# Patient Record
Sex: FEMALE | Race: WHITE | Employment: OTHER | URBAN - METROPOLITAN AREA
[De-identification: names, ages, dates, MRNs, and addresses within clinical notes are randomized per-mention and may not be internally consistent; named-entity substitution may affect disease eponyms.]

---

## 2018-07-17 ENCOUNTER — OFFICE VISIT (OUTPATIENT)
Dept: FAMILY MEDICINE CLINIC | Age: 58
End: 2018-07-17
Payer: COMMERCIAL

## 2018-07-17 VITALS
DIASTOLIC BLOOD PRESSURE: 76 MMHG | BODY MASS INDEX: 35.65 KG/M2 | WEIGHT: 214 LBS | HEIGHT: 65 IN | RESPIRATION RATE: 18 BRPM | TEMPERATURE: 98.5 F | SYSTOLIC BLOOD PRESSURE: 130 MMHG | OXYGEN SATURATION: 94 % | HEART RATE: 94 BPM

## 2018-07-17 DIAGNOSIS — Z83.3 FAMILY HISTORY OF DIABETES MELLITUS: ICD-10-CM

## 2018-07-17 DIAGNOSIS — M54.42 CHRONIC BILATERAL LOW BACK PAIN WITH LEFT-SIDED SCIATICA: ICD-10-CM

## 2018-07-17 DIAGNOSIS — Z00.00 ROUTINE PHYSICAL EXAMINATION: Primary | ICD-10-CM

## 2018-07-17 DIAGNOSIS — Z12.11 SCREEN FOR COLON CANCER: ICD-10-CM

## 2018-07-17 DIAGNOSIS — M25.552 LEFT HIP PAIN: ICD-10-CM

## 2018-07-17 DIAGNOSIS — Z12.39 SCREENING FOR BREAST CANCER: ICD-10-CM

## 2018-07-17 DIAGNOSIS — Z23 NEED FOR PNEUMOCOCCAL VACCINATION: ICD-10-CM

## 2018-07-17 DIAGNOSIS — Z13.1 SCREENING FOR DIABETES MELLITUS: ICD-10-CM

## 2018-07-17 DIAGNOSIS — Z87.442 HISTORY OF NEPHROLITHIASIS: ICD-10-CM

## 2018-07-17 DIAGNOSIS — Z87.440 HISTORY OF UTI: ICD-10-CM

## 2018-07-17 DIAGNOSIS — Z86.19 HISTORY OF HERPES GENITALIS: ICD-10-CM

## 2018-07-17 DIAGNOSIS — G89.29 CHRONIC BILATERAL LOW BACK PAIN WITH LEFT-SIDED SCIATICA: ICD-10-CM

## 2018-07-17 LAB
BILIRUBIN, POC: NORMAL
BLOOD URINE, POC: NORMAL
CLARITY, POC: CLEAR
COLOR, POC: YELLOW
GLUCOSE URINE, POC: NORMAL
KETONES, POC: NORMAL
LEUKOCYTE EST, POC: NORMAL
NITRITE, POC: NORMAL
PH, POC: 6
PROTEIN, POC: 100
SPECIFIC GRAVITY, POC: 1.03
UROBILINOGEN, POC: 0.2

## 2018-07-17 PROCEDURE — 90732 PPSV23 VACC 2 YRS+ SUBQ/IM: CPT | Performed by: FAMILY MEDICINE

## 2018-07-17 PROCEDURE — 90471 IMMUNIZATION ADMIN: CPT | Performed by: FAMILY MEDICINE

## 2018-07-17 PROCEDURE — 99386 PREV VISIT NEW AGE 40-64: CPT | Performed by: FAMILY MEDICINE

## 2018-07-17 PROCEDURE — 81002 URINALYSIS NONAUTO W/O SCOPE: CPT | Performed by: FAMILY MEDICINE

## 2018-07-17 RX ORDER — VALACYCLOVIR HYDROCHLORIDE 1 G/1
1000 TABLET, FILM COATED ORAL DAILY
Qty: 5 TABLET | Refills: 1 | Status: SHIPPED | OUTPATIENT
Start: 2018-07-17 | End: 2021-04-27 | Stop reason: SDUPTHER

## 2018-07-17 RX ORDER — MELOXICAM 7.5 MG/1
TABLET ORAL
Qty: 60 TABLET | Refills: 2 | Status: ON HOLD | OUTPATIENT
Start: 2018-07-17 | End: 2018-09-20

## 2018-07-17 ASSESSMENT — ENCOUNTER SYMPTOMS
CONSTIPATION: 1
BLOOD IN STOOL: 0
DIARRHEA: 1
HEARTBURN: 1
BACK PAIN: 1

## 2018-07-17 ASSESSMENT — PATIENT HEALTH QUESTIONNAIRE - PHQ9
SUM OF ALL RESPONSES TO PHQ QUESTIONS 1-9: 0
1. LITTLE INTEREST OR PLEASURE IN DOING THINGS: 0
2. FEELING DOWN, DEPRESSED OR HOPELESS: 0
SUM OF ALL RESPONSES TO PHQ9 QUESTIONS 1 & 2: 0

## 2018-07-17 NOTE — PROGRESS NOTES
7/17/2018    Eder March is a 60 y.o. 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 Has not been to a doctor for some time. She has 3 kids, grown up, also has grandkids. Her sons children live with her  Health issues - she has h/o kidney infections and kidney stones. 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 She has a hiatal hernia. A few years ago had a few variable blood pressures both too high and too low. No recent evaluation for diabetes. She had a few normal paps after procedure for stage 4 abnormal cells of cervix. She is having some pain shooting into her legs  \"I think I have something pinched\"  Will get a sudden grabbing pain and will yell with pain, will occur once or twice and then won't recur. Then will occur again every few days  In addition to this, she has constant pain left side, sometimes radiating to right side - radiates to top thigh and left hip. Will sometimes have pain across lower part of them  She is having more trouble - more limited in her activities, feels unsteady on her feet as well. Previously had more strength in her legs  More trouble doing gardening and cutting grass. She has tried advil for pain - \"takes the edge off\". occ numbness in right hand numb  Worse with certain positions    Smokes - Quit for 6 months years and years ago. Not interested in quitting at this time    She also reports history of genital herpes. Got from  uyears ago. Gets outbreaks periodically. At one time was on daily medication but has not had medications for a long time.     Wt Readings from Last 3 Encounters:   07/17/18 214 lb (97.1 kg)   03/07/14 280 lb (127 kg)       Allergies   Allergen Reactions    Latex     Aloe     Iodine        Medications  Current Outpatient Prescriptions   Medication Sig Dispense Refill    Multiple Vitamins-Minerals (CENTRUM SILVER PO) Take  by mouth daily.  Omega-3 Fatty Acids (FISH OIL PO) Take  by mouth every 48 hours.  Naproxen Sodium (ALEVE PO) Take  by mouth daily. No current facility-administered medications for this visit. Past Medical/Surgical Hx;  Reviewed with patient      Diagnosis Date    Degenerative arthritis of hand     Hiatal hernia      Past Surgical History:   Procedure Laterality Date    TONSILLECTOMY         Past Family Hx:  Reviewed with patient  Family History   Problem Relation Age of Onset    Diabetes Mother     Arthritis Mother     Atrial Fibrillation Mother     Diabetes Father    Mora Dakins Atrial Fibrillation Father     Arthritis Father    Mora Dakins Emphysema Father    Mora Dakins Cancer Sister        Social Hx:  Reviewed with patient  Social History   Substance Use Topics    Smoking status: Current Every Day Smoker     Packs/day: 1.50     Years: 43.00     Types: Cigarettes    Smokeless tobacco: Never Used    Alcohol use No         There is no immunization history on file for this patient. Review of Systems  Review of Systems   Constitutional: Negative for chills and fever. Gastrointestinal: Positive for constipation (occ), diarrhea (occ) and heartburn (depending on diet). Negative for blood in stool and melena. Musculoskeletal: Positive for back pain. Neurological: Positive for weakness (legs). PE:  VS:  /76   Pulse 94   Temp 98.5 °F (36.9 °C) (Oral)   Resp 18   Ht 5' 5\" (1.651 m)   Wt 214 lb (97.1 kg)   SpO2 94%   Breastfeeding? No   BMI 35.61 kg/m²   Physical Exam   Constitutional: She is oriented to person, place, and time. She appears well-developed and well-nourished. Central obesity   HENT:   Head: Normocephalic and atraumatic. Eyes: Conjunctivae and EOM are normal. Pupils are equal, round, and reactive to light.    Cardiovascular: Normal rate, regular rhythm and

## 2018-07-17 NOTE — PATIENT INSTRUCTIONS
[email or web address] and sign in to your [employer] account. Enter Z339 in the [employer] box to learn more about \"Back Stretches: Exercises. \"     If you do not have an account, please click on the \"Sign Up Now\" link. Current as of: November 29, 2017  Content Version: 11.6  © 3234-1381 [employer]. Care instructions adapted under license by [facility] ([facility]). If you have questions about a medical condition or this instruction, always ask your healthcare professional. [address] any warranty or liability for your use of this information. Patient Education        Sciatica: Exercises  Your Care Instructions  Here are some examples of typical rehabilitation exercises for your condition. Start each exercise slowly. Ease off the exercise if you start to have pain. Your doctor or physical therapist will tell you when you can start these exercises and which ones will work best for you. When you are not being active, find a comfortable position for rest. Some people are comfortable on the floor or a medium-firm bed with a small pillow under their head and another under their knees. Some people prefer to lie on their side with a pillow between their knees. Don't stay in one position for too long. Take short walks (10 to 20 minutes) every 2 to 3 hours. Avoid slopes, hills, and stairs until you feel better. Walk only distances you can manage without pain, especially leg pain. How to do the exercises  Back stretches    5. Get down on your hands and knees on the floor. 6. Relax your head and allow it to droop. Round your back up toward the ceiling until you feel a nice stretch in your upper, middle, and lower back. Hold this stretch for as long as it feels comfortable, or about 15 to 30 seconds. 7. Return to the starting position with a flat back while you are on your hands and knees. 8. Let your back sway by pressing your stomach toward the floor.  Lift bend your knee. With your opposite hand, reach across your body, and then gently pull your knee toward your opposite shoulder. 7. Hold the stretch for 15 to 30 seconds. 8. Repeat 2 to 4 times. Double knee-to-chest    5. Lie on your back with your knees bent and your feet flat on the floor. You can put a small pillow under your head and neck if it is more comfortable. 6. Bring both knees to your chest.  7. Keep your lower back pressed to the floor. Hold for 15 to 30 seconds. 8. Relax, and lower your knees to the starting position. 9. Repeat 2 to 4 times. Follow-up care is a key part of your treatment and safety. Be sure to make and go to all appointments, and call your doctor if you are having problems. It's also a good idea to know your test results and keep a list of the medicines you take. Where can you learn more? Go to [email or web address] and sign in to your [email or web address] account. Enter L233 in the [employer] box to learn more about \"Trochanteric Bursitis: Exercises. \"     If you do not have an account, please click on the \"Sign Up Now\" link. Current as of: November 29, 2017  Content Version: 11.6  © 7879-1280 [employer]. Care instructions adapted under license by [facility] ([facility]). If you have questions about a medical condition or this instruction, always ask your healthcare professional. [place] [address] any warranty or liability for your use of this information.

## 2018-07-19 DIAGNOSIS — R31.29 OTHER MICROSCOPIC HEMATURIA: Primary | ICD-10-CM

## 2018-07-19 DIAGNOSIS — Z87.442 HISTORY OF NEPHROLITHIASIS: ICD-10-CM

## 2018-07-24 ENCOUNTER — HOSPITAL ENCOUNTER (OUTPATIENT)
Age: 58
Discharge: HOME | End: 2018-07-26
Payer: COMMERCIAL

## 2018-07-24 ENCOUNTER — NURSE ONLY (OUTPATIENT)
Dept: FAMILY MEDICINE CLINIC | Age: 58
End: 2018-07-24
Payer: COMMERCIAL

## 2018-07-24 DIAGNOSIS — Z87.440 HISTORY OF UTI: ICD-10-CM

## 2018-07-24 DIAGNOSIS — Z87.442 HISTORY OF NEPHROLITHIASIS: ICD-10-CM

## 2018-07-24 DIAGNOSIS — Z13.1 SCREENING FOR DIABETES MELLITUS: ICD-10-CM

## 2018-07-24 LAB
ALBUMIN SERPL-MCNC: 4.2 G/DL (ref 3.5–5.2)
ALP BLD-CCNC: 87 U/L (ref 35–104)
ALT SERPL-CCNC: 11 U/L (ref 0–32)
ANION GAP SERPL CALCULATED.3IONS-SCNC: 16 MMOL/L (ref 7–16)
AST SERPL-CCNC: 15 U/L (ref 0–31)
BASOPHILS ABSOLUTE: 0 E9/L (ref 0–0.2)
BASOPHILS RELATIVE PERCENT: 0.8 % (ref 0–2)
BILIRUB SERPL-MCNC: 0.5 MG/DL (ref 0–1.2)
BUN BLDV-MCNC: 14 MG/DL (ref 6–20)
BURR CELLS: NORMAL
CALCIUM SERPL-MCNC: 9.7 MG/DL (ref 8.6–10.2)
CHLORIDE BLD-SCNC: 102 MMOL/L (ref 98–107)
CHOLESTEROL, TOTAL: 186 MG/DL (ref 0–199)
CO2: 25 MMOL/L (ref 22–29)
CREAT SERPL-MCNC: 0.7 MG/DL (ref 0.5–1)
EOSINOPHILS ABSOLUTE: 0.14 E9/L (ref 0.05–0.5)
EOSINOPHILS RELATIVE PERCENT: 1.8 % (ref 0–6)
GFR AFRICAN AMERICAN: >60
GFR NON-AFRICAN AMERICAN: >60 ML/MIN/1.73
GLUCOSE BLD-MCNC: 107 MG/DL (ref 74–109)
HCT VFR BLD CALC: 46.1 % (ref 34–48)
HDLC SERPL-MCNC: 47 MG/DL
HEMOGLOBIN: 15.5 G/DL (ref 11.5–15.5)
LDL CHOLESTEROL CALCULATED: 108 MG/DL (ref 0–99)
LYMPHOCYTES ABSOLUTE: 2.5 E9/L (ref 1.5–4)
LYMPHOCYTES RELATIVE PERCENT: 31.6 % (ref 20–42)
MCH RBC QN AUTO: 31.8 PG (ref 26–35)
MCHC RBC AUTO-ENTMCNC: 33.6 % (ref 32–34.5)
MCV RBC AUTO: 94.7 FL (ref 80–99.9)
MONOCYTES ABSOLUTE: 0.55 E9/L (ref 0.1–0.95)
MONOCYTES RELATIVE PERCENT: 7 % (ref 2–12)
NEUTROPHILS ABSOLUTE: 4.68 E9/L (ref 1.8–7.3)
NEUTROPHILS RELATIVE PERCENT: 59.6 % (ref 43–80)
PDW BLD-RTO: 13.2 FL (ref 11.5–15)
PLATELET # BLD: 252 E9/L (ref 130–450)
PMV BLD AUTO: 9.6 FL (ref 7–12)
POIKILOCYTES: NORMAL
POTASSIUM SERPL-SCNC: 4.6 MMOL/L (ref 3.5–5)
RBC # BLD: 4.87 E12/L (ref 3.5–5.5)
SODIUM BLD-SCNC: 143 MMOL/L (ref 132–146)
TOTAL PROTEIN: 7.1 G/DL (ref 6.4–8.3)
TRIGL SERPL-MCNC: 157 MG/DL (ref 0–149)
VLDLC SERPL CALC-MCNC: 31 MG/DL
WBC # BLD: 7.8 E9/L (ref 4.5–11.5)

## 2018-07-24 PROCEDURE — 80053 COMPREHEN METABOLIC PANEL: CPT

## 2018-07-24 PROCEDURE — 85025 COMPLETE CBC W/AUTO DIFF WBC: CPT

## 2018-07-24 PROCEDURE — 36415 COLL VENOUS BLD VENIPUNCTURE: CPT | Performed by: FAMILY MEDICINE

## 2018-07-24 PROCEDURE — 80061 LIPID PANEL: CPT

## 2018-07-25 ENCOUNTER — HOSPITAL ENCOUNTER (OUTPATIENT)
Dept: ULTRASOUND IMAGING | Age: 58
Discharge: HOME | End: 2018-07-27
Payer: COMMERCIAL

## 2018-07-25 DIAGNOSIS — Z87.442 HISTORY OF NEPHROLITHIASIS: ICD-10-CM

## 2018-07-25 DIAGNOSIS — R31.29 OTHER MICROSCOPIC HEMATURIA: ICD-10-CM

## 2018-07-25 PROCEDURE — 76770 US EXAM ABDO BACK WALL COMP: CPT

## 2018-07-27 DIAGNOSIS — Z87.442 HISTORY OF NEPHROLITHIASIS: ICD-10-CM

## 2018-07-27 DIAGNOSIS — Z72.0 TOBACCO USE: ICD-10-CM

## 2018-07-27 DIAGNOSIS — R31.29 OTHER MICROSCOPIC HEMATURIA: Primary | ICD-10-CM

## 2018-09-10 ENCOUNTER — APPOINTMENT (OUTPATIENT)
Dept: CT IMAGING | Age: 58
DRG: 021 | End: 2018-09-10
Payer: COMMERCIAL

## 2018-09-10 ENCOUNTER — HOSPITAL ENCOUNTER (INPATIENT)
Age: 58
LOS: 10 days | Discharge: NURSING, REHAB OR LONG-TERM CARE | DRG: 021 | End: 2018-09-20
Attending: EMERGENCY MEDICINE | Admitting: INTERNAL MEDICINE
Payer: COMMERCIAL

## 2018-09-10 ENCOUNTER — ANESTHESIA EVENT (OUTPATIENT)
Dept: INTERVENTIONAL RADIOLOGY/VASCULAR | Age: 58
DRG: 021 | End: 2018-09-10
Payer: COMMERCIAL

## 2018-09-10 ENCOUNTER — ANESTHESIA (OUTPATIENT)
Dept: INTERVENTIONAL RADIOLOGY/VASCULAR | Age: 58
DRG: 021 | End: 2018-09-10
Payer: COMMERCIAL

## 2018-09-10 ENCOUNTER — APPOINTMENT (OUTPATIENT)
Dept: INTERVENTIONAL RADIOLOGY/VASCULAR | Age: 58
DRG: 021 | End: 2018-09-10
Payer: COMMERCIAL

## 2018-09-10 VITALS — SYSTOLIC BLOOD PRESSURE: 125 MMHG | TEMPERATURE: 96.8 F | OXYGEN SATURATION: 95 % | DIASTOLIC BLOOD PRESSURE: 63 MMHG

## 2018-09-10 DIAGNOSIS — I10 ACCELERATED HYPERTENSION: ICD-10-CM

## 2018-09-10 DIAGNOSIS — I60.9 SAH (SUBARACHNOID HEMORRHAGE) (HCC): ICD-10-CM

## 2018-09-10 DIAGNOSIS — I60.9 SUBARACHNOID BLEED (HCC): Primary | ICD-10-CM

## 2018-09-10 DIAGNOSIS — R51.9 ACUTE INTRACTABLE HEADACHE, UNSPECIFIED HEADACHE TYPE: ICD-10-CM

## 2018-09-10 PROBLEM — I67.1 CEREBRAL ANEURYSM: Status: ACTIVE | Noted: 2018-09-10

## 2018-09-10 PROBLEM — Z87.440 HISTORY OF UTI: Status: RESOLVED | Noted: 2018-07-17 | Resolved: 2018-09-10

## 2018-09-10 PROBLEM — E66.9 OBESITY (BMI 30-39.9): Chronic | Status: ACTIVE | Noted: 2018-09-10

## 2018-09-10 PROBLEM — I16.1 HYPERTENSIVE EMERGENCY: Status: ACTIVE | Noted: 2018-09-10

## 2018-09-10 PROBLEM — Z87.442 HISTORY OF NEPHROLITHIASIS: Status: RESOLVED | Noted: 2018-07-17 | Resolved: 2018-09-10

## 2018-09-10 LAB
ABO/RH: NORMAL
ACETAMINOPHEN LEVEL: <5 MCG/ML (ref 10–30)
AMPHETAMINE SCREEN, URINE: NOT DETECTED
ANION GAP SERPL CALCULATED.3IONS-SCNC: 15 MMOL/L (ref 7–16)
ANTIBODY SCREEN: NORMAL
APTT: 26 SEC (ref 24.5–35.1)
BARBITURATE SCREEN URINE: NOT DETECTED
BASOPHILS ABSOLUTE: 0.05 E9/L (ref 0–0.2)
BASOPHILS RELATIVE PERCENT: 0.4 % (ref 0–2)
BENZODIAZEPINE SCREEN, URINE: NOT DETECTED
BUN BLDV-MCNC: 19 MG/DL (ref 6–20)
CALCIUM SERPL-MCNC: 8.9 MG/DL (ref 8.6–10.2)
CANNABINOID SCREEN URINE: NOT DETECTED
CHLORIDE BLD-SCNC: 100 MMOL/L (ref 98–107)
CO2: 19 MMOL/L (ref 22–29)
COCAINE METABOLITE SCREEN URINE: NOT DETECTED
CREAT SERPL-MCNC: 0.6 MG/DL (ref 0.5–1)
EKG ATRIAL RATE: 88 BPM
EKG P AXIS: 69 DEGREES
EKG P-R INTERVAL: 162 MS
EKG Q-T INTERVAL: 400 MS
EKG QRS DURATION: 98 MS
EKG QTC CALCULATION (BAZETT): 484 MS
EKG R AXIS: 78 DEGREES
EKG T AXIS: 62 DEGREES
EKG VENTRICULAR RATE: 88 BPM
EOSINOPHILS ABSOLUTE: 0.06 E9/L (ref 0.05–0.5)
EOSINOPHILS RELATIVE PERCENT: 0.4 % (ref 0–6)
ETHANOL: <10 MG/DL (ref 0–0.08)
GFR AFRICAN AMERICAN: >60
GFR NON-AFRICAN AMERICAN: >60 ML/MIN/1.73
GLUCOSE BLD-MCNC: 159 MG/DL (ref 74–109)
HCT VFR BLD CALC: 43 % (ref 34–48)
HEMOGLOBIN: 14.6 G/DL (ref 11.5–15.5)
IMMATURE GRANULOCYTES #: 0.16 E9/L
IMMATURE GRANULOCYTES %: 1.1 % (ref 0–5)
INR BLD: 0.9
LACTIC ACID: 2.3 MMOL/L (ref 0.5–2.2)
LYMPHOCYTES ABSOLUTE: 1.51 E9/L (ref 1.5–4)
LYMPHOCYTES RELATIVE PERCENT: 10.8 % (ref 20–42)
MAGNESIUM: 1.8 MG/DL (ref 1.6–2.6)
MCH RBC QN AUTO: 31.8 PG (ref 26–35)
MCHC RBC AUTO-ENTMCNC: 34 % (ref 32–34.5)
MCV RBC AUTO: 93.7 FL (ref 80–99.9)
METHADONE SCREEN, URINE: NOT DETECTED
MONOCYTES ABSOLUTE: 0.67 E9/L (ref 0.1–0.95)
MONOCYTES RELATIVE PERCENT: 4.8 % (ref 2–12)
NEUTROPHILS ABSOLUTE: 11.57 E9/L (ref 1.8–7.3)
NEUTROPHILS RELATIVE PERCENT: 82.5 % (ref 43–80)
OPIATE SCREEN URINE: NOT DETECTED
PDW BLD-RTO: 12.8 FL (ref 11.5–15)
PHENCYCLIDINE SCREEN URINE: NOT DETECTED
PLATELET # BLD: 238 E9/L (ref 130–450)
PMV BLD AUTO: 9.3 FL (ref 7–12)
POTASSIUM SERPL-SCNC: 3.7 MMOL/L (ref 3.5–5)
PROPOXYPHENE SCREEN: NOT DETECTED
PROTHROMBIN TIME: 10.9 SEC (ref 9.3–12.4)
RBC # BLD: 4.59 E12/L (ref 3.5–5.5)
SALICYLATE, SERUM: <0.3 MG/DL (ref 0–30)
SODIUM BLD-SCNC: 134 MMOL/L (ref 132–146)
TRICYCLIC ANTIDEPRESSANTS SCREEN SERUM: NEGATIVE NG/ML
TROPONIN: <0.01 NG/ML (ref 0–0.03)
WBC # BLD: 14 E9/L (ref 4.5–11.5)

## 2018-09-10 PROCEDURE — 36415 COLL VENOUS BLD VENIPUNCTURE: CPT

## 2018-09-10 PROCEDURE — 03VG3DZ RESTRICTION OF INTRACRANIAL ARTERY WITH INTRALUMINAL DEVICE, PERCUTANEOUS APPROACH: ICD-10-PCS | Performed by: NEUROLOGICAL SURGERY

## 2018-09-10 PROCEDURE — 96375 TX/PRO/DX INJ NEW DRUG ADDON: CPT

## 2018-09-10 PROCEDURE — 70498 CT ANGIOGRAPHY NECK: CPT

## 2018-09-10 PROCEDURE — 93005 ELECTROCARDIOGRAM TRACING: CPT | Performed by: PHYSICIAN ASSISTANT

## 2018-09-10 PROCEDURE — 6360000002 HC RX W HCPCS

## 2018-09-10 PROCEDURE — 70496 CT ANGIOGRAPHY HEAD: CPT

## 2018-09-10 PROCEDURE — 7100000000 HC PACU RECOVERY - FIRST 15 MIN

## 2018-09-10 PROCEDURE — 85025 COMPLETE CBC W/AUTO DIFF WBC: CPT

## 2018-09-10 PROCEDURE — 6360000002 HC RX W HCPCS: Performed by: INTERNAL MEDICINE

## 2018-09-10 PROCEDURE — 6360000002 HC RX W HCPCS: Performed by: EMERGENCY MEDICINE

## 2018-09-10 PROCEDURE — 2500000003 HC RX 250 WO HCPCS: Performed by: NURSE ANESTHETIST, CERTIFIED REGISTERED

## 2018-09-10 PROCEDURE — 84484 ASSAY OF TROPONIN QUANT: CPT

## 2018-09-10 PROCEDURE — 94761 N-INVAS EAR/PLS OXIMETRY MLT: CPT

## 2018-09-10 PROCEDURE — 86850 RBC ANTIBODY SCREEN: CPT

## 2018-09-10 PROCEDURE — 6360000002 HC RX W HCPCS: Performed by: RADIOLOGY

## 2018-09-10 PROCEDURE — 6360000004 HC RX CONTRAST MEDICATION: Performed by: RADIOLOGY

## 2018-09-10 PROCEDURE — 83605 ASSAY OF LACTIC ACID: CPT

## 2018-09-10 PROCEDURE — 3700000001 HC ADD 15 MINUTES (ANESTHESIA)

## 2018-09-10 PROCEDURE — 2580000003 HC RX 258

## 2018-09-10 PROCEDURE — 99285 EMERGENCY DEPT VISIT HI MDM: CPT

## 2018-09-10 PROCEDURE — 85610 PROTHROMBIN TIME: CPT

## 2018-09-10 PROCEDURE — 85730 THROMBOPLASTIN TIME PARTIAL: CPT

## 2018-09-10 PROCEDURE — S0028 INJECTION, FAMOTIDINE, 20 MG: HCPCS | Performed by: PHYSICIAN ASSISTANT

## 2018-09-10 PROCEDURE — 2500000003 HC RX 250 WO HCPCS: Performed by: PHYSICIAN ASSISTANT

## 2018-09-10 PROCEDURE — 6360000002 HC RX W HCPCS: Performed by: PHYSICIAN ASSISTANT

## 2018-09-10 PROCEDURE — 80048 BASIC METABOLIC PNL TOTAL CA: CPT

## 2018-09-10 PROCEDURE — 83735 ASSAY OF MAGNESIUM: CPT

## 2018-09-10 PROCEDURE — 96365 THER/PROPH/DIAG IV INF INIT: CPT

## 2018-09-10 PROCEDURE — C9113 INJ PANTOPRAZOLE SODIUM, VIA: HCPCS | Performed by: INTERNAL MEDICINE

## 2018-09-10 PROCEDURE — 86900 BLOOD TYPING SEROLOGIC ABO: CPT

## 2018-09-10 PROCEDURE — 7100000001 HC PACU RECOVERY - ADDTL 15 MIN

## 2018-09-10 PROCEDURE — 2580000003 HC RX 258: Performed by: NURSE PRACTITIONER

## 2018-09-10 PROCEDURE — C9248 INJ, CLEVIDIPINE BUTYRATE: HCPCS | Performed by: EMERGENCY MEDICINE

## 2018-09-10 PROCEDURE — 2500000003 HC RX 250 WO HCPCS

## 2018-09-10 PROCEDURE — 6360000002 HC RX W HCPCS: Performed by: NURSE ANESTHETIST, CERTIFIED REGISTERED

## 2018-09-10 PROCEDURE — 2500000003 HC RX 250 WO HCPCS: Performed by: NURSE PRACTITIONER

## 2018-09-10 PROCEDURE — 99233 SBSQ HOSP IP/OBS HIGH 50: CPT | Performed by: NURSE PRACTITIONER

## 2018-09-10 PROCEDURE — 2720000010 IR ULTRASOUND GUIDANCE VASCULAR ACCESS

## 2018-09-10 PROCEDURE — 87081 CULTURE SCREEN ONLY: CPT

## 2018-09-10 PROCEDURE — 2580000003 HC RX 258: Performed by: RADIOLOGY

## 2018-09-10 PROCEDURE — 2000000000 HC ICU R&B

## 2018-09-10 PROCEDURE — 3700000000 HC ANESTHESIA ATTENDED CARE

## 2018-09-10 PROCEDURE — 86901 BLOOD TYPING SEROLOGIC RH(D): CPT

## 2018-09-10 PROCEDURE — 80307 DRUG TEST PRSMV CHEM ANLYZR: CPT

## 2018-09-10 PROCEDURE — G0480 DRUG TEST DEF 1-7 CLASSES: HCPCS

## 2018-09-10 RX ORDER — FENTANYL CITRATE 50 UG/ML
25 INJECTION, SOLUTION INTRAMUSCULAR; INTRAVENOUS ONCE
Status: COMPLETED | OUTPATIENT
Start: 2018-09-10 | End: 2018-09-10

## 2018-09-10 RX ORDER — HYDRALAZINE HYDROCHLORIDE 20 MG/ML
10 INJECTION INTRAMUSCULAR; INTRAVENOUS ONCE
Status: COMPLETED | OUTPATIENT
Start: 2018-09-10 | End: 2018-09-10

## 2018-09-10 RX ORDER — HEPARIN SODIUM 10000 [USP'U]/ML
5000 INJECTION, SOLUTION INTRAVENOUS; SUBCUTANEOUS EVERY 5 MIN PRN
Status: COMPLETED | OUTPATIENT
Start: 2018-09-10 | End: 2018-09-10

## 2018-09-10 RX ORDER — MORPHINE SULFATE 2 MG/ML
2 INJECTION, SOLUTION INTRAMUSCULAR; INTRAVENOUS EVERY 5 MIN PRN
Status: DISCONTINUED | OUTPATIENT
Start: 2018-09-10 | End: 2018-09-11

## 2018-09-10 RX ORDER — METHYLPREDNISOLONE SODIUM SUCCINATE 125 MG/2ML
INJECTION, POWDER, LYOPHILIZED, FOR SOLUTION INTRAMUSCULAR; INTRAVENOUS PRN
Status: DISCONTINUED | OUTPATIENT
Start: 2018-09-10 | End: 2018-09-10 | Stop reason: SDUPTHER

## 2018-09-10 RX ORDER — MEPERIDINE HYDROCHLORIDE 50 MG/ML
12.5 INJECTION INTRAMUSCULAR; INTRAVENOUS; SUBCUTANEOUS EVERY 5 MIN PRN
Status: DISCONTINUED | OUTPATIENT
Start: 2018-09-10 | End: 2018-09-11

## 2018-09-10 RX ORDER — ONDANSETRON 2 MG/ML
4 INJECTION INTRAMUSCULAR; INTRAVENOUS ONCE
Status: COMPLETED | OUTPATIENT
Start: 2018-09-10 | End: 2018-09-10

## 2018-09-10 RX ORDER — DEXAMETHASONE SODIUM PHOSPHATE 10 MG/ML
INJECTION, SOLUTION INTRAMUSCULAR; INTRAVENOUS PRN
Status: DISCONTINUED | OUTPATIENT
Start: 2018-09-10 | End: 2018-09-10 | Stop reason: SDUPTHER

## 2018-09-10 RX ORDER — MORPHINE SULFATE 2 MG/ML
1 INJECTION, SOLUTION INTRAMUSCULAR; INTRAVENOUS EVERY 5 MIN PRN
Status: DISCONTINUED | OUTPATIENT
Start: 2018-09-10 | End: 2018-09-11

## 2018-09-10 RX ORDER — HYDROCODONE BITARTRATE AND ACETAMINOPHEN 5; 325 MG/1; MG/1
2 TABLET ORAL PRN
Status: ACTIVE | OUTPATIENT
Start: 2018-09-10 | End: 2018-09-10

## 2018-09-10 RX ORDER — PROPOFOL 10 MG/ML
INJECTION, EMULSION INTRAVENOUS PRN
Status: DISCONTINUED | OUTPATIENT
Start: 2018-09-10 | End: 2018-09-10 | Stop reason: SDUPTHER

## 2018-09-10 RX ORDER — HYDROCODONE BITARTRATE AND ACETAMINOPHEN 5; 325 MG/1; MG/1
1 TABLET ORAL PRN
Status: ACTIVE | OUTPATIENT
Start: 2018-09-10 | End: 2018-09-10

## 2018-09-10 RX ORDER — VECURONIUM BROMIDE 1 MG/ML
INJECTION, POWDER, LYOPHILIZED, FOR SOLUTION INTRAVENOUS PRN
Status: DISCONTINUED | OUTPATIENT
Start: 2018-09-10 | End: 2018-09-10 | Stop reason: SDUPTHER

## 2018-09-10 RX ORDER — DIPHENHYDRAMINE HYDROCHLORIDE 50 MG/ML
25 INJECTION INTRAMUSCULAR; INTRAVENOUS ONCE
Status: COMPLETED | OUTPATIENT
Start: 2018-09-10 | End: 2018-09-10

## 2018-09-10 RX ORDER — SODIUM CHLORIDE 9 MG/ML
INJECTION, SOLUTION INTRAVENOUS CONTINUOUS
Status: DISCONTINUED | OUTPATIENT
Start: 2018-09-10 | End: 2018-09-11

## 2018-09-10 RX ORDER — GLYCOPYRROLATE 1 MG/5 ML
SYRINGE (ML) INTRAVENOUS PRN
Status: DISCONTINUED | OUTPATIENT
Start: 2018-09-10 | End: 2018-09-10 | Stop reason: SDUPTHER

## 2018-09-10 RX ORDER — FENTANYL CITRATE 50 UG/ML
INJECTION, SOLUTION INTRAMUSCULAR; INTRAVENOUS
Status: COMPLETED
Start: 2018-09-10 | End: 2018-09-10

## 2018-09-10 RX ORDER — SODIUM CHLORIDE 0.9 % (FLUSH) 0.9 %
10 SYRINGE (ML) INJECTION PRN
Status: COMPLETED | OUTPATIENT
Start: 2018-09-10 | End: 2018-09-10

## 2018-09-10 RX ORDER — SODIUM CHLORIDE 0.9 % (FLUSH) 0.9 %
10 SYRINGE (ML) INJECTION EVERY 12 HOURS SCHEDULED
Status: DISCONTINUED | OUTPATIENT
Start: 2018-09-10 | End: 2018-09-20 | Stop reason: HOSPADM

## 2018-09-10 RX ORDER — DIPHENHYDRAMINE HYDROCHLORIDE 50 MG/ML
50 INJECTION INTRAMUSCULAR; INTRAVENOUS ONCE
Status: DISCONTINUED | OUTPATIENT
Start: 2018-09-10 | End: 2018-09-14

## 2018-09-10 RX ORDER — NEOSTIGMINE METHYLSULFATE 1 MG/ML
INJECTION, SOLUTION INTRAVENOUS PRN
Status: DISCONTINUED | OUTPATIENT
Start: 2018-09-10 | End: 2018-09-10 | Stop reason: SDUPTHER

## 2018-09-10 RX ORDER — SODIUM CHLORIDE 0.9 % (FLUSH) 0.9 %
10 SYRINGE (ML) INJECTION EVERY 12 HOURS SCHEDULED
Status: DISCONTINUED | OUTPATIENT
Start: 2018-09-10 | End: 2018-09-10 | Stop reason: SDUPTHER

## 2018-09-10 RX ORDER — ACETAMINOPHEN 325 MG/1
650 TABLET ORAL EVERY 4 HOURS PRN
Status: DISCONTINUED | OUTPATIENT
Start: 2018-09-10 | End: 2018-09-15

## 2018-09-10 RX ORDER — FENTANYL CITRATE 50 UG/ML
INJECTION, SOLUTION INTRAMUSCULAR; INTRAVENOUS PRN
Status: DISCONTINUED | OUTPATIENT
Start: 2018-09-10 | End: 2018-09-10 | Stop reason: SDUPTHER

## 2018-09-10 RX ORDER — METHYLPREDNISOLONE SODIUM SUCCINATE 125 MG/2ML
125 INJECTION, POWDER, LYOPHILIZED, FOR SOLUTION INTRAMUSCULAR; INTRAVENOUS ONCE
Status: COMPLETED | OUTPATIENT
Start: 2018-09-10 | End: 2018-09-10

## 2018-09-10 RX ORDER — HYDRALAZINE HYDROCHLORIDE 20 MG/ML
10 INJECTION INTRAMUSCULAR; INTRAVENOUS EVERY 10 MIN PRN
Status: DISCONTINUED | OUTPATIENT
Start: 2018-09-10 | End: 2018-09-11

## 2018-09-10 RX ORDER — ONDANSETRON 2 MG/ML
INJECTION INTRAMUSCULAR; INTRAVENOUS
Status: COMPLETED
Start: 2018-09-10 | End: 2018-09-10

## 2018-09-10 RX ORDER — HEPARIN SODIUM 1000 [USP'U]/ML
INJECTION, SOLUTION INTRAVENOUS; SUBCUTANEOUS PRN
Status: DISCONTINUED | OUTPATIENT
Start: 2018-09-10 | End: 2018-09-10 | Stop reason: SDUPTHER

## 2018-09-10 RX ORDER — SODIUM CHLORIDE 0.9 % (FLUSH) 0.9 %
10 SYRINGE (ML) INJECTION PRN
Status: DISCONTINUED | OUTPATIENT
Start: 2018-09-10 | End: 2018-09-20 | Stop reason: HOSPADM

## 2018-09-10 RX ORDER — PROMETHAZINE HYDROCHLORIDE 25 MG/ML
6.25 INJECTION, SOLUTION INTRAMUSCULAR; INTRAVENOUS EVERY 10 MIN PRN
Status: DISCONTINUED | OUTPATIENT
Start: 2018-09-10 | End: 2018-09-11

## 2018-09-10 RX ORDER — METHYLPREDNISOLONE ACETATE 80 MG/ML
INJECTION, SUSPENSION INTRA-ARTICULAR; INTRALESIONAL; INTRAMUSCULAR; SOFT TISSUE PRN
Status: DISCONTINUED | OUTPATIENT
Start: 2018-09-10 | End: 2018-09-10

## 2018-09-10 RX ORDER — LABETALOL HYDROCHLORIDE 5 MG/ML
10 INJECTION, SOLUTION INTRAVENOUS EVERY 10 MIN PRN
Status: DISCONTINUED | OUTPATIENT
Start: 2018-09-10 | End: 2018-09-11

## 2018-09-10 RX ORDER — DIPHENHYDRAMINE HYDROCHLORIDE 50 MG/ML
INJECTION INTRAMUSCULAR; INTRAVENOUS PRN
Status: DISCONTINUED | OUTPATIENT
Start: 2018-09-10 | End: 2018-09-10 | Stop reason: SDUPTHER

## 2018-09-10 RX ORDER — SODIUM CHLORIDE 9 MG/ML
INJECTION, SOLUTION INTRAVENOUS CONTINUOUS PRN
Status: DISCONTINUED | OUTPATIENT
Start: 2018-09-10 | End: 2018-09-10 | Stop reason: SDUPTHER

## 2018-09-10 RX ORDER — SODIUM CHLORIDE 9 MG/ML
INJECTION, SOLUTION INTRAVENOUS CONTINUOUS PRN
Status: DISCONTINUED | OUTPATIENT
Start: 2018-09-10 | End: 2018-09-10

## 2018-09-10 RX ORDER — HYDRALAZINE HYDROCHLORIDE 20 MG/ML
INJECTION INTRAMUSCULAR; INTRAVENOUS
Status: COMPLETED
Start: 2018-09-10 | End: 2018-09-10

## 2018-09-10 RX ORDER — MORPHINE SULFATE 2 MG/ML
2 INJECTION, SOLUTION INTRAMUSCULAR; INTRAVENOUS EVERY 4 HOURS PRN
Status: DISCONTINUED | OUTPATIENT
Start: 2018-09-10 | End: 2018-09-11

## 2018-09-10 RX ORDER — METHYLPREDNISOLONE SODIUM SUCCINATE 40 MG/ML
40 INJECTION, POWDER, LYOPHILIZED, FOR SOLUTION INTRAMUSCULAR; INTRAVENOUS ONCE
Status: DISCONTINUED | OUTPATIENT
Start: 2018-09-10 | End: 2018-09-14

## 2018-09-10 RX ORDER — SUCCINYLCHOLINE CHLORIDE 20 MG/ML
INJECTION INTRAMUSCULAR; INTRAVENOUS PRN
Status: DISCONTINUED | OUTPATIENT
Start: 2018-09-10 | End: 2018-09-10 | Stop reason: SDUPTHER

## 2018-09-10 RX ORDER — ONDANSETRON 2 MG/ML
4 INJECTION INTRAMUSCULAR; INTRAVENOUS EVERY 6 HOURS PRN
Status: DISCONTINUED | OUTPATIENT
Start: 2018-09-10 | End: 2018-09-20 | Stop reason: HOSPADM

## 2018-09-10 RX ORDER — PANTOPRAZOLE SODIUM 40 MG/10ML
40 INJECTION, POWDER, LYOPHILIZED, FOR SOLUTION INTRAVENOUS DAILY
Status: DISCONTINUED | OUTPATIENT
Start: 2018-09-10 | End: 2018-09-17

## 2018-09-10 RX ADMIN — FENTANYL CITRATE 50 MCG: 50 INJECTION, SOLUTION INTRAMUSCULAR; INTRAVENOUS at 17:43

## 2018-09-10 RX ADMIN — VECURONIUM BROMIDE 2 MG: 1 INJECTION, POWDER, LYOPHILIZED, FOR SOLUTION INTRAVENOUS at 18:52

## 2018-09-10 RX ADMIN — ONDANSETRON 4 MG: 2 SOLUTION INTRAMUSCULAR; INTRAVENOUS at 11:51

## 2018-09-10 RX ADMIN — ONDANSETRON 4 MG: 2 INJECTION INTRAMUSCULAR; INTRAVENOUS at 02:16

## 2018-09-10 RX ADMIN — HYDRALAZINE HYDROCHLORIDE 10 MG: 20 INJECTION INTRAMUSCULAR; INTRAVENOUS at 01:40

## 2018-09-10 RX ADMIN — Medication 5000 UNITS: at 17:20

## 2018-09-10 RX ADMIN — DEXAMETHASONE SODIUM PHOSPHATE 10 MG: 10 INJECTION, SOLUTION INTRAMUSCULAR; INTRAVENOUS at 16:51

## 2018-09-10 RX ADMIN — Medication 10 ML: at 08:03

## 2018-09-10 RX ADMIN — MORPHINE SULFATE 2 MG: 2 INJECTION, SOLUTION INTRAMUSCULAR; INTRAVENOUS at 10:05

## 2018-09-10 RX ADMIN — DIPHENHYDRAMINE HYDROCHLORIDE 50 MG: 50 INJECTION, SOLUTION INTRAMUSCULAR; INTRAVENOUS at 16:34

## 2018-09-10 RX ADMIN — HYDRALAZINE HYDROCHLORIDE 10 MG: 20 INJECTION INTRAMUSCULAR; INTRAVENOUS at 01:28

## 2018-09-10 RX ADMIN — DIPHENHYDRAMINE HYDROCHLORIDE 25 MG: 50 INJECTION, SOLUTION INTRAMUSCULAR; INTRAVENOUS at 01:11

## 2018-09-10 RX ADMIN — CLEVIPIDINE 14 MG/HR: 0.5 EMULSION INTRAVENOUS at 05:56

## 2018-09-10 RX ADMIN — MORPHINE SULFATE 2 MG: 2 INJECTION, SOLUTION INTRAMUSCULAR; INTRAVENOUS at 04:26

## 2018-09-10 RX ADMIN — ONDANSETRON 4 MG: 2 SOLUTION INTRAMUSCULAR; INTRAVENOUS at 02:16

## 2018-09-10 RX ADMIN — HEPARIN SODIUM 2000 UNITS: 1000 INJECTION, SOLUTION INTRAVENOUS; SUBCUTANEOUS at 20:26

## 2018-09-10 RX ADMIN — Medication 20 ML: at 01:21

## 2018-09-10 RX ADMIN — SODIUM CHLORIDE: 9 INJECTION, SOLUTION INTRAVENOUS at 10:36

## 2018-09-10 RX ADMIN — Medication 5000 UNITS: at 17:35

## 2018-09-10 RX ADMIN — SODIUM CHLORIDE: 9 INJECTION, SOLUTION INTRAVENOUS at 16:18

## 2018-09-10 RX ADMIN — IOVERSOL 285 ML: 678 INJECTION INTRA-ARTERIAL; INTRAVENOUS at 22:30

## 2018-09-10 RX ADMIN — IOPAMIDOL 80 ML: 755 INJECTION, SOLUTION INTRAVENOUS at 01:21

## 2018-09-10 RX ADMIN — Medication 140 MG: at 16:32

## 2018-09-10 RX ADMIN — Medication 5000 UNITS: at 17:25

## 2018-09-10 RX ADMIN — Medication 5000 UNITS: at 17:30

## 2018-09-10 RX ADMIN — FENTANYL CITRATE 50 MCG: 50 INJECTION, SOLUTION INTRAMUSCULAR; INTRAVENOUS at 17:04

## 2018-09-10 RX ADMIN — Medication 5000 UNITS: at 17:15

## 2018-09-10 RX ADMIN — FENTANYL CITRATE 25 MCG: 50 INJECTION, SOLUTION INTRAMUSCULAR; INTRAVENOUS at 01:55

## 2018-09-10 RX ADMIN — PANTOPRAZOLE SODIUM 40 MG: 40 INJECTION, POWDER, FOR SOLUTION INTRAVENOUS at 08:03

## 2018-09-10 RX ADMIN — Medication 5000 UNITS: at 17:10

## 2018-09-10 RX ADMIN — VECURONIUM BROMIDE 8 MG: 1 INJECTION, POWDER, LYOPHILIZED, FOR SOLUTION INTRAVENOUS at 16:51

## 2018-09-10 RX ADMIN — HEPARIN SODIUM 2500 UNITS: 1000 INJECTION, SOLUTION INTRAVENOUS; SUBCUTANEOUS at 19:13

## 2018-09-10 RX ADMIN — LABETALOL HYDROCHLORIDE 10 MG: 5 INJECTION INTRAVENOUS at 09:55

## 2018-09-10 RX ADMIN — CLEVIPIDINE 14 MG/HR: 0.5 EMULSION INTRAVENOUS at 09:19

## 2018-09-10 RX ADMIN — VECURONIUM BROMIDE 2 MG: 1 INJECTION, POWDER, LYOPHILIZED, FOR SOLUTION INTRAVENOUS at 16:31

## 2018-09-10 RX ADMIN — LABETALOL HYDROCHLORIDE 10 MG: 5 INJECTION INTRAVENOUS at 12:09

## 2018-09-10 RX ADMIN — FENTANYL CITRATE 50 MCG: 50 INJECTION, SOLUTION INTRAMUSCULAR; INTRAVENOUS at 16:23

## 2018-09-10 RX ADMIN — Medication 0.6 MG: at 21:48

## 2018-09-10 RX ADMIN — CLEVIPIDINE 12 MG/HR: 0.5 EMULSION INTRAVENOUS at 12:56

## 2018-09-10 RX ADMIN — VECURONIUM BROMIDE 1 MG: 1 INJECTION, POWDER, LYOPHILIZED, FOR SOLUTION INTRAVENOUS at 21:15

## 2018-09-10 RX ADMIN — FENTANYL CITRATE 50 MCG: 50 INJECTION, SOLUTION INTRAMUSCULAR; INTRAVENOUS at 16:31

## 2018-09-10 RX ADMIN — METHYLPREDNISOLONE SODIUM SUCCINATE 40 MG: 125 INJECTION, POWDER, FOR SOLUTION INTRAMUSCULAR; INTRAVENOUS at 16:34

## 2018-09-10 RX ADMIN — CLEVIPIDINE 2 MG/HR: 0.5 EMULSION INTRAVENOUS at 01:43

## 2018-09-10 RX ADMIN — LIDOCAINE HYDROCHLORIDE 100 MG: 20 INJECTION, SOLUTION INTRAVENOUS at 16:31

## 2018-09-10 RX ADMIN — PROPOFOL 200 MG: 10 INJECTION, EMULSION INTRAVENOUS at 16:31

## 2018-09-10 RX ADMIN — VECURONIUM BROMIDE 2 MG: 1 INJECTION, POWDER, LYOPHILIZED, FOR SOLUTION INTRAVENOUS at 19:42

## 2018-09-10 RX ADMIN — METHYLPREDNISOLONE SODIUM SUCCINATE 125 MG: 125 INJECTION, POWDER, FOR SOLUTION INTRAMUSCULAR; INTRAVENOUS at 01:11

## 2018-09-10 RX ADMIN — ONDANSETRON 4 MG: 2 SOLUTION INTRAMUSCULAR; INTRAVENOUS at 21:44

## 2018-09-10 RX ADMIN — Medication 3 MG: at 21:48

## 2018-09-10 RX ADMIN — FENTANYL CITRATE 100 MCG: 50 INJECTION, SOLUTION INTRAMUSCULAR; INTRAVENOUS at 22:18

## 2018-09-10 RX ADMIN — FAMOTIDINE 20 MG: 10 INJECTION, SOLUTION INTRAVENOUS at 01:11

## 2018-09-10 ASSESSMENT — PULMONARY FUNCTION TESTS
PIF_VALUE: 23
PIF_VALUE: 22
PIF_VALUE: 22
PIF_VALUE: 25
PIF_VALUE: 1
PIF_VALUE: 22
PIF_VALUE: 22
PIF_VALUE: 23
PIF_VALUE: 25
PIF_VALUE: 22
PIF_VALUE: 32
PIF_VALUE: 22
PIF_VALUE: 22
PIF_VALUE: 1
PIF_VALUE: 22
PIF_VALUE: 1
PIF_VALUE: 22
PIF_VALUE: 25
PIF_VALUE: 22
PIF_VALUE: 22
PIF_VALUE: 21
PIF_VALUE: 21
PIF_VALUE: 22
PIF_VALUE: 23
PIF_VALUE: 22
PIF_VALUE: 24
PIF_VALUE: 23
PIF_VALUE: 23
PIF_VALUE: 22
PIF_VALUE: 23
PIF_VALUE: 24
PIF_VALUE: 22
PIF_VALUE: 3
PIF_VALUE: 23
PIF_VALUE: 22
PIF_VALUE: 23
PIF_VALUE: 22
PIF_VALUE: 22
PIF_VALUE: 23
PIF_VALUE: 21
PIF_VALUE: 7
PIF_VALUE: 10
PIF_VALUE: 23
PIF_VALUE: 22
PIF_VALUE: 23
PIF_VALUE: 22
PIF_VALUE: 23
PIF_VALUE: 22
PIF_VALUE: 22
PIF_VALUE: 24
PIF_VALUE: 23
PIF_VALUE: 22
PIF_VALUE: 23
PIF_VALUE: 23
PIF_VALUE: 22
PIF_VALUE: 23
PIF_VALUE: 22
PIF_VALUE: 23
PIF_VALUE: 22
PIF_VALUE: 23
PIF_VALUE: 23
PIF_VALUE: 25
PIF_VALUE: 22
PIF_VALUE: 37
PIF_VALUE: 22
PIF_VALUE: 22
PIF_VALUE: 24
PIF_VALUE: 21
PIF_VALUE: 22
PIF_VALUE: 23
PIF_VALUE: 1
PIF_VALUE: 23
PIF_VALUE: 25
PIF_VALUE: 22
PIF_VALUE: 23
PIF_VALUE: 2
PIF_VALUE: 22
PIF_VALUE: 23
PIF_VALUE: 22
PIF_VALUE: 23
PIF_VALUE: 22
PIF_VALUE: 23
PIF_VALUE: 22
PIF_VALUE: 0
PIF_VALUE: 23
PIF_VALUE: 22
PIF_VALUE: 17
PIF_VALUE: 22
PIF_VALUE: 23
PIF_VALUE: 22
PIF_VALUE: 24
PIF_VALUE: 22
PIF_VALUE: 20
PIF_VALUE: 22
PIF_VALUE: 23
PIF_VALUE: 22
PIF_VALUE: 4
PIF_VALUE: 23
PIF_VALUE: 26
PIF_VALUE: 22
PIF_VALUE: 23
PIF_VALUE: 22
PIF_VALUE: 23
PIF_VALUE: 22
PIF_VALUE: 4
PIF_VALUE: 23
PIF_VALUE: 22
PIF_VALUE: 22
PIF_VALUE: 24
PIF_VALUE: 22
PIF_VALUE: 25
PIF_VALUE: 22
PIF_VALUE: 23
PIF_VALUE: 22
PIF_VALUE: 23
PIF_VALUE: 22
PIF_VALUE: 23
PIF_VALUE: 14
PIF_VALUE: 26
PIF_VALUE: 22
PIF_VALUE: 24
PIF_VALUE: 22
PIF_VALUE: 22
PIF_VALUE: 21
PIF_VALUE: 22
PIF_VALUE: 22
PIF_VALUE: 24
PIF_VALUE: 22
PIF_VALUE: 23
PIF_VALUE: 23
PIF_VALUE: 22
PIF_VALUE: 22
PIF_VALUE: 26
PIF_VALUE: 22
PIF_VALUE: 27
PIF_VALUE: 22
PIF_VALUE: 21
PIF_VALUE: 22
PIF_VALUE: 23
PIF_VALUE: 22
PIF_VALUE: 21
PIF_VALUE: 22
PIF_VALUE: 2
PIF_VALUE: 22
PIF_VALUE: 19
PIF_VALUE: 22
PIF_VALUE: 22
PIF_VALUE: 23
PIF_VALUE: 23
PIF_VALUE: 10
PIF_VALUE: 19
PIF_VALUE: 24
PIF_VALUE: 1
PIF_VALUE: 22
PIF_VALUE: 14
PIF_VALUE: 23
PIF_VALUE: 22
PIF_VALUE: 22
PIF_VALUE: 23
PIF_VALUE: 8
PIF_VALUE: 23
PIF_VALUE: 22
PIF_VALUE: 22
PIF_VALUE: 24
PIF_VALUE: 22
PIF_VALUE: 21
PIF_VALUE: 23
PIF_VALUE: 22
PIF_VALUE: 23
PIF_VALUE: 22
PIF_VALUE: 23
PIF_VALUE: 22
PIF_VALUE: 23
PIF_VALUE: 22
PIF_VALUE: 25
PIF_VALUE: 23
PIF_VALUE: 1
PIF_VALUE: 22
PIF_VALUE: 22
PIF_VALUE: 23
PIF_VALUE: 23
PIF_VALUE: 2
PIF_VALUE: 23
PIF_VALUE: 23
PIF_VALUE: 22
PIF_VALUE: 25
PIF_VALUE: 23
PIF_VALUE: 22
PIF_VALUE: 29
PIF_VALUE: 21
PIF_VALUE: 19
PIF_VALUE: 22
PIF_VALUE: 23
PIF_VALUE: 22
PIF_VALUE: 24
PIF_VALUE: 23
PIF_VALUE: 25
PIF_VALUE: 23
PIF_VALUE: 23
PIF_VALUE: 0
PIF_VALUE: 22
PIF_VALUE: 22
PIF_VALUE: 23
PIF_VALUE: 1
PIF_VALUE: 23
PIF_VALUE: 32
PIF_VALUE: 22
PIF_VALUE: 23
PIF_VALUE: 22
PIF_VALUE: 1
PIF_VALUE: 22
PIF_VALUE: 22
PIF_VALUE: 21
PIF_VALUE: 23
PIF_VALUE: 23
PIF_VALUE: 22
PIF_VALUE: 13
PIF_VALUE: 23
PIF_VALUE: 22
PIF_VALUE: 23
PIF_VALUE: 22
PIF_VALUE: 23
PIF_VALUE: 22
PIF_VALUE: 22
PIF_VALUE: 28
PIF_VALUE: 23
PIF_VALUE: 22
PIF_VALUE: 23
PIF_VALUE: 22
PIF_VALUE: 23
PIF_VALUE: 22
PIF_VALUE: 23
PIF_VALUE: 22
PIF_VALUE: 22
PIF_VALUE: 23
PIF_VALUE: 26
PIF_VALUE: 23
PIF_VALUE: 22
PIF_VALUE: 25
PIF_VALUE: 22
PIF_VALUE: 24
PIF_VALUE: 23
PIF_VALUE: 22
PIF_VALUE: 2
PIF_VALUE: 25
PIF_VALUE: 23
PIF_VALUE: 23
PIF_VALUE: 12
PIF_VALUE: 22
PIF_VALUE: 23
PIF_VALUE: 22
PIF_VALUE: 23
PIF_VALUE: 22
PIF_VALUE: 25
PIF_VALUE: 23
PIF_VALUE: 22
PIF_VALUE: 23
PIF_VALUE: 28
PIF_VALUE: 22
PIF_VALUE: 23

## 2018-09-10 ASSESSMENT — PAIN DESCRIPTION - LOCATION
LOCATION: HEAD;NECK

## 2018-09-10 ASSESSMENT — PAIN DESCRIPTION - ONSET
ONSET: ON-GOING
ONSET: ON-GOING

## 2018-09-10 ASSESSMENT — LIFESTYLE VARIABLES: SMOKING_STATUS: 1

## 2018-09-10 ASSESSMENT — PAIN DESCRIPTION - PAIN TYPE
TYPE: ACUTE PAIN
TYPE: ACUTE PAIN

## 2018-09-10 ASSESSMENT — PAIN SCALES - GENERAL
PAINLEVEL_OUTOF10: 10
PAINLEVEL_OUTOF10: 7
PAINLEVEL_OUTOF10: 10
PAINLEVEL_OUTOF10: 10
PAINLEVEL_OUTOF10: 0
PAINLEVEL_OUTOF10: 10

## 2018-09-10 ASSESSMENT — PAIN DESCRIPTION - FREQUENCY
FREQUENCY: CONTINUOUS
FREQUENCY: CONTINUOUS

## 2018-09-10 NOTE — ED PROVIDER NOTES
ED Attending  CC: Yes      HPI:  9/10/18, Time: 12:53AM.       Perla Voss is a 60 y.o. female presenting to the ED for evaluation of severe posterior head and neck pain, beginning prior to coming to ER . The complaint has been constant, moderate to severe in severity, and worsened by nothing. 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 She reports she has never had any pain like this in the past. She reports that she has no history of known HTN. She reports that her vision feels somewhat blurry and her eyes are very sensitive to the light. She states she went to the bathroom and felt very sweaty all over. She denies any anterior chest pain or shortness of breath. She is unaware of any known FH of aneurysms, etc.. She does admit to smoking tobacco. She denies any recent falls, trauma or injury. ROS:   Pertinent positives and negatives are stated within the HPI, all other systems reviewed and are negative.    --------------------------------------------- PAST HISTORY ---------------------------------------------  Past Medical History:  has a past medical history of Degenerative arthritis of hand and Hiatal hernia. Past Surgical History:  has a past surgical history that includes Tonsillectomy. Social History:  reports that she has been smoking Cigarettes. She has a 64.50 pack-year smoking history. She has never used smokeless tobacco. She reports that she does not drink alcohol or use drugs. Family History: family history includes Arthritis in her father and mother; Atrial Fibrillation in her father and mother; Cancer in her sister; Diabetes in her father and mother; Emphysema in her father. The patients home medications have been reviewed. Allergies: Latex;  Aloe; and Iodine      ---------------------------------------------------PHYSICAL EXAM--------------------------------------    Constitutional/General: Alert and oriented x3, ill appearing, non toxic in NAD, lying on bed with eyes closed. Slow to respond to questions. Head: NC/AT  Eyes: PERRL, EOMI, eyes very sensitive to light bilaterally. Ears and Nose: No discharge or bleeding  Mouth: Oropharynx clear, tongue midline, handling secretions, no trismus  Neck: Patient resists flexion and extension of cervical spine due to complaints of pain. Marked local tenderness to palpation in the midline of central cervical spine, no stridor, no crepitus, no meningeal signs  Pulmonary: Lungs clear to auscultation bilaterally, no obvious active wheezes, rales, or rhonchi. Not in respiratory distress  Cardiovascular:  Regular rate. No obvious murmur, gallops, or rubs. 2+ distal pulses  Chest: no chest wall tenderness  Abdomen: Soft. Non tender. Non distended. +BS. No rebound, guarding, or rigidity. No pulsatile masses appreciated. Musculoskeletal: Moves all extremities x 4. Warm and well perfused, no clubbing, cyanosis, or pitting edema. Capillary refill <3 seconds  Skin: warm and dry. No rashes. Neurologic: GCS 15, CN 2-12 grossly intact, no focal deficits, symmetric strength 5/5 in the upper and lower extremities bilaterally  Psych: Normal Affect    -------------------------------------------------- RESULTS -------------------------------------------------  I have personally reviewed all laboratory and imaging results for this patient. Results are listed below.      LABS:  Results for orders placed or performed during the hospital encounter of 09/10/18   CBC Auto Differential   Result Value Ref Range    WBC 14.0 (H) 4.5 - 11.5 E9/L    RBC 4.59 3.50 - 5.50 E12/L    Hemoglobin 14.6 11.5 - 15.5 g/dL    Hematocrit 43.0 34.0 - 48.0 %    MCV 93.7 80.0 - 99.9 fL    MCH 31.8 26.0 - 35.0 pg    MCHC 34.0 32.0 - 34.5 %    RDW 12.8 11.5 - 15.0 encounter and vital signs as below have been reviewed by myself. BP (!) 192/101   Pulse 98   Temp 96.4 °F (35.8 °C) (Temporal)   Resp 20   Ht 5' 5\" (1.651 m)   SpO2 95%   BMI 35.61 kg/m²   Oxygen Saturation Interpretation: Normal    The patients available past medical records and past encounters were reviewed. ------------------------------ ED COURSE/MEDICAL DECISION MAKING----------------------  Medications   clevidipine (CLEVIPREX) infusion (6 mg/hr Intravenous Rate/Dose Change 9/10/18 0216)   diphenhydrAMINE (BENADRYL) injection 25 mg (25 mg Intravenous Given 9/10/18 0111)   methylPREDNISolone sodium (SOLU-MEDROL) injection 125 mg (125 mg Intravenous Given 9/10/18 0111)   famotidine (PEPCID) injection 20 mg (20 mg Intravenous Given 9/10/18 0111)   sodium chloride flush 0.9 % injection 10 mL (20 mLs Intravenous Given 9/10/18 0121)   iopamidol (ISOVUE-370) 76 % injection 80 mL (80 mLs Intravenous Given 9/10/18 0121)   hydrALAZINE (APRESOLINE) injection 10 mg (10 mg Intravenous Given 9/10/18 0128)   hydrALAZINE (APRESOLINE) injection 10 mg (10 mg Intravenous Given 9/10/18 0140)   fentaNYL (SUBLIMAZE) injection 25 mcg (25 mcg Intravenous Given 9/10/18 0155)   ondansetron (ZOFRAN) injection 4 mg (4 mg Intravenous Given 9/10/18 0216)       Medical Decision Making:    Patient to ER with complaints of sudden onset of severe posterior neck pain and severe headache. Spoke with Dr. Anthony Terry immediately after coming out of patient room as to my concerns of possible bleed vs aneurysm rupture. Iodine allergy listed on patient chart, due to emergent nature of need for CTA of head and neck, will give Benadryl, Solumedrol and Pepcid as prophylaxis prior. Patient unaware of any IV dye allergy. Call received from CT, evidence of subarachnoid hemorrhage. Dose of IV Hydralazine given. BP improved. Fentanyl given for headache/pain. Patient vomiting 2:10 AM, dose of IV Zofran given.     Consultations: 1:35AM Dr. Shar Hicks spoke with Dr. Davis Courser placed to Dr. Ariel Finch, hospitalist concerning admission. 2:10AM Dr. Shar Hicks spoke with Dr. Ariel Finch, he agrees with admission to Neuro ICU. Admission placed. Critical Care: 30 minutes    This patient's ED course included: a personal history and physicial examination, re-evaluation prior to disposition, multiple bedside re-evaluations, IV medications, cardiac monitoring, continuous pulse oximetry and complex medical decision making and emergency management    This patient has remained hemodynamically stable during their ED course. Counseling: The emergency provider has spoken with the patient and discussed todays results, in addition to providing specific details for the plan of care and counseling regarding the diagnosis and prognosis. Questions are answered at this time and they are agreeable with the plan.     --------------------------------- IMPRESSION AND DISPOSITION ---------------------------------    IMPRESSION  1. Subarachnoid bleed ([address]    2. Accelerated hypertension    3.  Acute intractable headache, unspecified headache type        DISPOSITION  Disposition: Admit to Neuro ICU  Patient condition is fair    Patient seen and discussed with Dr. Sandeep Savage PA-C  09/10/18 5658

## 2018-09-10 NOTE — ANESTHESIA PRE PROCEDURE
0.9 % injection 10 mL  10 mL Intravenous 2 times per day LYLY Armijo CNP   10 mL at 09/10/18 6942       Allergies: Allergies   Allergen Reactions    Latex     Aloe     Iodine        Problem List:    Patient Active Problem List   Diagnosis Code    [facility] I60.9    Hypertensive emergency I16.1    Obesity (BMI 30-39. 9) E66.9    Cerebral aneurysm I67.1       Past Medical History:        Diagnosis Date    Degenerative arthritis of hand     Hiatal hernia        Past Surgical History:        Procedure Laterality Date    TONSILLECTOMY         Social History:    Social History   Substance Use Topics    Smoking status: Current Every Day Smoker     Packs/day: 1.50     Years: 43.00     Types: Cigarettes    Smokeless tobacco: Never Used    Alcohol use No                                Ready to quit: Not Answered  Counseling given: Not Answered      Vital Signs (Current):   Vitals:    09/10/18 1315 09/10/18 1400 09/10/18 1500 09/10/18 1520   BP: (!) 150/91 137/81 136/89 (!) 146/82   Pulse: 85 74 85 86   Resp: 17 16 17 20   Temp:       TempSrc:       SpO2:  92% 91% 95%   Weight:       Height:                                                  BP Readings from Last 3 Encounters:   09/10/18 (!) 146/82   07/17/18 130/76   03/07/14 130/84       NPO Status:                                                                                 BMI:   Wt Readings from Last 3 Encounters:   09/10/18 219 lb 12.8 oz (99.7 kg)   07/17/18 214 lb (97.1 kg)   03/07/14 280 lb (127 kg)     Body mass index is 36.58 kg/m².     CBC:   Lab Results   Component Value Date    WBC 14.0 09/10/2018    RBC 4.59 09/10/2018    HGB 14.6 09/10/2018    HCT 43.0 09/10/2018    MCV 93.7 09/10/2018    RDW 12.8 09/10/2018     09/10/2018       CMP:   Lab Results   Component Value Date     09/10/2018    K 3.7 09/10/2018     09/10/2018    CO2 19 09/10/2018    BUN 19 09/10/2018    CREATININE 0.6 09/10/2018

## 2018-09-10 NOTE — INTERVAL H&P NOTE
NEUROINTERVENTIONAL RADIOLOGY PREOPERATIVE HISTORY AND PHYSICAL    CHIEF COMPLAINT: Aneurysmal subarachnoid hemorrhage    HISTORY OF PRESENT ILLNESS: Laura Suarez is a 60 y.o. female who presented with severe headache. Head CT demonstrated extensive subarachnoid hemorrhage. Due to a contrast allergy, the patient was premedicated with methylprednisolone 125 mg IV and Benadryl 50 mg IV before undergoing CTA of the head, which demonstrated left ICA aneurysms. She was referred to Interventional Radiology for cerebral angiography and endovascular coil embolization. DIAGNOSIS:    Patient Active Problem List   Diagnosis    [facility]) [facility]    Hypertensive emergency    Obesity (BMI 30-39. 9)    Cerebral aneurysm         Current Facility-Administered Medications:     clevidipine (CLEVIPREX) infusion, 2 mg/hr, Intravenous, Continuous, Manda Jackson MD, Last Rate: 28 mL/hr at 09/10/18 1318, 14 mg/hr at 09/10/18 1318    sodium chloride flush 0.9 % injection 10 mL, 10 mL, Intravenous, PRN, Zachery Cason MD    0.9 % sodium chloride infusion, , Intravenous, Continuous, Gayatri Gibson APRN - CNP, Last Rate: 75 mL/hr at 09/10/18 1036    acetaminophen (TYLENOL) tablet 650 mg, 650 mg, Oral, Q4H PRN, Zachery Cason MD    morphine injection 2 mg, 2 mg, Intravenous, Q4H PRN, Zachery Cason MD, 2 mg at 09/10/18 1005    ondansetron [facility]) injection 4 mg, 4 mg, Intravenous, Q6H PRN, Zachery Cason MD, 4 mg at 09/10/18 1151    pantoprazole (PROTONIX) injection 40 mg, 40 mg, Intravenous, Daily, Zachery Cason MD, 40 mg at 09/10/18 0803    hydrALAZINE (APRESOLINE) injection 10 mg, 10 mg, Intravenous, Q10 Min PRN, Gayatri Cowans APRN - CNP    labetalol (NORMODYNE;TRANDATE) injection 10 mg, 10 mg, Intravenous, Q10 Min PRN, Gayatri Gibson APRN - CNP, 10 mg at 09/10/18 1209    sodium chloride flush 0.9 % injection 10 mL, 10 mL, Intravenous, 2 times per day, Gayatri Gibson APRN - CNP, 10 mL at 09/10/18 0803    methylPREDNISolone sodium (SOLU-MEDROL) injection 40 mg, 40 mg, Intravenous, Once, Obi Arcos MD    diphenhydrAMINE (BENADRYL) injection 50 mg, 50 mg, Intravenous, Once, Obi Arcos MD    Allergies   Allergen Reactions    Latex     Aloe     Iodine        Past Medical History:   Diagnosis Date    Degenerative arthritis of hand     Hiatal hernia        Past Surgical History:   Procedure Laterality Date    TONSILLECTOMY         Family History   Problem Relation Age of Onset    Diabetes Mother     Arthritis Mother     Atrial Fibrillation Mother     Diabetes Father    Minerva Amble Atrial Fibrillation Father     Arthritis Father    Minerva Amble Emphysema Father     Cancer Sister        Social History     Social History    Marital status:      Spouse name: N/A    Number of children: N/A    Years of education: N/A     Occupational History    Not on file. Social History Main Topics    Smoking status: Current Every Day Smoker     Packs/day: 1.50     Years: 43.00     Types: Cigarettes    Smokeless tobacco: Never Used    Alcohol use No    Drug use: No    Sexual activity: Not on file     Other Topics Concern    Not on file     Social History Narrative    No narrative on file       ROS: Negative or non-contributory other than as noted above. PHYSICAL EXAM:      Vitals:    09/10/18 1520   BP: (!) 146/82   Pulse: 86   Resp: 20   Temp:    SpO2: 95%     Patient examined in angio holding area. General appearance: Lying supine in ICU bed. Neuro:    Mental status: Somnolent - patient fell asleep repeatedly during interview but was oriented to place, date, situation. Speech: Speech fluent. No obvious dysarthria or aphasia. Cranial nerves: Subtle CN III palsy? II - XII otherwise intact. Pupils equal, round (2 mm), reactive to light. Strength: 4/5 in upper extremities (, biceps, triceps). Right pronator drift.   5/5 lower extremities (quadriceps, hamstrings, gastrocnemius, tibialis Alejandra Mayorga.  The details of the procedure as well as its risks, benefits, and alternatives were discussed. These risks include, but are not limited to, stroke (with temporary or permanent disability), vascular injury (including dissection), cerebral or other internal hemorrhage, aneurysm rupture or re-rupture with increased intracranial hemorrhage, infection, and death. The patient's sone indicated understanding and gave permission to proceed.     Electronically signed by Jake Valles MD on 9/10/2018 at 4:35 PM

## 2018-09-10 NOTE — ED NOTES
Name: Bernardo Berry  : 1960  MRN: [id number]    Date: 9/10/2018    Benefits of immediately proceeding with Radiology exam outweigh the risks and therefore the following is being waived:      [] Pregnancy test    [] Protocol for Iodine allergy    [] MRI questionnaire    [x] BUN/Creatinine  Concern for bleed in brain or aneurysm, waive labs      Brianna Watt 95, PA-C  09/10/18 0105

## 2018-09-10 NOTE — PROGRESS NOTES
1515 Received pt via bed to angio holding. Foot flexion and extension =. Hand grasps = and fair . Tongue deviates left. Hand off report received from [facility] SRNA visits and examines  Right femoral and DP pulses2+  1550 Dr Carrington Brown visits and explains procedure  65 Son at bedside, Dr Carrington Brown explains procedure  1620 To Angio table, care turned over to anesthesia  1625  Dr Anna Collins visits. Pt intubated with ease  1650  Dr Carrington Brown notified of pt's readiness  [id number] Procedure started  1910 2500 units heparin given IV  1922  4 mm x 8 cm Hydro Frame 10 coil deployed in LICA  7341  3 mm x 6 cm Hydrosoft 3D deployed in LICA  6228   2 mm x 4 cm Wave extra soft coil deployed in [address]  1 mm x 2 cm  Hyper soft 3D coil deployed in LICA  0612   2 mm x 2 cm wave extra soft coil deployed in LICA  2029  3 mm x 4 cm Hydrosoft 3D coil deployed LICA-PCOM  2964  1.5 mm x 2 cm wave extra soft coil deployed LICA - PCOM  1864 Procedure ended. 6 Fr angioseal deployed right femoral artery. Digital pressure x 5 minutes  2155 Right femoral and DP pulses 2+.  Hand off report called to PACU  2200 Transferred via bed, with monitor, O2 CRNA and RN in stable condition

## 2018-09-10 NOTE — ED NOTES
Name: Lia Brown  : 1960  MRN: [id number]    Date: 9/10/2018    Benefits of immediately proceeding with Radiology exam outweigh the risks and therefore the following is being waived:      [] Pregnancy test    [x] Protocol for Iodine allergy    [] MRI questionnaire    [] BUN/Creatinine        MD Juanpablo Williamson MD  09/10/18 5678

## 2018-09-10 NOTE — ED NOTES
Pt had one episode of emesis and was medicated with 4 mg Zofran per verbal order of Dr. Hussein Benítez. Pt no longer complaining of nausea.      Deshawn Gray RN  09/10/18 9218

## 2018-09-10 NOTE — H&P
[address] [facility]  Attending History and Physical      CHIEF COMPLAINT:  headache      HISTORY OF PRESENT ILLNESS:      The patient is a 60 y.o. female patient of dr Vidya Hackett who presents with complains of headache. Patient was in her usual state of health the entire day. Around 10 PM she acutely developed a headache. It was the worst headache of her life. She was nauseated. She did not take anything for the symptoms at home. She has no history of hypertension. She presented to the ED. She denied chest pain, shortness of breath, abdominal pain, fevers, chills and diaphoresis. She is resting comfortably in bed. She is very tired from being up all night. Past Medical History:    Past Medical History:   Diagnosis Date    Degenerative arthritis of hand     Hiatal hernia        Past Surgical History:    Past Surgical History:   Procedure Laterality Date    TONSILLECTOMY         Medications Prior to Admission:    Prescriptions Prior to Admission: meloxicam (MOBIC) 7.5 MG tablet, Take 1-2 tablets once daily  Multiple Vitamins-Minerals (CENTRUM SILVER PO), Take  by mouth daily. Omega-3 Fatty Acids (FISH OIL PO), Take  by mouth every 48 hours. Naproxen Sodium (ALEVE PO), Take  by mouth daily. Allergies:    Latex; Aloe; and Iodine    Social History:    reports that she has been smoking Cigarettes. She has a 64.50 pack-year smoking history. She has never used smokeless tobacco. She reports that she does not drink alcohol or use drugs. Family History:   family history includes Arthritis in her father and mother; Atrial Fibrillation in her father and mother; Cancer in her sister; Diabetes in her father and mother; Emphysema in her father.     REVIEW OF SYSTEMS:  As above in the HPI, otherwise negative    PHYSICAL EXAM:    Vitals:  /64   Pulse 85   Temp 98.2 °F (36.8 °C) (Oral)   Resp 21   Ht 5' 5\" (1.651 m)   Wt 219 lb 12.8 oz (99.7 kg)   SpO2 93%   BMI 36.58 kg/m² General:  Awake, alert, oriented X 3. Well developed, well nourished, well groomed. No apparent distress. HEENT:  Normocephalic, atraumatic. Pupils equal, round, reactive to light. No scleral icterus. No conjunctival injection. Normal lips, teeth, and gums. No nasal discharge. Neck:  Supple  Heart:  RRR, no murmurs, gallops, rubs  Lungs:  CTA bilaterally, bilat symmetrical expansion, no wheeze, rales, or rhonchi  Abdomen:   Bowel sounds present, soft, nontender, no masses, no organomegaly, no peritoneal signs  Extremities:  No clubbing, cyanosis, or edema  Skin:  Warm and dry, no open lesions or rash  Neuro:  Cranial nerves 2-12 intact, no focal deficits  Breast: deferred  Rectal: deferred  Genitalia:  deferred    LABS:    CBC with Differential:    Lab Results   Component Value Date    WBC 14.0 09/10/2018    RBC 4.59 09/10/2018    HGB 14.6 09/10/2018    HCT 43.0 09/10/2018     09/10/2018    MCV 93.7 09/10/2018    MCH 31.8 09/10/2018    MCHC 34.0 09/10/2018    RDW 12.8 09/10/2018    LYMPHOPCT 10.8 09/10/2018    MONOPCT 4.8 09/10/2018    BASOPCT 0.4 09/10/2018    MONOSABS 0.67 09/10/2018    LYMPHSABS 1.51 09/10/2018    EOSABS 0.06 09/10/2018    BASOSABS 0.05 09/10/2018     CMP:    Lab Results   Component Value Date     09/10/2018    K 3.7 09/10/2018     09/10/2018    CO2 19 09/10/2018    BUN 19 09/10/2018    CREATININE 0.6 09/10/2018    GFRAA >60 09/10/2018    LABGLOM >60 09/10/2018    GLUCOSE 159 09/10/2018    PROT 7.1 07/24/2018    LABALBU 4.2 07/24/2018    CALCIUM 8.9 09/10/2018    BILITOT 0.5 07/24/2018    ALKPHOS 87 07/24/2018    AST 15 07/24/2018    ALT 11 07/24/2018     BMP:    Lab Results   Component Value Date     09/10/2018    K 3.7 09/10/2018     09/10/2018    CO2 19 09/10/2018    BUN 19 09/10/2018    LABALBU 4.2 07/24/2018    CREATININE 0.6 09/10/2018    CALCIUM 8.9 09/10/2018    GFRAA >60 09/10/2018    LABGLOM >60 09/10/2018    GLUCOSE 159 09/10/2018 Magnesium:    Lab Results   Component Value Date    MG 1.8 09/10/2018     Phosphorus:  No results found for: PHOS  PT/INR:    Lab Results   Component Value Date    PROTIME 10.9 09/10/2018    INR 0.9 09/10/2018     PTT:    Lab Results   Component Value Date    APTT 26.0 09/10/2018   [APTT}  Troponin:    Lab Results   Component Value Date    TROPONINI <0.01 09/10/2018     Last 3 Troponin:    Lab Results   Component Value Date    TROPONINI <0.01 09/10/2018     U/A:    Lab Results   Component Value Date    NITRITE neg 07/17/2018    COLORU yellow 07/17/2018    COLORU YELLOW 03/07/2014    PROTEINU 100 07/17/2018    PROTEINU >=300 03/07/2014    PHUR 6.0 07/17/2018    PHUR 5.5 03/07/2014    WBCUA NONE 03/07/2014    RBCUA 2-5 03/07/2014    BACTERIA RARE 03/07/2014    CLARITYU clear 07/17/2018    CLARITYU CLEAR 03/07/2014    SPECGRAV 1.030 07/17/2018    SPECGRAV >=1.030 03/07/2014    LEUKOCYTESUR neg 07/17/2018    LEUKOCYTESUR NEGATIVE 03/07/2014    UROBILINOGEN 0.2 03/07/2014    BILIRUBINUR neg 07/17/2018    BLOODU moderate 07/17/2018    BLOODU LARGE 03/07/2014    GLUCOSEU neg 07/17/2018    GLUCOSEU NEGATIVE 03/07/2014     HgBA1c:  No results found for: LABA1C  VITAMIN B12: No components found for: B12  FOLATE:  No results found for: FOLATE    ASSESSMENT:      Patient Active Problem List   Diagnosis    [facility] [address]    Hypertensive emergency    Obesity (BMI 30-39. 9)    Cerebral aneurysm         PLAN:    Neurosurgery managing this neurosurgical emergency. Control blood pressure. As above. Continue to encourage weight loss. As above. Continue ICU care.     Joseph Pena MD  11:14 AM  9/10/2018

## 2018-09-10 NOTE — PLAN OF CARE
Problem: Falls - Risk of:  Goal: Will remain free from falls  Will remain free from falls   Outcome: Met This Shift      Problem: HEMODYNAMIC STATUS  Goal: Patient has stable vital signs and fluid balance  Outcome: Met This Shift

## 2018-09-10 NOTE — CONSULTS
Patient evaluated. Spoke with family. 4 vessel angiogram & possible coiling of the aneurysm ordered.

## 2018-09-10 NOTE — PROGRESS NOTES
 Atrial Fibrillation Father     Arthritis Father     Emphysema Father     Cancer Sister        VITAL SIGNS:   BP (!) 146/79   Pulse 100   Temp 98.2 °F (36.8 °C) (Oral)   Resp 19   Ht 5' 5\" (1.651 m)   Wt 219 lb 12.8 oz (99.7 kg)   SpO2 94%   BMI 36.58 kg/m²     PHYSICAL EXAM:    General appearance:  Comfortable. Pain Description: severe HA  GCS:    4 - Opens eyes on own   6 - Follows simple motor commands  5 - Alert and oriented    Pupil size:  Left 3 mm    Right 3 mm  Pupil reaction: Yes  Wiggles fingers: Left Yes Right Yes  Hand grasp:   Left normal    Right normal  Wiggles toes: Left Yes    Right Yes  Plantar flexion: Left normal   Right normal    CONSTITUTIONAL: no acute distress, lying in hospital bed. Alert and cooperative   NEUROLOGIC: PERRL, oriented x 4  CARDIOVASCULAR: S1 S2, regular rate, regular rhythm, no murmur/gallop/rub. Monitor:Sinus  PULMONARY: no rhonchi/rales/wheezes, no use of accessory muscles  RENAL: fischer to gravity, clear yellow urine  ABDOMEN: soft, nontender, nondistended, nontympanic, no masses, no organomegaly, normal bowel sounds   MUSCULOSKELETAL: moves all extremities purposefully, 5/5 strength   SKIN/EXTREMITIES: no rashes/ecchymosis, no edema/clubbing, warm/dry, good capillary refill     Assessment & Plan:       · Neuro:  SAH, aneurysm. Neurosurgery consulted, monitor hemodynamics   · CV: Hypertensive. Monitor hemodynamics. BP goal < 140. PRN hydralazine & labetalol. Cleviprex,     · Pulm: No acute issues  Monitor RR & SpO2. Pulmonary hygiene  · GI: No acute issues. NPO. Monitor bowel function. · Renal: No acute issues. Monitor BUN & Cr. Monitor electrolytes & replace as needed. Monitor urine output. · ID: Leukocytosis, afebrile. Monitor for SIRS  · Endocrine: Hyperglycemia. Monitor BS. · MSK: No acute issues. ROM. turn & reposition. PT/OT when able  · Heme: No acute issues. Monitor CBC.       Pain control/Sedation: morphine, Tylenol  DVT prophylaxis: SCDs.  No Lovenox/heparin until ok with neurosurgery. GI prophylaxis: Protonix  Mouth/Eye care: As needed  Sams: Keep in place for critical care monitoring of fluid balance.     Family update: Updated at bedside   Code status:  Full    Disposition:  NSICU      Electronically signed by LYLY Flores CNP on 9/10/2018 at 9:37 AM

## 2018-09-11 ENCOUNTER — ANESTHESIA EVENT (OUTPATIENT)
Dept: INTERVENTIONAL RADIOLOGY/VASCULAR | Age: 58
DRG: 021 | End: 2018-09-11
Payer: COMMERCIAL

## 2018-09-11 ENCOUNTER — APPOINTMENT (OUTPATIENT)
Dept: CT IMAGING | Age: 58
DRG: 021 | End: 2018-09-11
Payer: COMMERCIAL

## 2018-09-11 ENCOUNTER — APPOINTMENT (OUTPATIENT)
Dept: INTERVENTIONAL RADIOLOGY/VASCULAR | Age: 58
DRG: 021 | End: 2018-09-11
Payer: COMMERCIAL

## 2018-09-11 ENCOUNTER — APPOINTMENT (OUTPATIENT)
Dept: GENERAL RADIOLOGY | Age: 58
DRG: 021 | End: 2018-09-11
Payer: COMMERCIAL

## 2018-09-11 ENCOUNTER — ANESTHESIA (OUTPATIENT)
Dept: INTERVENTIONAL RADIOLOGY/VASCULAR | Age: 58
DRG: 021 | End: 2018-09-11
Payer: COMMERCIAL

## 2018-09-11 ENCOUNTER — APPOINTMENT (OUTPATIENT)
Dept: ULTRASOUND IMAGING | Age: 58
DRG: 021 | End: 2018-09-11
Payer: COMMERCIAL

## 2018-09-11 VITALS — RESPIRATION RATE: 1 BRPM | OXYGEN SATURATION: 92 %

## 2018-09-11 LAB
ANION GAP SERPL CALCULATED.3IONS-SCNC: 18 MMOL/L (ref 7–16)
BUN BLDV-MCNC: 15 MG/DL (ref 6–20)
CALCIUM SERPL-MCNC: 8.2 MG/DL (ref 8.6–10.2)
CHLORIDE BLD-SCNC: 106 MMOL/L (ref 98–107)
CO2: 18 MMOL/L (ref 22–29)
CREAT SERPL-MCNC: 0.7 MG/DL (ref 0.5–1)
GFR AFRICAN AMERICAN: >60
GFR NON-AFRICAN AMERICAN: >60 ML/MIN/1.73
GLUCOSE BLD-MCNC: 141 MG/DL (ref 74–109)
HCT VFR BLD CALC: 39.6 % (ref 34–48)
HEMOGLOBIN: 13.4 G/DL (ref 11.5–15.5)
MAGNESIUM: 1.7 MG/DL (ref 1.6–2.6)
MCH RBC QN AUTO: 32 PG (ref 26–35)
MCHC RBC AUTO-ENTMCNC: 33.8 % (ref 32–34.5)
MCV RBC AUTO: 94.5 FL (ref 80–99.9)
MRSA CULTURE ONLY: NORMAL
PDW BLD-RTO: 13.3 FL (ref 11.5–15)
PHOSPHORUS: 2.7 MG/DL (ref 2.5–4.5)
PLATELET # BLD: 233 E9/L (ref 130–450)
PMV BLD AUTO: 9.6 FL (ref 7–12)
POTASSIUM SERPL-SCNC: 3.6 MMOL/L (ref 3.5–5)
RBC # BLD: 4.19 E12/L (ref 3.5–5.5)
SODIUM BLD-SCNC: 142 MMOL/L (ref 132–146)
SODIUM BLD-SCNC: 143 MMOL/L (ref 132–146)
WBC # BLD: 15.8 E9/L (ref 4.5–11.5)

## 2018-09-11 PROCEDURE — C9248 INJ, CLEVIDIPINE BUTYRATE: HCPCS | Performed by: EMERGENCY MEDICINE

## 2018-09-11 PROCEDURE — 2580000003 HC RX 258: Performed by: PSYCHIATRY & NEUROLOGY

## 2018-09-11 PROCEDURE — 71045 X-RAY EXAM CHEST 1 VIEW: CPT

## 2018-09-11 PROCEDURE — 6360000002 HC RX W HCPCS: Performed by: INTERNAL MEDICINE

## 2018-09-11 PROCEDURE — 84100 ASSAY OF PHOSPHORUS: CPT

## 2018-09-11 PROCEDURE — 6370000000 HC RX 637 (ALT 250 FOR IP): Performed by: NURSE PRACTITIONER

## 2018-09-11 PROCEDURE — C1769 GUIDE WIRE: HCPCS

## 2018-09-11 PROCEDURE — 76377 3D RENDER W/INTRP POSTPROCES: CPT

## 2018-09-11 PROCEDURE — 7100000000 HC PACU RECOVERY - FIRST 15 MIN

## 2018-09-11 PROCEDURE — 75894 X-RAYS TRANSCATH THERAPY: CPT

## 2018-09-11 PROCEDURE — 83735 ASSAY OF MAGNESIUM: CPT

## 2018-09-11 PROCEDURE — 6360000002 HC RX W HCPCS: Performed by: NURSE ANESTHETIST, CERTIFIED REGISTERED

## 2018-09-11 PROCEDURE — 6360000002 HC RX W HCPCS: Performed by: EMERGENCY MEDICINE

## 2018-09-11 PROCEDURE — 70450 CT HEAD/BRAIN W/O DYE: CPT

## 2018-09-11 PROCEDURE — 99253 IP/OBS CNSLTJ NEW/EST LOW 45: CPT | Performed by: PSYCHIATRY & NEUROLOGY

## 2018-09-11 PROCEDURE — 2580000003 HC RX 258: Performed by: NURSE ANESTHETIST, CERTIFIED REGISTERED

## 2018-09-11 PROCEDURE — 02HV33Z INSERTION OF INFUSION DEVICE INTO SUPERIOR VENA CAVA, PERCUTANEOUS APPROACH: ICD-10-PCS | Performed by: SURGERY

## 2018-09-11 PROCEDURE — 6360000002 HC RX W HCPCS: Performed by: RADIOLOGY

## 2018-09-11 PROCEDURE — 93886 INTRACRANIAL COMPLETE STUDY: CPT

## 2018-09-11 PROCEDURE — 94664 DEMO&/EVAL PT USE INHALER: CPT

## 2018-09-11 PROCEDURE — 7100000001 HC PACU RECOVERY - ADDTL 15 MIN

## 2018-09-11 PROCEDURE — 84295 ASSAY OF SERUM SODIUM: CPT

## 2018-09-11 PROCEDURE — 36228 PLACE CATH INTRACRANIAL ART: CPT

## 2018-09-11 PROCEDURE — 99233 SBSQ HOSP IP/OBS HIGH 50: CPT | Performed by: NURSE PRACTITIONER

## 2018-09-11 PROCEDURE — 75710 ARTERY X-RAYS ARM/LEG: CPT

## 2018-09-11 PROCEDURE — 2500000003 HC RX 250 WO HCPCS: Performed by: RADIOLOGY

## 2018-09-11 PROCEDURE — 6360000004 HC RX CONTRAST MEDICATION: Performed by: RADIOLOGY

## 2018-09-11 PROCEDURE — 3700000000 HC ANESTHESIA ATTENDED CARE

## 2018-09-11 PROCEDURE — 37253 INTRVASC US NONCORONARY ADDL: CPT

## 2018-09-11 PROCEDURE — 2500000003 HC RX 250 WO HCPCS: Performed by: NURSE ANESTHETIST, CERTIFIED REGISTERED

## 2018-09-11 PROCEDURE — 85027 COMPLETE CBC AUTOMATED: CPT

## 2018-09-11 PROCEDURE — 36245 INS CATH ABD/L-EXT ART 1ST: CPT

## 2018-09-11 PROCEDURE — 2500000003 HC RX 250 WO HCPCS: Performed by: NURSE PRACTITIONER

## 2018-09-11 PROCEDURE — 6370000000 HC RX 637 (ALT 250 FOR IP): Performed by: ANESTHESIOLOGY

## 2018-09-11 PROCEDURE — 61650 EVASC PRLNG ADMN RX AGNT 1ST: CPT

## 2018-09-11 PROCEDURE — 36226 PLACE CATH VERTEBRAL ART: CPT

## 2018-09-11 PROCEDURE — 61624 TCAT PERM OCCLS/EMBOLJ CNS: CPT

## 2018-09-11 PROCEDURE — 36415 COLL VENOUS BLD VENIPUNCTURE: CPT

## 2018-09-11 PROCEDURE — 3700000001 HC ADD 15 MINUTES (ANESTHESIA)

## 2018-09-11 PROCEDURE — 2580000003 HC RX 258: Performed by: SURGERY

## 2018-09-11 PROCEDURE — 2000000000 HC ICU R&B

## 2018-09-11 PROCEDURE — 75898 FOLLOW-UP ANGIOGRAPHY: CPT

## 2018-09-11 PROCEDURE — C9113 INJ PANTOPRAZOLE SODIUM, VIA: HCPCS | Performed by: INTERNAL MEDICINE

## 2018-09-11 PROCEDURE — 36556 INSERT NON-TUNNEL CV CATH: CPT

## 2018-09-11 PROCEDURE — 2580000003 HC RX 258: Performed by: NURSE PRACTITIONER

## 2018-09-11 PROCEDURE — 36224 PLACE CATH CAROTD ART: CPT

## 2018-09-11 PROCEDURE — 80048 BASIC METABOLIC PNL TOTAL CA: CPT

## 2018-09-11 PROCEDURE — 6360000002 HC RX W HCPCS: Performed by: NURSE PRACTITIONER

## 2018-09-11 PROCEDURE — 37252 INTRVASC US NONCORONARY 1ST: CPT

## 2018-09-11 RX ORDER — POTASSIUM CHLORIDE 400 MEQ/1000ML
INJECTION, SOLUTION INTRAVENOUS PRN
Status: DISCONTINUED | OUTPATIENT
Start: 2018-09-11 | End: 2018-09-11 | Stop reason: SDUPTHER

## 2018-09-11 RX ORDER — LABETALOL HYDROCHLORIDE 5 MG/ML
10 INJECTION, SOLUTION INTRAVENOUS EVERY 10 MIN PRN
Status: DISCONTINUED | OUTPATIENT
Start: 2018-09-11 | End: 2018-09-15

## 2018-09-11 RX ORDER — POTASSIUM CHLORIDE 7.45 MG/ML
10 INJECTION INTRAVENOUS
Status: DISPENSED | OUTPATIENT
Start: 2018-09-11 | End: 2018-09-11

## 2018-09-11 RX ORDER — PROMETHAZINE HYDROCHLORIDE 25 MG/ML
25 INJECTION, SOLUTION INTRAMUSCULAR; INTRAVENOUS PRN
Status: DISCONTINUED | OUTPATIENT
Start: 2018-09-11 | End: 2018-09-13

## 2018-09-11 RX ORDER — NEOSTIGMINE METHYLSULFATE 1 MG/ML
INJECTION, SOLUTION INTRAVENOUS PRN
Status: DISCONTINUED | OUTPATIENT
Start: 2018-09-11 | End: 2018-09-11 | Stop reason: SDUPTHER

## 2018-09-11 RX ORDER — FENTANYL CITRATE 50 UG/ML
INJECTION, SOLUTION INTRAMUSCULAR; INTRAVENOUS PRN
Status: DISCONTINUED | OUTPATIENT
Start: 2018-09-11 | End: 2018-09-11 | Stop reason: SDUPTHER

## 2018-09-11 RX ORDER — 3% SODIUM CHLORIDE 3 G/100ML
25 INJECTION, SOLUTION INTRAVENOUS CONTINUOUS
Status: DISCONTINUED | OUTPATIENT
Start: 2018-09-11 | End: 2018-09-11

## 2018-09-11 RX ORDER — ACETAMINOPHEN 650 MG/1
650 SUPPOSITORY RECTAL EVERY 4 HOURS PRN
Status: DISCONTINUED | OUTPATIENT
Start: 2018-09-11 | End: 2018-09-13

## 2018-09-11 RX ORDER — VERAPAMIL HYDROCHLORIDE 2.5 MG/ML
40 INJECTION, SOLUTION INTRAVENOUS
Status: DISCONTINUED | OUTPATIENT
Start: 2018-09-11 | End: 2018-09-11 | Stop reason: CLARIF

## 2018-09-11 RX ORDER — MEPERIDINE HYDROCHLORIDE 50 MG/ML
12.5 INJECTION INTRAMUSCULAR; INTRAVENOUS; SUBCUTANEOUS EVERY 5 MIN PRN
Status: DISCONTINUED | OUTPATIENT
Start: 2018-09-11 | End: 2018-09-12

## 2018-09-11 RX ORDER — PROPOFOL 10 MG/ML
INJECTION, EMULSION INTRAVENOUS PRN
Status: DISCONTINUED | OUTPATIENT
Start: 2018-09-11 | End: 2018-09-11 | Stop reason: SDUPTHER

## 2018-09-11 RX ORDER — METHYLPREDNISOLONE SODIUM SUCCINATE 40 MG/ML
40 INJECTION, POWDER, LYOPHILIZED, FOR SOLUTION INTRAMUSCULAR; INTRAVENOUS ONCE
Status: COMPLETED | OUTPATIENT
Start: 2018-09-11 | End: 2018-09-11

## 2018-09-11 RX ORDER — LABETALOL HYDROCHLORIDE 5 MG/ML
5 INJECTION, SOLUTION INTRAVENOUS EVERY 10 MIN PRN
Status: DISCONTINUED | OUTPATIENT
Start: 2018-09-11 | End: 2018-09-12

## 2018-09-11 RX ORDER — NIMODIPINE 30 MG/1
60 CAPSULE, LIQUID FILLED ORAL
Status: DISCONTINUED | OUTPATIENT
Start: 2018-09-11 | End: 2018-09-20 | Stop reason: HOSPADM

## 2018-09-11 RX ORDER — SODIUM CHLORIDE 9 MG/ML
INJECTION, SOLUTION INTRAVENOUS CONTINUOUS
Status: DISCONTINUED | OUTPATIENT
Start: 2018-09-11 | End: 2018-09-13

## 2018-09-11 RX ORDER — DIPHENHYDRAMINE HYDROCHLORIDE 50 MG/ML
50 INJECTION INTRAMUSCULAR; INTRAVENOUS ONCE
Status: COMPLETED | OUTPATIENT
Start: 2018-09-11 | End: 2018-09-11

## 2018-09-11 RX ORDER — VECURONIUM BROMIDE 1 MG/ML
INJECTION, POWDER, LYOPHILIZED, FOR SOLUTION INTRAVENOUS PRN
Status: DISCONTINUED | OUTPATIENT
Start: 2018-09-11 | End: 2018-09-11 | Stop reason: SDUPTHER

## 2018-09-11 RX ORDER — VERAPAMIL HYDROCHLORIDE 2.5 MG/ML
40 INJECTION, SOLUTION INTRAVENOUS
Status: COMPLETED | OUTPATIENT
Start: 2018-09-11 | End: 2018-09-11

## 2018-09-11 RX ORDER — LIDOCAINE HYDROCHLORIDE 20 MG/ML
INJECTION, SOLUTION EPIDURAL; INFILTRATION; INTRACAUDAL; PERINEURAL PRN
Status: DISCONTINUED | OUTPATIENT
Start: 2018-09-11 | End: 2018-09-11 | Stop reason: SDUPTHER

## 2018-09-11 RX ORDER — MORPHINE SULFATE 2 MG/ML
1 INJECTION, SOLUTION INTRAMUSCULAR; INTRAVENOUS EVERY 5 MIN PRN
Status: DISCONTINUED | OUTPATIENT
Start: 2018-09-11 | End: 2018-09-12

## 2018-09-11 RX ORDER — HYDRALAZINE HYDROCHLORIDE 20 MG/ML
10 INJECTION INTRAMUSCULAR; INTRAVENOUS EVERY 10 MIN PRN
Status: DISCONTINUED | OUTPATIENT
Start: 2018-09-11 | End: 2018-09-19

## 2018-09-11 RX ORDER — IPRATROPIUM BROMIDE AND ALBUTEROL SULFATE 2.5; .5 MG/3ML; MG/3ML
1 SOLUTION RESPIRATORY (INHALATION) ONCE
Status: COMPLETED | OUTPATIENT
Start: 2018-09-11 | End: 2018-09-11

## 2018-09-11 RX ORDER — MAGNESIUM SULFATE IN WATER 40 MG/ML
4 INJECTION, SOLUTION INTRAVENOUS ONCE
Status: COMPLETED | OUTPATIENT
Start: 2018-09-11 | End: 2018-09-11

## 2018-09-11 RX ORDER — MORPHINE SULFATE 2 MG/ML
2 INJECTION, SOLUTION INTRAMUSCULAR; INTRAVENOUS EVERY 5 MIN PRN
Status: DISCONTINUED | OUTPATIENT
Start: 2018-09-11 | End: 2018-09-12

## 2018-09-11 RX ORDER — HEPARIN SODIUM 10000 [USP'U]/ML
5000 INJECTION, SOLUTION INTRAVENOUS; SUBCUTANEOUS EVERY 5 MIN PRN
Status: COMPLETED | OUTPATIENT
Start: 2018-09-11 | End: 2018-09-11

## 2018-09-11 RX ORDER — GLYCOPYRROLATE 1 MG/5 ML
SYRINGE (ML) INTRAVENOUS PRN
Status: DISCONTINUED | OUTPATIENT
Start: 2018-09-11 | End: 2018-09-11 | Stop reason: SDUPTHER

## 2018-09-11 RX ORDER — 0.9 % SODIUM CHLORIDE 0.9 %
1000 INTRAVENOUS SOLUTION INTRAVENOUS ONCE
Status: DISCONTINUED | OUTPATIENT
Start: 2018-09-11 | End: 2018-09-11

## 2018-09-11 RX ORDER — CALCIUM CHLORIDE 100 MG/ML
INJECTION INTRAVENOUS; INTRAVENTRICULAR PRN
Status: DISCONTINUED | OUTPATIENT
Start: 2018-09-11 | End: 2018-09-11 | Stop reason: SDUPTHER

## 2018-09-11 RX ORDER — PROPOFOL 10 MG/ML
INJECTION, EMULSION INTRAVENOUS CONTINUOUS PRN
Status: DISCONTINUED | OUTPATIENT
Start: 2018-09-11 | End: 2018-09-11 | Stop reason: SDUPTHER

## 2018-09-11 RX ADMIN — Medication 5000 UNITS: at 15:55

## 2018-09-11 RX ADMIN — PHENYLEPHRINE HYDROCHLORIDE 150 MCG: 10 INJECTION INTRAMUSCULAR; INTRAVENOUS; SUBCUTANEOUS at 16:58

## 2018-09-11 RX ADMIN — Medication 0.4 MG: at 17:56

## 2018-09-11 RX ADMIN — LIDOCAINE HYDROCHLORIDE 20 ML: 20 INJECTION, SOLUTION INFILTRATION; PERINEURAL at 18:50

## 2018-09-11 RX ADMIN — CLEVIPIDINE 14 MG/HR: 0.5 EMULSION INTRAVENOUS at 07:32

## 2018-09-11 RX ADMIN — METHYLPREDNISOLONE SODIUM SUCCINATE 40 MG: 40 INJECTION, POWDER, FOR SOLUTION INTRAMUSCULAR; INTRAVENOUS at 14:50

## 2018-09-11 RX ADMIN — IOVERSOL 140 ML: 678 INJECTION INTRA-ARTERIAL; INTRAVENOUS at 18:50

## 2018-09-11 RX ADMIN — PANTOPRAZOLE SODIUM 40 MG: 40 INJECTION, POWDER, FOR SOLUTION INTRAVENOUS at 08:33

## 2018-09-11 RX ADMIN — PHENYLEPHRINE HYDROCHLORIDE 10 MCG: 10 INJECTION INTRAMUSCULAR; INTRAVENOUS; SUBCUTANEOUS at 15:45

## 2018-09-11 RX ADMIN — VECURONIUM BROMIDE FOR INJECTION 1 MG: 1 INJECTION, POWDER, LYOPHILIZED, FOR SOLUTION INTRAVENOUS at 16:40

## 2018-09-11 RX ADMIN — VECURONIUM BROMIDE FOR INJECTION 2 MG: 1 INJECTION, POWDER, LYOPHILIZED, FOR SOLUTION INTRAVENOUS at 15:40

## 2018-09-11 RX ADMIN — ACETAMINOPHEN 650 MG: 650 SUPPOSITORY RECTAL at 09:44

## 2018-09-11 RX ADMIN — VECURONIUM BROMIDE FOR INJECTION 2 MG: 1 INJECTION, POWDER, LYOPHILIZED, FOR SOLUTION INTRAVENOUS at 15:13

## 2018-09-11 RX ADMIN — POTASSIUM CHLORIDE 20 MEQ: 400 INJECTION, SOLUTION INTRAVENOUS at 17:27

## 2018-09-11 RX ADMIN — LABETALOL HYDROCHLORIDE 10 MG: 5 INJECTION INTRAVENOUS at 02:12

## 2018-09-11 RX ADMIN — DIPHENHYDRAMINE HYDROCHLORIDE 50 MG: 50 INJECTION, SOLUTION INTRAMUSCULAR; INTRAVENOUS at 14:50

## 2018-09-11 RX ADMIN — LABETALOL HYDROCHLORIDE 10 MG: 5 INJECTION INTRAVENOUS at 21:38

## 2018-09-11 RX ADMIN — SODIUM CHLORIDE: 9 INJECTION, SOLUTION INTRAVENOUS at 14:40

## 2018-09-11 RX ADMIN — HYDRALAZINE HYDROCHLORIDE 10 MG: 20 INJECTION INTRAMUSCULAR; INTRAVENOUS at 02:32

## 2018-09-11 RX ADMIN — FENTANYL CITRATE 25 MCG: 50 INJECTION, SOLUTION INTRAMUSCULAR; INTRAVENOUS at 15:55

## 2018-09-11 RX ADMIN — MAGNESIUM SULFATE IN WATER 4 G: 40 INJECTION, SOLUTION INTRAVENOUS at 12:34

## 2018-09-11 RX ADMIN — Medication 5000 UNITS: at 15:50

## 2018-09-11 RX ADMIN — Medication 10 ML: at 21:09

## 2018-09-11 RX ADMIN — CLEVIPIDINE 14 MG/HR: 0.5 EMULSION INTRAVENOUS at 06:52

## 2018-09-11 RX ADMIN — Medication 5000 UNITS: at 15:40

## 2018-09-11 RX ADMIN — POTASSIUM CHLORIDE 10 MEQ: 7.46 INJECTION, SOLUTION INTRAVENOUS at 12:32

## 2018-09-11 RX ADMIN — HYDRALAZINE HYDROCHLORIDE 10 MG: 20 INJECTION INTRAMUSCULAR; INTRAVENOUS at 03:06

## 2018-09-11 RX ADMIN — CALCIUM CHLORIDE 0.5 G: 100 INJECTION, SOLUTION INTRAVENOUS; INTRAVENTRICULAR at 17:39

## 2018-09-11 RX ADMIN — SODIUM CHLORIDE: 9 INJECTION, SOLUTION INTRAVENOUS at 16:40

## 2018-09-11 RX ADMIN — FENTANYL CITRATE 50 MCG: 50 INJECTION, SOLUTION INTRAMUSCULAR; INTRAVENOUS at 14:40

## 2018-09-11 RX ADMIN — LABETALOL HYDROCHLORIDE 10 MG: 5 INJECTION INTRAVENOUS at 03:39

## 2018-09-11 RX ADMIN — HYDRALAZINE HYDROCHLORIDE 10 MG: 20 INJECTION INTRAMUSCULAR; INTRAVENOUS at 21:08

## 2018-09-11 RX ADMIN — Medication 2 MG: at 17:56

## 2018-09-11 RX ADMIN — FENTANYL CITRATE 25 MCG: 50 INJECTION, SOLUTION INTRAMUSCULAR; INTRAVENOUS at 15:14

## 2018-09-11 RX ADMIN — VECURONIUM BROMIDE FOR INJECTION 1 MG: 1 INJECTION, POWDER, LYOPHILIZED, FOR SOLUTION INTRAVENOUS at 16:10

## 2018-09-11 RX ADMIN — Medication 5000 UNITS: at 15:35

## 2018-09-11 RX ADMIN — LIDOCAINE HYDROCHLORIDE 100 MG: 20 INJECTION, SOLUTION EPIDURAL; INFILTRATION; INTRACAUDAL; PERINEURAL at 14:40

## 2018-09-11 RX ADMIN — PHENYLEPHRINE HYDROCHLORIDE 100 MCG: 10 INJECTION INTRAMUSCULAR; INTRAVENOUS; SUBCUTANEOUS at 14:40

## 2018-09-11 RX ADMIN — LABETALOL HYDROCHLORIDE 10 MG: 5 INJECTION INTRAVENOUS at 05:22

## 2018-09-11 RX ADMIN — VECURONIUM BROMIDE FOR INJECTION 5 MG: 1 INJECTION, POWDER, LYOPHILIZED, FOR SOLUTION INTRAVENOUS at 14:40

## 2018-09-11 RX ADMIN — Medication 5000 UNITS: at 15:30

## 2018-09-11 RX ADMIN — PROPOFOL 50 MCG/KG/MIN: 10 INJECTION, EMULSION INTRAVENOUS at 14:50

## 2018-09-11 RX ADMIN — Medication 10 ML: at 08:34

## 2018-09-11 RX ADMIN — NIMODIPINE 60 MG: 30 CAPSULE ORAL at 21:34

## 2018-09-11 RX ADMIN — PROPOFOL 200 MG: 10 INJECTION, EMULSION INTRAVENOUS at 14:40

## 2018-09-11 RX ADMIN — SODIUM CHLORIDE 25 ML/HR: 3 INJECTION, SOLUTION INTRAVENOUS at 08:34

## 2018-09-11 RX ADMIN — IPRATROPIUM BROMIDE AND ALBUTEROL SULFATE 1 AMPULE: .5; 3 SOLUTION RESPIRATORY (INHALATION) at 20:51

## 2018-09-11 RX ADMIN — SODIUM CHLORIDE 1000 ML: 9 INJECTION, SOLUTION INTRAVENOUS at 12:32

## 2018-09-11 RX ADMIN — PHENYLEPHRINE HYDROCHLORIDE 100 MCG: 10 INJECTION INTRAMUSCULAR; INTRAVENOUS; SUBCUTANEOUS at 17:10

## 2018-09-11 RX ADMIN — PHENYLEPHRINE HYDROCHLORIDE 200 MCG: 10 INJECTION INTRAMUSCULAR; INTRAVENOUS; SUBCUTANEOUS at 17:20

## 2018-09-11 RX ADMIN — FENTANYL CITRATE 25 MCG: 50 INJECTION, SOLUTION INTRAMUSCULAR; INTRAVENOUS at 15:40

## 2018-09-11 RX ADMIN — PHENYLEPHRINE HYDROCHLORIDE 100 MCG: 10 INJECTION INTRAMUSCULAR; INTRAVENOUS; SUBCUTANEOUS at 16:50

## 2018-09-11 RX ADMIN — Medication 5000 UNITS: at 15:45

## 2018-09-11 RX ADMIN — VERAPAMIL HYDROCHLORIDE 10 MG: 2.5 INJECTION, SOLUTION INTRAVENOUS at 17:03

## 2018-09-11 RX ADMIN — HYDRALAZINE HYDROCHLORIDE 10 MG: 20 INJECTION INTRAMUSCULAR; INTRAVENOUS at 07:08

## 2018-09-11 RX ADMIN — PHENYLEPHRINE HYDROCHLORIDE 200 MCG: 10 INJECTION INTRAMUSCULAR; INTRAVENOUS; SUBCUTANEOUS at 17:05

## 2018-09-11 RX ADMIN — SODIUM CHLORIDE: 9 INJECTION, SOLUTION INTRAVENOUS at 17:55

## 2018-09-11 RX ADMIN — CLEVIPIDINE 14 MG/HR: 0.5 EMULSION INTRAVENOUS at 09:23

## 2018-09-11 ASSESSMENT — PULMONARY FUNCTION TESTS
PIF_VALUE: 21
PIF_VALUE: 14
PIF_VALUE: 21
PIF_VALUE: 4
PIF_VALUE: 1
PIF_VALUE: 23
PIF_VALUE: 20
PIF_VALUE: 1
PIF_VALUE: 21
PIF_VALUE: 25
PIF_VALUE: 21
PIF_VALUE: 24
PIF_VALUE: 21
PIF_VALUE: 26
PIF_VALUE: 21
PIF_VALUE: 24
PIF_VALUE: 24
PIF_VALUE: 20
PIF_VALUE: 20
PIF_VALUE: 22
PIF_VALUE: 20
PIF_VALUE: 20
PIF_VALUE: 41
PIF_VALUE: 24
PIF_VALUE: 23
PIF_VALUE: 25
PIF_VALUE: 4
PIF_VALUE: 2
PIF_VALUE: 21
PIF_VALUE: 24
PIF_VALUE: 1
PIF_VALUE: 23
PIF_VALUE: 30
PIF_VALUE: 22
PIF_VALUE: 21
PIF_VALUE: 21
PIF_VALUE: 20
PIF_VALUE: 22
PIF_VALUE: 21
PIF_VALUE: 20
PIF_VALUE: 19
PIF_VALUE: 23
PIF_VALUE: 20
PIF_VALUE: 25
PIF_VALUE: 3
PIF_VALUE: 21
PIF_VALUE: 24
PIF_VALUE: 21
PIF_VALUE: 24
PIF_VALUE: 21
PIF_VALUE: 23
PIF_VALUE: 15
PIF_VALUE: 23
PIF_VALUE: 24
PIF_VALUE: 24
PIF_VALUE: 20
PIF_VALUE: 20
PIF_VALUE: 19
PIF_VALUE: 22
PIF_VALUE: 1
PIF_VALUE: 25
PIF_VALUE: 27
PIF_VALUE: 23
PIF_VALUE: 19
PIF_VALUE: 21
PIF_VALUE: 25
PIF_VALUE: 21
PIF_VALUE: 16
PIF_VALUE: 25
PIF_VALUE: 24
PIF_VALUE: 22
PIF_VALUE: 28
PIF_VALUE: 14
PIF_VALUE: 1
PIF_VALUE: 19
PIF_VALUE: 1
PIF_VALUE: 22
PIF_VALUE: 21
PIF_VALUE: 21
PIF_VALUE: 24
PIF_VALUE: 34
PIF_VALUE: 22
PIF_VALUE: 21
PIF_VALUE: 23
PIF_VALUE: 1
PIF_VALUE: 2
PIF_VALUE: 21
PIF_VALUE: 21
PIF_VALUE: 25
PIF_VALUE: 21
PIF_VALUE: 22
PIF_VALUE: 12
PIF_VALUE: 23
PIF_VALUE: 22
PIF_VALUE: 20
PIF_VALUE: 1
PIF_VALUE: 24
PIF_VALUE: 1
PIF_VALUE: 23
PIF_VALUE: 21
PIF_VALUE: 23
PIF_VALUE: 1
PIF_VALUE: 21
PIF_VALUE: 24
PIF_VALUE: 1
PIF_VALUE: 1
PIF_VALUE: 25
PIF_VALUE: 18
PIF_VALUE: 22
PIF_VALUE: 19
PIF_VALUE: 3
PIF_VALUE: 1
PIF_VALUE: 24
PIF_VALUE: 21
PIF_VALUE: 21
PIF_VALUE: 1
PIF_VALUE: 21
PIF_VALUE: 23
PIF_VALUE: 23
PIF_VALUE: 24
PIF_VALUE: 21
PIF_VALUE: 1
PIF_VALUE: 17
PIF_VALUE: 20
PIF_VALUE: 22
PIF_VALUE: 20
PIF_VALUE: 20
PIF_VALUE: 23
PIF_VALUE: 16
PIF_VALUE: 21
PIF_VALUE: 21
PIF_VALUE: 23
PIF_VALUE: 1
PIF_VALUE: 20
PIF_VALUE: 20
PIF_VALUE: 23
PIF_VALUE: 21
PIF_VALUE: 21
PIF_VALUE: 17
PIF_VALUE: 21
PIF_VALUE: 2
PIF_VALUE: 2
PIF_VALUE: 28
PIF_VALUE: 24
PIF_VALUE: 23
PIF_VALUE: 24
PIF_VALUE: 22
PIF_VALUE: 21
PIF_VALUE: 24
PIF_VALUE: 3
PIF_VALUE: 20
PIF_VALUE: 20
PIF_VALUE: 24
PIF_VALUE: 1
PIF_VALUE: 40
PIF_VALUE: 23
PIF_VALUE: 21
PIF_VALUE: 22
PIF_VALUE: 23
PIF_VALUE: 21
PIF_VALUE: 21
PIF_VALUE: 24
PIF_VALUE: 25
PIF_VALUE: 23
PIF_VALUE: 23
PIF_VALUE: 21
PIF_VALUE: 1
PIF_VALUE: 23
PIF_VALUE: 21
PIF_VALUE: 23
PIF_VALUE: 21
PIF_VALUE: 21
PIF_VALUE: 28
PIF_VALUE: 24
PIF_VALUE: 23
PIF_VALUE: 20
PIF_VALUE: 24
PIF_VALUE: 5
PIF_VALUE: 21
PIF_VALUE: 19
PIF_VALUE: 24
PIF_VALUE: 17
PIF_VALUE: 24
PIF_VALUE: 21
PIF_VALUE: 28
PIF_VALUE: 23
PIF_VALUE: 18
PIF_VALUE: 11
PIF_VALUE: 23
PIF_VALUE: 1
PIF_VALUE: 1
PIF_VALUE: 24
PIF_VALUE: 12
PIF_VALUE: 19
PIF_VALUE: 31
PIF_VALUE: 1
PIF_VALUE: 21
PIF_VALUE: 24
PIF_VALUE: 24
PIF_VALUE: 21
PIF_VALUE: 24
PIF_VALUE: 1
PIF_VALUE: 21
PIF_VALUE: 24
PIF_VALUE: 20
PIF_VALUE: 21
PIF_VALUE: 31
PIF_VALUE: 23
PIF_VALUE: 24
PIF_VALUE: 24
PIF_VALUE: 21
PIF_VALUE: 20
PIF_VALUE: 2
PIF_VALUE: 21
PIF_VALUE: 25
PIF_VALUE: 24
PIF_VALUE: 23

## 2018-09-11 ASSESSMENT — PAIN DESCRIPTION - LOCATION
LOCATION: HEAD;NECK;BACK
LOCATION: HEAD;NECK;BACK

## 2018-09-11 ASSESSMENT — PAIN SCALES - GENERAL
PAINLEVEL_OUTOF10: 0
PAINLEVEL_OUTOF10: 9
PAINLEVEL_OUTOF10: 0

## 2018-09-11 ASSESSMENT — PAIN DESCRIPTION - PAIN TYPE
TYPE: ACUTE PAIN
TYPE: ACUTE PAIN

## 2018-09-11 ASSESSMENT — LIFESTYLE VARIABLES: SMOKING_STATUS: 1

## 2018-09-11 ASSESSMENT — PAIN DESCRIPTION - FREQUENCY: FREQUENCY: CONTINUOUS

## 2018-09-11 NOTE — PROGRESS NOTES
Spoke with patient's bedside nurse, Teresa Lozoya, regarding aneurysm coiling performed yesterday, 9/10. Nurse states there is a decline in patient's neurological status affecting the right side. Notified Dr. Marichuy Jauregui of information. Per nurse, neurology was consulted to change in status. If there are any concerns that needs to be directed to IR department, please call at ext [phone].

## 2018-09-11 NOTE — PROGRESS NOTES
[facility] Int[facility] Unit  Critical Care Consult 9/11/2018      Date of Admission: 9/10/18    CC: Headache    Overnight Events:  Lethargic this AM, flaccid right side. CT scan demonstrating worsening edema. Started on 3%    Surgical/Interventional Procedures:   9/10: 4 Vessel with coiling of 2 ICA aneurysms       VITAL SIGNS:   /66   Pulse 71   Temp 98.1 °F (36.7 °C) (Oral)   Resp 18   Ht 5' 5\" (1.651 m)   Wt 219 lb 12.8 oz (99.7 kg)   SpO2 92%   BMI 36.58 kg/m²     PHYSICAL EXAM:    General appearance:  Comfortable. GCS:    3 - Opens eyes to loud noise or command   6 - Follows simple motor commands  4 - Seems confused, disoriented      Pupil size:  Left 3 mm    Right 3 mm  Pupil reaction: Yes  Wiggles fingers: Left Yes Right No  Hand grasp:   Left normal    Right absent  Wiggles toes: Left Yes    Right No  Plantar flexion: Left normal   Right absent    CONSTITUTIONAL: no acute distress, lying in hospital bed. CARDIOVASCULAR: S1 S2, regular rate, regular rhythm, no murmur/gallop/rub. Monitor:Sinus  PULMONARY: no rhonchi/rales/wheezes, no use of accessory muscles, 2 L NC  RENAL: fischer to gravity, clear yellow urine  ABDOMEN: soft, nontender, nondistended, nontympanic, no masses, no organomegaly, normal bowel sounds   SKIN/EXTREMITIES: no rashes/ecchymosis, no edema/clubbing, warm/dry, good capillary refill     Assessment & Plan:       · Neuro:  SAH, aneurysm s/p coiling. Neurosurgery following, monitor hemodynamics   · CV: Hypertensive. Monitor hemodynamics. BP goal < 140. PRN hydralazine & labetalol. Cleviprex,     · Pulm: No acute issues  Monitor RR & SpO2. Pulmonary hygiene  · GI: No acute issues. NPO. Monitor bowel function. · Renal: No acute issues. Monitor BUN & Cr. Monitor electrolytes & replace as needed. Monitor urine output. · ID: Leukocytosis, afebrile. Monitor for SIRS  · Endocrine: Hyperglycemia. Monitor BS. · MSK: No acute issues. ROM. turn & reposition.  PT/OT when able  · Heme: No acute issues. Monitor CBC. Pain control/Sedation: morphine, Tylenol  DVT prophylaxis: SCDs. No Lovenox/heparin until ok with neurosurgery. GI prophylaxis: Protonix  Mouth/Eye care: As needed  Sams: Keep in place for critical care monitoring of fluid balance.     Family update: Updated at bedside   Code status:  Full    Disposition:  NSICU      Electronically signed by LYLY Cintron CNP on 9/11/2018 at 8:22 AM

## 2018-09-11 NOTE — PROGRESS NOTES
Had 4 vessel angiogram yesterday & also coiling of the aneurysm. Apparently is obtunded today & possible right sided weakness & expressive dysphasia. Patient started on Nimotop. Reviewed CT scan of brain. Diffuse atrophy likely age related   Findings compatible with small vessel ischemic changes.    Evolving intracranial hemorrhage   Getting central line insertion

## 2018-09-11 NOTE — PROGRESS NOTES
Attempted to call Dr. Ron García at [phone] with update regarding pts neuro assessment. Could not leave message d/t mailbox being full. Unable to reach him through Reaction also.

## 2018-09-11 NOTE — INTERVAL H&P NOTE
Neurointerventional Radiology Preprocedure Note    HPI:  Lia Brown is a 60 y.o. female with aneurysmal SAH, POD 1 s/p coil embolization of two distal left ICA aneurysms. Pt. demonstrated RUE pronator drift prior to coiling and now shows persistent RUE weakness, right facial droop, and decreased spontaneous movement on right. There was no apparent major arterial occlusion during angio. She underwent repeat head CT early this AM and again this afternoon. There has been slight resolution of the [facility]. Ventricles are fairly stable. Vasospasm is a diagnostic consideration. Allergies: Allergies   Allergen Reactions    Latex     Aloe     Iodine        Vital Signs:  Vitals:    09/11/18 1100 09/11/18 1200 09/11/18 1300 09/11/18 1400   BP:  116/73 (!) 156/84    Pulse: 74 96 67 66   Resp: 17 26 18 19   Temp:  99.8 °F (37.7 °C)     TempSrc:  Temporal     SpO2: 93% 92% 95% 91%   Weight:       Height:         Exam:  Pt. follows some commands. Restless. Complains of headache in the back of her head. Right facial droop. Does look to right beyond midline. Left eye - slight exotropia. But EOM's appear grossly conjugate. Moves RLE spontaneously but not as much as LLE. RLE strength 5/5. LLE strength 5/5. Sensation in both LE's grossly intact. Can not move RUE spontaneously. Does not hold RUE against gravity. Lab Results:    Lab Results   Component Value Date    INR 0.9 09/10/2018    PROTIME 10.9 09/10/2018    WBC 15.8 (H) 09/11/2018    HGB 13.4 09/11/2018    HCT 39.6 09/11/2018     09/11/2018    CHOL 186 07/24/2018    TRIG 157 (H) 07/24/2018    HDL 47 07/24/2018    ALT 11 07/24/2018    AST 15 07/24/2018     09/11/2018    K 3.6 09/11/2018     09/11/2018    CREATININE 0.7 09/11/2018    BUN 15 09/11/2018    CO2 18 (L) 09/11/2018       Imaging:   Most recent head CT this PM shows slightly decreased SAH. Coil masses stable. There is IVH, as on priors, and temporal horns are prominent but stable.  No large infarct. Assessment and Plan:  1. Overall decreased spontaneous right-sided movement and weakness in RUE, right facial droop, poor mentation. Vasospasm vs. embolic infarct. Plan is for cerebral angio and intra-arterial treatment of vasospasm. Discussed with Dr. Amina Richard of Neurology and Dr. Irena Rodriguez of Neurosurgery. 2. Pre-medicate for contrast allergy. Informed Consent:  Informed consent was obtained from the patient's son, Zaira Holliday. The details of the procedure, as wells as its risks, benefits, and alternatives, were discussed. These risks include, but are not limited to, stroke (with temporary or permanent disability), vascular injury (including dissection), intracranial hemorrhage, aneurysm rupture, groin hematoma and/or femoral artery pseudoaneurysm, infection, and death. The patient's son indicated that he understood and gave permission to proceed.     Electronically signed by Payton Gordon MD on 9/11/2018 at 2:45 PM

## 2018-09-11 NOTE — ANESTHESIA PRE PROCEDURE
mg  650 mg Oral Q4H PRN Sylvie Donato MD        ondansetron [facility]) injection 4 mg  4 mg Intravenous Q6H PRN Sylvie Donato MD   4 mg at 09/10/18 2144    pantoprazole (PROTONIX) injection 40 mg  40 mg Intravenous Daily Sylvie Donato MD   40 mg at 09/11/18 4952    hydrALAZINE (APRESOLINE) injection 10 mg  10 mg Intravenous Q10 Min PRN [facility], APRN - CNP   10 mg at 09/11/18 0708    labetalol (NORMODYNE;TRANDATE) injection 10 mg  10 mg Intravenous Q10 Min PRN [facility], APRN - CNP   10 mg at 09/11/18 0522    sodium chloride flush 0.9 % injection 10 mL  10 mL Intravenous 2 times per day [facility] APRN - CNP   10 mL at 09/11/18 0834    methylPREDNISolone sodium (SOLU-MEDROL) injection 40 mg  40 mg Intravenous Once Tierra Guevara MD        diphenhydrAMINE (BENADRYL) injection 50 mg  50 mg Intravenous Once Tierra Guevara MD           Allergies: Allergies   Allergen Reactions    Latex     Aloe     Iodine        Problem List:    Patient Active Problem List   Diagnosis Code    [facility] I60.9    Hypertensive emergency I16.1    Obesity (BMI 30-39. 9) E66.9    Cerebral aneurysm I67.1       Past Medical History:        Diagnosis Date    Degenerative arthritis of hand     Hiatal hernia        Past Surgical History:        Procedure Laterality Date    TONSILLECTOMY         Social History:    Social History   Substance Use Topics    Smoking status: Current Every Day Smoker     Packs/day: 1.50     Years: 43.00     Types: Cigarettes    Smokeless tobacco: Never Used    Alcohol use No                                Ready to quit: Not Answered  Counseling given: Not Answered      Vital Signs (Current): There were no vitals filed for this visit.                                            BP Readings from Last 3 Encounters:   09/11/18 116/73   09/10/18 125/63   07/17/18 130/76       NPO Status: Anesthesia Plan      general     ASA 4 - emergent       Induction: rapid sequence. arterial line  MIPS: Postoperative opioids intended and Prophylactic antiemetics administered. Anesthetic plan and risks discussed with patient. Plan discussed with attending.                   Halle Blevins MD   9/11/2018

## 2018-09-11 NOTE — CONSULTS
intracranial  aneurysm. Recommend four-vessel cerebral arteriogram to confirm the same  and in the same time, if possible coiling of the aneurysm. I had spoken with Dr. Washington Gonzalez in that regard. I have explained this to the  family. We will follow along.                 Kathe Yates MD    D: 09/11/2018 12:50:03       T: 09/11/2018 12:52:37     [email or web address]  Job#: [id number]     Doc#: [id number]    CC:

## 2018-09-11 NOTE — PROGRESS NOTES
3139 Hand off report given by  RN. Received pt on angio table. Procedure in progress. 1703 10 mg verapamil intra arterially LMCA given by Dr Devaughn Arreguin  1721  7 mg verapamil intra arterially LICA given by Dr Devaughn Arreguin  [phone] Procedure ended. 6 Fr angioseal deployed left femoral artery. Digital pressure held x 10 minutes  1800 DSD applied amrik groin. Bilateral femoral and DP pulses 3+  1810 To PACU via bed with monitor, O2 and CRNA, SRNA and RN in transport.  Hand off report given by CRNA

## 2018-09-11 NOTE — PROGRESS NOTES
Subjective: The patient is awake and alert. Displaying right sided weakness. Status post cerebral angiography and endovascular coil embolization of two distal left ICA aneurysms (9/10/2018). Denies chest pain, angina, and dyspnea. Denies abdominal pain. Tolerating diet. No nausea or vomiting. Objective:    /66   Pulse 64   Temp 98.1 °F (36.7 °C) (Oral)   Resp 19   Ht 5' 5\" (1.651 m)   Wt 219 lb 12.8 oz (99.7 kg)   SpO2 92%   BMI 36.58 kg/m²     Current medications that patient is taking have been reviewed. Heart:  RRR, no murmurs, gallops, or rubs.   Lungs:  CTA bilaterally, no wheeze, rales or rhonchi  Abd: bowel sounds present, nontender, nondistended, no masses  Extrem:  No clubbing, cyanosis, or edema    CBC with Differential:    Lab Results   Component Value Date    WBC 15.8 09/11/2018    RBC 4.19 09/11/2018    HGB 13.4 09/11/2018    HCT 39.6 09/11/2018     09/11/2018    MCV 94.5 09/11/2018    MCH 32.0 09/11/2018    MCHC 33.8 09/11/2018    RDW 13.3 09/11/2018    LYMPHOPCT 10.8 09/10/2018    MONOPCT 4.8 09/10/2018    BASOPCT 0.4 09/10/2018    MONOSABS 0.67 09/10/2018    LYMPHSABS 1.51 09/10/2018    EOSABS 0.06 09/10/2018    BASOSABS 0.05 09/10/2018     CMP:    Lab Results   Component Value Date     09/11/2018    K 3.6 09/11/2018     09/11/2018    CO2 18 09/11/2018    BUN 15 09/11/2018    CREATININE 0.7 09/11/2018    GFRAA >60 09/11/2018    LABGLOM >60 09/11/2018    GLUCOSE 141 09/11/2018    PROT 7.1 07/24/2018    LABALBU 4.2 07/24/2018    CALCIUM 8.2 09/11/2018    BILITOT 0.5 07/24/2018    ALKPHOS 87 07/24/2018    AST 15 07/24/2018    ALT 11 07/24/2018     BMP:    Lab Results   Component Value Date     09/11/2018    K 3.6 09/11/2018     09/11/2018    CO2 18 09/11/2018    BUN 15 09/11/2018    LABALBU 4.2 07/24/2018    CREATININE 0.7 09/11/2018    CALCIUM 8.2 09/11/2018    GFRAA >60 09/11/2018    LABGLOM >60 09/11/2018    GLUCOSE 141 09/11/2018 Magnesium:    Lab Results   Component Value Date    MG 1.7 09/11/2018     Phosphorus:    Lab Results   Component Value Date    PHOS 2.7 09/11/2018     PT/INR:    Lab Results   Component Value Date    PROTIME 10.9 09/10/2018    INR 0.9 09/10/2018     PTT:    Lab Results   Component Value Date    APTT 26.0 09/10/2018   [APTT}     Assessment:    Patient Active Problem List   Diagnosis    SAH (subarachnoid hemorrhage) ([facility] [address]    Hypertensive emergency    Obesity (BMI 30-39. 9)    Cerebral aneurysm       Plan:  As per neurosurgery. Continue blood pressure control. Continue to encourage weight loss. Status post cerebral angiography and endovascular coil embolization of two distal left ICA aneurysms. Now with right sided weakness. Worry about CVA. Neurology consulted. Pt/Ot evaluations for discharge planning.         Simón Retana MD  11:10 AM  9/11/2018

## 2018-09-11 NOTE — PROGRESS NOTES
Clevaprex at 12 mg/h on arrival to pacu. The patient was agitated on arrival and unable to redirect. Patient placed in 3 pt soft restraints for safety.  [place]

## 2018-09-11 NOTE — PROGRESS NOTES
Dr. Elsi Quinn in the PAcu to evaluate patient. She is still not following commands. She has been sleeping and is aroused with stimulation.  Mary Davidson

## 2018-09-11 NOTE — PROGRESS NOTES
Pt transported to 4414 from PACU with cleviprex gtt running and O2 which she was trying to pull off. Pt able to follow some commands at this time but not all d/t lethargy. R side appears to be weaker than L. These findings were communicated with Dr. Román Spencer when he had called unit to get an update on pt. Follow up head CT ordered. Will continue to monitor.

## 2018-09-11 NOTE — PROCEDURES
applied  Assessment: blood return through all ports,  free fluid flow,  no pneumothorax on x-ray and placement verified by x-ray  Patient tolerance: Patient tolerated the procedure well with no immediate complications          Delaney Romberg, MD  9/11/2018

## 2018-09-11 NOTE — CONSULTS
Dave Swain is a 60 y.o. female    Neurology consulted for stroke in setting of [facility]    Past Medical History:     Past Medical History:   Diagnosis Date    Degenerative arthritis of hand     Hiatal hernia        Past Surgical History:     Past Surgical History:   Procedure Laterality Date    TONSILLECTOMY         Allergies:     Latex; Aloe; and Iodine    Medications:     Prior to Admission medications    Medication Sig Start Date End Date Taking? Authorizing Provider   meloxicam (MOBIC) 7.5 MG tablet Take 1-2 tablets once daily 7/17/18   Shayna Sandra MD   Multiple Vitamins-Minerals (CENTRUM SILVER PO) Take  by mouth daily. Historical Provider, MD   Omega-3 Fatty Acids (FISH OIL PO) Take  by mouth every 48 hours. Historical Provider, MD   Naproxen Sodium (ALEVE PO) Take  by mouth daily. Historical Provider, MD       Social History:     Social History   Substance Use Topics    Smoking status: Current Every Day Smoker     Packs/day: 1.50     Years: 43.00     Types: Cigarettes    Smokeless tobacco: Never Used    Alcohol use No       Review of Systems:       Limited by mental status and headache. Endorses headache    Review of Systems   Unable to perform ROS: Mental status change         Family History:     Family History   Problem Relation Age of Onset    Diabetes Mother     Arthritis Mother     Atrial Fibrillation Mother     Diabetes Father     Atrial Fibrillation Father     Arthritis Father     Emphysema Father     Cancer Sister         History of Present Illness:     History obtained from pt EMR and ICU team.   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 Post procedure she has had weakness of the right side continued severe headache and decreased mental status.      Objective:   /66   Pulse 72   Temp 98.1 °F (36.7 °C) (Oral)   Resp (!) 33   Ht 5' 5\" (1.651 m)   Wt 219 lb 12.8 oz (99.7 kg)   SpO2 93%   BMI 36.58 kg/m² of ruptured LICA aneurysm concering for jacque procedural stroke verse vasospasm related ischemia. There is a lack of cortical signs. No visual field deficits no aphsia gaze preference no neglect. This suggest stroke is not hemispheric and relatively less likely vasospasm. Possible anterior choroidal involvement vs perforators off MCA. Her decreased LOC is also concerning for increased ICP    Patient Active Problem List   Diagnosis    SAH (subarachnoid hemorrhage) ([address]    Hypertensive emergency    Obesity (BMI 30-39. 9)    Cerebral aneurysm       Plan:     Agree with TCDs to monitor for vasospasm. Consider fluid augmentation, EVD placement repeat angiogram.  Loosening of pressure goal. Differ to [address], Neuroradiology proceduralist.   MRI brain when able.        Helena Headings  10:07 AM  9/11/2018

## 2018-09-11 NOTE — PLAN OF CARE
Problem: Falls - Risk of:  Goal: Will remain free from falls  Will remain free from falls   Outcome: Met This Shift    Goal: Absence of physical injury  Absence of physical injury   Outcome: Met This Shift      Problem: HEMODYNAMIC STATUS  Goal: Patient has stable vital signs and fluid balance  Outcome: Met This Shift      Problem: COMMUNICATION IMPAIRMENT  Goal: Ability to express needs and understand communication  Outcome: Met This Shift      Problem: Risk for Impaired Skin Integrity  Goal: Tissue integrity - skin and mucous membranes  Structural intactness and normal physiological function of skin and  mucous membranes.    Outcome: Met This Shift

## 2018-09-11 NOTE — PROGRESS NOTES
Pt transported to and from CT in bed with portable monitor, cleviprex drip, and O2 without complication. Returned back to [phone], pt quiet resting in bed.

## 2018-09-11 NOTE — PROGRESS NOTES
1430 Patient arrived to angio department for cerebral angiogram with possible intervention with Dr. Shaun Otoole. Report Hand off obtained from [facility]. Chart reviewed, allergies, medications and consent has been signed. 1450 Patient medicated for Iodine allergy see MAR  Patient placed on angio table with monitoring devices, Right  femoral and bilateral pedal pulses palpable. Rght groin prepped and draped. Time out called @ 1523 Right artery accessed and angiogram initiated by Dr Shaun Otoole.    Shift Handoff given RP

## 2018-09-12 ENCOUNTER — APPOINTMENT (OUTPATIENT)
Dept: ULTRASOUND IMAGING | Age: 58
DRG: 021 | End: 2018-09-12
Payer: COMMERCIAL

## 2018-09-12 LAB
ALBUMIN SERPL-MCNC: 3.2 G/DL (ref 3.5–5.2)
ALP BLD-CCNC: 57 U/L (ref 35–104)
ALT SERPL-CCNC: 10 U/L (ref 0–32)
ANION GAP SERPL CALCULATED.3IONS-SCNC: 16 MMOL/L (ref 7–16)
AST SERPL-CCNC: 9 U/L (ref 0–31)
BILIRUB SERPL-MCNC: 0.4 MG/DL (ref 0–1.2)
BUN BLDV-MCNC: 12 MG/DL (ref 6–20)
CALCIUM SERPL-MCNC: 8.1 MG/DL (ref 8.6–10.2)
CHLORIDE BLD-SCNC: 107 MMOL/L (ref 98–107)
CO2: 19 MMOL/L (ref 22–29)
CREAT SERPL-MCNC: 0.6 MG/DL (ref 0.5–1)
GFR AFRICAN AMERICAN: >60
GFR NON-AFRICAN AMERICAN: >60 ML/MIN/1.73
GLUCOSE BLD-MCNC: 123 MG/DL (ref 74–109)
HCT VFR BLD CALC: 34.7 % (ref 34–48)
HEMOGLOBIN: 11.3 G/DL (ref 11.5–15.5)
MAGNESIUM: 2.3 MG/DL (ref 1.6–2.6)
MCH RBC QN AUTO: 31.2 PG (ref 26–35)
MCHC RBC AUTO-ENTMCNC: 32.6 % (ref 32–34.5)
MCV RBC AUTO: 95.9 FL (ref 80–99.9)
PDW BLD-RTO: 13.5 FL (ref 11.5–15)
PHOSPHORUS: 2.2 MG/DL (ref 2.5–4.5)
PLATELET # BLD: 191 E9/L (ref 130–450)
PMV BLD AUTO: 10 FL (ref 7–12)
POTASSIUM SERPL-SCNC: 3.3 MMOL/L (ref 3.5–5)
RBC # BLD: 3.62 E12/L (ref 3.5–5.5)
SODIUM BLD-SCNC: 142 MMOL/L (ref 132–146)
TOTAL PROTEIN: 5.7 G/DL (ref 6.4–8.3)
WBC # BLD: 11.7 E9/L (ref 4.5–11.5)

## 2018-09-12 PROCEDURE — 2580000003 HC RX 258: Performed by: PSYCHIATRY & NEUROLOGY

## 2018-09-12 PROCEDURE — 83735 ASSAY OF MAGNESIUM: CPT

## 2018-09-12 PROCEDURE — 99233 SBSQ HOSP IP/OBS HIGH 50: CPT | Performed by: NURSE PRACTITIONER

## 2018-09-12 PROCEDURE — 93886 INTRACRANIAL COMPLETE STUDY: CPT

## 2018-09-12 PROCEDURE — 84100 ASSAY OF PHOSPHORUS: CPT

## 2018-09-12 PROCEDURE — 6360000002 HC RX W HCPCS: Performed by: INTERNAL MEDICINE

## 2018-09-12 PROCEDURE — 6360000002 HC RX W HCPCS: Performed by: NURSE PRACTITIONER

## 2018-09-12 PROCEDURE — 99231 SBSQ HOSP IP/OBS SF/LOW 25: CPT | Performed by: PSYCHIATRY & NEUROLOGY

## 2018-09-12 PROCEDURE — 6360000002 HC RX W HCPCS: Performed by: ANESTHESIOLOGY

## 2018-09-12 PROCEDURE — 6370000000 HC RX 637 (ALT 250 FOR IP): Performed by: INTERNAL MEDICINE

## 2018-09-12 PROCEDURE — 36415 COLL VENOUS BLD VENIPUNCTURE: CPT

## 2018-09-12 PROCEDURE — 85027 COMPLETE CBC AUTOMATED: CPT

## 2018-09-12 PROCEDURE — C9113 INJ PANTOPRAZOLE SODIUM, VIA: HCPCS | Performed by: INTERNAL MEDICINE

## 2018-09-12 PROCEDURE — 6370000000 HC RX 637 (ALT 250 FOR IP): Performed by: NURSE PRACTITIONER

## 2018-09-12 PROCEDURE — 6360000002 HC RX W HCPCS: Performed by: RADIOLOGY

## 2018-09-12 PROCEDURE — 2580000003 HC RX 258: Performed by: NURSE PRACTITIONER

## 2018-09-12 PROCEDURE — 2000000000 HC ICU R&B

## 2018-09-12 PROCEDURE — 80053 COMPREHEN METABOLIC PANEL: CPT

## 2018-09-12 PROCEDURE — 2500000003 HC RX 250 WO HCPCS: Performed by: RADIOLOGY

## 2018-09-12 RX ORDER — POTASSIUM CHLORIDE 29.8 MG/ML
20 INJECTION INTRAVENOUS
Status: COMPLETED | OUTPATIENT
Start: 2018-09-12 | End: 2018-09-12

## 2018-09-12 RX ORDER — BUTALBITAL, ACETAMINOPHEN AND CAFFEINE 50; 325; 40 MG/1; MG/1; MG/1
1 TABLET ORAL ONCE
Status: COMPLETED | OUTPATIENT
Start: 2018-09-12 | End: 2018-09-12

## 2018-09-12 RX ADMIN — NIMODIPINE 60 MG: 30 CAPSULE ORAL at 12:40

## 2018-09-12 RX ADMIN — PANTOPRAZOLE SODIUM 40 MG: 40 INJECTION, POWDER, FOR SOLUTION INTRAVENOUS at 08:28

## 2018-09-12 RX ADMIN — POTASSIUM CHLORIDE 20 MEQ: 400 INJECTION, SOLUTION INTRAVENOUS at 09:04

## 2018-09-12 RX ADMIN — LABETALOL HYDROCHLORIDE 10 MG: 5 INJECTION INTRAVENOUS at 10:12

## 2018-09-12 RX ADMIN — LABETALOL HYDROCHLORIDE 10 MG: 5 INJECTION INTRAVENOUS at 23:02

## 2018-09-12 RX ADMIN — SODIUM CHLORIDE: 9 INJECTION, SOLUTION INTRAVENOUS at 16:10

## 2018-09-12 RX ADMIN — HYDRALAZINE HYDROCHLORIDE 10 MG: 20 INJECTION INTRAMUSCULAR; INTRAVENOUS at 06:09

## 2018-09-12 RX ADMIN — NIMODIPINE 60 MG: 30 CAPSULE ORAL at 19:48

## 2018-09-12 RX ADMIN — HYDRALAZINE HYDROCHLORIDE 10 MG: 20 INJECTION INTRAMUSCULAR; INTRAVENOUS at 18:26

## 2018-09-12 RX ADMIN — MORPHINE SULFATE 2 MG: 2 INJECTION, SOLUTION INTRAMUSCULAR; INTRAVENOUS at 02:28

## 2018-09-12 RX ADMIN — Medication 10 ML: at 21:18

## 2018-09-12 RX ADMIN — LABETALOL HYDROCHLORIDE 10 MG: 5 INJECTION INTRAVENOUS at 02:13

## 2018-09-12 RX ADMIN — NIMODIPINE 60 MG: 30 CAPSULE ORAL at 00:29

## 2018-09-12 RX ADMIN — ACETAMINOPHEN 650 MG: 325 TABLET, FILM COATED ORAL at 08:32

## 2018-09-12 RX ADMIN — LABETALOL HYDROCHLORIDE 10 MG: 5 INJECTION INTRAVENOUS at 17:04

## 2018-09-12 RX ADMIN — BUTALBITAL, ACETAMINOPHEN, AND CAFFEINE 1 TABLET: 50; 325; 40 TABLET ORAL at 11:27

## 2018-09-12 RX ADMIN — NIMODIPINE 60 MG: 30 CAPSULE ORAL at 05:28

## 2018-09-12 RX ADMIN — NIMODIPINE 60 MG: 30 CAPSULE ORAL at 08:28

## 2018-09-12 RX ADMIN — SODIUM CHLORIDE: 9 INJECTION, SOLUTION INTRAVENOUS at 05:59

## 2018-09-12 RX ADMIN — LABETALOL HYDROCHLORIDE 10 MG: 5 INJECTION INTRAVENOUS at 21:19

## 2018-09-12 RX ADMIN — NIMODIPINE 60 MG: 30 CAPSULE ORAL at 16:11

## 2018-09-12 RX ADMIN — ACETAMINOPHEN 650 MG: 325 TABLET, FILM COATED ORAL at 19:48

## 2018-09-12 RX ADMIN — LABETALOL HYDROCHLORIDE 10 MG: 5 INJECTION INTRAVENOUS at 15:05

## 2018-09-12 RX ADMIN — ACETAMINOPHEN 650 MG: 325 TABLET, FILM COATED ORAL at 15:07

## 2018-09-12 RX ADMIN — Medication 10 ML: at 08:28

## 2018-09-12 RX ADMIN — POTASSIUM CHLORIDE 20 MEQ: 400 INJECTION, SOLUTION INTRAVENOUS at 07:53

## 2018-09-12 RX ADMIN — LABETALOL HYDROCHLORIDE 10 MG: 5 INJECTION INTRAVENOUS at 19:03

## 2018-09-12 RX ADMIN — HYDRALAZINE HYDROCHLORIDE 10 MG: 20 INJECTION INTRAMUSCULAR; INTRAVENOUS at 07:55

## 2018-09-12 RX ADMIN — LABETALOL HYDROCHLORIDE 10 MG: 5 INJECTION INTRAVENOUS at 09:02

## 2018-09-12 RX ADMIN — HYDRALAZINE HYDROCHLORIDE 10 MG: 20 INJECTION INTRAMUSCULAR; INTRAVENOUS at 22:03

## 2018-09-12 RX ADMIN — HYDRALAZINE HYDROCHLORIDE 10 MG: 20 INJECTION INTRAMUSCULAR; INTRAVENOUS at 14:02

## 2018-09-12 ASSESSMENT — PAIN DESCRIPTION - DESCRIPTORS
DESCRIPTORS: HEADACHE
DESCRIPTORS: ACHING;DISCOMFORT;NAGGING
DESCRIPTORS: ACHING;DISCOMFORT;HEADACHE

## 2018-09-12 ASSESSMENT — PAIN DESCRIPTION - FREQUENCY
FREQUENCY: CONTINUOUS

## 2018-09-12 ASSESSMENT — PAIN SCALES - GENERAL
PAINLEVEL_OUTOF10: 6
PAINLEVEL_OUTOF10: 10
PAINLEVEL_OUTOF10: 7
PAINLEVEL_OUTOF10: 10
PAINLEVEL_OUTOF10: 0
PAINLEVEL_OUTOF10: 6
PAINLEVEL_OUTOF10: 9
PAINLEVEL_OUTOF10: 10
PAINLEVEL_OUTOF10: 8
PAINLEVEL_OUTOF10: 10

## 2018-09-12 ASSESSMENT — PAIN DESCRIPTION - ONSET: ONSET: AWAKENED FROM SLEEP

## 2018-09-12 ASSESSMENT — PAIN DESCRIPTION - PAIN TYPE
TYPE: ACUTE PAIN
TYPE: ACUTE PAIN;SURGICAL PAIN
TYPE: ACUTE PAIN;CHRONIC PAIN

## 2018-09-12 ASSESSMENT — PAIN DESCRIPTION - LOCATION
LOCATION: HEAD

## 2018-09-12 NOTE — ANESTHESIA POSTPROCEDURE EVALUATION
Department of Anesthesiology  Postprocedure Note    Patient: Mona Baron  MRN: [id number]  YOB: 1960  Date of evaluation: 9/12/2018  Time:  7:30 AM     Procedure Summary     Date:  09/11/18 Room / Location:  [facility] Special Procedures    Anesthesia Start:  3165 Anesthesia Stop:  7118    Procedures:       IR ABY INT CAROTID LT INTRCRANIAL      IR ULTRASOUND GUIDANCE VASCULAR ACCESS      IR PLACE CATH CAROTID/INOM ART LEFT      IR RADIOPHARM THERAPY INTRA ART      IR ANGIOGRAM EXTREMITY RIGHT Diagnosis:       SAH (subarachnoid hemorrhage) (HCC)      (SAH (subarachnoid hemorrhage) (HCC))      (SAH (subarachnoid hemorrhage) (HCC))      (SAH (subarachnoid hemorrhage) (HCC))      (SAH (subarachnoid hemorrhage) ([address]))      (right femoral angio for possible occlusion)    Scheduled Providers:   Responsible Provider:  Anthony Franz MD    Anesthesia Type:  general ASA Status:  4 - Emergent          Anesthesia Type: general    Monica Phase I: Monica Score: 8    Monica Phase II:      Last vitals: Reviewed and per EMR flowsheets.        Anesthesia Post Evaluation    Patient location during evaluation: PACU  Patient participation: complete - patient participated  Level of consciousness: awake  Airway patency: patent  Nausea & Vomiting: no nausea and no vomiting  Complications: no  Cardiovascular status: hemodynamically stable  Respiratory status: acceptable  Hydration status: stable

## 2018-09-12 NOTE — PROGRESS NOTES
Subjective: The patient is awake and alert. Displaying right sided weakness. Status post cerebral angiography, left carotid circulation transcatheter intra-arterial infusion of verapamil, left MCA and left ICA (9/11/2018). Status post cerebral angiography and endovascular coil embolization of two distal left ICA aneurysms (9/10/2018). Denies chest pain, angina, and dyspnea. Denies abdominal pain. Tolerating diet. No nausea or vomiting. Objective:    BP (!) 176/73   Pulse 84   Temp 98.2 °F (36.8 °C) (Oral)   Resp 20   Ht 5' 5\" (1.651 m)   Wt 229 lb (103.9 kg)   SpO2 94%   BMI 38.11 kg/m²     Current medications that patient is taking have been reviewed. Heart:  RRR, no murmurs, gallops, or rubs.   Lungs:  CTA bilaterally, no wheeze, rales or rhonchi  Abd: bowel sounds present, nontender, nondistended, no masses  Extrem:  No clubbing, cyanosis, or edema    CBC with Differential:    Lab Results   Component Value Date    WBC 11.7 09/12/2018    RBC 3.62 09/12/2018    HGB 11.3 09/12/2018    HCT 34.7 09/12/2018     09/12/2018    MCV 95.9 09/12/2018    MCH 31.2 09/12/2018    MCHC 32.6 09/12/2018    RDW 13.5 09/12/2018    LYMPHOPCT 10.8 09/10/2018    MONOPCT 4.8 09/10/2018    BASOPCT 0.4 09/10/2018    MONOSABS 0.67 09/10/2018    LYMPHSABS 1.51 09/10/2018    EOSABS 0.06 09/10/2018    BASOSABS 0.05 09/10/2018     CMP:    Lab Results   Component Value Date     09/12/2018    K 3.3 09/12/2018     09/12/2018    CO2 19 09/12/2018    BUN 12 09/12/2018    CREATININE 0.6 09/12/2018    GFRAA >60 09/12/2018    LABGLOM >60 09/12/2018    GLUCOSE 123 09/12/2018    PROT 5.7 09/12/2018    LABALBU 3.2 09/12/2018    CALCIUM 8.1 09/12/2018    BILITOT 0.4 09/12/2018    ALKPHOS 57 09/12/2018    AST 9 09/12/2018    ALT 10 09/12/2018     BMP:    Lab Results   Component Value Date     09/12/2018    K 3.3 09/12/2018     09/12/2018    CO2 19 09/12/2018    BUN 12 09/12/2018    LABALBU 3.2 09/12/2018 CREATININE 0.6 09/12/2018    CALCIUM 8.1 09/12/2018    GFRAA >60 09/12/2018    LABGLOM >60 09/12/2018    GLUCOSE 123 09/12/2018     Magnesium:    Lab Results   Component Value Date    MG 2.3 09/12/2018     Phosphorus:    Lab Results   Component Value Date    PHOS 2.2 09/12/2018     PT/INR:    Lab Results   Component Value Date    PROTIME 10.9 09/10/2018    INR 0.9 09/10/2018     PTT:    Lab Results   Component Value Date    APTT 26.0 09/10/2018   [APTT}     Assessment:    Patient Active Problem List   Diagnosis    [facility] [facility]ach[facility]) [address]    Hypertensive emergency    Obesity (BMI 30-39. 9)    Cerebral aneurysm       Plan:  As per neurosurgery. Continue blood pressure control. Continue to encourage weight loss. Status post cerebral angiography, left carotid circulation transcatheter intra-arterial infusion of verapamil, left MCA and left ICA. Status post cerebral angiography and endovascular coil embolization of two distal left ICA aneurysms. Now with right sided weakness. Worry about CVA. Neurology input appreciated. Pt/Ot evaluations for discharge planning.         Luanne Riley MD  10:55 AM  9/12/2018

## 2018-09-12 NOTE — PROGRESS NOTES
No significant improvement in neuro status from yesterday. Speech fair,  Right hand  1/5 right leg 2-3/5  CT scan reviewed yesterday - early hydrocephalus.   Will repeat CT scan of brain in AM

## 2018-09-12 NOTE — PROGRESS NOTES
NEUROINTERVENTIONAL RADIOLOGY    S: Pt. was extubated in angio suite, recovered in PACU, and transferred to NSICU. Examined pt. at [facility] bedside. She complains of being unable to move right arm but does complain of headache. O:   Vitals:    09/11/18 1825 09/11/18 1828 09/11/18 1830 09/11/18 1837   BP: (!) 91/56 (!) 108/53  (!) 147/70   Pulse: 86 92 108 63   Resp: 28 14 20 18   Temp: 97 °F (36.1 °C) 97 °F (36.1 °C)     TempSrc: Temporal Temporal     SpO2: 92% 91% 95% 94%   Weight:       Height:         Exam:   Awake, alert. Speech coherent. Left eye down and out deviation (as on prior exams) but eye movements conjugate. Flat right nasolabial fold and right mouth droop. Sensation grossly intact in all extremities, including RUE and RLE. RUE - Attempts movement of proximal arm, 1/5. Right hand and fingers 0/5. RLE - Knee flex 4+/5, plantar flex 4+/5  LUE and LLE grossly full strength. A/P:  POD 1 s/p coil embo of ruptured left ICA aneurysms. Maintain -160. Closely monitor for change in neuro status. Legs straight x 3 hrs from end of angio. Evaluate inguinal puncture sites for bleeding and hematoma.     Electronically signed by Alexandra Singh MD on 9/11/2018 at 8:18 PM

## 2018-09-12 NOTE — PROGRESS NOTES
Neurology progress note  Events over last 24 hours: pt remains weak on the right with severe head pain and is fairly sleepy but arousable    Subjective: reports headache weakness and numbness on the right    Objective:    Exam:   Vitals:    09/12/18 0622 09/12/18 0700 09/12/18 0800 09/12/18 0900   BP:  (!) 161/72 (!) 157/73 (!) 181/60   Pulse:  80 61 77   Resp:  23 21 29   Temp:   98.2 °F (36.8 °C)    TempSrc:   Oral    SpO2:  92% 93% 92%   Weight: 229 lb (103.9 kg)      Height:                Mental status: drowsy follows commands and andswers questions no aphasia  CN: pupils equal and reactive.  Normal EOM full range  Motor: Right side with only flicker of movement  Sensory: decreased sensation on right            Current Facility-Administered Medications   Medication Dose Route Frequency Provider Last Rate Last Dose    butalbital-acetaminophen-caffeine (FIORICET, ESGIC) per tablet 1 tablet  1 tablet Oral Once LYLY Huffman - CNP        acetaminophen (TYLENOL) suppository 650 mg  650 mg Rectal Q4H PRN [place], APRN - CNP   650 mg at 09/11/18 0944    niMODipine (NIMOTOP) capsule 60 mg  60 mg Oral 6 times per day [place], APRN - CNP   60 mg at 09/12/18 0828    0.9 % sodium chloride infusion   Intravenous Continuous Luis Alfredo Olson  mL/hr at 09/12/18 0559      promethazine (PHENERGAN) injection 25 mg  25 mg Intravenous PRN Nora Benson MD        labetalol (NORMODYNE;TRANDATE) injection 5 mg  5 mg Intravenous Q10 Min PRN Nora Benson MD        meperidine (DEMEROL) injection 12.5 mg  12.5 mg Intravenous Q5 Min PRN Nora Benson MD        morphine injection 1 mg  1 mg Intravenous Q5 Min PRN Nora Benson MD        morphine injection 2 mg  2 mg Intravenous Q5 Min PRN Nora Benson MD   2 mg at 09/12/18 0228    labetalol (NORMODYNE;TRANDATE) injection 10 mg  10 mg Intravenous Q10 Min PRN Leslee Rust MD   10 mg at 09/12/18 0902    hydrALAZINE (APRESOLINE) injection 10 mg  10 mg Intravenous Q10 Min PRN Carine Knowles MD   10 mg at 09/12/18 0755    clevidipine (CLEVIPREX) infusion  2 mg/hr Intravenous Continuous Manda Jackson MD   Stopped at 09/11/18 1214    sodium chloride flush 0.9 % injection 10 mL  10 mL Intravenous PRN Zachery Cason MD        acetaminophen (TYLENOL) tablet 650 mg  650 mg Oral Q4H PRN Zachery Cason MD   650 mg at 09/12/18 0832    ondansetron (ZOFRAN) injection 4 mg  4 mg Intravenous Q6H PRN Zachery Cason MD   4 mg at 09/10/18 2144    pantoprazole (PROTONIX) injection 40 mg  40 mg Intravenous Daily Zachery Cason MD   40 mg at 09/12/18 0828    sodium chloride flush 0.9 % injection 10 mL  10 mL Intravenous 2 times per day LYLY Bush - CNP   10 mL at 09/12/18 0183    methylPREDNISolone sodium (SOLU-MEDROL) injection 40 mg  40 mg Intravenous Once Carine Knowles MD        diphenhydrAMINE (BENADRYL) injection 50 mg  50 mg Intravenous Once Carine Knowles MD               Principal Problem:    SAH (subarachnoid hemorrhage) [facility])  Active Problems:    Hypertensive emergency    Obesity (BMI 30-39. 9)    Cerebral aneurysm  Resolved Problems:    * No resolved hospital problems. *        Assessment and Plan:  SAH 2/2 ruptured ICA aneurysm with likely small stroke. She is neurologically stable. No vasospasm on repeat angiography. Monitor for signs of increased ICP and hydrocephalus. Neurology will be available as needed.        I spent 25 minutes with the patient, with 50% or more counseling them on their stroke and Ashely Bell MD

## 2018-09-12 NOTE — PLAN OF CARE
Problem: Falls - Risk of:  Goal: Will remain free from falls  Will remain free from falls   Outcome: Met This Shift      Problem: HEMODYNAMIC STATUS  Goal: Patient has stable vital signs and fluid balance  Outcome: Ongoing

## 2018-09-12 NOTE — PROGRESS NOTES
Spoke to Jigar Arroyo about pt breathing and HOB restrictions.  Verbal order given , ok to elevate HOB  No more than 30 degrees

## 2018-09-12 NOTE — PROGRESS NOTES
[facility] Science Intensive Care Unit  Critical Care Consult 9/12/2018      Date of Admission: 9/10/18    CC: Headache    Overnight Events:  Angio yesterday for transcatheter Verapamil, no vasospasm noted. Improvement with facial droop and right sided weakness this AM.  C/O ha     Surgical/Interventional Procedures:   9/10: 4 Vessel with coiling of 2 ICA aneurysms   9/11: angio for  transcatheter Verapamil      VITAL SIGNS:   BP (!) 181/60   Pulse 77   Temp 98.2 °F (36.8 °C) (Oral)   Resp 29   Ht 5' 5\" (1.651 m)   Wt 229 lb (103.9 kg)   SpO2 92%   BMI 38.11 kg/m²     PHYSICAL EXAM:    General appearance:  Comfortable. GCS:    3 - Opens eyes to loud noise or command   6 - Follows simple motor commands  5 - Alert and oriented       Pupil size:  Left 3 mm    Right 3 mm  Pupil reaction: Yes  Wiggles fingers: Left Yes Right No  Hand grasp:   Left normal    Right weak  Wiggles toes: Left Yes    Right Yes  Plantar flexion: Left normal   Right weak    CONSTITUTIONAL: no acute distress, lying in hospital bed. Alert and cooperative    CARDIOVASCULAR: S1 S2, regular rate, regular rhythm, no murmur/gallop/rub. Monitor:Sinus  PULMONARY: no rhonchi/rales/wheezes, no use of accessory muscles, 2 L NC  RENAL: fischer to gravity, clear yellow urine  ABDOMEN: soft, nontender, nondistended, nontympanic, no masses, no organomegaly, normal bowel sounds   SKIN/EXTREMITIES: no rashes/ecchymosis, no edema/clubbing, warm/dry, good capillary refill     Assessment & Plan:       · Neuro:  SAH, aneurysm s/p coiling and transcatheter Verapamil . Neurosurgery following, monitor hemodynamics Nimotop, transcranial US  · CV: Hypertensive. Monitor hemodynamics. BP goal < 140. PRN hydralazine & labetalol. · Pulm: No acute issues  Monitor RR & SpO2. Pulmonary hygiene  · GI: No acute issues. NPO. Monitor bowel function. · Renal: No acute issues. Monitor BUN & Cr. Monitor electrolytes & replace as needed. Monitor urine output.    · ID: Leukocytosis, afebrile. Monitor for SIRS  · Endocrine: Hyperglycemia. Monitor BS. · MSK: No acute issues. ROM. turn & reposition. PT/OT when able  · Heme: No acute issues. Monitor CBC. Pain control/Sedation: morphine, Tylenol  DVT prophylaxis: SCDs. No Lovenox/heparin until ok with neurosurgery. GI prophylaxis: Protonix  Mouth/Eye care: As needed  Sams: Keep in place for critical care monitoring of fluid balance.     Family update: Updated at bedside   Code status:  Full    Disposition:  NSICU      Electronically signed by LYLY Motta CNP on 9/12/2018 at 9:31 AM

## 2018-09-13 ENCOUNTER — APPOINTMENT (OUTPATIENT)
Dept: CT IMAGING | Age: 58
DRG: 021 | End: 2018-09-13
Payer: COMMERCIAL

## 2018-09-13 ENCOUNTER — APPOINTMENT (OUTPATIENT)
Dept: ULTRASOUND IMAGING | Age: 58
DRG: 021 | End: 2018-09-13
Payer: COMMERCIAL

## 2018-09-13 LAB
ALBUMIN SERPL-MCNC: 3.3 G/DL (ref 3.5–5.2)
ALP BLD-CCNC: 57 U/L (ref 35–104)
ALT SERPL-CCNC: 14 U/L (ref 0–32)
ANION GAP SERPL CALCULATED.3IONS-SCNC: 16 MMOL/L (ref 7–16)
AST SERPL-CCNC: 12 U/L (ref 0–31)
BILIRUB SERPL-MCNC: 0.6 MG/DL (ref 0–1.2)
BUN BLDV-MCNC: 9 MG/DL (ref 6–20)
CALCIUM SERPL-MCNC: 8.3 MG/DL (ref 8.6–10.2)
CHLORIDE BLD-SCNC: 106 MMOL/L (ref 98–107)
CO2: 20 MMOL/L (ref 22–29)
CREAT SERPL-MCNC: 0.5 MG/DL (ref 0.5–1)
GFR AFRICAN AMERICAN: >60
GFR NON-AFRICAN AMERICAN: >60 ML/MIN/1.73
GLUCOSE BLD-MCNC: 123 MG/DL (ref 74–109)
HCT VFR BLD CALC: 33.6 % (ref 34–48)
HEMOGLOBIN: 11.3 G/DL (ref 11.5–15.5)
MAGNESIUM: 1.9 MG/DL (ref 1.6–2.6)
MCH RBC QN AUTO: 32.1 PG (ref 26–35)
MCHC RBC AUTO-ENTMCNC: 33.6 % (ref 32–34.5)
MCV RBC AUTO: 95.5 FL (ref 80–99.9)
PDW BLD-RTO: 13.4 FL (ref 11.5–15)
PHOSPHORUS: 2.5 MG/DL (ref 2.5–4.5)
PLATELET # BLD: 176 E9/L (ref 130–450)
PMV BLD AUTO: 10.1 FL (ref 7–12)
POTASSIUM SERPL-SCNC: 3.3 MMOL/L (ref 3.5–5)
RBC # BLD: 3.52 E12/L (ref 3.5–5.5)
SODIUM BLD-SCNC: 142 MMOL/L (ref 132–146)
TOTAL PROTEIN: 5.9 G/DL (ref 6.4–8.3)
WBC # BLD: 10.6 E9/L (ref 4.5–11.5)

## 2018-09-13 PROCEDURE — 6360000002 HC RX W HCPCS: Performed by: INTERNAL MEDICINE

## 2018-09-13 PROCEDURE — 97530 THERAPEUTIC ACTIVITIES: CPT

## 2018-09-13 PROCEDURE — G8982 BODY POS GOAL STATUS: HCPCS

## 2018-09-13 PROCEDURE — G8981 BODY POS CURRENT STATUS: HCPCS

## 2018-09-13 PROCEDURE — 2580000003 HC RX 258: Performed by: INTERNAL MEDICINE

## 2018-09-13 PROCEDURE — 2580000003 HC RX 258: Performed by: NURSE PRACTITIONER

## 2018-09-13 PROCEDURE — 6370000000 HC RX 637 (ALT 250 FOR IP): Performed by: NURSE PRACTITIONER

## 2018-09-13 PROCEDURE — C9113 INJ PANTOPRAZOLE SODIUM, VIA: HCPCS | Performed by: INTERNAL MEDICINE

## 2018-09-13 PROCEDURE — 70450 CT HEAD/BRAIN W/O DYE: CPT

## 2018-09-13 PROCEDURE — 2000000000 HC ICU R&B

## 2018-09-13 PROCEDURE — 2500000003 HC RX 250 WO HCPCS: Performed by: RADIOLOGY

## 2018-09-13 PROCEDURE — 6360000002 HC RX W HCPCS: Performed by: RADIOLOGY

## 2018-09-13 PROCEDURE — 6370000000 HC RX 637 (ALT 250 FOR IP): Performed by: INTERNAL MEDICINE

## 2018-09-13 PROCEDURE — 97162 PT EVAL MOD COMPLEX 30 MIN: CPT

## 2018-09-13 PROCEDURE — 85027 COMPLETE CBC AUTOMATED: CPT

## 2018-09-13 PROCEDURE — 97166 OT EVAL MOD COMPLEX 45 MIN: CPT

## 2018-09-13 PROCEDURE — 83735 ASSAY OF MAGNESIUM: CPT

## 2018-09-13 PROCEDURE — 93886 INTRACRANIAL COMPLETE STUDY: CPT

## 2018-09-13 PROCEDURE — 6360000002 HC RX W HCPCS: Performed by: SURGERY

## 2018-09-13 PROCEDURE — 2700000000 HC OXYGEN THERAPY PER DAY

## 2018-09-13 PROCEDURE — 80053 COMPREHEN METABOLIC PANEL: CPT

## 2018-09-13 PROCEDURE — 2580000003 HC RX 258: Performed by: PSYCHIATRY & NEUROLOGY

## 2018-09-13 PROCEDURE — 94640 AIRWAY INHALATION TREATMENT: CPT

## 2018-09-13 PROCEDURE — 84100 ASSAY OF PHOSPHORUS: CPT

## 2018-09-13 PROCEDURE — 99254 IP/OBS CNSLTJ NEW/EST MOD 60: CPT | Performed by: PHYSICAL MEDICINE & REHABILITATION

## 2018-09-13 PROCEDURE — 36415 COLL VENOUS BLD VENIPUNCTURE: CPT

## 2018-09-13 RX ORDER — POTASSIUM CHLORIDE 20 MEQ/1
40 TABLET, EXTENDED RELEASE ORAL 2 TIMES DAILY
Status: COMPLETED | OUTPATIENT
Start: 2018-09-13 | End: 2018-09-13

## 2018-09-13 RX ORDER — AMLODIPINE BESYLATE 5 MG/1
5 TABLET ORAL DAILY
Status: DISCONTINUED | OUTPATIENT
Start: 2018-09-13 | End: 2018-09-14

## 2018-09-13 RX ORDER — BUTALBITAL, ACETAMINOPHEN AND CAFFEINE 50; 325; 40 MG/1; MG/1; MG/1
1 TABLET ORAL EVERY 6 HOURS PRN
Status: DISCONTINUED | OUTPATIENT
Start: 2018-09-13 | End: 2018-09-20 | Stop reason: HOSPADM

## 2018-09-13 RX ORDER — ALBUTEROL SULFATE 1.25 MG/3ML
1.25 SOLUTION RESPIRATORY (INHALATION) EVERY 4 HOURS PRN
Status: DISCONTINUED | OUTPATIENT
Start: 2018-09-13 | End: 2018-09-20 | Stop reason: HOSPADM

## 2018-09-13 RX ADMIN — Medication 10 ML: at 08:00

## 2018-09-13 RX ADMIN — NIMODIPINE 60 MG: 30 CAPSULE ORAL at 11:30

## 2018-09-13 RX ADMIN — Medication 10 ML: at 20:10

## 2018-09-13 RX ADMIN — Medication 10 ML: at 22:11

## 2018-09-13 RX ADMIN — BUTALBITAL, ACETAMINOPHEN, AND CAFFEINE 1 TABLET: 50; 325; 40 TABLET ORAL at 09:12

## 2018-09-13 RX ADMIN — NIMODIPINE 60 MG: 30 CAPSULE ORAL at 19:56

## 2018-09-13 RX ADMIN — LABETALOL HYDROCHLORIDE 10 MG: 5 INJECTION INTRAVENOUS at 10:44

## 2018-09-13 RX ADMIN — HYDRALAZINE HYDROCHLORIDE 10 MG: 20 INJECTION INTRAMUSCULAR; INTRAVENOUS at 10:05

## 2018-09-13 RX ADMIN — BUTALBITAL, ACETAMINOPHEN, AND CAFFEINE 1 TABLET: 50; 325; 40 TABLET ORAL at 22:27

## 2018-09-13 RX ADMIN — SODIUM CHLORIDE: 9 INJECTION, SOLUTION INTRAVENOUS at 02:04

## 2018-09-13 RX ADMIN — LABETALOL HYDROCHLORIDE 10 MG: 5 INJECTION INTRAVENOUS at 10:34

## 2018-09-13 RX ADMIN — ACETAMINOPHEN 650 MG: 325 TABLET, FILM COATED ORAL at 19:55

## 2018-09-13 RX ADMIN — LABETALOL HYDROCHLORIDE 10 MG: 5 INJECTION INTRAVENOUS at 19:35

## 2018-09-13 RX ADMIN — NIMODIPINE 60 MG: 30 CAPSULE ORAL at 15:42

## 2018-09-13 RX ADMIN — BUTALBITAL, ACETAMINOPHEN, AND CAFFEINE 1 TABLET: 50; 325; 40 TABLET ORAL at 15:41

## 2018-09-13 RX ADMIN — POTASSIUM CHLORIDE 40 MEQ: 20 TABLET, EXTENDED RELEASE ORAL at 20:10

## 2018-09-13 RX ADMIN — LABETALOL HYDROCHLORIDE 10 MG: 5 INJECTION INTRAVENOUS at 04:04

## 2018-09-13 RX ADMIN — POTASSIUM CHLORIDE 40 MEQ: 20 TABLET, EXTENDED RELEASE ORAL at 09:13

## 2018-09-13 RX ADMIN — NIMODIPINE 60 MG: 30 CAPSULE ORAL at 00:49

## 2018-09-13 RX ADMIN — PANTOPRAZOLE SODIUM 40 MG: 40 INJECTION, POWDER, FOR SOLUTION INTRAVENOUS at 07:28

## 2018-09-13 RX ADMIN — NIMODIPINE 60 MG: 30 CAPSULE ORAL at 04:05

## 2018-09-13 RX ADMIN — ALBUTEROL SULFATE 1.25 MG: 1.25 SOLUTION RESPIRATORY (INHALATION) at 03:25

## 2018-09-13 RX ADMIN — LABETALOL HYDROCHLORIDE 10 MG: 5 INJECTION INTRAVENOUS at 09:09

## 2018-09-13 RX ADMIN — ACETAMINOPHEN 650 MG: 325 TABLET, FILM COATED ORAL at 05:59

## 2018-09-13 RX ADMIN — HYDRALAZINE HYDROCHLORIDE 10 MG: 20 INJECTION INTRAMUSCULAR; INTRAVENOUS at 22:10

## 2018-09-13 RX ADMIN — HYDRALAZINE HYDROCHLORIDE 10 MG: 20 INJECTION INTRAMUSCULAR; INTRAVENOUS at 14:14

## 2018-09-13 RX ADMIN — LABETALOL HYDROCHLORIDE 10 MG: 5 INJECTION INTRAVENOUS at 17:29

## 2018-09-13 RX ADMIN — NIMODIPINE 60 MG: 30 CAPSULE ORAL at 07:30

## 2018-09-13 RX ADMIN — AMLODIPINE BESYLATE 5 MG: 5 TABLET ORAL at 15:42

## 2018-09-13 RX ADMIN — ACETAMINOPHEN 650 MG: 325 TABLET, FILM COATED ORAL at 00:49

## 2018-09-13 ASSESSMENT — PAIN DESCRIPTION - LOCATION
LOCATION: HEAD

## 2018-09-13 ASSESSMENT — PAIN SCALES - GENERAL
PAINLEVEL_OUTOF10: 0
PAINLEVEL_OUTOF10: 4
PAINLEVEL_OUTOF10: 0
PAINLEVEL_OUTOF10: 3
PAINLEVEL_OUTOF10: 0
PAINLEVEL_OUTOF10: 5
PAINLEVEL_OUTOF10: 6
PAINLEVEL_OUTOF10: 0
PAINLEVEL_OUTOF10: 0
PAINLEVEL_OUTOF10: 7
PAINLEVEL_OUTOF10: 5
PAINLEVEL_OUTOF10: 4

## 2018-09-13 ASSESSMENT — PAIN DESCRIPTION - ONSET: ONSET: GRADUAL

## 2018-09-13 ASSESSMENT — PAIN DESCRIPTION - FREQUENCY: FREQUENCY: CONTINUOUS

## 2018-09-13 ASSESSMENT — PAIN DESCRIPTION - PAIN TYPE
TYPE: ACUTE PAIN

## 2018-09-13 ASSESSMENT — PAIN DESCRIPTION - DESCRIPTORS
DESCRIPTORS: HEADACHE;DULL
DESCRIPTORS: DULL;HEADACHE

## 2018-09-13 NOTE — CONSULTS
Inpatient consult to Physical Medicine Rehab  Consult performed by: Adrianna Mayen  Consult ordered by: Sharonda Jacques        PM&R Consult Note  Patient: Yara Bailey  Age/sex: 60 y.o. female  Medical Record #: [id number]      Referring physician: Casa Ulloa MD  Consulting physician: Dr. Chuy Davis MD  Primary care provider: Shanika Modi MD      Chief complaint:   Impairments and acitivities limitations in ADLs and mobility secondary to [facility] due to ruptured intracranial aneurysm    HPI:   Yara Bailey is a 60 y.o. female who presented to [facility] on 9/10/2018 due to severe headache and subsequent nausea/vomiting. Patient was found to have [facility] on head CT. CTA confirmed left ICA aneurysm x2. She underwent coiling on 9/10/18. Post procedure she was noted to have increased right sided weakness and decreased mental status. Further imaging revealed small infarct in the left basal ganglia. She is being monitored in ICU for signs of increased intracranial pressure and hydrocephalus. She continues to report severe headache. Patient was previously independent. She has participated in therapy evaluations on acute care.        Past Medical History:   Diagnosis Date    Degenerative arthritis of hand     Hiatal hernia      Past Surgical History:   Procedure Laterality Date    TONSILLECTOMY         Family History   Problem Relation Age of Onset    Diabetes Mother     Arthritis Mother     Atrial Fibrillation Mother     Diabetes Father     Atrial Fibrillation Father     Arthritis Father     Emphysema Father     Cancer Sister        Allergies   Allergen Reactions    Latex     Aloe     Fish-Derived Products     Iodine        Scheduled Meds:    potassium chloride 40 mEq BID   amLODIPine 5 mg Daily   niMODipine 60 mg 6 times per day   pantoprazole 40 mg Daily   sodium chloride flush 10 mL 2 times per day   methylPREDNISolone 40 mg Once   diphenhydrAMINE 50 mg Once       PRN Meds    albuterol 1.25 mg Q4H PRN   butalbital-acetaminophen-caffeine 1 tablet Q6H PRN   labetalol 10 mg Q10 Min PRN   hydrALAZINE 10 mg Q10 Min PRN   sodium chloride flush 10 mL PRN   acetaminophen 650 mg Q4H PRN   ondansetron 4 mg Q6H PRN       Social History:  Social History     Social History    Marital status:      Spouse name: N/A    Number of children: N/A    Years of education: N/A     Occupational History    Not on file.      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  Social supports: Lives with son (home to assist) and grandchild  Home situation: Lives in 2 level home, with 4 stairs to enter with 1 rail      Functional History:  Premorbid ADL's: independent   Premorbid Mobility: independent   Present: significant decrease in mobility and function    Physical Therapy:  Bed mobility: Max A  Transfers: Max A  Ambulation: NT    Occupational Therapy:  Feeding: Min A  Grooming: Max A  UB dressing: Dependent  LB dressing: Dependent     Review Of Systems:   Contsitutional: No Fever, chills  HEENT: + HA  Respiratory: No Cough, SOB  Cardiovascular: No CP  Gastrointestinal: No nausea, vomiting, diarrhea, abdominal discomfort  Genitourinary: No Dysuria  Musculoskeletal:  per HPI  Neurologic: per HPI  Psychiatric: No Depression, No anxiety      Physical Examination:  Vitals:   Vitals:    09/13/18 1300 09/13/18 1400 09/13/18 1500 09/13/18 1600   BP: (!) 153/44 (!) 174/78 137/76 (!) 126/49   Pulse: 78 61 82 61   Resp: 22 22 26 23   Temp:  97.8 °F (36.6 °C)     TempSrc:       SpO2: 94% 97% 95% 96%   Weight:       Height:           GEN: resting in bed, uncomfortable appearing, reports headache   HEENT: NC/AT, PERRL, EOMI  RESP: CTAB, no R/R/W  CV: RRR  ABD: soft, NT, ND, normal BS   EXT: No erythema/clubbing/cyanosis  Skin: intact in visible areas    Neuro Exam:  Awake, drowsy but easily arousable  Speech clear, only short answers. Oriented to person, place  Repetition/naming intact  Follows 1-2 step commands    Cranial Nerves:  I: olfactory not tested  II visual fields grossly intact   III, IV, VI: extra occular muscles intact  Pupils (II, III): ERRL  V: Facial Sensation/Jaw clench: intact  VII: Facial Motor: right facial droop  VIII.  Hearing: intact for conversation  XI/X: Palate: intact  XI: Shoulder shrug/SCM:intact  XII: Tongue: intact      Coordination:  Did not participate due to headache     Sensory:    LT: patient reports intact without side to side difference in bilateral upper and lower extremities with light touch testing     Motor:      Motor Findings  Strength: Right  Strength: Left    Deltoid  0/5  5-/5    Biceps  0/5  5/5    Triceps  0/5  5/5    Wrist Extensors  0/5  5/5    FDP 0/5 5/5   Finger abductors 0/5 5/5   Iliospoas  2/5  4/5    Quadriceps  2/5  4/5    Tibialis anterior  0/5  5/5    EHL 0/5 5/5   Gastroc soleus  0/5  5/5        DTRs:  Reflex Right Left   Biceps 2+ 2+   Triceps 2+ 2+   Brachioradialis 2+ 2+   Patella 3+ 2+   Achilles  2+ 2+   Babinski upgoing downgoing   Clonus Negative Negative   Marily Negative Negative       Balance/Gait:  Gait: deferred     Laboratory:    Lab Results   Component Value Date    WBC 10.6 09/13/2018    HGB 11.3 (L) 09/13/2018    HCT 33.6 (L) 09/13/2018    MCV 95.5 09/13/2018     09/13/2018     Lab Results   Component Value Date     09/13/2018    K 3.3 09/13/2018     09/13/2018    CO2 20 09/13/2018    BUN 9 09/13/2018    CREATININE 0.5 09/13/2018    GLUCOSE 123 09/13/2018    CALCIUM 8.3 09/13/2018      Lab Results   Component Value Date    ALT 14 09/13/2018    AST 12 09/13/2018    ALKPHOS 57 09/13/2018    BILITOT 0.6 09/13/2018       Lab Results Component Value Date    COLORU yellow 07/17/2018    COLORU YELLOW 03/07/2014    NITRU NEGATIVE 03/07/2014    GLUCOSEU neg 07/17/2018    GLUCOSEU NEGATIVE 03/07/2014    KETUA neg 07/17/2018    KETUA TRACE 03/07/2014    UROBILINOGEN 0.2 03/07/2014    BILIRUBINUR neg 07/17/2018       All available imaging reviewed      IMPRESSION:  Eliazar Guerra is a 60 y.o. female with impairments and acitivities limitations in ADLs and mobility secondary to [facility] due to ruptured intracranial aneurysm      Recommendations:   Patient is appropriate and would benefit from acute rehab once medically stable and all workup is complete. Thank you for the consult.     Andrade Wang MD  Physical Medicine and Rehabilitation

## 2018-09-13 NOTE — PROGRESS NOTES
No change in neuro status. No movements right hand & barely right arm. Right leg 1-2/5  Speech fair. Reviewed CT scan from today. .  Redemonstration of subarachnoid and intraventricular hemorrhage, stable to    slightly decreased from previous study.       2.  Interval improvement/resolution of previously noted mild hydrocephalus.       3.  Interval development of small low attenuation left lateral convexity    extra-axial fluid collection measuring approximately 3 mm which may represent a    subdural hygroma.       4.  No significant mass effect or midline shift.       5.  Incidental/non-acute findings are described above. Small infarct noted in the left basal ganglion.   Spoke with family

## 2018-09-13 NOTE — PLAN OF CARE
Problem: Falls - Risk of:  Goal: Will remain free from falls  Will remain free from falls   Outcome: Met This Shift    Goal: Absence of physical injury  Absence of physical injury   Outcome: Met This Shift      Problem: HEMODYNAMIC STATUS  Goal: Patient has stable vital signs and fluid balance  Outcome: Ongoing      Problem: ACTIVITY INTOLERANCE/IMPAIRED MOBILITY  Goal: Mobility/activity is maintained at optimum level for patient  Outcome: Ongoing      Problem: Risk for Impaired Skin Integrity  Goal: Tissue integrity - skin and mucous membranes  Structural intactness and normal physiological function of skin and  mucous membranes.    Outcome: Ongoing      Problem: Pain:  Goal: Pain level will decrease  Pain level will decrease   Outcome: Ongoing    Goal: Control of acute pain  Control of acute pain   Outcome: Ongoing    Goal: Control of chronic pain  Control of chronic pain   Outcome: Ongoing

## 2018-09-13 NOTE — PROGRESS NOTES
SIRS  · Endocrine: Hyperglycemia. Monitor BS. · MSK: No acute issues. ROM. turn & reposition. PT/OT when able  · Heme: No acute issues. Monitor CBC. Pain control/Sedation: Fiorcet, Tylenol  DVT prophylaxis: SCDs. No Lovenox/heparin until ok with neurosurgery. GI prophylaxis: Protonix, Diet  Mouth/Eye care: As needed  Sams: Keep in place for critical care monitoring of fluid balance.     Family update: Updated at bedside   Code status:  Full    Disposition:  NSICU      Electronically signed by LYLY Zelaya CNP on 9/13/2018 at 10:44 AM

## 2018-09-13 NOTE — PROGRESS NOTES
Acute Rehab Pre-Admission Screen      Referral date: 9/13/18 1400  Onset/Hospital Admit Date: 9/10/2018 12:48 AM  Current Location: [facility]  Admitting Diagnosis: SAH   Surgery /  Date:  Embolization 9/10/18  Name: Neva Ibrahim  YOB: 1960  Age: 60 y.o.   [id number]  Home Phone: [phone] (home)  Kash Longoria #: [id number]    Sex: female  Race:   Marital Status:    Ethnic/Cultural/Restoration Considerations: Shinto    Advanced Directives: [x] Full Code  [] [address] [] Medications only       [] Living Will  [] DPOA      []Organ donor      [] No mechanical breathing or ventilation     [] no tube feeding, nutrition or hydration      [x] Patient does not have advanced directives or living will         COVERAGE INFORMATION   Primary Insurer: [employer] Yeni Olp  Phone:   Merle Baez #: [id number]  Verified coverage: [] Patient  [] Family/caregiver    [x] financial department [] insurance carrier    PHYSICIAN / [facility] Notice INFORMATION    Referring [address], APRN - CNP   Attending Physician: Hollie Flores MD  Primary Care Physician: Payton Vitale MD  Consultants/Opinions (see full consult notes on chart): Kareem Torres MD-neurosurgery-  ( see consult on chart)   Abimael Reeder MD-neurology-( see consult on chart)      SOCIAL INFORMATION    Primary  Contact: Yonas Whatley Relationship: dtr  Primary Phone: [phone]     Secondary  Contact: Georgi Saucedo  Relationship: son  Primary Phone: [phone]     Previous Community Services: none  Caregiver available: [x] Yes [] No Hours per day available:TBD  Patient previously employed:  [x] Yes [] Part Time [] Full Time [] No [] Retired  Occupation/Profession: works in homecare  Prior living arrangements: [x] Home  [] Assisted living  [] SNF [] Other  Lived with:  [] Alone  [] Spouse  [x] Family  [] Other  Lived with: son and grandson  Contact phone:   Home:  2 story home  4 entry steps  Rails: 1  Steps: 12-15 to basement  Rails:  1   Bedroom: [x] 1st floor  [] 2nd floor    Bathroom:  [x] 1st floor  [] 2nd floor    Prior Functional Level: Independent for : IND with ADLs;  IND with IADLs. No device for ambulation  driving    Dominant hand: [x] Right  [] Left    Previous Home Equipment:  [] Cane [] Grab bars [] Orthotic / prosthetic   [] Shower chair [] Tub bench  [] 3-in-1 Commode [] Long handle sponge   [] Oxygen [] Sock aide  [] Wheelchair  [] motorized wc/scooter  [] Wheelchair cushion   [] Crutches [] Long handle shoehorn  [] Reachers [] Toilet seat elevator  [] Walker(wheeled)   [] Walker(standard) [] Mechanical lift    [x] None of the above    MEDICAL UPDATE:  History of present admission: presents with complains of headache. Patient was in her usual state of health the entire day. Around 10 PM she acutely developed a headache. It was the worst headache of her life. She was nauseated. She did not take anything for the symptoms at home. + for anuerysm hemorrhage- s/p coil embolization 9/10/18. Past Medical:  Past Medical History:   Diagnosis Date    Degenerative arthritis of hand     Hiatal hernia         Influenza vaccine given:  [] yes   [] no  [x] n/a (out of season)    Has patient had 2 or more falls in the past year? [] yes   [x] no   Has patient had any fall with injury in the past year? [] yes   [x] no  [] unknown  Has patient had major surgery during past 100 days prior to admission?    [x] yes   [] no Type/ Date: s/p coil embolization 9/10/18      Surgical History:  Past Surgical History:   Procedure Laterality Date    TONSILLECTOMY           Current Co-morbidities:  [] Alzheimer's   [] Dysphasia     [] Parkinsonism  [] Amputation   [] Edema of larynx  [] Peripheral artery disease   [] Anemia      [] Encephalopathy  [] Peripheral vascular disease  [] Anxiety   [] Gangrene   [] Pneumonia  [] Aphasia   [] Gout    [] Polyneuropathy  [] Asthma   [] slightly more prominent on the current study. Status post coil embolization of two distal left ICA aneurysms. Ct Head Wo Contrast  Result Date: 9/11/2018  Diffuse atrophy likely age related Findings compatible with small vessel ischemic changes. Evolving intracranial hemorrhage     Xr Chest Portable  Result Date: 9/11/2018  No airspace opacities or pleural effusion. Satisfactory position right-sided catheter without pneumothorax     Cta Neck W Contrast  Addendum Date: 9/10/2018    1. Extensive subarachnoid hemorrhage as described above. 2.  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 Cta Head W Contrast  Addendum Date: 9/10/2018      1. Extensive subarachnoid hemorrhage   2. 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       Medications:   docusate sodium  100 mg Oral BID    senna  1 tablet Oral Nightly    polyethylene glycol  17 g Oral Daily    metoprolol tartrate  50 mg Oral BID    losartan  100 mg Oral Daily    potassium chloride  40 mEq Oral BID WC    pantoprazole  40 mg Oral QAM AC    ipratropium-albuterol  1 ampule Inhalation 4x daily    chlorthalidone  25 mg Oral Daily    acetaminophen  650 mg Oral Q6H    amLODIPine  10 mg Oral Daily    enoxaparin  40 mg Subcutaneous Daily    niMODipine  60 mg Oral 6 times per day    sodium chloride flush  10 mL Intravenous 2 times per day       hydrALAZINE, ketorolac, guaiFENesin-dextromethorphan, magnesium hydroxide, bisacodyl, albuterol, butalbital-acetaminophen-caffeine, sodium chloride flush, ondansetron      SPECIAL PRECAUTIONS: [] No current precautions  [] Cardiac  [] Renal [] Sternal [] Respiratory      [] Neurological           [] Hip  [] Spinal [x] Seizure  [] Aspiration  [] Isolation precautions:    [] Contact   [] Barriers to discharge: none noted    Discharge Support: [] Patient lives alone and does not have a caregiver available     [x] Patient has a caregiver available     [x] Discharge plan has been verified with patient's caregiver    [] Caregiver is in agreement with the discharge plan     Expected functional status for safe discharge: min assist to supervision level    Patient/support person goals: return home and to work and drive    Expected length of stay: 2-3 weeks    Discussed expected length of stay and agreeable to IRF plan: [x] Yes   [] No    Impairment Group Category: 1.1    Etiological Diagnosis: CVA    Primary Rehabilitation Diagnosis: CVA      Electronically signed by Cait Angelo RN on 9/20/2018 at 12:18 PM    Prescreen completed __________________________________ (signature of prescreener)    Date:   9/20/18 Time:  1305      JUSTIFICATION FOR ADMISSION TO ACUTE REHABILITATION:  Patient has suffered decline in functional abilities for gait, transfers, speech, swallowing, cognition,  ADL's and IADL's as well as endurance. Patient has functional deficits requiring intensive therapy across multiple disciplines in order to return home safely. Patient will need physician oversight for respiratory issues, abnormal vital signs, nutritional and hydration status, safety issues, medications and therapy modalities. PT, OT and speech will work on deficits as noted in evaluations. Case management and social work will provide services for DME and management of a safe discharge home.         RECOMMEND LEVEL OF CARE  Recommend inpatient rehabilitation: [x] Yes   [] No  If no indicate reason:    [] Functional level too high  [] Unmotivated  [] No insurance carrier approval [] Unlikely to return to community  [] No medical necessity  [] Patient or family chose other facility  [] Too medically complex  [] Inadequate discharge plan  [] Rehabilitation bed unavailable [] Functional level too low  [] patient or family refused ARU    If patient not accepted for IRF admission, recommended level of care:  [] [address]  [] [employer]  [] [place]   [] Home Care  [] Other      [] LTAC       Physician Assigned:  [x] Dr. Lux Peraza [] Dr. Annia Lopez  [] Dr. Candice Ibanez     [] Dr. Betty Her [] Dr. Osiris York  [] Dr. Tito Burgos (if not admitted within 48 hours of initial pre-screen)    Medical Update/Changes: Prescreen updated to reflect current data since initiated. Functional Update/Changes: Therapy notes updated on prescreen graph to reflect current status.       Reviewer Signature:_____________________________________    Date:  9/20/18  Time: 1305    PHYSICIAN ADMISSION DETERMINATION AND REVIEW UPDATE:     ____________________________________________________________________  ____________________________________________________________________  ____________________________________________________________________  ____________________________________________________________________  ____________________________________________________________________                                                                                                                                                                                                                                                                                                                                            Physician Signature:_____________________________________    Print Signature:_________________________________________    Date: 9/20/18    Time:  3079

## 2018-09-13 NOTE — PROGRESS NOTES
0.5 09/13/2018    CALCIUM 8.3 09/13/2018    GFRAA >60 09/13/2018    LABGLOM >60 09/13/2018    GLUCOSE 123 09/13/2018     Magnesium:    Lab Results   Component Value Date    MG 1.9 09/13/2018     Phosphorus:    Lab Results   Component Value Date    PHOS 2.5 09/13/2018     PT/INR:    Lab Results   Component Value Date    PROTIME 10.9 09/10/2018    INR 0.9 09/10/2018     PTT:    Lab Results   Component Value Date    APTT 26.0 09/10/2018   [APTT}     Assessment:    Patient Active Problem List   Diagnosis    Subarach[facility]oid bleed ([facility] [address]    Hypertensive emergency    Obesity (BMI 30-39. 9)    Cerebral aneurysm       Plan:  As per neurosurgery. Continue blood pressure control. Continue to encourage weight loss. Status post cerebral angiography, left carotid circulation transcatheter intra-arterial infusion of verapamil, left MCA and left ICA. Status post cerebral angiography and endovascular coil embolization of two distal left ICA aneurysms. Now with right sided weakness. Worry about CVA. Neurology input appreciated. Pt/Ot evaluations for discharge planning.         Daniel Addison MD  11:16 AM  9/13/2018

## 2018-09-13 NOTE — PROGRESS NOTES
Physical Therapy    Facility/Department: [address]  Initial Assessment    NAME: Liza Calles  : 1960  MRN: [id number]    Date of Service: 2018  Evaluating Therapist: Carlie Salamanca PT, SCOTTY    ROOM #: [id number]  DIAGNOSIS: SAH  PRECAUTIONS: Falls, R hemiparesis, 2L O2, SBP <140  PROCEDURES:  cerebral angiography, endovascular coil embolization of 2 distal L ICA aneurysms     Social:  Pt lives with son and grandson in a 2 floor plan, 1st floor setup with 4 step(s) and 1 rail(s) to enter. Prior to admission pt walked with no device and was Independent. Pt works in home care. Initial Evaluation  Date: 18 Treatment  Date:     Short Term/ Long Term   Goals   AM-PAC 6 Clicks      Does pt have pain? 5/10 headache      Bed Mobility  Rolling: NT  Supine to sit: MaxA  Sit to supine: NT  Scooting: MaxA  Rachel   Transfers Sit to stand: MaxA  Stand to sit: MaxA  Stand pivot: MaxA x 1, Rachel x 1 for HHA  Rachel with AAD   Ambulation   NT  >75 feet with Rachel with AAD   Stair negotiation: ascended and descended NT  >4 steps with 1 rail with Rachel   BLE ROM WNL     BLE strength LLE Grossly 4+/5  R hip flex 3-/5  R knee ext 3-/5  R DF 1/5  Increase by 1/3 MMT grade   Balance Sitting: Rachel  Standing: MaxA x 1, Rachel x 1 for HHA  Sitting: SBA  Standing: Rachel with AAD     Vitals:  Heart Rate at rest 75 bpm Heart Rate post session 63 bpm   SpO2 at rest 95% SpO2 post session 94%   Blood Pressure at rest 128/68 mmHg Blood Pressure post session 126/82 mmHg     Functional Status Score-Intensive Care Unit (FSS-ICU)   Rolling 2/7   Supine to sit transfer 2   Unsupported sitting  /   Sit to stand transfers 2/   Ambulation    Total       Pt is alert and oriented x 3  CAM-ICU: negative  RASS: 0  Sensation: WNL  Edema: WNL    ASSESSMENT  Pt displays functional ability as noted in the objective portion of this evaluation. Comments/Treatment:  RN reported pt was stable for session.   Pt supine in bed upon arrival, agreeable to initial evaluation with OT collaboration. Pt's mother present for session. Initially, /80 before session - VIVIAN Hartmann in room to administer medication. Pt significantly improved and below goal of <140 for mobility. Pt exhibited trace muscle contraction at ankle. Pt was able to initiate BLE movement, L>R, to EOB but required increased assistance to achieve a seated position at EOB. Pt reported increased head and back pain with mobility. BP reassessed at 166/90 at EOB. RN again administered medication and BP then 140/75. Pt required a R knee block for transfers as well as HHA on L hand for safety and balance deficits. Pt instructed on proper sequencing for transfer to bedside chair. R knee buckling noted with RLE stance phase. Pt left in bedside chair and instructed on therapeutic exercises. All needs met and call light in reach. Pt would benefit from an intensive rehabilitation program at discharge to address deficits noted above. Patient education  Pt educated on safety    Patient response to education:   Pt verbalized understanding Pt demonstrated skill Pt requires further education in this area   x  x     Pts/ family goals   1. Improve function    Patient and or family understand(s) diagnosis, prognosis, and plan of care. PLAN  PT care will be provided in accordance with the objectives noted above. Whenever appropriate, clear delegation orders will be provided for nursing staff. Exercises and functional mobility practice will be used as well as appropriate assistive devices or modalities to obtain goals. Patient and family education will also be administered as needed. Frequency of treatments will be 2-5x/week as able.     Time in: 1025  Time out: 1600 [address] [place] [address]  [id number]

## 2018-09-13 NOTE — PROGRESS NOTES
Consult received,chart reviewed. Will discuss with Dr. Luis F Tee  and will advise. Will see today. Thank you for consult. Will need precerted if accepted to [address].

## 2018-09-13 NOTE — PROGRESS NOTES
Info:    RUE  PROM WFL; min active shoulder adduction; no active movement distally  1/5 shoulder ext;   2-/5 horizontal adduction  0/5 distal No active   and no  FMC/dexterity noted during ADL tasks     LUE WFL 4/5 G  and G FMC/dexterity noted during ADL tasks     Sensation: impaired sensation median nerve distribution   Tone: flaccid distal R UE   Edema:min edema R UE    Functional Assessment:   Initial Status 9/13 Comments   Feeding  Min A using L UE    Grooming  Max A    Upper Body Dressing Dep     Lower Body Dressing Dep    Bathing Dep    Toileting  Dep    Bed Mobility  Supine to Sit: Max A  Sit to Supine:NT    Functional Transfers Max A sit<>stand  Max A + Min A 2nd person for SPT to chair    Functional Mobility NT      Sit balance: Min A  Stand balance: Max A  Endurance/Activity tolerance: fair with light activity; pt limited by headache    Vitals: BP prior to session: 181/80. Nurse notified- present to monitor BP/administer medication throughout session  Heart Rate at rest 75 bpm Heart Rate post session 63 bpm   SpO2 at rest 95% SpO2 post session 94%   Blood Pressure at rest 128/68 mmHg Blood Pressure post session 126/82 mmHg         Comments: Upon arrival pt supine in bed; mother present. At end of session pt assisted to bedside chair; R UE elevated on pillow for jt protection and edema control. Family present. All devices within reach, all lines and tubes intact. Treatment: Patient assisted to EOB to increase functional endurance in preparation of self care activities and functional transfers. Patient required Max A to EOB/ Min A for R UE awareness; sat for 8-10 min with Min A for balance & F tolerance. Nurse present to assist in monitoring BP. Patient educated on R UE jt protection. Pt assisted with transfers to chair. See documentation above.        Assessment of current deficits   Functional mobility [x]  ROM [x] Strength [x]  Cognition [x]  ADLs [x]   IADLs [x] Safety Awareness [x] Endurance [x]  Fine Motor Coordination [x] Balance [x] Vision/perception [x] Sensation [x]   Gross Motor Coordination [x]     Eval Complexity: moderate  Profile and History- moderate- review of consults  Assessment of Occupational Performance and Identification of Deficits- moderate- 5+ performance areas identified limiting functional independence and safe return home. Clinical Decision Making- moderate    Treatment frequency:PRN 1-3 tx      Plan of Care:   ADL retraining [x]   Equipment needs [x]   Neuromuscular re-education [x] Energy Conservation Techniques [x]  Functional Transfer training [x] Patient and/or Family Education [x]  Functional Mobility training [x]  Environmental Modifications [x]  Cognitive re-training []   Compensatory techniques for ADLs [x]  Splinting Needs []   Positioning/joint protection [x]  Other: [x] perceptual retraining     Rehab Potential: good    Patient / Family Goal: to improve function     Short term goals  Time Frame: 3-5 days  GOAL (1) Increase feeding skills to S; set up using AE as needed while seated up in chair to increase activity tolerance  GOAL (2) Increase grooming skills to Min A after set up seated sink level with F+ balance & F activity tolerance  GOAL (3) Increase UB dressing/bathing to Mod A; good trunk control and G awareness of R side of body and koby dressing techniques   GOAL (4) Increase LB dressing/bathing to Mod A using AE as needed for safe reach/energy conservation & demonstrating F+  balance/ G safety  GOAL (5) Increase functional transfers/bathroom mobility to Min A using AD/DME as needed for balance/energy conservation & F+ safety  GOAL (6) Increase knowledge of R UE SROM ex's and jt protection to maintain jt integrity       Patient and/or family understands diagnosis, prognosis and plan of care: yes    [] Malnutrition indicators have been identified and nursing has been notified to ensure a dietitian consult is ordered.      Time In: 1025  Time Out: 1105  Evaluation + 40 min timed treatment    Hiral Mejia, OTR/L 1161

## 2018-09-14 ENCOUNTER — APPOINTMENT (OUTPATIENT)
Dept: ULTRASOUND IMAGING | Age: 58
DRG: 021 | End: 2018-09-14
Payer: COMMERCIAL

## 2018-09-14 LAB
ALBUMIN SERPL-MCNC: 3.1 G/DL (ref 3.5–5.2)
ALP BLD-CCNC: 55 U/L (ref 35–104)
ALT SERPL-CCNC: 22 U/L (ref 0–32)
ANION GAP SERPL CALCULATED.3IONS-SCNC: 16 MMOL/L (ref 7–16)
AST SERPL-CCNC: 15 U/L (ref 0–31)
BILIRUB SERPL-MCNC: 0.7 MG/DL (ref 0–1.2)
BUN BLDV-MCNC: 13 MG/DL (ref 6–20)
CALCIUM SERPL-MCNC: 8.6 MG/DL (ref 8.6–10.2)
CHLORIDE BLD-SCNC: 103 MMOL/L (ref 98–107)
CO2: 20 MMOL/L (ref 22–29)
CREAT SERPL-MCNC: 0.6 MG/DL (ref 0.5–1)
GFR AFRICAN AMERICAN: >60
GFR NON-AFRICAN AMERICAN: >60 ML/MIN/1.73
GLUCOSE BLD-MCNC: 122 MG/DL (ref 74–109)
HCT VFR BLD CALC: 33 % (ref 34–48)
HEMOGLOBIN: 10.8 G/DL (ref 11.5–15.5)
MAGNESIUM: 2 MG/DL (ref 1.6–2.6)
MCH RBC QN AUTO: 31.4 PG (ref 26–35)
MCHC RBC AUTO-ENTMCNC: 32.7 % (ref 32–34.5)
MCV RBC AUTO: 95.9 FL (ref 80–99.9)
PDW BLD-RTO: 13.4 FL (ref 11.5–15)
PHOSPHORUS: 3.5 MG/DL (ref 2.5–4.5)
PLATELET # BLD: 183 E9/L (ref 130–450)
PMV BLD AUTO: 10 FL (ref 7–12)
POTASSIUM SERPL-SCNC: 3.9 MMOL/L (ref 3.5–5)
RBC # BLD: 3.44 E12/L (ref 3.5–5.5)
SODIUM BLD-SCNC: 139 MMOL/L (ref 132–146)
TOTAL PROTEIN: 5.8 G/DL (ref 6.4–8.3)
WBC # BLD: 10.4 E9/L (ref 4.5–11.5)

## 2018-09-14 PROCEDURE — 6360000002 HC RX W HCPCS: Performed by: INTERNAL MEDICINE

## 2018-09-14 PROCEDURE — 93886 INTRACRANIAL COMPLETE STUDY: CPT

## 2018-09-14 PROCEDURE — 2500000003 HC RX 250 WO HCPCS: Performed by: RADIOLOGY

## 2018-09-14 PROCEDURE — 6360000002 HC RX W HCPCS: Performed by: RADIOLOGY

## 2018-09-14 PROCEDURE — 2580000003 HC RX 258: Performed by: NURSE PRACTITIONER

## 2018-09-14 PROCEDURE — 6360000002 HC RX W HCPCS: Performed by: NURSE PRACTITIONER

## 2018-09-14 PROCEDURE — 85027 COMPLETE CBC AUTOMATED: CPT

## 2018-09-14 PROCEDURE — 80053 COMPREHEN METABOLIC PANEL: CPT

## 2018-09-14 PROCEDURE — 2700000000 HC OXYGEN THERAPY PER DAY

## 2018-09-14 PROCEDURE — 6370000000 HC RX 637 (ALT 250 FOR IP): Performed by: NURSE PRACTITIONER

## 2018-09-14 PROCEDURE — 97530 THERAPEUTIC ACTIVITIES: CPT

## 2018-09-14 PROCEDURE — 97110 THERAPEUTIC EXERCISES: CPT

## 2018-09-14 PROCEDURE — 2580000003 HC RX 258: Performed by: INTERNAL MEDICINE

## 2018-09-14 PROCEDURE — 6370000000 HC RX 637 (ALT 250 FOR IP): Performed by: INTERNAL MEDICINE

## 2018-09-14 PROCEDURE — 2000000000 HC ICU R&B

## 2018-09-14 PROCEDURE — C9113 INJ PANTOPRAZOLE SODIUM, VIA: HCPCS | Performed by: INTERNAL MEDICINE

## 2018-09-14 PROCEDURE — 97535 SELF CARE MNGMENT TRAINING: CPT

## 2018-09-14 PROCEDURE — 83735 ASSAY OF MAGNESIUM: CPT

## 2018-09-14 PROCEDURE — 99233 SBSQ HOSP IP/OBS HIGH 50: CPT | Performed by: NURSE PRACTITIONER

## 2018-09-14 PROCEDURE — 36415 COLL VENOUS BLD VENIPUNCTURE: CPT

## 2018-09-14 PROCEDURE — 84100 ASSAY OF PHOSPHORUS: CPT

## 2018-09-14 RX ORDER — DOCUSATE SODIUM 100 MG/1
100 CAPSULE, LIQUID FILLED ORAL DAILY PRN
Status: DISCONTINUED | OUTPATIENT
Start: 2018-09-14 | End: 2018-09-19

## 2018-09-14 RX ORDER — AMLODIPINE BESYLATE 10 MG/1
10 TABLET ORAL DAILY
Status: DISCONTINUED | OUTPATIENT
Start: 2018-09-14 | End: 2018-09-20 | Stop reason: HOSPADM

## 2018-09-14 RX ORDER — BISACODYL 10 MG
10 SUPPOSITORY, RECTAL RECTAL DAILY PRN
Status: DISCONTINUED | OUTPATIENT
Start: 2018-09-14 | End: 2018-09-20 | Stop reason: HOSPADM

## 2018-09-14 RX ADMIN — NIMODIPINE 60 MG: 30 CAPSULE ORAL at 16:03

## 2018-09-14 RX ADMIN — ACETAMINOPHEN 650 MG: 325 TABLET, FILM COATED ORAL at 14:23

## 2018-09-14 RX ADMIN — METOPROLOL TARTRATE 25 MG: 25 TABLET ORAL at 08:16

## 2018-09-14 RX ADMIN — METOPROLOL TARTRATE 25 MG: 25 TABLET ORAL at 20:17

## 2018-09-14 RX ADMIN — LABETALOL HYDROCHLORIDE 10 MG: 5 INJECTION INTRAVENOUS at 03:12

## 2018-09-14 RX ADMIN — HYDRALAZINE HYDROCHLORIDE 10 MG: 20 INJECTION INTRAMUSCULAR; INTRAVENOUS at 22:33

## 2018-09-14 RX ADMIN — Medication 10 ML: at 20:20

## 2018-09-14 RX ADMIN — LABETALOL HYDROCHLORIDE 10 MG: 5 INJECTION INTRAVENOUS at 14:19

## 2018-09-14 RX ADMIN — AMLODIPINE BESYLATE 10 MG: 10 TABLET ORAL at 08:16

## 2018-09-14 RX ADMIN — NIMODIPINE 60 MG: 30 CAPSULE ORAL at 00:07

## 2018-09-14 RX ADMIN — BUTALBITAL, ACETAMINOPHEN, AND CAFFEINE 1 TABLET: 50; 325; 40 TABLET ORAL at 17:52

## 2018-09-14 RX ADMIN — HYDRALAZINE HYDROCHLORIDE 10 MG: 20 INJECTION INTRAMUSCULAR; INTRAVENOUS at 15:00

## 2018-09-14 RX ADMIN — BUTALBITAL, ACETAMINOPHEN, AND CAFFEINE 1 TABLET: 50; 325; 40 TABLET ORAL at 04:19

## 2018-09-14 RX ADMIN — NIMODIPINE 60 MG: 30 CAPSULE ORAL at 11:47

## 2018-09-14 RX ADMIN — NIMODIPINE 60 MG: 30 CAPSULE ORAL at 04:19

## 2018-09-14 RX ADMIN — BUTALBITAL, ACETAMINOPHEN, AND CAFFEINE 1 TABLET: 50; 325; 40 TABLET ORAL at 10:52

## 2018-09-14 RX ADMIN — LABETALOL HYDROCHLORIDE 10 MG: 5 INJECTION INTRAVENOUS at 09:19

## 2018-09-14 RX ADMIN — PANTOPRAZOLE SODIUM 40 MG: 40 INJECTION, POWDER, FOR SOLUTION INTRAVENOUS at 08:16

## 2018-09-14 RX ADMIN — NIMODIPINE 60 MG: 30 CAPSULE ORAL at 07:40

## 2018-09-14 RX ADMIN — LABETALOL HYDROCHLORIDE 10 MG: 5 INJECTION INTRAVENOUS at 04:20

## 2018-09-14 RX ADMIN — LABETALOL HYDROCHLORIDE 10 MG: 5 INJECTION INTRAVENOUS at 14:47

## 2018-09-14 RX ADMIN — ACETAMINOPHEN 650 MG: 325 TABLET, FILM COATED ORAL at 08:21

## 2018-09-14 RX ADMIN — Medication 10 ML: at 04:21

## 2018-09-14 RX ADMIN — Medication 10 ML: at 08:16

## 2018-09-14 RX ADMIN — LABETALOL HYDROCHLORIDE 10 MG: 5 INJECTION INTRAVENOUS at 21:32

## 2018-09-14 RX ADMIN — HYDRALAZINE HYDROCHLORIDE 10 MG: 20 INJECTION INTRAMUSCULAR; INTRAVENOUS at 16:03

## 2018-09-14 RX ADMIN — HYDRALAZINE HYDROCHLORIDE 10 MG: 20 INJECTION INTRAMUSCULAR; INTRAVENOUS at 09:05

## 2018-09-14 RX ADMIN — LABETALOL HYDROCHLORIDE 10 MG: 5 INJECTION INTRAVENOUS at 13:15

## 2018-09-14 RX ADMIN — NIMODIPINE 60 MG: 30 CAPSULE ORAL at 19:44

## 2018-09-14 RX ADMIN — ACETAMINOPHEN 650 MG: 325 TABLET, FILM COATED ORAL at 20:16

## 2018-09-14 RX ADMIN — ENOXAPARIN SODIUM 40 MG: 40 INJECTION SUBCUTANEOUS at 14:45

## 2018-09-14 ASSESSMENT — PAIN DESCRIPTION - FREQUENCY
FREQUENCY: CONTINUOUS

## 2018-09-14 ASSESSMENT — PAIN DESCRIPTION - PAIN TYPE
TYPE: ACUTE PAIN

## 2018-09-14 ASSESSMENT — PAIN SCALES - GENERAL
PAINLEVEL_OUTOF10: 7
PAINLEVEL_OUTOF10: 5
PAINLEVEL_OUTOF10: 10
PAINLEVEL_OUTOF10: 3
PAINLEVEL_OUTOF10: 6
PAINLEVEL_OUTOF10: 6
PAINLEVEL_OUTOF10: 7
PAINLEVEL_OUTOF10: 10
PAINLEVEL_OUTOF10: 3
PAINLEVEL_OUTOF10: 0

## 2018-09-14 ASSESSMENT — PAIN DESCRIPTION - DESCRIPTORS
DESCRIPTORS: HEADACHE
DESCRIPTORS: HEADACHE;DULL
DESCRIPTORS: HEADACHE

## 2018-09-14 ASSESSMENT — PAIN DESCRIPTION - ONSET
ONSET: GRADUAL
ONSET: GRADUAL
ONSET: ON-GOING

## 2018-09-14 ASSESSMENT — PAIN DESCRIPTION - LOCATION
LOCATION: HEAD

## 2018-09-14 NOTE — PROGRESS NOTES
verapamil. Headache. Neurosurgery following. Monitor neuro status. Nimotop. D4/21. Fioricet  CV: HTN. SBP goal <150mmHg. Increased amlodipine. Started on metoprolol. PRN labetalol and hydralazine. Pulm: No acute issues. Monitor respiratory status. GI: No acute issues. Diet. Protonix. Monitor bowel function. Zofran. Renal:  No acute issues. Monitor uop, renal function and electrolytes. ID: No acute issues. Afebrile. Endocrine: Hyperglycemia. Monitor BS     MSK:.R arm flaccid. R leg weak. PT/OT. Up with assistance. PMR    Heme:Anemia. Monitor CBC. Bowel regime: MOM. Colace. Dulcolax  Pain control/Sedation:  Acetaminophen. Fioricet. DVT prophylaxis: SCDs. No heparin or lovenox dt SAH. GI prophylaxis: Protonix. Diet. Glucose protocol:  Monitor BS. Consults:   Medicine. Neurosurgery. Patient/Family update: Family updated. Questions answered. Code status:  Full code. [facility]         LYLY Moore-CNP  9/14/2018  7:21 AM

## 2018-09-14 NOTE — PROGRESS NOTES
Post Aneurysm Coiling#4  No change in neuro status. No movements right hand &  right arm. Right leg 1-2/5  Speech fair.   Post angiogram & injection of intraarterial verapamil  Spoke with family

## 2018-09-14 NOTE — PROGRESS NOTES
Met with pt and mother in room. Discussed AR program and expectations. Pt is motivated to do program. Family is supportive and will have plenty of support at time of discharge between all family members. Will re eval status on Monday as testing not completed as of this date. Did advise when medically stable will beging precert process/ verbalized understanding. Will follow.

## 2018-09-14 NOTE — PROGRESS NOTES
Nutrition Assessment    Type and Reason for Visit: Initial    Nutrition Recommendations: Continue current diet, Start Ensure BID    Malnutrition Assessment:  · Malnutrition Status: At risk for malnutrition  · Context: Acute illness or injury  · Findings of the 6 clinical characteristics of malnutrition (Minimum of 2 out of 6 clinical characteristics is required to make the diagnosis of moderate or severe Protein Calorie Malnutrition based on AND/ASPEN Guidelines):  1. Energy Intake-Less than or equal to 50%,  (x4 days)    2. Weight Loss-No significant weight loss    3. Fat Loss-No significant subcutaneous fat loss    4. Muscle Loss-No significant muscle mass loss    5. Fluid Accumulation-No significant fluid accumulation    6.   Strength-Not measured    Nutrition Diagnosis:   · Problem: Inadequate oral intake  · Etiology: related to Acute injury/trauma (s/p SAH)     Signs and symptoms:  as evidenced by Intake 0-25%, Diet history of poor intake    Nutrition Assessment:  · Subjective Assessment: pt alert w/ minimal response to this clinician s/p SAH  · Nutrition-Focused Physical Findings: missing teeth, abd WDL, no noted edema, -I/Os  · Wound Type: Surgical Wound  · Current Nutrition Therapies:  · Oral Diet Orders: General   · Oral Diet intake: 1-25%  · Anthropometric Measures:  · Ht: 5' 5\" (165.1 cm)   · Current Body Wt: 225 lb (102.1 kg) (bed scale 9/14)  · Admission Body Wt: 219 lb (99.3 kg) (actual 9/10)  · Usual Body Wt: 214 lb (97.1 kg) (per EMR 07/2018, no method noted)  · % Weight Change: noted wt gain since adm though current fluids - at this time     · Ideal Body Wt: 125 lb (56.7 kg), % Ideal Body 180%  · Adjusted Body Wt: 150 lb (68 kg), body weight adjusted for Obesity  · BMI Classification: BMI 35.0 - 39.9 Obese Class II  · Comparative Standards (Estimated Nutrition Needs):  · Estimated Daily Total Kcal:  (MSJ 1602 x 1.2 SF)  · Estimated Daily Protein (g):     Nutrition Risk Level: Moderate    Nutrition Interventions:   Continued Inpatient Monitoring, Education not appropriate at this time, Coordination of Care    Nutrition Evaluation:   · Evaluation: Goals set   · Goals: Consume >50% meals/ONS    · Monitoring: Meal Intake, Supplement Intake, Diet Tolerance, Skin Integrity, Wound Healing, Fluid Balance, Mental Status/Confusion, Weight, Comparative Standards, Pertinent Labs    See Adult Nutrition Doc Flowsheet for more detail.      Electronically signed by Kerrie Trevizo MS, RD, LD on 9/14/18 at 2:57 PM    Contact Number: [phone]

## 2018-09-14 NOTE — PROGRESS NOTES
I called Dr. Monta Carrel regarding patient able to transfer out. Dr. Monta Carrel does not want patient transferring out of ICU.

## 2018-09-14 NOTE — PROGRESS NOTES
09/14/2018    CREATININE 0.6 09/14/2018    CALCIUM 8.6 09/14/2018    GFRAA >60 09/14/2018    LABGLOM >60 09/14/2018    GLUCOSE 122 09/14/2018     Magnesium:    Lab Results   Component Value Date    MG 2.0 09/14/2018     Phosphorus:    Lab Results   Component Value Date    PHOS 3.5 09/14/2018     PT/INR:    Lab Results   Component Value Date    PROTIME 10.9 09/10/2018    INR 0.9 09/10/2018     PTT:    Lab Results   Component Value Date    APTT 26.0 09/10/2018   [APTT}     Assessment:    Patient Active Problem List   Diagnosis    Subarachnoid bleed ([facility] [address]    Hypertensive emergency    Obesity (BMI 30-39. 9)    Cerebral aneurysm       Plan:  As per neurosurgery. Continue blood pressure control. Continue to encourage weight loss. Status post cerebral angiography, left carotid circulation transcatheter intra-arterial infusion of verapamil, left MCA and left ICA. Status post cerebral angiography and endovascular coil embolization of two distal left ICA aneurysms. Now with right sided weakness. Worry about CVA. Neurology input appreciated. Pt/Ot evaluations for discharge planning.         Shaheed Duggan MD  10:42 AM  9/14/2018

## 2018-09-14 NOTE — PROGRESS NOTES
Physical Therapy  Facility/Department: [facility]LW [facility]  Daily Treatment Note  NAME: Adonica Rubinstein  : 1960  MRN: [id number]    Date of Service: 2018  Evaluating Therapist: Angi Streeter PT, DPT     ROOM #: [id number]  DIAGNOSIS: SAH  PRECAUTIONS: Falls, R hemiparesis, 2L O2, SBP <140  PROCEDURES:  cerebral angiography, endovascular coil embolization of 2 distal L ICA aneurysms      Social:  Pt lives with son and grandson in a 2 floor plan, 1st floor setup with 4 step(s) and 1 rail(s) to enter. Prior to admission pt walked with no device and was Independent. Pt works in home care.                Initial Evaluation  Date: 18 Treatment  Date: 18  Short Term/ Long Term   Goals   AM-PAC 6 Clicks 3/92  47/11     Does pt have pain?  5/10 headache  5/10 headache      Bed Mobility  Rolling: NT  Supine to sit: MaxA  Sit to supine: NT  Scooting: MaxA Rolling: NT  Supine to sit: MaxA  Sit to supine: NT  Scooting: MaxA Rachel   Transfers Sit to stand: MaxA  Stand to sit: MaxA  Stand pivot: MaxA x 1, Rachel x 1 for HHA Sit to stand: MaxA  Stand to sit: MaxA  Stand pivot: MaxA with hemicane Rachel with AAD   Ambulation   NT 2 feet with MaxA with hemicane >75 feet with Rachel with AAD   Stair negotiation: ascended and descended NT   >4 steps with 1 rail with Rachel   BLE ROM WNL       BLE strength LLE Grossly 4+/5  R hip flex 3-/5  R knee ext 3-/5  R DF 1/5   Increase by 1/3 MMT grade   Balance Sitting: Rachel  Standing: MaxA x 1, Rachel x 1 for HHA Sitting: Rachel dynamic  Standing: MaxA with hemicane Sitting: SBA  Standing: Rachel with AAD      Vitals:  Heart Rate at rest 64 bpm Heart Rate post session 67 bpm   SpO2 at rest 94% SpO2 post session 94%   Blood Pressure at rest 138/69 mmHg Blood Pressure post session 128/68 mmHg      Functional Status Score-Intensive Care Unit (FSS-ICU)   Rolling 2/7   Supine to sit transfer 2   Unsupported sitting     Sit to stand transfers    Ambulation    Total        Pt is alert and oriented x 3  CAM-ICU: negative  RASS: -1  Sensation: WNL  Edema: WNL    Pt performed therapeutic exercise of the following: R LAQs until fatigue     Patient education  Pt was educated on safety    Patient response to education:   Pt verbalized understanding Pt demonstrated skill Pt requires further education in this area   x  x     Additional Comments: RN reported pt was stable for session. Pt supine in bed upon arrival, agreeable to treatment session with OT collaboration. Pt reported continued headache and was somewhat lethargic throughout session. Pt demonstrated improved ability to advance legs to EOB but still required increased assistance. Pt reported increased back pain with sitting EOB. Pt instructed on use of LUE to complete transfers. Pt stood with flexed posture and a right lateral lean. Pt required assistance to correct. Pt became confused with right and left and forward and backward when attempting to ambulate. Poor hemicane placement noted and pt required assistance. Pt completed small steps to bedside chair. Pt instructed on importance of completing therapeutic exercises. All needs met and call light in reach. Pt's mother present for session. Time in: 1010  Time out: 21     Pt is making progress toward established Physical Therapy goals. Continue with physical therapy current plan of care.     Lane Mroeno, PT, DPT  [id number]

## 2018-09-14 NOTE — PROGRESS NOTES
Fair  Comprehension: Fair  Problem solving: Fair-  Safety/Judgement: Poor+    UE Assessment:  Hand Dominance: Right [x]  Left []  R UE: 1/5 shoulder, no active ROM distal, flaccid UE  L UE: WFL 4/5    Sensation: finger ID intact this date   Tone: flaccid distal R UE   Edema:min edema R UE    Short term goals  Time Frame: 3-5 days  GOAL (1) Increase feeding skills to S; set up using AE as needed while seated up in chair to increase activity tolerance  GOAL (2) Increase grooming skills to Min A after set up seated sink level with F+ balance & F activity tolerance  GOAL (3) Increase UB dressing/bathing to Mod A; good trunk control and G awareness of R side of body and koby dressing techniques   GOAL (4) Increase LB dressing/bathing to Mod A using AE as needed for safe reach/energy conservation & demonstrating F+  balance/ G safety  GOAL (5) Increase functional transfers/bathroom mobility to Min A using AD/DME as needed for balance/energy conservation & F+ safety  GOAL (6) Increase knowledge of R UE SROM ex's and jt protection to maintain jt integrity     Functional Assessment:   Initial Status 9/13 Current Status: 9/14   Feeding  Min A using L UE Min A after set up-using non dominant L UE   Grooming  Max A Max A after set up to wash face  Pt requires min assist to wash affected UE and apply lotion for skin care and proprioceptive input to increase R hand awareness   Upper Body Dressing Dep  Max A after set up and instruction on koby dressing techniques   Lower Body Dressing Dep Dep   Bathing Dep Max A; min cues for technique to initiate UE sponge bath and hand hygiene. Toileting  Dep Dep   Bed Mobility  Supine to Sit: Max A  Sit to Supine:NT Supine to sit:  Max A   Functional Transfers Max A sit<>stand  Max A + Min A 2nd person for SPT to chair Max A sit<>stand with min cues for hand placement-use of koby walker for support   Functional Mobility NT Max A using koby walker with max cues to sequence steps for pivot transfer to chair     Sit balance: SBA static, Min A dynamic   Stand balance: Max A; koby walker with min cues for posture  Endurance/Activity tolerance: fair with light activity; pt limited by headache    Vitals:  Heart Rate at rest 70 bpm Heart Rate post session 77 bpm   SpO2 at rest 94% SpO2 post session 95%   Blood Pressure at rest 138/69 mmHg Blood Pressure post session 128/68 mmHg         Comments/Treatment: Upon arrival pt supine in bed; mother present. At end of session pt assisted to bedside chair; R UE elevated on pillow for jt protection and edema control. Pt educated on dressing koby techniques and improving awareness of R UE. Pt instructed on hand hygiene, jt protection and SROM ex's to perform bedside. All devices within reach, all lines and tubes intact. Patient / Family Goal: to improve function       Patient and/or family understands diagnosis, prognosis and plan of care: yes    [] Malnutrition indicators have been identified and nursing has been notified to ensure a dietitian consult is ordered.      Time In: 1010  Time Out: 1050   40 min timed treatment    Vidal Olmedo OTR/L 2300

## 2018-09-15 ENCOUNTER — APPOINTMENT (OUTPATIENT)
Dept: ULTRASOUND IMAGING | Age: 58
DRG: 021 | End: 2018-09-15
Payer: COMMERCIAL

## 2018-09-15 LAB
ALBUMIN SERPL-MCNC: 3.5 G/DL (ref 3.5–5.2)
ALP BLD-CCNC: 66 U/L (ref 35–104)
ALT SERPL-CCNC: 31 U/L (ref 0–32)
ANION GAP SERPL CALCULATED.3IONS-SCNC: 17 MMOL/L (ref 7–16)
AST SERPL-CCNC: 15 U/L (ref 0–31)
BILIRUB SERPL-MCNC: 0.6 MG/DL (ref 0–1.2)
BUN BLDV-MCNC: 14 MG/DL (ref 6–20)
CALCIUM SERPL-MCNC: 8.8 MG/DL (ref 8.6–10.2)
CHLORIDE BLD-SCNC: 98 MMOL/L (ref 98–107)
CO2: 23 MMOL/L (ref 22–29)
CREAT SERPL-MCNC: 0.5 MG/DL (ref 0.5–1)
GFR AFRICAN AMERICAN: >60
GFR NON-AFRICAN AMERICAN: >60 ML/MIN/1.73
GLUCOSE BLD-MCNC: 124 MG/DL (ref 74–109)
HCT VFR BLD CALC: 35.3 % (ref 34–48)
HEMOGLOBIN: 11.7 G/DL (ref 11.5–15.5)
MAGNESIUM: 2 MG/DL (ref 1.6–2.6)
MCH RBC QN AUTO: 31.4 PG (ref 26–35)
MCHC RBC AUTO-ENTMCNC: 33.1 % (ref 32–34.5)
MCV RBC AUTO: 94.6 FL (ref 80–99.9)
PDW BLD-RTO: 13.2 FL (ref 11.5–15)
PHOSPHORUS: 3.6 MG/DL (ref 2.5–4.5)
PLATELET # BLD: 223 E9/L (ref 130–450)
PMV BLD AUTO: 9.9 FL (ref 7–12)
POTASSIUM SERPL-SCNC: 3.6 MMOL/L (ref 3.5–5)
RBC # BLD: 3.73 E12/L (ref 3.5–5.5)
SODIUM BLD-SCNC: 138 MMOL/L (ref 132–146)
TOTAL PROTEIN: 6.3 G/DL (ref 6.4–8.3)
WBC # BLD: 10.8 E9/L (ref 4.5–11.5)

## 2018-09-15 PROCEDURE — 2500000003 HC RX 250 WO HCPCS: Performed by: RADIOLOGY

## 2018-09-15 PROCEDURE — 6370000000 HC RX 637 (ALT 250 FOR IP): Performed by: INTERNAL MEDICINE

## 2018-09-15 PROCEDURE — 6370000000 HC RX 637 (ALT 250 FOR IP): Performed by: NURSE PRACTITIONER

## 2018-09-15 PROCEDURE — 2000000000 HC ICU R&B

## 2018-09-15 PROCEDURE — 2580000003 HC RX 258: Performed by: NURSE PRACTITIONER

## 2018-09-15 PROCEDURE — 6360000002 HC RX W HCPCS: Performed by: INTERNAL MEDICINE

## 2018-09-15 PROCEDURE — 99233 SBSQ HOSP IP/OBS HIGH 50: CPT | Performed by: NURSE PRACTITIONER

## 2018-09-15 PROCEDURE — 6360000002 HC RX W HCPCS: Performed by: NURSE PRACTITIONER

## 2018-09-15 PROCEDURE — 6360000002 HC RX W HCPCS: Performed by: RADIOLOGY

## 2018-09-15 PROCEDURE — 84100 ASSAY OF PHOSPHORUS: CPT

## 2018-09-15 PROCEDURE — C9113 INJ PANTOPRAZOLE SODIUM, VIA: HCPCS | Performed by: INTERNAL MEDICINE

## 2018-09-15 PROCEDURE — 83735 ASSAY OF MAGNESIUM: CPT

## 2018-09-15 PROCEDURE — 93886 INTRACRANIAL COMPLETE STUDY: CPT

## 2018-09-15 PROCEDURE — 36415 COLL VENOUS BLD VENIPUNCTURE: CPT

## 2018-09-15 PROCEDURE — 2700000000 HC OXYGEN THERAPY PER DAY

## 2018-09-15 PROCEDURE — 2500000003 HC RX 250 WO HCPCS: Performed by: NURSE PRACTITIONER

## 2018-09-15 PROCEDURE — 80053 COMPREHEN METABOLIC PANEL: CPT

## 2018-09-15 PROCEDURE — 85027 COMPLETE CBC AUTOMATED: CPT

## 2018-09-15 RX ORDER — ACETAMINOPHEN 325 MG/1
650 TABLET ORAL EVERY 6 HOURS
Status: DISCONTINUED | OUTPATIENT
Start: 2018-09-15 | End: 2018-09-20 | Stop reason: HOSPADM

## 2018-09-15 RX ORDER — LOSARTAN POTASSIUM 25 MG/1
25 TABLET ORAL DAILY
Status: DISCONTINUED | OUTPATIENT
Start: 2018-09-15 | End: 2018-09-17

## 2018-09-15 RX ORDER — LABETALOL HYDROCHLORIDE 5 MG/ML
10 INJECTION, SOLUTION INTRAVENOUS EVERY 10 MIN PRN
Status: DISCONTINUED | OUTPATIENT
Start: 2018-09-15 | End: 2018-09-19

## 2018-09-15 RX ADMIN — NIMODIPINE 60 MG: 30 CAPSULE ORAL at 15:36

## 2018-09-15 RX ADMIN — AMLODIPINE BESYLATE 10 MG: 10 TABLET ORAL at 08:37

## 2018-09-15 RX ADMIN — Medication 10 ML: at 08:37

## 2018-09-15 RX ADMIN — HYDRALAZINE HYDROCHLORIDE 10 MG: 20 INJECTION INTRAMUSCULAR; INTRAVENOUS at 16:02

## 2018-09-15 RX ADMIN — ENOXAPARIN SODIUM 40 MG: 40 INJECTION SUBCUTANEOUS at 08:37

## 2018-09-15 RX ADMIN — HYDRALAZINE HYDROCHLORIDE 10 MG: 20 INJECTION INTRAMUSCULAR; INTRAVENOUS at 04:41

## 2018-09-15 RX ADMIN — LABETALOL HYDROCHLORIDE 10 MG: 5 INJECTION INTRAVENOUS at 12:22

## 2018-09-15 RX ADMIN — NIMODIPINE 60 MG: 30 CAPSULE ORAL at 11:14

## 2018-09-15 RX ADMIN — HYDRALAZINE HYDROCHLORIDE 10 MG: 20 INJECTION INTRAMUSCULAR; INTRAVENOUS at 00:40

## 2018-09-15 RX ADMIN — HYDRALAZINE HYDROCHLORIDE 10 MG: 20 INJECTION INTRAMUSCULAR; INTRAVENOUS at 18:51

## 2018-09-15 RX ADMIN — NIMODIPINE 60 MG: 30 CAPSULE ORAL at 20:04

## 2018-09-15 RX ADMIN — ACETAMINOPHEN 650 MG: 325 TABLET, FILM COATED ORAL at 10:25

## 2018-09-15 RX ADMIN — LABETALOL HYDROCHLORIDE 10 MG: 5 INJECTION, SOLUTION INTRAVENOUS at 15:21

## 2018-09-15 RX ADMIN — LABETALOL HYDROCHLORIDE 10 MG: 5 INJECTION INTRAVENOUS at 09:48

## 2018-09-15 RX ADMIN — HYDRALAZINE HYDROCHLORIDE 10 MG: 20 INJECTION INTRAMUSCULAR; INTRAVENOUS at 09:34

## 2018-09-15 RX ADMIN — BUTALBITAL, ACETAMINOPHEN, AND CAFFEINE 1 TABLET: 50; 325; 40 TABLET ORAL at 20:39

## 2018-09-15 RX ADMIN — ACETAMINOPHEN 650 MG: 325 TABLET, FILM COATED ORAL at 22:55

## 2018-09-15 RX ADMIN — ACETAMINOPHEN 650 MG: 325 TABLET, FILM COATED ORAL at 00:35

## 2018-09-15 RX ADMIN — HYDRALAZINE HYDROCHLORIDE 10 MG: 20 INJECTION INTRAMUSCULAR; INTRAVENOUS at 04:09

## 2018-09-15 RX ADMIN — Medication 10 ML: at 20:05

## 2018-09-15 RX ADMIN — HYDRALAZINE HYDROCHLORIDE 10 MG: 20 INJECTION INTRAMUSCULAR; INTRAVENOUS at 20:27

## 2018-09-15 RX ADMIN — LABETALOL HYDROCHLORIDE 10 MG: 5 INJECTION INTRAVENOUS at 11:20

## 2018-09-15 RX ADMIN — NIMODIPINE 60 MG: 30 CAPSULE ORAL at 00:33

## 2018-09-15 RX ADMIN — LABETALOL HYDROCHLORIDE 10 MG: 5 INJECTION, SOLUTION INTRAVENOUS at 20:39

## 2018-09-15 RX ADMIN — HYDRALAZINE HYDROCHLORIDE 10 MG: 20 INJECTION INTRAMUSCULAR; INTRAVENOUS at 09:10

## 2018-09-15 RX ADMIN — METOPROLOL TARTRATE 25 MG: 25 TABLET ORAL at 08:37

## 2018-09-15 RX ADMIN — METOPROLOL TARTRATE 25 MG: 25 TABLET ORAL at 20:04

## 2018-09-15 RX ADMIN — HYDRALAZINE HYDROCHLORIDE 10 MG: 20 INJECTION INTRAMUSCULAR; INTRAVENOUS at 02:05

## 2018-09-15 RX ADMIN — ACETAMINOPHEN 650 MG: 325 TABLET, FILM COATED ORAL at 05:17

## 2018-09-15 RX ADMIN — LOSARTAN POTASSIUM 25 MG: 25 TABLET, FILM COATED ORAL at 10:25

## 2018-09-15 RX ADMIN — PANTOPRAZOLE SODIUM 40 MG: 40 INJECTION, POWDER, FOR SOLUTION INTRAVENOUS at 08:38

## 2018-09-15 RX ADMIN — LABETALOL HYDROCHLORIDE 10 MG: 5 INJECTION, SOLUTION INTRAVENOUS at 23:11

## 2018-09-15 RX ADMIN — HYDRALAZINE HYDROCHLORIDE: 20 INJECTION INTRAMUSCULAR; INTRAVENOUS at 12:04

## 2018-09-15 RX ADMIN — HYDRALAZINE HYDROCHLORIDE 10 MG: 20 INJECTION INTRAMUSCULAR; INTRAVENOUS at 20:05

## 2018-09-15 RX ADMIN — BUTALBITAL, ACETAMINOPHEN, AND CAFFEINE 1 TABLET: 50; 325; 40 TABLET ORAL at 15:36

## 2018-09-15 RX ADMIN — NIMODIPINE 60 MG: 30 CAPSULE ORAL at 05:17

## 2018-09-15 RX ADMIN — HYDRALAZINE HYDROCHLORIDE 10 MG: 20 INJECTION INTRAMUSCULAR; INTRAVENOUS at 11:04

## 2018-09-15 RX ADMIN — LABETALOL HYDROCHLORIDE 10 MG: 5 INJECTION INTRAVENOUS at 05:39

## 2018-09-15 RX ADMIN — HYDRALAZINE HYDROCHLORIDE 10 MG: 20 INJECTION INTRAMUSCULAR; INTRAVENOUS at 11:15

## 2018-09-15 RX ADMIN — NIMODIPINE 60 MG: 30 CAPSULE ORAL at 08:37

## 2018-09-15 ASSESSMENT — PAIN SCALES - GENERAL
PAINLEVEL_OUTOF10: 7
PAINLEVEL_OUTOF10: 7
PAINLEVEL_OUTOF10: 0
PAINLEVEL_OUTOF10: 5
PAINLEVEL_OUTOF10: 0
PAINLEVEL_OUTOF10: 3
PAINLEVEL_OUTOF10: 0
PAINLEVEL_OUTOF10: 7
PAINLEVEL_OUTOF10: 5
PAINLEVEL_OUTOF10: 4
PAINLEVEL_OUTOF10: 3

## 2018-09-15 ASSESSMENT — PAIN DESCRIPTION - PAIN TYPE
TYPE: ACUTE PAIN
TYPE: ACUTE PAIN;CHRONIC PAIN;SURGICAL PAIN

## 2018-09-15 ASSESSMENT — PAIN DESCRIPTION - ORIENTATION
ORIENTATION: OTHER (COMMENT)
ORIENTATION: POSTERIOR
ORIENTATION: ANTERIOR;POSTERIOR

## 2018-09-15 ASSESSMENT — PAIN DESCRIPTION - DESCRIPTORS
DESCRIPTORS: ACHING;NAGGING;DISCOMFORT
DESCRIPTORS: ACHING;HEADACHE;NAGGING
DESCRIPTORS: ACHING

## 2018-09-15 ASSESSMENT — PAIN DESCRIPTION - LOCATION
LOCATION: BACK;HEAD
LOCATION: HEAD
LOCATION: BACK;HEAD;LEG

## 2018-09-15 ASSESSMENT — PAIN DESCRIPTION - FREQUENCY
FREQUENCY: CONTINUOUS
FREQUENCY: CONTINUOUS

## 2018-09-15 ASSESSMENT — PAIN DESCRIPTION - PROGRESSION: CLINICAL_PROGRESSION: GRADUALLY WORSENING

## 2018-09-15 NOTE — PLAN OF CARE
Problem: Falls - Risk of:  Goal: Will remain free from falls  Will remain free from falls   Outcome: Met This Shift    Goal: Absence of physical injury  Absence of physical injury   Outcome: Met This Shift      Problem: HEMODYNAMIC STATUS  Goal: Patient has stable vital signs and fluid balance  Outcome: Met This Shift      Problem: ACTIVITY INTOLERANCE/IMPAIRED MOBILITY  Goal: Mobility/activity is maintained at optimum level for patient  Outcome: Met This Shift      Problem: Risk for Impaired Skin Integrity  Goal: Tissue integrity - skin and mucous membranes  Structural intactness and normal physiological function of skin and  mucous membranes.    Outcome: Met This Shift      Problem: Pain:  Goal: Pain level will decrease  Pain level will decrease   Outcome: Met This Shift    Goal: Control of acute pain  Control of acute pain   Outcome: Met This Shift    Goal: Control of chronic pain  Control of chronic pain   Outcome: Met This Shift      Problem: Neurological  Goal: Maximum potential motor/sensory/cognitive function  Outcome: Met This Shift      Problem: Nutrition  Goal: Optimal nutrition therapy  Outcome: Met This Shift

## 2018-09-15 NOTE — PROGRESS NOTES
Neuro Science Intensive Care Unit  Critical Care  Daily Progress Note 9/15/2018    Date of Admission: 9/10/18  CC:  Headache    SURGICAL/INTERVENTIONAL PROCEDURES:   9/10: 4 Vessel with coiling of 2 ICA aneurysms   9/11: angio for  transcatheter Verapamil    OVERNIGHT EVENTS: T max 98.7F. Required 13 additional doses of antihypertensive agents in the previous 24 hours. Did not  insulin in the previous 24 hours. PHYSICAL EXAM:    BP (!) 155/56   Pulse 59   Temp 98.6 °F (37 °C) (Temporal)   Resp 19   Ht 5' 5\" (1.651 m)   Wt 217 lb (98.4 kg)   SpO2 96%   BMI 36.11 kg/m²        Intake/Output Summary (Last 24 hours) at 09/15/18 0835  Last data filed at 09/15/18 0530   Gross per 24 hour   Intake              760 ml   Output             1250 ml   Net             -490 ml         General appearance:  Appears ill. NEUROLOGIC:        GCS:    4 - Opens eyes on own   6 - Follows simple motor commands  5 - Alert and oriented       Pupil size:  Left 3 mm  Right 3 mm  Pupil reaction: Yes   PERRLA  Wiggles fingers: Left Yes Right No  Hand grasp:   Left normal     Right absent  Wiggles toes: Left Yes    Right Yes  Plantar flexion: Left normal    Right decreased    Facial droop: left    Speech: normal       CONSTITUTIONAL: Appears ill with headache. CARDIOVASCULAR: S1 S2, regular rate, regular rhythm,Monitor: SR  PULMONARY:  Respirations unlabored. No rhonchi/rales/wheezes. ABDOMEN: Soft, nontender, nondistended, nontympanic, normal bowel sounds. MUSCULOSKELETAL: RLE moving to command. No RUE movement. SKIN/EXTREMITIES: No rashes/ecchymosis, no edema/clubbing, warm/dry, good capillary refill. IV ACCESS:   TLC D 5. Peripheral       ASSESSMENT/PLAN:     Principal Problem:    Subarachnoid bleed (HCC)  Active Problems:    Hypertensive emergency    Obesity (BMI 30-39. 9)    Cerebral aneurysm  Resolved Problems:    * No resolved hospital problems. *    Neuro:  SAH.  Aneurysm s/p coiling and transcatheter

## 2018-09-15 NOTE — PROGRESS NOTES
Subjective: The patient is awake and alert. Displaying right sided weakness. Status post cerebral angiography, left carotid circulation transcatheter intra-arterial infusion of verapamil, left MCA and left ICA (9/11/2018). Status post cerebral angiography and endovascular coil embolization of two distal left ICA aneurysms (9/10/2018). Denies chest pain, angina, and dyspnea. Denies abdominal pain. Tolerating diet. No nausea or vomiting. She does complain of head pain, back pain and legs-painful    Objective:    BP (!) 155/56   Pulse 59   Temp 98.6 °F (37 °C) (Temporal)   Resp 19   Ht 5' 5\" (1.651 m)   Wt 217 lb (98.4 kg)   SpO2 96%   BMI 36.11 kg/m²     Current medications that patient is taking have been reviewed. Head normal  Eyes normal-full ROM and pupils are equal  Face-right droop-weakness  Heart:  RRR, no murmurs, gallops, or rubs.   Lungs:  CTA bilaterally, no wheeze, rales or rhonchi  Abd: bowel sounds present, nontender, nondistended, no masses  Extrem:  No clubbing, cyanosis, or edema-pulses palpable bilaterally-no skin lesion  Cognition-awake alert and cognizant capable of self determination    CBC with Differential:    Lab Results   Component Value Date    WBC 10.8 09/15/2018    RBC 3.73 09/15/2018    HGB 11.7 09/15/2018    HCT 35.3 09/15/2018     09/15/2018    MCV 94.6 09/15/2018    MCH 31.4 09/15/2018    MCHC 33.1 09/15/2018    RDW 13.2 09/15/2018    LYMPHOPCT 10.8 09/10/2018    MONOPCT 4.8 09/10/2018    BASOPCT 0.4 09/10/2018    MONOSABS 0.67 09/10/2018    LYMPHSABS 1.51 09/10/2018    EOSABS 0.06 09/10/2018    BASOSABS 0.05 09/10/2018     CMP:    Lab Results   Component Value Date     09/15/2018    K 3.6 09/15/2018    CL 98 09/15/2018    CO2 23 09/15/2018    BUN 14 09/15/2018    CREATININE 0.5 09/15/2018    GFRAA >60 09/15/2018    LABGLOM >60 09/15/2018    GLUCOSE 124 09/15/2018    PROT 6.3 09/15/2018    LABALBU 3.5 09/15/2018    CALCIUM 8.8 09/15/2018    BILITOT 0.6 09/15/2018    ALKPHOS 66 09/15/2018    AST 15 09/15/2018    ALT 31 09/15/2018     BMP:    Lab Results   Component Value Date     09/15/2018    K 3.6 09/15/2018    CL 98 09/15/2018    CO2 23 09/15/2018    BUN 14 09/15/2018    LABALBU 3.5 09/15/2018    CREATININE 0.5 09/15/2018    CALCIUM 8.8 09/15/2018    GFRAA >60 09/15/2018    LABGLOM >60 09/15/2018    GLUCOSE 124 09/15/2018     Magnesium:    Lab Results   Component Value Date    MG 2.0 09/15/2018     Phosphorus:    Lab Results   Component Value Date    PHOS 3.6 09/15/2018     PT/INR:    Lab Results   Component Value Date    PROTIME 10.9 09/10/2018    INR 0.9 09/10/2018     PTT:    Lab Results   Component Value Date    APTT 26.0 09/10/2018   [APTT}     Assessment:    Patient Active Problem List   Diagnosis    Subarachnoid bleed ([address]    Hypertensive emergency    Obesity (BMI 30-39. 9)    Cerebral aneurysm       Plan:  As per neurosurgery. Continue blood pressure control. This is still marginal  Status post cerebral angiography, left carotid circulation transcatheter intra-arterial infusion of verapamil, left MCA and left ICA. Status post cerebral angiography and endovascular coil embolization of two distal left ICA aneurysms. Now with right sided weakness. Worry about CVA. Neurology input appreciated. Pt/Ot evaluations for discharge planning.  Re-evaluation for acute rehab scheduled for Monday         Kali Dodge MD  8:22 AM  9/15/2018

## 2018-09-15 NOTE — PROGRESS NOTES
Post Aneurysm Coiling#5  No change in neuro status. No movements right hand &  right arm. Right leg  2/5  Speech fair. TCD being done.  BP elevated  Spoke with family

## 2018-09-16 ENCOUNTER — APPOINTMENT (OUTPATIENT)
Dept: ULTRASOUND IMAGING | Age: 58
DRG: 021 | End: 2018-09-16
Payer: COMMERCIAL

## 2018-09-16 LAB
ALBUMIN SERPL-MCNC: 3.5 G/DL (ref 3.5–5.2)
ALP BLD-CCNC: 67 U/L (ref 35–104)
ALT SERPL-CCNC: 23 U/L (ref 0–32)
ANION GAP SERPL CALCULATED.3IONS-SCNC: 12 MMOL/L (ref 7–16)
AST SERPL-CCNC: 10 U/L (ref 0–31)
BILIRUB SERPL-MCNC: 0.5 MG/DL (ref 0–1.2)
BUN BLDV-MCNC: 18 MG/DL (ref 6–20)
CALCIUM SERPL-MCNC: 8.5 MG/DL (ref 8.6–10.2)
CHLORIDE BLD-SCNC: 101 MMOL/L (ref 98–107)
CO2: 24 MMOL/L (ref 22–29)
CREAT SERPL-MCNC: 0.5 MG/DL (ref 0.5–1)
GFR AFRICAN AMERICAN: >60
GFR NON-AFRICAN AMERICAN: >60 ML/MIN/1.73
GLUCOSE BLD-MCNC: 116 MG/DL (ref 74–109)
HCT VFR BLD CALC: 33.7 % (ref 34–48)
HEMOGLOBIN: 11.3 G/DL (ref 11.5–15.5)
MAGNESIUM: 2.1 MG/DL (ref 1.6–2.6)
MCH RBC QN AUTO: 31.7 PG (ref 26–35)
MCHC RBC AUTO-ENTMCNC: 33.5 % (ref 32–34.5)
MCV RBC AUTO: 94.4 FL (ref 80–99.9)
PDW BLD-RTO: 13.2 FL (ref 11.5–15)
PHOSPHORUS: 3.8 MG/DL (ref 2.5–4.5)
PLATELET # BLD: 253 E9/L (ref 130–450)
PMV BLD AUTO: 9.8 FL (ref 7–12)
POTASSIUM SERPL-SCNC: 3.6 MMOL/L (ref 3.5–5)
RBC # BLD: 3.57 E12/L (ref 3.5–5.5)
SODIUM BLD-SCNC: 137 MMOL/L (ref 132–146)
TOTAL PROTEIN: 6.2 G/DL (ref 6.4–8.3)
WBC # BLD: 11.1 E9/L (ref 4.5–11.5)

## 2018-09-16 PROCEDURE — 36415 COLL VENOUS BLD VENIPUNCTURE: CPT

## 2018-09-16 PROCEDURE — 6360000002 HC RX W HCPCS: Performed by: NURSE PRACTITIONER

## 2018-09-16 PROCEDURE — 80053 COMPREHEN METABOLIC PANEL: CPT

## 2018-09-16 PROCEDURE — 93886 INTRACRANIAL COMPLETE STUDY: CPT

## 2018-09-16 PROCEDURE — 6370000000 HC RX 637 (ALT 250 FOR IP): Performed by: NURSE PRACTITIONER

## 2018-09-16 PROCEDURE — 2500000003 HC RX 250 WO HCPCS: Performed by: NURSE PRACTITIONER

## 2018-09-16 PROCEDURE — 6370000000 HC RX 637 (ALT 250 FOR IP): Performed by: NEUROLOGICAL SURGERY

## 2018-09-16 PROCEDURE — C9113 INJ PANTOPRAZOLE SODIUM, VIA: HCPCS | Performed by: INTERNAL MEDICINE

## 2018-09-16 PROCEDURE — 6370000000 HC RX 637 (ALT 250 FOR IP)

## 2018-09-16 PROCEDURE — 2700000000 HC OXYGEN THERAPY PER DAY

## 2018-09-16 PROCEDURE — 2000000000 HC ICU R&B

## 2018-09-16 PROCEDURE — 85027 COMPLETE CBC AUTOMATED: CPT

## 2018-09-16 PROCEDURE — 6360000002 HC RX W HCPCS: Performed by: INTERNAL MEDICINE

## 2018-09-16 PROCEDURE — 84100 ASSAY OF PHOSPHORUS: CPT

## 2018-09-16 PROCEDURE — 83735 ASSAY OF MAGNESIUM: CPT

## 2018-09-16 PROCEDURE — 2580000003 HC RX 258: Performed by: NURSE PRACTITIONER

## 2018-09-16 PROCEDURE — 99233 SBSQ HOSP IP/OBS HIGH 50: CPT | Performed by: NURSE PRACTITIONER

## 2018-09-16 RX ORDER — DIMETHICONE, OXYBENZONE, AND PADIMATE O 2; 2.5; 6.6 G/100G; G/100G; G/100G
STICK TOPICAL
Status: COMPLETED
Start: 2018-09-16 | End: 2018-09-16

## 2018-09-16 RX ORDER — HYDROCODONE BITARTRATE AND ACETAMINOPHEN 7.5; 325 MG/1; MG/1
1 TABLET ORAL EVERY 4 HOURS PRN
Status: DISCONTINUED | OUTPATIENT
Start: 2018-09-16 | End: 2018-09-17

## 2018-09-16 RX ORDER — CHLORTHALIDONE 25 MG/1
25 TABLET ORAL DAILY
Status: DISCONTINUED | OUTPATIENT
Start: 2018-09-16 | End: 2018-09-20 | Stop reason: HOSPADM

## 2018-09-16 RX ADMIN — NIMODIPINE 60 MG: 30 CAPSULE ORAL at 00:26

## 2018-09-16 RX ADMIN — HYDRALAZINE HYDROCHLORIDE 10 MG: 20 INJECTION INTRAMUSCULAR; INTRAVENOUS at 21:22

## 2018-09-16 RX ADMIN — HYDROCODONE BITARTRATE AND ACETAMINOPHEN 1 TABLET: 7.5; 325 TABLET ORAL at 16:32

## 2018-09-16 RX ADMIN — HYDRALAZINE HYDROCHLORIDE 10 MG: 20 INJECTION INTRAMUSCULAR; INTRAVENOUS at 11:15

## 2018-09-16 RX ADMIN — HYDROCODONE BITARTRATE AND ACETAMINOPHEN 1 TABLET: 7.5; 325 TABLET ORAL at 12:31

## 2018-09-16 RX ADMIN — HYDRALAZINE HYDROCHLORIDE 10 MG: 20 INJECTION INTRAMUSCULAR; INTRAVENOUS at 16:16

## 2018-09-16 RX ADMIN — Medication 10 ML: at 08:32

## 2018-09-16 RX ADMIN — HYDRALAZINE HYDROCHLORIDE 10 MG: 20 INJECTION INTRAMUSCULAR; INTRAVENOUS at 08:19

## 2018-09-16 RX ADMIN — ACETAMINOPHEN 650 MG: 325 TABLET, FILM COATED ORAL at 12:16

## 2018-09-16 RX ADMIN — HYDROCODONE BITARTRATE AND ACETAMINOPHEN 1 TABLET: 7.5; 325 TABLET ORAL at 20:32

## 2018-09-16 RX ADMIN — BUTALBITAL, ACETAMINOPHEN, AND CAFFEINE 1 TABLET: 50; 325; 40 TABLET ORAL at 00:26

## 2018-09-16 RX ADMIN — NIMODIPINE 60 MG: 30 CAPSULE ORAL at 20:15

## 2018-09-16 RX ADMIN — NIMODIPINE 60 MG: 30 CAPSULE ORAL at 12:07

## 2018-09-16 RX ADMIN — HYDRALAZINE HYDROCHLORIDE 10 MG: 20 INJECTION INTRAMUSCULAR; INTRAVENOUS at 22:17

## 2018-09-16 RX ADMIN — NIMODIPINE 60 MG: 30 CAPSULE ORAL at 04:25

## 2018-09-16 RX ADMIN — NIMODIPINE 60 MG: 30 CAPSULE ORAL at 08:31

## 2018-09-16 RX ADMIN — CHLORTHALIDONE 25 MG: 25 TABLET ORAL at 19:00

## 2018-09-16 RX ADMIN — METOPROLOL TARTRATE 25 MG: 25 TABLET ORAL at 20:15

## 2018-09-16 RX ADMIN — Medication 10 ML: at 20:16

## 2018-09-16 RX ADMIN — METOPROLOL TARTRATE 25 MG: 25 TABLET ORAL at 08:32

## 2018-09-16 RX ADMIN — Medication: at 17:43

## 2018-09-16 RX ADMIN — BUTALBITAL, ACETAMINOPHEN, AND CAFFEINE 1 TABLET: 50; 325; 40 TABLET ORAL at 08:49

## 2018-09-16 RX ADMIN — LABETALOL HYDROCHLORIDE 10 MG: 5 INJECTION, SOLUTION INTRAVENOUS at 06:00

## 2018-09-16 RX ADMIN — ACETAMINOPHEN 650 MG: 325 TABLET, FILM COATED ORAL at 04:25

## 2018-09-16 RX ADMIN — ENOXAPARIN SODIUM 40 MG: 40 INJECTION SUBCUTANEOUS at 08:31

## 2018-09-16 RX ADMIN — AMLODIPINE BESYLATE 10 MG: 10 TABLET ORAL at 08:31

## 2018-09-16 RX ADMIN — PANTOPRAZOLE SODIUM 40 MG: 40 INJECTION, POWDER, FOR SOLUTION INTRAVENOUS at 08:32

## 2018-09-16 RX ADMIN — LOSARTAN POTASSIUM 25 MG: 25 TABLET, FILM COATED ORAL at 08:32

## 2018-09-16 RX ADMIN — NIMODIPINE 60 MG: 30 CAPSULE ORAL at 16:32

## 2018-09-16 ASSESSMENT — PAIN SCALES - GENERAL
PAINLEVEL_OUTOF10: 0
PAINLEVEL_OUTOF10: 7
PAINLEVEL_OUTOF10: 5
PAINLEVEL_OUTOF10: 0
PAINLEVEL_OUTOF10: 6
PAINLEVEL_OUTOF10: 4
PAINLEVEL_OUTOF10: 9
PAINLEVEL_OUTOF10: 5
PAINLEVEL_OUTOF10: 6

## 2018-09-16 ASSESSMENT — PAIN DESCRIPTION - DESCRIPTORS
DESCRIPTORS: CONSTANT;DISCOMFORT;HEADACHE
DESCRIPTORS: ACHING;CONSTANT;DISCOMFORT

## 2018-09-16 ASSESSMENT — PAIN DESCRIPTION - PROGRESSION
CLINICAL_PROGRESSION: GRADUALLY WORSENING
CLINICAL_PROGRESSION: GRADUALLY WORSENING

## 2018-09-16 ASSESSMENT — PAIN DESCRIPTION - LOCATION
LOCATION: BACK
LOCATION: HEAD
LOCATION: HEAD

## 2018-09-16 ASSESSMENT — PAIN DESCRIPTION - PAIN TYPE
TYPE: ACUTE PAIN

## 2018-09-16 ASSESSMENT — PAIN DESCRIPTION - ONSET
ONSET: AWAKENED FROM SLEEP
ONSET: ON-GOING

## 2018-09-16 NOTE — PROGRESS NOTES
Neuro Science Intensive Care Unit  Critical Care  Daily Progress Note 9/16/2018    Date of Admission: 9/10/18  CC:  Headache    SURGICAL/INTERVENTIONAL PROCEDURES:   9/10: 4 Vessel with coiling of 2 ICA aneurysms   9/11: angio for  transcatheter Verapamil    OVERNIGHT EVENTS: T max 99.1F. Required 14 additional doses of antihypertensive agents in the previous 24 hours. Did not require insulin in the previous 24 hours. PHYSICAL EXAM:    BP (!) 173/71 Comment: treated  Pulse 71   Temp 98.9 °F (37.2 °C) (Temporal)   Resp 21   Ht 5' 5\" (1.651 m)   Wt 217 lb (98.4 kg)   SpO2 92%   BMI 36.11 kg/m²        Intake/Output Summary (Last 24 hours) at 09/16/18 0754  Last data filed at 09/16/18 0304   Gross per 24 hour   Intake              490 ml   Output             1450 ml   Net             -960 ml         General appearance:  Appears ill. NEUROLOGIC:        GCS:    4 - Opens eyes on own   6 - Follows simple motor commands  5 - Alert and oriented       Pupil size:  Left 3 mm  Right 3 mm  Pupil reaction: Yes   PERRLA  Wiggles fingers: Left Yes Right No  Hand grasp:   Left normal     Right absent  Wiggles toes: Left Yes    Right Yes  Plantar flexion: Left normal    Right decreased    Facial droop: left    Speech: normal       CONSTITUTIONAL: Appears ill with headache. CARDIOVASCULAR: S1 S2, regular rate, regular rhythm,Monitor: SR  PULMONARY:  Respirations unlabored. No rhonchi/rales/wheezes. ABDOMEN: Soft, nontender, nondistended, nontympanic, normal bowel sounds. MUSCULOSKELETAL: RLE moving to command. No RUE movement. SKIN/EXTREMITIES: No rashes/ecchymosis, no edema/clubbing, warm/dry, good capillary refill. IV ACCESS:   TLC D 5. Peripheral       ASSESSMENT/PLAN:     Principal Problem:    Subarachnoid bleed (HCC)  Active Problems:    Hypertensive emergency    Obesity (BMI 30-39. 9)    Cerebral aneurysm  Resolved Problems:    * No resolved hospital problems. *    Neuro:  SAH.  Aneurysm s/p coiling and transcatheter verapamil. Headache. Neurosurgery following. Monitor neuro status. Nimotop. D5/21. Fioricet  CV: HTN. SBP goal <160mmHg. amlodipine. metoprolol. Losartan. Chlorothalidone. PRN labetalol and hydralazine. Pulm: No acute issues. Monitor respiratory status. GI: No acute issues. Diet. Protonix. Monitor bowel function. Zofran. Renal:  No acute issues. Monitor uop, renal function and electrolytes. ID: No acute issues. Afebrile. Endocrine: Hyperglycemia. Monitor BS     MSK:.R arm flaccid. R leg weak. PT/OT. Up with assistance. PMR    Heme:Anemia. Monitor CBC. Bowel regime: MOM. Colace. Dulcolax  Pain control/Sedation:  Acetaminophen. Fioricet. DVT prophylaxis: SCDs. No heparin or lovenox dt SAH. GI prophylaxis: Protonix. Diet. Glucose protocol:  Monitor BS. Consults:   Medicine. Neurosurgery. Patient/Family update: Family updated. Questions answered. Code status:  Full code. [facility]         LYLY Moore-CNP  9/16/2018  7:57 AM

## 2018-09-16 NOTE — PROGRESS NOTES
Subjective: The patient is awake and alert. Displaying right sided weakness. Status post cerebral angiography, left carotid circulation transcatheter intra-arterial infusion of verapamil, left MCA and left ICA (9/11/2018). Status post cerebral angiography and endovascular coil embolization of two distal left ICA aneurysms (9/10/2018). Denies chest pain, angina, and dyspnea. Denies abdominal pain. Tolerating diet. No nausea or vomiting. She does complain of head pain, back pain and legs-painful    Objective:    BP (!) 175/79   Pulse 71   Temp 98.9 °F (37.2 °C) (Temporal)   Resp 21   Ht 5' 5\" (1.651 m)   Wt 217 lb (98.4 kg)   SpO2 92%   BMI 36.11 kg/m²     Current medications that patient is taking have been reviewed. Head normal  Eyes normal-full ROM and pupils are equal  Face-right droop-weakness  Heart:  RRR, no murmurs, gallops, or rubs.   Lungs:  CTA bilaterally, no wheeze, rales or rhonchi  Abd: bowel sounds present, nontender, nondistended, no masses  Extrem:  No clubbing, cyanosis, or edema-pulses palpable bilaterally-no skin lesion  Cognition-awake alert and cognizant capable of self determination    CBC with Differential:    Lab Results   Component Value Date    WBC 11.1 09/16/2018    RBC 3.57 09/16/2018    HGB 11.3 09/16/2018    HCT 33.7 09/16/2018     09/16/2018    MCV 94.4 09/16/2018    MCH 31.7 09/16/2018    MCHC 33.5 09/16/2018    RDW 13.2 09/16/2018    LYMPHOPCT 10.8 09/10/2018    MONOPCT 4.8 09/10/2018    BASOPCT 0.4 09/10/2018    MONOSABS 0.67 09/10/2018    LYMPHSABS 1.51 09/10/2018    EOSABS 0.06 09/10/2018    BASOSABS 0.05 09/10/2018     CMP:    Lab Results   Component Value Date     09/16/2018    K 3.6 09/16/2018     09/16/2018    CO2 24 09/16/2018    BUN 18 09/16/2018    CREATININE 0.5 09/16/2018    GFRAA >60 09/16/2018    LABGLOM >60 09/16/2018    GLUCOSE 116 09/16/2018    PROT 6.2 09/16/2018    LABALBU 3.5 09/16/2018    CALCIUM 8.5 09/16/2018 BILITOT 0.5 09/16/2018    ALKPHOS 67 09/16/2018    AST 10 09/16/2018    ALT 23 09/16/2018     BMP:    Lab Results   Component Value Date     09/16/2018    K 3.6 09/16/2018     09/16/2018    CO2 24 09/16/2018    BUN 18 09/16/2018    LABALBU 3.5 09/16/2018    CREATININE 0.5 09/16/2018    CALCIUM 8.5 09/16/2018    GFRAA >60 09/16/2018    LABGLOM >60 09/16/2018    GLUCOSE 116 09/16/2018     Magnesium:    Lab Results   Component Value Date    MG 2.1 09/16/2018     Phosphorus:    Lab Results   Component Value Date    PHOS 3.8 09/16/2018     PT/INR:    Lab Results   Component Value Date    PROTIME 10.9 09/10/2018    INR 0.9 09/10/2018     PTT:    Lab Results   Component Value Date    APTT 26.0 09/10/2018   [APTT}     Assessment:  SURGICAL/INTERVENTIONAL PROCEDURES:   9/10: 4 Vessel with coiling of 2 ICA aneurysms   9/11: angio for  transcatheter Verapamil    Patient Active Problem List   Diagnosis    Subarachnoid bleed ([facility] [address]    Hypertensive emergency    Obesity (BMI 30-39. 9)    Cerebral aneurysm       Plan:  As per neurosurgery. Continue blood pressure control. This is still marginal  Status post cerebral angiography, left carotid circulation transcatheter intra-arterial infusion of verapamil, left MCA and left ICA. Status post cerebral angiography and endovascular coil embolization of two distal left ICA aneurysms. Now with right sided weakness.           Pramod Otero MD  9:38 AM  9/16/2018

## 2018-09-17 ENCOUNTER — APPOINTMENT (OUTPATIENT)
Dept: ULTRASOUND IMAGING | Age: 58
DRG: 021 | End: 2018-09-17
Payer: COMMERCIAL

## 2018-09-17 ENCOUNTER — APPOINTMENT (OUTPATIENT)
Dept: CT IMAGING | Age: 58
DRG: 021 | End: 2018-09-17
Payer: COMMERCIAL

## 2018-09-17 LAB
ALBUMIN SERPL-MCNC: 3.5 G/DL (ref 3.5–5.2)
ALP BLD-CCNC: 77 U/L (ref 35–104)
ALT SERPL-CCNC: 23 U/L (ref 0–32)
ANION GAP SERPL CALCULATED.3IONS-SCNC: 19 MMOL/L (ref 7–16)
AST SERPL-CCNC: 12 U/L (ref 0–31)
BILIRUB SERPL-MCNC: 0.6 MG/DL (ref 0–1.2)
BUN BLDV-MCNC: 13 MG/DL (ref 6–20)
CALCIUM SERPL-MCNC: 9.2 MG/DL (ref 8.6–10.2)
CHLORIDE BLD-SCNC: 92 MMOL/L (ref 98–107)
CO2: 24 MMOL/L (ref 22–29)
CREAT SERPL-MCNC: 0.4 MG/DL (ref 0.5–1)
GFR AFRICAN AMERICAN: >60
GFR NON-AFRICAN AMERICAN: >60 ML/MIN/1.73
GLUCOSE BLD-MCNC: 112 MG/DL (ref 74–109)
HCT VFR BLD CALC: 38.5 % (ref 34–48)
HEMOGLOBIN: 13.1 G/DL (ref 11.5–15.5)
MAGNESIUM: 2 MG/DL (ref 1.6–2.6)
MCH RBC QN AUTO: 31.9 PG (ref 26–35)
MCHC RBC AUTO-ENTMCNC: 34 % (ref 32–34.5)
MCV RBC AUTO: 93.7 FL (ref 80–99.9)
PDW BLD-RTO: 13.2 FL (ref 11.5–15)
PHOSPHORUS: 3.2 MG/DL (ref 2.5–4.5)
PLATELET # BLD: 314 E9/L (ref 130–450)
PMV BLD AUTO: 9.9 FL (ref 7–12)
POTASSIUM SERPL-SCNC: 2.8 MMOL/L (ref 3.5–5)
POTASSIUM SERPL-SCNC: 5 MMOL/L (ref 3.5–5)
RBC # BLD: 4.11 E12/L (ref 3.5–5.5)
SODIUM BLD-SCNC: 135 MMOL/L (ref 132–146)
TOTAL PROTEIN: 6.8 G/DL (ref 6.4–8.3)
WBC # BLD: 11.9 E9/L (ref 4.5–11.5)

## 2018-09-17 PROCEDURE — 6370000000 HC RX 637 (ALT 250 FOR IP): Performed by: NEUROLOGICAL SURGERY

## 2018-09-17 PROCEDURE — 36415 COLL VENOUS BLD VENIPUNCTURE: CPT

## 2018-09-17 PROCEDURE — 99233 SBSQ HOSP IP/OBS HIGH 50: CPT | Performed by: SURGERY

## 2018-09-17 PROCEDURE — 70450 CT HEAD/BRAIN W/O DYE: CPT

## 2018-09-17 PROCEDURE — 84132 ASSAY OF SERUM POTASSIUM: CPT

## 2018-09-17 PROCEDURE — 83735 ASSAY OF MAGNESIUM: CPT

## 2018-09-17 PROCEDURE — 2500000003 HC RX 250 WO HCPCS: Performed by: NURSE PRACTITIONER

## 2018-09-17 PROCEDURE — 2700000000 HC OXYGEN THERAPY PER DAY

## 2018-09-17 PROCEDURE — 85027 COMPLETE CBC AUTOMATED: CPT

## 2018-09-17 PROCEDURE — 6360000002 HC RX W HCPCS: Performed by: NURSE PRACTITIONER

## 2018-09-17 PROCEDURE — 97530 THERAPEUTIC ACTIVITIES: CPT

## 2018-09-17 PROCEDURE — APPSS30 APP SPLIT SHARED TIME 16-30 MINUTES: Performed by: NURSE PRACTITIONER

## 2018-09-17 PROCEDURE — 6370000000 HC RX 637 (ALT 250 FOR IP): Performed by: NURSE PRACTITIONER

## 2018-09-17 PROCEDURE — 6370000000 HC RX 637 (ALT 250 FOR IP): Performed by: INTERNAL MEDICINE

## 2018-09-17 PROCEDURE — 84100 ASSAY OF PHOSPHORUS: CPT

## 2018-09-17 PROCEDURE — 97110 THERAPEUTIC EXERCISES: CPT

## 2018-09-17 PROCEDURE — 97535 SELF CARE MNGMENT TRAINING: CPT

## 2018-09-17 PROCEDURE — 2000000000 HC ICU R&B

## 2018-09-17 PROCEDURE — 94640 AIRWAY INHALATION TREATMENT: CPT

## 2018-09-17 PROCEDURE — 93886 INTRACRANIAL COMPLETE STUDY: CPT

## 2018-09-17 PROCEDURE — 2580000003 HC RX 258: Performed by: NURSE PRACTITIONER

## 2018-09-17 PROCEDURE — 6360000002 HC RX W HCPCS: Performed by: INTERNAL MEDICINE

## 2018-09-17 PROCEDURE — 80053 COMPREHEN METABOLIC PANEL: CPT

## 2018-09-17 RX ORDER — OXYCODONE HYDROCHLORIDE AND ACETAMINOPHEN 5; 325 MG/1; MG/1
1 TABLET ORAL EVERY 6 HOURS PRN
Status: DISCONTINUED | OUTPATIENT
Start: 2018-09-17 | End: 2018-09-18

## 2018-09-17 RX ORDER — FENTANYL CITRATE 50 UG/ML
25 INJECTION, SOLUTION INTRAMUSCULAR; INTRAVENOUS ONCE
Status: COMPLETED | OUTPATIENT
Start: 2018-09-17 | End: 2018-09-17

## 2018-09-17 RX ORDER — POTASSIUM CHLORIDE 7.45 MG/ML
10 INJECTION INTRAVENOUS
Status: COMPLETED | OUTPATIENT
Start: 2018-09-17 | End: 2018-09-17

## 2018-09-17 RX ORDER — GUAIFENESIN/DEXTROMETHORPHAN 100-10MG/5
5 SYRUP ORAL EVERY 4 HOURS PRN
Status: DISCONTINUED | OUTPATIENT
Start: 2018-09-17 | End: 2018-09-20 | Stop reason: HOSPADM

## 2018-09-17 RX ORDER — IPRATROPIUM BROMIDE AND ALBUTEROL SULFATE 2.5; .5 MG/3ML; MG/3ML
1 SOLUTION RESPIRATORY (INHALATION) 4 TIMES DAILY
Status: DISCONTINUED | OUTPATIENT
Start: 2018-09-17 | End: 2018-09-20 | Stop reason: HOSPADM

## 2018-09-17 RX ORDER — POTASSIUM CHLORIDE 20 MEQ/1
40 TABLET, EXTENDED RELEASE ORAL ONCE
Status: COMPLETED | OUTPATIENT
Start: 2018-09-17 | End: 2018-09-17

## 2018-09-17 RX ORDER — PANTOPRAZOLE SODIUM 40 MG/1
40 TABLET, DELAYED RELEASE ORAL
Status: DISCONTINUED | OUTPATIENT
Start: 2018-09-17 | End: 2018-09-20 | Stop reason: HOSPADM

## 2018-09-17 RX ORDER — LOSARTAN POTASSIUM 50 MG/1
50 TABLET ORAL DAILY
Status: DISCONTINUED | OUTPATIENT
Start: 2018-09-17 | End: 2018-09-19

## 2018-09-17 RX ADMIN — HYDRALAZINE HYDROCHLORIDE 10 MG: 20 INJECTION INTRAMUSCULAR; INTRAVENOUS at 21:19

## 2018-09-17 RX ADMIN — Medication 10 ML: at 08:41

## 2018-09-17 RX ADMIN — POTASSIUM CHLORIDE 10 MEQ: 10 INJECTION, SOLUTION INTRAVENOUS at 11:17

## 2018-09-17 RX ADMIN — NIMODIPINE 60 MG: 30 CAPSULE ORAL at 12:02

## 2018-09-17 RX ADMIN — CHLORTHALIDONE 25 MG: 25 TABLET ORAL at 08:42

## 2018-09-17 RX ADMIN — NIMODIPINE 60 MG: 30 CAPSULE ORAL at 20:43

## 2018-09-17 RX ADMIN — POTASSIUM CHLORIDE 10 MEQ: 10 INJECTION, SOLUTION INTRAVENOUS at 08:50

## 2018-09-17 RX ADMIN — NIMODIPINE 60 MG: 30 CAPSULE ORAL at 15:56

## 2018-09-17 RX ADMIN — LABETALOL HYDROCHLORIDE 10 MG: 5 INJECTION, SOLUTION INTRAVENOUS at 12:37

## 2018-09-17 RX ADMIN — ACETAMINOPHEN 650 MG: 325 TABLET, FILM COATED ORAL at 09:58

## 2018-09-17 RX ADMIN — ACETAMINOPHEN 650 MG: 325 TABLET, FILM COATED ORAL at 00:07

## 2018-09-17 RX ADMIN — ACETAMINOPHEN 650 MG: 325 TABLET, FILM COATED ORAL at 04:13

## 2018-09-17 RX ADMIN — LOSARTAN POTASSIUM 50 MG: 50 TABLET, FILM COATED ORAL at 08:42

## 2018-09-17 RX ADMIN — POTASSIUM CHLORIDE 10 MEQ: 10 INJECTION, SOLUTION INTRAVENOUS at 12:03

## 2018-09-17 RX ADMIN — ENOXAPARIN SODIUM 40 MG: 40 INJECTION SUBCUTANEOUS at 08:41

## 2018-09-17 RX ADMIN — FENTANYL CITRATE 25 MCG: 50 INJECTION INTRAMUSCULAR; INTRAVENOUS at 08:41

## 2018-09-17 RX ADMIN — LABETALOL HYDROCHLORIDE 10 MG: 5 INJECTION, SOLUTION INTRAVENOUS at 08:23

## 2018-09-17 RX ADMIN — HYDRALAZINE HYDROCHLORIDE 10 MG: 20 INJECTION INTRAMUSCULAR; INTRAVENOUS at 05:23

## 2018-09-17 RX ADMIN — NIMODIPINE 60 MG: 30 CAPSULE ORAL at 04:13

## 2018-09-17 RX ADMIN — NIMODIPINE 60 MG: 30 CAPSULE ORAL at 08:49

## 2018-09-17 RX ADMIN — HYDRALAZINE HYDROCHLORIDE 10 MG: 20 INJECTION INTRAMUSCULAR; INTRAVENOUS at 02:30

## 2018-09-17 RX ADMIN — IPRATROPIUM BROMIDE AND ALBUTEROL SULFATE 1 AMPULE: .5; 3 SOLUTION RESPIRATORY (INHALATION) at 20:54

## 2018-09-17 RX ADMIN — POTASSIUM CHLORIDE 10 MEQ: 10 INJECTION, SOLUTION INTRAVENOUS at 10:01

## 2018-09-17 RX ADMIN — Medication 10 ML: at 20:43

## 2018-09-17 RX ADMIN — HYDROCODONE BITARTRATE AND ACETAMINOPHEN 1 TABLET: 7.5; 325 TABLET ORAL at 04:57

## 2018-09-17 RX ADMIN — METOPROLOL TARTRATE 25 MG: 25 TABLET ORAL at 20:42

## 2018-09-17 RX ADMIN — HYDROCODONE BITARTRATE AND ACETAMINOPHEN 1 TABLET: 7.5; 325 TABLET ORAL at 12:03

## 2018-09-17 RX ADMIN — HYDRALAZINE HYDROCHLORIDE 10 MG: 20 INJECTION INTRAMUSCULAR; INTRAVENOUS at 04:12

## 2018-09-17 RX ADMIN — OXYCODONE AND ACETAMINOPHEN 1 TABLET: 5; 325 TABLET ORAL at 22:02

## 2018-09-17 RX ADMIN — PANTOPRAZOLE SODIUM 40 MG: 40 TABLET, DELAYED RELEASE ORAL at 08:49

## 2018-09-17 RX ADMIN — AMLODIPINE BESYLATE 10 MG: 10 TABLET ORAL at 08:42

## 2018-09-17 RX ADMIN — POTASSIUM CHLORIDE 40 MEQ: 20 TABLET, EXTENDED RELEASE ORAL at 08:41

## 2018-09-17 RX ADMIN — METOPROLOL TARTRATE 25 MG: 25 TABLET ORAL at 08:42

## 2018-09-17 RX ADMIN — OXYCODONE AND ACETAMINOPHEN 1 TABLET: 5; 325 TABLET ORAL at 15:56

## 2018-09-17 RX ADMIN — NIMODIPINE 60 MG: 30 CAPSULE ORAL at 00:07

## 2018-09-17 ASSESSMENT — PAIN SCALES - GENERAL
PAINLEVEL_OUTOF10: 5
PAINLEVEL_OUTOF10: 0
PAINLEVEL_OUTOF10: 7
PAINLEVEL_OUTOF10: 9
PAINLEVEL_OUTOF10: 8
PAINLEVEL_OUTOF10: 6
PAINLEVEL_OUTOF10: 7
PAINLEVEL_OUTOF10: 5
PAINLEVEL_OUTOF10: 8
PAINLEVEL_OUTOF10: 7

## 2018-09-17 ASSESSMENT — PAIN DESCRIPTION - ONSET
ONSET: ON-GOING
ONSET: ON-GOING

## 2018-09-17 ASSESSMENT — PAIN DESCRIPTION - PAIN TYPE
TYPE: ACUTE PAIN

## 2018-09-17 ASSESSMENT — PAIN DESCRIPTION - LOCATION
LOCATION: BACK
LOCATION: BACK;HEAD

## 2018-09-17 ASSESSMENT — PAIN DESCRIPTION - DESCRIPTORS
DESCRIPTORS: ACHING;HEADACHE
DESCRIPTORS: ACHING;CONSTANT;DISCOMFORT
DESCRIPTORS: ACHING;DISCOMFORT
DESCRIPTORS: ACHING;HEADACHE

## 2018-09-17 ASSESSMENT — PAIN DESCRIPTION - PROGRESSION: CLINICAL_PROGRESSION: GRADUALLY WORSENING

## 2018-09-17 ASSESSMENT — PAIN DESCRIPTION - FREQUENCY: FREQUENCY: CONTINUOUS

## 2018-09-17 NOTE — PLAN OF CARE
Problem: Falls - Risk of:  Goal: Will remain free from falls  Will remain free from falls   Outcome: Met This Shift    Goal: Absence of physical injury  Absence of physical injury   Outcome: Met This Shift      Problem: HEMODYNAMIC STATUS  Goal: Patient has stable vital signs and fluid balance  Outcome: Ongoing      Problem: ACTIVITY INTOLERANCE/IMPAIRED MOBILITY  Goal: Mobility/activity is maintained at optimum level for patient  Outcome: Not Met This Shift      Problem: Pain:  Goal: Control of acute pain  Control of acute pain   Outcome: Ongoing

## 2018-09-17 NOTE — PROGRESS NOTES
Subjective: The patient is awake and alert. Continues to have headache and diffuse right sided plegia. No changes in vision. No chest pain or dyspnea. Objective:    BP (!) 165/69   Pulse 79   Temp 98.4 °F (36.9 °C) (Oral)   Resp 16   Ht 5' 5\" (1.651 m)   Wt 211 lb 8 oz (95.9 kg)   SpO2 95%   BMI 35.20 kg/m²     Current medications that patient is taking have been reviewed. Heart:  RRR, no murmurs, gallops, or rubs. Lungs:  Bilateral rhonchi, no wheeze/rales  Abd: bowel sounds present, soft, nontender, nondistended, no masses  Extrem:  No clubbing, cyanosis, or edema  Neuro:  Right sided hemiparesis/plegia with upper extremity strength 0/5 and lower extremity strength 1-2/5, left sided strength 5/5    CBC:   Lab Results   Component Value Date    WBC 11.9 09/17/2018    RBC 4.11 09/17/2018    HGB 13.1 09/17/2018    HCT 38.5 09/17/2018    MCV 93.7 09/17/2018    MCH 31.9 09/17/2018    MCHC 34.0 09/17/2018    RDW 13.2 09/17/2018     09/17/2018    MPV 9.9 09/17/2018     BMP:    Lab Results   Component Value Date     09/17/2018    K 2.8 09/17/2018    CL 92 09/17/2018    CO2 24 09/17/2018    BUN 13 09/17/2018    LABALBU 3.5 09/17/2018    CREATININE 0.4 09/17/2018    CALCIUM 9.2 09/17/2018    GFRAA >60 09/17/2018    LABGLOM >60 09/17/2018    GLUCOSE 112 09/17/2018        Assessment:    Patient Active Problem List   Diagnosis    Subarachnoid hemorrhage secondary to hypertension with ruptured aneurysm-s/p coiling of aneurysm and trans catheter infusion of verapamil    Hypertensive emergency    Intracranial vasospasm    Obesity, morbid    Hypokalemia     Tobacco abuse, in remission       Plan:    1) post aneurysm care as per neurosurgery  2) await repeat head CT this AM  3) control BP  4) control headache/back pain  5) replace electrolytes  6) aerosols 4 times daily  7) ok to move out of ICU once ok with neurosurgery  8) needs PT/OT. Will need MACKENZIE/acute rehab on discharge.       Samantha Johnson Moon Arteaga MD  8:00 AM  9/17/2018

## 2018-09-17 NOTE — PROGRESS NOTES
Post Aneurysm Coiling#7  No change in neuro status. No movements right hand &  right arm. Right leg  2/5 improving  Speech fair. C/O back pain  TCD being done. BP elevated  CT scan of brain  Reviewed:  1. Status post endovascular coil embolization.       2. Areas of subarachnoid hemorrhage seen on prior are less apparent.       3. No new intracranial hemorrhage or evidence of intracranial edema. Continued follow-up recommended as clinically indicated. BP elevated. C/O headache    Order History     Open Order Details

## 2018-09-17 NOTE — PROGRESS NOTES
[facility]  Critical Care  Daily Progress Note 9/17/2018    Date of Admission: 09/10/2018    CC: Follow up for aneurysm    HOSPITAL COURSE/OVERNIGHT EVENTS:    09/10  Admitted with HA. Left ICA aneurysm. Arteriogram with coiling of 2 ICA aneurysms. Cleviprex for BP control.    09/11 Developed right sided weakness. Angiography for  transcatheter Verapamil. Extubated in PACU.    09/12  Improving right sided weakness. Head Ct with hydrocephalus. 09/13  No new issues. PMR consulted. 09/14  Hypertensive requiring multiple doses of antihypertensive agents. 09/15  Continues to require multiple doses of antihypertensive agents for BP control. 09/16  Afebrile. Did require multiple doses of hydralazine in the previous 24 hours. No new issues. Central line removed. Scheduled Hygroton added. 09/17  Afebrile. Required multiple doses of antihypertensive agents for BP control. PHYSICAL EXAM:  BP (!) 165/69   Pulse 79   Temp 98.4 °F (36.9 °C) (Oral)   Resp 16   Ht 5' 5\" (1.651 m)   Wt 211 lb 8 oz (95.9 kg)   SpO2 95%   BMI 35.20 kg/m²     Intake/Output Summary (Last 24 hours) at 09/17/18 0738  Last data filed at 09/17/18 0300   Gross per 24 hour   Intake              350 ml   Output             2125 ml   Net            -1775 ml     General appearance:  Ill looking woman in bed. Pain Description: severe headache and back pain. NEUROLOGIC:   RASS Score:  0  GCS:    4 - Opens eyes on own   6 - Follows simple motor commands  5 - Alert and oriented       Pupil size:  Left 4 mm  Right 4 mm  Pupil reaction: Yes   PERRLA  Wiggles fingers: Left Yes Right No  Hand grasp:   Left normal     Right decreased  Wiggles toes: Left Yes    Right No  Plantar flexion: Left normal    Right none    CONSTITUTIONAL: No acute distress, lying in hospital bed. CARDIOVASCULAR: S1 S2, regular rate, regular rhythm, no murmur/gallop/rub. Monitor: NSR. PULMONARY: Bilaterally clear.   No rhonchi/rales/wheezes, no use of accessory muscles. O2 at 2 L nc. RENAL  Sams to gravity, clear yellow urine. Fluid balance for previous 24 hours:  - 1775 ml. ABDOMEN: Soft, nontender, nondistended, nontympanic, normal bowel sounds. Tolerates diet. No nausea or vomiting. Last BM since prior to admission. MUSCULOSKELETAL:  Complains of back pain. RUE:  Unable to lift off the bed. No hand grasp. RLE: Unable to lift leg off bed. LUE:  Strong hand grasp  LLE:  Able to lift leg off bed. SKIN/EXTREMITIES: No rashes/ecchymosis, no edema/clubbing, warm/dry, good capillary refill. Peripheral IVs.         Recent Labs      09/15/18   0525  09/16/18   0430  09/17/18   0445   WBC  10.8  11.1  11.9*   HGB  11.7  11.3*  13.1   HCT  35.3  33.7*  38.5   MCV  94.6  94.4  93.7   PLT  223  253  314       Recent Labs      09/15/18   0525  09/16/18   0430  09/17/18   0445   NA  138  137  135   K  3.6  3.6  2.8*   CO2  23  24  24   PHOS  3.6  3.8  3.2   BUN  14  18  13   CREATININE  0.5  0.5  0.4*     ASSESSMENT/PLAN:     Principal Problem:    Subarachnoid bleed (HCC)  Active Problems:    Hypertensive emergency    Obesity (BMI 30-39. 9)    Cerebral aneurysm  Resolved Problems:    * No resolved hospital problems. *    Neuro:  Headache. SAH. Left ICA aneurysm. S/P coiling. S/P transcatheter administration of verapamil - Monitor neuro status. Neurosurgery following. Neurology signed off. Stroke education. Stroke protocol. Nimodipine for cerebral artery spasms. CV: Hypertensive - BP goal systolic less than 217 mm Hg. PRN hydralzine & labetalol. Lopressor. Norvasc. Hygroton. Pulm: No acute issues - Monitor RR & SpO2. O2 as needed. Albuterol PRN. Encourage cough, SMI  & deep breathing. GI: No acute issues. Obesity. BMI 36 - Monitor bowel function. Diet General with supplements. Zofran. Bowel regime. Renal:  No acute issues.   Hypokalemia - Monitor BUN & Cr. Monitor electrolytes & replace as needed. Monitor I & O. Sams catheter. Potassium supplementation. ID: No acute issues - Monitor for sepsis. Endocrine: No acute issues - Monitor BS.   MSK: No acute issues. Deconditioned  - ROM. Turn & reposition. PT & OT AM PAC 10/24. Monitor for skin breakdown. Heme: No acute issues  - Monitor CBC. Bowel regime: Colace. MOM. Ducolax suppository. Pain control/Sedation:  Norco.   Tylenol. Fioricet. Given 1 dose of Fentanyl 25 micrograms for severe headache & back pain. DVT prophylaxis: SCDs. Lovenox. GI prophylaxis:   Protonix. Diet. Sams: Keep in place for critical care monitoring of fluid balance. Ancillary consults:   Neurosurgery. Neurology (signed off). PMR. Medicine. Patient/Family update:  Questions answered. Support given. Code status:  Full  Disposition:  NSICU.       Electronically signed by Brien Brunson RN MSN APRN-NP [facility] NP  JANET CCRN 9/17/2018 9:54 AM

## 2018-09-17 NOTE — PROGRESS NOTES
Marie Garcia, yuriy. Updated to current status & daily goals & activities. POC also explained & PMR precert has begun. 1:1 given, voices understanding to all.

## 2018-09-17 NOTE — PROGRESS NOTES
Pt is alert and oriented x 3  CAM-ICU: negative  RASS: -1  Sensation: WNL  Edema: WNL    Pt performed therapeutic exercise of the following: A/AROM at R ankle and knee, PROM R hip    Patient education  Pt was educated on safety    Patient response to education:   Pt verbalized understanding Pt demonstrated skill Pt requires further education in this area   x  x     Additional Comments: RN reported pt was stable for session. Pt supine in bed upon arrival, agreeable to treatment session with OT collaboration. Pt reported increased headache and back pain upon sitting EOB. Pt required encouragement to remain seated at EOB. Pt exhibited improved mobility throughout RLE compared to previous session. Pt stood and reported mild dizziness that required increased time to improve. Pt initially had difficulty following commands for sequencing and weight shifting. Due to safety concerns, pt was returned to EOB. After a brief rest break, pt agreeable to attempt ambulation again. Pt demonstrated impaired processing and differentiating between left and right. Pt required assistance for RLE placement as pt favors a narrow DAPHNIE. Pt completed short ambulation to chair. Therapeutic exercises completed as noted above. Pt left in chair with all needs met and call light in reach. Time in: 0839  Time out: 0917    Pt is making progress toward established Physical Therapy goals. Continue with physical therapy current plan of care.     Shaun Pena, PT, DPT  [id number]

## 2018-09-17 NOTE — PLAN OF CARE
Problem: HEMODYNAMIC STATUS  Goal: Patient has stable vital signs and fluid balance  Outcome: Ongoing      Problem: Risk for Impaired Skin Integrity  Goal: Tissue integrity - skin and mucous membranes  Structural intactness and normal physiological function of skin and  mucous membranes.    Outcome: Met This Shift      Problem: Neurological  Goal: Maximum potential motor/sensory/cognitive function  Outcome: Ongoing

## 2018-09-17 NOTE — CARE COORDINATION
9/17/2018 social work transition of care/discharge planning  Patient has PM&R consult in [place], patient was choices for ARU at [facility] [employer]. Aru is following and will start pre cert when patient vitals are stable. Sw will follow and assist prn.   Electronically signed by SUZANNE Martinez on 9/17/2018 at 11:24 AM

## 2018-09-17 NOTE — PROGRESS NOTES
OCCUPATIONAL THERAPY INITIAL EVALUATION      Date:2018  Patient Name: Dave Swain  MRN: [id number]  : 1960  Room: -A     Evaluating OT: Kimberlee Wilkins, OTR/L [id number]    Recommended Adaptive Equipment: TBA: AD, ADL AE, tub transfer bench and rails, 3in1 commode     AM-PAC Inpatient Daily Activity Raw Score:   G code on evaluation: CL    Modified [place] Scale (MRS)  Score     Description  0             No symptoms  1             No significant disability despite symptoms  2             Slight disability; able to look after own affairs  3             Moderate disability; able to ambulate without assist/ requires assist with ADLs  4             Moderate/Severe disability; requires assist to ambulate/assist with ADLs  5             Severe disability;bedridden/incontinent   6               Score:   4      Diagnosis: SAH; R weakness  Surgery:   cerebral angiography, endovascular coil embolization of 2 distal L ICA aneurysms      Past Medical History:   Diagnosis Date    Degenerative arthritis of hand     Hiatal hernia        Precautions: Falls, R hemiparesis, 2L O2, SBP <140     Home Living: Pt lives with son(home to assist) & grandchild (school) in a 2 floor plan with 4 steps to enter and 1 rail(s); bed/bath on first floor. Flight/rail to basement for laundry. Bathroom setup: full bath on first floor: tub/shower; standard commode seat  Equipment owned: no DME     Prior Level of Function: IND with ADLs;  IND with IADLs. No device for ambulation. Driving: yes   Occupation: pt works in home care    Pain Level: pt c/o headache /10 this session; pt also c/o back pain-does not rate    Cognition: oriented x 3  Pt follows gross gross 1 step commands appropriately. Requires cues during motor processing of transfers. Pt limited also by perceptual deficits: min R/L discrimination confusion, min cues for R body awareness.  Pt demo min recall of koby dressing techniques; decreased recall of

## 2018-09-18 ENCOUNTER — APPOINTMENT (OUTPATIENT)
Dept: CT IMAGING | Age: 58
DRG: 021 | End: 2018-09-18
Payer: COMMERCIAL

## 2018-09-18 LAB
ALBUMIN SERPL-MCNC: 3.7 G/DL (ref 3.5–5.2)
ALP BLD-CCNC: 83 U/L (ref 35–104)
ALT SERPL-CCNC: 27 U/L (ref 0–32)
ANION GAP SERPL CALCULATED.3IONS-SCNC: 17 MMOL/L (ref 7–16)
AST SERPL-CCNC: 22 U/L (ref 0–31)
BILIRUB SERPL-MCNC: 0.5 MG/DL (ref 0–1.2)
BUN BLDV-MCNC: 20 MG/DL (ref 6–20)
CALCIUM SERPL-MCNC: 9.3 MG/DL (ref 8.6–10.2)
CHLORIDE BLD-SCNC: 90 MMOL/L (ref 98–107)
CO2: 24 MMOL/L (ref 22–29)
CREAT SERPL-MCNC: 0.5 MG/DL (ref 0.5–1)
GFR AFRICAN AMERICAN: >60
GFR NON-AFRICAN AMERICAN: >60 ML/MIN/1.73
GLUCOSE BLD-MCNC: 131 MG/DL (ref 74–109)
HCT VFR BLD CALC: 37.3 % (ref 34–48)
HEMOGLOBIN: 13.2 G/DL (ref 11.5–15.5)
MAGNESIUM: 1.9 MG/DL (ref 1.6–2.6)
MCH RBC QN AUTO: 32.5 PG (ref 26–35)
MCHC RBC AUTO-ENTMCNC: 35.4 % (ref 32–34.5)
MCV RBC AUTO: 91.9 FL (ref 80–99.9)
PDW BLD-RTO: 13 FL (ref 11.5–15)
PHOSPHORUS: 3.6 MG/DL (ref 2.5–4.5)
PLATELET # BLD: 323 E9/L (ref 130–450)
PMV BLD AUTO: 9.5 FL (ref 7–12)
POTASSIUM SERPL-SCNC: 4.1 MMOL/L (ref 3.5–5)
RBC # BLD: 4.06 E12/L (ref 3.5–5.5)
SODIUM BLD-SCNC: 131 MMOL/L (ref 132–146)
TOTAL PROTEIN: 6.9 G/DL (ref 6.4–8.3)
WBC # BLD: 12.7 E9/L (ref 4.5–11.5)

## 2018-09-18 PROCEDURE — 6360000002 HC RX W HCPCS: Performed by: NURSE PRACTITIONER

## 2018-09-18 PROCEDURE — 6370000000 HC RX 637 (ALT 250 FOR IP): Performed by: INTERNAL MEDICINE

## 2018-09-18 PROCEDURE — 97110 THERAPEUTIC EXERCISES: CPT

## 2018-09-18 PROCEDURE — 84100 ASSAY OF PHOSPHORUS: CPT

## 2018-09-18 PROCEDURE — 80053 COMPREHEN METABOLIC PANEL: CPT

## 2018-09-18 PROCEDURE — 6370000000 HC RX 637 (ALT 250 FOR IP): Performed by: NURSE PRACTITIONER

## 2018-09-18 PROCEDURE — 6370000000 HC RX 637 (ALT 250 FOR IP): Performed by: SURGERY

## 2018-09-18 PROCEDURE — 97535 SELF CARE MNGMENT TRAINING: CPT

## 2018-09-18 PROCEDURE — 85027 COMPLETE CBC AUTOMATED: CPT

## 2018-09-18 PROCEDURE — 99233 SBSQ HOSP IP/OBS HIGH 50: CPT | Performed by: SURGERY

## 2018-09-18 PROCEDURE — 97530 THERAPEUTIC ACTIVITIES: CPT

## 2018-09-18 PROCEDURE — 2700000000 HC OXYGEN THERAPY PER DAY

## 2018-09-18 PROCEDURE — 94640 AIRWAY INHALATION TREATMENT: CPT

## 2018-09-18 PROCEDURE — 36415 COLL VENOUS BLD VENIPUNCTURE: CPT

## 2018-09-18 PROCEDURE — 2000000000 HC ICU R&B

## 2018-09-18 PROCEDURE — 70450 CT HEAD/BRAIN W/O DYE: CPT

## 2018-09-18 PROCEDURE — 83735 ASSAY OF MAGNESIUM: CPT

## 2018-09-18 PROCEDURE — 2580000003 HC RX 258: Performed by: NURSE PRACTITIONER

## 2018-09-18 PROCEDURE — 2500000003 HC RX 250 WO HCPCS: Performed by: NURSE PRACTITIONER

## 2018-09-18 RX ORDER — SODIUM CHLORIDE 1000 MG
1 TABLET, SOLUBLE MISCELLANEOUS
Status: DISCONTINUED | OUTPATIENT
Start: 2018-09-18 | End: 2018-09-19

## 2018-09-18 RX ORDER — OXYCODONE HYDROCHLORIDE 15 MG/1
7.5 TABLET ORAL EVERY 4 HOURS PRN
Status: DISCONTINUED | OUTPATIENT
Start: 2018-09-18 | End: 2018-09-18

## 2018-09-18 RX ORDER — OXYCODONE HYDROCHLORIDE 5 MG/1
5 TABLET ORAL EVERY 4 HOURS PRN
Status: DISCONTINUED | OUTPATIENT
Start: 2018-09-18 | End: 2018-09-18

## 2018-09-18 RX ORDER — KETOROLAC TROMETHAMINE 30 MG/ML
15 INJECTION, SOLUTION INTRAMUSCULAR; INTRAVENOUS EVERY 6 HOURS PRN
Status: DISCONTINUED | OUTPATIENT
Start: 2018-09-18 | End: 2018-09-20 | Stop reason: HOSPADM

## 2018-09-18 RX ADMIN — NIMODIPINE 60 MG: 30 CAPSULE ORAL at 04:26

## 2018-09-18 RX ADMIN — SODIUM CHLORIDE TAB 1 GM 1 G: 1 TAB at 12:41

## 2018-09-18 RX ADMIN — NIMODIPINE 60 MG: 30 CAPSULE ORAL at 08:26

## 2018-09-18 RX ADMIN — ACETAMINOPHEN 650 MG: 325 TABLET, FILM COATED ORAL at 04:27

## 2018-09-18 RX ADMIN — NIMODIPINE 60 MG: 30 CAPSULE ORAL at 20:08

## 2018-09-18 RX ADMIN — CHLORTHALIDONE 25 MG: 25 TABLET ORAL at 08:30

## 2018-09-18 RX ADMIN — Medication 10 ML: at 08:33

## 2018-09-18 RX ADMIN — AMLODIPINE BESYLATE 10 MG: 10 TABLET ORAL at 08:26

## 2018-09-18 RX ADMIN — NIMODIPINE 60 MG: 30 CAPSULE ORAL at 16:01

## 2018-09-18 RX ADMIN — IPRATROPIUM BROMIDE AND ALBUTEROL SULFATE 1 AMPULE: .5; 3 SOLUTION RESPIRATORY (INHALATION) at 21:23

## 2018-09-18 RX ADMIN — LABETALOL HYDROCHLORIDE 10 MG: 5 INJECTION, SOLUTION INTRAVENOUS at 05:13

## 2018-09-18 RX ADMIN — METOPROLOL TARTRATE 25 MG: 25 TABLET ORAL at 08:26

## 2018-09-18 RX ADMIN — ENOXAPARIN SODIUM 40 MG: 40 INJECTION SUBCUTANEOUS at 08:26

## 2018-09-18 RX ADMIN — IPRATROPIUM BROMIDE AND ALBUTEROL SULFATE 1 AMPULE: .5; 3 SOLUTION RESPIRATORY (INHALATION) at 13:21

## 2018-09-18 RX ADMIN — ACETAMINOPHEN 650 MG: 325 TABLET, FILM COATED ORAL at 16:01

## 2018-09-18 RX ADMIN — NIMODIPINE 60 MG: 30 CAPSULE ORAL at 00:26

## 2018-09-18 RX ADMIN — NIMODIPINE 60 MG: 30 CAPSULE ORAL at 12:39

## 2018-09-18 RX ADMIN — METOPROLOL TARTRATE 25 MG: 25 TABLET ORAL at 20:08

## 2018-09-18 RX ADMIN — LOSARTAN POTASSIUM 50 MG: 50 TABLET, FILM COATED ORAL at 08:35

## 2018-09-18 RX ADMIN — PANTOPRAZOLE SODIUM 40 MG: 40 TABLET, DELAYED RELEASE ORAL at 07:24

## 2018-09-18 RX ADMIN — LABETALOL HYDROCHLORIDE 10 MG: 5 INJECTION, SOLUTION INTRAVENOUS at 03:13

## 2018-09-18 RX ADMIN — HYDRALAZINE HYDROCHLORIDE 10 MG: 20 INJECTION INTRAMUSCULAR; INTRAVENOUS at 01:07

## 2018-09-18 RX ADMIN — SODIUM CHLORIDE TAB 1 GM 1 G: 1 TAB at 17:18

## 2018-09-18 RX ADMIN — OXYCODONE AND ACETAMINOPHEN 1 TABLET: 5; 325 TABLET ORAL at 04:53

## 2018-09-18 RX ADMIN — OXYCODONE HYDROCHLORIDE 7.5 MG: 15 TABLET ORAL at 10:32

## 2018-09-18 RX ADMIN — Medication 10 ML: at 20:08

## 2018-09-18 ASSESSMENT — PAIN DESCRIPTION - ORIENTATION
ORIENTATION: MID

## 2018-09-18 ASSESSMENT — PAIN DESCRIPTION - PROGRESSION
CLINICAL_PROGRESSION: NOT CHANGED

## 2018-09-18 ASSESSMENT — PAIN DESCRIPTION - DESCRIPTORS
DESCRIPTORS: ACHING

## 2018-09-18 ASSESSMENT — PAIN SCALES - GENERAL
PAINLEVEL_OUTOF10: 7
PAINLEVEL_OUTOF10: 10
PAINLEVEL_OUTOF10: 0
PAINLEVEL_OUTOF10: 0
PAINLEVEL_OUTOF10: 7
PAINLEVEL_OUTOF10: 0
PAINLEVEL_OUTOF10: 5

## 2018-09-18 ASSESSMENT — PAIN DESCRIPTION - FREQUENCY
FREQUENCY: INTERMITTENT

## 2018-09-18 ASSESSMENT — PAIN DESCRIPTION - LOCATION
LOCATION: BACK

## 2018-09-18 ASSESSMENT — PAIN DESCRIPTION - PAIN TYPE
TYPE: CHRONIC PAIN

## 2018-09-18 ASSESSMENT — PAIN DESCRIPTION - ONSET
ONSET: PROGRESSIVE

## 2018-09-18 NOTE — PLAN OF CARE
Problem: Falls - Risk of:  Goal: Will remain free from falls  Will remain free from falls   Outcome: Met This Shift    Goal: Absence of physical injury  Absence of physical injury   Outcome: Met This Shift      Problem: HEMODYNAMIC STATUS  Goal: Patient has stable vital signs and fluid balance  Outcome: Ongoing      Problem: ACTIVITY INTOLERANCE/IMPAIRED MOBILITY  Goal: Mobility/activity is maintained at optimum level for patient  Outcome: Not Met This Shift      Problem: Risk for Impaired Skin Integrity  Goal: Tissue integrity - skin and mucous membranes  Structural intactness and normal physiological function of skin and  mucous membranes.    Outcome: Met This Shift      Problem: Pain:  Goal: Control of acute pain  Control of acute pain   Outcome: Met This Shift

## 2018-09-18 NOTE — PROGRESS NOTES
Subjective: The patient is awake and alert. Headache and back ache persists, but no worse than yesterday. No new deficits. Right side remains weak. No angina, dyspnea or nausea. Objective:    BP (!) 167/82 Comment: med given  Pulse 87   Temp 99.1 °F (37.3 °C)   Resp 19   Ht 5' 5\" (1.651 m)   Wt 211 lb 8 oz (95.9 kg)   SpO2 90%   BMI 35.20 kg/m²     Current medications that patient is taking have been reviewed. Heart:  RRR, no murmurs, gallops, or rubs.   Lungs:  Clear bilaterally, no wheeze/rhonchi today  Abd: bowel sounds present, soft, nontender, nondistended, no masses  Extrem:  No clubbing, cyanosis, or edema  Neuro:  Right sided hemiparesis persists with 0/5 movement right upper and 1-2/5 right lower extremities, no new deficits    CBC:   Lab Results   Component Value Date    WBC 12.7 09/18/2018    RBC 4.06 09/18/2018    HGB 13.2 09/18/2018    HCT 37.3 09/18/2018    MCV 91.9 09/18/2018    MCH 32.5 09/18/2018    MCHC 35.4 09/18/2018    RDW 13.0 09/18/2018     09/18/2018    MPV 9.5 09/18/2018     BMP:    Lab Results   Component Value Date     09/18/2018    K 4.1 09/18/2018    CL 90 09/18/2018    CO2 24 09/18/2018    BUN 20 09/18/2018    LABALBU 3.7 09/18/2018    CREATININE 0.5 09/18/2018    CALCIUM 9.3 09/18/2018    GFRAA >60 09/18/2018    LABGLOM >60 09/18/2018    GLUCOSE 131 09/18/2018        Imaging studies reviewed      Assessment:    Patient Active Problem List   Diagnosis    Subarachnoid hemorrhage secondary to hypertension with ruptured aneurysm-s/p coiling of aneurysm and trans catheter infusion of verapamil    Hypertensive emergency-BP improved    Intracranial vasospasm-resolving    Obesity, morbid    Tobacco abuse, in remission       Plan:    1) post aneurysm care as per neurosurgery  2) titrate medications for headache/back ache  3) follow labs  4) transfer out of ICU once ok with neurosurgery  5) acute rehab placement pending      Fela Ferrari MD  8:31

## 2018-09-18 NOTE — PROGRESS NOTES
[facility]  Critical Care  Daily Progress Note 9/18/2018    Date of Admission: 09/10/2018    CC: Follow up for aneurysm    HOSPITAL COURSE/OVERNIGHT EVENTS:    09/10  Admitted with HA. Left ICA aneurysm. Arteriogram with coiling of 2 ICA aneurysms. Cleviprex for BP control.    09/11 Developed right sided weakness. Angiography for  transcatheter Verapamil. Extubated in PACU.    09/12  Improving right sided weakness. Head Ct with hydrocephalus. 09/13  No new issues. PMR consulted. 09/14  Hypertensive requiring multiple doses of antihypertensive agents. 09/15  Continues to require multiple doses of antihypertensive agents for BP control. 09/16  Afebrile. Did require multiple doses of hydralazine in the previous 24 hours. No new issues. Central line removed. Scheduled Hygroton added. 09/17  Afebrile. Required multiple doses of antihypertensive agents for BP control. 09/18  Afebrile. Continues to complain headache & back pain. Required multiple doses of antihypertensive agents. PHYSICAL EXAM:  BP (!) 171/79   Pulse 68   Temp 98.7 °F (37.1 °C) (Temporal)   Resp 20   Ht 5' 5\" (1.651 m)   Wt 211 lb 8 oz (95.9 kg)   SpO2 93%   BMI 35.20 kg/m²     Intake/Output Summary (Last 24 hours) at 09/18/18 1057  Last data filed at 09/18/18 0800   Gross per 24 hour   Intake              420 ml   Output             2250 ml   Net            -1830 ml     General appearance:  Ill looking woman in bed. Pain Description: severe headache and back pain. NEUROLOGIC:   RASS Score:  0  GCS:    4 - Opens eyes on own   6 - Follows simple motor commands  5 - Alert and oriented     Complains of headache.   Pupil size:  Left 4 mm  Right 4 mm  Pupil reaction: Yes   PERRLA  Wiggles fingers: Left Yes Right No  Hand grasp:   Left normal     Right decreased  Wiggles toes: Left Yes    Right No  Plantar flexion: Left normal    Right none    CONSTITUTIONAL: No acute distress, lying in issues. Obesity. BMI 36 - Monitor bowel function. Diet General with supplements. Zofran. Bowel regime. Renal:  No acute issues. Hypokalemia - Monitor BUN & Cr. Monitor electrolytes & replace as needed. Monitor I & O. Sams catheter. Potassium supplementation. ID: No acute issues - Monitor for sepsis. Endocrine: No acute issues - Monitor BS.   MSK: No acute issues. Deconditioned  - ROM. Turn & reposition. PT & OT AM PAC 10/24. Monitor for skin breakdown. Heme: No acute issues  - Monitor CBC. Bowel regime: Colace. MOM. Ducolax suppository. Pain control/Sedation:  Norco.   Tylenol. Fioricet. Given 1 dose of Fentanyl 25 micrograms for severe headache & back pain. DVT prophylaxis: SCDs. Lovenox. GI prophylaxis:   Protonix. Diet. Sams: Keep in place for critical care monitoring of fluid balance. Ancillary consults:   Neurosurgery. Neurology (signed off). PMR. Medicine. Patient/Family update:  Questions answered. Support given. Code status:  Full  Disposition:  NSICU. Transfer.       Electronically signed by Majo Garner RN MSN APRN-NP [facility] NP  CCNS CCRN 9/18/2018 10:57 AM

## 2018-09-18 NOTE — PLAN OF CARE
Problem: Falls - Risk of:  Goal: Will remain free from falls  Will remain free from falls   Outcome: Met This Shift    Goal: Absence of physical injury  Absence of physical injury   Outcome: Met This Shift      Problem: HEMODYNAMIC STATUS  Goal: Patient has stable vital signs and fluid balance  Outcome: Ongoing      Problem: ACTIVITY INTOLERANCE/IMPAIRED MOBILITY  Goal: Mobility/activity is maintained at optimum level for patient  Outcome: Not Met This Shift      Problem: Risk for Impaired Skin Integrity  Goal: Tissue integrity - skin and mucous membranes  Structural intactness and normal physiological function of skin and  mucous membranes.    Outcome: Met This Shift      Problem: Pain:  Goal: Pain level will decrease  Pain level will decrease   Outcome: Ongoing    Goal: Control of acute pain  Control of acute pain   Outcome: Ongoing    Goal: Control of chronic pain  Control of chronic pain   Outcome: Ongoing      Problem: Neurological  Goal: Maximum potential motor/sensory/cognitive function  Outcome: Met This Shift      Problem: Nutrition  Goal: Optimal nutrition therapy  Outcome: Met This Shift

## 2018-09-18 NOTE — PROGRESS NOTES
1200: upon assessment, patient was confused and disoriented while sitting in chair; experienced expressive aphasia but still able to follow commands. Jolynn Aquino NP notified of change in mental status. Patient had just received increased pain medication per order. STAT head CT ordered. Dr. Ragini Lange aware. Patient taken to CT on monitor.

## 2018-09-18 NOTE — CARE COORDINATION
9/18/2018 social work transition of care/discharge planning  Patient plan remains for Acute rehab here. Per rounds,pt BP needs to be stable before going to ARU.  Sw will follow and assist prn.'  Electronically signed by SUZANNE Johns on 9/18/2018 at 9:58 AM

## 2018-09-19 LAB
ALBUMIN SERPL-MCNC: 3.7 G/DL (ref 3.5–5.2)
ALP BLD-CCNC: 82 U/L (ref 35–104)
ALT SERPL-CCNC: 39 U/L (ref 0–32)
ANION GAP SERPL CALCULATED.3IONS-SCNC: 19 MMOL/L (ref 7–16)
AST SERPL-CCNC: 24 U/L (ref 0–31)
BILIRUB SERPL-MCNC: 0.5 MG/DL (ref 0–1.2)
BUN BLDV-MCNC: 24 MG/DL (ref 6–20)
CALCIUM SERPL-MCNC: 9.3 MG/DL (ref 8.6–10.2)
CHLORIDE BLD-SCNC: 90 MMOL/L (ref 98–107)
CO2: 25 MMOL/L (ref 22–29)
CREAT SERPL-MCNC: 0.6 MG/DL (ref 0.5–1)
GFR AFRICAN AMERICAN: >60
GFR NON-AFRICAN AMERICAN: >60 ML/MIN/1.73
GLUCOSE BLD-MCNC: 111 MG/DL (ref 74–109)
HCT VFR BLD CALC: 39.2 % (ref 34–48)
HEMOGLOBIN: 13.5 G/DL (ref 11.5–15.5)
MAGNESIUM: 2 MG/DL (ref 1.6–2.6)
MCH RBC QN AUTO: 31.7 PG (ref 26–35)
MCHC RBC AUTO-ENTMCNC: 34.4 % (ref 32–34.5)
MCV RBC AUTO: 92 FL (ref 80–99.9)
PDW BLD-RTO: 13 FL (ref 11.5–15)
PHOSPHORUS: 4.3 MG/DL (ref 2.5–4.5)
PLATELET # BLD: 329 E9/L (ref 130–450)
PMV BLD AUTO: 9.4 FL (ref 7–12)
POTASSIUM SERPL-SCNC: 3.1 MMOL/L (ref 3.5–5)
RBC # BLD: 4.26 E12/L (ref 3.5–5.5)
SODIUM BLD-SCNC: 134 MMOL/L (ref 132–146)
TOTAL PROTEIN: 7 G/DL (ref 6.4–8.3)
WBC # BLD: 13.1 E9/L (ref 4.5–11.5)

## 2018-09-19 PROCEDURE — 6370000000 HC RX 637 (ALT 250 FOR IP): Performed by: INTERNAL MEDICINE

## 2018-09-19 PROCEDURE — 6370000000 HC RX 637 (ALT 250 FOR IP): Performed by: NURSE PRACTITIONER

## 2018-09-19 PROCEDURE — 97535 SELF CARE MNGMENT TRAINING: CPT

## 2018-09-19 PROCEDURE — 36415 COLL VENOUS BLD VENIPUNCTURE: CPT

## 2018-09-19 PROCEDURE — 6360000002 HC RX W HCPCS: Performed by: NURSE PRACTITIONER

## 2018-09-19 PROCEDURE — 97110 THERAPEUTIC EXERCISES: CPT

## 2018-09-19 PROCEDURE — 1200000000 HC SEMI PRIVATE

## 2018-09-19 PROCEDURE — 97530 THERAPEUTIC ACTIVITIES: CPT

## 2018-09-19 PROCEDURE — APPSS15 APP SPLIT SHARED TIME 0-15 MINUTES: Performed by: NURSE PRACTITIONER

## 2018-09-19 PROCEDURE — 83735 ASSAY OF MAGNESIUM: CPT

## 2018-09-19 PROCEDURE — 2700000000 HC OXYGEN THERAPY PER DAY

## 2018-09-19 PROCEDURE — 94640 AIRWAY INHALATION TREATMENT: CPT

## 2018-09-19 PROCEDURE — 2580000003 HC RX 258: Performed by: NURSE PRACTITIONER

## 2018-09-19 PROCEDURE — 6360000002 HC RX W HCPCS: Performed by: NEUROLOGICAL SURGERY

## 2018-09-19 PROCEDURE — 80053 COMPREHEN METABOLIC PANEL: CPT

## 2018-09-19 PROCEDURE — 2500000003 HC RX 250 WO HCPCS: Performed by: NURSE PRACTITIONER

## 2018-09-19 PROCEDURE — 84100 ASSAY OF PHOSPHORUS: CPT

## 2018-09-19 PROCEDURE — 85027 COMPLETE CBC AUTOMATED: CPT

## 2018-09-19 RX ORDER — METOPROLOL TARTRATE 50 MG/1
50 TABLET, FILM COATED ORAL 2 TIMES DAILY
Status: CANCELLED | OUTPATIENT
Start: 2018-09-19

## 2018-09-19 RX ORDER — DOCUSATE SODIUM 100 MG/1
100 CAPSULE, LIQUID FILLED ORAL 2 TIMES DAILY
Status: CANCELLED | OUTPATIENT
Start: 2018-09-19

## 2018-09-19 RX ORDER — PANTOPRAZOLE SODIUM 40 MG/1
40 TABLET, DELAYED RELEASE ORAL
Status: CANCELLED | OUTPATIENT
Start: 2018-09-20

## 2018-09-19 RX ORDER — SODIUM CHLORIDE 0.9 % (FLUSH) 0.9 %
10 SYRINGE (ML) INJECTION EVERY 12 HOURS SCHEDULED
Status: CANCELLED | OUTPATIENT
Start: 2018-09-19

## 2018-09-19 RX ORDER — SENNA PLUS 8.6 MG/1
1 TABLET ORAL NIGHTLY
Status: CANCELLED | OUTPATIENT
Start: 2018-09-19

## 2018-09-19 RX ORDER — CHLORTHALIDONE 25 MG/1
25 TABLET ORAL DAILY
Status: CANCELLED | OUTPATIENT
Start: 2018-09-19

## 2018-09-19 RX ORDER — SENNA PLUS 8.6 MG/1
1 TABLET ORAL NIGHTLY
Status: DISCONTINUED | OUTPATIENT
Start: 2018-09-19 | End: 2018-09-20 | Stop reason: HOSPADM

## 2018-09-19 RX ORDER — POLYETHYLENE GLYCOL 3350 17 G/17G
17 POWDER, FOR SOLUTION ORAL DAILY
Status: CANCELLED | OUTPATIENT
Start: 2018-09-19

## 2018-09-19 RX ORDER — GUAIFENESIN/DEXTROMETHORPHAN 100-10MG/5
5 SYRUP ORAL EVERY 4 HOURS PRN
Status: CANCELLED | OUTPATIENT
Start: 2018-09-19

## 2018-09-19 RX ORDER — POTASSIUM CHLORIDE 7.45 MG/ML
10 INJECTION INTRAVENOUS
Status: DISCONTINUED | OUTPATIENT
Start: 2018-09-19 | End: 2018-09-19

## 2018-09-19 RX ORDER — HYDRALAZINE HYDROCHLORIDE 20 MG/ML
10 INJECTION INTRAMUSCULAR; INTRAVENOUS EVERY 6 HOURS PRN
Status: DISCONTINUED | OUTPATIENT
Start: 2018-09-19 | End: 2018-09-20 | Stop reason: HOSPADM

## 2018-09-19 RX ORDER — SODIUM CHLORIDE 0.9 % (FLUSH) 0.9 %
10 SYRINGE (ML) INJECTION PRN
Status: CANCELLED | OUTPATIENT
Start: 2018-09-19

## 2018-09-19 RX ORDER — LOSARTAN POTASSIUM 50 MG/1
100 TABLET ORAL DAILY
Status: DISCONTINUED | OUTPATIENT
Start: 2018-09-19 | End: 2018-09-20 | Stop reason: HOSPADM

## 2018-09-19 RX ORDER — POTASSIUM CHLORIDE 20 MEQ/1
40 TABLET, EXTENDED RELEASE ORAL 2 TIMES DAILY WITH MEALS
Status: DISCONTINUED | OUTPATIENT
Start: 2018-09-19 | End: 2018-09-20 | Stop reason: HOSPADM

## 2018-09-19 RX ORDER — DOCUSATE SODIUM 100 MG/1
100 CAPSULE, LIQUID FILLED ORAL 2 TIMES DAILY
Status: DISCONTINUED | OUTPATIENT
Start: 2018-09-19 | End: 2018-09-20 | Stop reason: HOSPADM

## 2018-09-19 RX ORDER — LOSARTAN POTASSIUM 50 MG/1
100 TABLET ORAL DAILY
Status: CANCELLED | OUTPATIENT
Start: 2018-09-19

## 2018-09-19 RX ORDER — AMLODIPINE BESYLATE 10 MG/1
10 TABLET ORAL DAILY
Status: CANCELLED | OUTPATIENT
Start: 2018-09-19

## 2018-09-19 RX ORDER — IPRATROPIUM BROMIDE AND ALBUTEROL SULFATE 2.5; .5 MG/3ML; MG/3ML
1 SOLUTION RESPIRATORY (INHALATION) 4 TIMES DAILY
Status: CANCELLED | OUTPATIENT
Start: 2018-09-19

## 2018-09-19 RX ORDER — POLYETHYLENE GLYCOL 3350 17 G/17G
17 POWDER, FOR SOLUTION ORAL DAILY
Status: DISCONTINUED | OUTPATIENT
Start: 2018-09-19 | End: 2018-09-20 | Stop reason: HOSPADM

## 2018-09-19 RX ORDER — POTASSIUM CHLORIDE 20 MEQ/1
40 TABLET, EXTENDED RELEASE ORAL ONCE
Status: COMPLETED | OUTPATIENT
Start: 2018-09-19 | End: 2018-09-19

## 2018-09-19 RX ORDER — NIMODIPINE 30 MG/1
60 CAPSULE, LIQUID FILLED ORAL
Status: CANCELLED | OUTPATIENT
Start: 2018-09-19

## 2018-09-19 RX ORDER — BUTALBITAL, ACETAMINOPHEN AND CAFFEINE 50; 325; 40 MG/1; MG/1; MG/1
1 TABLET ORAL EVERY 6 HOURS PRN
Status: CANCELLED | OUTPATIENT
Start: 2018-09-19

## 2018-09-19 RX ORDER — BISACODYL 10 MG
10 SUPPOSITORY, RECTAL RECTAL DAILY PRN
Status: CANCELLED | OUTPATIENT
Start: 2018-09-19

## 2018-09-19 RX ORDER — METOPROLOL TARTRATE 50 MG/1
50 TABLET, FILM COATED ORAL 2 TIMES DAILY
Status: DISCONTINUED | OUTPATIENT
Start: 2018-09-19 | End: 2018-09-20 | Stop reason: HOSPADM

## 2018-09-19 RX ADMIN — LOSARTAN POTASSIUM 100 MG: 50 TABLET, FILM COATED ORAL at 09:38

## 2018-09-19 RX ADMIN — ACETAMINOPHEN 650 MG: 325 TABLET, FILM COATED ORAL at 05:06

## 2018-09-19 RX ADMIN — ACETAMINOPHEN 650 MG: 325 TABLET, FILM COATED ORAL at 18:36

## 2018-09-19 RX ADMIN — LABETALOL HYDROCHLORIDE 10 MG: 5 INJECTION, SOLUTION INTRAVENOUS at 04:05

## 2018-09-19 RX ADMIN — KETOROLAC TROMETHAMINE 15 MG: 30 INJECTION, SOLUTION INTRAMUSCULAR at 19:08

## 2018-09-19 RX ADMIN — Medication 10 ML: at 09:39

## 2018-09-19 RX ADMIN — KETOROLAC TROMETHAMINE 15 MG: 30 INJECTION, SOLUTION INTRAMUSCULAR at 02:44

## 2018-09-19 RX ADMIN — SENNOSIDES 8.6 MG: 8.6 TABLET, FILM COATED ORAL at 22:25

## 2018-09-19 RX ADMIN — POTASSIUM CHLORIDE 40 MEQ: 20 TABLET, EXTENDED RELEASE ORAL at 18:37

## 2018-09-19 RX ADMIN — PANTOPRAZOLE SODIUM 40 MG: 40 TABLET, DELAYED RELEASE ORAL at 07:57

## 2018-09-19 RX ADMIN — KETOROLAC TROMETHAMINE 15 MG: 30 INJECTION, SOLUTION INTRAMUSCULAR at 09:00

## 2018-09-19 RX ADMIN — POTASSIUM CHLORIDE 40 MEQ: 20 TABLET, EXTENDED RELEASE ORAL at 14:11

## 2018-09-19 RX ADMIN — DOCUSATE SODIUM 100 MG: 100 CAPSULE, LIQUID FILLED ORAL at 09:43

## 2018-09-19 RX ADMIN — NIMODIPINE 60 MG: 30 CAPSULE ORAL at 08:34

## 2018-09-19 RX ADMIN — NIMODIPINE 30 MG: 30 CAPSULE ORAL at 12:20

## 2018-09-19 RX ADMIN — NIMODIPINE 60 MG: 30 CAPSULE ORAL at 18:37

## 2018-09-19 RX ADMIN — POTASSIUM CHLORIDE 40 MEQ: 20 TABLET, EXTENDED RELEASE ORAL at 07:57

## 2018-09-19 RX ADMIN — METOPROLOL TARTRATE 50 MG: 50 TABLET, FILM COATED ORAL at 22:24

## 2018-09-19 RX ADMIN — CHLORTHALIDONE 25 MG: 25 TABLET ORAL at 14:14

## 2018-09-19 RX ADMIN — LABETALOL HYDROCHLORIDE 10 MG: 5 INJECTION, SOLUTION INTRAVENOUS at 05:15

## 2018-09-19 RX ADMIN — NIMODIPINE 60 MG: 30 CAPSULE ORAL at 04:30

## 2018-09-19 RX ADMIN — IPRATROPIUM BROMIDE AND ALBUTEROL SULFATE 1 AMPULE: .5; 3 SOLUTION RESPIRATORY (INHALATION) at 15:55

## 2018-09-19 RX ADMIN — NIMODIPINE 60 MG: 30 CAPSULE ORAL at 00:29

## 2018-09-19 RX ADMIN — DOCUSATE SODIUM 100 MG: 100 CAPSULE, LIQUID FILLED ORAL at 22:25

## 2018-09-19 RX ADMIN — ENOXAPARIN SODIUM 40 MG: 40 INJECTION SUBCUTANEOUS at 09:43

## 2018-09-19 RX ADMIN — METOPROLOL TARTRATE 50 MG: 50 TABLET, FILM COATED ORAL at 09:38

## 2018-09-19 RX ADMIN — ACETAMINOPHEN 650 MG: 325 TABLET, FILM COATED ORAL at 09:43

## 2018-09-19 RX ADMIN — AMLODIPINE BESYLATE 10 MG: 10 TABLET ORAL at 09:38

## 2018-09-19 ASSESSMENT — PAIN DESCRIPTION - LOCATION
LOCATION: BACK

## 2018-09-19 ASSESSMENT — PAIN DESCRIPTION - PAIN TYPE
TYPE: CHRONIC PAIN

## 2018-09-19 ASSESSMENT — PAIN SCALES - GENERAL
PAINLEVEL_OUTOF10: 6
PAINLEVEL_OUTOF10: 5
PAINLEVEL_OUTOF10: 0
PAINLEVEL_OUTOF10: 5
PAINLEVEL_OUTOF10: 6
PAINLEVEL_OUTOF10: 0

## 2018-09-19 ASSESSMENT — PAIN DESCRIPTION - ONSET: ONSET: ON-GOING

## 2018-09-19 ASSESSMENT — PAIN DESCRIPTION - ORIENTATION
ORIENTATION: MID

## 2018-09-19 ASSESSMENT — PAIN DESCRIPTION - DESCRIPTORS
DESCRIPTORS: ACHING;CONSTANT;DISCOMFORT;SORE
DESCRIPTORS: ACHING;CONSTANT;DISCOMFORT;SORE

## 2018-09-19 NOTE — PROGRESS NOTES
Subjective: The patient is awake and alert. Feels about the same. Per nursing, she had confusion after oxycodone 7.5 mg given yesterday. Back to baseline mental status at this time. No chest pain. No dyspnea. Right sided weakness unchanged. Objective:    BP (!) 158/69   Pulse 88   Temp 98.8 °F (37.1 °C)   Resp 16   Ht 5' 5\" (1.651 m)   Wt 211 lb 8 oz (95.9 kg)   SpO2 93%   BMI 35.20 kg/m²     Current medications that patient is taking have been reviewed. Heart:  RRR, no murmurs, gallops, or rubs.   Lungs:  Clear bilaterally, no wheeze/rhonchi today  Abd: bowel sounds present, soft, nontender, nondistended, no masses  Extrem:  No clubbing, cyanosis, or edema  Neuro:  Right sided hemiparesis unchanged, no new deficits    CBC:   Lab Results   Component Value Date    WBC 13.1 09/19/2018    RBC 4.26 09/19/2018    HGB 13.5 09/19/2018    HCT 39.2 09/19/2018    MCV 92.0 09/19/2018    MCH 31.7 09/19/2018    MCHC 34.4 09/19/2018    RDW 13.0 09/19/2018     09/19/2018    MPV 9.4 09/19/2018     BMP:    Lab Results   Component Value Date     09/19/2018    K 3.1 09/19/2018    CL 90 09/19/2018    CO2 25 09/19/2018    BUN 24 09/19/2018    LABALBU 3.7 09/19/2018    CREATININE 0.6 09/19/2018    CALCIUM 9.3 09/19/2018    GFRAA >60 09/19/2018    LABGLOM >60 09/19/2018    GLUCOSE 111 09/19/2018        Imaging studies reviewed      Assessment:    Patient Active Problem List   Diagnosis    Subarachnoid hemorrhage secondary to hypertension with ruptured aneurysm-s/p coiling of aneurysm and trans catheter infusion of verapamil    Hypertensive emergency-BP improved    Intracranial vasospasm-resolved    Obesity, morbid    Tobacco abuse, in remission       Plan:    1) titrate medications for BP management  2) supplement electrolytes  3) ok to move out of ICU and move to acute rehab if ok with neurosurgery      Portia San MD  7:51 AM  9/19/2018

## 2018-09-19 NOTE — PROGRESS NOTES
Functional Assessment:   Initial Status 9/13 Current Status: 9/19   Feeding  Min A using L UE SBA after set up-using non dominant L UE (perforemed bed level)     Grooming  Max A Min A; set up to wash face and brush teeth while while seated up in chair. Pt requires min assist to follow through with one handed compensatory techniques and min cues to regard R UE/jt protection during tasks   Upper Body Dressing Dep  9/18: Min A UB dressing    Mod A UB bathing    *Pt demo fair recall of koby  dressing techniques; requires cues for R UE awareness and follow through of one handed sponge bathing technique after instruct. Lower Body Dressing Dep 9/19: Pt instructed on LB dressing techniques this am using reacher. Pt required Max A to don pants; Max A to stand and pull pants up over hips. Pt able to use reacher to doff B socks with min cues while seated up in chair. Pt demo G understanding of use of reacher & F- awareness of R LE during tasks   Bathing Dep Mod A; mod cues for technique & to maintain jt protection   Toileting  Dep Dep; assist to roll in bed and place bed pan  Total assist for hygiene   Bed Mobility  Supine to Sit: Max A  Sit to Supine:NT Rolling: Max A    Supine to sit: Max A; min cues for R UE jt protection   Functional Transfers Max A sit<>stand  Max A + Min A 2nd person for SPT to chair Max A sit<>stand with min cues for hand placement-use of koby walker for support   Functional Mobility NT Max A using koby walker with max cues to sequence steps for pivot transfer to chair- cues to increase awareness of narrow DAPHNIE & R LE proprioception; assist to weight shift to advance L LE ; support at R  knee     Sit balance: SBA static, Min A dynamic; decreased sitting tolerance EOB due to complaints of increased back pain  Stand balance:  Max A; koby walker with min cues for posture  Endurance/Activity tolerance: fair with light activity & min encouragement; pt limited by back pain    Vitals:  Heart Rate at rest 94 bpm Heart Rate post session 94 bpm   SpO2 at rest 96% SpO2 post session 91%   Blood Pressure at rest 138/80 mmHg Blood Pressure post session 104/69 mmHg        Comments/Treatment: Upon arrival pt supine in bed; agreeable to therapy session. Patient assisted to EOB to increase functional endurance in preparation of self care activities and functional transfers. Patient required Max A with mod cues for technique to EOB; sat for 5-8 min during LB ADL tasks; stood with Max A/min cues for technique and use of hemiwalker. Pt assisted to bedside chair to perform grooming tasks and ex's. Review of compensatory techniques and R UE jt protection. Pt with F- follow through. Nurse to order sling for use during therapy and functional activities. Pt set up with breakfast tray while seated in chair. Pillow placed under R UE for protection. Call line within reach; medical lines intact.        Time In: 9266  Time Out: 1020   53 min timed treatment    Sina Aguila, OTR/L [id number]

## 2018-09-19 NOTE — PROGRESS NOTES
[facility] Science Intensive Care Unit  Critical Care  Daily Progress Note 9/19/2018    Date of Admission: 09/10/2018    CC: Follow up for aneurysm    HOSPITAL COURSE/OVERNIGHT EVENTS:    09/10  Admitted with HA. Left ICA aneurysm. Arteriogram with coiling of 2 ICA aneurysms. Cleviprex for BP control.    09/11 Developed right sided weakness. Angiography for  transcatheter Verapamil. Extubated in PACU.    09/12  Improving right sided weakness. Head Ct with hydrocephalus. 09/13  No new issues. PMR consulted. 09/14  Hypertensive requiring multiple doses of antihypertensive agents. 09/15  Continues to require multiple doses of antihypertensive agents for BP control. 09/16  Afebrile. Did require multiple doses of hydralazine in the previous 24 hours. No new issues. Central line removed. Scheduled Hygroton added. 09/17  Afebrile. Required multiple doses of antihypertensive agents for BP control. 09/18  Afebrile. Continues to complain headache & back pain. Required multiple doses of antihypertensive agents. Acute confusion yesterday. Brain CT negative.   09/19  Afebrile. Continue to complain headache & back pain. PHYSICAL EXAM:  BP (!) 166/70   Pulse 90   Temp 98.8 °F (37.1 °C)   Resp 13   Ht 5' 5\" (1.651 m)   Wt 211 lb 8 oz (95.9 kg)   SpO2 92%   BMI 35.20 kg/m²     Intake/Output Summary (Last 24 hours) at 09/19/18 1036  Last data filed at 09/19/18 0937   Gross per 24 hour   Intake              680 ml   Output             1450 ml   Net             -770 ml     General appearance: Comfortable this morning. Pain Description: severe headache and back pain. NEUROLOGIC:   RASS Score:  0  GCS:  15  4 - Opens eyes on own   6 - Follows simple motor commands  5 - Alert and oriented     Complains of headache.   Pupil size:  Left 4 mm  Right 4 mm  Pupil reaction: Yes   PERRLA  Wiggles fingers: Left Yes Right No  Hand grasp:   Left normal     Right decreased  Wiggles toes: Left Yes    Right No  Plantar flexion: Left normal    Right none    CONSTITUTIONAL: No acute distress, lying in hospital bed. CARDIOVASCULAR: S1 S2, regular rate, regular rhythm, no murmur/gallop/rub. Monitor: NSR. PULMONARY: Bilaterally clear. No rhonchi/rales/wheezes, no use of accessory muscles. O2 at 2 L nc. RENAL  Voids. Fluid balance for previous 24 hours:  - 770 ml. ABDOMEN: Soft, nontender, nondistended, nontympanic, normal bowel sounds. Tolerates diet. No nausea or vomiting. Last BM since prior to admission. MUSCULOSKELETAL:  Complains of back pain. RUE:  Unable to lift off the bed. No hand grasp. RLE: Unable to lift leg off bed. LUE:  Strong hand grasp  LLE:  Able to lift leg off bed. SKIN/EXTREMITIES: No rashes/ecchymosis, no edema/clubbing, warm/dry, good capillary refill. Peripheral IVs.         Recent Labs      09/17/18   0445  09/18/18   0130  09/19/18   0510   WBC  11.9*  12.7*  13.1*   HGB  13.1  13.2  13.5   HCT  38.5  37.3  39.2   MCV  93.7  91.9  92.0   PLT  314  323  329       Recent Labs      09/17/18   0445  09/17/18   1415  09/18/18   0130  09/19/18   0510   NA  135   --   131*  134   K  2.8*  5.0  4.1  3.1*   CO2  24   --   24  25   PHOS  3.2   --   3.6  4.3   BUN  13   --   20  24*   CREATININE  0.4*   --   0.5  0.6     ASSESSMENT/PLAN:     Principal Problem:    Subarachnoid bleed (HCC)  Active Problems:    Hypertensive emergency    Obesity (BMI 30-39. 9)    Cerebral aneurysm  Resolved Problems:    * No resolved hospital problems. *    Neuro:  Headache. SAH. Left ICA aneurysm. S/P coiling. S/P transcatheter administration of verapamil - Monitor neuro status. Neurosurgery following. Neurology signed off. Stroke education. Stroke protocol. Nimodipine for cerebral artery spasms. CV: Hypertensive - BP goal systolic less than 472 mm Hg. PRN hydralzine & labetalol. Lopressor. Norvasc. Hygroton. Pulm: No acute issues - Monitor RR & SpO2. O2 as needed. Albuterol PRN. Encourage cough, SMI  & deep breathing. GI: No acute issues. Obesity. BMI 36 - Monitor bowel function. Diet General with supplements. Zofran. Bowel regime. GLycolax. Colace increased. Renal:  No acute issues. Hypokalemia - Monitor BUN & Cr. Monitor electrolytes & replace as needed. Monitor I & O. Sams catheter. Potassium supplementation. ID: No acute issues - Monitor for sepsis. Endocrine: No acute issues - Monitor BS.   MSK: No acute issues. Deconditioned  - ROM. Turn & reposition. PT & OT AM PAC 10/24. Monitor for skin breakdown. Heme: No acute issues  - Monitor CBC. Bowel regime: Colace. MOM. Ducolax suppository. Pain control/Sedation:  Tylenol. DVT prophylaxis: SCDs. Lovenox. GI prophylaxis:   Protonix. Diet. Sams: Keep in place for critical care monitoring of fluid balance. Ancillary consults:   Neurosurgery. Neurology (signed off). PMR. Medicine. Patient/Family update:  Questions answered. Support given. Code status:  Full  Disposition:  NSICU. Transfer.       Electronically signed by Mohini Meraz RN MSN APRN-NP [facility] NP  CCNS CCRN 9/19/2018 10:36 AM

## 2018-09-19 NOTE — PROGRESS NOTES
Nutrition Assessment    Type and Reason for Visit: Reassess    Nutrition Recommendations: Continue current diet/ONS    Malnutrition Assessment:  · Malnutrition Status: At risk for malnutrition  · Context: Acute illness or injury  · Findings of the 6 clinical characteristics of malnutrition (Minimum of 2 out of 6 clinical characteristics is required to make the diagnosis of moderate or severe Protein Calorie Malnutrition based on AND/ASPEN Guidelines):  1. Energy Intake-Less than or equal to 50%, greater than 7 days    2. Weight Loss-No significant weight loss    3. Fat Loss-No significant subcutaneous fat loss    4. Muscle Loss-No significant muscle mass loss    5. Fluid Accumulation-No significant fluid accumulation    6.   Strength-Not measured    Nutrition Diagnosis:   · Problem: Inadequate oral intake  · Etiology: related to Acute injury/trauma (s/p SAH)     Signs and symptoms:  as evidenced by Intake 25-50%, Diet history of poor intake    Nutrition Assessment:  · Subjective Assessment: pt alert in bed s/p SAH  · Nutrition-Focused Physical Findings: poor dentition, hypokalemia, soft abd, active BS, trace edema, -I/Os  · Wound Type: Surgical Wound  · Current Nutrition Therapies:  · Oral Diet Orders: General   · Oral Diet intake: 26-50% (per doc flow)  · Oral Nutrition Supplement (ONS) Orders: Standard High Calorie Oral Supplement (BID)  · ONS intake: % (per pt)  · Anthropometric Measures:  · Ht: 5' 5\" (165.1 cm)   · Current Body Wt: 209 lb (94.8 kg) (bed scale 9/19)  · Admission Body Wt: 219 lb (99.3 kg) (actual 9/10)  · Usual Body Wt: 214 lb (97.1 kg) (per EMR 07/2018, no method noted)  · % Weight Change: noted overall 10# wt loss since adm w/ current fluids -10L at this time     · Ideal Body Wt: 125 lb (56.7 kg), % Ideal Body 167%  · Adjusted Body Wt: 146 lb (66.2 kg), body weight adjusted for Obesity  · BMI Classification: BMI 30.0 - 34.9 Obese Class I  · Comparative Standards (Estimated Nutrition Needs):  · Estimated Daily Total Kcal:  (MSJ 1530 x 1.2 SF)  · Estimated Daily Protein (g):     Nutrition Risk Level: Moderate    Nutrition Interventions:   Continued Inpatient Monitoring, Education Initiated, Coordination of Care (Reviewed importance of PO intake)    Nutrition Evaluation:   · Evaluation: Goals set   · Goals: Consume >50% meals/ONS    · Monitoring: Meal Intake, Supplement Intake, Diet Tolerance, Skin Integrity, Wound Healing, Fluid Balance, Ascites/Edema, Weight, Comparative Standards, Pertinent Labs    See Adult Nutrition Doc Flowsheet for more detail.      Electronically signed by Ghulam Beavers, MS, RD, LD on 9/19/18 at 12:17 PM    Contact Number: [phone]

## 2018-09-19 NOTE — PROGRESS NOTES
2/7   Ambulation 1/7   Total  11/35     Pt is alert and oriented x 3  CAM-ICU: negative  RASS: 0  Sensation: WNL     Pt performed therapeutic exercise of the following: A/AROM at R ankle and knee, PROM R hip    Patient education  Pt was educated on safety, therapeutic exercises, transfers    Patient response to education:   Pt verbalized understanding Pt demonstrated skill Pt requires further education in this area   x  x     Additional Comments: RN reported pt was stable for session. Pt supine in bed upon arrival, agreeable to treatment with OT collaboration. Pt was more alert and focused this session but still required redirection. Pt demonstrated improved strength in RLE compared to previous session. Pt reported increased back pain with upon sitting EOB and unable to tolerate >3 minutes at EOB. Pt required assistance with standing balance while donning pants. Pt required frequent cues for weight shifting, sequencing and R TKE during mobility. Pt unable to achieve R TKE without assistance. R knee block required in stance phase. Pt demonstrated improved ability to advance RLE but still required assistance for placement. Increased time required for transfer and pt fatigued. While in chair, pt completed RLE therapeutic exercises to fatigue. All needs met and call light in reach. Time in: 0927  Time out: 56    Pt is making progress toward established Physical Therapy goals. Continue with physical therapy current plan of care.     Sindy Payne, PT, DPT  [id number]

## 2018-09-19 NOTE — PROGRESS NOTES
Post Aneurysm Coiling#9  No change in neuro status. 1-2 movements right hand &  right arm. Right leg  2/5 improving  No deficit left extremities  Speech fair.  C/O back pain  Transfer out of NSICU

## 2018-09-19 NOTE — FLOWSHEET NOTE
Left message with Dr. Pranav Granger for stepdown unit transfer. Per Hector Xiong started today and once overnight SBP control is achieved patient can transfer to Acute Re-hab.

## 2018-09-19 NOTE — DISCHARGE SUMMARY
Physician Discharge Summary     Patient ID:  Taran Alexander  [id number]  60 y.o.  1960    Admit date: 9/10/2018    Discharge date and time:  Sept 20, 2018    Admission Diagnoses:   Patient Active Problem List   Diagnosis    Subarachnoid hemorrhage     Hypertensive emergency    Obesity, morbid    Tobacco abuse, in remission       Discharge Diagnoses:   Patient Active Problem List   Diagnosis    Subarachnoid hemorrhage secondary to hypertension with ruptured aneurysm-s/p coiling of aneurysm and trans catheter infusion of verapamil    Hypertensive emergency-BP improved    Intracranial vasospasm-resolving    Obesity, morbid    Tobacco abuse, in remission       Consults: neurology Effie Gonsalez and neurosurgery Benny Ayala    Procedures: coiling of aneurysm, trans catheter verapamil infusion    Hospital Course: the patient is a 60 y.o. white woman followed by DR Shefali Mike who presents secondary to severe headache. She was found to have markedly elevated BP and subarachnoid hemorrhage. Further imaging demonstrated ruptured cerebral aneurysm. She was admitted to the ICU and evaluated by neurosurgery. She underwent coiling of the aneurysm. Medications were titrated for BP management. She developed cerebral vasospasm, and nimotop was started. Trans catheter verapamil infusion was completed by IR. She had slow but progressive improvement. Due to the bleed, right sided hemiparesis developed. She was recommended acute rehab for further functional improvement. She was ready for discharge to acute rehab on Sept 19.     Discharge Exam:  See progress note from today    Disposition: acute rehab    Patient Instructions:     docusate sodium (COLACE) capsule 100 mg BID   senna (SENOKOT) tablet 8.6 mg Nightly   polyethylene glycol (GLYCOLAX) packet 17 g Daily   metoprolol tartrate (LOPRESSOR) tablet 50 mg BID   losartan (COZAAR) tablet 100 mg Daily   pantoprazole (PROTONIX) tablet 40 mg QAM AC   ipratropium-albuterol (DUONEB) nebulizer solution 1 ampule 4x daily   guaiFENesin-dextromethorphan (ROBITUSSIN DM) 100-10 MG/5ML syrup 5 mL Q4H PRN   chlorthalidone (HYGROTON) tablet 25 mg Daily   acetaminophen (TYLENOL) tablet 650 mg Q6H/PRN   amLODIPine (NORVASC) tablet 10 mg Daily   magnesium hydroxide (MILK OF MAGNESIA) 400 MG/5ML suspension 30 mL Daily PRN   bisacodyl (DULCOLAX) suppository 10 mg Daily PRN   enoxaparin (LOVENOX) injection 40 mg Daily   albuterol (ACCUNEB) nebulizer solution 1.25 mg Q4H PRN   butalbital-acetaminophen-caffeine (FIORICET, ESGIC) per tablet 1 tablet Q6H PRN   niMODipine (NIMOTOP) capsule 60 mg 6 times per day       Activity: activity as tolerated  Diet: regular diet    Follow-up with PM&R doctor in 1 day.     Note that over 30 minutes was spent in preparing discharge papers, discussing discharge with patient, medication review, etc.    Signed:  Zachery Cason MD  9/20/2018  8:29 PM

## 2018-09-20 ENCOUNTER — HOSPITAL ENCOUNTER (INPATIENT)
Age: 58
LOS: 26 days | Discharge: HOME HEALTH | DRG: 949 | End: 2018-10-16
Attending: PHYSICAL MEDICINE & REHABILITATION | Admitting: PHYSICAL MEDICINE & REHABILITATION
Payer: COMMERCIAL

## 2018-09-20 VITALS
SYSTOLIC BLOOD PRESSURE: 143 MMHG | RESPIRATION RATE: 15 BRPM | WEIGHT: 209.4 LBS | HEART RATE: 77 BPM | OXYGEN SATURATION: 97 % | DIASTOLIC BLOOD PRESSURE: 65 MMHG | BODY MASS INDEX: 34.89 KG/M2 | TEMPERATURE: 98.1 F | HEIGHT: 65 IN

## 2018-09-20 DIAGNOSIS — M54.16 LUMBAR RADICULITIS: Primary | ICD-10-CM

## 2018-09-20 PROBLEM — I60.9 SAH (SUBARACHNOID HEMORRHAGE) (HCC): Status: ACTIVE | Noted: 2018-09-20

## 2018-09-20 PROCEDURE — 94640 AIRWAY INHALATION TREATMENT: CPT

## 2018-09-20 PROCEDURE — 6370000000 HC RX 637 (ALT 250 FOR IP): Performed by: INTERNAL MEDICINE

## 2018-09-20 PROCEDURE — 6360000002 HC RX W HCPCS: Performed by: NURSE PRACTITIONER

## 2018-09-20 PROCEDURE — 6370000000 HC RX 637 (ALT 250 FOR IP): Performed by: NURSE PRACTITIONER

## 2018-09-20 PROCEDURE — 1280000000 HC REHAB R&B

## 2018-09-20 PROCEDURE — 2580000003 HC RX 258: Performed by: INTERNAL MEDICINE

## 2018-09-20 PROCEDURE — 6360000002 HC RX W HCPCS: Performed by: NEUROLOGICAL SURGERY

## 2018-09-20 PROCEDURE — 99222 1ST HOSP IP/OBS MODERATE 55: CPT | Performed by: PHYSICAL MEDICINE & REHABILITATION

## 2018-09-20 RX ORDER — CHLORTHALIDONE 25 MG/1
25 TABLET ORAL DAILY
Status: DISCONTINUED | OUTPATIENT
Start: 2018-09-20 | End: 2018-10-16 | Stop reason: HOSPADM

## 2018-09-20 RX ORDER — AMLODIPINE BESYLATE 10 MG/1
10 TABLET ORAL DAILY
Status: DISCONTINUED | OUTPATIENT
Start: 2018-09-20 | End: 2018-09-29

## 2018-09-20 RX ORDER — GUAIFENESIN/DEXTROMETHORPHAN 100-10MG/5
5 SYRUP ORAL EVERY 4 HOURS PRN
Status: DISCONTINUED | OUTPATIENT
Start: 2018-09-20 | End: 2018-09-24

## 2018-09-20 RX ORDER — NIMODIPINE 30 MG/1
60 CAPSULE, LIQUID FILLED ORAL
Status: DISCONTINUED | OUTPATIENT
Start: 2018-09-20 | End: 2018-09-28 | Stop reason: SINTOL

## 2018-09-20 RX ORDER — SODIUM CHLORIDE 0.9 % (FLUSH) 0.9 %
10 SYRINGE (ML) INJECTION PRN
Status: DISCONTINUED | OUTPATIENT
Start: 2018-09-20 | End: 2018-09-22 | Stop reason: ALTCHOICE

## 2018-09-20 RX ORDER — IPRATROPIUM BROMIDE AND ALBUTEROL SULFATE 2.5; .5 MG/3ML; MG/3ML
1 SOLUTION RESPIRATORY (INHALATION) 4 TIMES DAILY
Status: DISCONTINUED | OUTPATIENT
Start: 2018-09-21 | End: 2018-10-02

## 2018-09-20 RX ORDER — POLYETHYLENE GLYCOL 3350 17 G/17G
17 POWDER, FOR SOLUTION ORAL DAILY
Status: DISCONTINUED | OUTPATIENT
Start: 2018-09-20 | End: 2018-09-24

## 2018-09-20 RX ORDER — DOCUSATE SODIUM 100 MG/1
100 CAPSULE, LIQUID FILLED ORAL 2 TIMES DAILY
Status: DISCONTINUED | OUTPATIENT
Start: 2018-09-20 | End: 2018-09-24

## 2018-09-20 RX ORDER — ACETAMINOPHEN 325 MG/1
650 TABLET ORAL EVERY 6 HOURS PRN
Status: DISCONTINUED | OUTPATIENT
Start: 2018-09-20 | End: 2018-10-16 | Stop reason: HOSPADM

## 2018-09-20 RX ORDER — BISACODYL 10 MG
10 SUPPOSITORY, RECTAL RECTAL DAILY PRN
Status: DISCONTINUED | OUTPATIENT
Start: 2018-09-20 | End: 2018-10-16 | Stop reason: HOSPADM

## 2018-09-20 RX ORDER — BUTALBITAL, ACETAMINOPHEN AND CAFFEINE 50; 325; 40 MG/1; MG/1; MG/1
1 TABLET ORAL EVERY 6 HOURS PRN
Status: DISCONTINUED | OUTPATIENT
Start: 2018-09-20 | End: 2018-10-01

## 2018-09-20 RX ORDER — LOSARTAN POTASSIUM 50 MG/1
100 TABLET ORAL DAILY
Status: DISCONTINUED | OUTPATIENT
Start: 2018-09-20 | End: 2018-10-16

## 2018-09-20 RX ORDER — ACETAMINOPHEN 325 MG/1
650 TABLET ORAL EVERY 6 HOURS PRN
Status: CANCELLED | OUTPATIENT
Start: 2018-09-20

## 2018-09-20 RX ORDER — METOPROLOL TARTRATE 50 MG/1
50 TABLET, FILM COATED ORAL 2 TIMES DAILY
Status: DISCONTINUED | OUTPATIENT
Start: 2018-09-20 | End: 2018-10-16 | Stop reason: HOSPADM

## 2018-09-20 RX ORDER — PANTOPRAZOLE SODIUM 40 MG/1
40 TABLET, DELAYED RELEASE ORAL
Status: DISCONTINUED | OUTPATIENT
Start: 2018-09-21 | End: 2018-10-16 | Stop reason: HOSPADM

## 2018-09-20 RX ORDER — SODIUM CHLORIDE 0.9 % (FLUSH) 0.9 %
10 SYRINGE (ML) INJECTION EVERY 12 HOURS SCHEDULED
Status: DISCONTINUED | OUTPATIENT
Start: 2018-09-20 | End: 2018-09-22 | Stop reason: ALTCHOICE

## 2018-09-20 RX ORDER — SENNA PLUS 8.6 MG/1
1 TABLET ORAL NIGHTLY
Status: DISCONTINUED | OUTPATIENT
Start: 2018-09-20 | End: 2018-09-24

## 2018-09-20 RX ADMIN — IPRATROPIUM BROMIDE AND ALBUTEROL SULFATE 1 AMPULE: .5; 3 SOLUTION RESPIRATORY (INHALATION) at 17:20

## 2018-09-20 RX ADMIN — ACETAMINOPHEN 650 MG: 325 TABLET, FILM COATED ORAL at 01:36

## 2018-09-20 RX ADMIN — CHLORTHALIDONE 25 MG: 25 TABLET ORAL at 09:46

## 2018-09-20 RX ADMIN — POTASSIUM CHLORIDE 40 MEQ: 20 TABLET, EXTENDED RELEASE ORAL at 12:51

## 2018-09-20 RX ADMIN — METOPROLOL TARTRATE 50 MG: 50 TABLET, FILM COATED ORAL at 09:46

## 2018-09-20 RX ADMIN — AMLODIPINE BESYLATE 10 MG: 10 TABLET ORAL at 09:46

## 2018-09-20 RX ADMIN — Medication 10 ML: at 01:17

## 2018-09-20 RX ADMIN — DOCUSATE SODIUM 100 MG: 100 CAPSULE, LIQUID FILLED ORAL at 21:53

## 2018-09-20 RX ADMIN — PANTOPRAZOLE SODIUM 40 MG: 40 TABLET, DELAYED RELEASE ORAL at 06:38

## 2018-09-20 RX ADMIN — NIMODIPINE 60 MG: 30 CAPSULE ORAL at 00:17

## 2018-09-20 RX ADMIN — NIMODIPINE 60 MG: 30 CAPSULE ORAL at 04:39

## 2018-09-20 RX ADMIN — NIMODIPINE 60 MG: 30 CAPSULE ORAL at 12:51

## 2018-09-20 RX ADMIN — POTASSIUM CHLORIDE 40 MEQ: 20 TABLET, EXTENDED RELEASE ORAL at 16:30

## 2018-09-20 RX ADMIN — LOSARTAN POTASSIUM 100 MG: 50 TABLET, FILM COATED ORAL at 09:46

## 2018-09-20 RX ADMIN — SENNOSIDES 8.6 MG: 8.6 TABLET, FILM COATED ORAL at 21:53

## 2018-09-20 RX ADMIN — NIMODIPINE 60 MG: 30 CAPSULE ORAL at 16:30

## 2018-09-20 RX ADMIN — NIMODIPINE 30 MG: 30 CAPSULE ORAL at 21:53

## 2018-09-20 RX ADMIN — METOPROLOL TARTRATE 50 MG: 50 TABLET ORAL at 21:53

## 2018-09-20 RX ADMIN — ENOXAPARIN SODIUM 40 MG: 40 INJECTION SUBCUTANEOUS at 09:46

## 2018-09-20 RX ADMIN — NIMODIPINE 60 MG: 30 CAPSULE ORAL at 09:45

## 2018-09-20 RX ADMIN — ACETAMINOPHEN 650 MG: 325 TABLET, FILM COATED ORAL at 16:30

## 2018-09-20 RX ADMIN — ACETAMINOPHEN 650 MG: 325 TABLET, FILM COATED ORAL at 09:48

## 2018-09-20 RX ADMIN — ACETAMINOPHEN 650 MG: 325 TABLET, FILM COATED ORAL at 04:38

## 2018-09-20 ASSESSMENT — PAIN SCALES - GENERAL
PAINLEVEL_OUTOF10: 0
PAINLEVEL_OUTOF10: 4
PAINLEVEL_OUTOF10: 6
PAINLEVEL_OUTOF10: 4
PAINLEVEL_OUTOF10: 7
PAINLEVEL_OUTOF10: 4
PAINLEVEL_OUTOF10: 2
PAINLEVEL_OUTOF10: 6
PAINLEVEL_OUTOF10: 7

## 2018-09-20 ASSESSMENT — PAIN DESCRIPTION - LOCATION
LOCATION: BACK;HEAD
LOCATION: BACK;HEAD

## 2018-09-20 ASSESSMENT — PAIN DESCRIPTION - DESCRIPTORS
DESCRIPTORS: ACHING;CONSTANT;DISCOMFORT;SORE
DESCRIPTORS: ACHING;CONSTANT;DISCOMFORT;HEADACHE

## 2018-09-20 ASSESSMENT — PAIN DESCRIPTION - ORIENTATION
ORIENTATION: MID
ORIENTATION: MID

## 2018-09-20 ASSESSMENT — PAIN DESCRIPTION - FREQUENCY
FREQUENCY: CONTINUOUS
FREQUENCY: CONTINUOUS

## 2018-09-20 ASSESSMENT — PAIN DESCRIPTION - PAIN TYPE
TYPE: CHRONIC PAIN;ACUTE PAIN
TYPE: CHRONIC PAIN;ACUTE PAIN

## 2018-09-20 ASSESSMENT — PAIN DESCRIPTION - ONSET
ONSET: ON-GOING
ONSET: ON-GOING

## 2018-09-20 NOTE — PROGRESS NOTES
Post Aneurysm Coiling#10  No change in neuro status. 1-2 movements right hand &  right arm. Right leg  2/5 improving  No deficit left extremities  Speech fair.   Slow but steady improvement

## 2018-09-20 NOTE — LETTER
DATE: 10/10/2018        Zackery Andreina Almeida available in your area  [address] [facility]  [place], [place] [address]  [phone]  Activities include: ceramics, aerobics, walkers club, and fitness center. [address]  [place], [address]  [phone] opportunities, programs and prescription assistance   [facility]  [address], [facility] [address]  [phone], line dancing, chair yoga,   dance exercise classes, painting, gardening groups and more. [facility]  [address] [place]  [facility], [place]  [phone]  Socialization opportunities, exercise and wellness classes, crafts, and computer classes. [facility]  [address], [address]  [phone]  Senior group meets on Mondays and Wednesdays from [address] [facility]. Activities include: discussions, crafts, guest speakers, cards and films. [address] [place] [address], [place]  [phone]  Activities such as cards, bowling and occasional bus trips. [facility]  [address] [facility], [address]  [phone]  Weekly programs including painting, dancing, exercise, computer classes,   crafts and theater. [address] [place] [address] 30 [facility],[address], [place] [address]  [phone]  Meetings 9AM-2PM on Thursdays to socialize and play cards. [place] [facility]  [address] [facility]  [phone]  Exercise equipment, water exercise classes,  indoor and outdoor pool. [employer] of [facility] [place]  [address] [facility], [place] [address]  [phone]  Activities include: bus trips, fitness programs, arts, crafts, dinners and card games. [employer] [facility] [address] [facility], [address]  [address]  [place], [address]  [phone] Water exercise programs, different exercise equipment, indoor pool. [address] at the . [address] [facility]. [place], [place] [address]  [phone]  Free health screenings, health education, health information on community resources, fitness classes & fitness center. [address] [facility]  [address], [address] [place] [address]  [phone]  8AM-4PM daily with noon meal.  Activities include arts, crafts, knitting and rin [facility]  [address] [place] [address]  [phone]  Low level fitness classes and warm water arthritis classes. [facility] [address] [facility], [address] [place] [address]  [phone]  Group meets at 12PM on Mondays, Wednesdays and Fridays. Activities include bingo, lunch and special programs.           DATE: 10/10/2018        ***

## 2018-09-20 NOTE — PROGRESS NOTES
Subjective: The patient is awake and alert. Feels ok. Still with intermittent headache, but pain medications helping. No acute events overnight. Right sided weakness unchanged. Objective:    BP (!) 158/70   Pulse 75   Temp 98.1 °F (36.7 °C) (Temporal)   Resp 16   Ht 5' 5\" (1.651 m)   Wt 209 lb 6.4 oz (95 kg)   SpO2 100%   BMI 34.85 kg/m²     Current medications that patient is taking have been reviewed. Heart:  RRR, no murmurs, gallops, or rubs.   Lungs:  Clear bilaterally, no rales or wheeze  Abd: bowel sounds present, soft, nontender, nondistended, no masses  Extrem:  No clubbing, cyanosis, or edema  Neuro:  Right sided hemiparesis unchanged, no new deficits    Labs pending this AM    Assessment:    Patient Active Problem List   Diagnosis    Subarachnoid hemorrhage secondary to hypertension with ruptured aneurysm-s/p coiling of aneurysm and trans catheter infusion of verapamil    Hypertensive emergency-BP improved    Intracranial vasospasm-resolved    Obesity, morbid    Tobacco abuse, in remission       Plan:    1) continue current medications  2) follow up on AM labs  3) ok to discharge to acute rehab once bed approved/available    Discussed with family who was in the room at the time of visit      Gerda Cordero MD  9:54 AM  9/20/2018

## 2018-09-20 NOTE — PROGRESS NOTES
Spoke with son, Berlin Oleary via phone and daughter, Vivienne Ventura in person and reviewed ARU program they are both in agreement with ARU at [facility]. Precert obtained will admit to ARU [id number]. Orders written under misc nursing.

## 2018-09-20 NOTE — CARE COORDINATION
9/20/2018 social work transition of care/discharge planning  Sw notified that Selena Simmonds is pending for acute rehab. Moo will follow and assist prn.   Electronically signed by SUZANNE Brown on 9/20/2018 at 11:58 AM

## 2018-09-21 PROBLEM — I67.1 ANEURYSM OF LEFT INTERNAL CAROTID ARTERY: Status: ACTIVE | Noted: 2018-09-21

## 2018-09-21 PROBLEM — I10 HYPERTENSION: Status: ACTIVE | Noted: 2018-09-21

## 2018-09-21 LAB
ANION GAP SERPL CALCULATED.3IONS-SCNC: 20 MMOL/L (ref 7–16)
BASOPHILS ABSOLUTE: 0.05 E9/L (ref 0–0.2)
BASOPHILS RELATIVE PERCENT: 0.3 % (ref 0–2)
BUN BLDV-MCNC: 37 MG/DL (ref 6–20)
CALCIUM SERPL-MCNC: 9.8 MG/DL (ref 8.6–10.2)
CHLORIDE BLD-SCNC: 94 MMOL/L (ref 98–107)
CO2: 21 MMOL/L (ref 22–29)
CREAT SERPL-MCNC: 0.6 MG/DL (ref 0.5–1)
EOSINOPHILS ABSOLUTE: 0.09 E9/L (ref 0.05–0.5)
EOSINOPHILS RELATIVE PERCENT: 0.6 % (ref 0–6)
GFR AFRICAN AMERICAN: >60
GFR NON-AFRICAN AMERICAN: >60 ML/MIN/1.73
GLUCOSE BLD-MCNC: 126 MG/DL (ref 74–109)
HCT VFR BLD CALC: 40.2 % (ref 34–48)
HEMOGLOBIN: 13.8 G/DL (ref 11.5–15.5)
IMMATURE GRANULOCYTES #: 0.22 E9/L
IMMATURE GRANULOCYTES %: 1.5 % (ref 0–5)
LYMPHOCYTES ABSOLUTE: 2.07 E9/L (ref 1.5–4)
LYMPHOCYTES RELATIVE PERCENT: 13.8 % (ref 20–42)
MCH RBC QN AUTO: 31.4 PG (ref 26–35)
MCHC RBC AUTO-ENTMCNC: 34.3 % (ref 32–34.5)
MCV RBC AUTO: 91.4 FL (ref 80–99.9)
MONOCYTES ABSOLUTE: 0.9 E9/L (ref 0.1–0.95)
MONOCYTES RELATIVE PERCENT: 6 % (ref 2–12)
NEUTROPHILS ABSOLUTE: 11.64 E9/L (ref 1.8–7.3)
NEUTROPHILS RELATIVE PERCENT: 77.8 % (ref 43–80)
PDW BLD-RTO: 13 FL (ref 11.5–15)
PLATELET # BLD: 416 E9/L (ref 130–450)
PMV BLD AUTO: 9.1 FL (ref 7–12)
POTASSIUM SERPL-SCNC: 4.4 MMOL/L (ref 3.5–5)
RBC # BLD: 4.4 E12/L (ref 3.5–5.5)
SODIUM BLD-SCNC: 135 MMOL/L (ref 132–146)
WBC # BLD: 15 E9/L (ref 4.5–11.5)

## 2018-09-21 PROCEDURE — 6360000002 HC RX W HCPCS: Performed by: INTERNAL MEDICINE

## 2018-09-21 PROCEDURE — 97535 SELF CARE MNGMENT TRAINING: CPT

## 2018-09-21 PROCEDURE — 6370000000 HC RX 637 (ALT 250 FOR IP): Performed by: PHYSICAL MEDICINE & REHABILITATION

## 2018-09-21 PROCEDURE — 97112 NEUROMUSCULAR REEDUCATION: CPT

## 2018-09-21 PROCEDURE — 85025 COMPLETE CBC W/AUTO DIFF WBC: CPT

## 2018-09-21 PROCEDURE — 94664 DEMO&/EVAL PT USE INHALER: CPT

## 2018-09-21 PROCEDURE — 80048 BASIC METABOLIC PNL TOTAL CA: CPT

## 2018-09-21 PROCEDURE — 97530 THERAPEUTIC ACTIVITIES: CPT

## 2018-09-21 PROCEDURE — 94640 AIRWAY INHALATION TREATMENT: CPT

## 2018-09-21 PROCEDURE — 97163 PT EVAL HIGH COMPLEX 45 MIN: CPT

## 2018-09-21 PROCEDURE — 92610 EVALUATE SWALLOWING FUNCTION: CPT

## 2018-09-21 PROCEDURE — 1280000000 HC REHAB R&B

## 2018-09-21 PROCEDURE — 6370000000 HC RX 637 (ALT 250 FOR IP): Performed by: INTERNAL MEDICINE

## 2018-09-21 PROCEDURE — 36415 COLL VENOUS BLD VENIPUNCTURE: CPT

## 2018-09-21 RX ORDER — LIDOCAINE 50 MG/G
1 PATCH TOPICAL DAILY
Status: DISCONTINUED | OUTPATIENT
Start: 2018-09-22 | End: 2018-10-16 | Stop reason: HOSPADM

## 2018-09-21 RX ORDER — HYDRALAZINE HYDROCHLORIDE 25 MG/1
25 TABLET, FILM COATED ORAL EVERY 8 HOURS SCHEDULED
Status: DISCONTINUED | OUTPATIENT
Start: 2018-09-21 | End: 2018-10-12

## 2018-09-21 RX ORDER — HYDROCODONE BITARTRATE AND ACETAMINOPHEN 5; 325 MG/1; MG/1
1 TABLET ORAL EVERY 6 HOURS PRN
Status: DISCONTINUED | OUTPATIENT
Start: 2018-09-21 | End: 2018-09-21

## 2018-09-21 RX ADMIN — AMLODIPINE BESYLATE 10 MG: 10 TABLET ORAL at 07:44

## 2018-09-21 RX ADMIN — IPRATROPIUM BROMIDE AND ALBUTEROL SULFATE 1 AMPULE: .5; 3 SOLUTION RESPIRATORY (INHALATION) at 14:55

## 2018-09-21 RX ADMIN — NIMODIPINE 60 MG: 30 CAPSULE ORAL at 19:40

## 2018-09-21 RX ADMIN — NIMODIPINE 60 MG: 30 CAPSULE ORAL at 16:14

## 2018-09-21 RX ADMIN — NIMODIPINE 60 MG: 30 CAPSULE ORAL at 23:31

## 2018-09-21 RX ADMIN — HYDRALAZINE HYDROCHLORIDE 25 MG: 25 TABLET ORAL at 13:40

## 2018-09-21 RX ADMIN — SENNOSIDES 8.6 MG: 8.6 TABLET, FILM COATED ORAL at 21:11

## 2018-09-21 RX ADMIN — CHLORTHALIDONE 25 MG: 25 TABLET ORAL at 07:44

## 2018-09-21 RX ADMIN — DOCUSATE SODIUM 100 MG: 100 CAPSULE, LIQUID FILLED ORAL at 21:11

## 2018-09-21 RX ADMIN — NIMODIPINE 60 MG: 30 CAPSULE ORAL at 00:18

## 2018-09-21 RX ADMIN — POLYETHYLENE GLYCOL 3350 17 G: 17 POWDER, FOR SOLUTION ORAL at 07:43

## 2018-09-21 RX ADMIN — METOPROLOL TARTRATE 50 MG: 50 TABLET ORAL at 21:11

## 2018-09-21 RX ADMIN — ACETAMINOPHEN 650 MG: 325 TABLET, FILM COATED ORAL at 21:12

## 2018-09-21 RX ADMIN — PANTOPRAZOLE SODIUM 40 MG: 40 TABLET, DELAYED RELEASE ORAL at 06:45

## 2018-09-21 RX ADMIN — IPRATROPIUM BROMIDE AND ALBUTEROL SULFATE 1 AMPULE: .5; 3 SOLUTION RESPIRATORY (INHALATION) at 19:13

## 2018-09-21 RX ADMIN — HYDRALAZINE HYDROCHLORIDE 25 MG: 25 TABLET ORAL at 21:11

## 2018-09-21 RX ADMIN — HYDROCODONE BITARTRATE AND ACETAMINOPHEN 1 TABLET: 5; 325 TABLET ORAL at 15:09

## 2018-09-21 RX ADMIN — ACETAMINOPHEN 650 MG: 325 TABLET, FILM COATED ORAL at 07:43

## 2018-09-21 RX ADMIN — NIMODIPINE 60 MG: 30 CAPSULE ORAL at 04:02

## 2018-09-21 RX ADMIN — LOSARTAN POTASSIUM 100 MG: 50 TABLET, FILM COATED ORAL at 07:44

## 2018-09-21 RX ADMIN — ENOXAPARIN SODIUM 40 MG: 40 INJECTION SUBCUTANEOUS at 07:43

## 2018-09-21 RX ADMIN — NIMODIPINE 60 MG: 30 CAPSULE ORAL at 12:04

## 2018-09-21 RX ADMIN — DOCUSATE SODIUM 100 MG: 100 CAPSULE, LIQUID FILLED ORAL at 07:44

## 2018-09-21 RX ADMIN — BUTALBITAL, ACETAMINOPHEN, AND CAFFEINE 1 TABLET: 50; 325; 40 TABLET ORAL at 12:04

## 2018-09-21 RX ADMIN — NIMODIPINE 60 MG: 30 CAPSULE ORAL at 07:45

## 2018-09-21 RX ADMIN — METOPROLOL TARTRATE 50 MG: 50 TABLET ORAL at 07:44

## 2018-09-21 ASSESSMENT — PAIN DESCRIPTION - FREQUENCY
FREQUENCY: CONTINUOUS
FREQUENCY: INTERMITTENT
FREQUENCY: CONTINUOUS

## 2018-09-21 ASSESSMENT — PAIN DESCRIPTION - PAIN TYPE
TYPE: CHRONIC PAIN
TYPE: ACUTE PAIN

## 2018-09-21 ASSESSMENT — PAIN SCALES - GENERAL
PAINLEVEL_OUTOF10: 7
PAINLEVEL_OUTOF10: 5
PAINLEVEL_OUTOF10: 10
PAINLEVEL_OUTOF10: 7
PAINLEVEL_OUTOF10: 3
PAINLEVEL_OUTOF10: 0
PAINLEVEL_OUTOF10: 5
PAINLEVEL_OUTOF10: 7
PAINLEVEL_OUTOF10: 0
PAINLEVEL_OUTOF10: 0
PAINLEVEL_OUTOF10: 5

## 2018-09-21 ASSESSMENT — PAIN DESCRIPTION - DESCRIPTORS
DESCRIPTORS: ACHING;DISCOMFORT;DULL;SORE
DESCRIPTORS: ACHING
DESCRIPTORS: CONSTANT;DISCOMFORT;DULL;ACHING
DESCRIPTORS: CONSTANT;DISCOMFORT;SORE

## 2018-09-21 ASSESSMENT — PAIN DESCRIPTION - PROGRESSION
CLINICAL_PROGRESSION: NOT CHANGED
CLINICAL_PROGRESSION: GRADUALLY WORSENING

## 2018-09-21 ASSESSMENT — PAIN DESCRIPTION - ORIENTATION
ORIENTATION: LOWER
ORIENTATION: LOWER
ORIENTATION: ANTERIOR
ORIENTATION: MID;LOWER

## 2018-09-21 ASSESSMENT — PAIN DESCRIPTION - LOCATION
LOCATION: NECK
LOCATION: BACK

## 2018-09-21 ASSESSMENT — PAIN DESCRIPTION - ONSET
ONSET: ON-GOING

## 2018-09-21 NOTE — PROGRESS NOTES
Physical Therapy    Facility/Department: [address] REHAB  Initial Assessment    NAME: Tono Colon  : 1960  MRN: [id number]    Date of Service: 2018    Evaluating Therapist: Jackelin Patel DPT    ROOM: [address] [place] [address]  DIAGNOSIS: SAH    PMH: To ED on 9/10/18 for HA with CT shows SAH. CTA of the head, which demonstrated left ICA aneurysms. Subarachnoid hemorrhage secondary to hypertension with ruptured aneurysm- s/p coiling of aneurysm with endovascular coil embolization of 2 distal L ICA aneurysms  And noted saccular aneurysm of right MCA on 9/10/18 and trans catheter infusion of verapamil. Post op, She developed cerebral vasospasm. PRECAUTIONS: SBP less than 150mmHg,  Right hemiparesis, falls, latex allergy, bed/chair alarm    Social:  Pt lives with son and grand daughter in a 2 floor plan, 1st floor setup with 4 step(s) and 1 rail(s) to enter on left per chart. Patient reporting 6 steps to enter with left HR. Prior to admission pt walked with no device and was Independent.  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 Initial Evaluation  18       Short Term Goals Long Term Goals    Was pt agreeable to Eval/treatment? yes       Does pt have pain? Yes 10/10 low back pain with any movement.  Nursing notified        Bed Mobility  Rolling: maxA  Supine to sit: maxA  Sit to supine: NT  Scooting: maxA   Kristi sba   Transfers Sit to stand: maxA  Stand to sit: maxA  Stand pivot: maxA HHA to left side   Kristi with AAD sba with AAD   Ambulation    3 feet with maxA with HHA (side stepping EOB)   50 feet with AAD with Kristi >150 feet with AAD with sba   Walking 10 feet on uneven surface NT       Wheel Chair Mobility NT   150' with left hemibody cgA >300' with left hemibody sup   Car Transfers NT   Kristi with AAD sba with AAd   Stair negotiation: ascended and descended  NT   4 steps with one rail with modA >6 steps with one rail with Kristi   Curb Step:   ascended and descended NT   4 inch step with AAD and modA 4 inch back pain and leg pain but unable to provide any detail to which leg, where at in the leg, or description of pain. Pt will benefit from intense skilled PT to increase above deficits to increase functional mobility to return home with family. Pending progress with skilled PT, pt may benefit from 24 hours supervision with all mobility. Patient education  Pt educated on log roll technique with bed mobility, sequencing with transfers, role of PT, PT poc, use of call bell    Patient response to education:   Pt verbalized understanding Pt demonstrated skill Pt requires further education in this area   Yes  Yes with cueing Reinforcement      Rehab potential is Good for reaching above PT goals. Pts/ family goals   1. To feel better    Patient and or family understand(s) diagnosis, prognosis, and plan of care. PLAN  PT care will be provided in accordance with the objectives noted above. Whenever appropriate, clear delegation orders will be provided for nursing staff. Exercises and functional mobility practice will be used as well as appropriate assistive devices or modalities to obtain goals. Patient and family education will also be administered as needed. Frequency of treatments will be 2x/day M-F and 1x/day Sat or Sun x  28 days.     Time in: 1000  Time out: 1015    Time In: 1030  Time POO:9257    Valentino Seek, DPT   [id number]

## 2018-09-21 NOTE — PROGRESS NOTES
Occupational Therapy   Occupational Therapy Initial Assessment  Date: 2018   Patient Name: Angel Beard  MRN: [id number]     : 1960  Room: 5509B    Referring Practitioner: Kajal Rizvi MD  Diagnosis: s/p aneurysm coiling - CVA  Additional Pertinent Hx: HTN, +ETOH & OA    Date of Service: 2018    Discharge Recommendations:  24 hour supervision or assist, Home with Home health OT     Restrictions   Fall Risk    Subjective   Chart Reviewed: Yes  Family / Caregiver Present: No  Subjective: Pt presents supine in bed & was agreeable to OT intervention  Pain Comments: Pt did c/o 8/10 back & headache pain during OT interveniton. Pt had been medicated prior to OT session     Social/Functional History  Lives With: Son & grand daughter  Type of Home: House  Home Layout: Two level, Laundry in basement (1st floor s/u & flight of steps 1HR to basement)  Home Access: Stairs to enter with rails  Entrance Stairs - Number of Steps: 4 RADAMES 1HR  Bathroom Shower/Tub: Tub/Shower unit, Curtain  Bathroom Toilet: Standard  Ambulation Assistance: Independent  Transfer Assistance: Independent  Active : Yes  Mode of Transportation: Car  Occupation: Full time employment  Type of occupation: Homecare  IADL Comments: PLOF pt independent in [address] including driving, home management & working     Objective   Vision: Impaired  Vision Exceptions: Wears glasses at all times  Hearing: Within functional limits      Orientation  Overall Orientation Status: Within Functional Limits     Observation/Palpation  Posture: Fair (due to pain)     Balance  Sitting Balance: Minimal assistance  Standing Balance: Maximum assistance  Standing Balance  Sit to stand:  Moderate assistance  Stand to sit: Moderate assistance  Toilet Transfers  Toilet - Technique: Stand pivot  Equipment Used: Standard toilet  Toilet Transfer: Dependent/Total  Toilet Transfers Comments: vc's for hand placement & safety throughout the transfer     ADL  Feeding: Minimal

## 2018-09-21 NOTE — PROGRESS NOTES
Administer meds via feeding tube     []  Other:                      OBJECTIVE ASSESSMENT:    Current respiratory status:        [x] Room Air         [] Nasal cannula        [] O2 mask          [] Non-rebreather mask      [] Tracheostomy      [] BiPAP      [] Vent dependent        Current nutritional status:        [x] Oral      [] NPO    Oral mechanism examination:       [x] Adequate lingual/labial strength    [] Generalized oral weakness      [] Left labiobuccal weakness         [] Left lingual deviation          [] Right labiobuccal weakness      [] Right lingual deviation                      [] Oral apraxia                   [] CNT      Dentition:      [x] Natural         [] Missing teeth/teeth in poor repair      [] Edentulous         [] Dentures    [] Implants         Vocal quality prior to PO intake:       [x] WFL    [] Weak    [] Wet      PROCEDURE    Consistencies Administered During the Evaluation:      Solids:  [x] Puree/pudding      [x] Soft solid    [x] Solid       Liquids: [x]  Thin         []  Nectar         []  Honey         Mealtime assessment:          [] Breakfast       [x] Lunch    [] Dinner      Method of Intake:     [x] Tsp     [x] Cup      [] Straw     [x] Fed self    [] Fed by SLP      Position:    [x] Seated in wheelchair/chair      [] Upright seated in bed   [] Reclined,  >____°       [] Modified Blue Dye tracheostomy protocol utilized      RESULTS OF ASSESSMENT    Oral Stage:  [] Normal     [x] Functional     [] Abnormal       [] Inadequate labial seal resulting anterior labial spillage from:     [] right     [] left     [] midline     [] Decreased mastication due to:       [] poor/missing dentition     [] decreased lingual control     [] vertical munch     [] other:       [] Delayed A-P transit due to:       [] decreased initiation     [] poor tongue movement     [] cognitive function      [] Oral residuals:        [] throughout oral cavity      [] anterior sulcus     [] left lateral

## 2018-09-21 NOTE — H&P
I   Potential: good potential to reach functional goals    ELOS: 3 weeks with anticipated discharge home    Co-morbidities :  -Headache: continue PRN tylenol, Fioricet   -HTN: continue current blood pressure medications, monitor closely. May need further adjustment to scheduled medications. -DVT ppx: lovenox        Summary of Medical Appropriateness for Admission to [address] [place] [address],[address]    1. The functional deficits and medical and nursing needs outlined above require:   a. close medical supervision by a physiatrist;   b. 24 hour availability of nurses skilled in rehabilitation; and   c. treatment by multiple other licensed rehabilitation professionals in a time intensive and medically coordinated program.   2. The medical stability of the patient and management of medical or surgical co-morbidities are:   a. manageable in the rehabilitation hospital; and   b. sufficiently under control so as to permit simultaneous participation in the rehabilitation program.   3. The patient is now capable of fully participating in the inpatient rehabilitation program.   4. The patient has a high probability of benefiting from the program of care.    5. The patient has a home and available family or care providers         Electronically signed by Hanny Gary MD on 9/20/2018 at 9:59 PM

## 2018-09-22 LAB
ANION GAP SERPL CALCULATED.3IONS-SCNC: 21 MMOL/L (ref 7–16)
BILIRUBIN URINE: NEGATIVE
BLOOD, URINE: NEGATIVE
BUN BLDV-MCNC: 42 MG/DL (ref 6–20)
CALCIUM SERPL-MCNC: 9.7 MG/DL (ref 8.6–10.2)
CHLORIDE BLD-SCNC: 93 MMOL/L (ref 98–107)
CLARITY: CLEAR
CO2: 20 MMOL/L (ref 22–29)
COLOR: YELLOW
CREAT SERPL-MCNC: 0.7 MG/DL (ref 0.5–1)
GFR AFRICAN AMERICAN: >60
GFR NON-AFRICAN AMERICAN: >60 ML/MIN/1.73
GLUCOSE BLD-MCNC: 125 MG/DL (ref 74–109)
GLUCOSE URINE: NEGATIVE MG/DL
KETONES, URINE: NEGATIVE MG/DL
LEUKOCYTE ESTERASE, URINE: NEGATIVE
NITRITE, URINE: NEGATIVE
PH UA: 6 (ref 5–9)
POTASSIUM SERPL-SCNC: 4.3 MMOL/L (ref 3.5–5)
PROTEIN UA: NEGATIVE MG/DL
SODIUM BLD-SCNC: 134 MMOL/L (ref 132–146)
SPECIFIC GRAVITY UA: 1.02 (ref 1–1.03)
UROBILINOGEN, URINE: 1 E.U./DL

## 2018-09-22 PROCEDURE — 80048 BASIC METABOLIC PNL TOTAL CA: CPT

## 2018-09-22 PROCEDURE — 51798 US URINE CAPACITY MEASURE: CPT

## 2018-09-22 PROCEDURE — 6370000000 HC RX 637 (ALT 250 FOR IP): Performed by: PHYSICAL MEDICINE & REHABILITATION

## 2018-09-22 PROCEDURE — 99232 SBSQ HOSP IP/OBS MODERATE 35: CPT | Performed by: PHYSICAL MEDICINE & REHABILITATION

## 2018-09-22 PROCEDURE — 87077 CULTURE AEROBIC IDENTIFY: CPT

## 2018-09-22 PROCEDURE — 6370000000 HC RX 637 (ALT 250 FOR IP): Performed by: INTERNAL MEDICINE

## 2018-09-22 PROCEDURE — 94640 AIRWAY INHALATION TREATMENT: CPT

## 2018-09-22 PROCEDURE — 87088 URINE BACTERIA CULTURE: CPT

## 2018-09-22 PROCEDURE — 6360000002 HC RX W HCPCS: Performed by: INTERNAL MEDICINE

## 2018-09-22 PROCEDURE — 97110 THERAPEUTIC EXERCISES: CPT

## 2018-09-22 PROCEDURE — 1280000000 HC REHAB R&B

## 2018-09-22 PROCEDURE — 36415 COLL VENOUS BLD VENIPUNCTURE: CPT

## 2018-09-22 PROCEDURE — 92523 SPEECH SOUND LANG COMPREHEN: CPT

## 2018-09-22 PROCEDURE — 87186 SC STD MICRODIL/AGAR DIL: CPT

## 2018-09-22 PROCEDURE — 81003 URINALYSIS AUTO W/O SCOPE: CPT

## 2018-09-22 RX ORDER — GABAPENTIN 100 MG/1
100 CAPSULE ORAL 3 TIMES DAILY
Status: DISCONTINUED | OUTPATIENT
Start: 2018-09-22 | End: 2018-09-26

## 2018-09-22 RX ADMIN — LOSARTAN POTASSIUM 100 MG: 50 TABLET, FILM COATED ORAL at 09:06

## 2018-09-22 RX ADMIN — AMLODIPINE BESYLATE 10 MG: 10 TABLET ORAL at 09:06

## 2018-09-22 RX ADMIN — BUTALBITAL, ACETAMINOPHEN, AND CAFFEINE 1 TABLET: 50; 325; 40 TABLET ORAL at 02:06

## 2018-09-22 RX ADMIN — PANTOPRAZOLE SODIUM 40 MG: 40 TABLET, DELAYED RELEASE ORAL at 05:44

## 2018-09-22 RX ADMIN — NIMODIPINE 60 MG: 30 CAPSULE ORAL at 09:06

## 2018-09-22 RX ADMIN — ACETAMINOPHEN 650 MG: 325 TABLET, FILM COATED ORAL at 05:44

## 2018-09-22 RX ADMIN — METOPROLOL TARTRATE 50 MG: 50 TABLET ORAL at 09:04

## 2018-09-22 RX ADMIN — IPRATROPIUM BROMIDE AND ALBUTEROL SULFATE 1 AMPULE: .5; 3 SOLUTION RESPIRATORY (INHALATION) at 17:08

## 2018-09-22 RX ADMIN — DOCUSATE SODIUM 100 MG: 100 CAPSULE, LIQUID FILLED ORAL at 09:07

## 2018-09-22 RX ADMIN — SENNOSIDES 8.6 MG: 8.6 TABLET, FILM COATED ORAL at 20:24

## 2018-09-22 RX ADMIN — HYDRALAZINE HYDROCHLORIDE 25 MG: 25 TABLET ORAL at 21:21

## 2018-09-22 RX ADMIN — HYDRALAZINE HYDROCHLORIDE 25 MG: 25 TABLET ORAL at 05:44

## 2018-09-22 RX ADMIN — NIMODIPINE 60 MG: 30 CAPSULE ORAL at 16:10

## 2018-09-22 RX ADMIN — NIMODIPINE 60 MG: 30 CAPSULE ORAL at 03:19

## 2018-09-22 RX ADMIN — CHLORTHALIDONE 25 MG: 25 TABLET ORAL at 09:07

## 2018-09-22 RX ADMIN — IPRATROPIUM BROMIDE AND ALBUTEROL SULFATE 1 AMPULE: .5; 3 SOLUTION RESPIRATORY (INHALATION) at 20:54

## 2018-09-22 RX ADMIN — ACETAMINOPHEN 650 MG: 325 TABLET, FILM COATED ORAL at 14:40

## 2018-09-22 RX ADMIN — METOPROLOL TARTRATE 50 MG: 50 TABLET ORAL at 20:24

## 2018-09-22 RX ADMIN — ACETAMINOPHEN 650 MG: 325 TABLET, FILM COATED ORAL at 21:21

## 2018-09-22 RX ADMIN — HYDRALAZINE HYDROCHLORIDE 25 MG: 25 TABLET ORAL at 14:37

## 2018-09-22 RX ADMIN — IPRATROPIUM BROMIDE AND ALBUTEROL SULFATE 1 AMPULE: .5; 3 SOLUTION RESPIRATORY (INHALATION) at 12:13

## 2018-09-22 RX ADMIN — ENOXAPARIN SODIUM 40 MG: 40 INJECTION SUBCUTANEOUS at 09:03

## 2018-09-22 RX ADMIN — DOCUSATE SODIUM 100 MG: 100 CAPSULE, LIQUID FILLED ORAL at 20:24

## 2018-09-22 RX ADMIN — GABAPENTIN 100 MG: 100 CAPSULE ORAL at 20:24

## 2018-09-22 ASSESSMENT — PAIN DESCRIPTION - DESCRIPTORS
DESCRIPTORS: DISCOMFORT;THROBBING
DESCRIPTORS: THROBBING;DISCOMFORT
DESCRIPTORS: ACHING;HEADACHE

## 2018-09-22 ASSESSMENT — PAIN SCALES - GENERAL
PAINLEVEL_OUTOF10: 3
PAINLEVEL_OUTOF10: 5
PAINLEVEL_OUTOF10: 0
PAINLEVEL_OUTOF10: 8
PAINLEVEL_OUTOF10: 0
PAINLEVEL_OUTOF10: 7
PAINLEVEL_OUTOF10: 5
PAINLEVEL_OUTOF10: 0

## 2018-09-22 ASSESSMENT — PAIN DESCRIPTION - ONSET
ONSET: ON-GOING
ONSET: ON-GOING

## 2018-09-22 ASSESSMENT — PAIN DESCRIPTION - FREQUENCY
FREQUENCY: INTERMITTENT
FREQUENCY: INTERMITTENT

## 2018-09-22 ASSESSMENT — PAIN DESCRIPTION - PROGRESSION
CLINICAL_PROGRESSION: NOT CHANGED
CLINICAL_PROGRESSION: NOT CHANGED

## 2018-09-22 ASSESSMENT — PAIN DESCRIPTION - LOCATION
LOCATION: HEAD

## 2018-09-22 ASSESSMENT — PAIN DESCRIPTION - PAIN TYPE
TYPE: ACUTE PAIN

## 2018-09-22 ASSESSMENT — PAIN DESCRIPTION - ORIENTATION
ORIENTATION: ANTERIOR
ORIENTATION: ANTERIOR

## 2018-09-22 NOTE — PROGRESS NOTES
SPEECH/LANGUAGE PATHOLOGY  SPEECH/LANGUAGE/COGNITIVE EVALUATION      PATIENT NAME:  Martinez Merrill      :  1960      TODAY'S DATE:  2018      SPEECH PATHOLOGY DIAGNOSIS:     Moderate-mild language deficit  Moderate-mild cognitive deficit    THERAPY RECOMMENDATIONS:   []Speech Pathology intervention is not warranted at this time. [x]Speech Pathology intervention is recommended with emphasis on the followin. Patient will improve word finding at a conversational level to greater than 90% accuracy. 2.  Patient will improve auditory comprehension for yes/no questions and multi-step commands to greater than 90% accuracy. 3.  Patient will improve orientation to environment to greater than 90% accuracy with the use of compensatory aids.                MOTOR SPEECH          Oral Peripheral Examination   []Adequate lingual/labial strength   []Generalized oral weakness   [x]Right labiobuccal weakness -mild  []Left labiobuccal weakness   []Right lingual deviation    []Left lingual deviation   []Inadequate velopharyngeal closure  []Oral apraxia      []CNT    Parameters of Speech Production  Respiration:  [x]WFL []SOB []Inadequate for speech production  Articulation:  []WFL [x]Distortions-minimal []Anticipatory struggle []CNT  Resonance:  [x]WFL []Hypernasal  []Hyponasal  []Nasal emission []CNT  Quality:   [x]WFL []Hoarse []Harsh []Strained [] Breathiness []CNT  Pitch:    [x]WFL []High []Low []CNT  Intensity: [x]WFL []Loud []Quiet []CNT  Fluency:  [x]Intact []Dysfluent []CNT  Prosody [x]Intact []Monotone []Irregular fluctuation      RECEPTIVE LANGUAGE       Comprehension of Yes/No Questions:   []WNL []Incomplete [x]Latent  []Inconsistent []Perseveration []Cueing  []Unable []CNT    Process  Simple Verbal Commands:   [x]WNL []Incomplete []Latent  []Inconsistent []Perseveration []Cueing  []Unable []CNT  Process Intermediate Verbal Commands:   []WNL [x]Incomplete [x]Latent  [x]Inconsistent []Perseveration

## 2018-09-23 LAB
ANION GAP SERPL CALCULATED.3IONS-SCNC: 19 MMOL/L (ref 7–16)
BASOPHILS ABSOLUTE: 0.06 E9/L (ref 0–0.2)
BASOPHILS RELATIVE PERCENT: 0.5 % (ref 0–2)
BUN BLDV-MCNC: 49 MG/DL (ref 6–20)
CALCIUM SERPL-MCNC: 9.3 MG/DL (ref 8.6–10.2)
CHLORIDE BLD-SCNC: 92 MMOL/L (ref 98–107)
CO2: 23 MMOL/L (ref 22–29)
CREAT SERPL-MCNC: 0.8 MG/DL (ref 0.5–1)
EOSINOPHILS ABSOLUTE: 0.21 E9/L (ref 0.05–0.5)
EOSINOPHILS RELATIVE PERCENT: 1.7 % (ref 0–6)
GFR AFRICAN AMERICAN: >60
GFR NON-AFRICAN AMERICAN: >60 ML/MIN/1.73
GLUCOSE BLD-MCNC: 119 MG/DL (ref 74–109)
HCT VFR BLD CALC: 38.3 % (ref 34–48)
HEMOGLOBIN: 13.2 G/DL (ref 11.5–15.5)
IMMATURE GRANULOCYTES #: 0.13 E9/L
IMMATURE GRANULOCYTES %: 1 % (ref 0–5)
LYMPHOCYTES ABSOLUTE: 2.48 E9/L (ref 1.5–4)
LYMPHOCYTES RELATIVE PERCENT: 19.6 % (ref 20–42)
MCH RBC QN AUTO: 31.8 PG (ref 26–35)
MCHC RBC AUTO-ENTMCNC: 34.5 % (ref 32–34.5)
MCV RBC AUTO: 92.3 FL (ref 80–99.9)
MONOCYTES ABSOLUTE: 1.04 E9/L (ref 0.1–0.95)
MONOCYTES RELATIVE PERCENT: 8.2 % (ref 2–12)
NEUTROPHILS ABSOLUTE: 8.72 E9/L (ref 1.8–7.3)
NEUTROPHILS RELATIVE PERCENT: 69 % (ref 43–80)
PDW BLD-RTO: 12.9 FL (ref 11.5–15)
PLATELET # BLD: 446 E9/L (ref 130–450)
PMV BLD AUTO: 9.2 FL (ref 7–12)
POTASSIUM SERPL-SCNC: 3.7 MMOL/L (ref 3.5–5)
RBC # BLD: 4.15 E12/L (ref 3.5–5.5)
SODIUM BLD-SCNC: 134 MMOL/L (ref 132–146)
WBC # BLD: 12.6 E9/L (ref 4.5–11.5)

## 2018-09-23 PROCEDURE — 51798 US URINE CAPACITY MEASURE: CPT

## 2018-09-23 PROCEDURE — 6360000002 HC RX W HCPCS: Performed by: INTERNAL MEDICINE

## 2018-09-23 PROCEDURE — 6370000000 HC RX 637 (ALT 250 FOR IP): Performed by: PHYSICAL MEDICINE & REHABILITATION

## 2018-09-23 PROCEDURE — 94640 AIRWAY INHALATION TREATMENT: CPT

## 2018-09-23 PROCEDURE — 51701 INSERT BLADDER CATHETER: CPT

## 2018-09-23 PROCEDURE — 6370000000 HC RX 637 (ALT 250 FOR IP): Performed by: INTERNAL MEDICINE

## 2018-09-23 PROCEDURE — 80048 BASIC METABOLIC PNL TOTAL CA: CPT

## 2018-09-23 PROCEDURE — 97110 THERAPEUTIC EXERCISES: CPT

## 2018-09-23 PROCEDURE — 85025 COMPLETE CBC W/AUTO DIFF WBC: CPT

## 2018-09-23 PROCEDURE — 97530 THERAPEUTIC ACTIVITIES: CPT

## 2018-09-23 PROCEDURE — 1280000000 HC REHAB R&B

## 2018-09-23 PROCEDURE — 36415 COLL VENOUS BLD VENIPUNCTURE: CPT

## 2018-09-23 RX ADMIN — METOPROLOL TARTRATE 50 MG: 50 TABLET ORAL at 20:58

## 2018-09-23 RX ADMIN — PANTOPRAZOLE SODIUM 40 MG: 40 TABLET, DELAYED RELEASE ORAL at 07:04

## 2018-09-23 RX ADMIN — ENOXAPARIN SODIUM 40 MG: 40 INJECTION SUBCUTANEOUS at 08:16

## 2018-09-23 RX ADMIN — HYDRALAZINE HYDROCHLORIDE 25 MG: 25 TABLET ORAL at 07:04

## 2018-09-23 RX ADMIN — CHLORTHALIDONE 25 MG: 25 TABLET ORAL at 08:19

## 2018-09-23 RX ADMIN — AMLODIPINE BESYLATE 10 MG: 10 TABLET ORAL at 08:18

## 2018-09-23 RX ADMIN — BUTALBITAL, ACETAMINOPHEN, AND CAFFEINE 1 TABLET: 50; 325; 40 TABLET ORAL at 14:40

## 2018-09-23 RX ADMIN — BUTALBITAL, ACETAMINOPHEN, AND CAFFEINE 1 TABLET: 50; 325; 40 TABLET ORAL at 08:14

## 2018-09-23 RX ADMIN — NIMODIPINE 30 MG: 30 CAPSULE ORAL at 03:55

## 2018-09-23 RX ADMIN — GABAPENTIN 100 MG: 100 CAPSULE ORAL at 08:13

## 2018-09-23 RX ADMIN — ACETAMINOPHEN 650 MG: 325 TABLET, FILM COATED ORAL at 19:31

## 2018-09-23 RX ADMIN — NIMODIPINE 60 MG: 30 CAPSULE ORAL at 00:36

## 2018-09-23 RX ADMIN — ACETAMINOPHEN 650 MG: 325 TABLET, FILM COATED ORAL at 03:56

## 2018-09-23 RX ADMIN — HYDRALAZINE HYDROCHLORIDE 25 MG: 25 TABLET ORAL at 22:20

## 2018-09-23 RX ADMIN — POLYETHYLENE GLYCOL 3350 17 G: 17 POWDER, FOR SOLUTION ORAL at 08:24

## 2018-09-23 RX ADMIN — LOSARTAN POTASSIUM 100 MG: 50 TABLET, FILM COATED ORAL at 08:19

## 2018-09-23 RX ADMIN — DOCUSATE SODIUM 100 MG: 100 CAPSULE, LIQUID FILLED ORAL at 20:58

## 2018-09-23 RX ADMIN — DOCUSATE SODIUM 100 MG: 100 CAPSULE, LIQUID FILLED ORAL at 08:17

## 2018-09-23 RX ADMIN — IPRATROPIUM BROMIDE AND ALBUTEROL SULFATE 1 AMPULE: .5; 3 SOLUTION RESPIRATORY (INHALATION) at 16:17

## 2018-09-23 RX ADMIN — GABAPENTIN 100 MG: 100 CAPSULE ORAL at 20:58

## 2018-09-23 RX ADMIN — SENNOSIDES 8.6 MG: 8.6 TABLET, FILM COATED ORAL at 20:58

## 2018-09-23 RX ADMIN — GABAPENTIN 100 MG: 100 CAPSULE ORAL at 14:40

## 2018-09-23 RX ADMIN — ACETAMINOPHEN 650 MG: 325 TABLET, FILM COATED ORAL at 11:40

## 2018-09-23 RX ADMIN — METOPROLOL TARTRATE 50 MG: 50 TABLET ORAL at 08:13

## 2018-09-23 ASSESSMENT — PAIN DESCRIPTION - PROGRESSION
CLINICAL_PROGRESSION: NOT CHANGED

## 2018-09-23 ASSESSMENT — PAIN DESCRIPTION - ONSET
ONSET: ON-GOING

## 2018-09-23 ASSESSMENT — PAIN DESCRIPTION - DESCRIPTORS
DESCRIPTORS: ACHING;HEADACHE
DESCRIPTORS: DISCOMFORT;HEADACHE
DESCRIPTORS: ACHING;DISCOMFORT;HEADACHE

## 2018-09-23 ASSESSMENT — PAIN DESCRIPTION - LOCATION
LOCATION: BACK;HEAD

## 2018-09-23 ASSESSMENT — PAIN DESCRIPTION - FREQUENCY
FREQUENCY: INTERMITTENT
FREQUENCY: INTERMITTENT

## 2018-09-23 ASSESSMENT — PAIN SCALES - GENERAL
PAINLEVEL_OUTOF10: 4
PAINLEVEL_OUTOF10: 7
PAINLEVEL_OUTOF10: 6
PAINLEVEL_OUTOF10: 0
PAINLEVEL_OUTOF10: 5
PAINLEVEL_OUTOF10: 7
PAINLEVEL_OUTOF10: 9
PAINLEVEL_OUTOF10: 7

## 2018-09-23 ASSESSMENT — PAIN DESCRIPTION - ORIENTATION
ORIENTATION: ANTERIOR;LOWER
ORIENTATION: ANTERIOR;LOWER

## 2018-09-23 ASSESSMENT — PAIN DESCRIPTION - PAIN TYPE
TYPE: ACUTE PAIN

## 2018-09-23 NOTE — FLOWSHEET NOTE
09/23/18 1300   Urine Assessment   Urine Color Kimber   Urine Appearance Clear   Urine Odor No odor   Bladder Scan Volume (mL) 550 mL   $ Bladder scan $ Yes   Intermittent/Straight Cath (mL) 400 mL   $ Cath urethra straight $ Yes

## 2018-09-24 ENCOUNTER — APPOINTMENT (OUTPATIENT)
Dept: CT IMAGING | Age: 58
DRG: 949 | End: 2018-09-24
Attending: PHYSICAL MEDICINE & REHABILITATION
Payer: COMMERCIAL

## 2018-09-24 LAB
ALBUMIN SERPL-MCNC: 4 G/DL (ref 3.5–5.2)
ALP BLD-CCNC: 90 U/L (ref 35–104)
ALT SERPL-CCNC: 36 U/L (ref 0–32)
ANION GAP SERPL CALCULATED.3IONS-SCNC: 15 MMOL/L (ref 7–16)
AST SERPL-CCNC: 11 U/L (ref 0–31)
BASOPHILS ABSOLUTE: 0.05 E9/L (ref 0–0.2)
BASOPHILS RELATIVE PERCENT: 0.5 % (ref 0–2)
BILIRUB SERPL-MCNC: 0.4 MG/DL (ref 0–1.2)
BILIRUBIN URINE: NEGATIVE
BLOOD, URINE: NEGATIVE
BUN BLDV-MCNC: 52 MG/DL (ref 6–20)
CALCIUM SERPL-MCNC: 9.8 MG/DL (ref 8.6–10.2)
CHLORIDE BLD-SCNC: 93 MMOL/L (ref 98–107)
CLARITY: CLEAR
CO2: 26 MMOL/L (ref 22–29)
COLOR: YELLOW
CREAT SERPL-MCNC: 0.9 MG/DL (ref 0.5–1)
EOSINOPHILS ABSOLUTE: 0.18 E9/L (ref 0.05–0.5)
EOSINOPHILS RELATIVE PERCENT: 1.7 % (ref 0–6)
GFR AFRICAN AMERICAN: >60
GFR NON-AFRICAN AMERICAN: >60 ML/MIN/1.73
GLUCOSE BLD-MCNC: 156 MG/DL (ref 74–109)
GLUCOSE URINE: NEGATIVE MG/DL
HCT VFR BLD CALC: 38.9 % (ref 34–48)
HEMOGLOBIN: 12.8 G/DL (ref 11.5–15.5)
IMMATURE GRANULOCYTES #: 0.08 E9/L
IMMATURE GRANULOCYTES %: 0.7 % (ref 0–5)
KETONES, URINE: NEGATIVE MG/DL
LEUKOCYTE ESTERASE, URINE: NEGATIVE
LYMPHOCYTES ABSOLUTE: 1.85 E9/L (ref 1.5–4)
LYMPHOCYTES RELATIVE PERCENT: 17.1 % (ref 20–42)
MCH RBC QN AUTO: 31.6 PG (ref 26–35)
MCHC RBC AUTO-ENTMCNC: 32.9 % (ref 32–34.5)
MCV RBC AUTO: 96 FL (ref 80–99.9)
MONOCYTES ABSOLUTE: 0.85 E9/L (ref 0.1–0.95)
MONOCYTES RELATIVE PERCENT: 7.9 % (ref 2–12)
NEUTROPHILS ABSOLUTE: 7.79 E9/L (ref 1.8–7.3)
NEUTROPHILS RELATIVE PERCENT: 72.1 % (ref 43–80)
NITRITE, URINE: NEGATIVE
PDW BLD-RTO: 12.8 FL (ref 11.5–15)
PH UA: 6 (ref 5–9)
PLATELET # BLD: 464 E9/L (ref 130–450)
PMV BLD AUTO: 9.3 FL (ref 7–12)
POTASSIUM SERPL-SCNC: 3.4 MMOL/L (ref 3.5–5)
PROTEIN UA: NEGATIVE MG/DL
RBC # BLD: 4.05 E12/L (ref 3.5–5.5)
SODIUM BLD-SCNC: 134 MMOL/L (ref 132–146)
SPECIFIC GRAVITY UA: 1.01 (ref 1–1.03)
TOTAL PROTEIN: 7.4 G/DL (ref 6.4–8.3)
UROBILINOGEN, URINE: 1 E.U./DL
WBC # BLD: 10.8 E9/L (ref 4.5–11.5)

## 2018-09-24 PROCEDURE — 80053 COMPREHEN METABOLIC PANEL: CPT

## 2018-09-24 PROCEDURE — 97535 SELF CARE MNGMENT TRAINING: CPT

## 2018-09-24 PROCEDURE — 1280000000 HC REHAB R&B

## 2018-09-24 PROCEDURE — 97530 THERAPEUTIC ACTIVITIES: CPT

## 2018-09-24 PROCEDURE — 6370000000 HC RX 637 (ALT 250 FOR IP): Performed by: INTERNAL MEDICINE

## 2018-09-24 PROCEDURE — 97110 THERAPEUTIC EXERCISES: CPT

## 2018-09-24 PROCEDURE — 99232 SBSQ HOSP IP/OBS MODERATE 35: CPT | Performed by: PHYSICAL MEDICINE & REHABILITATION

## 2018-09-24 PROCEDURE — 6360000002 HC RX W HCPCS: Performed by: INTERNAL MEDICINE

## 2018-09-24 PROCEDURE — 94640 AIRWAY INHALATION TREATMENT: CPT

## 2018-09-24 PROCEDURE — 51701 INSERT BLADDER CATHETER: CPT

## 2018-09-24 PROCEDURE — 6370000000 HC RX 637 (ALT 250 FOR IP): Performed by: PHYSICAL MEDICINE & REHABILITATION

## 2018-09-24 PROCEDURE — 51798 US URINE CAPACITY MEASURE: CPT

## 2018-09-24 PROCEDURE — 2580000003 HC RX 258: Performed by: PHYSICAL MEDICINE & REHABILITATION

## 2018-09-24 PROCEDURE — 97112 NEUROMUSCULAR REEDUCATION: CPT

## 2018-09-24 PROCEDURE — 36415 COLL VENOUS BLD VENIPUNCTURE: CPT

## 2018-09-24 PROCEDURE — 87088 URINE BACTERIA CULTURE: CPT

## 2018-09-24 PROCEDURE — 81003 URINALYSIS AUTO W/O SCOPE: CPT

## 2018-09-24 PROCEDURE — 70450 CT HEAD/BRAIN W/O DYE: CPT

## 2018-09-24 PROCEDURE — 85025 COMPLETE CBC W/AUTO DIFF WBC: CPT

## 2018-09-24 RX ORDER — AMOXICILLIN 250 MG/1
500 CAPSULE ORAL EVERY 12 HOURS SCHEDULED
Status: COMPLETED | OUTPATIENT
Start: 2018-09-24 | End: 2018-09-28

## 2018-09-24 RX ORDER — SODIUM CHLORIDE 9 MG/ML
INJECTION, SOLUTION INTRAVENOUS CONTINUOUS
Status: DISCONTINUED | OUTPATIENT
Start: 2018-09-24 | End: 2018-10-02

## 2018-09-24 RX ORDER — SENNA PLUS 8.6 MG/1
1 TABLET ORAL NIGHTLY PRN
Status: DISCONTINUED | OUTPATIENT
Start: 2018-09-24 | End: 2018-10-16 | Stop reason: HOSPADM

## 2018-09-24 RX ORDER — DOCUSATE SODIUM 100 MG/1
100 CAPSULE, LIQUID FILLED ORAL 2 TIMES DAILY PRN
Status: DISCONTINUED | OUTPATIENT
Start: 2018-09-24 | End: 2018-10-16 | Stop reason: HOSPADM

## 2018-09-24 RX ORDER — POTASSIUM CHLORIDE 750 MG/1
10 TABLET, EXTENDED RELEASE ORAL
Status: DISCONTINUED | OUTPATIENT
Start: 2018-09-25 | End: 2018-10-16 | Stop reason: HOSPADM

## 2018-09-24 RX ORDER — SODIUM CHLORIDE 9 MG/ML
INJECTION, SOLUTION INTRAVENOUS CONTINUOUS
Status: ACTIVE | OUTPATIENT
Start: 2018-09-24 | End: 2018-09-24

## 2018-09-24 RX ADMIN — GABAPENTIN 100 MG: 100 CAPSULE ORAL at 20:48

## 2018-09-24 RX ADMIN — SODIUM CHLORIDE: 9 INJECTION, SOLUTION INTRAVENOUS at 19:48

## 2018-09-24 RX ADMIN — IPRATROPIUM BROMIDE AND ALBUTEROL SULFATE 1 AMPULE: .5; 3 SOLUTION RESPIRATORY (INHALATION) at 15:47

## 2018-09-24 RX ADMIN — METOPROLOL TARTRATE 50 MG: 50 TABLET ORAL at 09:11

## 2018-09-24 RX ADMIN — GABAPENTIN 100 MG: 100 CAPSULE ORAL at 14:25

## 2018-09-24 RX ADMIN — AMLODIPINE BESYLATE 10 MG: 10 TABLET ORAL at 09:11

## 2018-09-24 RX ADMIN — HYDRALAZINE HYDROCHLORIDE 25 MG: 25 TABLET ORAL at 14:28

## 2018-09-24 RX ADMIN — ACETAMINOPHEN 650 MG: 325 TABLET, FILM COATED ORAL at 17:35

## 2018-09-24 RX ADMIN — HYDRALAZINE HYDROCHLORIDE 25 MG: 25 TABLET ORAL at 22:21

## 2018-09-24 RX ADMIN — CHLORTHALIDONE 25 MG: 25 TABLET ORAL at 09:11

## 2018-09-24 RX ADMIN — SODIUM CHLORIDE: 9 INJECTION, SOLUTION INTRAVENOUS at 22:13

## 2018-09-24 RX ADMIN — LOSARTAN POTASSIUM 100 MG: 50 TABLET, FILM COATED ORAL at 09:11

## 2018-09-24 RX ADMIN — IPRATROPIUM BROMIDE AND ALBUTEROL SULFATE 1 AMPULE: .5; 3 SOLUTION RESPIRATORY (INHALATION) at 09:29

## 2018-09-24 RX ADMIN — PANTOPRAZOLE SODIUM 40 MG: 40 TABLET, DELAYED RELEASE ORAL at 06:51

## 2018-09-24 RX ADMIN — NIMODIPINE 60 MG: 30 CAPSULE ORAL at 04:41

## 2018-09-24 RX ADMIN — IPRATROPIUM BROMIDE AND ALBUTEROL SULFATE 1 AMPULE: .5; 3 SOLUTION RESPIRATORY (INHALATION) at 19:43

## 2018-09-24 RX ADMIN — ENOXAPARIN SODIUM 40 MG: 40 INJECTION SUBCUTANEOUS at 09:21

## 2018-09-24 RX ADMIN — HYDRALAZINE HYDROCHLORIDE 25 MG: 25 TABLET ORAL at 06:51

## 2018-09-24 RX ADMIN — ACETAMINOPHEN 650 MG: 325 TABLET, FILM COATED ORAL at 01:56

## 2018-09-24 RX ADMIN — NIMODIPINE 60 MG: 30 CAPSULE ORAL at 20:47

## 2018-09-24 RX ADMIN — AMOXICILLIN 500 MG: 250 CAPSULE ORAL at 22:20

## 2018-09-24 RX ADMIN — METOPROLOL TARTRATE 50 MG: 50 TABLET ORAL at 20:48

## 2018-09-24 RX ADMIN — ACETAMINOPHEN 650 MG: 325 TABLET, FILM COATED ORAL at 09:19

## 2018-09-24 RX ADMIN — GABAPENTIN 100 MG: 100 CAPSULE ORAL at 09:11

## 2018-09-24 RX ADMIN — NIMODIPINE 30 MG: 30 CAPSULE ORAL at 09:09

## 2018-09-24 ASSESSMENT — PAIN DESCRIPTION - DESCRIPTORS
DESCRIPTORS: CONSTANT
DESCRIPTORS: CONSTANT;DISCOMFORT;SORE

## 2018-09-24 ASSESSMENT — PAIN DESCRIPTION - FREQUENCY
FREQUENCY: INTERMITTENT
FREQUENCY: CONTINUOUS

## 2018-09-24 ASSESSMENT — PAIN DESCRIPTION - PAIN TYPE
TYPE: CHRONIC PAIN
TYPE: ACUTE PAIN
TYPE: CHRONIC PAIN

## 2018-09-24 ASSESSMENT — PAIN SCALES - GENERAL
PAINLEVEL_OUTOF10: 7
PAINLEVEL_OUTOF10: 7
PAINLEVEL_OUTOF10: 0
PAINLEVEL_OUTOF10: 7
PAINLEVEL_OUTOF10: 7
PAINLEVEL_OUTOF10: 0

## 2018-09-24 ASSESSMENT — PAIN DESCRIPTION - ONSET: ONSET: ON-GOING

## 2018-09-24 ASSESSMENT — PAIN DESCRIPTION - ORIENTATION
ORIENTATION: LEFT
ORIENTATION: LOWER;RIGHT;LEFT

## 2018-09-24 ASSESSMENT — PAIN DESCRIPTION - LOCATION
LOCATION: GENERALIZED
LOCATION: LEG

## 2018-09-24 NOTE — FLOWSHEET NOTE
09/24/18 0115 09/24/18 0650 09/24/18 0744   Output (mL)   Urine 0 mL 0 mL --    Urine Assessment   Incontinence No No --    Bladder Scan Volume (mL) 239 mL  (Denies discomfort. Will monitor.) 587 mL --    $ Bladder scan $ Yes $ Yes --    Intermittent/Straight Cath (mL) --  (patient refused until later.  states she just got dressed.) 675 mL

## 2018-09-24 NOTE — PROGRESS NOTES
Pt has requested to use the bedpan and go to the bathroom multiple times this shift with no success. Pt has the urge to urinate but is unable to do so on her own.  Kely Gusman RN

## 2018-09-24 NOTE — PROGRESS NOTES
supplementation, encourage PO      Electronically signed by Francisco Hurley MD on 9/24/2018 at 3:45 PM

## 2018-09-25 LAB
ANION GAP SERPL CALCULATED.3IONS-SCNC: 18 MMOL/L (ref 7–16)
BUN BLDV-MCNC: 38 MG/DL (ref 6–20)
CALCIUM SERPL-MCNC: 9.4 MG/DL (ref 8.6–10.2)
CHLORIDE BLD-SCNC: 95 MMOL/L (ref 98–107)
CO2: 23 MMOL/L (ref 22–29)
CREAT SERPL-MCNC: 0.6 MG/DL (ref 0.5–1)
GFR AFRICAN AMERICAN: >60
GFR NON-AFRICAN AMERICAN: >60 ML/MIN/1.73
GLUCOSE BLD-MCNC: 121 MG/DL (ref 74–109)
ORGANISM: ABNORMAL
POTASSIUM SERPL-SCNC: 3.7 MMOL/L (ref 3.5–5)
SODIUM BLD-SCNC: 136 MMOL/L (ref 132–146)
URINE CULTURE, ROUTINE: ABNORMAL
URINE CULTURE, ROUTINE: ABNORMAL

## 2018-09-25 PROCEDURE — 6360000002 HC RX W HCPCS: Performed by: INTERNAL MEDICINE

## 2018-09-25 PROCEDURE — 51798 US URINE CAPACITY MEASURE: CPT

## 2018-09-25 PROCEDURE — 51701 INSERT BLADDER CATHETER: CPT

## 2018-09-25 PROCEDURE — 97530 THERAPEUTIC ACTIVITIES: CPT

## 2018-09-25 PROCEDURE — 6370000000 HC RX 637 (ALT 250 FOR IP): Performed by: INTERNAL MEDICINE

## 2018-09-25 PROCEDURE — 2580000003 HC RX 258: Performed by: PHYSICAL MEDICINE & REHABILITATION

## 2018-09-25 PROCEDURE — 94640 AIRWAY INHALATION TREATMENT: CPT

## 2018-09-25 PROCEDURE — 6370000000 HC RX 637 (ALT 250 FOR IP): Performed by: PHYSICAL MEDICINE & REHABILITATION

## 2018-09-25 PROCEDURE — 1280000000 HC REHAB R&B

## 2018-09-25 PROCEDURE — 97127 HC SP THER IVNTJ W/FOCUS COG FUNCJ: CPT | Performed by: SPEECH-LANGUAGE PATHOLOGIST

## 2018-09-25 PROCEDURE — 36415 COLL VENOUS BLD VENIPUNCTURE: CPT

## 2018-09-25 PROCEDURE — 80048 BASIC METABOLIC PNL TOTAL CA: CPT

## 2018-09-25 PROCEDURE — 90791 PSYCH DIAGNOSTIC EVALUATION: CPT | Performed by: PSYCHOLOGIST

## 2018-09-25 PROCEDURE — 99232 SBSQ HOSP IP/OBS MODERATE 35: CPT | Performed by: PHYSICAL MEDICINE & REHABILITATION

## 2018-09-25 RX ORDER — CELECOXIB 100 MG/1
200 CAPSULE ORAL 2 TIMES DAILY
Status: COMPLETED | OUTPATIENT
Start: 2018-09-25 | End: 2018-09-28

## 2018-09-25 RX ADMIN — AMLODIPINE BESYLATE 10 MG: 10 TABLET ORAL at 09:32

## 2018-09-25 RX ADMIN — CELECOXIB 200 MG: 100 CAPSULE ORAL at 13:36

## 2018-09-25 RX ADMIN — HYDRALAZINE HYDROCHLORIDE 25 MG: 25 TABLET ORAL at 14:20

## 2018-09-25 RX ADMIN — AMOXICILLIN 500 MG: 250 CAPSULE ORAL at 20:47

## 2018-09-25 RX ADMIN — IPRATROPIUM BROMIDE AND ALBUTEROL SULFATE 1 AMPULE: .5; 3 SOLUTION RESPIRATORY (INHALATION) at 19:49

## 2018-09-25 RX ADMIN — METOPROLOL TARTRATE 50 MG: 50 TABLET ORAL at 20:47

## 2018-09-25 RX ADMIN — LOSARTAN POTASSIUM 100 MG: 50 TABLET, FILM COATED ORAL at 09:35

## 2018-09-25 RX ADMIN — CHLORTHALIDONE 25 MG: 25 TABLET ORAL at 09:34

## 2018-09-25 RX ADMIN — NIMODIPINE 60 MG: 30 CAPSULE ORAL at 20:15

## 2018-09-25 RX ADMIN — GABAPENTIN 100 MG: 100 CAPSULE ORAL at 20:48

## 2018-09-25 RX ADMIN — NIMODIPINE 60 MG: 30 CAPSULE ORAL at 09:15

## 2018-09-25 RX ADMIN — SODIUM CHLORIDE: 9 INJECTION, SOLUTION INTRAVENOUS at 20:58

## 2018-09-25 RX ADMIN — POTASSIUM CHLORIDE 10 MEQ: 750 TABLET, EXTENDED RELEASE ORAL at 09:29

## 2018-09-25 RX ADMIN — GABAPENTIN 100 MG: 100 CAPSULE ORAL at 09:30

## 2018-09-25 RX ADMIN — NIMODIPINE 30 MG: 30 CAPSULE ORAL at 00:38

## 2018-09-25 RX ADMIN — NIMODIPINE 60 MG: 30 CAPSULE ORAL at 04:24

## 2018-09-25 RX ADMIN — HYDRALAZINE HYDROCHLORIDE 25 MG: 25 TABLET ORAL at 06:31

## 2018-09-25 RX ADMIN — IPRATROPIUM BROMIDE AND ALBUTEROL SULFATE 1 AMPULE: .5; 3 SOLUTION RESPIRATORY (INHALATION) at 08:28

## 2018-09-25 RX ADMIN — BUTALBITAL, ACETAMINOPHEN, AND CAFFEINE 1 TABLET: 50; 325; 40 TABLET ORAL at 16:12

## 2018-09-25 RX ADMIN — PANTOPRAZOLE SODIUM 40 MG: 40 TABLET, DELAYED RELEASE ORAL at 06:19

## 2018-09-25 RX ADMIN — IPRATROPIUM BROMIDE AND ALBUTEROL SULFATE 1 AMPULE: .5; 3 SOLUTION RESPIRATORY (INHALATION) at 16:05

## 2018-09-25 RX ADMIN — GABAPENTIN 100 MG: 100 CAPSULE ORAL at 14:20

## 2018-09-25 RX ADMIN — HYDRALAZINE HYDROCHLORIDE 25 MG: 25 TABLET ORAL at 20:47

## 2018-09-25 RX ADMIN — METOPROLOL TARTRATE 50 MG: 50 TABLET ORAL at 09:36

## 2018-09-25 RX ADMIN — NIMODIPINE 30 MG: 30 CAPSULE ORAL at 13:03

## 2018-09-25 RX ADMIN — AMOXICILLIN 500 MG: 250 CAPSULE ORAL at 09:29

## 2018-09-25 RX ADMIN — ENOXAPARIN SODIUM 40 MG: 40 INJECTION SUBCUTANEOUS at 09:39

## 2018-09-25 RX ADMIN — CELECOXIB 200 MG: 100 CAPSULE ORAL at 20:48

## 2018-09-25 ASSESSMENT — PAIN DESCRIPTION - LOCATION
LOCATION: BACK
LOCATION: BACK;LEG

## 2018-09-25 ASSESSMENT — PAIN SCALES - GENERAL
PAINLEVEL_OUTOF10: 8
PAINLEVEL_OUTOF10: 0
PAINLEVEL_OUTOF10: 7
PAINLEVEL_OUTOF10: 5
PAINLEVEL_OUTOF10: 4
PAINLEVEL_OUTOF10: 3

## 2018-09-25 ASSESSMENT — PAIN DESCRIPTION - PAIN TYPE
TYPE: CHRONIC PAIN
TYPE: CHRONIC PAIN

## 2018-09-25 ASSESSMENT — PAIN DESCRIPTION - DESCRIPTORS
DESCRIPTORS: ACHING;CRUSHING;DULL
DESCRIPTORS: ACHING;CONSTANT;DISCOMFORT

## 2018-09-25 ASSESSMENT — PAIN DESCRIPTION - FREQUENCY
FREQUENCY: CONTINUOUS
FREQUENCY: INTERMITTENT

## 2018-09-25 ASSESSMENT — PAIN DESCRIPTION - ORIENTATION: ORIENTATION: LOWER;RIGHT;LEFT

## 2018-09-25 NOTE — PATIENT CARE CONFERENCE
level: max assist-1-2, transfer board  is mod assist-max assist of 2  Short term Chair/bed transfers goal: min assist  Long term Chair/bed transfers goal: standby assist      Ambulation:   Current level: 5 ft max assist with hemicane, right upper in sling, wheelchair follows  Short term ambulation goal: 50 ft min assist  Long term ambulation goal: 150 standby assist    Car transfers:   Current level: to be assessed    Stairs:   Current level : to be assessed    Lower Extremity Strength Issues:    Right LE: grossly 3+/5     Left LE: hip: 3-/5  Knee: 3-/5  Ankle: 2-/5        Other comments: pain hinders      OCCUPATIONAL THERAPY:      Tub/shower:   Current level: to be assessed    Feeding:  Current level: min assist  Short term feeding goal: supervision  Long term feeding goal:independent    Grooming:   Current level: min assist, neglect present with right arm  Short term grooming goal: supervision  Long term grooming goal: Modified independent    Bathing:  Current level: max assist, pain limits  Short term bathing goal: mod assist  Long term bathing goal: min assist    Homemaking:   Current level: NA    Upper body dressing:  Current level: max assist  Short term upper body dressing goal: mod assist  Long term upper body dressing goal: supervision    Lower body dressing:  Current level: dependent  Short term lower body dressing goal: max assist  Long term lower body dressing goal: min assist    Toilet transfer:   Current level: max assist-dependent  Short term toilet transfer goal: max assist-mod assist  Long term toilet transfer goal: min assist        Other comments: pain hinders        SPEECH THERAPY  Swallowing:  Current level:  Modified independent  Short term swallowing goal: Modified independent  Long term swallowing Goal: Modified independent    Comprehension:   Current level: min assist  Short term comprehension goal: supervision  Long term comprehension goal: supervision    Expression:   Current level: min assist  Short term expression goal: supervision  Long term expression goal: supervision    Problem solving:   Current level: min assist  Short term problem solving goal: supervision  Long term problem solving goal: supervision    Memory:  Current level: min assist  Short term memory goal: supervision  Long term memory goal: supervision    Social interaction:  min assist    Safety awareness: fair-      Patient/family's personal goals: \"get back to work\"  Factors supporting goal achievement:  prior level of independence  Factors hindering goal achievement:  back and headache pain      Discharge Plan   Estimated Length of 4 weeks            Destination: home  Services at Discharge: to be assessed  Equipment at Discharge: to be assessed      INTERDISCIPLINARY TEAM/PHYSICIAN RECOMMENDATION AND/OR REVISIONS OF PLAN OF CARE:  Dr. Dilip Najera to see for adjustment reaction, start Celebrex for pain, Amoxil for urinary symptoms      I approve the established interdisciplinary plan of care as documented within the medical record of Kimberley Morataya. Please see team conference signature page for those in attendance.     Electronically signed by Fatou Sims RN  on 9/25/2018 at 10:25 AM

## 2018-09-25 NOTE — FLOWSHEET NOTE
09/25/18 1300   Urine Assessment   Incontinence No  (patient up to the toilet and voided (missed the hat))   Bladder Scan Volume (mL) 168 mL   $ Bladder scan $ Yes   Unmeasured Output   Urine Occurrence 1  (missed the hat)       Patient up to toilet, voided large amount, unmeasured due to missing the hat. PVR for 168.

## 2018-09-25 NOTE — PROGRESS NOTES
Monitor neuro status. Continue acute rehab program. Interval HCT 9/24 stable. -Headache: continue PRN tylenol, Fioricet. Headaches improving, prn medication use decreasing. -HTN: Blood pressure control improving. Monitor. BP medications per IM.   -Low back pain: check xray. Patient does not tolerate narcotics, too drowsy. Not controlled with tylenol. Received IV toradol on acute care and she reports it helped. Will try oral nsaid for a few days, but would avoid long term. -Dysuria: UA negative, culture however growing strep > 100,000. She was symptomatic, thus opt to treat. No fevers. No WBC elevation.   -Intermittent urinary retention. At times she is voiding well on commode, not voiding well on bed pan. Nursing to get patient up to commode for all voids. Check PVRs. IC if needed per order parameters. As intermittent, likely related to positioning and immobility. Will see if she empties better when consistently up to commode and not in bed, if no improvement consider urology. Monitor closely  -Poor oral intake - gentle IVF supplementation, encourage PO. PO fluid intake better this am, if she continues to take better po, consider decreasing or stopping IVF.      Psychology for adjustment, coping     Electronically signed by Heather Servin MD on 9/25/2018 at 12:02 PM

## 2018-09-25 NOTE — PROGRESS NOTES
Physical Therapy    [address]Department[address]  Daily Treatment Note    NAME: Roz Castellanos  : 1960  MRN: [id number]    Date of Service: 2018    Evaluating Therapist: Hillary Grossman DPT    ROOM: 8837/8173-[id number]  DIAGNOSIS: SAH    PMH: To ED on 9/10/18 for HA with CT shows SAH. CTA of the head, which demonstrated left ICA aneurysms. Subarachnoid hemorrhage secondary to hypertension with ruptured aneurysm- s/p coiling of aneurysm with endovascular coil embolization of 2 distal L ICA aneurysms  And noted saccular aneurysm of right MCA on 9/10/18 and trans catheter infusion of verapamil. Post op, She developed cerebral vasospasm. PRECAUTIONS: SBP less than 150mmHg,  Right hemiparesis, falls, latex allergy    Social:  Pt lives with son and grand daughter in a 2 floor plan, 1st floor setup with 4 step(s) and 1 rail(s) to enter on left per chart. Patient reporting 6 steps to enter with left HR. Prior to admission pt walked with no device and was Independent.  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 Initial Evaluation  18  AM P.m. Short Term Goals Long Term Goals    Was pt agreeable to Eval/treatment? yes Yes yes     Does pt have pain? Yes 10/10 low back pain with any movement. Nursing notified  Reported low back pain with all movement No c/o this p.m. Bed Mobility  Rolling: maxA  Supine to sit: maxA  Sit to supine: NT  Scooting: maxA Rolling: ModA  Supine to sit: Max A  Sit to supine: MaxA  Scooting: Max A N/T Kristi sba   Transfers Sit to stand: maxA  Stand to sit: maxA  Stand pivot: maxA HHA to left side Sit to stand: Max A  Stand to sit: Max A  Stand pivot:N/T Sit to stand MaxA   Stand to sit MaxA   Stand pivot MaxA Kristi with AAD sba with AAD   Ambulation    3 feet with maxA with HHA (side stepping EOB) Sidestepped toward HOB ModA +2 10 feet with lehman rail and 22 feet with lehman rail ModA and w/c follow .  See comments 50 feet with AAD with Kristi >150 feet with AAD with sba   Walking 10 feet on

## 2018-09-26 ENCOUNTER — APPOINTMENT (OUTPATIENT)
Dept: GENERAL RADIOLOGY | Age: 58
DRG: 949 | End: 2018-09-26
Attending: PHYSICAL MEDICINE & REHABILITATION
Payer: COMMERCIAL

## 2018-09-26 PROCEDURE — 6370000000 HC RX 637 (ALT 250 FOR IP): Performed by: PHYSICAL MEDICINE & REHABILITATION

## 2018-09-26 PROCEDURE — 2580000003 HC RX 258: Performed by: PHYSICAL MEDICINE & REHABILITATION

## 2018-09-26 PROCEDURE — 72100 X-RAY EXAM L-S SPINE 2/3 VWS: CPT

## 2018-09-26 PROCEDURE — 51798 US URINE CAPACITY MEASURE: CPT

## 2018-09-26 PROCEDURE — 97127 HC SP THER IVNTJ W/FOCUS COG FUNCJ: CPT

## 2018-09-26 PROCEDURE — 2580000003 HC RX 258

## 2018-09-26 PROCEDURE — 99232 SBSQ HOSP IP/OBS MODERATE 35: CPT | Performed by: PHYSICAL MEDICINE & REHABILITATION

## 2018-09-26 PROCEDURE — 97530 THERAPEUTIC ACTIVITIES: CPT

## 2018-09-26 PROCEDURE — 94640 AIRWAY INHALATION TREATMENT: CPT

## 2018-09-26 PROCEDURE — 6370000000 HC RX 637 (ALT 250 FOR IP): Performed by: INTERNAL MEDICINE

## 2018-09-26 PROCEDURE — 1280000000 HC REHAB R&B

## 2018-09-26 PROCEDURE — 97112 NEUROMUSCULAR REEDUCATION: CPT

## 2018-09-26 PROCEDURE — 97535 SELF CARE MNGMENT TRAINING: CPT

## 2018-09-26 PROCEDURE — 6360000002 HC RX W HCPCS: Performed by: INTERNAL MEDICINE

## 2018-09-26 RX ORDER — GABAPENTIN 100 MG/1
200 CAPSULE ORAL NIGHTLY
Status: DISCONTINUED | OUTPATIENT
Start: 2018-09-26 | End: 2018-10-02

## 2018-09-26 RX ORDER — SODIUM CHLORIDE 0.9 % (FLUSH) 0.9 %
SYRINGE (ML) INJECTION
Status: COMPLETED
Start: 2018-09-26 | End: 2018-09-26

## 2018-09-26 RX ADMIN — POTASSIUM CHLORIDE 10 MEQ: 750 TABLET, EXTENDED RELEASE ORAL at 09:22

## 2018-09-26 RX ADMIN — ACETAMINOPHEN 650 MG: 325 TABLET, FILM COATED ORAL at 11:44

## 2018-09-26 RX ADMIN — GABAPENTIN 200 MG: 100 CAPSULE ORAL at 20:46

## 2018-09-26 RX ADMIN — HYDRALAZINE HYDROCHLORIDE 25 MG: 25 TABLET ORAL at 21:47

## 2018-09-26 RX ADMIN — AMOXICILLIN 500 MG: 250 CAPSULE ORAL at 09:22

## 2018-09-26 RX ADMIN — HYDRALAZINE HYDROCHLORIDE 25 MG: 25 TABLET ORAL at 14:16

## 2018-09-26 RX ADMIN — CHLORTHALIDONE 25 MG: 25 TABLET ORAL at 09:22

## 2018-09-26 RX ADMIN — AMOXICILLIN 500 MG: 250 CAPSULE ORAL at 20:41

## 2018-09-26 RX ADMIN — PANTOPRAZOLE SODIUM 40 MG: 40 TABLET, DELAYED RELEASE ORAL at 06:42

## 2018-09-26 RX ADMIN — METOPROLOL TARTRATE 50 MG: 50 TABLET ORAL at 09:22

## 2018-09-26 RX ADMIN — CELECOXIB 200 MG: 100 CAPSULE ORAL at 20:46

## 2018-09-26 RX ADMIN — LOSARTAN POTASSIUM 100 MG: 50 TABLET, FILM COATED ORAL at 09:22

## 2018-09-26 RX ADMIN — CELECOXIB 200 MG: 100 CAPSULE ORAL at 09:22

## 2018-09-26 RX ADMIN — SODIUM CHLORIDE: 9 INJECTION, SOLUTION INTRAVENOUS at 20:05

## 2018-09-26 RX ADMIN — NIMODIPINE 30 MG: 30 CAPSULE ORAL at 09:21

## 2018-09-26 RX ADMIN — Medication 10 ML: at 20:40

## 2018-09-26 RX ADMIN — IPRATROPIUM BROMIDE AND ALBUTEROL SULFATE 1 AMPULE: .5; 3 SOLUTION RESPIRATORY (INHALATION) at 16:43

## 2018-09-26 RX ADMIN — ENOXAPARIN SODIUM 40 MG: 40 INJECTION SUBCUTANEOUS at 09:32

## 2018-09-26 RX ADMIN — AMLODIPINE BESYLATE 10 MG: 10 TABLET ORAL at 09:22

## 2018-09-26 ASSESSMENT — PAIN SCALES - GENERAL
PAINLEVEL_OUTOF10: 5
PAINLEVEL_OUTOF10: 0
PAINLEVEL_OUTOF10: 4
PAINLEVEL_OUTOF10: 5
PAINLEVEL_OUTOF10: 6
PAINLEVEL_OUTOF10: 4
PAINLEVEL_OUTOF10: 8

## 2018-09-26 ASSESSMENT — PAIN DESCRIPTION - FREQUENCY
FREQUENCY: CONTINUOUS
FREQUENCY: CONTINUOUS

## 2018-09-26 ASSESSMENT — PAIN DESCRIPTION - ORIENTATION
ORIENTATION: RIGHT;LEFT;LOWER
ORIENTATION: RIGHT;LEFT;LOWER

## 2018-09-26 ASSESSMENT — PAIN DESCRIPTION - PAIN TYPE
TYPE: CHRONIC PAIN
TYPE: CHRONIC PAIN

## 2018-09-26 ASSESSMENT — PAIN DESCRIPTION - LOCATION
LOCATION: BACK;LEG
LOCATION: BACK;LEG

## 2018-09-26 ASSESSMENT — PAIN DESCRIPTION - DESCRIPTORS
DESCRIPTORS: ACHING;DISCOMFORT
DESCRIPTORS: ACHING;DISCOMFORT

## 2018-09-26 NOTE — PROGRESS NOTES
however growing strep > 100,000. She was symptomatic, thus opt to treat. No fevers. No WBC elevation.   -Intermittent urinary retention. - Improved. Patient now voiding with low PVRs on commode. Nursing to get patient up to commode for all voids, as she retains if voiding laying on bedpan.   -Poor oral intake - PO intake improving. Decrease IVF supplementation to run only overnight and off during the day.  If PO continues to improve hopefully can dc soon     Psychology for adjustment, coping     Electronically signed by Tyler Ortiz MD on 9/26/2018 at 1:00 PM

## 2018-09-26 NOTE — PROGRESS NOTES
with Kristi >150 feet with AAD with sba   Walking 10 feet on uneven surface NT NT N/T     Wheel Chair Mobility NT NT N/T 150' with left hemibody cgA >300' with left hemibody sup   Car Transfers NT NT N/T Kristi with AAD sba with AAd   Stair negotiation: ascended and descended  NT                NT 2 steps L HR/ descend backwards maxA 4 steps with one rail with modA >6 steps with one rail with Kristi   Curb Step:   ascended and descended NT NT N/T 4 inch step with AAD and modA 4 inch step with AAD and Kristi   Picking up object off the floor NT NT N/T     BLE ROM WFL NT      BLE Strength Right LE: grossly 3+/5    Left LE: hip: 3-/5  Knee: 3-/5  Ankle: 2-/5 NT      Balance  Sitting: Kristi-sba (left lateral lean 2* back pain)    Standing: maxA Sitting EOB: CGA  Standing: modA with HW Standing Dynamically [address] [place]     Date Family Teach Completed TBA  Pt's brother, \"Rayo\" was present for observation 9/23      Is additional Family Teaching Needed? Y or N Pending improvement  Yes yes     Hindering Progress Hemiparesis, pain, balance Hemiparesis, pain, poor balance hemiparesis     PT recommended ELOS 4 weeks       Team's Discharge Plan        Therapist at Team Meeting          Therapeutic Exercise:   AM: n/a    PM: n/a    Patient education  Pt educated on importance of getting out of bed and moving around, ambulation pattern    Patient response to education:   Pt verbalized understanding Pt demonstrated skill Pt requires further education in this area   yes Yes verbal cues reinforcement     Additional Comments: Reviewed log roll technique in bed for mobility mobility. Pt able to tolerate and initiate rolling in bed more this date with reduced c/o pain. Pain increased in back with transitional movements but residing with static positioning. Pt c/o low back and leg pain throughout session.  Pt ambulated using L HR requiring verbal cues to weight shift to the left and for wider foot placement with R LE (pt tending to place at

## 2018-09-27 PROCEDURE — 97110 THERAPEUTIC EXERCISES: CPT

## 2018-09-27 PROCEDURE — 6370000000 HC RX 637 (ALT 250 FOR IP): Performed by: INTERNAL MEDICINE

## 2018-09-27 PROCEDURE — 97112 NEUROMUSCULAR REEDUCATION: CPT

## 2018-09-27 PROCEDURE — 97530 THERAPEUTIC ACTIVITIES: CPT

## 2018-09-27 PROCEDURE — 51798 US URINE CAPACITY MEASURE: CPT

## 2018-09-27 PROCEDURE — 97127 HC SP THER IVNTJ W/FOCUS COG FUNCJ: CPT

## 2018-09-27 PROCEDURE — 1280000000 HC REHAB R&B

## 2018-09-27 PROCEDURE — 2580000003 HC RX 258: Performed by: PHYSICAL MEDICINE & REHABILITATION

## 2018-09-27 PROCEDURE — 6360000002 HC RX W HCPCS: Performed by: INTERNAL MEDICINE

## 2018-09-27 PROCEDURE — 6370000000 HC RX 637 (ALT 250 FOR IP): Performed by: PHYSICAL MEDICINE & REHABILITATION

## 2018-09-27 PROCEDURE — 99232 SBSQ HOSP IP/OBS MODERATE 35: CPT | Performed by: PHYSICAL MEDICINE & REHABILITATION

## 2018-09-27 PROCEDURE — 94640 AIRWAY INHALATION TREATMENT: CPT

## 2018-09-27 RX ADMIN — ACETAMINOPHEN 650 MG: 325 TABLET, FILM COATED ORAL at 21:03

## 2018-09-27 RX ADMIN — METOPROLOL TARTRATE 50 MG: 50 TABLET ORAL at 21:04

## 2018-09-27 RX ADMIN — POTASSIUM CHLORIDE 10 MEQ: 750 TABLET, EXTENDED RELEASE ORAL at 08:37

## 2018-09-27 RX ADMIN — HYDRALAZINE HYDROCHLORIDE 25 MG: 25 TABLET ORAL at 22:51

## 2018-09-27 RX ADMIN — HYDRALAZINE HYDROCHLORIDE 25 MG: 25 TABLET ORAL at 14:30

## 2018-09-27 RX ADMIN — SODIUM CHLORIDE: 9 INJECTION, SOLUTION INTRAVENOUS at 00:02

## 2018-09-27 RX ADMIN — METOPROLOL TARTRATE 50 MG: 50 TABLET ORAL at 08:37

## 2018-09-27 RX ADMIN — HYDRALAZINE HYDROCHLORIDE 25 MG: 25 TABLET ORAL at 06:34

## 2018-09-27 RX ADMIN — GABAPENTIN 200 MG: 100 CAPSULE ORAL at 21:04

## 2018-09-27 RX ADMIN — CHLORTHALIDONE 25 MG: 25 TABLET ORAL at 08:36

## 2018-09-27 RX ADMIN — NIMODIPINE 30 MG: 30 CAPSULE ORAL at 08:37

## 2018-09-27 RX ADMIN — NIMODIPINE 30 MG: 30 CAPSULE ORAL at 16:46

## 2018-09-27 RX ADMIN — PANTOPRAZOLE SODIUM 40 MG: 40 TABLET, DELAYED RELEASE ORAL at 06:34

## 2018-09-27 RX ADMIN — IPRATROPIUM BROMIDE AND ALBUTEROL SULFATE 1 AMPULE: .5; 3 SOLUTION RESPIRATORY (INHALATION) at 17:04

## 2018-09-27 RX ADMIN — AMOXICILLIN 500 MG: 250 CAPSULE ORAL at 08:37

## 2018-09-27 RX ADMIN — ENOXAPARIN SODIUM 40 MG: 40 INJECTION SUBCUTANEOUS at 08:36

## 2018-09-27 RX ADMIN — AMOXICILLIN 500 MG: 250 CAPSULE ORAL at 21:04

## 2018-09-27 RX ADMIN — CELECOXIB 200 MG: 100 CAPSULE ORAL at 21:04

## 2018-09-27 RX ADMIN — AMLODIPINE BESYLATE 10 MG: 10 TABLET ORAL at 08:37

## 2018-09-27 RX ADMIN — NIMODIPINE 30 MG: 30 CAPSULE ORAL at 21:01

## 2018-09-27 RX ADMIN — LOSARTAN POTASSIUM 100 MG: 50 TABLET, FILM COATED ORAL at 08:38

## 2018-09-27 RX ADMIN — CELECOXIB 200 MG: 100 CAPSULE ORAL at 08:38

## 2018-09-27 ASSESSMENT — PAIN SCALES - GENERAL
PAINLEVEL_OUTOF10: 5
PAINLEVEL_OUTOF10: 0
PAINLEVEL_OUTOF10: 0

## 2018-09-27 NOTE — PROGRESS NOTES
[facility] Physical Medicine and Rehabilitation  Comprehensive Progress Note    Subjective:      Constanza Johnson is a 60 y.o. female admitted to inpatient rehabilitation for [address] due to ruptured intracranial aneurysm. No acute events overnight. Patient reports pain continues to be improved. Patient now voiding well. Dysuria resolved. No cp, sob, n/v reported. Progressing, walked 48 ft with therapy assistance. The patient's medical records have been reviewed. Scheduled Meds:    gabapentin 200 mg Nightly   celecoxib 200 mg BID   potassium chloride 10 mEq Daily with breakfast   amoxicillin 500 mg 2 times per day   hydrALAZINE 25 mg 3 times per day   lidocaine 1 patch Daily   amLODIPine 10 mg Daily   chlorthalidone 25 mg Daily   ipratropium-albuterol 1 ampule 4x daily   losartan 100 mg Daily   metoprolol tartrate 50 mg BID   niMODipine 60 mg 6 times per day   pantoprazole 40 mg QAM AC   enoxaparin 40 mg Daily     Continuous Infusions:   sodium chloride 80 mL/hr at 09/27/18 0002     PRN Meds:    docusate sodium 100 mg BID PRN   senna 1 tablet Nightly PRN   acetaminophen 650 mg Q6H PRN   bisacodyl 10 mg Daily PRN   butalbital-acetaminophen-caffeine 1 tablet Q6H PRN   magnesium hydroxide 30 mL Daily PRN        Objective:      Vitals:    09/26/18 2147 09/27/18 0615 09/27/18 0836 09/27/18 1204   BP: 106/62 134/70 (!) 125/53    Pulse:  94 94    Resp:  16     Temp:  98.6 °F (37 °C)     TempSrc:  Temporal     SpO2:       Weight:       Height:    5' 7\" (1.702 m)     General appearance: Alert, NAD, seen in dining room  Lungs: CTA b/l, no w/r/r  Heart: RRR  Abdomen: soft, non-tender, normal bowel sounds, no pain or guarding with palpation. No costovertebral angle tenderness or pain with percussion. Musculoskeletal: Range of motion limited by weakness on R, normal on L. No lumbar midline or paraspinal tenderness. Neurologic: AAOx3. Speech clear, answers questions appropriately. Follows commands.  R facial droop,

## 2018-09-27 NOTE — PROGRESS NOTES
Occupational Therapy  OCCUPATIONAL THERAPY DAILY NOTE    Date:2018  Patient Name: Gallito Manzano  MRN: [id number]  : 1960  Room: [address] [place] [address]     Diagnosis: s/p aneurysm coiling - CVA  Precautions: falls    Functional Assessment:   Date Status AE  Comments   Feeding 18 Minimal Assist      Grooming 18 Maximal Assist      Bathing 18 Maximal Assist      UB Dressing 18 Maximal Assist      LB Dressing 18 Max A  reacher    Homemaking         Functional Transfers / Balance:   Date Status DME  Comments   Sit Balance 18 Min A     Stand Balance 18 Mod- Max A Table top    [x] Tub  [] Shower   Transfer 18 Mod A Joe cane, extended tub bench  Assistance to lift BLEs over side of tub    Commode   Transfer 18 Min-Mod A Joe  cane VCs for hand placement   Functional   Mobility 18 Min-mod A Joe cane    Other:  Bed<>WC   18   Max A       Functional Exercises / Activity:  Courtney Mohs box with 1# weight with ace wrap to secure RUE to handle to increase BUE ROM, strength and endurance for independence with functional transfers and ADLs. Towel scrunches with R hand completed- pt demo'd poor skill with 2-/5 movement in digits 2, 4 & 5    Standing at table top pt completed prehension clip activity to increase stand balance, weight bearing through RUE and endurance. Pt completed X 2 stands with CGA-Min A    PROM to RUE 1 X 10 to increase ROM and maintain joint mobility    1# free weight 3 X 10 to LUE to increase strength for functional tasks and transfers      Sensory / Neuromuscular Re-Education:      Cognitive Skills:   Status Comments   Problem   Solving fair     Memory fair     Sequencing fair     Safety fair -       Visual Perception:    Education:  Pt educated on hand placement during functional transfers    [] Family teach completed on:    Pain Level:      Additional Notes:   During PM session pt completed commode transfer from [facility]- pt reports becoming dizzy and

## 2018-09-27 NOTE — PLAN OF CARE
Problem: Pain:  Goal: Pain level will decrease  Pain level will decrease   Outcome: Met This Shift      Problem: Falls - Risk of:  Goal: Will remain free from falls  Will remain free from falls   Outcome: Met This Shift      Problem: Risk for Impaired Skin Integrity  Goal: Tissue integrity - skin and mucous membranes  Structural intactness and normal physiological function of skin and  mucous membranes.    Outcome: Met This Shift

## 2018-09-27 NOTE — PROGRESS NOTES
Speech Language Pathology    Individual cognitive therapy completed to target goals identified on speech/language/cognitive eval. Patient was alert and cooperative. Patient oriented x3 this date with independent use of visual cues. Completed short term memory task with ~75% accuracy given min v/c from SLP. Attempted to engage patient in conversation regarding interests; however, patient reluctant to participate. Responses were vague/incomplete.     Kennedy Cormier CCC-SLP/L  SP 1162      Three Hour Rule Tracking:    Individual therapy:   15 minutes  Concurrent therapy:    0 minutes  Group therapy:     0 minutes  Co-treatment therapy:    0 minutes    Total minutes for 9/26/2018: 15 minutes

## 2018-09-27 NOTE — PROGRESS NOTES
Physical Therapy    Facility/Department: [facility]  Daily Treatment Note    NAME: Rae John  : 1960  MRN: [id number]    Date of Service: 2018    Evaluating Therapist: Aldo Yepez DPT    ROOM: [id number]  DIAGNOSIS: SAH    PMH: To ED on 9/10/18 for HA with CT shows SAH. CTA of the head, which demonstrated left ICA aneurysms. Subarachnoid hemorrhage secondary to hypertension with ruptured aneurysm- s/p coiling of aneurysm with endovascular coil embolization of 2 distal L ICA aneurysms  And noted saccular aneurysm of right MCA on 9/10/18 and trans catheter infusion of verapamil. Post op, She developed cerebral vasospasm. PRECAUTIONS: SBP less than 150mmHg,  Right hemiparesis, falls, latex allergy    Social:  Pt lives with son and grand daughter in a 2 floor plan, 1st floor setup with 4 step(s) and 1 rail(s) to enter on left per chart. Patient reporting 6 steps to enter with left HR. Prior to admission pt walked with no device and was Independent.  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 Initial Evaluation  18  AM PM Short Term Goals Long Term Goals    Was pt agreeable to Eval/treatment? yes Yes yes     Does pt have pain? Yes 10/10 low back pain with any movement. Nursing notified  Low back pain   low back pain, but tolerable     Bed Mobility  Rolling: maxA  Supine to sit: maxA  Sit to supine: NT  Scooting: maxA Rolling: Kristi  Supine to sit: ModA  Sit to supine: NT  Scooting: Max A Rolling: Kristi  Supine to sit: Kristi  Sit to supine: NT  Scooting:  Max A Kristi sba   Transfers Sit to stand: maxA  Stand to sit: maxA  Stand pivot: maxA HHA to left side Sit to stand: Kristi  Stand to sit: Kristi  Stand pivot: modA with LBQC Sit to stand: cg/Kristi  Stand to sit: cga  Stand pivot: min/modA with LBQC Kristi with AAD sba with AAD   Ambulation    3 feet with maxA with HHA (side stepping EOB) 30 feet with L LBQC Kristi 30 feet with L LBQC cg/Kristi 50 feet with AAD with Kristi >150 feet with AAD with sba

## 2018-09-28 LAB
ANION GAP SERPL CALCULATED.3IONS-SCNC: 17 MMOL/L (ref 7–16)
BUN BLDV-MCNC: 26 MG/DL (ref 6–20)
CALCIUM SERPL-MCNC: 8.9 MG/DL (ref 8.6–10.2)
CHLORIDE BLD-SCNC: 100 MMOL/L (ref 98–107)
CO2: 23 MMOL/L (ref 22–29)
CREAT SERPL-MCNC: 0.6 MG/DL (ref 0.5–1)
GFR AFRICAN AMERICAN: >60
GFR NON-AFRICAN AMERICAN: >60 ML/MIN/1.73
GLUCOSE BLD-MCNC: 94 MG/DL (ref 74–109)
ORGANISM: ABNORMAL
ORGANISM: ABNORMAL
POTASSIUM SERPL-SCNC: 3.6 MMOL/L (ref 3.5–5)
SODIUM BLD-SCNC: 140 MMOL/L (ref 132–146)
URINE CULTURE, ROUTINE: ABNORMAL

## 2018-09-28 PROCEDURE — 97535 SELF CARE MNGMENT TRAINING: CPT

## 2018-09-28 PROCEDURE — 6370000000 HC RX 637 (ALT 250 FOR IP): Performed by: PHYSICAL MEDICINE & REHABILITATION

## 2018-09-28 PROCEDURE — 94640 AIRWAY INHALATION TREATMENT: CPT

## 2018-09-28 PROCEDURE — 99231 SBSQ HOSP IP/OBS SF/LOW 25: CPT | Performed by: PHYSICAL MEDICINE & REHABILITATION

## 2018-09-28 PROCEDURE — 80048 BASIC METABOLIC PNL TOTAL CA: CPT

## 2018-09-28 PROCEDURE — 97112 NEUROMUSCULAR REEDUCATION: CPT

## 2018-09-28 PROCEDURE — 97110 THERAPEUTIC EXERCISES: CPT

## 2018-09-28 PROCEDURE — 1280000000 HC REHAB R&B

## 2018-09-28 PROCEDURE — 2580000003 HC RX 258: Performed by: PHYSICAL MEDICINE & REHABILITATION

## 2018-09-28 PROCEDURE — 97530 THERAPEUTIC ACTIVITIES: CPT

## 2018-09-28 PROCEDURE — 36415 COLL VENOUS BLD VENIPUNCTURE: CPT

## 2018-09-28 PROCEDURE — 97127 HC SP THER IVNTJ W/FOCUS COG FUNCJ: CPT

## 2018-09-28 PROCEDURE — 6370000000 HC RX 637 (ALT 250 FOR IP): Performed by: INTERNAL MEDICINE

## 2018-09-28 PROCEDURE — 6360000002 HC RX W HCPCS: Performed by: INTERNAL MEDICINE

## 2018-09-28 RX ADMIN — DOCUSATE SODIUM 100 MG: 100 CAPSULE, LIQUID FILLED ORAL at 08:13

## 2018-09-28 RX ADMIN — METOPROLOL TARTRATE 50 MG: 50 TABLET ORAL at 08:16

## 2018-09-28 RX ADMIN — GABAPENTIN 200 MG: 100 CAPSULE ORAL at 21:08

## 2018-09-28 RX ADMIN — NIMODIPINE 30 MG: 30 CAPSULE ORAL at 08:14

## 2018-09-28 RX ADMIN — BUTALBITAL, ACETAMINOPHEN, AND CAFFEINE 1 TABLET: 50; 325; 40 TABLET ORAL at 08:13

## 2018-09-28 RX ADMIN — LOSARTAN POTASSIUM 100 MG: 50 TABLET, FILM COATED ORAL at 08:16

## 2018-09-28 RX ADMIN — CELECOXIB 200 MG: 100 CAPSULE ORAL at 21:07

## 2018-09-28 RX ADMIN — AMOXICILLIN 500 MG: 250 CAPSULE ORAL at 21:07

## 2018-09-28 RX ADMIN — METOPROLOL TARTRATE 50 MG: 50 TABLET ORAL at 21:09

## 2018-09-28 RX ADMIN — ENOXAPARIN SODIUM 40 MG: 40 INJECTION SUBCUTANEOUS at 08:13

## 2018-09-28 RX ADMIN — POTASSIUM CHLORIDE 10 MEQ: 750 TABLET, EXTENDED RELEASE ORAL at 08:16

## 2018-09-28 RX ADMIN — IPRATROPIUM BROMIDE AND ALBUTEROL SULFATE 1 AMPULE: .5; 3 SOLUTION RESPIRATORY (INHALATION) at 13:12

## 2018-09-28 RX ADMIN — CELECOXIB 200 MG: 100 CAPSULE ORAL at 08:15

## 2018-09-28 RX ADMIN — ACETAMINOPHEN 650 MG: 325 TABLET, FILM COATED ORAL at 11:41

## 2018-09-28 RX ADMIN — PANTOPRAZOLE SODIUM 40 MG: 40 TABLET, DELAYED RELEASE ORAL at 07:00

## 2018-09-28 RX ADMIN — AMOXICILLIN 500 MG: 250 CAPSULE ORAL at 08:15

## 2018-09-28 RX ADMIN — SODIUM CHLORIDE: 9 INJECTION, SOLUTION INTRAVENOUS at 14:30

## 2018-09-28 RX ADMIN — SODIUM CHLORIDE: 9 INJECTION, SOLUTION INTRAVENOUS at 19:24

## 2018-09-28 RX ADMIN — HYDRALAZINE HYDROCHLORIDE 25 MG: 25 TABLET ORAL at 07:00

## 2018-09-28 RX ADMIN — HYDRALAZINE HYDROCHLORIDE 25 MG: 25 TABLET ORAL at 21:10

## 2018-09-28 RX ADMIN — CHLORTHALIDONE 25 MG: 25 TABLET ORAL at 08:15

## 2018-09-28 RX ADMIN — AMLODIPINE BESYLATE 10 MG: 10 TABLET ORAL at 08:16

## 2018-09-28 ASSESSMENT — PAIN DESCRIPTION - ONSET
ONSET: ON-GOING
ONSET: ON-GOING

## 2018-09-28 ASSESSMENT — PAIN DESCRIPTION - DESCRIPTORS
DESCRIPTORS: ACHING;DISCOMFORT;HEADACHE
DESCRIPTORS: ACHING;DISCOMFORT;DULL;SORE

## 2018-09-28 ASSESSMENT — PAIN DESCRIPTION - PROGRESSION
CLINICAL_PROGRESSION: NOT CHANGED
CLINICAL_PROGRESSION: NOT CHANGED

## 2018-09-28 ASSESSMENT — PAIN SCALES - GENERAL
PAINLEVEL_OUTOF10: 5
PAINLEVEL_OUTOF10: 2
PAINLEVEL_OUTOF10: 3
PAINLEVEL_OUTOF10: 6
PAINLEVEL_OUTOF10: 6
PAINLEVEL_OUTOF10: 5

## 2018-09-28 ASSESSMENT — PAIN DESCRIPTION - LOCATION
LOCATION: BACK
LOCATION: HEAD

## 2018-09-28 ASSESSMENT — PAIN DESCRIPTION - FREQUENCY
FREQUENCY: INTERMITTENT
FREQUENCY: CONTINUOUS

## 2018-09-28 ASSESSMENT — PAIN DESCRIPTION - ORIENTATION: ORIENTATION: LOWER

## 2018-09-28 ASSESSMENT — PAIN DESCRIPTION - PAIN TYPE: TYPE: CHRONIC PAIN

## 2018-09-28 NOTE — PROGRESS NOTES
[facility] Physical Medicine and Rehabilitation  Comprehensive Progress Note    Subjective:      Brian Sellers is a 60 y.o. female admitted to inpatient rehabilitation for [facility] due to ruptured intracranial aneurysm. No acute events overnight. No cp, sob, n/v reported. Progressing in therapy. No new issues reported. The patient's medical records have been reviewed. Scheduled Meds:    gabapentin 200 mg Nightly   celecoxib 200 mg BID   potassium chloride 10 mEq Daily with breakfast   amoxicillin 500 mg 2 times per day   hydrALAZINE 25 mg 3 times per day   lidocaine 1 patch Daily   amLODIPine 10 mg Daily   chlorthalidone 25 mg Daily   ipratropium-albuterol 1 ampule 4x daily   losartan 100 mg Daily   metoprolol tartrate 50 mg BID   pantoprazole 40 mg QAM AC   enoxaparin 40 mg Daily     Continuous Infusions:   sodium chloride 80 mL/hr at 09/1960     PRN Meds:    docusate sodium 100 mg BID PRN   senna 1 tablet Nightly PRN   acetaminophen 650 mg Q6H PRN   bisacodyl 10 mg Daily PRN   butalbital-acetaminophen-caffeine 1 tablet Q6H PRN   magnesium hydroxide 30 mL Daily PRN        Objective:      Vitals:    09/28/18 0811 09/28/18 1138 09/28/18 1332 09/28/18 1650   BP: 120/61 (!) 89/49 (!) 84/36 133/72   Pulse: 90 60 72 70   Resp:       Temp:       TempSrc:       SpO2:       Weight:       Height:         General appearance: Alert, NAD, seen in dining room  Lungs: CTA b/l, no w/r/r  Heart: RRR  Abdomen: soft, non-tender, normal bowel sounds, no pain or guarding with palpation. No costovertebral angle tenderness or pain with percussion. Musculoskeletal: Range of motion limited by weakness on R, normal on L. No lumbar midline or paraspinal tenderness. Neurologic: AAOx3. Speech clear, answers questions appropriately. Follows commands. R facial droop, unchanged.   Motor 1/5 RUE, 3/5 RLE, 4-5/5 on left side    Laboratory:    Lab Results   Component Value Date    WBC 10.8 09/24/2018    HGB 12.8 09/24/2018

## 2018-09-28 NOTE — PROGRESS NOTES
Notified Dr Zeinab Fish and Dr Jas Gonzalez regarding patient's low blood pressures. See new orders.

## 2018-09-29 PROCEDURE — 97530 THERAPEUTIC ACTIVITIES: CPT

## 2018-09-29 PROCEDURE — 94640 AIRWAY INHALATION TREATMENT: CPT

## 2018-09-29 PROCEDURE — 92508 TX SP LANG VOICE COMM GROUP: CPT

## 2018-09-29 PROCEDURE — 6360000002 HC RX W HCPCS: Performed by: INTERNAL MEDICINE

## 2018-09-29 PROCEDURE — 90832 PSYTX W PT 30 MINUTES: CPT | Performed by: PSYCHOLOGIST

## 2018-09-29 PROCEDURE — 6370000000 HC RX 637 (ALT 250 FOR IP): Performed by: INTERNAL MEDICINE

## 2018-09-29 PROCEDURE — 97110 THERAPEUTIC EXERCISES: CPT

## 2018-09-29 PROCEDURE — 97535 SELF CARE MNGMENT TRAINING: CPT

## 2018-09-29 PROCEDURE — 51798 US URINE CAPACITY MEASURE: CPT

## 2018-09-29 PROCEDURE — 1280000000 HC REHAB R&B

## 2018-09-29 PROCEDURE — 2580000003 HC RX 258: Performed by: PHYSICAL MEDICINE & REHABILITATION

## 2018-09-29 PROCEDURE — 2580000003 HC RX 258

## 2018-09-29 PROCEDURE — 6370000000 HC RX 637 (ALT 250 FOR IP): Performed by: PHYSICAL MEDICINE & REHABILITATION

## 2018-09-29 RX ORDER — SODIUM CHLORIDE 0.9 % (FLUSH) 0.9 %
SYRINGE (ML) INJECTION
Status: COMPLETED
Start: 2018-09-29 | End: 2018-09-29

## 2018-09-29 RX ORDER — AMLODIPINE BESYLATE 5 MG/1
5 TABLET ORAL DAILY
Status: DISCONTINUED | OUTPATIENT
Start: 2018-09-30 | End: 2018-10-16 | Stop reason: HOSPADM

## 2018-09-29 RX ADMIN — METOPROLOL TARTRATE 50 MG: 50 TABLET ORAL at 20:45

## 2018-09-29 RX ADMIN — LOSARTAN POTASSIUM 100 MG: 50 TABLET, FILM COATED ORAL at 08:46

## 2018-09-29 RX ADMIN — CHLORTHALIDONE 25 MG: 25 TABLET ORAL at 08:46

## 2018-09-29 RX ADMIN — METOPROLOL TARTRATE 50 MG: 50 TABLET ORAL at 08:46

## 2018-09-29 RX ADMIN — GABAPENTIN 200 MG: 100 CAPSULE ORAL at 20:41

## 2018-09-29 RX ADMIN — DOCUSATE SODIUM 100 MG: 100 CAPSULE, LIQUID FILLED ORAL at 20:42

## 2018-09-29 RX ADMIN — IPRATROPIUM BROMIDE AND ALBUTEROL SULFATE 1 AMPULE: .5; 3 SOLUTION RESPIRATORY (INHALATION) at 09:23

## 2018-09-29 RX ADMIN — IPRATROPIUM BROMIDE AND ALBUTEROL SULFATE 1 AMPULE: .5; 3 SOLUTION RESPIRATORY (INHALATION) at 20:00

## 2018-09-29 RX ADMIN — SODIUM CHLORIDE: 9 INJECTION, SOLUTION INTRAVENOUS at 22:07

## 2018-09-29 RX ADMIN — POTASSIUM CHLORIDE 10 MEQ: 750 TABLET, EXTENDED RELEASE ORAL at 08:45

## 2018-09-29 RX ADMIN — ACETAMINOPHEN 650 MG: 325 TABLET, FILM COATED ORAL at 14:16

## 2018-09-29 RX ADMIN — Medication: at 13:44

## 2018-09-29 RX ADMIN — AMLODIPINE BESYLATE 10 MG: 10 TABLET ORAL at 08:45

## 2018-09-29 RX ADMIN — ACETAMINOPHEN 650 MG: 325 TABLET, FILM COATED ORAL at 22:01

## 2018-09-29 RX ADMIN — ENOXAPARIN SODIUM 40 MG: 40 INJECTION SUBCUTANEOUS at 08:51

## 2018-09-29 RX ADMIN — HYDRALAZINE HYDROCHLORIDE 25 MG: 25 TABLET ORAL at 07:17

## 2018-09-29 RX ADMIN — PANTOPRAZOLE SODIUM 40 MG: 40 TABLET, DELAYED RELEASE ORAL at 07:17

## 2018-09-29 RX ADMIN — IPRATROPIUM BROMIDE AND ALBUTEROL SULFATE 1 AMPULE: .5; 3 SOLUTION RESPIRATORY (INHALATION) at 14:15

## 2018-09-29 RX ADMIN — HYDRALAZINE HYDROCHLORIDE 25 MG: 25 TABLET ORAL at 22:01

## 2018-09-29 ASSESSMENT — PAIN SCALES - GENERAL
PAINLEVEL_OUTOF10: 0
PAINLEVEL_OUTOF10: 6
PAINLEVEL_OUTOF10: 7
PAINLEVEL_OUTOF10: 5
PAINLEVEL_OUTOF10: 3
PAINLEVEL_OUTOF10: 0
PAINLEVEL_OUTOF10: 0

## 2018-09-29 ASSESSMENT — PAIN DESCRIPTION - PAIN TYPE
TYPE: ACUTE PAIN
TYPE: CHRONIC PAIN

## 2018-09-29 ASSESSMENT — PAIN DESCRIPTION - FREQUENCY
FREQUENCY: INTERMITTENT
FREQUENCY: CONTINUOUS

## 2018-09-29 ASSESSMENT — PAIN DESCRIPTION - LOCATION
LOCATION: HEAD
LOCATION: BACK

## 2018-09-29 ASSESSMENT — PAIN DESCRIPTION - ORIENTATION: ORIENTATION: LOWER

## 2018-09-29 ASSESSMENT — PAIN DESCRIPTION - DESCRIPTORS
DESCRIPTORS: ACHING;HEADACHE;DISCOMFORT
DESCRIPTORS: ACHING;DISCOMFORT;SORE

## 2018-09-29 NOTE — PROGRESS NOTES
.Occupational Therapy  OCCUPATIONAL THERAPY DAILY NOTE    Date:2018  Patient Name: Laure Bonds  MRN: [id number]  : 1960  Room: [address] [place] [address]     Diagnosis: s/p aneurysm coiling - CVA  Precautions: falls    Functional Assessment:   Date Status AE  Comments   Feeding 18 Minimal Assist      Grooming 18 Min A      Bathing 18 Mod A       UB Dressing 18 Set up     LB Dressing 18 max A  Reacher  Sock aid Pt used reacher to rashida/doff pants and gripper socks needing assist with vc's for proper technique. Pt used sock aid to rashida socks on with vc's. Pt attempted threading sock on device using one handed skills. Homemaking         Functional Transfers / Balance:   Date Status DME  Comments   Sit Balance 18 Min A  Sitting balance up in w/c and during A.E. Training to increase trunk control/bending. Stand Balance 18 Min-Mod A Table top Pt stood at table top x2 reps for approx. 2mins each. [x] Tub  [x] Shower   Transfer 18 Mod A Joe cane, extended tub bench    Commode   Transfer 18 Min-Mod A Joe  cane    Functional   Mobility 18 Min-mod A Joe cane    Other:  Bed<>WC   18   Max A       Functional Exercises / Activity:  Pt engaged in RUE wt bearing while standing at table to promote muscular input for functional use. RUE skate board task to increase ROM for shld/scapula needing assist with vc's for follow thru. Sensory / Neuromuscular Re-Education:      Cognitive Skills:   Status Comments   Problem   Solving fair     Memory fair     Sequencing fair     Safety fair -       Visual Perception:    Education:  Pt educated on hand placement during functional transfers. Pt educated using A.E for LB dressing tasks using one handed skills. [] Family teach completed on:    Pain Level: 6./10 in back     Additional Notes:   18- During PM session pt completed commode transfer from [facility]- pt reports becoming dizzy and \"seeing stars\".  Therapist

## 2018-09-29 NOTE — PLAN OF CARE
Problem: Pain:  Goal: Pain level will decrease  Pain level will decrease   Outcome: Ongoing      Problem: Falls - Risk of:  Goal: Will remain free from falls  Will remain free from falls   Outcome: Met This Shift

## 2018-09-29 NOTE — PLAN OF CARE
Problem: Pain:  Goal: Pain level will decrease  Pain level will decrease   Outcome: Met This Shift    Goal: Control of acute pain  Control of acute pain   Outcome: Met This Shift    Goal: Control of chronic pain  Control of chronic pain   Outcome: Met This Shift      Problem: Falls - Risk of:  Goal: Will remain free from falls  Will remain free from falls   Outcome: Met This Shift    Goal: Absence of physical injury  Absence of physical injury   Outcome: Met This Shift      Problem: Risk for Impaired Skin Integrity  Goal: Tissue integrity - skin and mucous membranes  Structural intactness and normal physiological function of skin and  mucous membranes.    Outcome: Met This Shift      Problem: HEMODYNAMIC STATUS  Goal: Patient has stable vital signs and fluid balance  Outcome: Met This Shift      Problem: COMMUNICATION IMPAIRMENT  Goal: Ability to express needs and understand communication  Outcome: Met This Shift      Problem: Neurological  Goal: Maximum potential motor/sensory/cognitive function  Outcome: Met This Shift

## 2018-09-30 PROCEDURE — 51798 US URINE CAPACITY MEASURE: CPT

## 2018-09-30 PROCEDURE — 6360000002 HC RX W HCPCS: Performed by: INTERNAL MEDICINE

## 2018-09-30 PROCEDURE — 6370000000 HC RX 637 (ALT 250 FOR IP): Performed by: PHYSICAL MEDICINE & REHABILITATION

## 2018-09-30 PROCEDURE — 6370000000 HC RX 637 (ALT 250 FOR IP): Performed by: INTERNAL MEDICINE

## 2018-09-30 PROCEDURE — 94640 AIRWAY INHALATION TREATMENT: CPT

## 2018-09-30 PROCEDURE — 1280000000 HC REHAB R&B

## 2018-09-30 PROCEDURE — 97530 THERAPEUTIC ACTIVITIES: CPT

## 2018-09-30 RX ORDER — SODIUM CHLORIDE 0.9 % (FLUSH) 0.9 %
SYRINGE (ML) INJECTION
Status: COMPLETED
Start: 2018-09-30 | End: 2018-10-01

## 2018-09-30 RX ADMIN — CHLORTHALIDONE 25 MG: 25 TABLET ORAL at 08:15

## 2018-09-30 RX ADMIN — METOPROLOL TARTRATE 50 MG: 50 TABLET ORAL at 20:34

## 2018-09-30 RX ADMIN — IPRATROPIUM BROMIDE AND ALBUTEROL SULFATE 1 AMPULE: .5; 3 SOLUTION RESPIRATORY (INHALATION) at 13:59

## 2018-09-30 RX ADMIN — HYDRALAZINE HYDROCHLORIDE 25 MG: 25 TABLET ORAL at 06:57

## 2018-09-30 RX ADMIN — HYDRALAZINE HYDROCHLORIDE 25 MG: 25 TABLET ORAL at 13:27

## 2018-09-30 RX ADMIN — ACETAMINOPHEN 650 MG: 325 TABLET, FILM COATED ORAL at 08:15

## 2018-09-30 RX ADMIN — GABAPENTIN 200 MG: 100 CAPSULE ORAL at 20:34

## 2018-09-30 RX ADMIN — PANTOPRAZOLE SODIUM 40 MG: 40 TABLET, DELAYED RELEASE ORAL at 06:57

## 2018-09-30 RX ADMIN — METOPROLOL TARTRATE 50 MG: 50 TABLET ORAL at 08:15

## 2018-09-30 RX ADMIN — ACETAMINOPHEN 650 MG: 325 TABLET, FILM COATED ORAL at 19:46

## 2018-09-30 RX ADMIN — BUTALBITAL, ACETAMINOPHEN, AND CAFFEINE 1 TABLET: 50; 325; 40 TABLET ORAL at 13:27

## 2018-09-30 RX ADMIN — IPRATROPIUM BROMIDE AND ALBUTEROL SULFATE 1 AMPULE: .5; 3 SOLUTION RESPIRATORY (INHALATION) at 11:04

## 2018-09-30 RX ADMIN — IPRATROPIUM BROMIDE AND ALBUTEROL SULFATE 1 AMPULE: .5; 3 SOLUTION RESPIRATORY (INHALATION) at 20:20

## 2018-09-30 RX ADMIN — POTASSIUM CHLORIDE 10 MEQ: 750 TABLET, EXTENDED RELEASE ORAL at 08:16

## 2018-09-30 RX ADMIN — ENOXAPARIN SODIUM 40 MG: 40 INJECTION SUBCUTANEOUS at 08:16

## 2018-09-30 RX ADMIN — LOSARTAN POTASSIUM 100 MG: 50 TABLET, FILM COATED ORAL at 08:16

## 2018-09-30 RX ADMIN — HYDRALAZINE HYDROCHLORIDE 25 MG: 25 TABLET ORAL at 22:09

## 2018-09-30 RX ADMIN — AMLODIPINE BESYLATE 5 MG: 5 TABLET ORAL at 08:16

## 2018-09-30 ASSESSMENT — PAIN DESCRIPTION - LOCATION
LOCATION: HEAD
LOCATION: BACK
LOCATION: GROIN

## 2018-09-30 ASSESSMENT — PAIN DESCRIPTION - ONSET
ONSET: ON-GOING
ONSET: ON-GOING

## 2018-09-30 ASSESSMENT — PAIN SCALES - GENERAL
PAINLEVEL_OUTOF10: 5
PAINLEVEL_OUTOF10: 4
PAINLEVEL_OUTOF10: 0
PAINLEVEL_OUTOF10: 6
PAINLEVEL_OUTOF10: 2
PAINLEVEL_OUTOF10: 8

## 2018-09-30 ASSESSMENT — PAIN DESCRIPTION - DESCRIPTORS
DESCRIPTORS: ACHING;DISCOMFORT;DULL
DESCRIPTORS: ACHING;DISCOMFORT
DESCRIPTORS: ACHING;CONSTANT;DISCOMFORT;DULL

## 2018-09-30 ASSESSMENT — PAIN DESCRIPTION - PAIN TYPE
TYPE: CHRONIC PAIN
TYPE: ACUTE PAIN
TYPE: CHRONIC PAIN

## 2018-09-30 ASSESSMENT — PAIN DESCRIPTION - ORIENTATION: ORIENTATION: LEFT

## 2018-09-30 ASSESSMENT — PAIN DESCRIPTION - FREQUENCY
FREQUENCY: CONTINUOUS
FREQUENCY: INTERMITTENT

## 2018-10-01 PROCEDURE — 99231 SBSQ HOSP IP/OBS SF/LOW 25: CPT | Performed by: PHYSICAL MEDICINE & REHABILITATION

## 2018-10-01 PROCEDURE — 6360000002 HC RX W HCPCS: Performed by: INTERNAL MEDICINE

## 2018-10-01 PROCEDURE — 6370000000 HC RX 637 (ALT 250 FOR IP): Performed by: PHYSICAL MEDICINE & REHABILITATION

## 2018-10-01 PROCEDURE — 6370000000 HC RX 637 (ALT 250 FOR IP): Performed by: INTERNAL MEDICINE

## 2018-10-01 PROCEDURE — 2580000003 HC RX 258: Performed by: PHYSICAL MEDICINE & REHABILITATION

## 2018-10-01 PROCEDURE — 94640 AIRWAY INHALATION TREATMENT: CPT

## 2018-10-01 PROCEDURE — 97110 THERAPEUTIC EXERCISES: CPT

## 2018-10-01 PROCEDURE — 1280000000 HC REHAB R&B

## 2018-10-01 PROCEDURE — 97530 THERAPEUTIC ACTIVITIES: CPT

## 2018-10-01 PROCEDURE — 2580000003 HC RX 258

## 2018-10-01 PROCEDURE — 97127 HC SP THER IVNTJ W/FOCUS COG FUNCJ: CPT

## 2018-10-01 PROCEDURE — 51798 US URINE CAPACITY MEASURE: CPT

## 2018-10-01 RX ORDER — CELECOXIB 100 MG/1
200 CAPSULE ORAL DAILY
Status: DISCONTINUED | OUTPATIENT
Start: 2018-10-01 | End: 2018-10-16 | Stop reason: HOSPADM

## 2018-10-01 RX ORDER — SODIUM CHLORIDE 0.9 % (FLUSH) 0.9 %
10 SYRINGE (ML) INJECTION 2 TIMES DAILY
Status: DISCONTINUED | OUTPATIENT
Start: 2018-10-01 | End: 2018-10-10

## 2018-10-01 RX ORDER — BUTALBITAL, ACETAMINOPHEN AND CAFFEINE 50; 325; 40 MG/1; MG/1; MG/1
1 TABLET ORAL 2 TIMES DAILY PRN
Status: DISCONTINUED | OUTPATIENT
Start: 2018-10-01 | End: 2018-10-03

## 2018-10-01 RX ADMIN — CELECOXIB 200 MG: 100 CAPSULE ORAL at 10:04

## 2018-10-01 RX ADMIN — ACETAMINOPHEN 650 MG: 325 TABLET, FILM COATED ORAL at 16:20

## 2018-10-01 RX ADMIN — POTASSIUM CHLORIDE 10 MEQ: 750 TABLET, EXTENDED RELEASE ORAL at 08:25

## 2018-10-01 RX ADMIN — AMLODIPINE BESYLATE 5 MG: 5 TABLET ORAL at 08:25

## 2018-10-01 RX ADMIN — Medication 10 ML: at 22:45

## 2018-10-01 RX ADMIN — METOPROLOL TARTRATE 50 MG: 50 TABLET ORAL at 21:56

## 2018-10-01 RX ADMIN — LOSARTAN POTASSIUM 100 MG: 50 TABLET, FILM COATED ORAL at 08:25

## 2018-10-01 RX ADMIN — IPRATROPIUM BROMIDE AND ALBUTEROL SULFATE 1 AMPULE: .5; 3 SOLUTION RESPIRATORY (INHALATION) at 12:16

## 2018-10-01 RX ADMIN — BUTALBITAL, ACETAMINOPHEN, AND CAFFEINE 1 TABLET: 50; 325; 40 TABLET ORAL at 21:59

## 2018-10-01 RX ADMIN — ACETAMINOPHEN 650 MG: 325 TABLET, FILM COATED ORAL at 08:25

## 2018-10-01 RX ADMIN — IPRATROPIUM BROMIDE AND ALBUTEROL SULFATE 1 AMPULE: .5; 3 SOLUTION RESPIRATORY (INHALATION) at 07:43

## 2018-10-01 RX ADMIN — SODIUM CHLORIDE: 9 INJECTION, SOLUTION INTRAVENOUS at 22:42

## 2018-10-01 RX ADMIN — HYDRALAZINE HYDROCHLORIDE 25 MG: 25 TABLET ORAL at 21:56

## 2018-10-01 RX ADMIN — BUTALBITAL, ACETAMINOPHEN, AND CAFFEINE 1 TABLET: 50; 325; 40 TABLET ORAL at 08:37

## 2018-10-01 RX ADMIN — METOPROLOL TARTRATE 50 MG: 50 TABLET ORAL at 08:25

## 2018-10-01 RX ADMIN — ENOXAPARIN SODIUM 40 MG: 40 INJECTION SUBCUTANEOUS at 08:25

## 2018-10-01 RX ADMIN — CHLORTHALIDONE 25 MG: 25 TABLET ORAL at 08:25

## 2018-10-01 RX ADMIN — GABAPENTIN 200 MG: 100 CAPSULE ORAL at 21:57

## 2018-10-01 RX ADMIN — Medication 10 ML: at 12:00

## 2018-10-01 RX ADMIN — HYDRALAZINE HYDROCHLORIDE 25 MG: 25 TABLET ORAL at 06:34

## 2018-10-01 RX ADMIN — PANTOPRAZOLE SODIUM 40 MG: 40 TABLET, DELAYED RELEASE ORAL at 06:34

## 2018-10-01 RX ADMIN — Medication 10 ML: at 22:01

## 2018-10-01 RX ADMIN — HYDRALAZINE HYDROCHLORIDE 25 MG: 25 TABLET ORAL at 13:39

## 2018-10-01 RX ADMIN — IPRATROPIUM BROMIDE AND ALBUTEROL SULFATE 1 AMPULE: .5; 3 SOLUTION RESPIRATORY (INHALATION) at 19:51

## 2018-10-01 ASSESSMENT — PAIN DESCRIPTION - PROGRESSION: CLINICAL_PROGRESSION: NOT CHANGED

## 2018-10-01 ASSESSMENT — PAIN SCALES - GENERAL
PAINLEVEL_OUTOF10: 10
PAINLEVEL_OUTOF10: 6
PAINLEVEL_OUTOF10: 0
PAINLEVEL_OUTOF10: 10
PAINLEVEL_OUTOF10: 7

## 2018-10-01 ASSESSMENT — PAIN DESCRIPTION - DESCRIPTORS
DESCRIPTORS: ACHING;DISCOMFORT
DESCRIPTORS: HEADACHE

## 2018-10-01 ASSESSMENT — PAIN DESCRIPTION - PAIN TYPE
TYPE: ACUTE PAIN
TYPE: ACUTE PAIN

## 2018-10-01 ASSESSMENT — PAIN DESCRIPTION - ONSET: ONSET: ON-GOING

## 2018-10-01 ASSESSMENT — PAIN DESCRIPTION - FREQUENCY
FREQUENCY: INTERMITTENT
FREQUENCY: INTERMITTENT

## 2018-10-01 ASSESSMENT — PAIN DESCRIPTION - LOCATION
LOCATION: HEAD
LOCATION: BACK

## 2018-10-01 ASSESSMENT — PAIN DESCRIPTION - ORIENTATION: ORIENTATION: MID;LOWER

## 2018-10-01 NOTE — PROGRESS NOTES
__Pain_______    Recommendations:  [x]Group Session  [x]Individual Session  []Client Refused Services  []No Therapy  []Other: ___________    Objectives:  [x]Establish adaptations for leisure involvement   []Increase Self worth/self esteem  []Community reintegration training   [x]Social interaction  [x]Leisure participation  []Other: ___________    Goals for Therapeutic Recreation Services:   Social Interaction:   Admission Functional [place] Measure: ___3_______  Discharge Functional [place] Measure: ___5________   Other:________________________________      Leisure Choice/ Increase [place] Quality of Life: To participate in 1 premorbid leisure activities of choice by discharge to increase leisure choice and independence thus increasing the overall quality of his/her life. Socialization/Social Interaction: To increase social interaction skills by introducing self 1 x per week at the beginning of TR session . Adaptations: To increase knowledge of adaptations to leisure involvement by participating in 1 modified leisure activities per week. Self Expression: To participate in 1 leisure activity(s) of choice, identifying 1 benefits to leisure participation. Leisure Skills: To increase leisure skills by displaying the ability to participate in 1 new leisure activities by discharge. Self esteem/confidence: To complete 1 leisure activities with success 1 x  by discharge. Radha Bernstein

## 2018-10-01 NOTE — PROGRESS NOTES
sup with AAd   Stair negotiation: ascended and descended  NT NT 4 steps with Kristi with L HR (descending backwards) 4 steps with one rail with modA >6 steps with one rail with Kristi   Curb Step:   ascended and descended NT  4 in step modA with left // bar 4 inch step with AAD and modA 4 inch step with AAD and Kristi   BLE ROM WFL NT WNL     BLE Strength Right LE: grossly 3+/5    Left LE: hip: 3-/5  Knee: 3-/5  Ankle: 2-/5 NT Right LE: grossly 4/5  Left LE: hip 3+/5  Knee: 4-/5  Ankle: 3+/5     Balance  Sitting: Kristi-sba (left lateral lean 2* back pain)    Standing: maxA Sitting EOB: CGA  Standing: Max A dynamically Sitting EOB: sup  Standing: cga with LBQC dynamically     Date Family Teach Completed TBA  Pt's brother, \"Rayo\" was present for observation 9/23 9/23 brother observed     Is additional Family Teaching Needed? Y or N Pending improvement  Yes Y     Hindering Progress Hemiparesis, pain, balance Hemiparesis, pain, poor balance Hemiparesis, balance     PT recommended ELOS 4 weeks 3 weeks if pt can tolerate 2-3 weeks     Team's Discharge Plan  4 weeks 2 weeks     Therapist at [facility] GHANSHYAM, PT, DPT [id number]   Yris Ortiz, PT, DPT [id number]         Date:  10/1/18  Supporting factors: Motivated, good carryover  Barriers to discharge:  Low back pain, R hemiparesis  Additional comments: Pt able to tolerate more in PT due to decrease in low back pain. Pt improving in all aspects of mobility. Occasional verbal cues given for hand placement during transfers and step pattern during stair negotiation. Long term goals upgraded per grid above. DME:  TBD (LBQC at this time however anticipate progression to [facility])  After Care:  TBD    Nita Albright, SPT  [facility]  [id number]    10/2/18:  Social work to set up family teach. Dtr will be staying with the patient during the day and son in the evening. Patient needs to stay hydrated, was drinking coffee all day.   Yris Ortiz, PT, DPT

## 2018-10-02 PROCEDURE — 97127 HC SP THER IVNTJ W/FOCUS COG FUNCJ: CPT

## 2018-10-02 PROCEDURE — 97530 THERAPEUTIC ACTIVITIES: CPT

## 2018-10-02 PROCEDURE — 99232 SBSQ HOSP IP/OBS MODERATE 35: CPT | Performed by: PHYSICAL MEDICINE & REHABILITATION

## 2018-10-02 PROCEDURE — 6370000000 HC RX 637 (ALT 250 FOR IP): Performed by: INTERNAL MEDICINE

## 2018-10-02 PROCEDURE — 94640 AIRWAY INHALATION TREATMENT: CPT

## 2018-10-02 PROCEDURE — 6360000002 HC RX W HCPCS: Performed by: INTERNAL MEDICINE

## 2018-10-02 PROCEDURE — 97110 THERAPEUTIC EXERCISES: CPT

## 2018-10-02 PROCEDURE — 1280000000 HC REHAB R&B

## 2018-10-02 PROCEDURE — 6370000000 HC RX 637 (ALT 250 FOR IP): Performed by: PHYSICAL MEDICINE & REHABILITATION

## 2018-10-02 PROCEDURE — 97112 NEUROMUSCULAR REEDUCATION: CPT

## 2018-10-02 PROCEDURE — 51798 US URINE CAPACITY MEASURE: CPT

## 2018-10-02 RX ORDER — IPRATROPIUM BROMIDE AND ALBUTEROL SULFATE 2.5; .5 MG/3ML; MG/3ML
1 SOLUTION RESPIRATORY (INHALATION) EVERY 6 HOURS PRN
Status: DISCONTINUED | OUTPATIENT
Start: 2018-10-02 | End: 2018-10-16 | Stop reason: HOSPADM

## 2018-10-02 RX ORDER — GABAPENTIN 100 MG/1
200 CAPSULE ORAL 2 TIMES DAILY
Status: DISCONTINUED | OUTPATIENT
Start: 2018-10-02 | End: 2018-10-16 | Stop reason: HOSPADM

## 2018-10-02 RX ADMIN — IPRATROPIUM BROMIDE AND ALBUTEROL SULFATE 1 AMPULE: .5; 3 SOLUTION RESPIRATORY (INHALATION) at 08:06

## 2018-10-02 RX ADMIN — HYDRALAZINE HYDROCHLORIDE 25 MG: 25 TABLET ORAL at 13:58

## 2018-10-02 RX ADMIN — GABAPENTIN 200 MG: 100 CAPSULE ORAL at 21:13

## 2018-10-02 RX ADMIN — HYDRALAZINE HYDROCHLORIDE 25 MG: 25 TABLET ORAL at 21:11

## 2018-10-02 RX ADMIN — HYDRALAZINE HYDROCHLORIDE 25 MG: 25 TABLET ORAL at 06:45

## 2018-10-02 RX ADMIN — PANTOPRAZOLE SODIUM 40 MG: 40 TABLET, DELAYED RELEASE ORAL at 06:45

## 2018-10-02 RX ADMIN — CHLORTHALIDONE 25 MG: 25 TABLET ORAL at 08:56

## 2018-10-02 RX ADMIN — AMLODIPINE BESYLATE 5 MG: 5 TABLET ORAL at 08:57

## 2018-10-02 RX ADMIN — METOPROLOL TARTRATE 50 MG: 50 TABLET ORAL at 08:57

## 2018-10-02 RX ADMIN — ACETAMINOPHEN 650 MG: 325 TABLET, FILM COATED ORAL at 08:56

## 2018-10-02 RX ADMIN — CELECOXIB 200 MG: 100 CAPSULE ORAL at 08:57

## 2018-10-02 RX ADMIN — ENOXAPARIN SODIUM 40 MG: 40 INJECTION SUBCUTANEOUS at 08:57

## 2018-10-02 RX ADMIN — DOCUSATE SODIUM 100 MG: 100 CAPSULE, LIQUID FILLED ORAL at 08:56

## 2018-10-02 RX ADMIN — BUTALBITAL, ACETAMINOPHEN, AND CAFFEINE 1 TABLET: 50; 325; 40 TABLET ORAL at 21:11

## 2018-10-02 RX ADMIN — POTASSIUM CHLORIDE 10 MEQ: 750 TABLET, EXTENDED RELEASE ORAL at 08:57

## 2018-10-02 RX ADMIN — LOSARTAN POTASSIUM 100 MG: 50 TABLET, FILM COATED ORAL at 08:57

## 2018-10-02 RX ADMIN — GABAPENTIN 200 MG: 100 CAPSULE ORAL at 12:22

## 2018-10-02 RX ADMIN — METOPROLOL TARTRATE 50 MG: 50 TABLET ORAL at 21:11

## 2018-10-02 RX ADMIN — BUTALBITAL, ACETAMINOPHEN, AND CAFFEINE 1 TABLET: 50; 325; 40 TABLET ORAL at 11:48

## 2018-10-02 ASSESSMENT — PAIN DESCRIPTION - FREQUENCY
FREQUENCY: CONTINUOUS
FREQUENCY: CONTINUOUS

## 2018-10-02 ASSESSMENT — PAIN SCALES - GENERAL
PAINLEVEL_OUTOF10: 0
PAINLEVEL_OUTOF10: 8
PAINLEVEL_OUTOF10: 4
PAINLEVEL_OUTOF10: 0
PAINLEVEL_OUTOF10: 0
PAINLEVEL_OUTOF10: 7
PAINLEVEL_OUTOF10: 0
PAINLEVEL_OUTOF10: 5
PAINLEVEL_OUTOF10: 0

## 2018-10-02 ASSESSMENT — PAIN DESCRIPTION - PAIN TYPE
TYPE: CHRONIC PAIN
TYPE: CHRONIC PAIN

## 2018-10-02 ASSESSMENT — PAIN DESCRIPTION - ONSET
ONSET: ON-GOING
ONSET: ON-GOING

## 2018-10-02 ASSESSMENT — PAIN DESCRIPTION - DESCRIPTORS
DESCRIPTORS: ACHING;CONSTANT;DISCOMFORT
DESCRIPTORS: ACHING;DISCOMFORT;SORE

## 2018-10-02 ASSESSMENT — PAIN DESCRIPTION - PROGRESSION
CLINICAL_PROGRESSION: NOT CHANGED
CLINICAL_PROGRESSION: NOT CHANGED

## 2018-10-02 ASSESSMENT — PAIN DESCRIPTION - LOCATION
LOCATION: BACK
LOCATION: BACK

## 2018-10-02 ASSESSMENT — PAIN DESCRIPTION - ORIENTATION: ORIENTATION: MID;LOWER

## 2018-10-02 NOTE — PATIENT CARE CONFERENCE
solving:   Current level: min assist  Short term problem solving goal: supervision  Long term problem solving goal: supervision    Memory:  Current level: min assist  Short term memory goal: supervision  Long term memory goal: supervision    Safety awareness: fair -      Patient/family's personal goals: \"get back home\"  Factors supporting goal achievement:  making gains  Factors hindering goal achievement:  back pain      Discharge Plan   Estimated Length of Stay: 2 weeks            Destination: home  Services at Discharge: to be assessed  Equipment at Discharge: to be assessed      INTERDISCIPLINARY TEAM/PHYSICIAN RECOMMENDATION AND/OR REVISIONS OF PLAN OF CARE:  schedule family education      I approve the established interdisciplinary plan of care as documented within the medical record of Rodney Hernandez. Please see team conference signature page for those in attendance.     Electronically signed by Vonna Bosworth, RN  on 10/2/2018 at 9:56 AM

## 2018-10-02 NOTE — PROGRESS NOTES
[facility] Physical Medicine and Rehabilitation  Comprehensive Progress Note    Subjective:      Lian Hairston is a 60 y.o. female admitted to inpatient rehabilitation for [facility] due to ruptured intracranial aneurysm. No acute events overnight. No cp, sob, n/v reported. Continues to progress in therapy. PO intake improving. The patient's medical records have been reviewed. Scheduled Meds:    sodium chloride flush 10 mL BID   celecoxib 200 mg Daily   amLODIPine 5 mg Daily   gabapentin 200 mg Nightly   potassium chloride 10 mEq Daily with breakfast   hydrALAZINE 25 mg 3 times per day   lidocaine 1 patch Daily   chlorthalidone 25 mg Daily   ipratropium-albuterol 1 ampule 4x daily   losartan 100 mg Daily   metoprolol tartrate 50 mg BID   pantoprazole 40 mg QAM AC   enoxaparin 40 mg Daily     Continuous Infusions:    PRN Meds:    butalbital-acetaminophen-caffeine 1 tablet BID PRN   docusate sodium 100 mg BID PRN   senna 1 tablet Nightly PRN   acetaminophen 650 mg Q6H PRN   bisacodyl 10 mg Daily PRN   magnesium hydroxide 30 mL Daily PRN        Objective:      Vitals:    10/01/18 2151 10/02/18 0507 10/02/18 0807 10/02/18 0855   BP: 128/62 125/67  132/62   Pulse: 86 80  95   Resp:  16     Temp:  97.7 °F (36.5 °C)     TempSrc:  Temporal     SpO2:   97%    Weight:       Height:         General appearance: Alert, NAD, up in dining room  Lungs: CTA b/l, no w/r/r  Heart: RRR  Abdomen: soft, non-tender, normal bowel sounds, no pain or guarding with palpation. Musculoskeletal: Range of motion limited by weakness on R, normal on L. No lumbar midline or paraspinal tenderness. Neurologic: AAOx3. Speech clear, answers questions appropriately. Follows commands. R facial droop, unchanged.   Motor 1-2/5 RUE, 3/5 RLE, 4-5/5 on left side    Laboratory:    Lab Results   Component Value Date    WBC 10.8 09/24/2018    HGB 12.8 09/24/2018    HCT 38.9 09/24/2018    MCV 96.0 09/24/2018     (H) 09/24/2018     Lab Results

## 2018-10-03 LAB
ANION GAP SERPL CALCULATED.3IONS-SCNC: 13 MMOL/L (ref 7–16)
BUN BLDV-MCNC: 19 MG/DL (ref 6–20)
CALCIUM SERPL-MCNC: 9.5 MG/DL (ref 8.6–10.2)
CHLORIDE BLD-SCNC: 98 MMOL/L (ref 98–107)
CO2: 29 MMOL/L (ref 22–29)
CREAT SERPL-MCNC: 0.6 MG/DL (ref 0.5–1)
GFR AFRICAN AMERICAN: >60
GFR NON-AFRICAN AMERICAN: >60 ML/MIN/1.73
GLUCOSE BLD-MCNC: 97 MG/DL (ref 74–109)
POTASSIUM SERPL-SCNC: 4.2 MMOL/L (ref 3.5–5)
SODIUM BLD-SCNC: 140 MMOL/L (ref 132–146)

## 2018-10-03 PROCEDURE — 97530 THERAPEUTIC ACTIVITIES: CPT

## 2018-10-03 PROCEDURE — 80048 BASIC METABOLIC PNL TOTAL CA: CPT

## 2018-10-03 PROCEDURE — 1280000000 HC REHAB R&B

## 2018-10-03 PROCEDURE — 6370000000 HC RX 637 (ALT 250 FOR IP): Performed by: PHYSICAL MEDICINE & REHABILITATION

## 2018-10-03 PROCEDURE — 6360000002 HC RX W HCPCS: Performed by: INTERNAL MEDICINE

## 2018-10-03 PROCEDURE — 97110 THERAPEUTIC EXERCISES: CPT

## 2018-10-03 PROCEDURE — 51798 US URINE CAPACITY MEASURE: CPT

## 2018-10-03 PROCEDURE — 97535 SELF CARE MNGMENT TRAINING: CPT

## 2018-10-03 PROCEDURE — 97112 NEUROMUSCULAR REEDUCATION: CPT

## 2018-10-03 PROCEDURE — 6370000000 HC RX 637 (ALT 250 FOR IP): Performed by: INTERNAL MEDICINE

## 2018-10-03 PROCEDURE — 36415 COLL VENOUS BLD VENIPUNCTURE: CPT

## 2018-10-03 PROCEDURE — 99231 SBSQ HOSP IP/OBS SF/LOW 25: CPT | Performed by: PHYSICAL MEDICINE & REHABILITATION

## 2018-10-03 PROCEDURE — 97127 HC SP THER IVNTJ W/FOCUS COG FUNCJ: CPT

## 2018-10-03 RX ADMIN — HYDRALAZINE HYDROCHLORIDE 25 MG: 25 TABLET ORAL at 21:54

## 2018-10-03 RX ADMIN — PANTOPRAZOLE SODIUM 40 MG: 40 TABLET, DELAYED RELEASE ORAL at 06:44

## 2018-10-03 RX ADMIN — GABAPENTIN 200 MG: 100 CAPSULE ORAL at 09:01

## 2018-10-03 RX ADMIN — METOPROLOL TARTRATE 50 MG: 50 TABLET ORAL at 20:47

## 2018-10-03 RX ADMIN — GABAPENTIN 200 MG: 100 CAPSULE ORAL at 20:47

## 2018-10-03 RX ADMIN — HYDRALAZINE HYDROCHLORIDE 25 MG: 25 TABLET ORAL at 06:44

## 2018-10-03 RX ADMIN — CELECOXIB 200 MG: 100 CAPSULE ORAL at 09:01

## 2018-10-03 RX ADMIN — METOPROLOL TARTRATE 50 MG: 50 TABLET ORAL at 09:01

## 2018-10-03 RX ADMIN — ACETAMINOPHEN 650 MG: 325 TABLET, FILM COATED ORAL at 20:47

## 2018-10-03 RX ADMIN — HYDRALAZINE HYDROCHLORIDE 25 MG: 25 TABLET ORAL at 14:07

## 2018-10-03 RX ADMIN — ACETAMINOPHEN 650 MG: 325 TABLET, FILM COATED ORAL at 08:59

## 2018-10-03 RX ADMIN — AMLODIPINE BESYLATE 5 MG: 5 TABLET ORAL at 09:00

## 2018-10-03 RX ADMIN — LOSARTAN POTASSIUM 100 MG: 50 TABLET, FILM COATED ORAL at 09:01

## 2018-10-03 RX ADMIN — ENOXAPARIN SODIUM 40 MG: 40 INJECTION SUBCUTANEOUS at 09:00

## 2018-10-03 RX ADMIN — POTASSIUM CHLORIDE 10 MEQ: 750 TABLET, EXTENDED RELEASE ORAL at 09:00

## 2018-10-03 RX ADMIN — BUTALBITAL, ACETAMINOPHEN, AND CAFFEINE 1 TABLET: 50; 325; 40 TABLET ORAL at 11:51

## 2018-10-03 RX ADMIN — CHLORTHALIDONE 25 MG: 25 TABLET ORAL at 09:00

## 2018-10-03 ASSESSMENT — PAIN DESCRIPTION - ORIENTATION: ORIENTATION: RIGHT;UPPER

## 2018-10-03 ASSESSMENT — PAIN SCALES - GENERAL
PAINLEVEL_OUTOF10: 7
PAINLEVEL_OUTOF10: 0
PAINLEVEL_OUTOF10: 0
PAINLEVEL_OUTOF10: 7
PAINLEVEL_OUTOF10: 7

## 2018-10-03 ASSESSMENT — PAIN DESCRIPTION - DESCRIPTORS: DESCRIPTORS: ACHING;CONSTANT

## 2018-10-03 ASSESSMENT — PAIN DESCRIPTION - FREQUENCY: FREQUENCY: CONTINUOUS

## 2018-10-03 ASSESSMENT — PAIN DESCRIPTION - PAIN TYPE: TYPE: CHRONIC PAIN

## 2018-10-03 ASSESSMENT — PAIN DESCRIPTION - LOCATION: LOCATION: LEG

## 2018-10-03 NOTE — PROGRESS NOTES
Physical Therapy    Facility/Department: [facility] [facility]  Daily Treatment Note    NAME: Jose Alberto Reina  : 1960  MRN: [id number]    Date of Service: 10/3/2018    Evaluating Therapist: Vincent Martinez DPT    ROOM: [id number]  DIAGNOSIS: SAH    PMH: To ED on 9/10/18 for HA with CT shows SAH. CTA of the head, which demonstrated left ICA aneurysms. Subarachnoid hemorrhage secondary to hypertension with ruptured aneurysm- s/p coiling of aneurysm with endovascular coil embolization of 2 distal L ICA aneurysms  And noted saccular aneurysm of right MCA on 9/10/18 and trans catheter infusion of verapamil. Post op, She developed cerebral vasospasm. PRECAUTIONS: SBP less than 150mmHg,  Right hemiparesis, falls, latex allergy    Social:  Pt lives with son and grand daughter in a 2 floor plan, 1st floor setup with 4 step(s) and 1 rail(s) to enter on left per chart. Patient reporting 6 steps to enter with left HR. Prior to admission pt walked with no device and was Independent.  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 Initial Evaluation  18  AM PM Short Term Goals Long Term Goals    Was pt agreeable to Eval/treatment? yes Yes yes     Does pt have pain? Yes 10/10 low back pain with any movement.  Nursing notified  Yes, low back and right LE \"heaviness\" mild low back pain      Bed Mobility  Rolling: maxA  Supine to sit: maxA  Sit to supine: NT  Scooting: maxA   Kristi  mod I   Transfers Sit to stand: maxA  Stand to sit: maxA  Stand pivot: maxA HHA to left side Sit to stand: sba  Stand to sit: sba  Stand pivot: cg/sba  With L SBQC Sit to stand: sba  Stand to sit: sba  Stand pivot: cg/sba  With L SBQC Kristi with AAD  sup with AAD   Ambulation    3 feet with maxA with HHA (side stepping EOB) 60 feet with cga with L SBQC 50 feet with cga with L SBQC 50 feet with AAD with Kristi >150 feet with AAD with  sup   Walking 10 feet on uneven surface NT NT N/T     Wheel Chair Mobility NT NT NT     Car Transfers NT sba with L SBQC N/T Kristi with AAD  sup with AAd   Stair negotiation: ascended and descended  NT                NT 6 steps with L handrail and cga 4 steps with one rail with modA >6 steps with one rail with Kristi   Curb Step:   ascended and descended NT  N/T 4 inch step with AAD and modA 4 inch step with AAD and Kristi   Picking up object off the floor NT NT N/T     BLE ROM WFL NT      BLE Strength Right LE: grossly 3+/5    Left LE: hip: 3-/5  Knee: 3-/5  Ankle: 2-/5 NT Right LE: grossly 4/5  Left LE: hip 3-/5  Knee: 4-/5  Ankle: 4-/5     Balance  Sitting: Kristi-sba (left lateral lean 2* back pain)    Standing: maxA Sitting EOB: sup  Standing: cgA/Kristi with SBQC Standing Dynamically cgA with SQBC     Date Family Teach Completed TBA  9/23, 10/2 brother observed      Is additional Family Teaching Needed? Y or N Pending improvement  Scheduled 10/5 with dtr/sons      Hindering Progress Hemiparesis, pain, balance Hemiparesis, pain, poor balance hemiparesis     PT recommended ELOS 4 weeks       Team's Discharge Plan        Therapist at Team Meeting          Therapeutic Exercise:   AM: 5 x STS with L LBQC  20 feet walk with L SBQC between 2 chairs to address turning to L cga x 4 reps    PM: weaving in and out of 3 canes then turning to sit in a chair x 4 reps with L sbqc cga    Patient education  Pt educated on hand placement during transfers, sequencing during turns    Patient response to education:   Pt verbalized understanding Pt demonstrated skill Pt requires further education in this area   yes Yes with verbal cues reinforcement     Additional Comments: Pt complains of low back and right leg heaviness and pain throughout session. Addressed turning to the left to sit in a chair after 20 foot walk x 4 reps. Pt instructed to turn to the left to maintain balance and stability. Pt demonstrated improved turning with repetition of activity. Pt using L SBQC throughout session and had good carryover with step sequencing and safety throughout.  In PM,

## 2018-10-03 NOTE — FLOWSHEET NOTE
10/03/18 0345   Intake   P.O. 120 mL   Output (mL)   Urine 500 mL   Urine Assessment   Incontinence No   Urine Color Yellow/straw   Urine Appearance Clear   Urine Odor No odor   Bladder Scan Volume (mL) 0 mL  (PVR)   $ Bladder scan $ Yes

## 2018-10-04 PROCEDURE — 90832 PSYTX W PT 30 MINUTES: CPT | Performed by: PSYCHOLOGIST

## 2018-10-04 PROCEDURE — 6370000000 HC RX 637 (ALT 250 FOR IP): Performed by: INTERNAL MEDICINE

## 2018-10-04 PROCEDURE — 97530 THERAPEUTIC ACTIVITIES: CPT

## 2018-10-04 PROCEDURE — 97127 HC SP THER IVNTJ W/FOCUS COG FUNCJ: CPT

## 2018-10-04 PROCEDURE — 6370000000 HC RX 637 (ALT 250 FOR IP): Performed by: PHYSICAL MEDICINE & REHABILITATION

## 2018-10-04 PROCEDURE — 1280000000 HC REHAB R&B

## 2018-10-04 PROCEDURE — 6360000002 HC RX W HCPCS: Performed by: INTERNAL MEDICINE

## 2018-10-04 PROCEDURE — 94640 AIRWAY INHALATION TREATMENT: CPT

## 2018-10-04 PROCEDURE — 99231 SBSQ HOSP IP/OBS SF/LOW 25: CPT | Performed by: PHYSICAL MEDICINE & REHABILITATION

## 2018-10-04 PROCEDURE — 97535 SELF CARE MNGMENT TRAINING: CPT

## 2018-10-04 PROCEDURE — 97112 NEUROMUSCULAR REEDUCATION: CPT

## 2018-10-04 PROCEDURE — 97110 THERAPEUTIC EXERCISES: CPT

## 2018-10-04 RX ADMIN — ACETAMINOPHEN 650 MG: 325 TABLET, FILM COATED ORAL at 21:15

## 2018-10-04 RX ADMIN — ENOXAPARIN SODIUM 40 MG: 40 INJECTION SUBCUTANEOUS at 09:13

## 2018-10-04 RX ADMIN — LOSARTAN POTASSIUM 100 MG: 50 TABLET, FILM COATED ORAL at 09:13

## 2018-10-04 RX ADMIN — ACETAMINOPHEN 650 MG: 325 TABLET, FILM COATED ORAL at 14:45

## 2018-10-04 RX ADMIN — GABAPENTIN 200 MG: 100 CAPSULE ORAL at 09:10

## 2018-10-04 RX ADMIN — IPRATROPIUM BROMIDE AND ALBUTEROL SULFATE 1 AMPULE: .5; 3 SOLUTION RESPIRATORY (INHALATION) at 07:27

## 2018-10-04 RX ADMIN — HYDRALAZINE HYDROCHLORIDE 25 MG: 25 TABLET ORAL at 14:45

## 2018-10-04 RX ADMIN — CELECOXIB 200 MG: 100 CAPSULE ORAL at 09:09

## 2018-10-04 RX ADMIN — HYDRALAZINE HYDROCHLORIDE 25 MG: 25 TABLET ORAL at 22:41

## 2018-10-04 RX ADMIN — PANTOPRAZOLE SODIUM 40 MG: 40 TABLET, DELAYED RELEASE ORAL at 06:56

## 2018-10-04 RX ADMIN — DOCUSATE SODIUM 100 MG: 100 CAPSULE, LIQUID FILLED ORAL at 09:09

## 2018-10-04 RX ADMIN — AMLODIPINE BESYLATE 5 MG: 5 TABLET ORAL at 09:12

## 2018-10-04 RX ADMIN — CHLORTHALIDONE 25 MG: 25 TABLET ORAL at 09:13

## 2018-10-04 RX ADMIN — METOPROLOL TARTRATE 50 MG: 50 TABLET ORAL at 21:15

## 2018-10-04 RX ADMIN — POTASSIUM CHLORIDE 10 MEQ: 750 TABLET, EXTENDED RELEASE ORAL at 09:10

## 2018-10-04 RX ADMIN — GABAPENTIN 200 MG: 100 CAPSULE ORAL at 21:15

## 2018-10-04 RX ADMIN — METOPROLOL TARTRATE 50 MG: 50 TABLET ORAL at 09:12

## 2018-10-04 RX ADMIN — HYDRALAZINE HYDROCHLORIDE 25 MG: 25 TABLET ORAL at 06:44

## 2018-10-04 ASSESSMENT — PAIN DESCRIPTION - PAIN TYPE
TYPE: CHRONIC PAIN

## 2018-10-04 ASSESSMENT — PAIN DESCRIPTION - LOCATION
LOCATION: BACK
LOCATION: BACK
LOCATION: BACK;LEG

## 2018-10-04 ASSESSMENT — PAIN DESCRIPTION - FREQUENCY
FREQUENCY: CONTINUOUS
FREQUENCY: CONTINUOUS

## 2018-10-04 ASSESSMENT — PAIN DESCRIPTION - ONSET
ONSET: ON-GOING
ONSET: ON-GOING

## 2018-10-04 ASSESSMENT — PAIN DESCRIPTION - PROGRESSION: CLINICAL_PROGRESSION: NOT CHANGED

## 2018-10-04 ASSESSMENT — PAIN SCALES - GENERAL
PAINLEVEL_OUTOF10: 0
PAINLEVEL_OUTOF10: 3
PAINLEVEL_OUTOF10: 0
PAINLEVEL_OUTOF10: 5
PAINLEVEL_OUTOF10: 7
PAINLEVEL_OUTOF10: 7
PAINLEVEL_OUTOF10: 0
PAINLEVEL_OUTOF10: 3

## 2018-10-04 ASSESSMENT — PAIN DESCRIPTION - DESCRIPTORS
DESCRIPTORS: ACHING;DISCOMFORT

## 2018-10-04 ASSESSMENT — PAIN DESCRIPTION - ORIENTATION: ORIENTATION: RIGHT

## 2018-10-04 NOTE — FLOWSHEET NOTE
10/04/18 1400   Output (mL)   Urine 400 mL   Urine Assessment   Incontinence No   Urine Color Yellow/straw   Urine Appearance Clear

## 2018-10-04 NOTE — PROGRESS NOTES
.Occupational Therapy  OCCUPATIONAL THERAPY DAILY NOTE    Date:10/4/2018  Patient Name: Garret Mcfarland  MRN: [id number]  : 1960  Room: [address] [place] [address]     Diagnosis: s/p aneurysm coiling - CVA  Precautions: falls    Functional Assessment:   Date Status AE  Comments   Feeding 10/4/18 Set up  Assist to open packages. Grooming 10/3/18 Set up     Bathing 10/3/18 Mod A     UB Dressing 10/3/18 Set up     LB Dressing 10/3/18 Mod A  Reacher  Sock aid    Homemaking 10/4/18 Min A LBQC Prepared toast with jelly. Pt retrieved all items and made toast in toaster. Opened jelly package and spread onto toast. SBA-CGA for stand balance. VCs for safety technique for item retrieval and to steady bread while buttering. Functional Transfers / Balance:   Date Status DME  Comments   Sit Balance 10/3/18 S     Stand Balance 10/4/18 SBA-CGA Table top    [x] Tub  [x] Shower   Transfer 10/4/18 Min A LBQC, extended tub bench Assistance to lift RLE over side of tub   Commode   Transfer 10/4/18 CGA LBQC    Functional   Mobility 10/4/18 CGA LBQC    Other:  Bed<>WC    WC<>Chair w/ arms   10/2/18    10/2/18   Min A    Min-mod A       LBQC      Functional Exercises / Activity:  Arm Skate to RUE on figure 8 board to increase AROM and endurance- pt completed with fair/fair+ skill and demo'd fair endurance.      Jesus Manuel box with ace wrap to RUE to increase strength, BUE coordination and endurance for independence with functional tasks     Mod resistive power  X 50 reps to increase L hand strength for independence with functional tasks      Sensory / Neuromuscular Re-Education:  PROM to RUE 1 X 10 on all planes to increase ROM and maintain joint mobility  Cognitive Skills:   Status Comments   Problem   Solving fair     Memory fair     Sequencing fair     Safety fair -       Visual Perception:    Education:  Pt educated on hand placement during functional transfers  ing.  [] Family teach completed on:    Pain Level: no c/o pain     Additional

## 2018-10-04 NOTE — PROGRESS NOTES
Nutrition Assessment    Type and Reason for Visit: Reassess    Nutrition Recommendations: Continue current diet and ONS as ordered. Will continue to monitor patient. Patient reports good po intake since admit     Malnutrition Assessment:  · Malnutrition Status: At risk for malnutrition  · Context: Acute illness or injury  · Findings of the 6 clinical characteristics of malnutrition (Minimum of 2 out of 6 clinical characteristics is required to make the diagnosis of moderate or severe Protein Calorie Malnutrition based on AND/ASPEN Guidelines):  1. Energy Intake-Less than or equal to 75%, greater than or equal to 5 days    2. Weight Loss-No significant weight loss,    3. Fat Loss-No significant subcutaneous fat loss,    4. Muscle Loss-No significant muscle mass loss,    5. Fluid Accumulation-No significant fluid accumulation,    6.   Strength-Not measured    Nutrition Diagnosis:   · Problem: Inadequate oral intake  · Etiology: related to Insufficient energy/nutrient consumption     Signs and symptoms:  as evidenced by Diet history of poor intake, Intake 50-75%    Nutrition Assessment:  · Subjective Assessment: patient reports good po intake, enjoys ensure shakes   · Nutrition-Focused Physical Findings: +i/os, rounded abd, active bs, trace RUE edema noted   · Wound Type: Surgical Wound (Incision to L groin)  · Current Nutrition Therapies:  · Oral Diet Orders: General   · Oral Diet intake: 51-75%  · Oral Nutrition Supplement (ONS) Orders: Standard High Calorie Oral Supplement  · ONS intake: 51-75%  · Anthropometric Measures:  · Ht: 5' 7\" (170.2 cm)   · Current Body Wt: 198 lb (89.8 kg) (10/3 actual bedscale )  · Admission Body Wt: 202 lb (91.6 kg) (9/21/18, first bedscale wt)  · Usual Body Wt: 209 lb (94.8 kg) (9/10/18, bedscale)  · % Weight Change:  ,  EMR shows past weight of 209# bedscale on 9/10/18  · Ideal Body Wt: 1235 lb (560.2 kg), % Ideal Body 152%  · Adjusted Body Wt: 153 lb (69.4 kg), body weight adjusted for Obesity  · BMI Classification: BMI 30.0 - 34.9 Obese Class I  · Comparative Standards (Estimated Nutrition Needs):  · Estimated Daily Total Kcal: 7432-7714  · Estimated Daily Protein (g): 75-85      Nutrition Risk Level: Low    Nutrition Interventions:   Continue current diet, Continue current ONS  Continued Inpatient Monitoring, Coordination of Care, No recommendations at this time    Nutrition Evaluation:   · Evaluation: Goals set   · Goals: consume 75% or more of most meals/ONS     · Monitoring: Meal Intake, Supplement Intake, Diet Tolerance, Skin Integrity, Fluid Balance, Ascites/Edema, Weight, Comparative Standards, Pertinent Labs    See Adult Nutrition Doc Flowsheet for more detail.      Electronically signed by Di Franklin RD, LD on 10/4/18 at 12:59 PM    Contact Number: x [phone]

## 2018-10-04 NOTE — PROGRESS NOTES
Speech Language Pathology  DAILY PROGRESS NOTE      Alert, oriented x3. Fair/fair+ outcome when asked to provide verbal recounting of directives to successfully complete familiar recreationally-based cognitive task. Infrequent min v/c for task maintenance and functional problem solving once engaged in motor component of activity.            SWALLOWING:    Current level:  MODIFIED INDEPENDENT    RECEPTIVE LANGUAGE:  Current FIM level: MODIFIED INDEPENDENT      EXPRESSIVE LANGUAGE:  Current FIM level: MODIFIED INDEPENDENT/SUPERVISION    PROBLEM SOLVING: Current FIM level: MIN ASSISTANCE    MEMORY:    Current FIM level: MIN ASSISTANCE        DISCHARGE PLANNING:    Given current cognitive/linguistic status, SLP recommends      24 hour   supervision  Close   supervision  Intermittent   supervision    No   supervision              X                                               Given current swallow function status, SLP recommends supervision with all PO intake               YES -- Close supervision      YES -- Remote supervision    NO supervision necessary  Not applicable given current NPO status                                               X                                Education was completed in the following areas:         Aspiration precautions    x  Solid/liquid consistency recommendations     Strategies to promote safety with PO intake     Use of thickening agents     Resistive tongue base exercises/Delmy maneuver     Deep Pharyngeal Neuromuscular Stimulation (DPNS)     Shaker exercises     Electrical stimulation     Swallow compensatory strategies     Ace water protocol     Modified Ace water protocol     Receptive aphasia     Auditory processing deficit     Expressive aphasia     Anomia/circumlocutory strategies     Apraxia -- verbal/oral/motor     Augmentative communication   x  Cognition   x  Strategies to promote safety in home environment   x  IADL tasks -- finances, medications, etc.   x

## 2018-10-05 PROCEDURE — 6370000000 HC RX 637 (ALT 250 FOR IP): Performed by: INTERNAL MEDICINE

## 2018-10-05 PROCEDURE — 97530 THERAPEUTIC ACTIVITIES: CPT

## 2018-10-05 PROCEDURE — 6370000000 HC RX 637 (ALT 250 FOR IP): Performed by: PHYSICAL MEDICINE & REHABILITATION

## 2018-10-05 PROCEDURE — 1280000000 HC REHAB R&B

## 2018-10-05 PROCEDURE — 97110 THERAPEUTIC EXERCISES: CPT

## 2018-10-05 PROCEDURE — 6360000002 HC RX W HCPCS: Performed by: INTERNAL MEDICINE

## 2018-10-05 RX ADMIN — HYDRALAZINE HYDROCHLORIDE 25 MG: 25 TABLET ORAL at 06:52

## 2018-10-05 RX ADMIN — AMLODIPINE BESYLATE 5 MG: 5 TABLET ORAL at 09:08

## 2018-10-05 RX ADMIN — HYDRALAZINE HYDROCHLORIDE 25 MG: 25 TABLET ORAL at 23:02

## 2018-10-05 RX ADMIN — METOPROLOL TARTRATE 50 MG: 50 TABLET ORAL at 23:02

## 2018-10-05 RX ADMIN — CHLORTHALIDONE 25 MG: 25 TABLET ORAL at 09:08

## 2018-10-05 RX ADMIN — GABAPENTIN 200 MG: 100 CAPSULE ORAL at 09:08

## 2018-10-05 RX ADMIN — METOPROLOL TARTRATE 50 MG: 50 TABLET ORAL at 09:08

## 2018-10-05 RX ADMIN — ACETAMINOPHEN 650 MG: 325 TABLET, FILM COATED ORAL at 09:08

## 2018-10-05 RX ADMIN — ACETAMINOPHEN 650 MG: 325 TABLET, FILM COATED ORAL at 16:04

## 2018-10-05 RX ADMIN — LOSARTAN POTASSIUM 100 MG: 50 TABLET, FILM COATED ORAL at 09:08

## 2018-10-05 RX ADMIN — ENOXAPARIN SODIUM 40 MG: 40 INJECTION SUBCUTANEOUS at 09:08

## 2018-10-05 RX ADMIN — POTASSIUM CHLORIDE 10 MEQ: 750 TABLET, EXTENDED RELEASE ORAL at 09:08

## 2018-10-05 RX ADMIN — CELECOXIB 200 MG: 100 CAPSULE ORAL at 09:08

## 2018-10-05 RX ADMIN — DOCUSATE SODIUM 100 MG: 100 CAPSULE, LIQUID FILLED ORAL at 09:08

## 2018-10-05 RX ADMIN — PANTOPRAZOLE SODIUM 40 MG: 40 TABLET, DELAYED RELEASE ORAL at 06:52

## 2018-10-05 RX ADMIN — HYDRALAZINE HYDROCHLORIDE 25 MG: 25 TABLET ORAL at 14:34

## 2018-10-05 RX ADMIN — GABAPENTIN 200 MG: 100 CAPSULE ORAL at 20:56

## 2018-10-05 ASSESSMENT — PAIN SCALES - GENERAL
PAINLEVEL_OUTOF10: 0
PAINLEVEL_OUTOF10: 6
PAINLEVEL_OUTOF10: 0
PAINLEVEL_OUTOF10: 0
PAINLEVEL_OUTOF10: 5
PAINLEVEL_OUTOF10: 7

## 2018-10-05 ASSESSMENT — PAIN DESCRIPTION - ORIENTATION: ORIENTATION: RIGHT;LEFT

## 2018-10-05 ASSESSMENT — PAIN DESCRIPTION - PAIN TYPE: TYPE: ACUTE PAIN

## 2018-10-05 ASSESSMENT — PAIN DESCRIPTION - DESCRIPTORS: DESCRIPTORS: NAGGING;DISCOMFORT;ACHING

## 2018-10-05 ASSESSMENT — PAIN DESCRIPTION - LOCATION: LOCATION: BACK;LEG

## 2018-10-06 PROCEDURE — 6370000000 HC RX 637 (ALT 250 FOR IP): Performed by: INTERNAL MEDICINE

## 2018-10-06 PROCEDURE — 1280000000 HC REHAB R&B

## 2018-10-06 PROCEDURE — 97530 THERAPEUTIC ACTIVITIES: CPT

## 2018-10-06 PROCEDURE — 6370000000 HC RX 637 (ALT 250 FOR IP): Performed by: PHYSICAL MEDICINE & REHABILITATION

## 2018-10-06 PROCEDURE — 97110 THERAPEUTIC EXERCISES: CPT

## 2018-10-06 PROCEDURE — 6360000002 HC RX W HCPCS: Performed by: INTERNAL MEDICINE

## 2018-10-06 PROCEDURE — 97127 HC SP THER IVNTJ W/FOCUS COG FUNCJ: CPT

## 2018-10-06 RX ADMIN — LOSARTAN POTASSIUM 100 MG: 50 TABLET, FILM COATED ORAL at 08:11

## 2018-10-06 RX ADMIN — METOPROLOL TARTRATE 50 MG: 50 TABLET ORAL at 08:11

## 2018-10-06 RX ADMIN — POTASSIUM CHLORIDE 10 MEQ: 750 TABLET, EXTENDED RELEASE ORAL at 08:10

## 2018-10-06 RX ADMIN — PANTOPRAZOLE SODIUM 40 MG: 40 TABLET, DELAYED RELEASE ORAL at 06:44

## 2018-10-06 RX ADMIN — HYDRALAZINE HYDROCHLORIDE 25 MG: 25 TABLET ORAL at 06:44

## 2018-10-06 RX ADMIN — ACETAMINOPHEN 650 MG: 325 TABLET, FILM COATED ORAL at 08:08

## 2018-10-06 RX ADMIN — HYDRALAZINE HYDROCHLORIDE 25 MG: 25 TABLET ORAL at 14:55

## 2018-10-06 RX ADMIN — CELECOXIB 200 MG: 100 CAPSULE ORAL at 08:10

## 2018-10-06 RX ADMIN — METOPROLOL TARTRATE 50 MG: 50 TABLET ORAL at 21:03

## 2018-10-06 RX ADMIN — AMLODIPINE BESYLATE 5 MG: 5 TABLET ORAL at 08:11

## 2018-10-06 RX ADMIN — CHLORTHALIDONE 25 MG: 25 TABLET ORAL at 08:11

## 2018-10-06 RX ADMIN — HYDRALAZINE HYDROCHLORIDE 25 MG: 25 TABLET ORAL at 21:03

## 2018-10-06 RX ADMIN — GABAPENTIN 200 MG: 100 CAPSULE ORAL at 21:01

## 2018-10-06 RX ADMIN — DOCUSATE SODIUM 100 MG: 100 CAPSULE, LIQUID FILLED ORAL at 08:10

## 2018-10-06 RX ADMIN — GABAPENTIN 200 MG: 100 CAPSULE ORAL at 08:10

## 2018-10-06 RX ADMIN — ENOXAPARIN SODIUM 40 MG: 40 INJECTION SUBCUTANEOUS at 08:11

## 2018-10-06 RX ADMIN — ACETAMINOPHEN 650 MG: 325 TABLET, FILM COATED ORAL at 21:08

## 2018-10-06 ASSESSMENT — PAIN DESCRIPTION - FREQUENCY
FREQUENCY: CONTINUOUS
FREQUENCY: CONTINUOUS

## 2018-10-06 ASSESSMENT — PAIN SCALES - GENERAL
PAINLEVEL_OUTOF10: 6
PAINLEVEL_OUTOF10: 6
PAINLEVEL_OUTOF10: 0
PAINLEVEL_OUTOF10: 0
PAINLEVEL_OUTOF10: 4
PAINLEVEL_OUTOF10: 8

## 2018-10-06 ASSESSMENT — PAIN DESCRIPTION - LOCATION
LOCATION: HEAD
LOCATION: LEG
LOCATION: BACK;LEG
LOCATION: HEAD

## 2018-10-06 ASSESSMENT — PAIN DESCRIPTION - DESCRIPTORS
DESCRIPTORS: ACHING
DESCRIPTORS: ACHING;SORE

## 2018-10-06 ASSESSMENT — PAIN DESCRIPTION - ORIENTATION
ORIENTATION: RIGHT;LEFT
ORIENTATION: RIGHT

## 2018-10-06 ASSESSMENT — PAIN DESCRIPTION - PAIN TYPE
TYPE: CHRONIC PAIN
TYPE: CHRONIC PAIN

## 2018-10-06 NOTE — PLAN OF CARE
Problem: Pain:  Goal: Pain level will decrease  Pain level will decrease   Outcome: Met This Shift      Problem: Falls - Risk of:  Goal: Will remain free from falls  Will remain free from falls   Outcome: Met This Shift    Goal: Absence of physical injury  Absence of physical injury   Outcome: Met This Shift      Problem: Risk for Impaired Skin Integrity  Goal: Tissue integrity - skin and mucous membranes  Structural intactness and normal physiological function of skin and  mucous membranes.    Outcome: Met This Shift

## 2018-10-07 PROCEDURE — 1280000000 HC REHAB R&B

## 2018-10-07 PROCEDURE — 6370000000 HC RX 637 (ALT 250 FOR IP): Performed by: PHYSICAL MEDICINE & REHABILITATION

## 2018-10-07 PROCEDURE — 6370000000 HC RX 637 (ALT 250 FOR IP): Performed by: INTERNAL MEDICINE

## 2018-10-07 PROCEDURE — 97535 SELF CARE MNGMENT TRAINING: CPT

## 2018-10-07 PROCEDURE — 97530 THERAPEUTIC ACTIVITIES: CPT

## 2018-10-07 PROCEDURE — 97110 THERAPEUTIC EXERCISES: CPT

## 2018-10-07 PROCEDURE — 6360000002 HC RX W HCPCS: Performed by: INTERNAL MEDICINE

## 2018-10-07 RX ADMIN — METOPROLOL TARTRATE 50 MG: 50 TABLET ORAL at 08:21

## 2018-10-07 RX ADMIN — HYDRALAZINE HYDROCHLORIDE 25 MG: 25 TABLET ORAL at 07:14

## 2018-10-07 RX ADMIN — GABAPENTIN 200 MG: 100 CAPSULE ORAL at 20:45

## 2018-10-07 RX ADMIN — ACETAMINOPHEN 650 MG: 325 TABLET, FILM COATED ORAL at 20:45

## 2018-10-07 RX ADMIN — ENOXAPARIN SODIUM 40 MG: 40 INJECTION SUBCUTANEOUS at 08:18

## 2018-10-07 RX ADMIN — LOSARTAN POTASSIUM 100 MG: 50 TABLET, FILM COATED ORAL at 08:21

## 2018-10-07 RX ADMIN — ACETAMINOPHEN 650 MG: 325 TABLET, FILM COATED ORAL at 08:22

## 2018-10-07 RX ADMIN — HYDRALAZINE HYDROCHLORIDE 25 MG: 25 TABLET ORAL at 14:14

## 2018-10-07 RX ADMIN — POTASSIUM CHLORIDE 10 MEQ: 750 TABLET, EXTENDED RELEASE ORAL at 08:21

## 2018-10-07 RX ADMIN — PANTOPRAZOLE SODIUM 40 MG: 40 TABLET, DELAYED RELEASE ORAL at 07:14

## 2018-10-07 RX ADMIN — AMLODIPINE BESYLATE 5 MG: 5 TABLET ORAL at 08:21

## 2018-10-07 RX ADMIN — HYDRALAZINE HYDROCHLORIDE 25 MG: 25 TABLET ORAL at 21:51

## 2018-10-07 RX ADMIN — GABAPENTIN 200 MG: 100 CAPSULE ORAL at 08:21

## 2018-10-07 RX ADMIN — ACETAMINOPHEN 650 MG: 325 TABLET, FILM COATED ORAL at 14:16

## 2018-10-07 RX ADMIN — CELECOXIB 200 MG: 100 CAPSULE ORAL at 08:21

## 2018-10-07 RX ADMIN — DOCUSATE SODIUM 100 MG: 100 CAPSULE, LIQUID FILLED ORAL at 08:24

## 2018-10-07 RX ADMIN — CHLORTHALIDONE 25 MG: 25 TABLET ORAL at 08:22

## 2018-10-07 ASSESSMENT — PAIN SCALES - GENERAL
PAINLEVEL_OUTOF10: 2
PAINLEVEL_OUTOF10: 2
PAINLEVEL_OUTOF10: 0
PAINLEVEL_OUTOF10: 4
PAINLEVEL_OUTOF10: 6
PAINLEVEL_OUTOF10: 3
PAINLEVEL_OUTOF10: 0

## 2018-10-07 ASSESSMENT — PAIN DESCRIPTION - ORIENTATION
ORIENTATION: RIGHT;LEFT
ORIENTATION: RIGHT
ORIENTATION: RIGHT

## 2018-10-07 ASSESSMENT — PAIN DESCRIPTION - FREQUENCY
FREQUENCY: INTERMITTENT

## 2018-10-07 ASSESSMENT — PAIN DESCRIPTION - LOCATION
LOCATION: BACK;KNEE
LOCATION: HEAD
LOCATION: HEAD
LOCATION: KNEE

## 2018-10-07 ASSESSMENT — PAIN DESCRIPTION - PAIN TYPE
TYPE: CHRONIC PAIN
TYPE: ACUTE PAIN

## 2018-10-07 ASSESSMENT — PAIN DESCRIPTION - DESCRIPTORS
DESCRIPTORS: HEADACHE
DESCRIPTORS: HEADACHE
DESCRIPTORS: ACHING;SORE
DESCRIPTORS: ACHING;SORE

## 2018-10-07 NOTE — PROGRESS NOTES
Progress Note - covering Dr. Xi Mooney    Subjective/   60y.o. year old female on the rehab unit for [facility] secondary to ruptured ICA aneurysm. She is complaining of leg pain which is better this afternoon. She reports a headache as well. States Tylenol didn't help. No SOB or chest pain or dizziness. Objective/   VITALS:  BP (!) 115/47   Pulse 65   Temp 97.9 °F (36.6 °C)   Resp 16   Ht 5' 7\" (1.702 m)   Wt 198 lb (89.8 kg)   SpO2 97%   BMI 31.01 kg/m²   24HR INTAKE/OUTPUT:    Intake/Output Summary (Last 24 hours) at 10/06/18 2156  Last data filed at 10/06/18 1840   Gross per 24 hour   Intake             1320 ml   Output             1000 ml   Net              320 ml     Constitutional:  Alert, awake, no apparent distress   Cardiovascular:  S1, S2 without m/r/g   Respiratory:  CTA B without w/r/r   Abdomen: +BS  Ext: no pitting LE edema, no calf tenderness or cords. Negative Homans' sign  Neuro: awake and alert, good sitting balance    Functional Level    Initial Evaluation  9/21/18  PM Short Term Goals Long Term Goals    Was pt agreeable to Eval/treatment? yes   yes       Does pt have pain? Yes 10/10 low back pain with any movement.  Nursing notified    mild low back pain        Bed Mobility  Rolling: maxA  Supine to sit: maxA  Sit to supine: NT  Scooting: maxA   Supervision for all components Kristi  mod I   Transfers Sit to stand: maxA  Stand to sit: maxA  Stand pivot: maxA HHA to left side   Sit<>stand: Supervision  Stand pivot: SBA Kristi with AAD  sup with AAD   Ambulation    3 feet with maxA with HHA (side stepping EOB)   75 feet with L LBQC with CGA 50 feet with AAD with Kristi >150 feet with AAD with  sup   Walking 10 feet on uneven surface NT   NT       Wheel Chair Mobility NT   NT     Car Transfers NT   NT Kristi with AAD  sup with AAd   Stair negotiation: ascended and descended  NT    4 steps with L rail (descended backward) with CGA 4 steps with one rail with modA >6 steps with one rail with with anxiety and depression      Plan/  1. Continue rehab program  2. Continue symptomatic relief  3. Close neuro monitoring and re-image if significant changes  4. Continue dvt prophylaxis  5.  Control blood pressure - doing well          Robinson Ferrara MD

## 2018-10-07 NOTE — PROGRESS NOTES
.Occupational Therapy  OCCUPATIONAL THERAPY DAILY NOTE    Date:10/7/2018  Patient Name: Dawson Iyer  MRN: [id number]  : 1960  Room: [address] [place] [address]     Diagnosis: s/p aneurysm coiling - CVA  Precautions: falls    Functional Assessment:   Date Status AE  Comments   Feeding 10/4/18 Set up     Grooming 10/3/18 Set up     Bathing 10/3/18 Mod A     UB Dressing 10/7/18 Set up  Pt able to rashida/doff t- shirt using koby technique. LB Dressing 10/7/18 CGA/min Reacher  Sock aid Pt able to rashida/doff hospital pants without reacher seated in w/c. Pt used sock aid using Right hand to assist threading sock on device x2 reps. CGA with sock aid to rashida gripper socks. Homemaking 10/4/18 Min A LBQC      Functional Transfers / Balance:   Date Status DME  Comments   Sit Balance 10/7/18 S  Sitting balance up in w/c and during LB dressing tasks   Stand Balance 10/7/18 SBA-CGA Table top  Quad cane Pt completed sit to stand transfers in room using quad cane for balance. [x] Tub  [x] Shower   Transfer 10/5/18 CGA LBQC, extended tub bench    Commode   Transfer 10/5/18 CGA LBQC    Functional   Mobility 10/5/18 CGA LBQC    Other:  Bed<>WC    WC<>Chair w/ arms   10/5/18    10/5/18   CGA    Min-mod A       LBQC      Functional Exercises / Activity:   RUE ROM ex's completed using  skate board on table  to increase joint ROM for functional movement during therapeutic tasks. Sensory / Neuromuscular Re-Education:      Cognitive Skills:   Status Comments   Problem   Solving fair     Memory fair     Sequencing fair     Safety fair -       Visual Perception:    Education:  Pt educated using A.E. For LB dressing and picking up objects off floor. [x] Family teach completed on: 10/5/18  Pt's daughter and sons present for family teach this AM and completed hands on during transfers throughout functional living environment. They demo'd good skill and understanding of instructions.  They were educated on levels of assist for ADLs and

## 2018-10-08 PROCEDURE — 97530 THERAPEUTIC ACTIVITIES: CPT

## 2018-10-08 PROCEDURE — 1280000000 HC REHAB R&B

## 2018-10-08 PROCEDURE — 6360000002 HC RX W HCPCS: Performed by: INTERNAL MEDICINE

## 2018-10-08 PROCEDURE — 97535 SELF CARE MNGMENT TRAINING: CPT

## 2018-10-08 PROCEDURE — 6370000000 HC RX 637 (ALT 250 FOR IP): Performed by: PHYSICAL MEDICINE & REHABILITATION

## 2018-10-08 PROCEDURE — 97112 NEUROMUSCULAR REEDUCATION: CPT

## 2018-10-08 PROCEDURE — 99231 SBSQ HOSP IP/OBS SF/LOW 25: CPT | Performed by: PHYSICAL MEDICINE & REHABILITATION

## 2018-10-08 PROCEDURE — 6370000000 HC RX 637 (ALT 250 FOR IP): Performed by: INTERNAL MEDICINE

## 2018-10-08 PROCEDURE — 97127 HC SP THER IVNTJ W/FOCUS COG FUNCJ: CPT

## 2018-10-08 PROCEDURE — 97110 THERAPEUTIC EXERCISES: CPT

## 2018-10-08 RX ORDER — BUTALBITAL, ACETAMINOPHEN AND CAFFEINE 50; 325; 40 MG/1; MG/1; MG/1
1 TABLET ORAL 2 TIMES DAILY PRN
Status: DISCONTINUED | OUTPATIENT
Start: 2018-10-08 | End: 2018-10-16 | Stop reason: HOSPADM

## 2018-10-08 RX ADMIN — CELECOXIB 200 MG: 100 CAPSULE ORAL at 08:46

## 2018-10-08 RX ADMIN — METOPROLOL TARTRATE 50 MG: 50 TABLET ORAL at 20:44

## 2018-10-08 RX ADMIN — ACETAMINOPHEN 650 MG: 325 TABLET, FILM COATED ORAL at 14:32

## 2018-10-08 RX ADMIN — PANTOPRAZOLE SODIUM 40 MG: 40 TABLET, DELAYED RELEASE ORAL at 06:42

## 2018-10-08 RX ADMIN — BUTALBITAL, ACETAMINOPHEN, AND CAFFEINE 1 TABLET: 50; 325; 40 TABLET ORAL at 20:46

## 2018-10-08 RX ADMIN — ENOXAPARIN SODIUM 40 MG: 40 INJECTION SUBCUTANEOUS at 08:46

## 2018-10-08 RX ADMIN — POTASSIUM CHLORIDE 10 MEQ: 750 TABLET, EXTENDED RELEASE ORAL at 08:47

## 2018-10-08 RX ADMIN — DOCUSATE SODIUM 100 MG: 100 CAPSULE, LIQUID FILLED ORAL at 08:46

## 2018-10-08 RX ADMIN — HYDRALAZINE HYDROCHLORIDE 25 MG: 25 TABLET ORAL at 14:28

## 2018-10-08 RX ADMIN — HYDRALAZINE HYDROCHLORIDE 25 MG: 25 TABLET ORAL at 06:42

## 2018-10-08 RX ADMIN — ACETAMINOPHEN 650 MG: 325 TABLET, FILM COATED ORAL at 06:42

## 2018-10-08 RX ADMIN — GABAPENTIN 200 MG: 100 CAPSULE ORAL at 08:47

## 2018-10-08 RX ADMIN — GABAPENTIN 200 MG: 100 CAPSULE ORAL at 20:44

## 2018-10-08 ASSESSMENT — PAIN SCALES - GENERAL
PAINLEVEL_OUTOF10: 3
PAINLEVEL_OUTOF10: 7
PAINLEVEL_OUTOF10: 5
PAINLEVEL_OUTOF10: 3
PAINLEVEL_OUTOF10: 5
PAINLEVEL_OUTOF10: 5
PAINLEVEL_OUTOF10: 7
PAINLEVEL_OUTOF10: 1
PAINLEVEL_OUTOF10: 0

## 2018-10-08 ASSESSMENT — PAIN DESCRIPTION - FREQUENCY
FREQUENCY: CONTINUOUS
FREQUENCY: CONTINUOUS

## 2018-10-08 ASSESSMENT — PAIN DESCRIPTION - DESCRIPTORS
DESCRIPTORS: ACHING;DISCOMFORT;HEADACHE
DESCRIPTORS: HEADACHE;ACHING;DISCOMFORT
DESCRIPTORS: DULL;HEADACHE

## 2018-10-08 ASSESSMENT — PAIN DESCRIPTION - ORIENTATION
ORIENTATION: ANTERIOR;POSTERIOR
ORIENTATION: POSTERIOR;ANTERIOR

## 2018-10-08 ASSESSMENT — PAIN DESCRIPTION - ONSET: ONSET: ON-GOING

## 2018-10-08 ASSESSMENT — PAIN DESCRIPTION - PAIN TYPE
TYPE: CHRONIC PAIN

## 2018-10-08 ASSESSMENT — PAIN DESCRIPTION - PROGRESSION: CLINICAL_PROGRESSION: NOT CHANGED

## 2018-10-08 ASSESSMENT — PAIN DESCRIPTION - LOCATION
LOCATION: HEAD

## 2018-10-08 NOTE — PLAN OF CARE
Problem: Pain:  Goal: Pain level will decrease  Pain level will decrease   Outcome: Met This Shift    Goal: Control of acute pain  Control of acute pain   Outcome: Met This Shift    Goal: Control of chronic pain  Control of chronic pain   Outcome: Met This Shift      Problem: Falls - Risk of:  Goal: Will remain free from falls  Will remain free from falls   Outcome: Met This Shift    Goal: Absence of physical injury  Absence of physical injury   Outcome: Met This Shift      Problem: Risk for Impaired Skin Integrity  Goal: Tissue integrity - skin and mucous membranes  Structural intactness and normal physiological function of skin and  mucous membranes.    Outcome: Met This Shift      Problem: HEMODYNAMIC STATUS  Goal: Patient has stable vital signs and fluid balance  Outcome: Met This Shift      Problem: Neurological  Goal: Maximum potential motor/sensory/cognitive function  Outcome: Met This Shift

## 2018-10-08 NOTE — PROGRESS NOTES
excessively lean forward occasionally requiring verbal cues and tactile cues to stand up straight when stepping up. In PM, pt c/o headache and feeling \"off\". Pt BP after session 119/80. Pt demonstrated good carryover with hand placements and sequencing during turning throughout session. AM  Time in: 1000  Time out: 1045    PM  Time in: 1515  Time out: 1600    Pt is making good progress toward established Physical Therapy goals. Continue with physical therapy current plan of care.     Dave Tom, SPT  [place], [place]  [id number]

## 2018-10-08 NOTE — PROGRESS NOTES
Occupational Therapy     10/08/18 4367   Attendance   Activity Trivia   Participation Active participation   [place] Demonstrates ability to complete social goals   Leisure Education Demonstrates knowledge of benefits of leisure involvement  (Identified broke the monotony as a benefit.)   Leisure Attitude/Participation Participates in 1:1 structured activity   Time Spent With Patient   Minutes 20

## 2018-10-08 NOTE — PATIENT CARE CONFERENCE
Short term Chair/bed transfers goal: Supervision   Long term Chair/bed transfers goal: Supervision     Ambulation:   Current level: 75-90' small based quad cane at Stand by Assist   Short term ambulation goal: met / exceeded  Long term ambulation goal: > 150'  With Appropriate assistive device at Supervision     Car transfers:   Current level: Stand by Assist with small based quad cane  Short term car transfers goal: met  Long term car transfers goal:Supervision with Appropriate assistive device     Stairs:   Current level : 4-7 steps with  left rail at [employer] assistance   Short term stairs goal: met   Long term stairs goal: 12  With 1 rail at Stand by Assist     OCCUPATIONAL THERAPY:    Tub/shower:   Current level: Contact Guard assistance to Minimum assistance   Short term tub/shower goal: Contact Guard assistance   Long term tub/shower goal: Supervision     Feeding:  Current level: Independent   Short term feeding goal: Independent   Long term feeding goal: Independent     Grooming:   Current level: Supervision   Short term grooming goal: Modified Independent   Long term grooming goal: Modified Independent     Bathing:  Current level: Moderate Assist   Short term bathing goal: Minimum assistance   Long term bathing goal: Minimum assistance     Homemaking:   Current level: Minimum assistance   Short term homemaking goal: Supervision   Long term homemaking goal: Supervision     Upper body dressing:  Current level: Supervision   Short term upper body dressing goal: Supervision   Long term upper body dressing goal: Supervision     Lower body dressing:  Current level: Minimum assistance   Short term lower body dressing goal: Contact Guard assistance   Long term lower body dressing goal: Supervision     Toilet transfer:   Current level: Stand by Assist   Short term toilet transfer goal: Supervision   Long term toilet transfer goal: Supervision     SPEECH THERAPY  Swallowing:  Current level:   Independent

## 2018-10-09 PROCEDURE — 97530 THERAPEUTIC ACTIVITIES: CPT

## 2018-10-09 PROCEDURE — 90791 PSYCH DIAGNOSTIC EVALUATION: CPT | Performed by: PSYCHOLOGIST

## 2018-10-09 PROCEDURE — 1280000000 HC REHAB R&B

## 2018-10-09 PROCEDURE — 97110 THERAPEUTIC EXERCISES: CPT

## 2018-10-09 PROCEDURE — 97127 HC SP THER IVNTJ W/FOCUS COG FUNCJ: CPT

## 2018-10-09 PROCEDURE — 6370000000 HC RX 637 (ALT 250 FOR IP): Performed by: PHYSICAL MEDICINE & REHABILITATION

## 2018-10-09 PROCEDURE — 99231 SBSQ HOSP IP/OBS SF/LOW 25: CPT | Performed by: PHYSICAL MEDICINE & REHABILITATION

## 2018-10-09 PROCEDURE — 6360000002 HC RX W HCPCS: Performed by: INTERNAL MEDICINE

## 2018-10-09 PROCEDURE — 6370000000 HC RX 637 (ALT 250 FOR IP): Performed by: INTERNAL MEDICINE

## 2018-10-09 RX ADMIN — HYDRALAZINE HYDROCHLORIDE 25 MG: 25 TABLET ORAL at 07:01

## 2018-10-09 RX ADMIN — METOPROLOL TARTRATE 50 MG: 50 TABLET ORAL at 09:06

## 2018-10-09 RX ADMIN — CELECOXIB 200 MG: 100 CAPSULE ORAL at 09:06

## 2018-10-09 RX ADMIN — ACETAMINOPHEN 650 MG: 325 TABLET, FILM COATED ORAL at 16:26

## 2018-10-09 RX ADMIN — DOCUSATE SODIUM 100 MG: 100 CAPSULE, LIQUID FILLED ORAL at 09:06

## 2018-10-09 RX ADMIN — LOSARTAN POTASSIUM 100 MG: 50 TABLET, FILM COATED ORAL at 09:06

## 2018-10-09 RX ADMIN — AMLODIPINE BESYLATE 5 MG: 5 TABLET ORAL at 09:07

## 2018-10-09 RX ADMIN — CHLORTHALIDONE 25 MG: 25 TABLET ORAL at 09:07

## 2018-10-09 RX ADMIN — METOPROLOL TARTRATE 50 MG: 50 TABLET ORAL at 21:30

## 2018-10-09 RX ADMIN — ENOXAPARIN SODIUM 40 MG: 40 INJECTION SUBCUTANEOUS at 09:08

## 2018-10-09 RX ADMIN — PANTOPRAZOLE SODIUM 40 MG: 40 TABLET, DELAYED RELEASE ORAL at 07:01

## 2018-10-09 RX ADMIN — ACETAMINOPHEN 650 MG: 325 TABLET, FILM COATED ORAL at 09:07

## 2018-10-09 RX ADMIN — GABAPENTIN 200 MG: 100 CAPSULE ORAL at 21:30

## 2018-10-09 RX ADMIN — GABAPENTIN 200 MG: 100 CAPSULE ORAL at 09:06

## 2018-10-09 RX ADMIN — POTASSIUM CHLORIDE 10 MEQ: 750 TABLET, EXTENDED RELEASE ORAL at 09:06

## 2018-10-09 ASSESSMENT — PAIN DESCRIPTION - ONSET: ONSET: ON-GOING

## 2018-10-09 ASSESSMENT — PAIN DESCRIPTION - PAIN TYPE
TYPE: CHRONIC PAIN
TYPE: CHRONIC PAIN

## 2018-10-09 ASSESSMENT — PAIN DESCRIPTION - LOCATION
LOCATION: BACK
LOCATION: BACK

## 2018-10-09 ASSESSMENT — PAIN SCALES - GENERAL
PAINLEVEL_OUTOF10: 6
PAINLEVEL_OUTOF10: 8
PAINLEVEL_OUTOF10: 4

## 2018-10-09 ASSESSMENT — PAIN DESCRIPTION - FREQUENCY
FREQUENCY: CONTINUOUS
FREQUENCY: CONTINUOUS

## 2018-10-09 ASSESSMENT — PAIN DESCRIPTION - ORIENTATION
ORIENTATION: LOWER
ORIENTATION: LOWER

## 2018-10-09 ASSESSMENT — PAIN DESCRIPTION - DESCRIPTORS
DESCRIPTORS: CONSTANT;DISCOMFORT;ACHING
DESCRIPTORS: OTHER (COMMENT)

## 2018-10-09 ASSESSMENT — PAIN DESCRIPTION - PROGRESSION: CLINICAL_PROGRESSION: NOT CHANGED

## 2018-10-09 NOTE — PROGRESS NOTES
Speech Language Pathology  DAILY PROGRESS NOTE        Fair+ outcome outcome when engaged in recreationally based cognitive activity. Good sustained attention to task. Fair functional problem solving when engaged in motor component of task while standing and throwing bags. Required infrequent min v/c for recall of scoring procedure and also for functional math to tally scores. Limited interaction with peers but appropriate response to humorous/sarcastic banter. Affect appropriate to venue.             SWALLOWING:    Current level:  MODIFIED INDEPENDENT    RECEPTIVE LANGUAGE:  Current FIM level: MODIFIED INDEPENDENT      EXPRESSIVE LANGUAGE:  Current FIM level: MODIFIED INDEPENDENT    PROBLEM SOLVING: Current FIM level: SUPERVISION/MIN ASSISTANCE    MEMORY:    Current FIM level: SUPERVISION/MIN ASSISTANCE        DISCHARGE PLANNING:    Given current cognitive/linguistic status, SLP recommends      24 hour   supervision  Close   supervision  Intermittent   supervision    No   supervision              X                                               Given current swallow function status, SLP recommends supervision with all PO intake               YES -- Close supervision      YES -- Remote supervision    NO supervision necessary  Not applicable given current NPO status                                               X                                Education was completed in the following areas:         Aspiration precautions    x  Solid/liquid consistency recommendations     Strategies to promote safety with PO intake     Use of thickening agents     Resistive tongue base exercises/Delmy maneuver     Deep Pharyngeal Neuromuscular Stimulation (DPNS)     Shaker exercises     Electrical stimulation     Swallow compensatory strategies     Fredda Clamp water protocol     Google water protocol     Receptive aphasia     Auditory processing deficit     Expressive aphasia     Anomia/circumlocutory strategies     Apraxia --

## 2018-10-09 NOTE — PROGRESS NOTES
Physical Therapy    Facility/Department: [address] REHAB  Daily Treatment Note    NAME: Dawson Iyer  : 1960  MRN: [id number]    Date of Service: 10/9/2018    Evaluating Therapist: Aldo Yepez DPT    ROOM: [facility]  DIAGNOSIS: SAH    PMH: To ED on 9/10/18 for HA with CT shows SAH. CTA of the head, which demonstrated left ICA aneurysms. Subarachnoid hemorrhage secondary to hypertension with ruptured aneurysm- s/p coiling of aneurysm with endovascular coil embolization of 2 distal L ICA aneurysms  And noted saccular aneurysm of right MCA on 9/10/18 and trans catheter infusion of verapamil. Post op, She developed cerebral vasospasm. PRECAUTIONS: SBP less than 150mmHg,  Right hemiparesis, falls, latex allergy    Social:  Pt lives with son and grand daughter in a 2 floor plan, 1st floor setup with 4 step(s) and 1 rail(s) to enter on left per chart. Patient reporting 6 steps to enter with left HR. Prior to admission pt walked with no device and was Independent.  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 Initial Evaluation  18  AM PM Short Term Goals Long Term Goals    Was pt agreeable to Eval/treatment? yes Yes yes     Does pt have pain? Yes 10/10 low back pain with any movement.  Nursing notified  Mild Low back and back of left leg pain 8/10 Low back and back of left leg pain     Bed Mobility  Rolling: maxA  Supine to sit: maxA  Sit to supine: NT  Scooting: maxA Mod I Mod I Kristi  mod I   Transfers Sit to stand: maxA  Stand to sit: maxA  Stand pivot: maxA HHA to left side Sit to stand: sup  Stand to sit: sup  Stand pivot: sba  With L SBQC Sit to stand: sup  Stand to sit: sup  Stand pivot: sba  With L SBQC Kristi with AAD  sup with AAD   Ambulation    3 feet with maxA with HHA (side stepping EOB) 75 feet with sba L sbqc     75 feet with  sba L sbqc    150 feet with cga and L handrail reciprocal pattern 50 feet with AAD with Kristi >150 feet with AAD with  sup   Walking 10 feet on uneven surface NT NT N/T all mobility. Pt given initial verbal cues during turning to not reach too far with cane placement and then was able to demonstrate appropriate cane placement for the remainder of the session. In PM, pt c/o 8/10 low back pain after ambulation. Pt willing and motivated to participate in session. Trialed ambulation with reciprocal pattern with L handrail with increased endurance. Pt given verbal cues to keep wider DAPHNIE and take smaller step with L LE. Pt given occasional verbal cues for hand placement during transfers. On last stand pivot transfer of session, pt had LOB back into chair due to not reaching back and fatigue. Pt educated on importance of taking time when tired to have good quality movements and increase safety. AM  Time in: 1000  Time out: 1045    PM  Time in: 1515  Time out: 1600    Pt is making good progress toward established Physical Therapy goals. Continue with physical therapy current plan of care.     Cait Andrews, SILVINA [place], [place]  [id number]

## 2018-10-10 PROCEDURE — 97530 THERAPEUTIC ACTIVITIES: CPT

## 2018-10-10 PROCEDURE — 97535 SELF CARE MNGMENT TRAINING: CPT

## 2018-10-10 PROCEDURE — 99231 SBSQ HOSP IP/OBS SF/LOW 25: CPT | Performed by: PHYSICAL MEDICINE & REHABILITATION

## 2018-10-10 PROCEDURE — 6370000000 HC RX 637 (ALT 250 FOR IP): Performed by: PHYSICAL MEDICINE & REHABILITATION

## 2018-10-10 PROCEDURE — 97542 WHEELCHAIR MNGMENT TRAINING: CPT

## 2018-10-10 PROCEDURE — 1280000000 HC REHAB R&B

## 2018-10-10 PROCEDURE — 97127 HC SP THER IVNTJ W/FOCUS COG FUNCJ: CPT

## 2018-10-10 PROCEDURE — 6370000000 HC RX 637 (ALT 250 FOR IP): Performed by: INTERNAL MEDICINE

## 2018-10-10 PROCEDURE — 6360000002 HC RX W HCPCS: Performed by: INTERNAL MEDICINE

## 2018-10-10 RX ADMIN — GABAPENTIN 200 MG: 100 CAPSULE ORAL at 09:38

## 2018-10-10 RX ADMIN — AMLODIPINE BESYLATE 5 MG: 5 TABLET ORAL at 09:39

## 2018-10-10 RX ADMIN — ACETAMINOPHEN 650 MG: 325 TABLET, FILM COATED ORAL at 13:58

## 2018-10-10 RX ADMIN — ENOXAPARIN SODIUM 40 MG: 40 INJECTION SUBCUTANEOUS at 09:38

## 2018-10-10 RX ADMIN — CHLORTHALIDONE 25 MG: 25 TABLET ORAL at 09:38

## 2018-10-10 RX ADMIN — LOSARTAN POTASSIUM 100 MG: 50 TABLET, FILM COATED ORAL at 09:38

## 2018-10-10 RX ADMIN — HYDRALAZINE HYDROCHLORIDE 25 MG: 25 TABLET ORAL at 13:58

## 2018-10-10 RX ADMIN — HYDRALAZINE HYDROCHLORIDE 25 MG: 25 TABLET ORAL at 06:38

## 2018-10-10 RX ADMIN — PANTOPRAZOLE SODIUM 40 MG: 40 TABLET, DELAYED RELEASE ORAL at 06:38

## 2018-10-10 RX ADMIN — METOPROLOL TARTRATE 50 MG: 50 TABLET ORAL at 09:38

## 2018-10-10 RX ADMIN — METOPROLOL TARTRATE 50 MG: 50 TABLET ORAL at 21:40

## 2018-10-10 RX ADMIN — CELECOXIB 200 MG: 100 CAPSULE ORAL at 09:38

## 2018-10-10 RX ADMIN — POTASSIUM CHLORIDE 10 MEQ: 750 TABLET, EXTENDED RELEASE ORAL at 09:39

## 2018-10-10 RX ADMIN — GABAPENTIN 200 MG: 100 CAPSULE ORAL at 21:40

## 2018-10-10 ASSESSMENT — PAIN DESCRIPTION - LOCATION
LOCATION: BACK
LOCATION: BACK

## 2018-10-10 ASSESSMENT — PAIN DESCRIPTION - PAIN TYPE
TYPE: CHRONIC PAIN
TYPE: CHRONIC PAIN

## 2018-10-10 ASSESSMENT — PAIN SCALES - GENERAL
PAINLEVEL_OUTOF10: 6
PAINLEVEL_OUTOF10: 5
PAINLEVEL_OUTOF10: 6
PAINLEVEL_OUTOF10: 0
PAINLEVEL_OUTOF10: 0

## 2018-10-10 ASSESSMENT — PAIN DESCRIPTION - ORIENTATION
ORIENTATION: LOWER
ORIENTATION: LOWER

## 2018-10-10 ASSESSMENT — PAIN DESCRIPTION - ONSET
ONSET: ON-GOING
ONSET: ON-GOING

## 2018-10-10 ASSESSMENT — PAIN DESCRIPTION - DESCRIPTORS
DESCRIPTORS: ACHING;DISCOMFORT;CONSTANT
DESCRIPTORS: ACHING;DISCOMFORT

## 2018-10-10 ASSESSMENT — PAIN DESCRIPTION - FREQUENCY
FREQUENCY: CONTINUOUS
FREQUENCY: CONTINUOUS

## 2018-10-10 ASSESSMENT — PAIN DESCRIPTION - PROGRESSION
CLINICAL_PROGRESSION: NOT CHANGED
CLINICAL_PROGRESSION: NOT CHANGED

## 2018-10-10 NOTE — PROGRESS NOTES
.Occupational Therapy  OCCUPATIONAL THERAPY DAILY NOTE    Date:10/10/2018  Patient Name: Pete Marquez  MRN: [id number]  : 1960  Room: [address] [place] [address]     Diagnosis: s/p aneurysm coiling - CVA  Precautions: falls    Functional Assessment:   Date Status AE  Comments   Feeding 10/8/18 Independent     Grooming 10/10/18 Set up  Brushed teeth, brushed hair, washed face. Bathing 10/10/18 Mod A  UB and LB bathing completed seated and standing. SBA for safety to wash buttocks while steadying using grab bar. UB Dressing 10/10/18 Set up  [employer]. LB Dressing 10/10/18 SBA Reacher  Sock aid Don pants. Homemaking 10/4/18 Min A LBQC       Functional Transfers / Balance:    Date Status DME  Comments   Sit Balance 10/7/18 S     Stand Balance 10/10/18 S/SBA Table top  Quad cane    [x] Tub  [x] Shower   Transfer 10/10/18 SBA LBQC, extended tub bench Shower bench with grab bar   Commode   Transfer 10/8/18 SBA LBQC    Functional   Mobility 10/9/18 SBA/CGA LBQC    Other:  Bed<>WC    WC<>Chair w/ arms   10/8/18    10/9/18   SBA    SBA       LBQC      Functional Exercises / Activity:  Leisure task standing at table top to increase stand balance, endurance and to promote weightbearing throughout RUE. Pt completed task with S for stand balance and demo'd fair+ stand tolerance. Sensory / Neuromuscular Re-Education:      Cognitive Skills:   Status Comments   Problem   Solving fair     Memory fair     Sequencing fair     Safety fair -       Visual Perception:    Education:  Pt educated on safety during functional activity. [x] Family teach completed on: 10/5/18  Pt's daughter and sons present for family teach this AM and completed hands on during transfers throughout functional living environment. They demo'd good skill and understanding of instructions. They were educated on levels of assist for ADLs and home safety instructions.      Pain Level: no c/o pain     Additional Notes:       Patient has made good

## 2018-10-10 NOTE — PROGRESS NOTES
Occupational Therapy     10/10/18 1225   Attendance   Activity Games  (Left,Center,Right)   Participation Active participation   [place] Demonstrates ability to complete social goals   Social Skills Cooperates with others in group activity   Leisure Education Demonstrates knowledge of benefits of leisure involvement  (Identified socializing as a benefit.)   Time Spent With Patient   Minutes 30

## 2018-10-10 NOTE — PROGRESS NOTES
proper sequencing with sbqc with curb negotiation. Continues to be limited with longer distances/prolonged standing 2* back pain. One episode of cueing requiring sba for safety with turning to w/c with fatigue after ambulation. AM  Time in: 1000  Time out: 1045    PM  Time in: 1515  Time out: 1600    Pt is making fair+ progress toward established Physical Therapy goals. Continue with physical therapy current plan of care.     Andrez Resendiz DPT  [id number]

## 2018-10-10 NOTE — PROGRESS NOTES
exercises/Delmy maneuver      Deep Pharyngeal Neuromuscular Stimulation (DPNS)      Shaker exercises      Electrical stimulation      Swallow compensatory strategies      Ace water protocol      Modified Ace water protocol      Receptive aphasia      Auditory processing deficit      Expressive aphasia      Anomia/circumlocutory strategies      Apraxia -- verbal/oral/motor      Augmentative communication   x  Cognition   x  Strategies to promote safety in home environment   x  IADL tasks -- finances, medications, etc.   x  Safety/insight given current physical/cognitive/visuospatial deficits      Neglect      Homonymous hemianopsia      Dysarthria      Oral motor exercises      Articulation drill training      Speech intelligibility strategies (slow rate of delivery, over-articulation)      Dysphonia      Vocal hygiene program      Other:      Will continue SP intervention as per previously established POC.     Rigo Solano M.Ed. CCC-SLP  SP [id number]        Three Hour Rule Tracking:     Individual therapy:                     30 minutes  Concurrent therapy:                  0 minutes  Group therapy:                        0 minutes  Co-treatment therapy:               0  minutes     Total minutes:                       30 minutes

## 2018-10-11 PROCEDURE — 97530 THERAPEUTIC ACTIVITIES: CPT

## 2018-10-11 PROCEDURE — 6360000002 HC RX W HCPCS: Performed by: INTERNAL MEDICINE

## 2018-10-11 PROCEDURE — 6370000000 HC RX 637 (ALT 250 FOR IP): Performed by: INTERNAL MEDICINE

## 2018-10-11 PROCEDURE — 99231 SBSQ HOSP IP/OBS SF/LOW 25: CPT | Performed by: PHYSICAL MEDICINE & REHABILITATION

## 2018-10-11 PROCEDURE — 97110 THERAPEUTIC EXERCISES: CPT

## 2018-10-11 PROCEDURE — 6370000000 HC RX 637 (ALT 250 FOR IP): Performed by: PHYSICAL MEDICINE & REHABILITATION

## 2018-10-11 PROCEDURE — 97127 HC SP THER IVNTJ W/FOCUS COG FUNCJ: CPT

## 2018-10-11 PROCEDURE — 1280000000 HC REHAB R&B

## 2018-10-11 RX ADMIN — CELECOXIB 200 MG: 100 CAPSULE ORAL at 08:36

## 2018-10-11 RX ADMIN — BUTALBITAL, ACETAMINOPHEN, AND CAFFEINE 1 TABLET: 50; 325; 40 TABLET ORAL at 08:43

## 2018-10-11 RX ADMIN — ACETAMINOPHEN 650 MG: 325 TABLET, FILM COATED ORAL at 15:15

## 2018-10-11 RX ADMIN — AMLODIPINE BESYLATE 5 MG: 5 TABLET ORAL at 08:36

## 2018-10-11 RX ADMIN — METOPROLOL TARTRATE 50 MG: 50 TABLET ORAL at 08:36

## 2018-10-11 RX ADMIN — LOSARTAN POTASSIUM 100 MG: 50 TABLET, FILM COATED ORAL at 08:36

## 2018-10-11 RX ADMIN — CHLORTHALIDONE 25 MG: 25 TABLET ORAL at 08:36

## 2018-10-11 RX ADMIN — GABAPENTIN 200 MG: 100 CAPSULE ORAL at 21:30

## 2018-10-11 RX ADMIN — ENOXAPARIN SODIUM 40 MG: 40 INJECTION SUBCUTANEOUS at 10:01

## 2018-10-11 RX ADMIN — POTASSIUM CHLORIDE 10 MEQ: 750 TABLET, EXTENDED RELEASE ORAL at 08:35

## 2018-10-11 RX ADMIN — PANTOPRAZOLE SODIUM 40 MG: 40 TABLET, DELAYED RELEASE ORAL at 06:36

## 2018-10-11 RX ADMIN — GABAPENTIN 200 MG: 100 CAPSULE ORAL at 08:36

## 2018-10-11 ASSESSMENT — PAIN SCALES - GENERAL
PAINLEVEL_OUTOF10: 6
PAINLEVEL_OUTOF10: 0
PAINLEVEL_OUTOF10: 6
PAINLEVEL_OUTOF10: 6
PAINLEVEL_OUTOF10: 0

## 2018-10-11 NOTE — PROGRESS NOTES
Physical Therapy    Facility/Department: [address] REHAB  Daily Treatment Note    NAME: Garret Mcfarland  : 1960  MRN: [id number]    Date of Service: 10/11/2018    Evaluating Therapist: Cece Ramirez DPT    ROOM: [address] [place] [address]  DIAGNOSIS: SAH    PMH: To ED on 9/10/18 for HA with CT shows SAH. CTA of the head, which demonstrated left ICA aneurysms. Subarachnoid hemorrhage secondary to hypertension with ruptured aneurysm- s/p coiling of aneurysm with endovascular coil embolization of 2 distal L ICA aneurysms  And noted saccular aneurysm of right MCA on 9/10/18 and trans catheter infusion of verapamil. Post op, She developed cerebral vasospasm. PRECAUTIONS: SBP less than 150mmHg,  Right hemiparesis, falls, latex allergy    Social:  Pt lives with son and grand daughter in a 2 floor plan, 1st floor setup with 4 step(s) and 1 rail(s) to enter on left per chart. Patient reporting 6 steps to enter with left HR. Prior to admission pt walked with no device and was Independent.  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 Initial Evaluation  18  AM PM Short Term Goals Long Term Goals    Was pt agreeable to Eval/treatment? yes Yes yes     Does pt have pain? Yes 10/10 low back pain with any movement.  Nursing notified  Mild Low back and back of left leg pain 7/10 Low back      Bed Mobility  Rolling: maxA  Supine to sit: maxA  Sit to supine: NT  Scooting: maxA NT NT Kristi  mod I   Transfers Sit to stand: maxA  Stand to sit: maxA  Stand pivot: maxA HHA to left side Sit to stand: mod I  Stand to sit: sup  Stand pivot: sup  With L SBQC Sit to stand: mod i  Stand to sit: mod i  Stand pivot: sup  With L SBQC Kristi with AAD  sup with AAD   Ambulation    3 feet with maxA with HHA (side stepping EOB) NT     110' with sup with L sbqc 50 feet with AAD with Kristi >150 feet with AAD with  sup   Walking 10 feet on uneven surface NT 50' sba with sbqc N/T     Wheel Chair Mobility NT NT NT     Car Transfers NT sup with L SBQC NT Kristi with AAD

## 2018-10-11 NOTE — PROGRESS NOTES
hands on during transfers throughout functional living environment. They demo'd good skill and understanding of instructions. They were educated on levels of assist for ADLs and home safety instructions. Pain Level: no c/o pain     Additional Notes:       Patient has made good minus progress during treatment sessions toward set goals. Therapy emphasis to obtain goals:Strengthening, ROM, Gait Training, Patient/Caregiver Education & Training, Home Management Training, Equipment Evaluation, Education, & procurement, Balance Training, Functional Mobility Training, Endurance Training, Safety Education & Training, Positioning, Neuromuscular Re-education      [x] Continue with current OT Plan of care. [] Prepare for Discharge     DISCHARGE RECOMMENDATIONS  Recommended DME:    Post Discharge Care:   []Home Independently  []Home with 24hr Care / Supervision []Home with Partial Supervision []Home with Home Health OT []Home with Out Pt OT []Other: ___   Comments:         Time in Time out Tx Time Breakdown  Variance:   First Session  8:20 8:55 [x] Individual Tx- 45 Mins  [] Concurrent Tx -   [] Co-Tx -   [] Group Tx -   [] Time Missed -     Second Session 10:45 11:30 [] Individual Tx-    [x] Concurrent Tx - 45 Mins  [] Co-Tx -   [] Group Tx -   [] Time Missed -     Third Session    [] Individual Tx-   [] Concurrent Tx -  [] Co-Tx -   [] Group Tx -   [] Time Missed -         Total Tx Time  90 Mins     Wm Osman  ZAMBRANO/L [id number]  I have read the above note and agree with the documentation.   Mari Blanton OTR/L [id number]

## 2018-10-12 PROCEDURE — 97110 THERAPEUTIC EXERCISES: CPT

## 2018-10-12 PROCEDURE — 97530 THERAPEUTIC ACTIVITIES: CPT

## 2018-10-12 PROCEDURE — 6370000000 HC RX 637 (ALT 250 FOR IP): Performed by: PHYSICAL MEDICINE & REHABILITATION

## 2018-10-12 PROCEDURE — 1280000000 HC REHAB R&B

## 2018-10-12 PROCEDURE — 99232 SBSQ HOSP IP/OBS MODERATE 35: CPT | Performed by: PHYSICAL MEDICINE & REHABILITATION

## 2018-10-12 PROCEDURE — 6360000002 HC RX W HCPCS: Performed by: INTERNAL MEDICINE

## 2018-10-12 PROCEDURE — 97127 HC SP THER IVNTJ W/FOCUS COG FUNCJ: CPT

## 2018-10-12 PROCEDURE — 6370000000 HC RX 637 (ALT 250 FOR IP): Performed by: INTERNAL MEDICINE

## 2018-10-12 PROCEDURE — 97112 NEUROMUSCULAR REEDUCATION: CPT

## 2018-10-12 RX ADMIN — POTASSIUM CHLORIDE 10 MEQ: 750 TABLET, EXTENDED RELEASE ORAL at 08:22

## 2018-10-12 RX ADMIN — CELECOXIB 200 MG: 100 CAPSULE ORAL at 08:22

## 2018-10-12 RX ADMIN — PANTOPRAZOLE SODIUM 40 MG: 40 TABLET, DELAYED RELEASE ORAL at 06:31

## 2018-10-12 RX ADMIN — LOSARTAN POTASSIUM 100 MG: 50 TABLET, FILM COATED ORAL at 08:22

## 2018-10-12 RX ADMIN — GABAPENTIN 200 MG: 100 CAPSULE ORAL at 21:34

## 2018-10-12 RX ADMIN — CHLORTHALIDONE 25 MG: 25 TABLET ORAL at 08:28

## 2018-10-12 RX ADMIN — GABAPENTIN 200 MG: 100 CAPSULE ORAL at 08:21

## 2018-10-12 RX ADMIN — METOPROLOL TARTRATE 50 MG: 50 TABLET ORAL at 21:34

## 2018-10-12 RX ADMIN — ACETAMINOPHEN 650 MG: 325 TABLET, FILM COATED ORAL at 08:27

## 2018-10-12 RX ADMIN — ENOXAPARIN SODIUM 40 MG: 40 INJECTION SUBCUTANEOUS at 08:22

## 2018-10-12 ASSESSMENT — PAIN SCALES - GENERAL
PAINLEVEL_OUTOF10: 6
PAINLEVEL_OUTOF10: 6
PAINLEVEL_OUTOF10: 5

## 2018-10-12 ASSESSMENT — PAIN DESCRIPTION - PROGRESSION: CLINICAL_PROGRESSION: NOT CHANGED

## 2018-10-12 ASSESSMENT — PAIN DESCRIPTION - ORIENTATION: ORIENTATION: LOWER

## 2018-10-12 ASSESSMENT — PAIN DESCRIPTION - FREQUENCY: FREQUENCY: CONTINUOUS

## 2018-10-12 ASSESSMENT — PAIN DESCRIPTION - ONSET: ONSET: ON-GOING

## 2018-10-12 ASSESSMENT — PAIN DESCRIPTION - LOCATION: LOCATION: BACK

## 2018-10-12 ASSESSMENT — PAIN DESCRIPTION - DESCRIPTORS: DESCRIPTORS: ACHING;DISCOMFORT;SORE

## 2018-10-12 ASSESSMENT — PAIN DESCRIPTION - PAIN TYPE: TYPE: CHRONIC PAIN

## 2018-10-12 NOTE — PROGRESS NOTES
Stair negotiation: ascended and descended  NT  NT 8 steps left HR cg/sba (descending backwards) 4 steps with one rail with modA  12 steps with one rail with  sba   Curb Step:   ascended and descended NT 4 in curb x2 with sbqc cgA/sba 4 in curb x2 with sbqc cgA 4 inch step with AAD and modA 4 inch step with AAD and Kristi   Picking up object off the floor NT NT N/T     BLE ROM WFL NT      BLE Strength Right LE: grossly 3+/5    Left LE: hip: 3-/5  Knee: 3-/5  Ankle: 2-/5 NT Right LE: grossly 4/5  Left LE: hip 4-/5  Knee: 4-/5  Ankle: 4-/5     Balance  Sitting: Kristi-sba (left lateral lean 2* back pain)    Standing: maxA Sitting EOB: Independent   Standing: sba with SBQC Standing Dynamically sbA with SQBC     Date Family Teach Completed TBA  9/23, 10/2 brother observed, 10/5 with dtr/sons, 10/9,10/11 with dtr      Is additional Family Teaching Needed? Y or N Pending improvement        Hindering Progress Hemiparesis, pain, balance  pain      PT recommended ELOS 4 weeks       Team's Discharge Plan        Therapist at Team Meeting          Therapeutic Exercise:   AM: supine gs, qs, ap, saq, bridging (3x5), single knee to chest (2x10) x30 to increase strength for transfers  5x sts transfers at sbqc with no cues for hand placement     PM: 5 x sts transfers with no cues for hand placement     Patient education  Pt educated on sequencing with curb step with sbqc, safety with transfers (spt)    Patient response to education:   Pt verbalized understanding Pt demonstrated skill Pt requires further education in this area   yes yes Reinforcement      Additional Comments: Reviewed sequencing with sbqc with proper step pattern, with patient demonstrating consistently throughout session. Educated to slow down with turning as pt as times does not turn right LE completely back to chair and is at risk to lose balance. Pt carrying safety over and performing proper stand to sit transfer after SPT with sbqc throughout rest of session.

## 2018-10-12 NOTE — PROGRESS NOTES
Speech Language Pathology  DAILY PROGRESS NOTE           Good outcome with completion of community reintegration task (ordering off a menu). Good ability to follow multi-step directives presented by SLP. Patient able to complete functional math (e.g. calculating total, adding tip) with cueing x1 only from SLP.                                                                                 SWALLOWING:                     Current level:               MODIFIED INDEPENDENT     RECEPTIVE LANGUAGE:    Current FIM level:       MODIFIED INDEPENDENT                            EXPRESSIVE LANGUAGE:  Current FIM level:       MODIFIED INDEPENDENT     PROBLEM SOLVING:           Current FIM level:       SUPERVISION     MEMORY:                               Current FIM level:       SUPERVISION           DISCHARGE PLANNING:     Given current cognitive/linguistic status, SLP recommends       24 hour   supervision  Close   supervision  Intermittent   supervision    No   supervision                              X                                                                             Given current swallow function status, SLP recommends supervision with all PO intake                                                                                                          YES -- Close supervision      YES -- Remote supervision    NO supervision necessary  Not applicable given current NPO status                                               X                                  Education was completed in the following areas:                                           Aspiration precautions              x  Solid/liquid consistency recommendations      Strategies to promote safety with PO intake      Use of thickening agents      Resistive tongue base exercises/Delmy maneuver      Deep Pharyngeal Neuromuscular Stimulation (DPNS)      Shaker exercises      Electrical stimulation      Swallow compensatory strategies      Db sims

## 2018-10-13 PROCEDURE — 6370000000 HC RX 637 (ALT 250 FOR IP): Performed by: INTERNAL MEDICINE

## 2018-10-13 PROCEDURE — 6370000000 HC RX 637 (ALT 250 FOR IP): Performed by: PHYSICAL MEDICINE & REHABILITATION

## 2018-10-13 PROCEDURE — 92507 TX SP LANG VOICE COMM INDIV: CPT | Performed by: SPEECH-LANGUAGE PATHOLOGIST

## 2018-10-13 PROCEDURE — 97530 THERAPEUTIC ACTIVITIES: CPT

## 2018-10-13 PROCEDURE — 97535 SELF CARE MNGMENT TRAINING: CPT

## 2018-10-13 PROCEDURE — 6360000002 HC RX W HCPCS: Performed by: INTERNAL MEDICINE

## 2018-10-13 PROCEDURE — 1280000000 HC REHAB R&B

## 2018-10-13 RX ADMIN — GABAPENTIN 200 MG: 100 CAPSULE ORAL at 21:44

## 2018-10-13 RX ADMIN — AMLODIPINE BESYLATE 5 MG: 5 TABLET ORAL at 08:32

## 2018-10-13 RX ADMIN — ACETAMINOPHEN 650 MG: 325 TABLET, FILM COATED ORAL at 08:32

## 2018-10-13 RX ADMIN — ENOXAPARIN SODIUM 40 MG: 40 INJECTION SUBCUTANEOUS at 08:32

## 2018-10-13 RX ADMIN — METOPROLOL TARTRATE 50 MG: 50 TABLET ORAL at 21:44

## 2018-10-13 RX ADMIN — CHLORTHALIDONE 25 MG: 25 TABLET ORAL at 08:32

## 2018-10-13 RX ADMIN — PANTOPRAZOLE SODIUM 40 MG: 40 TABLET, DELAYED RELEASE ORAL at 07:03

## 2018-10-13 RX ADMIN — LOSARTAN POTASSIUM 100 MG: 50 TABLET, FILM COATED ORAL at 08:32

## 2018-10-13 RX ADMIN — POTASSIUM CHLORIDE 10 MEQ: 750 TABLET, EXTENDED RELEASE ORAL at 08:32

## 2018-10-13 RX ADMIN — GABAPENTIN 200 MG: 100 CAPSULE ORAL at 08:32

## 2018-10-13 RX ADMIN — DOCUSATE SODIUM 100 MG: 100 CAPSULE, LIQUID FILLED ORAL at 08:32

## 2018-10-13 RX ADMIN — CELECOXIB 200 MG: 100 CAPSULE ORAL at 08:32

## 2018-10-13 RX ADMIN — METOPROLOL TARTRATE 50 MG: 50 TABLET ORAL at 08:32

## 2018-10-13 ASSESSMENT — PAIN DESCRIPTION - FREQUENCY: FREQUENCY: CONTINUOUS

## 2018-10-13 ASSESSMENT — PAIN SCALES - GENERAL
PAINLEVEL_OUTOF10: 0
PAINLEVEL_OUTOF10: 6
PAINLEVEL_OUTOF10: 0
PAINLEVEL_OUTOF10: 3

## 2018-10-13 ASSESSMENT — PAIN DESCRIPTION - LOCATION: LOCATION: BACK

## 2018-10-13 ASSESSMENT — PAIN DESCRIPTION - PROGRESSION: CLINICAL_PROGRESSION: NOT CHANGED

## 2018-10-13 ASSESSMENT — PAIN DESCRIPTION - DESCRIPTORS: DESCRIPTORS: ACHING;DULL;DISCOMFORT;SORE

## 2018-10-13 ASSESSMENT — PAIN DESCRIPTION - PAIN TYPE: TYPE: CHRONIC PAIN

## 2018-10-13 ASSESSMENT — PAIN DESCRIPTION - ONSET: ONSET: ON-GOING

## 2018-10-13 NOTE — PROGRESS NOTES
Cognition:   WFL. Speech:  WNL. ADLs and Self Care:  MIN to MOD A. Bed Mobility:  MOD I. Transfers:   MOD I to SUP. Gait:     120' with SBQC with MOD I to SUP. Stairs:    8 steps with left rail with SBA to CGA, descending backward. 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  4.) S/P Endovascular coil embolization of 2 distal left ICA aneurysms,  9/10/2018.   5.) HTN. PLAN:    1.) Recheck labs.   2.) Continue Rehab Program.

## 2018-10-14 LAB
ALBUMIN SERPL-MCNC: 3.5 G/DL (ref 3.5–5.2)
ALP BLD-CCNC: 72 U/L (ref 35–104)
ALT SERPL-CCNC: 17 U/L (ref 0–32)
ANION GAP SERPL CALCULATED.3IONS-SCNC: 13 MMOL/L (ref 7–16)
AST SERPL-CCNC: 10 U/L (ref 0–31)
BASOPHILS ABSOLUTE: 0.03 E9/L (ref 0–0.2)
BASOPHILS RELATIVE PERCENT: 0.5 % (ref 0–2)
BILIRUB SERPL-MCNC: 0.2 MG/DL (ref 0–1.2)
BUN BLDV-MCNC: 25 MG/DL (ref 6–20)
CALCIUM SERPL-MCNC: 9.4 MG/DL (ref 8.6–10.2)
CHLORIDE BLD-SCNC: 100 MMOL/L (ref 98–107)
CO2: 28 MMOL/L (ref 22–29)
CREAT SERPL-MCNC: 0.8 MG/DL (ref 0.5–1)
EOSINOPHILS ABSOLUTE: 0.21 E9/L (ref 0.05–0.5)
EOSINOPHILS RELATIVE PERCENT: 3.4 % (ref 0–6)
GFR AFRICAN AMERICAN: >60
GFR NON-AFRICAN AMERICAN: >60 ML/MIN/1.73
GLUCOSE BLD-MCNC: 85 MG/DL (ref 74–109)
HCT VFR BLD CALC: 36.3 % (ref 34–48)
HEMOGLOBIN: 11.8 G/DL (ref 11.5–15.5)
IMMATURE GRANULOCYTES #: 0.04 E9/L
IMMATURE GRANULOCYTES %: 0.7 % (ref 0–5)
LYMPHOCYTES ABSOLUTE: 2.54 E9/L (ref 1.5–4)
LYMPHOCYTES RELATIVE PERCENT: 41.6 % (ref 20–42)
MCH RBC QN AUTO: 31.4 PG (ref 26–35)
MCHC RBC AUTO-ENTMCNC: 32.5 % (ref 32–34.5)
MCV RBC AUTO: 96.5 FL (ref 80–99.9)
MONOCYTES ABSOLUTE: 0.49 E9/L (ref 0.1–0.95)
MONOCYTES RELATIVE PERCENT: 8 % (ref 2–12)
NEUTROPHILS ABSOLUTE: 2.8 E9/L (ref 1.8–7.3)
NEUTROPHILS RELATIVE PERCENT: 45.8 % (ref 43–80)
PDW BLD-RTO: 12.6 FL (ref 11.5–15)
PLATELET # BLD: 237 E9/L (ref 130–450)
PMV BLD AUTO: 9.3 FL (ref 7–12)
POTASSIUM SERPL-SCNC: 4.4 MMOL/L (ref 3.5–5)
RBC # BLD: 3.76 E12/L (ref 3.5–5.5)
SODIUM BLD-SCNC: 141 MMOL/L (ref 132–146)
TOTAL PROTEIN: 6.2 G/DL (ref 6.4–8.3)
WBC # BLD: 6.1 E9/L (ref 4.5–11.5)

## 2018-10-14 PROCEDURE — 85025 COMPLETE CBC W/AUTO DIFF WBC: CPT

## 2018-10-14 PROCEDURE — 80053 COMPREHEN METABOLIC PANEL: CPT

## 2018-10-14 PROCEDURE — 6360000002 HC RX W HCPCS: Performed by: INTERNAL MEDICINE

## 2018-10-14 PROCEDURE — 36415 COLL VENOUS BLD VENIPUNCTURE: CPT

## 2018-10-14 PROCEDURE — 6370000000 HC RX 637 (ALT 250 FOR IP): Performed by: PHYSICAL MEDICINE & REHABILITATION

## 2018-10-14 PROCEDURE — 6370000000 HC RX 637 (ALT 250 FOR IP): Performed by: INTERNAL MEDICINE

## 2018-10-14 PROCEDURE — 1280000000 HC REHAB R&B

## 2018-10-14 PROCEDURE — 97530 THERAPEUTIC ACTIVITIES: CPT

## 2018-10-14 RX ADMIN — ENOXAPARIN SODIUM 40 MG: 40 INJECTION SUBCUTANEOUS at 09:31

## 2018-10-14 RX ADMIN — DOCUSATE SODIUM 100 MG: 100 CAPSULE, LIQUID FILLED ORAL at 10:02

## 2018-10-14 RX ADMIN — POTASSIUM CHLORIDE 10 MEQ: 750 TABLET, EXTENDED RELEASE ORAL at 09:31

## 2018-10-14 RX ADMIN — CELECOXIB 200 MG: 100 CAPSULE ORAL at 09:27

## 2018-10-14 RX ADMIN — CHLORTHALIDONE 25 MG: 25 TABLET ORAL at 09:27

## 2018-10-14 RX ADMIN — GABAPENTIN 200 MG: 100 CAPSULE ORAL at 21:23

## 2018-10-14 RX ADMIN — METOPROLOL TARTRATE 50 MG: 50 TABLET ORAL at 09:30

## 2018-10-14 RX ADMIN — GABAPENTIN 200 MG: 100 CAPSULE ORAL at 09:27

## 2018-10-14 RX ADMIN — AMLODIPINE BESYLATE 5 MG: 5 TABLET ORAL at 09:28

## 2018-10-14 RX ADMIN — ACETAMINOPHEN 650 MG: 325 TABLET, FILM COATED ORAL at 10:02

## 2018-10-14 RX ADMIN — SENNOSIDES 8.6 MG: 8.6 TABLET, FILM COATED ORAL at 21:24

## 2018-10-14 RX ADMIN — PANTOPRAZOLE SODIUM 40 MG: 40 TABLET, DELAYED RELEASE ORAL at 06:42

## 2018-10-14 RX ADMIN — METOPROLOL TARTRATE 50 MG: 50 TABLET ORAL at 21:24

## 2018-10-14 RX ADMIN — ACETAMINOPHEN 650 MG: 325 TABLET, FILM COATED ORAL at 21:24

## 2018-10-14 RX ADMIN — BUTALBITAL, ACETAMINOPHEN, AND CAFFEINE 1 TABLET: 50; 325; 40 TABLET ORAL at 15:18

## 2018-10-14 RX ADMIN — LOSARTAN POTASSIUM 100 MG: 50 TABLET, FILM COATED ORAL at 09:30

## 2018-10-14 ASSESSMENT — PAIN DESCRIPTION - FREQUENCY
FREQUENCY: INTERMITTENT
FREQUENCY: INTERMITTENT

## 2018-10-14 ASSESSMENT — PAIN DESCRIPTION - DESCRIPTORS
DESCRIPTORS: HEADACHE;ACHING;POUNDING
DESCRIPTORS: ACHING;DISCOMFORT;HEADACHE
DESCRIPTORS: HEADACHE

## 2018-10-14 ASSESSMENT — PAIN SCALES - GENERAL
PAINLEVEL_OUTOF10: 6
PAINLEVEL_OUTOF10: 7
PAINLEVEL_OUTOF10: 7
PAINLEVEL_OUTOF10: 0
PAINLEVEL_OUTOF10: 5
PAINLEVEL_OUTOF10: 0
PAINLEVEL_OUTOF10: 2
PAINLEVEL_OUTOF10: 0
PAINLEVEL_OUTOF10: 6

## 2018-10-14 ASSESSMENT — PAIN DESCRIPTION - ONSET
ONSET: ON-GOING
ONSET: ON-GOING

## 2018-10-14 ASSESSMENT — PAIN DESCRIPTION - PAIN TYPE
TYPE: CHRONIC PAIN
TYPE: CHRONIC PAIN

## 2018-10-14 ASSESSMENT — PAIN DESCRIPTION - LOCATION
LOCATION: HEAD

## 2018-10-14 NOTE — PROGRESS NOTES
steps with one rail with  sba   Curb Step:   ascended and descended NT NT  4 inch step with AAD and modA 4 inch step with AAD and Kristi   Picking up object off the floor NT NT      BLE ROM WFL NT      BLE Strength Right LE: grossly 3+/5    Left LE: hip: 3-/5  Knee: 3-/5  Ankle: 2-/5 NT      Balance  Sitting: Kristi-sba (left lateral lean 2* back pain)    Standing: maxA Sitting EOB: Independent   Standing: sba with SBQC      Date Family Teach Completed TBA  9/23, 10/2 brother observed, 10/5 with dtr/sons, 10/9,10/11 with dtr      Is additional Family Teaching Needed? Y or N Pending improvement        Hindering Progress Hemiparesis, pain, balance  pain      PT recommended ELOS 4 weeks       Team's Discharge Plan        Therapist at Team Meeting          Therapeutic Exercise:   Supine TE:    HS stretch BLE 30\" each   SKTC 10x10\"   Lumbar trunk rotations 10x10\"   Bridges 10 reps    Patient education  Pt educated on upright posture with forward gaze during amb, hand placement with car transfer and safety/cane placement with pivots. Patient response to education:   Pt verbalized understanding Pt demonstrated skill Pt requires further education in this area   yes yes Reinforcement      Additional Comments: Pt c/o increased back pain following amb. Pt performed above supine TE and reported pain had decreased to 4/10. Pt required intermittent VCs for hand placement with transfers and for appropriate placement of SBQC during pivots. AM  Time in: 1010  Time out: 1040    Pt is making fair+ progress toward established Physical Therapy goals. Continue with physical therapy current plan of care.     Kristina Steele, PT, DPT  License [id number]

## 2018-10-15 PROCEDURE — 97112 NEUROMUSCULAR REEDUCATION: CPT

## 2018-10-15 PROCEDURE — 97535 SELF CARE MNGMENT TRAINING: CPT

## 2018-10-15 PROCEDURE — 6370000000 HC RX 637 (ALT 250 FOR IP): Performed by: PHYSICAL MEDICINE & REHABILITATION

## 2018-10-15 PROCEDURE — 97530 THERAPEUTIC ACTIVITIES: CPT

## 2018-10-15 PROCEDURE — 6370000000 HC RX 637 (ALT 250 FOR IP): Performed by: INTERNAL MEDICINE

## 2018-10-15 PROCEDURE — 99232 SBSQ HOSP IP/OBS MODERATE 35: CPT | Performed by: PHYSICAL MEDICINE & REHABILITATION

## 2018-10-15 PROCEDURE — 97127 HC SP THER IVNTJ W/FOCUS COG FUNCJ: CPT

## 2018-10-15 PROCEDURE — 6360000002 HC RX W HCPCS: Performed by: INTERNAL MEDICINE

## 2018-10-15 PROCEDURE — 1280000000 HC REHAB R&B

## 2018-10-15 RX ADMIN — GABAPENTIN 200 MG: 100 CAPSULE ORAL at 20:17

## 2018-10-15 RX ADMIN — ENOXAPARIN SODIUM 40 MG: 40 INJECTION SUBCUTANEOUS at 09:13

## 2018-10-15 RX ADMIN — ACETAMINOPHEN 650 MG: 325 TABLET, FILM COATED ORAL at 09:19

## 2018-10-15 RX ADMIN — POTASSIUM CHLORIDE 10 MEQ: 750 TABLET, EXTENDED RELEASE ORAL at 09:15

## 2018-10-15 RX ADMIN — PANTOPRAZOLE SODIUM 40 MG: 40 TABLET, DELAYED RELEASE ORAL at 06:37

## 2018-10-15 RX ADMIN — AMLODIPINE BESYLATE 5 MG: 5 TABLET ORAL at 09:15

## 2018-10-15 RX ADMIN — LOSARTAN POTASSIUM 100 MG: 50 TABLET, FILM COATED ORAL at 09:15

## 2018-10-15 RX ADMIN — METOPROLOL TARTRATE 50 MG: 50 TABLET ORAL at 09:16

## 2018-10-15 RX ADMIN — GABAPENTIN 200 MG: 100 CAPSULE ORAL at 09:15

## 2018-10-15 RX ADMIN — CELECOXIB 200 MG: 100 CAPSULE ORAL at 09:14

## 2018-10-15 RX ADMIN — CHLORTHALIDONE 25 MG: 25 TABLET ORAL at 09:16

## 2018-10-15 RX ADMIN — DOCUSATE SODIUM 100 MG: 100 CAPSULE, LIQUID FILLED ORAL at 09:19

## 2018-10-15 ASSESSMENT — PAIN SCALES - GENERAL
PAINLEVEL_OUTOF10: 0
PAINLEVEL_OUTOF10: 6
PAINLEVEL_OUTOF10: 6
PAINLEVEL_OUTOF10: 0

## 2018-10-15 NOTE — PROGRESS NOTES
[facility]  Comprehensive Progress Note    Subjective:      Susan Mclaughlin is a 60 y.o. female admitted to inpatient rehabilitation for [facility] due to ruptured intracranial aneurysm. No acute events overnight. No cp, sob. Tolerating therapy. No complaints. No new issues reported. The patient's medical records have been reviewed. Scheduled Meds:    [START ON 10/17/2018] losartan 50 mg Daily   gabapentin 200 mg BID   celecoxib 200 mg Daily   amLODIPine 5 mg Daily   potassium chloride 10 mEq Daily with breakfast   lidocaine 1 patch Daily   chlorthalidone 25 mg Daily   metoprolol tartrate 50 mg BID   pantoprazole 40 mg QAM AC   enoxaparin 40 mg Daily     Continuous Infusions:    PRN Meds:    butalbital-acetaminophen-caffeine 1 tablet BID PRN   ipratropium-albuterol 1 ampule Q6H PRN   docusate sodium 100 mg BID PRN   senna 1 tablet Nightly PRN   acetaminophen 650 mg Q6H PRN   bisacodyl 10 mg Daily PRN   magnesium hydroxide 30 mL Daily PRN        Vitals:    10/14/18 1630 10/14/18 2123 10/15/18 0449 10/15/18 0900   BP:  131/75 (!) 92/44 119/61   Pulse: 62 60 57 66   Resp:   16 16   Temp:   97.6 °F (36.4 °C)    TempSrc:   Temporal    SpO2: 98%      Weight:   208 lb 3.2 oz (94.4 kg)    Height:         General appearance: Alert, NAD, up in chair   Lungs: CTA b/l, no w/r/r  Heart: RRR  Abdomen: soft, non-tender, normal bowel sounds, no pain or guarding with palpation. Musculoskeletal: Range of motion limited by weakness on R, normal on L. Normal PROM. No lumbar midline or paraspinal tenderness. No calf tenderness. No lower extremity edema. Neurologic: AAOx3. Speech clear, content appropriate. Follows commands. R facial droop. Motor 2-3/5 RUE, 4-/5 RLE, 5/5 on left side.      Exam stable    Laboratory:    Lab Results   Component Value Date    WBC 6.1 10/14/2018    HGB 11.8 10/14/2018    HCT 36.3 10/14/2018    MCV 96.5 10/14/2018     10/14/2018     Lab Results

## 2018-10-15 NOTE — PROGRESS NOTES
with AAd   Stair negotiation: ascended and descended  NT  NT 10 steps left HR cg/sba (descending backwards) 4 steps with one rail with modA  12 steps with one rail with  sba   Curb Step:   ascended and descended NT 4 in curb with sbqc cgA/sba 4 in curb x2 with sbqc cgA 4 inch step with AAD and modA 4 inch step with AAD and Kristi   Picking up object off the floor NT NT N/T     BLE ROM WFL NT      BLE Strength Right LE: grossly 3+/5    Left LE: hip: 3-/5  Knee: 3-/5  Ankle: 2-/5 Right LE:  4+/5  Left LE: hip 4-/5  Knee: 4-/5  Ankle: 4-/5 Right LE: grossly 4/5  Left LE: hip 4-/5  Knee: 4-/5  Ankle: 4-/5     Balance  Sitting: Kristi-sba (left lateral lean 2* back pain)    Standing: maxA Sitting EOB: Independent   Standing: sup with SBQC Standing Dynamically sup with SQBC     Date Family Teach Completed TBA  9/23, 10/2 brother observed, 10/5 with dtr/sons, 10/9,10/11 with dtr      Is additional Family Teaching Needed? Y or N Pending improvement        Hindering Progress Hemiparesis, pain, balance  pain      PT recommended ELOS 4 weeks       Team's Discharge Plan        Therapist at Team Meeting          Therapeutic Exercise:   Am: 5x sts [place] [facility] [place] cues for hand placement   Supine ap, gs, saq, (SKTC and pelvic/lumbar side to side rotation)  x20 (the rest of the exercises completed 30 reps)    PM: 5x sts [place] at [facility] [place] no cues for hand placement   Standing hip flexion, mini squats, heel raises at left // bar x15    Patient education  Pt educated on sequencing with sbqc with ambulation, sequencing with sbqc with curb negotiation     Patient response to education:   Pt verbalized understanding Pt demonstrated skill Pt requires further education in this area   yes yes Reinforcement      Additional Comments: Reviewed all aspects of mobility as anticipated d/c home tomorrow with family.  Pt reporting feeling nervous to leave the hospital but also excited to finally be back home after prolonged

## 2018-10-15 NOTE — PROGRESS NOTES
.Occupational Therapy  OCCUPATIONAL THERAPY DAILY NOTE    Date:10/15/2018  Patient Name: Vu Sheehan  MRN: [id number]  : 1960  Room: [address] [place] [address]     Diagnosis: s/p aneurysm coiling - CVA  Precautions: falls    Functional Assessment:   Date Status AE  Comments   Feeding 10/8/18 Independent     Grooming 10/15/18 Mod I  Seated at sink pt completed grooming tasks of brushing teeth and hair. Pt demo'd good carryover of koby techniques during grooming tasks. Bathing 10/15/18 Min A Shower bench, HH Shower Full shower completed seated/standing with assist required to wash L UE and for balance/safety while standing to wash jacque area. Min cues for safety provided. UB Dressing 10/15/18 Set up  To don pull over shirt seated in chair. LB Dressing 10/15/18 SBA Reacher  Sock aid Pt demo'd ability to rashida/doff socks and don pants seated in chair with assist required for balance/safety once standing to manage clothing around hips. Homemaking 10/13/18 Min A LBQC        Functional Transfers / Balance:    Date Status DME  Comments   Sit Balance 10/15/18 Independent  Seated on shower bench during bathing task. Stand Balance 10/15/18 SBA Table top  Quad cane Demo'd during transfers/ADL tasks. [x] Tub      [x] Shower   Transfer 10/15/18      10/15/18 SBA      SBA LBQC, extended tub bench  Shower bench, grab bar       SPT completed from w/c<>shower with min cues for safety. Commode   Transfer 10/15/18 SBA LBQC    Functional   Mobility 10/15/18 SBA LBQC    Other:  Bed<>WC    WC<>Chair w/ arms   10/8/18    10/9/18   SBA    SBA       LBQC      Functional Exercises / Activity:  Jesus Manuel box with 5# weight with ace wrap to secure RUE to handle to increase BUE coordination, ROM and endurance for independence with functional tasks- demo'd good endurance.     AAROM, AROM 1 X 10 on all planes of RUE to increase strength and ROM     2# free weight 3 X 10 on all planes of LUE to increase strength for independence with functional

## 2018-10-16 VITALS
RESPIRATION RATE: 16 BRPM | HEIGHT: 67 IN | WEIGHT: 208.2 LBS | BODY MASS INDEX: 32.68 KG/M2 | HEART RATE: 72 BPM | OXYGEN SATURATION: 98 % | DIASTOLIC BLOOD PRESSURE: 65 MMHG | SYSTOLIC BLOOD PRESSURE: 123 MMHG | TEMPERATURE: 97.7 F

## 2018-10-16 PROCEDURE — 99239 HOSP IP/OBS DSCHRG MGMT >30: CPT | Performed by: PHYSICAL MEDICINE & REHABILITATION

## 2018-10-16 PROCEDURE — 97110 THERAPEUTIC EXERCISES: CPT

## 2018-10-16 PROCEDURE — 6370000000 HC RX 637 (ALT 250 FOR IP): Performed by: INTERNAL MEDICINE

## 2018-10-16 PROCEDURE — 97112 NEUROMUSCULAR REEDUCATION: CPT

## 2018-10-16 PROCEDURE — 97530 THERAPEUTIC ACTIVITIES: CPT

## 2018-10-16 PROCEDURE — 6370000000 HC RX 637 (ALT 250 FOR IP): Performed by: PHYSICAL MEDICINE & REHABILITATION

## 2018-10-16 PROCEDURE — 6360000002 HC RX W HCPCS: Performed by: INTERNAL MEDICINE

## 2018-10-16 RX ORDER — CELECOXIB 200 MG/1
200 CAPSULE ORAL DAILY
Qty: 30 CAPSULE | Refills: 0 | Status: SHIPPED | OUTPATIENT
Start: 2018-10-17 | End: 2018-11-06 | Stop reason: SDUPTHER

## 2018-10-16 RX ORDER — CHLORTHALIDONE 25 MG/1
25 TABLET ORAL DAILY
Qty: 30 TABLET | Refills: 0 | Status: SHIPPED | OUTPATIENT
Start: 2018-10-17 | End: 2018-11-06 | Stop reason: SDUPTHER

## 2018-10-16 RX ORDER — LOSARTAN POTASSIUM 100 MG/1
100 TABLET ORAL DAILY
Qty: 30 TABLET | Refills: 0 | Status: SHIPPED | OUTPATIENT
Start: 2018-10-17 | End: 2018-11-06 | Stop reason: SDUPTHER

## 2018-10-16 RX ORDER — AMLODIPINE BESYLATE 5 MG/1
5 TABLET ORAL DAILY
Qty: 30 TABLET | Refills: 0 | Status: SHIPPED | OUTPATIENT
Start: 2018-10-17 | End: 2018-11-06 | Stop reason: SDUPTHER

## 2018-10-16 RX ORDER — LOSARTAN POTASSIUM 50 MG/1
100 TABLET ORAL DAILY
Status: DISCONTINUED | OUTPATIENT
Start: 2018-10-17 | End: 2018-10-16 | Stop reason: HOSPADM

## 2018-10-16 RX ORDER — POTASSIUM CHLORIDE 750 MG/1
10 TABLET, EXTENDED RELEASE ORAL
Qty: 30 TABLET | Refills: 0 | Status: SHIPPED | OUTPATIENT
Start: 2018-10-17 | End: 2018-11-06 | Stop reason: SDUPTHER

## 2018-10-16 RX ORDER — LIDOCAINE 50 MG/G
1 PATCH TOPICAL DAILY
Qty: 30 PATCH | Refills: 0 | Status: SHIPPED | OUTPATIENT
Start: 2018-10-17 | End: 2018-11-06 | Stop reason: SDUPTHER

## 2018-10-16 RX ORDER — LOSARTAN POTASSIUM 50 MG/1
50 TABLET ORAL DAILY
Status: DISCONTINUED | OUTPATIENT
Start: 2018-10-17 | End: 2018-10-16

## 2018-10-16 RX ORDER — METOPROLOL TARTRATE 50 MG/1
50 TABLET, FILM COATED ORAL 2 TIMES DAILY
Qty: 60 TABLET | Refills: 0 | Status: SHIPPED | OUTPATIENT
Start: 2018-10-16 | End: 2018-11-06 | Stop reason: SDUPTHER

## 2018-10-16 RX ORDER — PANTOPRAZOLE SODIUM 40 MG/1
40 TABLET, DELAYED RELEASE ORAL
Qty: 30 TABLET | Refills: 0 | Status: SHIPPED | OUTPATIENT
Start: 2018-10-17 | End: 2018-11-06 | Stop reason: SDUPTHER

## 2018-10-16 RX ORDER — GABAPENTIN 100 MG/1
200 CAPSULE ORAL 2 TIMES DAILY
Qty: 120 CAPSULE | Refills: 0 | Status: SHIPPED | OUTPATIENT
Start: 2018-10-16 | End: 2018-11-06 | Stop reason: SDUPTHER

## 2018-10-16 RX ADMIN — METOPROLOL TARTRATE 50 MG: 50 TABLET ORAL at 08:12

## 2018-10-16 RX ADMIN — ACETAMINOPHEN 650 MG: 325 TABLET, FILM COATED ORAL at 12:11

## 2018-10-16 RX ADMIN — BUTALBITAL, ACETAMINOPHEN, AND CAFFEINE 1 TABLET: 50; 325; 40 TABLET ORAL at 13:23

## 2018-10-16 RX ADMIN — GABAPENTIN 200 MG: 100 CAPSULE ORAL at 08:12

## 2018-10-16 RX ADMIN — LOSARTAN POTASSIUM 100 MG: 50 TABLET, FILM COATED ORAL at 08:12

## 2018-10-16 RX ADMIN — PANTOPRAZOLE SODIUM 40 MG: 40 TABLET, DELAYED RELEASE ORAL at 06:53

## 2018-10-16 RX ADMIN — CHLORTHALIDONE 25 MG: 25 TABLET ORAL at 08:12

## 2018-10-16 RX ADMIN — AMLODIPINE BESYLATE 5 MG: 5 TABLET ORAL at 08:13

## 2018-10-16 RX ADMIN — POTASSIUM CHLORIDE 10 MEQ: 750 TABLET, EXTENDED RELEASE ORAL at 08:13

## 2018-10-16 RX ADMIN — CELECOXIB 200 MG: 100 CAPSULE ORAL at 08:12

## 2018-10-16 RX ADMIN — ENOXAPARIN SODIUM 40 MG: 40 INJECTION SUBCUTANEOUS at 08:11

## 2018-10-16 ASSESSMENT — PAIN SCALES - GENERAL
PAINLEVEL_OUTOF10: 6
PAINLEVEL_OUTOF10: 7

## 2018-10-16 NOTE — PROGRESS NOTES
10/14/2018    CO2 28 10/14/2018    BUN 25 10/14/2018    CREATININE 0.8 10/14/2018    GLUCOSE 85 10/14/2018    CALCIUM 9.4 10/14/2018      Lab Results   Component Value Date    ALT 17 10/14/2018    AST 10 10/14/2018    ALKPHOS 72 10/14/2018    BILITOT 0.2 10/14/2018       Lab Results   Component Value Date    COLORU Yellow 09/24/2018    NITRU Negative 09/24/2018    GLUCOSEU Negative 09/24/2018    KETUA Negative 09/24/2018    UROBILINOGEN 1.0 09/24/2018    BILIRUBINUR Negative 09/24/2018    BILIRUBINUR neg 07/17/2018       Functional Status:   Feeding: Independent   Grooming: Mod I  UB dressing: Setup  LB dressing: SBA  Bathing: Min A  Bed mobility: Mod I  Transfers: Mod I  Ambulation: 120 ft with supervision  With L SBQC     Assessment/Plan:     60 y.o. female admitted to inpatient rehabilitation for [facility] due to ruptured intracranial aneurysm. -SAH, L ICA aneurysm x2 s/p coiling: Kirill dc'd. Continue BP control, NSGY to follow case. Monitor neuro status--stable. Continue acute rehab program. Interval HCT 9/24 stable. -Headache: continue PRN tylenol. Fioricet for rare use if tylenol not effective. Headaches improving. -HTN: Continue BP medications. BP improved.   -Low back pain: Lumbar xray with chronic degenerative changes, no acute finding. Patient does not tolerate narcotics, too drowsy. Gabapentin helping with neuropathic pains  -Intermittent urinary retention. - Resolved. -Poor oral intake - improved, continue to encourage   -Psychology following for adjustment, coping     Team conference today.  Discharge today with home health    Electronically signed by Edwardo Lau MD on 10/16/2018 at 9:40 AM

## 2018-10-16 NOTE — PROGRESS NOTES
Strategies to promote safety with PO intake      Use of thickening agents      Resistive tongue base exercises/Delmy maneuver      Deep Pharyngeal Neuromuscular Stimulation (DPNS)      Shaker exercises      Electrical stimulation      Swallow compensatory strategies      Ace water protocol      Modified Ace water protocol      Receptive aphasia      Auditory processing deficit      Expressive aphasia      Anomia/circumlocutory strategies      Apraxia -- verbal/oral/motor      Augmentative communication   x  Cognition   x  Strategies to promote safety in home environment   x  IADL tasks -- finances, medications, etc.   x  Safety/insight given current physical/cognitive/visuospatial deficits      Neglect      Homonymous hemianopsia      Dysarthria      Oral motor exercises      Articulation drill training      Speech intelligibility strategies (slow rate of delivery, over-articulation)      Dysphonia      Vocal hygiene program      Other:      Will continue SP intervention as per previously established POC.       Noel Yanes M.A., CCC-SLP/L  SP [id number]        Three Hour Rule Tracking:     Individual therapy:                   30 minutes  Concurrent therapy:                  0 minutes  Group therapy:                          0 minutes  Co-treatment therapy:               0  minutes     Total minutes:                       30 minutes

## 2018-10-16 NOTE — PATIENT CARE CONFERENCE
[address] [place], [place] [address] [facility]  INPATIENT ACUTE REHABILITATION  TEAM CONFERENCE NOTE/PATIENT PLAN OF CARE    Date: 10/16/2018  Admission date: 2018  Patient Name: Vu Sheehan        MRN: [id number]    : 1960  (63 y.o.)  Gender: female   Rehab diagnosis/surgery with date:  CVA-ruptured aneurysm-coil embolization 9/10/18  Impairment Group Code:  1.2      MEDICAL/FUNCTIONAL HISTORY/STATUS:  BP running low, meds adjusted, stable    Consultations/Labs/X-rays: none recent      MEDICATION UPDATE:  Cozaar 100 mg.      NURSING FIMS:    Bowel:   Current level: supervision  Short term bowel goal:  Modified independent  Long term bowel goal: Modified independent    Bladder:   Current level: Modified independent  Short term bladder goal: Modified independent  Long term bladder goal: Modified independent    Toilet Hygiene:   Current level : min assist  Short term Toilet hygiene goal: supervision  Long term toilet hygiene goal:  supervision    Skin integrity: red jacque area  Pain: low back and headaches, lidoderm patch, tylenol    NUTRITION    Diet  general  Liquid consistency   thin    SOCIAL INFORMATION:  Lives with: son and granddaughter  Prior community services:  none  Home Architecture:  2 story, 6 entry 1 rail, first floor setup, tub shower combo  Prior Level of function:  independent, was working  DME:  shower chair    FAMILY / PATIENT EDUCATION:  safety and self care are ongoing, family education completed    PHYSICAL THERAPY    Bed mobility:   Current level: Modified independent  Short term bed mobility goal: Modified independent  Long term bed mobility goal: Modified independent    Chair/bed transfers:  Current level: Modified independent-supervision with quad cane  Short term Chair/bed transfers goal: met  Long term Chair/bed transfers goal: met      Ambulation:   Current level: 75ft  to 120 ft. quad cane at  supervision  Short term ambulation goal: 150 ft. at supervision  Long term ambulation goal: met      Car independent  Long term expression goal: Modified independent    Problem solving:   Current level: supervision  Short term problem solving goal: supervision  Long term problem solving goal: supervision    Memory:  Current level: supervision-min assist  Short term memory goal: supervision  Long term memory goal: supervision    Social interaction:  Modified independent    Safety awareness: fair to fair +      Patient/family's personal goals: \"go home today\"  Factors supporting goal achievement:  made gains, supportive family  Factors hindering goal achievement:  none, ready for discharge      Discharge Plan   Estimated Length of Stay: 10/16            Destination: home  Services at Discharge: Home health  for all  therapies and nursingthen move on to outpatient  Equipment at Discharge: quad cane , tub bench, transport chair      INTERDISCIPLINARY TEAM/PHYSICIAN RECOMMENDATION AND/OR REVISIONS OF PLAN OF CARE:  discharge today with instructions      I approve the established interdisciplinary plan of care as documented within the medical record of Garret Mcfarland. Please see team conference signature page for those in attendance.     Electronically signed by Venkat Dean RN  on 10/16/2018 at 10:09 AM

## 2018-10-16 NOTE — PROGRESS NOTES
Strength Right LE: grossly 3+/5    Left LE: hip: 3-/5  Knee: 3-/5  Ankle: 2-/5 Right LE:  4+/5  Left LE: hip 4-/5  Knee: 4-/5  Ankle: 4-/5     Balance  Sitting: Kristi-sba (left lateral lean 2* back pain)    Standing: maxA Sitting EOB: Independent   Standing: sup with SBQC     Date Family Teach Completed TBA  9/23, 10/2 brother observed, 10/5 with dtr/sons, 10/9,10/11 with dtr     Is additional Family Teaching Needed? Y or N Pending improvement       Hindering Progress Hemiparesis, pain, balance      PT recommended ELOS 4 weeks      Team's Discharge Plan       Therapist at Team Meeting         Pt made significant progress during rehab length of stay. Pt met all goals except distance with ambulation and number of steps completed, limited by back pain. FT occurred on multiple instances as per above grid with family educated on guarding techniques, safety with ambulation/transfers/curb and step negotiation, as well as technique to complete car transfer into and out of personal vehicle. Family reporting feeling comfortable with current level of care and providing cgA with patient at time of d/c. Pt to d/c home with sbqc as safest device. Rec transport w/c for community level mobility at this time. rec home with HHPT for continued strengthening and transition home safely. Family to provide 24 hr supervision upon d/c.      Moshe Cortez DPT  [id number]

## 2018-10-16 NOTE — PROGRESS NOTES
[]Home Independently  []Home with 24hr Care / Supervision []Home with Partial Supervision []Home with Home Health OT []Home with Out Pt OT []Other: ___   Comments:         Time in Time out Tx Time Breakdown  Variance:   First Session  10:45 11:25 [] Individual Tx-   [x] Concurrent Tx - 40 Mins  [] Co-Tx - \  [] Group Tx -   [] Time Missed -     Second Session   [] Individual Tx-    [] Concurrent Tx -  [] Co-Tx -   [] Group Tx -   [] Time Missed -     Third Session    [] Individual Tx-   [] Concurrent Tx -  [] Co-Tx -   [] Group Tx -   [] Time Missed -         Total Tx Time  45 Mins   Óscar Stewart  ZAMBRANO/L [id number]    I have read & agree with the above status.     Russ Will OTR/L [id number]

## 2018-10-16 NOTE — PROGRESS NOTES
Speech Language Pathology      DISCHARGE SUMMARY          CURRENT LEVEL OF FUNCTION:     SWALLOWING:   Functional for regular consistency solids and thin consistency liquids     LANGUAGE:    Functional for expression of abstract thought      COGNITION:   Mild cognitive deficit     SPEECH:   Intact      Speech Pathologist (SLP) completed education with the patient and/or family regarding type of cognitive impairment. Discussed compensatory strategies to assist in improving attention, STM/LTM, problem solving, abstract reasoning and safety/insight. Encouraged patient and/or family to engage SLP in structured Q&A session relative to identified deficit areas. Patient and/or family indicated understanding of all information provided via satisfactory verbal response. OUTCOME:    Good progress toward goals. Will benefit from ongoing SP intervention to target the identified deficit areas.       Renee Olmstead M.A., CCC-SLP/L  SP [id number]  10/16/2018

## 2018-10-16 NOTE — PROGRESS NOTES
Notified Dr. Olinda Hughes that pts blood pressure was low and Lopressor was held. Orders received and entered.  Misti Jane RN

## 2018-10-17 ENCOUNTER — TELEPHONE (OUTPATIENT)
Dept: FAMILY MEDICINE CLINIC | Age: 58
End: 2018-10-17

## 2018-10-18 ENCOUNTER — TELEPHONE (OUTPATIENT)
Dept: FAMILY MEDICINE CLINIC | Age: 58
End: 2018-10-18

## 2018-10-18 ENCOUNTER — TELEPHONE (OUTPATIENT)
Dept: PHYSICAL MEDICINE AND REHAB | Age: 58
End: 2018-10-18

## 2018-10-25 ENCOUNTER — OFFICE VISIT (OUTPATIENT)
Dept: FAMILY MEDICINE CLINIC | Age: 58
End: 2018-10-25
Payer: COMMERCIAL

## 2018-10-25 VITALS
DIASTOLIC BLOOD PRESSURE: 60 MMHG | WEIGHT: 201.12 LBS | HEIGHT: 65 IN | RESPIRATION RATE: 18 BRPM | OXYGEN SATURATION: 98 % | BODY MASS INDEX: 33.51 KG/M2 | HEART RATE: 50 BPM | SYSTOLIC BLOOD PRESSURE: 108 MMHG | TEMPERATURE: 97.8 F

## 2018-10-25 DIAGNOSIS — Z92.89 HISTORY OF RECENT HOSPITALIZATION: ICD-10-CM

## 2018-10-25 DIAGNOSIS — I10 HYPERTENSION, UNSPECIFIED TYPE: ICD-10-CM

## 2018-10-25 DIAGNOSIS — I60.9 SAH (SUBARACHNOID HEMORRHAGE) (HCC): Primary | ICD-10-CM

## 2018-10-25 PROCEDURE — 99214 OFFICE O/P EST MOD 30 MIN: CPT | Performed by: NURSE PRACTITIONER

## 2018-10-25 ASSESSMENT — ENCOUNTER SYMPTOMS
WHEEZING: 0
CONSTIPATION: 0
SHORTNESS OF BREATH: 0
BACK PAIN: 1
COUGH: 1
DIARRHEA: 0
NAUSEA: 0
VOMITING: 0

## 2018-10-26 ENCOUNTER — TELEPHONE (OUTPATIENT)
Dept: FAMILY MEDICINE CLINIC | Age: 58
End: 2018-10-26

## 2018-10-26 NOTE — TELEPHONE ENCOUNTER
[phone] [facility] called and wanted you to know that they will be doing occupational therapy 2 x a week for 4 weeks

## 2018-11-06 DIAGNOSIS — M54.16 LUMBAR RADICULITIS: ICD-10-CM

## 2018-11-08 RX ORDER — LIDOCAINE 50 MG/G
1 PATCH TOPICAL DAILY
Qty: 30 PATCH | Refills: 0 | Status: SHIPPED | OUTPATIENT
Start: 2018-11-08 | End: 2018-11-18

## 2018-11-08 RX ORDER — CHLORTHALIDONE 25 MG/1
25 TABLET ORAL DAILY
Qty: 30 TABLET | Refills: 0 | Status: SHIPPED | OUTPATIENT
Start: 2018-11-08 | End: 2018-11-16 | Stop reason: SDUPTHER

## 2018-11-08 RX ORDER — LOSARTAN POTASSIUM 100 MG/1
100 TABLET ORAL DAILY
Qty: 30 TABLET | Refills: 0 | Status: SHIPPED | OUTPATIENT
Start: 2018-11-08 | End: 2018-11-16 | Stop reason: SDUPTHER

## 2018-11-08 RX ORDER — AMLODIPINE BESYLATE 5 MG/1
5 TABLET ORAL DAILY
Qty: 30 TABLET | Refills: 0 | Status: SHIPPED | OUTPATIENT
Start: 2018-11-08 | End: 2018-11-16 | Stop reason: SDUPTHER

## 2018-11-08 RX ORDER — GABAPENTIN 100 MG/1
200 CAPSULE ORAL 2 TIMES DAILY
Qty: 120 CAPSULE | Refills: 0 | Status: SHIPPED | OUTPATIENT
Start: 2018-11-08 | End: 2018-12-07 | Stop reason: SDUPTHER

## 2018-11-08 RX ORDER — POTASSIUM CHLORIDE 750 MG/1
10 TABLET, EXTENDED RELEASE ORAL
Qty: 30 TABLET | Refills: 0 | Status: SHIPPED | OUTPATIENT
Start: 2018-11-08 | End: 2018-11-16 | Stop reason: SDUPTHER

## 2018-11-08 RX ORDER — METOPROLOL TARTRATE 50 MG/1
50 TABLET, FILM COATED ORAL 2 TIMES DAILY
Qty: 60 TABLET | Refills: 0 | Status: SHIPPED | OUTPATIENT
Start: 2018-11-08 | End: 2018-12-07 | Stop reason: SDUPTHER

## 2018-11-08 RX ORDER — PANTOPRAZOLE SODIUM 40 MG/1
40 TABLET, DELAYED RELEASE ORAL
Qty: 30 TABLET | Refills: 0 | Status: SHIPPED | OUTPATIENT
Start: 2018-11-08 | End: 2018-11-16 | Stop reason: SDUPTHER

## 2018-11-08 RX ORDER — CELECOXIB 200 MG/1
200 CAPSULE ORAL DAILY
Qty: 30 CAPSULE | Refills: 0 | Status: SHIPPED | OUTPATIENT
Start: 2018-11-08 | End: 2018-12-07

## 2018-11-09 ENCOUNTER — HOSPITAL ENCOUNTER (EMERGENCY)
Age: 58
Discharge: ANOTHER HOSPITAL | End: 2018-11-09
Attending: EMERGENCY MEDICINE
Payer: COMMERCIAL

## 2018-11-09 ENCOUNTER — APPOINTMENT (OUTPATIENT)
Dept: GENERAL RADIOLOGY | Age: 58
End: 2018-11-09
Payer: COMMERCIAL

## 2018-11-09 ENCOUNTER — HOSPITAL ENCOUNTER (INPATIENT)
Age: 58
LOS: 3 days | Discharge: HOME | DRG: 103 | End: 2018-11-12
Attending: INTERNAL MEDICINE | Admitting: INTERNAL MEDICINE
Payer: COMMERCIAL

## 2018-11-09 ENCOUNTER — APPOINTMENT (OUTPATIENT)
Dept: CT IMAGING | Age: 58
End: 2018-11-09
Payer: COMMERCIAL

## 2018-11-09 ENCOUNTER — OFFICE VISIT (OUTPATIENT)
Dept: FAMILY MEDICINE CLINIC | Age: 58
End: 2018-11-09
Payer: COMMERCIAL

## 2018-11-09 ENCOUNTER — HOSPITAL ENCOUNTER (OUTPATIENT)
Age: 58
Discharge: HOME | End: 2018-11-09
Payer: COMMERCIAL

## 2018-11-09 VITALS
OXYGEN SATURATION: 97 % | DIASTOLIC BLOOD PRESSURE: 68 MMHG | RESPIRATION RATE: 16 BRPM | WEIGHT: 213 LBS | HEIGHT: 65 IN | HEART RATE: 56 BPM | SYSTOLIC BLOOD PRESSURE: 125 MMHG | TEMPERATURE: 97 F | BODY MASS INDEX: 35.49 KG/M2

## 2018-11-09 VITALS
BODY MASS INDEX: 35.49 KG/M2 | DIASTOLIC BLOOD PRESSURE: 70 MMHG | TEMPERATURE: 98.3 F | HEIGHT: 65 IN | HEART RATE: 55 BPM | SYSTOLIC BLOOD PRESSURE: 114 MMHG | WEIGHT: 213 LBS | RESPIRATION RATE: 18 BRPM | OXYGEN SATURATION: 97 %

## 2018-11-09 DIAGNOSIS — H57.11 ACUTE RIGHT EYE PAIN: Primary | ICD-10-CM

## 2018-11-09 DIAGNOSIS — G44.59 OTHER COMPLICATED HEADACHE SYNDROME: ICD-10-CM

## 2018-11-09 DIAGNOSIS — R51.9 NONINTRACTABLE HEADACHE, UNSPECIFIED CHRONICITY PATTERN, UNSPECIFIED HEADACHE TYPE: Primary | ICD-10-CM

## 2018-11-09 DIAGNOSIS — Z86.79 HX OF SUBARACHNOID HEMORRHAGE: ICD-10-CM

## 2018-11-09 DIAGNOSIS — R47.81 SLURRED SPEECH: ICD-10-CM

## 2018-11-09 PROBLEM — R29.90 STROKE-LIKE SYMPTOMS: Status: ACTIVE | Noted: 2018-11-09

## 2018-11-09 LAB
ALBUMIN SERPL-MCNC: 4.2 G/DL (ref 3.5–5.2)
ALP BLD-CCNC: 86 U/L (ref 35–104)
ALT SERPL-CCNC: 14 U/L (ref 0–32)
ANION GAP SERPL CALCULATED.3IONS-SCNC: 11 MMOL/L (ref 7–16)
AST SERPL-CCNC: 12 U/L (ref 0–31)
BASOPHILS ABSOLUTE: 0.04 E9/L (ref 0–0.2)
BASOPHILS RELATIVE PERCENT: 0.6 % (ref 0–2)
BILIRUB SERPL-MCNC: 0.2 MG/DL (ref 0–1.2)
BUN BLDV-MCNC: 24 MG/DL (ref 6–20)
CALCIUM SERPL-MCNC: 9.4 MG/DL (ref 8.6–10.2)
CHLORIDE BLD-SCNC: 104 MMOL/L (ref 98–107)
CHP ED QC CHECK: YES
CO2: 25 MMOL/L (ref 22–29)
CREAT SERPL-MCNC: 1 MG/DL (ref 0.5–1)
EKG ATRIAL RATE: 53 BPM
EKG P AXIS: 70 DEGREES
EKG P-R INTERVAL: 164 MS
EKG Q-T INTERVAL: 412 MS
EKG QRS DURATION: 96 MS
EKG QTC CALCULATION (BAZETT): 386 MS
EKG R AXIS: 74 DEGREES
EKG T AXIS: 71 DEGREES
EKG VENTRICULAR RATE: 53 BPM
EOSINOPHILS ABSOLUTE: 0.24 E9/L (ref 0.05–0.5)
EOSINOPHILS RELATIVE PERCENT: 3.5 % (ref 0–6)
GFR AFRICAN AMERICAN: >60
GFR NON-AFRICAN AMERICAN: 57 ML/MIN/1.73
GLUCOSE BLD-MCNC: 109 MG/DL
GLUCOSE BLD-MCNC: 113 MG/DL (ref 74–99)
HCT VFR BLD CALC: 39.1 % (ref 34–48)
HEMOGLOBIN: 13.1 G/DL (ref 11.5–15.5)
IMMATURE GRANULOCYTES #: 0.03 E9/L
IMMATURE GRANULOCYTES %: 0.4 % (ref 0–5)
LYMPHOCYTES ABSOLUTE: 1.83 E9/L (ref 1.5–4)
LYMPHOCYTES RELATIVE PERCENT: 26.8 % (ref 20–42)
MCH RBC QN AUTO: 31.7 PG (ref 26–35)
MCHC RBC AUTO-ENTMCNC: 33.5 % (ref 32–34.5)
MCV RBC AUTO: 94.7 FL (ref 80–99.9)
METER GLUCOSE: 109 MG/DL (ref 74–99)
MONOCYTES ABSOLUTE: 0.48 E9/L (ref 0.1–0.95)
MONOCYTES RELATIVE PERCENT: 7 % (ref 2–12)
NEUTROPHILS ABSOLUTE: 4.2 E9/L (ref 1.8–7.3)
NEUTROPHILS RELATIVE PERCENT: 61.7 % (ref 43–80)
PDW BLD-RTO: 12.5 FL (ref 11.5–15)
PLATELET # BLD: 261 E9/L (ref 130–450)
PMV BLD AUTO: 9.3 FL (ref 7–12)
POTASSIUM SERPL-SCNC: 4.4 MMOL/L (ref 3.5–5)
RBC # BLD: 4.13 E12/L (ref 3.5–5.5)
SODIUM BLD-SCNC: 140 MMOL/L (ref 132–146)
TOTAL PROTEIN: 7.1 G/DL (ref 6.4–8.3)
TROPONIN: <0.01 NG/ML (ref 0–0.03)
WBC # BLD: 6.8 E9/L (ref 4.5–11.5)

## 2018-11-09 PROCEDURE — 96374 THER/PROPH/DIAG INJ IV PUSH: CPT

## 2018-11-09 PROCEDURE — 96375 TX/PRO/DX INJ NEW DRUG ADDON: CPT

## 2018-11-09 PROCEDURE — 6360000002 HC RX W HCPCS: Performed by: EMERGENCY MEDICINE

## 2018-11-09 PROCEDURE — A0426 ALS 1: HCPCS

## 2018-11-09 PROCEDURE — 80053 COMPREHEN METABOLIC PANEL: CPT

## 2018-11-09 PROCEDURE — 2000000000 HC ICU R&B

## 2018-11-09 PROCEDURE — 85025 COMPLETE CBC W/AUTO DIFF WBC: CPT

## 2018-11-09 PROCEDURE — 6370000000 HC RX 637 (ALT 250 FOR IP): Performed by: INTERNAL MEDICINE

## 2018-11-09 PROCEDURE — 2580000003 HC RX 258: Performed by: INTERNAL MEDICINE

## 2018-11-09 PROCEDURE — 70450 CT HEAD/BRAIN W/O DYE: CPT

## 2018-11-09 PROCEDURE — 87081 CULTURE SCREEN ONLY: CPT

## 2018-11-09 PROCEDURE — 36415 COLL VENOUS BLD VENIPUNCTURE: CPT

## 2018-11-09 PROCEDURE — 99213 OFFICE O/P EST LOW 20 MIN: CPT | Performed by: PHYSICIAN ASSISTANT

## 2018-11-09 PROCEDURE — 82962 GLUCOSE BLOOD TEST: CPT

## 2018-11-09 PROCEDURE — 71045 X-RAY EXAM CHEST 1 VIEW: CPT

## 2018-11-09 PROCEDURE — A0425 GROUND MILEAGE: HCPCS

## 2018-11-09 PROCEDURE — 99285 EMERGENCY DEPT VISIT HI MDM: CPT

## 2018-11-09 PROCEDURE — 84484 ASSAY OF TROPONIN QUANT: CPT

## 2018-11-09 RX ORDER — SODIUM CHLORIDE 0.9 % (FLUSH) 0.9 %
10 SYRINGE (ML) INJECTION PRN
Status: DISCONTINUED | OUTPATIENT
Start: 2018-11-09 | End: 2018-11-12 | Stop reason: HOSPADM

## 2018-11-09 RX ORDER — LOSARTAN POTASSIUM 50 MG/1
100 TABLET ORAL DAILY
Status: DISCONTINUED | OUTPATIENT
Start: 2018-11-09 | End: 2018-11-12 | Stop reason: HOSPADM

## 2018-11-09 RX ORDER — METOPROLOL TARTRATE 50 MG/1
50 TABLET, FILM COATED ORAL 2 TIMES DAILY
Status: DISCONTINUED | OUTPATIENT
Start: 2018-11-09 | End: 2018-11-12 | Stop reason: HOSPADM

## 2018-11-09 RX ORDER — AMLODIPINE BESYLATE 5 MG/1
5 TABLET ORAL DAILY
Status: DISCONTINUED | OUTPATIENT
Start: 2018-11-09 | End: 2018-11-12 | Stop reason: HOSPADM

## 2018-11-09 RX ORDER — HYDROCODONE BITARTRATE AND ACETAMINOPHEN 5; 325 MG/1; MG/1
1 TABLET ORAL EVERY 6 HOURS PRN
Status: DISCONTINUED | OUTPATIENT
Start: 2018-11-09 | End: 2018-11-12 | Stop reason: HOSPADM

## 2018-11-09 RX ORDER — POTASSIUM CHLORIDE 750 MG/1
10 TABLET, EXTENDED RELEASE ORAL
Status: DISCONTINUED | OUTPATIENT
Start: 2018-11-10 | End: 2018-11-12 | Stop reason: HOSPADM

## 2018-11-09 RX ORDER — DIPHENHYDRAMINE HYDROCHLORIDE 50 MG/ML
12.5 INJECTION INTRAMUSCULAR; INTRAVENOUS ONCE
Status: COMPLETED | OUTPATIENT
Start: 2018-11-09 | End: 2018-11-09

## 2018-11-09 RX ORDER — ONDANSETRON 2 MG/ML
4 INJECTION INTRAMUSCULAR; INTRAVENOUS EVERY 6 HOURS PRN
Status: DISCONTINUED | OUTPATIENT
Start: 2018-11-09 | End: 2018-11-12 | Stop reason: HOSPADM

## 2018-11-09 RX ORDER — CHLORTHALIDONE 25 MG/1
25 TABLET ORAL DAILY
Status: DISCONTINUED | OUTPATIENT
Start: 2018-11-09 | End: 2018-11-12 | Stop reason: HOSPADM

## 2018-11-09 RX ORDER — GABAPENTIN 100 MG/1
200 CAPSULE ORAL 2 TIMES DAILY
Status: DISCONTINUED | OUTPATIENT
Start: 2018-11-09 | End: 2018-11-12 | Stop reason: HOSPADM

## 2018-11-09 RX ORDER — PANTOPRAZOLE SODIUM 40 MG/1
40 TABLET, DELAYED RELEASE ORAL
Status: DISCONTINUED | OUTPATIENT
Start: 2018-11-10 | End: 2018-11-12 | Stop reason: HOSPADM

## 2018-11-09 RX ORDER — SODIUM CHLORIDE 0.9 % (FLUSH) 0.9 %
10 SYRINGE (ML) INJECTION EVERY 12 HOURS SCHEDULED
Status: DISCONTINUED | OUTPATIENT
Start: 2018-11-09 | End: 2018-11-12 | Stop reason: HOSPADM

## 2018-11-09 RX ADMIN — METOPROLOL TARTRATE 50 MG: 50 TABLET ORAL at 20:35

## 2018-11-09 RX ADMIN — DIPHENHYDRAMINE HYDROCHLORIDE 12.5 MG: 50 INJECTION, SOLUTION INTRAMUSCULAR; INTRAVENOUS at 16:06

## 2018-11-09 RX ADMIN — PROCHLORPERAZINE EDISYLATE 10 MG: 5 INJECTION INTRAMUSCULAR; INTRAVENOUS at 16:06

## 2018-11-09 RX ADMIN — GABAPENTIN 200 MG: 100 CAPSULE ORAL at 20:34

## 2018-11-09 RX ADMIN — HYDROCODONE BITARTRATE AND ACETAMINOPHEN 1 TABLET: 5; 325 TABLET ORAL at 20:43

## 2018-11-09 RX ADMIN — Medication 10 ML: at 20:36

## 2018-11-09 ASSESSMENT — PAIN DESCRIPTION - PAIN TYPE
TYPE: ACUTE PAIN

## 2018-11-09 ASSESSMENT — ENCOUNTER SYMPTOMS
VOMITING: 0
ABDOMINAL PAIN: 0
PHOTOPHOBIA: 0
SHORTNESS OF BREATH: 0
NAUSEA: 0
EYE REDNESS: 0
EYE PAIN: 1
BLURRED VISION: 0
COLOR CHANGE: 0
COUGH: 0

## 2018-11-09 ASSESSMENT — PAIN DESCRIPTION - FREQUENCY: FREQUENCY: INTERMITTENT

## 2018-11-09 ASSESSMENT — PAIN DESCRIPTION - LOCATION
LOCATION: HEAD
LOCATION: HEAD;EYE
LOCATION: HEAD

## 2018-11-09 ASSESSMENT — PAIN DESCRIPTION - PROGRESSION: CLINICAL_PROGRESSION: GRADUALLY IMPROVING

## 2018-11-09 ASSESSMENT — PAIN SCALES - GENERAL
PAINLEVEL_OUTOF10: 7
PAINLEVEL_OUTOF10: 9
PAINLEVEL_OUTOF10: 6

## 2018-11-09 ASSESSMENT — PAIN DESCRIPTION - ONSET
ONSET: ON-GOING
ONSET: ON-GOING

## 2018-11-09 ASSESSMENT — PAIN DESCRIPTION - DESCRIPTORS
DESCRIPTORS: ACHING;DISCOMFORT;HEADACHE
DESCRIPTORS: ACHING

## 2018-11-09 ASSESSMENT — PAIN DESCRIPTION - ORIENTATION
ORIENTATION: ANTERIOR
ORIENTATION: RIGHT

## 2018-11-09 NOTE — PROGRESS NOTES
related to the medical problem. Past Surgical History:  has a past surgical history that includes Tonsillectomy and Brain aneurysm surgery. Social History:  reports that she quit smoking about 2 months ago. Her smoking use included Cigarettes. She has a 64.50 pack-year smoking history. She has never used smokeless tobacco. She reports that she does not drink alcohol or use drugs. Family History: family history includes Arthritis in her father and mother; Atrial Fibrillation in her father and mother; Cancer in her sister; Diabetes in her father and mother; Emphysema in her father. Allergies: Latex; Iodine; Aloe; and Fish-derived products    Physical Exam         VS:  /70   Pulse 55   Temp 98.3 °F (36.8 °C) (Oral)   Resp 18   Ht 5' 5\" (1.651 m)   Wt 213 lb (96.6 kg)   SpO2 97%   Breastfeeding? No   BMI 35.45 kg/m²    Oxygen Saturation Interpretation: Normal.    Constitutional:  Level of Consciousness: Alert, gives appropriate history, ambulated self to the room with the use of a cane. Eyes:  PERRL, EOMI. Moderate photophobia noted. Neck:  Normal ROM. Supple. Lungs:  Clear to auscultation and breath sounds equal.  Heart:  Regular rate and rhythm, normal heart sounds, without pathological murmurs, ectopy, gallops, or rubs. Skin:  Normal turgor. Warm, dry, without visible rash, unless noted elsewhere. Neurological:  Oriented. Motor functions intact. CN II-XII intact. Decreased strength to the right upper extremity. No nystagmus noted. Lab / Imaging Results   (All laboratory and radiology results have been personally reviewed by myself)  Labs:  No results found for this visit on 11/09/18. Imaging: All Radiology results interpreted by Radiologist unless otherwise noted. Assessment / Plan     Impression(s):  1. Acute right eye pain    2. Hx of subarachnoid hemorrhage    3. Other complicated headache syndrome        Disposition:  Disposition: Discharge to home.     Given history of

## 2018-11-09 NOTE — ED PROVIDER NOTES
Conjunctivae are normal.   Neck: Neck supple. Cardiovascular: Normal rate, regular rhythm, normal heart sounds and intact distal pulses. Pulses:       Radial pulses are 2+ on the right side, and 2+ on the left side. Dorsalis pedis pulses are 2+ on the right side, and 2+ on the left side. Pulmonary/Chest: Effort normal and breath sounds normal. No respiratory distress. Abdominal: Soft. Bowel sounds are normal. There is no tenderness. Neurological: She is alert and oriented to person, place, and time. Cranial nerves 2 through 12 grossly intact. Patient able to perform elbow flexion and extension of left upper extremity against resistance. Patient able to perform hip flexion, knee extension, plantar flexion, dorsiflexion and extension for hallicus longus muscle against resistance. Patient has unequal  strength as her left is greater than right. Patient has residual weakness from prior stroke in December. Patient has mild facial droop on the right. Patient has slurred speech. Skin: Skin is warm and dry. She is not diaphoretic. Psychiatric: She has a normal mood and affect. Her behavior is normal.   Nursing note and vitals reviewed. Last known well time: 700    1A: Level of Consciousness 0 - alert; keenly responsive   1B: Ask Month and Age 0 - answers both questions correctly   1C:  Tell Patient To Open and Close Eyes, then Hand  Squeeze 0 - performs both tasks correctly   2: Test Horizontal Extraocular Movements 0 - normal   3: Test Visual Fields 0 - no visual loss   4: Test Facial Palsy 1 - minor paralysis (flattened nasolabial fold, asymmetric on smiling)   5A: Test Left Arm Motor Drift 0 - no drift, limb holds 90 (or 45) degrees for full 10 seconds   5B: Test Right Arm Motor Drift 2 - some effort against gravity, limb cannot get to or maintain (if cued) 90 (or 45) degrees, drifts down to bed, but has some effort against gravity    6A: Test Left Leg Motor Drift 0 - no drift; leg stable.                  Juancho Sims DO  Resident  11/09/18 2004

## 2018-11-10 LAB
ANION GAP SERPL CALCULATED.3IONS-SCNC: 17 MMOL/L (ref 7–16)
BASOPHILS ABSOLUTE: 0.04 E9/L (ref 0–0.2)
BASOPHILS RELATIVE PERCENT: 0.8 % (ref 0–2)
BUN BLDV-MCNC: 28 MG/DL (ref 6–20)
CALCIUM SERPL-MCNC: 9 MG/DL (ref 8.6–10.2)
CHLORIDE BLD-SCNC: 103 MMOL/L (ref 98–107)
CO2: 23 MMOL/L (ref 22–29)
CREAT SERPL-MCNC: 0.9 MG/DL (ref 0.5–1)
EOSINOPHILS ABSOLUTE: 0.28 E9/L (ref 0.05–0.5)
EOSINOPHILS RELATIVE PERCENT: 5.4 % (ref 0–6)
GFR AFRICAN AMERICAN: >60
GFR NON-AFRICAN AMERICAN: >60 ML/MIN/1.73
GLUCOSE BLD-MCNC: 94 MG/DL (ref 74–99)
HCT VFR BLD CALC: 37.8 % (ref 34–48)
HEMOGLOBIN: 12.3 G/DL (ref 11.5–15.5)
IMMATURE GRANULOCYTES #: 0.04 E9/L
IMMATURE GRANULOCYTES %: 0.8 % (ref 0–5)
LYMPHOCYTES ABSOLUTE: 1.92 E9/L (ref 1.5–4)
LYMPHOCYTES RELATIVE PERCENT: 37 % (ref 20–42)
MCH RBC QN AUTO: 31.1 PG (ref 26–35)
MCHC RBC AUTO-ENTMCNC: 32.5 % (ref 32–34.5)
MCV RBC AUTO: 95.5 FL (ref 80–99.9)
MONOCYTES ABSOLUTE: 0.34 E9/L (ref 0.1–0.95)
MONOCYTES RELATIVE PERCENT: 6.6 % (ref 2–12)
NEUTROPHILS ABSOLUTE: 2.57 E9/L (ref 1.8–7.3)
NEUTROPHILS RELATIVE PERCENT: 49.4 % (ref 43–80)
PDW BLD-RTO: 12.6 FL (ref 11.5–15)
PLATELET # BLD: 226 E9/L (ref 130–450)
PMV BLD AUTO: 9.4 FL (ref 7–12)
POTASSIUM REFLEX MAGNESIUM: 4.4 MMOL/L (ref 3.5–5)
RBC # BLD: 3.96 E12/L (ref 3.5–5.5)
SODIUM BLD-SCNC: 143 MMOL/L (ref 132–146)
WBC # BLD: 5.2 E9/L (ref 4.5–11.5)

## 2018-11-10 PROCEDURE — 6370000000 HC RX 637 (ALT 250 FOR IP): Performed by: INTERNAL MEDICINE

## 2018-11-10 PROCEDURE — 2000000000 HC ICU R&B

## 2018-11-10 PROCEDURE — APPSS15 APP SPLIT SHARED TIME 0-15 MINUTES: Performed by: NURSE PRACTITIONER

## 2018-11-10 PROCEDURE — 2580000003 HC RX 258: Performed by: INTERNAL MEDICINE

## 2018-11-10 PROCEDURE — 6370000000 HC RX 637 (ALT 250 FOR IP): Performed by: NURSE PRACTITIONER

## 2018-11-10 PROCEDURE — 99232 SBSQ HOSP IP/OBS MODERATE 35: CPT | Performed by: SURGERY

## 2018-11-10 PROCEDURE — 85025 COMPLETE CBC W/AUTO DIFF WBC: CPT

## 2018-11-10 PROCEDURE — 80048 BASIC METABOLIC PNL TOTAL CA: CPT

## 2018-11-10 PROCEDURE — 36415 COLL VENOUS BLD VENIPUNCTURE: CPT

## 2018-11-10 RX ORDER — DIPHENHYDRAMINE HCL 25 MG
50 TABLET ORAL ONCE
Status: COMPLETED | OUTPATIENT
Start: 2018-11-11 | End: 2018-11-11

## 2018-11-10 RX ORDER — LORAZEPAM 2 MG/ML
0.5 INJECTION INTRAMUSCULAR ONCE
Status: COMPLETED | OUTPATIENT
Start: 2018-11-10 | End: 2018-11-11

## 2018-11-10 RX ADMIN — AMLODIPINE BESYLATE 5 MG: 5 TABLET ORAL at 08:36

## 2018-11-10 RX ADMIN — HYDROCODONE BITARTRATE AND ACETAMINOPHEN 1 TABLET: 5; 325 TABLET ORAL at 21:15

## 2018-11-10 RX ADMIN — LOSARTAN POTASSIUM 100 MG: 50 TABLET, FILM COATED ORAL at 08:36

## 2018-11-10 RX ADMIN — GABAPENTIN 200 MG: 100 CAPSULE ORAL at 08:36

## 2018-11-10 RX ADMIN — Medication 10 ML: at 21:00

## 2018-11-10 RX ADMIN — PREDNISONE 50 MG: 10 TABLET ORAL at 16:11

## 2018-11-10 RX ADMIN — POTASSIUM CHLORIDE 10 MEQ: 10 TABLET, EXTENDED RELEASE ORAL at 08:35

## 2018-11-10 RX ADMIN — METOPROLOL TARTRATE 50 MG: 50 TABLET ORAL at 08:35

## 2018-11-10 RX ADMIN — HYDROCODONE BITARTRATE AND ACETAMINOPHEN 1 TABLET: 5; 325 TABLET ORAL at 15:15

## 2018-11-10 RX ADMIN — PREDNISONE 50 MG: 10 TABLET ORAL at 20:56

## 2018-11-10 RX ADMIN — METOPROLOL TARTRATE 50 MG: 50 TABLET ORAL at 20:56

## 2018-11-10 RX ADMIN — HYDROCODONE BITARTRATE AND ACETAMINOPHEN 1 TABLET: 5; 325 TABLET ORAL at 08:35

## 2018-11-10 RX ADMIN — CHLORTHALIDONE 25 MG: 25 TABLET ORAL at 08:36

## 2018-11-10 RX ADMIN — GABAPENTIN 200 MG: 100 CAPSULE ORAL at 20:56

## 2018-11-10 RX ADMIN — PANTOPRAZOLE SODIUM 40 MG: 40 TABLET, DELAYED RELEASE ORAL at 06:39

## 2018-11-10 ASSESSMENT — PAIN DESCRIPTION - ORIENTATION
ORIENTATION: RIGHT
ORIENTATION: ANTERIOR;RIGHT
ORIENTATION: RIGHT

## 2018-11-10 ASSESSMENT — PAIN DESCRIPTION - DESCRIPTORS
DESCRIPTORS: DISCOMFORT
DESCRIPTORS: ACHING
DESCRIPTORS: DISCOMFORT

## 2018-11-10 ASSESSMENT — PAIN SCALES - GENERAL
PAINLEVEL_OUTOF10: 0
PAINLEVEL_OUTOF10: 5
PAINLEVEL_OUTOF10: 0
PAINLEVEL_OUTOF10: 5
PAINLEVEL_OUTOF10: 6
PAINLEVEL_OUTOF10: 2
PAINLEVEL_OUTOF10: 8
PAINLEVEL_OUTOF10: 7
PAINLEVEL_OUTOF10: 5
PAINLEVEL_OUTOF10: 6
PAINLEVEL_OUTOF10: 0
PAINLEVEL_OUTOF10: 6

## 2018-11-10 ASSESSMENT — ENCOUNTER SYMPTOMS
EYE REDNESS: 1
RESPIRATORY NEGATIVE: 1
BLURRED VISION: 1
VOMITING: 0
EYE PAIN: 1
GASTROINTESTINAL NEGATIVE: 1
NAUSEA: 0
PHOTOPHOBIA: 1

## 2018-11-10 ASSESSMENT — PAIN DESCRIPTION - PAIN TYPE
TYPE: ACUTE PAIN

## 2018-11-10 ASSESSMENT — PAIN DESCRIPTION - LOCATION
LOCATION: EYE;HEAD
LOCATION: HEAD
LOCATION: EYE
LOCATION: HEAD

## 2018-11-10 ASSESSMENT — VISUAL ACUITY
OD: 50
OS: 40

## 2018-11-10 ASSESSMENT — PAIN DESCRIPTION - FREQUENCY: FREQUENCY: INTERMITTENT

## 2018-11-10 NOTE — PLAN OF CARE
Problem: Falls - Risk of:  Goal: Will remain free from falls  Will remain free from falls   Outcome: Met This Shift      Problem: Mobility - Impaired:  Goal: Able to ambulate independently  Able to ambulate independently   Outcome: Ongoing

## 2018-11-10 NOTE — CONSULTS
1.50     Years: 43.00     Types: Cigarettes     Quit date: 9/9/2018    Smokeless tobacco: Never Used    Alcohol use No     Family History   Problem Relation Age of Onset    Diabetes Mother     Arthritis Mother     Atrial Fibrillation Mother     Diabetes Father     Atrial Fibrillation Father     Arthritis Father     Emphysema Father     Cancer Sister      Past Surgical History:   Procedure Laterality Date    BRAIN ANEURYSM SURGERY      coiling     TONSILLECTOMY       Past Medical History:   Diagnosis Date    Cerebral artery occlusion with cerebral infarction ([address]     Degenerative arthritis of hand     Hiatal hernia     Hypertension      Review of Systems   Eyes: Positive for blurred vision, photophobia, pain and redness. Gastrointestinal: Negative for nausea and vomiting. All other systems reviewed and are negative. Objective:   BP (!) 108/58   Pulse 56   Temp 97.9 °F (36.6 °C) (Oral)   Resp 17   Wt 212 lb (96.2 kg)   SpO2 99%   BMI 35.28 kg/m²     Physical Exam   Constitutional: She appears well-developed and well-nourished. HENT:   Head: Normocephalic and atraumatic. Eyes: Pupils are equal, round, and reactive to light. Slight scleral injection   Neck: Neck supple. Cardiovascular: Normal rate. Pulmonary/Chest: Effort normal.   Abdominal: Soft. Neurological:   Reflex Scores:       Tricep reflexes are 2+ on the right side and Tr on the left side. Bicep reflexes are 2+ on the right side and Tr on the left side. Brachioradialis reflexes are 2+ on the right side and Tr on the left side. Patellar reflexes are 0 on the right side and 0 on the left side. Achilles reflexes are 0 on the right side and 0 on the left side. Psychiatric: Her speech is normal.               Neurological Exam  Mental Status  Awake, alert and oriented to person, place and time. Recent and remote memory are intact.  Clock drawing is normal. Speech is normal. Language is 11/10/2018    MONOPCT 6.6 11/10/2018    BASOPCT 0.8 11/10/2018    MONOSABS 0.34 11/10/2018    LYMPHSABS 1.92 11/10/2018    EOSABS 0.28 11/10/2018    BASOSABS 0.04 11/10/2018     CMP:    Lab Results   Component Value Date     11/10/2018    K 4.4 11/10/2018     11/10/2018    CO2 23 11/10/2018    BUN 28 11/10/2018    CREATININE 0.9 11/10/2018    GFRAA >60 11/10/2018    LABGLOM >60 11/10/2018    GLUCOSE 94 11/10/2018    PROT 7.1 11/09/2018    LABALBU 4.2 11/09/2018    CALCIUM 9.0 11/10/2018    BILITOT 0.2 11/09/2018    ALKPHOS 86 11/09/2018    AST 12 11/09/2018    ALT 14 11/09/2018       Head CT reviewed as above. I independently reviewed the labs and imaging studies at today's appointment. Assessment:     Severe headache and eye pain in setting of recent carotid aneurysm and SAH. Neurologically pt is stable. headache is improving somewhat. Less likely vasospasm in my opinion. Patient Active Problem List   Diagnosis    Subarachnoid bleed ([address]    Hypertensive emergency    Obesity (BMI 30-39. 9)    Cerebral aneurysm    SAH (subarachnoid hemorrhage) (HCC)    Hypertension    Adjustment reaction with anxiety and depression    Stroke-like symptoms       Plan:     Monitor clinically for any signs of vasospasm or neurologic deterioration. - consider Angiogram is any occur. Recommend optho evaluation as her main new complaint is new eye pain.      Elizabeth Barbour  10:34 AM  11/10/2018

## 2018-11-10 NOTE — PLAN OF CARE
Problem: Pain:  Goal: Pain level will decrease  Pain level will decrease   Outcome: Met This Shift    Goal: Control of acute pain  Control of acute pain   Outcome: Ongoing      Problem: Falls - Risk of:  Goal: Absence of physical injury  Absence of physical injury   Outcome: Met This Shift

## 2018-11-11 ENCOUNTER — APPOINTMENT (OUTPATIENT)
Dept: CT IMAGING | Age: 58
DRG: 103 | End: 2018-11-11
Attending: INTERNAL MEDICINE
Payer: COMMERCIAL

## 2018-11-11 LAB — MRSA CULTURE ONLY: NORMAL

## 2018-11-11 PROCEDURE — 2580000003 HC RX 258: Performed by: INTERNAL MEDICINE

## 2018-11-11 PROCEDURE — 6370000000 HC RX 637 (ALT 250 FOR IP): Performed by: INTERNAL MEDICINE

## 2018-11-11 PROCEDURE — 97162 PT EVAL MOD COMPLEX 30 MIN: CPT

## 2018-11-11 PROCEDURE — G8988 SELF CARE GOAL STATUS: HCPCS

## 2018-11-11 PROCEDURE — 6360000002 HC RX W HCPCS: Performed by: SURGERY

## 2018-11-11 PROCEDURE — 70496 CT ANGIOGRAPHY HEAD: CPT

## 2018-11-11 PROCEDURE — 6360000004 HC RX CONTRAST MEDICATION: Performed by: RADIOLOGY

## 2018-11-11 PROCEDURE — G8987 SELF CARE CURRENT STATUS: HCPCS

## 2018-11-11 PROCEDURE — G8978 MOBILITY CURRENT STATUS: HCPCS

## 2018-11-11 PROCEDURE — 97530 THERAPEUTIC ACTIVITIES: CPT

## 2018-11-11 PROCEDURE — G8979 MOBILITY GOAL STATUS: HCPCS

## 2018-11-11 PROCEDURE — 2060000000 HC ICU INTERMEDIATE R&B

## 2018-11-11 PROCEDURE — 2700000000 HC OXYGEN THERAPY PER DAY

## 2018-11-11 PROCEDURE — 6370000000 HC RX 637 (ALT 250 FOR IP): Performed by: NURSE PRACTITIONER

## 2018-11-11 RX ADMIN — PANTOPRAZOLE SODIUM 40 MG: 40 TABLET, DELAYED RELEASE ORAL at 07:00

## 2018-11-11 RX ADMIN — GABAPENTIN 200 MG: 100 CAPSULE ORAL at 08:19

## 2018-11-11 RX ADMIN — DIPHENHYDRAMINE HCL 50 MG: 25 TABLET ORAL at 03:59

## 2018-11-11 RX ADMIN — IOPAMIDOL 70 ML: 755 INJECTION, SOLUTION INTRAVENOUS at 05:19

## 2018-11-11 RX ADMIN — Medication 10 ML: at 08:21

## 2018-11-11 RX ADMIN — HYDROCODONE BITARTRATE AND ACETAMINOPHEN 1 TABLET: 5; 325 TABLET ORAL at 22:07

## 2018-11-11 RX ADMIN — AMLODIPINE BESYLATE 5 MG: 5 TABLET ORAL at 08:20

## 2018-11-11 RX ADMIN — LORAZEPAM 0.5 MG: 2 INJECTION INTRAMUSCULAR; INTRAVENOUS at 04:59

## 2018-11-11 RX ADMIN — METOPROLOL TARTRATE 50 MG: 50 TABLET ORAL at 20:17

## 2018-11-11 RX ADMIN — PREDNISONE 50 MG: 10 TABLET ORAL at 03:58

## 2018-11-11 RX ADMIN — Medication 10 ML: at 20:17

## 2018-11-11 RX ADMIN — CHLORTHALIDONE 25 MG: 25 TABLET ORAL at 08:20

## 2018-11-11 RX ADMIN — HYDROCODONE BITARTRATE AND ACETAMINOPHEN 1 TABLET: 5; 325 TABLET ORAL at 08:25

## 2018-11-11 RX ADMIN — LOSARTAN POTASSIUM 100 MG: 50 TABLET, FILM COATED ORAL at 08:19

## 2018-11-11 RX ADMIN — METOPROLOL TARTRATE 50 MG: 50 TABLET ORAL at 08:19

## 2018-11-11 RX ADMIN — HYDROCODONE BITARTRATE AND ACETAMINOPHEN 1 TABLET: 5; 325 TABLET ORAL at 16:01

## 2018-11-11 RX ADMIN — GABAPENTIN 200 MG: 100 CAPSULE ORAL at 20:17

## 2018-11-11 RX ADMIN — POTASSIUM CHLORIDE 10 MEQ: 10 TABLET, EXTENDED RELEASE ORAL at 08:20

## 2018-11-11 ASSESSMENT — PAIN DESCRIPTION - ONSET
ONSET: ON-GOING
ONSET: ON-GOING

## 2018-11-11 ASSESSMENT — PAIN DESCRIPTION - FREQUENCY
FREQUENCY: INTERMITTENT
FREQUENCY: INTERMITTENT

## 2018-11-11 ASSESSMENT — PAIN SCALES - GENERAL
PAINLEVEL_OUTOF10: 8
PAINLEVEL_OUTOF10: 6
PAINLEVEL_OUTOF10: 0
PAINLEVEL_OUTOF10: 7
PAINLEVEL_OUTOF10: 0

## 2018-11-11 ASSESSMENT — PAIN DESCRIPTION - DESCRIPTORS
DESCRIPTORS: ACHING;CONSTANT
DESCRIPTORS: ACHING;CONSTANT;DISCOMFORT

## 2018-11-11 ASSESSMENT — PAIN DESCRIPTION - PROGRESSION
CLINICAL_PROGRESSION: GRADUALLY IMPROVING
CLINICAL_PROGRESSION: GRADUALLY IMPROVING

## 2018-11-11 ASSESSMENT — PAIN DESCRIPTION - PAIN TYPE
TYPE: ACUTE PAIN
TYPE: ACUTE PAIN

## 2018-11-11 ASSESSMENT — PAIN DESCRIPTION - ORIENTATION: ORIENTATION: MID

## 2018-11-11 ASSESSMENT — PAIN DESCRIPTION - LOCATION
LOCATION: HEAD
LOCATION: HEAD

## 2018-11-11 NOTE — PLAN OF CARE
Problem: Pain:  Goal: Pain level will decrease  Pain level will decrease   Outcome: Met This Shift    Goal: Control of acute pain  Control of acute pain   Outcome: Met This Shift      Problem: Falls - Risk of:  Goal: Will remain free from falls  Will remain free from falls   Outcome: Met This Shift    Goal: Absence of physical injury  Absence of physical injury   Outcome: Met This Shift      Problem: Mobility - Impaired:  Goal: Able to ambulate with minimal assistance  Able to ambulate with minimal assistance   Outcome: Met This Shift

## 2018-11-11 NOTE — PROGRESS NOTES
Dr. Claus Joyner called the unit, orders given for norco 5-325mg q6hr prn for pain. Orders placed telephone with readback.
Pt. Brought back to unit from CT
Pt. Taken down to CTA at this time
Spoke with Dr. Lani Bassett regarding neurosurgery consult, said he will see her in the morning
with all amb. Pt was left sitting up in bedside chair with call button within reach and all needs met. Patient education  Pt educated on PT role, safety, upright posture and forward gaze. Patient response to education:   Pt verbalized understanding Pt demonstrated skill Pt requires further education in this area   yes  yes     Rehab potential is Good for reaching above PT goals. Pts/ family goals   1. To return home. Patient and or family understand(s) diagnosis, prognosis, and plan of care. PLAN  PT care will be provided in accordance with the objectives noted above. Whenever appropriate, clear delegation orders will be provided for nursing staff. Exercises and functional mobility practice will be used as well as appropriate assistive devices or modalities to obtain goals. Patient and family education will also be administered as needed. Frequency of treatments will be 2-5x/week x 7-10 days.     Time in: 1335  Time out: [address] [facility], [facility]  License [id number]

## 2018-11-12 VITALS
OXYGEN SATURATION: 96 % | TEMPERATURE: 97.5 F | SYSTOLIC BLOOD PRESSURE: 128 MMHG | BODY MASS INDEX: 34.06 KG/M2 | RESPIRATION RATE: 18 BRPM | WEIGHT: 204.7 LBS | DIASTOLIC BLOOD PRESSURE: 60 MMHG | HEART RATE: 74 BPM

## 2018-11-12 PROBLEM — R29.90 STROKE-LIKE SYMPTOMS: Status: RESOLVED | Noted: 2018-11-09 | Resolved: 2018-11-12

## 2018-11-12 PROBLEM — I67.1 CEREBRAL ANEURYSM: Status: RESOLVED | Noted: 2018-09-10 | Resolved: 2018-11-12

## 2018-11-12 PROBLEM — I60.9 SAH (SUBARACHNOID HEMORRHAGE) (HCC): Status: RESOLVED | Noted: 2018-09-20 | Resolved: 2018-11-12

## 2018-11-12 PROCEDURE — 6370000000 HC RX 637 (ALT 250 FOR IP): Performed by: INTERNAL MEDICINE

## 2018-11-12 PROCEDURE — 2580000003 HC RX 258: Performed by: INTERNAL MEDICINE

## 2018-11-12 RX ORDER — CARBAMAZEPINE 100 MG/1
100 TABLET, CHEWABLE ORAL 3 TIMES DAILY
Qty: 90 TABLET | Refills: 0 | Status: SHIPPED | OUTPATIENT
Start: 2018-11-12 | End: 2018-12-04

## 2018-11-12 RX ORDER — CARBAMAZEPINE 100 MG/1
100 TABLET, CHEWABLE ORAL 3 TIMES DAILY
Status: DISCONTINUED | OUTPATIENT
Start: 2018-11-12 | End: 2018-11-12 | Stop reason: HOSPADM

## 2018-11-12 RX ADMIN — Medication 10 ML: at 08:17

## 2018-11-12 RX ADMIN — AMLODIPINE BESYLATE 5 MG: 5 TABLET ORAL at 08:14

## 2018-11-12 RX ADMIN — HYDROCODONE BITARTRATE AND ACETAMINOPHEN 1 TABLET: 5; 325 TABLET ORAL at 14:35

## 2018-11-12 RX ADMIN — PANTOPRAZOLE SODIUM 40 MG: 40 TABLET, DELAYED RELEASE ORAL at 06:51

## 2018-11-12 RX ADMIN — LOSARTAN POTASSIUM 100 MG: 50 TABLET, FILM COATED ORAL at 08:14

## 2018-11-12 RX ADMIN — HYDROCODONE BITARTRATE AND ACETAMINOPHEN 1 TABLET: 5; 325 TABLET ORAL at 08:17

## 2018-11-12 RX ADMIN — GABAPENTIN 200 MG: 100 CAPSULE ORAL at 08:14

## 2018-11-12 RX ADMIN — CARBAMAZEPINE 100 MG: 100 TABLET, CHEWABLE ORAL at 11:24

## 2018-11-12 RX ADMIN — CHLORTHALIDONE 25 MG: 25 TABLET ORAL at 08:14

## 2018-11-12 RX ADMIN — METOPROLOL TARTRATE 50 MG: 50 TABLET ORAL at 08:14

## 2018-11-12 RX ADMIN — POTASSIUM CHLORIDE 10 MEQ: 10 TABLET, EXTENDED RELEASE ORAL at 08:14

## 2018-11-12 ASSESSMENT — PAIN DESCRIPTION - DESCRIPTORS: DESCRIPTORS: ACHING;DISCOMFORT;PRESSURE

## 2018-11-12 ASSESSMENT — PAIN DESCRIPTION - LOCATION
LOCATION: EYE
LOCATION: EYE;HEAD

## 2018-11-12 ASSESSMENT — PAIN SCALES - GENERAL
PAINLEVEL_OUTOF10: 7
PAINLEVEL_OUTOF10: 8
PAINLEVEL_OUTOF10: 5
PAINLEVEL_OUTOF10: 0
PAINLEVEL_OUTOF10: 0

## 2018-11-12 ASSESSMENT — PAIN DESCRIPTION - PAIN TYPE
TYPE: ACUTE PAIN
TYPE: ACUTE PAIN

## 2018-11-12 ASSESSMENT — PAIN DESCRIPTION - PROGRESSION: CLINICAL_PROGRESSION: GRADUALLY IMPROVING

## 2018-11-12 NOTE — CARE COORDINATION
Spoke with patient in her room. She states she lives at home with her son and 12 yr old grand daughter. She states her PCP is Dr Dayday Ott. She states she does feel a lot better than when she came in. She states she plans to return home on discharge with resumption of [id number] [facility] homecare as she is already active with them. She was inquiring how to get Medical record for disability. SW gave her Medical record number to call and request.  Have called and spoke to Ger at [facility]. Informed her that patient will likely go home today and needs services resumed. She is requesting a resume care order for PT, OT and SLP, charge RN notified.

## 2018-11-13 ENCOUNTER — TELEPHONE (OUTPATIENT)
Dept: FAMILY MEDICINE CLINIC | Age: 58
End: 2018-11-13

## 2018-11-13 NOTE — TELEPHONE ENCOUNTER
Tony [facility] Transitions Initial Follow Up Call    Outreach made within 2 business days of discharge: Yes    Patient: Renée Meyer Patient : 1960   MRN: [id number]  Reason for Admission: There are no discharge diagnoses documented for the most recent discharge. Discharge Date: 18       Spoke with: Mrs. Winifred Tran    Discharge department/facility: Mrs. Mitzi Morfin Patient Contact:  Was patient able to fill all prescriptions: Yes  Was patient instructed to bring all medications to the follow-up visit: Yes  Is patient taking all medications as directed in the discharge summary?  Yes  Does patient understand their discharge instructions: Yes  Does patient have questions or concerns that need addressed prior to 7-14 day follow up office visit: yes -       Scheduled appointment with PCP within 7-14 days    Follow Up appointment was made already  Future Appointments  Date Time Provider Shabnam Moore   2018 10:30 AM [facility] - [place] [id number]   2018 10:00 AM Holly Tobin MD [facility]   2018 1:45 PM Kelsey Ocampo MD [facility] None   2018 10:00 AM Mauro Rivero MD [facility]

## 2018-11-16 ENCOUNTER — OFFICE VISIT (OUTPATIENT)
Dept: FAMILY MEDICINE CLINIC | Age: 58
End: 2018-11-16
Payer: COMMERCIAL

## 2018-11-16 VITALS
RESPIRATION RATE: 16 BRPM | HEART RATE: 59 BPM | SYSTOLIC BLOOD PRESSURE: 130 MMHG | OXYGEN SATURATION: 97 % | HEIGHT: 65 IN | WEIGHT: 216.8 LBS | BODY MASS INDEX: 36.12 KG/M2 | TEMPERATURE: 98.2 F | DIASTOLIC BLOOD PRESSURE: 66 MMHG

## 2018-11-16 DIAGNOSIS — I10 HYPERTENSION, UNSPECIFIED TYPE: ICD-10-CM

## 2018-11-16 DIAGNOSIS — I60.9 SUBARACHNOID BLEED (HCC): Primary | ICD-10-CM

## 2018-11-16 PROCEDURE — 99495 TRANSJ CARE MGMT MOD F2F 14D: CPT | Performed by: NURSE PRACTITIONER

## 2018-11-16 PROCEDURE — 1111F DSCHRG MED/CURRENT MED MERGE: CPT | Performed by: NURSE PRACTITIONER

## 2018-11-16 ASSESSMENT — ENCOUNTER SYMPTOMS
CONSTIPATION: 0
SHORTNESS OF BREATH: 0
COUGH: 0
WHEEZING: 0
VOMITING: 0
DIARRHEA: 0
NAUSEA: 0

## 2018-11-18 RX ORDER — CHLORTHALIDONE 25 MG/1
25 TABLET ORAL DAILY
Qty: 30 TABLET | Refills: 2 | Status: SHIPPED | OUTPATIENT
Start: 2018-11-18 | End: 2019-02-14 | Stop reason: SDUPTHER

## 2018-11-18 RX ORDER — AMLODIPINE BESYLATE 5 MG/1
5 TABLET ORAL DAILY
Qty: 30 TABLET | Refills: 2 | Status: SHIPPED | OUTPATIENT
Start: 2018-11-18 | End: 2019-02-14 | Stop reason: SDUPTHER

## 2018-11-18 RX ORDER — PANTOPRAZOLE SODIUM 40 MG/1
40 TABLET, DELAYED RELEASE ORAL
Qty: 30 TABLET | Refills: 2 | Status: SHIPPED | OUTPATIENT
Start: 2018-11-18 | End: 2019-02-14 | Stop reason: SDUPTHER

## 2018-11-18 RX ORDER — POTASSIUM CHLORIDE 750 MG/1
10 TABLET, EXTENDED RELEASE ORAL
Qty: 30 TABLET | Refills: 2 | Status: SHIPPED | OUTPATIENT
Start: 2018-11-18 | End: 2019-03-14 | Stop reason: SDUPTHER

## 2018-11-18 RX ORDER — LOSARTAN POTASSIUM 100 MG/1
100 TABLET ORAL DAILY
Qty: 30 TABLET | Refills: 2 | Status: SHIPPED | OUTPATIENT
Start: 2018-11-18 | End: 2019-02-14 | Stop reason: SDUPTHER

## 2018-11-26 ENCOUNTER — OFFICE VISIT (OUTPATIENT)
Dept: PHYSICAL MEDICINE AND REHAB | Age: 58
End: 2018-11-26
Payer: COMMERCIAL

## 2018-11-26 VITALS
BODY MASS INDEX: 35.99 KG/M2 | SYSTOLIC BLOOD PRESSURE: 130 MMHG | TEMPERATURE: 97.6 F | HEART RATE: 63 BPM | OXYGEN SATURATION: 95 % | WEIGHT: 216 LBS | DIASTOLIC BLOOD PRESSURE: 82 MMHG | HEIGHT: 65 IN

## 2018-11-26 DIAGNOSIS — Z78.9 IMPAIRED MOBILITY AND ADLS: ICD-10-CM

## 2018-11-26 DIAGNOSIS — Z86.79 HISTORY OF SUBARACHNOID HEMORRHAGE: Primary | ICD-10-CM

## 2018-11-26 DIAGNOSIS — Z74.09 IMPAIRED MOBILITY AND ADLS: ICD-10-CM

## 2018-11-26 DIAGNOSIS — Z86.79 HISTORY OF NONTRAUMATIC RUPTURE OF CEREBRAL ANEURYSM: ICD-10-CM

## 2018-11-26 PROCEDURE — 99215 OFFICE O/P EST HI 40 MIN: CPT | Performed by: PHYSICAL MEDICINE & REHABILITATION

## 2018-11-26 NOTE — PROGRESS NOTES
Loly Devries M.D. [facility] MEDICINE AND [facility]ITA[facility]  [address] [facility] [address]  Dept: [phone]  Dept Fax: [phone]    PCP: Vee Blanco MD  Date of visit: 11/26/18    Chief Complaint   Patient presents with    Neurologic Problem     trouble walking, weakness in right arm- knuckles hurt when she makes a fist - right leg is weaker than left side         Dawson Iyer is a 60 y.o. woman who presents for follow up after acute rehab. Patient was admitted to [facility] 9/10/18 with SAH secondary to ruptured left ICA aneurysm. She underwent aneurysm coiling x2. Blood pressure was controlled and interval head CT was stable. She then completed acute inpatient rehab from 9/20/18-10/16/18. She made excellent functional progress in rehab and was discharged home with home health services. At time of discharge she was at [address] I level for all functional mobility and  SBA-Mod I level for ADLs. After return home she had been doing well. She was admitted back to the hospital from 11/9/18-11/12/18 after presenting with worsened headache and right eye pain. She was evaluated by neurology and NSGY. Imaging did not reveal SAH or acute changes. Continued conservative therapy was recommended. She was started on Tegretol by NSGY for headache and reports it does help. She is now back at home. She states she is now doing well at home. Home health therapy will be resuming today, PT, OT, and Speech therapy. She is ambulating Mod I with a quad cane. No falls since she has been home. She is dressing herself and grooming. She still needs some assistance with bathing. She feels right upper extremity strength is improving. She is starting to write again with her right hand. Patient asks about driving. She states it is hard to find rides, but she herself does not feel she is safe to drive. No other new issues reported. capsule by mouth daily 30 capsule 0    gabapentin (NEURONTIN) 100 MG capsule Take 2 capsules by mouth 2 times daily for 30 days. . 120 capsule 0    metoprolol tartrate (LOPRESSOR) 50 MG tablet Take 1 tablet by mouth 2 times daily 60 tablet 0     No current facility-administered medications for this visit. Review of Systems  General: No chills, fatigue, fever, malaise, night sweats, weight gain,  weight loss. Psychological: No anxiety, depression, suicidal ideation   Ophthalmic: No blurry vision, decreased vision, double vision, loss of vision  Ear Nose Throat: No hearing loss, tinnitus, phonophobia, sensitivity to smells, vertigo, or vocal changes. Allergy/Immunology: No watery eyes, itchy eyes, frequent infections. Hematological and Lymphatic: No bleeding problems, blood clots, bruising  Endocrine:  No polydypsia, polyuria, temperature intolerance. Respiratory: No cough, shortness of breath, wheezing. Cardiovascular: No syncope, chest pain, dyspnea on exertion,palpitations. Gastrointestinal: No abdominal pain, hematemesis, melena, nausea, vomiting, stool incontinence  Genito-Urinary: No dysuria, hematuria, incontinence   Musculoskeletal: Per HPI  Neurological: Per HPI  Dermatological:  No rash      Physical Exam:   Height 5' 5\" (1.651 m), weight 216 lb (98 kg), not currently breastfeeding. Vitals:    11/26/18 1020   BP: 130/82   Pulse: 63   Temp: 97.6 °F (36.4 °C)   SpO2: 95%      General: The patient is in no apparent distress. HEENT: No rhinorrhea, sneezing, yawning, or lacrimation. No scleral icterus or conjunctival injection. SKIN: No piloerection. No track marks. No rash. Normal turgor. No erythema or ecchymosis. Psychological: Mood and affect are appropriate. Hygiene is appropriate. Cardiovascular:  Heart is regular rate. Peripheral pulses are 2+. There is no edema. Respiratory: Respirations are regular and unlabored. There is no cyanosis.    Lymphatic: There is no cervical

## 2018-12-07 ENCOUNTER — OFFICE VISIT (OUTPATIENT)
Dept: FAMILY MEDICINE CLINIC | Age: 58
End: 2018-12-07
Payer: COMMERCIAL

## 2018-12-07 ENCOUNTER — HOSPITAL ENCOUNTER (OUTPATIENT)
Age: 58
Discharge: HOME | End: 2018-12-09
Payer: COMMERCIAL

## 2018-12-07 VITALS
DIASTOLIC BLOOD PRESSURE: 60 MMHG | WEIGHT: 221 LBS | HEIGHT: 65 IN | OXYGEN SATURATION: 93 % | TEMPERATURE: 97.9 F | RESPIRATION RATE: 16 BRPM | HEART RATE: 62 BPM | BODY MASS INDEX: 36.82 KG/M2 | SYSTOLIC BLOOD PRESSURE: 110 MMHG

## 2018-12-07 DIAGNOSIS — Z12.31 ENCOUNTER FOR SCREENING MAMMOGRAM FOR BREAST CANCER: ICD-10-CM

## 2018-12-07 DIAGNOSIS — R35.0 URINARY FREQUENCY: Primary | ICD-10-CM

## 2018-12-07 DIAGNOSIS — R35.0 URINARY FREQUENCY: ICD-10-CM

## 2018-12-07 DIAGNOSIS — I10 HYPERTENSION, UNSPECIFIED TYPE: ICD-10-CM

## 2018-12-07 DIAGNOSIS — M54.16 LUMBAR RADICULITIS: ICD-10-CM

## 2018-12-07 DIAGNOSIS — I60.9 SAH (SUBARACHNOID HEMORRHAGE) (HCC): ICD-10-CM

## 2018-12-07 DIAGNOSIS — R51.9 RECURRENT HEADACHE: ICD-10-CM

## 2018-12-07 LAB
BILIRUBIN URINE: NEGATIVE
BILIRUBIN, POC: NEGATIVE
BLOOD URINE, POC: NORMAL
BLOOD, URINE: NEGATIVE
CLARITY, POC: CLEAR
CLARITY: CLEAR
COLOR, POC: NORMAL
COLOR: YELLOW
GLUCOSE URINE, POC: NEGATIVE
GLUCOSE URINE: NEGATIVE MG/DL
KETONES, POC: NORMAL
KETONES, URINE: NEGATIVE MG/DL
LEUKOCYTE EST, POC: NEGATIVE
LEUKOCYTE ESTERASE, URINE: NEGATIVE
NITRITE, POC: NEGATIVE
NITRITE, URINE: NEGATIVE
PH UA: 6.5 (ref 5–9)
PH, POC: 6
PROTEIN UA: NEGATIVE MG/DL
PROTEIN, POC: NORMAL
SPECIFIC GRAVITY UA: 1.01 (ref 1–1.03)
SPECIFIC GRAVITY, POC: 1.01
UROBILINOGEN, POC: NORMAL
UROBILINOGEN, URINE: 0.2 E.U./DL

## 2018-12-07 PROCEDURE — 81003 URINALYSIS AUTO W/O SCOPE: CPT

## 2018-12-07 PROCEDURE — 99213 OFFICE O/P EST LOW 20 MIN: CPT | Performed by: FAMILY MEDICINE

## 2018-12-07 PROCEDURE — 81002 URINALYSIS NONAUTO W/O SCOPE: CPT | Performed by: FAMILY MEDICINE

## 2018-12-07 PROCEDURE — 87088 URINE BACTERIA CULTURE: CPT

## 2018-12-07 RX ORDER — METOPROLOL TARTRATE 50 MG/1
50 TABLET, FILM COATED ORAL 2 TIMES DAILY
Qty: 60 TABLET | Refills: 2 | Status: SHIPPED | OUTPATIENT
Start: 2018-12-07 | End: 2019-02-14 | Stop reason: SDUPTHER

## 2018-12-07 RX ORDER — GABAPENTIN 300 MG/1
300 CAPSULE ORAL 2 TIMES DAILY
Qty: 60 CAPSULE | Refills: 2 | Status: SHIPPED | OUTPATIENT
Start: 2018-12-07 | End: 2019-02-14 | Stop reason: SDUPTHER

## 2018-12-09 LAB — URINE CULTURE, ROUTINE: NORMAL

## 2018-12-14 RX ORDER — CELECOXIB 200 MG/1
CAPSULE ORAL
Qty: 30 CAPSULE | Refills: 0 | OUTPATIENT
Start: 2018-12-14

## 2018-12-20 RX ORDER — CELECOXIB 200 MG/1
200 CAPSULE ORAL DAILY
Qty: 30 CAPSULE | Refills: 0 | OUTPATIENT
Start: 2018-12-20

## 2019-01-18 ENCOUNTER — PROCEDURE VISIT (OUTPATIENT)
Dept: PHYSICAL MEDICINE AND REHAB | Age: 59
End: 2019-01-18

## 2019-01-18 VITALS
BODY MASS INDEX: 37.99 KG/M2 | SYSTOLIC BLOOD PRESSURE: 134 MMHG | DIASTOLIC BLOOD PRESSURE: 75 MMHG | HEART RATE: 58 BPM | WEIGHT: 228 LBS | HEIGHT: 65 IN

## 2019-01-18 DIAGNOSIS — Z02.71 DISABILITY EXAMINATION: Primary | ICD-10-CM

## 2019-01-18 PROCEDURE — MISCBDD BUREAU OF DISABILITY DETERMINATION: Performed by: PHYSICAL MEDICINE & REHABILITATION

## 2019-01-18 RX ORDER — CELECOXIB 200 MG/1
200 CAPSULE ORAL 2 TIMES DAILY
COMMUNITY
End: 2019-02-14 | Stop reason: SDUPTHER

## 2019-02-14 ENCOUNTER — OFFICE VISIT (OUTPATIENT)
Dept: FAMILY MEDICINE CLINIC | Age: 59
End: 2019-02-14
Payer: COMMERCIAL

## 2019-02-14 ENCOUNTER — HOSPITAL ENCOUNTER (OUTPATIENT)
Age: 59
Discharge: HOME | End: 2019-02-16
Payer: COMMERCIAL

## 2019-02-14 VITALS
BODY MASS INDEX: 38.86 KG/M2 | DIASTOLIC BLOOD PRESSURE: 62 MMHG | WEIGHT: 233.25 LBS | TEMPERATURE: 98.4 F | HEIGHT: 65 IN | RESPIRATION RATE: 18 BRPM | SYSTOLIC BLOOD PRESSURE: 118 MMHG | OXYGEN SATURATION: 95 % | HEART RATE: 68 BPM

## 2019-02-14 DIAGNOSIS — K44.9 HIATAL HERNIA: ICD-10-CM

## 2019-02-14 DIAGNOSIS — R06.09 DYSPNEA ON EXERTION: ICD-10-CM

## 2019-02-14 DIAGNOSIS — I10 HYPERTENSION, UNSPECIFIED TYPE: ICD-10-CM

## 2019-02-14 DIAGNOSIS — M54.16 LUMBAR RADICULITIS: ICD-10-CM

## 2019-02-14 DIAGNOSIS — R06.09 DYSPNEA ON EXERTION: Primary | ICD-10-CM

## 2019-02-14 DIAGNOSIS — R00.2 PALPITATIONS: ICD-10-CM

## 2019-02-14 LAB
ALBUMIN SERPL-MCNC: 4.4 G/DL (ref 3.5–5.2)
ALP BLD-CCNC: 107 U/L (ref 35–104)
ALT SERPL-CCNC: 13 U/L (ref 0–32)
ANION GAP SERPL CALCULATED.3IONS-SCNC: 11 MMOL/L (ref 7–16)
AST SERPL-CCNC: 11 U/L (ref 0–31)
BASOPHILS ABSOLUTE: 0.04 E9/L (ref 0–0.2)
BASOPHILS RELATIVE PERCENT: 0.6 % (ref 0–2)
BILIRUB SERPL-MCNC: <0.2 MG/DL (ref 0–1.2)
BUN BLDV-MCNC: 22 MG/DL (ref 6–20)
CALCIUM SERPL-MCNC: 9.2 MG/DL (ref 8.6–10.2)
CHLORIDE BLD-SCNC: 102 MMOL/L (ref 98–107)
CO2: 26 MMOL/L (ref 22–29)
CREAT SERPL-MCNC: 1 MG/DL (ref 0.5–1)
EOSINOPHILS ABSOLUTE: 0.2 E9/L (ref 0.05–0.5)
EOSINOPHILS RELATIVE PERCENT: 3 % (ref 0–6)
GFR AFRICAN AMERICAN: >60
GFR NON-AFRICAN AMERICAN: 57 ML/MIN/1.73
GLUCOSE BLD-MCNC: 86 MG/DL (ref 74–99)
HCT VFR BLD CALC: 42.5 % (ref 34–48)
HEMOGLOBIN: 13.5 G/DL (ref 11.5–15.5)
IMMATURE GRANULOCYTES #: 0.02 E9/L
IMMATURE GRANULOCYTES %: 0.3 % (ref 0–5)
LYMPHOCYTES ABSOLUTE: 2.24 E9/L (ref 1.5–4)
LYMPHOCYTES RELATIVE PERCENT: 33.2 % (ref 20–42)
MCH RBC QN AUTO: 29.9 PG (ref 26–35)
MCHC RBC AUTO-ENTMCNC: 31.8 % (ref 32–34.5)
MCV RBC AUTO: 94 FL (ref 80–99.9)
MONOCYTES ABSOLUTE: 0.55 E9/L (ref 0.1–0.95)
MONOCYTES RELATIVE PERCENT: 8.1 % (ref 2–12)
NEUTROPHILS ABSOLUTE: 3.7 E9/L (ref 1.8–7.3)
NEUTROPHILS RELATIVE PERCENT: 54.8 % (ref 43–80)
PDW BLD-RTO: 12.7 FL (ref 11.5–15)
PLATELET # BLD: 250 E9/L (ref 130–450)
PMV BLD AUTO: 9.5 FL (ref 7–12)
POTASSIUM SERPL-SCNC: 4.3 MMOL/L (ref 3.5–5)
RBC # BLD: 4.52 E12/L (ref 3.5–5.5)
SODIUM BLD-SCNC: 139 MMOL/L (ref 132–146)
TOTAL PROTEIN: 7.3 G/DL (ref 6.4–8.3)
TSH SERPL DL<=0.05 MIU/L-ACNC: 1.29 UIU/ML (ref 0.27–4.2)
WBC # BLD: 6.8 E9/L (ref 4.5–11.5)

## 2019-02-14 PROCEDURE — 99214 OFFICE O/P EST MOD 30 MIN: CPT | Performed by: FAMILY MEDICINE

## 2019-02-14 PROCEDURE — 3017F COLORECTAL CA SCREEN DOC REV: CPT | Performed by: FAMILY MEDICINE

## 2019-02-14 PROCEDURE — G8417 CALC BMI ABV UP PARAM F/U: HCPCS | Performed by: FAMILY MEDICINE

## 2019-02-14 PROCEDURE — 93000 ELECTROCARDIOGRAM COMPLETE: CPT | Performed by: FAMILY MEDICINE

## 2019-02-14 PROCEDURE — G8484 FLU IMMUNIZE NO ADMIN: HCPCS | Performed by: FAMILY MEDICINE

## 2019-02-14 PROCEDURE — 80053 COMPREHEN METABOLIC PANEL: CPT

## 2019-02-14 PROCEDURE — 85025 COMPLETE CBC W/AUTO DIFF WBC: CPT

## 2019-02-14 PROCEDURE — 1036F TOBACCO NON-USER: CPT | Performed by: FAMILY MEDICINE

## 2019-02-14 PROCEDURE — G8427 DOCREV CUR MEDS BY ELIG CLIN: HCPCS | Performed by: FAMILY MEDICINE

## 2019-02-14 PROCEDURE — 84443 ASSAY THYROID STIM HORMONE: CPT

## 2019-02-14 RX ORDER — GABAPENTIN 300 MG/1
300 CAPSULE ORAL 3 TIMES DAILY
Qty: 90 CAPSULE | Refills: 2 | Status: SHIPPED | OUTPATIENT
Start: 2019-02-14 | End: 2019-06-19 | Stop reason: SDUPTHER

## 2019-02-14 RX ORDER — CHLORTHALIDONE 25 MG/1
25 TABLET ORAL DAILY
Qty: 30 TABLET | Refills: 5 | Status: SHIPPED | OUTPATIENT
Start: 2019-02-14 | End: 2019-06-18 | Stop reason: SDUPTHER

## 2019-02-14 RX ORDER — METOPROLOL TARTRATE 50 MG/1
50 TABLET, FILM COATED ORAL 2 TIMES DAILY
Qty: 60 TABLET | Refills: 5 | Status: SHIPPED | OUTPATIENT
Start: 2019-02-14 | End: 2019-06-18 | Stop reason: SDUPTHER

## 2019-02-14 RX ORDER — PANTOPRAZOLE SODIUM 20 MG/1
20 TABLET, DELAYED RELEASE ORAL
Qty: 30 TABLET | Refills: 2 | Status: SHIPPED | OUTPATIENT
Start: 2019-02-14 | End: 2019-06-19 | Stop reason: SDUPTHER

## 2019-02-14 RX ORDER — LOSARTAN POTASSIUM 100 MG/1
100 TABLET ORAL DAILY
Qty: 30 TABLET | Refills: 5 | Status: SHIPPED | OUTPATIENT
Start: 2019-02-14 | End: 2019-06-18 | Stop reason: SDUPTHER

## 2019-02-14 RX ORDER — AMLODIPINE BESYLATE 5 MG/1
5 TABLET ORAL DAILY
Qty: 30 TABLET | Refills: 5 | Status: SHIPPED | OUTPATIENT
Start: 2019-02-14 | End: 2019-06-18 | Stop reason: SDUPTHER

## 2019-02-14 RX ORDER — CELECOXIB 200 MG/1
200 CAPSULE ORAL DAILY
Qty: 30 CAPSULE | Refills: 2 | Status: SHIPPED | OUTPATIENT
Start: 2019-02-14 | End: 2019-08-16 | Stop reason: SDUPTHER

## 2019-02-14 RX ORDER — CARBAMAZEPINE 100 MG/1
100 TABLET, EXTENDED RELEASE ORAL 3 TIMES DAILY
Qty: 90 TABLET | Refills: 3 | Status: SHIPPED | OUTPATIENT
Start: 2019-02-14 | End: 2019-06-18 | Stop reason: SDUPTHER

## 2019-02-14 ASSESSMENT — PATIENT HEALTH QUESTIONNAIRE - PHQ9
2. FEELING DOWN, DEPRESSED OR HOPELESS: 1
SUM OF ALL RESPONSES TO PHQ9 QUESTIONS 1 & 2: 2
SUM OF ALL RESPONSES TO PHQ QUESTIONS 1-9: 2
1. LITTLE INTEREST OR PLEASURE IN DOING THINGS: 1
SUM OF ALL RESPONSES TO PHQ QUESTIONS 1-9: 2

## 2019-02-14 ASSESSMENT — ENCOUNTER SYMPTOMS
SHORTNESS OF BREATH: 1
WHEEZING: 0
COUGH: 0
BACK PAIN: 1

## 2019-02-21 ENCOUNTER — HOSPITAL ENCOUNTER (OUTPATIENT)
Dept: NON INVASIVE DIAGNOSTICS | Age: 59
Discharge: HOME | End: 2019-02-21
Payer: COMMERCIAL

## 2019-02-21 ENCOUNTER — HOSPITAL ENCOUNTER (OUTPATIENT)
Age: 59
Discharge: HOME | End: 2019-02-23
Payer: COMMERCIAL

## 2019-02-21 ENCOUNTER — HOSPITAL ENCOUNTER (OUTPATIENT)
Dept: GENERAL RADIOLOGY | Age: 59
Discharge: HOME | End: 2019-02-23
Payer: COMMERCIAL

## 2019-02-21 DIAGNOSIS — I10 HYPERTENSION, UNSPECIFIED TYPE: ICD-10-CM

## 2019-02-21 DIAGNOSIS — R06.09 DYSPNEA ON EXERTION: ICD-10-CM

## 2019-02-21 DIAGNOSIS — R00.2 PALPITATIONS: ICD-10-CM

## 2019-02-21 LAB
LV EF: 65 %
LVEF MODALITY: NORMAL

## 2019-02-21 PROCEDURE — 93306 TTE W/DOPPLER COMPLETE: CPT

## 2019-02-21 PROCEDURE — 71046 X-RAY EXAM CHEST 2 VIEWS: CPT

## 2019-02-22 ENCOUNTER — OFFICE VISIT (OUTPATIENT)
Dept: NEUROLOGY | Age: 59
End: 2019-02-22
Payer: COMMERCIAL

## 2019-02-22 VITALS
BODY MASS INDEX: 38.82 KG/M2 | HEIGHT: 65 IN | WEIGHT: 233 LBS | SYSTOLIC BLOOD PRESSURE: 150 MMHG | HEART RATE: 79 BPM | RESPIRATION RATE: 12 BRPM | DIASTOLIC BLOOD PRESSURE: 80 MMHG | OXYGEN SATURATION: 96 %

## 2019-02-22 DIAGNOSIS — G44.329 CHRONIC INTERMITTENT POST-TRAUMATIC HEADACHE: Primary | ICD-10-CM

## 2019-02-22 PROCEDURE — G8427 DOCREV CUR MEDS BY ELIG CLIN: HCPCS | Performed by: PSYCHIATRY & NEUROLOGY

## 2019-02-22 PROCEDURE — 3017F COLORECTAL CA SCREEN DOC REV: CPT | Performed by: PSYCHIATRY & NEUROLOGY

## 2019-02-22 PROCEDURE — G8417 CALC BMI ABV UP PARAM F/U: HCPCS | Performed by: PSYCHIATRY & NEUROLOGY

## 2019-02-22 PROCEDURE — 99204 OFFICE O/P NEW MOD 45 MIN: CPT | Performed by: PSYCHIATRY & NEUROLOGY

## 2019-02-22 PROCEDURE — G8484 FLU IMMUNIZE NO ADMIN: HCPCS | Performed by: PSYCHIATRY & NEUROLOGY

## 2019-02-22 PROCEDURE — 1036F TOBACCO NON-USER: CPT | Performed by: PSYCHIATRY & NEUROLOGY

## 2019-02-22 RX ORDER — BENZONATATE 100 MG/1
100 CAPSULE ORAL 3 TIMES DAILY PRN
Qty: 30 CAPSULE | Refills: 0 | Status: SHIPPED | OUTPATIENT
Start: 2019-02-22 | End: 2019-03-01 | Stop reason: CLARIF

## 2019-02-22 ASSESSMENT — ENCOUNTER SYMPTOMS
RESPIRATORY NEGATIVE: 1
BACK PAIN: 1
GASTROINTESTINAL NEGATIVE: 1
ALLERGIC/IMMUNOLOGIC NEGATIVE: 1

## 2019-03-01 ENCOUNTER — OFFICE VISIT (OUTPATIENT)
Dept: PAIN MANAGEMENT | Age: 59
End: 2019-03-01
Payer: COMMERCIAL

## 2019-03-01 VITALS
HEIGHT: 65 IN | BODY MASS INDEX: 38.82 KG/M2 | DIASTOLIC BLOOD PRESSURE: 78 MMHG | SYSTOLIC BLOOD PRESSURE: 122 MMHG | RESPIRATION RATE: 16 BRPM | TEMPERATURE: 97.9 F | OXYGEN SATURATION: 96 % | HEART RATE: 64 BPM | WEIGHT: 233 LBS

## 2019-03-01 DIAGNOSIS — G89.4 CHRONIC PAIN SYNDROME: ICD-10-CM

## 2019-03-01 DIAGNOSIS — M47.816 LUMBAR SPONDYLOSIS: ICD-10-CM

## 2019-03-01 DIAGNOSIS — M16.12 PRIMARY OSTEOARTHRITIS OF LEFT HIP: ICD-10-CM

## 2019-03-01 DIAGNOSIS — M47.816 LUMBAR FACET ARTHROPATHY: Primary | ICD-10-CM

## 2019-03-01 DIAGNOSIS — M51.9 LUMBAR DISC DISORDER: ICD-10-CM

## 2019-03-01 PROCEDURE — 1036F TOBACCO NON-USER: CPT | Performed by: PAIN MEDICINE

## 2019-03-01 PROCEDURE — 99204 OFFICE O/P NEW MOD 45 MIN: CPT | Performed by: PAIN MEDICINE

## 2019-03-01 PROCEDURE — G8484 FLU IMMUNIZE NO ADMIN: HCPCS | Performed by: PAIN MEDICINE

## 2019-03-01 PROCEDURE — 3017F COLORECTAL CA SCREEN DOC REV: CPT | Performed by: PAIN MEDICINE

## 2019-03-01 PROCEDURE — G8417 CALC BMI ABV UP PARAM F/U: HCPCS | Performed by: PAIN MEDICINE

## 2019-03-01 PROCEDURE — G8427 DOCREV CUR MEDS BY ELIG CLIN: HCPCS | Performed by: PAIN MEDICINE

## 2019-03-14 ENCOUNTER — OFFICE VISIT (OUTPATIENT)
Dept: FAMILY MEDICINE CLINIC | Age: 59
End: 2019-03-14
Payer: COMMERCIAL

## 2019-03-14 VITALS
HEIGHT: 65 IN | TEMPERATURE: 98 F | SYSTOLIC BLOOD PRESSURE: 124 MMHG | RESPIRATION RATE: 16 BRPM | HEART RATE: 83 BPM | BODY MASS INDEX: 39.32 KG/M2 | DIASTOLIC BLOOD PRESSURE: 74 MMHG | WEIGHT: 236 LBS | OXYGEN SATURATION: 96 %

## 2019-03-14 DIAGNOSIS — R05.9 COUGH: Primary | ICD-10-CM

## 2019-03-14 DIAGNOSIS — J40 BRONCHITIS: ICD-10-CM

## 2019-03-14 DIAGNOSIS — I10 HYPERTENSION, UNSPECIFIED TYPE: ICD-10-CM

## 2019-03-14 LAB
INFLUENZA A ANTIBODY: NORMAL
INFLUENZA B ANTIBODY: NORMAL

## 2019-03-14 PROCEDURE — G8427 DOCREV CUR MEDS BY ELIG CLIN: HCPCS | Performed by: FAMILY MEDICINE

## 2019-03-14 PROCEDURE — 1036F TOBACCO NON-USER: CPT | Performed by: FAMILY MEDICINE

## 2019-03-14 PROCEDURE — G8484 FLU IMMUNIZE NO ADMIN: HCPCS | Performed by: FAMILY MEDICINE

## 2019-03-14 PROCEDURE — 87804 INFLUENZA ASSAY W/OPTIC: CPT | Performed by: FAMILY MEDICINE

## 2019-03-14 PROCEDURE — 3017F COLORECTAL CA SCREEN DOC REV: CPT | Performed by: FAMILY MEDICINE

## 2019-03-14 PROCEDURE — 99213 OFFICE O/P EST LOW 20 MIN: CPT | Performed by: FAMILY MEDICINE

## 2019-03-14 PROCEDURE — G8417 CALC BMI ABV UP PARAM F/U: HCPCS | Performed by: FAMILY MEDICINE

## 2019-03-14 RX ORDER — BENZONATATE 100 MG/1
100 CAPSULE ORAL 3 TIMES DAILY PRN
Qty: 30 CAPSULE | Refills: 0 | Status: SHIPPED | OUTPATIENT
Start: 2019-03-14 | End: 2019-03-21

## 2019-03-14 RX ORDER — POTASSIUM CHLORIDE 750 MG/1
10 TABLET, EXTENDED RELEASE ORAL
Qty: 90 TABLET | Refills: 0 | Status: SHIPPED | OUTPATIENT
Start: 2019-03-14 | End: 2019-06-18 | Stop reason: SDUPTHER

## 2019-03-14 RX ORDER — AZITHROMYCIN 250 MG/1
250 TABLET, FILM COATED ORAL SEE ADMIN INSTRUCTIONS
Qty: 6 TABLET | Refills: 0 | Status: SHIPPED | OUTPATIENT
Start: 2019-03-14 | End: 2019-03-19

## 2019-03-18 ASSESSMENT — ENCOUNTER SYMPTOMS
COUGH: 1
SHORTNESS OF BREATH: 1

## 2019-03-25 ENCOUNTER — OFFICE VISIT (OUTPATIENT)
Dept: PHYSICAL MEDICINE AND REHAB | Age: 59
End: 2019-03-25
Payer: COMMERCIAL

## 2019-03-25 VITALS
BODY MASS INDEX: 38.82 KG/M2 | TEMPERATURE: 97.7 F | SYSTOLIC BLOOD PRESSURE: 128 MMHG | HEART RATE: 58 BPM | DIASTOLIC BLOOD PRESSURE: 60 MMHG | OXYGEN SATURATION: 97 % | HEIGHT: 65 IN | WEIGHT: 233 LBS

## 2019-03-25 DIAGNOSIS — Z74.09 IMPAIRED MOBILITY AND ADLS: ICD-10-CM

## 2019-03-25 DIAGNOSIS — M16.12 PRIMARY OSTEOARTHRITIS OF LEFT HIP: ICD-10-CM

## 2019-03-25 DIAGNOSIS — Z86.79 HISTORY OF SUBARACHNOID HEMORRHAGE: Primary | ICD-10-CM

## 2019-03-25 DIAGNOSIS — Z78.9 IMPAIRED MOBILITY AND ADLS: ICD-10-CM

## 2019-03-25 DIAGNOSIS — Z86.79 HISTORY OF NONTRAUMATIC RUPTURE OF CEREBRAL ANEURYSM: ICD-10-CM

## 2019-03-25 PROCEDURE — 3017F COLORECTAL CA SCREEN DOC REV: CPT | Performed by: PHYSICAL MEDICINE & REHABILITATION

## 2019-03-25 PROCEDURE — 99214 OFFICE O/P EST MOD 30 MIN: CPT | Performed by: PHYSICAL MEDICINE & REHABILITATION

## 2019-03-25 PROCEDURE — G8484 FLU IMMUNIZE NO ADMIN: HCPCS | Performed by: PHYSICAL MEDICINE & REHABILITATION

## 2019-03-25 PROCEDURE — 1036F TOBACCO NON-USER: CPT | Performed by: PHYSICAL MEDICINE & REHABILITATION

## 2019-03-25 PROCEDURE — G8417 CALC BMI ABV UP PARAM F/U: HCPCS | Performed by: PHYSICAL MEDICINE & REHABILITATION

## 2019-03-25 PROCEDURE — G8427 DOCREV CUR MEDS BY ELIG CLIN: HCPCS | Performed by: PHYSICAL MEDICINE & REHABILITATION

## 2019-03-25 NOTE — PROGRESS NOTES
Mikel Jolley M.D. [address] [facility] PHYSICAL MEDICINE AND REHABILITAION  [address] [facility] [address]  Dept: [phone]  Dept Fax: [phone]    PCP: George Souza MD  Date of visit: 3/25/19    Chief Complaint   Patient presents with    Neurologic Problem     trouble with gate - uses 4 pronged cane- still having trouble with balance somedays are worse than other- sometimes she feels dizzy but most of the time she said she just feels off balance- no falls     HPI:   Yany Gunter is a 60 y.o. woman who presents for follow up of rehab needs, post stroke. Patient was admitted to [address] [place] [address] Drive 9/10/18 with SAH secondary to ruptured left ICA aneurysm. She underwent aneurysm coiling x2. Blood pressure was controlled and interval head CT was stable. She then completed acute inpatient rehab from 9/20/18-10/16/18. She made excellent functional progress in rehab and was discharged home with home health services. At time of discharge she was at [address] I level for all functional mobility and  SBA-Mod I level for ADLs. After return home she had been doing well. She was admitted back to the hospital from 11/9/18-11/12/18 after presenting with worsened headache and right eye pain. She was evaluated by neurology and NSGY. Imaging did not reveal SAH or acute changes. Continued conservative therapy was recommended. She was started on Tegretol by NSGY for headache and reported it does help. Interval history:  Since last visit patient reports no changes. She states she is doing well overall at home. She completed home health PT, OT, SLP. She is ambulating Mod I with a quad cane. She states that she has some increased difficulty and unsteadiness on days when her back and left hip pain is worse. She is seeing pain management for the low back pain and is suppose to be starting PT. No falls.  She reports pain in the low back and with most significant pain at the posterior left hip radiating into the left groin. Left hip pain is worse with weight bearing. Her xray did show advanced arthritis in the left hip. In the office she is noted to have an antalgic gait on the left. She is dressing herself and grooming. She still needs some assistance with washing her hair and bathing. She feels right upper extremity strength is improving. She is starting to write again with her right hand. She states it is hard to find rides, but she does not feel she is safe to drive. She states she is still getting intermittent headaches, less frequent and severe than previously. She saw Neurology who recommended she increase Tegretol and continue remainder of current medications; patient did not try increasing this medication. Her most recent head CT was stable. She states she will be having an EEG recommended by Dr. Queenie Shell. Left hip xray  Impression   Advanced osteoarthrosis of bilateral hips     Lumbar xray  Impression   Levoconvex curvature with moderate lumbar spondylosis at L5-S1. Head CT  HEAD:   The ventricles, sulci, and cisterns are normal in size and   configuration.  The brain parenchyma is normal in density.  There is   no evidence of intracranial hemorrhage or mass.  No extra-axial fluid   is seen.  The paranasal sinuses are clear.  The globes and orbits are   normal.  No abnormalities of the calvarium are identified.           Impression       1. No acute findings.          Past Medical History:   Diagnosis Date    Cerebral artery occlusion with cerebral infarction ([facility] [address]     Degenerative arthritis of hand     Hiatal hernia     Hypertension        Past Surgical History:   Procedure Laterality Date    BRAIN ANEURYSM SURGERY      coiling     TONSILLECTOMY         Social History     Socioeconomic History    Marital status:      Spouse name: Not on file    Number of children: Not on file    Years of education: Not on file    Highest education level: Not on file   Occupational History    Not on file   Social Needs    Financial resource strain: Not on file    Food insecurity:     Worry: Not on file     Inability: Not on file    Transportation needs:     Medical: Not on file     Non-medical: Not on file   Tobacco Use    Smoking status: Former Smoker     Packs/day: 1.50     Years: 43.00     Pack years: 64.50     Types: Cigarettes     Last attempt to quit: 2018     Years since quittin.5    Smokeless tobacco: Never Used   Substance and Sexual Activity    Alcohol use: No    Drug use: No    Sexual activity: Not on file   Lifestyle    Physical activity:     Days per week: Not on file     Minutes per session: Not on file    Stress: Not on file   Relationships    Social connections:     Talks on phone: Not on file     Gets together: Not on file     Attends Worship service: Not on file     Active member of club or organization: Not on file     Attends meetings of clubs or organizations: Not on file     Relationship status: Not on file    Intimate partner violence:     Fear of current or ex partner: Not on file     Emotionally abused: Not on file     Physically abused: Not on file     Forced sexual activity: Not on file   Other Topics Concern    Not on file   Social History Narrative    Not on file          Family History   Problem Relation Age of Onset    Diabetes Mother     Arthritis Mother     Atrial Fibrillation Mother     Diabetes Father     Atrial Fibrillation Father     Arthritis Father     Emphysema Father     Cancer Sister        Allergies   Allergen Reactions    Latex Hives     Hives and rash    Iodine Rash     Hives . pt.  States anaphalyxis    Aloe Hives     Hives     Fish-Derived Products        Current Outpatient Medications   Medication Sig Dispense Refill    potassium chloride (KLOR-CON M) 10 MEQ extended release tablet Take 1 tablet by mouth daily (with breakfast) 90 tablet 0    pantoprazole (PROTONIX) 20 MG tablet Take 1 tablet by mouth every morning (before breakfast) 30 tablet 2    metoprolol tartrate (LOPRESSOR) 50 MG tablet Take 1 tablet by mouth 2 times daily 60 tablet 5    losartan (COZAAR) 100 MG tablet Take 1 tablet by mouth daily 30 tablet 5    chlorthalidone (HYGROTON) 25 MG tablet Take 1 tablet by mouth daily 30 tablet 5    gabapentin (NEURONTIN) 300 MG capsule Take 1 capsule by mouth 3 times daily for 90 days. . 90 capsule 2    carBAMazepine (TEGRETOL-XR) 100 MG extended release tablet Take 1 tablet by mouth 3 times daily 90 tablet 3    amLODIPine (NORVASC) 5 MG tablet Take 1 tablet by mouth daily 30 tablet 5    celecoxib (CELEBREX) 200 MG capsule Take 1 capsule by mouth daily 30 capsule 2     No current facility-administered medications for this visit. Review of Systems  General: No chills, fatigue, fever, malaise, night sweats, weight gain,  weight loss. Psychological: No anxiety, depression, suicidal ideation   Ophthalmic: No blurry vision, decreased vision, double vision, loss of vision  Ear Nose Throat: No hearing loss, tinnitus, phonophobia, sensitivity to smells, vertigo, or vocal changes. Allergy/Immunology: No watery eyes, itchy eyes, frequent infections. Hematological and Lymphatic: No bleeding problems, blood clots, bruising  Endocrine:  No polydypsia, polyuria, temperature intolerance. Respiratory: No cough, shortness of breath, wheezing. Cardiovascular: No syncope, chest pain, dyspnea on exertion,palpitations. Gastrointestinal: No abdominal pain, hematemesis, melena, nausea, vomiting, stool incontinence  Genito-Urinary: No dysuria, hematuria, incontinence   Musculoskeletal: Per HPI  Neurological: Per HPI  Dermatological:  No rash      Physical Exam:   Blood pressure 128/60, pulse 58, temperature 97.7 °F (36.5 °C), temperature source Oral, height 5' 5\" (1.651 m), weight 233 lb (105.7 kg), SpO2 97 %, not currently breastfeeding.    Vitals:    03/25/19 0916   BP: 128/60 Pulse: 58   Temp: 97.7 °F (36.5 °C)   SpO2: 97%      General: The patient is in no apparent distress. HEENT: No rhinorrhea, sneezing, yawning, or lacrimation. No scleral icterus or conjunctival injection. SKIN: No piloerection. No track marks. No rash. Normal turgor. No erythema or ecchymosis. Psychological: Mood and affect are appropriate. Hygiene is appropriate. Cardiovascular:  Heart is regular rate. Peripheral pulses are 2+. There is no edema. Respiratory: Respirations are regular and unlabored. There is no cyanosis. Lymphatic: There is no cervical lymphadenopathy. Gastrointestinal: Soft abdomen, non-tender. Genitourinary: No costovertebral angle tenderness. MSK: Normal AROM of upper extremities. Significantly decreased AROM and PROM of left hip. Pain with left hip flexion, internal rotation, and external rotation (posterior hip and groin). Negative SLR bilaterally. Neurologic: Awake, alert and oriented in three planes. Speech is fluent. No facial asymmetry. Tongue is midline. Hearing is intact for conversation. Pupils are equal and round. Extraocular muscles are intact. Shoulder shrug symmetric. Strength:   R  L      Deltoid   4+  5  Biceps   4 + 5  Triceps  4 + 5  Wrist Ext  4 + 5  Finger Abd  4+  5  Hip Flex  4+  5  Knee Ext  4+  5  Ankle dorsi  4+  5  EHL   4+ 5  Ankle Plantar  4+  5    Sensory:  Intact for light touch in all upper and lower extremity dermatomes. Reflexes:   R  L  Biceps   1 1  Triceps  1 1  BR   1 1   Patellar  1 1   Ankle Jerk  1 1    No Babinski  No Ford  No clonus or spasticity. Gait: ambulates with left quad cane, slow pace, antalgic on the left. Impression:   History of subarachnoid hemorrhage secondary to ruptured Left ICA aneurysm  Impaired mobility and ADLs  Chronic left hip and low back pain. Severe OA of the left hip. Plan:   · Agree with PT for low back and left hip pain.  Patient states PT was ordered, but she has not heard from them to schedule--she will follow up. She is following with pain management for these issues. · Continue quad cane at all times. · Continue home exercise program  The patient was educated about the diagnosis, prognosis, indications, risks and benefits of treatment. An opportunity to ask questions was given to the patient and questions were answered. The patient agreed to proceed with the recommended treatment as described above. Follow up 4 months, or sooner if needed         Annette Jorgensen M.D.   Physical Medicine and Rehabilitation

## 2019-03-27 ENCOUNTER — HOSPITAL ENCOUNTER (OUTPATIENT)
Dept: NEUROLOGY | Age: 59
Discharge: HOME | End: 2019-03-27
Payer: COMMERCIAL

## 2019-03-27 DIAGNOSIS — I60.9 SUBARACHNOID BLEED (HCC): ICD-10-CM

## 2019-03-27 PROCEDURE — 95953 HC EEG CONT EA 24HR 16CH UNATTENDED: CPT

## 2019-03-29 ENCOUNTER — OFFICE VISIT (OUTPATIENT)
Dept: PAIN MANAGEMENT | Age: 59
End: 2019-03-29
Payer: COMMERCIAL

## 2019-03-29 ENCOUNTER — HOSPITAL ENCOUNTER (OUTPATIENT)
Dept: NEUROLOGY | Age: 59
Discharge: HOME | End: 2019-03-29
Payer: COMMERCIAL

## 2019-03-29 VITALS
BODY MASS INDEX: 37.15 KG/M2 | HEART RATE: 69 BPM | RESPIRATION RATE: 16 BRPM | SYSTOLIC BLOOD PRESSURE: 102 MMHG | OXYGEN SATURATION: 94 % | TEMPERATURE: 99 F | DIASTOLIC BLOOD PRESSURE: 64 MMHG | WEIGHT: 223 LBS | HEIGHT: 65 IN

## 2019-03-29 DIAGNOSIS — M47.816 LUMBAR SPONDYLOSIS: ICD-10-CM

## 2019-03-29 DIAGNOSIS — M16.12 PRIMARY OSTEOARTHRITIS OF LEFT HIP: ICD-10-CM

## 2019-03-29 DIAGNOSIS — G89.4 CHRONIC PAIN SYNDROME: ICD-10-CM

## 2019-03-29 DIAGNOSIS — M51.9 LUMBAR DISC DISORDER: Primary | ICD-10-CM

## 2019-03-29 DIAGNOSIS — M47.816 LUMBAR FACET ARTHROPATHY: ICD-10-CM

## 2019-03-29 PROCEDURE — G8417 CALC BMI ABV UP PARAM F/U: HCPCS | Performed by: PAIN MEDICINE

## 2019-03-29 PROCEDURE — 99213 OFFICE O/P EST LOW 20 MIN: CPT | Performed by: PAIN MEDICINE

## 2019-03-29 PROCEDURE — G8427 DOCREV CUR MEDS BY ELIG CLIN: HCPCS | Performed by: PAIN MEDICINE

## 2019-03-29 PROCEDURE — 1036F TOBACCO NON-USER: CPT | Performed by: PAIN MEDICINE

## 2019-03-29 PROCEDURE — 99214 OFFICE O/P EST MOD 30 MIN: CPT | Performed by: PAIN MEDICINE

## 2019-03-29 PROCEDURE — 3017F COLORECTAL CA SCREEN DOC REV: CPT | Performed by: PAIN MEDICINE

## 2019-03-29 PROCEDURE — G8484 FLU IMMUNIZE NO ADMIN: HCPCS | Performed by: PAIN MEDICINE

## 2019-03-29 PROCEDURE — 95950 PR AMBULATORY EEG MONITORING: CPT | Performed by: PSYCHIATRY & NEUROLOGY

## 2019-03-29 NOTE — PROGRESS NOTES
[address]  [address] [facility], [address] [place] [address] [place]  [phone]    Follow up Note      Meirdanika Dobson     Date of Visit:  3/29/2019    CC:  Patient presents for follow up   Chief Complaint   Patient presents with    Lower Back Pain       HPI:    Pain is unchanged. Change in quality of symptoms:no. Medication side effects:none. Recent diagnostic testing:lumbar spine and hip xray. Recent interventional procedures:none. .    She has not been on anticoagulation medications to include none and has not been on herbal supplements. She is not diabetic.     Imaging: Lumbar spine Xray 03/2019  Scoliosis and osteoarthritis. Bilateral hip Xray 03/2019   Advanced osteoarthrosis of bilateral hips. Previous treatments: Physical Therapy and medications. .         Potential Aberrant Drug-Related Behavior:  No    Urine Drug Screening:  First office visit saliva screen showed no narcotics     OARRS report:  03/2019 consistent     Past Medical History:   Diagnosis Date    Cerebral artery occlusion with cerebral infarction ([facility] [address]     Degenerative arthritis of hand     Hiatal hernia     Hypertension      Past Surgical History:   Procedure Laterality Date    BRAIN ANEURYSM SURGERY      coiling     TONSILLECTOMY       Prior to Admission medications    Medication Sig Start Date End Date Taking?  Authorizing Provider   potassium chloride (KLOR-CON M) 10 MEQ extended release tablet Take 1 tablet by mouth daily (with breakfast) 3/14/19  Yes Zuleima Katz MD   pantoprazole (PROTONIX) 20 MG tablet Take 1 tablet by mouth every morning (before breakfast) 2/14/19  Yes Zuleima Katz MD   metoprolol tartrate (LOPRESSOR) 50 MG tablet Take 1 tablet by mouth 2 times daily 2/14/19  Yes Zuleima Katz MD   losartan (COZAAR) 100 MG tablet Take 1 tablet by mouth daily 2/14/19  Yes Zuleima Katz MD   chlorthalidone (HYGROTON) 25 MG tablet Take 1 tablet by mouth daily 2/14/19  Yes Yaneli Mckeon Cornelius Bush MD   gabapentin (NEURONTIN) 300 MG capsule Take 1 capsule by mouth 3 times daily for 90 days. . 2/14/19 5/15/19 Yes Jordan Agee MD   carBAMazepine (TEGRETOL-XR) 100 MG extended release tablet Take 1 tablet by mouth 3 times daily 19  Yes Jordan Agee MD   amLODIPine (NORVASC) 5 MG tablet Take 1 tablet by mouth daily 19  Yes Jordan Agee MD   celecoxib (CELEBREX) 200 MG capsule Take 1 capsule by mouth daily 19  Yes Jordan Agee MD     Allergies   Allergen Reactions    Latex Hives     Hives and rash    Iodine Rash     Hives . pt.  States anaphalyxis    Aloe Hives     Hives     Fish-Derived Products      Social History     Socioeconomic History    Marital status:      Spouse name: Not on file    Number of children: Not on file    Years of education: Not on file    Highest education level: Not on file   Occupational History    Not on file   Social Needs    Financial resource strain: Not on file    Food insecurity:     Worry: Not on file     Inability: Not on file    Transportation needs:     Medical: Not on file     Non-medical: Not on file   Tobacco Use    Smoking status: Former Smoker     Packs/day: 1.50     Years: 43.00     Pack years: 64.50     Types: Cigarettes     Last attempt to quit: 2018     Years since quittin.5    Smokeless tobacco: Never Used   Substance and Sexual Activity    Alcohol use: No    Drug use: No    Sexual activity: Not on file   Lifestyle    Physical activity:     Days per week: Not on file     Minutes per session: Not on file    Stress: Not on file   Relationships    Social connections:     Talks on phone: Not on file     Gets together: Not on file     Attends Yazdanism service: Not on file     Active member of club or organization: Not on file     Attends meetings of clubs or organizations: Not on file     Relationship status: Not on file    Intimate partner violence:     Fear of current or ex partner: Not on file positive Left  Intact:Yes  Edema:Normal     Neurological:     Sensory:normal to light touch bilateral lower extremities  Motor:                  Right Quadriceps4-/5          Left Quadriceps4-/5           Right Gastrocnemius4-/5    Left Gastrocnemius4-/5  Right Ant Tibialis4-/5  Left Ant Tibialis4-/5  Reflexes:    Right Quadriceps reflex2+  Left Quadriceps reflex2+  Right Achilles reflex1+  Left Achilles reflex1+  Gait:antalgic with a cane     Dermatology:     Skin:no unusual rashes and no skin lesions     Impression:  Chronic low back pain with radiation to the Left groin and h/o stroke and residual Right sided weakness   Lumbar spine Xray scoliosis and osteoarthritis   Plan:  Axial low back pain and Left hip dysfunction  Results of lumbar and Left hip Xray were discussed with the patient   Will refer patient to  for evaluation   Will consider interventional procedures if physical therapy doesn't help  Reviewed first office visit saliva screen  OARRS report reviewed  Patient encouraged to stay active and to lose weight, awaiting to start physical therapy  Treatment plan discussed with the patient including medications and procedure side effects       ccreferring physic    CHARO Crook M.D.

## 2019-03-29 NOTE — PROGRESS NOTES
Naeem Adkins presents to the [address] [address] on 3/29/2019. Jessy Hill is complaining of pain in her lower back and radiates down left leg. . The pain is constant. The pain is described as aching, throbbing, shooting, stabbing and sharp. Pain is rated on her best day at a 8, on her worst day at a 10, and on average at a 9 on the VAS scale. She took her last dose of Advil this AM .        Any procedures since your last visit: No,    She has nothas not been on anticoagulation medications to include none and is managed by NA.      Pacemaker or defibrilator: No Physician managing device is NA.       /64   Pulse 69   Temp 99 °F (37.2 °C) (Oral)   Resp 16   Ht 5' 5\" (1.651 m)   Wt 223 lb (101.2 kg)   SpO2 94%   BMI 37.11 kg/m²

## 2019-04-26 ENCOUNTER — OFFICE VISIT (OUTPATIENT)
Dept: PAIN MANAGEMENT | Age: 59
End: 2019-04-26
Payer: COMMERCIAL

## 2019-04-26 VITALS
OXYGEN SATURATION: 98 % | RESPIRATION RATE: 16 BRPM | WEIGHT: 233 LBS | TEMPERATURE: 97.7 F | BODY MASS INDEX: 38.82 KG/M2 | HEIGHT: 65 IN | SYSTOLIC BLOOD PRESSURE: 128 MMHG | DIASTOLIC BLOOD PRESSURE: 62 MMHG | HEART RATE: 62 BPM

## 2019-04-26 DIAGNOSIS — M47.816 LUMBAR SPONDYLOSIS: ICD-10-CM

## 2019-04-26 DIAGNOSIS — M51.9 LUMBAR DISC DISORDER: ICD-10-CM

## 2019-04-26 DIAGNOSIS — M47.816 LUMBAR FACET ARTHROPATHY: ICD-10-CM

## 2019-04-26 DIAGNOSIS — M16.12 PRIMARY OSTEOARTHRITIS OF LEFT HIP: Primary | ICD-10-CM

## 2019-04-26 DIAGNOSIS — G89.4 CHRONIC PAIN SYNDROME: ICD-10-CM

## 2019-04-26 PROCEDURE — 99213 OFFICE O/P EST LOW 20 MIN: CPT | Performed by: PAIN MEDICINE

## 2019-04-26 PROCEDURE — G8417 CALC BMI ABV UP PARAM F/U: HCPCS | Performed by: PAIN MEDICINE

## 2019-04-26 PROCEDURE — 3017F COLORECTAL CA SCREEN DOC REV: CPT | Performed by: PAIN MEDICINE

## 2019-04-26 PROCEDURE — G8427 DOCREV CUR MEDS BY ELIG CLIN: HCPCS | Performed by: PAIN MEDICINE

## 2019-04-26 PROCEDURE — 1036F TOBACCO NON-USER: CPT | Performed by: PAIN MEDICINE

## 2019-04-26 NOTE — PROGRESS NOTES
Deja Brooks presents to the [facility] on 4/26/2019. Shyann Diggs is complaining of pain lt. Hip /back. The pain is constant. The pain is described as aching, throbbing, shooting and stabbing. Pain is rated on her best day at a 8, on her worst day at a 8, and on average at a 8 on the VAS scale. She took her last dose of  Tylenol. Any procedures since your last visit. She has been on anticoagulation medications to include NSAIDS and is managed by herself.      Pacemaker or defibrilator: No Physician managing device is none       /62   Pulse 62   Temp 97.7 °F (36.5 °C) (Oral)   Resp 16   Ht 5' 5\" (1.651 m)   Wt 233 lb (105.7 kg)   SpO2 98%   BMI 38.77 kg/m²

## 2019-04-26 NOTE — PROGRESS NOTES
[facility]  [address] [facility], [address] [place] [address] [place]  [phone]    Follow up Note      Tim Michael     Date of Visit:  4/26/2019    CC:  Patient presents for follow up   Chief Complaint   Patient presents with    Follow-up     will see surgeon 5'1/19     HPI:    Pain is unchanged. Change in quality of symptoms:no. Medication side effects:none. Recent diagnostic testing:none  Recent interventional procedures:none. .    She has not been on anticoagulation medications to include none and has not been on herbal supplements. She is not diabetic.     Imaging: Lumbar spine Xray 03/2019  Scoliosis and osteoarthritis. Bilateral hip Xray 03/2019   Advanced osteoarthrosis of bilateral hips. Previous treatments: Physical Therapy and medications. .         Potential Aberrant Drug-Related Behavior:  No    Urine Drug Screening:  First office visit saliva screen showed no narcotics     OARRS report:  03/2019 consistent   04/2019 consistent     Past Medical History:   Diagnosis Date    Cerebral artery occlusion with cerebral infarction ([address]     Degenerative arthritis of hand     Hiatal hernia     Hypertension      Past Surgical History:   Procedure Laterality Date    BRAIN ANEURYSM SURGERY      coiling     TONSILLECTOMY       Prior to Admission medications    Medication Sig Start Date End Date Taking?  Authorizing Provider   potassium chloride (KLOR-CON M) 10 MEQ extended release tablet Take 1 tablet by mouth daily (with breakfast) 3/14/19  Yes Joi Chang MD   pantoprazole (PROTONIX) 20 MG tablet Take 1 tablet by mouth every morning (before breakfast) 2/14/19  Yes Joi Chang MD   metoprolol tartrate (LOPRESSOR) 50 MG tablet Take 1 tablet by mouth 2 times daily 2/14/19  Yes Joi Chang MD   losartan (COZAAR) 100 MG tablet Take 1 tablet by mouth daily 2/14/19  Yes Joi Chang MD   chlorthalidone (HYGROTON) 25 MG tablet Take 1 tablet by mouth daily 2/14/19 Yes Jose Alvarado MD   gabapentin (NEURONTIN) 300 MG capsule Take 1 capsule by mouth 3 times daily for 90 days. . 2/14/19 5/15/19 Yes Jose Alvarado MD   carBAMazepine (TEGRETOL-XR) 100 MG extended release tablet Take 1 tablet by mouth 3 times daily 19  Yes Jose Alvarado MD   amLODIPine (NORVASC) 5 MG tablet Take 1 tablet by mouth daily 19  Yes Jose Alvarado MD   celecoxib (CELEBREX) 200 MG capsule Take 1 capsule by mouth daily 19  Yes Jose Alvarado MD     Allergies   Allergen Reactions    Latex Hives     Hives and rash    Iodine Rash     Hives . pt.  States anaphalyxis    Aloe Hives     Hives     Fish-Derived Products      Social History     Socioeconomic History    Marital status:      Spouse name: Not on file    Number of children: Not on file    Years of education: Not on file    Highest education level: Not on file   Occupational History    Not on file   Social Needs    Financial resource strain: Not on file    Food insecurity:     Worry: Not on file     Inability: Not on file    Transportation needs:     Medical: Not on file     Non-medical: Not on file   Tobacco Use    Smoking status: Former Smoker     Packs/day: 1.50     Years: 43.00     Pack years: 64.50     Types: Cigarettes     Last attempt to quit: 2018     Years since quittin.6    Smokeless tobacco: Never Used   Substance and Sexual Activity    Alcohol use: No    Drug use: No    Sexual activity: Not on file   Lifestyle    Physical activity:     Days per week: Not on file     Minutes per session: Not on file    Stress: Not on file   Relationships    Social connections:     Talks on phone: Not on file     Gets together: Not on file     Attends Christianity service: Not on file     Active member of club or organization: Not on file     Attends meetings of clubs or organizations: Not on file     Relationship status: Not on file    Intimate partner violence:     Fear of current or ex partner: Not on of hip positive Left  Intact:Yes  Edema:Normal     Neurological:     Sensory:normal to light touch bilateral lower extremities  Motor:                  Right Quadriceps4-/5          Left Quadriceps4-/5           Right Gastrocnemius4-/5    Left Gastrocnemius4-/5  Right Ant Tibialis4-/5  Left Ant Tibialis4-/5  Reflexes:    Right Quadriceps reflex2+  Left Quadriceps reflex2+  Right Achilles reflex1+  Left Achilles reflex1+  Gait:antalgic with a cane     Dermatology:     Skin:no unusual rashes and no skin lesions     Impression:  Chronic low back pain with radiation to the Left groin and h/o stroke and residual Right sided weakness   Lumbar spine Xray scoliosis and osteoarthritis   Plan:  Axial low back pain and Left hip dysfunction  Patient is scheduled to see  for evaluation on 05/02  Will consider interventional procedures if physical therapy doesn't help  OARRS report reviewed  Patient encouraged to stay active and to lose weight, awaiting to start physical therapy  Treatment plan discussed with the patient including medications and procedure side effects       ccreferring physic    CHARO Cassidy M.D.

## 2019-05-02 ENCOUNTER — OFFICE VISIT (OUTPATIENT)
Dept: ORTHOPEDIC SURGERY | Age: 59
End: 2019-05-02
Payer: COMMERCIAL

## 2019-05-02 VITALS — BODY MASS INDEX: 38.82 KG/M2 | WEIGHT: 233 LBS | HEIGHT: 65 IN

## 2019-05-02 DIAGNOSIS — M16.12 PRIMARY OSTEOARTHRITIS OF LEFT HIP: Primary | ICD-10-CM

## 2019-05-02 PROCEDURE — 1036F TOBACCO NON-USER: CPT | Performed by: ORTHOPAEDIC SURGERY

## 2019-05-02 PROCEDURE — 99203 OFFICE O/P NEW LOW 30 MIN: CPT | Performed by: ORTHOPAEDIC SURGERY

## 2019-05-02 PROCEDURE — G8427 DOCREV CUR MEDS BY ELIG CLIN: HCPCS | Performed by: ORTHOPAEDIC SURGERY

## 2019-05-02 PROCEDURE — G8417 CALC BMI ABV UP PARAM F/U: HCPCS | Performed by: ORTHOPAEDIC SURGERY

## 2019-05-02 PROCEDURE — 3017F COLORECTAL CA SCREEN DOC REV: CPT | Performed by: ORTHOPAEDIC SURGERY

## 2019-05-02 NOTE — PROGRESS NOTES
Chief Complaint   Patient presents with    Hip Pain     Left hip pain for several years. Ivis Perkins  Is a 60 y.o.  female who presents today complaining of left hip pain. She states that the pain began several   year(s) ago. She did not have a history of injury. Patients states pain is located anterior, posterior and has tenderness over the  None portion of the hip. Hip pain is described as aching, sharp and throbbing and  is worse with weight bearing along with groin discomfort. She states the pain occurs in the  continuous. Patient states hip pain is relieved by rest. Patient does have a history of back pain. The patient does use ambulatory aid. Past Medical History:   Diagnosis Date    Cerebral artery occlusion with cerebral infarction ([facility] [address]     Degenerative arthritis of hand     Hiatal hernia     Hypertension      Past Surgical History:   Procedure Laterality Date    BRAIN ANEURYSM SURGERY      coiling     TONSILLECTOMY         Current Outpatient Medications:     potassium chloride (KLOR-CON M) 10 MEQ extended release tablet, Take 1 tablet by mouth daily (with breakfast), Disp: 90 tablet, Rfl: 0    pantoprazole (PROTONIX) 20 MG tablet, Take 1 tablet by mouth every morning (before breakfast), Disp: 30 tablet, Rfl: 2    metoprolol tartrate (LOPRESSOR) 50 MG tablet, Take 1 tablet by mouth 2 times daily, Disp: 60 tablet, Rfl: 5    losartan (COZAAR) 100 MG tablet, Take 1 tablet by mouth daily, Disp: 30 tablet, Rfl: 5    chlorthalidone (HYGROTON) 25 MG tablet, Take 1 tablet by mouth daily, Disp: 30 tablet, Rfl: 5    gabapentin (NEURONTIN) 300 MG capsule, Take 1 capsule by mouth 3 times daily for 90 days. ., Disp: 90 capsule, Rfl: 2    carBAMazepine (TEGRETOL-XR) 100 MG extended release tablet, Take 1 tablet by mouth 3 times daily, Disp: 90 tablet, Rfl: 3    amLODIPine (NORVASC) 5 MG tablet, Take 1 tablet by mouth daily, Disp: 30 tablet, Rfl: 5    celecoxib (CELEBREX) 200 MG capsule, Take 1 capsule by mouth daily, Disp: 30 capsule, Rfl: 2  Allergies   Allergen Reactions    Latex Hives     Hives and rash    Iodine Rash     Hives . pt.  States anaphalyxis    Aloe Hives     Hives     Fish-Derived Products      Social History     Socioeconomic History    Marital status:      Spouse name: Not on file    Number of children: Not on file    Years of education: Not on file    Highest education level: Not on file   Occupational History    Not on file   Social Needs    Financial resource strain: Not on file    Food insecurity:     Worry: Not on file     Inability: Not on file    Transportation needs:     Medical: Not on file     Non-medical: Not on file   Tobacco Use    Smoking status: Former Smoker     Packs/day: 1.50     Years: 43.00     Pack years: 64.50     Types: Cigarettes     Last attempt to quit: 2018     Years since quittin.6    Smokeless tobacco: Never Used   Substance and Sexual Activity    Alcohol use: No    Drug use: No    Sexual activity: Not on file   Lifestyle    Physical activity:     Days per week: Not on file     Minutes per session: Not on file    Stress: Not on file   Relationships    Social connections:     Talks on phone: Not on file     Gets together: Not on file     Attends [employer] service: Not on file     Active member of club or organization: Not on file     Attends meetings of clubs or organizations: Not on file     Relationship status: Not on file    Intimate partner violence:     Fear of current or ex partner: Not on file     Emotionally abused: Not on file     Physically abused: Not on file     Forced sexual activity: Not on file   Other Topics Concern    Not on file   Social History Narrative    Not on file     Family History   Problem Relation Age of Onset    Diabetes Mother     Arthritis Mother     Atrial Fibrillation Mother     Diabetes Father     Atrial Fibrillation Father     Arthritis Father     Emphysema Father  Cancer Sister          REVIEW OF SYSTEMS:     General/Constitution:  (-)weight loss, (-)fever, (-)chills, (-)weakness. Skin: (-) rash,(-) psoriasis,(-) eczema, (-)skin cancer. Musculoskeletal: (-) fractures,  (-) dislocations,(-) collagen vascular disease, (-) fibromyalgia, (-) multiple sclerosis, (-) muscular dystrophy, (-) RSD,(-) joint pain (-)swelling, (-) joint pain,swelling. Neurologic: (-) epilepsy, (-)seizures,(-) brain tumor,(-) TIA, (-)stroke, (-)headaches, (-)Parkinson disease,(-) memory loss, (-) LOC. Cardiovascular: (-) Chest pain, (-) swelling in legs/feet, (-) SOB, (-) cramping in legs/feet with walking. Respiratory: (-) SOB, (-) Coughing, (-) night sweats. GI: (-) nausea, (-) vomiting, (-) diarrhea, (-) blood in stool, (-) gastric ulcer. Psychiatric: (-) Depression, (-) Anxiety, (-) bipolar disease, (-) Alzheimer's Disease  Allergic/Immunologic: (-) allergies latex, (-) allergies metal, (-) skin sensitivity. Hematlogic: (-) anemia, (-) blood transfusion, (-) DVT/PE, (-) Clotting disorders      Subjective:    Constitution:    Ht 5' 5\" (1.651 m)   Wt 233 lb (105.7 kg)   BMI 38.77 kg/m²     Psycihatric:  The patient is alert and oriented x 3, appears to be stated age and in no distress. Respiratory:  Respiratory effort is not labored. Patient is not gasping. Palpation of the chest reveals no tactile fremitus. Skin:  Upon inspection: the skin appears warm, dry and intact. There is not a previous scar over the affected area. There is not any cellulitis, lymphedema or cutaneous lesions noted in the lower extremities. Upon palpation there is no induration noted. Neurologic:  Motor exam of the lower extremities show ; quadriceps, hamstrings, foot dorsi and plantar flexors intact R.  5/5 and L. 5/5. Deep tendon reflexes are 2/4 at the knees and 2/4 at the ankles with strong extensor hallicus longus motor strength bilaterally.  Sensory to both feet is intact to all sensory roots. Cardiovascular: The vascular exam is normal and is well perfused to distal extremities. Distal pulses DP/PT: R. 2+; L. 2+. There is cap refill noted less than two seconds in all digits. There is not edema of the bilateral lower extremities. There is not varicosities noted in the distal extremities. Lymph:  Upon palpation,  there is no lymphadenopathy noted in bilateral lower extremities. Musculoskeletal:  Gait: antalgic;  Examination of the digits and nails reveal no cyanosis or clubbing    Lumbar exam:  On visual inspection, there is not deformity of the spine. full range of motion, no tenderness, palpable spasm or pain on motion. Special tests: Straight Leg Raise negative, Eduard test negative. Hip exam:  Upon visual inspection there is a deformity noted. Patient complains of tenderness at the  groin. Exam of the left hip shows none leg length discrepancy. Range of motion of the involved/uninvolved hip is 10 degrees internal rotation and 10 degrees external rotation/20 degrees internal rotation and 30 degrees external rotation, the hip joint is stable to testing. Strength of the lower extremity is normal.The patient does not have ipsilateral knee pain, and is described as  none. Hip exam- Gait: antalgic; Strength: Hip Flexors 5/5; Hip Abductors 5/5; Hip Adduction 5/5. Knee exam :  Upon visual inspection there is not deformity noted. She does not know have  pain on motion, there is not tenderness over the  medial, lateral region. Range of motion of R. Knee is 0 to 120, and L. Knee is 0 to 120. there are not any masses, there is not ligamentous instability, there is not  deformity noted. Xrays: There is no evidence of acute fracture or dislocation. The obturator   rings are intact. There is advanced osteoarthrosis of bilateral hips. There is no evidence of avascular necrosis. Bilateral sacroiliac   joints and the pubic symphysis are maintained.  There are few

## 2019-05-28 ENCOUNTER — HOSPITAL ENCOUNTER (OUTPATIENT)
Dept: GENERAL RADIOLOGY | Age: 59
Discharge: HOME | End: 2019-05-30
Payer: COMMERCIAL

## 2019-05-28 ENCOUNTER — HOSPITAL ENCOUNTER (OUTPATIENT)
Age: 59
Discharge: HOME | End: 2019-05-30
Payer: COMMERCIAL

## 2019-05-28 ENCOUNTER — OFFICE VISIT (OUTPATIENT)
Dept: FAMILY MEDICINE CLINIC | Age: 59
End: 2019-05-28
Payer: COMMERCIAL

## 2019-05-28 VITALS
HEART RATE: 65 BPM | DIASTOLIC BLOOD PRESSURE: 60 MMHG | BODY MASS INDEX: 38.32 KG/M2 | RESPIRATION RATE: 16 BRPM | TEMPERATURE: 98.1 F | WEIGHT: 230 LBS | SYSTOLIC BLOOD PRESSURE: 114 MMHG | HEIGHT: 65 IN | OXYGEN SATURATION: 94 %

## 2019-05-28 DIAGNOSIS — J20.9 ACUTE BRONCHITIS, UNSPECIFIED ORGANISM: ICD-10-CM

## 2019-05-28 DIAGNOSIS — J20.9 ACUTE BRONCHITIS, UNSPECIFIED ORGANISM: Primary | ICD-10-CM

## 2019-05-28 PROCEDURE — 99213 OFFICE O/P EST LOW 20 MIN: CPT | Performed by: PHYSICIAN ASSISTANT

## 2019-05-28 PROCEDURE — G8417 CALC BMI ABV UP PARAM F/U: HCPCS | Performed by: PHYSICIAN ASSISTANT

## 2019-05-28 PROCEDURE — 3017F COLORECTAL CA SCREEN DOC REV: CPT | Performed by: PHYSICIAN ASSISTANT

## 2019-05-28 PROCEDURE — 71046 X-RAY EXAM CHEST 2 VIEWS: CPT

## 2019-05-28 PROCEDURE — 1036F TOBACCO NON-USER: CPT | Performed by: PHYSICIAN ASSISTANT

## 2019-05-28 PROCEDURE — G8427 DOCREV CUR MEDS BY ELIG CLIN: HCPCS | Performed by: PHYSICIAN ASSISTANT

## 2019-05-28 RX ORDER — BENZONATATE 100 MG/1
100 CAPSULE ORAL 3 TIMES DAILY PRN
Qty: 30 CAPSULE | Refills: 0 | Status: SHIPPED | OUTPATIENT
Start: 2019-05-28 | End: 2019-06-07

## 2019-05-28 RX ORDER — DOXYCYCLINE HYCLATE 100 MG
100 TABLET ORAL 2 TIMES DAILY WITH MEALS
Qty: 20 TABLET | Refills: 0 | Status: SHIPPED | OUTPATIENT
Start: 2019-05-28 | End: 2019-06-07

## 2019-06-04 ENCOUNTER — OFFICE VISIT (OUTPATIENT)
Dept: FAMILY MEDICINE CLINIC | Age: 59
End: 2019-06-04
Payer: COMMERCIAL

## 2019-06-04 VITALS
DIASTOLIC BLOOD PRESSURE: 64 MMHG | SYSTOLIC BLOOD PRESSURE: 120 MMHG | BODY MASS INDEX: 38.49 KG/M2 | OXYGEN SATURATION: 96 % | WEIGHT: 231 LBS | TEMPERATURE: 98.1 F | RESPIRATION RATE: 14 BRPM | HEIGHT: 65 IN | HEART RATE: 57 BPM

## 2019-06-04 DIAGNOSIS — R26.2 AMBULATORY DYSFUNCTION: ICD-10-CM

## 2019-06-04 DIAGNOSIS — Z87.891 FORMER TOBACCO USE: ICD-10-CM

## 2019-06-04 DIAGNOSIS — M15.9 GENERALIZED OSTEOARTHRITIS: ICD-10-CM

## 2019-06-04 DIAGNOSIS — R13.10 DYSPHAGIA, UNSPECIFIED TYPE: Primary | ICD-10-CM

## 2019-06-04 PROCEDURE — G8427 DOCREV CUR MEDS BY ELIG CLIN: HCPCS | Performed by: FAMILY MEDICINE

## 2019-06-04 PROCEDURE — 3017F COLORECTAL CA SCREEN DOC REV: CPT | Performed by: FAMILY MEDICINE

## 2019-06-04 PROCEDURE — 99213 OFFICE O/P EST LOW 20 MIN: CPT | Performed by: FAMILY MEDICINE

## 2019-06-04 PROCEDURE — 1036F TOBACCO NON-USER: CPT | Performed by: FAMILY MEDICINE

## 2019-06-04 PROCEDURE — G8417 CALC BMI ABV UP PARAM F/U: HCPCS | Performed by: FAMILY MEDICINE

## 2019-06-04 RX ORDER — POLYETHYLENE GLYCOL 3350 17 G/17G
17 POWDER, FOR SOLUTION ORAL DAILY PRN
Qty: 510 G | Refills: 5 | Status: SHIPPED | OUTPATIENT
Start: 2019-06-04 | End: 2019-06-17

## 2019-06-04 ASSESSMENT — ENCOUNTER SYMPTOMS
BLOOD IN STOOL: 0
CONSTIPATION: 1
COUGH: 1
APNEA: 0
DIARRHEA: 1
VOICE CHANGE: 0
TROUBLE SWALLOWING: 1

## 2019-06-04 NOTE — PROGRESS NOTES
(LOPRESSOR) 50 MG tablet Take 1 tablet by mouth 2 times daily 60 tablet 5    losartan (COZAAR) 100 MG tablet Take 1 tablet by mouth daily 30 tablet 5    chlorthalidone (HYGROTON) 25 MG tablet Take 1 tablet by mouth daily 30 tablet 5    carBAMazepine (TEGRETOL-XR) 100 MG extended release tablet Take 1 tablet by mouth 3 times daily 90 tablet 3    amLODIPine (NORVASC) 5 MG tablet Take 1 tablet by mouth daily 30 tablet 5    celecoxib (CELEBREX) 200 MG capsule Take 1 capsule by mouth daily 30 capsule 2    gabapentin (NEURONTIN) 300 MG capsule Take 1 capsule by mouth 3 times daily for 90 days. . 90 capsule 2     No current facility-administered medications for this visit. Past Medical/Surgical Hx;  Reviewed with patient         Diagnosis Date    C[facility]bral artery occlusion with cerebral infarction ([facility] [address]     Degenerative arthritis of hand     Hiatal hernia     Hypertension      Past Surgical History:   Procedure Laterality Date    BRAIN ANEURYSM SURGERY      coiling     TONSILLECTOMY         Past Family Hx:  Reviewed with patient      Problem Relation Age of Onset    Diabetes Mother     Arthritis Mother     Atrial Fibrillation Mother     Diabetes Father    [facility] Atrial Fibrillation Father     Arthritis Father    [facility] Emphysema Father     Cancer Sister        Social Hx:  Reviewed with patient  Social History     Tobacco Use    Smoking status: Former Smoker     Packs/day: 1.50     Years: 43.00     Pack years: 64.50     Types: Cigarettes     Last attempt to quit: 2018     Years since quittin.7    Smokeless tobacco: Never Used   Substance Use Topics    Alcohol use: No       Immunization History   Administered Date(s) Administered    Pneumococcal Polysaccharide ([id number]) 2018       Review of Systems  Review of Systems   Constitutional: Positive for appetite change and fatigue. HENT: Positive for trouble swallowing. Negative for voice change. Respiratory: Positive for cough (improving). Negative for apnea. Cardiovascular: Positive for chest pain (chest wall achiness). Gastrointestinal: Positive for constipation and diarrhea. Negative for blood in stool. PE:  VS:  /64   Pulse 57   Temp 98.1 °F (36.7 °C)   Resp 14   Ht 5' 5\" (1.651 m)   Wt 231 lb (104.8 kg)   SpO2 96%   BMI 38.44 kg/m²   Physical Exam   Constitutional: She is oriented to person, place, and time. She appears well-developed and well-nourished. HENT:   Head: Normocephalic and atraumatic. Cardiovascular: Normal rate and regular rhythm. Exam reveals no gallop and no friction rub. No murmur heard. Pulmonary/Chest: Effort normal and breath sounds normal. No stridor. No respiratory distress. She has no wheezes. She has no rales. Musculoskeletal: She exhibits no edema. Neurological: She is alert and oriented to person, place, and time. Skin: Skin is warm and dry. Recent CXR reviewed    Assessment/Plan:  Erlinda Larose was seen today for generalized body aches and nausea. Diagnoses and all orders for this visit:    Dysphagia, unspecified type  Worrisome in setting of known hiatal hernia and tobacco history  Recommend egd  -     [facility]    Former tobacco use  -     Scot Kinsey MD, General Surgery, [place]    Ambulatory dysfunction  Having trouble walking long distances due to knee and hip pain  Also recent stroke  -     DME Order for Wheel Chair as OP    Generalized osteoarthritis  -     DME Order for Wheel Chair as OP    Constipation  -     polyethylene glycol (GLYCOLAX) powder; Take 17 g by mouth daily as needed (constipation)        Return in about 2 months (around 8/4/2019). Advised patient to call with any new medication issues. Allquestions answered.   Call or go to ED immediately if symptoms worsen or persist.

## 2019-06-17 ENCOUNTER — OFFICE VISIT (OUTPATIENT)
Dept: SURGERY | Age: 59
End: 2019-06-17
Payer: COMMERCIAL

## 2019-06-17 VITALS
BODY MASS INDEX: 35.49 KG/M2 | DIASTOLIC BLOOD PRESSURE: 64 MMHG | TEMPERATURE: 97.7 F | SYSTOLIC BLOOD PRESSURE: 112 MMHG | WEIGHT: 213 LBS | HEIGHT: 65 IN | HEART RATE: 82 BPM | RESPIRATION RATE: 16 BRPM

## 2019-06-17 DIAGNOSIS — R68.81 EARLY SATIETY: ICD-10-CM

## 2019-06-17 DIAGNOSIS — Z91.89 AT RISK FOR COLON CANCER: ICD-10-CM

## 2019-06-17 DIAGNOSIS — R12 HEARTBURN: ICD-10-CM

## 2019-06-17 DIAGNOSIS — R13.10 DYSPHAGIA, UNSPECIFIED TYPE: Primary | ICD-10-CM

## 2019-06-17 PROCEDURE — G8427 DOCREV CUR MEDS BY ELIG CLIN: HCPCS | Performed by: SURGERY

## 2019-06-17 PROCEDURE — G8417 CALC BMI ABV UP PARAM F/U: HCPCS | Performed by: SURGERY

## 2019-06-17 PROCEDURE — 99244 OFF/OP CNSLTJ NEW/EST MOD 40: CPT | Performed by: SURGERY

## 2019-06-17 ASSESSMENT — ENCOUNTER SYMPTOMS
CHOKING: 0
EYE REDNESS: 0
BLOOD IN STOOL: 0
COUGH: 0
SHORTNESS OF BREATH: 1
COLOR CHANGE: 1
NAUSEA: 1
DIARRHEA: 1
CONSTIPATION: 1
CHEST TIGHTNESS: 0
EYE DISCHARGE: 0
WHEEZING: 1
ABDOMINAL PAIN: 1
EYE ITCHING: 1
VOMITING: 0
ANAL BLEEDING: 0
BACK PAIN: 1
EYE PAIN: 1
ABDOMINAL DISTENTION: 1

## 2019-06-17 NOTE — PROGRESS NOTES
Subjective:      Patient ID: Lilo Dobson is a 60 y.o. female. HPI  60 yr old female referred by Dr. Ellen Marcelino for dysphagia. This occurs with both solids and liquids. Does not happen every time she eats or drinks, but she does report early satiety. States she was told she had a hiatal hernia in her 25s. States she has occasional upper abdominal pain and bloating. Pain sometimes radiates into her chest and back. Denies regurgitation of undigested food. Occasional nausea and ongoing heartburn. Denies blood in stool. Reports alternating diarrhea and constipation--has been this way her whole life. Denies unintentional weight loss, or family hx of colon cancer. Denies prior colonoscopy. Past Medical History:   Diagnosis Date    Cerebral artery occlusion with [facility] infarction ([facility] [address]     Degenerative arthritis of hand     Hiatal hernia     Hypertension        Past Surgical History:   Procedure Laterality Date    BRAIN ANEURYSM SURGERY      coiling     TONSILLECTOMY         Current Outpatient Medications   Medication Sig Dispense Refill    potassium chloride (KLOR-CON M) 10 MEQ extended release tablet Take 1 tablet by mouth daily (with breakfast) 90 tablet 0    pantoprazole (PROTONIX) 20 MG tablet Take 1 tablet by mouth every morning (before breakfast) 30 tablet 2    metoprolol tartrate (LOPRESSOR) 50 MG tablet Take 1 tablet by mouth 2 times daily 60 tablet 5    losartan (COZAAR) 100 MG tablet Take 1 tablet by mouth daily 30 tablet 5    chlorthalidone (HYGROTON) 25 MG tablet Take 1 tablet by mouth daily 30 tablet 5    gabapentin (NEURONTIN) 300 MG capsule Take 1 capsule by mouth 3 times daily for 90 days. . 90 capsule 2    carBAMazepine (TEGRETOL-XR) 100 MG extended release tablet Take 1 tablet by mouth 3 times daily 90 tablet 3    amLODIPine (NORVASC) 5 MG tablet Take 1 tablet by mouth daily 30 tablet 5    celecoxib (CELEBREX) 200 MG capsule Take 1 capsule by mouth daily 30 capsule 2 No current facility-administered medications for this visit. Allergies   Allergen Reactions    Latex Hives     Hives and rash    Iodine Rash     Hives . pt.  States anaphalyxis    Aloe Hives     Hives     Fish-Derived Products        Family History   Problem Relation Age of Onset    Diabetes Mother     Arthritis Mother     Atrial Fibrillation Mother     Diabetes Father     Atrial Fibrillation Father     Arthritis Father     Emphysema Father     Cancer Sister        Social History     Socioeconomic History    Marital status:      Spouse name: Not on file    Number of children: Not on file    Years of education: Not on file    Highest education level: Not on file   Occupational History    Not on file   Social Needs    Financial resource strain: Not on file    Food insecurity:     Worry: Not on file     Inability: Not on file    Transportation needs:     Medical: Not on file     Non-medical: Not on file   Tobacco Use    Smoking status: Former Smoker     Packs/day: 1.50     Years: 43.00     Pack years: 64.50     Types: Cigarettes     Last attempt to quit: 2018     Years since quittin.7    Smokeless tobacco: Never Used   Substance and Sexual Activity    Alcohol use: No    Drug use: No    Sexual activity: Not on file   Lifestyle    Physical activity:     Days per week: Not on file     Minutes per session: Not on file    Stress: Not on file   Relationships    Social connections:     Talks on phone: Not on file     Gets together: Not on file     Attends Orthodox service: Not on file     Active member of club or organization: Not on file     Attends meetings of clubs or organizations: Not on file     Relationship status: Not on file    Intimate partner violence:     Fear of current or ex partner: Not on file     Emotionally abused: Not on file     Physically abused: Not on file     Forced sexual activity: Not on file   Other Topics Concern    Not on file   Social History Narrative    Not on file       Review of Systems   Constitutional: Positive for appetite change and chills. Negative for activity change, fever and unexpected weight change. HENT: Negative. Eyes: Positive for pain, itching and visual disturbance. Negative for discharge and redness. Respiratory: Positive for shortness of breath and wheezing. Negative for cough, choking and chest tightness. Cardiovascular: Positive for leg swelling. Negative for chest pain and palpitations. Gastrointestinal: Positive for abdominal distention, abdominal pain, constipation, diarrhea and nausea. Negative for anal bleeding, blood in stool and vomiting. Endocrine: Positive for cold intolerance and heat intolerance. Negative for polydipsia and polyuria. Genitourinary: Positive for frequency. Negative for dysuria, hematuria, urgency, vaginal bleeding, vaginal discharge and vaginal pain. Musculoskeletal: Positive for arthralgias, back pain, gait problem, joint swelling, myalgias, neck pain and neck stiffness. Skin: Positive for color change. Negative for pallor, rash and wound. Allergic/Immunologic: Positive for food allergies. Negative for environmental allergies. Neurological: Positive for weakness and headaches. Negative for dizziness, seizures, syncope, light-headedness and numbness. Hematological: Negative for adenopathy. Bruises/bleeds easily. Psychiatric/Behavioral: Positive for agitation. Negative for confusion, decreased concentration, hallucinations, self-injury and suicidal ideas. The patient is nervous/anxious. The patient is not hyperactive. Objective:   Physical Exam   Constitutional: She is oriented to person, place, and time. She appears well-developed and well-nourished. No distress. HENT:   Head: Normocephalic and atraumatic. Right Ear: External ear normal.   Left Ear: External ear normal.   Nose: Nose normal.   Eyes: Pupils are equal, round, and reactive to light.  Conjunctivae and EOM are normal. Right eye exhibits no discharge. Left eye exhibits no discharge. No scleral icterus. Neck: Normal range of motion. Neck supple. Cardiovascular: Normal rate, regular rhythm and normal heart sounds. No murmur heard. Pulmonary/Chest: Effort normal and breath sounds normal. No stridor. No respiratory distress. She has no wheezes. Abdominal: Soft. Bowel sounds are normal. She exhibits no distension and no mass. There is no tenderness. There is no rebound and no guarding. No hernia. Musculoskeletal:   In wheelchair   Neurological: She is alert and oriented to person, place, and time. Skin: Skin is warm and dry. She is not diaphoretic. Psychiatric: She has a normal mood and affect. Her behavior is normal. Judgment and thought content normal.       Assessment:      Dysphagia  Heartburn  At risk for colon cancer secondary to age      Plan:      Recommend UGI  Recommend EGD/colonoscopy. The patient was explained the risks/benefits/alternatives/expected outcomes of the procedure. The patient was explained the risks of the procedure, including, but not limited to, the risk of reaction to the anesthesia medicine and the risk of perforation requiring further surgery. The patient was informed that they may require biopsy or polypectomy. These procedures may increase the risk of complication. All questions were answered. The patient verbalized understanding and agreed to proceed.           Galen Dugan MD

## 2019-06-17 NOTE — PATIENT INSTRUCTIONS
Call [phone] for any questions/concerns. Patient Information and Instructions for  Upper GI Endoscopy or Esophagogastroduodenoscopy [EGD])         Definition Upper GI Endoscopy or Esophagogastroduodenoscopy [EGD])  This is a test that uses a fiberoptic scope to examine the esophagus (throat), stomach, and upper part of the small intestines. Upper GI endoscopy may be recommended if you have:   Abdominal pain   Severe heartburn   Persistent nausea and vomiting   Difficulty swallowing   Blood in stool or vomit   Abnormal x-ray or other examinations of the gastrointestinal tract     Conditions that can be diagnosed with upper GI endoscopy include:   Ulcers   Tumors   Polyps   Abnormal narrowing   Inflammation     Possible Complications   Complications are rare, but no procedure is completely free of risk. If you are planning to have upper GI endoscopy, your doctor will review a list of possible complications, which may include:   Bleeding   Damage to the esophagus, stomach, or intestine   Infection   Respiratory depression (reduced breathing rate and/or depth)   Reaction to sedatives or anesthesia causing your blood pressure to drop    Some factors that may increase the risk of complications include:   Age: 61 or older   Pregnancy   Obesity   Smoking , alcoholism , or drug use   Malnutrition   Recent illness   Diabetes   Heart or lung problems   Bleeding disorders   Use of certain medicines     Be sure to discuss these risks with your doctor before the test.     What to Expect   Prior to test   Leading up to the test:   Arrange for a ride home after the test. Also, arrange for help at home. The night before, eat a light meal.  Do not eat or drink anything after midnight the night before the test.   Talk to your doctor about your medicines.  You may be asked to stop taking some medicines up to one week before the procedure, like:   Anti-inflammatory drugs (e.g., aspirin )   Blood thinners, like clopidogrel (Plavix) or warfarin (Coumadin)     Description of the Test   You will be asked to lie on your left side. You will have monitors tracking your breathing, heart rate, and blood oxygen levels. You will be given supplemental oxygen to breathe through your nose. A mouthpiece will be positioned to help keep your mouth open. Your throat may be sprayed with a numbing medicine. You will be given a sedative through an IV to help you relax during the test.  During the test, a small suction tube will be used to clear saliva and fluids from your mouth. The endoscope will be lubricated and placed in your mouth. You will be asked to try to swallow it. Then, it will be carefully and slowly advanced down your throat. It will be passed through your esophagus and into your stomach and intestine. While the endoscope is being advanced, your doctor will view the images on the screen. Air will be passed through the endoscope into your digestive tract. This will be done to smooth the normal folds in the tissues, allowing your doctor to view the tissue more easily. Tiny tools may be passed through the endoscope in order to take biopsies or do other tests. After Test   After the test, you will be observed for an hour. Then, you will be allowed to go home. When you return home after the test, do the following to help ensure a smooth recovery:   Rest when you get home. Ask your doctor if you can resume your normal diet. In most cases, you will be able to. Sedatives can slow your reaction time. Do not drive or use machinery for the rest of the day. Avoid alcohol for the rest of the day. Be sure to follow your doctor's instructions . How Long Will It Take? Usually about 10-15 minutes     Will It Hurt? Most people do not feel anything during the test and will not remember the test.  After the test, your throat may be sore and you may feel bloated.      Results   This test gives your doctor information about the health of your digestive system. The results can help to explain your symptoms. You and your doctor will talk about the results and your treatment plan. Call Your Doctor   After the test, call your doctor if any of the following occurs:   Signs of infection, including fever and chills   Severe abdominal pain   Hard, swollen abdomen   Difficulty swallowing or breathing   Any change or increase in your original symptoms   Bloody or black tarry colored stools   Nausea and/or vomiting   Cough, shortness of breath, or chest pain   Bleeding     In case of emergency, call 911. BOWEL PREP INSTRUCTIONS      It is very important that you follow all of the instructions listed on this sheet carefully to ensure that your colon is cleaned out or your risk of side effects could be increased. What you will Need:  1. Nulytely, Golytely or Colyte Colon prep. You have been given a prescription. 2 Days or More Before Colonoscopy:   Obtain your colon prep from the pharmacy   Do not eat corn, tomatoes, peas, or watermelon 3 to 5 days before procedure. On the Day Before Colonoscopy:  · You may have clear liquids ONLY - No solid food. Do not drink milk   Do not eat or drink anything that is red or purple in color   Do not drink alcohol or beer. The following is OK to eat or drink:  ·  Water, Limeade or lemonade  ·  Strained fruit juices (no pulp) - including apple, orange, white grape or white  cranberry  ·  Coffee or Tea - do not use any milk or creamer  ·  Chicken broth  ·  Jello without added fruit or toppings (No red or purple)    Directions to take Colon Prep:  Step 1. The colon prep can be used with or without the flavor packs. If using flavor, tear open packet and pour into bottle before mixing. Step 2. Add lukewarm water to top line on bottle. Put cap on bottle and shake to dissolve the powder. It should clear like water. Do not put anything else in the bottle. After mixing, keep in the refrigerator.  You should drink it within 48 hours. Step 3. The first bowel movement occurs about 1 hour after you start drinking the bowel prep. Continue to drink the bowel prep until the bowel movement is clear like water and free of solid bowel movement  Step 4. Drink 1 (8 oz) glass every 10 minutes. Try not to sip small amounts, but instead drink each glass within a few minutes. Note:    Feelings of bloating and/or nausea are common. This is temporary and will get better once bowel movements begin.  If you have nausea or vomiting, call the doctor   Nothing to eat or drink after midnight the night before your colonoscopy. However, if you are taking any blood pressure medications or heart medications, you should take them with a sip of water.  If you are on Insulin or Other medications for diabetes then check with your doctor as to how to take your medication for the day before and the day of the colonoscopy. [facility] - Dr. Donna Walker, Dr. Bruce Ceron,  Dr. Bossman Roque, Dr. Shelby Campuzano, Dr. Lizzie Hernandez, Dr. Clemencia Bob, Dr. Tashi Yen  Phone: [phone]  Fax:  [phone]       Patient Information and Instructions for Colonoscopy         Definition of Colonoscopy   A colonoscopy is the visual exam of the rectum and colon (large intestine). The exam is done with a tool called a colonoscope. The colonoscope is a flexible tube with a tiny camera on the end. This instrument allows the doctor to view the inside of your rectum and colon. Sigmoidoscopy is a shorter scope that views only the last one third of the colon. Reasons for Colonoscopy   It is used to examine, diagnose, and treat problems in your large intestine. The procedure is most often done for the following reasons:    To determine the cause of abdominal pain, rectal bleeding, or a change in bowel habits   To detect and treat colon cancer or colon polyps   To obtain tissue samples for testing   To stop intestinal bleeding   Monitor response to treatment if you have inflammatory bowel disease     Possible Complications   Complications are rare, but no procedure is completely free of risk. If you are planning to have a colonoscopy, your doctor will review a list of possible complications, which may include:   Bleeding   Reaction to the sedation causing drop in your blood pressure or problems breathing  Perforation or puncture of the bowel     Factors that may increase the risk of complications include:   Pre-existing heart or kidney condition   Treatment with certain medicines, including aspirin and other drugs with anticoagulant or blood-thinning properties   Prior abdominal surgery or radiation treatments   Active colitis , diverticulitis , or other acute bowel disease   Previous treatment with radiation therapy     Be sure to discuss these risks with your doctor before the procedure. What to Expect   Prior to Procedure   Your doctor will likely do the following:   Physical exam   Health history   Review of medicines   Test your stool for hidden blood (called \"occult blood\")     Your colon must be completely clean before the procedure. Any stool left in the intestine will block the view. This preparation may start several days before the procedure. Follow your doctor's instructions. Leading up to your procedure:   Talk to your doctor about your medicines. You may be asked to stop taking some medicines up to one week before the procedure, like:   Anti-inflammatory drugs (e.g., aspirin )   Blood thinners like clopidogrel (Plavix) or warfarin (Coumadin)   Iron supplements or vitamins containing iron   The day or days before your procedure, go on a clear liquid diet (clear broth, clear juice, clear jello) with no red coloring  Do not eat or drink anything after midnight. Wear comfortable clothing.    If you have diabetes, ask your doctor if you need to adjust your diabetes medicine on the day prior to your Signs of infection, including fever or chills   Inability to pass gas or stool   Coughing, shortness of breath, chest pain, severe nausea or vomiting     In case of an emergency, CALL 911 .

## 2019-06-18 DIAGNOSIS — I60.9 SUBARACHNOID BLEED (HCC): Primary | ICD-10-CM

## 2019-06-18 DIAGNOSIS — I10 HYPERTENSION, UNSPECIFIED TYPE: ICD-10-CM

## 2019-06-18 DIAGNOSIS — K44.9 HIATAL HERNIA: ICD-10-CM

## 2019-06-18 DIAGNOSIS — M54.16 LUMBAR RADICULITIS: ICD-10-CM

## 2019-06-18 NOTE — TELEPHONE ENCOUNTER
Next appointment 8/16/19. Patient called for refill on gabapentin and Potassium.   meds pending drs approval.

## 2019-06-19 RX ORDER — CARBAMAZEPINE 100 MG/1
100 TABLET, EXTENDED RELEASE ORAL 3 TIMES DAILY
Qty: 90 TABLET | Refills: 2 | Status: SHIPPED | OUTPATIENT
Start: 2019-06-19 | End: 2019-08-16 | Stop reason: SDUPTHER

## 2019-06-19 RX ORDER — LOSARTAN POTASSIUM 100 MG/1
100 TABLET ORAL DAILY
Qty: 30 TABLET | Refills: 2 | Status: SHIPPED | OUTPATIENT
Start: 2019-06-19 | End: 2019-08-16 | Stop reason: SDUPTHER

## 2019-06-19 RX ORDER — CHLORTHALIDONE 25 MG/1
25 TABLET ORAL DAILY
Qty: 30 TABLET | Refills: 2 | Status: SHIPPED | OUTPATIENT
Start: 2019-06-19 | End: 2019-08-16 | Stop reason: SDUPTHER

## 2019-06-19 RX ORDER — POTASSIUM CHLORIDE 750 MG/1
10 TABLET, EXTENDED RELEASE ORAL
Qty: 30 TABLET | Refills: 2 | Status: SHIPPED | OUTPATIENT
Start: 2019-06-19 | End: 2019-08-16 | Stop reason: SDUPTHER

## 2019-06-19 RX ORDER — METOPROLOL TARTRATE 50 MG/1
50 TABLET, FILM COATED ORAL 2 TIMES DAILY
Qty: 60 TABLET | Refills: 2 | Status: SHIPPED | OUTPATIENT
Start: 2019-06-19 | End: 2019-08-16 | Stop reason: SDUPTHER

## 2019-06-19 RX ORDER — AMLODIPINE BESYLATE 5 MG/1
5 TABLET ORAL DAILY
Qty: 30 TABLET | Refills: 2 | Status: SHIPPED | OUTPATIENT
Start: 2019-06-19 | End: 2019-08-16 | Stop reason: SDUPTHER

## 2019-06-19 RX ORDER — PANTOPRAZOLE SODIUM 20 MG/1
20 TABLET, DELAYED RELEASE ORAL
Qty: 30 TABLET | Refills: 2 | Status: SHIPPED | OUTPATIENT
Start: 2019-06-19 | End: 2019-08-16 | Stop reason: SDUPTHER

## 2019-06-19 RX ORDER — GABAPENTIN 300 MG/1
300 CAPSULE ORAL 3 TIMES DAILY
Qty: 90 CAPSULE | Refills: 2 | Status: SHIPPED | OUTPATIENT
Start: 2019-06-19 | End: 2019-08-16 | Stop reason: SDUPTHER

## 2019-06-20 ENCOUNTER — HOSPITAL ENCOUNTER (OUTPATIENT)
Dept: GENERAL RADIOLOGY | Age: 59
Discharge: HOME | End: 2019-06-22
Payer: COMMERCIAL

## 2019-06-20 DIAGNOSIS — R12 HEARTBURN: ICD-10-CM

## 2019-06-20 DIAGNOSIS — R13.10 DYSPHAGIA, UNSPECIFIED TYPE: ICD-10-CM

## 2019-06-20 DIAGNOSIS — R68.81 EARLY SATIETY: ICD-10-CM

## 2019-06-20 PROCEDURE — 74220 X-RAY XM ESOPHAGUS 1CNTRST: CPT

## 2019-06-20 PROCEDURE — 2500000003 HC RX 250 WO HCPCS: Performed by: NURSE PRACTITIONER

## 2019-06-20 PROCEDURE — 6370000000 HC RX 637 (ALT 250 FOR IP): Performed by: NURSE PRACTITIONER

## 2019-06-20 RX ADMIN — ANTACID/ANTIFLATULENT 1 EACH: 380; 550; 10; 10 GRANULE, EFFERVESCENT ORAL at 09:47

## 2019-06-20 RX ADMIN — BARIUM SULFATE 176 G: 960 POWDER, FOR SUSPENSION ORAL at 09:47

## 2019-06-21 DIAGNOSIS — M16.12 PRIMARY OSTEOARTHRITIS OF LEFT HIP: Primary | ICD-10-CM

## 2019-06-25 ENCOUNTER — HOSPITAL ENCOUNTER (OUTPATIENT)
Dept: GENERAL RADIOLOGY | Age: 59
Discharge: HOME | End: 2019-06-27
Payer: COMMERCIAL

## 2019-06-25 ENCOUNTER — OFFICE VISIT (OUTPATIENT)
Dept: ORTHOPEDIC SURGERY | Age: 59
End: 2019-06-25
Payer: COMMERCIAL

## 2019-06-25 VITALS
HEIGHT: 63 IN | SYSTOLIC BLOOD PRESSURE: 127 MMHG | WEIGHT: 213 LBS | HEART RATE: 54 BPM | DIASTOLIC BLOOD PRESSURE: 74 MMHG | BODY MASS INDEX: 37.74 KG/M2

## 2019-06-25 DIAGNOSIS — M16.12 PRIMARY OSTEOARTHRITIS OF LEFT HIP: Primary | ICD-10-CM

## 2019-06-25 DIAGNOSIS — M16.12 PRIMARY OSTEOARTHRITIS OF LEFT HIP: ICD-10-CM

## 2019-06-25 PROCEDURE — 3017F COLORECTAL CA SCREEN DOC REV: CPT | Performed by: ORTHOPAEDIC SURGERY

## 2019-06-25 PROCEDURE — G8417 CALC BMI ABV UP PARAM F/U: HCPCS | Performed by: ORTHOPAEDIC SURGERY

## 2019-06-25 PROCEDURE — G8427 DOCREV CUR MEDS BY ELIG CLIN: HCPCS | Performed by: ORTHOPAEDIC SURGERY

## 2019-06-25 PROCEDURE — 1036F TOBACCO NON-USER: CPT | Performed by: ORTHOPAEDIC SURGERY

## 2019-06-25 PROCEDURE — 99213 OFFICE O/P EST LOW 20 MIN: CPT | Performed by: ORTHOPAEDIC SURGERY

## 2019-06-25 PROCEDURE — 99212 OFFICE O/P EST SF 10 MIN: CPT | Performed by: ORTHOPAEDIC SURGERY

## 2019-06-25 PROCEDURE — 73502 X-RAY EXAM HIP UNI 2-3 VIEWS: CPT

## 2019-06-26 ENCOUNTER — TELEPHONE (OUTPATIENT)
Dept: SURGERY | Age: 59
End: 2019-06-26

## 2019-06-26 NOTE — TELEPHONE ENCOUNTER
MA made first attempt to schedule patient for EGD and Colonoscopy with Dr. Danae Medeiros. MA left voicemail with office contact information for scheduling.     Electronically signed by Donald Noriega on 6/26/19 at 3:38 PM

## 2019-07-03 RX ORDER — BUPIVACAINE HYDROCHLORIDE 2.5 MG/ML
1 INJECTION, SOLUTION EPIDURAL; INFILTRATION; INTRACAUDAL ONCE
Status: CANCELLED | OUTPATIENT
Start: 2019-07-03 | End: 2019-07-03

## 2019-07-03 RX ORDER — LIDOCAINE HYDROCHLORIDE 10 MG/ML
1 INJECTION, SOLUTION EPIDURAL; INFILTRATION; INTRACAUDAL; PERINEURAL ONCE
Status: CANCELLED | OUTPATIENT
Start: 2019-07-03 | End: 2019-07-03

## 2019-07-03 RX ORDER — TRIAMCINOLONE ACETONIDE 40 MG/ML
40 INJECTION, SUSPENSION INTRA-ARTICULAR; INTRAMUSCULAR ONCE
Status: CANCELLED | OUTPATIENT
Start: 2019-07-03 | End: 2019-07-03

## 2019-07-05 ENCOUNTER — HOSPITAL ENCOUNTER (OUTPATIENT)
Dept: INTERVENTIONAL RADIOLOGY/VASCULAR | Age: 59
Discharge: HOME | End: 2019-07-07
Payer: COMMERCIAL

## 2019-07-05 VITALS
SYSTOLIC BLOOD PRESSURE: 142 MMHG | DIASTOLIC BLOOD PRESSURE: 78 MMHG | HEART RATE: 53 BPM | TEMPERATURE: 97.7 F | BODY MASS INDEX: 35.49 KG/M2 | HEIGHT: 65 IN | WEIGHT: 213 LBS | RESPIRATION RATE: 16 BRPM | OXYGEN SATURATION: 100 %

## 2019-07-05 DIAGNOSIS — M16.12 PRIMARY OSTEOARTHRITIS OF LEFT HIP: ICD-10-CM

## 2019-07-05 PROCEDURE — 6360000004 HC RX CONTRAST MEDICATION: Performed by: PHYSICIAN ASSISTANT

## 2019-07-05 PROCEDURE — 2500000003 HC RX 250 WO HCPCS: Performed by: PHYSICIAN ASSISTANT

## 2019-07-05 PROCEDURE — 20610 DRAIN/INJ JOINT/BURSA W/O US: CPT

## 2019-07-05 PROCEDURE — 2500000003 HC RX 250 WO HCPCS: Performed by: ORTHOPAEDIC SURGERY

## 2019-07-05 PROCEDURE — 6360000002 HC RX W HCPCS: Performed by: PHYSICIAN ASSISTANT

## 2019-07-05 PROCEDURE — 77002 NEEDLE LOCALIZATION BY XRAY: CPT

## 2019-07-05 PROCEDURE — 6360000002 HC RX W HCPCS: Performed by: ORTHOPAEDIC SURGERY

## 2019-07-05 RX ORDER — DIPHENHYDRAMINE HYDROCHLORIDE 50 MG/ML
50 INJECTION INTRAMUSCULAR; INTRAVENOUS ONCE
Status: COMPLETED | OUTPATIENT
Start: 2019-07-05 | End: 2019-07-05

## 2019-07-05 RX ORDER — LIDOCAINE HYDROCHLORIDE 10 MG/ML
1 INJECTION, SOLUTION EPIDURAL; INFILTRATION; INTRACAUDAL; PERINEURAL ONCE
Status: COMPLETED | OUTPATIENT
Start: 2019-07-05 | End: 2019-07-05

## 2019-07-05 RX ORDER — BUPIVACAINE HYDROCHLORIDE 2.5 MG/ML
1 INJECTION, SOLUTION EPIDURAL; INFILTRATION; INTRACAUDAL ONCE
Status: COMPLETED | OUTPATIENT
Start: 2019-07-05 | End: 2019-07-05

## 2019-07-05 RX ORDER — TRIAMCINOLONE ACETONIDE 40 MG/ML
40 INJECTION, SUSPENSION INTRA-ARTICULAR; INTRAMUSCULAR ONCE
Status: COMPLETED | OUTPATIENT
Start: 2019-07-05 | End: 2019-07-05

## 2019-07-05 RX ORDER — LIDOCAINE HYDROCHLORIDE 20 MG/ML
12 INJECTION, SOLUTION INFILTRATION; PERINEURAL ONCE
Status: COMPLETED | OUTPATIENT
Start: 2019-07-05 | End: 2019-07-05

## 2019-07-05 RX ADMIN — DIPHENHYDRAMINE HYDROCHLORIDE 50 MG: 50 INJECTION, SOLUTION INTRAMUSCULAR; INTRAVENOUS at 09:14

## 2019-07-05 RX ADMIN — BUPIVACAINE HYDROCHLORIDE 2.5 MG: 2.5 INJECTION, SOLUTION EPIDURAL; INFILTRATION; INTRACAUDAL at 10:03

## 2019-07-05 RX ADMIN — IOVERSOL 1 ML: 678 INJECTION INTRA-ARTERIAL; INTRAVENOUS at 10:02

## 2019-07-05 RX ADMIN — LIDOCAINE HYDROCHLORIDE 1 ML: 10 INJECTION, SOLUTION EPIDURAL; INFILTRATION; INTRACAUDAL; PERINEURAL at 10:03

## 2019-07-05 RX ADMIN — TRIAMCINOLONE ACETONIDE 40 MG: 40 INJECTION, SUSPENSION INTRA-ARTICULAR; INTRAMUSCULAR at 10:03

## 2019-07-05 RX ADMIN — HYDROCORTISONE SODIUM SUCCINATE 100 MG: 100 INJECTION, POWDER, FOR SOLUTION INTRAMUSCULAR; INTRAVENOUS at 09:14

## 2019-07-05 RX ADMIN — LIDOCAINE HYDROCHLORIDE 12 ML: 20 INJECTION, SOLUTION INFILTRATION; PERINEURAL at 10:01

## 2019-07-05 NOTE — H&P
on file     Forced sexual activity: Not on file   Other Topics Concern    Not on file   Social History Narrative    Not on file       ROS: Non-contributory other than as noted above    PHYSICAL EXAM:    Constitutional:  Awake and alert. cooperative. Heart:  Normal regular rhythm    Lungs:  demonstrate no contraindications to proceed    Abdomen:  normal    DATA:  CBC:   Lab Results   Component Value Date    WBC 6.8 02/14/2019    RBC 4.52 02/14/2019    HGB 13.5 02/14/2019    HCT 42.5 02/14/2019    MCV 94.0 02/14/2019    MCH 29.9 02/14/2019    MCHC 31.8 02/14/2019    RDW 12.7 02/14/2019     02/14/2019    MPV 9.5 02/14/2019     CBC with Differential:    Lab Results   Component Value Date    WBC 6.8 02/14/2019    RBC 4.52 02/14/2019    HGB 13.5 02/14/2019    HCT 42.5 02/14/2019     02/14/2019    MCV 94.0 02/14/2019    MCH 29.9 02/14/2019    MCHC 31.8 02/14/2019    RDW 12.7 02/14/2019    LYMPHOPCT 33.2 02/14/2019    MONOPCT 8.1 02/14/2019    BASOPCT 0.6 02/14/2019    MONOSABS 0.55 02/14/2019    LYMPHSABS 2.24 02/14/2019    EOSABS 0.20 02/14/2019    BASOSABS 0.04 02/14/2019     Platelets:    Lab Results   Component Value Date     02/14/2019     BUN/Creatinine:    Lab Results   Component Value Date    BUN 22 02/14/2019    CREATININE 1.0 02/14/2019       ASSESSMENT AND PLAN:  1.   left Hip Joint pain. 2.  Procedure options, risks and benefits reviewed with patient. Patient expresses understanding.   3.   left Hip Joint injection,    Electronically signed by SPENSER López   DD: 7/5/19  9:49 AM

## 2019-07-15 NOTE — PROGRESS NOTES
New Patient      Morteza Pendleton is a 60 y.o. female, herdate of birth is 1960 with the following history as recorded in [facility]:    HPI: Patient is a very pleasant 68-year-old female who comes in today with bilateral hip pain. Her left hip hurts worse than her right. She also has a significant history of a ruptured brain aneurysm approximately 5 to 6 months ago which was repaired. She has been seen by multiple surgeons and deemed not a good operative candidate for total hip arthroplasty due to her aneurysm and medical comorbidities. She came to me for a second opinion. She is having significant pain in her hip which is limiting her ADLs. She denies any history of falls or traumas. Patient Active Problem List    Diagnosis Date Noted    Chronic pain syndrome 03/01/2019    Lumbar facet arthropathy 03/01/2019    Lumbar spondylosis 03/01/2019    Lumbar disc disorder 03/01/2019    Primary osteoarthritis of left hip 03/01/2019    Hypertension 09/21/2018    Subarachnoid bleed ([address] 09/10/2018    Hypertensive emergency 09/10/2018    Obesity (BMI 30-39.9) 09/10/2018     Current Outpatient Medications   Medication Sig Dispense Refill    gabapentin (NEURONTIN) 300 MG capsule Take 1 capsule by mouth 3 times daily for 90 days.  90 capsule 2    potassium chloride (KLOR-CON M) 10 MEQ extended release tablet Take 1 tablet by mouth daily (with breakfast) 30 tablet 2    pantoprazole (PROTONIX) 20 MG tablet Take 1 tablet by mouth every morning (before breakfast) 30 tablet 2    metoprolol tartrate (LOPRESSOR) 50 MG tablet Take 1 tablet by mouth 2 times daily 60 tablet 2    losartan (COZAAR) 100 MG tablet Take 1 tablet by mouth daily 30 tablet 2    chlorthalidone (HYGROTON) 25 MG tablet Take 1 tablet by mouth daily 30 tablet 2    carBAMazepine (TEGRETOL-XR) 100 MG extended release tablet Take 1 tablet by mouth 3 times daily 90 tablet 2    amLODIPine (NORVASC) 5 MG tablet Take 1 tablet by mouth daily no abdominal pain, change in bowel habits, or black or bloody stools  Genito-Urinary ROS: no dysuria, trouble voiding, or hematuria  Musculoskeletal ROS: Bilateral groin pain left worse than the right  Neurological ROS: no TIA or stroke symptoms      Physical Examination:      Vitals:    06/25/19 1130   BP: 127/74   Pulse: 54       Physical Examination: General appearance - alert, well appearing, and in no distress, oriented to person, place, and time and overweight  Mental status - alert, oriented to person, place, and time, normal mood, behavior, speech, dress, motor activity, and thought processes  Back exam - full range of motion, no tenderness, palpable spasm or pain on motion, negative straight leg raise bilaterally, sacroiliac joints and sciatic notches nontender, normal reflexes and strength bilateral lower extremities, sensory exam intact bilateral lower extremities  Neurological - alert, oriented, normal speech, no focal findings or movement disorder noted, DTR's normal and symmetric, Babinski sign negative, motor and sensory grossly normal bilaterally, normal muscle tone, no tremors, strength 5/5  Musculoskeletal -  Left hip   Skin intact   Compartments soft and compressible   Calves soft and non tender    5/5 EHL, TA, GS   2/4 DP and PT pulses   Sensation intact distally   Capillary refill less than 3 seconds    very limited internal rotation left hip   Groin pain elicited on internal rotation with crepitus noted   No hip flexion contracture noted    Extremities - peripheral pulses normal, no pedal edema, no clubbing or cyanosis, no pedal edema noted, no edema, redness or tenderness in the calves or thighs, [place]'s sign negative bilaterally, no ulcers, gangrene or atrophic changes  Skin - normal coloration and turgor, no rashes, no suspicious skin lesions noted      XR: X-rays reviewed which show severe osteoarthritis in bilateral hips worse on the left than the right.     DISCUSSION: Talk with her in

## 2019-07-16 ENCOUNTER — TELEPHONE (OUTPATIENT)
Dept: SURGERY | Age: 59
End: 2019-07-16

## 2019-08-07 ENCOUNTER — OFFICE VISIT (OUTPATIENT)
Dept: ORTHOPEDIC SURGERY | Age: 59
End: 2019-08-07
Payer: COMMERCIAL

## 2019-08-07 VITALS
HEART RATE: 57 BPM | WEIGHT: 210 LBS | BODY MASS INDEX: 37.21 KG/M2 | SYSTOLIC BLOOD PRESSURE: 126 MMHG | DIASTOLIC BLOOD PRESSURE: 75 MMHG | TEMPERATURE: 97.6 F | HEIGHT: 63 IN

## 2019-08-07 DIAGNOSIS — M16.12 PRIMARY OSTEOARTHRITIS OF LEFT HIP: Primary | ICD-10-CM

## 2019-08-07 DIAGNOSIS — M16.11 ARTHRITIS OF RIGHT HIP: ICD-10-CM

## 2019-08-07 PROCEDURE — 1036F TOBACCO NON-USER: CPT | Performed by: PHYSICIAN ASSISTANT

## 2019-08-07 PROCEDURE — G8417 CALC BMI ABV UP PARAM F/U: HCPCS | Performed by: PHYSICIAN ASSISTANT

## 2019-08-07 PROCEDURE — G8427 DOCREV CUR MEDS BY ELIG CLIN: HCPCS | Performed by: PHYSICIAN ASSISTANT

## 2019-08-07 PROCEDURE — 99212 OFFICE O/P EST SF 10 MIN: CPT

## 2019-08-07 PROCEDURE — 99214 OFFICE O/P EST MOD 30 MIN: CPT | Performed by: PHYSICIAN ASSISTANT

## 2019-08-07 PROCEDURE — 3017F COLORECTAL CA SCREEN DOC REV: CPT | Performed by: PHYSICIAN ASSISTANT

## 2019-08-07 RX ORDER — NAPROXEN 500 MG/1
500 TABLET ORAL 2 TIMES DAILY WITH MEALS
COMMUNITY
End: 2019-08-27 | Stop reason: ALTCHOICE

## 2019-08-07 NOTE — PROGRESS NOTES
Negative for pallor, rash and wound. Neurological: Negative for dizziness, tremors, seizures, weakness, light-headedness, no TIA or stroke symptoms. No numbness and headaches. Psychiatric/Behavioral: Negative. OBJECTIVE:      Physical Examination:   General appearance: alert, well appearing, and in no distress,  normal appearing weight. No visible signs of trauma   Mental status: alert, oriented to person, place, and time, normal mood, behavior, speech, dress, motor activity, and thought processes  Abdomen: soft, nondistended  Resp:   resp easy and unlabored, no audible wheezes note, normal symmetrical expansion of both hemithoraces  Cardiac: distal pulses palpable, skin and extremities well perfused  Neurological: alert, oriented X3, normal speech, no focal findings or movement disorder noted, motor and sensory grossly normal bilaterally, normal muscle tone, no tremors, strength 5/5, normal gait and station  HEENT: normochephalic atraumatic, external ears and eyes normal, sclera normal, neck supple, no nasal discharge. Extremities:   peripheral pulses normal, no edema, redness or tenderness in the calves   Skin: normal coloration, no rashes or open wounds, no suspicious skin lesions noted  Psych: Affect euthymic   Musculoskeletal:   Extremity:  left Lower Extremity Exam:    Skin intact not broken down, no signs of infection. No erythema/induration/fluctuence present. no swelling present. no ecchymosis present. Tender to palpation over left trochanteric bursa, and groin. Significantly limited left hip range of motion, 0 degrees of internal rotation, leg rest and external rotation, pain with IR/ER, pain with logrolling and hip flexion  Demonstrates active knee flexion/extension, ankle plantar/dorsiflexion/great toe extension. Sensation intact to light touch in sural/deep peroneal/superficial peroneal/saphenous/posterior tibial nerve distributions to foot/ankle.    Palpable dorsalis pedis and

## 2019-08-14 ENCOUNTER — TELEPHONE (OUTPATIENT)
Dept: SURGERY | Age: 59
End: 2019-08-14

## 2019-08-15 ENCOUNTER — OFFICE VISIT (OUTPATIENT)
Dept: PAIN MANAGEMENT | Age: 59
End: 2019-08-15
Payer: COMMERCIAL

## 2019-08-15 ENCOUNTER — HOSPITAL ENCOUNTER (OUTPATIENT)
Age: 59
Discharge: HOME | End: 2019-08-17
Payer: COMMERCIAL

## 2019-08-15 VITALS
DIASTOLIC BLOOD PRESSURE: 62 MMHG | OXYGEN SATURATION: 96 % | WEIGHT: 200 LBS | SYSTOLIC BLOOD PRESSURE: 100 MMHG | HEART RATE: 61 BPM | TEMPERATURE: 97.8 F | RESPIRATION RATE: 16 BRPM | BODY MASS INDEX: 32.14 KG/M2 | HEIGHT: 66 IN

## 2019-08-15 DIAGNOSIS — M51.9 LUMBAR DISC DISORDER: ICD-10-CM

## 2019-08-15 DIAGNOSIS — M47.816 LUMBAR SPONDYLOSIS: ICD-10-CM

## 2019-08-15 DIAGNOSIS — M47.816 LUMBAR FACET ARTHROPATHY: ICD-10-CM

## 2019-08-15 DIAGNOSIS — M16.12 PRIMARY OSTEOARTHRITIS OF LEFT HIP: Primary | ICD-10-CM

## 2019-08-15 DIAGNOSIS — G89.4 CHRONIC PAIN SYNDROME: ICD-10-CM

## 2019-08-15 LAB — SPECIFIC GRAVITY UA: <=1.005 (ref 1–1.03)

## 2019-08-15 PROCEDURE — G8417 CALC BMI ABV UP PARAM F/U: HCPCS | Performed by: PAIN MEDICINE

## 2019-08-15 PROCEDURE — 3017F COLORECTAL CA SCREEN DOC REV: CPT | Performed by: PAIN MEDICINE

## 2019-08-15 PROCEDURE — 99213 OFFICE O/P EST LOW 20 MIN: CPT | Performed by: PAIN MEDICINE

## 2019-08-15 PROCEDURE — 80307 DRUG TEST PRSMV CHEM ANLYZR: CPT

## 2019-08-15 PROCEDURE — G0480 DRUG TEST DEF 1-7 CLASSES: HCPCS

## 2019-08-15 PROCEDURE — G8427 DOCREV CUR MEDS BY ELIG CLIN: HCPCS | Performed by: PAIN MEDICINE

## 2019-08-15 PROCEDURE — 1036F TOBACCO NON-USER: CPT | Performed by: PAIN MEDICINE

## 2019-08-16 ENCOUNTER — OFFICE VISIT (OUTPATIENT)
Dept: FAMILY MEDICINE CLINIC | Age: 59
End: 2019-08-16
Payer: COMMERCIAL

## 2019-08-16 VITALS
BODY MASS INDEX: 38.09 KG/M2 | WEIGHT: 237 LBS | DIASTOLIC BLOOD PRESSURE: 70 MMHG | RESPIRATION RATE: 18 BRPM | HEART RATE: 54 BPM | OXYGEN SATURATION: 98 % | HEIGHT: 66 IN | SYSTOLIC BLOOD PRESSURE: 118 MMHG

## 2019-08-16 DIAGNOSIS — I10 HYPERTENSION, UNSPECIFIED TYPE: Primary | ICD-10-CM

## 2019-08-16 DIAGNOSIS — Z23 NEED FOR TDAP VACCINATION: ICD-10-CM

## 2019-08-16 DIAGNOSIS — K44.9 HIATAL HERNIA: ICD-10-CM

## 2019-08-16 DIAGNOSIS — I60.9 SUBARACHNOID BLEED (HCC): ICD-10-CM

## 2019-08-16 DIAGNOSIS — M54.16 LUMBAR RADICULITIS: ICD-10-CM

## 2019-08-16 DIAGNOSIS — Z23 NEED FOR SHINGLES VACCINE: ICD-10-CM

## 2019-08-16 DIAGNOSIS — Z12.39 SCREENING FOR MALIGNANT NEOPLASM OF BREAST: ICD-10-CM

## 2019-08-16 PROCEDURE — 90715 TDAP VACCINE 7 YRS/> IM: CPT | Performed by: FAMILY MEDICINE

## 2019-08-16 PROCEDURE — G8427 DOCREV CUR MEDS BY ELIG CLIN: HCPCS | Performed by: FAMILY MEDICINE

## 2019-08-16 PROCEDURE — 99213 OFFICE O/P EST LOW 20 MIN: CPT | Performed by: FAMILY MEDICINE

## 2019-08-16 PROCEDURE — G8417 CALC BMI ABV UP PARAM F/U: HCPCS | Performed by: FAMILY MEDICINE

## 2019-08-16 PROCEDURE — 3017F COLORECTAL CA SCREEN DOC REV: CPT | Performed by: FAMILY MEDICINE

## 2019-08-16 PROCEDURE — 90471 IMMUNIZATION ADMIN: CPT | Performed by: FAMILY MEDICINE

## 2019-08-16 PROCEDURE — 1036F TOBACCO NON-USER: CPT | Performed by: FAMILY MEDICINE

## 2019-08-16 RX ORDER — LOSARTAN POTASSIUM 100 MG/1
100 TABLET ORAL DAILY
Qty: 30 TABLET | Refills: 2 | Status: SHIPPED | OUTPATIENT
Start: 2019-08-16 | End: 2019-09-18

## 2019-08-16 RX ORDER — PANTOPRAZOLE SODIUM 20 MG/1
20 TABLET, DELAYED RELEASE ORAL
Qty: 30 TABLET | Refills: 2 | Status: ON HOLD | OUTPATIENT
Start: 2019-08-16 | End: 2019-08-30 | Stop reason: HOSPADM

## 2019-08-16 RX ORDER — POTASSIUM CHLORIDE 750 MG/1
10 TABLET, EXTENDED RELEASE ORAL
Qty: 30 TABLET | Refills: 2 | Status: SHIPPED | OUTPATIENT
Start: 2019-08-16 | End: 2019-10-16 | Stop reason: SDUPTHER

## 2019-08-16 RX ORDER — CELECOXIB 200 MG/1
200 CAPSULE ORAL DAILY
Qty: 30 CAPSULE | Refills: 2 | Status: SHIPPED | OUTPATIENT
Start: 2019-08-16 | End: 2019-10-17 | Stop reason: SINTOL

## 2019-08-16 RX ORDER — METOPROLOL TARTRATE 50 MG/1
50 TABLET, FILM COATED ORAL 2 TIMES DAILY
Qty: 60 TABLET | Refills: 2 | Status: SHIPPED | OUTPATIENT
Start: 2019-08-16 | End: 2019-10-16 | Stop reason: SDUPTHER

## 2019-08-16 RX ORDER — AMLODIPINE BESYLATE 5 MG/1
5 TABLET ORAL DAILY
Qty: 30 TABLET | Refills: 2 | Status: SHIPPED | OUTPATIENT
Start: 2019-08-16 | End: 2019-10-16 | Stop reason: SDUPTHER

## 2019-08-16 RX ORDER — CARBAMAZEPINE 100 MG/1
100 TABLET, EXTENDED RELEASE ORAL 3 TIMES DAILY
Qty: 90 TABLET | Refills: 2 | Status: SHIPPED
Start: 2019-08-16 | End: 2020-02-10 | Stop reason: SDUPTHER

## 2019-08-16 RX ORDER — CHLORTHALIDONE 25 MG/1
25 TABLET ORAL DAILY
Qty: 30 TABLET | Refills: 2 | Status: SHIPPED | OUTPATIENT
Start: 2019-08-16 | End: 2019-10-16 | Stop reason: SDUPTHER

## 2019-08-16 RX ORDER — GABAPENTIN 300 MG/1
300 CAPSULE ORAL 3 TIMES DAILY
Qty: 90 CAPSULE | Refills: 2 | Status: SHIPPED | OUTPATIENT
Start: 2019-08-16 | End: 2019-11-20 | Stop reason: SDUPTHER

## 2019-08-16 ASSESSMENT — PATIENT HEALTH QUESTIONNAIRE - PHQ9
2. FEELING DOWN, DEPRESSED OR HOPELESS: 1
SUM OF ALL RESPONSES TO PHQ QUESTIONS 1-9: 2
SUM OF ALL RESPONSES TO PHQ9 QUESTIONS 1 & 2: 2
1. LITTLE INTEREST OR PLEASURE IN DOING THINGS: 1
SUM OF ALL RESPONSES TO PHQ QUESTIONS 1-9: 2

## 2019-08-16 ASSESSMENT — ENCOUNTER SYMPTOMS
SHORTNESS OF BREATH: 1
BACK PAIN: 1

## 2019-08-19 LAB
6AM URINE: <10 NG/ML
7-AMINOCLONAZEPAM, URINE: <5 NG/ML
ALPHA-HYDROXYALPRAZOLAM, URINE: <5 NG/ML
ALPHA-HYDROXYMIDAZOLAM, URINE: <20 NG/ML
ALPRAZOLAM, URINE: <5 NG/ML
CHLORDIAZEPOXIDE, URINE: <20 NG/ML
CLONAZEPAM, URINE: <5 NG/ML
CODEINE, URINE: <20 NG/ML
DIAZEPAM, URINE: <20 NG/ML
HYDROCODONE, URINE: <20 NG/ML
HYDROMORPHONE, URINE: <20 NG/ML
LORAZEPAM, URINE: <20 NG/ML
Lab: NORMAL
MIDAZOLAM, URINE: <20 NG/ML
MORPHINE URINE: <20 NG/ML
NORDIAZEPAM, URINE: <20 NG/ML
NORHYDROCODONE, URINE: <20 NG/ML
NOROXYCODONE, URINE: <20 NG/ML
NOROXYMORPHONE, URINE: <20 NG/ML
OXAZEPAM, URINE: <20 NG/ML
OXYCODONE, URINE CONFIRMATION: <20 NG/ML
OXYMORPHONE, URINE: <20 NG/ML
REPORT: NORMAL
TEMAZEPAM, URINE: <20 NG/ML
THIS TEST SENT TO: NORMAL

## 2019-08-30 ENCOUNTER — HOSPITAL ENCOUNTER (OUTPATIENT)
Age: 59
Setting detail: OUTPATIENT SURGERY
Discharge: HOME | End: 2019-08-30
Attending: SURGERY | Admitting: SURGERY
Payer: COMMERCIAL

## 2019-08-30 ENCOUNTER — ANESTHESIA EVENT (OUTPATIENT)
Dept: ENDOSCOPY | Age: 59
End: 2019-08-30
Payer: COMMERCIAL

## 2019-08-30 ENCOUNTER — ANESTHESIA (OUTPATIENT)
Dept: ENDOSCOPY | Age: 59
End: 2019-08-30
Payer: COMMERCIAL

## 2019-08-30 VITALS
OXYGEN SATURATION: 98 % | SYSTOLIC BLOOD PRESSURE: 144 MMHG | WEIGHT: 230 LBS | HEIGHT: 65 IN | BODY MASS INDEX: 38.32 KG/M2 | RESPIRATION RATE: 17 BRPM | HEART RATE: 49 BPM | TEMPERATURE: 97 F | DIASTOLIC BLOOD PRESSURE: 83 MMHG

## 2019-08-30 VITALS — DIASTOLIC BLOOD PRESSURE: 67 MMHG | OXYGEN SATURATION: 96 % | SYSTOLIC BLOOD PRESSURE: 143 MMHG

## 2019-08-30 PROCEDURE — 3609010600 HC COLONOSCOPY POLYPECTOMY SNARE/COLD BIOPSY: Performed by: SURGERY

## 2019-08-30 PROCEDURE — 43239 EGD BIOPSY SINGLE/MULTIPLE: CPT | Performed by: SURGERY

## 2019-08-30 PROCEDURE — 2580000003 HC RX 258: Performed by: SURGERY

## 2019-08-30 PROCEDURE — 88305 TISSUE EXAM BY PATHOLOGIST: CPT

## 2019-08-30 PROCEDURE — 6360000002 HC RX W HCPCS: Performed by: ANESTHESIOLOGIST ASSISTANT

## 2019-08-30 PROCEDURE — 2709999900 HC NON-CHARGEABLE SUPPLY: Performed by: SURGERY

## 2019-08-30 PROCEDURE — 45380 COLONOSCOPY AND BIOPSY: CPT | Performed by: SURGERY

## 2019-08-30 PROCEDURE — 3700000001 HC ADD 15 MINUTES (ANESTHESIA): Performed by: SURGERY

## 2019-08-30 PROCEDURE — 3609012400 HC EGD TRANSORAL BIOPSY SINGLE/MULTIPLE: Performed by: SURGERY

## 2019-08-30 PROCEDURE — 3700000000 HC ANESTHESIA ATTENDED CARE: Performed by: SURGERY

## 2019-08-30 PROCEDURE — 7100000010 HC PHASE II RECOVERY - FIRST 15 MIN: Performed by: SURGERY

## 2019-08-30 PROCEDURE — 7100000011 HC PHASE II RECOVERY - ADDTL 15 MIN: Performed by: SURGERY

## 2019-08-30 RX ORDER — SUCRALFATE 1 G/1
1 TABLET ORAL 4 TIMES DAILY
Qty: 120 TABLET | Refills: 3 | Status: SHIPPED | OUTPATIENT
Start: 2019-08-30 | End: 2020-02-21 | Stop reason: SDUPTHER

## 2019-08-30 RX ORDER — PROPOFOL 10 MG/ML
INJECTION, EMULSION INTRAVENOUS PRN
Status: DISCONTINUED | OUTPATIENT
Start: 2019-08-30 | End: 2019-08-30 | Stop reason: SDUPTHER

## 2019-08-30 RX ORDER — SODIUM CHLORIDE 9 MG/ML
INJECTION, SOLUTION INTRAVENOUS CONTINUOUS
Status: DISCONTINUED | OUTPATIENT
Start: 2019-08-30 | End: 2019-08-30 | Stop reason: HOSPADM

## 2019-08-30 RX ORDER — ONDANSETRON 2 MG/ML
INJECTION INTRAMUSCULAR; INTRAVENOUS PRN
Status: DISCONTINUED | OUTPATIENT
Start: 2019-08-30 | End: 2019-08-30 | Stop reason: SDUPTHER

## 2019-08-30 RX ORDER — 0.9 % SODIUM CHLORIDE 0.9 %
10 VIAL (ML) INJECTION PRN
Status: DISCONTINUED | OUTPATIENT
Start: 2019-08-30 | End: 2019-08-30 | Stop reason: HOSPADM

## 2019-08-30 RX ORDER — PANTOPRAZOLE SODIUM 40 MG/1
40 TABLET, DELAYED RELEASE ORAL DAILY
Qty: 30 TABLET | Refills: 3 | Status: SHIPPED | OUTPATIENT
Start: 2019-08-30 | End: 2020-02-21 | Stop reason: SDUPTHER

## 2019-08-30 RX ORDER — SODIUM CHLORIDE 0.9 % (FLUSH) 0.9 %
10 SYRINGE (ML) INJECTION EVERY 12 HOURS SCHEDULED
Status: DISCONTINUED | OUTPATIENT
Start: 2019-08-30 | End: 2019-08-30 | Stop reason: HOSPADM

## 2019-08-30 RX ADMIN — SODIUM CHLORIDE: 9 INJECTION, SOLUTION INTRAVENOUS at 08:24

## 2019-08-30 RX ADMIN — ONDANSETRON HYDROCHLORIDE 4 MG: 2 INJECTION, SOLUTION INTRAMUSCULAR; INTRAVENOUS at 09:18

## 2019-08-30 RX ADMIN — PROPOFOL 500 MG: 10 INJECTION, EMULSION INTRAVENOUS at 08:40

## 2019-08-30 ASSESSMENT — PAIN - FUNCTIONAL ASSESSMENT: PAIN_FUNCTIONAL_ASSESSMENT: 0-10

## 2019-08-30 ASSESSMENT — PAIN SCALES - GENERAL
PAINLEVEL_OUTOF10: 0

## 2019-08-30 NOTE — ANESTHESIA PRE PROCEDURE
3 Encounters:   08/16/19 118/70   08/15/19 100/62   08/07/19 126/75       NPO Status:                                                                                 BMI:   Wt Readings from Last 3 Encounters:   08/27/19 230 lb (104.3 kg)   08/16/19 237 lb (107.5 kg)   08/15/19 200 lb (90.7 kg)     There is no height or weight on file to calculate BMI.    CBC:   Lab Results   Component Value Date    WBC 6.8 02/14/2019    RBC 4.52 02/14/2019    HGB 13.5 02/14/2019    HCT 42.5 02/14/2019    MCV 94.0 02/14/2019    RDW 12.7 02/14/2019     02/14/2019       CMP:   Lab Results   Component Value Date     02/14/2019    K 4.3 02/14/2019    K 4.4 11/10/2018     02/14/2019    CO2 26 02/14/2019    BUN 22 02/14/2019    CREATININE 1.0 02/14/2019    GFRAA >60 02/14/2019    LABGLOM 57 02/14/2019    GLUCOSE 86 02/14/2019    PROT 7.3 02/14/2019    CALCIUM 9.2 02/14/2019    BILITOT <0.2 02/14/2019    ALKPHOS 107 02/14/2019    AST 11 02/14/2019    ALT 13 02/14/2019       POC Tests: No results for input(s): POCGLU, POCNA, POCK, POCCL, POCBUN, POCHEMO, POCHCT in the last 72 hours. Coags:   Lab Results   Component Value Date    PROTIME 10.9 09/10/2018    INR 0.9 09/10/2018    APTT 26.0 09/10/2018       HCG (If Applicable): No results found for: PREGTESTUR, PREGSERUM, HCG, HCGQUANT     ABGs: No results found for: PHART, PO2ART, WVR6HNZ, CKA2YJT, BEART, S4PMYJAJ     Type & Screen (If Applicable):  No results found for: LABABO, [address]    Anesthesia Evaluation  Patient summary reviewed  Airway: Mallampati: III  TM distance: >3 FB   Neck ROM: full  Mouth opening: > = 3 FB Dental:          Pulmonary: breath sounds clear to auscultation                            ROS comment: Former Smoker. Cardiovascular:    (+) hypertension:,       ECG reviewed  Rhythm: regular  Rate: normal  Echocardiogram reviewed         Beta Blocker:  Dose within 24 Hrs      ROS comment: EKG: Normal Sinus Rhythm 63.     ECHO:  Left ventricular internal dimensions were normal in diastole and systole.   Mild left ventricular concentric hypertrophy noted.   No regional wall motion abnormalities seen.   Normal left ventricular ejection fraction.   Mild aortic regurgitation is noted. Neuro/Psych:   (+) CVA:, neuromuscular disease:, headaches:,              ROS comment: Brain Aneurysm. GI/Hepatic/Renal:   (+) hiatal hernia,           Endo/Other:    (+) : arthritis (DJD OA Hips.):., .                 Abdominal:           Vascular: negative vascular ROS. Anesthesia Plan      MAC     ASA 3             Anesthetic plan and risks discussed with patient. Plan discussed with CRNA.     Attending anesthesiologist reviewed and agrees with Shelby Christensen MD   8/30/2019

## 2019-08-30 NOTE — ANESTHESIA PRE PROCEDURE
Department of Anesthesiology  Preprocedure Note       Name:  Alan Salazar   Age:  60 y.o.  :  1960                                          MRN:  [id number]         Date:  2019      Surgeon: Linda Huynh):  Aniyah Gonsalez MD    Procedure: EGD (N/A )  COLORECTAL CANCER SCREENING, NOT HIGH RISK (N/A )    Medications prior to admission:   Prior to Admission medications    Medication Sig Start Date End Date Taking? Authorizing Provider   zoster recombinant adjuvanted vaccine [facility]) 50 MCG/0.5ML SUSR injection Inject 0.5 mLs into the muscle See Admin Instructions 1 dose now and repeat in 2-6 months 19   Elke Lane MD   amLODIPine (NORVASC) 5 MG tablet Take 1 tablet by mouth daily 19   Elke Lane MD   carBAMazepine (TEGRETOL-XR) 100 MG extended release tablet Take 1 tablet by mouth 3 times daily 19   Elke Lane MD   pantoprazole (PROTONIX) 20 MG tablet Take 1 tablet by mouth every morning (before breakfast) 19   Elke Lane MD   potassium chloride (KLOR-CON M) 10 MEQ extended release tablet Take 1 tablet by mouth daily (with breakfast) 19   Elke Lane MD   chlorthalidone (HYGROTON) 25 MG tablet Take 1 tablet by mouth daily 19   Elke Lane MD   gabapentin (NEURONTIN) 300 MG capsule Take 1 capsule by mouth 3 times daily for 90 days.  19  Elke Lane MD   losartan (COZAAR) 100 MG tablet Take 1 tablet by mouth daily 19   Elke Lane MD   metoprolol tartrate (LOPRESSOR) 50 MG tablet Take 1 tablet by mouth 2 times daily 19   Elke Lane MD   celecoxib (CELEBREX) 200 MG capsule Take 1 capsule by mouth daily 19   Elke Lane MD       Current medications:    Current Facility-Administered Medications   Medication Dose Route Frequency Provider Last Rate Last Dose    0.9 % sodium chloride infusion   Intravenous Continuous Aniyah Gonsalez MD        sodium chloride flush 0.9 % injection 10 mL  10 mL Intravenous 2 (+) CVA:, neuromuscular disease:, headaches:,              ROS comment: Brain Aneurysm. GI/Hepatic/Renal:   (+) hiatal hernia,           Endo/Other:    (+) : arthritis (DJD OA Hips.):., .                 Abdominal:           Vascular: negative vascular ROS. Anesthesia Plan      MAC     ASA 3             Anesthetic plan and risks discussed with patient. Plan discussed with CRNA.     Attending anesthesiologist reviewed and agrees with Juan Irving MD   8/30/2019

## 2019-08-30 NOTE — H&P
Patient ID: Valda Nyhan is a 60 y.o. female. HPI  60 yr old female referred by Dr. Yoshi Shields for dysphagia. This occurs with both solids and liquids. Does not happen every time she eats or drinks, but she does report early satiety. States she was told she had a hiatal hernia in her 25s. States she has occasional upper abdominal pain and bloating. Pain sometimes radiates into her chest and back. Denies regurgitation of undigested food. Occasional nausea and ongoing heartburn. Denies blood in stool. Reports alternating diarrhea and constipation--has been this way her whole life. Denies unintentional weight loss, or family hx of colon cancer. Denies prior colonoscopy. Past Medical History        Past Medical History:   Diagnosis Date    Cerebral artery occlusion with cerebral infarction ([facility] [address]      Degenerative arthritis of hand      Hiatal hernia      Hypertension              Past Surgical History         Past Surgical History:   Procedure Laterality Date    BRAIN ANEURYSM SURGERY         coiling     TONSILLECTOMY                Current Facility-Administered Medications          Current Outpatient Medications   Medication Sig Dispense Refill    potassium chloride (KLOR-CON M) 10 MEQ extended release tablet Take 1 tablet by mouth daily (with breakfast) 90 tablet 0    pantoprazole (PROTONIX) 20 MG tablet Take 1 tablet by mouth every morning (before breakfast) 30 tablet 2    metoprolol tartrate (LOPRESSOR) 50 MG tablet Take 1 tablet by mouth 2 times daily 60 tablet 5    losartan (COZAAR) 100 MG tablet Take 1 tablet by mouth daily 30 tablet 5    chlorthalidone (HYGROTON) 25 MG tablet Take 1 tablet by mouth daily 30 tablet 5    gabapentin (NEURONTIN) 300 MG capsule Take 1 capsule by mouth 3 times daily for 90 days. . 90 capsule 2    carBAMazepine (TEGRETOL-XR) 100 MG extended release tablet Take 1 tablet by mouth 3 times daily 90 tablet 3    amLODIPine (NORVASC) 5 MG tablet Take 1 tablet by

## 2019-08-30 NOTE — ANESTHESIA POSTPROCEDURE EVALUATION
Department of Anesthesiology  Postprocedure Note    Patient: Winter Lundberg  MRN: [id number]  YOB: 1960  Date of evaluation: 8/30/2019  Time:  10:57 AM     Procedure Summary     Date:  08/30/19 [place]  [facility] [place] [facility] [place] [facility]    Anesthesia Start:  4838 Anesthesia Stop:  0920    Procedures:       EGD BIOPSY (N/A )      COLONOSCOPY POLYPECTOMY SNARE/COLD BIOPSY (N/A ) Diagnosis:  ([address], EARLY SATIETY,HEARTBURN, SCREENING)    Surgeon:  Elder Adames MD Responsible Provider:  Joshua Collazo MD    Anesthesia Type:  MAC ASA Status:  3          Anesthesia Type: MAC    Monica Phase I: Monica Score: 10    Monica Phase II: Monica Score: 10    Last vitals: Reviewed and per EMR flowsheets.        Anesthesia Post Evaluation    Patient location during evaluation: PACU  Patient participation: complete - patient participated  Level of consciousness: awake  Pain score: 0  Airway patency: patent  Nausea & Vomiting: no nausea  Complications: no  Cardiovascular status: blood pressure returned to baseline  Respiratory status: acceptable  Hydration status: euvolemic

## 2019-09-06 PROBLEM — K63.5 COLON POLYPS: Status: ACTIVE | Noted: 2019-09-06

## 2019-09-09 ENCOUNTER — TELEPHONE (OUTPATIENT)
Dept: FAMILY MEDICINE CLINIC | Age: 59
End: 2019-09-09

## 2019-09-09 NOTE — TELEPHONE ENCOUNTER
Patient left message for someone to call her back. I called back, left message for patient to return call with a detailed message as to what she is needing.

## 2019-09-12 ENCOUNTER — OFFICE VISIT (OUTPATIENT)
Dept: PAIN MANAGEMENT | Age: 59
End: 2019-09-12
Payer: COMMERCIAL

## 2019-09-12 VITALS
SYSTOLIC BLOOD PRESSURE: 112 MMHG | DIASTOLIC BLOOD PRESSURE: 70 MMHG | HEART RATE: 57 BPM | OXYGEN SATURATION: 99 % | WEIGHT: 230 LBS | HEIGHT: 65 IN | BODY MASS INDEX: 38.32 KG/M2 | RESPIRATION RATE: 16 BRPM | TEMPERATURE: 98.3 F

## 2019-09-12 DIAGNOSIS — G89.4 CHRONIC PAIN SYNDROME: ICD-10-CM

## 2019-09-12 DIAGNOSIS — M47.816 LUMBAR SPONDYLOSIS: ICD-10-CM

## 2019-09-12 DIAGNOSIS — M16.11 ARTHRITIS OF RIGHT HIP: ICD-10-CM

## 2019-09-12 DIAGNOSIS — M51.9 LUMBAR DISC DISORDER: ICD-10-CM

## 2019-09-12 DIAGNOSIS — M47.816 LUMBAR FACET ARTHROPATHY: ICD-10-CM

## 2019-09-12 DIAGNOSIS — M16.12 PRIMARY OSTEOARTHRITIS OF LEFT HIP: Primary | ICD-10-CM

## 2019-09-12 PROCEDURE — 99213 OFFICE O/P EST LOW 20 MIN: CPT | Performed by: NURSE PRACTITIONER

## 2019-09-12 PROCEDURE — G8417 CALC BMI ABV UP PARAM F/U: HCPCS | Performed by: NURSE PRACTITIONER

## 2019-09-12 PROCEDURE — 1036F TOBACCO NON-USER: CPT | Performed by: NURSE PRACTITIONER

## 2019-09-12 PROCEDURE — G8427 DOCREV CUR MEDS BY ELIG CLIN: HCPCS | Performed by: NURSE PRACTITIONER

## 2019-09-12 PROCEDURE — 3017F COLORECTAL CA SCREEN DOC REV: CPT | Performed by: NURSE PRACTITIONER

## 2019-09-12 RX ORDER — ACETAMINOPHEN AND CODEINE PHOSPHATE 300; 30 MG/1; MG/1
1 TABLET ORAL DAILY PRN
Qty: 30 TABLET | Refills: 0 | Status: SHIPPED | OUTPATIENT
Start: 2019-09-12 | End: 2019-10-12

## 2019-09-12 NOTE — PROGRESS NOTES
Mary Ellen Cerna presents to the [facility] on 9/12/2019. [employer] is complaining of pain in her bilateral hips. . The pain is constant. The pain is described as aching, throbbing, shooting and stabbing. Pain is rated on her best day at a 8, on her worst day at a 10, and on average at a 9 on the VAS scale. She took her last dose of gabapentin this AM .        Any procedures since your last visit: No    She has not been on anticoagulation medications to include none and is managed by NA. Pacemaker or defibrilator: No Physician managing device is NA.       /70   Pulse 57   Temp 98.3 °F (36.8 °C) (Oral)   Resp 16   Ht 5' 5\" (1.651 m)   Wt 230 lb (104.3 kg)   SpO2 99%   BMI 38.27 kg/m²      No LMP recorded.  Patient is postmenopausal.
to remain active as tolerated   Treatment plan discussed with the patient including medications and side effects     ccreferring physic    LYLY Caal - CNP

## 2019-09-13 ENCOUNTER — TELEPHONE (OUTPATIENT)
Dept: PAIN MANAGEMENT | Age: 59
End: 2019-09-13

## 2019-09-18 DIAGNOSIS — I10 HYPERTENSION, UNSPECIFIED TYPE: ICD-10-CM

## 2019-09-18 RX ORDER — LOSARTAN POTASSIUM 50 MG/1
100 TABLET ORAL DAILY
Qty: 60 TABLET | Refills: 2 | Status: SHIPPED | OUTPATIENT
Start: 2019-09-18 | End: 2019-10-18

## 2019-09-27 ENCOUNTER — TELEPHONE (OUTPATIENT)
Dept: FAMILY MEDICINE CLINIC | Age: 59
End: 2019-09-27

## 2019-09-27 NOTE — LETTER
[facility]  [address] [address]  Dept: [phone]    Juany Zimmerman MD   9/27/2019      Osorio Flowers  [address] [place] [address]          Dear Karlie Morris records show that you are in need of a screening mammogram.     The American Cancer Society's guidelines state that early detection of breast cancer improves the chances that breast cancer can be diagnosed at an early stage and treated successfully. Women age 36 and older should have a mammogram every year and should continue to do so for as long as they are in good health. Please call our office to discuss scheduling your mammogram at your earliest convenience. We look forward to hearing from you. If you already had a mammogram this year Congratulations for taking the step toward good health! Please call our office so that we can add this information to your chart. We also welcome you to reach out to us online using [employer]. Ask us how!     Sincerely,     Juany Zimmerman MD

## 2019-10-02 ENCOUNTER — HOSPITAL ENCOUNTER (OUTPATIENT)
Dept: MAMMOGRAPHY | Age: 59
Discharge: HOME | End: 2019-10-04
Payer: COMMERCIAL

## 2019-10-02 DIAGNOSIS — Z12.39 SCREENING FOR MALIGNANT NEOPLASM OF BREAST: ICD-10-CM

## 2019-10-02 PROCEDURE — 77067 SCR MAMMO BI INCL CAD: CPT

## 2019-10-16 ENCOUNTER — OFFICE VISIT (OUTPATIENT)
Dept: FAMILY MEDICINE CLINIC | Age: 59
End: 2019-10-16
Payer: COMMERCIAL

## 2019-10-16 VITALS
HEART RATE: 72 BPM | HEIGHT: 65 IN | RESPIRATION RATE: 18 BRPM | BODY MASS INDEX: 39.82 KG/M2 | SYSTOLIC BLOOD PRESSURE: 128 MMHG | WEIGHT: 239 LBS | OXYGEN SATURATION: 94 % | DIASTOLIC BLOOD PRESSURE: 64 MMHG

## 2019-10-16 DIAGNOSIS — I10 HYPERTENSION, UNSPECIFIED TYPE: Primary | ICD-10-CM

## 2019-10-16 DIAGNOSIS — Z12.2 ENCOUNTER FOR SCREENING FOR MALIGNANT NEOPLASM OF LUNG: ICD-10-CM

## 2019-10-16 DIAGNOSIS — M54.16 LUMBAR RADICULITIS: ICD-10-CM

## 2019-10-16 DIAGNOSIS — Z86.79 HX OF SUBARACHNOID HEMORRHAGE: ICD-10-CM

## 2019-10-16 DIAGNOSIS — Z74.09 IMPAIRED MOBILITY: ICD-10-CM

## 2019-10-16 PROCEDURE — G8417 CALC BMI ABV UP PARAM F/U: HCPCS | Performed by: FAMILY MEDICINE

## 2019-10-16 PROCEDURE — G8484 FLU IMMUNIZE NO ADMIN: HCPCS | Performed by: FAMILY MEDICINE

## 2019-10-16 PROCEDURE — 1036F TOBACCO NON-USER: CPT | Performed by: FAMILY MEDICINE

## 2019-10-16 PROCEDURE — G8427 DOCREV CUR MEDS BY ELIG CLIN: HCPCS | Performed by: FAMILY MEDICINE

## 2019-10-16 PROCEDURE — 3017F COLORECTAL CA SCREEN DOC REV: CPT | Performed by: FAMILY MEDICINE

## 2019-10-16 PROCEDURE — 99213 OFFICE O/P EST LOW 20 MIN: CPT | Performed by: FAMILY MEDICINE

## 2019-10-16 RX ORDER — ACETAMINOPHEN AND CODEINE PHOSPHATE 300; 30 MG/1; MG/1
1 TABLET ORAL DAILY PRN
COMMUNITY
End: 2019-10-17

## 2019-10-16 RX ORDER — AMLODIPINE BESYLATE 5 MG/1
5 TABLET ORAL DAILY
Qty: 30 TABLET | Refills: 3 | Status: SHIPPED
Start: 2019-10-16 | End: 2020-02-11 | Stop reason: SDUPTHER

## 2019-10-16 RX ORDER — METOPROLOL TARTRATE 50 MG/1
50 TABLET, FILM COATED ORAL 2 TIMES DAILY
Qty: 60 TABLET | Refills: 3 | Status: SHIPPED
Start: 2019-10-16 | End: 2020-02-11 | Stop reason: SDUPTHER

## 2019-10-16 RX ORDER — CHLORTHALIDONE 25 MG/1
25 TABLET ORAL DAILY
Qty: 30 TABLET | Refills: 3 | Status: SHIPPED
Start: 2019-10-16 | End: 2020-02-11 | Stop reason: SDUPTHER

## 2019-10-16 RX ORDER — CELECOXIB 200 MG/1
200 CAPSULE ORAL DAILY
Qty: 30 CAPSULE | Refills: 3 | Status: CANCELLED | OUTPATIENT
Start: 2019-10-16

## 2019-10-16 RX ORDER — POTASSIUM CHLORIDE 750 MG/1
10 TABLET, EXTENDED RELEASE ORAL
Qty: 30 TABLET | Refills: 3 | Status: SHIPPED
Start: 2019-10-16 | End: 2020-02-11 | Stop reason: SDUPTHER

## 2019-10-17 ENCOUNTER — OFFICE VISIT (OUTPATIENT)
Dept: PAIN MANAGEMENT | Age: 59
End: 2019-10-17
Payer: COMMERCIAL

## 2019-10-17 VITALS
HEIGHT: 65 IN | TEMPERATURE: 98 F | SYSTOLIC BLOOD PRESSURE: 122 MMHG | OXYGEN SATURATION: 99 % | RESPIRATION RATE: 16 BRPM | HEART RATE: 59 BPM | BODY MASS INDEX: 39.82 KG/M2 | WEIGHT: 239 LBS | DIASTOLIC BLOOD PRESSURE: 70 MMHG

## 2019-10-17 DIAGNOSIS — I10 HYPERTENSION, UNSPECIFIED TYPE: Primary | ICD-10-CM

## 2019-10-17 DIAGNOSIS — M47.816 LUMBAR FACET ARTHROPATHY: ICD-10-CM

## 2019-10-17 DIAGNOSIS — M47.816 LUMBAR SPONDYLOSIS: Primary | ICD-10-CM

## 2019-10-17 DIAGNOSIS — M16.12 PRIMARY OSTEOARTHRITIS OF LEFT HIP: ICD-10-CM

## 2019-10-17 DIAGNOSIS — G89.4 CHRONIC PAIN SYNDROME: ICD-10-CM

## 2019-10-17 DIAGNOSIS — M51.9 LUMBAR DISC DISORDER: ICD-10-CM

## 2019-10-17 PROCEDURE — 3017F COLORECTAL CA SCREEN DOC REV: CPT | Performed by: NURSE PRACTITIONER

## 2019-10-17 PROCEDURE — 1036F TOBACCO NON-USER: CPT | Performed by: NURSE PRACTITIONER

## 2019-10-17 PROCEDURE — G8417 CALC BMI ABV UP PARAM F/U: HCPCS | Performed by: NURSE PRACTITIONER

## 2019-10-17 PROCEDURE — G8427 DOCREV CUR MEDS BY ELIG CLIN: HCPCS | Performed by: NURSE PRACTITIONER

## 2019-10-17 PROCEDURE — 99213 OFFICE O/P EST LOW 20 MIN: CPT | Performed by: NURSE PRACTITIONER

## 2019-10-17 PROCEDURE — G8484 FLU IMMUNIZE NO ADMIN: HCPCS | Performed by: NURSE PRACTITIONER

## 2019-10-17 RX ORDER — TRAMADOL HYDROCHLORIDE 50 MG/1
50 TABLET ORAL DAILY PRN
Qty: 30 TABLET | Refills: 0 | Status: SHIPPED | OUTPATIENT
Start: 2019-10-17 | End: 2019-11-16

## 2019-10-18 DIAGNOSIS — I10 HYPERTENSION, UNSPECIFIED TYPE: ICD-10-CM

## 2019-10-18 RX ORDER — LOSARTAN POTASSIUM 100 MG/1
100 TABLET ORAL DAILY
Qty: 30 TABLET | Refills: 3 | Status: SHIPPED
Start: 2019-10-18 | End: 2020-02-11 | Stop reason: SDUPTHER

## 2019-10-18 RX ORDER — LOSARTAN POTASSIUM 100 MG/1
100 TABLET ORAL DAILY
Qty: 30 TABLET | Refills: 3 | Status: SHIPPED | OUTPATIENT
Start: 2019-10-18 | End: 2019-10-18 | Stop reason: SDUPTHER

## 2019-10-29 ENCOUNTER — HOSPITAL ENCOUNTER (OUTPATIENT)
Dept: CT IMAGING | Age: 59
Discharge: HOME | End: 2019-10-31
Payer: COMMERCIAL

## 2019-10-29 ENCOUNTER — HOSPITAL ENCOUNTER (OUTPATIENT)
Age: 59
Discharge: HOME | End: 2019-10-29
Payer: COMMERCIAL

## 2019-10-29 DIAGNOSIS — Z12.2 ENCOUNTER FOR SCREENING FOR MALIGNANT NEOPLASM OF LUNG: ICD-10-CM

## 2019-10-29 DIAGNOSIS — I10 HYPERTENSION, UNSPECIFIED TYPE: ICD-10-CM

## 2019-10-29 LAB
ALBUMIN SERPL-MCNC: 4.4 G/DL (ref 3.5–5.2)
ALP BLD-CCNC: 113 U/L (ref 35–104)
ALT SERPL-CCNC: 15 U/L (ref 0–32)
ANION GAP SERPL CALCULATED.3IONS-SCNC: 14 MMOL/L (ref 7–16)
AST SERPL-CCNC: 11 U/L (ref 0–31)
BASOPHILS ABSOLUTE: 0.03 E9/L (ref 0–0.2)
BASOPHILS RELATIVE PERCENT: 0.5 % (ref 0–2)
BILIRUB SERPL-MCNC: 0.3 MG/DL (ref 0–1.2)
BUN BLDV-MCNC: 18 MG/DL (ref 8–23)
CALCIUM SERPL-MCNC: 9.7 MG/DL (ref 8.6–10.2)
CHLORIDE BLD-SCNC: 98 MMOL/L (ref 98–107)
CHOLESTEROL, TOTAL: 184 MG/DL (ref 0–199)
CO2: 27 MMOL/L (ref 22–29)
CREAT SERPL-MCNC: 1 MG/DL (ref 0.5–1)
EOSINOPHILS ABSOLUTE: 0.07 E9/L (ref 0.05–0.5)
EOSINOPHILS RELATIVE PERCENT: 1.3 % (ref 0–6)
GFR AFRICAN AMERICAN: >60
GFR NON-AFRICAN AMERICAN: 56 ML/MIN/1.73
GLUCOSE BLD-MCNC: 115 MG/DL (ref 74–99)
HCT VFR BLD CALC: 42.7 % (ref 34–48)
HDLC SERPL-MCNC: 61 MG/DL
HEMOGLOBIN: 13.7 G/DL (ref 11.5–15.5)
IMMATURE GRANULOCYTES #: 0.02 E9/L
IMMATURE GRANULOCYTES %: 0.4 % (ref 0–5)
LDL CHOLESTEROL CALCULATED: 88 MG/DL (ref 0–99)
LYMPHOCYTES ABSOLUTE: 1.38 E9/L (ref 1.5–4)
LYMPHOCYTES RELATIVE PERCENT: 24.8 % (ref 20–42)
MCH RBC QN AUTO: 29.7 PG (ref 26–35)
MCHC RBC AUTO-ENTMCNC: 32.1 % (ref 32–34.5)
MCV RBC AUTO: 92.4 FL (ref 80–99.9)
MONOCYTES ABSOLUTE: 0.37 E9/L (ref 0.1–0.95)
MONOCYTES RELATIVE PERCENT: 6.6 % (ref 2–12)
NEUTROPHILS ABSOLUTE: 3.7 E9/L (ref 1.8–7.3)
NEUTROPHILS RELATIVE PERCENT: 66.4 % (ref 43–80)
PDW BLD-RTO: 12.3 FL (ref 11.5–15)
PLATELET # BLD: 238 E9/L (ref 130–450)
PMV BLD AUTO: 8.8 FL (ref 7–12)
POTASSIUM SERPL-SCNC: 3.8 MMOL/L (ref 3.5–5)
RBC # BLD: 4.62 E12/L (ref 3.5–5.5)
SODIUM BLD-SCNC: 139 MMOL/L (ref 132–146)
TOTAL PROTEIN: 7.7 G/DL (ref 6.4–8.3)
TRIGL SERPL-MCNC: 174 MG/DL (ref 0–149)
VLDLC SERPL CALC-MCNC: 35 MG/DL
WBC # BLD: 5.6 E9/L (ref 4.5–11.5)

## 2019-10-29 PROCEDURE — 80053 COMPREHEN METABOLIC PANEL: CPT

## 2019-10-29 PROCEDURE — G0297 LDCT FOR LUNG CA SCREEN: HCPCS

## 2019-10-29 PROCEDURE — 85025 COMPLETE CBC W/AUTO DIFF WBC: CPT

## 2019-10-29 PROCEDURE — 80061 LIPID PANEL: CPT

## 2019-10-29 PROCEDURE — 36415 COLL VENOUS BLD VENIPUNCTURE: CPT

## 2019-10-30 ASSESSMENT — ENCOUNTER SYMPTOMS
WHEEZING: 0
BACK PAIN: 1
COUGH: 0

## 2019-11-06 ENCOUNTER — OFFICE VISIT (OUTPATIENT)
Dept: ORTHOPEDIC SURGERY | Age: 59
End: 2019-11-06
Payer: COMMERCIAL

## 2019-11-06 VITALS
HEIGHT: 65 IN | SYSTOLIC BLOOD PRESSURE: 140 MMHG | BODY MASS INDEX: 39.82 KG/M2 | WEIGHT: 239 LBS | HEART RATE: 92 BPM | DIASTOLIC BLOOD PRESSURE: 82 MMHG

## 2019-11-06 DIAGNOSIS — J84.9 LUNG DISEASE, INTERSTITIAL (HCC): Primary | ICD-10-CM

## 2019-11-06 DIAGNOSIS — M16.12 PRIMARY OSTEOARTHRITIS OF LEFT HIP: Primary | ICD-10-CM

## 2019-11-06 DIAGNOSIS — M16.11 ARTHRITIS OF RIGHT HIP: ICD-10-CM

## 2019-11-06 PROCEDURE — G8427 DOCREV CUR MEDS BY ELIG CLIN: HCPCS | Performed by: NURSE PRACTITIONER

## 2019-11-06 PROCEDURE — G8417 CALC BMI ABV UP PARAM F/U: HCPCS | Performed by: NURSE PRACTITIONER

## 2019-11-06 PROCEDURE — 1036F TOBACCO NON-USER: CPT | Performed by: NURSE PRACTITIONER

## 2019-11-06 PROCEDURE — 99213 OFFICE O/P EST LOW 20 MIN: CPT | Performed by: NURSE PRACTITIONER

## 2019-11-06 PROCEDURE — 99212 OFFICE O/P EST SF 10 MIN: CPT | Performed by: NURSE PRACTITIONER

## 2019-11-06 PROCEDURE — G8484 FLU IMMUNIZE NO ADMIN: HCPCS | Performed by: NURSE PRACTITIONER

## 2019-11-06 PROCEDURE — 3017F COLORECTAL CA SCREEN DOC REV: CPT | Performed by: NURSE PRACTITIONER

## 2019-11-14 ENCOUNTER — HOSPITAL ENCOUNTER (OUTPATIENT)
Age: 59
Discharge: HOME | End: 2019-11-16
Payer: COMMERCIAL

## 2019-11-14 ENCOUNTER — TELEPHONE (OUTPATIENT)
Dept: PAIN MANAGEMENT | Age: 59
End: 2019-11-14

## 2019-11-14 ENCOUNTER — OFFICE VISIT (OUTPATIENT)
Dept: PAIN MANAGEMENT | Age: 59
End: 2019-11-14
Payer: COMMERCIAL

## 2019-11-14 VITALS
HEART RATE: 60 BPM | BODY MASS INDEX: 35.49 KG/M2 | TEMPERATURE: 98.3 F | SYSTOLIC BLOOD PRESSURE: 118 MMHG | RESPIRATION RATE: 16 BRPM | HEIGHT: 65 IN | OXYGEN SATURATION: 97 % | WEIGHT: 213 LBS | DIASTOLIC BLOOD PRESSURE: 74 MMHG

## 2019-11-14 DIAGNOSIS — M16.12 PRIMARY OSTEOARTHRITIS OF LEFT HIP: ICD-10-CM

## 2019-11-14 DIAGNOSIS — M51.9 LUMBAR DISC DISORDER: ICD-10-CM

## 2019-11-14 DIAGNOSIS — M16.11 ARTHRITIS OF RIGHT HIP: ICD-10-CM

## 2019-11-14 DIAGNOSIS — M54.2 CERVICALGIA: ICD-10-CM

## 2019-11-14 DIAGNOSIS — M47.816 LUMBAR FACET ARTHROPATHY: ICD-10-CM

## 2019-11-14 DIAGNOSIS — G89.4 CHRONIC PAIN SYNDROME: ICD-10-CM

## 2019-11-14 DIAGNOSIS — M47.816 LUMBAR SPONDYLOSIS: Primary | ICD-10-CM

## 2019-11-14 LAB — SPECIFIC GRAVITY UA: 1.01 (ref 1–1.03)

## 2019-11-14 PROCEDURE — G8427 DOCREV CUR MEDS BY ELIG CLIN: HCPCS | Performed by: NURSE PRACTITIONER

## 2019-11-14 PROCEDURE — 3017F COLORECTAL CA SCREEN DOC REV: CPT | Performed by: NURSE PRACTITIONER

## 2019-11-14 PROCEDURE — 1036F TOBACCO NON-USER: CPT | Performed by: NURSE PRACTITIONER

## 2019-11-14 PROCEDURE — 99213 OFFICE O/P EST LOW 20 MIN: CPT | Performed by: NURSE PRACTITIONER

## 2019-11-14 PROCEDURE — G0480 DRUG TEST DEF 1-7 CLASSES: HCPCS

## 2019-11-14 PROCEDURE — G8484 FLU IMMUNIZE NO ADMIN: HCPCS | Performed by: NURSE PRACTITIONER

## 2019-11-14 PROCEDURE — 80307 DRUG TEST PRSMV CHEM ANLYZR: CPT

## 2019-11-14 PROCEDURE — G8417 CALC BMI ABV UP PARAM F/U: HCPCS | Performed by: NURSE PRACTITIONER

## 2019-11-14 RX ORDER — BUPRENORPHINE 5 UG/H
1 PATCH TRANSDERMAL WEEKLY
Qty: 4 PATCH | Refills: 0 | Status: SHIPPED | OUTPATIENT
Start: 2019-11-14 | End: 2019-12-12

## 2019-11-17 LAB
7-AMINOCLONAZEPAM, URINE: <5 NG/ML
ALPHA-HYDROXYALPRAZOLAM, URINE: <5 NG/ML
ALPHA-HYDROXYMIDAZOLAM, URINE: <20 NG/ML
ALPRAZOLAM, URINE: <5 NG/ML
CHLORDIAZEPOXIDE, URINE: <20 NG/ML
CLONAZEPAM, URINE: <5 NG/ML
DIAZEPAM, URINE: <20 NG/ML
LORAZEPAM, URINE: <20 NG/ML
MIDAZOLAM, URINE: <20 NG/ML
NORDIAZEPAM, URINE: <20 NG/ML
OXAZEPAM, URINE: <20 NG/ML
TEMAZEPAM, URINE: <20 NG/ML

## 2019-11-18 DIAGNOSIS — M54.16 LUMBAR RADICULITIS: ICD-10-CM

## 2019-11-18 LAB
6AM URINE: <10 NG/ML
CODEINE, URINE: <20 NG/ML
HYDROCODONE, URINE: <20 NG/ML
HYDROMORPHONE, URINE: <20 NG/ML
Lab: NORMAL
MORPHINE URINE: <20 NG/ML
NORHYDROCODONE, URINE: <20 NG/ML
NOROXYCODONE, URINE: <20 NG/ML
NOROXYMORPHONE, URINE: <20 NG/ML
OXYCODONE, URINE CONFIRMATION: <20 NG/ML
OXYMORPHONE, URINE: <20 NG/ML
REPORT: NORMAL
THIS TEST SENT TO: NORMAL

## 2019-11-20 RX ORDER — GABAPENTIN 300 MG/1
300 CAPSULE ORAL 3 TIMES DAILY
Qty: 90 CAPSULE | Refills: 0 | Status: SHIPPED | OUTPATIENT
Start: 2019-11-20 | End: 2019-12-12 | Stop reason: SDUPTHER

## 2019-12-12 ENCOUNTER — OFFICE VISIT (OUTPATIENT)
Dept: PAIN MANAGEMENT | Age: 59
End: 2019-12-12
Payer: COMMERCIAL

## 2019-12-12 VITALS
DIASTOLIC BLOOD PRESSURE: 58 MMHG | RESPIRATION RATE: 16 BRPM | SYSTOLIC BLOOD PRESSURE: 100 MMHG | HEART RATE: 66 BPM | WEIGHT: 235 LBS | HEIGHT: 65 IN | TEMPERATURE: 98 F | OXYGEN SATURATION: 98 % | BODY MASS INDEX: 39.15 KG/M2

## 2019-12-12 DIAGNOSIS — G89.4 CHRONIC PAIN SYNDROME: ICD-10-CM

## 2019-12-12 DIAGNOSIS — M50.30 DEGENERATION OF CERVICAL DISC WITHOUT MYELOPATHY: ICD-10-CM

## 2019-12-12 DIAGNOSIS — M16.12 PRIMARY OSTEOARTHRITIS OF LEFT HIP: ICD-10-CM

## 2019-12-12 DIAGNOSIS — M54.16 LUMBAR RADICULITIS: ICD-10-CM

## 2019-12-12 DIAGNOSIS — M47.816 LUMBAR SPONDYLOSIS: Primary | ICD-10-CM

## 2019-12-12 DIAGNOSIS — M47.816 LUMBAR FACET ARTHROPATHY: ICD-10-CM

## 2019-12-12 DIAGNOSIS — M47.812 FACET ARTHROPATHY, CERVICAL: ICD-10-CM

## 2019-12-12 DIAGNOSIS — M50.30 DEGENERATIVE DISC DISEASE, CERVICAL: ICD-10-CM

## 2019-12-12 DIAGNOSIS — M51.9 LUMBAR DISC DISORDER: ICD-10-CM

## 2019-12-12 DIAGNOSIS — M16.11 ARTHRITIS OF RIGHT HIP: ICD-10-CM

## 2019-12-12 DIAGNOSIS — M47.812 ARTHROPATHY OF CERVICAL FACET JOINT: ICD-10-CM

## 2019-12-12 PROCEDURE — 1036F TOBACCO NON-USER: CPT | Performed by: NURSE PRACTITIONER

## 2019-12-12 PROCEDURE — 99213 OFFICE O/P EST LOW 20 MIN: CPT | Performed by: NURSE PRACTITIONER

## 2019-12-12 PROCEDURE — G8417 CALC BMI ABV UP PARAM F/U: HCPCS | Performed by: NURSE PRACTITIONER

## 2019-12-12 PROCEDURE — 3017F COLORECTAL CA SCREEN DOC REV: CPT | Performed by: NURSE PRACTITIONER

## 2019-12-12 PROCEDURE — G8484 FLU IMMUNIZE NO ADMIN: HCPCS | Performed by: NURSE PRACTITIONER

## 2019-12-12 PROCEDURE — G8427 DOCREV CUR MEDS BY ELIG CLIN: HCPCS | Performed by: NURSE PRACTITIONER

## 2019-12-12 RX ORDER — GABAPENTIN 300 MG/1
300 CAPSULE ORAL 3 TIMES DAILY
Qty: 90 CAPSULE | Refills: 0 | Status: SHIPPED | OUTPATIENT
Start: 2019-12-12 | End: 2020-01-16 | Stop reason: SDUPTHER

## 2019-12-12 RX ORDER — ACETAMINOPHEN AND CODEINE PHOSPHATE 60; 300 MG/1; MG/1
1 TABLET ORAL 2 TIMES DAILY PRN
Qty: 60 TABLET | Refills: 0 | Status: SHIPPED | OUTPATIENT
Start: 2019-12-12 | End: 2020-01-16 | Stop reason: SDUPTHER

## 2019-12-27 ENCOUNTER — OFFICE VISIT (OUTPATIENT)
Dept: FAMILY MEDICINE CLINIC | Age: 59
End: 2019-12-27
Payer: COMMERCIAL

## 2019-12-27 VITALS
HEIGHT: 65 IN | TEMPERATURE: 98.1 F | RESPIRATION RATE: 16 BRPM | WEIGHT: 230 LBS | BODY MASS INDEX: 38.32 KG/M2 | HEART RATE: 72 BPM | OXYGEN SATURATION: 96 % | DIASTOLIC BLOOD PRESSURE: 62 MMHG | SYSTOLIC BLOOD PRESSURE: 110 MMHG

## 2019-12-27 DIAGNOSIS — J20.9 BRONCHITIS WITH BRONCHOSPASM: Primary | ICD-10-CM

## 2019-12-27 PROCEDURE — 1036F TOBACCO NON-USER: CPT | Performed by: NURSE PRACTITIONER

## 2019-12-27 PROCEDURE — G8427 DOCREV CUR MEDS BY ELIG CLIN: HCPCS | Performed by: NURSE PRACTITIONER

## 2019-12-27 PROCEDURE — 3017F COLORECTAL CA SCREEN DOC REV: CPT | Performed by: NURSE PRACTITIONER

## 2019-12-27 PROCEDURE — G8484 FLU IMMUNIZE NO ADMIN: HCPCS | Performed by: NURSE PRACTITIONER

## 2019-12-27 PROCEDURE — 99213 OFFICE O/P EST LOW 20 MIN: CPT | Performed by: NURSE PRACTITIONER

## 2019-12-27 PROCEDURE — G8417 CALC BMI ABV UP PARAM F/U: HCPCS | Performed by: NURSE PRACTITIONER

## 2019-12-27 RX ORDER — BENZONATATE 100 MG/1
100 CAPSULE ORAL 3 TIMES DAILY PRN
Qty: 21 CAPSULE | Refills: 0 | Status: SHIPPED | OUTPATIENT
Start: 2019-12-27 | End: 2019-12-31 | Stop reason: SDUPTHER

## 2019-12-27 RX ORDER — DEXTROMETHORPHAN HYDROBROMIDE AND PROMETHAZINE HYDROCHLORIDE 15; 6.25 MG/5ML; MG/5ML
5 SYRUP ORAL 4 TIMES DAILY PRN
Qty: 120 ML | Refills: 0 | Status: SHIPPED | OUTPATIENT
Start: 2019-12-27 | End: 2020-01-06

## 2019-12-27 RX ORDER — DOXYCYCLINE HYCLATE 100 MG
100 TABLET ORAL 2 TIMES DAILY
Qty: 10 TABLET | Refills: 0 | Status: SHIPPED | OUTPATIENT
Start: 2019-12-27 | End: 2019-12-31 | Stop reason: SDUPTHER

## 2019-12-28 ENCOUNTER — TELEPHONE (OUTPATIENT)
Dept: FAMILY MEDICINE CLINIC | Age: 59
End: 2019-12-28

## 2019-12-31 ENCOUNTER — TELEPHONE (OUTPATIENT)
Dept: FAMILY MEDICINE CLINIC | Age: 59
End: 2019-12-31

## 2019-12-31 DIAGNOSIS — J20.9 BRONCHITIS WITH BRONCHOSPASM: ICD-10-CM

## 2019-12-31 RX ORDER — BENZONATATE 100 MG/1
100 CAPSULE ORAL 3 TIMES DAILY PRN
Qty: 21 CAPSULE | Refills: 0 | Status: SHIPPED | OUTPATIENT
Start: 2019-12-31 | End: 2020-01-07

## 2019-12-31 RX ORDER — DOXYCYCLINE HYCLATE 100 MG
100 TABLET ORAL 2 TIMES DAILY
Qty: 10 TABLET | Refills: 0 | Status: SHIPPED | OUTPATIENT
Start: 2019-12-31 | End: 2020-01-05

## 2020-01-08 ENCOUNTER — OFFICE VISIT (OUTPATIENT)
Dept: ORTHOPEDIC SURGERY | Age: 60
End: 2020-01-08
Payer: COMMERCIAL

## 2020-01-08 ENCOUNTER — HOSPITAL ENCOUNTER (OUTPATIENT)
Dept: GENERAL RADIOLOGY | Age: 60
Discharge: HOME | End: 2020-01-10
Payer: COMMERCIAL

## 2020-01-08 VITALS
HEIGHT: 65 IN | HEART RATE: 66 BPM | RESPIRATION RATE: 18 BRPM | DIASTOLIC BLOOD PRESSURE: 76 MMHG | WEIGHT: 230 LBS | BODY MASS INDEX: 38.32 KG/M2 | SYSTOLIC BLOOD PRESSURE: 130 MMHG

## 2020-01-08 PROBLEM — M16.0 PRIMARY OSTEOARTHRITIS OF BOTH HIPS: Status: ACTIVE | Noted: 2019-03-01

## 2020-01-08 PROCEDURE — 99213 OFFICE O/P EST LOW 20 MIN: CPT | Performed by: ORTHOPAEDIC SURGERY

## 2020-01-08 PROCEDURE — G8417 CALC BMI ABV UP PARAM F/U: HCPCS | Performed by: ORTHOPAEDIC SURGERY

## 2020-01-08 PROCEDURE — 73502 X-RAY EXAM HIP UNI 2-3 VIEWS: CPT

## 2020-01-08 PROCEDURE — G8484 FLU IMMUNIZE NO ADMIN: HCPCS | Performed by: ORTHOPAEDIC SURGERY

## 2020-01-08 PROCEDURE — 1036F TOBACCO NON-USER: CPT | Performed by: ORTHOPAEDIC SURGERY

## 2020-01-08 PROCEDURE — 3017F COLORECTAL CA SCREEN DOC REV: CPT | Performed by: ORTHOPAEDIC SURGERY

## 2020-01-08 PROCEDURE — 99215 OFFICE O/P EST HI 40 MIN: CPT | Performed by: ORTHOPAEDIC SURGERY

## 2020-01-08 PROCEDURE — G8427 DOCREV CUR MEDS BY ELIG CLIN: HCPCS | Performed by: ORTHOPAEDIC SURGERY

## 2020-01-08 NOTE — PATIENT INSTRUCTIONS
Call office once medical clearance appointment with your family doctor is scheduled and schedule appointment with our office following PCP appointment. You will need to be medically cleared before surgery by your family doctor & from neurosurgery. Please call your doctor to set up an appointment for medical clearance as soon as possible and have the office fax your medical clearance to :   (FAX) [phone]   ATTN:  IESHA    1. Your surgery is scheduled for Left total hip arthroplasty  with Dr. Mala Wells MD at the main [address] [place] [address] in [facility] . You will need to report to Preop area  that morning at 90 mins prior to scheduled time   OR time and date will be scheduled once we have clearance    2. You are having Inpatient surgery so you will not be returning home the same day  3. Preadmission Testing (PAT) department at [facility] will contact you with all the details prior to surgery. 4. Nothing to eat or drink after midnight the night before surgery. You may take a pain pill and any other medicine PAT instructs you to take with small sip of water if needed. 5. You will be going to Joint Education Class prior to surgery  6. Continue with ice and elevation to reduce swelling  7. Weightbearing as tolerated right lower and left lower, use assistive devices  8. Take pain medicine as instructed  9. Call office with any question or concerns: [phone]. Hold Aspirin the day of surgery.  Hold all NSAIDs 7 days prior to surgery    - We will ask for pain management's assistance managing your post-op pain regimen  - Continue with your aquatic therapy

## 2020-01-08 NOTE — PROGRESS NOTES
[celecoxib]; and Fish-derived products  Past Medical History:   Diagnosis Date    Brain aneurysm 2018    H/O TIMES 2 THAT BURST PER PATIENT    Cerebral artery occlusion with cerebral infarction (HCC)     RT SIDE AFFECTED-LEARNED TO WALK AND WRITE AGAIN    Degenerative arthritis of hand     Headache     Hiatal hernia     Hip problem     NEEDS HIP REPLACEMENT PER PATIENT    Hypertension      Past Surgical History:   Procedure Laterality Date    BRAIN ANEURYSM SURGERY      coiling     COLONOSCOPY  2019    polyps--frank    COLONOSCOPY N/A 2019    COLONOSCOPY POLYPECTOMY SNARE/COLD BIOPSY performed by Glenna Jara MD at [address]- DONE AT [address] UPPER GASTROINTESTINAL ENDOSCOPY  2019    gastritis with superficial ulcerations; duodenitis--frank    UPPER GASTROINTESTINAL ENDOSCOPY N/A 2019    EGD BIOPSY performed by Glenna Jara MD at [facility] ENDOSCOPY     Family History   Problem Relation Age of Onset    Diabetes Mother    Equilla Kranthi Arthritis Mother     Atrial Fibrillation Mother     Diabetes Father    Equilla Kranthi Atrial Fibrillation Father     Arthritis Father    Equilla Kranthi Emphysema Father     Cancer Sister      Social History     Tobacco Use    Smoking status: Former Smoker     Packs/day: 1.50     Years: 43.00     Pack years: 64.50     Types: Cigarettes     Last attempt to quit: 2018     Years since quittin.3    Smokeless tobacco: Never Used   Substance Use Topics    Alcohol use: No                             Chief Complaint   Patient presents with    Hip Pain      Cont.to c/o of left hip pain only sl. worse than Rt. hip. Pain has intensified since 2018. SUBJECTIVE: Floridalma Omalley is here today for their bilateral Hip. The patient has had pain for sometime, but last 6 months or so it has become more severe.  She  was diagnosed with OA in the past. There had been no injury to the bilateral Hip that they can remember. Viji Mac has tried multiple conservative treatment. Has had therapy in the past, that worked at first, but the pain persisted. She did have steroid injections which did not help much. Patient also had used a bilateral Hip bracing without relief of symptoms. They have been working on weight loss. The pain is described as typical arthritic pain, achy in the joint, becoming more severe with stairs and any twisting motion of the bilateral Hip. Rest makes the bilateral Hip better along with NSAIDs and over the counter analgesics, but states they are now less effective. She does use an occasional walker and scooter, patient can ambulate less than 1 block prior to pain limiting their function. Viji Mac is having marked difficulty with ADLs, and states this pain is limiting her activity and is decreasing their quality of life. She would like to discuss MERVAT. Review of Systems   Constitutional: Negative for fever, chills, diaphoresis, appetite change and fatigue. HENT: Negative for dental issues, hearing loss and tinnitus. Negative for congestion, sinus pressure, sneezing, sore throat. Negative for headache. Eyes: Negative for visual disturbance, blurred and double vision. Negative for pain, discharge, redness and itching  Respiratory: Negative for cough, shortness of breath and wheezing. Cardiovascular: Negative for chest pain, palpitations and leg swelling. No dyspnea on exertion   Gastrointestinal:   Negative for nausea, vomiting, abdominal pain, diarrhea, constipation  or black or bloody. Hematologic\Lymphatic:  negative for bleeding, petechiae,   Genitourinary: Negative for hematuria and difficulty urinating. Musculoskeletal: Negative for neck pain and stiffness. Mild for back pain, negative joint swelling and gait problem. Skin: Negative for pallor, rash and wound.    Neurological: Negative for dizziness, tremors, seizures, weakness, light-headedness, no TIA or stroke symptoms. No numbness and headaches. Psychiatric/Behavioral: Negative. Physical Examination:   General appearance: alert, well appearing, and in no distress,  normal appearing weight  Mental status: alert, oriented to person, place, and time, normal mood, behavior, speech, dress, motor activity, and thought processes  Abdomen: soft, nondistended, nontender, bowel sound + X 4 quads  Resp:   resp easy and unlabored, equal, regular rate, no wheezes, rhonchi, crackles noted  Cardiac: distal pulses palpable, skin well perfused. HR regular rhythm and rate, no murmers, rubs, or clicks  Neurological: alert, oriented X3, normal speech, no focal findings or movement disorder noted, motor and sensory grossly normal bilaterally, normal muscle tone, no tremors, strength 5/5, normal gait and station  HEENT: normochephalic atraumatic, external ears and eyes normal, sclera normal, neck supple  Extremities:   peripheral pulses normal, no edema, redness or tenderness in the calves   Skin: normal coloration, no rashes or open wounds, no suspicious skin lesions noted  Psych: Affect euthymic   Musculoskeletal:    Extremity:    Bilateral hips   Skin intact   Compartments soft and compressible   Calves soft and non tender    5/5 EHL, TA, GS   2/4 DP and PT pulses   Sensation intact distally   Capillary refill less than 3 seconds    Pain and hip range of motion and crepitus   No internal rotation bilaterally   Early hip contractures present with flexion      /76   Pulse 66   Resp 18   Ht 5' 5\" (1.651 m)   Wt 230 lb (104.3 kg)   BMI 38.27 kg/m²      XR: 1/8/20     ASSESSMENT:     Diagnosis Orders   1. Primary osteoarthritis of left hip  XR HIP 2-3 VW W PELVIS LEFT   2. Primary osteoarthritis of both hips         Discussion:  The patient would like to proceed with total bilateral Hip arthroplasty when cleared by their PCP.   Javy Bowenjose miguel understands the risks include but are not limited to infection, injuries to the blood vessel and nerves, bleeding, continued pain and non relief of pain, aseptic loosening dislocation, limb length discrepancy, arthrofibrosis of the joint, further operation, deep venous thrombosis, pulmonary embolism and fracture, heart attack and death. Mary operative plan discussed, need for anticoagulation for DVT/PE prophylaxis, need for physical therapy, office follow up visits, and possible rehab stay. It was also explained patient will need to take a prophylactic dose of ATB prior to any bowel, dental or mouth procedures, including dental cleaning, for length of the implant. Did review surgery prosthesis with model with patient as well. Also explained patient will need to use hip precaution for 6-8 weeks after surgery. Pain control was also discussed and the expectation is to have patient off narcotic pain meds around 6 weeks post operatively for a total joint arthroplasty     PLAN:  Plan for ORn further discussion and clearance from neurosurgery and family practice  Pre-surgery medical clearance  PAT  Joint education class  NPO after midnight the night before surgery    Electronically signed by Shweta James PA-C on 1/8/2020 at 10:31 AM  Note: This report was completed using [employer] voiced recognition software. Every effort has been made to ensure accuracy; however, inadvertent computerized transcription errors may be present. I have seen and evaluated the patient and agree with the above assessment on today's visit. I have performed the key components of the history and physical examination and concur completely with the findings and plans as documented. Agree with ROS, examination, FMH, PMH, PSH, SocHx, and allergies as above. Agree with above history and physical.  Patient was seen along with the physician assistant today. She does have complex medical comorbidities and her body mass is close to being at 40. We discussed these risks today.   She understands and she

## 2020-01-09 ENCOUNTER — TELEPHONE (OUTPATIENT)
Dept: FAMILY MEDICINE CLINIC | Age: 60
End: 2020-01-09

## 2020-01-10 NOTE — TELEPHONE ENCOUNTER
Okay to substitute olmesartan or valsartan   Please first verify if the pharmacy has it    olmesartan 40 mg    Valsartan 320 mg     Either one is okay    Whichever is covered by insurance

## 2020-01-13 ENCOUNTER — TELEPHONE (OUTPATIENT)
Dept: FAMILY MEDICINE CLINIC | Age: 60
End: 2020-01-13

## 2020-01-16 ENCOUNTER — OFFICE VISIT (OUTPATIENT)
Dept: PAIN MANAGEMENT | Age: 60
End: 2020-01-16
Payer: COMMERCIAL

## 2020-01-16 VITALS
OXYGEN SATURATION: 92 % | DIASTOLIC BLOOD PRESSURE: 62 MMHG | TEMPERATURE: 98 F | HEART RATE: 56 BPM | SYSTOLIC BLOOD PRESSURE: 117 MMHG | WEIGHT: 230 LBS | HEIGHT: 65 IN | BODY MASS INDEX: 38.32 KG/M2 | RESPIRATION RATE: 16 BRPM

## 2020-01-16 PROCEDURE — 3017F COLORECTAL CA SCREEN DOC REV: CPT | Performed by: NURSE PRACTITIONER

## 2020-01-16 PROCEDURE — G8484 FLU IMMUNIZE NO ADMIN: HCPCS | Performed by: NURSE PRACTITIONER

## 2020-01-16 PROCEDURE — G8417 CALC BMI ABV UP PARAM F/U: HCPCS | Performed by: NURSE PRACTITIONER

## 2020-01-16 PROCEDURE — G8427 DOCREV CUR MEDS BY ELIG CLIN: HCPCS | Performed by: NURSE PRACTITIONER

## 2020-01-16 PROCEDURE — 99213 OFFICE O/P EST LOW 20 MIN: CPT | Performed by: NURSE PRACTITIONER

## 2020-01-16 PROCEDURE — 1036F TOBACCO NON-USER: CPT | Performed by: NURSE PRACTITIONER

## 2020-01-16 RX ORDER — GABAPENTIN 300 MG/1
300 CAPSULE ORAL 3 TIMES DAILY
Qty: 90 CAPSULE | Refills: 0 | Status: SHIPPED
Start: 2020-01-16 | End: 2020-02-10 | Stop reason: SDUPTHER

## 2020-01-16 RX ORDER — ACETAMINOPHEN AND CODEINE PHOSPHATE 60; 300 MG/1; MG/1
1 TABLET ORAL 2 TIMES DAILY PRN
Qty: 60 TABLET | Refills: 0 | Status: SHIPPED
Start: 2020-01-16 | End: 2020-02-17 | Stop reason: SDUPTHER

## 2020-01-16 NOTE — PROGRESS NOTES
 COLONOSCOPY N/A 8/30/2019    COLONOSCOPY POLYPECTOMY SNARE/COLD BIOPSY performed by Cait Millard MD at [address] ENDOSCOPY  08/30/2019    gastritis with superficial ulcerations; duodenitis--frank    UPPER GASTROINTESTINAL ENDOSCOPY N/A 8/30/2019    EGD BIOPSY performed by Cait Millard MD at [address]       Prior to Admission medications    Medication Sig Start Date End Date Taking? Authorizing Provider   gabapentin (NEURONTIN) 300 MG capsule Take 1 capsule by mouth 3 times daily for 30 days. 1/16/20 2/15/20 Yes LYLY Urena - CNP   acetaminophen-codeine (TYLENOL #4) 300-60 MG per tablet Take 1 tablet by mouth 2 times daily as needed for Pain for up to 30 days. 1/16/20 2/15/20 Yes LYLY Urena CNP   losartan (COZAAR) 100 MG tablet Take 1 tablet by mouth daily 10/18/19  Yes Betzy Alvarenga MD   amLODIPine (NORVASC) 5 MG tablet Take 1 tablet by mouth daily 10/16/19  Yes Betzy Alvarenga MD   chlorthalidone (HYGROTON) 25 MG tablet Take 1 tablet by mouth daily 10/16/19  Yes Betyz Alvarenga MD   metoprolol tartrate (LOPRESSOR) 50 MG tablet Take 1 tablet by mouth 2 times daily 10/16/19  Yes Betzy Alvarenga MD   potassium chloride (KLOR-CON M) 10 MEQ extended release tablet Take 1 tablet by mouth daily (with breakfast) 10/16/19  Yes Betzy Alvarenga MD   sucralfate (CARAFATE) 1 GM tablet Take 1 tablet by mouth 4 times daily 8/30/19  Yes Cait Millard MD   pantoprazole (PROTONIX) 40 MG tablet Take 1 tablet by mouth daily 8/30/19  Yes Cait Millard MD   carBAMazepine (TEGRETOL-XR) 100 MG extended release tablet Take 1 tablet by mouth 3 times daily 8/16/19  Yes Betzy Alvarenga MD       Allergies   Allergen Reactions    Latex Hives     Hives and rash    Iodine Rash     Hives . pt.  States anaphalyxis    Aloe Hives     Hives     Celebrex [Celecoxib] Itching    Fish-Derived Products Other (See Comments)       Social History     Socioeconomic History    Marital status:      Spouse name: Not on file    Number of children: Not on file    Years of education: Not on file    Highest education level: Not on file   Occupational History    Not on file   Social Needs    Financial resource strain: Not on file    Food insecurity:     Worry: Not on file     Inability: Not on file    Transportation needs:     Medical: Not on file     Non-medical: Not on file   Tobacco Use    Smoking status: Former Smoker     Packs/day: 1.50     Years: 43.00     Pack years: 64.50     Types: Cigarettes     Last attempt to quit: 2018     Years since quittin.3    Smokeless tobacco: Never Used   Substance and Sexual Activity    Alcohol use: No    Drug use: No    Sexual activity: Not Currently   Lifestyle    Physical activity:     Days per week: Not on file     Minutes per session: Not on file    Stress: Not on file   Relationships    Social connections:     Talks on phone: Not on file     Gets together: Not on file     Attends Restoration service: Not on file     Active member of club or organization: Not on file     Attends meetings of clubs or organizations: Not on file     Relationship status: Not on file    Intimate partner violence:     Fear of current or ex partner: Not on file     Emotionally abused: Not on file     Physically abused: Not on file     Forced sexual activity: Not on file   Other Topics Concern    Not on file   Social History Narrative    Not on file       Family History   Problem Relation Age of Onset    Diabetes Mother     Arthritis Mother     Atrial Fibrillation Mother     Diabetes Father     Atrial Fibrillation Father     Arthritis Father     Emphysema Father     Cancer Sister        REVIEW OF SYSTEMS:     Jialene Noonan denies fever/chills, chest pain, shortness of breath, new bowel or bladder complaints. All other review of systems was negative. Denies any new neurologic complaints and/or red flag symptoms. PHYSICAL EXAMINATION:      /62   Pulse 56   Temp 98 °F (36.7 °C) (Oral)   Resp 16   Ht 5' 5\" (1.651 m)   Wt 230 lb (104.3 kg)   SpO2 92%   BMI 38.27 kg/m²     General:      General appearance: Pleasant and well-hydrated. In moderate discomfort and alert and oriented x3  Build:Overweight  Function:Rises from a seated position with some difficulty     HEENT:     Head:normocephalic and atraumatic  Pupils:regular, round and equal.  Sclera: icterus absent     Abdomen:     Shape: Obese and non-distended  Tenderness:none  Guarding:none    Cervical spine:     Spine inspection: Normal  CVA tenderness: None  Palpation: Tenderness paravertebral muscles, facet loading, left, right, positive and tenderness. Right worse than left  Range of motion: Abnormal moderately Lateral bending, flexion, extension rotation bilateral and is painful. Spurling's: negative   Ford's: negative     Lumbar spine:     Spine inspection: Scoliosis   CVA tenderness: None  Palpation: Tenderness paravertebral muscles, facet loading, left, right, positive and tenderness. Range of motion: Abnormal moderately Lateral bending, flexion, extension rotation bilateral and is painful.      Musculoskeletal:     Trigger points in Paravertebral: absent bilaterally   SI joint tenderness: Positive Left  Elkin Yessy test: Positive Left   Piriformis tenderness: Negative  Trochanteric bursa tenderness: Negative  SLR: Negative, sitting     Extremities:     Tremors: None bilaterally upper and lower  Range of motion: Generally normal shoulders, pain with internal rotation of hips positive Left, painful  Intact: Yes  Edema: Normal     Neurological:     Sensory: Normal to light touch bilateral upper and lower extremities  Motor:  Right Quadriceps4/5  Left Quadriceps4/5  Right Gastrocnemius4/5  Left Gastrocnemius4/5  Right Ant Tibialis4/5  Left Ant Tibialis4/5  Gait: Antalgic in a wheelchair

## 2020-01-16 NOTE — PROGRESS NOTES
Yobani Calles presents to the [address] on 1/16/2020. Juliette Sharp is complaining of pain in lower back. . The pain is constant. The pain is described as aching, throbbing, shooting, stabbing and sharp. Pain is rated on her best day at a 9, on her worst day at a 10, and on average at a 9 on the VAS scale. She took her last dose of Tylenol with codeine this Darliss Friday does not have issues with constipation. Any procedures since your last visit: No,      She is not on NSAIDS and  is not on anticoagulation medications to include none and is managed by NA. Pacemaker or defibrilator: No Physician managing device is NA.       /62   Pulse 56   Temp 98 °F (36.7 °C) (Oral)   Resp 16   Ht 5' 5\" (1.651 m)   Wt 230 lb (104.3 kg)   SpO2 92%   BMI 38.27 kg/m²      No LMP recorded.  Patient is postmenopausal.

## 2020-01-17 ENCOUNTER — OFFICE VISIT (OUTPATIENT)
Dept: FAMILY MEDICINE CLINIC | Age: 60
End: 2020-01-17
Payer: COMMERCIAL

## 2020-01-17 VITALS
SYSTOLIC BLOOD PRESSURE: 109 MMHG | TEMPERATURE: 97.8 F | BODY MASS INDEX: 39.82 KG/M2 | HEIGHT: 65 IN | OXYGEN SATURATION: 95 % | DIASTOLIC BLOOD PRESSURE: 62 MMHG | HEART RATE: 73 BPM | WEIGHT: 239 LBS | RESPIRATION RATE: 18 BRPM

## 2020-01-17 PROCEDURE — 3017F COLORECTAL CA SCREEN DOC REV: CPT | Performed by: STUDENT IN AN ORGANIZED HEALTH CARE EDUCATION/TRAINING PROGRAM

## 2020-01-17 PROCEDURE — 1036F TOBACCO NON-USER: CPT | Performed by: STUDENT IN AN ORGANIZED HEALTH CARE EDUCATION/TRAINING PROGRAM

## 2020-01-17 PROCEDURE — G8427 DOCREV CUR MEDS BY ELIG CLIN: HCPCS | Performed by: STUDENT IN AN ORGANIZED HEALTH CARE EDUCATION/TRAINING PROGRAM

## 2020-01-17 PROCEDURE — 99213 OFFICE O/P EST LOW 20 MIN: CPT | Performed by: STUDENT IN AN ORGANIZED HEALTH CARE EDUCATION/TRAINING PROGRAM

## 2020-01-17 PROCEDURE — G8484 FLU IMMUNIZE NO ADMIN: HCPCS | Performed by: STUDENT IN AN ORGANIZED HEALTH CARE EDUCATION/TRAINING PROGRAM

## 2020-01-17 PROCEDURE — G8417 CALC BMI ABV UP PARAM F/U: HCPCS | Performed by: STUDENT IN AN ORGANIZED HEALTH CARE EDUCATION/TRAINING PROGRAM

## 2020-01-17 RX ORDER — PREDNISONE 20 MG/1
40 TABLET ORAL DAILY
Qty: 10 TABLET | Refills: 0 | Status: SHIPPED | OUTPATIENT
Start: 2020-01-17 | End: 2020-01-22

## 2020-01-17 RX ORDER — FLUTICASONE PROPIONATE 50 MCG
2 SPRAY, SUSPENSION (ML) NASAL DAILY
Qty: 1 BOTTLE | Refills: 0 | Status: SHIPPED | OUTPATIENT
Start: 2020-01-17 | End: 2020-02-21 | Stop reason: SDUPTHER

## 2020-01-17 RX ORDER — GUAIFENESIN 600 MG/1
600 TABLET, EXTENDED RELEASE ORAL 2 TIMES DAILY
Qty: 30 TABLET | Refills: 0 | Status: SHIPPED | OUTPATIENT
Start: 2020-01-17 | End: 2020-02-01

## 2020-01-17 RX ORDER — AMOXICILLIN AND CLAVULANATE POTASSIUM 875; 125 MG/1; MG/1
1 TABLET, FILM COATED ORAL 2 TIMES DAILY
Qty: 20 TABLET | Refills: 0 | Status: SHIPPED | OUTPATIENT
Start: 2020-01-17 | End: 2020-01-27

## 2020-01-17 RX ORDER — ALBUTEROL SULFATE 90 UG/1
2 AEROSOL, METERED RESPIRATORY (INHALATION) EVERY 6 HOURS PRN
Qty: 3 INHALER | Refills: 1 | Status: SHIPPED | OUTPATIENT
Start: 2020-01-17 | End: 2020-02-21 | Stop reason: SDUPTHER

## 2020-01-17 ASSESSMENT — PATIENT HEALTH QUESTIONNAIRE - PHQ9
SUM OF ALL RESPONSES TO PHQ QUESTIONS 1-9: 1
SUM OF ALL RESPONSES TO PHQ QUESTIONS 1-9: 1
1. LITTLE INTEREST OR PLEASURE IN DOING THINGS: 0
SUM OF ALL RESPONSES TO PHQ9 QUESTIONS 1 & 2: 1
2. FEELING DOWN, DEPRESSED OR HOPELESS: 1

## 2020-01-17 NOTE — PROGRESS NOTES
[address] [facility]  Family Medicine Residency Program  Phone: [phone]  Fax: [phone]    Patient:  Eliseo Perze 60 y.o. female                                 Date of Service: 1/17/20                            Chiefcomplaint:   Chief Complaint   Patient presents with    Cough     for 1 month    Congestion         History of Present Illness: The patient is a 60 y.o. female  with past medical history of hypertension, low back pain/chronic pain syndrome, obesity, GERD, past smoker presented to us for acute problem. She further stated that she has had cough with congestion for last 3 weeks. She said that  she visited nurse practitioner in last week of December and took antibiotics doxycycline for 5 days  and cough medicine as per her advice. She further explained that it has not been better, but it is getting worse. Now, she has had subjective fever, sinus pain and pressure and  frequent cough. The cough is most of the time dry, sometimes produces yellowish- green sputum without blood and frequent cough making chest wall discomfort. She denied chest pain, palpitation, shortness of breath, chills and Rigor, dizziness, joint pain, abdominal pain, bowel- bladder problem. But she admits wheezing and sore throat occasionally. He has been a schedule with pulmonologist in the month of March and has been following up with Dr. Nadeen Sanders MD  for her regular medical problem. Review of Systems:   Review of Systems   Constitutional: Positive for chills and fever. HENT: Positive for congestion, sinus pressure, sinus pain, sneezing, sore throat and voice change. Negative for trouble swallowing. Eyes: Negative for redness. Respiratory: Positive for cough and wheezing. Negative for chest tightness and shortness of breath. Cardiovascular: Negative for chest pain and palpitations. Gastrointestinal: Negative for abdominal pain, constipation, nausea and vomiting. enlargement  Neck: Supple, symmetrical, trachea midline. No JVD. Chest wall/Lung: Clear to auscultation bilaterally,  respirations unlabored. No   Occasional wheezes right more than left, no crepitation. Heart[de-identified]  Regular rate and rhythm, S1and S2 normal, no murmur, rub or gallop. Abdomen: Soft, non-tender, bowel sounds normoactive, no masses, no organomegaly  Extremities:  Extremities normal, atraumatic, no cyanosis. no edema. Skin: Skin color, texture, turgor normal, no rashes or lesions  Musculokeletal: ROM grossly normal in all joints of extremities, no obvious joint swelling. Lymph nodes: no lymph node enlargement appreciated  Neurologic:   Alert&Oriented. Normal gait and coordination  No focal neurological deficits appreaciated         Psychiatric: has a normal mood and affect. Behavior is normal.       Assessment and Plan:         Beni Mora was seen today for cough and congestion. Diagnoses and all orders for this visit:    Acute maxillary sinusitis, recurrence not specified  -Presented with 3 weeks history of cough and congestion  -Admits subjective fever, nasal discharge occasionally  -Moderate tenderness over right frontal and maxillary sinus  -Given Augmentin for 10 days  -Given Flonase nasal spray  -advised steam inhalation    Acute bronchitis, unspecified organism  -Presented with cough and congestion for about 3 weeks,   -past smoker, admits occasional wheeze too  -Visited nurse practitioner in the past, no improvement with cough medicine and doxy 5 days   -Has had cough with occasional light yellowish sputum, repeated cough causing chest wall pain  -Given Mucinex and tab prednisone for 5 days  -Started Augmentin for rhinosinusitis  -Advised albuterol inhaler as needed    Other orders  -     amoxicillin-clavulanate (AUGMENTIN) 875-125 MG per tablet; Take 1 tablet by mouth 2 times daily for 10 days  -     predniSONE (DELTASONE) 20 MG tablet;  Take 2 tablets by mouth daily for 5 days  - fluticasone (FLONASE) 50 MCG/ACT nasal spray; 2 sprays by Each Nostril route daily  -     albuterol sulfate  (90 Base) MCG/ACT inhaler; Inhale 2 puffs into the lungs every 6 hours as needed for Wheezing  -     guaiFENesin (MUCINEX) 600 MG extended release tablet; Take 1 tablet by mouth 2 times daily for 15 days    I encourage further reading and education about your health conditions. Information on many healthconditions is provided by the American Academy of Family Physicians: https://familydoctor. org/  Please bring any questions to me at your next visit. Return to Office: Return for Routine Follow up. Medication List:    Current Outpatient Medications   Medication Sig Dispense Refill    amoxicillin-clavulanate (AUGMENTIN) 875-125 MG per tablet Take 1 tablet by mouth 2 times daily for 10 days 20 tablet 0    predniSONE (DELTASONE) 20 MG tablet Take 2 tablets by mouth daily for 5 days 10 tablet 0    fluticasone (FLONASE) 50 MCG/ACT nasal spray 2 sprays by Each Nostril route daily 1 Bottle 0    albuterol sulfate  (90 Base) MCG/ACT inhaler Inhale 2 puffs into the lungs every 6 hours as needed for Wheezing 3 Inhaler 1    guaiFENesin (MUCINEX) 600 MG extended release tablet Take 1 tablet by mouth 2 times daily for 15 days 30 tablet 0    gabapentin (NEURONTIN) 300 MG capsule Take 1 capsule by mouth 3 times daily for 30 days. 90 capsule 0    acetaminophen-codeine (TYLENOL #4) 300-60 MG per tablet Take 1 tablet by mouth 2 times daily as needed for Pain for up to 30 days.  60 tablet 0    losartan (COZAAR) 100 MG tablet Take 1 tablet by mouth daily 30 tablet 3    amLODIPine (NORVASC) 5 MG tablet Take 1 tablet by mouth daily 30 tablet 3    chlorthalidone (HYGROTON) 25 MG tablet Take 1 tablet by mouth daily 30 tablet 3    metoprolol tartrate (LOPRESSOR) 50 MG tablet Take 1 tablet by mouth 2 times daily 60 tablet 3    potassium chloride (KLOR-CON M) 10 MEQ extended release tablet Take 1 tablet by mouth daily (with breakfast) 30 tablet 3    sucralfate (CARAFATE) 1 GM tablet Take 1 tablet by mouth 4 times daily 120 tablet 3    pantoprazole (PROTONIX) 40 MG tablet Take 1 tablet by mouth daily 30 tablet 3    carBAMazepine (TEGRETOL-XR) 100 MG extended release tablet Take 1 tablet by mouth 3 times daily 90 tablet 2     No current facility-administered medications for this visit. Octavia Hoskins MD       This document may have been prepared at least partiallythrough the use of voice recognition software. Although effort is taken to assure the accuracy of this document, it is possible that grammatical, syntax,  or spelling errors may occur.

## 2020-01-18 ASSESSMENT — ENCOUNTER SYMPTOMS
VOMITING: 0
CHEST TIGHTNESS: 0
VOICE CHANGE: 1
SHORTNESS OF BREATH: 0
SINUS PRESSURE: 1
COUGH: 1
TROUBLE SWALLOWING: 0
EYE REDNESS: 0
ABDOMINAL PAIN: 0
SINUS PAIN: 1
SORE THROAT: 1
NAUSEA: 0
ALLERGIC/IMMUNOLOGIC NEGATIVE: 1
CONSTIPATION: 0
WHEEZING: 1

## 2020-02-10 NOTE — TELEPHONE ENCOUNTER
Patient called for refills on all of her medications. She is out of the carBAMazepine and losartan. She will run out of medication before her appointment on 2/21/2020.   meds pending drs approval.

## 2020-02-11 RX ORDER — LOSARTAN POTASSIUM 100 MG/1
100 TABLET ORAL DAILY
Qty: 30 TABLET | Refills: 3 | Status: SHIPPED
Start: 2020-02-11 | End: 2020-05-15 | Stop reason: SDUPTHER

## 2020-02-11 RX ORDER — CARBAMAZEPINE 100 MG/1
100 TABLET, EXTENDED RELEASE ORAL 3 TIMES DAILY
Qty: 90 TABLET | Refills: 3 | Status: SHIPPED
Start: 2020-02-11 | End: 2020-06-05

## 2020-02-11 RX ORDER — AMLODIPINE BESYLATE 5 MG/1
5 TABLET ORAL DAILY
Qty: 30 TABLET | Refills: 0 | Status: SHIPPED | OUTPATIENT
Start: 2020-02-11 | End: 2020-02-21 | Stop reason: SDUPTHER

## 2020-02-11 RX ORDER — METOPROLOL TARTRATE 50 MG/1
50 TABLET, FILM COATED ORAL 2 TIMES DAILY
Qty: 60 TABLET | Refills: 0 | Status: SHIPPED | OUTPATIENT
Start: 2020-02-11 | End: 2020-02-21 | Stop reason: SDUPTHER

## 2020-02-11 RX ORDER — GABAPENTIN 300 MG/1
300 CAPSULE ORAL 3 TIMES DAILY
Qty: 90 CAPSULE | Refills: 0 | Status: SHIPPED | OUTPATIENT
Start: 2020-02-11 | End: 2020-02-21 | Stop reason: SDUPTHER

## 2020-02-11 RX ORDER — CHLORTHALIDONE 25 MG/1
25 TABLET ORAL DAILY
Qty: 30 TABLET | Refills: 3 | Status: SHIPPED
Start: 2020-02-11 | End: 2020-05-15 | Stop reason: SDUPTHER

## 2020-02-11 RX ORDER — POTASSIUM CHLORIDE 750 MG/1
10 TABLET, EXTENDED RELEASE ORAL
Qty: 30 TABLET | Refills: 0 | Status: SHIPPED | OUTPATIENT
Start: 2020-02-11 | End: 2020-02-21 | Stop reason: SDUPTHER

## 2020-02-17 ENCOUNTER — TELEPHONE (OUTPATIENT)
Dept: FAMILY MEDICINE CLINIC | Age: 60
End: 2020-02-17

## 2020-02-17 ENCOUNTER — OFFICE VISIT (OUTPATIENT)
Dept: PAIN MANAGEMENT | Age: 60
End: 2020-02-17
Payer: COMMERCIAL

## 2020-02-17 VITALS
OXYGEN SATURATION: 92 % | HEART RATE: 66 BPM | SYSTOLIC BLOOD PRESSURE: 90 MMHG | BODY MASS INDEX: 38.82 KG/M2 | RESPIRATION RATE: 16 BRPM | DIASTOLIC BLOOD PRESSURE: 66 MMHG | HEIGHT: 65 IN | WEIGHT: 233 LBS | TEMPERATURE: 97.8 F

## 2020-02-17 PROCEDURE — G8427 DOCREV CUR MEDS BY ELIG CLIN: HCPCS | Performed by: PHYSICIAN ASSISTANT

## 2020-02-17 PROCEDURE — 99213 OFFICE O/P EST LOW 20 MIN: CPT | Performed by: PHYSICIAN ASSISTANT

## 2020-02-17 PROCEDURE — 1036F TOBACCO NON-USER: CPT | Performed by: PHYSICIAN ASSISTANT

## 2020-02-17 PROCEDURE — G8484 FLU IMMUNIZE NO ADMIN: HCPCS | Performed by: PHYSICIAN ASSISTANT

## 2020-02-17 PROCEDURE — 3017F COLORECTAL CA SCREEN DOC REV: CPT | Performed by: PHYSICIAN ASSISTANT

## 2020-02-17 PROCEDURE — G8417 CALC BMI ABV UP PARAM F/U: HCPCS | Performed by: PHYSICIAN ASSISTANT

## 2020-02-17 RX ORDER — ACETAMINOPHEN AND CODEINE PHOSPHATE 60; 300 MG/1; MG/1
1 TABLET ORAL 2 TIMES DAILY PRN
Qty: 60 TABLET | Refills: 0 | Status: SHIPPED
Start: 2020-02-17 | End: 2020-03-19 | Stop reason: SDUPTHER

## 2020-02-17 NOTE — PROGRESS NOTES
Carolina Boss presents to the [facility] on 2/17/2020. Essie Mary is complaining of pain hips lower back. The pain is constant. The pain is described as aching, sharp, tender and burning. Pain is rated on her best day at a 9, on her worst day at a 9, and on average at a 9 on the VAS scale. She took her last dose of Neurontin and Tylenol with codeine pain medication two days ago Neurontin today. Martínez Berry does have issues with constipation. Any procedures since your last visit: No, with  % relief. She is not on NSAIDS and  is not on anticoagulation medications to include none and is managed by   . Pacemaker or defibrilator: No Physician managing device is . BP 90/66   Pulse 66   Temp 97.8 °F (36.6 °C) (Oral)   Resp 16   Ht 5' 5\" (1.651 m)   Wt 233 lb (105.7 kg)   SpO2 92%   BMI 38.77 kg/m²      No LMP recorded.  Patient is postmenopausal.

## 2020-02-18 ENCOUNTER — TELEPHONE (OUTPATIENT)
Dept: SURGERY | Age: 60
End: 2020-02-18

## 2020-02-21 ENCOUNTER — TELEPHONE (OUTPATIENT)
Dept: FAMILY MEDICINE CLINIC | Age: 60
End: 2020-02-21

## 2020-02-21 ENCOUNTER — OFFICE VISIT (OUTPATIENT)
Dept: FAMILY MEDICINE CLINIC | Age: 60
End: 2020-02-21
Payer: COMMERCIAL

## 2020-02-21 VITALS
HEART RATE: 68 BPM | BODY MASS INDEX: 40.82 KG/M2 | RESPIRATION RATE: 16 BRPM | SYSTOLIC BLOOD PRESSURE: 124 MMHG | WEIGHT: 245 LBS | DIASTOLIC BLOOD PRESSURE: 68 MMHG | OXYGEN SATURATION: 98 % | HEIGHT: 65 IN

## 2020-02-21 PROBLEM — R73.09 ABNORMAL BLOOD SUGAR: Status: ACTIVE | Noted: 2020-02-21

## 2020-02-21 LAB — HBA1C MFR BLD: 7.3 %

## 2020-02-21 PROCEDURE — G8484 FLU IMMUNIZE NO ADMIN: HCPCS | Performed by: FAMILY MEDICINE

## 2020-02-21 PROCEDURE — G8427 DOCREV CUR MEDS BY ELIG CLIN: HCPCS | Performed by: FAMILY MEDICINE

## 2020-02-21 PROCEDURE — 83036 HEMOGLOBIN GLYCOSYLATED A1C: CPT | Performed by: FAMILY MEDICINE

## 2020-02-21 PROCEDURE — 93000 ELECTROCARDIOGRAM COMPLETE: CPT | Performed by: FAMILY MEDICINE

## 2020-02-21 PROCEDURE — G8417 CALC BMI ABV UP PARAM F/U: HCPCS | Performed by: FAMILY MEDICINE

## 2020-02-21 PROCEDURE — 3017F COLORECTAL CA SCREEN DOC REV: CPT | Performed by: FAMILY MEDICINE

## 2020-02-21 PROCEDURE — 1036F TOBACCO NON-USER: CPT | Performed by: FAMILY MEDICINE

## 2020-02-21 PROCEDURE — 99213 OFFICE O/P EST LOW 20 MIN: CPT | Performed by: FAMILY MEDICINE

## 2020-02-21 RX ORDER — DOXEPIN HYDROCHLORIDE 50 MG/G
CREAM TOPICAL
COMMUNITY
Start: 2020-02-19 | End: 2020-04-07

## 2020-02-21 RX ORDER — PANTOPRAZOLE SODIUM 40 MG/1
40 TABLET, DELAYED RELEASE ORAL DAILY
Qty: 30 TABLET | Refills: 3 | Status: SHIPPED
Start: 2020-02-21 | End: 2020-05-15 | Stop reason: SDUPTHER

## 2020-02-21 RX ORDER — ALBUTEROL SULFATE 90 UG/1
2 AEROSOL, METERED RESPIRATORY (INHALATION) EVERY 6 HOURS PRN
Qty: 3 INHALER | Refills: 1 | Status: SHIPPED
Start: 2020-02-21 | End: 2020-06-05 | Stop reason: SDUPTHER

## 2020-02-21 RX ORDER — METOPROLOL TARTRATE 50 MG/1
50 TABLET, FILM COATED ORAL 2 TIMES DAILY
Qty: 60 TABLET | Refills: 0 | Status: SHIPPED
Start: 2020-02-21 | End: 2020-02-21

## 2020-02-21 RX ORDER — POTASSIUM CHLORIDE 750 MG/1
10 TABLET, EXTENDED RELEASE ORAL
Qty: 30 TABLET | Refills: 0 | Status: SHIPPED
Start: 2020-02-21 | End: 2020-04-10 | Stop reason: SDUPTHER

## 2020-02-21 RX ORDER — SUCRALFATE 1 G/1
1 TABLET ORAL 4 TIMES DAILY
Qty: 120 TABLET | Refills: 3 | Status: SHIPPED
Start: 2020-02-21 | End: 2020-06-05 | Stop reason: SDUPTHER

## 2020-02-21 RX ORDER — FLUTICASONE PROPIONATE 50 MCG
2 SPRAY, SUSPENSION (ML) NASAL DAILY
Qty: 1 BOTTLE | Refills: 0 | Status: SHIPPED
Start: 2020-02-21 | End: 2020-12-11 | Stop reason: SDUPTHER

## 2020-02-21 RX ORDER — LIDOCAINE 50 MG/G
OINTMENT TOPICAL
COMMUNITY
Start: 2020-02-19 | End: 2020-04-07

## 2020-02-21 RX ORDER — GABAPENTIN 300 MG/1
300 CAPSULE ORAL 3 TIMES DAILY
Qty: 90 CAPSULE | Refills: 0 | Status: SHIPPED
Start: 2020-02-21 | End: 2020-04-10 | Stop reason: SDUPTHER

## 2020-02-21 RX ORDER — AMLODIPINE BESYLATE 5 MG/1
5 TABLET ORAL DAILY
Qty: 30 TABLET | Refills: 0 | Status: SHIPPED
Start: 2020-02-21 | End: 2020-04-10 | Stop reason: SDUPTHER

## 2020-02-21 RX ORDER — METOPROLOL TARTRATE 37.5 MG/1
37.5 TABLET, FILM COATED ORAL 2 TIMES DAILY
Qty: 60 TABLET | Refills: 2 | Status: SHIPPED
Start: 2020-02-21 | End: 2020-05-15 | Stop reason: SDUPTHER

## 2020-02-21 ASSESSMENT — ENCOUNTER SYMPTOMS
DIARRHEA: 0
SHORTNESS OF BREATH: 0
BACK PAIN: 1
CONSTIPATION: 0
WHEEZING: 0

## 2020-02-21 NOTE — PROGRESS NOTES
2/21/2020    Ekta Gillis is a 60 y.o. female here for   Chief Complaint   Patient presents with    Pre-op Exam    Hypertension    Weight Gain     Concerned about weight gain  Regarding hypertension. Patient is  monitoring home blood pressures. Checks once a day 562 to 472'Z systolic bp's. Never over 140's. Never less 100 since out of the hospital. It has been about 15 months since the time of her stroke. No chest pain. She is having some SOB. She is following with pulmonology. She has outpt appointment with pulm next month  Minor headaches which she attributes to neck arthritis  Cardiovascular risk factors: advanced age (older than 54 for men, 72 for women), dyslipidemia, hypertension, obesity (BMI >= 30 kg/m2), sedentary lifestyle, smoking/ tobacco exposure and prev history of stroke. Patient does not smoke. reports that she quit smoking about 17 months ago. Her smoking use included cigarettes. She has a 64.50 pack-year smoking history. She has never used smokeless tobacco.  Currently on metoprolol 50 mg twice daily, amlodipine 5 mg daily, losartan 100 mg daily, chlorthalidone 25 mg daily. Taking as prescribed. No adverse effects. Today,  BP: 124/68 and she is asymptomatic. BP Readings from Last 3 Encounters:   02/21/20 124/68   02/17/20 90/66   01/17/20 109/62     Patient denies chest pain, diaphoresis, dyspnea, dyspnea on exertion, peripheral edema, palpitations, headache, vision changes. Saw Dr Mikala Rogers for hip arthritis  Tried water arthritis  Was told that she may be candidate for hip replacement  She has upcoming appointment. Would like to proceed if it means that she could walk. She is veyr frustrated by her functional status. Has always been someone who worked and since her stroke has been nable to do much. She has devised ways to be able to clean her house by sitting and using a swiffer to reachcertain areas, etc.   She would like to get preoperative clerance ofr hip surgery.    She (CARAFATE) 1 GM tablet Take 1 tablet by mouth 4 times daily 120 tablet 3    pantoprazole (PROTONIX) 40 MG tablet Take 1 tablet by mouth daily 30 tablet 3    lidocaine (XYLOCAINE) 5 % ointment       doxepin (ZONALON) 5 % cream       diclofenac sodium 1 % GEL        No current facility-administered medications for this visit.          Past Medical/Surgical Hx;  Reviewed with patient         Diagnosis Date    Brain aneurysm 2018    H/O TIMES 2 THAT BURST PER PATIENT    Cerebral artery occlusion with cerebral infarction (HCC)     RT SIDE AFFECTED-LEARNED TO WALK AND WRITE AGAIN    Degenerative arthritis of hand     Headache     Hiatal hernia     Hip problem     NEEDS HIP REPLACEMENT PER PATIENT    Hypertension      Past Surgical History:   Procedure Laterality Date    BRAIN ANEURYSM SURGERY      coiling     COLONOSCOPY  2019    polyps--frank    COLONOSCOPY N/A 2019    COLONOSCOPY POLYPECTOMY SNARE/COLD BIOPSY performed by Veronica Merrill MD at [facility]- DONE AT [address] UPPER GASTROINTESTINAL ENDOSCOPY  2019    gastritis with superficial ulcerations; duodenitis--frank    UPPER GASTROINTESTINAL ENDOSCOPY N/A 2019    EGD BIOPSY performed by Veronica Merrill MD at [facility] ENDOSCOPY       Past Family Hx:  Reviewed with patient      Problem Relation Age of Onset    Diabetes Mother    Christen Bio Arthritis Mother     Atrial Fibrillation Mother     Diabetes Father    Christen Bio Atrial Fibrillation Father     Arthritis Father    Christen Bio Emphysema Father    Christen Bio Cancer Sister        Social Hx:  Reviewed with patient  Social History     Tobacco Use    Smoking status: Former Smoker     Packs/day: 1.50     Years: 43.00     Pack years: 64.50     Types: Cigarettes     Last attempt to quit: 2018     Years since quittin.4    Smokeless tobacco: Never Used   Substance Use Topics    Alcohol use: No       Immunization History Administered Date(s) Administered    Pneumococcal Polysaccharide ([id number]) 07/17/2018    Tdap (Boostrix, Adacel) 08/16/2019       Review of Systems  Review of Systems   Constitutional: Negative for chills, diaphoresis and fever. Eyes: Negative for visual disturbance. Respiratory: Negative for shortness of breath and wheezing. Cardiovascular: Negative for chest pain. Gastrointestinal: Negative for constipation and diarrhea. Musculoskeletal: Positive for arthralgias, back pain, gait problem and neck pain. Neurological: Positive for weakness. Negative for dizziness. Psychiatric/Behavioral: Positive for dysphoric mood. PE:  VS:  /68   Pulse 68   Resp 16   Ht 5' 5\" (1.651 m)   Wt 245 lb (111.1 kg)   SpO2 98%   Breastfeeding No   BMI 40.77 kg/m²   Physical Exam  Constitutional:       Appearance: She is well-developed. HENT:      Head: Normocephalic and atraumatic. Cardiovascular:      Rate and Rhythm: Normal rate and regular rhythm. Heart sounds: No murmur. No friction rub. No gallop. Pulmonary:      Effort: Pulmonary effort is normal.      Breath sounds: Normal breath sounds. No wheezing or rales. Skin:     General: Skin is warm and dry. Neurological:      Mental Status: She is alert and oriented to person, place, and time. Assessment/Plan:  Fitz Schreiber was seen today for pre-op exam, hypertension and weight gain. Diagnoses and all orders for this visit:    Preoperative testing  She has CV risk factors and is unable to acheieve 4 METS . Prior to hip replacment if indicated she should undergo cardiac stres testing especially as she is having intermittent ches tpain though this does not seem to be exertional.    -     NM Cardiac Stress Test Nuclear Imaging; Future  -     Cardiac Stress Test - w/Pharm; Future    Hypertension, unspecified type  Bradycardia on ekg  Reduce dose of metoprolol  -     Discontinue: metoprolol tartrate (LOPRESSOR) 50 MG tablet;  Take 1 tablet by mouth 2 times daily  -     potassium chloride (KLOR-CON M) 10 MEQ extended release tablet; Take 1 tablet by mouth daily (with breakfast)  -     amLODIPine (NORVASC) 5 MG tablet; Take 1 tablet by mouth daily  -     CBC Auto Differential; Future  -     Comprehensive Metabolic Panel; Future  -     EKG 12 Lead  -     NM Cardiac Stress Test Nuclear Imaging; Future  -     Cardiac Stress Test - w/Pharm; Future  -     metoprolol tartrate 37.5 MG TABS; Take 37.5 mg by mouth 2 times daily    Abnormal blood sugar  -     POCT glycosylated hemoglobin (Hb A1C)    Lumbar radiculitis  -     gabapentin (NEURONTIN) 300 MG capsule; Take 1 capsule by mouth 3 times daily for 30 days. Lung disease, interstitial (HCC)  -     fluticasone (FLONASE) 50 MCG/ACT nasal spray; 2 sprays by Each Nostril route daily  -     albuterol sulfate  (90 Base) MCG/ACT inhaler; Inhale 2 puffs into the lungs every 6 hours as needed for Wheezing    Dysphagia, unspecified type  improving  Cont carafate and ppi    -     sucralfate (CARAFATE) 1 GM tablet; Take 1 tablet by mouth 4 times daily  -     pantoprazole (PROTONIX) 40 MG tablet; Take 1 tablet by mouth daily    Screening for HIV without presence of risk factors  -     HIV-1 AND HIV-2 ANTIBODIES; Future    Encounter for hepatitis C screening test for low risk patient  -     HEPATITIS C ANTIBODY; Future    Hip arthritis  Anticipating possible surgery  Needs further evaluation    Chest pain, unspecified type  Check stress test  -     NM Cardiac Stress Test Nuclear Imaging; Future  -     Cardiac Stress Test - w/Pharm; Future    Will schedule future visit to discuss abnormal sugar test/ elevated hemoglobin aic.

## 2020-02-26 ENCOUNTER — OFFICE VISIT (OUTPATIENT)
Dept: ORTHOPEDIC SURGERY | Age: 60
End: 2020-02-26
Payer: COMMERCIAL

## 2020-02-26 ENCOUNTER — OFFICE VISIT (OUTPATIENT)
Dept: SURGERY | Age: 60
End: 2020-02-26
Payer: COMMERCIAL

## 2020-02-26 VITALS
DIASTOLIC BLOOD PRESSURE: 60 MMHG | HEART RATE: 70 BPM | SYSTOLIC BLOOD PRESSURE: 128 MMHG | BODY MASS INDEX: 40.65 KG/M2 | RESPIRATION RATE: 18 BRPM | WEIGHT: 244 LBS | OXYGEN SATURATION: 96 % | TEMPERATURE: 98.1 F | HEIGHT: 65 IN

## 2020-02-26 VITALS — SYSTOLIC BLOOD PRESSURE: 142 MMHG | HEART RATE: 62 BPM | DIASTOLIC BLOOD PRESSURE: 78 MMHG

## 2020-02-26 PROCEDURE — G8427 DOCREV CUR MEDS BY ELIG CLIN: HCPCS | Performed by: ORTHOPAEDIC SURGERY

## 2020-02-26 PROCEDURE — G8484 FLU IMMUNIZE NO ADMIN: HCPCS | Performed by: SURGERY

## 2020-02-26 PROCEDURE — 1036F TOBACCO NON-USER: CPT | Performed by: SURGERY

## 2020-02-26 PROCEDURE — G8484 FLU IMMUNIZE NO ADMIN: HCPCS | Performed by: ORTHOPAEDIC SURGERY

## 2020-02-26 PROCEDURE — 3017F COLORECTAL CA SCREEN DOC REV: CPT | Performed by: SURGERY

## 2020-02-26 PROCEDURE — G8417 CALC BMI ABV UP PARAM F/U: HCPCS | Performed by: SURGERY

## 2020-02-26 PROCEDURE — 99213 OFFICE O/P EST LOW 20 MIN: CPT | Performed by: SURGERY

## 2020-02-26 PROCEDURE — G8427 DOCREV CUR MEDS BY ELIG CLIN: HCPCS | Performed by: SURGERY

## 2020-02-26 PROCEDURE — 1036F TOBACCO NON-USER: CPT | Performed by: ORTHOPAEDIC SURGERY

## 2020-02-26 PROCEDURE — 99212 OFFICE O/P EST SF 10 MIN: CPT | Performed by: ORTHOPAEDIC SURGERY

## 2020-02-26 PROCEDURE — 3017F COLORECTAL CA SCREEN DOC REV: CPT | Performed by: ORTHOPAEDIC SURGERY

## 2020-02-26 PROCEDURE — G8417 CALC BMI ABV UP PARAM F/U: HCPCS | Performed by: ORTHOPAEDIC SURGERY

## 2020-02-26 NOTE — PROGRESS NOTES
Pt here to discuss ongoing use of Prilosec/Carafate. Pt underwent EGD/colonoscopy 8/2019 and was instructed to take Prilosec and Carafate for her symptoms. Pt denies ongoing heartburn. Does report occasional retrosternal pain. Pt reports feeling of fullness/bloating has improved. States she does occasionally have regurgitation/choking of water at times. Pt states she is still taking Prilosec once/day and routinely takes Carafate 2x/day--occasionally she remembers to take 3x/day. Rarely takes it 4x/day. Reviewed medication use and instructions to taper medicines over 3 weeks for each gradual reduction. I explained to reduce Carafate use first to once/day for 3 weeks and if no symptoms, then stop for 3 weeks before reducing Prilosec in similar fashion. Reduce to 20 mg daily for 3 weeks, then 20 mg every other day for 3 weeks, then stop. I instructed pt to increase back to last step if any symptoms develop. She verbalized understanding of taper and agreed to call with any questions.     Keegan Silva MD, FACS  2/26/2020  11:31 AM

## 2020-02-26 NOTE — PATIENT INSTRUCTIONS
Call [phone] for any questions/concerns. Reduce medication use over 3 weeks each step as discussed--call for any questions.

## 2020-03-11 ENCOUNTER — TELEPHONE (OUTPATIENT)
Dept: NON INVASIVE DIAGNOSTICS | Age: 60
End: 2020-03-11

## 2020-03-12 ENCOUNTER — HOSPITAL ENCOUNTER (OUTPATIENT)
Dept: NON INVASIVE DIAGNOSTICS | Age: 60
Discharge: HOME | End: 2020-03-12
Payer: COMMERCIAL

## 2020-03-12 ENCOUNTER — HOSPITAL ENCOUNTER (OUTPATIENT)
Dept: NUCLEAR MEDICINE | Age: 60
Discharge: HOME | End: 2020-03-14
Payer: COMMERCIAL

## 2020-03-12 LAB
LV EF: 73 %
LVEF MODALITY: NORMAL

## 2020-03-12 PROCEDURE — A9500 TC99M SESTAMIBI: HCPCS | Performed by: RADIOLOGY

## 2020-03-12 PROCEDURE — 3430000000 HC RX DIAGNOSTIC RADIOPHARMACEUTICAL: Performed by: RADIOLOGY

## 2020-03-12 PROCEDURE — 93018 CV STRESS TEST I&R ONLY: CPT | Performed by: INTERNAL MEDICINE

## 2020-03-12 PROCEDURE — 78452 HT MUSCLE IMAGE SPECT MULT: CPT

## 2020-03-12 PROCEDURE — 6360000002 HC RX W HCPCS: Performed by: FAMILY MEDICINE

## 2020-03-12 PROCEDURE — 93017 CV STRESS TEST TRACING ONLY: CPT

## 2020-03-12 PROCEDURE — 93016 CV STRESS TEST SUPVJ ONLY: CPT | Performed by: INTERNAL MEDICINE

## 2020-03-12 RX ADMIN — Medication 35 MILLICURIE: at 10:23

## 2020-03-12 RX ADMIN — Medication 10 MILLICURIE: at 08:30

## 2020-03-12 RX ADMIN — REGADENOSON 0.4 MG: 0.08 INJECTION, SOLUTION INTRAVENOUS at 10:06

## 2020-03-16 ENCOUNTER — TELEPHONE (OUTPATIENT)
Dept: FAMILY MEDICINE CLINIC | Age: 60
End: 2020-03-16

## 2020-03-16 NOTE — TELEPHONE ENCOUNTER
- [facility] called to verify what dose of Metoprolol patient is to take.   - last office visit, Jonh Merchant stated patient to DC Metoprolol 50 mg tab, start Metoprolol 37.5.  - I called [facility] spoke with Seema Solomon and advised to DC any refills on the metoprolol 50 mg tab, patient is to take the 37.5 mg.

## 2020-03-17 ENCOUNTER — TELEPHONE (OUTPATIENT)
Dept: FAMILY MEDICINE CLINIC | Age: 60
End: 2020-03-17
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
## 2020-03-19 ENCOUNTER — OFFICE VISIT (OUTPATIENT)
Dept: PAIN MANAGEMENT | Age: 60
End: 2020-03-19
Payer: COMMERCIAL

## 2020-03-19 PROCEDURE — G8484 FLU IMMUNIZE NO ADMIN: HCPCS | Performed by: PHYSICIAN ASSISTANT

## 2020-03-19 PROCEDURE — 99213 OFFICE O/P EST LOW 20 MIN: CPT | Performed by: PHYSICIAN ASSISTANT

## 2020-03-19 PROCEDURE — 3017F COLORECTAL CA SCREEN DOC REV: CPT | Performed by: PHYSICIAN ASSISTANT

## 2020-03-19 PROCEDURE — G8427 DOCREV CUR MEDS BY ELIG CLIN: HCPCS | Performed by: PHYSICIAN ASSISTANT

## 2020-03-19 PROCEDURE — G8417 CALC BMI ABV UP PARAM F/U: HCPCS | Performed by: PHYSICIAN ASSISTANT

## 2020-03-19 PROCEDURE — 1036F TOBACCO NON-USER: CPT | Performed by: PHYSICIAN ASSISTANT

## 2020-03-19 RX ORDER — ACETAMINOPHEN AND CODEINE PHOSPHATE 60; 300 MG/1; MG/1
1 TABLET ORAL 2 TIMES DAILY PRN
Qty: 60 TABLET | Refills: 0 | Status: SHIPPED
Start: 2020-03-19 | End: 2020-03-19 | Stop reason: SDUPTHER

## 2020-03-19 RX ORDER — ACETAMINOPHEN AND CODEINE PHOSPHATE 60; 300 MG/1; MG/1
1 TABLET ORAL 2 TIMES DAILY PRN
Qty: 60 TABLET | Refills: 0 | Status: SHIPPED | OUTPATIENT
Start: 2020-04-17 | End: 2020-05-17

## 2020-03-23 ENCOUNTER — TELEPHONE (OUTPATIENT)
Dept: FAMILY MEDICINE CLINIC | Age: 60
End: 2020-03-23

## 2020-03-23 RX ORDER — GUAIFENESIN 600 MG/1
600 TABLET, EXTENDED RELEASE ORAL 2 TIMES DAILY
Qty: 30 TABLET | Refills: 0 | Status: CANCELLED | OUTPATIENT
Start: 2020-03-23 | End: 2020-04-07

## 2020-04-07 RX ORDER — POTASSIUM CHLORIDE 750 MG/1
10 TABLET, EXTENDED RELEASE ORAL
Qty: 30 TABLET | Refills: 2 | Status: CANCELLED | OUTPATIENT
Start: 2020-04-07

## 2020-04-07 RX ORDER — AMLODIPINE BESYLATE 5 MG/1
5 TABLET ORAL DAILY
Qty: 30 TABLET | Refills: 2 | Status: CANCELLED | OUTPATIENT
Start: 2020-04-07

## 2020-04-07 RX ORDER — DOXEPIN HYDROCHLORIDE 50 MG/G
CREAM TOPICAL
Qty: 180 G | Refills: 0 | Status: SHIPPED
Start: 2020-04-07 | End: 2020-06-16

## 2020-04-07 RX ORDER — LIDOCAINE 50 MG/G
OINTMENT TOPICAL
Qty: 1 TUBE | Refills: 0 | Status: SHIPPED
Start: 2020-04-07 | End: 2020-05-11

## 2020-04-07 RX ORDER — PANTOPRAZOLE SODIUM 20 MG/1
TABLET, DELAYED RELEASE ORAL
Qty: 30 TABLET | Refills: 0 | OUTPATIENT
Start: 2020-04-07

## 2020-04-08 NOTE — TELEPHONE ENCOUNTER
If she has the same cough since January antibiotics may not be appropriate. I would like to speak with her.

## 2020-04-10 ENCOUNTER — VIRTUAL VISIT (OUTPATIENT)
Dept: FAMILY MEDICINE CLINIC | Age: 60
End: 2020-04-10
Payer: COMMERCIAL

## 2020-04-10 PROCEDURE — G8427 DOCREV CUR MEDS BY ELIG CLIN: HCPCS | Performed by: FAMILY MEDICINE

## 2020-04-10 PROCEDURE — 3017F COLORECTAL CA SCREEN DOC REV: CPT | Performed by: FAMILY MEDICINE

## 2020-04-10 PROCEDURE — 99213 OFFICE O/P EST LOW 20 MIN: CPT | Performed by: FAMILY MEDICINE

## 2020-04-10 RX ORDER — POTASSIUM CHLORIDE 750 MG/1
10 TABLET, EXTENDED RELEASE ORAL
Qty: 30 TABLET | Refills: 0 | Status: SHIPPED
Start: 2020-04-10 | End: 2020-05-22

## 2020-04-10 RX ORDER — GABAPENTIN 300 MG/1
300 CAPSULE ORAL 3 TIMES DAILY
Qty: 90 CAPSULE | Refills: 0 | Status: SHIPPED
Start: 2020-04-10 | End: 2020-05-15 | Stop reason: SDUPTHER

## 2020-04-10 RX ORDER — AZITHROMYCIN 250 MG/1
250 TABLET, FILM COATED ORAL SEE ADMIN INSTRUCTIONS
Qty: 6 TABLET | Refills: 0 | Status: SHIPPED | OUTPATIENT
Start: 2020-04-10 | End: 2020-04-15

## 2020-04-10 RX ORDER — AMLODIPINE BESYLATE 5 MG/1
5 TABLET ORAL DAILY
Qty: 30 TABLET | Refills: 0 | Status: SHIPPED
Start: 2020-04-10 | End: 2020-05-15 | Stop reason: SDUPTHER

## 2020-04-10 RX ORDER — GUAIFENESIN 600 MG/1
600 TABLET, EXTENDED RELEASE ORAL 2 TIMES DAILY
Qty: 30 TABLET | Refills: 0 | Status: SHIPPED | OUTPATIENT
Start: 2020-04-10 | End: 2020-04-25

## 2020-04-10 NOTE — PROGRESS NOTES
[facility] Precepting [facility]    Subjective: This Telehealth visit was performed as two-way, audio-video technology platform. Verbal consent was taken from patient as noted in resident's chart. Pt c/o cough   Productive  Hx of COPD  No CP, fever, chills, sore throat  Using inhalers more frequently    Objective:  General appearance: As noted in resident's chart. Home health data if available    Assessment/Plan:  Cough  Likely mild COPD exacerbation  -  Start on Azithromycin  -continue other meds  F/u prn     Attending Physician Statement  I have reviewed the chart, including any radiology or labs. I have discussed the case, including pertinent history with the resident. I agree with the assessment, plan and orders as documented by the resident. Please refer to the resident note for additional information.     Electronically signed by Anastasia Hercules MD on 4/10/20 at 11:11 AM EDT
address concerns as mentioned above. A caregiver was present when appropriate. Due to this being a TeleHealth encounter (During EKGNJ-73 public health emergency), evaluation of the following organ systems was limited: Vitals/Constitutional/EENT/Resp/CV/GI//MS/Neuro/Skin/Heme-Lymph-Imm. Pursuant to the emergency declaration under the [address] [place] [address], [address] [place] [address] authority and the [employer] and [employer] Act, this Virtual Visit was conducted with patient's (and/or legal guardian's) consent, to reduce the patient's risk of exposure to COVID-19 and provide necessary medical care. The patient (and/or legal guardian) has also been advised to contact this office for worsening conditions or problems, and seek emergency medical treatment and/or call 911 if deemed necessary. Services were provided through a video synchronous discussion virtually to substitute for in-person clinic visit. --Erasmo Dougherty MD on 4/12/2020 at 4:23 PM    An electronic signature was used to authenticate this note.

## 2020-04-12 ASSESSMENT — ENCOUNTER SYMPTOMS
GASTROINTESTINAL NEGATIVE: 1
COUGH: 1
NAUSEA: 0
EYES NEGATIVE: 1
CHEST TIGHTNESS: 0
EYE REDNESS: 0
VOMITING: 0
SORE THROAT: 0
SHORTNESS OF BREATH: 1
ALLERGIC/IMMUNOLOGIC NEGATIVE: 1
WHEEZING: 1
CONSTIPATION: 0
ABDOMINAL PAIN: 0

## 2020-04-15 NOTE — PROGRESS NOTES
(KLOR-CON M) 10 MEQ extended release tablet  Take 1 tablet by mouth daily (with breakfast)                   Medications marked \"taking\" at this time  Outpatient Prescriptions Marked as Taking for the 11/16/18 encounter (Office Visit) with LYLY Sena - CNP   Medication Sig Dispense Refill    carBAMazepine (TEGRETOL) 100 MG chewable tablet Take 1 tablet by mouth 3 times daily 90 tablet 0    potassium chloride (KLOR-CON M) 10 MEQ extended release tablet Take 1 tablet by mouth daily (with breakfast) 30 tablet 0    pantoprazole (PROTONIX) 40 MG tablet Take 1 tablet by mouth every morning (before breakfast) 30 tablet 0    celecoxib (CELEBREX) 200 MG capsule Take 1 capsule by mouth daily 30 capsule 0    gabapentin (NEURONTIN) 100 MG capsule Take 2 capsules by mouth 2 times daily for 30 days. . 120 capsule 0    chlorthalidone (HYGROTON) 25 MG tablet Take 1 tablet by mouth daily 30 tablet 0    losartan (COZAAR) 100 MG tablet Take 1 tablet by mouth daily 30 tablet 0    metoprolol tartrate (LOPRESSOR) 50 MG tablet Take 1 tablet by mouth 2 times daily 60 tablet 0    amLODIPine (NORVASC) 5 MG tablet Take 1 tablet by mouth daily 30 tablet 0        Medications patient taking as of now reconciled against medications ordered at time of hospital discharge: Yes    Chief Complaint   Patient presents with    [place] [facility] [place] Subarachnoid bleed. She is here for follow up hospital stay 11/9/18 to 11/12/18. She was admitted for right eye/occipital pain s/p subarachnoid hemorrhage. She reports that they were unable to determine the cause of the pain. They did give her pain medicine and started her on tegretol 100 mg tid. She reports that the medicine helps with the pain, however it makes her very cranky. She is requesting to try something different today. She reports that she did not have an opthamology consult in the hospital - and she does not follow with one on an outpatient basis.
162

## 2020-05-11 RX ORDER — LIDOCAINE 50 MG/G
OINTMENT TOPICAL
Qty: 212.64 G | Refills: 0 | Status: SHIPPED
Start: 2020-05-11 | End: 2021-02-15

## 2020-05-15 ENCOUNTER — HOSPITAL ENCOUNTER (OUTPATIENT)
Age: 60
Discharge: HOME | End: 2020-05-17
Payer: COMMERCIAL

## 2020-05-15 ENCOUNTER — OFFICE VISIT (OUTPATIENT)
Dept: FAMILY MEDICINE CLINIC | Age: 60
End: 2020-05-15
Payer: COMMERCIAL

## 2020-05-15 VITALS
BODY MASS INDEX: 40.98 KG/M2 | HEART RATE: 67 BPM | RESPIRATION RATE: 18 BRPM | WEIGHT: 246 LBS | TEMPERATURE: 98.1 F | OXYGEN SATURATION: 98 % | SYSTOLIC BLOOD PRESSURE: 139 MMHG | HEIGHT: 65 IN | DIASTOLIC BLOOD PRESSURE: 76 MMHG

## 2020-05-15 LAB
ALBUMIN SERPL-MCNC: 4.5 G/DL (ref 3.5–5.2)
ALP BLD-CCNC: 91 U/L (ref 35–104)
ALT SERPL-CCNC: 19 U/L (ref 0–32)
ANION GAP SERPL CALCULATED.3IONS-SCNC: 16 MMOL/L (ref 7–16)
AST SERPL-CCNC: 17 U/L (ref 0–31)
BASOPHILS ABSOLUTE: 0.02 E9/L (ref 0–0.2)
BASOPHILS RELATIVE PERCENT: 0.4 % (ref 0–2)
BILIRUB SERPL-MCNC: 0.3 MG/DL (ref 0–1.2)
BUN BLDV-MCNC: 23 MG/DL (ref 8–23)
CALCIUM SERPL-MCNC: 9.9 MG/DL (ref 8.6–10.2)
CHLORIDE BLD-SCNC: 99 MMOL/L (ref 98–107)
CO2: 25 MMOL/L (ref 22–29)
CREAT SERPL-MCNC: 1 MG/DL (ref 0.5–1)
EOSINOPHILS ABSOLUTE: 0.08 E9/L (ref 0.05–0.5)
EOSINOPHILS RELATIVE PERCENT: 1.4 % (ref 0–6)
GFR AFRICAN AMERICAN: >60
GFR NON-AFRICAN AMERICAN: 56 ML/MIN/1.73
GLUCOSE BLD-MCNC: 92 MG/DL (ref 74–99)
HCT VFR BLD CALC: 44.2 % (ref 34–48)
HEMOGLOBIN: 14.2 G/DL (ref 11.5–15.5)
IMMATURE GRANULOCYTES #: 0.02 E9/L
IMMATURE GRANULOCYTES %: 0.4 % (ref 0–5)
LYMPHOCYTES ABSOLUTE: 1.9 E9/L (ref 1.5–4)
LYMPHOCYTES RELATIVE PERCENT: 33.6 % (ref 20–42)
MCH RBC QN AUTO: 30 PG (ref 26–35)
MCHC RBC AUTO-ENTMCNC: 32.1 % (ref 32–34.5)
MCV RBC AUTO: 93.2 FL (ref 80–99.9)
MONOCYTES ABSOLUTE: 0.39 E9/L (ref 0.1–0.95)
MONOCYTES RELATIVE PERCENT: 6.9 % (ref 2–12)
NEUTROPHILS ABSOLUTE: 3.25 E9/L (ref 1.8–7.3)
NEUTROPHILS RELATIVE PERCENT: 57.3 % (ref 43–80)
PDW BLD-RTO: 12.7 FL (ref 11.5–15)
PLATELET # BLD: 238 E9/L (ref 130–450)
PMV BLD AUTO: 9.4 FL (ref 7–12)
POTASSIUM SERPL-SCNC: 3.8 MMOL/L (ref 3.5–5)
RBC # BLD: 4.74 E12/L (ref 3.5–5.5)
SODIUM BLD-SCNC: 140 MMOL/L (ref 132–146)
TOTAL PROTEIN: 7.3 G/DL (ref 6.4–8.3)
WBC # BLD: 5.7 E9/L (ref 4.5–11.5)

## 2020-05-15 PROCEDURE — 3017F COLORECTAL CA SCREEN DOC REV: CPT | Performed by: FAMILY MEDICINE

## 2020-05-15 PROCEDURE — G8417 CALC BMI ABV UP PARAM F/U: HCPCS | Performed by: FAMILY MEDICINE

## 2020-05-15 PROCEDURE — 99214 OFFICE O/P EST MOD 30 MIN: CPT | Performed by: FAMILY MEDICINE

## 2020-05-15 PROCEDURE — 86703 HIV-1/HIV-2 1 RESULT ANTBDY: CPT

## 2020-05-15 PROCEDURE — 86803 HEPATITIS C AB TEST: CPT

## 2020-05-15 PROCEDURE — 80053 COMPREHEN METABOLIC PANEL: CPT

## 2020-05-15 PROCEDURE — 85025 COMPLETE CBC W/AUTO DIFF WBC: CPT

## 2020-05-15 PROCEDURE — 1036F TOBACCO NON-USER: CPT | Performed by: FAMILY MEDICINE

## 2020-05-15 PROCEDURE — G8427 DOCREV CUR MEDS BY ELIG CLIN: HCPCS | Performed by: FAMILY MEDICINE

## 2020-05-15 RX ORDER — CHLORTHALIDONE 25 MG/1
25 TABLET ORAL DAILY
Qty: 30 TABLET | Refills: 3 | Status: SHIPPED
Start: 2020-05-15 | End: 2020-09-17 | Stop reason: SDUPTHER

## 2020-05-15 RX ORDER — METOPROLOL TARTRATE 37.5 MG/1
37.5 TABLET, FILM COATED ORAL 2 TIMES DAILY
Qty: 60 TABLET | Refills: 3 | Status: SHIPPED
Start: 2020-05-15 | End: 2020-09-17 | Stop reason: SDUPTHER

## 2020-05-15 RX ORDER — GABAPENTIN 300 MG/1
300 CAPSULE ORAL 3 TIMES DAILY
Qty: 90 CAPSULE | Refills: 2 | Status: SHIPPED
Start: 2020-05-15 | End: 2020-08-14 | Stop reason: SDUPTHER

## 2020-05-15 RX ORDER — PANTOPRAZOLE SODIUM 40 MG/1
40 TABLET, DELAYED RELEASE ORAL DAILY
Qty: 30 TABLET | Refills: 3 | Status: SHIPPED
Start: 2020-05-15 | End: 2020-09-08

## 2020-05-15 RX ORDER — LOSARTAN POTASSIUM 100 MG/1
100 TABLET ORAL DAILY
Qty: 30 TABLET | Refills: 3 | Status: SHIPPED
Start: 2020-05-15 | End: 2020-09-17 | Stop reason: SDUPTHER

## 2020-05-15 RX ORDER — AMLODIPINE BESYLATE 5 MG/1
5 TABLET ORAL DAILY
Qty: 30 TABLET | Refills: 3 | Status: SHIPPED
Start: 2020-05-15 | End: 2020-09-08

## 2020-05-15 NOTE — PROGRESS NOTES
5/15/2020    Chapo Rasmussen is a 63 y.o. female here for   Chief Complaint   Patient presents with    Hypertension    Gastroesophageal Reflux     restarted Carafate     Regarding hypertension. Patient is not monitoring home blood pressures. Cardiovascular risk factors: advanced age (older than 54 for men, 72 for women), dyslipidemia, hypertension, obesity (BMI >= 30 kg/m2), sedentary lifestyle, smoking/ tobacco exposure and prev SAH. Patient does not smoke. reports that she quit smoking about 20 months ago. Her smoking use included cigarettes. She has a 64.50 pack-year smoking history. She has never used smokeless tobacco. her son does smoke   Currently on amlodipine 5 mg daily, metoprolol 27.5 mg twice dialy, chlorthalidone 25 mg dialy, losartan 100 mg daily (also potassium 10 meq daily ) . Taking as prescribed. No adverse effects. Today,  BP: 139/76 and she is asymptomatic. BP Readings from Last 3 Encounters:   05/15/20 139/76   20 128/60   20 (!) 142/78     Patient denies chest pain, diaphoresis, peripheral edema, palpitations, headache, vision changes. Still having some chronic cough   Had temporary improvement with azithromycin  Has some discomfort across chest .   Has this discomfort on and off  Then will have a wet cough  Occasionally productive cough  Will have some wheezing associated with it    Needs a new pain clinic  Son had some conflict with one of the workers. At end of visit, pt inquired \"do I have cancer\"  She has been ahvign left hip pain. She has known osteoarthritis. She has a cousin who was having back and hip pain, was found to have metastatic cancer and  within a few months of diagnosis. Reviewed health maintenance. She has not had recent cervical cancer screening  She has had to have \"scraping\" for abnormal cells but was told that it was fine on f/u  Requests a referral to gyne.    Has had appointments for pap scheduled at my office before that were not done for various reasons. No abnormal bleeding  No night sweats  No unexpeted weight loss. Wt Readings from Last 3 Encounters:   05/15/20 246 lb (111.6 kg)   02/26/20 244 lb (110.7 kg)   02/21/20 245 lb (111.1 kg)       Allergies   Allergen Reactions    Latex Hives     Hives and rash    Iodine Rash     Hives . pt. States anaphalyxis    Aloe Hives     Hives     Celebrex [Celecoxib] Itching    Fish-Derived Products Other (See Comments)       Medications  Current Outpatient Medications   Medication Sig Dispense Refill    diclofenac sodium (VOLTAREN) 1 % GEL APPLY ONE (1) GRAM EXTERNALLY TWICE DAILY TO NECK AND ONE (1) GRAM EXTERNALLY TWICE DAILY TO BACK  100 g 0    lidocaine (XYLOCAINE) 5 % ointment APPLY ONE GRAM EXTERNALLY FOUR TIMES A DAY TO NECK AND ONE GRAM EXTERNALLY FOUR TIMES A DAY TO BACK  212.64 g 0    potassium chloride (KLOR-CON M) 10 MEQ extended release tablet Take 1 tablet by mouth daily (with breakfast) 30 tablet 0    amLODIPine (NORVASC) 5 MG tablet Take 1 tablet by mouth daily 30 tablet 0    gabapentin (NEURONTIN) 300 MG capsule Take 1 capsule by mouth 3 times daily for 30 days. 90 capsule 0    doxepin (ZONALON) 5 % cream APPLY ONE (1) GRAM EXTERNALLY THREE TIMES A DAY TO NECK AND ONE (1) GRAM THREE TIMES A DAY TO BACK 180 g 0    acetaminophen-codeine (TYLENOL #4) 300-60 MG per tablet Take 1 tablet by mouth 2 times daily as needed for Pain for up to 30 days. 60 tablet 0    Misc.  Devices (ROLLATOR) MISC 1 each by Does not apply route daily as needed (use as needed for stability) 1 each 0    fluticasone (FLONASE) 50 MCG/ACT nasal spray 2 sprays by Each Nostril route daily 1 Bottle 0    albuterol sulfate  (90 Base) MCG/ACT inhaler Inhale 2 puffs into the lungs every 6 hours as needed for Wheezing 3 Inhaler 1    sucralfate (CARAFATE) 1 GM tablet Take 1 tablet by mouth 4 times daily 120 tablet 3    pantoprazole (PROTONIX) 40 MG tablet Take 1 tablet by mouth daily 30 tablet 3 tobacco: Never Used   Substance Use Topics    Alcohol use: No       Immunization History   Administered Date(s) Administered    Pneumococcal Polysaccharide ([id number]) 07/17/2018    Tdap (Boostrix, Adacel) 08/16/2019       Review of Systems  Review of Systems    PE:  VS:  /76   Pulse 67   Temp 98.1 °F (36.7 °C) (Temporal)   Resp 18   Ht 5' 5\" (1.651 m)   Wt 246 lb (111.6 kg)   SpO2 98%   Breastfeeding No   BMI 40.94 kg/m²   Physical Exam    Assessment/Plan:  Haroldo Gibson was seen today for hypertension, gastroesophageal reflux and chronic pain. Diagnoses and all orders for this visit:    History of abnormal cervical Pap smear  Discussed importance of age appropriate cancer screening  Would start with this  If hip pain persists we can consider further imaging  Refer to gyne fo rpap  -     ([facility]) - Marco Valdez DO, OB/GYN, [place] ([place])    Hypertension, unspecified type  At goal  -     amLODIPine (NORVASC) 5 MG tablet; Take 1 tablet by mouth daily  -     chlorthalidone (HYGROTON) 25 MG tablet; Take 1 tablet by mouth daily  -     losartan (COZAAR) 100 MG tablet; Take 1 tablet by mouth daily  -     Metoprolol Tartrate 37.5 MG TABS; Take 37.5 mg by mouth 2 times daily    Dysphagia, unspecified type  Cont PPI  May need repeat egd though this was done in 2019 and it was normal  -     pantoprazole (PROTONIX) 40 MG tablet; Take 1 tablet by mouth daily    Lumbar radiculitis  Cont gabapetin  Refer to pain management  -     gabapentin (NEURONTIN) 300 MG capsule; Take 1 capsule by mouth 3 times daily for 30 days. -     [facility] Pain Medicine Reading    Spent 25 minutes in direct patient care with patient of which greater than 50% was spent counseling and/or coordinating care regarding the above issues/ diagnoses      F/u in 3 months    Advised patient to call with any new medication issues. Allquestions answered.   Call or go to ED immediately if symptoms worsen or persist.

## 2020-05-18 LAB
HEPATITIS C ANTIBODY INTERPRETATION: NORMAL
HIV-1 AND HIV-2 ANTIBODIES: NORMAL

## 2020-05-18 RX ORDER — LIDOCAINE AND PRILOCAINE 25; 25 MG/G; MG/G
CREAM TOPICAL
Qty: 30 G | Refills: 0 | Status: SHIPPED
Start: 2020-05-18 | End: 2020-07-27

## 2020-05-20 ENCOUNTER — VIRTUAL VISIT (OUTPATIENT)
Dept: PAIN MANAGEMENT | Age: 60
End: 2020-05-20
Payer: COMMERCIAL

## 2020-05-20 PROCEDURE — 99213 OFFICE O/P EST LOW 20 MIN: CPT | Performed by: PHYSICIAN ASSISTANT

## 2020-05-20 RX ORDER — ACETAMINOPHEN AND CODEINE PHOSPHATE 60; 300 MG/1; MG/1
1 TABLET ORAL DAILY PRN
Qty: 30 TABLET | Refills: 1 | Status: SHIPPED
Start: 2020-05-20 | End: 2020-06-15 | Stop reason: SDUPTHER

## 2020-05-22 RX ORDER — POTASSIUM CHLORIDE 750 MG/1
TABLET, EXTENDED RELEASE ORAL
Qty: 30 TABLET | Refills: 3 | Status: SHIPPED
Start: 2020-05-22 | End: 2020-09-17 | Stop reason: SDUPTHER

## 2020-05-22 NOTE — TELEPHONE ENCOUNTER
Last Appointment   5/15/2020  Next Appointment  8/7/2020    Patient forgot to request this refill at last appt.   Med pending drs approval.

## 2020-06-05 RX ORDER — CARBAMAZEPINE 100 MG/1
TABLET, EXTENDED RELEASE ORAL
Qty: 90 TABLET | Refills: 3 | Status: SHIPPED
Start: 2020-06-05 | End: 2020-09-17 | Stop reason: SDUPTHER

## 2020-06-05 RX ORDER — SUCRALFATE 1 G/1
1 TABLET ORAL 4 TIMES DAILY
Qty: 120 TABLET | Refills: 0 | Status: SHIPPED
Start: 2020-06-05 | End: 2020-06-12

## 2020-06-05 RX ORDER — ALBUTEROL SULFATE 90 UG/1
2 AEROSOL, METERED RESPIRATORY (INHALATION) EVERY 6 HOURS PRN
Qty: 3 INHALER | Refills: 1 | Status: SHIPPED
Start: 2020-06-05 | End: 2020-11-03

## 2020-06-05 NOTE — TELEPHONE ENCOUNTER
Last Appointment   5/15/2020  Next Appointment  8/7/2020    Refill was not given at last appt. Patient left message requesting refill for carbamazepine, albuterol inhaler and sucralfate.   Meds pending drs approval.

## 2020-06-12 RX ORDER — SUCRALFATE 1 G/1
TABLET ORAL
Qty: 120 TABLET | Refills: 0 | Status: SHIPPED
Start: 2020-06-12 | End: 2021-03-19 | Stop reason: SDUPTHER

## 2020-06-15 ENCOUNTER — OFFICE VISIT (OUTPATIENT)
Dept: PAIN MANAGEMENT | Age: 60
End: 2020-06-15
Payer: COMMERCIAL

## 2020-06-15 VITALS
HEART RATE: 76 BPM | SYSTOLIC BLOOD PRESSURE: 122 MMHG | DIASTOLIC BLOOD PRESSURE: 68 MMHG | BODY MASS INDEX: 38.32 KG/M2 | HEIGHT: 65 IN | RESPIRATION RATE: 16 BRPM | WEIGHT: 230 LBS | TEMPERATURE: 97.8 F

## 2020-06-15 PROCEDURE — 1036F TOBACCO NON-USER: CPT | Performed by: PHYSICIAN ASSISTANT

## 2020-06-15 PROCEDURE — 99213 OFFICE O/P EST LOW 20 MIN: CPT | Performed by: PHYSICIAN ASSISTANT

## 2020-06-15 PROCEDURE — G8427 DOCREV CUR MEDS BY ELIG CLIN: HCPCS | Performed by: PHYSICIAN ASSISTANT

## 2020-06-15 PROCEDURE — 3017F COLORECTAL CA SCREEN DOC REV: CPT | Performed by: PHYSICIAN ASSISTANT

## 2020-06-15 PROCEDURE — G8417 CALC BMI ABV UP PARAM F/U: HCPCS | Performed by: PHYSICIAN ASSISTANT

## 2020-06-15 RX ORDER — ACETAMINOPHEN AND CODEINE PHOSPHATE 60; 300 MG/1; MG/1
1 TABLET ORAL DAILY PRN
Qty: 30 TABLET | Refills: 0 | Status: SHIPPED
Start: 2020-07-19 | End: 2020-07-15 | Stop reason: SDUPTHER

## 2020-06-15 NOTE — PROGRESS NOTES
[facility]  [address] [facility], [address] [place] [address] Chad  [phone]    Follow up Note      Bob Maravilla     Date of Visit:  2/17/2020    CC:  Patient presents for follow up   Chief Complaint   Patient presents with    Follow-up     hips, lower back       HPI:    Pain is worse. B/L hips and back are the most painful today. Neck pain is intermittent. Having a good day. Appropriate analgesia with current medications regimen: yes. Change in quality of symptoms:no. Medication side effects:none. Recent diagnostic testing:none. Recent interventional procedures:none. She has not been on anticoagulation medications to include none. The patient  has not been on herbal supplements. The patient is not diabetic.     Imaging studies:    Cervical XR 11/2019 Severe degenerative changes      Lumbar spine Xray 03/2019  Scoliosis and osteoarthritis.     Bilateral hip Xray 03/2019   Advanced osteoarthrosis of bilateral hips.                                         Potential Aberrant Drug-Related Behavior: None     Urine Drug Screening:  First office visit saliva screen showed no narcotics   11/2019 Consistent, negative for all     OARRS report:  03/2019 consistent to 02/2020 Consistent         Past Medical History:   Diagnosis Date    Brain aneurysm 09/2018    H/O TIMES 2 THAT BURST PER PATIENT    Cerebral artery occlusion with cerebral infarction ([facility] [address]     RT SIDE AFFECTED-LEARNED TO WALK AND WRITE AGAIN    Degenerative arthritis of hand     Headache     Hiatal hernia     Hip problem     NEEDS HIP REPLACEMENT PER PATIENT    Hypertension        Past Surgical History:   Procedure Laterality Date    BRAIN ANEURYSM SURGERY      coiling     COLONOSCOPY  08/30/2019    polyps--frank    COLONOSCOPY N/A 8/30/2019    COLONOSCOPY POLYPECTOMY SNARE/COLD BIOPSY performed by Sergei Cade MD at [address] [place] [address]  Aloe Hives     Hives     Celebrex [Celecoxib] Itching    Fish-Derived Products Other (See Comments)       Social History     Socioeconomic History    Marital status:      Spouse name: Not on file    Number of children: Not on file    Years of education: Not on file    Highest education level: Not on file   Occupational History    Not on file   Social Needs    Financial resource strain: Not on file    Food insecurity:     Worry: Not on file     Inability: Not on file    Transportation needs:     Medical: Not on file     Non-medical: Not on file   Tobacco Use    Smoking status: Former Smoker     Packs/day: 1.50     Years: 43.00     Pack years: 64.50     Types: Cigarettes     Last attempt to quit: 2018     Years since quittin.4    Smokeless tobacco: Never Used   Substance and Sexual Activity    Alcohol use: No    Drug use: No    Sexual activity: Not Currently   Lifestyle    Physical activity:     Days per week: Not on file     Minutes per session: Not on file    Stress: Not on file   Relationships    Social connections:     Talks on phone: Not on file     Gets together: Not on file     Attends Sikhism service: Not on file     Active member of club or organization: Not on file     Attends meetings of clubs or organizations: Not on file     Relationship status: Not on file    Intimate partner violence:     Fear of current or ex partner: Not on file     Emotionally abused: Not on file     Physically abused: Not on file     Forced sexual activity: Not on file   Other Topics Concern    Not on file   Social History Narrative    Not on file       Family History   Problem Relation Age of Onset    Diabetes Mother     Arthritis Mother     Atrial Fibrillation Mother     Diabetes Father     Atrial Fibrillation Father     Arthritis Father     Emphysema Father     Cancer Sister        REVIEW OF SYSTEMS:     Sproul Loss denies fever/chills, chest pain, shortness of breath, new bowel or Airway patent. Nasal mucosa clear. Mouth with normal mucosa. Throat has no vesicles, no oropharyngeal exudates and uvula is midline.

## 2020-06-15 NOTE — PROGRESS NOTES
8/30/2019    COLONOSCOPY POLYPECTOMY SNARE/COLD BIOPSY performed by Merrill Francois MD at [address] ENDOSCOPY  08/30/2019    gastritis with superficial ulcerations; duodenitis--frank    UPPER GASTROINTESTINAL ENDOSCOPY N/A 8/30/2019    EGD BIOPSY performed by Merrill Francois MD at [address]       Prior to Admission medications    Medication Sig Start Date End Date Taking? Authorizing Provider   sucralfate (CARAFATE) 1 GM tablet TAKE ONE TABLET BY MOUTH FOUR TIMES A DAY 6/12/20  Yes Andre Charles MD   carBAMazepine (TEGRETOL XR) 100 MG extended release tablet TAKE ONE TABLET BY MOUTH THREE TIMES A DAY 6/5/20  Yes Andre Charles MD   albuterol sulfate  (90 Base) MCG/ACT inhaler Inhale 2 puffs into the lungs every 6 hours as needed for Wheezing 6/5/20  Yes Andre Charles MD   potassium chloride (KLOR-CON M) 10 MEQ extended release tablet TAKE ONE TABLET BY MOUTH DAILY WITH BREAKFAST 5/22/20  Yes Andre Charles MD   acetaminophen-codeine (TYLENOL #4) 300-60 MG per tablet Take 1 tablet by mouth daily as needed for Pain for up to 30 days.  5/20/20 6/19/20 Yes SPENSER Yost   lidocaine-prilocaine (EMLA) 2.5-2.5 % cream APPLY ONE GRAM EXTERNALLY THREE TIMES A DAY IF NEEDED-MUST LAST 30 DAYS  5/18/20  Yes SPENSER Yost   amLODIPine (NORVASC) 5 MG tablet Take 1 tablet by mouth daily 5/15/20  Yes Andre Charles MD   chlorthalidone (HYGROTON) 25 MG tablet Take 1 tablet by mouth daily 5/15/20  Yes Andre Charles MD   losartan (COZAAR) 100 MG tablet Take 1 tablet by mouth daily 5/15/20  Yes Andre Charles MD   Metoprolol Tartrate 37.5 MG TABS Take 37.5 mg by mouth 2 times daily 5/15/20  Yes Andre Charles MD   pantoprazole (PROTONIX) 40 MG tablet Take 1 tablet by mouth daily 5/15/20  Yes Andre Charles MD   diclofenac sodium (VOLTAREN) 1 % GEL APPLY ONE (1) GRAM EXTERNALLY TWICE DAILY TO NECK AND ONE (1) GRAM EXTERNALLY TWICE DAILY TO BACK  20  Yes SPENSER Ashley   lidocaine (XYLOCAINE) 5 % ointment APPLY ONE GRAM EXTERNALLY FOUR TIMES A DAY TO NECK AND ONE GRAM EXTERNALLY FOUR TIMES A DAY TO BACK  20  Yes SPENSER Ashley   doxepin (ZONALON) 5 % cream APPLY ONE (1) GRAM EXTERNALLY THREE TIMES A DAY TO NECK AND ONE (1) GRAM THREE TIMES A DAY TO BACK 20  Yes SPENSER Ashley   Misc. Devices (ROLLATOR) MISC 1 each by Does not apply route daily as needed (use as needed for stability) 3/17/20  Yes Theresa Coleman MD   fluticasone (FLONASE) 50 MCG/ACT nasal spray 2 sprays by Each Nostril route daily 20  Yes Theresa Coleman MD   gabapentin (NEURONTIN) 300 MG capsule Take 1 capsule by mouth 3 times daily for 30 days. 5/15/20 6/14/20  Theresa Coleman MD       Allergies   Allergen Reactions    Latex Hives     Hives and rash    Iodine Rash     Hives . pt.  States anaphalyxis    Aloe Hives     Hives     Celebrex [Celecoxib] Itching    Fish-Derived Products Other (See Comments)       Social History     Socioeconomic History    Marital status:      Spouse name: Not on file    Number of children: Not on file    Years of education: Not on file    Highest education level: Not on file   Occupational History    Not on file   Social Needs    Financial resource strain: Not on file    Food insecurity     Worry: Not on file     Inability: Not on file   [employer] needs     Medical: Not on file     Non-medical: Not on file   Tobacco Use    Smoking status: Former Smoker     Packs/day: 1.50     Years: 43.00     Pack years: 64.50     Types: Cigarettes     Last attempt to quit: 2018     Years since quittin.7    Smokeless tobacco: Never Used   Substance and Sexual Activity    Alcohol use: No    Drug use: No    Sexual activity: Not Currently   Lifestyle    Physical activity     Days per week: Not on file     Minutes nodules and good skin turgor    Impression:    Patient seen for follow up for her chronic bilateral hip pain and low back pain due to severe degenerative changes and axial c/o neck pain   Would benefit from Cervical MBB, patient uninterested   Patient has seen Dr. Geo Varghese and Dr. 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 Continue Gabapentin 300 mg TID per PCP  Continue Tylenol #4 QD prn  Compounding cream. Helping a lot. OARRS report reviewed 06/2020  Buccal ordered today  Patient encouraged to remain active as tolerated   Treatment plan discussed with the patient including medications and side effects     Controlled Substance Monitoring:    Acute and Chronic Pain Monitoring:   RX Monitoring 6/15/2020   Periodic Controlled Substance Monitoring Possible medication side effects, risk of tolerance/dependence & alternative treatments discussed. ;No signs of potential drug abuse or diversion identified. ;Assessed functional status.                        Plan:    We discussed with the patient that combining opioids, benzodiazepines, alcohol, illicit drugs or sleep aids increases the risk of respiratory depression including death. We discussed that these medications may cause drowsiness, sedation or dizziness and have counseled the patient not to drive or operate machinery. We have discussed that these medications will be prescribed only by one provider. We have discussed with the patient about age related risk factors and have thoroughly discussed the importance of taking these medications as prescribed. The patient verbalizes understanding.     ccreferring physic

## 2020-06-16 RX ORDER — DOXEPIN HYDROCHLORIDE 50 MG/G
CREAM TOPICAL
Qty: 180 G | Refills: 1 | Status: SHIPPED
Start: 2020-06-16 | End: 2020-09-29

## 2020-06-16 RX ORDER — DOXEPIN HYDROCHLORIDE 50 MG/G
CREAM TOPICAL
Qty: 180 G | Refills: 0 | Status: CANCELLED | OUTPATIENT
Start: 2020-06-16

## 2020-06-16 RX ORDER — LIDOCAINE 50 MG/G
OINTMENT TOPICAL
Qty: 212.64 G | Refills: 0 | Status: CANCELLED | OUTPATIENT
Start: 2020-06-16

## 2020-07-07 ENCOUNTER — VIRTUAL VISIT (OUTPATIENT)
Dept: PULMONOLOGY | Age: 60
End: 2020-07-07
Payer: COMMERCIAL

## 2020-07-07 PROCEDURE — 99443 PR PHYS/QHP TELEPHONE EVALUATION 21-30 MIN: CPT | Performed by: INTERNAL MEDICINE

## 2020-07-07 NOTE — PROGRESS NOTES
Department of Internal Medicine  Division of Pulmonary, Critical Care & Sleep Medicine  Pulmonary [address]                                             Pulmonary Clinic Consult     I had the pleasure of seeing  Feliciano Nuno in the [address] regarding their COPD       Chief Complaint   Patient presents with    New Patient     Referral for COPD; COVID reschedule   sob    HISTORY OF PRESENT ILLNESS:    Feliciano Nuno is a 63y.o. year old  Who start smoking at age 14 And increase gradually 1.4 TO 2 pack and she quit 2018     The Patient comes in with SOB that has been going on  2-3 years Associated with cough and it is occasional ,She  states that it get worse with exercise or walking long distance and he can walk . 1 block and she was told she has emphysema . And go 1-light of stairs before get short winded    She  has cough ,productive for clear-yellow   Sputum and it is more in the       She use albuterol as needed and she states 3-4 times     She states that she can breath well     Sleep history :  Snoring no   Feel tired during the day no   Wake up fresh no   excessive daytime sleepiness no             ALLERGIES:    Allergies   Allergen Reactions    Latex Hives     Hives and rash    Iodine Rash     Hives . pt.  States anaphalyxis    Aloe Hives     Hives     Celebrex [Celecoxib] Itching    Fish-Derived Products Other (See Comments)       PAST MEDICAL HISTORY:       Diagnosis Date    Brain aneurysm 09/2018    H/O TIMES 2 THAT BURST PER PATIENT    Cerebral artery occlusion with cerebral infarction (HCC)     RT SIDE AFFECTED-LEARNED TO WALK AND WRITE AGAIN    Degenerative arthritis of hand     Headache     Hiatal hernia     Hip problem     NEEDS HIP REPLACEMENT PER PATIENT    Hx of abnormal cervical Pap smear     Hypertension     Stroke (cerebrum) (HCC)        MEDICATIONS:   Current Outpatient Medications   Medication Sig Dispense Refill    doxepin (ZONALON) 5 % cream APPLY ONE (1) GRAM EXTERNALLY THREE TIMES A DAY TO NECK AND ONE (1) GRAM THREE TIMES A DAY TO BACK  180 g 1    [START ON 7/19/2020] acetaminophen-codeine (TYLENOL #4) 300-60 MG per tablet Take 1 tablet by mouth daily as needed for Pain for up to 30 days. 30 tablet 0    sucralfate (CARAFATE) 1 GM tablet TAKE ONE TABLET BY MOUTH FOUR TIMES A  tablet 0    carBAMazepine (TEGRETOL XR) 100 MG extended release tablet TAKE ONE TABLET BY MOUTH THREE TIMES A DAY 90 tablet 3    albuterol sulfate  (90 Base) MCG/ACT inhaler Inhale 2 puffs into the lungs every 6 hours as needed for Wheezing 3 Inhaler 1    potassium chloride (KLOR-CON M) 10 MEQ extended release tablet TAKE ONE TABLET BY MOUTH DAILY WITH BREAKFAST 30 tablet 3    lidocaine-prilocaine (EMLA) 2.5-2.5 % cream APPLY ONE GRAM EXTERNALLY THREE TIMES A DAY IF NEEDED-MUST LAST 30 DAYS  30 g 0    amLODIPine (NORVASC) 5 MG tablet Take 1 tablet by mouth daily 30 tablet 3    chlorthalidone (HYGROTON) 25 MG tablet Take 1 tablet by mouth daily 30 tablet 3    losartan (COZAAR) 100 MG tablet Take 1 tablet by mouth daily 30 tablet 3    Metoprolol Tartrate 37.5 MG TABS Take 37.5 mg by mouth 2 times daily 60 tablet 3    gabapentin (NEURONTIN) 300 MG capsule Take 1 capsule by mouth 3 times daily for 30 days. 90 capsule 2    pantoprazole (PROTONIX) 40 MG tablet Take 1 tablet by mouth daily 30 tablet 3    diclofenac sodium (VOLTAREN) 1 % GEL APPLY ONE (1) GRAM EXTERNALLY TWICE DAILY TO NECK AND ONE (1) GRAM EXTERNALLY TWICE DAILY TO BACK  100 g 0    lidocaine (XYLOCAINE) 5 % ointment APPLY ONE GRAM EXTERNALLY FOUR TIMES A DAY TO NECK AND ONE GRAM EXTERNALLY FOUR TIMES A DAY TO BACK  212.64 g 0    Misc.  Devices (ROLLATOR) MISC 1 each by Does not apply route daily as needed (use as needed for stability) 1 each 0    fluticasone (FLONASE) 50 MCG/ACT nasal spray 2 sprays by Each Nostril route daily 1 Bottle 0     No current facility-administered medications for this visit. SOCIAL AND OCCUPATIONAL HEALTH:  Social History     Tobacco Use   Smoking Status Former Smoker    Packs/day: 1.50    Years: 43.00    Pack years: 64.50    Types: Cigarettes    Last attempt to quit: 2018    Years since quittin.8   Smokeless Tobacco Never Used     TB:No   Pets Cats   Industrial exposure:  Birds :no     SURGICAL HISTORY:   Past Surgical History:   Procedure Laterality Date    BRAIN ANEURYSM SURGERY      coiling     COLONOSCOPY  2019    polyps--frank    COLONOSCOPY N/A 2019    COLONOSCOPY POLYPECTOMY SNARE/COLD BIOPSY performed by Nathanael Royal MD at [address]- DONE  [facility] [facility] [address] ENDOSCOPY  2019    gastritis with superficial ulcerations; duodenitis--frank    UPPER GASTROINTESTINAL ENDOSCOPY N/A 2019    EGD BIOPSY performed by Nathanael Royal MD at [address]:   Lung cancer:  DVT or PE     REVIEW OF SYSTEMS:  Constitutional: General health is good . There has been no weight changes. No fevers, fatigue or weakness. Head: Patient denies any history of trauma, convulsive disorder or syncope. Skin:  Patient denies history of changes in pigmentation, eruptions or pruritus. No easy bruising or bleeding. EENT: no nasal congestion   Cardiovascular ,No chest pain ,No edema ,  Respiration:SOB:+  ,BELCHER :+  Gastrointestinal:No GI bleed ,no melena  ,no hematemesis    Musculoskeletal: no joint pain ,no swelling  Neurological:no , syncope. Denies twitching, convulsions, loss of consciousness or memory. Endocrine:  . No history of goiter, exophthalmos or dryness of skin. The patient has no history of diabetes. Hematopoietic:  No history of bleeding disorders or easy bruising.   Rheumatic:  No connective tissue disease history or polyarthritis/inflammatory joint disease. PHYSICAL EXAMINATION:  There were no vitals filed for this visit. This is phone visit     . Neurological/Psychiatric: The patient's general behavior, level of consciousness, thought content and emotional status is normal.  Cranial nerves II-XII are intact. DATA:   Will get PFT     IMPRESSION:    1-Possible COPD  2-emphysema   3-lung nodule /scar   4-Hip pain       PLAN:      patient with long history of smoking ,will get PFT    -Patient had CAT scan done in October 2019 showing some cystic changes with scarring/lung nodule will get her to get follow-up CAT scan in a 1 year. Between the 2 CAT scans  -Continue albuterol as needed for now depends on the PFT and her symptoms we might add LAMA   in terms of her hip surgery she is high risk giving her year of smoking COPD/emphysema she needed the surgery and she understand the risk and she is willing to proceed    We will see her after PFT and CAT scan and decide about further work-up    Flu and Pneumovax as per primary    Thank you for [employer] allowing me to participate in [facility]. I will keep following with you ,should you have any concerns ,please contact me at [phone]     Sincerely,        Denny Chiu MD  Pulmonary & Critical Care Medicine     NOTE: This report was transcribed using voice recognition software. Every effort was made to ensure accuracy; however, inadvertent computerized transcription errors may be present.

## 2020-07-15 RX ORDER — ACETAMINOPHEN AND CODEINE PHOSPHATE 60; 300 MG/1; MG/1
1 TABLET ORAL DAILY PRN
Qty: 30 TABLET | Refills: 0 | Status: SHIPPED
Start: 2020-07-16 | End: 2020-08-10 | Stop reason: SDUPTHER

## 2020-07-15 NOTE — TELEPHONE ENCOUNTER
Pharmacy just called, they need a new script because the one they have is too old and didn't have a start date. Med pended.

## 2020-07-15 NOTE — TELEPHONE ENCOUNTER
Dee Alvarez called in, she is out of her Tylenol #4. I did review med and explained that there is one at the pharmacy for her. She was taking it BID, but instructed that it is only once a day. [facility] and they do have a script on hold for her.

## 2020-07-27 RX ORDER — LIDOCAINE AND PRILOCAINE 25; 25 MG/G; MG/G
CREAM TOPICAL
Qty: 30 G | Refills: 1 | Status: SHIPPED
Start: 2020-07-27 | End: 2020-11-03

## 2020-08-10 ENCOUNTER — OFFICE VISIT (OUTPATIENT)
Dept: PAIN MANAGEMENT | Age: 60
End: 2020-08-10
Payer: COMMERCIAL

## 2020-08-10 VITALS
WEIGHT: 230 LBS | TEMPERATURE: 97.7 F | DIASTOLIC BLOOD PRESSURE: 68 MMHG | RESPIRATION RATE: 16 BRPM | BODY MASS INDEX: 38.32 KG/M2 | SYSTOLIC BLOOD PRESSURE: 112 MMHG | HEIGHT: 65 IN | HEART RATE: 68 BPM

## 2020-08-10 PROCEDURE — G8417 CALC BMI ABV UP PARAM F/U: HCPCS | Performed by: PHYSICIAN ASSISTANT

## 2020-08-10 PROCEDURE — 99213 OFFICE O/P EST LOW 20 MIN: CPT | Performed by: PHYSICIAN ASSISTANT

## 2020-08-10 PROCEDURE — 1036F TOBACCO NON-USER: CPT | Performed by: PHYSICIAN ASSISTANT

## 2020-08-10 PROCEDURE — G8427 DOCREV CUR MEDS BY ELIG CLIN: HCPCS | Performed by: PHYSICIAN ASSISTANT

## 2020-08-10 PROCEDURE — 3017F COLORECTAL CA SCREEN DOC REV: CPT | Performed by: PHYSICIAN ASSISTANT

## 2020-08-10 RX ORDER — ACETAMINOPHEN AND CODEINE PHOSPHATE 60; 300 MG/1; MG/1
1 TABLET ORAL DAILY PRN
Qty: 30 TABLET | Refills: 0 | Status: SHIPPED
Start: 2020-08-14 | End: 2020-09-03 | Stop reason: SDUPTHER

## 2020-08-10 NOTE — PROGRESS NOTES
[address]  [address] [facility], [address] [place] [address] Chad  [phone]    Follow up Note      John Mauricio     Date of Visit:  8/10/2020    CC:  Patient presents for follow up   Chief Complaint   Patient presents with    Follow-up    Back Pain     low back pain       HPI:    Pain is unchanged. Reports that she has good and bad days. Appropriate analgesia with current medications regimen: yes. Change in quality of symptoms:no. Medication side effects:none. Recent diagnostic testing:none. Recent interventional procedures:none. She has not been on anticoagulation medications to include none. The patient  has not been on herbal supplements. The patient is not diabetic.     Imaging studies:    Cervical XR 11/2019 Severe degenerative changes      Lumbar spine Xray 03/2019  Scoliosis and osteoarthritis.     Bilateral hip Xray 03/2019   Advanced osteoarthrosis of bilateral hips.                                         Potential Aberrant Drug-Related Behavior: None     Urine Drug Screening:  First office visit saliva screen showed no narcotics   11/2019 Consistent, negative for all  06/2020 Consistent     OARRS report:  03/2019 consistent to 08/2020 Consistent         Past Medical History:   Diagnosis Date    Brain aneurysm 09/2018    H/O TIMES 2 THAT BURST PER PATIENT    Cerebral artery occlusion with cerebral infarction ([facility] [address]     RT SIDE AFFECTED-LEARNED TO WALK AND WRITE AGAIN    Degenerative arthritis of hand     Headache     Hiatal hernia     Hip problem     NEEDS HIP REPLACEMENT PER PATIENT    Hx of abnormal cervical Pap smear     Hypertension     Stroke [facility]cerebrum) [facility])        Past Surgical History:   Procedure Laterality Date    BRAIN ANEURYSM SURGERY      coiling     COLONOSCOPY  08/30/2019    polyps--frank    COLONOSCOPY N/A 8/30/2019    COLONOSCOPY POLYPECTOMY SNARE/COLD BIOPSY performed by Ankit Marc MD at [place] [address] HISTORY      FOR CANCER CELLS- DONE AT [address]      UPPER GASTROINTESTINAL ENDOSCOPY  08/30/2019    gastritis with superficial ulcerations; duodenitis--frank    UPPER GASTROINTESTINAL ENDOSCOPY N/A 8/30/2019    EGD BIOPSY performed by Christofer Fonseca MD at [address] [place] [address]       Prior to Admission medications    Medication Sig Start Date End Date Taking? Authorizing Provider   acetaminophen-codeine (TYLENOL #4) 300-60 MG per tablet Take 1 tablet by mouth daily as needed for Pain for up to 30 days.  8/14/20 9/13/20 Yes Enzo Mckeons, PA   lidocaine-prilocaine (EMLA) 2.5-2.5 % cream APPLY ONE GRAM EXTERNALLY THREE TIMES A DAY IF NEEDED-MUST LAST 30 DAYS  7/27/20  Yes Enzo Ge, PA   diclofenac sodium (VOLTAREN) 1 % GEL APPLY ONE (1) GRAM EXTERNALLY TWICE DAILY TO NECK AND ONE (1) GRAM EXTERNALLY TWICE DAILY TO BACK  7/27/20  Yes Enzo Mckeons, PA   doxepin (ZONALON) 5 % cream APPLY ONE (1) GRAM EXTERNALLY THREE TIMES A DAY TO NECK AND ONE (1) GRAM THREE TIMES A DAY TO BACK  6/16/20  Yes Enzo Ge, PA   sucralfate (CARAFATE) 1 GM tablet TAKE ONE TABLET BY MOUTH FOUR TIMES A DAY 6/12/20  Yes Sarabjit Wills MD   carBAMazepine (TEGRETOL XR) 100 MG extended release tablet TAKE ONE TABLET BY MOUTH THREE TIMES A DAY 6/5/20  Yes Sarabjit Wills MD   albuterol sulfate  (90 Base) MCG/ACT inhaler Inhale 2 puffs into the lungs every 6 hours as needed for Wheezing 6/5/20  Yes Sarabjit Wills MD   potassium chloride (KLOR-CON M) 10 MEQ extended release tablet TAKE ONE TABLET BY MOUTH DAILY WITH BREAKFAST 5/22/20  Yes Sarabjit Wills MD   amLODIPine (NORVASC) 5 MG tablet Take 1 tablet by mouth daily 5/15/20  Yes Sarabjit Wills MD   chlorthalidone (HYGROTON) 25 MG tablet Take 1 tablet by mouth daily 5/15/20  Yes Sarabjit Wills MD   losartan (COZAAR) 100 MG tablet Take 1 tablet by mouth daily 5/15/20  Yes Sarabjit Wills MD   Metoprolol Tartrate 37.5 MG TABS Take 37.5 mg by mouth 2 times daily 5/15/20  Yes Bienvenido James MD   pantoprazole (PROTONIX) 40 MG tablet Take 1 tablet by mouth daily 5/15/20  Yes Bienvenido James MD   lidocaine (XYLOCAINE) 5 % ointment APPLY ONE GRAM EXTERNALLY FOUR TIMES A DAY TO NECK AND ONE GRAM EXTERNALLY FOUR TIMES A DAY TO BACK  20  Yes Anastacia Spindle, [address] Devices (ROLLATOR) MISC 1 each by Does not apply route daily as needed (use as needed for stability) 3/17/20  Yes Bienvenido James MD   fluticasone (FLONASE) 50 MCG/ACT nasal spray 2 sprays by Each Nostril route daily 20  Yes Bienvenido James MD   gabapentin (NEURONTIN) 300 MG capsule Take 1 capsule by mouth 3 times daily for 30 days. 5/15/20 6/14/20  Bienvenido James MD       Allergies   Allergen Reactions    Latex Hives     Hives and rash    Iodine Rash     Hives . pt.  States anaphalyxis    Aloe Hives     Hives     Celebrex [Celecoxib] Itching    Fish-Derived Products Other (See Comments)       Social History     Socioeconomic History    Marital status:      Spouse name: Not on file    Number of children: Not on file    Years of education: Not on file    Highest education level: Not on file   Occupational History    Not on file   Social Needs    Financial resource strain: Not on file    Food insecurity     Worry: Not on file     Inability: Not on file   [employer] needs     Medical: Not on file     Non-medical: Not on file   Tobacco Use    Smoking status: Former Smoker     Packs/day: 1.50     Years: 43.00     Pack years: 64.50     Types: Cigarettes     Last attempt to quit: 2018     Years since quittin.9    Smokeless tobacco: Never Used   Substance and Sexual Activity    Alcohol use: No    Drug use: No    Sexual activity: Not Currently   Lifestyle    Physical activity     Days per week: Not on file     Minutes per session: Not on file    Stress: Not on file   Relationships    Social connections     Talks on phone: Not on file Gets together: Not on file     Attends Buddhist service: Not on file     Active member of club or organization: Not on file     Attends meetings of clubs or organizations: Not on file     Relationship status: Not on file    Intimate partner violence     Fear of current or ex partner: Not on file     Emotionally abused: Not on file     Physically abused: Not on file     Forced sexual activity: Not on file   Other Topics Concern    Not on file   Social History Narrative    Not on file       Family History   Problem Relation Age of Onset    Diabetes Mother     Arthritis Mother     Atrial Fibrillation Mother     Diabetes Father     Atrial Fibrillation Father     Arthritis Father     Emphysema Father     Cancer Sister        REVIEW OF SYSTEMS:     Lopez Root denies fever/chills, chest pain, shortness of breath, new bowel or bladder complaints. All other review of systems was negative. PHYSICAL EXAMINATION:      /68   Pulse 68   Temp 97.7 °F (36.5 °C) (Oral)   Resp 16   Ht 5' 5\" (1.651 m)   Wt 230 lb (104.3 kg)   BMI 38.27 kg/m²     General:      General appearance:   pleasant and well-hydrated. , in no discomfort and A & O x3  Build:Overweight  Function:Rises from a seated position with difficulty    HEENT:    Head:normocephalic and atraumatic  Pupils:regular, round and equal.  Sclera: icterus absent,    Lungs:    Breathing:Normal expansion. Clear to auscultation. No rales, rhonchi, or wheezing. Abdomen:    Shape:non-distended and normal  Tenderness:none  Guarding:none    Extremities:    Tremors:None bilaterally upper and lower  Range of motion:Generally normal shoulders  Intact:Yes  Varicose veins:not assessed   Cyanosis:none  Edema:Normal    Neurological:    Gait: Not observed.     Dermatology:    Skin:no unusual rashes, no skin lesions, no palpable subcutaneous nodules and good skin turgor    Impression:    Patient seen for follow up for her chronic bilateral hip pain and low back pain due to severe degenerative changes and axial c/o neck pain   Would benefit from Cervical MBB, patient uninterested   Patient has seen Dr. Elen Braga and Dr. Bruce Carpio who did not recommend surgery because of her co-morbid conditions (must be a year past her cerebral rupture and will need clearance per neurosurgery), but patient states she is in the process of getting medical clearance. Continue Gabapentin 300 mg TID per PCP  Continue Tylenol #4 QD prn  Compounding cream. Helping a lot. OARRS report reviewed 08/2020  Buccal reviewed and is consistent  Patient encouraged to remain active as tolerated   Treatment plan discussed with the patient including medications and side effects     Controlled Substance Monitoring:    Acute and Chronic Pain Monitoring:   RX Monitoring 8/10/2020   Periodic Controlled Substance Monitoring Possible medication side effects, risk of tolerance/dependence & alternative treatments discussed. ;No signs of potential drug abuse or diversion identified. ;Assessed functional status. ;Obtaining appropriate analgesic effect of treatment.                        Plan:    We discussed with the patient that combining opioids, benzodiazepines, alcohol, illicit drugs or sleep aids increases the risk of respiratory depression including death. We discussed that these medications may cause drowsiness, sedation or dizziness and have counseled the patient not to drive or operate machinery. We have discussed that these medications will be prescribed only by one provider. We have discussed with the patient about age related risk factors and have thoroughly discussed the importance of taking these medications as prescribed. The patient verbalizes understanding.     ccreferring physic

## 2020-08-10 NOTE — PROGRESS NOTES
Do you currently have any of the following:    Fever: No  Headache:  No  Cough: No  Shortness of breath: No  Exposed to anyone with these symptoms: No                                                                                                                Trenda Mantle presents to the [facility] on 8/10/2020. Iris Suazo is complaining of pain in her low back, both hips. The pain is constant. The pain is described as shooting, stabbing, sharp, burning and miserable. Pain is rated on her best day at a 9, on her worst day at a 10, and on average at a 8 on the VAS scale. She took her last dose of Tylenol with codeine this morning. Iris uSazo does not have issues with constipation. Any procedures since your last visit: No    She is not on NSAIDS and  is not on anticoagulation medications to include none. Pacemaker or defibrilator: No.       /68   Pulse 68   Temp 97.7 °F (36.5 °C) (Oral)   Resp 16   Ht 5' 5\" (1.651 m)   Wt 230 lb (104.3 kg)   BMI 38.27 kg/m²      No LMP recorded.  Patient is postmenopausal.

## 2020-08-13 ENCOUNTER — HOSPITAL ENCOUNTER (OUTPATIENT)
Dept: NEUROLOGY | Age: 60
Discharge: HOME | End: 2020-08-13
Payer: COMMERCIAL

## 2020-08-14 ENCOUNTER — HOSPITAL ENCOUNTER (OUTPATIENT)
Dept: NEUROLOGY | Age: 60
Discharge: HOME | End: 2020-08-14
Payer: COMMERCIAL

## 2020-08-14 ENCOUNTER — OFFICE VISIT (OUTPATIENT)
Dept: FAMILY MEDICINE CLINIC | Age: 60
End: 2020-08-14
Payer: COMMERCIAL

## 2020-08-14 ENCOUNTER — HOSPITAL ENCOUNTER (OUTPATIENT)
Age: 60
Discharge: HOME | End: 2020-08-16
Payer: COMMERCIAL

## 2020-08-14 VITALS
WEIGHT: 243 LBS | RESPIRATION RATE: 18 BRPM | OXYGEN SATURATION: 98 % | SYSTOLIC BLOOD PRESSURE: 102 MMHG | HEIGHT: 65 IN | DIASTOLIC BLOOD PRESSURE: 50 MMHG | BODY MASS INDEX: 40.48 KG/M2 | HEART RATE: 77 BPM | TEMPERATURE: 97.9 F

## 2020-08-14 LAB
APPEARANCE FLUID: ABNORMAL
BILIRUBIN, POC: ABNORMAL
BLOOD URINE, POC: ABNORMAL
CLARITY, POC: ABNORMAL
COLOR, POC: ABNORMAL
GLUCOSE URINE, POC: ABNORMAL
KETONES, POC: ABNORMAL
LEUKOCYTE EST, POC: ABNORMAL
NITRITE, POC: POSITIVE
PH, POC: 6.5
PROTEIN, POC: ABNORMAL
SPECIFIC GRAVITY, POC: 1.02
UROBILINOGEN, POC: 0.2

## 2020-08-14 PROCEDURE — 95721 EEG PHY/QHP>36<60 HR W/O VID: CPT | Performed by: PSYCHIATRY & NEUROLOGY

## 2020-08-14 PROCEDURE — 99213 OFFICE O/P EST LOW 20 MIN: CPT | Performed by: STUDENT IN AN ORGANIZED HEALTH CARE EDUCATION/TRAINING PROGRAM

## 2020-08-14 PROCEDURE — G8417 CALC BMI ABV UP PARAM F/U: HCPCS | Performed by: STUDENT IN AN ORGANIZED HEALTH CARE EDUCATION/TRAINING PROGRAM

## 2020-08-14 PROCEDURE — 3017F COLORECTAL CA SCREEN DOC REV: CPT | Performed by: STUDENT IN AN ORGANIZED HEALTH CARE EDUCATION/TRAINING PROGRAM

## 2020-08-14 PROCEDURE — 87077 CULTURE AEROBIC IDENTIFY: CPT

## 2020-08-14 PROCEDURE — 87088 URINE BACTERIA CULTURE: CPT

## 2020-08-14 PROCEDURE — 1036F TOBACCO NON-USER: CPT | Performed by: STUDENT IN AN ORGANIZED HEALTH CARE EDUCATION/TRAINING PROGRAM

## 2020-08-14 PROCEDURE — 81002 URINALYSIS NONAUTO W/O SCOPE: CPT | Performed by: STUDENT IN AN ORGANIZED HEALTH CARE EDUCATION/TRAINING PROGRAM

## 2020-08-14 PROCEDURE — 87186 SC STD MICRODIL/AGAR DIL: CPT

## 2020-08-14 PROCEDURE — G8427 DOCREV CUR MEDS BY ELIG CLIN: HCPCS | Performed by: STUDENT IN AN ORGANIZED HEALTH CARE EDUCATION/TRAINING PROGRAM

## 2020-08-14 RX ORDER — NITROFURANTOIN 25; 75 MG/1; MG/1
100 CAPSULE ORAL 2 TIMES DAILY
Qty: 14 CAPSULE | Refills: 0 | Status: SHIPPED
Start: 2020-08-14 | End: 2020-09-17 | Stop reason: ALTCHOICE

## 2020-08-14 RX ORDER — GABAPENTIN 300 MG/1
300 CAPSULE ORAL 3 TIMES DAILY
Qty: 90 CAPSULE | Refills: 0 | Status: SHIPPED
Start: 2020-08-14 | End: 2020-09-17 | Stop reason: SDUPTHER

## 2020-08-14 NOTE — TELEPHONE ENCOUNTER
Patient called for refill on gabapentin. Advised that since she missed the last appointment she needs to reschedule. appt with Sierra Rodriguez scheduled 9/17/20.     Complaints of vaginal itching, dark urine - appt scheduled 8/14/20 with Carmie Cap for refill on gabapentin and evaluation of symptoms

## 2020-08-14 NOTE — PROGRESS NOTES
S: 63 y.o. female with   Chief Complaint   Patient presents with    Chronic Pain     Lumbar radiculitis - needs refill Gabapentin    Vaginal Itching     started 1 wk ago; no discharge    Urinary Frequency     strong odor, dark urine       Acute visit for chronic pain treated with gabapentin. Pain stable. Planning surgery in future. Urinary frequency and dysuria and dark urine but no blood seen. Has hx kidney stones. BP Readings from Last 3 Encounters:   08/14/20 (!) 102/50   08/10/20 112/68   06/15/20 122/68       O: VS:  height is 5' 5\" (1.651 m) and weight is 243 lb (110.2 kg). Her temporal temperature is 97.9 °F (36.6 °C). Her blood pressure is 102/50 (abnormal) and her pulse is 77. Her respiration is 18 and oxygen saturation is 98%. AAO/NAD, appropriate affect for mood  CV:  RRR, no murmur  Resp: CTAB  Suprapubic ttp    Impression/Plan:   1) Chronic Pains with radiculopathy - refill gabapentin  2) UTI - Culture and treat        Health Maintenance Due   Topic Date Due    Diabetic foot exam  04/05/1969    Diabetic retinal exam  04/05/1969    Diabetic microalbuminuria test  04/05/1977    Shingles Vaccine (1 of 2) 04/05/2009         Attending Physician Statement  I have discussed the case, including pertinent history and exam findings with the resident. I agree with the documented assessment and plan.       Steph Frank MD

## 2020-08-14 NOTE — PROGRESS NOTES
file   Sticky needs     Medical: Not on file     Non-medical: Not on file   Tobacco Use    Smoking status: Former Smoker     Packs/day: 1.50     Years: 43.00     Pack years: 64.50     Types: Cigarettes     Last attempt to quit: 2018     Years since quittin.9    Smokeless tobacco: Never Used   Substance and Sexual Activity    Alcohol use: No    Drug use: No    Sexual activity: Not Currently   Lifestyle    Physical activity     Days per week: Not on file     Minutes per session: Not on file    Stress: Not on file   Relationships    Social connections     Talks on phone: Not on file     Gets together: Not on file     Attends Confucianism service: Not on file     Active member of club or organization: Not on file     Attends meetings of clubs or organizations: Not on file     Relationship status: Not on file    Intimate partner violence     Fear of current or ex partner: Not on file     Emotionally abused: Not on file     Physically abused: Not on file     Forced sexual activity: Not on file   Other Topics Concern    Not on file   Social History Narrative    Not on file        Family History:       Problem Relation Age of Onset    Diabetes Mother    Yoli Burkett Arthritis Mother     Atrial Fibrillation Mother     Diabetes Father     Atrial Fibrillation Father     Arthritis Father     Emphysema Father     Cancer Sister        BP Readings from Last 3 Encounters:   20 (!) 102/50   08/10/20 112/68   06/15/20 122/68       Physical Exam:    Vitals: BP (!) 102/50   Pulse 77   Temp 97.9 °F (36.6 °C) (Temporal)   Resp 18   Ht 5' 5\" (1.651 m)   Wt 243 lb (110.2 kg)   SpO2 98%   Breastfeeding No   BMI 40.44 kg/m²   General Appearance: patient came in wheelchair due to chronic  LBP and hip pain Well developed, awake, alert, oriented, no acute distress  HEENT: Normocephalic,atraumatic. PERRL, EOM's intact, EAC without erythemaor swelling, no pallor or icterus.    Neck: Supple, symmetrical, trachea concern-regarding this problem        Other orders  -     nitrofurantoin, macrocrystal-monohydrate, (MACROBID) 100 MG capsule; Take 1 capsule by mouth 2 times daily          I encourage further reading and education about your health conditions. Information on many healthconditions is provided by the American Academy of Family Physicians: https://familydoctor. org/  Please bring any questions to me at your next visit. Return to Office: Return for Routine Follow upwith PCP. Medication List:    Current Outpatient Medications   Medication Sig Dispense Refill    gabapentin (NEURONTIN) 300 MG capsule Take 1 capsule by mouth 3 times daily for 30 days. 90 capsule 0    nitrofurantoin, macrocrystal-monohydrate, (MACROBID) 100 MG capsule Take 1 capsule by mouth 2 times daily 14 capsule 0    acetaminophen-codeine (TYLENOL #4) 300-60 MG per tablet Take 1 tablet by mouth daily as needed for Pain for up to 30 days.  30 tablet 0    lidocaine-prilocaine (EMLA) 2.5-2.5 % cream APPLY ONE GRAM EXTERNALLY THREE TIMES A DAY IF NEEDED-MUST LAST 30 DAYS  30 g 1    diclofenac sodium (VOLTAREN) 1 % GEL APPLY ONE (1) GRAM EXTERNALLY TWICE DAILY TO NECK AND ONE (1) GRAM EXTERNALLY TWICE DAILY TO BACK  100 g 1    doxepin (ZONALON) 5 % cream APPLY ONE (1) GRAM EXTERNALLY THREE TIMES A DAY TO NECK AND ONE (1) GRAM THREE TIMES A DAY TO BACK  180 g 1    sucralfate (CARAFATE) 1 GM tablet TAKE ONE TABLET BY MOUTH FOUR TIMES A  tablet 0    carBAMazepine (TEGRETOL XR) 100 MG extended release tablet TAKE ONE TABLET BY MOUTH THREE TIMES A DAY 90 tablet 3    albuterol sulfate  (90 Base) MCG/ACT inhaler Inhale 2 puffs into the lungs every 6 hours as needed for Wheezing 3 Inhaler 1    potassium chloride (KLOR-CON M) 10 MEQ extended release tablet TAKE ONE TABLET BY MOUTH DAILY WITH BREAKFAST 30 tablet 3    amLODIPine (NORVASC) 5 MG tablet Take 1 tablet by mouth daily 30 tablet 3    chlorthalidone (HYGROTON) 25 MG tablet Take

## 2020-08-16 LAB
ORGANISM: ABNORMAL
URINE CULTURE, ROUTINE: ABNORMAL

## 2020-08-16 ASSESSMENT — ENCOUNTER SYMPTOMS
ABDOMINAL DISTENTION: 0
GASTROINTESTINAL NEGATIVE: 1
VOMITING: 0
NAUSEA: 0
EYES NEGATIVE: 1
EYE REDNESS: 0
DIARRHEA: 0
CONSTIPATION: 0
BACK PAIN: 1
ALLERGIC/IMMUNOLOGIC NEGATIVE: 1

## 2020-08-19 ENCOUNTER — TELEPHONE (OUTPATIENT)
Dept: FAMILY MEDICINE CLINIC | Age: 60
End: 2020-08-19

## 2020-08-20 NOTE — TELEPHONE ENCOUNTER
Okay to continue Macrobid. If itching persistant and noticed vaginal  Discharge,foul smelling  as welll ,we will treat .

## 2020-08-21 NOTE — TELEPHONE ENCOUNTER
Patient left message, she continues to have vaginal itching with odor, which started after the Antibiotics.    Would like a prescription sent

## 2020-08-23 RX ORDER — FLUCONAZOLE 150 MG/1
150 TABLET ORAL ONCE
Qty: 1 TABLET | Refills: 0 | Status: SHIPPED | OUTPATIENT
Start: 2020-08-23 | End: 2020-08-23

## 2020-09-03 ENCOUNTER — VIRTUAL VISIT (OUTPATIENT)
Dept: PAIN MANAGEMENT | Age: 60
End: 2020-09-03
Payer: COMMERCIAL

## 2020-09-03 PROCEDURE — 99442 PR PHYS/QHP TELEPHONE EVALUATION 11-20 MIN: CPT | Performed by: PHYSICIAN ASSISTANT

## 2020-09-03 RX ORDER — ACETAMINOPHEN AND CODEINE PHOSPHATE 60; 300 MG/1; MG/1
1 TABLET ORAL DAILY PRN
Qty: 30 TABLET | Refills: 0 | Status: SHIPPED
Start: 2020-09-12 | End: 2020-10-08 | Stop reason: SDUPTHER

## 2020-09-03 NOTE — PROGRESS NOTES
Shabnam Acevedo  [address] [facility], [address] [place] [address] Chad  [phone]    Telephone follow up visit      Date of Visit:  9/3/2020    CC: Follow Up    Consent:  Telephone follow up due to Rufus 19 pandemic   The patient and/or health care decision maker is aware that he/she may receive a bill for this telephone service, depending on his insurance coverage, and has provided verbal consent to proceed: Yes    I have considered the risks of abuse, dependence, addiction and diversion. My patient is aware that they will need a follow-up visit (in-person or virtually) at the appropriate time indicated for continued medications. Further, my patient is aware that when this acute crisis has lifted, they will be expected to return for an in-person visit and all elements of standard local and hospital guidelines in order to continue this medication. Patient location: Home   Provider Location: Office    HPI:  Pain is unchanged. Reports that her brother just  and is leaving to go out of town. Appropriate analgesia with current medications regimen: Yes. Change in quality of symptoms:no. Medication side effects:none. Recent diagnostic testing:none. Recent interventional procedures:none. She has not been on anticoagulation medications to include none.  The patient  has not been on herbal supplements.  The patient is not diabetic.     Imaging studies:     Cervical XR 2019 Severe degenerative changes      Lumbar spine Xray 2019  Scoliosis and osteoarthritis.     Bilateral hip Xray 2019   Advanced osteoarthrosis of bilateral hips.     Potential Aberrant Drug-Related Behavior: None     Urine Drug Screening:  First office visit saliva screen showed no narcotics   2019 Consistent, negative for all  2020 Consistent     OARRS report:  2019 consistent to 2020 Consistent         Past Medical History: Reviewed    Past Surgical History: Reviewed     Home Medications: Reviewed    Allergies: Reviewed     Social History: Reviewed     REVIEW OF SYSTEMS:     Radha Patel denies fever/chills, chest pain, shortness of breath, new bowel or bladder complaints. All other review of systems was negative. PHYSICAL EXAMINATION:     General:       A & O x3    Lungs:    Breathing: No breathing abnormalities noted over the phone. Impression:      Patient seen for follow up for her chronic bilateral hip pain and low back pain due to severe degenerative changes and axial c/o neck pain   Would benefit from Cervical MBB, patient uninterested   Patient has seen Dr. Jones and Dr. 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 Continue Gabapentin 300 mg TID per PCP  Continue Tylenol #4 QD prn  Compounding cream. Helping a lot. OARRS report reviewed 09/2020  Buccal reviewed and is consistent  Patient encouraged to remain active as tolerated   Treatment plan discussed with the patient including medications and side effects     Controlled Substance Monitoring:    Acute and Chronic Pain Monitoring:   RX Monitoring 9/3/2020   Periodic Controlled Substance Monitoring Possible medication side effects, risk of tolerance/dependence & alternative treatments discussed. ;No signs of potential drug abuse or diversion identified. ;Assessed functional status. ;Obtaining appropriate analgesic effect of treatment. We discussed with the patient that combining opioids, benzodiazepines, alcohol, illicit drugs or sleep aids increases the risk of respiratory depression including death. We discussed that these medications may cause drowsiness, sedation or dizziness and have counseled the patient not to drive or operate machinery. We have discussed that these medications will be prescribed only by one provider.  We have discussed with the patient about age related risk factors and have thoroughly discussed the importance of taking these medications as prescribed. The patient verbalizes understanding. Patient advised regarding steps to help prevent the spread of COVID-19   SOURCE - https://chadd-ben.info/. html     1-Stay home except to get medical care  2-Clean your hands often for atleast 20 secnds, avoid touching: Avoid touching your eyes, nose, and mouth with unwashed hands. 3-Seek medical attention: Seek prompt medical attention if your illness is worsening (e.g., difficulty breathing). Call you doctor first.  3-Wear a facemask if you are sick   4-Cover your coughs and sneezes        I affirm this is a Patient Initiated Episode with an Established Patient who has not had a related appointment within my department in the past 7 days or scheduled within the next 24 hours.     Total Time: 11-20 minutes    Note: not billable if this call serves to triage the patient into an appointment for the relevant concern

## 2020-09-03 NOTE — PROGRESS NOTES
Allen Eaton was read the following message We want to confirm that, for purposes of billing, this is a virtual visit with your provider for which we will submit a claim for reimbursement with your insurance company. You will be responsible for any copays, coinsurance amounts or other amounts not covered by your insurance company. If you do not accept this, unfortunately we will not be able to schedule a virtual visit with the provider. Do you accept? [facility] responded Yes .

## 2020-09-04 NOTE — PROCEDURES
Ambulatory EEG report    2year-old woman on gabapentin, Macrobid, sucralfate, carbamazepine, amlodipine, chlorthalidone, losartan, metoprolol, Protonix, albuterol inhalers and potassium supplements, displayed the following underlying rhythms---slightly disorganized, slightly asynchronous, 9 Hz, 15 to 20 µV alpha rhythms in both posterior regions. 20 Hz, 10 µV beta rhythms were noted in both precentral regions. There was no clear attenuation of the posterior alpha rhythms with eye opening. The patient did fall asleep, progressing uneventfully through all stages of somnolence. Despite minimal electrode, muscle and eye movement artifacts, there were no focal abnormalities or epileptiform discharges.     Impression-as as normal 48-hour ambulatory EEG

## 2020-09-08 RX ORDER — AMLODIPINE BESYLATE 5 MG/1
TABLET ORAL
Qty: 30 TABLET | Refills: 0 | Status: SHIPPED
Start: 2020-09-08 | End: 2020-09-17 | Stop reason: SDUPTHER

## 2020-09-08 RX ORDER — PANTOPRAZOLE SODIUM 40 MG/1
TABLET, DELAYED RELEASE ORAL
Qty: 30 TABLET | Refills: 0 | Status: SHIPPED
Start: 2020-09-08 | End: 2020-09-17 | Stop reason: SDUPTHER

## 2020-09-17 ENCOUNTER — OFFICE VISIT (OUTPATIENT)
Dept: FAMILY MEDICINE CLINIC | Age: 60
End: 2020-09-17
Payer: COMMERCIAL

## 2020-09-17 VITALS
OXYGEN SATURATION: 98 % | HEIGHT: 65 IN | SYSTOLIC BLOOD PRESSURE: 118 MMHG | RESPIRATION RATE: 16 BRPM | TEMPERATURE: 97.4 F | WEIGHT: 244 LBS | HEART RATE: 55 BPM | DIASTOLIC BLOOD PRESSURE: 73 MMHG | BODY MASS INDEX: 40.65 KG/M2

## 2020-09-17 LAB
CHP ED QC CHECK: NORMAL
GLUCOSE BLD-MCNC: 108 MG/DL
HBA1C MFR BLD: 5.5 %

## 2020-09-17 PROCEDURE — G8427 DOCREV CUR MEDS BY ELIG CLIN: HCPCS | Performed by: FAMILY MEDICINE

## 2020-09-17 PROCEDURE — 83036 HEMOGLOBIN GLYCOSYLATED A1C: CPT | Performed by: FAMILY MEDICINE

## 2020-09-17 PROCEDURE — 3017F COLORECTAL CA SCREEN DOC REV: CPT | Performed by: FAMILY MEDICINE

## 2020-09-17 PROCEDURE — 1036F TOBACCO NON-USER: CPT | Performed by: FAMILY MEDICINE

## 2020-09-17 PROCEDURE — G8417 CALC BMI ABV UP PARAM F/U: HCPCS | Performed by: FAMILY MEDICINE

## 2020-09-17 PROCEDURE — 99213 OFFICE O/P EST LOW 20 MIN: CPT | Performed by: FAMILY MEDICINE

## 2020-09-17 PROCEDURE — 82962 GLUCOSE BLOOD TEST: CPT | Performed by: FAMILY MEDICINE

## 2020-09-17 RX ORDER — METOPROLOL TARTRATE 37.5 MG/1
37.5 TABLET, FILM COATED ORAL 2 TIMES DAILY
Qty: 60 TABLET | Refills: 3 | Status: SHIPPED
Start: 2020-09-17 | End: 2020-12-11 | Stop reason: SDUPTHER

## 2020-09-17 RX ORDER — POTASSIUM CHLORIDE 750 MG/1
TABLET, EXTENDED RELEASE ORAL
Qty: 30 TABLET | Refills: 3 | Status: SHIPPED
Start: 2020-09-17 | End: 2020-12-11 | Stop reason: SDUPTHER

## 2020-09-17 RX ORDER — CARBAMAZEPINE 100 MG/1
TABLET, EXTENDED RELEASE ORAL
Qty: 90 TABLET | Refills: 3 | Status: SHIPPED
Start: 2020-09-17 | End: 2020-12-11 | Stop reason: SDUPTHER

## 2020-09-17 RX ORDER — CHLORTHALIDONE 25 MG/1
25 TABLET ORAL DAILY
Qty: 30 TABLET | Refills: 3 | Status: SHIPPED
Start: 2020-09-17 | End: 2020-12-11 | Stop reason: SDUPTHER

## 2020-09-17 RX ORDER — LOSARTAN POTASSIUM 100 MG/1
100 TABLET ORAL DAILY
Qty: 30 TABLET | Refills: 3 | Status: SHIPPED
Start: 2020-09-17 | End: 2020-12-11 | Stop reason: SDUPTHER

## 2020-09-17 RX ORDER — GABAPENTIN 300 MG/1
300 CAPSULE ORAL 3 TIMES DAILY
Qty: 90 CAPSULE | Refills: 0 | Status: SHIPPED
Start: 2020-09-17 | End: 2020-10-20

## 2020-09-17 RX ORDER — PANTOPRAZOLE SODIUM 40 MG/1
TABLET, DELAYED RELEASE ORAL
Qty: 30 TABLET | Refills: 3 | Status: SHIPPED
Start: 2020-09-17 | End: 2020-12-11 | Stop reason: SDUPTHER

## 2020-09-17 RX ORDER — AMLODIPINE BESYLATE 5 MG/1
TABLET ORAL
Qty: 30 TABLET | Refills: 3 | Status: SHIPPED
Start: 2020-09-17 | End: 2020-12-11 | Stop reason: SDUPTHER

## 2020-09-17 NOTE — PROGRESS NOTES
9/17/2020    Juan Ramon Hairston is a 63 y.o. female here for   Chief Complaint   Patient presents with    Hypertension    Blood Sugar Problem     2/2020 HGA1C 7.3     Regarding hypertension. Patient is not monitoring home blood pressures. Cardiovascular risk factors: dyslipidemia, hypertension, sedentary lifestyle and prev SAH. Patient does not smoke. reports that she quit smoking about 2 years ago. Her smoking use included cigarettes. She has a 64.50 pack-year smoking history. She has never used smokeless tobacco.  Currently on amldopine 5 mg daily, chlorthalidone 25 mg daily, losartan 100 mg daily, metoprolol 37.5 mg twice daily. Taking as prescribed. No adverse effects. Today,  BP: 118/73 and she is asymptomatic. BP Readings from Last 3 Encounters:   09/17/20 118/73   08/14/20 (!) 102/50   08/10/20 112/68     Patient denies chest pain, diaphoresis, dyspnea, dyspnea on exertion, peripheral edema, palpitations, headache, vision changes. Note previously elevated point of care aic. Never had formal diagnosis of diabetes because she did not return for f/u test. Note normal aic today. She has not taken home blood sugar readings. Discussed diabetic diet. Patient complains of no polyuria or polydipsia, no chest pain, dyspnea or TIA's, no unusual visual symptoms, no hypoglycemia. Lab Results   Component Value Date    LABA1C 5.5 09/17/2020    LABA1C 7.3 02/21/2020     Having teeth pulled   Planning to do it awake  Had a bad experience years ago   Due to her history  She saw dr Manuel Georges  She was sent fo ran eeg by dr Manuel Georges  When she went for resutls he was in an emregency surgery and had to reschedule. Wt Readings from Last 3 Encounters:   09/17/20 244 lb (110.7 kg)   08/14/20 243 lb (110.2 kg)   08/10/20 230 lb (104.3 kg)       She  reports that she quit smoking about 2 years ago. Her smoking use included cigarettes. She has a 64.50 pack-year smoking history.  She has never used smokeless

## 2020-09-29 RX ORDER — DOXEPIN HYDROCHLORIDE 50 MG/G
CREAM TOPICAL
Qty: 180 G | Refills: 2 | Status: SHIPPED
Start: 2020-09-29 | End: 2021-02-15

## 2020-10-08 ENCOUNTER — OFFICE VISIT (OUTPATIENT)
Dept: PAIN MANAGEMENT | Age: 60
End: 2020-10-08
Payer: COMMERCIAL

## 2020-10-08 VITALS
HEART RATE: 64 BPM | DIASTOLIC BLOOD PRESSURE: 72 MMHG | BODY MASS INDEX: 38.32 KG/M2 | OXYGEN SATURATION: 94 % | RESPIRATION RATE: 18 BRPM | HEIGHT: 65 IN | TEMPERATURE: 96.9 F | SYSTOLIC BLOOD PRESSURE: 124 MMHG | WEIGHT: 230 LBS

## 2020-10-08 PROCEDURE — G8427 DOCREV CUR MEDS BY ELIG CLIN: HCPCS | Performed by: PHYSICIAN ASSISTANT

## 2020-10-08 PROCEDURE — G8417 CALC BMI ABV UP PARAM F/U: HCPCS | Performed by: PHYSICIAN ASSISTANT

## 2020-10-08 PROCEDURE — 3017F COLORECTAL CA SCREEN DOC REV: CPT | Performed by: PHYSICIAN ASSISTANT

## 2020-10-08 PROCEDURE — 99213 OFFICE O/P EST LOW 20 MIN: CPT | Performed by: PHYSICIAN ASSISTANT

## 2020-10-08 PROCEDURE — 1036F TOBACCO NON-USER: CPT | Performed by: PHYSICIAN ASSISTANT

## 2020-10-08 PROCEDURE — G8484 FLU IMMUNIZE NO ADMIN: HCPCS | Performed by: PHYSICIAN ASSISTANT

## 2020-10-08 RX ORDER — ACETAMINOPHEN AND CODEINE PHOSPHATE 60; 300 MG/1; MG/1
1 TABLET ORAL DAILY PRN
Qty: 30 TABLET | Refills: 1 | Status: SHIPPED
Start: 2020-10-11 | End: 2020-12-03 | Stop reason: SDUPTHER

## 2020-10-08 NOTE — PROGRESS NOTES
Do you currently have any of the following:    Fever: No  Headache:  No  Cough: No  Shortness of breath: No  Exposed to anyone with these symptoms: Bernarda Cordon presents to the [facility] on 10/8/2020. Malik Garcia is complaining of pain in the back hips and legs  . The pain is constant. The pain is described as aching, throbbing, shooting and stabbing. Pain is rated on her best day at a 8, on her worst day at a 9, and on average at a 8 on the VAS scale. She took her last dose of Tylenol with codeine . Malik Garcia does not have issues with constipation. Any procedures since your last visit: No,     She is not on NSAIDS and  is not on anticoagulation medication  Pacemaker or defibrilator: No Physician managing device is none. /72   Pulse 64   Temp 96.9 °F (36.1 °C)   Resp 18   Ht 5' 5\" (1.651 m)   Wt 230 lb (104.3 kg)   SpO2 94%   BMI 38.27 kg/m²      No LMP recorded.  Patient is postmenopausal.

## 2020-10-08 NOTE — PROGRESS NOTES
[address]  [address] [facility], [address] [place] [address] [place]  [phone]    Follow up Note      Sriram Green     Date of Visit:  10/8/2020    CC:  Patient presents for follow up   Chief Complaint   Patient presents with    Pain     back hips and legs and back        HPI:    Pain is unchanged. No new issues. Appropriate analgesia with current medications regimen: yes. Change in quality of symptoms:no. Medication side effects:none. Recent diagnostic testing:none. Recent interventional procedures:none. She has not been on anticoagulation medications to include none. The patient  has not been on herbal supplements. The patient is not diabetic.     Imaging studies:    Cervical XR 11/2019 Severe degenerative changes      Lumbar spine Xray 03/2019  Scoliosis and osteoarthritis.     Bilateral hip Xray 03/2019   Advanced osteoarthrosis of bilateral hips. Potential Aberrant Drug-Related Behavior: None     Urine Drug Screening:  First office visit saliva screen showed no narcotics   11/2019 Consistent, negative for all  06/2020 Consistent     OARRS report:  03/2019 consistent to 10/2020 Consistent  (norco on 9/24 from dentist for teeth extraction).           Past Medical History:   Diagnosis Date    Brain aneurysm 09/2018    H/O TIMES 2 THAT BURST PER PATIENT    Cerebral artery occlusion with cerebral infarction (HCC)     RT SIDE AFFECTED-LEARNED TO WALK AND WRITE AGAIN    Degenerative arthritis of hand     Headache     Hiatal hernia     Hip problem     NEEDS HIP REPLACEMENT PER PATIENT    Hx of abnormal cervical Pap smear     Hypertension     Stroke [facility]re[facility])        Past Surgical History:   Procedure Laterality Date    BRAIN ANEURYSM SURGERY      coiling     COLONOSCOPY  08/30/2019    polyps--frank    COLONOSCOPY N/A 8/30/2019    COLONOSCOPY POLYPECTOMY SNARE/COLD BIOPSY performed by Michaela Michelle [facility] at [facility]COPY    OTHER SURGICAL HISTORY      FOR CANCER CELLS- DONE AT [facility]    TONSILLECTOMY      UPPER GASTROINTESTINAL ENDOSCOPY  08/30/2019    gastritis with superficial ulcerations; duodenitis--frank    UPPER GASTROINTESTINAL ENDOSCOPY N/A 8/30/2019    EGD BIOPSY performed by Israel Tomlinson MD at [address] [place] [address]       Prior to Admission medications    Medication Sig Start Date End Date Taking? Authorizing Provider   doxepin (ZONALON) 5 % cream APPLY ONE (1) GRAM EXTERNALLY THREE TIMES A DAY TO NECK AND ONE (1) GRAM EXTERNALLY THREE TIMES TIMES A DAY TO BACK 9/29/20  Yes SPENSER Burgos   pantoprazole (PROTONIX) 40 MG tablet TAKE ONE TABLET BY MOUTH EVERY DAY 9/17/20  Yes Anderson Birmingham MD   amLODIPine (NORVASC) 5 MG tablet TAKE ONE TABLET BY MOUTH EVERY DAY 9/17/20  Yes Anderson Birmingham MD   gabapentin (NEURONTIN) 300 MG capsule Take 1 capsule by mouth 3 times daily for 30 days. 9/17/20 10/17/20 Yes Anderson Birmingham MD   carBAMazepine (TEGRETOL XR) 100 MG extended release tablet TAKE ONE TABLET BY MOUTH THREE TIMES A DAY  Patient taking differently: 200 mg TAKE ONE TABLET BY MOUTH twice  TIMES A DAY 9/17/20  Yes Anderson Birmingham MD   potassium chloride (KLOR-CON M) 10 MEQ extended release tablet TAKE ONE TABLET BY MOUTH DAILY WITH BREAKFAST 9/17/20  Yes Anderson Birmingham MD   chlorthalidone (HYGROTON) 25 MG tablet Take 1 tablet by mouth daily 9/17/20  Yes Anderson Birmingham MD   losartan (COZAAR) 100 MG tablet Take 1 tablet by mouth daily 9/17/20  Yes Anderson Birmingham MD   Metoprolol Tartrate 37.5 MG TABS Take 37.5 mg by mouth 2 times daily 9/17/20  Yes Anderson Birmingham MD   acetaminophen-codeine (TYLENOL #4) 300-60 MG per tablet Take 1 tablet by mouth daily as needed for Pain for up to 30 days.  9/12/20 10/12/20 Yes SPENSER Burgos   lidocaine-prilocaine (EMLA) 2.5-2.5 % cream APPLY ONE GRAM EXTERNALLY THREE TIMES A DAY IF NEEDED-MUST LAST 30 DAYS  7/27/20  Yes SPENSER Burgos diclofenac sodium (VOLTAREN) 1 % GEL APPLY ONE (1) GRAM EXTERNALLY TWICE DAILY TO NECK AND ONE (1) GRAM EXTERNALLY TWICE DAILY TO BACK  20  Yes LurlenSPENSER Miller   sucralfate (CARAFATE) 1 GM tablet TAKE ONE TABLET BY MOUTH FOUR TIMES A DAY 20  Yes Enriqueta Durant MD   albuterol sulfate  (90 Base) MCG/ACT inhaler Inhale 2 puffs into the lungs every 6 hours as needed for Wheezing 20  Yes Enriqueta Durant MD   lidocaine (XYLOCAINE) 5 % ointment APPLY ONE GRAM EXTERNALLY FOUR TIMES A DAY TO NECK AND ONE GRAM EXTERNALLY FOUR TIMES A DAY TO BACK  20  Yes Lurlene Fraction PA   Misc. Devices (ROLLATOR) MISC 1 each by Does not apply route daily as needed (use as needed for stability) 3/17/20  Yes Enriqueta Durant MD   fluticasone (FLONASE) 50 MCG/ACT nasal spray 2 sprays by Each Nostril route daily 20  Yes Enriqueta Durant MD       Allergies   Allergen Reactions    Latex Hives     Hives and rash    Iodine Rash     Hives . pt.  States anaphalyxis    Aloe Hives     Hives     Celebrex [Celecoxib] Itching    Fish-Derived Products Other (See Comments)       Social History     Socioeconomic History    Marital status:      Spouse name: Not on file    Number of children: Not on file    Years of education: Not on file    Highest education level: Not on file   Occupational History    Not on file   Social Needs    Financial resource strain: Not on file    Food insecurity     Worry: Not on file     Inability: Not on file   [employer] needs     Medical: Not on file     Non-medical: Not on file   Tobacco Use    Smoking status: Former Smoker     Packs/day: 1.50     Years: 43.00     Pack years: 64.50     Types: Cigarettes     Last attempt to quit: 2018     Years since quittin.0    Smokeless tobacco: Never Used   Substance and Sexual Activity    Alcohol use: No    Drug use: No    Sexual activity: Not Currently   Lifestyle    Physical activity     Days per week: Not on file     Minutes per session: Not on file    Stress: Not on file   Relationships    Social connections     Talks on phone: Not on file     Gets together: Not on file     Attends [employer] service: Not on file     Active member of club or organization: Not on file     Attends meetings of clubs or organizations: Not on file     Relationship status: Not on file    Intimate partner violence     Fear of current or ex partner: Not on file     Emotionally abused: Not on file     Physically abused: Not on file     Forced sexual activity: Not on file   Other Topics Concern    Not on file   Social History Narrative    Not on file       Family History   Problem Relation Age of Onset    Diabetes Mother     Arthritis Mother     Atrial Fibrillation Mother     Diabetes Father     Atrial Fibrillation Father     Arthritis Father     Emphysema Father     Cancer Sister        REVIEW OF SYSTEMS:     Candy Maxwell denies fever/chills, chest pain, shortness of breath, new bowel or bladder complaints. All other review of systems was negative. PHYSICAL EXAMINATION:      /72   Pulse 64   Temp 96.9 °F (36.1 °C)   Resp 18   Ht 5' 5\" (1.651 m)   Wt 230 lb (104.3 kg)   SpO2 94%   BMI 38.27 kg/m²     General:      General appearance:   pleasant and well-hydrated. , in no discomfort and A & O x3  Build:Overweight  Function:Rises from a seated position with difficulty    HEENT:    Head:normocephalic and atraumatic  Pupils:regular, round and equal.  Sclera: icterus absent,    Lungs:    Breathing:Normal expansion. Clear to auscultation. No rales, rhonchi, or wheezing. Abdomen:    Shape:non-distended and normal  Tenderness:none  Guarding:none    Extremities:    Tremors:None bilaterally upper and lower  Range of motion:Generally normal shoulders  Intact:Yes  Varicose veins:not assessed   Cyanosis:none  Edema:Normal    Neurological:    Gait: Not observed.     Dermatology:    Skin:no unusual rashes, no skin lesions, no

## 2020-10-12 ENCOUNTER — OFFICE VISIT (OUTPATIENT)
Dept: PULMONOLOGY | Age: 60
End: 2020-10-12
Payer: COMMERCIAL

## 2020-10-12 VITALS
WEIGHT: 235 LBS | TEMPERATURE: 98.1 F | OXYGEN SATURATION: 96 % | HEIGHT: 65 IN | DIASTOLIC BLOOD PRESSURE: 86 MMHG | BODY MASS INDEX: 39.15 KG/M2 | HEART RATE: 57 BPM | RESPIRATION RATE: 16 BRPM | SYSTOLIC BLOOD PRESSURE: 154 MMHG

## 2020-10-12 LAB
EXPIRATORY TIME-POST: 7.44 SEC
EXPIRATORY TIME: 7.79 SEC
FEF 25-75% %CHNG: 2
FEF 25-75% %PRED-POST: 72 %
FEF 25-75% %PRED-PRE: 71 L/SEC
FEF 25-75% PRED: 2.11 L/SEC
FEF 25-75%-POST: 1.54 L/SEC
FEF 25-75%-PRE: 1.5 L/SEC
FEV1 %PRED-POST: 85 %
FEV1 %PRED-PRE: 85 %
FEV1 PRED: 2.29 L
FEV1-POST: 1.96 L
FEV1-PRE: 1.95 L
FEV1/FVC %PRED-POST: 94 %
FEV1/FVC %PRED-PRE: 95 %
FEV1/FVC PRED: 79 %
FEV1/FVC-POST: 75 %
FEV1/FVC-PRE: 75 %
FVC %PRED-POST: 90 L
FVC %PRED-PRE: 89 %
FVC PRED: 2.9 L
FVC-POST: 2.62 L
FVC-PRE: 2.59 L
PEF %PRED-POST: 75 %
PEF %PRED-PRE: 80 L/SEC
PEF PRED: 5.91 L/SEC
PEF%CHNG: -6
PEF-POST: 4.46 L/SEC
PEF-PRE: 4.77 L/SEC

## 2020-10-12 PROCEDURE — G8484 FLU IMMUNIZE NO ADMIN: HCPCS | Performed by: INTERNAL MEDICINE

## 2020-10-12 PROCEDURE — 94060 EVALUATION OF WHEEZING: CPT | Performed by: INTERNAL MEDICINE

## 2020-10-12 PROCEDURE — 99213 OFFICE O/P EST LOW 20 MIN: CPT | Performed by: INTERNAL MEDICINE

## 2020-10-12 PROCEDURE — 1036F TOBACCO NON-USER: CPT | Performed by: INTERNAL MEDICINE

## 2020-10-12 PROCEDURE — G8428 CUR MEDS NOT DOCUMENT: HCPCS | Performed by: INTERNAL MEDICINE

## 2020-10-12 PROCEDURE — 3017F COLORECTAL CA SCREEN DOC REV: CPT | Performed by: INTERNAL MEDICINE

## 2020-10-12 PROCEDURE — G8417 CALC BMI ABV UP PARAM F/U: HCPCS | Performed by: INTERNAL MEDICINE

## 2020-10-12 PROCEDURE — 99214 OFFICE O/P EST MOD 30 MIN: CPT | Performed by: INTERNAL MEDICINE

## 2020-10-12 ASSESSMENT — PULMONARY FUNCTION TESTS
FEV1_PERCENT_PREDICTED_PRE: 85
FEV1_PRE: 1.95
FEV1/FVC_PERCENT_PREDICTED_PRE: 95
FVC_PERCENT_PREDICTED_PRE: 89
FEV1_PREDICTED: 2.29
FVC_PERCENT_PREDICTED_POST: 90
FEV1_POST: 1.96
FVC_PRE: 2.59
FEV1/FVC_POST: 75
FEV1_PERCENT_PREDICTED_POST: 85
FVC_PREDICTED: 2.90
FEV1/FVC_PREDICTED: 79
FVC_POST: 2.62
FEV1/FVC_PERCENT_PREDICTED_POST: 94
FEV1/FVC_PRE: 75

## 2020-10-12 NOTE — PROGRESS NOTES
Department of Internal Medicine  Division of Pulmonary, Critical Care & Sleep Medicine  Pulmonary [facility]                                             Pulmonary Clinic Consult     I had the pleasure of seeing  Michaela Sweeney in the [facility] [place] regarding their COPD       No chief complaint on file. sob    HISTORY OF PRESENT ILLNESS:    Michaela Sweeney is a 63y.o. year old  Who start smoking at age 14 And increase gradually 1.4 TO 2 pack and she quit 2018     The Patient comes in with SOB that has been going on  2-3 years Associated with cough and it is occasional ,She  states that it get worse with exercise or walking long distance and he can walk . 1 block and she was told she has emphysema . And go 1-light of stairs before get short winded    She  has cough ,productive for clear-yellow   Sputum and it is more in the       She use albuterol as needed and she states 3-4 times     She states that she can breath well     Sleep history :  Snoring no   Feel tired during the day no   Wake up fresh no   excessive daytime sleepiness no     Today visit  She ahs been doing great  Mild SOB   History of post viral pneumonia,got abx and steroids           ALLERGIES:    Allergies   Allergen Reactions    Latex Hives     Hives and rash    Iodine Rash     Hives . pt.  States anaphalyxis    Aloe Hives     Hives     Celebrex [Celecoxib] Itching    Fish-Derived Products Other (See Comments)       PAST MEDICAL HISTORY:       Diagnosis Date    Brain aneurysm 09/2018    H/O TIMES 2 THAT BURST PER PATIENT    Cerebral artery occlusion with cerebral infarction (HCC)     RT SIDE AFFECTED-LEARNED TO WALK AND WRITE AGAIN    Degenerative arthritis of hand     Headache     Hiatal hernia     Hip problem     NEEDS HIP REPLACEMENT PER PATIENT    Hx of abnormal cervical Pap smear     Hypertension     Stroke (cerebrum) (HCC)        MEDICATIONS:   Current Outpatient Medications 0    fluticasone (FLONASE) 50 MCG/ACT nasal spray 2 sprays by Each Nostril route daily 1 Bottle 0     No current facility-administered medications for this visit. SOCIAL AND OCCUPATIONAL HEALTH:  Social History     Tobacco Use   Smoking Status Former Smoker    Packs/day: 1.50    Years: 43.00    Pack years: 64.50    Types: Cigarettes    Last attempt to quit: 2018    Years since quittin.0   Smokeless Tobacco Never Used     TB:No   Pets Cats   Industrial exposure:  Birds :no     SURGICAL HISTORY:   Past Surgical History:   Procedure Laterality Date    BRAIN ANEURYSM SURGERY      coiling     COLONOSCOPY  2019    polyps--frank    COLONOSCOPY N/A 2019    COLONOSCOPY POLYPECTOMY SNARE/COLD BIOPSY performed by Elena Almanza MD at [facility]- DONE  [facility] [address] ENDOSCOPY  2019    gastritis with superficial ulcerations; duodenitis--frank    UPPER GASTROINTESTINAL ENDOSCOPY N/A 2019    EGD BIOPSY performed by Elena Almanza MD at [address] [facility] [address]:   Lung cancer:  DVT or PE     REVIEW OF SYSTEMS:  Constitutional: General health is good . There has been no weight changes. No fevers, fatigue or weakness. Head: Patient denies any history of trauma, convulsive disorder or syncope. Skin:  Patient denies history of changes in pigmentation, eruptions or pruritus. No easy bruising or bleeding. EENT: no nasal congestion   Cardiovascular ,No chest pain ,No edema ,  Respiration:SOB:+  ,BELCHER :+  Gastrointestinal:No GI bleed ,no melena  ,no hematemesis    Musculoskeletal: no joint pain ,no swelling  Neurological:no , syncope. Denies twitching, convulsions, loss of consciousness or memory. Endocrine:  . No history of goiter, exophthalmos or dryness of skin. The patient has no history of diabetes.     Hematopoietic:  No history of bleeding disorders or easy bruising. Rheumatic:  No connective tissue disease history or polyarthritis/inflammatory joint disease. PHYSICAL EXAMINATION:  Vitals:    10/12/20 1126   BP: (!) 154/86   Pulse: 57   Resp: 16   Temp: 98.1 °F (36.7 °C)   SpO2: 96%     This is phone visit     . Neurological/Psychiatric: The patient's general behavior, level of consciousness, thought content and emotional status is normal.  Cranial nerves II-XII are intact. DATA:   Will get PFT     IMPRESSION:    1-Possible COPD  2-emphysema   3-lung nodule /scar   4-Hip pain       PLAN:      PFT ,she has spirometry she has no obstruction   patient with long history of smoking ,  She need to  lose weight and since her spirometry shows no obstruction and symptoms control ,will hold on further inhalers ,if she get worse will Symbicort   She get SOB only with exercise   She some times room to room `  -Patient had CAT scan done in October 2019 showing some cystic changes with scarring/lung nodule will get her to get follow-up CAT scan in a 1 year. Between the 2 CAT scans  -Continue albuterol as needed for now ,if any worse add LAMA       Flu and Pneumovax as per primary    Thank you for Kari Gilesjoe allowing me to participate in [facility]. I will keep following with you ,should you have any concerns ,please contact me at [phone]     Sincerely,        Deniz Angela MD  Pulmonary & Critical Care Medicine     NOTE: This report was transcribed using voice recognition software. Every effort was made to ensure accuracy; however, inadvertent computerized transcription errors may be present.

## 2020-11-03 PROBLEM — M47.816 LUMBAR SPONDYLOSIS: Status: RESOLVED | Noted: 2019-03-01 | Resolved: 2020-11-03

## 2020-11-03 RX ORDER — LIDOCAINE AND PRILOCAINE 25; 25 MG/G; MG/G
CREAM TOPICAL
Qty: 30 G | Refills: 2 | Status: SHIPPED
Start: 2020-11-03 | End: 2021-04-27

## 2020-11-04 RX ORDER — ALBUTEROL SULFATE 90 UG/1
AEROSOL, METERED RESPIRATORY (INHALATION)
Qty: 1 INHALER | Refills: 3 | Status: SHIPPED
Start: 2020-11-04 | End: 2021-03-19 | Stop reason: SDUPTHER

## 2020-11-10 ENCOUNTER — OFFICE VISIT (OUTPATIENT)
Dept: ORTHOPEDIC SURGERY | Age: 60
End: 2020-11-10
Payer: COMMERCIAL

## 2020-11-10 ENCOUNTER — HOSPITAL ENCOUNTER (OUTPATIENT)
Dept: GENERAL RADIOLOGY | Age: 60
Discharge: HOME | End: 2020-11-12
Payer: COMMERCIAL

## 2020-11-10 VITALS
SYSTOLIC BLOOD PRESSURE: 122 MMHG | BODY MASS INDEX: 39.15 KG/M2 | WEIGHT: 235 LBS | DIASTOLIC BLOOD PRESSURE: 72 MMHG | TEMPERATURE: 97.2 F | HEART RATE: 58 BPM | HEIGHT: 65 IN

## 2020-11-10 PROCEDURE — 99214 OFFICE O/P EST MOD 30 MIN: CPT | Performed by: PHYSICIAN ASSISTANT

## 2020-11-10 PROCEDURE — G8484 FLU IMMUNIZE NO ADMIN: HCPCS | Performed by: PHYSICIAN ASSISTANT

## 2020-11-10 PROCEDURE — G8417 CALC BMI ABV UP PARAM F/U: HCPCS | Performed by: PHYSICIAN ASSISTANT

## 2020-11-10 PROCEDURE — 1036F TOBACCO NON-USER: CPT | Performed by: PHYSICIAN ASSISTANT

## 2020-11-10 PROCEDURE — 73502 X-RAY EXAM HIP UNI 2-3 VIEWS: CPT

## 2020-11-10 PROCEDURE — G8427 DOCREV CUR MEDS BY ELIG CLIN: HCPCS | Performed by: PHYSICIAN ASSISTANT

## 2020-11-10 PROCEDURE — 3017F COLORECTAL CA SCREEN DOC REV: CPT | Performed by: PHYSICIAN ASSISTANT

## 2020-11-10 PROCEDURE — 99212 OFFICE O/P EST SF 10 MIN: CPT | Performed by: PHYSICIAN ASSISTANT

## 2020-11-10 NOTE — PROGRESS NOTES
Orthopaedic H&P Note    Jennifer Zhao is a 63 y.o. female, her YOB: 1960 with the following history as recorded in [facility]:      Patient Active Problem List    Diagnosis Date Noted    Abnormal blood sugar 02/21/2020    Degenerative disc disease, cervical 12/12/2019    Arthropathy of cervical facet joint 12/12/2019    Colon polyps 09/06/2019    Dysphagia     Heartburn     At risk for colon cancer     Arthritis of right hip 08/07/2019    Chronic pain syndrome 03/01/2019    Lumbar facet arthropathy 03/01/2019    Lumbar disc disorder 03/01/2019    Primary osteoarthritis of both hips 03/01/2019    Hypertension 09/21/2018    Subarachnoid bleed ([address] 09/10/2018    Hypertensive emergency 09/10/2018    Obesity (BMI 30-39.9) 09/10/2018     Current Outpatient Medications   Medication Sig Dispense Refill    albuterol sulfate  (90 Base) MCG/ACT inhaler INHALE TWO PUFFS BY MOUTH EVERY 6 HOURS AS NEEDED FOR WHEEZING. 1 Inhaler 3    diclofenac sodium (VOLTAREN) 1 % GEL APPLY ONE GRAM EXTERNALLY TWICE A DAY TO NECK AND ONE GRAM EXTERNALLY TWICE A DAY TO BACK  100 g 2    lidocaine-prilocaine (EMLA) 2.5-2.5 % cream APPLY ONE GRAM EXTERNALLY THREE TIMES A DAY IF NEEDED-MUST LAST 30 DAYS  30 g 2    gabapentin (NEURONTIN) 300 MG capsule TAKE ONE CAPSULE BY MOUTH THREE TIMES A DAY 90 capsule 1    acetaminophen-codeine (TYLENOL #4) 300-60 MG per tablet Take 1 tablet by mouth daily as needed for Pain for up to 30 days.  30 tablet 1    doxepin (ZONALON) 5 % cream APPLY ONE (1) GRAM EXTERNALLY THREE TIMES A DAY TO NECK AND ONE (1) GRAM EXTERNALLY THREE TIMES TIMES A DAY TO BACK 180 g 2    pantoprazole (PROTONIX) 40 MG tablet TAKE ONE TABLET BY MOUTH EVERY DAY 30 tablet 3    amLODIPine (NORVASC) 5 MG tablet TAKE ONE TABLET BY MOUTH EVERY DAY 30 tablet 3    carBAMazepine (TEGRETOL XR) 100 MG extended release tablet TAKE ONE TABLET BY MOUTH THREE TIMES A DAY (Patient taking differently: 200 mg TAKE ONE TABLET BY MOUTH twice  TIMES A DAY) 90 tablet 3    potassium chloride (KLOR-CON M) 10 MEQ extended release tablet TAKE ONE TABLET BY MOUTH DAILY WITH BREAKFAST 30 tablet 3    chlorthalidone (HYGROTON) 25 MG tablet Take 1 tablet by mouth daily 30 tablet 3    losartan (COZAAR) 100 MG tablet Take 1 tablet by mouth daily 30 tablet 3    Metoprolol Tartrate 37.5 MG TABS Take 37.5 mg by mouth 2 times daily 60 tablet 3    sucralfate (CARAFATE) 1 GM tablet TAKE ONE TABLET BY MOUTH FOUR TIMES A  tablet 0    lidocaine (XYLOCAINE) 5 % ointment APPLY ONE GRAM EXTERNALLY FOUR TIMES A DAY TO NECK AND ONE GRAM EXTERNALLY FOUR TIMES A DAY TO BACK  212.64 g 0    Misc. Devices (ROLLATOR) MISC 1 each by Does not apply route daily as needed (use as needed for stability) 1 each 0    fluticasone (FLONASE) 50 MCG/ACT nasal spray 2 sprays by Each Nostril route daily 1 Bottle 0     No current facility-administered medications for this visit. Allergies: Latex;  Iodine; Aloe; Celebrex [celecoxib]; and Fish-derived products  Past Medical History:   Diagnosis Date    Brain aneurysm 09/2018    H/O TIMES 2 THAT BURST PER PATIENT    Cerebral artery occlusion with cerebral infarction ([facility] [address]     RT SIDE AFFECTED-LEARNED TO WALK AND WRITE AGAIN    Degenerative arthritis of hand     Headache     Hiatal hernia     Hip problem     NEEDS HIP REPLACEMENT PER PATIENT    Hx of abnormal cervical Pap smear     Hypertension     Stroke [facility]cerebrum[facility])      Past Surgical History:   Procedure Laterality Date    BRAIN ANEURYSM SURGERY      coiling     COLONOSCOPY  08/30/2019    polyps--frank    COLONOSCOPY N/A 8/30/2019    COLONOSCOPY POLYPECTOMY SNARE/COLD BIOPSY performed by Nadine Cameron MD at [facility]  [address] ENDOSCOPY  08/30/2019    gastritis with superficial ulcerations; duodenitis--frank    UPPER GASTROINTESTINAL ENDOSCOPY N/A 2019    EGD BIOPSY performed by Karuna Briggs MD at [address] History   Problem Relation Age of Onset    Diabetes Mother     Arthritis Mother     Atrial Fibrillation Mother     Diabetes Father     Atrial Fibrillation Father     Arthritis Father     Emphysema Father     Cancer Sister      Social History     Tobacco Use    Smoking status: Former Smoker     Packs/day: 1.50     Years: 43.00     Pack years: 64.50     Types: Cigarettes     Last attempt to quit: 2018     Years since quittin.1    Smokeless tobacco: Never Used   Substance Use Topics    Alcohol use: No                             Chief Complaint   Patient presents with    Hip Pain      Reports severe BL hip pain the left being the worse. Walks only short distance at home with a cane. Remains under the care of the pain clinic.  Follow-up      Would like to discuss surgical options. SUBJECTIVE: Chinyere Smith is here today for their bilateral Hips. Left is worse than right. The patient has had pain for sometime, but last 6 months or so it has become more severe. She  was diagnosed with OA in the past. There had been no injury to the bilateral Hip that they can remember. Chinyere Smith has tried multiple conservative treatment. Has had therapy in the past, that worked at first, but the pain persisted. She did have steroid injections which did not help much. They have been working on weight loss. The pain is described as typical arthritic pain, achy in the joint, becoming more severe with stairs and any twisting motion of the bilateral Hip. Rest makes the bilateral Hip very mildly improved, patient cannot use NSAIDs due to history of brain aneurism. Patient is mainly wheelchair dependent due to severity of pain, will use a walker for short distances. Patient can ambulate less than 1 block prior to pain limiting their function.  Chinyere Smith is having difficulty with ADLs, and noted, motor and sensory grossly normal bilaterally, normal muscle tone, no tremors, strength 5/5  HEENT: normochephalic atraumatic, external ears and eyes normal, sclera normal, neck supple  Extremities:   peripheral pulses normal, no edema, redness or tenderness in the calves   Skin: normal coloration, no rashes or open wounds, no suspicious skin lesions noted  Psych: Affect euthymic   Musculoskeletal:    Extremity:  Bilateral Lower Extremity  Skin clean dry and intact, without signs of infection  Compartments supple throughout thigh and leg  Calf supple and nontender  Demonstrates active knee flexion/extension, ankle plantar/dorsiflexion/great toe extension. States sensation intact to touch in sural/deep peroneal/superficial peroneal/saphenous/posterior tibial nerve distributions to foot/ankle. Palpable dorsalis pedis and posterior tibialis pulses, cap refill brisk in toes, foot warm/perfused. Patient does have pain with any motion of bilateral hips L>R  No internal/external rotation able to be assessed secondary to pain              /72   Pulse 58   Temp 97.2 °F (36.2 °C) (Temporal)   Ht 5' 5\" (1.651 m)   Wt 235 lb (106.6 kg)   BMI 39.11 kg/m²      XR: 11/10/20 AP pelvis and 2 view of the left hip demonstrates bilateral severe osteoarthritis of the hips, there is impaction of femoral heads, severe sclerosis noted as well. No acute fracture. ASSESSMENT:     Diagnosis Orders   1. Pain  XR HIP 2-3 VW W PELVIS LEFT       Discussion:  The patient would like to proceed with total left Hip arthroplasty when cleared by their PCP. Misty Paige understands the risks include but are not limited to infection, injuries to the blood vessel and nerves, bleeding, continued pain and non relief of pain, aseptic loosening dislocation, limb length discrepancy, arthrofibrosis of the joint, further operation, deep venous thrombosis, pulmonary embolism and fracture, heart attack and death.    Mary operative plan discussed, need for anticoagulation for DVT/PE prophylaxis, need for physical therapy, office follow up visits, and possible rehab stay. It was also explained patient will need to take a prophylactic dose of ATB prior to any bowel, dental or mouth procedures, including dental cleaning, for length of the implant. Did review surgery prosthesis with model with patient as well. Also explained patient will need to use hip precaution for 6-8 weeks after surgery. Pain control was also discussed and the expectation is to have patient off narcotic pain meds around 3-4 weeks post operatively for a total joint arthroplasty     PLAN:  Plan for OR on 12/14/2020 pending medical clearance with Dr. Corinne Morse MD  Pre-surgery medical clearance  PAT  Joint education class  Preop COVID 19  NPO after midnight the night before surgery  WBAT on bilaterally lower extremity  Hold NSAIDS 7 days prior to surgery  Will notify pain management that planning for surgery and for post op pain control     Electronically signed by Carrington Jurado PA-C on 11/10/2020 at 10:45 AM  Note: This report was completed using [employer] voiced recognition software. Every effort has been made to ensure accuracy; however, inadvertent computerized transcription errors may be present.

## 2020-11-10 NOTE — PATIENT INSTRUCTIONS
You will need to be medically cleared before surgery by your family doctor. Please call your doctor to set up an appointment for medical clearance as soon as possible and have the office fax your medical clearance to :   (FAX) [phone]   ATTN:  IESHA    1. Your surgery is scheduled for Left total hip arthroplasty  at 12/14/2020 at 9:00 AM with Dr. Akash Ventura MD at the main [address] [place] [address] in [facility] . You will need to report to Preop area  that morning at 7:00 AM    2. You are having Inpatient surgery so you will not be returning home the same day  3. Preadmission Testing (PAT) department at [facility] will contact you with all the details prior to surgery. 4. Nothing to eat or drink after midnight the night before surgery. You may take a pain pill and any other medicine PAT instructs you to take with small sip of water if needed. 5. You will need to attend Joint Education Class prior to surgery- Tuesday AM from 10-11 am  6. Continue with ice and elevation to reduce swelling  7. Weightbearing as tolerated right lower and left lower, use assistive devices as needed  8. Take pain medicine as instructed  9. Call office with any question or concerns: [phone]. Hold Lovenox/Aspirin the day of surgery. Hold all NSAIDs 7 days prior to surgery  11.  COVID 19 testing    ALL TOTAL JOINT PATIENTS WILL BE WEANED OFF ANY NARCOTIC MEDICATION GIVEN POST OPERATIVELY BY AT THE LATEST 4 WEEKS FROM DATE OF SURGERY    OUTPATIENT ORTHOPEDIC SURGERY  PRE-OP COVID TESTING     We need to have COVID-19 Testing done 4 days (not including Sunday) prior to your scheduled surgery     After your testing is completed you will need to Self Quarantine until date of surgery     If your surgery is scheduled for:  Monday- Testing needs to be done Wednesday Tuesday- Testing needs to be done Thursday Wednesday- Testing needs to be done Friday Thursday- Testing needs to be done Friday Friday- Testing needs to

## 2020-11-24 ENCOUNTER — TELEPHONE (OUTPATIENT)
Dept: ORTHOPEDIC SURGERY | Age: 60
End: 2020-11-24

## 2020-11-24 NOTE — TELEPHONE ENCOUNTER
Spoke to patient to let her know effective 11- [facility] ([facility]) is cancelling all elective surgeries requiring overnight stay. Therefore her surgery on 12- will be cancelled. She will be placed on a waiting list for when we are able to reschedule surgeries. Patient verbalized understanding to everything.

## 2020-12-03 ENCOUNTER — OFFICE VISIT (OUTPATIENT)
Dept: PAIN MANAGEMENT | Age: 60
End: 2020-12-03
Payer: COMMERCIAL

## 2020-12-03 VITALS
HEIGHT: 65 IN | DIASTOLIC BLOOD PRESSURE: 60 MMHG | TEMPERATURE: 97.1 F | BODY MASS INDEX: 38.32 KG/M2 | HEART RATE: 60 BPM | RESPIRATION RATE: 20 BRPM | SYSTOLIC BLOOD PRESSURE: 94 MMHG | WEIGHT: 230 LBS

## 2020-12-03 PROCEDURE — G8417 CALC BMI ABV UP PARAM F/U: HCPCS | Performed by: PHYSICIAN ASSISTANT

## 2020-12-03 PROCEDURE — 1036F TOBACCO NON-USER: CPT | Performed by: PHYSICIAN ASSISTANT

## 2020-12-03 PROCEDURE — 3017F COLORECTAL CA SCREEN DOC REV: CPT | Performed by: PHYSICIAN ASSISTANT

## 2020-12-03 PROCEDURE — 99213 OFFICE O/P EST LOW 20 MIN: CPT | Performed by: PHYSICIAN ASSISTANT

## 2020-12-03 PROCEDURE — G8427 DOCREV CUR MEDS BY ELIG CLIN: HCPCS | Performed by: PHYSICIAN ASSISTANT

## 2020-12-03 PROCEDURE — 99214 OFFICE O/P EST MOD 30 MIN: CPT | Performed by: PHYSICIAN ASSISTANT

## 2020-12-03 PROCEDURE — 99214 OFFICE O/P EST MOD 30 MIN: CPT

## 2020-12-03 PROCEDURE — G8484 FLU IMMUNIZE NO ADMIN: HCPCS | Performed by: PHYSICIAN ASSISTANT

## 2020-12-03 RX ORDER — ACETAMINOPHEN AND CODEINE PHOSPHATE 60; 300 MG/1; MG/1
1 TABLET ORAL DAILY PRN
Qty: 30 TABLET | Refills: 1 | Status: SHIPPED
Start: 2020-12-03 | End: 2021-02-04 | Stop reason: SDUPTHER

## 2020-12-03 NOTE — PROGRESS NOTES
Do you currently have any of the following:    Fever: No  Headache:  No  Cough: No  Shortness of breath: No  Exposed to anyone with these symptoms: No                                                                                                                Preethi Lester presents to the [address] on 12/3/2020. Mirella Fontanez is complaining of pain neck , lower back. The pain is constant. The pain is described as aching and burning. Pain is rated on her best day at a 8, on her worst day at a 10, and on average at a 9 on the VAS scale. She took her last dose of Tylenol with codeine this am. Mirella Fontanez does have issues with constipation. Any procedures since your last visit: No    She is not on NSAIDS and  is not on anticoagulation medications to include none   Pacemaker or defibrilator: No  Medication Contract and Consent for Opioid Use Documents Filed     Patient Documents       Type of Document Status Date Received Received By Description     Medication Contract Received 3/1/2019  1:43 PM FRANCIS MCINTOSH PAIN MANAGEMENT PT AGREEMENT 3/1/2019     Medication Contract Received 10/8/2020  1:21 PM DAYNA [address] [place] [address] pain pt agreement                   BP 94/60   Pulse 60   Temp 97.1 °F (36.2 °C)   Resp 20   Ht 5' 5\" (1.651 m)   Wt 230 lb (104.3 kg)   BMI 38.27 kg/m²      No LMP recorded.  Patient is postmenopausal.

## 2020-12-03 NOTE — PROGRESS NOTES
[facility], [address] [place] [address] Chad  [phone]    Follow up Note      Ale Prophet     Date of Visit:  12/3/2020    CC:  Patient presents for follow up   Chief Complaint   Patient presents with    Lower Back Pain    Neck Pain     left hand    Follow-up       HPI:    Pain is unchanged. No new issues. Hip surgery is cancelled due to pandemic. Appropriate analgesia with current medications regimen: yes. Change in quality of symptoms:no. Medication side effects:none. Recent diagnostic testing:none. Recent interventional procedures:none. She has not been on anticoagulation medications to include none. The patient  has not been on herbal supplements. The patient is not diabetic.     Imaging studies:    Cervical XR 11/2019 Severe degenerative changes      Lumbar spine Xray 03/2019  Scoliosis and osteoarthritis.     Bilateral hip Xray 03/2019   Advanced osteoarthrosis of bilateral hips. Potential Aberrant Drug-Related Behavior: None     Urine Drug Screening:  First office visit saliva screen showed no narcotics   11/2019 Consistent, negative for all  06/2020 Consistent     OARRS report:  03/2019 consistent to 12/2020 Consistent  (norco on 9/24 from dentist for teeth extraction).           Past Medical History:   Diagnosis Date    Brain aneurysm 09/2018    H/O TIMES 2 THAT BURST PER PATIENT    Cerebral artery occlusion with cerebral infarction ([facility])     RT SIDE AFFECTED-LEARNED TO WALK AND WRITE AGAIN    Degenerative arthritis of hand     Headache     Hiatal hernia     Hip problem     NEEDS HIP REPLACEMENT PER PATIENT    Hx of abnormal cervical Pap smear     Hypertension     Stroke (cerebrum) [facility])        Past Surgical History:   Procedure Laterality Date    BRAIN ANEURYSM SURGERY      coiling     COLONOSCOPY  08/30/2019    polyps--frank    COLONOSCOPY N/A 8/30/2019    COLONOSCOPY POLYPECTOMY SNARE/COLD BIOPSY performed by Manda Jasso MD at [address]  [facility] [address] ENDOSCOPY  08/30/2019    gastritis with superficial ulcerations; duodenitis--frank    UPPER GASTROINTESTINAL ENDOSCOPY N/A 8/30/2019    EGD BIOPSY performed by Manda Jasso MD at [facility]       Prior to Admission medications    Medication Sig Start Date End Date Taking? Authorizing Provider   acetaminophen-codeine (TYLENOL #4) 300-60 MG per tablet Take 1 tablet by mouth daily as needed for Pain for up to 30 days.  12/3/20 1/2/21 Yes SPENSER Briseno   albuterol sulfate  (90 Base) MCG/ACT inhaler INHALE TWO PUFFS BY MOUTH EVERY 6 HOURS AS NEEDED FOR WHEEZING.  11/4/20  Yes Lani Mancia MD   diclofenac sodium (VOLTAREN) 1 % GEL APPLY ONE GRAM EXTERNALLY TWICE A DAY TO NECK AND ONE GRAM EXTERNALLY TWICE A DAY TO BACK  11/3/20  Yes SPENSER Briseno   lidocaine-prilocaine (EMLA) 2.5-2.5 % cream APPLY ONE GRAM EXTERNALLY THREE TIMES A DAY IF NEEDED-MUST LAST 30 DAYS  11/3/20  Yes SPENSER Briseno   gabapentin (NEURONTIN) 300 MG capsule TAKE ONE CAPSULE BY MOUTH THREE TIMES A DAY 10/20/20 12/3/20 Yes Samy Cole MD   doxepin (ZONALON) 5 % cream APPLY ONE (1) GRAM EXTERNALLY THREE TIMES A DAY TO NECK AND ONE (1) GRAM EXTERNALLY THREE TIMES TIMES A DAY TO BACK 9/29/20  Yes SPENSER Briseno   pantoprazole (PROTONIX) 40 MG tablet TAKE ONE TABLET BY MOUTH EVERY DAY 9/17/20  Yes Lani Mancia MD   amLODIPine (NORVASC) 5 MG tablet TAKE ONE TABLET BY MOUTH EVERY DAY 9/17/20  Yes Lani Mancia MD   carBAMazepine (TEGRETOL XR) 100 MG extended release tablet TAKE ONE TABLET BY MOUTH THREE TIMES A DAY  Patient taking differently: 200 mg TAKE ONE TABLET BY MOUTH twice  TIMES A DAY 9/17/20  Yes Lani Mancia MD   potassium chloride (KLOR-CON M) 10 MEQ extended release tablet TAKE ONE TABLET BY MOUTH DAILY WITH BREAKFAST 20  Yes Enriqueta Durant MD   chlorthalidone (HYGROTON) 25 MG tablet Take 1 tablet by mouth daily 20  Yes Enriqueta Durant MD   losartan (COZAAR) 100 MG tablet Take 1 tablet by mouth daily 20  Yes Enriqueta Durant MD   Metoprolol Tartrate 37.5 MG TABS Take 37.5 mg by mouth 2 times daily 20  Yes Enriqueta Durant MD   sucralfate (CARAFATE) 1 GM tablet TAKE ONE TABLET BY MOUTH FOUR TIMES A DAY 20  Yes Enriqueta Durant MD   lidocaine (XYLOCAINE) 5 % ointment APPLY ONE GRAM EXTERNALLY FOUR TIMES A DAY TO NECK AND ONE GRAM EXTERNALLY FOUR TIMES A DAY TO BACK  20  Yes Lurlene Fraction, [address] Devices (ROLLATOR) MISC 1 each by Does not apply route daily as needed (use as needed for stability) 3/17/20  Yes Enriqueta Durant MD   fluticasone (FLONASE) 50 MCG/ACT nasal spray 2 sprays by Each Nostril route daily 20  Yes Enriqueta Durant MD       Allergies   Allergen Reactions    Latex Hives     Hives and rash    Iodine Rash     Hives . pt.  States anaphalyxis    Aloe Hives     Hives     Celebrex [Celecoxib] Itching    Fish-Derived Products Other (See Comments)       Social History     Socioeconomic History    Marital status:      Spouse name: Not on file    Number of children: Not on file    Years of education: Not on file    Highest education level: Not on file   Occupational History    Not on file   Social Needs    Financial resource strain: Not on file    Food insecurity     Worry: Not on file     Inability: Not on file   [employer] needs     Medical: Not on file     Non-medical: Not on file   Tobacco Use    Smoking status: Former Smoker     Packs/day: 1.50     Years: 43.00     Pack years: 64.50     Types: Cigarettes     Last attempt to quit: 2018     Years since quittin.2    Smokeless tobacco: Never Used   Substance and Sexual Activity    Alcohol use: No    Drug use: No    Sexual activity: Not Currently   Lifestyle    Physical activity     Days per week: Not on file     Minutes per session: Not on file    Stress: Not on file   Relationships    Social connections     Talks on phone: Not on file     Gets together: Not on file     Attends Faith service: Not on file     Active member of club or organization: Not on file     Attends meetings of clubs or organizations: Not on file     Relationship status: Not on file    Intimate partner violence     Fear of current or ex partner: Not on file     Emotionally abused: Not on file     Physically abused: Not on file     Forced sexual activity: Not on file   Other Topics Concern    Not on file   Social History Narrative    Not on file       Family History   Problem Relation Age of Onset    Diabetes Mother     Arthritis Mother     Atrial Fibrillation Mother     Diabetes Father     Atrial Fibrillation Father     Arthritis Father     Emphysema Father     Cancer Sister        REVIEW OF SYSTEMS:     Pari Serrano denies fever/chills, chest pain, shortness of breath, new bowel or bladder complaints. All other review of systems was negative. PHYSICAL EXAMINATION:      BP 94/60   Pulse 60   Temp 97.1 °F (36.2 °C)   Resp 20   Ht 5' 5\" (1.651 m)   Wt 230 lb (104.3 kg)   BMI 38.27 kg/m²     General:      General appearance:   pleasant and well-hydrated. , in no discomfort and A & O x3  Build:Overweight  Function:Rises from a seated position with difficulty    HEENT:    Head:normocephalic and atraumatic  Pupils:regular, round and equal.  Sclera: icterus absent,    Lungs:    Breathing:Normal expansion. Clear to auscultation. No rales, rhonchi, or wheezing. Abdomen:    Shape:non-distended and normal  Tenderness:none  Guarding:none    Extremities:    Tremors:None bilaterally upper and lower  Range of motion:Generally normal shoulders  Intact:Yes  Varicose veins:not assessed   Cyanosis:none  Edema:Normal    Neurological:    Gait: Not observed.     Dermatology:    Skin:no unusual rashes, no skin lesions, no palpable subcutaneous nodules and good skin turgor    Impression:    Patient seen for follow up for her chronic bilateral hip pain and low back pain due to severe degenerative changes and axial c/o neck pain   Would benefit from Cervical MBB, patient uninterested   Patient has seen Dr. Radha Levi and Dr. Bradford Camarena who did not recommend surgery because of her co-morbid conditions (must be a year past her cerebral rupture and will need clearance per neurosurgery), but patient states she is in the process of getting medical clearance. Continue Gabapentin 300 mg TID per PCP  Continue Tylenol #4 QD prn - ordered with 1 refill. Compounding cream. Helping a lot. OARRS report reviewed 12/2020  UDS ordered today  Patient encouraged to remain active as tolerated   Treatment plan discussed with the patient including medications and side effects     Controlled Substance Monitoring:    Acute and Chronic Pain Monitoring:   RX Monitoring 10/8/2020   Periodic Controlled Substance Monitoring Possible medication side effects, risk of tolerance/dependence & alternative treatments discussed. ;No signs of potential drug abuse or diversion identified. ;Assessed functional status. ;Obtaining appropriate analgesic effect of treatment. We discussed with the patient that combining opioids, benzodiazepines, alcohol, illicit drugs or sleep aids increases the risk of respiratory depression including death. We discussed that these medications may cause drowsiness, sedation or dizziness and have counseled the patient not to drive or operate machinery. We have discussed that these medications will be prescribed only by one provider. We have discussed with the patient about age related risk factors and have thoroughly discussed the importance of taking these medications as prescribed. The patient verbalizes understanding.     ccreferring physic

## 2020-12-04 ENCOUNTER — HOSPITAL ENCOUNTER (OUTPATIENT)
Age: 60
Discharge: HOME | End: 2020-12-04
Payer: COMMERCIAL

## 2020-12-04 LAB
ALBUMIN SERPL-MCNC: 4.5 G/DL (ref 3.5–5.2)
ALP BLD-CCNC: 100 U/L (ref 35–104)
ALT SERPL-CCNC: 24 U/L (ref 0–32)
ANION GAP SERPL CALCULATED.3IONS-SCNC: 11 MMOL/L (ref 7–16)
AST SERPL-CCNC: 15 U/L (ref 0–31)
BASOPHILS ABSOLUTE: 0.02 E9/L (ref 0–0.2)
BASOPHILS RELATIVE PERCENT: 0.4 % (ref 0–2)
BILIRUB SERPL-MCNC: 0.3 MG/DL (ref 0–1.2)
BUN BLDV-MCNC: 19 MG/DL (ref 8–23)
CALCIUM SERPL-MCNC: 9.8 MG/DL (ref 8.6–10.2)
CHLORIDE BLD-SCNC: 101 MMOL/L (ref 98–107)
CHOLESTEROL, TOTAL: 175 MG/DL (ref 0–199)
CO2: 28 MMOL/L (ref 22–29)
CREAT SERPL-MCNC: 1 MG/DL (ref 0.5–1)
EOSINOPHILS ABSOLUTE COUNT: 80 /UL (ref 50–250)
EOSINOPHILS ABSOLUTE: 0.08 E9/L (ref 0.05–0.5)
EOSINOPHILS RELATIVE PERCENT: 1.4 % (ref 0–6)
GFR AFRICAN AMERICAN: >60
GFR NON-AFRICAN AMERICAN: 56 ML/MIN/1.73
GLUCOSE BLD-MCNC: 105 MG/DL (ref 74–99)
HBA1C MFR BLD: 5.8 % (ref 4–5.6)
HCT VFR BLD CALC: 42.8 % (ref 34–48)
HDLC SERPL-MCNC: 65 MG/DL
HEMOGLOBIN: 14.2 G/DL (ref 11.5–15.5)
IMMATURE GRANULOCYTES #: 0.02 E9/L
IMMATURE GRANULOCYTES %: 0.4 % (ref 0–5)
LDL CHOLESTEROL CALCULATED: 74 MG/DL (ref 0–99)
LYMPHOCYTES ABSOLUTE: 1.59 E9/L (ref 1.5–4)
LYMPHOCYTES RELATIVE PERCENT: 28.3 % (ref 20–42)
MCH RBC QN AUTO: 30.1 PG (ref 26–35)
MCHC RBC AUTO-ENTMCNC: 33.2 % (ref 32–34.5)
MCV RBC AUTO: 90.7 FL (ref 80–99.9)
MONOCYTES ABSOLUTE: 0.32 E9/L (ref 0.1–0.95)
MONOCYTES RELATIVE PERCENT: 5.7 % (ref 2–12)
NEUTROPHILS ABSOLUTE: 3.58 E9/L (ref 1.8–7.3)
NEUTROPHILS RELATIVE PERCENT: 63.8 % (ref 43–80)
PDW BLD-RTO: 12.5 FL (ref 11.5–15)
PLATELET # BLD: 255 E9/L (ref 130–450)
PMV BLD AUTO: 8.9 FL (ref 7–12)
POTASSIUM SERPL-SCNC: 3.5 MMOL/L (ref 3.5–5)
RBC # BLD: 4.72 E12/L (ref 3.5–5.5)
SODIUM BLD-SCNC: 140 MMOL/L (ref 132–146)
TOTAL PROTEIN: 7.5 G/DL (ref 6.4–8.3)
TRIGL SERPL-MCNC: 180 MG/DL (ref 0–149)
VLDLC SERPL CALC-MCNC: 36 MG/DL
WBC # BLD: 5.6 E9/L (ref 4.5–11.5)

## 2020-12-04 PROCEDURE — 85048 AUTOMATED LEUKOCYTE COUNT: CPT

## 2020-12-04 PROCEDURE — 80061 LIPID PANEL: CPT

## 2020-12-04 PROCEDURE — 85025 COMPLETE CBC W/AUTO DIFF WBC: CPT

## 2020-12-04 PROCEDURE — 86003 ALLG SPEC IGE CRUDE XTRC EA: CPT

## 2020-12-04 PROCEDURE — 80053 COMPREHEN METABOLIC PANEL: CPT

## 2020-12-04 PROCEDURE — 83036 HEMOGLOBIN GLYCOSYLATED A1C: CPT

## 2020-12-04 PROCEDURE — 36415 COLL VENOUS BLD VENIPUNCTURE: CPT

## 2020-12-04 PROCEDURE — 82785 ASSAY OF IGE: CPT

## 2020-12-09 LAB
Lab: NORMAL
REPORT: NORMAL
THIS TEST SENT TO: NORMAL

## 2020-12-11 ENCOUNTER — HOSPITAL ENCOUNTER (OUTPATIENT)
Dept: CT IMAGING | Age: 60
Discharge: HOME | End: 2020-12-13
Payer: COMMERCIAL

## 2020-12-11 ENCOUNTER — OFFICE VISIT (OUTPATIENT)
Dept: FAMILY MEDICINE CLINIC | Age: 60
End: 2020-12-11
Payer: COMMERCIAL

## 2020-12-11 VITALS
SYSTOLIC BLOOD PRESSURE: 119 MMHG | TEMPERATURE: 97.4 F | WEIGHT: 237.6 LBS | DIASTOLIC BLOOD PRESSURE: 61 MMHG | HEART RATE: 53 BPM | BODY MASS INDEX: 39.58 KG/M2 | HEIGHT: 65 IN

## 2020-12-11 PROCEDURE — 2022F DILAT RTA XM EVC RTNOPTHY: CPT | Performed by: STUDENT IN AN ORGANIZED HEALTH CARE EDUCATION/TRAINING PROGRAM

## 2020-12-11 PROCEDURE — 3017F COLORECTAL CA SCREEN DOC REV: CPT | Performed by: STUDENT IN AN ORGANIZED HEALTH CARE EDUCATION/TRAINING PROGRAM

## 2020-12-11 PROCEDURE — 70496 CT ANGIOGRAPHY HEAD: CPT

## 2020-12-11 PROCEDURE — 6360000004 HC RX CONTRAST MEDICATION: Performed by: RADIOLOGY

## 2020-12-11 PROCEDURE — G8427 DOCREV CUR MEDS BY ELIG CLIN: HCPCS | Performed by: STUDENT IN AN ORGANIZED HEALTH CARE EDUCATION/TRAINING PROGRAM

## 2020-12-11 PROCEDURE — 1036F TOBACCO NON-USER: CPT | Performed by: STUDENT IN AN ORGANIZED HEALTH CARE EDUCATION/TRAINING PROGRAM

## 2020-12-11 PROCEDURE — 99213 OFFICE O/P EST LOW 20 MIN: CPT | Performed by: STUDENT IN AN ORGANIZED HEALTH CARE EDUCATION/TRAINING PROGRAM

## 2020-12-11 PROCEDURE — G8417 CALC BMI ABV UP PARAM F/U: HCPCS | Performed by: STUDENT IN AN ORGANIZED HEALTH CARE EDUCATION/TRAINING PROGRAM

## 2020-12-11 PROCEDURE — G8484 FLU IMMUNIZE NO ADMIN: HCPCS | Performed by: STUDENT IN AN ORGANIZED HEALTH CARE EDUCATION/TRAINING PROGRAM

## 2020-12-11 PROCEDURE — 3044F HG A1C LEVEL LT 7.0%: CPT | Performed by: STUDENT IN AN ORGANIZED HEALTH CARE EDUCATION/TRAINING PROGRAM

## 2020-12-11 RX ORDER — CHLORTHALIDONE 25 MG/1
25 TABLET ORAL DAILY
Qty: 30 TABLET | Refills: 3 | Status: SHIPPED
Start: 2020-12-11 | End: 2021-03-19 | Stop reason: SDUPTHER

## 2020-12-11 RX ORDER — AMLODIPINE BESYLATE 5 MG/1
TABLET ORAL
Qty: 30 TABLET | Refills: 3 | Status: SHIPPED
Start: 2020-12-11 | End: 2021-03-19 | Stop reason: SDUPTHER

## 2020-12-11 RX ORDER — LOSARTAN POTASSIUM 100 MG/1
100 TABLET ORAL DAILY
Qty: 30 TABLET | Refills: 3 | Status: SHIPPED
Start: 2020-12-11 | End: 2021-03-19 | Stop reason: SDUPTHER

## 2020-12-11 RX ORDER — METOPROLOL TARTRATE 37.5 MG/1
37.5 TABLET, FILM COATED ORAL 2 TIMES DAILY
Qty: 60 TABLET | Refills: 3 | Status: SHIPPED
Start: 2020-12-11 | End: 2021-03-19 | Stop reason: SDUPTHER

## 2020-12-11 RX ORDER — FLUTICASONE PROPIONATE 50 MCG
2 SPRAY, SUSPENSION (ML) NASAL DAILY
Qty: 1 BOTTLE | Refills: 0 | Status: SHIPPED
Start: 2020-12-11 | End: 2021-03-19 | Stop reason: SDUPTHER

## 2020-12-11 RX ORDER — SODIUM CHLORIDE 0.9 % (FLUSH) 0.9 %
10 SYRINGE (ML) INJECTION PRN
Status: DISCONTINUED | OUTPATIENT
Start: 2020-12-11 | End: 2020-12-14 | Stop reason: HOSPADM

## 2020-12-11 RX ORDER — CARBAMAZEPINE 100 MG/1
200 TABLET, EXTENDED RELEASE ORAL 2 TIMES DAILY
Qty: 60 TABLET | Refills: 1 | Status: SHIPPED
Start: 2020-12-11 | End: 2020-12-11 | Stop reason: CLARIF

## 2020-12-11 RX ORDER — GABAPENTIN 300 MG/1
CAPSULE ORAL
Qty: 90 CAPSULE | Refills: 1 | Status: SHIPPED
Start: 2020-12-11 | End: 2021-02-15 | Stop reason: SDUPTHER

## 2020-12-11 RX ORDER — CARBAMAZEPINE 100 MG/1
200 TABLET, EXTENDED RELEASE ORAL 2 TIMES DAILY
Qty: 120 TABLET | Refills: 1 | OUTPATIENT
Start: 2020-12-11 | End: 2021-02-15 | Stop reason: SDUPTHER

## 2020-12-11 RX ORDER — PANTOPRAZOLE SODIUM 40 MG/1
TABLET, DELAYED RELEASE ORAL
Qty: 30 TABLET | Refills: 3 | Status: SHIPPED
Start: 2020-12-11 | End: 2021-03-19 | Stop reason: SDUPTHER

## 2020-12-11 RX ORDER — POTASSIUM CHLORIDE 750 MG/1
TABLET, EXTENDED RELEASE ORAL
Qty: 30 TABLET | Refills: 3 | Status: SHIPPED
Start: 2020-12-11 | End: 2021-03-19 | Stop reason: SDUPTHER

## 2020-12-11 RX ORDER — CARBAMAZEPINE 100 MG/1
200 TABLET, EXTENDED RELEASE ORAL 2 TIMES DAILY
COMMUNITY
End: 2020-12-11 | Stop reason: SDUPTHER

## 2020-12-11 RX ADMIN — IOPAMIDOL 60 ML: 755 INJECTION, SOLUTION INTRAVENOUS at 14:39

## 2020-12-11 NOTE — PROGRESS NOTES
[facility]  Department of Family Medicine  Family Medicine Residency Program      Patient:  Michaela Sweeney 63 y.o. female     Date of Service: 12/11/20      Chief complaint:   Chief Complaint   Patient presents with    3 Month Follow-Up     HTN         History ofPresent Illness   The patient is a 63 y.o. female  presented to the clinic with complaints as above. HTN  -f/u  -is checking every so often at home, 120's/80's  -no issues taking your medications, amlodipine and losartan and chlorthalidone   -denies CP, increasing SOB, lightheadedness, or dizziness     Elevated glucose  -a1c on 12-4-2020 was 5.8, in prediabetic range  -does not take anything for this  -denies any excessive thirst or urination     Past Medical History:      Diagnosis Date    Brain aneurysm 09/2018    H/O TIMES 2 THAT BURST PER PATIENT    Cerebral artery occlusion with cerebral infarction ([address]     RT SIDE AFFECTED-LEARNED TO WALK AND WRITE AGAIN    Degenerative arthritis of hand     Headache     Hiatal hernia     Hip problem     NEEDS HIP REPLACEMENT PER PATIENT    Hx of abnormal cervical Pap smear     Hypertension     Stroke (cerebrum) ([address]        PastSurgical History:        Procedure Laterality Date    BRAIN ANEURYSM SURGERY      coiling     COLONOSCOPY  08/30/2019    polyps--frank    COLONOSCOPY N/A 8/30/2019    COLONOSCOPY POLYPECTOMY SNARE/COLD BIOPSY performed by Ralph Vazquez MD at [address] [place] [address]- DONE AT [address] [place] [address] ENDOSCOPY  08/30/2019    gastritis with superficial ulcerations; duodenitis--frank    UPPER GASTROINTESTINAL ENDOSCOPY N/A 8/30/2019    EGD BIOPSY performed by Ralph V[facility]quez MD at [facility] ENDOSCOPY       Allergies:    Latex;  Iodine; Aloe; Celebrex [celecoxib]; and Fish-derived products    Social History:   Social History     Socioeconomic History    Marital status:  Spouse name: Not on file    Number of children: Not on file    Years of education: Not on file    Highest education level: Not on file   Occupational History    Not on file   Social Needs    Financial resource strain: Not on file    Food insecurity     Worry: Not on file     Inability: Not on file    Transportation needs     Medical: Not on file     Non-medical: Not on file   Tobacco Use    Smoking status: Former Smoker     Packs/day: 1.50     Years: 43.00     Pack years: 64.50     Types: Cigarettes     Last attempt to quit: 2018     Years since quittin.2    Smokeless tobacco: Never Used   Substance and Sexual Activity    Alcohol use: No    Drug use: No    Sexual activity: Not Currently   Lifestyle    Physical activity     Days per week: Not on file     Minutes per session: Not on file    Stress: Not on file   Relationships    Social connections     Talks on phone: Not on file     Gets together: Not on file     Attends Episcopal service: Not on file     Active member of club or organization: Not on file     Attends meetings of clubs or organizations: Not on file     Relationship status: Not on file    Intimate partner violence     Fear of current or ex partner: Not on file     Emotionally abused: Not on file     Physically abused: Not on file     Forced sexual activity: Not on file   Other Topics Concern    Not on file   Social History Narrative    Not on file        Family History:       Problem Relation Age of Onset    Diabetes Mother    Christine Abbasi Arthritis Mother     Atrial Fibrillation Mother     Diabetes Father     Atrial Fibrillation Father     Arthritis Father     Emphysema Father     Cancer Sister        Review of Systems:   Review of Systems - as above     Physical Exam   Vitals: /61   Pulse 53   Temp 97.4 °F (36.3 °C) (Temporal)   Ht 5' 5\" (1.651 m)   Wt 237 lb 9.6 oz (107.8 kg)   BMI 39.54 kg/m²   Physical Exam  Constitutional: Appearance: She is well-developed. HENT:      Head: Normocephalic. Cardiovascular:      Rate and Rhythm: Normal rate and regular rhythm. Heart sounds: Normal heart sounds. No murmur. Pulmonary:      Effort: Pulmonary effort is normal. No respiratory distress. Breath sounds: Normal breath sounds. No wheezing. Abdominal:      General: Bowel sounds are normal.      Palpations: Abdomen is soft. Musculoskeletal: Normal range of motion. General: No tenderness. Skin:     General: Skin is warm and dry. Neurological:      Mental Status: She is alert and oriented to person, place, and time. Psychiatric:         Behavior: Behavior normal.             Assessment and Plan       1. Hypertension, unspecified type  F/u of chronic issue  -well controlled given office BP and at home BP's  -Continue current regimen  - potassium chloride (KLOR-CON M) 10 MEQ extended release tablet; TAKE ONE TABLET BY MOUTH DAILY WITH BREAKFAST  Dispense: 30 tablet; Refill: 3  - Metoprolol Tartrate 37.5 MG TABS; Take 37.5 mg by mouth 2 times daily  Dispense: 60 tablet; Refill: 3  - losartan (COZAAR) 100 MG tablet; Take 1 tablet by mouth daily  Dispense: 30 tablet; Refill: 3  - chlorthalidone (HYGROTON) 25 MG tablet; Take 1 tablet by mouth daily  Dispense: 30 tablet; Refill: 3  - amLODIPine (NORVASC) 5 MG tablet; TAKE ONE TABLET BY MOUTH EVERY DAY  Dispense: 30 tablet; Refill: 3    HCM:  2. Type 2 diabetes mellitus without complication, without long-term current use of insulin (HCC)  F/u of chronic issue  -Diet controlled  -Continue lifestyle modification    3. Dysphagia, unspecified type  - pantoprazole (PROTONIX) 40 MG tablet; TAKE ONE TABLET BY MOUTH EVERY DAY  Dispense: 30 tablet; Refill: 3    4. Lumbar radiculitis  - gabapentin (NEURONTIN) 300 MG capsule; TAKE ONE CAPSULE BY MOUTH THREE TIMES A DAY  Dispense: 90 capsule; Refill: 1    5.  Lung disease, interstitial ([address] - fluticasone (FLONASE) 50 MCG/ACT nasal spray; 2 sprays by Each Nostril route daily  Dispense: 1 Bottle; Refill: 0        Counseled regarding above diagnosis, including possible risks and complications,  especially if left uncontrolled. Counseled regarding the possible side effects, risks, benefits and alternatives to treatment;patient and/or guardian verbalizes understanding, agrees, feels comfortable with and wishes to proceed with above treatment plan. Call or go to [address] if symptoms worsen or persist. Advised patient to call with any new medication issues, and, as applicable, read all Rx info from pharmacy to assure aware of all possible risks and side effects of medicationbefore taking. Patient and/or guardian given opportunity to ask questions/raise concerns. The patient verbalized comfort and understanding ofinstructions. I encourage further reading and education about your health conditions. Information on many health conditions is provided by [facility] [employer]: [email or web address] [email or web address]  Please bring any questions to me at your nextvisit. Return to Office: Return in about 3 months (around 3/11/2021) for f/u HTN .     Medication List:    Current Outpatient Medications   Medication Sig Dispense Refill    pantoprazole (PROTONIX) 40 MG tablet TAKE ONE TABLET BY MOUTH EVERY DAY 30 tablet 3    potassium chloride (KLOR-CON M) 10 MEQ extended release tablet TAKE ONE TABLET BY MOUTH DAILY WITH BREAKFAST 30 tablet 3    Metoprolol Tartrate 37.5 MG TABS Take 37.5 mg by mouth 2 times daily 60 tablet 3    losartan (COZAAR) 100 MG tablet Take 1 tablet by mouth daily 30 tablet 3    gabapentin (NEURONTIN) 300 MG capsule TAKE ONE CAPSULE BY MOUTH THREE TIMES A DAY 90 capsule 1    fluticasone (FLONASE) 50 MCG/ACT nasal spray 2 sprays by Each Nostril route daily 1 Bottle 0    chlorthalidone (HYGROTON) 25 MG tablet Take 1 tablet by mouth daily 30 tablet 3  amLODIPine (NORVASC) 5 MG tablet TAKE ONE TABLET BY MOUTH EVERY DAY 30 tablet 3    carBAMazepine (TEGRETOL XR) 100 MG extended release tablet Take 2 tablets by mouth 2 times daily 120 tablet 1    acetaminophen-codeine (TYLENOL #4) 300-60 MG per tablet Take 1 tablet by mouth daily as needed for Pain for up to 30 days. 30 tablet 1    albuterol sulfate  (90 Base) MCG/ACT inhaler INHALE TWO PUFFS BY MOUTH EVERY 6 HOURS AS NEEDED FOR WHEEZING. 1 Inhaler 3    diclofenac sodium (VOLTAREN) 1 % GEL APPLY ONE GRAM EXTERNALLY TWICE A DAY TO NECK AND ONE GRAM EXTERNALLY TWICE A DAY TO BACK  100 g 2    lidocaine-prilocaine (EMLA) 2.5-2.5 % cream APPLY ONE GRAM EXTERNALLY THREE TIMES A DAY IF NEEDED-MUST LAST 30 DAYS  30 g 2    doxepin (ZONALON) 5 % cream APPLY ONE (1) GRAM EXTERNALLY THREE TIMES A DAY TO NECK AND ONE (1) GRAM EXTERNALLY THREE TIMES TIMES A DAY TO BACK 180 g 2    sucralfate (CARAFATE) 1 GM tablet TAKE ONE TABLET BY MOUTH FOUR TIMES A  tablet 0    lidocaine (XYLOCAINE) 5 % ointment APPLY ONE GRAM EXTERNALLY FOUR TIMES A DAY TO NECK AND ONE GRAM EXTERNALLY FOUR TIMES A DAY TO BACK  212.64 g 0    Misc. Devices (ROLLATOR) MISC 1 each by Does not apply route daily as needed (use as needed for stability) 1 each 0     No current facility-administered medications for this visit. Facility-Administered Medications Ordered in Other Visits   Medication Dose Route Frequency Provider Last Rate Last Dose    sodium chloride flush 0.9 % injection 10 mL  10 mL Intravenous PRN Ever Staff, MD Carrington Mansfield, DO       This document may have been prepared at least partially through the use of voice recognition software. Although effort is taken to assure the accuracy ofthis document, it is possible that grammatical, syntax,  or spelling errors may occur.

## 2021-01-06 ENCOUNTER — TELEPHONE (OUTPATIENT)
Dept: ORTHOPEDIC SURGERY | Age: 61
End: 2021-01-06

## 2021-01-06 NOTE — TELEPHONE ENCOUNTER
Spoke to patient today to r/s her surgery from 12- that was cancelled due to the COVID restrictions. New surgery date was decided for 2- @ 7:30 am with Dr. Della Block for Left Total Hip Arthroplasty. Hospital arrival time of 5:30 am. Patient instructed to call [facility] dept #[phone] to go over surgery instructions, schedule her pre testing and joint class. Informed her of the self quarantine instructions and she was told to have her COVID testing done on 2- between 6:30 am-2 pm and she wants to have it done at the [facility] location. She needs to have a updated medical clearance from her PCP within 30 days of her surgery date and our clearance form was faxed to their office today. Patient is curious to know if her son can stay with her at the hospital following surgery so she was informed to call the [facility] HEAD with Contrast was done on 12- and she needs to make appointment with Dr. Homero Hooks to go over the results. Medical clearance will be needed from her Neurologist so I faxed our medical clearance form to their office today. Appointment with her dentist, Dr. Sumaya Medeiros in [place], is scheduled for 1-8-2021 for a check up since she recently had all of her teeth pulled.

## 2021-01-26 ENCOUNTER — OFFICE VISIT (OUTPATIENT)
Dept: FAMILY MEDICINE CLINIC | Age: 61
End: 2021-01-26
Payer: COMMERCIAL

## 2021-01-26 VITALS
WEIGHT: 232 LBS | HEIGHT: 65 IN | RESPIRATION RATE: 16 BRPM | BODY MASS INDEX: 38.65 KG/M2 | TEMPERATURE: 96.9 F | OXYGEN SATURATION: 99 % | SYSTOLIC BLOOD PRESSURE: 137 MMHG | HEART RATE: 58 BPM | DIASTOLIC BLOOD PRESSURE: 61 MMHG

## 2021-01-26 DIAGNOSIS — Z01.818 PREOP TESTING: Primary | ICD-10-CM

## 2021-01-26 PROCEDURE — G8417 CALC BMI ABV UP PARAM F/U: HCPCS | Performed by: STUDENT IN AN ORGANIZED HEALTH CARE EDUCATION/TRAINING PROGRAM

## 2021-01-26 PROCEDURE — 3017F COLORECTAL CA SCREEN DOC REV: CPT | Performed by: STUDENT IN AN ORGANIZED HEALTH CARE EDUCATION/TRAINING PROGRAM

## 2021-01-26 PROCEDURE — 93000 ELECTROCARDIOGRAM COMPLETE: CPT | Performed by: STUDENT IN AN ORGANIZED HEALTH CARE EDUCATION/TRAINING PROGRAM

## 2021-01-26 PROCEDURE — 99213 OFFICE O/P EST LOW 20 MIN: CPT | Performed by: STUDENT IN AN ORGANIZED HEALTH CARE EDUCATION/TRAINING PROGRAM

## 2021-01-26 PROCEDURE — G8427 DOCREV CUR MEDS BY ELIG CLIN: HCPCS | Performed by: STUDENT IN AN ORGANIZED HEALTH CARE EDUCATION/TRAINING PROGRAM

## 2021-01-26 PROCEDURE — G8484 FLU IMMUNIZE NO ADMIN: HCPCS | Performed by: STUDENT IN AN ORGANIZED HEALTH CARE EDUCATION/TRAINING PROGRAM

## 2021-01-26 PROCEDURE — 1036F TOBACCO NON-USER: CPT | Performed by: STUDENT IN AN ORGANIZED HEALTH CARE EDUCATION/TRAINING PROGRAM

## 2021-01-26 RX ORDER — DEXAMETHASONE 4 MG/1
TABLET ORAL
COMMUNITY
Start: 2020-11-24 | End: 2021-02-04

## 2021-01-26 RX ORDER — DIPHENHYDRAMINE HCL 50 MG/1
CAPSULE ORAL
COMMUNITY
Start: 2020-11-24 | End: 2021-02-04

## 2021-01-26 RX ORDER — FLUCONAZOLE 150 MG/1
150 TABLET ORAL DAILY
Qty: 3 TABLET | Refills: 0 | Status: SHIPPED | OUTPATIENT
Start: 2021-01-26 | End: 2021-01-29

## 2021-01-26 ASSESSMENT — PATIENT HEALTH QUESTIONNAIRE - PHQ9
SUM OF ALL RESPONSES TO PHQ QUESTIONS 1-9: 0
SUM OF ALL RESPONSES TO PHQ QUESTIONS 1-9: 0

## 2021-01-26 ASSESSMENT — ENCOUNTER SYMPTOMS: GASTROINTESTINAL NEGATIVE: 1

## 2021-01-26 NOTE — PROGRESS NOTES
[facility]  Precepting Note    Subjective:  Pre-op - hip replacement  COPD/asthma syndrome. BP controlled. Hx of SAH wth an aneurysmal clipping 2 years ago. No sequelae   Saw Neursosurg recently in prep for this surgery (hip)        ROS otherwise negative     Past medical, surgical, family and social history were reviewed, non-contributory, and unchanged unless otherwise stated. Objective:    /61   Pulse 58   Temp 96.9 °F (36.1 °C) (Temporal)   Resp 16   Ht 5' 5\" (1.651 m)   Wt 232 lb (105.2 kg)   SpO2 99%   Breastfeeding No   BMI 38.61 kg/m²     Exam is as noted by resident with the following changes, additions or corrections:    General:  NAD; alert & oriented x 3   Heart:  RRR, no murmurs, gallops, or rubs. Lungs:  CTA bilaterally, no wheeze, rales or rhonchi  Abd: bowel sounds present, nontender, nondistended, no masses  Extrem:  No clubbing, cyanosis, or edema    Assessment/Plan:  Pre-op Hip  EKG ok without signs of ischemia. No thinners. BP controlled. Breathing controlled. OK for surg per NS regarding SAH Hx. Attending Physician Statement  I have reviewed the chart, including any radiology or labs. I have discussed the case, including pertinent history and exam findings with the resident. I agree with the assessment, plan and orders as documented by the resident. Please refer to the resident note for additional information.       Electronically signed by Brittany Mitchell MD on 1/26/2021 at 10:37 AM

## 2021-01-26 NOTE — PROGRESS NOTES
[facility] ([facility]  Family Medicine Residency Program  Phone: [phone]  Fax: [phone]    Patient:  Nanci Harman 63 y.o. female                                 Date of Service: 1/26/21                            Chiefcomplaint:   Chief Complaint   Patient presents with    Pre-op Exam    Other     labs done on 12/4/2020         History of Present Illness: The patient is a 63 y.o. female  presented to the clinic : Preop exam  for hip replacement surgery: Left hip surgery scheduled on third week of February 2021   Her latest A1C 5.8  _ Has had Normal kidney function; creatinine 1.0, BUN 19, GFR more than 60, sodium 140,   -Hemoglobin 14.2   -She not on any blood thinners  -History of repaired cerebral aneurysm; 2 years ago,  -Today's /61, pulse 58      -The Summary of echocardiogram done on 02/2019   Left ventricular internal dimensions were normal in diastole and systole. Mild left ventricular concentric hypertrophy noted. No regional wall motion abnormalities seen. Normal left ventricular ejection fraction. Mild aortic regurgitation is noted. 2/2020 Latest EKG; normal except sinus bradycardia heart rate 57, we did EKG today and compared    -Slightly obese BMI more than 38    -Recent neurosurgery visit 0108; Dr Dash Arreola \"Reviewed cta head shows that brain looks okay. Discussed results with patient. I recommend patient follow up in 4-5 months as needed. Continue with medication and see how you feel. \"copied     --Stated  that she did not have any chest pain, shortness of breath, nausea vomiting, fever chills, only admits bilateral hip pain left more than right. Review of Systems:   Review of Systems   Constitutional: Negative. Negative for chills and fever. HENT: Negative. Negative for congestion, sinus pain and sore throat. Eyes: Negative. Negative for redness. Respiratory: Negative. Negative for chest tightness. Cardiovascular: Negative. Negative for chest pain, palpitations and leg swelling. Gastrointestinal: Negative. Negative for abdominal pain, constipation, diarrhea and nausea. Endocrine: Negative. Genitourinary: Negative. Negative for hematuria. Musculoskeletal: Positive for arthralgias and gait problem. Skin: Negative for rash and wound. Allergic/Immunologic: Negative. Neurological: Negative for dizziness and light-headedness. Hematological: Negative. Negative for adenopathy. Psychiatric/Behavioral: Negative. The patient is not nervous/anxious.         Past Medical History:      Diagnosis Date    Brain aneurysm 09/2018    H/O TIMES 2 THAT BURST PER PATIENT    Cerebral artery occlusion with cerebral infarction (HCC)     RT SIDE AFFECTED-LEARNED TO WALK AND WRITE AGAIN    Degenerative arthritis of hand     Headache     Hiatal hernia     Hip problem     NEEDS HIP REPLACEMENT PER PATIENT    Hx of abnormal cervical Pap smear     Hypertension     [facility])        Past Surgical History:        Procedure Laterality Date    BRAIN ANEURYSM SURGERY      coiling     COLONOSCOPY  08/30/2019    polyps--frank    COLONOSCOPY N/A 8/30/2019    COLONOSCOPY POLYPECTOMY SNARE/COLD BIOPSY performed by Caleb Monge MD at [address] [place] [address]- DONE AT [address] [place] [address] ENDOSCOPY  08/30/2019    gastritis with superficial ulcerations; duodenitis--frank    UPPER GASTROINTESTINAL ENDOSCOPY N/A 8/30/2019    EGD BIOPSY performed by Caleb Monge MD at [facility] ENDOSCOPY       Allergies:    Latex, Iodine, Aloe, Celebrex [celecoxib], and Fish-derived products    Social History:   Social History     Socioeconomic History    Marital status:      Spouse name: Not on file    Number of children: Not on file    Years of education: Not on file    Highest education level: Not on file Occupational History    Not on file   Social Needs    Financial resource strain: Not on file    Food insecurity     Worry: Not on file     Inability: Not on file    Transportation needs     Medical: Not on file     Non-medical: Not on file   Tobacco Use    Smoking status: Former Smoker     Packs/day: 1.50     Years: 43.00     Pack years: 64.50     Types: Cigarettes     Quit date: 2018     Years since quittin.3    Smokeless tobacco: Never Used   Substance and Sexual Activity    Alcohol use: No    Drug use: No    Sexual activity: Not Currently   Lifestyle    Physical activity     Days per week: Not on file     Minutes per session: Not on file    Stress: Not on file   Relationships    Social connections     Talks on phone: Not on file     Gets together: Not on file     Attends Mandaeism service: Not on file     Active member of club or organization: Not on file     Attends meetings of clubs or organizations: Not on file     Relationship status: Not on file    Intimate partner violence     Fear of current or ex partner: Not on file     Emotionally abused: Not on file     Physically abused: Not on file     Forced sexual activity: Not on file   Other Topics Concern    Not on file   Social History Narrative    Not on file        Family History:       Problem Relation Age of Onset    Diabetes Mother     Arthritis Mother     Atrial Fibrillation Mother     Diabetes Father     Atrial Fibrillation Father     Arthritis Father     Emphysema Father     Cancer Sister        BP Readings from Last 3 Encounters:   20 119/61   20 94/60   11/10/20 122/72       Physical Exam:    Vitals: Ht 5' 5\" (1.651 m)   Breastfeeding No   BMI 39.54 kg/m²   General Appearance: Obese, ambulating with wheelchair due to bilateral hip osteoarthritis, awake, alert, oriented, no acute distress  HEENT: Normocephalic,atraumatic. PERRL, EOM's intact, EAC without erythemaor swelling, no pallor or icterus. According to the 2014 ACC/AHA pre-operative risk assessment guidelines Shubham Roque is a low risk for major cardiac complications during a intermediate risk procedure and may continue as planned. Specific medication recommendations are listed below. Medications recommended to continue should be taken with a sip of water even when NPO. Further recommendations from consultants: None    Medication Recommendations:  -Take your regular medication during surgery  -Not on blood thinner  -Blood pressure well controlled 137/61    There are no diagnoses linked to this encounter. I encourage further reading and education about your health conditions. Information on many healthconditions is provided by the American Academy of Family Physicians: https://familydoctor. org/  Please bring any questions to me at your next visit. Return to Office: No follow-ups on file. Medication List:    Current Outpatient Medications   Medication Sig Dispense Refill    dexamethasone (DECADRON) 4 MG tablet TAKE ONE TABLET BY MOUTH EVERY SIX HOURS THE DAY BEFORE PROCEDURE.  TAKE 2 & 1/2 TABLETS THE MORNING OF      BANOPHEN 50 MG capsule TAKE 1 CAPSULE BY MOUTH EVERY 6 HOURS DAY PRIOR TO PROCEDURE      pantoprazole (PROTONIX) 40 MG tablet TAKE ONE TABLET BY MOUTH EVERY DAY 30 tablet 3    potassium chloride (KLOR-CON M) 10 MEQ extended release tablet TAKE ONE TABLET BY MOUTH DAILY WITH BREAKFAST 30 tablet 3    Metoprolol Tartrate 37.5 MG TABS Take 37.5 mg by mouth 2 times daily 60 tablet 3    losartan (COZAAR) 100 MG tablet Take 1 tablet by mouth daily 30 tablet 3    gabapentin (NEURONTIN) 300 MG capsule TAKE ONE CAPSULE BY MOUTH THREE TIMES A DAY 90 capsule 1    fluticasone (FLONASE) 50 MCG/ACT nasal spray 2 sprays by Each Nostril route daily 1 Bottle 0    chlorthalidone (HYGROTON) 25 MG tablet Take 1 tablet by mouth daily 30 tablet 3    amLODIPine (NORVASC) 5 MG tablet TAKE ONE TABLET BY MOUTH EVERY DAY 30 tablet 3  carBAMazepine (TEGRETOL XR) 100 MG extended release tablet Take 2 tablets by mouth 2 times daily 120 tablet 1    albuterol sulfate  (90 Base) MCG/ACT inhaler INHALE TWO PUFFS BY MOUTH EVERY 6 HOURS AS NEEDED FOR WHEEZING. 1 Inhaler 3    diclofenac sodium (VOLTAREN) 1 % GEL APPLY ONE GRAM EXTERNALLY TWICE A DAY TO NECK AND ONE GRAM EXTERNALLY TWICE A DAY TO BACK  100 g 2    lidocaine-prilocaine (EMLA) 2.5-2.5 % cream APPLY ONE GRAM EXTERNALLY THREE TIMES A DAY IF NEEDED-MUST LAST 30 DAYS  30 g 2    doxepin (ZONALON) 5 % cream APPLY ONE (1) GRAM EXTERNALLY THREE TIMES A DAY TO NECK AND ONE (1) GRAM EXTERNALLY THREE TIMES TIMES A DAY TO BACK 180 g 2    sucralfate (CARAFATE) 1 GM tablet TAKE ONE TABLET BY MOUTH FOUR TIMES A  tablet 0    lidocaine (XYLOCAINE) 5 % ointment APPLY ONE GRAM EXTERNALLY FOUR TIMES A DAY TO NECK AND ONE GRAM EXTERNALLY FOUR TIMES A DAY TO BACK  212.64 g 0    Misc. Devices (ROLLATOR) MISC 1 each by Does not apply route daily as needed (use as needed for stability) 1 each 0     No current facility-administered medications for this visit. Griffin Trevino MD       This document may have been prepared at least partiallythrough the use of voice recognition software. Although effort is taken to assure the accuracy of this document, it is possible that grammatical, syntax,  or spelling errors may occur.

## 2021-01-28 ASSESSMENT — ENCOUNTER SYMPTOMS
ALLERGIC/IMMUNOLOGIC NEGATIVE: 1
CHEST TIGHTNESS: 0
ABDOMINAL PAIN: 0
NAUSEA: 0
SORE THROAT: 0
CONSTIPATION: 0
DIARRHEA: 0
SINUS PAIN: 0
EYE REDNESS: 0
RESPIRATORY NEGATIVE: 1
EYES NEGATIVE: 1

## 2021-02-04 ENCOUNTER — OFFICE VISIT (OUTPATIENT)
Dept: PAIN MANAGEMENT | Age: 61
End: 2021-02-04
Payer: COMMERCIAL

## 2021-02-04 VITALS
SYSTOLIC BLOOD PRESSURE: 100 MMHG | BODY MASS INDEX: 38.65 KG/M2 | HEIGHT: 65 IN | DIASTOLIC BLOOD PRESSURE: 60 MMHG | WEIGHT: 232 LBS | RESPIRATION RATE: 16 BRPM | HEART RATE: 61 BPM | TEMPERATURE: 97.1 F | OXYGEN SATURATION: 97 %

## 2021-02-04 DIAGNOSIS — M51.9 LUMBAR DISC DISORDER: ICD-10-CM

## 2021-02-04 DIAGNOSIS — M50.30 DEGENERATION OF CERVICAL DISC WITHOUT MYELOPATHY: ICD-10-CM

## 2021-02-04 DIAGNOSIS — M47.816 LUMBAR FACET ARTHROPATHY: ICD-10-CM

## 2021-02-04 DIAGNOSIS — G89.4 CHRONIC PAIN SYNDROME: Primary | ICD-10-CM

## 2021-02-04 DIAGNOSIS — M47.816 LUMBAR SPONDYLOSIS: ICD-10-CM

## 2021-02-04 DIAGNOSIS — M47.812 ARTHROPATHY OF CERVICAL FACET JOINT: ICD-10-CM

## 2021-02-04 DIAGNOSIS — M16.11 ARTHRITIS OF RIGHT HIP: ICD-10-CM

## 2021-02-04 DIAGNOSIS — M16.12 PRIMARY OSTEOARTHRITIS OF LEFT HIP: ICD-10-CM

## 2021-02-04 DIAGNOSIS — M50.30 DEGENERATIVE DISC DISEASE, CERVICAL: ICD-10-CM

## 2021-02-04 DIAGNOSIS — M47.812 FACET ARTHROPATHY, CERVICAL: ICD-10-CM

## 2021-02-04 DIAGNOSIS — M54.16 LUMBAR RADICULITIS: ICD-10-CM

## 2021-02-04 DIAGNOSIS — M54.2 CERVICALGIA: ICD-10-CM

## 2021-02-04 PROCEDURE — 1036F TOBACCO NON-USER: CPT | Performed by: PHYSICIAN ASSISTANT

## 2021-02-04 PROCEDURE — G8484 FLU IMMUNIZE NO ADMIN: HCPCS | Performed by: PHYSICIAN ASSISTANT

## 2021-02-04 PROCEDURE — 3017F COLORECTAL CA SCREEN DOC REV: CPT | Performed by: PHYSICIAN ASSISTANT

## 2021-02-04 PROCEDURE — G8427 DOCREV CUR MEDS BY ELIG CLIN: HCPCS | Performed by: PHYSICIAN ASSISTANT

## 2021-02-04 PROCEDURE — 99213 OFFICE O/P EST LOW 20 MIN: CPT | Performed by: PHYSICIAN ASSISTANT

## 2021-02-04 PROCEDURE — 99214 OFFICE O/P EST MOD 30 MIN: CPT | Performed by: PHYSICIAN ASSISTANT

## 2021-02-04 PROCEDURE — G8417 CALC BMI ABV UP PARAM F/U: HCPCS | Performed by: PHYSICIAN ASSISTANT

## 2021-02-04 RX ORDER — ACETAMINOPHEN AND CODEINE PHOSPHATE 300; 60 MG/1; MG/1
TABLET ORAL
COMMUNITY
Start: 2021-01-10 | End: 2021-02-04

## 2021-02-04 RX ORDER — ACETAMINOPHEN AND CODEINE PHOSPHATE 60; 300 MG/1; MG/1
1 TABLET ORAL DAILY PRN
Qty: 13 TABLET | Refills: 0 | Status: SHIPPED | OUTPATIENT
Start: 2021-02-09 | End: 2021-02-22

## 2021-02-04 NOTE — PROGRESS NOTES
Do you currently have any of the following:    Fever: No  Headache:  No  Cough: No  Shortness of breath: No  Exposed to anyone with these symptoms: No                                                                                                                Carlos Davis [facility] to the [facility] on [facility]4/[facility]21. Kecia Chin is complaining of pain in bilateral hips. . The pain is constant. The pain is described as aching, throbbing, shooting, stabbing and sharp. Pain is rated on her best day at a 6, on her worst day at a 10, and on average at a 8 on the VAS scale. She took her last dose of Tylenol with codeine this Forrest Laws does not have issues with constipation. Any procedures since your last visit: No    She is not on NSAIDS and  is not on anticoagulation medications to include none and is managed by NA. Pacemaker or defibrillator: No Physician managing device is NA. Medication Contract and Consent for Opioid Use Documents Filed     Patient Documents       Type of Document Status Date Received Received By Description     Medication Contract Received 3/1/2019  1:43 PM FRANCIS MCINTOSH PAIN MANAGEMENT PT AGREEMENT 3/1/2019     Medication Contract Received 10/8/2020  1:21 PM FRANCIS MCINTOSH pain pt agreement                   /60   Pulse 61   Temp 97.1 °F (36.2 °C) (Infrared)   Resp 16   Ht 5' 5\" (1.651 m)   Wt 232 lb (105.2 kg)   SpO2 97%   BMI 38.61 kg/m²      No LMP recorded.  Patient is postmenopausal.

## 2021-02-04 NOTE — PROGRESS NOTES
[address]  [address] [facility], [address] [place] [address] Chad  [phone]    Follow up Note      Louisa Coronel     Date of Visit:  2/4/2021    CC:  Patient presents for follow up   Chief Complaint   Patient presents with    Lower Back Pain     2 month follow up       HPI:    Pain is unchanged. Having left MERVAT on 2/22. Appropriate analgesia with current medications regimen: yes. Change in quality of symptoms:no. Medication side effects:none. Recent diagnostic testing:none. Recent interventional procedures:none. She has not been on anticoagulation medications to include none. The patient  has not been on herbal supplements. The patient is not diabetic.     Imaging studies:    Cervical XR 11/2019 Severe degenerative changes      Lumbar spine Xray 03/2019  Scoliosis and osteoarthritis.     Bilateral hip Xray 03/2019   Advanced osteoarthrosis of bilateral hips. Potential Aberrant Drug-Related Behavior: None     Urine Drug Screening:  First office visit saliva screen showed no narcotics   11/2019 Consistent, negative for all  06/2020 Consistent  12/2020 Consistent     OARRS report:  03/2019 consistent to 12/2020 Consistent  (norco on 9/24 from dentist for teeth extraction).     Opioid Agreement:  10/08/2020    Past Medical History:   Diagnosis Date    Brain aneurysm 09/2018    H/O TIMES 2 THAT BURST PER PATIENT    Cerebral artery occlusion with cerebral infarction (HCC)     RT SIDE AFFECTED-LEARNED TO WALK AND WRITE AGAIN    Degenerative arthritis of hand     Headache     Hiatal hernia     Hip problem     NEEDS HIP REPLACEMENT PER PATIENT    Hx of abnormal cervical Pap smear     Hypertension     St[facility]re[facility])        Past Surgical History:   Procedure Laterality Date    BRAIN ANEURYSM SURGERY      coiling     COLONOSCOPY  08/30/2019    polyps--frank    COLONOSCOPY N/A 8/30/2019    COLONOSCOPY POLYPECTOMY SNARE/COLD BIOPSY performed by Ivon Martinez MD at [address] [facility] [address]- DONE  [address] ENDOSCOPY  08/30/2019    gastritis with superficial ulcerations; duodenitis--frank    UPPER GASTROINTESTINAL ENDOSCOPY N/A 8/30/2019    EGD BIOPSY performed by Ivon Martinez MD at [facility]       Prior to Admission medications    Medication Sig Start Date End Date Taking? Authorizing Provider   acetaminophen-codeine (TYLENOL #4) 300-60 MG per tablet Take 1 tablet by mouth daily as needed for Pain for up to 13 days.  2/9/21 2/22/21 Yes SPENSER Goodwin   pantoprazole (PROTONIX) 40 MG tablet TAKE ONE TABLET BY MOUTH EVERY DAY 12/11/20  Yes Faina Fernandez DO   potassium chloride (KLOR-CON M) 10 MEQ extended release tablet TAKE ONE TABLET BY MOUTH DAILY WITH BREAKFAST 12/11/20  Yes Faina Fernandez DO   Metoprolol Tartrate 37.5 MG TABS Take 37.5 mg by mouth 2 times daily 12/11/20  Yes Faina Fernandez DO   losartan (COZAAR) 100 MG tablet Take 1 tablet by mouth daily 12/11/20  Yes Faina Fernandez DO   fluticasone (FLONASE) 50 MCG/ACT nasal spray 2 sprays by Each Nostril route daily 12/11/20  Yes Faina Fernandez DO   chlorthalidone (HYGROTON) 25 MG tablet Take 1 tablet by mouth daily 12/11/20  Yes Ezequiel Fernandez DO   amLODIPine (NORVASC) 5 MG tablet TAKE ONE TABLET BY MOUTH EVERY DAY 12/11/20  Yes Faina Fernandez DO   carBAMazepine (TEGRETOL XR) 100 MG extended release tablet Take 2 tablets by mouth 2 times daily 12/11/20  Yes Ezequiel Fernandez DO   albuterol sulfate  (90 Base) MCG/ACT inhaler INHALE TWO PUFFS BY MOUTH EVERY 6 HOURS AS NEEDED FOR WHEEZING.  11/4/20  Yes Cassy Randall MD   diclofenac sodium (VOLTAREN) 1 % GEL APPLY ONE GRAM EXTERNALLY TWICE A DAY TO NECK AND ONE GRAM EXTERNALLY TWICE A DAY TO BACK  11/3/20  Yes SPENSER Goodwin   lidocaine-prilocaine (EMLA) 2.5-2.5 % cream APPLY ONE GRAM EXTERNALLY THREE TIMES A DAY IF NEEDED-MUST LAST 30 DAYS  11/3/20  Yes SPENSER Michel   doxepin (ZONALON) 5 % cream APPLY ONE (1) GRAM EXTERNALLY THREE TIMES A DAY TO NECK AND ONE (1) GRAM EXTERNALLY THREE TIMES TIMES A DAY TO BACK 20  Yes SPENSER Michel   sucralfate (CARAFATE) 1 GM tablet TAKE ONE TABLET BY MOUTH FOUR TIMES A DAY 20  Yes Anuja Wade MD   lidocaine (XYLOCAINE) 5 % ointment APPLY ONE GRAM EXTERNALLY FOUR TIMES A DAY TO NECK AND ONE GRAM EXTERNALLY FOUR TIMES A DAY TO BACK  20  Yes SPENSER Michel   Misc. Devices (ROLLATOR) MISC 1 each by Does not apply route daily as needed (use as needed for stability) 3/17/20  Yes Anuja Wade MD   gabapentin (NEURONTIN) 300 MG capsule TAKE ONE CAPSULE BY MOUTH THREE TIMES A DAY 20  Briana Fernandez, DO       Allergies   Allergen Reactions    Latex Hives     Hives and rash    Iodine Rash     Hives . pt.  States anaphalyxis    Aloe Hives     Hives     Celebrex [Celecoxib] Itching    Fish-Derived Products Other (See Comments)       Social History     Socioeconomic History    Marital status:      Spouse name: Not on file    Number of children: Not on file    Years of education: Not on file    Highest education level: Not on file   Occupational History    Not on file   Social Needs    Financial resource strain: Not on file    Food insecurity     Worry: Not on file     Inability: Not on file   [employer] needs     Medical: Not on file     Non-medical: Not on file   Tobacco Use    Smoking status: Former Smoker     Packs/day: 1.50     Years: 43.00     Pack years: 64.50     Types: Cigarettes     Quit date: 2018     Years since quittin.4    Smokeless tobacco: Never Used   Substance and Sexual Activity    Alcohol use: No    Drug use: No    Sexual activity: Not Currently   Lifestyle    Physical activity     Days per week: Not on file     Minutes per session: Not on file    Stress: Not on file   Relationships    Social connections     Talks on phone: Not on file     Gets together: Not on file     Attends Mu-ism service: Not on file     Active member of club or organization: Not on file     Attends meetings of clubs or organizations: Not on file     Relationship status: Not on file    Intimate partner violence     Fear of current or ex partner: Not on file     Emotionally abused: Not on file     Physically abused: Not on file     Forced sexual activity: Not on file   Other Topics Concern    Not on file   Social History Narrative    Not on file       Family History   Problem Relation Age of Onset    Diabetes Mother     Arthritis Mother     Atrial Fibrillation Mother     Diabetes Father     Atrial Fibrillation Father     Arthritis Father     Emphysema Father     Cancer Sister        REVIEW OF SYSTEMS:     Rach Schuster denies fever/chills, chest pain, shortness of breath, new bowel or bladder complaints. All other review of systems was negative. PHYSICAL EXAMINATION:      /60   Pulse 61   Temp 97.1 °F (36.2 °C) (Infrared)   Resp 16   Ht 5' 5\" (1.651 m)   Wt 232 lb (105.2 kg)   SpO2 97%   BMI 38.61 kg/m²     General:      General appearance:   pleasant and well-hydrated. , in no discomfort and A & O x3  Build:Overweight  Function:Rises from a seated position with difficulty    HEENT:    Head:normocephalic and atraumatic  Pupils:regular, round and equal.  Sclera: icterus absent,    Lungs:    Breathing:Normal expansion. Clear to auscultation. No rales, rhonchi, or wheezing. Abdomen:    Shape:non-distended and normal  Tenderness:none  Guarding:none    Extremities:    Tremors:None bilaterally upper and lower  Range of motion:Generally normal shoulders  Intact:Yes  Varicose veins:not assessed   Cyanosis:none  Edema:Normal    Neurological:    Gait: Not observed.     Dermatology:    Skin:no unusual rashes, no skin lesions, no palpable subcutaneous nodules and good skin turgor    Impression:    Patient seen for follow up for her chronic bilateral hip pain and low back pain due to severe degenerative changes and axial c/o neck pain   Would benefit from Cervical MBB, patient uninterested   Patient is having left MERVAT on 2/22. Cleared regarding cerebral rupture. Continue Gabapentin 300 mg TID per PCP  Continue Tylenol #4 QD prn -given enough until her surgery on 2/22/21 - left MERVAT. Surgeon may prescribe freely during the post operative period. Compounding cream. Helping a lot. OARRS report reviewed 02/2021  UDS reviewed and is consistent  Patient encouraged to remain active as tolerated   Treatment plan discussed with the patient including medications and side effects     Controlled Substance Monitoring:    Acute and Chronic Pain Monitoring:   RX Monitoring 2/4/2021   Periodic Controlled Substance Monitoring Possible medication side effects, risk of tolerance/dependence & alternative treatments discussed. ;No signs of potential drug abuse or diversion identified. ;Assessed functional status. ;Obtaining appropriate analgesic effect of treatment. We discussed with the patient that combining opioids, benzodiazepines, alcohol, illicit drugs or sleep aids increases the risk of respiratory depression including death. We discussed that these medications may cause drowsiness, sedation or dizziness and have counseled the patient not to drive or operate machinery. We have discussed that these medications will be prescribed only by one provider. We have discussed with the patient about age related risk factors and have thoroughly discussed the importance of taking these medications as prescribed. The patient verbalizes understanding.     ccreferring physic

## 2021-02-05 NOTE — PROGRESS NOTES
PATIENT AGREES TO HAVE COVID TESTING DONE 2/16/21 AT [facility], AGREES TO SELF ISOLATE UNTIL SURGERY DAY.

## 2021-02-11 ENCOUNTER — TELEPHONE (OUTPATIENT)
Dept: ORTHOPEDIC SURGERY | Age: 61
End: 2021-02-11

## 2021-02-11 NOTE — TELEPHONE ENCOUNTER
Patient is scheduled to have Left MERVAT on 2- by Dr. Pavan Gaines. Clinicals and prior Auth form were faxed in on 1- to get case started. I spoke with Inocente Godinez in Clinical Utilization @ [address] [place] [address] on 2-9-2021 to check on surgery status. Per Inocente Godinez, there is nothing showing in their system so I was asked to resubmit everything. Clinicals were resubmitted again on 2-9-2021 with fax confirmation page received.     [address] [facility] [address] [facility]  #[phone]  ID #[id number]  DOS 2-  Outpatient  DX: Left Hip OA M16.0  CPT: Left MERVAT [id number]    Faxed to #[phone]

## 2021-02-12 ENCOUNTER — PREP FOR PROCEDURE (OUTPATIENT)
Dept: ORTHOPEDIC SURGERY | Age: 61
End: 2021-02-12

## 2021-02-12 DIAGNOSIS — M16.0 PRIMARY OSTEOARTHRITIS OF BOTH HIPS: Primary | ICD-10-CM

## 2021-02-12 RX ORDER — SODIUM CHLORIDE 0.9 % (FLUSH) 0.9 %
10 SYRINGE (ML) INJECTION EVERY 12 HOURS SCHEDULED
Status: CANCELLED | OUTPATIENT
Start: 2021-02-12

## 2021-02-12 RX ORDER — SODIUM CHLORIDE 0.9 % (FLUSH) 0.9 %
10 SYRINGE (ML) INJECTION PRN
Status: CANCELLED | OUTPATIENT
Start: 2021-02-12

## 2021-02-15 DIAGNOSIS — M54.16 LUMBAR RADICULITIS: ICD-10-CM

## 2021-02-15 RX ORDER — M-VIT,TX,IRON,MINS/CALC/FOLIC 27MG-0.4MG
1 TABLET ORAL DAILY
COMMUNITY

## 2021-02-15 NOTE — TELEPHONE ENCOUNTER
Last Appointment   1/26/2021  Next Appointment  3/5/2021    Patient needs refill on gabapentin and Tegretol before upcoming appointment.   Med pending drs approval.

## 2021-02-15 NOTE — PROGRESS NOTES
[address] PRE-ADMISSION TESTING GENERAL INSTRUCTIONS- [facility]-phone number:[phone]    GENERAL INSTRUCTIONS  [x] Antibacterial Soap shower Night before surgery  [x] Ready Prep CHG wipe instruction sheet and wipes given. [x] Nothing by mouth after midnight, including gum, candy, mints, or water. [x] You may brush your teeth, gargle, but do NOT swallow water. [x]No smoking, chewing tobacco, illegal drugs, or alcohol within 24 hours of your surgery. [x] Jewelry, valuables or body piercing's should not be brought to the hospital. All body and/or tongue piercing's must be removed prior to arriving to hospital.  ALL hair pins must be removed. [x] Do not wear makeup, lotions, powders, deodorant. Nail polish as directed by the nurse. [x] Arrange transportation with a responsible adult  to and from the hospital. If you do not have a responsible adult  to transport you, you will need to make arrangements with a medical transportation company (i.e. [employer]. A Uber/taxi/bus is not appropriate unless you are accompanied by a responsible adult ). Arrange for someone to be with you for the remainder of the day and for 24 hours after your procedure due to having had anesthesia. Who will be your  for transportation? Cruzito Pepe, son will bring for surgery    [x] Bring insurance card and photo ID. [x] Transfusion Bracelet: Please bring with you to hospital, day of surgery    [x] Use inhalers the morning of surgery and bring with you to hospital.    PARKING INSTRUCTIONS:   [x] Arrival Time:5:30 am Park on [facility], enter the main entrance. One person may accompany you. Wear a mask. [x] [facility], upon entering the hospital, take elevator B to the 3rd floor. Check in with the . A parking token will be provided if needed.      EDUCATION INSTRUCTIONS: [x] The day of surgery you will be greeted and checked in by the [employer]. Please bring your photo ID and insurance card. A nurse will greet you in accordance to the time you are needed in the pre-op area to prepare you for surgery. Please do not be discouraged if you are not greeted in the order you arrive as there are many variables that are involved in patient preparation. Your patience is greatly appreciated as you wait for your nurse. Please bring in items such as: books, magazines, newspapers, electronics, or any other items  to occupy your time in the waiting area. [x]  Delays may occur with surgery and staff will make a sincere effort to keep you informed of delays. If any delays occur with your procedure, we apologize ahead of time for your inconvenience as we recognize the value of your time.

## 2021-02-15 NOTE — CARE COORDINATION
I spoke with  Leena Ford today for an orthopaedic education class. We discussed information regarding pre, intra, post-op, discharge, and LOS expectations. I answered all questions, and encouraged the patient to call with any future questions or concerns.

## 2021-02-16 ENCOUNTER — HOSPITAL ENCOUNTER (OUTPATIENT)
Age: 61
Discharge: HOME | End: 2021-02-18
Payer: COMMERCIAL

## 2021-02-16 ENCOUNTER — HOSPITAL ENCOUNTER (OUTPATIENT)
Age: 61
Discharge: HOME | End: 2021-02-16
Payer: COMMERCIAL

## 2021-02-16 ENCOUNTER — HOSPITAL ENCOUNTER (OUTPATIENT)
Dept: PREADMISSION TESTING | Age: 61
Discharge: HOME | End: 2021-02-16
Payer: COMMERCIAL

## 2021-02-16 VITALS
TEMPERATURE: 97.3 F | WEIGHT: 232 LBS | HEART RATE: 55 BPM | OXYGEN SATURATION: 98 % | DIASTOLIC BLOOD PRESSURE: 65 MMHG | SYSTOLIC BLOOD PRESSURE: 125 MMHG | HEIGHT: 65 IN | RESPIRATION RATE: 16 BRPM | BODY MASS INDEX: 38.65 KG/M2

## 2021-02-16 DIAGNOSIS — U07.1 COVID-19: ICD-10-CM

## 2021-02-16 DIAGNOSIS — M16.0 PRIMARY OSTEOARTHRITIS OF BOTH HIPS: ICD-10-CM

## 2021-02-16 LAB
ABO/RH: NORMAL
ALBUMIN SERPL-MCNC: 4.3 G/DL (ref 3.5–5.2)
ALP BLD-CCNC: 94 U/L (ref 35–104)
ALT SERPL-CCNC: 19 U/L (ref 0–32)
ANION GAP SERPL CALCULATED.3IONS-SCNC: 10 MMOL/L (ref 7–16)
ANTIBODY SCREEN: NORMAL
AST SERPL-CCNC: 14 U/L (ref 0–31)
BASOPHILS ABSOLUTE: 0.04 E9/L (ref 0–0.2)
BASOPHILS RELATIVE PERCENT: 0.5 % (ref 0–2)
BILIRUB SERPL-MCNC: 0.3 MG/DL (ref 0–1.2)
BUN BLDV-MCNC: 21 MG/DL (ref 8–23)
CALCIUM SERPL-MCNC: 9.5 MG/DL (ref 8.6–10.2)
CHLORIDE BLD-SCNC: 101 MMOL/L (ref 98–107)
CO2: 28 MMOL/L (ref 22–29)
CREAT SERPL-MCNC: 0.9 MG/DL (ref 0.5–1)
EOSINOPHILS ABSOLUTE: 0.07 E9/L (ref 0.05–0.5)
EOSINOPHILS RELATIVE PERCENT: 1 % (ref 0–6)
GFR AFRICAN AMERICAN: >60
GFR NON-AFRICAN AMERICAN: >60 ML/MIN/1.73
GLUCOSE BLD-MCNC: 116 MG/DL (ref 74–99)
HCT VFR BLD CALC: 42.2 % (ref 34–48)
HEMOGLOBIN: 14 G/DL (ref 11.5–15.5)
IMMATURE GRANULOCYTES #: 0.05 E9/L
IMMATURE GRANULOCYTES %: 0.7 % (ref 0–5)
INR BLD: 1
LYMPHOCYTES ABSOLUTE: 1.98 E9/L (ref 1.5–4)
LYMPHOCYTES RELATIVE PERCENT: 27.1 % (ref 20–42)
MCH RBC QN AUTO: 29.8 PG (ref 26–35)
MCHC RBC AUTO-ENTMCNC: 33.2 % (ref 32–34.5)
MCV RBC AUTO: 89.8 FL (ref 80–99.9)
MONOCYTES ABSOLUTE: 0.4 E9/L (ref 0.1–0.95)
MONOCYTES RELATIVE PERCENT: 5.5 % (ref 2–12)
NEUTROPHILS ABSOLUTE: 4.77 E9/L (ref 1.8–7.3)
NEUTROPHILS RELATIVE PERCENT: 65.2 % (ref 43–80)
PDW BLD-RTO: 12.5 FL (ref 11.5–15)
PLATELET # BLD: 257 E9/L (ref 130–450)
PMV BLD AUTO: 8.8 FL (ref 7–12)
POTASSIUM SERPL-SCNC: 4.1 MMOL/L (ref 3.5–5)
PROTHROMBIN TIME: 11 SEC (ref 9.3–12.4)
RBC # BLD: 4.7 E12/L (ref 3.5–5.5)
SODIUM BLD-SCNC: 139 MMOL/L (ref 132–146)
TOTAL PROTEIN: 7.3 G/DL (ref 6.4–8.3)
WBC # BLD: 7.3 E9/L (ref 4.5–11.5)

## 2021-02-16 PROCEDURE — 86850 RBC ANTIBODY SCREEN: CPT

## 2021-02-16 PROCEDURE — 85610 PROTHROMBIN TIME: CPT

## 2021-02-16 PROCEDURE — 80053 COMPREHEN METABOLIC PANEL: CPT

## 2021-02-16 PROCEDURE — 85025 COMPLETE CBC W/AUTO DIFF WBC: CPT

## 2021-02-16 PROCEDURE — 36415 COLL VENOUS BLD VENIPUNCTURE: CPT

## 2021-02-16 PROCEDURE — 87081 CULTURE SCREEN ONLY: CPT

## 2021-02-16 PROCEDURE — U0003 INFECTIOUS AGENT DETECTION BY NUCLEIC ACID (DNA OR RNA); SEVERE ACUTE RESPIRATORY SYNDROME CORONAVIRUS 2 (SARS-COV-2) (CORONAVIRUS DISEASE [COVID-19]), AMPLIFIED PROBE TECHNIQUE, MAKING USE OF HIGH THROUGHPUT TECHNOLOGIES AS DESCRIBED BY CMS-2020-01-R: HCPCS

## 2021-02-16 PROCEDURE — 86901 BLOOD TYPING SEROLOGIC RH(D): CPT

## 2021-02-16 PROCEDURE — 86900 BLOOD TYPING SEROLOGIC ABO: CPT

## 2021-02-16 RX ORDER — CARBAMAZEPINE 100 MG/1
200 TABLET, EXTENDED RELEASE ORAL 2 TIMES DAILY
Qty: 120 TABLET | Refills: 0 | Status: SHIPPED
Start: 2021-02-16 | End: 2021-03-19 | Stop reason: SDUPTHER

## 2021-02-16 RX ORDER — GABAPENTIN 300 MG/1
CAPSULE ORAL
Qty: 90 CAPSULE | Refills: 0 | Status: SHIPPED
Start: 2021-02-16 | End: 2021-03-19 | Stop reason: SDUPTHER

## 2021-02-16 NOTE — TELEPHONE ENCOUNTER
Surgery has been approved as outpatient. [facility] [id number]. Auth #: [id number]   Valid from 2- through 5-.

## 2021-02-17 LAB
MRSA CULTURE ONLY: NORMAL
SARS-COV-2: NOT DETECTED
SOURCE: NORMAL

## 2021-02-19 ENCOUNTER — TELEPHONE (OUTPATIENT)
Dept: ORTHOPEDIC SURGERY | Age: 61
End: 2021-02-19

## 2021-02-19 NOTE — TELEPHONE ENCOUNTER
Call placed to Dr. Armaan Castro to inquire if patient medically cleared from Neurosurgery standpoint, per Dr. Armaan Castro \"patient is medically cleared for left hip surgery. \"

## 2021-02-19 NOTE — PROGRESS NOTES
Spoke to Charbel Shaw at the ortho office. They are going to reach out to Dr Beto Norris to get his official clearance for surgery Monday since his clearance form is incomplete.

## 2021-02-20 ENCOUNTER — ANESTHESIA EVENT (OUTPATIENT)
Dept: OPERATING ROOM | Age: 61
DRG: 324 | End: 2021-02-20
Payer: COMMERCIAL

## 2021-02-22 ENCOUNTER — ANESTHESIA (OUTPATIENT)
Dept: OPERATING ROOM | Age: 61
DRG: 324 | End: 2021-02-22
Payer: COMMERCIAL

## 2021-02-22 ENCOUNTER — HOSPITAL ENCOUNTER (INPATIENT)
Age: 61
LOS: 2 days | Discharge: NURSING, REHAB OR LONG-TERM CARE | DRG: 324 | End: 2021-02-24
Attending: ORTHOPAEDIC SURGERY | Admitting: ORTHOPAEDIC SURGERY
Payer: COMMERCIAL

## 2021-02-22 ENCOUNTER — APPOINTMENT (OUTPATIENT)
Dept: GENERAL RADIOLOGY | Age: 61
DRG: 324 | End: 2021-02-22
Attending: ORTHOPAEDIC SURGERY
Payer: COMMERCIAL

## 2021-02-22 VITALS — DIASTOLIC BLOOD PRESSURE: 90 MMHG | OXYGEN SATURATION: 99 % | SYSTOLIC BLOOD PRESSURE: 136 MMHG | TEMPERATURE: 94.5 F

## 2021-02-22 DIAGNOSIS — Z96.641 H/O TOTAL HIP ARTHROPLASTY, RIGHT: ICD-10-CM

## 2021-02-22 DIAGNOSIS — U07.1 COVID-19: Primary | ICD-10-CM

## 2021-02-22 PROBLEM — M16.0 ARTHRITIS OF BOTH HIPS: Status: ACTIVE | Noted: 2021-02-22

## 2021-02-22 PROCEDURE — 6360000002 HC RX W HCPCS

## 2021-02-22 PROCEDURE — 2500000003 HC RX 250 WO HCPCS: Performed by: ORTHOPAEDIC SURGERY

## 2021-02-22 PROCEDURE — 0SRB04Z REPLACEMENT OF LEFT HIP JOINT WITH CERAMIC ON POLYETHYLENE SYNTHETIC SUBSTITUTE, OPEN APPROACH: ICD-10-PCS | Performed by: ORTHOPAEDIC SURGERY

## 2021-02-22 PROCEDURE — C1776 JOINT DEVICE (IMPLANTABLE): HCPCS | Performed by: ORTHOPAEDIC SURGERY

## 2021-02-22 PROCEDURE — 2580000003 HC RX 258: Performed by: PHYSICIAN ASSISTANT

## 2021-02-22 PROCEDURE — 7100000001 HC PACU RECOVERY - ADDTL 15 MIN: Performed by: ORTHOPAEDIC SURGERY

## 2021-02-22 PROCEDURE — 2580000003 HC RX 258

## 2021-02-22 PROCEDURE — 3600000013 HC SURGERY LEVEL 3 ADDTL 15MIN: Performed by: ORTHOPAEDIC SURGERY

## 2021-02-22 PROCEDURE — 88304 TISSUE EXAM BY PATHOLOGIST: CPT

## 2021-02-22 PROCEDURE — 3700000001 HC ADD 15 MINUTES (ANESTHESIA): Performed by: ORTHOPAEDIC SURGERY

## 2021-02-22 PROCEDURE — 2700000000 HC OXYGEN THERAPY PER DAY

## 2021-02-22 PROCEDURE — 2709999900 HC NON-CHARGEABLE SUPPLY: Performed by: ORTHOPAEDIC SURGERY

## 2021-02-22 PROCEDURE — 6360000002 HC RX W HCPCS: Performed by: ANESTHESIOLOGY

## 2021-02-22 PROCEDURE — 2580000003 HC RX 258: Performed by: ORTHOPAEDIC SURGERY

## 2021-02-22 PROCEDURE — 6360000002 HC RX W HCPCS: Performed by: ORTHOPAEDIC SURGERY

## 2021-02-22 PROCEDURE — 27130 TOTAL HIP ARTHROPLASTY: CPT | Performed by: ORTHOPAEDIC SURGERY

## 2021-02-22 PROCEDURE — 97530 THERAPEUTIC ACTIVITIES: CPT

## 2021-02-22 PROCEDURE — 97166 OT EVAL MOD COMPLEX 45 MIN: CPT

## 2021-02-22 PROCEDURE — 73502 X-RAY EXAM HIP UNI 2-3 VIEWS: CPT

## 2021-02-22 PROCEDURE — 2500000003 HC RX 250 WO HCPCS: Performed by: PHYSICIAN ASSISTANT

## 2021-02-22 PROCEDURE — 1200000000 HC SEMI PRIVATE

## 2021-02-22 PROCEDURE — 6360000002 HC RX W HCPCS: Performed by: PHYSICIAN ASSISTANT

## 2021-02-22 PROCEDURE — 3700000000 HC ANESTHESIA ATTENDED CARE: Performed by: ORTHOPAEDIC SURGERY

## 2021-02-22 PROCEDURE — 2500000003 HC RX 250 WO HCPCS

## 2021-02-22 PROCEDURE — 7100000000 HC PACU RECOVERY - FIRST 15 MIN: Performed by: ORTHOPAEDIC SURGERY

## 2021-02-22 PROCEDURE — 97161 PT EVAL LOW COMPLEX 20 MIN: CPT

## 2021-02-22 PROCEDURE — 3600000003 HC SURGERY LEVEL 3 BASE: Performed by: ORTHOPAEDIC SURGERY

## 2021-02-22 PROCEDURE — 2720000010 HC SURG SUPPLY STERILE: Performed by: ORTHOPAEDIC SURGERY

## 2021-02-22 PROCEDURE — 6370000000 HC RX 637 (ALT 250 FOR IP): Performed by: PHYSICIAN ASSISTANT

## 2021-02-22 PROCEDURE — 88311 DECALCIFY TISSUE: CPT

## 2021-02-22 DEVICE — HEAD FEM DIA36MM +7.5MM OFFSET HIP BIOLOX DELT CERAMIC TAPR: Type: IMPLANTABLE DEVICE | Site: HIP | Status: FUNCTIONAL

## 2021-02-22 DEVICE — STEM FEM SZ 5 L108MM NK L35MM 44MM OFFSET 127DEG HIP TI: Type: IMPLANTABLE DEVICE | Site: HIP | Status: FUNCTIONAL

## 2021-02-22 DEVICE — SHELL ACET DIA56MM HYDROXY APATITE 5 H CLUS H TRIDENT II: Type: IMPLANTABLE DEVICE | Site: HIP | Status: FUNCTIONAL

## 2021-02-22 DEVICE — LINER ACET SZ F ID36MM THK7.9MM 0DEG HIP X3 LOK RNG FOR: Type: IMPLANTABLE DEVICE | Site: HIP | Status: FUNCTIONAL

## 2021-02-22 RX ORDER — VANCOMYCIN HYDROCHLORIDE 1 G/20ML
INJECTION, POWDER, LYOPHILIZED, FOR SOLUTION INTRAVENOUS PRN
Status: DISCONTINUED | OUTPATIENT
Start: 2021-02-22 | End: 2021-02-22 | Stop reason: ALTCHOICE

## 2021-02-22 RX ORDER — MIDAZOLAM HYDROCHLORIDE 1 MG/ML
INJECTION INTRAMUSCULAR; INTRAVENOUS PRN
Status: DISCONTINUED | OUTPATIENT
Start: 2021-02-22 | End: 2021-02-22 | Stop reason: SDUPTHER

## 2021-02-22 RX ORDER — MORPHINE SULFATE 10 MG/ML
INJECTION, SOLUTION INTRAMUSCULAR; INTRAVENOUS PRN
Status: DISCONTINUED | OUTPATIENT
Start: 2021-02-22 | End: 2021-02-22 | Stop reason: SDUPTHER

## 2021-02-22 RX ORDER — LIDOCAINE HYDROCHLORIDE 20 MG/ML
INJECTION, SOLUTION INTRAVENOUS PRN
Status: DISCONTINUED | OUTPATIENT
Start: 2021-02-22 | End: 2021-02-22 | Stop reason: SDUPTHER

## 2021-02-22 RX ORDER — DIPHENHYDRAMINE HYDROCHLORIDE 50 MG/ML
12.5 INJECTION INTRAMUSCULAR; INTRAVENOUS
Status: DISCONTINUED | OUTPATIENT
Start: 2021-02-22 | End: 2021-02-22 | Stop reason: HOSPADM

## 2021-02-22 RX ORDER — CHLORTHALIDONE 25 MG/1
25 TABLET ORAL DAILY
Status: DISCONTINUED | OUTPATIENT
Start: 2021-02-22 | End: 2021-02-24 | Stop reason: HOSPADM

## 2021-02-22 RX ORDER — AMLODIPINE BESYLATE 5 MG/1
5 TABLET ORAL DAILY
Status: DISCONTINUED | OUTPATIENT
Start: 2021-02-23 | End: 2021-02-24 | Stop reason: HOSPADM

## 2021-02-22 RX ORDER — OXYCODONE HYDROCHLORIDE 10 MG/1
10 TABLET ORAL EVERY 4 HOURS PRN
Status: DISCONTINUED | OUTPATIENT
Start: 2021-02-22 | End: 2021-02-24 | Stop reason: HOSPADM

## 2021-02-22 RX ORDER — SODIUM CHLORIDE 0.9 % (FLUSH) 0.9 %
10 SYRINGE (ML) INJECTION PRN
Status: DISCONTINUED | OUTPATIENT
Start: 2021-02-22 | End: 2021-02-22 | Stop reason: HOSPADM

## 2021-02-22 RX ORDER — ALBUTEROL SULFATE 2.5 MG/3ML
2.5 SOLUTION RESPIRATORY (INHALATION) EVERY 4 HOURS PRN
Status: DISCONTINUED | OUTPATIENT
Start: 2021-02-22 | End: 2021-02-24 | Stop reason: HOSPADM

## 2021-02-22 RX ORDER — MORPHINE SULFATE 4 MG/ML
4 INJECTION, SOLUTION INTRAMUSCULAR; INTRAVENOUS
Status: DISCONTINUED | OUTPATIENT
Start: 2021-02-22 | End: 2021-02-24 | Stop reason: HOSPADM

## 2021-02-22 RX ORDER — PANTOPRAZOLE SODIUM 40 MG/1
40 TABLET, DELAYED RELEASE ORAL DAILY
Status: DISCONTINUED | OUTPATIENT
Start: 2021-02-23 | End: 2021-02-24 | Stop reason: HOSPADM

## 2021-02-22 RX ORDER — SODIUM CHLORIDE 9 MG/ML
INJECTION, SOLUTION INTRAVENOUS CONTINUOUS PRN
Status: DISCONTINUED | OUTPATIENT
Start: 2021-02-22 | End: 2021-02-22 | Stop reason: SDUPTHER

## 2021-02-22 RX ORDER — LOSARTAN POTASSIUM 50 MG/1
100 TABLET ORAL DAILY
Status: DISCONTINUED | OUTPATIENT
Start: 2021-02-22 | End: 2021-02-24 | Stop reason: HOSPADM

## 2021-02-22 RX ORDER — CALCIUM GLUCONATE 94 MG/ML
INJECTION, SOLUTION INTRAVENOUS PRN
Status: DISCONTINUED | OUTPATIENT
Start: 2021-02-22 | End: 2021-02-22 | Stop reason: ALTCHOICE

## 2021-02-22 RX ORDER — FLUTICASONE PROPIONATE 50 MCG
2 SPRAY, SUSPENSION (ML) NASAL DAILY
Status: DISCONTINUED | OUTPATIENT
Start: 2021-02-22 | End: 2021-02-24 | Stop reason: HOSPADM

## 2021-02-22 RX ORDER — MEPERIDINE HYDROCHLORIDE 25 MG/ML
12.5 INJECTION INTRAMUSCULAR; INTRAVENOUS; SUBCUTANEOUS EVERY 5 MIN PRN
Status: DISCONTINUED | OUTPATIENT
Start: 2021-02-22 | End: 2021-02-22 | Stop reason: HOSPADM

## 2021-02-22 RX ORDER — PROPOFOL 10 MG/ML
INJECTION, EMULSION INTRAVENOUS PRN
Status: DISCONTINUED | OUTPATIENT
Start: 2021-02-22 | End: 2021-02-22 | Stop reason: SDUPTHER

## 2021-02-22 RX ORDER — METHOCARBAMOL 750 MG/1
750 TABLET, FILM COATED ORAL 4 TIMES DAILY
Qty: 40 TABLET | Refills: 0 | Status: SHIPPED | OUTPATIENT
Start: 2021-02-22 | End: 2021-03-26 | Stop reason: SDUPTHER

## 2021-02-22 RX ORDER — FENTANYL CITRATE 50 UG/ML
INJECTION, SOLUTION INTRAMUSCULAR; INTRAVENOUS PRN
Status: DISCONTINUED | OUTPATIENT
Start: 2021-02-22 | End: 2021-02-22 | Stop reason: SDUPTHER

## 2021-02-22 RX ORDER — ACETAMINOPHEN 500 MG
1000 TABLET ORAL EVERY 6 HOURS
Status: DISCONTINUED | OUTPATIENT
Start: 2021-02-22 | End: 2021-02-24 | Stop reason: HOSPADM

## 2021-02-22 RX ORDER — GLYCOPYRROLATE 1 MG/5 ML
SYRINGE (ML) INTRAVENOUS PRN
Status: DISCONTINUED | OUTPATIENT
Start: 2021-02-22 | End: 2021-02-22 | Stop reason: SDUPTHER

## 2021-02-22 RX ORDER — CEFAZOLIN SODIUM 1 G/3ML
INJECTION, POWDER, FOR SOLUTION INTRAMUSCULAR; INTRAVENOUS PRN
Status: DISCONTINUED | OUTPATIENT
Start: 2021-02-22 | End: 2021-02-22 | Stop reason: SDUPTHER

## 2021-02-22 RX ORDER — PROMETHAZINE HYDROCHLORIDE 25 MG/1
12.5 TABLET ORAL EVERY 6 HOURS PRN
Status: DISCONTINUED | OUTPATIENT
Start: 2021-02-22 | End: 2021-02-24 | Stop reason: HOSPADM

## 2021-02-22 RX ORDER — SODIUM CHLORIDE 0.9 % (FLUSH) 0.9 %
10 SYRINGE (ML) INJECTION EVERY 12 HOURS SCHEDULED
Status: DISCONTINUED | OUTPATIENT
Start: 2021-02-22 | End: 2021-02-24 | Stop reason: HOSPADM

## 2021-02-22 RX ORDER — CARBAMAZEPINE 200 MG/1
200 CAPSULE, EXTENDED RELEASE ORAL 2 TIMES DAILY
Status: DISCONTINUED | OUTPATIENT
Start: 2021-02-22 | End: 2021-02-24 | Stop reason: HOSPADM

## 2021-02-22 RX ORDER — ROCURONIUM BROMIDE 10 MG/ML
INJECTION, SOLUTION INTRAVENOUS PRN
Status: DISCONTINUED | OUTPATIENT
Start: 2021-02-22 | End: 2021-02-22 | Stop reason: SDUPTHER

## 2021-02-22 RX ORDER — SUCRALFATE 1 G/1
1 TABLET ORAL EVERY 6 HOURS SCHEDULED
Status: DISCONTINUED | OUTPATIENT
Start: 2021-02-22 | End: 2021-02-24 | Stop reason: HOSPADM

## 2021-02-22 RX ORDER — ASPIRIN 81 MG/1
81 TABLET ORAL 2 TIMES DAILY
Qty: 56 TABLET | Refills: 0 | Status: SHIPPED | OUTPATIENT
Start: 2021-02-22 | End: 2021-03-22 | Stop reason: SDUPTHER

## 2021-02-22 RX ORDER — ONDANSETRON 2 MG/ML
INJECTION INTRAMUSCULAR; INTRAVENOUS PRN
Status: DISCONTINUED | OUTPATIENT
Start: 2021-02-22 | End: 2021-02-22 | Stop reason: SDUPTHER

## 2021-02-22 RX ORDER — OXYCODONE HYDROCHLORIDE 5 MG/1
5 TABLET ORAL EVERY 4 HOURS PRN
Status: DISCONTINUED | OUTPATIENT
Start: 2021-02-22 | End: 2021-02-24 | Stop reason: HOSPADM

## 2021-02-22 RX ORDER — SODIUM CHLORIDE 0.9 % (FLUSH) 0.9 %
10 SYRINGE (ML) INJECTION EVERY 12 HOURS SCHEDULED
Status: DISCONTINUED | OUTPATIENT
Start: 2021-02-22 | End: 2021-02-22 | Stop reason: HOSPADM

## 2021-02-22 RX ORDER — ONDANSETRON 2 MG/ML
4 INJECTION INTRAMUSCULAR; INTRAVENOUS EVERY 6 HOURS PRN
Status: DISCONTINUED | OUTPATIENT
Start: 2021-02-22 | End: 2021-02-24 | Stop reason: HOSPADM

## 2021-02-22 RX ORDER — NEOSTIGMINE METHYLSULFATE 1 MG/ML
INJECTION, SOLUTION INTRAVENOUS PRN
Status: DISCONTINUED | OUTPATIENT
Start: 2021-02-22 | End: 2021-02-22 | Stop reason: SDUPTHER

## 2021-02-22 RX ORDER — POTASSIUM CHLORIDE 750 MG/1
10 TABLET, EXTENDED RELEASE ORAL DAILY
Status: DISCONTINUED | OUTPATIENT
Start: 2021-02-22 | End: 2021-02-24 | Stop reason: HOSPADM

## 2021-02-22 RX ORDER — MORPHINE SULFATE 2 MG/ML
2 INJECTION, SOLUTION INTRAMUSCULAR; INTRAVENOUS
Status: DISCONTINUED | OUTPATIENT
Start: 2021-02-22 | End: 2021-02-24 | Stop reason: HOSPADM

## 2021-02-22 RX ORDER — SODIUM CHLORIDE 0.9 % (FLUSH) 0.9 %
10 SYRINGE (ML) INJECTION PRN
Status: DISCONTINUED | OUTPATIENT
Start: 2021-02-22 | End: 2021-02-24 | Stop reason: HOSPADM

## 2021-02-22 RX ORDER — POLYETHYLENE GLYCOL 3350 17 G/17G
17 POWDER, FOR SOLUTION ORAL DAILY
Status: DISCONTINUED | OUTPATIENT
Start: 2021-02-22 | End: 2021-02-24 | Stop reason: HOSPADM

## 2021-02-22 RX ORDER — HYDROCODONE BITARTRATE AND ACETAMINOPHEN 5; 325 MG/1; MG/1
1 TABLET ORAL EVERY 6 HOURS PRN
Qty: 28 TABLET | Refills: 0 | Status: SHIPPED | OUTPATIENT
Start: 2021-02-22 | End: 2021-03-01

## 2021-02-22 RX ORDER — GABAPENTIN 400 MG/1
400 CAPSULE ORAL 3 TIMES DAILY
Status: DISCONTINUED | OUTPATIENT
Start: 2021-02-22 | End: 2021-02-24 | Stop reason: HOSPADM

## 2021-02-22 RX ADMIN — FENTANYL CITRATE 50 MCG: 50 INJECTION, SOLUTION INTRAMUSCULAR; INTRAVENOUS at 08:15

## 2021-02-22 RX ADMIN — FENTANYL CITRATE 50 MCG: 50 INJECTION, SOLUTION INTRAMUSCULAR; INTRAVENOUS at 08:01

## 2021-02-22 RX ADMIN — ROCURONIUM BROMIDE 20 MG: 10 INJECTION, SOLUTION INTRAVENOUS at 07:51

## 2021-02-22 RX ADMIN — PROPOFOL 70 MG: 10 INJECTION, EMULSION INTRAVENOUS at 07:50

## 2021-02-22 RX ADMIN — ROCURONIUM BROMIDE 30 MG: 10 INJECTION, SOLUTION INTRAVENOUS at 07:31

## 2021-02-22 RX ADMIN — ACETAMINOPHEN 1000 MG: 500 TABLET ORAL at 13:12

## 2021-02-22 RX ADMIN — MORPHINE SULFATE 4 MG: 10 INJECTION, SOLUTION INTRAMUSCULAR; INTRAVENOUS at 09:44

## 2021-02-22 RX ADMIN — OXYCODONE HYDROCHLORIDE 10 MG: 10 TABLET ORAL at 13:25

## 2021-02-22 RX ADMIN — MORPHINE SULFATE 4 MG: 4 INJECTION, SOLUTION INTRAMUSCULAR; INTRAVENOUS at 12:11

## 2021-02-22 RX ADMIN — SODIUM CHLORIDE: 9 INJECTION, SOLUTION INTRAVENOUS at 09:19

## 2021-02-22 RX ADMIN — SUCRALFATE 1 G: 1 TABLET ORAL at 17:29

## 2021-02-22 RX ADMIN — Medication 0.2 MG: at 09:18

## 2021-02-22 RX ADMIN — CEFAZOLIN 1000 MG: 1 INJECTION, POWDER, FOR SOLUTION INTRAMUSCULAR; INTRAVENOUS at 08:00

## 2021-02-22 RX ADMIN — CEFAZOLIN 2000 MG: 10 INJECTION, POWDER, FOR SOLUTION INTRAVENOUS at 16:16

## 2021-02-22 RX ADMIN — PROPOFOL 130 MG: 10 INJECTION, EMULSION INTRAVENOUS at 07:30

## 2021-02-22 RX ADMIN — FENTANYL CITRATE 50 MCG: 50 INJECTION, SOLUTION INTRAMUSCULAR; INTRAVENOUS at 08:07

## 2021-02-22 RX ADMIN — Medication 2 MG: at 09:18

## 2021-02-22 RX ADMIN — ENOXAPARIN SODIUM 40 MG: 40 INJECTION SUBCUTANEOUS at 20:21

## 2021-02-22 RX ADMIN — SODIUM CHLORIDE: 9 INJECTION, SOLUTION INTRAVENOUS at 07:22

## 2021-02-22 RX ADMIN — ACETAMINOPHEN 1000 MG: 500 TABLET ORAL at 19:16

## 2021-02-22 RX ADMIN — CARBAMAZEPINE 200 MG: 200 CAPSULE, EXTENDED RELEASE ORAL at 20:19

## 2021-02-22 RX ADMIN — OXYCODONE HYDROCHLORIDE 10 MG: 10 TABLET ORAL at 17:31

## 2021-02-22 RX ADMIN — SODIUM CHLORIDE, PRESERVATIVE FREE 10 ML: 5 INJECTION INTRAVENOUS at 16:16

## 2021-02-22 RX ADMIN — FENTANYL CITRATE 50 MCG: 50 INJECTION, SOLUTION INTRAMUSCULAR; INTRAVENOUS at 09:23

## 2021-02-22 RX ADMIN — FENTANYL CITRATE 100 MCG: 50 INJECTION, SOLUTION INTRAMUSCULAR; INTRAVENOUS at 07:30

## 2021-02-22 RX ADMIN — MORPHINE SULFATE 4 MG: 4 INJECTION, SOLUTION INTRAMUSCULAR; INTRAVENOUS at 15:20

## 2021-02-22 RX ADMIN — Medication 0.2 MG: at 09:24

## 2021-02-22 RX ADMIN — OXYCODONE HYDROCHLORIDE 10 MG: 10 TABLET ORAL at 21:38

## 2021-02-22 RX ADMIN — MIDAZOLAM 2 MG: 1 INJECTION INTRAMUSCULAR; INTRAVENOUS at 07:22

## 2021-02-22 RX ADMIN — ROCURONIUM BROMIDE 20 MG: 10 INJECTION, SOLUTION INTRAVENOUS at 08:12

## 2021-02-22 RX ADMIN — ONDANSETRON HYDROCHLORIDE 4 MG: 2 INJECTION, SOLUTION INTRAMUSCULAR; INTRAVENOUS at 09:03

## 2021-02-22 RX ADMIN — TRANEXAMIC ACID 1000 MG: 1 INJECTION, SOLUTION INTRAVENOUS at 10:40

## 2021-02-22 RX ADMIN — HYDROMORPHONE HYDROCHLORIDE 0.5 MG: 1 INJECTION, SOLUTION INTRAMUSCULAR; INTRAVENOUS; SUBCUTANEOUS at 10:45

## 2021-02-22 RX ADMIN — SUCRALFATE 1 G: 1 TABLET ORAL at 13:12

## 2021-02-22 RX ADMIN — GABAPENTIN 400 MG: 400 CAPSULE ORAL at 20:20

## 2021-02-22 RX ADMIN — Medication 2 G: at 07:28

## 2021-02-22 RX ADMIN — HYDROMORPHONE HYDROCHLORIDE 0.5 MG: 1 INJECTION, SOLUTION INTRAMUSCULAR; INTRAVENOUS; SUBCUTANEOUS at 10:05

## 2021-02-22 RX ADMIN — LIDOCAINE HYDROCHLORIDE 80 MG: 20 INJECTION, SOLUTION INTRAVENOUS at 07:30

## 2021-02-22 RX ADMIN — TRANEXAMIC ACID 1000 MG: 1 INJECTION, SOLUTION INTRAVENOUS at 07:34

## 2021-02-22 RX ADMIN — MORPHINE SULFATE 4 MG: 10 INJECTION, SOLUTION INTRAMUSCULAR; INTRAVENOUS at 09:39

## 2021-02-22 RX ADMIN — POTASSIUM CHLORIDE 10 MEQ: 750 TABLET, EXTENDED RELEASE ORAL at 13:12

## 2021-02-22 RX ADMIN — GABAPENTIN 400 MG: 400 CAPSULE ORAL at 13:12

## 2021-02-22 RX ADMIN — SODIUM CHLORIDE, PRESERVATIVE FREE 10 ML: 5 INJECTION INTRAVENOUS at 20:22

## 2021-02-22 RX ADMIN — FENTANYL CITRATE 50 MCG: 50 INJECTION, SOLUTION INTRAMUSCULAR; INTRAVENOUS at 09:18

## 2021-02-22 ASSESSMENT — PULMONARY FUNCTION TESTS
PIF_VALUE: 25
PIF_VALUE: 26
PIF_VALUE: 25
PIF_VALUE: 15
PIF_VALUE: 27
PIF_VALUE: 0
PIF_VALUE: 26
PIF_VALUE: 28
PIF_VALUE: 3
PIF_VALUE: 31
PIF_VALUE: 20
PIF_VALUE: 26
PIF_VALUE: 32
PIF_VALUE: 14
PIF_VALUE: 26
PIF_VALUE: 0
PIF_VALUE: 26
PIF_VALUE: 27
PIF_VALUE: 25
PIF_VALUE: 30
PIF_VALUE: 2
PIF_VALUE: 25
PIF_VALUE: 30
PIF_VALUE: 25
PIF_VALUE: 0
PIF_VALUE: 16
PIF_VALUE: 26
PIF_VALUE: 26
PIF_VALUE: 30
PIF_VALUE: 25
PIF_VALUE: 27
PIF_VALUE: 1
PIF_VALUE: 25
PIF_VALUE: 15
PIF_VALUE: 26
PIF_VALUE: 27
PIF_VALUE: 1
PIF_VALUE: 28
PIF_VALUE: 27
PIF_VALUE: 24
PIF_VALUE: 26
PIF_VALUE: 24
PIF_VALUE: 25
PIF_VALUE: 25
PIF_VALUE: 26
PIF_VALUE: 24
PIF_VALUE: 26
PIF_VALUE: 0
PIF_VALUE: 15
PIF_VALUE: 28
PIF_VALUE: 26
PIF_VALUE: 20
PIF_VALUE: 26
PIF_VALUE: 26
PIF_VALUE: 27
PIF_VALUE: 23
PIF_VALUE: 26
PIF_VALUE: 0
PIF_VALUE: 26
PIF_VALUE: 25
PIF_VALUE: 20
PIF_VALUE: 25
PIF_VALUE: 25
PIF_VALUE: 27
PIF_VALUE: 26
PIF_VALUE: 25
PIF_VALUE: 1
PIF_VALUE: 24
PIF_VALUE: 26
PIF_VALUE: 33
PIF_VALUE: 17
PIF_VALUE: 26
PIF_VALUE: 25

## 2021-02-22 ASSESSMENT — PAIN DESCRIPTION - DESCRIPTORS
DESCRIPTORS: ACHING;CONSTANT;DISCOMFORT
DESCRIPTORS: BURNING;DISCOMFORT
DESCRIPTORS: ACHING;DISCOMFORT;PRESSURE
DESCRIPTORS: ACHING;CONSTANT;DISCOMFORT
DESCRIPTORS: PATIENT UNABLE TO DESCRIBE
DESCRIPTORS: ACHING;CONSTANT;DISCOMFORT

## 2021-02-22 ASSESSMENT — PAIN DESCRIPTION - LOCATION
LOCATION: HIP

## 2021-02-22 ASSESSMENT — PAIN - FUNCTIONAL ASSESSMENT
PAIN_FUNCTIONAL_ASSESSMENT: ACTIVITIES ARE NOT PREVENTED
PAIN_FUNCTIONAL_ASSESSMENT: PREVENTS OR INTERFERES SOME ACTIVE ACTIVITIES AND ADLS
PAIN_FUNCTIONAL_ASSESSMENT: ACTIVITIES ARE NOT PREVENTED
PAIN_FUNCTIONAL_ASSESSMENT: PREVENTS OR INTERFERES SOME ACTIVE ACTIVITIES AND ADLS
PAIN_FUNCTIONAL_ASSESSMENT: ACTIVITIES ARE NOT PREVENTED

## 2021-02-22 ASSESSMENT — PAIN DESCRIPTION - PAIN TYPE
TYPE: SURGICAL PAIN

## 2021-02-22 ASSESSMENT — PAIN DESCRIPTION - FREQUENCY
FREQUENCY: CONTINUOUS
FREQUENCY: INTERMITTENT
FREQUENCY: INTERMITTENT

## 2021-02-22 ASSESSMENT — PAIN SCALES - GENERAL
PAINLEVEL_OUTOF10: 7
PAINLEVEL_OUTOF10: 4
PAINLEVEL_OUTOF10: 7
PAINLEVEL_OUTOF10: 7
PAINLEVEL_OUTOF10: 0

## 2021-02-22 ASSESSMENT — PAIN DESCRIPTION - ORIENTATION
ORIENTATION: LEFT

## 2021-02-22 ASSESSMENT — PAIN DESCRIPTION - PROGRESSION
CLINICAL_PROGRESSION: NOT CHANGED

## 2021-02-22 ASSESSMENT — PAIN DESCRIPTION - ONSET
ONSET: ON-GOING

## 2021-02-22 NOTE — CONSULTS
Hospital Medicine  Consult History & Physical        Reason for consult:  Post-operative medical management    Date of Service: Pt seen/examined in consultation on 2/22/2021    History Of Present Illness:    Ms. Cuate Hoyos, a 63y.o. year old female  who  has a past medical history of Brain aneurysm, Cerebral artery occlusion with cerebral infarction [facility]), Degenerative arthritis of hand, Headache, Hiatal hernia, Hip problem, Hx of abnormal cervical Pap smear, Hypertension, and Stroke (cerebrum) ([facility] [address] She was admitted on 2/22/21 post-op total left hip arthroplasty. Past Medical History:        Diagnosis Date    Brain aneurysm 09/2018    H/O TIMES 2 THAT BURST PER PATIENT    Cerebral artery occlusion with cerebral infarction (HCC)     RT SIDE AFFECTED-LEARNED TO WALK AND WRITE AGAIN    Degenerative arthritis of hand     Headache     Hiatal hernia     Hip problem     NEEDS HIP REPLACEMENT PER PATIENT    Hx of abnormal cervical Pap smear     Hypertension     Stroke (cerebrum) [facility])        Past Surgical History:        Procedure Laterality Date    BRAIN ANEURYSM SURGERY      coiling     COLONOSCOPY  08/30/2019    polyps--frank    COLONOSCOPY N/A 8/30/2019    COLONOSCOPY POLYPECTOMY SNARE/COLD BIOPSY performed by Radha Hernandez MD at [address] [facility]- DONE AT [address] Left 2/22/2021    LEFT HIP TOTAL ARTHROPLASTY performed by Francisco Morejon MD at [place] [address] ENDOSCOPY  08/30/2019    gastritis with superficial ulcerations; duodenitis--frank    UPPER GASTROINTESTINAL ENDOSCOPY N/A 8/30/2019    EGD BIOPSY performed by Radha Hernandez MD at [address]       Medications Prior to Admission:    Prior to Admission medications    Medication Sig Start Date End Date Taking?  Authorizing Provider HYDROcodone-acetaminophen (NORCO) 5-325 MG per tablet Take 1 tablet by mouth every 6 hours as needed for Pain for up to 7 days. Intended supply: 7 days. Take lowest dose possible to manage pain 2/22/21 3/1/21 Yes Brenda Kwong PA-C   aspirin EC 81 MG EC tablet Take 1 tablet by mouth 2 times daily for 28 days 2/22/21 3/22/21 Yes Brenda Kwong PA-C   methocarbamol (ROBAXIN-750) 750 MG tablet Take 1 tablet by mouth 4 times daily 2/22/21  Yes Brenda Kwong PA-C   gabapentin (NEURONTIN) 300 MG capsule TAKE ONE CAPSULE BY MOUTH THREE TIMES A DAY 2/16/21 4/22/21 Yes Silvia Jasmine MD   carBAMazepine (TEGRETOL XR) 100 MG extended release tablet Take 2 tablets by mouth 2 times daily 2/16/21  Yes Silvia Jasmine MD   Multiple Vitamins-Minerals (THERAPEUTIC MULTIVITAMIN-MINERALS) tablet Take 1 tablet by mouth daily   Yes Whitney Bustos MD   acetaminophen-codeine (TYLENOL #4) 300-60 MG per tablet Take 1 tablet by mouth daily as needed for Pain for up to 13 days.  2/9/21 2/22/21 Yes SPENSER Gardner   pantoprazole (PROTONIX) 40 MG tablet TAKE ONE TABLET BY MOUTH EVERY DAY 12/11/20  Yes Abisai Fernandez DO   potassium chloride (KLOR-CON M) 10 MEQ extended release tablet TAKE ONE TABLET BY MOUTH DAILY WITH BREAKFAST 12/11/20  Yes Abisai Fernandez DO   Metoprolol Tartrate 37.5 MG TABS Take 37.5 mg by mouth 2 times daily 12/11/20  Yes Abisai Fernandez DO   losartan (COZAAR) 100 MG tablet Take 1 tablet by mouth daily 12/11/20  Yes Abisai Frenandez DO   fluticasone (FLONASE) 50 MCG/ACT nasal spray 2 sprays by Each Nostril route daily 12/11/20  Yes Abisai Fernandez DO   chlorthalidone (HYGROTON) 25 MG tablet Take 1 tablet by mouth daily 12/11/20  Yes Ezequiel Fernandez DO   amLODIPine (NORVASC) 5 MG tablet TAKE ONE TABLET BY MOUTH EVERY DAY 12/11/20  Yes Kyle Alatorre, DO albuterol sulfate  (90 Base) MCG/ACT inhaler INHALE TWO PUFFS BY MOUTH EVERY 6 HOURS AS NEEDED FOR WHEEZING.  11/4/20  Yes Lesia Avilez MD   diclofenac sodium (VOLTAREN) 1 % GEL APPLY ONE GRAM EXTERNALLY TWICE A DAY TO NECK AND ONE GRAM EXTERNALLY TWICE A DAY TO BACK  11/3/20  Yes SPENSER Cool   lidocaine-prilocaine (EMLA) 2.5-2.5 % cream APPLY ONE GRAM EXTERNALLY THREE TIMES A DAY IF NEEDED-MUST LAST 30 DAYS  11/3/20  Yes SPENSER Cool   sucralfate (CARAFATE) 1 GM tablet TAKE ONE TABLET BY MOUTH FOUR TIMES A DAY  Patient taking differently: 1 tablet once a week 6/12/20  Yes Lesia Avilez MD   Misc. Devices (ROLLATOR) MISC 1 each by Does not apply route daily as needed (use as needed for stability) 3/17/20   Lesia Avilez MD       Allergies:  Latex, Iodine, Aloe, Celebrex [celecoxib], and Fish-derived products    Social History:      TOBACCO:   reports that she quit smoking about 2 years ago. Her smoking use included cigarettes. She has a 64.50 pack-year smoking history. She has never used smokeless tobacco.  ETOH:   reports no history of alcohol use. Family History:     Positive as follows:        Problem Relation Age of Onset    Diabetes Mother     Arthritis Mother     Atrial Fibrillation Mother     Diabetes Father     Atrial Fibrillation Father     Arthritis Father     Emphysema Father     Cancer Sister        REVIEW OF SYSTEMS:   Pertinent positives as noted in the HPI. All other systems reviewed and negative. PHYSICAL EXAM:  /66   Pulse 83   Temp 97 °F (36.1 °C) (Temporal)   Resp 18   Ht 5' 5\" (1.651 m)   Wt 232 lb (105.2 kg)   SpO2 98%   BMI 38.61 kg/m²   General appearance: No apparent distress, appears stated age and cooperative. HEENT: Normal cephalic, atraumatic without obvious deformity. Pupils equal, round, and reactive to light. Extra ocular muscles intact. Conjunctivae/corneas clear. Neck: Supple, with full range of motion. No jugular venous distention. Trachea midline. Respiratory:  Clear to auscultation bilaterally. No apparent distress. Cardiovascular:  Regular rate and rhythm. S1, S2 without murmurs, rubs, or gallops. PV: Brisk capillary refill. +2 pedal and radial pulses bilaterally. No clubbing, cyanosis, edema of bilateral lower extremities. Abdomen: Soft, non-tender, non-distended. +BS  Musculoskeletal: No obvious deformities or erythematous or edematous joints. L hip with dressing intact. Skin: Normal skin color. No rashes or lesions. Neurologic:  Neurovascularly intact without any focal sensory/motor deficits. Cranial nerves: II-XII intact, grossly non-focal.  Psychiatric: Alert and oriented, thought content appropriate, normal insight    Labs:     CBC:   No results for input(s): WBC, RBC, HGB, HCT, MCV, RDW, PLT in the last 72 hours. BMP: No results for input(s): NA, K, CL, CO2, BUN, CREATININE, MG, PHOS in the last 72 hours. Invalid input(s): CA  LFT:  No results for input(s): PROT, ALB, ALKPHOS, ALT, AST, BILITOT, AMYLASE, LIPASE in the last 72 hours. CE:  No results for input(s): [place] in the last 72 hours. PT/INR: No results for input(s): INR, APTT in the last 72 hours. BNP: No results for input(s): BNP in the last 72 hours.   Hgb A1C:   Lab Results   Component Value Date    LABA1C 5.8 (H) 12/04/2020     No results found for: EAG  ESR: No results found for: SEDRATE  CRP: No results found for: CRP  D Dimer: No results found for: DDIMER  Folate and B12: No results found for: UORITQCW21, No results found for: FOLATE  Lactic Acid:   Lab Results   Component Value Date    LACTA 2.3 (H) 09/10/2018     Thyroid Studies:   Lab Results   Component Value Date    TSH 1.290 02/14/2019         ASSESSMENT/PLAN:    Osteoarthritis of bilateral hips  -S/p left hip total arthroplasty 2/22/2021  -Plan for right hip total arthroplasty in the near future per patient -Monitor H&H postop  -As needed analgesia  -Cefazolin surgical prophylaxis  -PT/OT    HTN  -Controlled with amlodipine, chlorthalidone (and potassium), losartan & metoprolol    Emphysema  -Follows with Dr. Semaj Solorio be as an outpatient  -PRN albuterol    GERD  -Hiatal hernia  Continue Carafate, Protonix    History of subarachnoid hemorrhage  -No residual deficits  -Carbamazepine seizure prophylaxis      Thanks for allowing us to participate in the care of Kristan Miller. We will continue to follow along. Please do not hesitate to contact us if needed.    +++++++++++++++++++++++++++++++++++++++++++++++++  [address] [place], [place]  +++++++++++++++++++++++++++++++++++++++++++++++++  NOTE: This report was transcribed using voice recognition software. Every effort was made to ensure accuracy; however, inadvertent computerized transcription errors may be present.

## 2021-02-22 NOTE — PROGRESS NOTES
OCCUPATIONAL THERAPY INITIAL EVALUATION      Date:2021  Patient Name: Sofia Tate  MRN: [id number]  : 1960  Room: [address] [place] [address]    Evaluating OT: Carmelita Stevenson. Jj Allen, OTR/L #[id number]    Referring physician:   Brice Kang PA-C              AM-PAC Daily Activity Raw Score:   Recommended Adaptive Equipment: ww, BSC, AE for LE dressing and bathing ,      Diagnosis: arthritis of B hips   Surgery: s/p L MERVAT    Pertinent Medical History:   Past Medical History:   Diagnosis Date    Brain aneurysm 2018    H/O TIMES 2 THAT BURST PER PATIENT    Cerebral artery occlusion with cerebral infarction ([facility] [address]     RT SIDE AFFECTED-LEARNED TO WALK AND WRITE AGAIN    Degenerative arthritis of hand     Headache     Hiatal hernia     Hip problem     NEEDS HIP REPLACEMENT PER PATIENT    Hx of abnormal cervical Pap smear     Hypertension     Stroke [facility]cerebrum) ([facility])         Precautions:  Falls, WBAT to LLE , anterolateral hip precautions     Home Living: Pt lives with son  in a 2 story home with 4-5 step(s) to enter and B wide rail(s); bed/bath on main floor. Bathroom setup:  Walk in shower with seat and HR   Equipment owned: ww, shower seat , HR in bathroom and Hallway  Prior Level of Function:   Independent with ADLs ,  shared with IADLs; using ww for ambulation.                               Medication Management setup    Leisure:enjoys reading and walking when able    Pain Level: severe L hip pain with bed mobility and mod with functional sit to stand and transfer  Cognition: A&O: 3/3; Follows 1-2 step directions   Memory:  fair   Sequencing:  fair   Problem solving:  fair   Judgement/safety:  fair     Functional Assessment:   Initial Eval Status  Date: 21 Treatment Status  Date: Short Term Goals=LTG  Treatment frequency: PRN 2-4 x/week   Feeding setup   Independent   Grooming Min A   Mod I standing with ww    UB Dressing modA   Setup sitting EOB   LB Dressing dependent  Min A with AE prn Bathing Mod A simulated  Min A with shower seat and LHS    Toileting Dependent - catheter  SBA to [facility] with ww   Bed Mobility  Supine to sit: max  A x2    Sit to supine: n/t  Supine to sit: min A    Sit to supine: min A    Functional Transfers Sit to stand: max A with ww  Stand to sit:  Mod A x2  Stand pivot: mod A x2 with ww and in creased time with light headedness noted. BP 99/54   SBA with ww   Functional Mobility n/t  SBA with ww   Balance Sitting:     Static:  Min A     Dynamic:mod A   Standing: max A      Activity Tolerance Poor limited by pain and light headedness O2 4.5 L 98% HR 83 post activity  Fair+   Visual/  Perceptual Glasses: yes WFL         Safety fair                 Good follow through with hip precautions in ADL completion     Hand dominance: R   UE ROM: RUE:  WFL  LUE:  WFL  Strength: RUE:  4/5 LUE:  4/5   Strength: R  WFL   L WFL   Fine Motor Coordination: R WFL  L WFL    Hearing: WFL  Sensation:  No c/o numbness or tingling  Tone:  WFL  Edema: none noted                            Comments: Nursing approval to work with patient given. Upon arrival, patient supine and agreeable to evaluation. OT evaluation performed with  education on benefits of mobility and safety with ADL completion. Patient cooperative with encouragement. At end of session, patient sitting up in chair and  with call light and phone within reach, all lines and tubes intact. Nursing notified. OT treatment: OT for functional assessment of  ADL, Functional Transfer/Mobility Training, Equipment Needs, Energy Conservation Techniques, Pt/Family Education, Ther Ex- deep breathing for edema and pain control., OT role and POC reviewed. Patient  demo fair understanding of hip precautions and safety with bed mobility, walker safety and ADL completion. Skilled occupational therapy services provided include instruction/training on safety and adapted techniques for completion of therapeutic activities, ADLs/IADLs, and therapeutic exercise deep breathing for endurance and pain and edema control. Therapist educated pt on anterolateral hip  precautions. Skilled monitoring of O2 sats, HR, and pt response throughout treatment. OT treatment provided this date includes:  ·  ADL-  Instruction/training on safety and adapted techniques for completion of ADLs: Pt. Demonstrates fair understanding of precautions & techniques  ·  Functional Mobility-  Instruction/training on safety and improved independence with bed mobility, /functional transfers using ww   ·  Sitting EOB 5+ minutes to improve dynamic sitting balance and activity tolerance during ADLs. ·  Activity tolerance- Instruction/training on energy conservation/work simplification for completion of ADLs:. Pt. Demo poor tolerance due to pain and light headedness this day   ·  Cognitive retraining -  Cues for safety/technique during bed mobility, sitting balance/seated ADLs, no cues for sequencing, problem solving WFL  ·  Skilled positioning/alignment-  Proper Positioning/Alignment due to anterolateral hip precautions  ·  Skilled monitoring of O2 sats-  refer to activity tolerance reporting  · Therapeutic Exercise- Instruction on deep breathing and benefits of upright sitting  And  exercise to improve functional Ind. with ADLs             Patient would  benefit from continued skilled OT to increase functional independence and quality of life.     Eval Complexity: mod    Assessment of current deficits   Functional mobility [x]  ADLs [x] Strength []  Cognition []  Functional transfers  [x] IADLs [x] Safety Awareness [x]  Endurance [x]  Fine Motor Coordination [] Balance [x] Vision/perception [] Sensation []   Gross Motor Coordination [] ROM [] Delirium []                  Motor Control [] Plan of Care: OT 1-3x/week for 5-7 days PRN   Instruction/training on adapted ADL techniques and AE recommendations to increase functional independence within precautions  Training on energy conservation strategies/techniques to improve independence/tolerance for self-care routine  Functional transfer/mobility training/DME recommendations for increased independence, safety, and fall prevention  Patient/Family education to increase follow through with safety techniques and functional independence  Recommendation of environmental modifications for increased safety with functional transfers/mobility and ADLs  Therapeutic exercise to improve motor endurance, ROM, and functional strength for ADLs/functional transfers  Positioning to improve functional independence    Rehab Potential: Good for established goals    Patient / Family Goal: decrease pain and increase ability to walk     Evaluation time includes thorough review of current medical information, gathering information on past medical & social history & PLOF, completion of standardized testing, informal observation of tasks, consultation with other medical professions/disciplines, assessment of data & development of POC/goals. Patient and/or family were instructed diagnosis, prognosis/goals and plan of care. yes Demonstrated good- understanding. Min Units   OT Eval Low [id number]     OT Eval Medium 96226 X    OT Eval High [id number]     OT Re-Eval [id number]     Therapeutic Ex [id number]     Therapeutic Activities [id number] 18 1   ADL/Self Care 47515     Orthotic Management 43526     Neuro Re-Ed [id number]     Non-Billable Time              Time In: 15:15           Time Out: 15:40               [] Malnutrition indicators have been identified and nursing has been notified to ensure a dietitian consult is ordered. mod OT Evaluation + 18  treatment minutes    Margarita Paez.  [address], [address]

## 2021-02-22 NOTE — PROGRESS NOTES
Admitted to [facility]. Instructions reviewed. Covid test done:2/16/21    Results:not detected    Self quarantine guidelines followed since tested? no    Any unusual S/S or concerns expressed/observed? Yes    Povidine-iodine 5% nasal antiseptic pre-op prep completed 15 seconds per nare :NOT USED  IODINE ALLERGY    2% CHG pre-op skin prep completed in appropriate order using all 6 cloths:   ? With 1st cloth, wipe the neck, chest, and abdomen. Scrub inside nd  around the navel/belly-button area. ? With 2nd cloth, wipe both arms, starting each with the shoulder  and ending at the fingertips. Be sure to thoroughly wipe the arm  pit area. ? With 3rd cloth, wipe the right and left hip followed by the groin. Be sure to wipe folds in the abdominal and groin areas. ? With 4th cloth, wipe both legs, starting at the thigh and ending   at the toes. Be sure to thoroughly wipe behind the knees. ? With 5th cloth, wipe the back starting at the base of the neck and   Ending at the waist line. ? With 6th cloth, wipe the buttocks.

## 2021-02-22 NOTE — H&P
Orthopaedic H&P Note     Beto Anderson is a 63 y.o. female, her YOB: 1960 with the following history as recorded in [facility]:             Patient Active Problem List     Diagnosis Date Noted    Abnormal blood sugar 02/21/2020    Degenerative disc disease, cervical 12/12/2019    Arthropathy of cervical facet joint 12/12/2019    Colon polyps 09/06/2019    Dysphagia      Heartburn      At risk for colon cancer      Arthritis of right hip 08/07/2019    Chronic pain syndrome 03/01/2019    Lumbar facet arthropathy 03/01/2019    Lumbar disc disorder 03/01/2019    Primary osteoarthritis of both hips 03/01/2019    Hypertension 09/21/2018    Subarachnoid bleed ([address] 09/10/2018    Hypertensive emergency 09/10/2018    Obesity (BMI 30-39.9) 09/10/2018      Current Facility-Administered Medications          Current Outpatient Medications   Medication Sig Dispense Refill    albuterol sulfate  (90 Base) MCG/ACT inhaler INHALE TWO PUFFS BY MOUTH EVERY 6 HOURS AS NEEDED FOR WHEEZING. 1 Inhaler 3    diclofenac sodium (VOLTAREN) 1 % GEL APPLY ONE GRAM EXTERNALLY TWICE A DAY TO NECK AND ONE GRAM EXTERNALLY TWICE A DAY TO BACK  100 g 2    lidocaine-prilocaine (EMLA) 2.5-2.5 % cream APPLY ONE GRAM EXTERNALLY THREE TIMES A DAY IF NEEDED-MUST LAST 30 DAYS  30 g 2    gabapentin (NEURONTIN) 300 MG capsule TAKE ONE CAPSULE BY MOUTH THREE TIMES A DAY 90 capsule 1    acetaminophen-codeine (TYLENOL #4) 300-60 MG per tablet Take 1 tablet by mouth daily as needed for Pain for up to 30 days.  30 tablet 1    doxepin (ZONALON) 5 % cream APPLY ONE (1) GRAM EXTERNALLY THREE TIMES A DAY TO NECK AND ONE (1) GRAM EXTERNALLY THREE TIMES TIMES A DAY TO BACK 180 g 2    pantoprazole (PROTONIX) 40 MG tablet TAKE ONE TABLET BY MOUTH EVERY DAY 30 tablet 3    amLODIPine (NORVASC) 5 MG tablet TAKE ONE TABLET BY MOUTH EVERY DAY 30 tablet 3  carBAMazepine (TEGRETOL XR) 100 MG extended release tablet TAKE ONE TABLET BY MOUTH THREE TIMES A DAY (Patient taking differently: 200 mg TAKE ONE TABLET BY MOUTH twice  TIMES A DAY) 90 tablet 3    potassium chloride (KLOR-CON M) 10 MEQ extended release tablet TAKE ONE TABLET BY MOUTH DAILY WITH BREAKFAST 30 tablet 3    chlorthalidone (HYGROTON) 25 MG tablet Take 1 tablet by mouth daily 30 tablet 3    losartan (COZAAR) 100 MG tablet Take 1 tablet by mouth daily 30 tablet 3    Metoprolol Tartrate 37.5 MG TABS Take 37.5 mg by mouth 2 times daily 60 tablet 3    sucralfate (CARAFATE) 1 GM tablet TAKE ONE TABLET BY MOUTH FOUR TIMES A  tablet 0    lidocaine (XYLOCAINE) 5 % ointment APPLY ONE GRAM EXTERNALLY FOUR TIMES A DAY TO NECK AND ONE GRAM EXTERNALLY FOUR TIMES A DAY TO BACK  212.64 g 0    Misc. Devices (ROLLATOR) MISC 1 each by Does not apply route daily as needed (use as needed for stability) 1 each 0    fluticasone (FLONASE) 50 MCG/ACT nasal spray 2 sprays by Each Nostril route daily 1 Bottle 0      No current facility-administered medications for this visit.          Allergies: Latex;  Iodine; Aloe; Celebrex [celecoxib]; and Fish-derived products  Past Medical History        Past Medical History:   Diagnosis Date    Brain aneurysm 09/2018     H/O TIMES 2 THAT BURST PER PATIENT    Cerebral artery occlusion with cerebral infarction ([facility] [address]       RT SIDE AFFECTED-LEARNED TO WALK AND WRITE AGAIN    Degenerative arthritis of hand      Headache      Hiatal hernia      Hip problem       NEEDS HIP REPLACEMENT PER PATIENT    Hx of abnormal cervical Pap smear      Hypertension      St[facility]cerebrum) [facility])           Past Surgical History         Past Surgical History:   Procedure Laterality Date    BRAIN ANEURYSM SURGERY         coiling     COLONOSCOPY   08/30/2019     polyps--frank    COLONOSCOPY N/A 8/30/2019   COLONOSCOPY POLYPECTOMY SNARE/COLD BIOPSY performed by Royce Kendrick MD at [facility].       FOR CANCER CELLS- DONE AT [address]        UPPER GASTROINTESTINAL ENDOSCOPY   2019     gastritis with superficial ulcerations; duodenitis--frank    UPPER GASTROINTESTINAL ENDOSCOPY N/A 2019     EGD BIOPSY performed by Royce Kendrick MD at [address] [place] [address]         Family History         Family History   Problem Relation Age of Onset    Diabetes Mother     Scheryl Gemma Arthritis Mother      Atrial Fibrillation Mother      Diabetes Father      Atrial Fibrillation Father      Arthritis Father      Emphysema Father      Cancer Sister           Social History            Tobacco Use    Smoking status: Former Smoker       Packs/day: 1.50       Years: 43.00       Pack years: 64.50       Types: Cigarettes       Last attempt to quit: 2018       Years since quittin.1    Smokeless tobacco: Never Used   Substance Use Topics    Alcohol use: No                                    Chief Complaint   Patient presents with    Hip Pain        Reports severe BL hip pain the left being the worse. Walks only short distance at home with a cane. Remains under the care of the pain clinic.     Follow-up        Would like to discuss surgical options.        Hematologic\Lymphatic:  negative for bleeding, petechiae,   Genitourinary: Negative for hematuria and difficulty urinating. Musculoskeletal: Negative for neck pain and stiffness. Mild for back pain, negative joint swelling and gait problem. Skin: Negative for pallor, rash and wound. Neurological: Negative for dizziness, tremors, seizures, weakness, light-headedness, no TIA or stroke symptoms. No numbness and headaches. Psychiatric/Behavioral: Negative.      Physical Examination:   General appearance: alert, well appearing, and in no distress,  normal appearing weight  Mental status: alert, oriented to person, place, and time, normal mood, behavior, speech, dress, motor activity, and thought processes  Abdomen: soft, nondistended, nontender, bowel sound + X 4 quads  Resp:   resp easy and unlabored, equal, regular rate, no wheezes, rhonchi, crackles noted  Cardiac: distal pulses palpable, skin well perfused. HR regular rhythm and rate, no murmers, rubs, or clicks  Neurological: alert, oriented X3, normal speech, no focal findings or movement disorder noted, motor and sensory grossly normal bilaterally, normal muscle tone, no tremors, strength 5/5  HEENT: normochephalic atraumatic, external ears and eyes normal, sclera normal, neck supple  Extremities:   peripheral pulses normal, no edema, redness or tenderness in the calves   Skin: normal coloration, no rashes or open wounds, no suspicious skin lesions noted  Psych: Affect euthymic   Musculoskeletal:    Extremity:  Bilateral Lower Extremity  Skin clean dry and intact, without signs of infection  Compartments supple throughout thigh and leg  Calf supple and nontender  Demonstrates active knee flexion/extension, ankle plantar/dorsiflexion/great toe extension. States sensation intact to touch in sural/deep peroneal/superficial peroneal/saphenous/posterior tibial nerve distributions to foot/ankle. Palpable dorsalis pedis and posterior tibialis pulses, cap refill brisk in toes, foot warm/perfused. Patient does have pain with any motion of bilateral hips L>R  No internal/external rotation able to be assessed secondary to pain                 /72   Pulse 58   Temp 97.2 °F (36.2 °C) (Temporal)   Ht 5' 5\" (1.651 m)   Wt 235 lb (106.6 kg)   BMI 39.11 kg/m²      XR: 11/10/20 AP pelvis and 2 view of the left hip demonstrates bilateral severe osteoarthritis of the hips, there is impaction of femoral heads, severe sclerosis noted as well. No acute fracture.      ASSESSMENT:       Diagnosis Orders   1. Pain  XR HIP 2-3 VW W PELVIS LEFT         Discussion:  The patient would like to proceed with total left Hip arthroplasty when cleared by their PCP.  Manual understands the risks include but are not limited to infection, injuries to the blood vessel and nerves, bleeding, continued pain and non relief of pain, aseptic loosening dislocation, limb length discrepancy, arthrofibrosis of the joint, further operation, deep venous thrombosis, pulmonary embolism and fracture, heart attack and death. Mary operative plan discussed, need for anticoagulation for DVT/PE prophylaxis, need for physical therapy, office follow up visits, and possible rehab stay. It was also explained patient will need to take a prophylactic dose of ATB prior to any bowel, dental or mouth procedures, including dental cleaning, for length of the implant. Did review surgery prosthesis with model with patient as well. Also explained patient will need to use hip precaution for 6-8 weeks after surgery.   Pain control was also discussed and the expectation is to have patient off narcotic pain meds around 3-4 weeks post operatively for a total joint arthroplasty     PLAN:  Plan for OR on 12/14/2020 pending medical clearance with Dr. Maurilio Timmons MD  Pre-surgery medical clearance  PAT  Joint education class  Preop COVID 19 NPO after midnight the night before surgery  WBAT on bilaterally lower extremity  Hold NSAIDS 7 days prior to surgery  Will notify pain management that planning for surgery and for post op pain control      Electronically signed by mEili Welsh PA-C on 11/10/2020 at 10:45 AM  Note: This report was completed using computerize voiced recognition Shilo Britt effort has been made to ensure accuracy; however, inadvertent computerized transcription errors may be present. I have seen and evaluated the patient and agree with the above assessment on today's visit. I have performed the key components of the history and physical examination and concur completely with the findings and plans as documented. Agree with ROS, examination, FMH, PMH, PSH, SocHx, and allergies as above. Patient seen and examined. Went over surgery in detail. I explained the surgery in detail. I explained the risks and complications of surgery with the patient including but not limited to death from anesthesia, possible neurovascular damage, possible infection,  Possible wound healing issues, possible perioperative fracture, leg length discrepancy, implant failure, possible need for further surgery, etc.  Patient understood this, asked appropriate questions and decided to go forward with the procedure. Physical Examination:   General appearance: alert, well appearing, and in no distress,  normal appearing weight.  No visible signs of trauma   Mental status: alert, oriented to person, place, and time, normal mood, behavior, speech, dress, motor activity, and thought processes  Abdomen: soft, nondistended  Resp:   resp easy and unlabored, no audible wheezes note, normal symmetrical expansion of both hemithoraces  Cardiac: distal pulses palpable, skin and extremities well perfused Neurological: alert, oriented X3, normal speech, no focal findings or movement disorder noted, motor and sensory grossly normal bilaterally, normal muscle tone, no tremors  HEENT: normochephalic atraumatic, external ears and eyes normal, sclera normal, neck supple, no nasal discharge. Extremities:   peripheral pulses normal, no edema, redness or tenderness in the calves   Skin: normal coloration, no rashes or open wounds, no suspicious skin lesions noted  Psych: Affect euthymic   Musculoskeletal:   Extremity:  No change, skin intact        ELECTRONICALLY signed by:    Anthony Jones MD  2/22/21   This is been dictated utilizing voice recognition software. All efforts have been made to make the note accurate although inadvertent errors may be present.

## 2021-02-22 NOTE — PROGRESS NOTES
Physical Therapy    Physical Therapy Initial Assessment     Name: Natalia Clarke  : 1960  MRN: [id number]    Referring Provider:  Tashi Valenzuela PA-C    Date of Service: 2021    Evaluating PT:  Otoniel Sosa PT, DPT PT [id number]    Room #:  8985/2156-Z  Diagnosis:  Severe left hip degenerative joint disease  PMHx/PSHx:  Brain Aneurysm, Headache, HTN, CVA    Procedure/Surgery:  LEFT HIP TOTAL ARTHROPLASTY 21  Precautions:  WBAT LLE, Anterolateral Hip Precautions, Falls, O2  Equipment Needs:  TBD    SUBJECTIVE:    Pt lives with son and daughter in law in a 2 story home with 4 stairs to enter and single rail. Bed is on first floor and bath is on first floor. Pt has flight of stairs to 2nd floor. Pt ambulated with [employer] independently PTA. Equipment Owned: [employer]    OBJECTIVE:   Initial Evaluation  Date: 21 Treatment Short Term/ Long Term   Goals   AM-PAC 6 Clicks      Was pt agreeable to Eval/treatment? Yes     Does pt have pain? Mild pain L hip     Bed Mobility  Rolling: NT  Supine to sit: MaxA x 2  Sit to supine: NT  Scooting: MaxA  Rolling: Independent    Supine to sit:  Independent    Sit to supine: Independent    Scooting: Independent     Transfers Sit to stand: MaxA   Stand to sit: MaxA  Stand pivot: MaxA [employer]  Sit to stand: Modified Independent  Stand to sit: Modified Independent  Stand pivot: Modified Independent     Ambulation    5 feet with MaxA  WW  >150 feet with Modified Independent  AAD   Stair negotiation: ascended and descended  NT  >4 steps with single rail Modified Independent     ROM BUE:  See OT Note  BLE:  WFL     Strength BUE:  See OT Note  RLE: 4/5  LLE: 2/5   Improve Strength 1/3 MMT Grade   Balance Sitting EOB:  Rachel  Dynamic Standing:  MaxA  Sitting EOB:  Independent    Dynamic Standing:  Modified Independent       Pt is A & O x 4  Sensation:  Pt denies numbness and tingling to extremities  Edema:  WNL    Patient education  Pt educated on role of PT Patient response to education:   Pt verbalized understanding Pt demonstrated skill Pt requires further education in this area   x x x     ASSESSMENT:    Comments:  Pt received in supine agreeable to PT evaluation. Pt educated on hip precautions. Pt requiring assistance of trunk and BLES for bed mobility. Pt requiring assist and cues for functional tranfers and ambulation. Pt with step to, antalgic gait. BP 99/ 53 in bedside chair. Treatment:  Patient practiced and was instructed in the following treatment:    ? Bed mobility- verbal cues for positioning and sequencing to facilitate independence  ? Functional transfers-Verbal cues for proper positioning and sequencing to perform transfers safely with maximum independence. ? Gait training -Verbal cues for proper positioning and sequencing using assistive device to maximize functional mobility independence. Pt's/ family goals   1. Get better    Patient and or family understand(s) diagnosis, prognosis, and plan of care. yes    PLAN:      PLAN OF CARE:    Current Treatment Recommendations     [x] Strengthening     [x] ROM   [x] Balance Training   [x] Endurance Training   [x] Transfer Training   [x] Gait Training   [x] Stair Training   [x] Positioning   [x] Safety and Education Training   [x] Patient/Caregiver Education   [x] HEP  [] Other       PT care will be provided in accordance with the objectives noted above. Exercises and functional mobility practice will be used as well as appropriate assistive devices or modalities to obtain goals. Patient and family education will also be administered as needed.     Frequency of treatments: BID    Time in  1510  Time out  1540    Total Treatment Time  10 minutes Evaluation Time includes thorough review of current medical information, gathering information on past medical history/social history and prior level of function, completion of standardized testing/informal observation of tasks, assessment of data and education on plan of care and goals.     CPT codes:  [x] Low Complexity PT evaluation 84961  [] Moderate Complexity PT evaluation 29336  [] High Complexity PT evaluation 25030  [] PT Re-evaluation 39362  [] Gait training 81579 0 minutes  [] Manual therapy 80436 0 minutes  [x] Therapeutic activities 33450 10 minutes  [] Therapeutic exercises 53481 0 minutes  [] Neuromuscular reeducation 28257 0 minutes       Colton Contreras PT, DPT   [id number]

## 2021-02-22 NOTE — ANESTHESIA POSTPROCEDURE EVALUATION
Department of Anesthesiology  Postprocedure Note    Patient: Mary Taveras  MRN: [id number]  YOB: 1960  Date of evaluation: 2/22/2021  Time:  1:09 PM     Procedure Summary     Date: 02/22/21 Room / Location: [facility] OR 08 / [facility]    Anesthesia Start: 0264 Anesthesia Stop: 9104    Procedure: LEFT HIP TOTAL ARTHROPLASTY (Left Hip) Diagnosis: (OSTEOARTHRITIS)    Surgeons: Bushra Walker MD Responsible Provider: Linh Sheikh MD    Anesthesia Type: general ASA Status: 3          Anesthesia Type: general    Monica Phase I: Monica Score: 9    Monica Phase II:      Last vitals: Reviewed and per EMR flowsheets.        Anesthesia Post Evaluation    Patient location during evaluation: PACU  Patient participation: complete - patient participated  Level of consciousness: awake and alert  Airway patency: patent  Nausea & Vomiting: no nausea and no vomiting  Complications: no  Cardiovascular status: hemodynamically stable  Respiratory status: acceptable  Hydration status: euvolemic

## 2021-02-22 NOTE — BRIEF OP NOTE
Brief Postoperative Note      Patient: Clemencia Houston  YOB: 1960  MRN: [id number]    Date of Procedure: 2/22/2021    Pre-Op Diagnosis: Left hip OSTEOARTHRITIS    Post-Op Diagnosis: Same       Procedure(s):  LEFT HIP TOTAL ARTHROPLASTY    Surgeon(s):  Carley Elliott MD    Assistant:  Surgical Assistant: Arthuro Lines  Physician Assistant: Hector Nino PA-C  Resident: Aurora Bryant DO    Anesthesia: General    Estimated Blood Loss (mL): 010     Complications: None    Specimens:   ID Type Source Tests Collected by Time Destination   A : LEFT FEMORAL HEAD Bone Hip SURGICAL PATHOLOGY Carley Elliott MD 2/22/2021 0830        Implants:  Implant Name Type Inv. Item Serial No.  Lot No. LRB No. Used Action   SHELL ACET [id number] HYDROXY APATITE 5 H CLUS H TRIDENT II  SHELL ACET [id number] HYDROXY APATITE 5 H CLUS H TRIDENT II  [employer] [id number] Left 1 Implanted   LINER ACET SZ F ID36MM THK7.9MM 0DEG HIP X3 APURVA RNG FOR  LINER ACET SZ F ID36MM THK7.9MM 0DEG HIP X3 APURVA RNG FOR  [employer] "[employer] [employer] [id number] Left 1 Implanted   STEM FEM SZ 5 L108MM NK L35MM 44MM OFFSET 127DEG HIP TI  STEM FEM SZ 5 L108MM NK L35MM 44MM OFFSET 127DEG HIP TI  [employer] [employer] [id number] Left 1 Implanted   HEAD FEM [id number] +7.5MM OFFSET HIP BIOLOX DELT CERAMIC TAPR  HEAD FEM [id number] +7.5MM OFFSET HIP BIOLOX DELT CERAMIC TAPR  [employer] [employer] [id number] Left 1 Implanted         Drains:   Urethral Catheter Non-latex; Double-lumen 16 fr (Active)       Findings: see dictated operative report    Electronically signed by Hector Nino PA-C on 2/22/2021 at 9:37 AM

## 2021-02-22 NOTE — OP NOTE
Operative Note      Patient: Shane Hernandez  YOB: 1960  MRN: [id number]    Date of Procedure: 2/22/2021    Pre-Op Diagnosis: Severe left hip degenerative joint disease    Post-Op Diagnosis: Same       Procedure(s):  LEFT HIP TOTAL ARTHROPLASTY    Surgeon(s):  Nicki Hui MD    Assistant:   Surgical Assistant: Denise Holloway  Physician Assistant: Phillip Washington PA-C  Resident: Keerthi Palacio DO    Anesthesia: General    Estimated Blood Loss (mL): less than 411     Complications: None    Specimens:   ID Type Source Tests Collected by Time Destination   A : LEFT FEMORAL HEAD Bone Hip SURGICAL PATHOLOGY Nicki Hui MD 2/22/2021 0830        Implants:  Implant Name Type Inv. Item Serial No.  Lot No. LRB No. Used Action   SHELL ACET [id number] HYDROXY APATITE 5 H CLUS H TRIDENT II  SHELL ACET [id number] HYDROXY APATITE 5 H CLUS H TRIDENT II  [employer] [id number] Left 1 Implanted   LINER ACET SZ F ID36MM THK7.9MM 0DEG HIP X3 APURVA RNG FOR  LINER ACET SZ F ID36MM THK7.9MM 0DEG HIP X3 APURVA RNG FOR  [employer] [id number] Left 1 Implanted   STEM FEM SZ 5 L108MM NK L35MM 44MM OFFSET 127DEG HIP TI  STEM FEM SZ 5 L108MM NK L35MM 44MM OFFSET 127DEG HIP TI  [employer] [id number] Left 1 Implanted   HEAD FEM [id number] +7.5MM OFFSET HIP BIOLOX DELT CERAMIC TAPR  HEAD FEM [id number] +7.5MM OFFSET HIP BIOLOX DELT CERAMIC TAPR  [employer] [id number] Left 1 Implanted         Drains:   Urethral Catheter Non-latex; Double-lumen 16 fr (Active)       Findings: Severe DJD with lateral and superior migration of the femoral head and severe osteophytes. Detailed Description of Procedure:   Illness: [place] Accolade 2 size number 127 degree stem with a 36 mm Biolox +7.5 mm head. A 56 mm Trident cup with a 36 mm liner. DESCRIPTION OF PROCEDURE:  The patient was brought into the operating  room in supine position on the hospital bed.   The patient was then transferred to the operating table by the operating room personnel in  a safe fashion with Anesthesia in control of the patient's C-spine  area. Once on the operating table, all pressure points were  identified and well padded. The patient was placed in a right side  down, left side up lateral position, tim securely with the peg board, all pressure points were identified and well padded. Axillary roll was placed. Patient's left  lower extremity was sterilely prepped and draped in the standard  surgical fashion. A time-out was performed indicating the appropriate  identification of the patient, the procedure to be performed, side to  be performed, and this was all agreed by all individuals in the room. After the time-out was performed, incision was marked over the right  hip. It was re-prepped with a ChloraPrep. Once dry, Karol Castro was  slipped into position. 10 blade was used to make a skin incision. Careful dissection was sharply done on the iliotibial band which was  incised in line of its fibers. A Charnley retractor was then placed  freeing clear the sciatic nerve. An incision was then made in line of  the gluteus medius down distally in a hockey stick fashion with free  edge. Subperiosteal dissection was carried out anteriorly as the leg  was externally rotated. Once the brim was identified, hip was  dislocated. A femoral neck cut was made one fingerbreadth above the  lesser trochanter. Retractors were then put in position for the  acetabulum, and the labrum was removed along with any osteophytes. The condyloid fossa was cleaned out. Sequential reaming was then  carried out through 56 mm which gave us a good cancellous spherical  socket. The 56 mm cup was then impacted in position and was tight. The cup was washed out for the final implant. Liner was locked into position and checked for security which it was.   Attention was then turned to the proximal femur where a femoral elevator was placed underneath it. The box  cutter was placed laterally. The canal finder was then placed on the  femoral canal.  Sequential broaching was carried out to size 5 stem. At this point in time, the canal was irrigated, dried and the final  implant was then backed into position. This was then trialed with a  with different neck lengths until stable to extremes of internal and external  rotation, checked out very nicely, had some tension on the quads, but  not undue tension. Therefore, this was re-dislocated and the final  head was placed, irrigated and dried, luna taper  backed into  position, checked for security which it was, relocated and trialed  through a nice range of motion. The wound was then copiously  irrigated with sterile normal saline. It was shot with Betadine for 3  minutes, diluted. It was re-irrigated once again. Vancomycin powder  was placed in the joint. The capsule was closed with Ethibond suture. Gluteus medius was closed with Ethibond suture. Irrigation was  carried out once again. Iliotibial band was closed with Ethibond  suture along with Stratafix. Irrigation was carried out once again. Subcutaneous tissues were closed with 3-0 Monocryl and skin with  staples. The patient had bacitracin, Adaptic, sterile dressing put in  position. Compartments were soft and compressible. The patient was  risen from anesthesia without complications. She was transferred back  to the hospital bed and taken to PACU in stable condition. Discharge plans Include:  1. Weight bear as tolerated, left lower extremity. 2.  DVT prophylaxis in the form of Lovenox while in the hospital.   Aspirin as an outpatient. 3.  Follow up in the office in 2 weeks for staple removal and repeat  x-rays of the left hip and AP pelvis.       Electronically signed by Edison Naqvi MD on 2/22/2021 at 8:53 AM

## 2021-02-22 NOTE — ANESTHESIA PRE PROCEDURE
Department of Anesthesiology  Preprocedure Note       Name:  Calla Felty   Age:  63 y.o.  :  1960                                          MRN:  [id number]         Date:  2021      Surgeon: Lashay Chávez):  Bess Bolaños MD    Procedure: Procedure(s):  LEFT HIP TOTAL ARTHROPLASTY--KEENAN    Medications prior to admission:   Prior to Admission medications    Medication Sig Start Date End Date Taking? Authorizing Provider   gabapentin (NEURONTIN) 300 MG capsule TAKE ONE CAPSULE BY MOUTH THREE TIMES A DAY 21 Yes Lesia Avilez MD   carBAMazepine (TEGRETOL XR) 100 MG extended release tablet Take 2 tablets by mouth 2 times daily 21  Yes Lesia Avilez MD   Multiple Vitamins-Minerals (THERAPEUTIC MULTIVITAMIN-MINERALS) tablet Take 1 tablet by mouth daily   Yes Historical Provider, MD   acetaminophen-codeine (TYLENOL #4) 300-60 MG per tablet Take 1 tablet by mouth daily as needed for Pain for up to 13 days.  21 Yes SPENSER Cool   pantoprazole (PROTONIX) 40 MG tablet TAKE ONE TABLET BY MOUTH EVERY DAY 20  Yes Kendal Fernandez,    potassium chloride (KLOR-CON M) 10 MEQ extended release tablet TAKE ONE TABLET BY MOUTH DAILY WITH BREAKFAST 20  Yes Kendal Fernandez DO   Metoprolol Tartrate 37.5 MG TABS Take 37.5 mg by mouth 2 times daily 20  Yes Kendal Fernandez DO   losartan (COZAAR) 100 MG tablet Take 1 tablet by mouth daily 20  Yes Kendal Fernandez DO   fluticasone (FLONASE) 50 MCG/ACT nasal spray 2 sprays by Each Nostril route daily 20  Yes Kendal Fernandez DO   chlorthalidone (HYGROTON) 25 MG tablet Take 1 tablet by mouth daily 20  Yes Ezequiel Fernandez,    amLODIPine (NORVASC) 5 MG tablet TAKE ONE TABLET BY MOUTH EVERY DAY 20  Yes Wilhemena Esters, DO albuterol sulfate  (90 Base) MCG/ACT inhaler INHALE TWO PUFFS BY MOUTH EVERY 6 HOURS AS NEEDED FOR WHEEZING.  11/4/20  Yes Marnie Vega MD   diclofenac sodium (VOLTAREN) 1 % GEL APPLY ONE GRAM EXTERNALLY TWICE A DAY TO NECK AND ONE GRAM EXTERNALLY TWICE A DAY TO BACK  11/3/20  Yes SPENSER Hanna   lidocaine-prilocaine (EMLA) 2.5-2.5 % cream APPLY ONE GRAM EXTERNALLY THREE TIMES A DAY IF NEEDED-MUST LAST 30 DAYS  11/3/20  Yes SPENSER Hanna   sucralfate (CARAFATE) 1 GM tablet TAKE ONE TABLET BY MOUTH FOUR TIMES A DAY  Patient taking differently: 1 tablet once a week 6/12/20  Yes Marnie Vega MD   Misc. Devices (ROLLATOR) MISC 1 each by Does not apply route daily as needed (use as needed for stability) 3/17/20   Marnie Vega MD       Current medications:    Current Facility-Administered Medications   Medication Dose Route Frequency Provider Last Rate Last Admin    ceFAZolin (ANCEF) 2000 mg in sterile water 20 mL IV syringe  2,000 mg Intravenous On Call to 23 SPENSER Gonzáles-MISA        sodium chloride flush 0.9 % injection 10 mL  10 mL Intravenous 2 times per day [place] Marisierra, PA-C        sodium chloride flush 0.9 % injection 10 mL  10 mL Intravenous PRN [place] Taylor PA-C        tranexamic acid (CYKLOKAPRON) 1,000 mg in dextrose 5 % 100 mL IVPB  1,000 mg Intravenous Once [place] Marinas, PA-C        tranexamic acid (CYKLOKAPRON) 1,000 mg in dextrose 5 % 100 mL IVPB  1,000 mg Intravenous Once [place] Claudia, PA-C           Allergies: Allergies   Allergen Reactions    Latex Hives     Hives and rash    Iodine Rash     Hives . pt. States anaphalyxis    Aloe Hives     Hives     Celebrex [Celecoxib] Itching    Fish-Derived Products Other (See Comments)       Problem List:    Patient Active Problem List   Diagnosis Code    Subarachnoid bleed ([facility] [address] I60.9    Hypertensive emergency I16.1    Obesity (BMI 30-39. 9) E66.9  Hypertension I10    Chronic pain syndrome G89.4    Lumbar facet arthropathy M47.816    Lumbar disc disorder M51.9    Primary osteoarthritis of both hips M16.0    Arthritis of right hip M16.11    Dysphagia R13.10    Heartburn R12    At risk for colon cancer Z91.89    Colon polyps K63.5    Degenerative disc disease, cervical M50.30    Arthropathy of cervical facet joint M47.812    Abnormal blood sugar R73.09    Arthritis of both hips M16.0       Past Medical History:        Diagnosis Date    Brain aneurysm 2018    H/O TIMES 2 THAT BURST PER PATIENT    Cerebral artery occlusion with cerebral infarction (HCC)     RT SIDE AFFECTED-LEARNED TO WALK AND WRITE AGAIN    Degenerative arthritis of hand     Headache     Hiatal hernia     Hip problem     NEEDS HIP REPLACEMENT PER PATIENT    Hx of abnormal cervical Pap smear     Hypertension     Stroke (cerebrum) [facility])        Past Surgical History:        Procedure Laterality Date    BRAIN ANEURYSM SURGERY      coiling     COLONOSCOPY  2019    polyps--frank    COLONOSCOPY N/A 2019    COLONOSCOPY POLYPECTOMY SNARE/COLD BIOPSY performed by Livier John MD at [address] [place] [address]- DONE AT [address] [place] [address] ENDOSCOPY  2019    gastritis with superficial ulcerations; duodenitis--frank    UPPER GASTROINTESTINAL ENDOSCOPY N/A 2019    EGD BIOPSY performed [facility] Livier John MD at [facility] ENDOSCOPY       Social History:    Social History     Tobacco Use    Smoking status: Former Smoker     Packs/day: 1.50     Years: 43.00     Pack years: 64.50     Types: Cigarettes     Quit date: 2018     Years since quittin.4    Smokeless tobacco: Never Used   Substance Use Topics    Alcohol use:  No                                Counseling given: Not Answered      Vital Signs (Current):   Vitals:    02/15/21 1256 21 0554   BP:  (!) 125/59 Pulse:  64   Resp:  20   Temp:  36.4 °C (97.6 °F)   TempSrc:  Temporal   SpO2:  94%   Weight: 232 lb (105.2 kg) 232 lb (105.2 kg)   Height: 5' 5\" (1.651 m) 5' 5\" (1.651 m)                                              BP Readings from Last 3 Encounters:   02/22/21 (!) 125/59   02/16/21 125/65   02/04/21 100/60       NPO Status: Time of last liquid consumption: 0400                        Time of last solid consumption: 1900                        Date of last liquid consumption: 02/22/21                        Date of last solid food consumption: 02/21/21    BMI:   Wt Readings from Last 3 Encounters:   02/22/21 232 lb (105.2 kg)   02/16/21 232 lb (105.2 kg)   02/04/21 232 lb (105.2 kg)     Body mass index is 38.61 kg/m². CBC:   Lab Results   Component Value Date    WBC 7.3 02/16/2021    RBC 4.70 02/16/2021    HGB 14.0 02/16/2021    HCT 42.2 02/16/2021    MCV 89.8 02/16/2021    RDW 12.5 02/16/2021     02/16/2021       CMP:   Lab Results   Component Value Date     02/16/2021    K 4.1 02/16/2021    K 4.4 11/10/2018     02/16/2021    CO2 28 02/16/2021    BUN 21 02/16/2021    CREATININE 0.9 02/16/2021    GFRAA >60 02/16/2021    LABGLOM >60 02/16/2021    GLUCOSE 116 02/16/2021    PROT 7.3 02/16/2021    CALCIUM 9.5 02/16/2021    BILITOT 0.3 02/16/2021    ALKPHOS 94 02/16/2021    AST 14 02/16/2021    ALT 19 02/16/2021       POC Tests: No results for input(s): POCGLU, POCNA, POCK, POCCL, POCBUN, POCHEMO, POCHCT in the last 72 hours.     Coags:   Lab Results   Component Value Date    PROTIME 11.0 02/16/2021    INR 1.0 02/16/2021    APTT 26.0 09/10/2018       HCG (If Applicable): No results found for: PREGTESTUR, PREGSERUM, HCG, HCGQUANT     ABGs: No results found for: PHART, PO2ART, RRX5JFU, HNY8HYL, BEART, D3HSWEWN     Type & Screen (If Applicable):  No results found for: LABABO, LABRH    Drug/Infectious Status (If Applicable):  No results found for: HIV, HEPCAB    COVID-19 Screening (If Applicable): ROS comment: obese Abdominal:           Vascular: negative vascular ROS. Anesthesia Plan      general     ASA 3       Induction: intravenous. MIPS: Postoperative opioids intended and Prophylactic antiemetics administered. Anesthetic plan and risks discussed with patient. Use of blood products discussed with patient whom. Plan discussed with CRNA and attending. Lovett Schlatter, RN   2/22/2021    DOS STAFF ADDENDUM:    Patient seen and examined, physical exam updated as needed, chart reviewed. NPO status confirmed. Anesthetic options and risks discussed with patient/legal guardian. Patient/legal guardian verbalized understanding and agrees to proceed.      Chyna Gusman MD  Staff Anesthesiologist  February 22, 2021  7:49 AM

## 2021-02-23 LAB
ANION GAP SERPL CALCULATED.3IONS-SCNC: 7 MMOL/L (ref 7–16)
BUN BLDV-MCNC: 22 MG/DL (ref 8–23)
CALCIUM SERPL-MCNC: 9 MG/DL (ref 8.6–10.2)
CHLORIDE BLD-SCNC: 101 MMOL/L (ref 98–107)
CO2: 29 MMOL/L (ref 22–29)
CREAT SERPL-MCNC: 1 MG/DL (ref 0.5–1)
GFR AFRICAN AMERICAN: >60
GFR NON-AFRICAN AMERICAN: 56 ML/MIN/1.73
GLUCOSE BLD-MCNC: 142 MG/DL (ref 74–99)
HCT VFR BLD CALC: 37.7 % (ref 34–48)
HEMOGLOBIN: 12.2 G/DL (ref 11.5–15.5)
MCH RBC QN AUTO: 30 PG (ref 26–35)
MCHC RBC AUTO-ENTMCNC: 32.4 % (ref 32–34.5)
MCV RBC AUTO: 92.9 FL (ref 80–99.9)
PDW BLD-RTO: 12.7 FL (ref 11.5–15)
PLATELET # BLD: 197 E9/L (ref 130–450)
PMV BLD AUTO: 9.4 FL (ref 7–12)
POTASSIUM REFLEX MAGNESIUM: 3.7 MMOL/L (ref 3.5–5)
RBC # BLD: 4.06 E12/L (ref 3.5–5.5)
SODIUM BLD-SCNC: 137 MMOL/L (ref 132–146)
WBC # BLD: 9.6 E9/L (ref 4.5–11.5)

## 2021-02-23 PROCEDURE — 36415 COLL VENOUS BLD VENIPUNCTURE: CPT

## 2021-02-23 PROCEDURE — 2500000003 HC RX 250 WO HCPCS: Performed by: PHYSICIAN ASSISTANT

## 2021-02-23 PROCEDURE — 6370000000 HC RX 637 (ALT 250 FOR IP): Performed by: NURSE PRACTITIONER

## 2021-02-23 PROCEDURE — 6370000000 HC RX 637 (ALT 250 FOR IP): Performed by: PHYSICIAN ASSISTANT

## 2021-02-23 PROCEDURE — 80048 BASIC METABOLIC PNL TOTAL CA: CPT

## 2021-02-23 PROCEDURE — 1200000000 HC SEMI PRIVATE

## 2021-02-23 PROCEDURE — 85027 COMPLETE CBC AUTOMATED: CPT

## 2021-02-23 PROCEDURE — 2580000003 HC RX 258: Performed by: PHYSICIAN ASSISTANT

## 2021-02-23 PROCEDURE — 2700000000 HC OXYGEN THERAPY PER DAY

## 2021-02-23 PROCEDURE — 6370000000 HC RX 637 (ALT 250 FOR IP): Performed by: STUDENT IN AN ORGANIZED HEALTH CARE EDUCATION/TRAINING PROGRAM

## 2021-02-23 PROCEDURE — 97530 THERAPEUTIC ACTIVITIES: CPT

## 2021-02-23 PROCEDURE — 6360000002 HC RX W HCPCS: Performed by: PHYSICIAN ASSISTANT

## 2021-02-23 RX ORDER — LIDOCAINE 4 G/G
1 PATCH TOPICAL DAILY
Status: DISCONTINUED | OUTPATIENT
Start: 2021-02-23 | End: 2021-02-24 | Stop reason: HOSPADM

## 2021-02-23 RX ADMIN — METOPROLOL TARTRATE 37.5 MG: 25 TABLET, FILM COATED ORAL at 09:24

## 2021-02-23 RX ADMIN — METOPROLOL TARTRATE 37.5 MG: 25 TABLET, FILM COATED ORAL at 21:03

## 2021-02-23 RX ADMIN — FLUTICASONE PROPIONATE 2 SPRAY: 50 SPRAY, METERED NASAL at 09:25

## 2021-02-23 RX ADMIN — ACETAMINOPHEN 1000 MG: 500 TABLET ORAL at 06:17

## 2021-02-23 RX ADMIN — POLYETHYLENE GLYCOL 3350 17 G: 17 POWDER, FOR SOLUTION ORAL at 09:24

## 2021-02-23 RX ADMIN — SUCRALFATE 1 G: 1 TABLET ORAL at 17:06

## 2021-02-23 RX ADMIN — GABAPENTIN 400 MG: 400 CAPSULE ORAL at 15:15

## 2021-02-23 RX ADMIN — AMLODIPINE BESYLATE 5 MG: 5 TABLET ORAL at 09:24

## 2021-02-23 RX ADMIN — ACETAMINOPHEN 1000 MG: 500 TABLET ORAL at 15:15

## 2021-02-23 RX ADMIN — SUCRALFATE 1 G: 1 TABLET ORAL at 01:20

## 2021-02-23 RX ADMIN — CHLORTHALIDONE 25 MG: 25 TABLET ORAL at 09:24

## 2021-02-23 RX ADMIN — CARBAMAZEPINE 200 MG: 200 CAPSULE, EXTENDED RELEASE ORAL at 21:03

## 2021-02-23 RX ADMIN — ACETAMINOPHEN 1000 MG: 500 TABLET ORAL at 21:03

## 2021-02-23 RX ADMIN — ACETAMINOPHEN 1000 MG: 500 TABLET ORAL at 01:20

## 2021-02-23 RX ADMIN — OXYCODONE HYDROCHLORIDE 10 MG: 10 TABLET ORAL at 09:31

## 2021-02-23 RX ADMIN — SUCRALFATE 1 G: 1 TABLET ORAL at 05:04

## 2021-02-23 RX ADMIN — GABAPENTIN 400 MG: 400 CAPSULE ORAL at 09:24

## 2021-02-23 RX ADMIN — CARBAMAZEPINE 200 MG: 200 CAPSULE, EXTENDED RELEASE ORAL at 09:24

## 2021-02-23 RX ADMIN — OXYCODONE HYDROCHLORIDE 10 MG: 10 TABLET ORAL at 21:08

## 2021-02-23 RX ADMIN — PANTOPRAZOLE SODIUM 40 MG: 40 TABLET, DELAYED RELEASE ORAL at 09:24

## 2021-02-23 RX ADMIN — OXYCODONE HYDROCHLORIDE 10 MG: 10 TABLET ORAL at 05:04

## 2021-02-23 RX ADMIN — GABAPENTIN 400 MG: 400 CAPSULE ORAL at 21:03

## 2021-02-23 RX ADMIN — CEFAZOLIN 2000 MG: 10 INJECTION, POWDER, FOR SOLUTION INTRAVENOUS at 01:20

## 2021-02-23 RX ADMIN — SODIUM CHLORIDE, PRESERVATIVE FREE 10 ML: 5 INJECTION INTRAVENOUS at 09:25

## 2021-02-23 RX ADMIN — ENOXAPARIN SODIUM 40 MG: 40 INJECTION SUBCUTANEOUS at 09:25

## 2021-02-23 RX ADMIN — OXYCODONE HYDROCHLORIDE 5 MG: 5 TABLET ORAL at 17:06

## 2021-02-23 RX ADMIN — POTASSIUM CHLORIDE 10 MEQ: 750 TABLET, EXTENDED RELEASE ORAL at 09:24

## 2021-02-23 ASSESSMENT — PAIN DESCRIPTION - DESCRIPTORS
DESCRIPTORS: ACHING
DESCRIPTORS: ACHING;DISCOMFORT
DESCRIPTORS: ACHING

## 2021-02-23 ASSESSMENT — PAIN SCALES - WONG BAKER: WONGBAKER_NUMERICALRESPONSE: 0

## 2021-02-23 ASSESSMENT — PAIN DESCRIPTION - LOCATION
LOCATION: HIP

## 2021-02-23 ASSESSMENT — PAIN DESCRIPTION - FREQUENCY
FREQUENCY: CONTINUOUS
FREQUENCY: CONTINUOUS

## 2021-02-23 ASSESSMENT — PAIN DESCRIPTION - PAIN TYPE
TYPE: SURGICAL PAIN
TYPE: SURGICAL PAIN

## 2021-02-23 ASSESSMENT — PAIN SCALES - GENERAL
PAINLEVEL_OUTOF10: 6
PAINLEVEL_OUTOF10: 7

## 2021-02-23 ASSESSMENT — PAIN DESCRIPTION - ORIENTATION: ORIENTATION: LEFT

## 2021-02-23 ASSESSMENT — PAIN DESCRIPTION - ONSET: ONSET: ON-GOING

## 2021-02-23 NOTE — PROGRESS NOTES
Physical Therapy    Physical Therapy Rx Note     Name: Ning Huynh  : 1960  MRN: [id number]    Referring Provider:  Evelin Bunch PA-C    Date of Service: 2021    Evaluating PT:  Danuta Carl PT, DPT PT [id number]    Room #:  6849/9250-N  Diagnosis:  Severe left hip degenerative joint disease  PMHx/PSHx:  Brain Aneurysm, Headache, HTN, CVA    Procedure/Surgery:  LEFT HIP TOTAL ARTHROPLASTY 21  Precautions:  WBAT LLE, Anterolateral Hip Precautions, Falls, O2  Equipment Needs:  TBD    SUBJECTIVE:    Pt lives with son and daughter in law in a 2 story home with 4 stairs to enter and single rail. Bed is on first floor and bath is on first floor. Pt has flight of stairs to 2nd floor. Pt ambulated with [employer] independently PTA. Equipment Owned: [employer]    OBJECTIVE:   Initial Evaluation  Date: 21 Treatment  21  Short Term/ Long Term   Goals   AM-PAC 6 Clicks 61/99 38/08    Was pt agreeable to Eval/treatment? Yes yes    Does pt have pain? Mild pain L hip 8/10 pain     Bed Mobility  Rolling: NT  Supine to sit: MaxA x 2  Sit to supine: NT  Scooting: MaxA Rolling to her right min assist  Supine to sit mod assist with max cues   Sit to supine  nt  Scooting  Min   Rolling: Independent    Supine to sit:  Independent    Sit to supine: Independent    Scooting: Independent     Transfers Sit to stand: MaxA   Stand to sit: MaxA  Stand pivot: MaxA [employer] Sit to stand mod/max  Stand to sit max  Stand pivot nt   Sit to stand: Modified Independent  Stand to sit: Modified Independent  Stand pivot: Modified Independent     Ambulation    5 feet with MaxA  WW 6 feet ww mod assist  >150 feet with Modified Independent  AAD   Stair negotiation: ascended and descended  NT NT  >4 steps with single rail Modified Independent     ROM BUE:  See OT Note  BLE:  WFL     Strength BUE:  See OT Note  RLE: 4/5  LLE: 2/5   Improve Strength 1/3 MMT Grade   Balance Sitting EOB:  Rachel  Dynamic Standing:  MaxA Sitting eob sba Dynamic standing mod assist Sitting EOB:  Independent    Dynamic Standing:  Modified Independent       Pt is A & O x 4    Patient education  Pt educated on hip precautions and function     Patient response to education:   Pt verbalized understanding Pt demonstrated skill Pt requires further education in this area   x x x     ASSESSMENT:    Comments:  Pt received in supine agreeable to PT treatment. Pt educated on hip precautions. Pt requiring assistance of trunk and BLES for bed mobility. Pt requiring assist and cues for functional tranfers and ambulation. Pt doing non reciprocal gait pattern. Pt required cues for walker placement, sequence, posture  Pt at times became quiet and not speaking and sat pt down. Pt reports she felt funny in her head but could not describe how she felt. Pt denied being dizzy but seeing purple things in front of her eyes. Pt performed seated ex laq, ankle pumps, supine hip flex, small abduction, quad sets and glut sets. Educated on ex to do in chair and sitting in bed. Pt also used her asthma inhaler after walking was fatigued and sob. When pulse ox check was in 94 range on 4 liters of 02. Pt BP after sitting 103/81 sat rested then 107/70 and prior to leaving room 128/70. Nursing was notified of the above. Pt educated on call light and not to get up on her own. Pt had no co head ache or seeing purple halo and resting in chair with call light. Treatment:  Patient practiced and was instructed in the following treatment:    ? Bed mobility- verbal cues for positioning and sequencing to facilitate independence  ? Functional transfers-Verbal cues for proper positioning and sequencing to perform transfers safely with maximum independence. ? Gait training -Verbal cues for proper positioning and sequencing using assistive device to maximize functional mobility independence. ? Therapeutic activity     Time 1004  To 1059       Pt's/ family goals   1.  Get better Patient and or family understand(s) diagnosis, prognosis, and plan of care. yes    PLAN:      PLAN OF CARE:    Current Treatment Recommendations     [x] Strengthening     [x] ROM   [x] Balance Training   [x] Endurance Training   [x] Transfer Training   [x] Gait Training   [x] Stair Training   [x] Positioning   [x] Safety and Education Training   [x] Patient/Caregiver Education   [x] HEP  [] Other       PT care will be provided in accordance with the objectives noted above. Exercises and functional mobility practice will be used as well as appropriate assistive devices or modalities to obtain goals. Patient and family education will also be administered as needed.     Frequency of treatments: BID    Time in   1004  Time out  1059    Total Treatment Time  55 minutes       CPT codes:  [] Low Complexity PT evaluation 88931  [] Moderate Complexity PT evaluation 81692  [] High Complexity PT evaluation 33515  [] PT Re-evaluation 70800  [] Gait training 78238 0 minutes  [] Manual therapy 96178 0 minutes  [x] Therapeutic activities 03730 55 minutes  [] Therapeutic exercises 37118 0 minutes  [] Neuromuscular reeducation 69019 0 minutes       Balbina Urbina, PTA  [id number]

## 2021-02-23 NOTE — PROGRESS NOTES
Physical Therapy    Physical Therapy Rx Note     Name: Ismael Vaughn  : 1960  MRN: [id number]    Referring Provider:  Vidya Arguello PA-C    Date of Service: 2021    Evaluating PT:  Isis Soliz PT, DPT PT [id number]    Room #:  9874/6715-M  Diagnosis:  Severe left hip degenerative joint disease  PMHx/PSHx:  Brain Aneurysm, Headache, HTN, CVA    Procedure/Surgery:  LEFT HIP TOTAL ARTHROPLASTY 21  Precautions:  WBAT LLE, Anterolateral Hip Precautions, Falls, O2  Equipment Needs:  TBD    SUBJECTIVE:    Pt lives with son and daughter in law in a 2 story home with 4 stairs to enter and single rail. Bed is on first floor and bath is on first floor. Pt has flight of stairs to 2nd floor. Pt ambulated with [employer] independently PTA. Equipment Owned: [employer]    OBJECTIVE:   Initial Evaluation  Date: 21 Treatment  21  Short Term/ Long Term   Goals   AM-PAC 6 Clicks  91/82    Was pt agreeable to Eval/treatment? Yes yes    Does pt have pain? Mild pain L hip /10 pain     Bed Mobility  Rolling: NT  Supine to sit: MaxA x 2  Sit to supine: NT  Scooting: MaxA Rolling to her right min assist  Supine to sit mod assist with max cues   Sit to supine  nt  Scooting  sba    Rolling: Independent    Supine to sit:  Independent    Sit to supine: Independent    Scooting: Independent     Transfers Sit to stand: MaxA   Stand to sit: MaxA  Stand pivot: MaxA [employer] Sit to stand mod  Stand to sit mod  Stand pivot mod with ww   Cues to  her feet    Sit to stand: Modified Independent  Stand to sit: Modified Independent  Stand pivot: Modified Independent     Ambulation    5 feet with MaxA  WW nt >150 feet with Modified Independent  AAD   Stair negotiation: ascended and descended  NT NT  >4 steps with single rail Modified Independent     ROM BUE:  See OT Note  BLE:  WFL     Strength BUE:  See OT Note  RLE: 4/5  LLE: 2/5   Improve Strength 1/3 MMT Grade   Balance Sitting EOB:  Rachel Dynamic Standing:  MaxA Sitting eob sba  Dynamic standing mod assist Sitting EOB:  Independent    Dynamic Standing:  Modified Independent       Pt is A & O x 4    Patient education  Pt educated on hip precautions and function     Patient response to education:   Pt verbalized understanding Pt demonstrated skill Pt requires further education in this area   x x x     ASSESSMENT:    Comments:  Pt received in supine agreeable to PT treatment. Pt reports she just woke up had some pain meds. Pt educated on hip precautions. Pt requiring assistance of trunk and BLES for bed mobility. Pt requiring assist and cues for functional tranfers and ambulation. Pt requiring increase time to complete stand pivot transfer and sit <> stand transfer and labored with all activity. Pt performed seated laq, ankle pumps. Pt fatigued with activity. Pulse ox in 95 range on room air and nursing ok with keeping pt on room air. Told pt to call staff if she becomes short of breath. Pt had no co dizziness with activity. Told pt that rehab may be benefical to her and she did talk with SW after this therapist talked to SW. Treatment:  Patient practiced and was instructed in the following treatment:    ? Bed mobility- verbal cues for positioning and sequencing to facilitate independence  ? Functional transfers-Verbal cues for proper positioning and sequencing to perform transfers safely with maximum independence. ? Gait training -Verbal cues for proper positioning and sequencing using assistive device to maximize functional mobility independence. ? Therapeutic activity       Pt's/ family goals   1. Get better    Patient and or family understand(s) diagnosis, prognosis, and plan of care.   yes    PLAN:      PLAN OF CARE:    Current Treatment Recommendations     [x] Strengthening     [x] ROM   [x] Balance Training   [x] Endurance Training   [x] Transfer Training   [x] Gait Training   [x] Stair Training [x] Positioning   [x] Safety and Education Training   [x] Patient/Caregiver Education   [x] HEP  [] Other       PT care will be provided in accordance with the objectives noted above. Exercises and functional mobility practice will be used as well as appropriate assistive devices or modalities to obtain goals. Patient and family education will also be administered as needed.     Frequency of treatments: BID    Time in   318   Time out  342  Total Treatment Time  24 minutes       CPT codes:  [] Low Complexity PT evaluation 50210  [] Moderate Complexity PT evaluation 59765  [] High Complexity PT evaluation 27791  [] PT Re-evaluation 32935  [] Gait training 61789 0 minutes  [] Manual therapy 30028 0 minutes  [x] Therapeutic activities 37885 24 minutes  [] Therapeutic exercises 62474 0 minutes  [] Neuromuscular reeducation 65228 0 minutes       Miguel Johnson, [facility]  [id number]

## 2021-02-23 NOTE — CARE COORDINATION
2/23/2021 social work transition of care planning  Pt is from home with family. Pt reported that she has w/w,cane, and s/c. Pt pcp is Dr. Jacques Barakat and pharmacy is [facility] on [facility]. Pt has no hx of snf and hx of [facility]?). Explained sw role in transition of care planning. Pt plan is home, no preference for [facility]. Pt's family will transport to discharge. Electronically signed by SUZANNE José on 2/23/2021 at 11:09 AM     Addendum:Sw notified that pt has agreed to acute rehab, but not nerissa. Pt has no preference for acute. Referral made to [facility]. Sw will follow, will need wet covid,can take pending if accepted.   Electronically signed by SUZANNE José on 2/23/2021 at 3:49 PM

## 2021-02-23 NOTE — PROGRESS NOTES
Department of Orthopedic Surgery  Resident Progress Note    Patient seen and examined at bedside. Pain controlled. No chest pain or shortness of breath. No nausea or vomiting. Patient tolerated diet. No numbness or tingling in extremities. No acute events overnight. Patient was ambulatory 5 feet with physical therapy yesterday.     VITALS:  /64   Pulse 77   Temp 98 °F (36.7 °C) (Temporal)   Resp 17   Ht 5' 5\" (1.651 m)   Wt 232 lb (105.2 kg)   SpO2 95%   BMI 38.61 kg/m²     General: alert and oriented to person, place and time, well-developed and well-nourished, in no acute distress and alert and oriented to person, place and time    MUSCULOSKELETAL:   left lower extremity:  · Aquacel dressing C/D/I  · Compartments soft and compressible  · +PF/DF/EHL  · +2/4 DP & PT pulses, Brisk Cap refill, Toes warm and perfused  · Distal sensation grossly intact to Peroneals, Sural, Saphenous, and tibial nrs    CBC:   Lab Results   Component Value Date    WBC 9.6 02/23/2021    HGB 12.2 02/23/2021    HCT 37.7 02/23/2021     02/23/2021     PT/INR:    Lab Results   Component Value Date    PROTIME 11.0 02/16/2021    INR 1.0 02/16/2021         ASSESSMENT  · S/P left total hip arthroplasty 2/22/2021    PLAN      · Continue physical therapy and protocol: WBAT - LLE  · 24 hour abx coverage  · Deep venous thrombosis prophylaxis -Lovenox 40 mg, early mobilization  · PT/OT  · Pain Control: IV and PO  · Monitor H&H 12.2  · D/C Plan: PT/OT/SW

## 2021-02-23 NOTE — PROGRESS NOTES
Hospitalist Consult Progress Note      SYNOPSIS:     Ms. Shaye Yadav, a 63y.o. year old female  who  has a past medical history of Brain aneurysm, Cerebral artery occlusion with cerebral infarction [facility]), Degenerative arthritis of hand, Headache, Hiatal hernia, Hip problem, Hx of abnormal cervical Pap smear, Hypertension, and Stroke (cerebrum) ([facility] [address] She was admitted on 21 post-op total left hip arthroplasty. Her labs have remained stable postoperatively and she has been working with PT.      SUBJECTIVE:    Patient seen and examined  Records reviewed. States that she feels tired today. Stable overnight. No other overnight issues reported. Temp (24hrs), Av.4 °F (36.3 °C), Min:97 °F (36.1 °C), Max:98 °F (36.7 °C)    DIET: DIET GENERAL;  CODE: Full Code    Intake/Output Summary (Last 24 hours) at 2021 1000  Last data filed at 2021 0555  Gross per 24 hour   Intake 460 ml   Output 1760 ml   Net -1300 ml       Review of Systems  Negative unless otherwise mentioned in HPI. OBJECTIVE:    /64   Pulse 77   Temp 98 °F (36.7 °C) (Temporal)   Resp 17   Ht 5' 5\" (1.651 m)   Wt 232 lb (105.2 kg)   SpO2 95%   BMI 38.61 kg/m²     General appearance: No apparent distress, appears stated age and cooperative. HEENT: Normal cephalic, atraumatic without obvious deformity. Pupils equal, round, and reactive to light. Extra ocular muscles intact. Conjunctivae/corneas clear. Neck: Supple, with full range of motion. No jugular venous distention. Trachea midline. Respiratory:  Clear to auscultation bilaterally. No apparent distress. Cardiovascular:  Regular rate and rhythm. S1, S2 without murmurs, rubs, or gallops. PV: Brisk capillary refill. +2 pedal and radial pulses bilaterally. No clubbing, cyanosis, edema of bilateral lower extremities. Abdomen: Soft, non-tender, non-distended.  +BS Musculoskeletal: No obvious deformities or erythematous or edematous joints. L hip with dressing intact. Skin: Normal skin color. No rashes or lesions. Neurologic:  Neurovascularly intact without any focal sensory/motor deficits. Cranial nerves: II-XII intact, grossly non-focal.  Psychiatric: Alert and oriented, thought content appropriate, normal insight       Medications:  REVIEWED DAILY    Infusion Medications   Scheduled Medications    amLODIPine  5 mg Oral Daily    carBAMazepine  200 mg Oral BID    chlorthalidone  25 mg Oral Daily    fluticasone  2 spray Each Nostril Daily    gabapentin  400 mg Oral TID    [Held by provider] losartan  100 mg Oral Daily    metoprolol tartrate  37.5 mg Oral BID    pantoprazole  40 mg Oral Daily    potassium chloride  10 mEq Oral Daily    sucralfate  1 g Oral 4 times per day    sodium chloride flush  10 mL Intravenous 2 times per day    enoxaparin  40 mg Subcutaneous Daily    acetaminophen  1,000 mg Oral Q6H    polyethylene glycol  17 g Oral Daily     PRN Meds: albuterol, sodium chloride flush, promethazine **OR** ondansetron, oxyCODONE **OR** oxyCODONE, morphine **OR** morphine, bisacodyl    Labs:     Recent Labs     02/23/21  0517   WBC 9.6   HGB 12.2   HCT 37.7          Recent Labs     02/23/21  0517      K 3.7      CO2 29   BUN 22   CREATININE 1.0   CALCIUM 9.0       No results for input(s): PROT, ALB, ALKPHOS, ALT, AST, BILITOT, AMYLASE, LIPASE in the last 72 hours. No results for input(s): INR in the last 72 hours. No results for input(s): [place] Fling in the last 72 hours.     Chronic labs:    Lab Results   Component Value Date    CHOL 175 12/04/2020    TRIG 180 (H) 12/04/2020    HDL 65 12/04/2020    LDLCALC 74 12/04/2020    TSH 1.290 02/14/2019    INR 1.0 02/16/2021    LABA1C 5.8 (H) 12/04/2020       Radiology: REVIEWED DAILY      ASSESSMENT and PLAN:    Osteoarthritis of bilateral hips -S/p left hip total arthroplasty 2/22/2021  -Plan for right hip total arthroplasty in the near future per patient  -Monitor H&H postop  -As needed analgesia  -Cefazolin surgical prophylaxis  -PT/OT     HTN  -Controlled with amlodipine, chlorthalidone (and potassium), losartan & metoprolol  -BP soft today, parameters added to metoprolol. Losartan held.     Emphysema  -Follows with Dr. Wilman Casey be as an outpatient  -PRN albuterol     GERD  -Hiatal hernia  Continue Carafate, Protonix     History of subarachnoid hemorrhage  -No residual deficits         DISPOSITION: Home once stable    +++++++++++++++++++++++++++++++++++++++++++++++++  [place], [place]  +++++++++++++++++++++++++++++++++++++++++++++++++  NOTE: This report was transcribed using voice recognition software. Every effort was made to ensure accuracy; however, inadvertent computerized transcription errors may be present.

## 2021-02-23 NOTE — CONSULTS
Nutrition Monitoring and Evaluation:   Food/Nutrient Intake Outcomes:  Supplement Intake, Food and Nutrient Intake  Physical Signs/Symptoms Outcomes:  GI Status, Fluid Status or Edema, Nutrition Focused Physical Findings, Skin, Weight, Biochemical Data     Electronically signed by Marlon Alfonso MS, RD, LD on 2/23/21 at 1:10 PM EST

## 2021-02-24 VITALS
RESPIRATION RATE: 16 BRPM | TEMPERATURE: 98 F | SYSTOLIC BLOOD PRESSURE: 113 MMHG | WEIGHT: 232 LBS | BODY MASS INDEX: 38.65 KG/M2 | DIASTOLIC BLOOD PRESSURE: 56 MMHG | OXYGEN SATURATION: 94 % | HEART RATE: 104 BPM | HEIGHT: 65 IN

## 2021-02-24 LAB
ANION GAP SERPL CALCULATED.3IONS-SCNC: 9 MMOL/L (ref 7–16)
BUN BLDV-MCNC: 16 MG/DL (ref 8–23)
CALCIUM SERPL-MCNC: 9 MG/DL (ref 8.6–10.2)
CHLORIDE BLD-SCNC: 102 MMOL/L (ref 98–107)
CO2: 28 MMOL/L (ref 22–29)
CREAT SERPL-MCNC: 0.8 MG/DL (ref 0.5–1)
GFR AFRICAN AMERICAN: >60
GFR NON-AFRICAN AMERICAN: >60 ML/MIN/1.73
GLUCOSE BLD-MCNC: 128 MG/DL (ref 74–99)
HCT VFR BLD CALC: 34.6 % (ref 34–48)
HEMOGLOBIN: 11.6 G/DL (ref 11.5–15.5)
MCH RBC QN AUTO: 30.4 PG (ref 26–35)
MCHC RBC AUTO-ENTMCNC: 33.5 % (ref 32–34.5)
MCV RBC AUTO: 90.8 FL (ref 80–99.9)
PDW BLD-RTO: 12.6 FL (ref 11.5–15)
PLATELET # BLD: 195 E9/L (ref 130–450)
PMV BLD AUTO: 9.6 FL (ref 7–12)
POTASSIUM SERPL-SCNC: 3.6 MMOL/L (ref 3.5–5)
RBC # BLD: 3.81 E12/L (ref 3.5–5.5)
SODIUM BLD-SCNC: 139 MMOL/L (ref 132–146)
WBC # BLD: 8.3 E9/L (ref 4.5–11.5)

## 2021-02-24 PROCEDURE — 97535 SELF CARE MNGMENT TRAINING: CPT

## 2021-02-24 PROCEDURE — 97530 THERAPEUTIC ACTIVITIES: CPT

## 2021-02-24 PROCEDURE — 2700000000 HC OXYGEN THERAPY PER DAY

## 2021-02-24 PROCEDURE — 6370000000 HC RX 637 (ALT 250 FOR IP): Performed by: PHYSICIAN ASSISTANT

## 2021-02-24 PROCEDURE — 6370000000 HC RX 637 (ALT 250 FOR IP): Performed by: STUDENT IN AN ORGANIZED HEALTH CARE EDUCATION/TRAINING PROGRAM

## 2021-02-24 PROCEDURE — 36415 COLL VENOUS BLD VENIPUNCTURE: CPT

## 2021-02-24 PROCEDURE — 80048 BASIC METABOLIC PNL TOTAL CA: CPT

## 2021-02-24 PROCEDURE — 85027 COMPLETE CBC AUTOMATED: CPT

## 2021-02-24 PROCEDURE — 6360000002 HC RX W HCPCS: Performed by: PHYSICIAN ASSISTANT

## 2021-02-24 PROCEDURE — 6370000000 HC RX 637 (ALT 250 FOR IP): Performed by: NURSE PRACTITIONER

## 2021-02-24 PROCEDURE — U0003 INFECTIOUS AGENT DETECTION BY NUCLEIC ACID (DNA OR RNA); SEVERE ACUTE RESPIRATORY SYNDROME CORONAVIRUS 2 (SARS-COV-2) (CORONAVIRUS DISEASE [COVID-19]), AMPLIFIED PROBE TECHNIQUE, MAKING USE OF HIGH THROUGHPUT TECHNOLOGIES AS DESCRIBED BY CMS-2020-01-R: HCPCS

## 2021-02-24 RX ADMIN — GABAPENTIN 400 MG: 400 CAPSULE ORAL at 10:37

## 2021-02-24 RX ADMIN — CARBAMAZEPINE 200 MG: 200 CAPSULE, EXTENDED RELEASE ORAL at 10:36

## 2021-02-24 RX ADMIN — PANTOPRAZOLE SODIUM 40 MG: 40 TABLET, DELAYED RELEASE ORAL at 10:36

## 2021-02-24 RX ADMIN — SUCRALFATE 1 G: 1 TABLET ORAL at 00:20

## 2021-02-24 RX ADMIN — OXYCODONE HYDROCHLORIDE 10 MG: 10 TABLET ORAL at 06:16

## 2021-02-24 RX ADMIN — SUCRALFATE 1 G: 1 TABLET ORAL at 06:16

## 2021-02-24 RX ADMIN — ENOXAPARIN SODIUM 40 MG: 40 INJECTION SUBCUTANEOUS at 10:36

## 2021-02-24 RX ADMIN — METOPROLOL TARTRATE 37.5 MG: 25 TABLET, FILM COATED ORAL at 10:36

## 2021-02-24 RX ADMIN — ACETAMINOPHEN 1000 MG: 500 TABLET ORAL at 10:46

## 2021-02-24 RX ADMIN — SUCRALFATE 1 G: 1 TABLET ORAL at 10:36

## 2021-02-24 ASSESSMENT — PAIN DESCRIPTION - ORIENTATION: ORIENTATION: LEFT

## 2021-02-24 ASSESSMENT — PAIN DESCRIPTION - DESCRIPTORS: DESCRIPTORS: ACHING

## 2021-02-24 ASSESSMENT — PAIN DESCRIPTION - LOCATION: LOCATION: LEG

## 2021-02-24 ASSESSMENT — PAIN SCALES - GENERAL
PAINLEVEL_OUTOF10: 3
PAINLEVEL_OUTOF10: 7

## 2021-02-24 ASSESSMENT — PAIN - FUNCTIONAL ASSESSMENT: PAIN_FUNCTIONAL_ASSESSMENT: ACTIVITIES ARE NOT PREVENTED

## 2021-02-24 ASSESSMENT — PAIN DESCRIPTION - PAIN TYPE: TYPE: SURGICAL PAIN

## 2021-02-24 ASSESSMENT — PAIN DESCRIPTION - FREQUENCY: FREQUENCY: INTERMITTENT

## 2021-02-24 ASSESSMENT — PAIN DESCRIPTION - ONSET
ONSET: GRADUAL
ONSET: ON-GOING

## 2021-02-24 NOTE — DISCHARGE INSTR - COC
Continuity of Care Form    Patient Name: Noe Howell   :  1960  MRN:  [id number]    Admit date:  2021  Discharge date:  ***    Code Status Order: Full Code   Advance Directives:   Advance Care Flowsheet Documentation     Date/Time Healthcare Directive Type of Healthcare Directive Copy in [address] Agent's Name Healthcare Agent's Phone Number    21 1248  No, patient does not have an advance directive for healthcare treatment -- -- -- -- --    02/15/21 1409  No, patient does not have an advance directive for healthcare treatment -- -- -- -- --          Admitting Physician:  Bri Banda MD  PCP: No primary care provider on file.     Discharging Nurse: [facility] Unit/Room#: 8175/5279-N  Discharging Unit Phone Number: ***    Emergency Contact:   Extended Emergency Contact Information  Primary Emergency Contact: Mark Cardoso [address] Phone: [phone]  Relation: Child  Secondary Emergency Contact: Alysia De Santiago [address] Phone: [phone]  Relation: Child    Past Surgical History:  Past Surgical History:   Procedure Laterality Date    BRAIN ANEURYSM SURGERY      coiling     COLONOSCOPY  2019    polyps--frank    COLONOSCOPY N/A 2019    COLONOSCOPY POLYPECTOMY SNARE/COLD BIOPSY performed by Ned Alvarado MD at [address] [place] [address]- DONE AT [address] [place] [address] Left 2021    LEFT HIP TOTAL ARTHROPLASTY performed by Bri Banda MD at [facility]  2019    gastritis with superficial ulcerations; duodenitis--frank    UPPER GASTROINTESTINAL ENDOSCOPY N/A 2019    EGD BIOPSY performed by Ned Alvarado MD at [facility] History:   Immunization History   Administered Date(s) Administered  Pneumococcal Polysaccharide ([id number]) 07/17/2018    Tdap (Boostrix, Adacel) 08/16/2019       Active Problems:  Patient Active Problem List   Diagnosis Code    Subarachnoid bleed ([facility] [address] I60.9    Hypertensive emergency I16.1    Obesity (BMI 30-39. 9) E66.9    Hypertension I10    Chronic pain syndrome G89.4    Lumbar facet arthropathy M47.816    Lumbar disc disorder M51.9    Primary osteoarthritis of both hips M16.0    Arthritis of right hip M16.11    Dysphagia R13.10    Heartburn R12    At risk for colon cancer Z91.89    Colon polyps K63.5    Degenerative disc disease, cervical M50.30    Arthropathy of cervical facet joint M47.812    Abnormal blood sugar R73.09    Arthritis of both hips M16.0    COVID-19 U07.1    H/O total hip arthroplasty, right Z96.641       Isolation/Infection:   Isolation          No Isolation        Patient Infection Status     Infection Onset Added Last Indicated Last Indicated By Review Planned Expiration Resolved Resolved By    None active    Resolved    COVID-19 Rule Out 02/16/21 02/16/21 02/16/21 COVID-19 Ambulatory (Ordered)   02/17/21 Rule-Out Test Resulted          Nurse Assessment:  Last Vital Signs: BP (!) 119/57   Pulse 88   Temp 98.9 °F (37.2 °C) (Temporal)   Resp 16   Ht 5' 5\" (1.651 m)   Wt 232 lb (105.2 kg)   SpO2 94%   BMI 38.61 kg/m²     Last documented pain score (0-10 scale): Pain Level: 3  Last Weight:   Wt Readings from Last 1 Encounters:   02/22/21 232 lb (105.2 kg)     Mental Status:  {IP PT MENTAL STATUS:80236}    IV Access:  { HOSSEIN IV ACCESS:900220141}    Nursing Mobility/ADLs:  Walking   {[facility] LGRZ:599133877}  Transfer  {[facility] CAYH:306557471}  Bathing  {[facility] XGKO:862527517}  Dressing  {[facility] UVPU:708587634}  Toileting  {[facility] BZJR:121379174}  Feeding  {[facility] GIFB:090065893}  Med Admin  {[facility] BHDX:603259758}  Med Delivery   {[facility] HOSSEIN MED Delivery:885709136}    Wound Care Documentation and Therapy:        Elimination: Continence:   · Bowel: {YES / CS:53750}  · Bladder: {YES / GK:64126}  Urinary Catheter: {Urinary Catheter:610962823}   Colostomy/Ileostomy/Ileal Conduit: {YES / QD:28079}       Date of Last BM: ***    Intake/Output Summary (Last 24 hours) at 2021 0910  Last data filed at 2021 0615  Gross per 24 hour   Intake    Output 2550 ml   Net -2550 ml     I/O last 3 completed shifts:  In: -   Out: 2550 [Urine:2550]    Safety Concerns:     [employer] Safety Concerns:717476330}    Impairments/Disabilities:      [employer] Impairments/Disabilities:397138974}    Nutrition Therapy:  Current Nutrition Therapy:   [employer] Diet List:362855166}    Routes of Feeding: {CHP DME Other Feedings:256244069}  Liquids: {Slp liquid thickness:99769}  Daily Fluid Restriction: {CHP DME Yes amt example:572866001}  Last Modified Barium Swallow with Video (Video Swallowing Test): {Done Not Done GZUN:515279175}    Treatments at the Time of Hospital Discharge:   Respiratory Treatments: ***  Oxygen Therapy:  {Therapy; copd oxygen:34645}  Ventilator:    { CC Vent [address]:861895318}    Rehab Therapies: {THERAPEUTIC INTERVENTION:0391069209}  Weight Bearing Status/Restrictions: [address] [employer]  Weight Bearin}  Other Medical Equipment (for information only, NOT a DME order):  {EQUIPMENT:719905323}  Other Treatments: ***    Patient's personal belongings (please select all that are sent with patient):  {CHP DME Belongings:438216401}    RN SIGNATURE:  {Esignature:986633942}    CASE MANAGEMENT/SOCIAL WORK SECTION    Inpatient Status Date: ***    Readmission Risk Assessment Score:  Readmission Risk              Risk of Unplanned Readmission:        9           Discharging to Facility/ Agency   · Name: [facility]  · Address:  · Phone:  · Fax:    Dialysis Facility (if applicable)   · Name:  · Address:  · Dialysis Schedule:  · Phone:  · Fax:    / signature: Electronically signed by SUZANNE Rawls on 2021 at 9:10 AM PHYSICIAN SECTION    Prognosis: {Prognosis:8234227109}    Condition at Discharge: [address] Pri[address] Arguello Patient Condition:491091754}    Rehab Potential (if transferring to Rehab): {Prognosis:6058844238}    Recommended Labs or Other Treatments After Discharge: ***    Physician Certification: I certify the above information and transfer of August Franklin  is necessary for the continuing treatment of the diagnosis listed and that she requires Acute Rehab for less 30 days.      Update Admission H&P: {CHP DME Changes in QIDP:427715256}    PHYSICIAN SIGNATURE:  {Esignature:396089778}

## 2021-02-24 NOTE — PROGRESS NOTES
UB Dressing modA  SBA  Setup sitting EOB   LB Dressing dependent Min A  Pt educated on LB AE, donned socks using reacher and sock aid  Min A with AE prn    Bathing Mod A simulated  mod A   Simulated seated EOB Min A with shower seat and LHS    Toileting Dependent - catheter Max A simulated  SBA to BSC with ww   Bed Mobility  Supine to sit: max  A x2    Sit to supine: n/t  min A- supine>sit  Educated pt on technique to increase independence. Supine to sit: min A    Sit to supine: min A    Functional Transfers Sit to stand: max A with ww  Stand to sit:  Mod A x2  Stand pivot: mod A x2 with ww and in creased time with light headedness noted. BP 99/54  Min A- sit<->stand  Cuing for hand placement and body mechanics SBA with ww   Functional Mobility n/t Mod A   Short home distance using w/w  SBA with ww   Balance Sitting:     Static:  Min A     Dynamic:mod A   Standing: max A   SBA static  Min A dynamic   Min A standing     Activity Tolerance Poor limited by pain and light headedness O2 4.5 L 98% HR 83 post activity  fair Fair+   Visual/  Perceptual Glasses: yes WFL           Safety fair  fair                Good follow through with hip precautions in ADL completion        Comments: Upon arrival pt supine in bed . Pt educated on adaptive techniques to increase independence and safety during ADL's, bed mobility, and functional transfers. At end of session sitting in bedside chair  all lines and tubes intact, call light within reach. · Pt has made fair progress towards set goals.    · Continue with current plan of care    Treatment Time In: 3:00          Treatment Time Out: 3:15             Treatment Charges: Mins Units   Ther Ex  98114     Manual Therapy 00349     Thera Activities [id number] 15 1   ADL/Home Mgt [id number]     Neuro Re-ed 08526     Group Therapy      Orthotic manage/training  01370     Non-Billable Time     Total Timed Treatment 15 [address] [place] Fozia Loaiza

## 2021-02-24 NOTE — CARE COORDINATION
2/24/2021 social work transition of care planning  Sw notified that [facility] will accept pt. Wet covid bdmax given to [facility] can accept pending results. Auth initiated today,will need OT update. Envelope will be in soft chart.   Electronically signed by SUZANNE Mckoy on 2/24/2021 at 9:12 AM

## 2021-02-24 NOTE — PROGRESS NOTES
Grooming Min A  Setup sitting  Mod I standing with ww    UB Dressing modA  SBA for gown change  Setup sitting EOB   LB Dressing dependent Dependent unable to bend or bring LE's up over knees  Min A with AE prn    Bathing Mod A simulated  mod A - assist for B LE and posterior Min A with shower seat and LHS    Toileting Dependent - catheter Max A simulated  SBA to BSC with ww   Bed Mobility  Supine to sit: max  A x2    Sit to supine: n/t  mod A x2   Supine to sit: min A    Sit to supine: min A    Functional Transfers Sit to stand: max A with ww  Stand to sit:  Mod A x2  Stand pivot: mod A x2 with ww and in creased time with light headedness noted. BP 99/54  Max  A with ww poor hand placement and poor response to cueing SBA with ww   Functional Mobility n/t Mod A with ww in room became light headed BP sitting 101/56 O2 sat 89% returned to 2L O2  SBA with ww   Balance Sitting:     Static:  Min A     Dynamic:mod A   Standing: max A   SBA static  Min A dynamic   Max A standing     Activity Tolerance Poor limited by pain and light headedness O2 4.5 L 98% HR 83 post activity  fair- limited by light headedness resting 98% 2L  Fair+   Visual/  Perceptual Glasses: yes WFL           Safety fair  fair- with increased cues for safety- able to recall 2/3 anterolateral hip precautions - gave handout                Good follow through with hip precautions in ADL completion        Comments: Upon arrival pt supine in bed . At end of session sitting up in chair eating breakfast and  all lines and tubes intact, call light within reach. Performed bed mobility,dressing,  grooming and bathing task. And walker safety with anterolateral hip precautions reviewed. · Pt has made fair- progress towards set goals.    · Continue with current plan of care    Treatment Time In:8:30          Treatment Time Out: 8:55             Treatment Charges: Mins Units   Ther Ex  [id number]     Manual Therapy Aimee Moreno [phone] [id number] [phone]

## 2021-02-24 NOTE — PROGRESS NOTES
Physical Therapy  Facility/Department: [facility]  Daily Treatment Note  NAME: Keerthi Haley  : 1960  MRN: [id number]    Date of Service: 2021    Referring Provider:  Selina Ogden PA-C       Evaluating PT:  Bobby Price PT, DPT PT [id number]     Room #:  9757/3896-E  Diagnosis:  Severe left hip degenerative joint disease  PMHx/PSHx:  Brain Aneurysm, Headache, HTN, CVA     Procedure/Surgery:  LEFT HIP TOTAL ARTHROPLASTY 21  Precautions:  WBAT LLE, Anterolateral Hip Precautions, Falls, O2  Equipment Needs:  TBD     SUBJECTIVE:     Pt lives with son and daughter in law in a 2 story home with 4 stairs to enter and single rail. Bed is on first floor and bath is on first floor. Pt has flight of stairs to 2nd floor. Pt ambulated with [employer] independently PTA. Equipment Owned: [employer]     OBJECTIVE:    Initial Evaluation  Date: 21 Treatment  21 AM  Short Term/ Long Term   Goals   AM-PAC 6 Clicks 99/71 05/10     Was pt agreeable to Eval/treatment? Yes yes     Does pt have pain? Mild pain L hip Moderate pain L hip     Bed Mobility  Rolling: NT  Supine to sit: MaxA x 2  Sit to supine: NT  Scooting: MaxA       Supine to sit: MaxA  Sit to supine: NT  Scooting: MaxA Rolling: Independent     Supine to sit:  Independent     Sit to supine: Independent     Scooting: Independent      Transfers Sit to stand: MaxA   Stand to sit: MaxA  Stand pivot: MaxA [employer] Sit to stand: MaxA  Stand to sit MaxA  Stand pivot ModA   Sit to stand: Modified Independent  Stand to sit: Modified Independent  Stand pivot: Modified Independent      Ambulation    5 feet with MaxA  WW 4' ModA [employer]    Required emergent chair pickup >150 feet with Modified Independent  AAD   Stair negotiation: ascended and descended  NT NT  >4 steps with single rail Modified Independent      ROM BUE:  See OT Note  BLE:  WFL       Strength BUE:  See OT Note  RLE: 4/5  LLE: 2/5    Improve Strength 1/3 MMT Grade   Balance Sitting EOB:  Rachel Patient is making good progress towards established goals. Will continue with current POC.       Time in  0827  Time out  0852    Total Treatment Time  25 minutes     CPT codes:  [] Gait training 53593 0 minutes  [] Manual therapy 85014 0 minutes  [x] Therapeutic activities 24635 25 minutes  [] Therapeutic exercises 01432 0 minutes  [] Neuromuscular reeducation 93418 0 minutes    Juan Genao PT, DPT  [id number]

## 2021-02-24 NOTE — PROGRESS NOTES
Physical Therapy  Facility/Department: [facility]  Daily Treatment Note  NAME: Shaye Yadav  : 1960  MRN: [id number]    Date of Service: 2021      Referring Provider: William Cunningham PA-C      Evaluating PT: Yonas Braxton PT, DPT PT [id number]     Room #:  4545/7352-Q  Diagnosis:  Severe left hip degenerative joint disease  PMHx/PSHx:  Brain Aneurysm, Headache, HTN, CVA     Procedure/Surgery:  LEFT HIP TOTAL ARTHROPLASTY 21  Precautions:  WBAT LLE, Anterolateral Hip Precautions, Falls  Equipment Needs:  TBD     SUBJECTIVE:     Pt lives with son and daughter in law in a 2 story home with 4 stairs to enter and single rail.  Bed is on first floor and bath is on first floor.  Pt has flight of stairs to 2nd floor.  Pt ambulated with [employer] independently PTA. Equipment Owned: [employer]     OBJECTIVE:    Initial Evaluation  Date: 21 Treatment  21 PM  Short Term/ Long Term   Goals   AM-PAC 6 Clicks 89/72 89/60     Was pt agreeable to Eval/treatment? Yes yes     Does pt have pain?  Mild pain L hip Moderate pain L hip     Bed Mobility  Rolling: NT  Supine to sit: MaxA x 2  Sit to supine: NT  Scooting: MaxA       Supine to sit: Rachel  Sit to supine: NT  Scooting: Rachel Rolling: Independent     Supine to sit: Independent     Sit to supine: Independent     Scooting: Independent      Transfers Sit to stand: MaxA   Stand to sit: MaxA  Stand pivot: MaxA [employer] Sit to stand: Rachel  Stand to sit Rachel  Stand pivot ModA [employer]    Sit to stand: Modified Independent  Stand to sit: Modified Independent  Stand pivot: Modified Independent      Ambulation    5 feet with MaxA  WW 20' ModA [employer]      >150 feet with Modified Independent  AAD   Stair negotiation: ascended and descended  NT NT  >4 steps with single rail Modified Independent      ROM BUE:  See OT Note  BLE:  WFL       Strength BUE:  See OT Note  RLE: 4/5  LLE: 2/5    Improve Strength 1/3 MMT Grade   Balance Sitting EOB:  Rachel Dynamic Standing:  MaxA Sitting eob SBA  Dynamic standing ModA [employer] Sitting EOB:  Independent     Dynamic Standing:  Modified Independent         Pt is A & O x 3. Improved cognition. Denies any visual hallucinations.     Patient education  Pt educated on hip precautions           Vitals:                          Spo2 93% 2.5 L O2                PRE  /56 immediately post ambulation.                         Spo2 93% 2.5 L O2                POST        Patient response to education:   Pt verbalized understanding Pt demonstrated skill Pt requires further education in this area   x x x      ASSESSMENT:     Comments:  Pt received in supine agreeable to PT. Pt requiring assist of trunk and BLEs for supine to sit transfer. Pt requiring assistance and cues for functional transfers and ambulation. Pt ambulating increased distance this session. Patient would benefit from continued skilled PT to maximize functional mobility independence. spo2 96% RA      Patient education  Pt educated on role of PT    Patient response to education:   Pt verbalized understanding Pt demonstrated skill Pt requires further education in this area   x x x     ASSESSMENT:    Comments:  Pt received in supine agreeable to PT. Pt continues to demonstrate strength, balance, and endurance deficits. Pt ambulating with decreased speed, step to, antalgic gait. Patient would benefit from continued skilled PT to maximize functional mobility independence. Treatment:  Patient practiced and was instructed in the following treatment:    ? Bed mobility- verbal cues for positioning and sequencing to facilitate independence  ? Functional transfers-Verbal cues for proper positioning and sequencing to perform transfers safely with maximum independence. ? Gait training- Verbal cues for proper positioning and sequencing using assistive device to maximize functional mobility independence.       PLAN:      PLAN OF CARE:

## 2021-02-24 NOTE — PROGRESS NOTES
Hospitalist Consult Progress Note      SYNOPSIS:     Ms. Murrell Fothergill, a 63y.o. year old female  who  has a past medical history of Brain aneurysm, Cerebral artery occlusion with cerebral infarction [facility]), Degenerative arthritis of hand, Headache, Hiatal hernia, Hip problem, Hx of abnormal cervical Pap smear, Hypertension, and Stroke (cerebrum) ([address] She was admitted on 21 post-op total left hip arthroplasty. Her labs have remained stable postoperatively and she has been working with PT. Her blood pressures were soft on  and antihypertensives were held accordingly. SUBJECTIVE:    Patient seen and examined  Records reviewed. Blood pressures remain soft. Patient reports that she is dizzy upon standing and seeing \"purple spots. \"      Temp (24hrs), Av.6 °F (37 °C), Min:98.3 °F (36.8 °C), Max:98.9 °F (37.2 °C)    DIET: DIET GENERAL;  Dietary Nutrition Supplements: Wound Healing Oral Supplement  CODE: Full Code    Intake/Output Summary (Last 24 hours) at 2021 1058  Last data filed at 2021 8222  Gross per 24 hour   Intake    Output 2550 ml   Net -2550 ml       Review of Systems  Negative unless otherwise mentioned in HPI. OBJECTIVE:    BP (!) 119/57   Pulse 88   Temp 98.9 °F (37.2 °C) (Temporal)   Resp 16   Ht 5' 5\" (1.651 m)   Wt 232 lb (105.2 kg)   SpO2 94%   BMI 38.61 kg/m²     General appearance: No apparent distress, appears stated age and cooperative. HEENT: Normal cephalic, atraumatic without obvious deformity. Pupils equal, round, and reactive to light. Extra ocular muscles intact. Conjunctivae/corneas clear. Neck: Supple, with full range of motion. No jugular venous distention. Trachea midline. Respiratory:  Clear to auscultation bilaterally. No apparent distress. Cardiovascular:  Regular rate and rhythm. S1, S2 without murmurs, rubs, or gallops. PV: Brisk capillary refill. +2 pedal and radial pulses bilaterally. No clubbing, cyanosis, edema of bilateral lower extremities. Abdomen: Soft, non-tender, non-distended. +BS  Musculoskeletal: No obvious deformities or erythematous or edematous joints. L hip with dressing intact. Skin: Normal skin color. No rashes or lesions. Neurologic:  Neurovascularly intact without any focal sensory/motor deficits. Cranial nerves: II-XII intact, grossly non-focal.  Psychiatric: Alert and oriented, thought content appropriate, normal insight       Medications:  REVIEWED DAILY    Infusion Medications   Scheduled Medications    lidocaine  1 patch Transdermal Daily    [Held by provider] amLODIPine  5 mg Oral Daily    carBAMazepine  200 mg Oral BID    [Held by provider] chlorthalidone  25 mg Oral Daily    fluticasone  2 spray Each Nostril Daily    gabapentin  400 mg Oral TID    [Held by provider] losartan  100 mg Oral Daily    metoprolol tartrate  37.5 mg Oral BID    pantoprazole  40 mg Oral Daily    [Held by provider] potassium chloride  10 mEq Oral Daily    sucralfate  1 g Oral 4 times per day    sodium chloride flush  10 mL Intravenous 2 times per day    enoxaparin  40 mg Subcutaneous Daily    acetaminophen  1,000 mg Oral Q6H    polyethylene glycol  17 g Oral Daily     PRN Meds: albuterol, sodium chloride flush, promethazine **OR** ondansetron, oxyCODONE **OR** oxyCODONE, morphine **OR** morphine, bisacodyl    Labs:     Recent Labs     02/23/21  0517 02/24/21  0658   WBC 9.6 8.3   HGB 12.2 11.6   HCT 37.7 34.6    195       Recent Labs     02/23/21  0517      K 3.7      CO2 29   BUN 22   CREATININE 1.0   CALCIUM 9.0       No results for input(s): PROT, ALB, ALKPHOS, ALT, AST, BILITOT, AMYLASE, LIPASE in the last 72 hours. No results for input(s): INR in the last 72 hours. No results for input(s): Doreen Mildred in the last 72 hours.     Chronic labs:    Lab Results Component Value Date    CHOL 175 12/04/2020    TRIG 180 (H) 12/04/2020    HDL 65 12/04/2020    LDLCALC 74 12/04/2020    TSH 1.290 02/14/2019    INR 1.0 02/16/2021    LABA1C 5.8 (H) 12/04/2020       Radiology: REVIEWED DAILY      ASSESSMENT and PLAN:    Osteoarthritis of bilateral hips  -S/p left hip total arthroplasty 2/22/2021  -Plan for right hip total arthroplasty in the near future per patient  -Monitor H&H postop  -As needed analgesia  -Cefazolin surgical prophylaxis  -PT/OT     HTN  -BP remains soft today, parameters added to metoprolol yesterday.    -Losartan, chlorthalidone and amlodipine held today. --She is describing orthostatic hypotension. Orthostatics obtained this afternoon were negative. Encouraged PO fluid intake.     Emphysema  -Follows with Dr. Alvarez waite as an outpatient  -PRN albuterol     GERD  -Hiatal hernia  Continue Carafate, Protonix     History of subarachnoid hemorrhage  -No residual deficits      DISPOSITION: [facility] once stable    +++++++++++++++++++++++++++++++++++++++++++++++++  [place], [place]  +++++++++++++++++++++++++++++++++++++++++++++++++  NOTE: This report was transcribed using voice recognition software. Every effort was made to ensure accuracy; however, inadvertent computerized transcription errors may be present.

## 2021-02-25 LAB — SARS-COV-2, PCR: NOT DETECTED

## 2021-03-04 NOTE — DISCHARGE SUMMARY
Physician Discharge Summary     Patient ID:  Beto Anderson  [id number]  1 y.o.  1960    Admit date: 2/22/2021    Discharge date and time: 2/24/2021  6:01 PM     Admitting Physician: Odessa Almanza MD     Discharge Physician: Odessa Almanza MD    Admission Diagnoses: Arthritis of both hips [M16.0]    Discharge Diagnoses: s/p left total hip arthroplasty    Admission Condition: stable    Discharged Condition: stable    Hospital Course: The patient was admitted from the pre-operative holding area and underwent an uneventful course of left Hip arthroplasty on 2/22/2021 by Odessa Almanza MD. The patient was subsequently taken to the PACU and to the floor in stable condition. The patient continued antibiotics 24 hours postoperatively, as they received a dose of antibiotics preoperatively for infection prophylaxis. The patient was to begin physical therapy, WBAT, the day of surgery. The patient was also started on DVT prophylaxis. Blood counts were followed daily. The operative dressing was a PANCHO dressing and was intended to stay on for 7 days postoperatively. The fischer was discontinued. Pain medications were transitioned to oral medication.  was consulted for D/C planning as the patient made appropriate gains with PT in regaining independent function. On POD 2, the patient was discharged to acute rehab in stable condition. Treatments: s/p left total hip arthroplasty    Disposition: The patient was provided instructions to follow up with Odessa Almanza MD in 2 weeks and to call the office for an appointment. staples (s) were to be removed in 2 weeks. Pt was to continue DVT prophylaxis as directed. Operative dressing was to remain intact for 1 week. At that time it will be removed and replaced with a clean, dry dressing.     Signed:  Tha Willingham DO  3/4/2021  12:02 PM

## 2021-03-08 ENCOUNTER — OFFICE VISIT (OUTPATIENT)
Dept: ORTHOPEDIC SURGERY | Age: 61
End: 2021-03-08
Payer: MEDICARE

## 2021-03-08 ENCOUNTER — HOSPITAL ENCOUNTER (OUTPATIENT)
Dept: GENERAL RADIOLOGY | Age: 61
Discharge: HOME | End: 2021-03-10
Payer: MEDICARE

## 2021-03-08 VITALS — HEIGHT: 65 IN | WEIGHT: 233 LBS | BODY MASS INDEX: 38.82 KG/M2

## 2021-03-08 DIAGNOSIS — M16.0 PRIMARY OSTEOARTHRITIS OF BOTH HIPS: ICD-10-CM

## 2021-03-08 DIAGNOSIS — Z96.642 HISTORY OF TOTAL LEFT HIP ARTHROPLASTY: Primary | ICD-10-CM

## 2021-03-08 PROCEDURE — 73502 X-RAY EXAM HIP UNI 2-3 VIEWS: CPT

## 2021-03-08 PROCEDURE — 99212 OFFICE O/P EST SF 10 MIN: CPT

## 2021-03-08 PROCEDURE — 99024 POSTOP FOLLOW-UP VISIT: CPT | Performed by: PHYSICIAN ASSISTANT

## 2021-03-08 NOTE — PATIENT INSTRUCTIONS
Scar Care Instructions      Sunscreen Recommendations for Scars  · We recommend that all patients use a sunscreen when outside but especially on new scars (that are exposed to sunlight) for a year after their procedure. · The SPF should be at least 28. Follow the application directions on the bottle. Why is it so Important to Use Sunscreen on Scars? · A scar is new and more fragile than the surrounding skin. · If you do not use sunscreen, the scar line will react differently to the sun than the surrounding skin. · If you don't use sunscreen, the scar tissue will become darker than surrounding skin. This is a hyper-pigmented scar and will remain darker than the other skin. · After one year, the scar and surrounding skin should react equally to sun. Superficial Scar Massage  · Scar massage desensitizes and reduces scar adhesions so skin glides freely. · Rub in a circular motion on and around the scar with firm, even pressure for 5 minutes four times per day   · You can start scar massage once incision is completely healed and strong enough to handle the motion (usually 10 - 14 days post operatively). · You use lotion to do the scar massage to allow ease with motion over the scar and prevent friction at the area. Watch for these significant complications. Call physician if they or any other problems occur:  · Fever over 101°, redness, swelling or warmth at the operative site  · Unrelieved nausea                          · Foul smelling or cloudy drainage at the operative site       · Unrelieved pain                   · Blood soaked dressing.  (Some oozing may be normal)                · Numb, pale, blue, cold or tingling extremity    Future Appointments   Date Time Provider Shabnam Teresa   4/5/2021  9:00 AM SCHEDULE, [facility]   5/19/2021  9:45 AM Avis Osborne MD [facility]   5/21/2021  1:00 PM Jesusita Moreno MD [facility] None   6/8/2021 10:00 AM Michaela Fan Shree Martinez, DO [facility]

## 2021-03-08 NOTE — PROGRESS NOTES
Chief Complaint   Patient presents with    Post-Op Check     left MERVAT       OP:SURGEON: Dr. Juan David Page MD  DATE OF PROCEDURE: 2/22/21  PROCEDURE: L MERVAT    Subjective:  Ismael Vaughn is approximately 2 weeks follow-up from the above surgery. Patient is WBAT on that extremity. She does use walker for ambulation. Home physical therapy is just starting to come to the house. She has maintain hip precautions. Still taking aspirin 81 mg twice daily for DVT prophylaxis. No new complaints. Staples still intact patient denies any drainage, fever, chills, erythema about the incision site. Pain is mild to moderate mostly to the left lateral hip about the incision line and she is taking Robaxin which is relieving her pain. Review of Systems -    General ROS: negative for - chills, fatigue, fever or night sweats  Respiratory ROS: no cough, shortness of breath, or wheezing  Cardiovascular ROS: no chest pain or dyspnea on exertion  Gastrointestinal ROS: no abdominal pain, nausea, vomiting, diarrhea, constipation,or black or bloody stools  Genitourinary: no hematuria, dysuria, or incontinence   Musculoskeletal ROS: negative for -back or neck pain or stiffness, also see HPI  Neurological ROS: no TIA or stroke symptoms       Objective:    General: Alert and oriented X 3, normocephalic atraumatic, external ears and eye normal, sclera clear, no acute distress, respirations easy and unlabored with no audible wheezes, skin warm and dry, speech and dress appropriate for noted age, affect euthymic. Extremity:  Left Lower Extremity  Skin clean dry and intact, without signs of infection  Incisions well approximated without signs of redness, warmth or drainage- staples intact  Mild edema noted about the incision lines with mild TTP  Compartments supple throughout thigh and leg  Calf supple and nontender  Demonstrates active knee flexion/extension, ankle plantar/dorsiflexion/great toe extension.    States sensation intact

## 2021-03-08 NOTE — PROGRESS NOTES
Patient here for a post op check Left MERVAT, DOS 02/22/2021. Patient states her pain is mild. She states that when she stands up she has the most pain. Staples removed from left hip, incision area looks good, mild irritation at the hip. Patient tolerate staple removal well. Steri-strips applied to incision site.         Electronically signed by Aaron Bell MA on 3/8/2021 at 9:13 AM

## 2021-03-16 ENCOUNTER — TELEPHONE (OUTPATIENT)
Dept: FAMILY MEDICINE CLINIC | Age: 61
End: 2021-03-16
Payer: MEDICARE

## 2021-03-16 DIAGNOSIS — M47.812 ARTHROPATHY OF CERVICAL FACET JOINT: ICD-10-CM

## 2021-03-16 DIAGNOSIS — J84.9 LUNG DISEASE, INTERSTITIAL (HCC): Primary | ICD-10-CM

## 2021-03-16 PROCEDURE — G0179 MD RECERTIFICATION HHA PT: HCPCS | Performed by: FAMILY MEDICINE

## 2021-03-19 ENCOUNTER — TELEPHONE (OUTPATIENT)
Dept: ADMINISTRATIVE | Age: 61
End: 2021-03-19

## 2021-03-19 DIAGNOSIS — J84.9 LUNG DISEASE, INTERSTITIAL (HCC): ICD-10-CM

## 2021-03-19 DIAGNOSIS — R13.10 DYSPHAGIA, UNSPECIFIED TYPE: ICD-10-CM

## 2021-03-19 DIAGNOSIS — I10 HYPERTENSION, UNSPECIFIED TYPE: ICD-10-CM

## 2021-03-19 DIAGNOSIS — M54.16 LUMBAR RADICULITIS: ICD-10-CM

## 2021-03-19 RX ORDER — AMLODIPINE BESYLATE 5 MG/1
TABLET ORAL
Qty: 30 TABLET | Refills: 0 | Status: SHIPPED | OUTPATIENT
Start: 2021-03-19 | End: 2021-03-22 | Stop reason: SDUPTHER

## 2021-03-19 RX ORDER — PANTOPRAZOLE SODIUM 40 MG/1
TABLET, DELAYED RELEASE ORAL
Qty: 30 TABLET | Refills: 0 | Status: SHIPPED | OUTPATIENT
Start: 2021-03-19 | End: 2021-03-22 | Stop reason: SDUPTHER

## 2021-03-19 RX ORDER — ALBUTEROL SULFATE 90 UG/1
AEROSOL, METERED RESPIRATORY (INHALATION)
Qty: 1 INHALER | Refills: 0 | Status: SHIPPED | OUTPATIENT
Start: 2021-03-19 | End: 2021-03-22 | Stop reason: SDUPTHER

## 2021-03-19 RX ORDER — CHLORTHALIDONE 25 MG/1
25 TABLET ORAL DAILY
Qty: 30 TABLET | Refills: 0 | Status: SHIPPED | OUTPATIENT
Start: 2021-03-19 | End: 2021-03-22 | Stop reason: SDUPTHER

## 2021-03-19 RX ORDER — POTASSIUM CHLORIDE 750 MG/1
TABLET, EXTENDED RELEASE ORAL
Qty: 30 TABLET | Refills: 0 | Status: SHIPPED | OUTPATIENT
Start: 2021-03-19 | End: 2021-03-22 | Stop reason: SDUPTHER

## 2021-03-19 RX ORDER — CARBAMAZEPINE 100 MG/1
200 TABLET, EXTENDED RELEASE ORAL 2 TIMES DAILY
Qty: 120 TABLET | Refills: 0 | Status: SHIPPED | OUTPATIENT
Start: 2021-03-19 | End: 2021-03-22 | Stop reason: SDUPTHER

## 2021-03-19 RX ORDER — LOSARTAN POTASSIUM 100 MG/1
100 TABLET ORAL DAILY
Qty: 30 TABLET | Refills: 0 | Status: SHIPPED | OUTPATIENT
Start: 2021-03-19 | End: 2021-03-22 | Stop reason: SDUPTHER

## 2021-03-19 RX ORDER — METOPROLOL TARTRATE 37.5 MG/1
37.5 TABLET, FILM COATED ORAL 2 TIMES DAILY
Qty: 60 TABLET | Refills: 0 | Status: SHIPPED | OUTPATIENT
Start: 2021-03-19 | End: 2021-03-22 | Stop reason: SDUPTHER

## 2021-03-19 RX ORDER — SUCRALFATE 1 G/1
TABLET ORAL
Qty: 120 TABLET | Refills: 0 | Status: SHIPPED | OUTPATIENT
Start: 2021-03-19 | End: 2021-03-22 | Stop reason: SDUPTHER

## 2021-03-19 RX ORDER — GABAPENTIN 300 MG/1
CAPSULE ORAL
Qty: 90 CAPSULE | Refills: 0 | Status: SHIPPED | OUTPATIENT
Start: 2021-03-19 | End: 2021-03-22 | Stop reason: SDUPTHER

## 2021-03-19 RX ORDER — FLUTICASONE PROPIONATE 50 MCG
2 SPRAY, SUSPENSION (ML) NASAL DAILY
Qty: 1 BOTTLE | Refills: 0 | Status: SHIPPED | OUTPATIENT
Start: 2021-03-19 | End: 2021-03-22 | Stop reason: SDUPTHER

## 2021-03-19 NOTE — TELEPHONE ENCOUNTER
Patient needs  Her meds adjusted and needs a hospital f/u after  Hip surgery . She was instructed to call office after 10 days due to covid concerns.

## 2021-03-22 ENCOUNTER — OFFICE VISIT (OUTPATIENT)
Dept: FAMILY MEDICINE CLINIC | Age: 61
End: 2021-03-22
Payer: MEDICARE

## 2021-03-22 VITALS
HEART RATE: 62 BPM | SYSTOLIC BLOOD PRESSURE: 121 MMHG | TEMPERATURE: 97.2 F | DIASTOLIC BLOOD PRESSURE: 68 MMHG | RESPIRATION RATE: 16 BRPM

## 2021-03-22 DIAGNOSIS — N30.00 ACUTE CYSTITIS WITHOUT HEMATURIA: ICD-10-CM

## 2021-03-22 DIAGNOSIS — N30.00 ACUTE CYSTITIS WITHOUT HEMATURIA: Primary | ICD-10-CM

## 2021-03-22 DIAGNOSIS — Z96.641 H/O TOTAL HIP ARTHROPLASTY, RIGHT: ICD-10-CM

## 2021-03-22 DIAGNOSIS — Z76.0 MEDICATION REFILL: ICD-10-CM

## 2021-03-22 DIAGNOSIS — R30.0 DYSURIA: ICD-10-CM

## 2021-03-22 LAB
APPEARANCE FLUID: ABNORMAL
BILIRUBIN, POC: ABNORMAL
BLOOD URINE, POC: ABNORMAL
CLARITY, POC: ABNORMAL
COLOR, POC: YELLOW
GLUCOSE URINE, POC: ABNORMAL
KETONES, POC: ABNORMAL
LEUKOCYTE EST, POC: ABNORMAL
NITRITE, POC: POSITIVE
PH, POC: 6
PROTEIN, POC: ABNORMAL
SPECIFIC GRAVITY, POC: 1.02
UROBILINOGEN, POC: 0.2

## 2021-03-22 PROCEDURE — G8427 DOCREV CUR MEDS BY ELIG CLIN: HCPCS | Performed by: STUDENT IN AN ORGANIZED HEALTH CARE EDUCATION/TRAINING PROGRAM

## 2021-03-22 PROCEDURE — G8484 FLU IMMUNIZE NO ADMIN: HCPCS | Performed by: STUDENT IN AN ORGANIZED HEALTH CARE EDUCATION/TRAINING PROGRAM

## 2021-03-22 PROCEDURE — 1111F DSCHRG MED/CURRENT MED MERGE: CPT | Performed by: STUDENT IN AN ORGANIZED HEALTH CARE EDUCATION/TRAINING PROGRAM

## 2021-03-22 PROCEDURE — 81002 URINALYSIS NONAUTO W/O SCOPE: CPT | Performed by: STUDENT IN AN ORGANIZED HEALTH CARE EDUCATION/TRAINING PROGRAM

## 2021-03-22 PROCEDURE — G8417 CALC BMI ABV UP PARAM F/U: HCPCS | Performed by: STUDENT IN AN ORGANIZED HEALTH CARE EDUCATION/TRAINING PROGRAM

## 2021-03-22 PROCEDURE — 99213 OFFICE O/P EST LOW 20 MIN: CPT | Performed by: STUDENT IN AN ORGANIZED HEALTH CARE EDUCATION/TRAINING PROGRAM

## 2021-03-22 PROCEDURE — 1036F TOBACCO NON-USER: CPT | Performed by: STUDENT IN AN ORGANIZED HEALTH CARE EDUCATION/TRAINING PROGRAM

## 2021-03-22 PROCEDURE — 3017F COLORECTAL CA SCREEN DOC REV: CPT | Performed by: STUDENT IN AN ORGANIZED HEALTH CARE EDUCATION/TRAINING PROGRAM

## 2021-03-22 RX ORDER — SUCRALFATE 1 G/1
TABLET ORAL
Qty: 120 TABLET | Refills: 3 | Status: SHIPPED
Start: 2021-03-22 | End: 2021-04-27 | Stop reason: DRUGHIGH

## 2021-03-22 RX ORDER — LOSARTAN POTASSIUM 100 MG/1
100 TABLET ORAL DAILY
Qty: 30 TABLET | Refills: 0 | Status: SHIPPED
Start: 2021-03-22 | End: 2021-04-27 | Stop reason: SDUPTHER

## 2021-03-22 RX ORDER — ASPIRIN 81 MG/1
81 TABLET ORAL 2 TIMES DAILY
Qty: 56 TABLET | Refills: 3 | Status: SHIPPED
Start: 2021-03-22 | End: 2021-08-06

## 2021-03-22 RX ORDER — AMLODIPINE BESYLATE 5 MG/1
TABLET ORAL
Qty: 30 TABLET | Refills: 3 | Status: SHIPPED
Start: 2021-03-22 | End: 2021-08-06 | Stop reason: SDUPTHER

## 2021-03-22 RX ORDER — POTASSIUM CHLORIDE 750 MG/1
TABLET, EXTENDED RELEASE ORAL
Qty: 30 TABLET | Refills: 3 | Status: SHIPPED
Start: 2021-03-22 | End: 2021-08-06 | Stop reason: SDUPTHER

## 2021-03-22 RX ORDER — HYDROCODONE BITARTRATE AND ACETAMINOPHEN 5; 325 MG/1; MG/1
TABLET ORAL
COMMUNITY
Start: 2021-03-05 | End: 2021-04-27 | Stop reason: ALTCHOICE

## 2021-03-22 RX ORDER — METHOCARBAMOL 750 MG/1
750 TABLET, FILM COATED ORAL 4 TIMES DAILY
Qty: 40 TABLET | Refills: 0 | Status: CANCELLED | OUTPATIENT
Start: 2021-03-22

## 2021-03-22 RX ORDER — CHLORTHALIDONE 25 MG/1
25 TABLET ORAL DAILY
Qty: 30 TABLET | Refills: 3 | Status: SHIPPED
Start: 2021-03-22 | End: 2021-08-06 | Stop reason: SDUPTHER

## 2021-03-22 RX ORDER — LIDOCAINE AND PRILOCAINE 25; 25 MG/G; MG/G
CREAM TOPICAL
Qty: 30 G | Refills: 2 | Status: CANCELLED | OUTPATIENT
Start: 2021-03-22

## 2021-03-22 RX ORDER — CARBAMAZEPINE 100 MG/1
100 TABLET, EXTENDED RELEASE ORAL 2 TIMES DAILY
Qty: 120 TABLET | Refills: 3 | Status: SHIPPED
Start: 2021-03-22 | End: 2021-03-25 | Stop reason: SDUPTHER

## 2021-03-22 RX ORDER — METOPROLOL TARTRATE 37.5 MG/1
37.5 TABLET, FILM COATED ORAL 2 TIMES DAILY
Qty: 60 TABLET | Refills: 3 | Status: SHIPPED
Start: 2021-03-22 | End: 2021-05-18

## 2021-03-22 RX ORDER — NITROFURANTOIN 25; 75 MG/1; MG/1
100 CAPSULE ORAL 2 TIMES DAILY
Qty: 20 CAPSULE | Refills: 0 | Status: SHIPPED | OUTPATIENT
Start: 2021-03-22 | End: 2021-04-01

## 2021-03-22 RX ORDER — PANTOPRAZOLE SODIUM 40 MG/1
TABLET, DELAYED RELEASE ORAL
Qty: 30 TABLET | Refills: 3 | Status: SHIPPED
Start: 2021-03-22 | End: 2021-08-06 | Stop reason: SDUPTHER

## 2021-03-22 RX ORDER — ALBUTEROL SULFATE 90 UG/1
AEROSOL, METERED RESPIRATORY (INHALATION)
Qty: 1 INHALER | Refills: 3 | Status: SHIPPED
Start: 2021-03-22 | End: 2022-05-18 | Stop reason: SDUPTHER

## 2021-03-22 RX ORDER — GABAPENTIN 300 MG/1
CAPSULE ORAL
Qty: 90 CAPSULE | Refills: 0 | Status: SHIPPED
Start: 2021-03-22 | End: 2021-04-27 | Stop reason: SDUPTHER

## 2021-03-22 RX ORDER — FLUTICASONE PROPIONATE 50 MCG
2 SPRAY, SUSPENSION (ML) NASAL DAILY
Qty: 1 BOTTLE | Refills: 3 | Status: SHIPPED
Start: 2021-03-22 | End: 2021-08-06 | Stop reason: SDUPTHER

## 2021-03-22 NOTE — PROGRESS NOTES
Subjective:    Shaun Smith is here today for a follow up for hip surgery. She had it about a month ago and she is doing well. She has dysuria and it appears that she does have cystitis. ROS: Otherwise negative    Patient Active Problem List   Diagnosis    Subarachnoid bleed ([address]    Hypertensive emergency    Obesity (BMI 30-39. 9)    Hypertension    Chronic pain syndrome    Lumbar facet arthropathy    Lumbar disc disorder    Primary osteoarthritis of both hips    Arthritis of right hip    Dysphagia    Heartburn    At risk for colon cancer    Colon polyps    Degenerative disc disease, cervical    Arthropathy of cervical facet joint    Abnormal blood sugar    Arthritis of both hips    COVID-19    H/O total hip arthroplasty, right    History of total left hip arthroplasty       Past medical, surgical, family and social history were reviewed, non-contributory, and unchanged unless otherwise stated. Objective:    /68   Pulse 62   Temp 97.2 °F (36.2 °C) (Temporal)   Resp 16   Breastfeeding No     Exam is as noted by resident with the following changes, additions or corrections:      Assessment/Plan:        Shaun Smith was seen today for follow-up from hospital, dysuria and hypertension. Diagnoses and all orders for this visit:    Acute cystitis without hematuria  -     Culture, Urine; Future  -     nitrofurantoin, macrocrystal-monohydrate, (MACROBID) 100 MG capsule; Take 1 capsule by mouth 2 times daily for 10 days  -     [facility] DISCHARGE MEDS RECONCILED W/ CURRENT OUTPATIENT MED LIST    Dysuria  -     POCT Urinalysis no Micro  -     NH DISCHARGE MEDS RECONCILED W/ CURRENT OUTPATIENT MED LIST    H/O total hip arthroplasty, right  -     NH DISCHARGE MEDS RECONCILED W/ CURRENT OUTPATIENT MED LIST    Medication refill  -     albuterol sulfate  (90 Base) MCG/ACT inhaler; INHALE TWO PUFFS BY MOUTH EVERY 6 HOURS AS NEEDED FOR WHEEZING.   -     amLODIPine (NORVASC) 5 MG tablet; TAKE ONE TABLET BY MOUTH EVERY DAY  -     carBAMazepine (TEGRETOL XR) 100 MG extended release tablet; Take 1 tablet by mouth 2 times daily  -     chlorthalidone (HYGROTON) 25 MG tablet; Take 1 tablet by mouth daily  -     fluticasone (FLONASE) 50 MCG/ACT nasal spray; 2 sprays by Each Nostril route daily  -     gabapentin (NEURONTIN) 300 MG capsule; TAKE ONE CAPSULE BY MOUTH THREE TIMES A DAY  -     losartan (COZAAR) 100 MG tablet; Take 1 tablet by mouth daily  -     Metoprolol Tartrate 37.5 MG TABS; Take 37.5 mg by mouth 2 times daily  -     pantoprazole (PROTONIX) 40 MG tablet; TAKE ONE TABLET BY MOUTH EVERY DAY  -     potassium chloride (KLOR-CON M) 10 MEQ extended release tablet; TAKE ONE TABLET BY MOUTH DAILY WITH BREAKFAST  -     sucralfate (CARAFATE) 1 GM tablet; TAKE ONE TABLET BY MOUTH FOUR TIMES A DAY  -     aspirin EC 81 MG EC tablet; Take 1 tablet by mouth 2 times daily for 28 days  -     [place] DISCHARGE MEDS RECONCILED W/ CURRENT OUTPATIENT MED LIST           Attending Physician Statement    I have reviewed the chart, including any radiology or labs. I have discussed the case, including pertinent history and exam findings with the resident. I agree with the assessment, plan and orders as documented by the resident. Please refer to the resident note for additional information.       Electronically signed by Mohamud Davis DO on 3/22/2021 at 7:24 PM

## 2021-03-22 NOTE — PROGRESS NOTES
Patient:  Negrito Gonsalez 63 y.o. female     Date of Service: 3/22/21      Chiefcomplaint:   Chief Complaint   Patient presents with    Follow-Up from [facility] Dysuria     dark urine, decreased urination: not drinking much water    Hypertension     History of Present Illness     She is here with her son. Hip replacement on left 1 month ago. She saw them recently for incision check. She says they told her she is doing well and has fu on the 5th. Has a walker and is using that. She is doing pt at the home. She reports improved range of motion. Previous pain is improved. She continues aspirin daily. Ortho notes say bid. Physical Exam   Vitals: /68   Pulse 62   Temp 97.2 °F (36.2 °C) (Temporal)   Resp 16   Breastfeeding No   General Appearance: Alert, oriented, no acute distress. In wheelchair. HEENT: No scleral icterus. No visible discharge from eyes  Neck: Not rigid. No visible masses  Chest wall/Lung: Clear to auscultation bilaterally,  respirations unlabored. No ronchi/wheezing/rales  Heart: RRR, no murmur  Abdomen: Soft, nontender  Extremities:  No edema  Skin: No rashes. No jaundice  Neuro: Alert and oriented        Psych: Appropriate mood and appropriate affect    Assessment and Plan     1. Acute cystitis without hematuria  Start treatment. Send for culture. Previous labs reviewed. Recommend to increase hydration.  - Culture, Urine; Future  - nitrofurantoin, macrocrystal-monohydrate, (MACROBID) 100 MG capsule; Take 1 capsule by mouth 2 times daily for 10 days  Dispense: 20 capsule; Refill: 0    2. Dysuria  - POCT Urinalysis no Micro    3. H/O total hip arthroplasty, right  Doing well. No concerns. Continues PT. Aspirin supposed to be bid. She is now aware of this. 4. Medication refill  - albuterol sulfate  (90 Base) MCG/ACT inhaler; INHALE TWO PUFFS BY MOUTH EVERY 6 HOURS AS NEEDED FOR WHEEZING. Dispense: 1 Inhaler;  Refill: 3  - amLODIPine (NORVASC) 5 MG tablet; TAKE ONE TABLET BY MOUTH EVERY DAY  Dispense: 30 tablet; Refill: 3  - carBAMazepine (TEGRETOL XR) 100 MG extended release tablet; Take 1 tablet by mouth 2 times daily  Dispense: 120 tablet; Refill: 3  - chlorthalidone (HYGROTON) 25 MG tablet; Take 1 tablet by mouth daily  Dispense: 30 tablet; Refill: 3  - fluticasone (FLONASE) 50 MCG/ACT nasal spray; 2 sprays by Each Nostril route daily  Dispense: 1 Bottle; Refill: 3  - gabapentin (NEURONTIN) 300 MG capsule; TAKE ONE CAPSULE BY MOUTH THREE TIMES A DAY  Dispense: 90 capsule; Refill: 0  - losartan (COZAAR) 100 MG tablet; Take 1 tablet by mouth daily  Dispense: 30 tablet; Refill: 0  - Metoprolol Tartrate 37.5 MG TABS; Take 37.5 mg by mouth 2 times daily  Dispense: 60 tablet; Refill: 3  - pantoprazole (PROTONIX) 40 MG tablet; TAKE ONE TABLET BY MOUTH EVERY DAY  Dispense: 30 tablet; Refill: 3  - potassium chloride (KLOR-CON M) 10 MEQ extended release tablet; TAKE ONE TABLET BY MOUTH DAILY WITH BREAKFAST  Dispense: 30 tablet; Refill: 3  - sucralfate (CARAFATE) 1 GM tablet; TAKE ONE TABLET BY MOUTH FOUR TIMES A DAY  Dispense: 120 tablet; Refill: 3  - aspirin EC 81 MG EC tablet; Take 1 tablet by mouth 2 times daily for 28 days  Dispense: 56 tablet; Refill: 3          Return to Office: No follow-ups on file. Merlinda Shorts, DO       This document may have been prepared at least partially through the use of voice recognition software. Although effort is taken to assure the accuracy of this document, it is possible that grammatical, syntax,  or spelling errors may occur.

## 2021-03-25 ENCOUNTER — TELEPHONE (OUTPATIENT)
Dept: FAMILY MEDICINE CLINIC | Age: 61
End: 2021-03-25

## 2021-03-25 DIAGNOSIS — Z76.0 MEDICATION REFILL: ICD-10-CM

## 2021-03-25 LAB
ORGANISM: ABNORMAL
URINE CULTURE, ROUTINE: ABNORMAL

## 2021-03-25 RX ORDER — CARBAMAZEPINE 100 MG/1
100 TABLET, EXTENDED RELEASE ORAL 3 TIMES DAILY
Qty: 270 TABLET | Refills: 1 | OUTPATIENT
Start: 2021-03-25 | End: 2021-08-06

## 2021-03-25 NOTE — TELEPHONE ENCOUNTER
Patient called requesting urine culture results from 3/22/21. She is taking Macrobid BID as prescribed, but still having increased urgency and odor.

## 2021-03-25 NOTE — TELEPHONE ENCOUNTER
Patient informed culture showed infection that should be sensitive to the medication she is already taking.  Advised her to make sure to complete the antibiotic and follow if no improvement

## 2021-03-25 NOTE — TELEPHONE ENCOUNTER
Culture results show macrobid will treat her UTI. Would finish out the medication and fu if no improvemen at that time.

## 2021-03-26 DIAGNOSIS — Z96.641 H/O TOTAL HIP ARTHROPLASTY, RIGHT: ICD-10-CM

## 2021-03-26 DIAGNOSIS — Z96.642 HISTORY OF TOTAL LEFT HIP ARTHROPLASTY: Primary | ICD-10-CM

## 2021-03-26 RX ORDER — METHOCARBAMOL 750 MG/1
750 TABLET, FILM COATED ORAL 4 TIMES DAILY
Qty: 40 TABLET | Refills: 0 | Status: SHIPPED | OUTPATIENT
Start: 2021-03-26 | End: 2021-04-05

## 2021-03-26 RX ORDER — HYDROCODONE BITARTRATE AND ACETAMINOPHEN 5; 325 MG/1; MG/1
1 TABLET ORAL DAILY PRN
Qty: 7 TABLET | Refills: 0 | Status: CANCELLED | OUTPATIENT
Start: 2021-03-26 | End: 2021-04-02

## 2021-03-26 NOTE — TELEPHONE ENCOUNTER
Robaxin refilled. Patient is s/p L MERVAT about 5 weeks out, so no new opioid scripts can be given at this point. Patient filled a Norco script on 3/5 by a different provider and seeing PM taking Acetaminophen-Codeine #4.

## 2021-03-26 NOTE — TELEPHONE ENCOUNTER
Call placed to pt, notified Robaxin script sent but not Norco. She verbalized understanding, would reach out to pain management for appt.

## 2021-03-26 NOTE — TELEPHONE ENCOUNTER
Pt left VM requesting refill of Robaxin and Norco.    OP:SURGEON: Dr. Miky Morataya MD  DATE OF PROCEDURE: 2/22/21  PROCEDURE: L MERVAT    Order pended and routed for decision and signature.

## 2021-04-05 ENCOUNTER — OFFICE VISIT (OUTPATIENT)
Dept: ORTHOPEDIC SURGERY | Age: 61
End: 2021-04-05
Payer: MEDICARE

## 2021-04-05 ENCOUNTER — HOSPITAL ENCOUNTER (OUTPATIENT)
Dept: GENERAL RADIOLOGY | Age: 61
Discharge: HOME | End: 2021-04-07
Payer: MEDICARE

## 2021-04-05 VITALS — TEMPERATURE: 97.8 F

## 2021-04-05 DIAGNOSIS — Z96.642 HISTORY OF TOTAL LEFT HIP ARTHROPLASTY: Primary | ICD-10-CM

## 2021-04-05 DIAGNOSIS — M16.11 PRIMARY OSTEOARTHRITIS OF RIGHT HIP: ICD-10-CM

## 2021-04-05 DIAGNOSIS — Z96.642 HISTORY OF TOTAL LEFT HIP ARTHROPLASTY: ICD-10-CM

## 2021-04-05 PROCEDURE — 99024 POSTOP FOLLOW-UP VISIT: CPT | Performed by: PHYSICIAN ASSISTANT

## 2021-04-05 PROCEDURE — 73502 X-RAY EXAM HIP UNI 2-3 VIEWS: CPT

## 2021-04-05 PROCEDURE — 99212 OFFICE O/P EST SF 10 MIN: CPT | Performed by: PHYSICIAN ASSISTANT

## 2021-04-05 RX ORDER — METHOCARBAMOL 750 MG/1
750 TABLET, FILM COATED ORAL 3 TIMES DAILY
Qty: 90 TABLET | Refills: 0 | Status: SHIPPED | OUTPATIENT
Start: 2021-04-05 | End: 2021-05-05

## 2021-04-05 NOTE — PATIENT INSTRUCTIONS
Continue weightbearing as tolerated on the left lower extremity. Patient is going to finish up home therapy and transition to outpatient PT working on land-based and aquatic therapy. Continue gait training as well as lower extremity strengthening exercises. We will call in a prescription for Robaxin    Follow-up in 6 weeks. Of note, patient has severe OA of the right hip and she is interested in a right hip replacement when she gets a little further out from her left hip surgery. She was advised that a right hip replacement may be considered when she around 4 months out from her left hip surgery. Call with any questions or concerns.

## 2021-04-05 NOTE — PROGRESS NOTES
[address]  [address] [facility], [address] [place] [address] Chad  [phone]    Follow up Note      Jimmy Peterson     Date of Visit:  4/7/2021    CC:  Patient presents for follow up   Chief Complaint   Patient presents with    Follow-up     lumbar, neck       HPI:    Pain is better to left hip s/p left MERVAT on 2/22/2021. Continues to have right hip pain due to severe OA. Currently in PT. Appropriate analgesia with current medications regimen: yes. Change in quality of symptoms:no. Medication side effects:none. Recent diagnostic testing:none. Recent interventional procedures:none. She has not been on anticoagulation medications to include none. The patient  has not been on herbal supplements. The patient is not diabetic.     Imaging studies:    Cervical XR 11/2019 Severe degenerative changes      Lumbar spine Xray 03/2019  Scoliosis and osteoarthritis.     Bilateral hip Xray 03/2019   Advanced osteoarthrosis of bilateral hips. Potential Aberrant Drug-Related Behavior: None     Urine Drug Screening:  First office visit saliva screen showed no narcotics   11/2019 Consistent, negative for all  06/2020 Consistent  12/2020 Consistent     OARRS report:  03/2019 consistent to 04/2021 Consistent  (norco on 9/24 from dentist for teeth extraction).     Opioid Agreement:  10/08/2020    Past Medical History:   Diagnosis Date    Brain aneurysm 09/2018    H/O TIMES 2 THAT BURST PER PATIENT    Cerebral artery occlusion with cerebral infarction (HCC)     RT SIDE AFFECTED-LEARNED TO WALK AND WRITE AGAIN    Degenerative arthritis of hand     Headache     Hiatal hernia     Hip problem     NEEDS HIP REPLACEMENT PER PATIENT    Hx of abnormal cervical Pap smear     Hypertension     Stroke (cerebrum) [facility])        Past Surgical History:   Procedure Laterality Date    BRAIN ANEURYSM SURGERY      coiling     COLONOSCOPY  08/30/2019 polyps--frank    COLONOSCOPY N/A 8/30/2019    COLONOSCOPY POLYPECTOMY SNARE/COLD BIOPSY performed by Dawna Johnson MD at [address]- DONE AT [address] Left 2/22/2021    LEFT HIP TOTAL ARTHROPLASTY performed by Nora Kellogg MD at [facility] ENDOSCOPY  08/30/2019    gastritis with superficial ulcerations; duodenitis--frank    UPPER GASTROINTESTINAL ENDOSCOPY N/A 8/30/2019    EGD BIOPSY performed by Dawna Johnson MD at [address]       Prior to Admission medications    Medication Sig Start Date End Date Taking?  Authorizing Provider   fluconazole (DIFLUCAN) 150 MG tablet Take 1 tablet by mouth once for 1 dose 4/7/21 4/7/21 Yes Junito Velasco MD   benzonatate (TESSALON) 100 MG capsule Take 1 capsule by mouth 3 times daily as needed for Cough 4/7/21 4/14/21 Yes Junito Velasco MD   methocarbamol (ROBAXIN-750) 750 MG tablet Take 1 tablet by mouth 3 times daily 4/5/21 5/5/21 Yes SPENSER Colindres   carBAMazepine (TEGRETOL XR) 100 MG extended release tablet Take 1 tablet by mouth 3 times daily 3/25/21 4/24/21 Yes Carlos Casanova,    albuterol sulfate  (90 Base) MCG/ACT inhaler INHALE TWO PUFFS BY MOUTH EVERY 6 HOURS AS NEEDED FOR WHEEZING. 3/22/21  Yes Akash Crump DO   amLODIPine (NORVASC) 5 MG tablet TAKE ONE TABLET BY MOUTH EVERY DAY 3/22/21  Yes Akash Crump DO   chlorthalidone (HYGROTON) 25 MG tablet Take 1 tablet by mouth daily 3/22/21  Yes Akash Crump DO   fluticasone [facility]) 50 MCG/ACT nasal spray 2 sprays by Each Nostril route daily 3/22/21  Yes Akash Crump DO   gabapentin (NEURONTIN) 300 MG capsule TAKE ONE CAPSULE BY MOUTH THREE TIMES A DAY 3/22/21 5/26/21 Yes Akash Crump DO   losartan (COZAAR) 100 MG tablet Take 1 tablet by mouth daily 3/22/21  Yes Akash Crump DO   Metoprolol Tartrate 37.5 MG TABS Take 37.5 mg by mouth Lungs:    Breathing:Normal expansion. No wheezing. Abdomen:    Shape:non-distended and normal    Extremities:    Tremors:None bilaterally upper and lower  Range of motion:Generally normal shoulders  Intact:Yes  Varicose veins:not assessed   Cyanosis:none  Edema:Normal    Neurological:    Gait: Not observed. Dermatology:    Skin:no unusual rashes, no skin lesions, no palpable subcutaneous nodules and good skin turgor    Impression:    Patient seen for follow up for her chronic bilateral hip pain and low back pain due to severe degenerative changes and axial c/o neck pain   Would benefit from Cervical MBB, patient uninterested   Patient is s/p:  Left MERVAT on 2/22/2021  Currently in PT. Continue Gabapentin 300 mg TID per PCP  Continue Tylenol #4 QD prn  Compounding cream. Helping a lot. OARRS report reviewed 04/2021  Patient encouraged to remain active as tolerated   Treatment plan discussed with the patient including medications and side effects     Controlled Substance Monitoring:    Acute and Chronic Pain Monitoring:   RX Monitoring 4/7/2021   Periodic Controlled Substance Monitoring Possible medication side effects, risk of tolerance/dependence & alternative treatments discussed. ;No signs of potential drug abuse or diversion identified. ;Assessed functional status. ;Obtaining appropriate analgesic effect of treatment. We discussed with the patient that combining opioids, benzodiazepines, alcohol, illicit drugs or sleep aids increases the risk of respiratory depression including death. We discussed that these medications may cause drowsiness, sedation or dizziness and have counseled the patient not to drive or operate machinery. We have discussed that these medications will be prescribed only by one provider. We have discussed with the patient about age related risk factors and have thoroughly discussed the importance of taking these medications as prescribed.  The patient verbalizes

## 2021-04-05 NOTE — PROGRESS NOTES
Chief Complaint   Patient presents with    Follow-up     6wk post-op L MERVAT from 2/22. Pain 4/10, denies numbness or tingling to L leg but has some in right thigh since surgery. Denies fever or chills. Has home PT coming in, 3 visits left.  Other     XR in EPIC       SUBJECTIVE: Que Caicedopool presents today for a follow-up visit. She is now 6 weeks postop from a left hip replacement. Overall she is doing quite well after surgery and making progress in home therapy. She has been ambulating with a walker and working on lower extremity strengthening exercises. She does take Robaxin which helps her. Of note, she does have severe OA in the right hip and is interested in proceeding with a right hip replacement when she gets further out from her left hip surgery likely around 4 months postop. She does complain of pain in the right hip and right anterior thigh area. She takes gabapentin as well for the nerve pain which helps. Review of Systems -   General ROS: negative for - chills, fatigue, fever or night sweats  Respiratory ROS: no cough, shortness of breath, or wheezing  Cardiovascular ROS: no chest pain or dyspnea on exertion  Gastrointestinal ROS: no abdominal pain, change in bowel habits, or black or bloody stools  Genitourinary: no hematuria, dysuria, or incontinence   Musculoskeletal ROS:see above  Neurological ROS: no TIA or stroke symptoms       OBJECTIVE:   Alert and oriented X 3, no acute distress, respirations easy and unlabored with no audible wheezes, skin warm and dry, speech and dress appropriate for noted age, affect euthymic. Extremity:  Left Lower Extremity  Skin clean dry and intact, without signs of infection  Incisions well approximated without signs of redness, warmth or drainage  no edema noted  Compartments supple throughout thigh and leg  Calf supple and nontender  Demonstrates active knee flexion/extension, ankle plantar/dorsiflexion/great toe extension.    States sensation intact to touch in sural/deep peroneal/superficial peroneal/saphenous/posterior tibial nerve distributions to foot/ankle. Palpable dorsalis pedis and posterior tibialis pulses, cap refill brisk in toes, foot warm/perfused. No pain with passive internal and external rotation of the left hip. Moderate right hip and groin pain with passive internal and external rotation of the right hip. XR: 4/5/21   Left hip and pelvis x-rays taken in the office today show a left hip replacement with the hardware in stable position and alignment. No evidence of hardware failure or loosening seen. She does have severe osteoarthritic changes in the right hip. Temp 97.8 °F (36.6 °C)     ASSESSMENT:     Diagnosis Orders   1. History of total left hip arthroplasty  Amb External Referral To Physical Therapy   2. Primary osteoarthritis of right hip         PLAN:  X-rays reviewed and discussed with the patient and her daughter today    Continue weightbearing as tolerated on the left lower extremity. Patient is going to finish up home therapy and transition to outpatient PT working on land-based and aquatic therapy. Continue gait training as well as lower extremity strengthening exercises. We will call in a prescription for Robaxin    Okay to stop hip precautions and progress with activities as tolerated    Follow-up in 6 weeks. Of note, patient has severe OA of the right hip and she is interested in a right hip replacement when she gets a little further out from her left hip surgery. She was advised that a right hip replacement may be considered when she is around 4 months out from her left hip surgery. Call with any questions or concerns. Electronically signed by SPENSER Gallego on 4/5/2021 at 9:48 AM  Note: This report was completed using [employer] voiced recognition software. Every effort has been made to ensure accuracy; however, inadvertent computerized transcription errors may be present.

## 2021-04-06 ENCOUNTER — TELEPHONE (OUTPATIENT)
Dept: FAMILY MEDICINE CLINIC | Age: 61
End: 2021-04-06

## 2021-04-06 DIAGNOSIS — J20.9 BRONCHITIS WITH BRONCHOSPASM: ICD-10-CM

## 2021-04-06 RX ORDER — BENZONATATE 100 MG/1
100 CAPSULE ORAL 3 TIMES DAILY PRN
Qty: 21 CAPSULE | Refills: 0 | Status: CANCELLED | OUTPATIENT
Start: 2021-04-06 | End: 2021-04-13

## 2021-04-06 NOTE — TELEPHONE ENCOUNTER
Last Appointment   3/22/2021  Next Appointment  4/27/2021    - 3/22/21 DX:UTI, was placed on Antibiotic, now has a yeast infection. Symptoms: vaginal itching/odor   Denies: vaginal discharge, abdominal/back pain, fever, chills ,sweats    - Cough due Emphysema, would like to know if she can get a refill on Benzonatate. Said that she had a few left and it helped with her cough.

## 2021-04-07 ENCOUNTER — OFFICE VISIT (OUTPATIENT)
Dept: PAIN MANAGEMENT | Age: 61
End: 2021-04-07
Payer: MEDICARE

## 2021-04-07 VITALS
SYSTOLIC BLOOD PRESSURE: 140 MMHG | WEIGHT: 230 LBS | OXYGEN SATURATION: 92 % | BODY MASS INDEX: 38.32 KG/M2 | HEIGHT: 65 IN | DIASTOLIC BLOOD PRESSURE: 81 MMHG | HEART RATE: 70 BPM | TEMPERATURE: 97.8 F | RESPIRATION RATE: 18 BRPM

## 2021-04-07 DIAGNOSIS — M16.12 PRIMARY OSTEOARTHRITIS OF LEFT HIP: ICD-10-CM

## 2021-04-07 DIAGNOSIS — G89.4 CHRONIC PAIN SYNDROME: Primary | ICD-10-CM

## 2021-04-07 DIAGNOSIS — M47.816 LUMBAR FACET ARTHROPATHY: ICD-10-CM

## 2021-04-07 DIAGNOSIS — M54.2 CERVICALGIA: ICD-10-CM

## 2021-04-07 DIAGNOSIS — M51.9 LUMBAR DISC DISORDER: ICD-10-CM

## 2021-04-07 DIAGNOSIS — M47.816 LUMBAR SPONDYLOSIS: ICD-10-CM

## 2021-04-07 DIAGNOSIS — M16.11 ARTHRITIS OF RIGHT HIP: ICD-10-CM

## 2021-04-07 DIAGNOSIS — M47.812 FACET ARTHROPATHY, CERVICAL: ICD-10-CM

## 2021-04-07 DIAGNOSIS — M47.812 ARTHROPATHY OF CERVICAL FACET JOINT: ICD-10-CM

## 2021-04-07 DIAGNOSIS — M50.30 DEGENERATIVE DISC DISEASE, CERVICAL: ICD-10-CM

## 2021-04-07 DIAGNOSIS — M54.16 LUMBAR RADICULITIS: ICD-10-CM

## 2021-04-07 DIAGNOSIS — M50.30 DEGENERATION OF CERVICAL DISC WITHOUT MYELOPATHY: ICD-10-CM

## 2021-04-07 PROCEDURE — 1036F TOBACCO NON-USER: CPT | Performed by: PHYSICIAN ASSISTANT

## 2021-04-07 PROCEDURE — 3017F COLORECTAL CA SCREEN DOC REV: CPT | Performed by: PHYSICIAN ASSISTANT

## 2021-04-07 PROCEDURE — 99214 OFFICE O/P EST MOD 30 MIN: CPT | Performed by: PHYSICIAN ASSISTANT

## 2021-04-07 PROCEDURE — 99214 OFFICE O/P EST MOD 30 MIN: CPT

## 2021-04-07 PROCEDURE — G8427 DOCREV CUR MEDS BY ELIG CLIN: HCPCS | Performed by: PHYSICIAN ASSISTANT

## 2021-04-07 PROCEDURE — G8417 CALC BMI ABV UP PARAM F/U: HCPCS | Performed by: PHYSICIAN ASSISTANT

## 2021-04-07 RX ORDER — BENZONATATE 100 MG/1
100 CAPSULE ORAL 3 TIMES DAILY PRN
Qty: 21 CAPSULE | Refills: 0 | Status: SHIPPED | OUTPATIENT
Start: 2021-04-07 | End: 2021-04-14

## 2021-04-07 RX ORDER — ACETAMINOPHEN AND CODEINE PHOSPHATE 60; 300 MG/1; MG/1
1 TABLET ORAL DAILY PRN
Qty: 30 TABLET | Refills: 1 | Status: SHIPPED
Start: 2021-04-07 | End: 2021-06-07 | Stop reason: SDUPTHER

## 2021-04-07 RX ORDER — FLUCONAZOLE 150 MG/1
150 TABLET ORAL ONCE
Qty: 1 TABLET | Refills: 0 | Status: SHIPPED | OUTPATIENT
Start: 2021-04-07 | End: 2021-04-07

## 2021-04-07 NOTE — PROGRESS NOTES
Do you currently have any of the following:    Fever: No  Headache:  No  Cough: Yes  Shortness of breath: No  Exposed to anyone with these symptoms: No                                                                                                                Tabby Dunlap [facility] to the [facility] on [facility]/7[facility]21. Margarito Rivero is complaining of pain lumbar, neck. The pain is constant. The pain is described as aching, throbbing, stabbing and nagging. Pain is rated on her best day at a 5, on her worst day at a 10, and on average at a 7 on the VAS scale. She took her last dose of none na. Margarito Rivero does not have issues with constipation. Any procedures since your last visit: No,  She is not on NSAIDS and  is not on anticoagulation medications to include none and is managed by na. Pacemaker or defibrillator: No Physician managing device is na. Medication Contract and Consent for Opioid Use Documents Filed     Patient Documents       Type of Document Status Date Received Received By Description     Medication Contract Received 3/1/2019  1:43 PM FRANCIS MCINTOSH PAIN MANAGEMENT PT AGREEMENT 3/1/2019     Medication Contract Received 10/8/2020  1:21 PM DAYNA [address] [place] [address] pain pt agreement                   BP (!) 140/81   Pulse 70   Temp 97.8 °F (36.6 °C)   Resp 16   Ht 5' 5\" (1.651 m)   Wt 230 lb (104.3 kg)   SpO2 92%   BMI 38.27 kg/m²      No LMP recorded.  Patient is postmenopausal.

## 2021-04-07 NOTE — TELEPHONE ENCOUNTER
Diflucan sent - take once for yeast infection. If no improvement will need office appt for evaluation. I will refill benzonoate for cough. Thank you!

## 2021-04-27 ENCOUNTER — OFFICE VISIT (OUTPATIENT)
Dept: FAMILY MEDICINE CLINIC | Age: 61
End: 2021-04-27
Payer: MEDICARE

## 2021-04-27 VITALS
HEART RATE: 74 BPM | OXYGEN SATURATION: 98 % | RESPIRATION RATE: 16 BRPM | HEIGHT: 65 IN | DIASTOLIC BLOOD PRESSURE: 79 MMHG | BODY MASS INDEX: 40.82 KG/M2 | WEIGHT: 245 LBS | SYSTOLIC BLOOD PRESSURE: 116 MMHG | TEMPERATURE: 97.2 F

## 2021-04-27 DIAGNOSIS — J43.9 PULMONARY EMPHYSEMA, UNSPECIFIED EMPHYSEMA TYPE (HCC): ICD-10-CM

## 2021-04-27 DIAGNOSIS — G89.4 CHRONIC PAIN SYNDROME: ICD-10-CM

## 2021-04-27 DIAGNOSIS — Z76.0 MEDICATION REFILL: ICD-10-CM

## 2021-04-27 DIAGNOSIS — R30.0 DYSURIA: Primary | ICD-10-CM

## 2021-04-27 DIAGNOSIS — I10 HYPERTENSION, UNSPECIFIED TYPE: ICD-10-CM

## 2021-04-27 DIAGNOSIS — Z86.19 HISTORY OF HERPES GENITALIS: ICD-10-CM

## 2021-04-27 LAB
APPEARANCE FLUID: NORMAL
BILIRUBIN, POC: NORMAL
BLOOD URINE, POC: NORMAL
CLARITY, POC: CLEAR
COLOR, POC: YELLOW
GLUCOSE URINE, POC: NORMAL
KETONES, POC: NORMAL
LEUKOCYTE EST, POC: NORMAL
NITRITE, POC: NORMAL
PH, POC: 5.5
PROTEIN, POC: NORMAL
SPECIFIC GRAVITY, POC: 1.01
UROBILINOGEN, POC: 0.2

## 2021-04-27 PROCEDURE — G8427 DOCREV CUR MEDS BY ELIG CLIN: HCPCS | Performed by: FAMILY MEDICINE

## 2021-04-27 PROCEDURE — 81002 URINALYSIS NONAUTO W/O SCOPE: CPT | Performed by: FAMILY MEDICINE

## 2021-04-27 PROCEDURE — 3017F COLORECTAL CA SCREEN DOC REV: CPT | Performed by: FAMILY MEDICINE

## 2021-04-27 PROCEDURE — G8417 CALC BMI ABV UP PARAM F/U: HCPCS | Performed by: FAMILY MEDICINE

## 2021-04-27 PROCEDURE — 3023F SPIROM DOC REV: CPT | Performed by: FAMILY MEDICINE

## 2021-04-27 PROCEDURE — G8926 SPIRO NO PERF OR DOC: HCPCS | Performed by: FAMILY MEDICINE

## 2021-04-27 PROCEDURE — 1036F TOBACCO NON-USER: CPT | Performed by: FAMILY MEDICINE

## 2021-04-27 PROCEDURE — 99214 OFFICE O/P EST MOD 30 MIN: CPT | Performed by: FAMILY MEDICINE

## 2021-04-27 RX ORDER — LIDOCAINE AND PRILOCAINE 25; 25 MG/G; MG/G
CREAM TOPICAL
Qty: 30 G | Refills: 2 | Status: CANCELLED | OUTPATIENT
Start: 2021-04-27

## 2021-04-27 RX ORDER — LOSARTAN POTASSIUM 100 MG/1
100 TABLET ORAL DAILY
Qty: 30 TABLET | Refills: 3 | Status: SHIPPED
Start: 2021-04-27 | End: 2021-08-06 | Stop reason: SDUPTHER

## 2021-04-27 RX ORDER — VALACYCLOVIR HYDROCHLORIDE 1 G/1
1000 TABLET, FILM COATED ORAL DAILY
Qty: 5 TABLET | Refills: 1 | Status: SHIPPED | OUTPATIENT
Start: 2021-04-27

## 2021-04-27 RX ORDER — SUCRALFATE 1 G/1
TABLET ORAL
Qty: 60 TABLET | Refills: 0 | Status: SHIPPED
Start: 2021-04-27 | End: 2021-08-06

## 2021-04-27 RX ORDER — GABAPENTIN 300 MG/1
CAPSULE ORAL
Qty: 90 CAPSULE | Refills: 0 | Status: SHIPPED
Start: 2021-04-27 | End: 2021-06-30 | Stop reason: SDUPTHER

## 2021-04-27 NOTE — PROGRESS NOTES
4/27/2021    Marcus Hayes is a 57 y.o. female here for   Chief Complaint   Patient presents with    Chronic Pain     Diclofenac 1% gel no longer available: please advise     Walks on her toes  Still having right hip pain   Hoping for right hip replacement  She did her rehab at [facility]  She was had an uneven \"cockeyed\" gait before before but it was not as noticeable as after she had her hip replacement  She did home therapy with [facility]  Now will do PT with [facility]  Saving some therapy visits for [facility]    At last visit was treated for UTI - urine culture confirmed E coli that was sensitive to macrobid. She has had a cough for a few days  She has been taking tessalon perles    Denies sinus congestion  She has had some mucous production - spitting it out - sometimes clear/ sometimes dark   Having some headaches but they don't stay  Will last for only a few seconds  No sore throat  Shortness of breath - sometimes yes sometimes no. This mrmini was starting to clean but had to sit down and clean and got aggitated. Due to using broom and walker. She is using inhalers when she needs  Also using flonase nasal spray. Getting false teeth. Regarding hypertension. Patient is  monitoring home blood pressures. They have been at goal - since she came home from hospital - it has only been high 6 times and only when sometimes going on . Cardiovascular risk factors: dyslipidemia, hypertension, obesity (BMI >= 30 kg/m2), sedentary lifestyle and previous stroke/ SAH. Patient does not smoke. reports that she quit smoking about 2 years ago. Her smoking use included cigarettes. She has a 64.50 pack-year smoking history. She has never used smokeless tobacco.     Currently on amlodipine 5 mg, chlrothalidone 25 mg , losartan 100 mg daily, metoprolol 37.5 mg twice daily. . Taking as prescribed. No adverse effects. Today,  BP: 116/79 and she is asymptomatic.   BP Readings from Last 3 Encounters:   04/27/21 116/79 04/07/21 (!) 140/81   03/22/21 121/68     Patient denies chest pain, diaphoresis, dyspnea, dyspnea on exertion, peripheral edema, palpitations, headache, vision changes. Weaning off carafate  sucralafate    Had been having some uti   Wanted to make sure everything was okay  Not recent UTI  She has h/o herpes      reports that she quit smoking about 2 years ago. Her smoking use included cigarettes. She has a 64.50 pack-year smoking history. She has never used smokeless tobacco.    Wt Readings from Last 3 Encounters:   04/27/21 245 lb (111.1 kg)   04/07/21 230 lb (104.3 kg)   03/08/21 233 lb (105.7 kg)       She  reports that she quit smoking about 2 years ago. Her smoking use included cigarettes. She has a 64.50 pack-year smoking history. She has never used smokeless tobacco.    Medications and allergies reviewed and updated in chart. Current Outpatient Medications   Medication Sig Dispense Refill    acetaminophen-codeine (TYLENOL #4) 300-60 MG per tablet Take 1 tablet by mouth daily as needed for Pain for up to 30 days. 30 tablet 1    methocarbamol (ROBAXIN-750) 750 MG tablet Take 1 tablet by mouth 3 times daily 90 tablet 0    carBAMazepine (TEGRETOL XR) 100 MG extended release tablet Take 1 tablet by mouth 3 times daily 270 tablet 1    albuterol sulfate  (90 Base) MCG/ACT inhaler INHALE TWO PUFFS BY MOUTH EVERY 6 HOURS AS NEEDED FOR WHEEZING.  1 Inhaler 3    amLODIPine (NORVASC) 5 MG tablet TAKE ONE TABLET BY MOUTH EVERY DAY 30 tablet 3    chlorthalidone (HYGROTON) 25 MG tablet Take 1 tablet by mouth daily 30 tablet 3    fluticasone (FLONASE) 50 MCG/ACT nasal spray 2 sprays by Each Nostril route daily 1 Bottle 3    gabapentin (NEURONTIN) 300 MG capsule TAKE ONE CAPSULE BY MOUTH THREE TIMES A DAY 90 capsule 0    losartan (COZAAR) 100 MG tablet Take 1 tablet by mouth daily 30 tablet 0    Metoprolol Tartrate 37.5 MG TABS Take 37.5 mg by mouth 2 times daily 60 tablet 3    pantoprazole (PROTONIX) 40 MG tablet TAKE ONE TABLET BY MOUTH EVERY DAY 30 tablet 3    potassium chloride (KLOR-CON M) 10 MEQ extended release tablet TAKE ONE TABLET BY MOUTH DAILY WITH BREAKFAST 30 tablet 3    sucralfate (CARAFATE) 1 GM tablet TAKE ONE TABLET BY MOUTH FOUR TIMES A  tablet 3    Multiple Vitamins-Minerals (THERAPEUTIC MULTIVITAMIN-MINERALS) tablet Take 1 tablet by mouth daily      lidocaine-prilocaine (EMLA) 2.5-2.5 % cream APPLY ONE GRAM EXTERNALLY THREE TIMES A DAY IF NEEDED-MUST LAST 30 DAYS  30 g 2    aspirin EC 81 MG EC tablet Take 1 tablet by mouth 2 times daily for 28 days (Patient not taking: Reported on 4/27/2021) 56 tablet 3    diclofenac sodium (VOLTAREN) 1 % GEL APPLY ONE GRAM EXTERNALLY TWICE A DAY TO NECK AND ONE GRAM EXTERNALLY TWICE A DAY TO BACK  100 g 2    Misc. Devices (ROLLATOR) MISC 1 each by Does not apply route daily as needed (use as needed for stability) 1 each 0     No current facility-administered medications for this visit. Patient'spast medical, surgical, social and/or family history reviewed, updated in chart, and are non-contributory (unless otherwise stated). Review of Systems  Review of Systems   Musculoskeletal: Positive for arthralgias and back pain. PE:  VS:  /79   Pulse 74   Temp 97.2 °F (36.2 °C) (Temporal)   Resp 16   Ht 5' 5\" (1.651 m)   Wt 245 lb (111.1 kg)   SpO2 98%   Breastfeeding No   BMI 40.77 kg/m²   Physical Exam  Constitutional:       Appearance: She is well-developed. She is obese. HENT:      Head: Normocephalic and atraumatic. Cardiovascular:      Rate and Rhythm: Normal rate and regular rhythm. Heart sounds: No murmur. No friction rub. No gallop. Pulmonary:      Effort: Pulmonary effort is normal. No respiratory distress. Breath sounds: No rales. Skin:     General: Skin is warm and dry. Neurological:      Mental Status: She is alert and oriented to person, place, and time.      antalgic gait   Limited rom right hip    Assessment/Plan:  Lilli Camara was seen today for chronic pain. Diagnoses and all orders for this visit:    Hypertension, unspecified type  bp at goal  Cont current medicatoins  -     losartan (COZAAR) 100 MG tablet; Take 1 tablet by mouth daily    Pulmonary emphysema, unspecified emphysema type ([address]  Stable      History of herpes genitalis  Recent UTI may have been related to recent herpes outbreak  Refill valtrex in case of outbreak to have available to take at first sign of outbreak  -     valACYclovir (VALTREX) 1 g tablet; Take 1 tablet by mouth daily For 5 days as needed for Herpes outbreak    Dysuria  Mostly improved, primarily concerned that uti has cleared  ua normal   Pt reassured  She has pap scheduled in a few months with ob/ gyne  -     POCT Urinalysis no Micro    Medication refill  -     sucralfate (CARAFATE) 1 GM tablet; TAKE ONE TABLET BY MOUTH twice a day, wean as tolerated    Chronic pain syndrome  -     diclofenac sodium (VOLTAREN) 1 % GEL; Apply 4 g topically 4 times daily as needed for Pain    -     gabapentin (NEURONTIN) 300 MG capsule; TAKE ONE CAPSULE BY MOUTH THREE TIMES A DAY    Discussed vaccine hesitancy  She is consider COVID vaccine   had arm swelling which makes her worried. Return in about 3 months (around 7/27/2021) for medicare wellness. Advised patient to call with any new medication issues. All questions answered.   Call or go to emergency department ifsymptoms worsen or persist.

## 2021-04-28 ASSESSMENT — ENCOUNTER SYMPTOMS: BACK PAIN: 1

## 2021-05-08 DIAGNOSIS — Z86.19 HISTORY OF HERPES GENITALIS: ICD-10-CM

## 2021-05-10 RX ORDER — VALACYCLOVIR HYDROCHLORIDE 1 G/1
TABLET, FILM COATED ORAL
Qty: 5 TABLET | Refills: 0 | OUTPATIENT
Start: 2021-05-10

## 2021-05-14 DIAGNOSIS — Z96.642 HISTORY OF TOTAL LEFT HIP ARTHROPLASTY: Primary | ICD-10-CM

## 2021-05-14 PROBLEM — I16.1 HYPERTENSIVE EMERGENCY: Chronic | Status: ACTIVE | Noted: 2018-09-10

## 2021-05-17 DIAGNOSIS — I16.1 HYPERTENSIVE EMERGENCY: Primary | ICD-10-CM

## 2021-05-17 NOTE — TELEPHONE ENCOUNTER
DENIED:   Metoprolol Tartrate 37.5 mg 1 po bid  (strength is not covered)  Please advise    PA Case ID: [id number]   Other covered drug(s) is/are:    Acebutolol HCl   Atenolol   Bisoprolol Fumarate   Bystolic   Carvedilol   Metoprolol Succinate ER   Metoprolol Tartrate   Nadolol   Pindolol   Propranolol HCl ER

## 2021-05-18 NOTE — TELEPHONE ENCOUNTER
I would let patient know  The most straightforward option would be to change to 25 mg tablets and have her take 1 1/2 tablets. If she is willing, I will send to pharmacy.

## 2021-05-19 ENCOUNTER — HOSPITAL ENCOUNTER (OUTPATIENT)
Dept: GENERAL RADIOLOGY | Age: 61
Discharge: HOME | End: 2021-05-21
Payer: MEDICARE

## 2021-05-19 ENCOUNTER — OFFICE VISIT (OUTPATIENT)
Dept: ORTHOPEDIC SURGERY | Age: 61
End: 2021-05-19
Payer: MEDICARE

## 2021-05-19 VITALS — HEIGHT: 65 IN | BODY MASS INDEX: 38.32 KG/M2 | TEMPERATURE: 97.6 F | WEIGHT: 230 LBS

## 2021-05-19 DIAGNOSIS — M16.11 PRIMARY OSTEOARTHRITIS OF RIGHT HIP: ICD-10-CM

## 2021-05-19 DIAGNOSIS — Z96.642 HISTORY OF TOTAL LEFT HIP ARTHROPLASTY: ICD-10-CM

## 2021-05-19 DIAGNOSIS — Z96.642 HISTORY OF TOTAL LEFT HIP ARTHROPLASTY: Primary | ICD-10-CM

## 2021-05-19 PROCEDURE — 99212 OFFICE O/P EST SF 10 MIN: CPT

## 2021-05-19 PROCEDURE — 73502 X-RAY EXAM HIP UNI 2-3 VIEWS: CPT

## 2021-05-19 PROCEDURE — 99024 POSTOP FOLLOW-UP VISIT: CPT | Performed by: PHYSICIAN ASSISTANT

## 2021-05-19 NOTE — PROGRESS NOTES
Patient here for 12 week postop check Left MERVAT. DOS 02/22/2021. Patient states that she is still going to outpatient therapy and the hip is feeling very good. Patient states that she has about 4-5 more therapy sessions left.             Electronically signed by Marni Kayser, MA on 5/19/2021 at 9:47 AM

## 2021-05-19 NOTE — PROGRESS NOTES
Chief Complaint   Patient presents with    Post-Op Check     Left MERVAT. OP:SURGEON: Dr. Kacy Mccoy MD  DATE OF PROCEDURE: 2/22/21  PROCEDURE: L MERVAT    Subjective:  Marylen Hancock is approximately 3 months the above date of surgery. She is weightbearing as tolerated bilateral lower extremities not using any assistive devices. Still falling precautions. Denies any anterior, lateral, posterior left hip pain but does sometimes have intermittent left groin pain. Denies paresthesias. States that she has paused physical therapy due to the limited number of sessions per insurance. She is active at home and doing home exercises. States that she would like a right total hip arthroplasty in the near future but would like a few more months of recovery for a left total hip. No injuries or any other orthopedic complaints today. States that she did receive an ultrasound for left calf swelling which came back negative for DVT and denies any calf swelling, warmth, pain today. Denies CP, SOB, fever, chills. Review of Systems -    General ROS: negative for - chills, fatigue, fever or night sweats  Respiratory ROS: no cough, shortness of breath, or wheezing  Cardiovascular ROS: no chest pain or dyspnea on exertion  Gastrointestinal ROS: no abdominal pain, nausea, vomiting, diarrhea, constipation,or black or bloody stools  Genitourinary: no hematuria, dysuria, or incontinence   Musculoskeletal ROS: negative for -back or neck pain or stiffness, also see HPI  Neurological ROS: no TIA or stroke symptoms       Objective:    General: Alert and oriented X 3, normocephalic atraumatic, external ears and eye normal, sclera clear, no acute distress, respirations easy and unlabored with no audible wheezes, skin warm and dry, speech and dress appropriate for noted age, affect euthymic.     Extremity:  Left Lower Extremity  Skin clean dry and intact, without signs of infection  Incision healed  nontender about the hip  No edema noted  Compartments supple throughout thigh and leg  Calf supple and nontender  Demonstrates active knee flexion/extension, ankle plantar/dorsiflexion/great toe extension. 4/5 L hip flexion, abduction, adduction    nontender to PROM at the L hip  States sensation intact to touch in sural/deep peroneal/superficial peroneal/saphenous/posterior tibial nerve distributions to foot/ankle. Palpable dorsalis pedis and posterior tibialis pulses, cap refill brisk in toes, foot warm/perfused. Temp 97.6 °F (36.4 °C) (Temporal)   Ht 5' 5\" (1.651 m)   Wt 230 lb (104.3 kg)   BMI 38.27 kg/m²     XR:   3 views of L hip and pelvis demonstrating L MERVAT  intact without interval displacement, loosening, or failure. No acute fractures or dislocations or any other osseus abnormality identified. Assessment:   Diagnosis Orders   1. History of total left hip arthroplasty  XR HIP 2-3 VW W PELVIS LEFT   2. Primary osteoarthritis of right hip  XR HIP 2-3 VW W PELVIS RIGHT       Plan:   Reviewed x-rays with patient today in office    Continue remaining active and continue HEP   Can discontinue hip precautions    otc analgesics if needed   Will discuss R MERVAT at next ov    Follow up in 3 mo with XR of the bilateral hips and pelvis     Electronically signed by Tessa Hughes PA-C on 5/19/2021 at 10:34 AM  Note: This report was completed using [employer] voiced recognition software. Every effort has been made to ensure accuracy; however, inadvertent computerized transcription errors may be present.

## 2021-06-07 ENCOUNTER — OFFICE VISIT (OUTPATIENT)
Dept: PAIN MANAGEMENT | Age: 61
End: 2021-06-07
Payer: MEDICARE

## 2021-06-07 VITALS
BODY MASS INDEX: 38.32 KG/M2 | TEMPERATURE: 95.9 F | OXYGEN SATURATION: 94 % | RESPIRATION RATE: 16 BRPM | SYSTOLIC BLOOD PRESSURE: 122 MMHG | WEIGHT: 230 LBS | DIASTOLIC BLOOD PRESSURE: 78 MMHG | HEART RATE: 62 BPM | HEIGHT: 65 IN

## 2021-06-07 DIAGNOSIS — M54.16 LUMBAR RADICULITIS: ICD-10-CM

## 2021-06-07 DIAGNOSIS — G89.4 CHRONIC PAIN SYNDROME: Primary | ICD-10-CM

## 2021-06-07 DIAGNOSIS — M54.2 CERVICALGIA: ICD-10-CM

## 2021-06-07 DIAGNOSIS — M47.812 ARTHROPATHY OF CERVICAL FACET JOINT: ICD-10-CM

## 2021-06-07 DIAGNOSIS — M47.812 FACET ARTHROPATHY, CERVICAL: ICD-10-CM

## 2021-06-07 DIAGNOSIS — M47.816 LUMBAR FACET ARTHROPATHY: ICD-10-CM

## 2021-06-07 DIAGNOSIS — M50.30 DEGENERATIVE DISC DISEASE, CERVICAL: ICD-10-CM

## 2021-06-07 DIAGNOSIS — M50.30 DEGENERATION OF CERVICAL DISC WITHOUT MYELOPATHY: ICD-10-CM

## 2021-06-07 DIAGNOSIS — M51.9 LUMBAR DISC DISORDER: ICD-10-CM

## 2021-06-07 DIAGNOSIS — M16.12 PRIMARY OSTEOARTHRITIS OF LEFT HIP: ICD-10-CM

## 2021-06-07 DIAGNOSIS — M16.11 ARTHRITIS OF RIGHT HIP: ICD-10-CM

## 2021-06-07 DIAGNOSIS — M47.816 LUMBAR SPONDYLOSIS: ICD-10-CM

## 2021-06-07 PROCEDURE — G8427 DOCREV CUR MEDS BY ELIG CLIN: HCPCS | Performed by: PHYSICIAN ASSISTANT

## 2021-06-07 PROCEDURE — 99214 OFFICE O/P EST MOD 30 MIN: CPT | Performed by: PHYSICIAN ASSISTANT

## 2021-06-07 PROCEDURE — G8417 CALC BMI ABV UP PARAM F/U: HCPCS | Performed by: PHYSICIAN ASSISTANT

## 2021-06-07 PROCEDURE — 1036F TOBACCO NON-USER: CPT | Performed by: PHYSICIAN ASSISTANT

## 2021-06-07 PROCEDURE — 3017F COLORECTAL CA SCREEN DOC REV: CPT | Performed by: PHYSICIAN ASSISTANT

## 2021-06-07 PROCEDURE — 99214 OFFICE O/P EST MOD 30 MIN: CPT

## 2021-06-07 RX ORDER — ACETAMINOPHEN AND CODEINE PHOSPHATE 60; 300 MG/1; MG/1
1 TABLET ORAL DAILY PRN
Qty: 30 TABLET | Refills: 1 | Status: SHIPPED
Start: 2021-06-07 | End: 2021-08-19 | Stop reason: SDUPTHER

## 2021-06-07 NOTE — PROGRESS NOTES
[address]  1973 [facility], [address] [place] [address] [place]  [phone]    Follow up Note      Jenn Morfinjanice     Date of Visit:  6/7/2021    CC:  Patient presents for follow up   Chief Complaint   Patient presents with    Follow-up     neck and lower back down legs       HPI:    Pain is unchanged. Having some soreness to left hip after surgery as expected. Neck has been sore. No other issues. Appropriate analgesia with current medications regimen: yes. Change in quality of symptoms:no. Medication side effects:none. Recent diagnostic testing:none. Recent interventional procedures:none. She has not been on anticoagulation medications to include none. The patient  has not been on herbal supplements. The patient is not diabetic.     Imaging studies:    Cervical XR 11/2019 Severe degenerative changes      Lumbar spine Xray 03/2019  Scoliosis and osteoarthritis.     Bilateral hip Xray 03/2019   Advanced osteoarthrosis of bilateral hips. Potential Aberrant Drug-Related Behavior: None     Urine Drug Screening:  First office visit saliva screen showed no narcotics   11/2019 Consistent, negative for all  06/2020 Consistent  12/2020 Consistent     OARRS report:  03/2019 consistent to 06/2021 Consistent  (norco on 9/24 from dentist for teeth extraction).     Opioid Agreement:  10/08/2020    Past Medical History:   Diagnosis Date    Brain aneurysm 09/2018    H/O TIMES 2 THAT BURST PER PATIENT    Cerebral artery occlusion with cerebral infarction (HCC)     RT SIDE AFFECTED-LEARNED TO WALK AND WRITE AGAIN    Degenerative arthritis of hand     Headache     Hiatal hernia     Hip problem     NEEDS HIP REPLACEMENT PER PATIENT    Hx of abnormal cervical Pap smear     Hypertension     Stroke (cerebrum) [facility])        Past Surgical History:   Procedure Laterality Date    BRAIN ANEURYSM SURGERY      coiling     COLONOSCOPY  08/30/2019 polyps--frank    COLONOSCOPY N/A 8/30/2019    COLONOSCOPY POLYPECTOMY SNARE/COLD BIOPSY performed by Joselito Rosales MD at [address]- DONE AT [address] Left 2/22/2021    LEFT HIP TOTAL ARTHROPLASTY performed by Ilene Siu MD at [address] ENDOSCOPY  08/30/2019    gastritis with superficial ulcerations; duodenitis--frank    UPPER GASTROINTESTINAL ENDOSCOPY N/A 8/30/2019    EGD BIOPSY performed by Joselito Rosales MD at [facility]       Prior to Admission medications    Medication Sig Start Date End Date Taking? Authorizing Provider   acetaminophen-codeine (TYLENOL #4) 300-60 MG per tablet Take 1 tablet by mouth daily as needed for Pain for up to 30 days.  6/7/21 7/7/21 Yes SPENSER Mullins   metoprolol tartrate (LOPRESSOR) 25 MG tablet Take 1.5 tablets by mouth 2 times daily 5/18/21 6/17/21 Yes Yessica Mcgrath MD   gabapentin (NEURONTIN) 300 MG capsule TAKE ONE CAPSULE BY MOUTH THREE TIMES A DAY 4/27/21 7/1/21 Yes Yessica Mcgrath MD   losartan (COZAAR) 100 MG tablet Take 1 tablet by mouth daily 4/27/21  Yes Yessica Mcgrath MD   diclofenac sodium (VOLTAREN) 1 % GEL Apply 4 g topically 4 times daily as needed for Pain 4/27/21  Yes Yessica Mcgrath MD   sucralfate (CARAFATE) 1 GM tablet TAKE ONE TABLET BY MOUTH twice a day, wean as tolerated 4/27/21  Yes Yessica Mcgrath MD   valACYclovir (VALTREX) 1 g tablet Take 1 tablet by mouth daily For 5 days as needed for Herpes outbreak 4/27/21  Yes Yessica Mcgrath MD   albuterol sulfate  (90 Base) MCG/ACT inhaler INHALE TWO PUFFS BY MOUTH EVERY 6 HOURS AS NEEDED FOR WHEEZING. 3/22/21  Yes Jorge Alberto Lame, DO   amLODIPine (NORVASC) 5 MG tablet TAKE ONE TABLET BY MOUTH EVERY DAY 3/22/21  Yes Jorge Alberto Lame, DO   chlorthalidone (HYGROTON) 25 MG tablet Take 1 tablet by mouth daily 3/22/21  Yes Jorge Alberto Lame, DO Other Topics Concern    Not on file   Social History Narrative    Not on file     Social Determinants of Health     Financial Resource Strain:     Difficulty of Paying Living Expenses:    Food Insecurity:     Worried About Running Out of Food in the Last Year:     [address] in the Last Year:    Transportation Needs:     Lack of Transportation (Medical):  Lack of Transportation (Non-Medical):    Physical Activity:     Days of Exercise per Week:     Minutes of Exercise per Session:    Stress:     Feeling of Stress :    Social Connections:     Frequency of Communication with Friends and Family:     Frequency of Social Gatherings with Friends and Family:     Attends [employer] Services:     Active Member of Clubs or Organizations:     Attends Club or Organization Meetings:     Marital Status:    Intimate Partner Violence:     Fear of Current or Ex-Partner:     Emotionally Abused:     Physically Abused:     Sexually Abused:        Family History   Problem Relation Age of Onset    Diabetes Mother     Arthritis Mother     Atrial Fibrillation Mother     Diabetes Father     Atrial Fibrillation Father     Arthritis Father     Emphysema Father     Cancer Sister        REVIEW OF SYSTEMS:     Eliseo Counts denies fever/chills, chest pain, shortness of breath, new bowel or bladder complaints. All other review of systems was negative. PHYSICAL EXAMINATION:      /78   Pulse 62   Temp 95.9 °F (35.5 °C) (Infrared)   Resp 16   Ht 5' 5\" (1.651 m)   Wt 230 lb (104.3 kg)   SpO2 94%   BMI 38.27 kg/m²     General:      General appearance:   pleasant and well-hydrated. , in no discomfort and A & O x3  Build:Overweight  Function:Rises from a seated position with difficulty    HEENT:    Head:normocephalic and atraumatic  Pupils:regular, round and equal.  Sclera: icterus absent,    Lungs:    Breathing:Normal expansion. No wheezing.     Abdomen:    Shape:non-distended and normal    Extremities:    Tremors:None bilaterally upper and lower  Range of motion:Generally normal shoulders  Intact:Yes  Varicose veins:not assessed   Cyanosis:none  Edema:Normal    Neurological:    Gait: Not observed. Dermatology:    Skin:no unusual rashes, no skin lesions, no palpable subcutaneous nodules and good skin turgor    Impression:    Patient seen for follow up for her chronic bilateral hip pain and low back pain due to severe degenerative changes and axial c/o neck pain   Would benefit from Cervical MBB, patient uninterested   Patient is s/p:  Left MERVAT on 2/22/2021    Continue Gabapentin 300 mg TID per PCP  Continue Tylenol #4 QD prn  Compounding cream. Helping a lot. OARRS report reviewed 06/2021  Buccal ordered today  Patient encouraged to remain active as tolerated   Treatment plan discussed with the patient including medications and side effects     Controlled Substance Monitoring:    Acute and Chronic Pain Monitoring:   RX Monitoring 6/7/2021   Periodic Controlled Substance Monitoring Possible medication side effects, risk of tolerance/dependence & alternative treatments discussed. ;No signs of potential drug abuse or diversion identified. ;Assessed functional status. ;Obtaining appropriate analgesic effect of treatment. ;Random urine drug screen sent today. We discussed with the patient that combining opioids, benzodiazepines, alcohol, illicit drugs or sleep aids increases the risk of respiratory depression including death. We discussed that these medications may cause drowsiness, sedation or dizziness and have counseled the patient not to drive or operate machinery. We have discussed that these medications will be prescribed only by one provider. We have discussed with the patient about age related risk factors and have thoroughly discussed the importance of taking these medications as prescribed. The patient verbalizes understanding.     ccreferring physic

## 2021-06-07 NOTE — PROGRESS NOTES
Do you currently have any of the following:    Fever: No  Headache:  No  Cough: No  Shortness of breath: No  Exposed to anyone with these symptoms: No                                                                                                                Hyacinth Robles presents to the [address] [address] on 6/7/2021. Priscilla Resendiz is complaining of pain lower back. The pain is persistent. The pain is described as aching, throbbing, shooting and stabbing. Pain is rated on her best day at a 7, on her worst day at a 10, and on average at a 8 on the VAS scale. She took her last dose of gabapentin and tyenol 4 . Priscilla Resendiz does not have issues with constipation. Any procedures since your last visit: No, with  % relief. She is not on NSAIDS and  is  on anticoagulation medications to include ASA and is managed by Mckenna Silva MD  .     Pacemaker or defibrillator: No Physician managing device is . Medication Contract and Consent for Opioid Use Documents Filed     Patient Documents       Type of Document Status Date Received Received By Description     Medication Contract Received 3/1/2019  1:43 PM [facility] PAIN MANAGEMENT PT AGREEMENT 3/1/2019     Medication Contract Received 10/8/2020  1:21 PM [facility] [address] [place] [address] pain pt agreement                   /78   Pulse 62   Temp 95.9 °F (35.5 °C) (Infrared)   Resp 16   Ht 5' 5\" (1.651 m)   Wt 230 lb (104.3 kg)   SpO2 94%   BMI 38.27 kg/m²      No LMP recorded.  Patient is postmenopausal.

## 2021-06-30 DIAGNOSIS — G89.4 CHRONIC PAIN SYNDROME: ICD-10-CM

## 2021-06-30 RX ORDER — GABAPENTIN 300 MG/1
CAPSULE ORAL
Qty: 90 CAPSULE | Refills: 0 | Status: SHIPPED
Start: 2021-06-30 | End: 2021-08-06 | Stop reason: SDUPTHER

## 2021-08-05 ASSESSMENT — PATIENT HEALTH QUESTIONNAIRE - PHQ9
SUM OF ALL RESPONSES TO PHQ QUESTIONS 1-9: 0
1. LITTLE INTEREST OR PLEASURE IN DOING THINGS: 0
SUM OF ALL RESPONSES TO PHQ QUESTIONS 1-9: 0
2. FEELING DOWN, DEPRESSED OR HOPELESS: 0
SUM OF ALL RESPONSES TO PHQ QUESTIONS 1-9: 0
SUM OF ALL RESPONSES TO PHQ9 QUESTIONS 1 & 2: 0

## 2021-08-05 ASSESSMENT — LIFESTYLE VARIABLES
AUDIT-C TOTAL SCORE: INCOMPLETE
AUDIT TOTAL SCORE: INCOMPLETE
HOW OFTEN DO YOU HAVE A DRINK CONTAINING ALCOHOL: 0
HOW OFTEN DO YOU HAVE A DRINK CONTAINING ALCOHOL: NEVER

## 2021-08-06 ENCOUNTER — OFFICE VISIT (OUTPATIENT)
Dept: FAMILY MEDICINE CLINIC | Age: 61
End: 2021-08-06
Payer: MEDICARE

## 2021-08-06 VITALS
DIASTOLIC BLOOD PRESSURE: 71 MMHG | BODY MASS INDEX: 39.99 KG/M2 | HEART RATE: 62 BPM | WEIGHT: 240 LBS | HEIGHT: 65 IN | SYSTOLIC BLOOD PRESSURE: 126 MMHG

## 2021-08-06 DIAGNOSIS — Z00.00 ROUTINE GENERAL MEDICAL EXAMINATION AT A HEALTH CARE FACILITY: Primary | ICD-10-CM

## 2021-08-06 DIAGNOSIS — I16.1 HYPERTENSIVE EMERGENCY: ICD-10-CM

## 2021-08-06 DIAGNOSIS — G89.4 CHRONIC PAIN SYNDROME: ICD-10-CM

## 2021-08-06 DIAGNOSIS — I10 HYPERTENSION, UNSPECIFIED TYPE: ICD-10-CM

## 2021-08-06 DIAGNOSIS — Z76.0 MEDICATION REFILL: ICD-10-CM

## 2021-08-06 PROCEDURE — 3017F COLORECTAL CA SCREEN DOC REV: CPT | Performed by: FAMILY MEDICINE

## 2021-08-06 PROCEDURE — G0402 INITIAL PREVENTIVE EXAM: HCPCS | Performed by: FAMILY MEDICINE

## 2021-08-06 RX ORDER — FLUTICASONE PROPIONATE 50 MCG
2 SPRAY, SUSPENSION (ML) NASAL DAILY
Qty: 1 BOTTLE | Refills: 3 | Status: SHIPPED
Start: 2021-08-06 | End: 2021-12-03 | Stop reason: SDUPTHER

## 2021-08-06 RX ORDER — CHLORTHALIDONE 25 MG/1
25 TABLET ORAL DAILY
Qty: 30 TABLET | Refills: 3 | Status: SHIPPED
Start: 2021-08-06 | End: 2021-12-03 | Stop reason: SDUPTHER

## 2021-08-06 RX ORDER — AMLODIPINE BESYLATE 5 MG/1
TABLET ORAL
Qty: 30 TABLET | Refills: 3 | Status: SHIPPED
Start: 2021-08-06 | End: 2021-12-03 | Stop reason: SDUPTHER

## 2021-08-06 RX ORDER — LOSARTAN POTASSIUM 100 MG/1
100 TABLET ORAL DAILY
Qty: 30 TABLET | Refills: 3 | Status: SHIPPED
Start: 2021-08-06 | End: 2021-12-03 | Stop reason: SDUPTHER

## 2021-08-06 RX ORDER — PANTOPRAZOLE SODIUM 40 MG/1
TABLET, DELAYED RELEASE ORAL
Qty: 30 TABLET | Refills: 3 | Status: SHIPPED
Start: 2021-08-06 | End: 2021-12-03 | Stop reason: SDUPTHER

## 2021-08-06 RX ORDER — GABAPENTIN 300 MG/1
CAPSULE ORAL
Qty: 90 CAPSULE | Refills: 3 | Status: SHIPPED
Start: 2021-08-06 | End: 2021-12-03 | Stop reason: SDUPTHER

## 2021-08-06 RX ORDER — POTASSIUM CHLORIDE 750 MG/1
TABLET, EXTENDED RELEASE ORAL
Qty: 30 TABLET | Refills: 3 | Status: SHIPPED
Start: 2021-08-06 | End: 2021-12-03 | Stop reason: SDUPTHER

## 2021-08-06 NOTE — ACP (ADVANCE CARE PLANNING)
Advance Care Planning     Advance Care Planning (ACP) Physician/NP/PA Conversation    Date of Conversation: 8/6/2021  Conducted with: Patient with Decision Making Capacity  Son, Keyla Verdin also present    Healthcare Decision Maker:        Click here to complete [place] including selection of the Healthcare Decision Maker Relationship (ie \"Primary\")  Today we documented Decision Maker(s). The patient will provide ACP documents. Care Preferences:    Hospitalization: \"If your health worsens and it becomes clear that your chance of recovery is unlikely, what would be your preference regarding hospitalization? \"  The patient would prefer hospitalization. Ventilation: \"If you were unable to breath on your own and your chance of recovery was unlikely, what would be your preference about the use of a ventilator (breathing machine) if it was available to you? \"  The patient would desire the use of a ventilator. and Would discuss with family if after a period of time her chance of recovery was limited. Resuscitation: \"In the event your heart stopped as a result of an underlying serious health condition, would you want attempts made to restart your heart, or would you prefer a natural death? \"  Yes, attempt to resuscitate. and would discuss with family if after a period of time, there was little chance of recovery.     benefit/burden of treatment options    Conversation Outcomes / Follow-Up Plan:  ACP in process - completing/providing documents  Reviewed DNR/DNI and patient elects Full Code (Attempt Resuscitation)    Length of Voluntary ACP Conversation in minutes:  <16 minutes (Non-Billable)    Yeyo Burgos MD

## 2021-08-06 NOTE — PATIENT INSTRUCTIONS
Personalized Preventive Plan for Nadia Limon - 8/6/2021  Medicare offers a range of preventive health benefits. Some of the tests and screenings are paid in full while other may be subject to a deductible, co-insurance, and/or copay. Some of these benefits include a comprehensive review of your medical history including lifestyle, illnesses that may run in your family, and various assessments and screenings as appropriate. After reviewing your medical record and screening and assessments performed today your provider may have ordered immunizations, labs, imaging, and/or referrals for you. A list of these orders (if applicable) as well as your Preventive Care list are included within your After Visit Summary for your review. Other Preventive Recommendations:    · A preventive eye exam performed by an eye specialist is recommended every 1-2 years to screen for glaucoma; cataracts, macular degeneration, and other eye disorders. · A preventive dental visit is recommended every 6 months. · Try to get at least 150 minutes of exercise per week or 10,000 steps per day on a pedometer . · Order or download the FREE \"Exercise & Physical Activity: Your Everyday Guide\" from [employer]. Call 7-504.204.2050 or search [employer] online. · You need 7241-9480 mg of calcium and 8817-0887 IU of vitamin D per day. It is possible to meet your calcium requirement with diet alone, but a vitamin D supplement is usually necessary to meet this goal.  · When exposed to the sun, use a sunscreen that protects against both UVA and UVB radiation with an SPF of 30 or greater. Reapply every 2 to 3 hours or after sweating, drying off with a towel, or swimming. · Always wear a seat belt when traveling in a car. Always wear a helmet when riding a bicycle or motorcycle.

## 2021-08-06 NOTE — PROGRESS NOTES
Medicare Annual Wellness Visit  Name: Cory Palomares Date: 2021   MRN: [id number] Sex: Female   Age: 57 y.o. Ethnicity: Non- / Non    : 1960 Race: White (non-)      Balbir Maurer is here for Medicare AWV    Screenings for behavioral, psychosocial and functional/safety risks, and cognitive dysfunction are all negative except as indicated below. These results, as well as other patient data from the [address] [facility] [address] form, are documented in Flowsheets linked to this Encounter. Allergies   Allergen Reactions    Latex Hives     Hives and rash    Iodine Rash     Hives . pt. States anaphalyxis    Aloe Hives     Hives     Celebrex [Celecoxib] Itching    Fish-Derived Products Other (See Comments)       Prior to Visit Medications    Medication Sig Taking? Authorizing Provider   gabapentin (NEURONTIN) 300 MG capsule TAKE ONE CAPSULE BY MOUTH THREE TIMES A DAY  Antoine Fink MD   metoprolol tartrate (LOPRESSOR) 25 MG tablet Take 1.5 tablets by mouth 2 times daily  Antoine Fink MD   losartan (COZAAR) 100 MG tablet Take 1 tablet by mouth daily  Antoine Fink MD   diclofenac sodium (VOLTAREN) 1 % GEL Apply 4 g topically 4 times daily as needed for Pain  Antoine Fink MD   sucralfate (CARAFATE) 1 GM tablet TAKE ONE TABLET BY MOUTH twice a day, wean as tolerated  Antoine Fink MD   valACYclovir (VALTREX) 1 g tablet Take 1 tablet by mouth daily For 5 days as needed for Herpes outbreak  Antoine Fink MD   carBAMazepine (TEGRETOL XR) 100 MG extended release tablet Take 1 tablet by mouth 3 times daily  Carlos Casanova DO   albuterol sulfate  (90 Base) MCG/ACT inhaler INHALE TWO PUFFS BY MOUTH EVERY 6 HOURS AS NEEDED FOR WHEEZING.   Lynn Martha, DO   amLODIPine (NORVASC) 5 MG tablet TAKE ONE TABLET BY MOUTH EVERY DAY  Lynn Martha, DO   chlorthalidone (HYGROTON) 25 MG tablet Take 1 tablet by mouth daily  Lynn Martha DO   fluticasone (FLONASE) 50 MCG/ACT nasal spray 2 sprays by Each Nostril route daily  Lonne Sweet, DO   pantoprazole (PROTONIX) 40 MG tablet TAKE ONE TABLET BY MOUTH EVERY DAY  Lonbeena Sweet, DO   potassium chloride (KLOR-CON M) 10 MEQ extended release tablet TAKE ONE TABLET BY MOUTH DAILY WITH BREAKFAST  Lonbeena Kaminskin, DO   aspirin EC 81 MG EC tablet Take 1 tablet by mouth 2 times daily for 28 days  Patient not taking: Reported on 4/27/2021  Lonbeena Sweet, DO   Multiple Vitamins-Minerals (THERAPEUTIC MULTIVITAMIN-MINERALS) tablet Take 1 tablet by mouth daily  Historical Provider, MD   diclofenac sodium (VOLTAREN) 1 % GEL APPLY ONE GRAM EXTERNALLY TWICE A DAY TO NECK AND ONE GRAM EXTERNALLY TWICE A DAY TO BACK   Reatha Hou, [address]  Devices (ROLLATOR) MISC 1 each by Does not apply route daily as needed (use as needed for stability)  Cole Grover MD       Past Medical History:   Diagnosis Date    Brain aneurysm 09/2018    H/O TIMES 2 THAT BURST PER PATIENT    Cerebral artery occlusion with cerebral infarction ([facility] [address]     RT SIDE AFFECTED-LEARNED TO WALK AND WRITE AGAIN    Degenerative arthritis of hand     Headache     Hiatal hernia     Hip problem     NEEDS HIP REPLACEMENT PER PATIENT    Hx of abnormal cervical Pap smear     Hypertension     [facility]roke [facility]rebrum) [facility])        Past Surgical History:   Procedure Laterality Date    BRAIN ANEURYSM SURGERY      coiling     COLONOSCOPY  08/30/2019    polyps--frank    COLONOSCOPY N/A 8/30/2019    COLONOSCOPY POLYPECTOMY SNARE/COLD BIOPSY performed by Kelsey Blanc MD at [address]- DONE AT [address] Left 2/22/2021    LEFT HIP TOTAL ARTHROPLASTY performed by nAna Zamudio MD at [address] [facility] ENDOSCOPY  08/30/2019    gastritis with superficial ulcerations; duodenitis--frank    UPPER GASTROINTESTINAL ENDOSCOPY N/A 8/30/2019    EGD Habits/Nutrition:  Health Habits/Nutrition  Do you exercise for at least 20 minutes 2-3 times per week?: (!) No  Have you lost any weight without trying in the past 3 months?: No  Do you eat only one meal per day?: No  Have you seen the dentist within the past year?: (!) No     Health Habits/Nutrition Interventions:  · Inadequate physical activity:  patient is not ready to increase his/her physical activity level at this time, due to hip pain     Safety:  Safety  Do you have working smoke detectors?: Yes  Have all throw rugs been removed or fastened?: Yes  Do you have non-slip mats or surfaces in all bathtubs/showers?: (!) No  Do all of your stairways have a railing or banister?: Yes  Are your doorways, halls and stairs free of clutter?: Yes  Do you always fasten your seatbelt when you are in a car?: Yes  Safety Interventions:  · Home safety tips provided    ADL:  ADLs  In the past 7 days, did you need help from others to perform any of the following everyday activities? Eating, dressing, grooming, bathing, toileting, or walking/balance?: None  In the past 7 days, did you need help from others to take care of any of the following?  Laundry, housekeeping, banking/finances, shopping, telephone use, food preparation, transportation, or taking medications?: (!) Transportation  ADL Interventions:  · Patient declines any further evaluation/treatment for this issue    Personalized Preventive Plan   Current Health Maintenance Status  Immunization History   Administered Date(s) Administered    Pneumococcal Polysaccharide ([id number]) 07/17/2018    Tdap (Boostrix, Adacel) 08/16/2019        Health Maintenance   Topic Date Due    COVID-19 Vaccine (1) Never done    Shingles Vaccine (1 of 2) Never done    Low dose CT lung screening  10/29/2020    Annual Wellness Visit (AWV)  Never done    Flu vaccine (1) 09/01/2021    Breast cancer screen  10/02/2021    A1C test (Diabetic or Prediabetic)  12/04/2021    Potassium monitoring  02/24/2022    Creatinine monitoring  02/24/2022    Colon cancer screen colonoscopy  08/30/2022    Cervical cancer screen  06/04/2023    Pneumococcal 0-64 years Vaccine (2 of 2 - PPSV23) 04/05/2024    Lipid screen  12/04/2025    DTaP/Tdap/Td vaccine (2 - Td or Tdap) 08/16/2029    Hepatitis C screen  Completed    HIV screen  Completed    Hepatitis A vaccine  Aged Out    Hepatitis B vaccine  Aged Out    Hib vaccine  Aged Out    Meningococcal (ACWY) vaccine  Aged Out     Recommendations for [employer] Due: see orders and patient instructions/AVS.  .   Recommended screening schedule for the next 5-10 years is provided to the patient in written form: see Patient Instructions/AVS.    Diagnoses and all orders for this visit:    Routine general medical examination at a health care facility

## 2021-08-06 NOTE — PROGRESS NOTES
8/6/2021    Ponce Degroot is a 57 y.o. female here for   Chief Complaint   Patient presents with   [facility] AWV   also needs refills and management of hypertension  She is having some joint ramírez. Regarding hypertension. Patient is  monitoring home blood pressures. Cardiovascular risk factors: dyslipidemia, hypertension and sedentary lifestyle. Patient does not smoke currently. Currently on amlodipine 5 mg daily, losartan 100 mg daily, metoprolol 2 - take 1 1/2 tablets twice daily, chlorthalidone 25 mg. Taking as prescribed. No adverse effects. Today,  BP: 126/71 and she is asymptomatic. BP Readings from Last 3 Encounters:   08/06/21 126/71   06/07/21 122/78   04/27/21 116/79     Patient denies chest pain, diaphoresis, dyspnea, dyspnea on exertion, peripheral edema, palpitations, headache, vision changes. Wt Readings from Last 3 Encounters:   08/06/21 240 lb (108.9 kg)   06/07/21 230 lb (104.3 kg)   05/19/21 230 lb (104.3 kg)       She  reports that she quit smoking about 2 years ago. Her smoking use included cigarettes. She has a 64.50 pack-year smoking history. She has never used smokeless tobacco.    Medications and allergies reviewed and updated in chart. Current Outpatient Medications   Medication Sig Dispense Refill    gabapentin (NEURONTIN) 300 MG capsule TAKE ONE CAPSULE BY MOUTH THREE TIMES A DAY 90 capsule 0    losartan (COZAAR) 100 MG tablet Take 1 tablet by mouth daily 30 tablet 3    diclofenac sodium (VOLTAREN) 1 % GEL Apply 4 g topically 4 times daily as needed for Pain 150 g 5    valACYclovir (VALTREX) 1 g tablet Take 1 tablet by mouth daily For 5 days as needed for Herpes outbreak 5 tablet 1    albuterol sulfate  (90 Base) MCG/ACT inhaler INHALE TWO PUFFS BY MOUTH EVERY 6 HOURS AS NEEDED FOR WHEEZING.  1 Inhaler 3    amLODIPine (NORVASC) 5 MG tablet TAKE ONE TABLET BY MOUTH EVERY DAY 30 tablet 3    chlorthalidone (HYGROTON) 25 MG tablet Take 1 tablet by mouth daily 30 tablet 3    fluticasone (FLONASE) 50 MCG/ACT nasal spray 2 sprays by Each Nostril route daily 1 Bottle 3    pantoprazole (PROTONIX) 40 MG tablet TAKE ONE TABLET BY MOUTH EVERY DAY 30 tablet 3    potassium chloride (KLOR-CON M) 10 MEQ extended release tablet TAKE ONE TABLET BY MOUTH DAILY WITH BREAKFAST 30 tablet 3    Multiple Vitamins-Minerals (THERAPEUTIC MULTIVITAMIN-MINERALS) tablet Take 1 tablet by mouth daily      diclofenac sodium (VOLTAREN) 1 % GEL APPLY ONE GRAM EXTERNALLY TWICE A DAY TO NECK AND ONE GRAM EXTERNALLY TWICE A DAY TO BACK  100 g 2    Misc. Devices (ROLLATOR) MISC 1 each by Does not apply route daily as needed (use as needed for stability) 1 each 0    metoprolol tartrate (LOPRESSOR) 25 MG tablet Take 1.5 tablets by mouth 2 times daily 90 tablet 3    sucralfate (CARAFATE) 1 GM tablet TAKE ONE TABLET BY MOUTH twice a day, wean as tolerated (Patient not taking: Reported on 8/6/2021) 60 tablet 0    carBAMazepine (TEGRETOL XR) 100 MG extended release tablet Take 1 tablet by mouth 3 times daily 270 tablet 1    aspirin EC 81 MG EC tablet Take 1 tablet by mouth 2 times daily for 28 days (Patient not taking: Reported on 4/27/2021) 56 tablet 3     No current facility-administered medications for this visit. Patient'spast medical, surgical, social and/or family history reviewed, updated in chart, and are non-contributory (unless otherwise stated). Review of Systems  Review of Systems   Constitutional: Negative for chills and fever. Respiratory: Negative for cough and wheezing. Cardiovascular: Negative for chest pain and leg swelling. Neurological: Negative for dizziness and headaches. PE:  VS:  /71   Pulse 62   Ht 5' 5\" (1.651 m)   Wt 240 lb (108.9 kg)   BMI 39.94 kg/m²   Physical Exam  Constitutional:       Appearance: She is well-developed. HENT:      Head: Normocephalic and atraumatic.    Cardiovascular:      Rate and Rhythm: Normal rate and regular rhythm. Heart sounds: No murmur heard. No friction rub. No gallop. Pulmonary:      Effort: Pulmonary effort is normal.      Breath sounds: Normal breath sounds. No wheezing or rales. Skin:     General: Skin is warm and dry. Neurological:      Mental Status: She is alert and oriented to person, place, and time. Assessment/Plan:  George Orellana was seen today for medicare awv. Diagnoses and all orders for this visit:    Hypertension  -     metoprolol tartrate (LOPRESSOR) 25 MG tablet; Take 1.5 tablets by mouth 2 times daily  -     losartan (COZAAR) 100 MG tablet; Take 1 tablet by mouth daily  -     amLODIPine (NORVASC) 5 MG tablet; TAKE ONE TABLET BY MOUTH EVERY DAY  -     chlorthalidone (HYGROTON) 25 MG tablet; Take 1 tablet by mouth daily    -     pantoprazole (PROTONIX) 40 MG tablet; TAKE ONE TABLET BY MOUTH EVERY DAY  -     fluticasone (FLONASE) 50 MCG/ACT nasal spray; 2 sprays by Each Nostril route daily  -     potassium chloride (KLOR-CON M) 10 MEQ extended release tablet; TAKE ONE TABLET BY MOUTH DAILY WITH BREAKFAST    Chronic pain syndrome  -     gabapentin (NEURONTIN) 300 MG capsule; TAKE ONE CAPSULE BY MOUTH THREE TIMES A DAY    f/u     Advised patient to call with any new medication issues. All questions answered.   Call or go to emergency department ifsymptoms worsen or persist.

## 2021-08-13 ASSESSMENT — ENCOUNTER SYMPTOMS
WHEEZING: 0
COUGH: 0

## 2021-08-17 NOTE — TELEPHONE ENCOUNTER
MA contacted [facility] for prior authorization. No prior authorization needed for outpatient EGD and Colonoscopy with Dr. Nubia Barrera at [facility] in [place] due to doctor and facility being in network. MA Spoke with Elizabeth Bonner, authorization Specialist. Reference number [id number]. MA scanned authorization form in media tab.     Electronically signed by Frank Spear on 8/14/19 at 4:14 PM 5

## 2021-08-19 ENCOUNTER — OFFICE VISIT (OUTPATIENT)
Dept: PAIN MANAGEMENT | Age: 61
End: 2021-08-19
Payer: MEDICARE

## 2021-08-19 VITALS
TEMPERATURE: 97.3 F | SYSTOLIC BLOOD PRESSURE: 120 MMHG | HEIGHT: 65 IN | DIASTOLIC BLOOD PRESSURE: 72 MMHG | BODY MASS INDEX: 39.99 KG/M2 | RESPIRATION RATE: 16 BRPM | OXYGEN SATURATION: 95 % | HEART RATE: 73 BPM | WEIGHT: 240 LBS

## 2021-08-19 DIAGNOSIS — M16.11 ARTHRITIS OF RIGHT HIP: ICD-10-CM

## 2021-08-19 DIAGNOSIS — M47.816 LUMBAR FACET ARTHROPATHY: ICD-10-CM

## 2021-08-19 DIAGNOSIS — M47.812 FACET ARTHROPATHY, CERVICAL: ICD-10-CM

## 2021-08-19 DIAGNOSIS — M50.30 DEGENERATIVE DISC DISEASE, CERVICAL: ICD-10-CM

## 2021-08-19 DIAGNOSIS — M51.9 LUMBAR DISC DISORDER: ICD-10-CM

## 2021-08-19 DIAGNOSIS — M50.30 DEGENERATION OF CERVICAL DISC WITHOUT MYELOPATHY: ICD-10-CM

## 2021-08-19 DIAGNOSIS — M16.12 PRIMARY OSTEOARTHRITIS OF LEFT HIP: ICD-10-CM

## 2021-08-19 DIAGNOSIS — M54.16 LUMBAR RADICULITIS: ICD-10-CM

## 2021-08-19 DIAGNOSIS — M47.812 ARTHROPATHY OF CERVICAL FACET JOINT: ICD-10-CM

## 2021-08-19 DIAGNOSIS — M54.2 CERVICALGIA: ICD-10-CM

## 2021-08-19 DIAGNOSIS — M47.816 LUMBAR SPONDYLOSIS: ICD-10-CM

## 2021-08-19 DIAGNOSIS — G89.4 CHRONIC PAIN SYNDROME: Primary | ICD-10-CM

## 2021-08-19 PROCEDURE — G8427 DOCREV CUR MEDS BY ELIG CLIN: HCPCS | Performed by: PHYSICIAN ASSISTANT

## 2021-08-19 PROCEDURE — 3017F COLORECTAL CA SCREEN DOC REV: CPT | Performed by: PHYSICIAN ASSISTANT

## 2021-08-19 PROCEDURE — G8417 CALC BMI ABV UP PARAM F/U: HCPCS | Performed by: PHYSICIAN ASSISTANT

## 2021-08-19 PROCEDURE — 99213 OFFICE O/P EST LOW 20 MIN: CPT | Performed by: PHYSICIAN ASSISTANT

## 2021-08-19 PROCEDURE — 1036F TOBACCO NON-USER: CPT | Performed by: PHYSICIAN ASSISTANT

## 2021-08-19 RX ORDER — METHOCARBAMOL 750 MG/1
TABLET, FILM COATED ORAL
COMMUNITY
Start: 2021-05-15 | End: 2021-11-02

## 2021-08-19 RX ORDER — ACETAMINOPHEN AND CODEINE PHOSPHATE 60; 300 MG/1; MG/1
1 TABLET ORAL DAILY PRN
Qty: 30 TABLET | Refills: 1 | Status: SHIPPED
Start: 2021-08-19 | End: 2021-10-07 | Stop reason: SDUPTHER

## 2021-08-19 NOTE — PROGRESS NOTES
[facility]  [address] [facility], [address] [place] [address]  [phone]    Follow up Note      Bhargav James     Date of Visit:  8/19/2021    CC:  Patient presents for follow up   Chief Complaint   Patient presents with    Lower Back Pain       HPI:    Pain is unchanged. Left hip is doing well. Right hip is painful. Possible surgery coming up. Appropriate analgesia with current medications regimen: yes. Change in quality of symptoms:no. Medication side effects:none. Recent diagnostic testing:none. Recent interventional procedures:none. She has not been on anticoagulation medications to include none. The patient  has not been on herbal supplements. The patient is not diabetic.     Imaging studies:    Cervical XR 11/2019 Severe degenerative changes      Lumbar spine Xray 03/2019  Scoliosis and osteoarthritis.     Bilateral hip Xray 03/2019   Advanced osteoarthrosis of bilateral hips. Potential Aberrant Drug-Related Behavior: None     Urine Drug Screening:  First office visit saliva screen showed no narcotics   11/2019 Consistent, negative for all  06/2020 Consistent  12/2020 Consistent  06/2021 Consistent     OARRS report:  03/2019 consistent to 06/2021 Consistent  (norco on 9/24 from dentist for teeth extraction).   08/2021 Consistent    Opioid Agreement:  10/08/2020    Past Medical History:   Diagnosis Date    Brain aneurysm 09/2018    H/O TIMES 2 THAT BURST PER PATIENT    Cerebral artery occlusion with cerebral infarction (HCC)     RT SIDE AFFECTED-LEARNED TO WALK AND WRITE AGAIN    Degenerative arthritis of hand     Headache     Hiatal hernia     Hip problem     NEEDS HIP REPLACEMENT PER PATIENT    Hx of abnormal cervical Pap smear     Hypertension     [facility]roke [facility])        Past Surgical History:   Procedure Laterality Date    BRAIN ANEURYSM SURGERY      coiling     COLONOSCOPY  08/30/2019 polyps--frank    COLONOSCOPY N/A 8/30/2019    COLONOSCOPY POLYPECTOMY SNARE/COLD BIOPSY performed by Rosita Flores MD at [address] [facility]- DONE AT [address] Left 2/22/2021    LEFT HIP TOTAL ARTHROPLASTY performed by Lakesha Santiago MD at [facility]  08/30/2019    gastritis with superficial ulcerations; duodenitis--frank    UPPER GASTROINTESTINAL ENDOSCOPY N/A 8/30/2019    EGD BIOPSY performed by Rosita Flores MD at [address] [facility]       Prior to Admission medications    Medication Sig Start Date End Date Taking?  Authorizing Provider   methocarbamol (ROBAXIN) 750 MG tablet TAKE ONE TABLET BY MOUTH THREE TIMES A DAY 5/15/21  Yes Historical Provider, MD   metoprolol tartrate (LOPRESSOR) 25 MG tablet Take 1.5 tablets by mouth 2 times daily 8/6/21 9/5/21 Yes Tiffanie Avina MD   losartan (COZAAR) 100 MG tablet Take 1 tablet by mouth daily 8/6/21  Yes Tiffanie Avina MD   amLODIPine (NORVASC) 5 MG tablet TAKE ONE TABLET BY MOUTH EVERY DAY 8/6/21  Yes Tiffanie Avina MD   chlorthalidone (HYGROTON) 25 MG tablet Take 1 tablet by mouth daily 8/6/21  Yes Tiffanie Avina MD   gabapentin (NEURONTIN) 300 MG capsule TAKE ONE CAPSULE BY MOUTH THREE TIMES A DAY 8/6/21 10/10/21 Yes Tiffanie Avina MD   pantoprazole (PROTONIX) 40 MG tablet TAKE ONE TABLET BY MOUTH EVERY DAY 8/6/21  Yes Tiffanie Avina MD   fluticasone (FLONASE) 50 MCG/ACT nasal spray 2 sprays by Each Nostril route daily 8/6/21  Yes Tiffanie Avina MD   potassium chloride (KLOR-CON M) 10 MEQ extended release tablet TAKE ONE TABLET BY MOUTH DAILY WITH BREAKFAST 8/6/21  Yes Tiffanie Avina MD   valACYclovir (VALTREX) 1 g tablet Take 1 tablet by mouth daily For 5 days as needed for Herpes outbreak 4/27/21  Yes Tiffanie Avina MD   albuterol sulfate  (90 Base) MCG/ACT inhaler INHALE TWO PUFFS BY MOUTH EVERY 6 HOURS AS NEEDED FOR WHEEZING. 3/22/21  Yes Kofi Boyce, DO   Multiple Vitamins-Minerals (THERAPEUTIC MULTIVITAMIN-MINERALS) tablet Take 1 tablet by mouth daily   Yes Historical Provider, MD   diclofenac sodium (VOLTAREN) 1 % GEL APPLY ONE GRAM EXTERNALLY TWICE A DAY TO NECK AND ONE GRAM EXTERNALLY TWICE A DAY TO BACK  11/3/20  Yes Vero Scott, [address] Devices (ROLLATOR) MISC 1 each by Does not apply route daily as needed (use as needed for stability) 3/17/20  Yes Carol Patton MD       Allergies   Allergen Reactions    Latex Hives     Hives and rash    Iodine Rash     Hives . pt. States anaphalyxis    Aloe Hives     Hives     Celebrex [Celecoxib] Itching    Fish-Derived Products Other (See Comments)       Social History     Socioeconomic History    Marital status:      Spouse name: Not on file    Number of children: Not on file    Years of education: Not on file    Highest education level: Not on file   Occupational History    Not on file   Tobacco Use    Smoking status: Former Smoker     Packs/day: 1.50     Years: 43.00     Pack years: 64.50     Types: Cigarettes     Quit date: 2018     Years since quittin.9    Smokeless tobacco: Never Used   Vaping Use    Vaping Use: Never used   Substance and Sexual Activity    Alcohol use: No    Drug use: No    Sexual activity: Not Currently   Other Topics Concern    Not on file   Social History Narrative    Not on file     Social Determinants of Health     Financial Resource Strain:     Difficulty of Paying Living Expenses:    Food Insecurity:     Worried About Running Out of Food in the Last Year:     [address] in the Last Year:    Transportation Needs:     Lack of Transportation (Medical):      Lack of Transportation (Non-Medical):    Physical Activity:     Days of Exercise per Week:     Minutes of Exercise per Session:    Stress:     Feeling of Stress :    Social Connections:     Frequency of Communication with Friends and Family:     Frequency of Social Gatherings with Friends and Family:     Attends [employer] Services:     Active Member of Clubs or Organizations:     Attends Club or Organization Meetings:     Marital Status:    Intimate Partner Violence:     Fear of Current or Ex-Partner:     Emotionally Abused:     Physically Abused:     Sexually Abused:        Family History   Problem Relation Age of Onset    Diabetes Mother     Arthritis Mother     Atrial Fibrillation Mother     Diabetes Father     Atrial Fibrillation Father     Arthritis Father     Emphysema Father     Cancer Sister        REVIEW OF SYSTEMS:     Jared Santos denies fever/chills, chest pain, shortness of breath, new bowel or bladder complaints. All other review of systems was negative. PHYSICAL EXAMINATION:      /72   Pulse 73   Temp 97.3 °F (36.3 °C) (Infrared)   Resp 16   Ht 5' 5\" (1.651 m)   Wt 240 lb (108.9 kg)   SpO2 95%   BMI 39.94 kg/m²     General:      General appearance:   pleasant and well-hydrated. , in no discomfort and A & O x3  Build:Overweight  Function:Rises from a seated position with difficulty    HEENT:    Head:normocephalic and atraumatic  Pupils:regular, round and equal.  Sclera: icterus absent,    Lungs:    Breathing:Normal expansion. No wheezing. Abdomen:    Shape:non-distended and normal    Extremities:    Tremors:None bilaterally upper and lower  Range of motion:Generally normal shoulders  Intact:Yes  Varicose veins:not assessed   Cyanosis:none  Edema:Normal    Neurological:    Gait: Not observed.     Dermatology:    Skin:no unusual rashes, no skin lesions, no palpable subcutaneous nodules and good skin turgor    Impression:    Patient seen for follow up for her chronic bilateral hip pain and low back pain due to severe degenerative changes and axial c/o neck pain   Would benefit from Cervical MBB, patient uninterested   Patient is s/p:  Left MERVAT on 2/22/2021    Continue Gabapentin 300 mg TID per PCP  Continue Tylenol #4 QD prn  Compounding cream. Helping a lot. OARRS report reviewed 08/2021  Buccal ordered today  Patient encouraged to remain active as tolerated   Treatment plan discussed with the patient including medications and side effects     Controlled Substance Monitoring:    Acute and Chronic Pain Monitoring:   RX Monitoring 8/19/2021   Periodic Controlled Substance Monitoring Possible medication side effects, risk of tolerance/dependence & alternative treatments discussed. ;No signs of potential drug abuse or diversion identified. ;Assessed functional status. ;Obtaining appropriate analgesic effect of treatment. We discussed with the patient that combining opioids, benzodiazepines, alcohol, illicit drugs or sleep aids increases the risk of respiratory depression including death. We discussed that these medications may cause drowsiness, sedation or dizziness and have counseled the patient not to drive or operate machinery. We have discussed that these medications will be prescribed only by one provider. We have discussed with the patient about age related risk factors and have thoroughly discussed the importance of taking these medications as prescribed. The patient verbalizes understanding.     ccreferring physic

## 2021-08-19 NOTE — PROGRESS NOTES
Do you currently have any of the following:    Fever: No  Headache:  No  Cough: No  Shortness of breath: No  Exposed to anyone with these symptoms: No                                                                                                                Yony Mclaughlin presents to the [facility] on 8/19/2021. Candy Maxwell is complaining of pain in lower back and legs. . The pain is constant. The pain is described as aching, throbbing, shooting, stabbing and sharp. Pain is rated on her best day at a 5, on her worst day at a 9, and on average at a 8 on the VAS scale. She took her last dose of Neurontin, Tylenol with codeine and robaxin and diclofenac gel . Candy Maxwell does not have issues with constipation. Any procedures since your last visit: No,      She is not on NSAIDS and  is not on anticoagulation medications to include none and is managed by NA. Pacemaker or defibrillator: No Physician managing device is NA. Medication Contract and Consent for Opioid Use Documents Filed     Patient Documents       Type of Document Status Date Received Received By Description     Medication Contract Received 3/1/2019  1:43 PM FRANCIS MCINTOSH PAIN MANAGEMENT PT AGREEMENT 3/1/2019     Medication Contract Received 10/8/2020  1:21 PM FRANCIS MCINTOSH pain pt agreement                   /72   Pulse 73   Temp 97.3 °F (36.3 °C) (Infrared)   Resp 16   Ht 5' 5\" (1.651 m)   Wt 240 lb (108.9 kg)   SpO2 95%   BMI 39.94 kg/m²      No LMP recorded.  Patient is postmenopausal.

## 2021-09-01 ENCOUNTER — OFFICE VISIT (OUTPATIENT)
Dept: ORTHOPEDIC SURGERY | Age: 61
End: 2021-09-01
Payer: MEDICARE

## 2021-09-01 ENCOUNTER — HOSPITAL ENCOUNTER (OUTPATIENT)
Dept: GENERAL RADIOLOGY | Age: 61
Discharge: HOME | End: 2021-09-03
Payer: MEDICARE

## 2021-09-01 VITALS — TEMPERATURE: 97.5 F

## 2021-09-01 DIAGNOSIS — Z96.642 HISTORY OF TOTAL LEFT HIP ARTHROPLASTY: ICD-10-CM

## 2021-09-01 DIAGNOSIS — M16.11 ARTHRITIS OF RIGHT HIP: ICD-10-CM

## 2021-09-01 DIAGNOSIS — M16.11 PRIMARY OSTEOARTHRITIS OF RIGHT HIP: ICD-10-CM

## 2021-09-01 DIAGNOSIS — Z11.59 SCREENING FOR VIRAL DISEASE: Primary | ICD-10-CM

## 2021-09-01 PROCEDURE — 1036F TOBACCO NON-USER: CPT | Performed by: ORTHOPAEDIC SURGERY

## 2021-09-01 PROCEDURE — 73502 X-RAY EXAM HIP UNI 2-3 VIEWS: CPT

## 2021-09-01 PROCEDURE — 99213 OFFICE O/P EST LOW 20 MIN: CPT | Performed by: ORTHOPAEDIC SURGERY

## 2021-09-01 PROCEDURE — 99215 OFFICE O/P EST HI 40 MIN: CPT | Performed by: ORTHOPAEDIC SURGERY

## 2021-09-01 PROCEDURE — G8417 CALC BMI ABV UP PARAM F/U: HCPCS | Performed by: ORTHOPAEDIC SURGERY

## 2021-09-01 PROCEDURE — G8427 DOCREV CUR MEDS BY ELIG CLIN: HCPCS | Performed by: ORTHOPAEDIC SURGERY

## 2021-09-01 PROCEDURE — 3017F COLORECTAL CA SCREEN DOC REV: CPT | Performed by: ORTHOPAEDIC SURGERY

## 2021-09-01 NOTE — PATIENT INSTRUCTIONS
You will need to be medically cleared before surgery by your family doctor. Please call your doctor to set up an appointment for medical clearance as soon as possible and have the office fax your medical clearance to :   (FAX) [phone]   ATTN:  IESHA    1. Your surgery is scheduled for RIGHT TOTAL HIP REPLACEMENT when medically cleared  with Dr. Joshua Ospina MD at the [facility] in [facility] . You will need to report to Preop area  that morning at 90 mins prior to surgery. 2. You are having Outpatient surgery but will plan to stay overnight  3. Preadmission Testing (PAT) department at [facility] will contact you with all the details prior to surgery. 4. Nothing to eat or drink after midnight the night before surgery. You may take a pain pill and any other medicine PAT instructs you to take with small sip of water if needed. 5. You will need to attend Joint Education Class prior to surgery- Tuesday AM from 10-11 am  6. Continue with ice and elevation to reduce swelling  7. Weightbearing as tolerated right lower and left lower, use assistive devices as needed  8. Take pain medicine as instructed  9. Call office with any question or concerns: [phone]. Hold Aspirin the day of surgery. Hold all NSAIDs 7 days prior to surgery    ALL TOTAL JOINT PATIENTS WILL BE WEANED OFF ANY NARCOTIC MEDICATION GIVEN POST OPERATIVELY BY AT THE LATEST 3 WEEKS FROM DATE OF SURGERY    Due to increased risk of infections, it is important to plan for getting treatment for significant dental diseases (including tooth extractions, root canal and periodontal work) before your total joint surgery. Routine teeth cleaning should be delayed until you are 3 months post op      OUTPATIENT ORTHOPEDIC SURGERY  PRE-OP COVID TESTING    If you are fully vaccinated (at least 2 weeks from second dose of vaccine):      No pre-operative COVID19 test is needed if you show your COVID19 vaccine card or a photo of the vaccine card at either your PAT (pre-admission testing) appointment or the day of your surgery. If you fail to show evidence of your COVID19 vaccination, a rapid test will be administered the day of your surgery. Patient who are not fully vaccinated:     Carlie Rahat testing is still required - please see below     We need to have COVID-19 Testing done 4 days (not including Sunday) prior to your scheduled surgery   After your testing is completed you will need to Self Quarantine until date of surgery     If your surgery is scheduled for:  Monday- Testing needs to be done Wednesday Tuesday- Testing needs to be done Thursday Wednesday- Testing needs to be done Friday Thursday- Testing needs to be done Friday Friday- Testing needs to be done Monday    Locations and hours are listed below: These sites will not test anyone without a physician order. The order can be in [facility] or can be a paper order.     [facility] PRIMARY CARE ([facility])   [address] TESTING: Monday - Friday 7AM -10 AM  WALK IN TESTING: Monday - Friday 8AM-4PM, Saturday 8AM-11AM    [place]- Monday-Friday 6a-230p  [address] [facility] Testing Monday -Friday 6a-10a only  Site will be [address] [place] [address], [facility] [place] Pae will follow white signs that say SURGERY TESTING   Please bring a valid photo ID with you   Please bring order for COVID 19 test with you   Pre-Admission Testing will also contact you to review COVID 19 testing and protocol

## 2021-09-01 NOTE — PROGRESS NOTES
Chief Complaint   Patient presents with    Hip Injury     Left Hip pn 2/10. Pt expressed having minimal pain most often to almost having no pain. Pt exporessed after sitting for long durations of time Left Hip discomfort. Pt has expressed having numbeness and tingling on Right Hip since surgery on 2/22/2021. Pt has difficulty sleeping through the night due to pain in Left hip from time to time. Pt expressed that only happens 4-5 times a month. SUBJECTIVE: Patient is a very pleasant 68-year-old female comes in today with chief complaint of bilateral hip osteoarthritis. She about 6 months out from her left hip. She was wheelchair-bound before for surgery now she is getting around to some degree. She is having significant right groin pain now that she is got more active. She does have severe DJD in her right hip. She is having difficulty due to the right hip states the left hip has no pain. She denies any recent falls or traumas. Review of Systems   Constitutional: Negative for fever, chills, diaphoresis, appetite change and fatigue. HENT: Negative for dental issues, hearing loss and tinnitus. Negative for congestion, sinus pressure, sneezing, sore throat. Negative for headache. Eyes: Negative for visual disturbance, blurred and double vision. Negative for pain, discharge, redness and itching  Respiratory: Negative for cough, shortness of breath and wheezing. Cardiovascular: Negative for chest pain, palpitations and leg swelling. No dyspnea on exertion   Gastrointestinal:   Negative for nausea, vomiting, abdominal pain, diarrhea, constipation  or black or bloody. Hematologic\Lymphatic:  negative for bleeding, petechiae,   Genitourinary: Negative for hematuria and difficulty urinating. Musculoskeletal: Negative for neck pain and stiffness. Negative for back pain, see HPI  Skin: Negative for pallor, rash and wound.    Neurological: Negative for dizziness, tremors, seizures, weakness, light-headedness, no TIA or stroke symptoms. No numbness and headaches. Psychiatric/Behavioral: Negative. Past Medical History:   Diagnosis Date    Brain aneurysm 2018    H/O TIMES 2 THAT BURST PER PATIENT    Cerebral artery occlusion with cerebral infarction (HCC)     RT SIDE AFFECTED-LEARNED TO WALK AND WRITE AGAIN    Degenerative arthritis of hand     Headache     Hiatal hernia     Hip problem     NEEDS HIP REPLACEMENT PER PATIENT    Hx of abnormal cervical Pap smear     Hypertension     [facility])      Past Surgical History:   Procedure Laterality Date    BRAIN ANEURYSM SURGERY      coiling     COLONOSCOPY  2019    polyps--frank    COLONOSCOPY N/A 2019    COLONOSCOPY POLYPECTOMY SNARE/COLD BIOPSY performed by Nadine Cameron MD at [address] [facility]- DONE AT [address] TOTAL HIP ARTHROPLASTY Left 2021    LEFT HIP TOTAL ARTHROPLASTY performed by Jesús Call MD at [place] [address] ENDOSCOPY  2019    gastritis with superficial ulcerations; duodenitis--frank    UPPER GASTROINTESTINAL ENDOSCOPY N/A 2019    EGD BIOPSY [facility]ine [facility] ENDOSCOPY     Family History   Problem Relation Age of Onset    Diabetes Mother    Unk Fujisawa Arthritis Mother     Atrial Fibrillation Mother     Diabetes Father    Unk Fujisawa Atrial Fibrillation Father     Arthritis Father    Unk Fujisawa Emphysema Father     Cancer Sister      Social History     Tobacco Use    Smoking status: Former Smoker     Packs/day: 1.50     Years: 43.00     Pack years: 64.50     Types: Cigarettes     Quit date: 2018     Years since quittin.9    Smokeless tobacco: Never Used   Vaping Use    Vaping Use: Never used   Substance Use Topics    Alcohol use: No    Drug use: No     Allergies   Allergen Reactions    Latex Hives     Hives and rash    Iodine Rash     Hives . pt.  States anaphalyxis    Aloe Hives     Hives     Celebrex [Celecoxib] Itching    Fish-Derived Products Other (See Comments)       OBJECTIVE:      Physical Examination:   General appearance: alert, well appearing, and in no distress, over weight. No visible signs of trauma   Mental status: alert, oriented to person, place, and time, normal mood, behavior, speech, dress, motor activity, and thought processes  Abdomen: soft, nondistended  Resp:   resp easy and unlabored, no audible wheezes note, normal symmetrical expansion of both hemithoraces  Cardiac: distal pulses palpable, skin and extremities well perfused  Neurological: alert, oriented X3, normal speech, no focal findings or movement disorder noted, motor and sensory grossly normal bilaterally, normal muscle tone, no tremors, strength 5/5, normal gait and station  HEENT: normochephalic atraumatic, external ears and eyes normal, sclera normal, neck supple, no nasal discharge.    Extremities:   peripheral pulses normal, no edema, redness or tenderness in the calves   Skin: normal coloration, no rashes or open wounds, no suspicious skin lesions noted  Psych: Affect euthymic   Musculoskeletal:   Extremity:  Left hip   Skin intact, surgical incision well-healed with no signs of infection   With hip range of motion with no clunking or pain   Compartments soft and compressible   Calves soft and non tender    5/5 EHL, TA, GS   2/4 DP and PT pulses   Sensation intact distally   Capillary refill less than 3 seconds     Right hip   Skin intact   Compartments soft and compressible   Pain with any hip range of motion internal rotation limited to about 5 degrees, no hip flexion contracture   Calves soft and non tender    5/5 EHL, TA, GS   2/4 DP and PT pulses   Sensation intact distally   Capillary refill less than 3 seconds         Temp 97.5 °F (36.4 °C) (Oral)      XR: 9/1/21 x-ray shows left hip well fixed with no subsidence and components in good position, she is about 5 mm to a centimeter longer on the left side than the right. The right is degenerative and has worn superior and lateral.  She has severe DJD in the right hip. ASSESSMENT:     Diagnosis Orders   1. Screening for viral disease  COVID-19 Ambulatory   2. Arthritis of right hip     3. Primary osteoarthritis of right hip          Discussion: The patient detail about the fact that we could do physical therapy to see if we get her right hip feeling better. She would like to go on to a joint replacement. She states she has tried therapy in the past.  This is with limiting her from getting around. I went over the risk including death and anesthesia, infection requiring explantation, need for further operations, continued pain, the fact that she is deconditioned and could remain deconditioned limiting her ambulation, deep venous thrombosis, and any other unforeseeable complications. She understood and would like to go for the right total hip replacement.     PLAN:    Right total arthroplasty after clearance    Call with any questions or concerns    ELECTRONICALLY signed by:    Tristian Guzman MD  9/1/21

## 2021-09-13 ENCOUNTER — TELEPHONE (OUTPATIENT)
Dept: FAMILY MEDICINE CLINIC | Age: 61
End: 2021-09-13

## 2021-09-14 NOTE — TELEPHONE ENCOUNTER
Please call patient   Since she is having back pain there could be a concern for an upper UTI  - would strongly suggest coming in TODAY for a poct ua/ urine culture , evaluation. Antibiotics could be prescribed at that time. If no availability okay to add to my schedule. Please let me know what she says.

## 2021-09-16 ENCOUNTER — OFFICE VISIT (OUTPATIENT)
Dept: FAMILY MEDICINE CLINIC | Age: 61
End: 2021-09-16
Payer: MEDICARE

## 2021-09-16 VITALS
SYSTOLIC BLOOD PRESSURE: 138 MMHG | OXYGEN SATURATION: 98 % | TEMPERATURE: 93.5 F | HEART RATE: 75 BPM | RESPIRATION RATE: 18 BRPM | BODY MASS INDEX: 39.49 KG/M2 | HEIGHT: 65 IN | DIASTOLIC BLOOD PRESSURE: 77 MMHG | WEIGHT: 237 LBS

## 2021-09-16 DIAGNOSIS — R30.0 DYSURIA: ICD-10-CM

## 2021-09-16 DIAGNOSIS — N30.01 ACUTE CYSTITIS WITH HEMATURIA: Primary | ICD-10-CM

## 2021-09-16 LAB
BILIRUBIN, POC: NORMAL
BLOOD URINE, POC: NORMAL
CLARITY, POC: CLEAR
COLOR, POC: YELLOW
GLUCOSE URINE, POC: NORMAL
KETONES, POC: NORMAL
LEUKOCYTE EST, POC: NORMAL
NITRITE, POC: NORMAL
PH, POC: 5.5
PROTEIN, POC: 100
SPECIFIC GRAVITY, POC: 1.02
UROBILINOGEN, POC: 0.2

## 2021-09-16 PROCEDURE — 3017F COLORECTAL CA SCREEN DOC REV: CPT | Performed by: STUDENT IN AN ORGANIZED HEALTH CARE EDUCATION/TRAINING PROGRAM

## 2021-09-16 PROCEDURE — 81002 URINALYSIS NONAUTO W/O SCOPE: CPT | Performed by: STUDENT IN AN ORGANIZED HEALTH CARE EDUCATION/TRAINING PROGRAM

## 2021-09-16 PROCEDURE — 99213 OFFICE O/P EST LOW 20 MIN: CPT | Performed by: STUDENT IN AN ORGANIZED HEALTH CARE EDUCATION/TRAINING PROGRAM

## 2021-09-16 PROCEDURE — G8417 CALC BMI ABV UP PARAM F/U: HCPCS | Performed by: STUDENT IN AN ORGANIZED HEALTH CARE EDUCATION/TRAINING PROGRAM

## 2021-09-16 PROCEDURE — G8427 DOCREV CUR MEDS BY ELIG CLIN: HCPCS | Performed by: STUDENT IN AN ORGANIZED HEALTH CARE EDUCATION/TRAINING PROGRAM

## 2021-09-16 PROCEDURE — 1036F TOBACCO NON-USER: CPT | Performed by: STUDENT IN AN ORGANIZED HEALTH CARE EDUCATION/TRAINING PROGRAM

## 2021-09-16 RX ORDER — CARBAMAZEPINE 100 MG/1
100 TABLET, EXTENDED RELEASE ORAL 2 TIMES DAILY
COMMUNITY
End: 2021-11-23 | Stop reason: SDUPTHER

## 2021-09-16 RX ORDER — FLUCONAZOLE 150 MG/1
150 TABLET ORAL ONCE
Qty: 1 TABLET | Refills: 1 | Status: SHIPPED | OUTPATIENT
Start: 2021-09-16 | End: 2021-09-16

## 2021-09-16 RX ORDER — NITROFURANTOIN 25; 75 MG/1; MG/1
100 CAPSULE ORAL 2 TIMES DAILY
Qty: 10 CAPSULE | Refills: 0 | Status: SHIPPED | OUTPATIENT
Start: 2021-09-16 | End: 2021-09-21

## 2021-09-16 SDOH — ECONOMIC STABILITY: FOOD INSECURITY: WITHIN THE PAST 12 MONTHS, THE FOOD YOU BOUGHT JUST DIDN'T LAST AND YOU DIDN'T HAVE MONEY TO GET MORE.: NEVER TRUE

## 2021-09-16 SDOH — ECONOMIC STABILITY: FOOD INSECURITY: WITHIN THE PAST 12 MONTHS, YOU WORRIED THAT YOUR FOOD WOULD RUN OUT BEFORE YOU GOT MONEY TO BUY MORE.: NEVER TRUE

## 2021-09-16 ASSESSMENT — ENCOUNTER SYMPTOMS
EYE PAIN: 0
NAUSEA: 0
ABDOMINAL PAIN: 0
DIARRHEA: 0
COUGH: 0
ABDOMINAL DISTENTION: 0
SHORTNESS OF BREATH: 0
TROUBLE SWALLOWING: 0
CONSTIPATION: 0
SORE THROAT: 0
PHOTOPHOBIA: 0
VOMITING: 0

## 2021-09-16 ASSESSMENT — SOCIAL DETERMINANTS OF HEALTH (SDOH): HOW HARD IS IT FOR YOU TO PAY FOR THE VERY BASICS LIKE FOOD, HOUSING, MEDICAL CARE, AND HEATING?: NOT HARD AT ALL

## 2021-09-16 NOTE — PROGRESS NOTES
9/16/2021    Wilber Tarango is a 57 y.o. femalehere for: dysuria and back pain    HPI:    Urgency for urination  Dysuria and itching as well  No hematuria  Hx of kidney infection, years ago  No fever, chills, nausea or vomiting  No abdominal pain  Diarrhea on chronic, but no change  U/A in clinic is unremarkable  Urine culture from 3/22/21 showed E. Coli that was pan-sensative    Following with neurosurgery for brain aneurysm. Will have f/u CTA in December, pt aware  Also in discussion about right hip replacement as well in future with Dr. Jerrod West  F/u with pain clinic as well, currently on acetaminophen-codeine  Gapabentin 300 mg TID per PCP    BP Readings from Last 3 Encounters:   09/16/21 138/77   08/19/21 120/72   08/06/21 126/71     Current Outpatient Medications   Medication Sig Dispense Refill    carBAMazepine (TEGRETOL XR) 100 MG extended release tablet Take 100 mg by mouth 3 times daily      nitrofurantoin, macrocrystal-monohydrate, (MACROBID) 100 MG capsule Take 1 capsule by mouth 2 times daily for 5 days 10 capsule 0    fluconazole (DIFLUCAN) 150 MG tablet Take 1 tablet by mouth once for 1 dose 1 tablet 1    methocarbamol (ROBAXIN) 750 MG tablet TAKE ONE TABLET BY MOUTH THREE TIMES A DAY      acetaminophen-codeine (TYLENOL #4) 300-60 MG per tablet Take 1 tablet by mouth daily as needed for Pain for up to 30 days.  30 tablet 1    losartan (COZAAR) 100 MG tablet Take 1 tablet by mouth daily 30 tablet 3    amLODIPine (NORVASC) 5 MG tablet TAKE ONE TABLET BY MOUTH EVERY DAY 30 tablet 3    chlorthalidone (HYGROTON) 25 MG tablet Take 1 tablet by mouth daily 30 tablet 3    gabapentin (NEURONTIN) 300 MG capsule TAKE ONE CAPSULE BY MOUTH THREE TIMES A DAY 90 capsule 3    pantoprazole (PROTONIX) 40 MG tablet TAKE ONE TABLET BY MOUTH EVERY DAY 30 tablet 3    fluticasone (FLONASE) 50 MCG/ACT nasal spray 2 sprays by Each Nostril route daily 1 Bottle 3    potassium chloride (KLOR-CON M) 10 MEQ extended release tablet TAKE ONE TABLET BY MOUTH DAILY WITH BREAKFAST 30 tablet 3    valACYclovir (VALTREX) 1 g tablet Take 1 tablet by mouth daily For 5 days as needed for Herpes outbreak 5 tablet 1    albuterol sulfate  (90 Base) MCG/ACT inhaler INHALE TWO PUFFS BY MOUTH EVERY 6 HOURS AS NEEDED FOR WHEEZING. 1 Inhaler 3    Multiple Vitamins-Minerals (THERAPEUTIC MULTIVITAMIN-MINERALS) tablet Take 1 tablet by mouth daily      diclofenac sodium (VOLTAREN) 1 % GEL APPLY ONE GRAM EXTERNALLY TWICE A DAY TO NECK AND ONE GRAM EXTERNALLY TWICE A DAY TO BACK  100 g 2    Misc. Devices (ROLLATOR) MISC 1 each by Does not apply route daily as needed (use as needed for stability) 1 each 0    metoprolol tartrate (LOPRESSOR) 25 MG tablet Take 1.5 tablets by mouth 2 times daily 90 tablet 3     No current facility-administered medications for this visit. Allergies   Allergen Reactions    Latex Hives     Hives and rash    Iodine Rash     Hives . pt.  States anaphalyxis    Aloe Hives     Hives     Celebrex [Celecoxib] Itching    Fish-Derived Products Other (See Comments)       Past Medical & Surgical History:      Diagnosis Date    Brain aneurysm 09/2018    H/O TIMES 2 THAT BURST PER PATIENT    Cerebral artery occlusion with cerebral infarction ([facility] [address]     RT SIDE AFFECTED-LEARNED TO WALK AND WRITE AGAIN    Degenerative arthritis of hand     Headache     Hiatal hernia     Hip problem     NEEDS HIP REPLACEMENT PER PATIENT    Hx of abnormal cervical Pap smear     Hypertension     [facility]roke [facility]um[facility])      Past Surgical History:   Procedure Laterality Date    BRAIN ANEURYSM SURGERY      coiling     COLONOSCOPY  08/30/2019    polyps--frank    COLONOSCOPY N/A 8/30/2019    COLONOSCOPY POLYPECTOMY SNARE/COLD [facility]OPSY [facility] [address] HIP ARTHROPLASTY Left 2/22/2021    LEFT HIP TOTAL ARTHROPLASTY performed by Lori Davis MD at [facility] ENDOSCOPY  08/30/2019    gastritis with superficial ulcerations; duodenitis--frank    UPPER GASTROINTESTINAL ENDOSCOPY N/A 8/30/2019    EGD BIOPSY performed by Ulysses Gallego MD at [facility] History:      Problem Relation Age of Onset    Diabetes Mother    Ashely Curl Arthritis Mother     Atrial Fibrillation Mother     Diabetes Father    Ashely Curl Atrial Fibrillation Father     Arthritis Father    Ashely Curl Emphysema Father     Cancer Sister        Social History:  Social History     Tobacco Use    Smoking status: Former Smoker     Packs/day: 1.50     Years: 43.00     Pack years: 64.50     Types: Cigarettes     Quit date: 9/9/2018     Years since quitting: 3.0    Smokeless tobacco: Never Used   Substance Use Topics    Alcohol use: No       Immunization History   Administered Date(s) Administered    Pneumococcal Polysaccharide ([id number]) 07/17/2018    Tdap (Boostrix, Adacel) 08/16/2019       Review of Systems   Constitutional: Negative for activity change, appetite change, chills, fatigue and fever. HENT: Negative for congestion, sore throat and trouble swallowing. Eyes: Negative for photophobia, pain and visual disturbance. Respiratory: Negative for cough and shortness of breath. Cardiovascular: Negative for chest pain, palpitations and leg swelling. Gastrointestinal: Negative for abdominal distention, abdominal pain, constipation, diarrhea, nausea and vomiting. Endocrine: Negative for heat intolerance and polydipsia. Genitourinary: Positive for dysuria, frequency and urgency. Negative for difficulty urinating, hematuria, pelvic pain and vaginal pain. Musculoskeletal: Negative for joint swelling, neck pain and neck stiffness. Skin: Negative for rash and wound. Neurological: Negative for dizziness, syncope, weakness, light-headedness, numbness and headaches.    Psychiatric/Behavioral: Negative for agitation, behavioral problems and confusion. VS:  /77   Pulse 75   Temp 93.5 °F (34.2 °C)   Resp 18   Ht 5' 5\" (1.651 m)   Wt 237 lb (107.5 kg)   SpO2 98%   BMI 39.44 kg/m²     Physical Exam  Constitutional:       General: She is not in acute distress. Appearance: Normal appearance. She is not ill-appearing. HENT:      Head: Normocephalic and atraumatic. Right Ear: External ear normal.      Left Ear: External ear normal.      Nose: Nose normal. No congestion. Mouth/Throat:      Mouth: Mucous membranes are moist.      Pharynx: Oropharynx is clear. Eyes:      General:         Right eye: No discharge. Left eye: No discharge. Extraocular Movements: Extraocular movements intact. Conjunctiva/sclera: Conjunctivae normal.   Cardiovascular:      Rate and Rhythm: Normal rate and regular rhythm. Pulses: Normal pulses. Heart sounds: Murmur (systolic) heard. Pulmonary:      Effort: Pulmonary effort is normal. No respiratory distress. Breath sounds: Normal breath sounds. No wheezing. Abdominal:      General: Bowel sounds are normal. There is no distension. Palpations: Abdomen is soft. There is no mass. Tenderness: There is no abdominal tenderness. There is no right CVA tenderness, left CVA tenderness, guarding or rebound. Musculoskeletal:         General: No swelling or tenderness. Normal range of motion. Cervical back: Normal range of motion and neck supple. Right lower leg: No edema. Left lower leg: No edema. Skin:     General: Skin is warm. Capillary Refill: Capillary refill takes less than 2 seconds. Coloration: Skin is not jaundiced. Neurological:      General: No focal deficit present. Mental Status: She is alert and oriented to person, place, and time. Mental status is at baseline. Psychiatric:         Mood and Affect: Mood normal.         Behavior: Behavior normal.         Thought Content:  Thought content normal.         Judgment: Judgment normal.         Assessment/Plan:  1. Acute cystitis with hematuria  - U/A wnl, no nitrite or LE. Will send for urine culture. No CVA tenderness b/l. Last urine culture was pan-sensitive from 3/21  - will send macrobid for 5 days. Encouraged to stay hydrated. - if symptoms worsen, call office  - nitrofurantoin, macrocrystal-monohydrate, (MACROBID) 100 MG capsule; Take 1 capsule by mouth 2 times daily for 5 days  Dispense: 10 capsule; Refill: 0  - Culture, Urine; Future    2. Dysuria  - POCT Urinalysis no Micro  - fluconazole (DIFLUCAN) 150 MG tablet; Take 1 tablet by mouth once for 1 dose  Dispense: 1 tablet; Refill: 1  - Culture, Urine; Future      Follow up:  prn    Patient agrees with the above stated plan. Counseled regarding above diagnosis, including possible risks and complications,  especially if left uncontrolled. Counseled regarding the possible side effects, risks, benefits and alternatives to treatment;patient and/or guardian verbalizes understanding, agrees, feels comfortable with and wishes to proceed with above treatment plan. Call or go to ED immediately if symptoms worsen or persist. Advised patient to call with any new medication issues, and, as applicable, read all Rx info from pharmacy to assure aware of all possible risks and side effects of medicationbefore taking. Patient and/or guardian given opportunity to ask questions/raise concerns. The patient verbalized comfort and understanding ofinstructions. I encourage further reading and education about your health conditions. Information on many health conditions is provided by [facility] [employer]: [email or web address]  Please bring any questions to me at your nextvisit.       Yeni Rodriguez MD  Family Medicine PGY-3

## 2021-09-16 NOTE — PATIENT INSTRUCTIONS
Patient Education        Painful Urination (Dysuria): Care Instructions  Your Care Instructions  Burning pain with urination (dysuria) is a common symptom of a urinary tract infection or other urinary problems. The bladder may become inflamed. This can cause pain when the bladder fills and empties. You may also feel pain if the tube that carries urine from the bladder to the outside of the body (urethra) gets irritated or infected. Sexually transmitted infections (STIs) also may cause pain when you urinate. Sometimes the pain can be caused by things other than an infection. The urethra can be irritated by soaps, perfumes, or foreign objects in the urethra. Kidney stones can cause pain when they pass through the urethra. The cause may be hard to find. You may need tests. Treatment for painful urination depends on the cause. Follow-up care is a key part of your treatment and safety. Be sure to make and go to all appointments, and call your doctor if you are having problems. It's also a good idea to know your test results and keep a list of the medicines you take. How can you care for yourself at home? · Drink extra water for the next day or two. This will help make the urine less concentrated. (If you have kidney, heart, or liver disease and have to limit fluids, talk with your doctor before you increase the amount of fluids you drink.)  · Avoid drinks that are carbonated or have caffeine. They can irritate the bladder. · Urinate often. Try to empty your bladder each time. For women:  · Urinate right after you have sex. · After going to the bathroom, wipe from front to back. · Avoid douches, bubble baths, and feminine hygiene sprays. And avoid other feminine hygiene products that have deodorants. When should you call for help? Call your doctor now or seek immediate medical care if:    · You have new symptoms, such as fever, nausea, or vomiting.     · You have new or worse symptoms of a urinary problem. For example:  ? You have blood or pus in your urine. ? You have chills or body aches. ? It hurts worse to urinate. ? You have groin or belly pain. ? You have pain in your back just below your rib cage (the flank area). Watch closely for changes in your health, and be sure to contact your doctor if you have any problems. Where can you learn more? Go to https://[email or web address].[email or web address] org and sign in to your [employer] account. Enter M175 in the [employer] box to learn more about \"Painful Urination (Dysuria): Care Instructions. \"     If you do not have an account, please click on the \"Sign Up Now\" link. Current as of: February 10, 2021               Content Version: 12.9  © 2006-2021 [employer]. Care instructions adapted under license by [facility] ([facility]). If you have questions about a medical condition or this instruction, always ask your healthcare professional. [place] [address] any warranty or liability for your use of this information. Patient Education        Urinary Tract Infection (UTI) in Women: Care Instructions  Overview     A urinary tract infection, or UTI, is a general term for an infection anywhere between the kidneys and the urethra (where urine comes out). Most UTIs are bladder infections. They often cause pain or burning when you urinate. UTIs are caused by bacteria and can be cured with antibiotics. Be sure to complete your treatment so that the infection does not get worse. Follow-up care is a key part of your treatment and safety. Be sure to make and go to all appointments, and call your doctor if you are having problems. It's also a good idea to know your test results and keep a list of the medicines you take. How can you care for yourself at home? · Take your antibiotics as directed. Do not stop taking them just because you feel better. You need to take the full course of antibiotics.   · Drink extra water and other fluids for the next day or two. This will help make the urine less concentrated and help wash out the bacteria that are causing the infection. (If you have kidney, heart, or liver disease and have to limit fluids, talk with your doctor before you increase the amount of fluids you drink.)  · Avoid drinks that are carbonated or have caffeine. They can irritate the bladder. · Urinate often. Try to empty your bladder each time. · To relieve pain, take a hot bath or lay a heating pad set on low over your lower belly or genital area. Never go to sleep with a heating pad in place. To prevent UTIs  · Drink plenty of water each day. This helps you urinate often, which clears bacteria from your system. (If you have kidney, heart, or liver disease and have to limit fluids, talk with your doctor before you increase the amount of fluids you drink.)  · Urinate when you need to. · If you are sexually active, urinate right after you have sex. · Change sanitary pads often. · Avoid douches, bubble baths, feminine hygiene sprays, and other feminine hygiene products that have deodorants. · After going to the bathroom, wipe from front to back. When should you call for help? Call your doctor now or seek immediate medical care if:    · Symptoms such as fever, chills, nausea, or vomiting get worse or appear for the first time.     · You have new pain in your back just below your rib cage. This is called flank pain.     · There is new blood or pus in your urine.     · You have any problems with your antibiotic medicine. Watch closely for changes in your health, and be sure to contact your doctor if:    · You are not getting better after taking an antibiotic for 2 days.     · Your symptoms go away but then come back. Where can you learn more? Go to https://tatianaeb.[facility]. org and sign in to your [employer] account.  Enter T094 in the [facility] box to learn more about \"Urinary Tract Infection (UTI) in Women: Care

## 2021-09-16 NOTE — PROGRESS NOTES
Subjective:    Cricket Lawson is here with dysuria and low back pain. She had a UTI in December showing E. Coli pansensitive. She also has some vaginal itching. ROS: Otherwise negative    Patient Active Problem List   Diagnosis    [facility]oid bleed ([facility]    Hypertensive emergency    Obesity (BMI 30-39. 9)    Hypertension    Chronic pain syndrome    Lumbar facet arthropathy    Lumbar disc disorder    Primary osteoarthritis of both hips    Arthritis of right hip    Dysphagia    Heartburn    At risk for colon cancer    Colon polyps    Degenerative disc disease, cervical    Arthropathy of cervical facet joint    Abnormal blood sugar    Arthritis of both hips    COVID-19    H/O total hip arthroplasty, right    History of total left hip arthroplasty    Primary osteoarthritis of right hip    Pulmonary emphysema ([facility]    History of herpes genitalis       Past medical, surgical, family and social history were reviewed, non-contributory, and unchanged unless otherwise stated. Objective:    /77   Pulse 75   Temp 93.5 °F (34.2 °C)   Resp 18   Ht 5' 5\" (1.651 m)   Wt 237 lb (107.5 kg)   SpO2 98%   BMI 39.44 kg/m²     Exam is as noted by resident with the following changes, additions or corrections:      Assessment/Plan:        Cricket Lawson was seen today for urinary tract infection. Diagnoses and all orders for this visit:    Acute cystitis with hematuria  -     nitrofurantoin, macrocrystal-monohydrate, (MACROBID) 100 MG capsule; Take 1 capsule by mouth 2 times daily for 5 days  -     Culture, Urine; Future    Dysuria  -     POCT Urinalysis no Micro  -     fluconazole (DIFLUCAN) 150 MG tablet; Take 1 tablet by mouth once for 1 dose  -     Culture, Urine; Future           Attending Physician Statement    I have reviewed the chart, including any radiology or labs. I have discussed the case, including pertinent history and exam findings with the resident.   I agree with the assessment, plan and orders as documented by the resident. Please refer to the resident note for additional information.       Electronically signed by Luiza De Los Santos DO on 9/20/2021 at 6:45 PM

## 2021-09-19 LAB — URINE CULTURE, ROUTINE: NORMAL

## 2021-09-21 ENCOUNTER — OFFICE VISIT (OUTPATIENT)
Dept: FAMILY MEDICINE CLINIC | Age: 61
End: 2021-09-21
Payer: MEDICARE

## 2021-09-21 VITALS
BODY MASS INDEX: 39.49 KG/M2 | OXYGEN SATURATION: 100 % | TEMPERATURE: 97 F | DIASTOLIC BLOOD PRESSURE: 73 MMHG | HEART RATE: 67 BPM | SYSTOLIC BLOOD PRESSURE: 147 MMHG | WEIGHT: 237 LBS | HEIGHT: 65 IN | RESPIRATION RATE: 16 BRPM

## 2021-09-21 DIAGNOSIS — Z01.818 PRE-OP TESTING: Primary | ICD-10-CM

## 2021-09-21 PROCEDURE — G8427 DOCREV CUR MEDS BY ELIG CLIN: HCPCS | Performed by: FAMILY MEDICINE

## 2021-09-21 PROCEDURE — 1036F TOBACCO NON-USER: CPT | Performed by: FAMILY MEDICINE

## 2021-09-21 PROCEDURE — G8417 CALC BMI ABV UP PARAM F/U: HCPCS | Performed by: FAMILY MEDICINE

## 2021-09-21 PROCEDURE — 93000 ELECTROCARDIOGRAM COMPLETE: CPT | Performed by: FAMILY MEDICINE

## 2021-09-21 PROCEDURE — 99213 OFFICE O/P EST LOW 20 MIN: CPT | Performed by: FAMILY MEDICINE

## 2021-09-21 PROCEDURE — 3017F COLORECTAL CA SCREEN DOC REV: CPT | Performed by: FAMILY MEDICINE

## 2021-09-21 RX ORDER — ACETAMINOPHEN AND CODEINE PHOSPHATE 300; 60 MG/1; MG/1
1 TABLET ORAL EVERY 4 HOURS PRN
COMMUNITY
End: 2021-10-07

## 2021-09-21 NOTE — PROGRESS NOTES
[facility] [address]  Precepting Note    Subjective:  Pre-op , hip replacement    PMH HTN, COPD. Left hip replaced earlier this year. ASA for 6 weeks after. No blood thinners now. Historical SAH, aneurysm, s/p coiling. Has been on tegretol since Sept. No Headaches recently. Former smoker. Cleared for surgery by NS already. ROS otherwise negative     Past medical, surgical, family and social history were reviewed, non-contributory, and unchanged unless otherwise stated. Objective:    BP (!) 147/73   Pulse 67   Temp 97 °F (36.1 °C) (Temporal)   Resp 16   Ht 5' 5\" (1.651 m)   Wt 237 lb (107.5 kg)   SpO2 100%   Breastfeeding No   BMI 39.44 kg/m²     Exam is as noted by resident with the following changes, additions or corrections:    General:  NAD; alert & oriented x 3   Heart:  RRR, no murmurs, gallops, or rubs. Lungs:  CTA bilaterally, no wheeze, rales or rhonchi  Abd: bowel sounds present, nontender, nondistended, no masses  Extrem:  No clubbing, cyanosis, or edema    Assessment/Plan:  Pre-op  Baseline sate of health, nothing new. OK from NS perspective (Hx SAH)  BP borderline but acceptable. No Hx of clotting, bleeding, anesthesia problems. No new developments since last hip replacement earlier this year. Attending Physician Statement  I have reviewed the chart, including any radiology or labs. I have discussed the case, including pertinent history and exam findings with the resident. I agree with the assessment, plan and orders as documented by the resident. Please refer to the resident note for additional information.       Electronically signed by Gwenith Oppenheim, MD on 9/21/2021 at 10:42 AM

## 2021-09-21 NOTE — PROGRESS NOTES
[facility]  Family Medicine Residency  Phone: [phone]  Fax: [phone]    Patient:  Mala Cordon 57 y.o. female                                 Date of Service: 21                            Chiefcomplaint:   Chief Complaint   Patient presents with    Pre-op Exam     : Right hip: surgery not scheduled until cleared by PCP         History of Present Illness: The patient is a 57 y.o. female  presented to the clinic with complaints as above. Preoperative Consultation  Mala Cordon  YOB: 1960    Date of Service:  2021    Vitals:    21 1000   BP: (!) 147/73   Pulse: 67   Resp: 16   Temp: 97 °F (36.1 °C)   TempSrc: Temporal   SpO2: 100%   Weight: 237 lb (107.5 kg)   Height: 5' 5\" (1.651 m)      Wt Readings from Last 2 Encounters:   21 237 lb (107.5 kg)   21 237 lb (107.5 kg)     BP Readings from Last 3 Encounters:   21 (!) 147/73   21 138/77   21 120/72        Chief Complaint   Patient presents with   Radha Kumar Pre-op Exam     : Right hip: surgery not scheduled until cleared by PCP     Allergies   Allergen Reactions    Latex Hives     Hives and rash    Iodine Rash     Hives . pt. States anaphalyxis    Aloe Hives     Hives     Celebrex [Celecoxib] Itching    Fish-Derived Products Other (See Comments)     Outpatient Medications Marked as Taking for the 21 encounter (Office Visit) with Willie Benavides MD   Medication Sig Dispense Refill    acetaminophen-codeine (TYLENOL/CODEINE #4) 300-60 MG per tablet Take 1 tablet by mouth every 4 hours as needed for Pain.  Indications: pain management      carBAMazepine (TEGRETOL XR) 100 MG extended release tablet Take 100 mg by mouth 3 times daily      [] nitrofurantoin, macrocrystal-monohydrate, (MACROBID) 100 MG capsule Take 1 capsule by mouth 2 times daily for 5 days 10 capsule 0    metoprolol tartrate (LOPRESSOR) 25 MG tablet Take 1.5 tablets by mouth 2 times daily 90 tablet 3    losartan (COZAAR) 100 MG tablet Take 1 tablet by mouth daily 30 tablet 3    amLODIPine (NORVASC) 5 MG tablet TAKE ONE TABLET BY MOUTH EVERY DAY 30 tablet 3    chlorthalidone (HYGROTON) 25 MG tablet Take 1 tablet by mouth daily 30 tablet 3    gabapentin (NEURONTIN) 300 MG capsule TAKE ONE CAPSULE BY MOUTH THREE TIMES A DAY 90 capsule 3    pantoprazole (PROTONIX) 40 MG tablet TAKE ONE TABLET BY MOUTH EVERY DAY 30 tablet 3    fluticasone (FLONASE) 50 MCG/ACT nasal spray 2 sprays by Each Nostril route daily 1 Bottle 3    potassium chloride (KLOR-CON M) 10 MEQ extended release tablet TAKE ONE TABLET BY MOUTH DAILY WITH BREAKFAST 30 tablet 3    albuterol sulfate  (90 Base) MCG/ACT inhaler INHALE TWO PUFFS BY MOUTH EVERY 6 HOURS AS NEEDED FOR WHEEZING. 1 Inhaler 3    Multiple Vitamins-Minerals (THERAPEUTIC MULTIVITAMIN-MINERALS) tablet Take 1 tablet by mouth daily      diclofenac sodium (VOLTAREN) 1 % GEL APPLY ONE GRAM EXTERNALLY TWICE A DAY TO NECK AND ONE GRAM EXTERNALLY TWICE A DAY TO BACK  100 g 2    Misc. Devices (ROLLATOR) MISC 1 each by Does not apply route daily as needed (use as needed for stability) 1 each 0       This patient presents to the office today for a preoperative consultation at the request of surgeon, Dr. Santhosh Valdez, who plans on performing right sided arthroplasty , unsure of dates at this time , surgery planned at  [facility]. XR hip 09/01/2021 showed end stage OA at right hip and anatomically aligned left hip arthroplasty. She states she has no new cough symptoms however some cough is chronic due to emphysema. No new fever, chills, chest pain . She follows with Dr. Brianne Clarke for hx of subarachnoid bleed and brain aneurysm, she was recently started  on tegretol in 09/2021,no headaches but intermittent pain in head   Pt has been cleared from surgery by Neurosurgery. She is not on any blood thinners at this time.  She was on aspirin for 1 month after her left hip arthoplasty in Feb 2021     Known anesthesia problems: None   Bleeding risk: No recent or remote history of abnormal bleeding  Personal or FH of DVT/PE: NO  Patient objection to receiving blood products: No    Patient Active Problem List   Diagnosis    [facility] bleed ([facility] [address]    Hypertensive emergency    Obesity (BMI 30-39. 9)    Hypertension    Chronic pain syndrome    Lumbar facet arthropathy    Lumbar disc disorder    Primary osteoarthritis of both hips    Arthritis of right hip    Dysphagia    Heartburn    At risk for colon cancer    Colon polyps    Degenerative disc disease, cervical    Arthropathy of cervical facet joint    Abnormal blood sugar    Arthritis of both hips    COVID-19    H/O total hip arthroplasty, right    History of total left hip arthroplasty    Primary osteoarthritis of right hip    Pulmonary emphysema ([facility] [address]    History of herpes genitalis    Pre-op testing       Past Medical History:   Diagnosis Date    Brain aneurysm 09/2018    H/O TIMES 2 THAT BURST PER PATIENT    Cerebral artery occlusion with cerebral infarction (HCC)     RT SIDE AFFECTED-LEARNED TO WALK AND WRITE AGAIN    Degenerative arthritis of hand     Headache     Hiatal hernia     Hip problem     NEEDS HIP REPLACEMENT PER PATIENT    Hx of abnormal cervical Pap smear     Hypertension     Stroke [facility]rebrum[facility])      Past Surgical History:   Procedure Laterality Date    BRAIN ANEURYSM SURGERY      coiling     COLONOSCOPY  08/30/2019    polyps--frank    COLONOSCOPY N/A 8/30/2019    COLONOSCOPY POLYPECTOMY SNARE/COLD BIOPSY performed by Ralph Fierro MD at [address] [place] [address]- DONE AT [address] [place] [address] Left 2/22/2021    LEFT HIP TOTAL ARTHROPLASTY performed by Mini Campbell MD at [facility]  08/30/2019    gastritis with superficial ulcerations; duodenitis--frank    UPPER GASTROINTESTINAL ENDOSCOPY N/A 8/30/2019    EGD BIOPSY performed by Karel Cat MD at [place] [id number] History   Problem Relation Age of Onset    Diabetes Mother     Arthritis Mother     Atrial Fibrillation Mother     Diabetes Father     Atrial Fibrillation Father     Arthritis Father    Everjonathan Adamschal Emphysema Father     Cancer Sister      Social History     Socioeconomic History    Marital status:      Spouse name: Not on file    Number of children: Not on file    Years of education: Not on file    Highest education level: Not on file   Occupational History    Not on file   Tobacco Use    Smoking status: Former Smoker     Packs/day: 1.50     Years: 43.00     Pack years: 64.50     Types: Cigarettes     Quit date: 9/9/2018     Years since quitting: 3.0    Smokeless tobacco: Never Used   Vaping Use    Vaping Use: Never used   Substance and Sexual Activity    Alcohol use: No    Drug use: No    Sexual activity: Not Currently   Other Topics Concern    Not on file   Social History Narrative    Not on file     Social Determinants of Health     Financial Resource Strain: Low Risk     Difficulty of Paying Living Expenses: Not hard at all   Food Insecurity: No Food Insecurity    Worried About [address] in the Last Year: Never true    [facility] in the Last Year: Never true   Transportation Needs:     Lack of Transportation (Medical):      Lack of Transportation (Non-Medical):    Physical Activity:     Days of Exercise per Week:     Minutes of Exercise per Session:    Stress:     Feeling of Stress :    Social Connections:     Frequency of Communication with Friends and Family:     Frequency of Social Gatherings with Friends and Family:     Attends Taoist Services:     Active Member of Clubs or Organizations:     Attends Club or Organization Meetings:     Marital Status:    Intimate Partner Violence:     Fear of Current or Ex-Partner:     by Jessica Ferguson MD on 9/22/2021 at 9:12 AM

## 2021-09-22 ENCOUNTER — TELEPHONE (OUTPATIENT)
Dept: ORTHOPEDIC SURGERY | Age: 61
End: 2021-09-22

## 2021-09-22 PROBLEM — Z01.818 PRE-OP TESTING: Status: ACTIVE | Noted: 2021-09-22

## 2021-09-22 NOTE — TELEPHONE ENCOUNTER
Spoke with patient today. She wants to have Right MERVAT surgery done by Dr. Madeline Marcus (Left MERVAT was already done). Medical clearance was obtained by her PCP yesterday. Neurology clearance was obtained on 9-9-2021 by Dr. Ian Guillen. Patient wants to schedule appointment to speak with Dr. Madeline Marcus prior to scheduling her surgery. She was informed that she doesn't need to go to joint class again, but she will need to have pre testing done the week prior to surgery along with COVID testing. Her insurance changed to Medicare A&B on 3-1-2021. Routed call to [address] [place] [address] so patient could make appointment to speak with Dr. Madeline Marcus.

## 2021-10-04 NOTE — PROGRESS NOTES
[address]  [address] [facility], [address] [place] [address] Chad  [phone]    Follow up Note      Cornelious Grady     Date of Visit:  10/7/2021    CC:  Patient presents for follow up   Chief Complaint   Patient presents with    Lower Back Pain       HPI:    Pain is unchanged. Left hip is doing well. Right hip is painful. Possible surgery next month. Appropriate analgesia with current medications regimen: yes. Change in quality of symptoms:no. Medication side effects:none. Recent diagnostic testing:none. Recent interventional procedures:none. She has not been on anticoagulation medications to include none. The patient  has not been on herbal supplements. The patient is not diabetic.     Imaging studies:    Cervical XR 11/2019 Severe degenerative changes      Lumbar spine Xray 03/2019  Scoliosis and osteoarthritis.     Bilateral hip Xray 03/2019   Advanced osteoarthrosis of bilateral hips. Potential Aberrant Drug-Related Behavior: None     Urine Drug Screening:  First office visit saliva screen showed no narcotics   11/2019 Consistent, negative for all  06/2020 Consistent  12/2020 Consistent  06/2021 Consistent     OARRS report:  03/2019 consistent to 06/2021 Consistent  (norco on 9/24 from dentist for teeth extraction). 08/2021 Consistent to 10/2021 Consistent    Opioid Agreement:  10/08/2020 - needs new one today.     Past Medical History:   Diagnosis Date    Brain aneurysm 09/2018    H/O TIMES 2 THAT BURST PER PATIENT    Cerebral artery occlusion with cerebral infarction (HCC)     RT SIDE AFFECTED-LEARNED TO WALK AND WRITE AGAIN    Degenerative arthritis of hand     Headache     Hiatal hernia     Hip problem     NEEDS HIP REPLACEMENT PER PATIENT    Hx of abnormal cervical Pap smear     Hypertension     Stroke (cerebrum) ([address]        Past Surgical History:   Procedure Laterality Date    BRAIN ANEURYSM SURGERY valACYclovir (VALTREX) 1 g tablet Take 1 tablet by mouth daily For 5 days as needed for Herpes outbreak 4/27/21  Yes Luis Garcia MD   albuterol sulfate  (90 Base) MCG/ACT inhaler INHALE TWO PUFFS BY MOUTH EVERY 6 HOURS AS NEEDED FOR WHEEZING. 3/22/21  Yes Bunny Dung, DO   Multiple Vitamins-Minerals (THERAPEUTIC MULTIVITAMIN-MINERALS) tablet Take 1 tablet by mouth daily   Yes Historical Provider, MD   diclofenac sodium (VOLTAREN) 1 % GEL APPLY ONE GRAM EXTERNALLY TWICE A DAY TO NECK AND ONE GRAM EXTERNALLY TWICE A DAY TO BACK  11/3/20  Yes Valdez Govern, [address] Devices (ROLLATOR) MISC 1 each by Does not apply route daily as needed (use as needed for stability) 3/17/20  Yes Luis Garcia MD   metoprolol tartrate (LOPRESSOR) 25 MG tablet Take 1.5 tablets by mouth 2 times daily 8/6/21 10/6/21  Luis Garcia MD       Allergies   Allergen Reactions    Latex Hives     Hives and rash    Iodine Rash     Hives . pt.  States anaphalyxis    Aloe Hives     Hives     Celebrex [Celecoxib] Itching    Fish-Derived Products Other (See Comments)       Social History     Socioeconomic History    Marital status:      Spouse name: Not on file    Number of children: Not on file    Years of education: Not on file    Highest education level: Not on file   Occupational History    Not on file   Tobacco Use    Smoking status: Former Smoker     Packs/day: 1.50     Years: 43.00     Pack years: 64.50     Types: Cigarettes     Quit date: 9/9/2018     Years since quitting: 3.0    Smokeless tobacco: Never Used   Vaping Use    Vaping Use: Never used   Substance and Sexual Activity    Alcohol use: No    Drug use: No    Sexual activity: Not Currently   Other Topics Concern    Not on file   Social History Narrative    Not on file     Social Determinants of Health     Financial Resource Strain: Low Risk     Difficulty of Paying Living Expenses: Not hard at all   Food Insecurity: No Food Insecurity    Worried About Running Out of Food in the Last Year: Never true    Manjula of Food in the Last Year: Never true   Transportation Needs:     Lack of Transportation (Medical):  Lack of Transportation (Non-Medical):    Physical Activity:     Days of Exercise per Week:     Minutes of Exercise per Session:    Stress:     Feeling of Stress :    Social Connections:     Frequency of Communication with Friends and Family:     Frequency of Social Gatherings with Friends and Family:     Attends Orthodoxy Services:     Active Member of Clubs or Organizations:     Attends Club or Organization Meetings:     Marital Status:    Intimate Partner Violence:     Fear of Current or Ex-Partner:     Emotionally Abused:     Physically Abused:     Sexually Abused:        Family History   Problem Relation Age of Onset    Diabetes Mother     Arthritis Mother     Atrial Fibrillation Mother     Diabetes Father     Atrial Fibrillation Father     Arthritis Father     Emphysema Father     Cancer Sister        REVIEW OF SYSTEMS:     Renae Every denies fever/chills, chest pain, shortness of breath, new bowel or bladder complaints. All other review of systems was negative. PHYSICAL EXAMINATION:      /80   Pulse 53   Temp 97.1 °F (36.2 °C) (Infrared)   Resp 16   Ht 5' 5\" (1.651 m)   Wt 237 lb (107.5 kg)   SpO2 98%   BMI 39.44 kg/m²     General:      General appearance:   pleasant and well-hydrated. , in no discomfort and A & O x3  Build:Overweight  Function:Rises from a seated position with difficulty    HEENT:    Head:normocephalic and atraumatic  Pupils:regular, round and equal.  Sclera: icterus absent,    Lungs:    Breathing:Normal expansion. No wheezing.     Abdomen:    Shape:non-distended and normal    Extremities:    Tremors:None bilaterally upper and lower  Range of motion:Generally normal shoulders  Intact:Yes  Varicose veins:not assessed Cyanosis:none  Edema:Normal    Neurological:    Gait: Not observed. Dermatology:    Skin:no unusual rashes, no skin lesions, no palpable subcutaneous nodules and good skin turgor    Impression:    Patient seen for follow up for her chronic bilateral hip pain and low back pain due to severe degenerative changes and axial c/o neck pain   Would benefit from Cervical MBB, patient uninterested   Patient is s/p:  Left MERVAT on 2/22/2021. May have right MERVAT next month. Continue Gabapentin 300 mg TID per PCP  Continue Tylenol #4 QD prn. She will call for refill if not having MERVAT surgery. Compounding cream. Helping a lot. OARRS report reviewed 10/2021  Buccal reviewed and is consistent  Patient encouraged to remain active as tolerated   Treatment plan discussed with the patient including medications and side effects     Controlled Substance Monitoring:    Acute and Chronic Pain Monitoring:   RX Monitoring 10/7/2021   Periodic Controlled Substance Monitoring Possible medication side effects, risk of tolerance/dependence & alternative treatments discussed. ;No signs of potential drug abuse or diversion identified. ;Assessed functional status. ;Obtaining appropriate analgesic effect of treatment. We discussed with the patient that combining opioids, benzodiazepines, alcohol, illicit drugs or sleep aids increases the risk of respiratory depression including death. We discussed that these medications may cause drowsiness, sedation or dizziness and have counseled the patient not to drive or operate machinery. We have discussed that these medications will be prescribed only by one provider. We have discussed with the patient about age related risk factors and have thoroughly discussed the importance of taking these medications as prescribed. The patient verbalizes understanding.     ccreferring physic

## 2021-10-06 ENCOUNTER — OFFICE VISIT (OUTPATIENT)
Dept: ORTHOPEDIC SURGERY | Age: 61
End: 2021-10-06
Payer: MEDICARE

## 2021-10-06 VITALS — TEMPERATURE: 97.4 F

## 2021-10-06 DIAGNOSIS — Z96.642 HISTORY OF TOTAL LEFT HIP ARTHROPLASTY: ICD-10-CM

## 2021-10-06 DIAGNOSIS — M16.11 PRIMARY OSTEOARTHRITIS OF RIGHT HIP: Primary | ICD-10-CM

## 2021-10-06 PROCEDURE — G8484 FLU IMMUNIZE NO ADMIN: HCPCS | Performed by: PHYSICIAN ASSISTANT

## 2021-10-06 PROCEDURE — 99214 OFFICE O/P EST MOD 30 MIN: CPT | Performed by: PHYSICIAN ASSISTANT

## 2021-10-06 PROCEDURE — 1036F TOBACCO NON-USER: CPT | Performed by: PHYSICIAN ASSISTANT

## 2021-10-06 PROCEDURE — 99213 OFFICE O/P EST LOW 20 MIN: CPT | Performed by: ORTHOPAEDIC SURGERY

## 2021-10-06 PROCEDURE — 3017F COLORECTAL CA SCREEN DOC REV: CPT | Performed by: PHYSICIAN ASSISTANT

## 2021-10-06 PROCEDURE — G8417 CALC BMI ABV UP PARAM F/U: HCPCS | Performed by: PHYSICIAN ASSISTANT

## 2021-10-06 PROCEDURE — G8427 DOCREV CUR MEDS BY ELIG CLIN: HCPCS | Performed by: PHYSICIAN ASSISTANT

## 2021-10-06 NOTE — PROGRESS NOTES
Nina Giron is a 57 y.o. female who presents for follow up of hips Right    Date last seen in office: 09/01/2021    Symptoms: worse  New complaints: patient has pain in right hip and would like to discuss surgery    Previous treatment from last visit acetaminophen-  joint injection- right hip    Weightbearing: right lower Full weight bearing    Assistive device Rolling walker and wheel chair when she is not in her house  Participating in therapy? no    Refills Needed: None  Order/Referral Needed: no

## 2021-10-06 NOTE — PATIENT INSTRUCTIONS
1. Your surgery is scheduled for 11-15-21 at 7:30am  with Dr. Yamila Christensen MD at the main [address] [facility] on [facility] in [facility] . You will need to report to Preop area  that morning at 5:30am    2. You are having observation surgery so you will not be returning home the same day. Plan to stay overnight at least 1 night. 3. Preadmission Testing (PAT) department at [facility] will contact you with all the details prior to surgery. 4. Nothing to eat or drink after midnight the night before surgery. You may take a pain pill and any other medicine PAT instructs you to take with small sip of water if needed. 6. Continue with ice and elevation to reduce swelling  7. Weightbearing as tolerated right lower, use assistive devices as needed  8. Take pain medicine as instructed  9. Call office with any question or concerns: [phone]. Hold Lovenox/Aspirin the day of surgery. Hold all NSAIDs 7 days prior to surgery    ALL TOTAL JOINT PATIENTS WILL BE WEANED OFF ANY NARCOTIC MEDICATION GIVEN POST OPERATIVELY BY AT THE LATEST 3 WEEKS FROM DATE OF SURGERY    Due to increased risk of infections, it is important to plan for getting treatment for significant dental diseases (including tooth extractions, root canal and periodontal work) before your total joint surgery.   Routine teeth cleaning should be delayed until you are 3 months post op      [unfilled]

## 2021-10-06 NOTE — PROGRESS NOTES
Orthopaedic H&P Note    Devagn Floyd is a 57 y.o. female, her YOB: 1960 with the following history as recorded in [facility]:      Patient Active Problem List    Diagnosis Date Noted    Pulmonary emphysema ([facility] [address] 04/27/2021    History of herpes genitalis 04/27/2021    Primary osteoarthritis of right hip 04/05/2021    History of total left hip arthroplasty 03/08/2021    Arthritis of both hips 02/22/2021    COVID-19     H/O total hip arthroplasty, right     Abnormal blood sugar 02/21/2020    Degenerative disc disease, cervical 12/12/2019    Arthropathy of cervical facet joint 12/12/2019    Colon polyps 09/06/2019    Dysphagia     Heartburn     At risk for colon cancer     Arthritis of right hip 08/07/2019    Chronic pain syndrome 03/01/2019    Lumbar facet arthropathy 03/01/2019    Lumbar disc disorder 03/01/2019    Primary osteoarthritis of both hips 03/01/2019    Hypertension 09/21/2018    Subarachnoid bleed ([facility] [address] 09/10/2018    Hypertensive emergency 09/10/2018    Obesity (BMI 30-39.9) 09/10/2018     Current Outpatient Medications   Medication Sig Dispense Refill    carBAMazepine (TEGRETOL XR) 100 MG extended release tablet Take 100 mg by mouth 2 times daily       metoprolol tartrate (LOPRESSOR) 25 MG tablet Take 1.5 tablets by mouth 2 times daily 90 tablet 3    losartan (COZAAR) 100 MG tablet Take 1 tablet by mouth daily 30 tablet 3    amLODIPine (NORVASC) 5 MG tablet TAKE ONE TABLET BY MOUTH EVERY DAY 30 tablet 3    chlorthalidone (HYGROTON) 25 MG tablet Take 1 tablet by mouth daily 30 tablet 3    gabapentin (NEURONTIN) 300 MG capsule TAKE ONE CAPSULE BY MOUTH THREE TIMES A DAY 90 capsule 3    pantoprazole (PROTONIX) 40 MG tablet TAKE ONE TABLET BY MOUTH EVERY DAY 30 tablet 3    fluticasone (FLONASE) 50 MCG/ACT nasal spray 2 sprays by Each Nostril route daily 1 Bottle 3    potassium chloride (KLOR-CON M) 10 MEQ extended release tablet TAKE ONE TABLET BY MOUTH DAILY WITH BREAKFAST 30 tablet 3    valACYclovir (VALTREX) 1 g tablet Take 1 tablet by mouth daily For 5 days as needed for Herpes outbreak 5 tablet 1    albuterol sulfate  (90 Base) MCG/ACT inhaler INHALE TWO PUFFS BY MOUTH EVERY 6 HOURS AS NEEDED FOR WHEEZING. 1 Inhaler 3    Multiple Vitamins-Minerals (THERAPEUTIC MULTIVITAMIN-MINERALS) tablet Take 1 tablet by mouth daily      diclofenac sodium (VOLTAREN) 1 % GEL APPLY ONE GRAM EXTERNALLY TWICE A DAY TO NECK AND ONE GRAM EXTERNALLY TWICE A DAY TO BACK  100 g 2    Misc. Devices (ROLLATOR) MISC 1 each by Does not apply route daily as needed (use as needed for stability) 1 each 0    acetaminophen-codeine (TYLENOL #4) 300-60 MG per tablet Take 1 tablet by mouth daily as needed for Pain for up to 30 days. 30 tablet 0     No current facility-administered medications for this visit.      Allergies: Latex, Iodine, Aloe, Celebrex [celecoxib], and Fish-derived products  Past Medical History:   Diagnosis Date    Brain aneurysm 09/2018    H/O TIMES 2 THAT BURST PER PATIENT    Cerebral artery occlusion with cerebral infarction ([address]     RT SIDE AFFECTED-LEARNED TO WALK AND WRITE AGAIN    Degenerative arthritis of hand     Edentulous     Emphysema lung ([address]     lung dr Daquan Streeter Headache     Hiatal hernia     Hip problem     NEEDS HIP REPLACEMENT PER PATIENT    Hx of abnormal cervical Pap smear     Hypertension     Stroke [facility]cerebrum) [facility])      Past Surgical History:   Procedure Laterality Date    BRAIN ANEURYSM SURGERY      coiling     COLONOSCOPY  08/30/2019    polyps--rfank    COLONOSCOPY N/A 8/30/2019    COLONOSCOPY POLYPECTOMY SNARE/COLD BIOPSY performed by Jess Juarez MD at [address] [place] [address]- DONE AT [address] Left 2/22/2021    LEFT HIP TOTAL ARTHROPLASTY performed by Obinna Vinson MD at [facility]  08/30/2019 gastritis with superficial ulcerations; duodenitis--frank    UPPER GASTROINTESTINAL ENDOSCOPY N/A 8/30/2019    EGD BIOPSY performed by Jess Juarez MD at [address] History   Problem Relation Age of Onset    Diabetes Mother     Arthritis Mother     Atrial Fibrillation Mother     Diabetes Father     Atrial Fibrillation Father     Arthritis Father    Daquan Bark Emphysema Father     Cancer Sister      Social History     Tobacco Use    Smoking status: Former Smoker     Packs/day: 1.50     Years: 43.00     Pack years: 64.50     Types: Cigarettes     Quit date: 9/9/2018     Years since quitting: 3.1    Smokeless tobacco: Never Used   Substance Use Topics    Alcohol use: No                             Chief Complaint   Patient presents with    Hip Pain     right hip pain; patient states pain has been in her hip for at least three years; 8/10 pain    Other     patient wants to discuss uziel on right hip       SUBJECTIVE: Molly Addison is here today for their right Hip. The patient has had pain for sometime, but last 6 months or so it has become more severe. She  was diagnosed with OA in the past. There had been no injury to the right Hip that they can remember. Molly Addison has tried multiple conservative treatment. Has had therapy in the past, that worked at first, but the pain persisted. She did have steroid injections which did not help much. Patient also had used a right Hip bracing without relief of symptoms. They have been working on weight loss. The pain is described as typical arthritic pain, achy in the joint, becoming more severe with stairs and any twisting motion of the right Hip. Rest makes the right Hip better along with NSAIDs and over the counter analgesics, but states they are now less effective. She does use an occasional straight cane, patient can ambulate 1 to 2 blocks prior to pain limiting their function.  Molly Addison is having difficulty with ADLs, and states this pain is limiting here activity decreasing their quality of life. She would like to discuss MERVAT. Review of Systems   Constitutional: Negative for fever, chills, diaphoresis, appetite change and fatigue. HENT: Negative for dental issues, hearing loss and tinnitus. Negative for congestion, sinus pressure, sneezing, sore throat. Negative for headache. Eyes: Negative for visual disturbance, blurred and double vision. Negative for pain, discharge, redness and itching  Respiratory: Negative for cough, shortness of breath and wheezing. Cardiovascular: Negative for chest pain, palpitations and leg swelling. No dyspnea on exertion   Gastrointestinal:   Negative for nausea, vomiting, abdominal pain, diarrhea, constipation  or black or bloody. Hematologic\Lymphatic:  negative for bleeding, petechiae,   Genitourinary: Negative for hematuria and difficulty urinating. Musculoskeletal: Negative for neck pain and stiffness. Mild for back pain, negative joint swelling and gait problem. Skin: Negative for pallor, rash and wound. Neurological: Negative for dizziness, tremors, seizures, weakness, light-headedness, no TIA or stroke symptoms. No numbness and headaches. Psychiatric/Behavioral: Negative.      Physical Examination:   General appearance: alert, well appearing, and in no distress,  normal appearing weight  Mental status: alert, oriented to person, place, and time, normal mood, behavior, speech, dress, motor activity, and thought processes  Abdomen: soft, nondistended   Resp:   resp easy and unlabored, no audible wheezes note  Cardiac: distal pulses palpable, skin well perfused  Neurological: alert, oriented X3, normal speech, no focal findings or movement disorder noted, motor and sensory grossly normal bilaterally, normal muscle tone, no tremors, strength 5/5, normal gait and station  HEENT: normochephalic atraumatic, external ears and eyes normal, sclera normal, neck supple  Extremities:   peripheral pulses normal, no edema, redness or tenderness in the calves   Skin: normal coloration, no rashes or open wounds, no suspicious skin lesions noted  Psych: Affect euthymic   Musculoskeletal:    Extremity:  Right Lower Extremity  Skin clean dry and intact, without signs of infection  no edema noted  Compartments supple throughout thigh and leg  Calf supple and nontender  Demonstrates active knee flexion/extension, ankle plantar/dorsiflexion/great toe extension. States sensation intact to touch in sural/deep peroneal/superficial peroneal/saphenous/posterior tibial nerve distributions to foot/ankle. Palpable dorsalis pedis and posterior tibialis pulses, cap refill brisk in toes, foot warm/perfused. She ambulates with an antalgic type gait. Moderate pain with passive internal rotation to approximately 10 degrees and external rotation to approximately 30 degrees of the right hip. She does have hip and groin pain with seated straight leg raise to approximately 65 degrees without distal pain. Temp 97.4 °F (36.3 °C)      XR: 10/6/21   Not taken today. ASSESSMENT:   Diagnosis Orders   1. Primary osteoarthritis of right hip     2. History of total left hip arthroplasty       Discussion:  The patient would like to proceed with total right Hip arthroplasty when cleared by their PCP. Dar Service understands the risks include but are not limited to infection, injuries to the blood vessel and nerves, bleeding, continued pain and non relief of pain, aseptic loosening dislocation, limb length discrepancy, arthrofibrosis of the joint, further operation, deep venous thrombosis, pulmonary embolism and fracture, heart attack and death. Mary operative plan discussed, need for anticoagulation for DVT/PE prophylaxis, need for physical therapy, office follow up visits, and possible rehab stay.  It was also explained patient will need to take a prophylactic dose of ATB prior to any bowel, dental or mouth procedures, including dental cleaning, for length of the implant. Did review surgery prosthesis with model with patient as well. Pain control was also discussed and the expectation is to have patient off narcotic pain meds around 3 weeks post operatively for a total joint arthroplasty     Elective Orthopedic Surgery Checklist:  [] Hemoglobin A1C must be ? 8  [] Nicotine cessation education- If nonunion surgery, needs negative Nicotine blood work  [x] Established with a PCP  [x] BMI to be ? 40 kg/m2 if pursing total joint. [] Referral to [facility]/Weight Management  [x] No pending dental treatments prior to total joint  [] Joint Education Class Needed   [] Any other areas of concern that need addressed prior to surgery:         PLAN:  Plan for OR on 11-15-21 with Dr. Ilene Martinez MD for right total hip replacement  Pre-surgery medical clearance- requested  PAT  Joint education class is not indicated  NPO after midnight the night before surgery  WBAT on on right lower extremity  Hold NSAIDS 7 days prior to surgery  Hold aspirin the morning of surgery  Make sure to have all dental care completed by 1 week before surgery  COVID 19 testing  Planning for post op DVT prophylaxis with Aspirin as well as nonpharmacologic use of Home SCDs  Patient to also to be supplied with cryotherapy with compression post operatively. Electronically signed by SPENSER Alexis on 11/4/2021 at 9:43 AM  Note: This report was completed using computerize voiced recognition software. Every effort has been made to ensure accuracy; however, inadvertent computerized transcription errors may be present.

## 2021-10-07 ENCOUNTER — OFFICE VISIT (OUTPATIENT)
Dept: PAIN MANAGEMENT | Age: 61
End: 2021-10-07
Payer: MEDICARE

## 2021-10-07 VITALS
HEART RATE: 53 BPM | TEMPERATURE: 97.1 F | OXYGEN SATURATION: 98 % | DIASTOLIC BLOOD PRESSURE: 80 MMHG | BODY MASS INDEX: 39.49 KG/M2 | RESPIRATION RATE: 16 BRPM | HEIGHT: 65 IN | WEIGHT: 237 LBS | SYSTOLIC BLOOD PRESSURE: 118 MMHG

## 2021-10-07 DIAGNOSIS — G89.4 CHRONIC PAIN SYNDROME: ICD-10-CM

## 2021-10-07 DIAGNOSIS — M47.812 ARTHROPATHY OF CERVICAL FACET JOINT: ICD-10-CM

## 2021-10-07 DIAGNOSIS — M54.16 LUMBAR RADICULITIS: Primary | ICD-10-CM

## 2021-10-07 DIAGNOSIS — M50.30 DEGENERATION OF CERVICAL DISC WITHOUT MYELOPATHY: ICD-10-CM

## 2021-10-07 DIAGNOSIS — M47.812 FACET ARTHROPATHY, CERVICAL: ICD-10-CM

## 2021-10-07 DIAGNOSIS — M16.11 ARTHRITIS OF RIGHT HIP: ICD-10-CM

## 2021-10-07 DIAGNOSIS — M47.816 LUMBAR FACET ARTHROPATHY: ICD-10-CM

## 2021-10-07 DIAGNOSIS — M16.12 PRIMARY OSTEOARTHRITIS OF LEFT HIP: ICD-10-CM

## 2021-10-07 DIAGNOSIS — M50.30 DEGENERATIVE DISC DISEASE, CERVICAL: ICD-10-CM

## 2021-10-07 DIAGNOSIS — M47.816 LUMBAR SPONDYLOSIS: ICD-10-CM

## 2021-10-07 DIAGNOSIS — M51.9 LUMBAR DISC DISORDER: ICD-10-CM

## 2021-10-07 PROCEDURE — 1036F TOBACCO NON-USER: CPT | Performed by: PHYSICIAN ASSISTANT

## 2021-10-07 PROCEDURE — G8427 DOCREV CUR MEDS BY ELIG CLIN: HCPCS | Performed by: PHYSICIAN ASSISTANT

## 2021-10-07 PROCEDURE — 3017F COLORECTAL CA SCREEN DOC REV: CPT | Performed by: PHYSICIAN ASSISTANT

## 2021-10-07 PROCEDURE — G8417 CALC BMI ABV UP PARAM F/U: HCPCS | Performed by: PHYSICIAN ASSISTANT

## 2021-10-07 PROCEDURE — 99213 OFFICE O/P EST LOW 20 MIN: CPT | Performed by: PHYSICIAN ASSISTANT

## 2021-10-07 PROCEDURE — G8484 FLU IMMUNIZE NO ADMIN: HCPCS | Performed by: PHYSICIAN ASSISTANT

## 2021-10-07 RX ORDER — ACETAMINOPHEN AND CODEINE PHOSPHATE 60; 300 MG/1; MG/1
1 TABLET ORAL DAILY PRN
Qty: 30 TABLET | Refills: 0 | Status: SHIPPED
Start: 2021-10-17 | End: 2021-12-02 | Stop reason: SDUPTHER

## 2021-10-07 NOTE — PROGRESS NOTES
Do you currently have any of the following:    Fever: No  Headache:  No  Cough: No  Shortness of breath: No  Exposed to anyone with these symptoms: No                                                                                                                Radha Guillen presents to the [facility] on 10[facility]2021. Eric Ramos is complaining of pain in the lower back and neck, hips. . The pain is constant. The pain is described as aching, throbbing, shooting, stabbing and sharp. Pain is rated on her best day at a 7, on her worst day at a 10, and on average at a 9 on the VAS scale. She took her last dose of Tylenol with codeine and Robaxin . Eric Ramos does not have issues with constipation. Any procedures since your last visit: No,    She is not on NSAIDS and  is not on anticoagulation medications to include none and is managed by NA. Pacemaker or defibrillator: No Physician managing device is NA. Medication Contract and Consent for Opioid Use Documents Filed     Patient Documents       Type of Document Status Date Received Received By Description     Medication Contract Received 3/1/2019  1:43 PM FRANCIS MCINTOSH PAIN MANAGEMENT PT AGREEMENT 3/1/2019     Medication Contract Received 10/8/2020  1:21 PM FRANCIS MCINTOSH pain pt agreement                   /80   Pulse 53   Temp 97.1 °F (36.2 °C) (Infrared)   Resp 16   Ht 5' 5\" (1.651 m)   Wt 237 lb (107.5 kg)   SpO2 98%   BMI 39.44 kg/m²      No LMP recorded.  Patient is postmenopausal.

## 2021-10-22 PROBLEM — Z01.818 PRE-OP TESTING: Status: RESOLVED | Noted: 2021-09-22 | Resolved: 2021-10-22

## 2021-11-02 NOTE — PROGRESS NOTES
[facility] PRE-ADMISSION TESTING GENERAL INSTRUCTIONS- [facility]-phone number:[phone]    GENERAL INSTRUCTIONS  [x] Antibacterial Soap shower Night before Surgery  [x] Prem wipe instruction sheet and wipes given. [x] Nothing by mouth after midnight, including gum, candy, mints, or water. [x]No smoking, chewing tobacco, illegal drugs, or alcohol within 24 hours of your surgery. [x] Jewelry, valuables or body piercing's should not be brought to the hospital. All body and/or tongue piercing's must be removed prior to arriving to hospital.  ALL hair pins must be removed. [x] Do not wear makeup, lotions, powders, deodorant. Nail polish as directed by the nurse. [x] Arrange transportation with a responsible adult  to and from the hospital.[][x] Transfusion Bracelet: Please bring with you to hospital, day of surgery  [x] Use inhalers the morning of surgery and bring with you to hospital.  [x] Bring copy of living will or healthcare power of  papers to be placed in your electronic record     PARKING INSTRUCTIONS:   [x] Arrival Time:_____0530 for surgery 11-15 Monday with dr petit________  · [x] Parking lot '\"I\"  is located on [facility] (the corner of [facility] and [facility]). To enter, press the button and the gate will lift. A free token will be provided to exit the lot. EDUCATION INSTRUCTIONS:       [x] Pre-admission Testing educational folder given  [x] Incentive Spirometry,coughing & deep breathing exercises reviewed. [][x]Fluoroscopy-Xray used in surgery reviewed with patient. Educational pamphlet placed in chart. [x]Pain: Post-op pain is normal and to be expected. You will be asked to rate your pain from 0-10(a zero is not acceptable-education is needed).  Your post-op pain goal is:  [x] Ask your nurse for your pain medication.  [] [][] Other:___________________________    MEDICATION INSTRUCTIONS:   [x]Bring a complete list of your medications, please write the last time you took the medicine, give this list to the nurse. SEE MED SHEET   [x] Take the following medications the morning of surgery with 1-2 ounces of water: TYLENOL #4 IF NEEDED  TEGRETOL LOPRESSOR NORVASC NEURONTIN PROTONIX  [x] Stop herbal supplements and vitamins 5 days before your surgery   [x] Use your inhalers the morning of surgery. Bring your emergency inhaler with you day of surgery. [x] Follow physician instructions regarding any blood thinners you may be taking. NONE    WHAT TO EXPECT:  [x] The day of surgery you will be greeted and checked in by the [employer].  In addition, you will be registered in the [place] by a Patient Access Representative. Please bring your photo ID and insurance card. A nurse will greet you in accordance to the time you are needed in the pre-op area to prepare you for surgery. Please do not be discouraged if you are not greeted in the order you arrive as there are many variables that are involved in patient preparation. Your patience is greatly appreciated as you wait for your nurse. Please bring in items such as: books, magazines, newspapers, electronics, or any other items  to occupy your time in the waiting area. [x]  Delays may occur with surgery and staff will make a sincere effort to keep you informed of delays. If any delays occur with your procedure, we apologize ahead of time for your inconvenience as we recognize the value of your time.

## 2021-11-03 ENCOUNTER — PREP FOR PROCEDURE (OUTPATIENT)
Dept: ORTHOPEDIC SURGERY | Age: 61
End: 2021-11-03

## 2021-11-03 DIAGNOSIS — M16.11 PRIMARY OSTEOARTHRITIS OF RIGHT HIP: Primary | ICD-10-CM

## 2021-11-03 RX ORDER — SODIUM CHLORIDE 0.9 % (FLUSH) 0.9 %
5-40 SYRINGE (ML) INJECTION EVERY 12 HOURS SCHEDULED
Status: CANCELLED | OUTPATIENT
Start: 2021-11-03

## 2021-11-03 RX ORDER — SODIUM CHLORIDE 0.9 % (FLUSH) 0.9 %
5-40 SYRINGE (ML) INJECTION PRN
Status: CANCELLED | OUTPATIENT
Start: 2021-11-03

## 2021-11-03 RX ORDER — SODIUM CHLORIDE 9 MG/ML
25 INJECTION, SOLUTION INTRAVENOUS PRN
Status: CANCELLED | OUTPATIENT
Start: 2021-11-03

## 2021-11-04 NOTE — H&P
Orthopaedic H&P Note     Salvador Wills is a 57 y.o. female, her YOB: 1960 with the following history as recorded in [facility]:             Patient Active Problem List     Diagnosis Date Noted    Pulmonary emphysema ([facility] [address] 04/27/2021    History of herpes genitalis 04/27/2021    Primary osteoarthritis of right hip 04/05/2021    History of total left hip arthroplasty 03/08/2021    Arthritis of both hips 02/22/2021    COVID-19      H/O total hip arthroplasty, right      Abnormal blood sugar 02/21/2020    Degenerative disc disease, cervical 12/12/2019    Arthropathy of cervical facet joint 12/12/2019    Colon polyps 09/06/2019    Dysphagia      Heartburn      At risk for colon cancer      Arthritis of right hip 08/07/2019    Chronic pain syndrome 03/01/2019    Lumbar facet arthropathy 03/01/2019    Lumbar disc disorder 03/01/2019    Primary osteoarthritis of both hips 03/01/2019    Hypertension 09/21/2018    Subarachnoid bleed ([facility] [address] 09/10/2018    Hypertensive emergency 09/10/2018    Obesity (BMI 30-39.9) 09/10/2018      Current Facility-Administered Medications          Current Outpatient Medications   Medication Sig Dispense Refill    carBAMazepine (TEGRETOL XR) 100 MG extended release tablet Take 100 mg by mouth 2 times daily         metoprolol tartrate (LOPRESSOR) 25 MG tablet Take 1.5 tablets by mouth 2 times daily 90 tablet 3    losartan (COZAAR) 100 MG tablet Take 1 tablet by mouth daily 30 tablet 3    amLODIPine (NORVASC) 5 MG tablet TAKE ONE TABLET BY MOUTH EVERY DAY 30 tablet 3    chlorthalidone (HYGROTON) 25 MG tablet Take 1 tablet by mouth daily 30 tablet 3    gabapentin (NEURONTIN) 300 MG capsule TAKE ONE CAPSULE BY MOUTH THREE TIMES A DAY 90 capsule 3    pantoprazole (PROTONIX) 40 MG tablet TAKE ONE TABLET BY MOUTH EVERY DAY 30 tablet 3    fluticasone (FLONASE) 50 MCG/ACT nasal spray 2 sprays by Each Nostril route daily 1 Bottle 3    potassium chloride (KLOR-CON [place]) 10 MEQ extended release tablet TAKE ONE TABLET BY MOUTH DAILY WITH BREAKFAST 30 tablet 3    valACYclovir (VALTREX) 1 g tablet Take 1 tablet by mouth daily For 5 days as needed for Herpes outbreak 5 tablet 1    albuterol sulfate  (90 Base) MCG/ACT inhaler INHALE TWO PUFFS BY MOUTH EVERY 6 HOURS AS NEEDED FOR WHEEZING. 1 Inhaler 3    Multiple Vitamins-Minerals (THERAPEUTIC MULTIVITAMIN-MINERALS) tablet Take 1 tablet by mouth daily        diclofenac sodium (VOLTAREN) 1 % GEL APPLY ONE GRAM EXTERNALLY TWICE A DAY TO NECK AND ONE GRAM EXTERNALLY TWICE A DAY TO BACK  100 g 2    Misc. Devices (ROLLATOR) MISC 1 each by Does not apply route daily as needed (use as needed for stability) 1 each 0    acetaminophen-codeine (TYLENOL #4) 300-60 MG per tablet Take 1 tablet by mouth daily as needed for Pain for up to 30 days.  30 tablet 0      No current facility-administered medications for this visit.         Allergies: Latex, Iodine, Aloe, Celebrex [celecoxib], and Fish-derived products  Past Medical History        Past Medical History:   Diagnosis Date    Brain aneurysm 09/2018     H/O TIMES 2 THAT BURST PER PATIENT    Cerebral artery occlusion with cerebral infarction ([address]       RT SIDE AFFECTED-LEARNED TO WALK AND WRITE AGAIN    Degenerative arthritis of hand      Edentulous      Emphysema lung ([facility] [address]       [facility]      Hiatal hernia      Hip problem       NEEDS HIP REPLACEMENT PER PATIENT    Hx of abnormal cervical Pap smear      Hypertension      Stroke (cerebrum) [facility])           Past Surgical History         Past Surgical History:   Procedure Laterality Date    BRAIN ANEURYSM SURGERY         coiling     COLONOSCOPY   08/30/2019     polyps--frank    COLONOSCOPY N/A 8/30/2019     COLONOSCOPY POLYPECTOMY SNARE/COLD BIOPSY performed by Alejandrina Gaffney MD at [place] [id number]         FOR CANCER CELLS- DONE AT [address] [id number]        counter analgesics, but states they are now less effective. She does use an occasional straight cane, patient can ambulate 1 to 2 blocks prior to pain limiting their function. Jorge Luis Green is having difficulty with ADLs, and states this pain is limiting here activity decreasing their quality of life. She would like to discuss MERVAT.      Review of Systems   Constitutional: Negative for fever, chills, diaphoresis, appetite change and fatigue. HENT: Negative for dental issues, hearing loss and tinnitus. Negative for congestion, sinus pressure, sneezing, sore throat. Negative for headache. Eyes: Negative for visual disturbance, blurred and double vision. Negative for pain, discharge, redness and itching  Respiratory: Negative for cough, shortness of breath and wheezing. Cardiovascular: Negative for chest pain, palpitations and leg swelling. No dyspnea on exertion   Gastrointestinal:   Negative for nausea, vomiting, abdominal pain, diarrhea, constipation  or black or bloody. Hematologic\Lymphatic:  negative for bleeding, petechiae,   Genitourinary: Negative for hematuria and difficulty urinating. Musculoskeletal: Negative for neck pain and stiffness. Mild for back pain, negative joint swelling and gait problem. Skin: Negative for pallor, rash and wound. Neurological: Negative for dizziness, tremors, seizures, weakness, light-headedness, no TIA or stroke symptoms. No numbness and headaches.    Psychiatric/Behavioral: Negative.      Physical Examination:   General appearance: alert, well appearing, and in no distress,  normal appearing weight  Mental status: alert, oriented to person, place, and time, normal mood, behavior, speech, dress, motor activity, and thought processes  Abdomen: soft, nondistended   Resp:   resp easy and unlabored, no audible wheezes note  Cardiac: distal pulses palpable, skin well perfused  Neurological: alert, oriented X3, normal speech, no focal findings or movement disorder noted, motor and sensory grossly normal bilaterally, normal muscle tone, no tremors, strength 5/5, normal gait and station  HEENT: normochephalic atraumatic, external ears and eyes normal, sclera normal, neck supple  Extremities:   peripheral pulses normal, no edema, redness or tenderness in the calves   Skin: normal coloration, no rashes or open wounds, no suspicious skin lesions noted  Psych: Affect euthymic   Musculoskeletal:    Extremity:  Right Lower Extremity  Skin clean dry and intact, without signs of infection  no edema noted  Compartments supple throughout thigh and leg  Calf supple and nontender  Demonstrates active knee flexion/extension, ankle plantar/dorsiflexion/great toe extension. States sensation intact to touch in sural/deep peroneal/superficial peroneal/saphenous/posterior tibial nerve distributions to foot/ankle. Palpable dorsalis pedis and posterior tibialis pulses, cap refill brisk in toes, foot warm/perfused. She ambulates with an antalgic type gait. Moderate pain with passive internal rotation to approximately 10 degrees and external rotation to approximately 30 degrees of the right hip. She does have hip and groin pain with seated straight leg raise to approximately 65 degrees without distal pain.     Temp 97.4 °F (36.3 °C)      XR: 10/6/21   Not taken today.     ASSESSMENT:    Diagnosis Orders   1. Primary osteoarthritis of right hip      2. History of total left hip arthroplasty         Discussion:  The patient would like to proceed with total right Hip arthroplasty when cleared by their PCP. Alo Collins understands the risks include but are not limited to infection, injuries to the blood vessel and nerves, bleeding, continued pain and non relief of pain, aseptic loosening dislocation, limb length discrepancy, arthrofibrosis of the joint, further operation, deep venous thrombosis, pulmonary embolism and fracture, heart attack and death.    Mary operative plan discussed, need for anticoagulation for DVT/PE prophylaxis, need for physical therapy, office follow up visits, and possible rehab stay. It was also explained patient will need to take a prophylactic dose of ATB prior to any bowel, dental or mouth procedures, including dental cleaning, for length of the implant. Did review surgery prosthesis with model with patient as well. Pain control was also discussed and the expectation is to have patient off narcotic pain meds around 3 weeks post operatively for a total joint arthroplasty     Elective Orthopedic Surgery Checklist:  []? Hemoglobin A1C must be ? 8  []? Nicotine cessation education- If nonunion surgery, needs negative Nicotine blood work  [x]? Established with a PCP  [x]? BMI to be ? 40 kg/m2 if pursing total joint. []? Referral to [facility]/Weight Management  [x]? No pending dental treatments prior to total joint  []? Joint Education Class Needed   []? Any other areas of concern that need addressed prior to surgery:            PLAN:  Plan for OR on 11-15-21 with Dr. Naomi Willard MD for right total hip replacement  Pre-surgery medical clearance- requested  PAT  Joint education class is not indicated  NPO after midnight the night before surgery  WBAT on on right lower extremity  Hold NSAIDS 7 days prior to surgery  Hold aspirin the morning of surgery  Make sure to have all dental care completed by 1 week before surgery  COVID 19 testing  Planning for post op DVT prophylaxis with Aspirin as well as nonpharmacologic use of Home SCDs  Patient to also to be supplied with cryotherapy with compression post operatively.      Electronically signed by SPENSER Banks on 11/4/2021 at 9:43 AM  Note: This report was completed using [email or web address] voiced recognition software.  Every effort has been made to ensure accuracy; however, inadvertent computerized transcription errors may be present.              Office Visit on 10/6/2021     Office Visit on 10/6/2021        Detailed Report      Note shared with patient  Progress Notes Info    Author Note Status Last Update User   SPENSER Khalil Signed SPENSER Khalil   Last Update Date/Time: 11/4/2021 11:00 AM   Chart Review Routing History    No routing history on file.

## 2021-11-05 ENCOUNTER — HOSPITAL ENCOUNTER (OUTPATIENT)
Age: 61
Discharge: HOME | End: 2021-11-05
Payer: MEDICARE

## 2021-11-05 ENCOUNTER — HOSPITAL ENCOUNTER (OUTPATIENT)
Dept: PREADMISSION TESTING | Age: 61
Discharge: HOME | End: 2021-11-05
Payer: MEDICARE

## 2021-11-05 VITALS
OXYGEN SATURATION: 99 % | DIASTOLIC BLOOD PRESSURE: 59 MMHG | TEMPERATURE: 97.8 F | SYSTOLIC BLOOD PRESSURE: 130 MMHG | RESPIRATION RATE: 16 BRPM | HEART RATE: 76 BPM

## 2021-11-05 DIAGNOSIS — M16.11 PRIMARY OSTEOARTHRITIS OF RIGHT HIP: ICD-10-CM

## 2021-11-05 DIAGNOSIS — Z01.812 PRE-OPERATIVE LABORATORY EXAMINATION: Primary | ICD-10-CM

## 2021-11-05 LAB
ABO/RH: NORMAL
ANION GAP SERPL CALCULATED.3IONS-SCNC: 14 MMOL/L (ref 7–16)
ANTIBODY SCREEN: NORMAL
BASOPHILS ABSOLUTE: 0.02 E9/L (ref 0–0.2)
BASOPHILS RELATIVE PERCENT: 0.4 % (ref 0–2)
BUN BLDV-MCNC: 19 MG/DL (ref 6–23)
CALCIUM SERPL-MCNC: 9.5 MG/DL (ref 8.6–10.2)
CHLORIDE BLD-SCNC: 102 MMOL/L (ref 98–107)
CO2: 24 MMOL/L (ref 22–29)
CREAT SERPL-MCNC: 0.9 MG/DL (ref 0.5–1)
EOSINOPHILS ABSOLUTE: 0.08 E9/L (ref 0.05–0.5)
EOSINOPHILS RELATIVE PERCENT: 1.5 % (ref 0–6)
GFR AFRICAN AMERICAN: >60
GFR NON-AFRICAN AMERICAN: >60 ML/MIN/1.73
GLUCOSE BLD-MCNC: 117 MG/DL (ref 74–99)
HCT VFR BLD CALC: 42.5 % (ref 34–48)
HEMOGLOBIN: 14 G/DL (ref 11.5–15.5)
IMMATURE GRANULOCYTES #: 0.03 E9/L
IMMATURE GRANULOCYTES %: 0.5 % (ref 0–5)
INR BLD: 0.9
LYMPHOCYTES ABSOLUTE: 1.44 E9/L (ref 1.5–4)
LYMPHOCYTES RELATIVE PERCENT: 26.2 % (ref 20–42)
MCH RBC QN AUTO: 29.2 PG (ref 26–35)
MCHC RBC AUTO-ENTMCNC: 32.9 % (ref 32–34.5)
MCV RBC AUTO: 88.7 FL (ref 80–99.9)
MONOCYTES ABSOLUTE: 0.34 E9/L (ref 0.1–0.95)
MONOCYTES RELATIVE PERCENT: 6.2 % (ref 2–12)
NEUTROPHILS ABSOLUTE: 3.59 E9/L (ref 1.8–7.3)
NEUTROPHILS RELATIVE PERCENT: 65.2 % (ref 43–80)
PDW BLD-RTO: 12.9 FL (ref 11.5–15)
PLATELET # BLD: 255 E9/L (ref 130–450)
PMV BLD AUTO: 9 FL (ref 7–12)
POTASSIUM SERPL-SCNC: 3.8 MMOL/L (ref 3.5–5)
PROTHROMBIN TIME: 10.3 SEC (ref 9.3–12.4)
RBC # BLD: 4.79 E12/L (ref 3.5–5.5)
SODIUM BLD-SCNC: 140 MMOL/L (ref 132–146)
WBC # BLD: 5.5 E9/L (ref 4.5–11.5)

## 2021-11-05 PROCEDURE — 85610 PROTHROMBIN TIME: CPT

## 2021-11-05 PROCEDURE — 87081 CULTURE SCREEN ONLY: CPT

## 2021-11-05 PROCEDURE — 85025 COMPLETE CBC W/AUTO DIFF WBC: CPT

## 2021-11-05 PROCEDURE — 86901 BLOOD TYPING SEROLOGIC RH(D): CPT

## 2021-11-05 PROCEDURE — 36415 COLL VENOUS BLD VENIPUNCTURE: CPT

## 2021-11-05 PROCEDURE — 86850 RBC ANTIBODY SCREEN: CPT

## 2021-11-05 PROCEDURE — 80048 BASIC METABOLIC PNL TOTAL CA: CPT

## 2021-11-05 PROCEDURE — 86900 BLOOD TYPING SEROLOGIC ABO: CPT

## 2021-11-05 ASSESSMENT — PAIN DESCRIPTION - PAIN TYPE: TYPE: CHRONIC PAIN

## 2021-11-05 ASSESSMENT — PAIN DESCRIPTION - DESCRIPTORS: DESCRIPTORS: ACHING;BURNING

## 2021-11-05 ASSESSMENT — PAIN DESCRIPTION - ORIENTATION: ORIENTATION: RIGHT;LOWER

## 2021-11-05 ASSESSMENT — PAIN DESCRIPTION - LOCATION: LOCATION: HIP;BACK

## 2021-11-05 ASSESSMENT — PAIN SCALES - GENERAL: PAINLEVEL_OUTOF10: 8

## 2021-11-05 ASSESSMENT — PAIN DESCRIPTION - FREQUENCY: FREQUENCY: CONTINUOUS

## 2021-11-06 LAB — MRSA CULTURE ONLY: NORMAL

## 2021-11-10 ENCOUNTER — HOSPITAL ENCOUNTER (OUTPATIENT)
Age: 61
Discharge: HOME | End: 2021-11-12
Payer: MEDICARE

## 2021-11-10 DIAGNOSIS — U07.1 COVID-19: ICD-10-CM

## 2021-11-10 PROCEDURE — U0003 INFECTIOUS AGENT DETECTION BY NUCLEIC ACID (DNA OR RNA); SEVERE ACUTE RESPIRATORY SYNDROME CORONAVIRUS 2 (SARS-COV-2) (CORONAVIRUS DISEASE [COVID-19]), AMPLIFIED PROBE TECHNIQUE, MAKING USE OF HIGH THROUGHPUT TECHNOLOGIES AS DESCRIBED BY CMS-2020-01-R: HCPCS

## 2021-11-10 PROCEDURE — U0005 INFEC AGEN DETEC AMPLI PROBE: HCPCS

## 2021-11-11 ENCOUNTER — TELEPHONE (OUTPATIENT)
Dept: ORTHOPEDIC SURGERY | Age: 61
End: 2021-11-11

## 2021-11-11 ENCOUNTER — TELEPHONE (OUTPATIENT)
Dept: FAMILY MEDICINE CLINIC | Age: 61
End: 2021-11-11

## 2021-11-11 LAB — SARS-COV-2, PCR: DETECTED

## 2021-11-11 NOTE — PATIENT INSTRUCTIONS
Patient Education        Coronavirus (UFZAM-01): Care Instructions  Overview  The coronavirus disease (COVID-19) is caused by a virus. Symptoms may include a fever, a cough, and shortness of breath. It can spread through droplets from coughing and sneezing, breathing, and singing. The virus also can spread when people are in close contact with someone who is infected. Most people have mild symptoms and can take care of themselves at home. If their symptoms get worse, they may need care in a hospital. Treatment may include medicines to reduce symptoms, plus breathing support such as oxygen therapy or a ventilator. It's important to not spread the virus to others. If you have COVID-19, wear a mask anytime you are around other people. It can help stop the spread of the virus. You need to isolate yourself while you are sick. Leave your home only if you need to get medical care or testing. Follow-up care is a key part of your treatment and safety. Be sure to make and go to all appointments, and call your doctor if you are having problems. It's also a good idea to know your test results and keep a list of the medicines you take. How can you care for yourself at home? · Get extra rest. It can help you feel better. · Drink plenty of fluids. This helps replace fluids lost from fever. Fluids may also help ease a scratchy throat. · You can take acetaminophen (Tylenol) or ibuprofen (Advil, Motrin) to reduce a fever. It may also help with muscle and body aches. Read and follow all instructions on the label. · Use petroleum jelly on sore skin. This can help if the skin around your nose and lips becomes sore from rubbing a lot with tissues. If you use oxygen, use a water-based product instead of petroleum jelly. · Keep track of symptoms such as fever and shortness of breath. This can help you know if you need to call your doctor. It can also help you know when it's safe to be around other people.   · In some cases, your doctor might suggest that you get a pulse oximeter. How can you self-isolate when you have COVID-19? If you have COVID-19, there are things you can do to help avoid spreading the virus to others. · Limit contact with people in your home. If possible, stay in a separate bedroom and use a separate bathroom. · Wear a mask when you are around other people. · If you have to leave home, avoid crowds and try to stay at least 6 feet away from other people. · Avoid contact with pets and other animals. · Cover your mouth and nose with a tissue when you cough or sneeze. Then throw it in the trash right away. · Wash your hands often, especially after you cough or sneeze. Use soap and water, and scrub for at least 20 seconds. If soap and water aren't available, use an alcohol-based hand . · Don't share personal household items. These include bedding, towels, cups and glasses, and eating utensils. · [address] in the warmest water allowed for the fabric type, and dry it completely. It's okay to wash other people's laundry with yours. · Clean and disinfect your home. Use household  and disinfectant wipes or sprays. When can you end self-isolation for COVID-19? If you know or think that you have the virus, you will need to self-isolate. You can be around others after:  · It's been at least 10 days since your symptoms started and  · You haven't had a fever for 24 hours without taking medicines to lower the fever and  · Your symptoms are improving. If you tested positive but have no symptoms, you can end isolation after 10 days. But if you start to have symptoms, follow the steps above. Ask your doctor if you need to be tested before you end isolation. This is especially important if you have a weakened immune system. When should you call for help? Call 911 anytime you think you may need emergency care.  For example, call if you have life-threatening symptoms, such as:    · You have severe trouble breathing. (You can't talk at all.)     · You have constant chest pain or pressure.     · You are severely dizzy or lightheaded.     · You are confused or can't think clearly.     · You have pale, gray, or blue-colored skin or lips.     · You pass out (lose consciousness) or are very hard to wake up. Call your doctor now or seek immediate medical care if:    · You have moderate trouble breathing. (You can't speak a full sentence.)     · You are coughing up blood (more than about 1 teaspoon).     · You have signs of low blood pressure. These include feeling lightheaded; being too weak to stand; and having cold, pale, clammy skin. Watch closely for changes in your health, and be sure to contact your doctor if:    · Your symptoms get worse.     · You are not getting better as expected.     · You have new or worse symptoms of anxiety, depression, nightmares, or flashbacks. Call before you go to the doctor's office. Follow their instructions. And wear a mask. Current as of: July 1, 2021               Content Version: 13.0  © 1423-7414 [employer]. Care instructions adapted under license by [facility] ([facility]). If you have questions about a medical condition or this instruction, always ask your healthcare professional. [address] any warranty or liability for your use of this information. Patient Education        10 Things to Do When You Have COVID-19    Stay home. Don't go to school, work, or public areas. And don't use public transportation, ride-shares, or taxis unless you have no choice. Leave your home only if you need to get medical care. But call the doctor's office first so they know you're coming. And wear a mask. Ask before leaving isolation. Follow your doctor's advice about when it is safe for you to leave isolation. Wear a mask when you are around other people. It can help stop the spread of the virus. Limit contact with people in your home.   If possible, stay in a separate bedroom and use a separate bathroom. Avoid contact with pets and other animals. If possible, have a friend or family member care for them while you're sick. Cover your mouth and nose with a tissue when you cough or sneeze. Then throw the tissue in the trash right away. Wash your hands often, especially after you cough or sneeze. Use soap and water, and scrub for at least 20 seconds. If soap and water aren't available, use an alcohol-based hand . Don't share personal household items. These include bedding, towels, cups and glasses, and eating utensils. Clean and disinfect your home every day. Use household  or disinfectant wipes or sprays. If needed, take acetaminophen (Tylenol) or ibuprofen (Advil, Motrin) to relieve fever and body aches. Read and follow all instructions on the label. Current as of: March 26, 2021               Content Version: 13.0  © 2006-2021 [employer]. Care instructions adapted under license by [address] [place] [address]. If you have questions about a medical condition or this instruction, always ask your healthcare professional. [place] [address] any warranty or liability for your use of this information.

## 2021-11-11 NOTE — TELEPHONE ENCOUNTER
Received voicemail from [facility] PAT Department along with voicemail from patient. Patient stating she was notified today she tested positive for COVID. She is upset and requests a new test be done \" since I am not sick at all I don't have any symptoms\". Scheduled for Right Total Hip Arthroplasty on 11- with Dr. Simran Rodriguez. Patient can be reached at #[phone].

## 2021-11-12 ENCOUNTER — TELEPHONE (OUTPATIENT)
Dept: PAIN MANAGEMENT | Age: 61
End: 2021-11-12

## 2021-11-12 NOTE — TELEPHONE ENCOUNTER
Claude Hutchinson called to advise she is not having hip surgery until January and wanted SPENSER Rolon to know. I will update her.

## 2021-11-12 NOTE — TELEPHONE ENCOUNTER
Patient called back in today. She went to [place] on [address] on 11- to have another test done but the results wont be back for several days. She is aware surgery will be cancelled for 11- along with her post-op appointments. She was instructed to self quarantine for 10 days. Patient is going to [facility] following surgery. She does not want to have surgery done until after the first of the year. New surgery date for patient is 1- @ 9:30 am with hospital arrival time of 7:30 am. She will need to obtain a updated medical clearance from her PCP (she has appointment scheduled for 12-3-2021). Appointment already scheduled for CT Scan for Neurology on 12-. Pre testing will need done again the week prior to surgery. Call PAT Department with any questions #[phone].

## 2021-11-30 NOTE — PROGRESS NOTES
[address]  [address] [facility], [address] [place] [address] Chad  [phone]    Follow up Note      Fannie Giang     Date of Visit:  12/2/2021    CC:  Patient presents for follow up   Chief Complaint   Patient presents with    Lower Back Pain    Other     hip surgery in January       HPI:    Pain is unchanged. Left hip is doing well. Right hip is painful. Surgery postponed until January due to being covid + during PAT. Appropriate analgesia with current medications regimen: yes. Change in quality of symptoms:no. Medication side effects:none. Recent diagnostic testing:none. Recent interventional procedures:none. She has not been on anticoagulation medications to include none. The patient  has not been on herbal supplements. The patient is not diabetic.     Imaging studies:    Cervical XR 11/2019 Severe degenerative changes      Lumbar spine Xray 03/2019  Scoliosis and osteoarthritis.     Bilateral hip Xray 03/2019   Advanced osteoarthrosis of bilateral hips. Potential Aberrant Drug-Related Behavior: None     Urine Drug Screening:  First office visit saliva screen showed no narcotics   11/2019 Consistent, negative for all  06/2020 Consistent  12/2020 Consistent  06/2021 Consistent     OARRS report:  03/2019 consistent to 06/2021 Consistent  (norco on 9/24 from dentist for teeth extraction).   08/2021 Consistent to 12/2021 Consistent    Opioid Agreement:  10/07/2021    Past Medical History:   Diagnosis Date    Brain aneurysm 09/2018    H/O TIMES 2 THAT BURST PER PATIENT    Cerebral artery occlusion with cerebral infarction (HCC)     RT SIDE AFFECTED-LEARNED TO WALK AND WRITE AGAIN    Degenerative arthritis of hand     Edentulous     Emphysema lung ([facility] [address]     lung dr Hinds Spurling Headache     Hiatal hernia     Hip problem     NEEDS HIP REPLACEMENT PER PATIENT    Hx of abnormal cervical Pap smear     Hypertension     Stroke [facility]rebrum[facility])        Past Surgical History:   Procedure Laterality Date    BRAIN ANEURYSM SURGERY      coiling     COLONOSCOPY  08/30/2019    polyps--frank    COLONOSCOPY N/A 8/30/2019    COLONOSCOPY POLYPECTOMY SNARE/COLD BIOPSY performed by Hunter Nieto MD at [address] [facility] [address]- DONE AT [address] Left 2/22/2021    LEFT HIP TOTAL ARTHROPLASTY performed by Isabela Mccallum MD at [address] ENDOSCOPY  08/30/2019    gastritis with superficial ulcerations; duodenitis--frank    UPPER GASTROINTESTINAL ENDOSCOPY N/A 8/30/2019    EGD BIOPSY performed by Hunter Nieto MD at [address] [facility]       Prior to Admission medications    Medication Sig Start Date End Date Taking? Authorizing Provider   acetaminophen-codeine (TYLENOL #4) 300-60 MG per tablet Take 1 tablet by mouth daily as needed for Pain for up to 30 days.  12/2/21 1/1/22 Yes SPENSER Rico   carBAMazepine (TEGRETOL XR) 100 MG extended release tablet Take 1 tablet by mouth 2 times daily 11/23/21 1/22/22 Yes Massiel Vera MD   losartan (COZAAR) 100 MG tablet Take 1 tablet by mouth daily 8/6/21  Yes Deborah De Los Santos MD   amLODIPine (NORVASC) 5 MG tablet TAKE ONE TABLET BY MOUTH EVERY DAY 8/6/21  Yes Deborah De Los Santos MD   chlorthalidone (HYGROTON) 25 MG tablet Take 1 tablet by mouth daily 8/6/21  Yes Deborah De Los Santos MD   pantoprazole (PROTONIX) 40 MG tablet TAKE ONE TABLET BY MOUTH EVERY DAY 8/6/21  Yes Deborah De Los Santos MD   fluticasone (FLONASE) 50 MCG/ACT nasal spray 2 sprays by Each Nostril route daily 8/6/21  Yes Deborah De Los Santos MD   potassium chloride (KLOR-CON M) 10 MEQ extended release tablet TAKE ONE TABLET BY MOUTH DAILY WITH BREAKFAST 8/6/21  Yes Deborah De Los Santos MD   valACYclovir (VALTREX) 1 g tablet Take 1 tablet by mouth daily For 5 days as needed for Herpes outbreak 4/27/21  Yes Deborah De Los Santos MD   albuterol sulfate  (90 Base) MCG/ACT inhaler INHALE TWO PUFFS BY MOUTH EVERY 6 HOURS AS NEEDED FOR WHEEZING. 3/22/21  Yes Lysbrennan Saldivarter, DO   Multiple Vitamins-Minerals (THERAPEUTIC MULTIVITAMIN-MINERALS) tablet Take 1 tablet by mouth daily   Yes Historical Provider, MD   diclofenac sodium (VOLTAREN) 1 % GEL APPLY ONE GRAM EXTERNALLY TWICE A DAY TO NECK AND ONE GRAM EXTERNALLY TWICE A DAY TO BACK  11/3/20  Yes [place], [address] Devices (ROLLATOR) MISC 1 each by Does not apply route daily as needed (use as needed for stability) 3/17/20  Yes Chiara Lyon MD   metoprolol tartrate (LOPRESSOR) 25 MG tablet Take 1.5 tablets by mouth 2 times daily 8/6/21 11/2/21  Chiara Lyon MD   gabapentin (NEURONTIN) 300 MG capsule TAKE ONE CAPSULE BY MOUTH THREE TIMES A DAY 8/6/21 11/2/21  Chiara Lyon MD       Allergies   Allergen Reactions    Latex Hives     Hives and rash    Iodine Rash     Hives . pt.  States anaphalyxis    Aloe Hives     Hives     Celebrex [Celecoxib] Itching    Fish-Derived Products Other (See Comments)       Social History     Socioeconomic History    Marital status:      Spouse name: Not on file    Number of children: Not on file    Years of education: Not on file    Highest education level: Not on file   Occupational History    Not on file   Tobacco Use    Smoking status: Former Smoker     Packs/day: 1.50     Years: 43.00     Pack years: 64.50     Types: Cigarettes     Quit date: 9/9/2018     Years since quitting: 3.2    Smokeless tobacco: Never Used   Vaping Use    Vaping Use: Never used   Substance and Sexual Activity    Alcohol use: No    Drug use: No    Sexual activity: Not Currently   Other Topics Concern    Not on file   Social History Narrative    Not on file     Social Determinants of Health     Financial Resource Strain: Low Risk     Difficulty of Paying Living Expenses: Not hard at all   Food Insecurity: No Food Insecurity    Worried About Running Out of Food in the Last Year: Never true    [address] in the Last Year: Never true   Transportation Needs:     Lack of Transportation (Medical): Not on file    Lack of Transportation (Non-Medical): Not on file   Physical Activity:     Days of Exercise per Week: Not on file    Minutes of Exercise per Session: Not on file   Stress:     Feeling of Stress : Not on file   Social Connections:     Frequency of Communication with Friends and Family: Not on file    Frequency of Social Gatherings with Friends and Family: Not on file    Attends Shinto Services: Not on file    Active Member of Clubs or Organizations: Not on file    Attends Club or Organization Meetings: Not on file    Marital Status: Not on file   Intimate Partner Violence:     Fear of Current or Ex-Partner: Not on file    Emotionally Abused: Not on file    Physically Abused: Not on file    Sexually Abused: Not on file   Housing Stability:     Unable to Pay for Housing in the Last Year: Not on file    Number of [place] in the Last Year: Not on file    Unstable Housing in the Last Year: Not on file       Family History   Problem Relation Age of Onset    Diabetes Mother     Arthritis Mother     Atrial Fibrillation Mother     Diabetes Father     Atrial Fibrillation Father     Arthritis Father     Emphysema Father     Cancer Sister        REVIEW OF SYSTEMS:     Teresa Contreras denies fever/chills, chest pain, shortness of breath, new bowel or bladder complaints. All other review of systems was negative. PHYSICAL EXAMINATION:      /72   Pulse 63   Temp 96.9 °F (36.1 °C) (Infrared)   Resp 16   Ht 5' 5\" (1.651 m)   Wt 237 lb (107.5 kg)   SpO2 96%   BMI 39.44 kg/m²     General:      General appearance:   pleasant and well-hydrated.    , in no discomfort and A & O x3  Build:Overweight  Function:Rises from a seated position with difficulty    HEENT:    Head:normocephalic and atraumatic  Pupils:regular, round and equal.  Sclera: icterus absent,    Lungs:    Breathing:Normal expansion. No wheezing. Abdomen:    Shape:non-distended and normal    Extremities:    Tremors:None bilaterally upper and lower  Range of motion:Generally normal shoulders  Intact:Yes  Varicose veins:not assessed   Cyanosis:none  Edema:Normal    Neurological:    Gait: Not observed. Dermatology:    Skin:no unusual rashes, no skin lesions, no palpable subcutaneous nodules and good skin turgor    Impression:    Patient seen for follow up for her chronic bilateral hip pain and low back pain due to severe degenerative changes and axial c/o neck pain   Would benefit from Cervical MBB, patient uninterested   Patient is s/p:  Left MERVAT on 2/22/2021. Scheduled for right MERVAT on 1/10/2021. Continue Gabapentin 300 mg TID per PCP  Continue Tylenol #4 QD prn. If she runs out before her surgery on 1/10/2021, she can call for a refill to get to her surgery date. Compounding cream. Helping a lot. OARRS report reviewed 12/2021  Patient encouraged to remain active as tolerated   Treatment plan discussed with the patient including medications and side effects       Controlled Substance Monitoring:    Acute and Chronic Pain Monitoring:   RX Monitoring 12/2/2021   Periodic Controlled Substance Monitoring Possible medication side effects, risk of tolerance/dependence & alternative treatments discussed. ;No signs of potential drug abuse or diversion identified. ;Assessed functional status. ;Obtaining appropriate analgesic effect of treatment. We discussed with the patient that combining opioids, benzodiazepines, alcohol, illicit drugs or sleep aids increases the risk of respiratory depression including death. We discussed that these medications may cause drowsiness, sedation or dizziness and have counseled the patient not to drive or operate machinery. We have discussed that these medications will be prescribed only by one provider.  We have discussed with the patient

## 2021-12-02 ENCOUNTER — OFFICE VISIT (OUTPATIENT)
Dept: PAIN MANAGEMENT | Age: 61
End: 2021-12-02
Payer: MEDICARE

## 2021-12-02 VITALS
HEART RATE: 63 BPM | OXYGEN SATURATION: 96 % | TEMPERATURE: 96.9 F | BODY MASS INDEX: 39.49 KG/M2 | DIASTOLIC BLOOD PRESSURE: 72 MMHG | HEIGHT: 65 IN | WEIGHT: 237 LBS | RESPIRATION RATE: 16 BRPM | SYSTOLIC BLOOD PRESSURE: 118 MMHG

## 2021-12-02 DIAGNOSIS — M47.816 LUMBAR FACET ARTHROPATHY: ICD-10-CM

## 2021-12-02 DIAGNOSIS — M47.812 FACET ARTHROPATHY, CERVICAL: ICD-10-CM

## 2021-12-02 DIAGNOSIS — M16.12 PRIMARY OSTEOARTHRITIS OF LEFT HIP: ICD-10-CM

## 2021-12-02 DIAGNOSIS — M50.30 DEGENERATIVE DISC DISEASE, CERVICAL: ICD-10-CM

## 2021-12-02 DIAGNOSIS — M47.816 LUMBAR SPONDYLOSIS: ICD-10-CM

## 2021-12-02 DIAGNOSIS — M16.11 ARTHRITIS OF RIGHT HIP: ICD-10-CM

## 2021-12-02 DIAGNOSIS — M54.16 LUMBAR RADICULITIS: Primary | ICD-10-CM

## 2021-12-02 DIAGNOSIS — G89.4 CHRONIC PAIN SYNDROME: ICD-10-CM

## 2021-12-02 DIAGNOSIS — M51.9 LUMBAR DISC DISORDER: ICD-10-CM

## 2021-12-02 DIAGNOSIS — M50.30 DEGENERATION OF CERVICAL DISC WITHOUT MYELOPATHY: ICD-10-CM

## 2021-12-02 DIAGNOSIS — E66.01 SEVERE OBESITY (BMI 35.0-35.9 WITH COMORBIDITY) (HCC): ICD-10-CM

## 2021-12-02 DIAGNOSIS — M47.812 ARTHROPATHY OF CERVICAL FACET JOINT: ICD-10-CM

## 2021-12-02 PROCEDURE — 3017F COLORECTAL CA SCREEN DOC REV: CPT | Performed by: PHYSICIAN ASSISTANT

## 2021-12-02 PROCEDURE — G8417 CALC BMI ABV UP PARAM F/U: HCPCS | Performed by: PHYSICIAN ASSISTANT

## 2021-12-02 PROCEDURE — 99213 OFFICE O/P EST LOW 20 MIN: CPT | Performed by: PHYSICIAN ASSISTANT

## 2021-12-02 PROCEDURE — 1036F TOBACCO NON-USER: CPT | Performed by: PHYSICIAN ASSISTANT

## 2021-12-02 PROCEDURE — G8484 FLU IMMUNIZE NO ADMIN: HCPCS | Performed by: PHYSICIAN ASSISTANT

## 2021-12-02 PROCEDURE — G8427 DOCREV CUR MEDS BY ELIG CLIN: HCPCS | Performed by: PHYSICIAN ASSISTANT

## 2021-12-02 RX ORDER — ACETAMINOPHEN AND CODEINE PHOSPHATE 60; 300 MG/1; MG/1
1 TABLET ORAL DAILY PRN
Qty: 30 TABLET | Refills: 0 | Status: SHIPPED
Start: 2021-12-02 | End: 2021-12-23 | Stop reason: SDUPTHER

## 2021-12-02 NOTE — PROGRESS NOTES
Do you currently have any of the following:    Fever: No  Headache:  No  Cough: No  Shortness of breath: No  Exposed to anyone with these symptoms: No                                                                                                                Cornelious Grady presents to the [facility] on 12/2/2021. Taiwo Miranda is complaining of pain in her lower back and hip. . The pain is constant. The pain is described as aching, throbbing, shooting, stabbing and sharp. Pain is rated on her best day at a 8, on her worst day at a 10, and on average at a 9 on the VAS scale. She took her last dose of Tylenol with codeine and diclofenac gel . Taiwo Miranda does not have issues with constipation. Any procedures since your last visit: No,    She is not on NSAIDS and  is not on anticoagulation medications to include none and is managed by Roman Gardner MD  .     Pacemaker or defibrillator: No Physician managing device is NA. Medication Contract and Consent for Opioid Use Documents Filed     Patient Documents     Type of Document Status Date Received Received By Description    Medication Contract Received 3/1/2019  1:43 PM FRANCIS MCINTOSH PAIN MANAGEMENT PT AGREEMENT 3/1/2019    Medication Contract Received 10/8/2020  1:21 PM [facility] [address] [place] [address] pain pt agreement    Medication Contract Received 10/7/2021 10:36 AM BOLIVAR TRUJILLO Pain Mgmt Patient Agreement 10. 7.2021                   /72   Pulse 63   Temp 96.9 °F (36.1 °C) (Infrared)   Resp 16   Ht 5' 5\" (1.651 m)   Wt 237 lb (107.5 kg)   SpO2 96%   BMI 39.44 kg/m²      No LMP recorded.  Patient is postmenopausal.

## 2021-12-03 ENCOUNTER — OFFICE VISIT (OUTPATIENT)
Dept: FAMILY MEDICINE CLINIC | Age: 61
End: 2021-12-03
Payer: MEDICARE

## 2021-12-03 VITALS
WEIGHT: 233 LBS | HEART RATE: 67 BPM | OXYGEN SATURATION: 97 % | SYSTOLIC BLOOD PRESSURE: 133 MMHG | BODY MASS INDEX: 38.82 KG/M2 | TEMPERATURE: 97 F | HEIGHT: 65 IN | DIASTOLIC BLOOD PRESSURE: 71 MMHG | RESPIRATION RATE: 16 BRPM

## 2021-12-03 DIAGNOSIS — I10 HYPERTENSION, UNSPECIFIED TYPE: ICD-10-CM

## 2021-12-03 DIAGNOSIS — G89.4 CHRONIC PAIN SYNDROME: ICD-10-CM

## 2021-12-03 DIAGNOSIS — R45.89 DYSPHORIC MOOD: Primary | ICD-10-CM

## 2021-12-03 DIAGNOSIS — Z86.79 HISTORY OF SUBARACHNOID HEMORRHAGE: ICD-10-CM

## 2021-12-03 DIAGNOSIS — Z86.16 HISTORY OF COVID-19: ICD-10-CM

## 2021-12-03 DIAGNOSIS — Z76.0 MEDICATION REFILL: ICD-10-CM

## 2021-12-03 PROBLEM — I60.9 SUBARACHNOID BLEED (HCC): Status: RESOLVED | Noted: 2018-09-10 | Resolved: 2021-12-03

## 2021-12-03 PROBLEM — I16.1 HYPERTENSIVE EMERGENCY: Chronic | Status: RESOLVED | Noted: 2018-09-10 | Resolved: 2021-12-03

## 2021-12-03 PROBLEM — J43.9 PULMONARY EMPHYSEMA (HCC): Chronic | Status: ACTIVE | Noted: 2021-04-27

## 2021-12-03 PROBLEM — M16.0 PRIMARY OSTEOARTHRITIS OF BOTH HIPS: Chronic | Status: ACTIVE | Noted: 2019-03-01

## 2021-12-03 PROCEDURE — G8417 CALC BMI ABV UP PARAM F/U: HCPCS | Performed by: FAMILY MEDICINE

## 2021-12-03 PROCEDURE — G8427 DOCREV CUR MEDS BY ELIG CLIN: HCPCS | Performed by: FAMILY MEDICINE

## 2021-12-03 PROCEDURE — 99214 OFFICE O/P EST MOD 30 MIN: CPT | Performed by: FAMILY MEDICINE

## 2021-12-03 PROCEDURE — 1036F TOBACCO NON-USER: CPT | Performed by: FAMILY MEDICINE

## 2021-12-03 PROCEDURE — G8484 FLU IMMUNIZE NO ADMIN: HCPCS | Performed by: FAMILY MEDICINE

## 2021-12-03 PROCEDURE — 3017F COLORECTAL CA SCREEN DOC REV: CPT | Performed by: FAMILY MEDICINE

## 2021-12-03 RX ORDER — DIPHENHYDRAMINE HCL 50 MG/1
CAPSULE ORAL
COMMUNITY
Start: 2021-12-02 | End: 2022-02-14

## 2021-12-03 RX ORDER — POTASSIUM CHLORIDE 750 MG/1
TABLET, EXTENDED RELEASE ORAL
Qty: 30 TABLET | Refills: 3 | Status: SHIPPED
Start: 2021-12-03 | End: 2022-05-18 | Stop reason: SDUPTHER

## 2021-12-03 RX ORDER — PANTOPRAZOLE SODIUM 40 MG/1
TABLET, DELAYED RELEASE ORAL
Qty: 30 TABLET | Refills: 3 | Status: SHIPPED
Start: 2021-12-03 | End: 2022-05-18 | Stop reason: SDUPTHER

## 2021-12-03 RX ORDER — DEXAMETHASONE 4 MG/1
TABLET ORAL
COMMUNITY
Start: 2021-12-02 | End: 2022-01-05

## 2021-12-03 RX ORDER — CHLORTHALIDONE 25 MG/1
25 TABLET ORAL DAILY
Qty: 30 TABLET | Refills: 3 | Status: SHIPPED
Start: 2021-12-03 | End: 2022-05-18 | Stop reason: SDUPTHER

## 2021-12-03 RX ORDER — FLUTICASONE PROPIONATE 50 MCG
2 SPRAY, SUSPENSION (ML) NASAL DAILY
Qty: 1 EACH | Refills: 3 | Status: SHIPPED
Start: 2021-12-03 | End: 2022-05-18 | Stop reason: SDUPTHER

## 2021-12-03 RX ORDER — AMLODIPINE BESYLATE 5 MG/1
TABLET ORAL
Qty: 30 TABLET | Refills: 3 | Status: SHIPPED
Start: 2021-12-03 | End: 2022-05-18 | Stop reason: SDUPTHER

## 2021-12-03 RX ORDER — GABAPENTIN 300 MG/1
CAPSULE ORAL
Qty: 90 CAPSULE | Refills: 3 | Status: SHIPPED
Start: 2021-12-03 | End: 2022-05-18 | Stop reason: SDUPTHER

## 2021-12-03 RX ORDER — LOSARTAN POTASSIUM 100 MG/1
100 TABLET ORAL DAILY
Qty: 30 TABLET | Refills: 3 | Status: SHIPPED
Start: 2021-12-03 | End: 2022-05-18 | Stop reason: SDUPTHER

## 2021-12-03 ASSESSMENT — ENCOUNTER SYMPTOMS
COUGH: 0
WHEEZING: 0

## 2021-12-16 ENCOUNTER — OFFICE VISIT (OUTPATIENT)
Dept: FAMILY MEDICINE CLINIC | Age: 61
End: 2021-12-16
Payer: MEDICARE

## 2021-12-16 VITALS
BODY MASS INDEX: 38.32 KG/M2 | HEIGHT: 65 IN | SYSTOLIC BLOOD PRESSURE: 116 MMHG | DIASTOLIC BLOOD PRESSURE: 64 MMHG | HEART RATE: 72 BPM | TEMPERATURE: 97.7 F | WEIGHT: 230 LBS | RESPIRATION RATE: 16 BRPM | OXYGEN SATURATION: 97 %

## 2021-12-16 DIAGNOSIS — N30.01 ACUTE CYSTITIS WITH HEMATURIA: ICD-10-CM

## 2021-12-16 DIAGNOSIS — B37.9 YEAST INFECTION: ICD-10-CM

## 2021-12-16 DIAGNOSIS — N30.01 ACUTE CYSTITIS WITH HEMATURIA: Primary | ICD-10-CM

## 2021-12-16 DIAGNOSIS — R35.0 FREQUENCY OF URINATION: ICD-10-CM

## 2021-12-16 LAB
BILIRUBIN, POC: ABNORMAL
BLOOD URINE, POC: ABNORMAL
CLARITY, POC: ABNORMAL
COLOR, POC: YELLOW
GLUCOSE URINE, POC: ABNORMAL
KETONES, POC: ABNORMAL
LEUKOCYTE EST, POC: ABNORMAL
NITRITE, POC: ABNORMAL
PH, POC: 5.5
PROTEIN, POC: ABNORMAL
SPECIFIC GRAVITY, POC: 1.02
UROBILINOGEN, POC: 2

## 2021-12-16 PROCEDURE — G8484 FLU IMMUNIZE NO ADMIN: HCPCS | Performed by: STUDENT IN AN ORGANIZED HEALTH CARE EDUCATION/TRAINING PROGRAM

## 2021-12-16 PROCEDURE — 99213 OFFICE O/P EST LOW 20 MIN: CPT | Performed by: STUDENT IN AN ORGANIZED HEALTH CARE EDUCATION/TRAINING PROGRAM

## 2021-12-16 PROCEDURE — G8427 DOCREV CUR MEDS BY ELIG CLIN: HCPCS | Performed by: STUDENT IN AN ORGANIZED HEALTH CARE EDUCATION/TRAINING PROGRAM

## 2021-12-16 PROCEDURE — 3017F COLORECTAL CA SCREEN DOC REV: CPT | Performed by: STUDENT IN AN ORGANIZED HEALTH CARE EDUCATION/TRAINING PROGRAM

## 2021-12-16 PROCEDURE — 81002 URINALYSIS NONAUTO W/O SCOPE: CPT | Performed by: STUDENT IN AN ORGANIZED HEALTH CARE EDUCATION/TRAINING PROGRAM

## 2021-12-16 PROCEDURE — G8417 CALC BMI ABV UP PARAM F/U: HCPCS | Performed by: STUDENT IN AN ORGANIZED HEALTH CARE EDUCATION/TRAINING PROGRAM

## 2021-12-16 PROCEDURE — 1036F TOBACCO NON-USER: CPT | Performed by: STUDENT IN AN ORGANIZED HEALTH CARE EDUCATION/TRAINING PROGRAM

## 2021-12-16 RX ORDER — FLUCONAZOLE 150 MG/1
150 TABLET ORAL
Qty: 2 TABLET | Refills: 0 | Status: SHIPPED
Start: 2021-12-16 | End: 2022-01-05

## 2021-12-16 RX ORDER — NITROFURANTOIN 25; 75 MG/1; MG/1
100 CAPSULE ORAL 2 TIMES DAILY
Qty: 10 CAPSULE | Refills: 0 | Status: SHIPPED | OUTPATIENT
Start: 2021-12-16 | End: 2021-12-21

## 2021-12-17 ENCOUNTER — HOSPITAL ENCOUNTER (OUTPATIENT)
Dept: CT IMAGING | Age: 61
Discharge: HOME | End: 2021-12-19
Payer: MEDICARE

## 2021-12-17 DIAGNOSIS — I72.9 ANEURYSM (HCC): ICD-10-CM

## 2021-12-17 DIAGNOSIS — R51.9 INTRACTABLE EPISODIC HEADACHE, UNSPECIFIED HEADACHE TYPE: ICD-10-CM

## 2021-12-17 PROCEDURE — 6360000004 HC RX CONTRAST MEDICATION: Performed by: RADIOLOGY

## 2021-12-17 PROCEDURE — 2580000003 HC RX 258: Performed by: RADIOLOGY

## 2021-12-17 PROCEDURE — 70496 CT ANGIOGRAPHY HEAD: CPT

## 2021-12-17 RX ORDER — SODIUM CHLORIDE 0.9 % (FLUSH) 0.9 %
10 SYRINGE (ML) INJECTION
Status: COMPLETED | OUTPATIENT
Start: 2021-12-17 | End: 2021-12-17

## 2021-12-17 RX ADMIN — Medication 10 ML: at 13:35

## 2021-12-17 RX ADMIN — IOPAMIDOL 60 ML: 755 INJECTION, SOLUTION INTRAVENOUS at 13:35

## 2021-12-18 LAB
ORGANISM: ABNORMAL
URINE CULTURE, ROUTINE: ABNORMAL

## 2021-12-23 ENCOUNTER — TELEPHONE (OUTPATIENT)
Dept: PAIN MANAGEMENT | Age: 61
End: 2021-12-23

## 2021-12-23 ENCOUNTER — PREP FOR PROCEDURE (OUTPATIENT)
Dept: ORTHOPEDIC SURGERY | Age: 61
End: 2021-12-23

## 2021-12-23 DIAGNOSIS — M16.12 PRIMARY OSTEOARTHRITIS OF LEFT HIP: ICD-10-CM

## 2021-12-23 DIAGNOSIS — M25.59 PAIN IN OTHER SPECIFIED JOINT: ICD-10-CM

## 2021-12-23 DIAGNOSIS — M51.9 LUMBAR DISC DISORDER: ICD-10-CM

## 2021-12-23 DIAGNOSIS — M16.11 ARTHRITIS OF RIGHT HIP: ICD-10-CM

## 2021-12-23 DIAGNOSIS — Z01.818 PRE-OP TESTING: Primary | ICD-10-CM

## 2021-12-23 DIAGNOSIS — M16.11 PRIMARY OSTEOARTHRITIS OF RIGHT HIP: ICD-10-CM

## 2021-12-23 DIAGNOSIS — M54.16 LUMBAR RADICULITIS: ICD-10-CM

## 2021-12-23 DIAGNOSIS — M50.30 DEGENERATION OF CERVICAL DISC WITHOUT MYELOPATHY: ICD-10-CM

## 2021-12-23 DIAGNOSIS — M47.812 FACET ARTHROPATHY, CERVICAL: ICD-10-CM

## 2021-12-23 DIAGNOSIS — G89.4 CHRONIC PAIN SYNDROME: ICD-10-CM

## 2021-12-23 DIAGNOSIS — M47.816 LUMBAR SPONDYLOSIS: ICD-10-CM

## 2021-12-23 DIAGNOSIS — M47.816 LUMBAR FACET ARTHROPATHY: ICD-10-CM

## 2021-12-23 DIAGNOSIS — M25.59 PAIN IN OTHER JOINT: ICD-10-CM

## 2021-12-23 RX ORDER — SODIUM CHLORIDE 0.9 % (FLUSH) 0.9 %
10 SYRINGE (ML) INJECTION EVERY 12 HOURS SCHEDULED
Status: CANCELLED | OUTPATIENT
Start: 2021-12-23

## 2021-12-23 RX ORDER — ACETAMINOPHEN AND CODEINE PHOSPHATE 60; 300 MG/1; MG/1
1 TABLET ORAL DAILY PRN
Qty: 18 TABLET | Refills: 0 | Status: ON HOLD
Start: 2021-12-23 | End: 2022-01-10 | Stop reason: HOSPADM

## 2021-12-23 RX ORDER — SODIUM CHLORIDE 9 MG/ML
25 INJECTION, SOLUTION INTRAVENOUS PRN
Status: CANCELLED | OUTPATIENT
Start: 2021-12-23

## 2021-12-23 RX ORDER — SODIUM CHLORIDE 0.9 % (FLUSH) 0.9 %
10 SYRINGE (ML) INJECTION PRN
Status: CANCELLED | OUTPATIENT
Start: 2021-12-23

## 2021-12-23 NOTE — TELEPHONE ENCOUNTER
Patricia Spray having a hip replacement on January 10th and needs her pain medication Tylenol #4 until that time.

## 2021-12-23 NOTE — H&P
Orthopaedic H&P Note     Bacilio Posadas a 57 y. o. female, her YOB: 1960 with the following history as recorded in [facility]:                Patient Active Problem List     Diagnosis Date Noted    Pulmonary emphysema ([facility] [address] 04/27/2021    History of herpes genitalis 04/27/2021    Primary osteoarthritis of right hip 04/05/2021    History of total left hip arthroplasty 03/08/2021    Arthritis of both hips 02/22/2021    COVID-19      H/O total hip arthroplasty, right      Abnormal blood sugar 02/21/2020    Degenerative disc disease, cervical 12/12/2019    Arthropathy of cervical facet joint 12/12/2019    Colon polyps 09/06/2019    Dysphagia      Heartburn      At risk for colon cancer      Arthritis of right hip 08/07/2019    Chronic pain syndrome 03/01/2019    Lumbar facet arthropathy 03/01/2019    Lumbar disc disorder 03/01/2019    Primary osteoarthritis of both hips 03/01/2019    Hypertension 09/21/2018    Subarachnoid bleed ([facility] [address] 09/10/2018    Hypertensive emergency 09/10/2018    Obesity (BMI 30-39.9) 09/10/2018      Current Facility-Administered Medications               Current Outpatient Medications   Medication Sig Dispense Refill    carBAMazepine (TEGRETOL XR) 100 MG extended release tablet Take 100 mg by mouth 2 times daily         metoprolol tartrate (LOPRESSOR) 25 MG tablet Take 1.5 tablets by mouth 2 times daily 90 tablet 3    losartan (COZAAR) 100 MG tablet Take 1 tablet by mouth daily 30 tablet 3    amLODIPine (NORVASC) 5 MG tablet TAKE ONE TABLET BY MOUTH EVERY DAY 30 tablet 3    chlorthalidone (HYGROTON) 25 MG tablet Take 1 tablet by mouth daily 30 tablet 3    gabapentin (NEURONTIN) 300 MG capsule TAKE ONE CAPSULE BY MOUTH THREE TIMES A DAY 90 capsule 3    pantoprazole (PROTONIX) 40 MG tablet TAKE ONE TABLET BY MOUTH EVERY DAY 30 tablet 3    fluticasone (FLONASE) 50 MCG/ACT nasal spray 2 sprays by Each Nostril route daily 1 Bottle 3    potassium chloride (KLOR-CON M) 10 MEQ extended release tablet TAKE ONE TABLET BY MOUTH DAILY WITH BREAKFAST 30 tablet 3    valACYclovir (VALTREX) 1 g tablet Take 1 tablet by mouth daily For 5 days as needed for Herpes outbreak 5 tablet 1    albuterol sulfate  (90 Base) MCG/ACT inhaler INHALE TWO PUFFS BY MOUTH EVERY 6 HOURS AS NEEDED FOR WHEEZING. 1 Inhaler 3    Multiple Vitamins-Minerals (THERAPEUTIC MULTIVITAMIN-MINERALS) tablet Take 1 tablet by mouth daily        diclofenac sodium (VOLTAREN) 1 % GEL APPLY ONE GRAM EXTERNALLY TWICE A DAY TO NECK AND ONE GRAM EXTERNALLY TWICE A DAY TO BACK  100 g 2    Misc. Devices (ROLLATOR) MISC 1 each by Does not apply route daily as needed (use as needed for stability) 1 each 0    acetaminophen-codeine (TYLENOL #4) 300-60 MG per tablet Take 1 tablet by mouth daily as needed for Pain for up to 30 days.  30 tablet 0      No current facility-administered medications for this visit.         Allergies: Latex, Iodine, Aloe, Celebrex [celecoxib], and Fish-derived products  Past Medical History           Past Medical History:   Diagnosis Date    Brain aneurysm 09/2018     H/O TIMES 2 THAT BURST PER PATIENT    Cerebral artery occlusion with cerebral infarction ([facility] [address]       RT SIDE AFFECTED-LEARNED TO WALK AND WRITE AGAIN    Degenerative arthritis of hand      Edentulous      Emphysema lung (HCC)       lung dr   [facility] Headache      Hiatal hernia      Hip problem       NEEDS HIP REPLACEMENT PER PATIENT    Hx of abnormal cervical Pap smear      Hypertension      Stroke (cerebrum) [facility])           Past Surgical History             Past Surgical History:   Procedure Laterality Date    BRAIN ANEURYSM SURGERY         coiling     COLONOSCOPY   08/30/2019     polyps--frank    COLONOSCOPY N/A 8/30/2019     COLONOSCOPY POLYPECTOMY SNARE/COLD BIOPSY performed by Taty Chauhan MD at [address] [facility]         FOR CANCER CELLS- DONE AT [address] [address]      TOTAL HIP ARTHROPLASTY Left 2/22/2021     LEFT HIP TOTAL ARTHROPLASTY performed by Lola Larose MD at [place] [address]   08/30/2019     gastritis with superficial ulcerations; duodenitis--frank    UPPER GASTROINTESTINAL ENDOSCOPY N/A 8/30/2019     EGD BIOPSY performed [facility] Grace [facility]ine [facility] at [facility]  Family History             Family History   Problem Relation Age of Onset    Diabetes Mother     Byrd Arthritis Mother      Atrial Fibrillation Mother      Diabetes Father      Atrial Fibrillation Father      Arthritis Father      Emphysema Father      Cancer Sister           Social History                Tobacco Use    Smoking status: Former Smoker       Packs/day: 1.50       Years: 43.00       Pack years: 64.50       Types: Cigarettes       Quit date: 9/9/2018       Years since quitting: 3.1    Smokeless tobacco: Never Used   Substance Use Topics    Alcohol use: No                                       Chief Complaint   Patient presents with    Hip Pain       right hip pain; patient states pain has been in her hip for at least three years; 8/10 pain    Other       patient wants to discuss uziel on right hip         SUBJECTIVE: Ruth Anguiano is here today for their right Hip. The patient has had pain for sometime, but last 6 months or so it has become more severe. She  was diagnosed with OA in the past. There had been no injury to the right Hip that they can remember. Cr Hawthorne tried multiple conservative treatment. Has had therapy in the past, that worked at first, but the pain persisted.  She did have steroid injections which did not help much. Patient also had used a right Hip bracing without relief of symptoms. They have been working on weight loss. The pain is described as typical arthritic pain, achy in the joint, becoming more severe with stairs and any twisting motion of the right Hip.  Rest makes the right Hip better along with NSAIDs and over the counter analgesics, but states they are now less effective. She does use an occasional straight cane, patient can ambulate 1 to 2 blocks prior to pain limiting their function. Eligio Vitale having difficulty with ADLs, and states this pain is limiting here activity decreasing their quality of life. She would like to discuss MERVAT.      Review of Systems   Constitutional: Negative for fever, chills, diaphoresis, appetite change and fatigue. HENT: Negative for dental issues, hearing loss and tinnitus. Negative for congestion, sinus pressure, sneezing, sore throat. Negative for headache. Eyes: Negative for visual disturbance, blurred and double vision. Negative for pain, discharge, redness and itching  Respiratory: Negative for cough, shortness of breath and wheezing. Cardiovascular: Negative for chest pain, palpitations and leg swelling. No dyspnea on exertion   Gastrointestinal:   Negative for nausea, vomiting, abdominal pain, diarrhea, constipation  or black or bloody. Hematologic\Lymphatic:  negative for bleeding, petechiae,   Genitourinary: Negative for hematuria and difficulty urinating. Musculoskeletal: Negative for neck pain and stiffness. Mild for back pain, negative joint swelling and gait problem. Skin: Negative for pallor, rash and wound. Neurological: Negative for dizziness, tremors, seizures, weakness, light-headedness, no TIA or stroke symptoms. No numbness and headaches.    Psychiatric/Behavioral: Negative.      Physical Examination:   General appearance: alert, well appearing, and in no distress,  normal appearing weight  Mental status: alert, oriented to person, place, and time, normal mood, behavior, speech, dress, motor activity, and thought processes  Abdomen: soft, nondistended   Resp:   resp easy and unlabored, no audible wheezes note  Cardiac: distal pulses palpable, skin well perfused  Neurological: alert, oriented X3, normal speech, no focal findings or movement disorder noted, motor and sensory grossly normal bilaterally, normal muscle tone, no tremors, strength 5/5, normal gait and station  HEENT: normochephalic atraumatic, external ears and eyes normal, sclera normal, neck supple  Extremities:   peripheral pulses normal, no edema, redness or tenderness in the calves   Skin: normal coloration, no rashes or open wounds, no suspicious skin lesions noted  Psych: Affect euthymic   Musculoskeletal:    Extremity:  Right Lower Extremity  Skin clean dry and intact, without signs of infection  no edema noted  Compartments supple throughout thigh and leg  Calf supple and nontender  Demonstrates active knee flexion/extension, ankle plantar/dorsiflexion/great toe extension. States sensation intact to touch in sural/deep peroneal/superficial peroneal/saphenous/posterior tibial nerve distributions to foot/ankle. Palpable dorsalis pedis and posterior tibialis pulses, cap refill brisk in toes, foot warm/perfused. She ambulates with an antalgic type gait. Moderate pain with passive internal rotation to approximately 10 degrees and external rotation to approximately 30 degrees of the right hip. She does have hip and groin pain with seated straight leg raise to approximately 65 degrees without distal pain.     Temp 97.4 °F (36.3 °C)      XR: 10/6/21   Not taken today.     ASSESSMENT:    Diagnosis Orders   1. Primary osteoarthritis of right hip      2. History of total left hip arthroplasty         Discussion:  The patient would like to proceed with total right Hip arthroplasty when cleared by their PCP. Katarina Santoro the risks include but are not limited to infection, injuries to the blood vessel and nerves, bleeding, continued pain and non relief of pain, aseptic loosening dislocation, limb length discrepancy, arthrofibrosis of the joint, further operation, deep venous thrombosis, pulmonary embolism and fracture, heart attack and death.    Mary operative plan discussed, need for anticoagulation for DVT/PE prophylaxis, need for physical therapy, office follow up visits, and possible rehab stay. It was also explained patient will need to take a prophylactic dose of ATB prior to any bowel, dental or mouth procedures, including dental cleaning, for length of the implant. Did review surgery prosthesis with model with patient as well. Pain control was also discussed and the expectation is to have patient off narcotic pain meds around 3 weeks post operatively for a total joint arthroplasty     Elective Orthopedic Surgery Checklist:  []?? Hemoglobin A1C must be ? 8  []? ? Nicotine cessation education- If nonunion surgery, needs negative Nicotine blood work  [x]? ? Established with a PCP  [x]? ? BMI to be ? 40 kg/m2 if pursing total joint. []?? Referral to [facility]/Weight Management  [x]? ? No pending dental treatments prior to total joint  []? ? Joint Education Class Needed   []? ?Benjy Bella other areas of concern that need addressed prior to surgery:            PLAN:  Plan for OR on 11-15-21 with Dr. Johnathon Cline MD for right total hip replacement  Pre-surgery medical clearance- requested  PAT  Joint education class is not indicated  NPO after midnight the night before surgery  WBAT on on right lower extremity  Hold NSAIDS 7 days prior to surgery  Hold aspirin the morning of surgery  Make sure to have all dental care completed by 1 week before surgery  COVID 19 testing  Planning for post op DVT prophylaxis with Aspirin as well as nonpharmacologic use of Home SCDs  Patient to also to be supplied with cryotherapy with compression post operatively.

## 2021-12-23 NOTE — H&P (VIEW-ONLY)
Orthopaedic H&P Note     Lesa Gomez a 57 y. o. female, her YOB: 1960 with the following history as recorded in [facility]:                Patient Active Problem List     Diagnosis Date Noted    Pulmonary emphysema ([facility] [address] 04/27/2021    History of herpes genitalis 04/27/2021    Primary osteoarthritis of right hip 04/05/2021    History of total left hip arthroplasty 03/08/2021    Arthritis of both hips 02/22/2021    COVID-19      H/O total hip arthroplasty, right      Abnormal blood sugar 02/21/2020    Degenerative disc disease, cervical 12/12/2019    Arthropathy of cervical facet joint 12/12/2019    Colon polyps 09/06/2019    Dysphagia      Heartburn      At risk for colon cancer      Arthritis of right hip 08/07/2019    Chronic pain syndrome 03/01/2019    Lumbar facet arthropathy 03/01/2019    Lumbar disc disorder 03/01/2019    Primary osteoarthritis of both hips 03/01/2019    Hypertension 09/21/2018    Subarachnoid bleed ([facility] [address] 09/10/2018    Hypertensive emergency 09/10/2018    Obesity (BMI 30-39.9) 09/10/2018      Current Facility-Administered Medications               Current Outpatient Medications   Medication Sig Dispense Refill    carBAMazepine (TEGRETOL XR) 100 MG extended release tablet Take 100 mg by mouth 2 times daily         metoprolol tartrate (LOPRESSOR) 25 MG tablet Take 1.5 tablets by mouth 2 times daily 90 tablet 3    losartan (COZAAR) 100 MG tablet Take 1 tablet by mouth daily 30 tablet 3    amLODIPine (NORVASC) 5 MG tablet TAKE ONE TABLET BY MOUTH EVERY DAY 30 tablet 3    chlorthalidone (HYGROTON) 25 MG tablet Take 1 tablet by mouth daily 30 tablet 3    gabapentin (NEURONTIN) 300 MG capsule TAKE ONE CAPSULE BY MOUTH THREE TIMES A DAY 90 capsule 3    pantoprazole (PROTONIX) 40 MG tablet TAKE ONE TABLET BY MOUTH EVERY DAY 30 tablet 3    fluticasone (FLONASE) 50 MCG/ACT nasal spray 2 sprays by Each Nostril route daily 1 Bottle 3    potassium chloride (KLOR-CON M) 10 MEQ extended release tablet TAKE ONE TABLET BY MOUTH DAILY WITH BREAKFAST 30 tablet 3    valACYclovir (VALTREX) 1 g tablet Take 1 tablet by mouth daily For 5 days as needed for Herpes outbreak 5 tablet 1    albuterol sulfate  (90 Base) MCG/ACT inhaler INHALE TWO PUFFS BY MOUTH EVERY 6 HOURS AS NEEDED FOR WHEEZING. 1 Inhaler 3    Multiple Vitamins-Minerals (THERAPEUTIC MULTIVITAMIN-MINERALS) tablet Take 1 tablet by mouth daily        diclofenac sodium (VOLTAREN) 1 % GEL APPLY ONE GRAM EXTERNALLY TWICE A DAY TO NECK AND ONE GRAM EXTERNALLY TWICE A DAY TO BACK  100 g 2    Misc. Devices (ROLLATOR) MISC 1 each by Does not apply route daily as needed (use as needed for stability) 1 each 0    acetaminophen-codeine (TYLENOL #4) 300-60 MG per tablet Take 1 tablet by mouth daily as needed for Pain for up to 30 days.  30 tablet 0      No current facility-administered medications for this visit.         Allergies: Latex, Iodine, Aloe, Celebrex [celecoxib], and Fish-derived products  Past Medical History           Past Medical History:   Diagnosis Date    Brain aneurysm 09/2018     H/O TIMES 2 THAT BURST PER PATIENT    Cerebral artery occlusion with cerebral infarction ([facility] [address]       RT SIDE AFFECTED-LEARNED TO WALK AND WRITE AGAIN    Degenerative arthritis of hand      Edentulous      Emphysema lung (HCC)       lung dr Byrd Headache      Hiatal hernia      Hip problem       NEEDS HIP REPLACEMENT PER PATIENT    Hx of abnormal cervical Pap smear      Hypertension      Stroke (cerebrum) [facility])           Past Surgical History             Past Surgical History:   Procedure Laterality Date    BRAIN ANEURYSM SURGERY         coiling     COLONOSCOPY   08/30/2019     polyps--frank    COLONOSCOPY N/A 8/30/2019     COLONOSCOPY POLYPECTOMY SNARE/COLD BIOPSY performed by Chelsey Germain MD at [place] [address]         FOR CANCER CELLS- DONE AT [address] [place] [address]      TOTAL HIP ARTHROPLASTY Left 2/22/2021     LEFT HIP TOTAL ARTHROPLASTY performed by Dominique Gallo MD at [address]   08/30/2019     gastritis with superficial ulcerations; duodenitis--frank    UPPER GASTROINTESTINAL ENDOSCOPY N/A 8/30/2019     EGD BIOPSY performed [facility] Yeni Ramos [facility] at [facility]  Family History             Family History   Problem Relation Age of Onset    Diabetes Mother     Byrd Arthritis Mother      Atrial Fibrillation Mother      Diabetes Father      Atrial Fibrillation Father      Arthritis Father      Emphysema Father      Cancer Sister           Social History                Tobacco Use    Smoking status: Former Smoker       Packs/day: 1.50       Years: 43.00       Pack years: 64.50       Types: Cigarettes       Quit date: 9/9/2018       Years since quitting: 3.1    Smokeless tobacco: Never Used   Substance Use Topics    Alcohol use: No                                       Chief Complaint   Patient presents with    Hip Pain       right hip pain; patient states pain has been in her hip for at least three years; 8/10 pain    Other       patient wants to discuss uziel on right hip         SUBJECTIVE: Ruth Anguiano is here today for their right Hip. The patient has had pain for sometime, but last 6 months or so it has become more severe. She  was diagnosed with OA in the past. There had been no injury to the right Hip that they can remember. Reema Purchase tried multiple conservative treatment. Has had therapy in the past, that worked at first, but the pain persisted.  She did have steroid injections which did not help much. Patient also had used a right Hip bracing without relief of symptoms. They have been working on weight loss. The pain is described as typical arthritic pain, achy in the joint, becoming more severe with stairs and any twisting motion of the right Hip.  Rest makes the right Hip better along with NSAIDs and over the counter analgesics, but states they are now less effective. She does use an occasional straight cane, patient can ambulate 1 to 2 blocks prior to pain limiting their function. Juarez Speaker having difficulty with ADLs, and states this pain is limiting here activity decreasing their quality of life. She would like to discuss MERVAT.      Review of Systems   Constitutional: Negative for fever, chills, diaphoresis, appetite change and fatigue. HENT: Negative for dental issues, hearing loss and tinnitus. Negative for congestion, sinus pressure, sneezing, sore throat. Negative for headache. Eyes: Negative for visual disturbance, blurred and double vision. Negative for pain, discharge, redness and itching  Respiratory: Negative for cough, shortness of breath and wheezing. Cardiovascular: Negative for chest pain, palpitations and leg swelling. No dyspnea on exertion   Gastrointestinal:   Negative for nausea, vomiting, abdominal pain, diarrhea, constipation  or black or bloody. Hematologic\Lymphatic:  negative for bleeding, petechiae,   Genitourinary: Negative for hematuria and difficulty urinating. Musculoskeletal: Negative for neck pain and stiffness. Mild for back pain, negative joint swelling and gait problem. Skin: Negative for pallor, rash and wound. Neurological: Negative for dizziness, tremors, seizures, weakness, light-headedness, no TIA or stroke symptoms. No numbness and headaches.    Psychiatric/Behavioral: Negative.      Physical Examination:   General appearance: alert, well appearing, and in no distress,  normal appearing weight  Mental status: alert, oriented to person, place, and time, normal mood, behavior, speech, dress, motor activity, and thought processes  Abdomen: soft, nondistended   Resp:   resp easy and unlabored, no audible wheezes note  Cardiac: distal pulses palpable, skin well perfused  Neurological: alert, oriented X3, normal speech, no focal findings or movement disorder noted, motor and sensory grossly normal bilaterally, normal muscle tone, no tremors, strength 5/5, normal gait and station  HEENT: normochephalic atraumatic, external ears and eyes normal, sclera normal, neck supple  Extremities:   peripheral pulses normal, no edema, redness or tenderness in the calves   Skin: normal coloration, no rashes or open wounds, no suspicious skin lesions noted  Psych: Affect euthymic   Musculoskeletal:    Extremity:  Right Lower Extremity  Skin clean dry and intact, without signs of infection  no edema noted  Compartments supple throughout thigh and leg  Calf supple and nontender  Demonstrates active knee flexion/extension, ankle plantar/dorsiflexion/great toe extension. States sensation intact to touch in sural/deep peroneal/superficial peroneal/saphenous/posterior tibial nerve distributions to foot/ankle. Palpable dorsalis pedis and posterior tibialis pulses, cap refill brisk in toes, foot warm/perfused. She ambulates with an antalgic type gait. Moderate pain with passive internal rotation to approximately 10 degrees and external rotation to approximately 30 degrees of the right hip. She does have hip and groin pain with seated straight leg raise to approximately 65 degrees without distal pain.     Temp 97.4 °F (36.3 °C)      XR: 10/6/21   Not taken today.     ASSESSMENT:    Diagnosis Orders   1. Primary osteoarthritis of right hip      2. History of total left hip arthroplasty         Discussion:  The patient would like to proceed with total right Hip arthroplasty when cleared by their PCP. Arlys Samples the risks include but are not limited to infection, injuries to the blood vessel and nerves, bleeding, continued pain and non relief of pain, aseptic loosening dislocation, limb length discrepancy, arthrofibrosis of the joint, further operation, deep venous thrombosis, pulmonary embolism and fracture, heart attack and death.    Mary operative plan discussed, need for anticoagulation for DVT/PE prophylaxis, need for physical therapy, office follow up visits, and possible rehab stay. It was also explained patient will need to take a prophylactic dose of ATB prior to any bowel, dental or mouth procedures, including dental cleaning, for length of the implant. Did review surgery prosthesis with model with patient as well. Pain control was also discussed and the expectation is to have patient off narcotic pain meds around 3 weeks post operatively for a total joint arthroplasty     Elective Orthopedic Surgery Checklist:  []?? Hemoglobin A1C must be ? 8  []? ? Nicotine cessation education- If nonunion surgery, needs negative Nicotine blood work  [x]? ? Established with a PCP  [x]? ? BMI to be ? 40 kg/m2 if pursing total joint. []?? Referral to [facility]/Weight Management  [x]? ? No pending dental treatments prior to total joint  []? ? Joint Education Class Needed   []? ?Jimy Alvarez other areas of concern that need addressed prior to surgery:            PLAN:  Plan for OR on 11-15-21 with Dr. Gifty Burns MD for right total hip replacement  Pre-surgery medical clearance- requested  PAT  Joint education class is not indicated  NPO after midnight the night before surgery  WBAT on on right lower extremity  Hold NSAIDS 7 days prior to surgery  Hold aspirin the morning of surgery  Make sure to have all dental care completed by 1 week before surgery  COVID 19 testing  Planning for post op DVT prophylaxis with Aspirin as well as nonpharmacologic use of Home SCDs  Patient to also to be supplied with cryotherapy with compression post operatively.

## 2021-12-27 NOTE — PROGRESS NOTES
[address] PRE-ADMISSION TESTING GENERAL INSTRUCTIONS- [facility]-phone number:[phone]    GENERAL INSTRUCTIONS  [x] Antibacterial Soap shower Night before  AM of Surgery  [x] Prem wipe instruction sheet and wipes given. [x] Nothing by mouth after midnight, including gum, candy, mints, or water. [x] You may brush your teeth, gargle, but do NOT swallow water. .   [x]No smoking, chewing tobacco, illegal drugs, or alcohol within 24 hours of your surgery. [x] Jewelry, valuables or body piercing's should not be brought to the hospital. All body and/or tongue piercing's must be removed prior to arriving to hospital.  ALL hair pins must be removed. [x] Do not wear makeup, lotions, powders, deodorant. Nail polish as directed by the nurse. [x] Bring insurance card and photo ID. [x] Transfusion Bracelet: Please bring with you to hospital, day of surgery  [x] Use inhalers the morning of surgery and bring with you to hospital.     PARKING INSTRUCTIONS:   [x] Arrival Time: Indra 10 th, arrive at 7 am, one person to accompany, wear mask  · [x] Parking lot '\"I\"  is located on [facility] [place] (the corner of [facility] and [facility] [place]). To enter, press the button and the gate will lift. A free token will be provided to exit the lot. One car per patient is allowed to park in this lot. All other cars are to park on [address] [place] [address] either in the parking garage or the handicap lot. [] To reach the [facility] from [address] [place] [address], upon entering the hospital, take elevator B to the 3rd floor. EDUCATION INSTRUCTIONS:      [x] Knee or hip replacement booklet & exercise pamphlets given. [x] Pre-admission Testing educational folder given  [x] Incentive Spirometry,coughing & deep breathing exercises reviewed. [x]Medication information sheet(s)   [x]Fluoroscopy-Xray used in surgery reviewed with patient. Educational pamphlet placed in chart. [x]Pain: Post-op pain is normal and to be expected. You will be asked to rate your pain from 0-10(a zero is not acceptable-education is needed). Your post-op pain goal is:  [x] Ask your nurse for your pain medication. [x] Joint camp offered. [x] Joint replacement booklets given. MEDICATION INSTRUCTIONS:   [x]Bring a complete list of your medications, please write the last time you took the medicine, give this list to the nurse. [x] Take the following medications the morning of surgery with 1-2 ounces of water: tegretol, gabapentin, lopressor, amlodipine, protonix  [x] Stop herbal supplements and vitamins 5 days before your surgery. [x] Use your inhalers the morning of surgery. Bring your emergency inhaler with you day of surgery. [x] Follow physician instructions regarding any blood thinners you may be taking. WHAT TO EXPECT:  [x] The day of surgery you will be greeted and checked in by the [employer].  In addition, you will be registered in the [facility] by a Patient Access Representative. Please bring your photo ID and insurance card. A nurse will greet you in accordance to the time you are needed in the pre-op area to prepare you for surgery. Please do not be discouraged if you are not greeted in the order you arrive as there are many variables that are involved in patient preparation. Your patience is greatly appreciated as you wait for your nurse. Please bring in items such as: books, magazines, newspapers, electronics, or any other items  to occupy your time in the waiting area. [x]  Delays may occur with surgery and staff will make a sincere effort to keep you informed of delays. If any delays occur with your procedure, we apologize ahead of time for your inconvenience as we recognize the value of your time.

## 2022-01-04 ENCOUNTER — HOSPITAL ENCOUNTER (OUTPATIENT)
Dept: PREADMISSION TESTING | Age: 62
Discharge: HOME | End: 2022-01-04
Payer: MEDICARE

## 2022-01-04 ENCOUNTER — HOSPITAL ENCOUNTER (OUTPATIENT)
Age: 62
Discharge: HOME | End: 2022-01-06
Payer: MEDICARE

## 2022-01-04 ENCOUNTER — HOSPITAL ENCOUNTER (OUTPATIENT)
Dept: GENERAL RADIOLOGY | Age: 62
Discharge: HOME | End: 2022-01-06
Payer: MEDICARE

## 2022-01-04 VITALS
DIASTOLIC BLOOD PRESSURE: 77 MMHG | RESPIRATION RATE: 20 BRPM | TEMPERATURE: 97.6 F | BODY MASS INDEX: 38.94 KG/M2 | SYSTOLIC BLOOD PRESSURE: 166 MMHG | WEIGHT: 234 LBS | OXYGEN SATURATION: 97 %

## 2022-01-04 DIAGNOSIS — M25.59 PAIN IN OTHER JOINT: ICD-10-CM

## 2022-01-04 DIAGNOSIS — M25.59 PAIN IN OTHER SPECIFIED JOINT: ICD-10-CM

## 2022-01-04 DIAGNOSIS — M16.11 PRIMARY OSTEOARTHRITIS OF RIGHT HIP: ICD-10-CM

## 2022-01-04 DIAGNOSIS — Z01.812 PRE-OPERATIVE LABORATORY EXAMINATION: Primary | ICD-10-CM

## 2022-01-04 DIAGNOSIS — Z01.818 PRE-OP TESTING: ICD-10-CM

## 2022-01-04 DIAGNOSIS — Z11.59 SCREENING FOR VIRAL DISEASE: ICD-10-CM

## 2022-01-04 LAB
ABO/RH: NORMAL
ALBUMIN SERPL-MCNC: 4.1 G/DL (ref 3.5–5.2)
ALP BLD-CCNC: 90 U/L (ref 35–104)
ALT SERPL-CCNC: 19 U/L (ref 0–32)
ANION GAP SERPL CALCULATED.3IONS-SCNC: 15 MMOL/L (ref 7–16)
ANTIBODY SCREEN: NORMAL
APTT: <20 SEC (ref 24.5–35.1)
AST SERPL-CCNC: 15 U/L (ref 0–31)
BASOPHILS ABSOLUTE: 0.02 E9/L (ref 0–0.2)
BASOPHILS RELATIVE PERCENT: 0.5 % (ref 0–2)
BILIRUB SERPL-MCNC: 0.3 MG/DL (ref 0–1.2)
BILIRUBIN URINE: NEGATIVE
BLOOD, URINE: NEGATIVE
BUN BLDV-MCNC: 20 MG/DL (ref 6–23)
CALCIUM SERPL-MCNC: 9.5 MG/DL (ref 8.6–10.2)
CHLORIDE BLD-SCNC: 102 MMOL/L (ref 98–107)
CLARITY: CLEAR
CO2: 23 MMOL/L (ref 22–29)
COLOR: YELLOW
CREAT SERPL-MCNC: 0.9 MG/DL (ref 0.5–1)
EOSINOPHILS ABSOLUTE: 0.1 E9/L (ref 0.05–0.5)
EOSINOPHILS RELATIVE PERCENT: 2.4 % (ref 0–6)
GFR AFRICAN AMERICAN: >60
GFR NON-AFRICAN AMERICAN: >60 ML/MIN/1.73
GLUCOSE BLD-MCNC: 105 MG/DL (ref 74–99)
GLUCOSE URINE: NEGATIVE MG/DL
HCT VFR BLD CALC: 39.4 % (ref 34–48)
HEMOGLOBIN: 12.7 G/DL (ref 11.5–15.5)
IMMATURE GRANULOCYTES #: 0.02 E9/L
IMMATURE GRANULOCYTES %: 0.5 % (ref 0–5)
INR BLD: 0.9
KETONES, URINE: ABNORMAL MG/DL
LEUKOCYTE ESTERASE, URINE: NEGATIVE
LYMPHOCYTES ABSOLUTE: 1.27 E9/L (ref 1.5–4)
LYMPHOCYTES RELATIVE PERCENT: 30 % (ref 20–42)
MCH RBC QN AUTO: 30.2 PG (ref 26–35)
MCHC RBC AUTO-ENTMCNC: 32.2 % (ref 32–34.5)
MCV RBC AUTO: 93.8 FL (ref 80–99.9)
MONOCYTES ABSOLUTE: 0.26 E9/L (ref 0.1–0.95)
MONOCYTES RELATIVE PERCENT: 6.1 % (ref 2–12)
NEUTROPHILS ABSOLUTE: 2.57 E9/L (ref 1.8–7.3)
NEUTROPHILS RELATIVE PERCENT: 60.5 % (ref 43–80)
NITRITE, URINE: NEGATIVE
PDW BLD-RTO: 13.7 FL (ref 11.5–15)
PH UA: 5.5 (ref 5–9)
PLATELET # BLD: 207 E9/L (ref 130–450)
PMV BLD AUTO: 9.2 FL (ref 7–12)
POTASSIUM REFLEX MAGNESIUM: 4.1 MMOL/L (ref 3.5–5)
PROTEIN UA: NEGATIVE MG/DL
PROTHROMBIN TIME: 9.9 SEC (ref 9.3–12.4)
RBC # BLD: 4.2 E12/L (ref 3.5–5.5)
SODIUM BLD-SCNC: 140 MMOL/L (ref 132–146)
SPECIFIC GRAVITY UA: 1.02 (ref 1–1.03)
TOTAL PROTEIN: 7.1 G/DL (ref 6.4–8.3)
UROBILINOGEN, URINE: 0.2 E.U./DL
WBC # BLD: 4.2 E9/L (ref 4.5–11.5)

## 2022-01-04 PROCEDURE — 85610 PROTHROMBIN TIME: CPT

## 2022-01-04 PROCEDURE — 85025 COMPLETE CBC W/AUTO DIFF WBC: CPT

## 2022-01-04 PROCEDURE — 87081 CULTURE SCREEN ONLY: CPT

## 2022-01-04 PROCEDURE — 85730 THROMBOPLASTIN TIME PARTIAL: CPT

## 2022-01-04 PROCEDURE — U0005 INFEC AGEN DETEC AMPLI PROBE: HCPCS

## 2022-01-04 PROCEDURE — 87088 URINE BACTERIA CULTURE: CPT

## 2022-01-04 PROCEDURE — U0003 INFECTIOUS AGENT DETECTION BY NUCLEIC ACID (DNA OR RNA); SEVERE ACUTE RESPIRATORY SYNDROME CORONAVIRUS 2 (SARS-COV-2) (CORONAVIRUS DISEASE [COVID-19]), AMPLIFIED PROBE TECHNIQUE, MAKING USE OF HIGH THROUGHPUT TECHNOLOGIES AS DESCRIBED BY CMS-2020-01-R: HCPCS

## 2022-01-04 PROCEDURE — 80053 COMPREHEN METABOLIC PANEL: CPT

## 2022-01-04 PROCEDURE — 36415 COLL VENOUS BLD VENIPUNCTURE: CPT

## 2022-01-04 PROCEDURE — 86900 BLOOD TYPING SEROLOGIC ABO: CPT

## 2022-01-04 PROCEDURE — 81003 URINALYSIS AUTO W/O SCOPE: CPT

## 2022-01-04 PROCEDURE — 71046 X-RAY EXAM CHEST 2 VIEWS: CPT

## 2022-01-04 PROCEDURE — 86901 BLOOD TYPING SEROLOGIC RH(D): CPT

## 2022-01-04 PROCEDURE — 86850 RBC ANTIBODY SCREEN: CPT

## 2022-01-05 ENCOUNTER — OFFICE VISIT (OUTPATIENT)
Dept: FAMILY MEDICINE CLINIC | Age: 62
End: 2022-01-05
Payer: MEDICARE

## 2022-01-05 VITALS
WEIGHT: 238 LBS | HEART RATE: 61 BPM | HEIGHT: 65 IN | TEMPERATURE: 97.5 F | BODY MASS INDEX: 39.65 KG/M2 | SYSTOLIC BLOOD PRESSURE: 123 MMHG | DIASTOLIC BLOOD PRESSURE: 78 MMHG | OXYGEN SATURATION: 96 %

## 2022-01-05 DIAGNOSIS — Z01.818 PREOPERATIVE CLEARANCE: Primary | ICD-10-CM

## 2022-01-05 LAB — MRSA CULTURE ONLY: NORMAL

## 2022-01-05 PROCEDURE — 1036F TOBACCO NON-USER: CPT | Performed by: STUDENT IN AN ORGANIZED HEALTH CARE EDUCATION/TRAINING PROGRAM

## 2022-01-05 PROCEDURE — 3017F COLORECTAL CA SCREEN DOC REV: CPT | Performed by: STUDENT IN AN ORGANIZED HEALTH CARE EDUCATION/TRAINING PROGRAM

## 2022-01-05 PROCEDURE — G8484 FLU IMMUNIZE NO ADMIN: HCPCS | Performed by: STUDENT IN AN ORGANIZED HEALTH CARE EDUCATION/TRAINING PROGRAM

## 2022-01-05 PROCEDURE — G8417 CALC BMI ABV UP PARAM F/U: HCPCS | Performed by: STUDENT IN AN ORGANIZED HEALTH CARE EDUCATION/TRAINING PROGRAM

## 2022-01-05 PROCEDURE — G8427 DOCREV CUR MEDS BY ELIG CLIN: HCPCS | Performed by: STUDENT IN AN ORGANIZED HEALTH CARE EDUCATION/TRAINING PROGRAM

## 2022-01-05 PROCEDURE — 99213 OFFICE O/P EST LOW 20 MIN: CPT | Performed by: STUDENT IN AN ORGANIZED HEALTH CARE EDUCATION/TRAINING PROGRAM

## 2022-01-05 NOTE — PATIENT INSTRUCTIONS
Please call the office back with medication list    Do not take Amlodipine, Chlorthalidone and Losartan on the day of surgery.

## 2022-01-05 NOTE — PROGRESS NOTES
For pre op clearance  Scheduled for knee replacement surgery  History of aneurysm that ruptured  HTN  COPD  Examination  Blood pressure 123/78, pulse 61, temperature 97.5 °F (36.4 °C), temperature source Temporal, height 5' 5\" (1.651 m), weight 238 lb (108 kg), SpO2 96 %, not currently breastfeeding. Unremarkable exam  Lungs clear  Heart regular  No edema  A/P  Review med list  May proceed with surgery as scheduled. Attending Physician Statement  I have discussed the case, including pertinent history and exam findings with the resident. I agree with the documented assessment and plan.

## 2022-01-05 NOTE — PROGRESS NOTES
[facility]  Department of Family Medicine  Family Medicine Residency Program      Patient: Neisha Padron 57 y.o. female     Date of Service: 1/5/22      Chief complaint:   Chief Complaint   Patient presents with    Pre-op Exam     HISTORY OF PRESENTING ILLNESS     57 y.o. female presented to the clinic for a pre op evaluation. She does not have any complaints. She is scheduled for total right hip arthroplasty on 1/10. She does not have a history of blood clots. She does not have a history of bleeding. She does not have any past issues with anesthesia. She does not have any significant drug allergies known to her. She does not have any past issues with pain medications. She states that she was given a medication list and was instructed to avoid flonase, potassium pill, losartan and chlorthalidone on the day of the surgery and to hold her vitamins for 5 days prior to surgery. She denies chest pain, SOB, weakness, abdominal pain.      Past Medical History:      Diagnosis Date    Brain aneurysm 09/2018    H/O TIMES 2 THAT BURST PER PATIENT    Cerebral artery occlusion with cerebral infarction (HCC)     RT SIDE AFFECTED-LEARNED TO WALK AND WRITE AGAIN    Degenerative arthritis of hand     Edentulous     Emphysema lung ([address]     lung dr Montgomery Outhouse Headache     Hiatal hernia     Hip problem     NEEDS HIP REPLACEMENT PER PATIENT    Hx of abnormal cervical Pap smear     Hypertension     Stroke (cerebrum) ([address]     Subarachnoid bleed ([address] 9/10/2018     Past Surgical History:        Procedure Laterality Date    BRAIN ANEURYSM SURGERY      coiling     COLONOSCOPY  08/30/2019    polyps--frank    COLONOSCOPY N/A 8/30/2019    COLONOSCOPY POLYPECTOMY SNARE/COLD BIOPSY performed by Yeni Ramos MD at [facility] AT [address] HIP ARTHROPLASTY Left 2/22/2021    LEFT HIP TOTAL ARTHROPLASTY performed by Dipti Ortiz MD at [address] [place] [address] ENDOSCOPY  08/30/2019    gastritis with superficial ulcerations; duodenitis--frank    UPPER GASTROINTESTINAL ENDOSCOPY N/A 8/30/2019    EGD BIOPSY performed by Jason Cooper MD at [facility] ENDOSCOPY     Allergies:    Latex, Iodine, Aloe, Celebrex [celecoxib], and Fish-derived products  Social History:   Social History     Socioeconomic History    Marital status:      Spouse name: Not on file    Number of children: Not on file    Years of education: Not on file    Highest education level: Not on file   Occupational History    Not on file   Tobacco Use    Smoking status: Former Smoker     Packs/day: 1.50     Years: 43.00     Pack years: 64.50     Types: Cigarettes     Quit date: 9/9/2018     Years since quitting: 3.3    Smokeless tobacco: Never Used   Vaping Use    Vaping Use: Never used   Substance and Sexual Activity    Alcohol use: No    Drug use: No    Sexual activity: Not Currently   Other Topics Concern    Not on file   Social History Narrative    Not on file     Social Determinants of Health     Financial Resource Strain: Low Risk     Difficulty of Paying Living Expenses: Not hard at all   Food Insecurity: No Food Insecurity    Worried About [address] in the Last Year: Never true    [address] [facility] [address] in the Last Year: Never true   Transportation Needs:     Lack of Transportation (Medical): Not on file    Lack of Transportation (Non-Medical):  Not on file   Physical Activity:     Days of Exercise per Week: Not on file    Minutes of Exercise per Session: Not on file   Stress:     Feeling of Stress : Not on file   Social Connections:     Frequency of Communication with Friends and Family: Not on file    Frequency of Social Gatherings with Friends and Family: Not on file    Attends Shinto Services: Not on file    Active Member of Clubs or Organizations: Not on file    Attends Club or Organization Meetings: Not on file    Marital Status: Not on file   Intimate Partner Violence:     Fear of Current or Ex-Partner: Not on file    Emotionally Abused: Not on file    Physically Abused: Not on file    Sexually Abused: Not on file   Housing Stability:     Unable to Pay for Housing in the Last Year: Not on file    Number of Places Lived in the Last Year: Not on file    Unstable Housing in the Last Year: Not on file      Family History:       Problem Relation Age of Onset    Diabetes Mother    Brunetta Nipper Arthritis Mother     Atrial Fibrillation Mother     Diabetes Father    Brunetta Nipper Atrial Fibrillation Father     Arthritis Father     Emphysema Father     Cancer Sister      Review of Systems:   Review of Systems   Constitutional: Negative for activity change, chills and fever. HENT: Negative for rhinorrhea, sneezing and sore throat. Eyes: Negative for redness. Respiratory: Negative for cough and chest tightness. Cardiovascular: Negative for chest pain, palpitations and leg swelling. Gastrointestinal: Negative for abdominal pain, diarrhea, nausea and vomiting. Genitourinary: Negative for decreased urine volume, dysuria, flank pain, frequency, hematuria and urgency. Musculoskeletal: Negative for arthralgias and myalgias. Right hip pain   Neurological: Negative for dizziness, syncope, weakness and headaches. Psychiatric/Behavioral: Negative for agitation. The patient is not nervous/anxious. PHYSICAL EXAM   Vitals: /78   Pulse 61   Temp 97.5 °F (36.4 °C) (Temporal)   Ht 5' 5\" (1.651 m)   Wt 238 lb (108 kg)   SpO2 96%   BMI 39.61 kg/m²   Physical Exam  Constitutional:       General: She is not in acute distress. Comments: In wheelchair   Eyes:      Pupils: Pupils are equal, round, and reactive to light. Cardiovascular:      Rate and Rhythm: Normal rate and regular rhythm. Pulses: Normal pulses.    Pulmonary:      Effort: Pulmonary effort is normal.   Abdominal:      General: Bowel sounds are normal.      Palpations: Abdomen is soft. Musculoskeletal:      Right lower leg: No edema. Left lower leg: No edema. Neurological:      Mental Status: She is oriented to person, place, and time. Psychiatric:         Mood and Affect: Mood normal.       ASSESSMENT AND PLAN     1. Preoperative clearance  Revised Cardiac Index 6 % medium cardiac risk and high surgery risk  -Instructed the patient not to take Amlodipine, Chlorthalidone and Losartan on the day of surgery.   -She is cleared for surgery    Counseled regarding above diagnosis, including possible risks and complications, especially if left uncontrolled. Counseled regarding the possible side effects, risks, benefits and alternatives to treatment; patient and/or guardian verbalizes understanding, agrees, feels comfortable with and wishes to proceed with above treatment plan. Call or go to ED immediately if symptoms worsen or persist. Advised patient to call with any new medication issues, and, as applicable, read all Rx info from pharmacy to assure aware of all possible risks and side effects of medication before taking. Patient and/or guardian given opportunity to ask questions/raise concerns. The patient verbalized comfort and understanding of instructions. I encourage further reading and education about your health conditions. Information on many health conditions is provided by the American Academy of Family Physicians: https://familydoctor. org/  Please bring any questions to me at your next visit. Medication List:    Current Outpatient Medications   Medication Sig Dispense Refill    carBAMazepine (TEGRETOL XR) 100 MG extended release tablet Take 1 tablet by mouth 3 times daily 90 tablet 5    acetaminophen-codeine (TYLENOL #4) 300-60 MG per tablet Take 1 tablet by mouth daily as needed for Pain for up to 18 days.  18 tablet 0    BANOPHEN 50 MG capsule TAKE 1 CAPSULE BY MOUTH EVERY 4 HOURS FOR 24 HOURS PRIOR TO PROCEDURE      amLODIPine (NORVASC) 5 MG tablet TAKE ONE TABLET BY MOUTH EVERY DAY 30 tablet 3    chlorthalidone (HYGROTON) 25 MG tablet Take 1 tablet by mouth daily 30 tablet 3    fluticasone (FLONASE) 50 MCG/ACT nasal spray 2 sprays by Each Nostril route daily 1 each 3    gabapentin (NEURONTIN) 300 MG capsule TAKE ONE CAPSULE BY MOUTH THREE TIMES A DAY 90 capsule 3    losartan (COZAAR) 100 MG tablet Take 1 tablet by mouth daily 30 tablet 3    pantoprazole (PROTONIX) 40 MG tablet TAKE ONE TABLET BY MOUTH EVERY DAY 30 tablet 3    potassium chloride (KLOR-CON M) 10 MEQ extended release tablet TAKE ONE TABLET BY MOUTH DAILY WITH BREAKFAST 30 tablet 3    valACYclovir (VALTREX) 1 g tablet Take 1 tablet by mouth daily For 5 days as needed for Herpes outbreak 5 tablet 1    albuterol sulfate  (90 Base) MCG/ACT inhaler INHALE TWO PUFFS BY MOUTH EVERY 6 HOURS AS NEEDED FOR WHEEZING. 1 Inhaler 3    Multiple Vitamins-Minerals (THERAPEUTIC MULTIVITAMIN-MINERALS) tablet Take 1 tablet by mouth daily      diclofenac sodium (VOLTAREN) 1 % GEL APPLY ONE GRAM EXTERNALLY TWICE A DAY TO NECK AND ONE GRAM EXTERNALLY TWICE A DAY TO BACK  100 g 2    Misc. Devices (ROLLATOR) MISC 1 each by Does not apply route daily as needed (use as needed for stability) 1 each 0    metoprolol tartrate (LOPRESSOR) 25 MG tablet Take 1.5 tablets by mouth 2 times daily 90 tablet 3     No current facility-administered medications for this visit. Return to Office: No follow-ups on file. This document may have been prepared at least partially through the use of voice recognition software. Although effort is taken to assure the accuracy of this document, it is possible that grammatical, syntax,  or spelling errors may occur.     Jagdeep Brasher MD

## 2022-01-05 NOTE — PROGRESS NOTES
Spoke with Jessica Suarez at Dr. Doug Garza office patient scheduled for right MERVAT on 1/10. Patient states she went to Dr. Doug Garza as she needed  Neurology clearance for surgery. Jessica Suarez states she will follow up with Dr. Jacques Rivera regarding and will fax the clearance, provided her with fax number. She states patient was last seen  at the office Dec 2021.

## 2022-01-06 ENCOUNTER — TELEPHONE (OUTPATIENT)
Dept: FAMILY MEDICINE CLINIC | Age: 62
End: 2022-01-06

## 2022-01-06 LAB
SARS-COV-2: NOT DETECTED
SOURCE: NORMAL
URINE CULTURE, ROUTINE: NORMAL

## 2022-01-06 ASSESSMENT — ENCOUNTER SYMPTOMS
RHINORRHEA: 0
COUGH: 0
CHEST TIGHTNESS: 0
DIARRHEA: 0
ABDOMINAL PAIN: 0
SORE THROAT: 0
EYE REDNESS: 0
VOMITING: 0
NAUSEA: 0

## 2022-01-06 NOTE — TELEPHONE ENCOUNTER
Anibal Agudelo and she said she was advised not to take:  - Chlorthalidone 25mg   - Flonase (but they said she can bring it with her)  - Losartan 100mg  - Potassium Chloride 10 MEQ extended release tablet  - Was told to hold her vitamins for 5 days- today, 1/6/22 is her second full day without taking vitamins.  - Voltaren gel

## 2022-01-09 ENCOUNTER — ANESTHESIA EVENT (OUTPATIENT)
Dept: OPERATING ROOM | Age: 62
DRG: 470 | End: 2022-01-09
Payer: MEDICARE

## 2022-01-10 ENCOUNTER — APPOINTMENT (OUTPATIENT)
Dept: GENERAL RADIOLOGY | Age: 62
DRG: 470 | End: 2022-01-10
Attending: ORTHOPAEDIC SURGERY
Payer: MEDICARE

## 2022-01-10 ENCOUNTER — ANESTHESIA (OUTPATIENT)
Dept: OPERATING ROOM | Age: 62
DRG: 470 | End: 2022-01-10
Payer: MEDICARE

## 2022-01-10 ENCOUNTER — HOSPITAL ENCOUNTER (INPATIENT)
Age: 62
LOS: 2 days | Discharge: NURSING, REHAB OR LONG-TERM CARE | DRG: 470 | End: 2022-01-12
Attending: ORTHOPAEDIC SURGERY | Admitting: ORTHOPAEDIC SURGERY
Payer: MEDICARE

## 2022-01-10 VITALS — DIASTOLIC BLOOD PRESSURE: 98 MMHG | SYSTOLIC BLOOD PRESSURE: 154 MMHG | TEMPERATURE: 94.3 F | OXYGEN SATURATION: 85 %

## 2022-01-10 DIAGNOSIS — Z86.16 HISTORY OF COVID-19: ICD-10-CM

## 2022-01-10 DIAGNOSIS — Z96.641 S/P TOTAL RIGHT HIP ARTHROPLASTY: ICD-10-CM

## 2022-01-10 DIAGNOSIS — U07.1 COVID-19: Primary | ICD-10-CM

## 2022-01-10 PROBLEM — R41.89 COGNITIVE IMPAIRMENT: Status: ACTIVE | Noted: 2022-01-10

## 2022-01-10 PROCEDURE — 6360000002 HC RX W HCPCS

## 2022-01-10 PROCEDURE — 1200000000 HC SEMI PRIVATE

## 2022-01-10 PROCEDURE — 3700000000 HC ANESTHESIA ATTENDED CARE: Performed by: ORTHOPAEDIC SURGERY

## 2022-01-10 PROCEDURE — 2580000003 HC RX 258: Performed by: PHYSICIAN ASSISTANT

## 2022-01-10 PROCEDURE — 73502 X-RAY EXAM HIP UNI 2-3 VIEWS: CPT

## 2022-01-10 PROCEDURE — C1776 JOINT DEVICE (IMPLANTABLE): HCPCS | Performed by: ORTHOPAEDIC SURGERY

## 2022-01-10 PROCEDURE — 6370000000 HC RX 637 (ALT 250 FOR IP): Performed by: FAMILY MEDICINE

## 2022-01-10 PROCEDURE — 27130 TOTAL HIP ARTHROPLASTY: CPT | Performed by: ORTHOPAEDIC SURGERY

## 2022-01-10 PROCEDURE — 2709999900 HC NON-CHARGEABLE SUPPLY: Performed by: ORTHOPAEDIC SURGERY

## 2022-01-10 PROCEDURE — 72170 X-RAY EXAM OF PELVIS: CPT

## 2022-01-10 PROCEDURE — 88311 DECALCIFY TISSUE: CPT

## 2022-01-10 PROCEDURE — 2500000003 HC RX 250 WO HCPCS

## 2022-01-10 PROCEDURE — 97530 THERAPEUTIC ACTIVITIES: CPT

## 2022-01-10 PROCEDURE — 6360000002 HC RX W HCPCS: Performed by: STUDENT IN AN ORGANIZED HEALTH CARE EDUCATION/TRAINING PROGRAM

## 2022-01-10 PROCEDURE — 2500000003 HC RX 250 WO HCPCS: Performed by: PHYSICIAN ASSISTANT

## 2022-01-10 PROCEDURE — 6360000002 HC RX W HCPCS: Performed by: ANESTHESIOLOGY

## 2022-01-10 PROCEDURE — 6360000002 HC RX W HCPCS: Performed by: ORTHOPAEDIC SURGERY

## 2022-01-10 PROCEDURE — 6360000002 HC RX W HCPCS: Performed by: PHYSICIAN ASSISTANT

## 2022-01-10 PROCEDURE — 2500000003 HC RX 250 WO HCPCS: Performed by: ORTHOPAEDIC SURGERY

## 2022-01-10 PROCEDURE — 2580000003 HC RX 258: Performed by: STUDENT IN AN ORGANIZED HEALTH CARE EDUCATION/TRAINING PROGRAM

## 2022-01-10 PROCEDURE — 3600000003 HC SURGERY LEVEL 3 BASE: Performed by: ORTHOPAEDIC SURGERY

## 2022-01-10 PROCEDURE — 6370000000 HC RX 637 (ALT 250 FOR IP): Performed by: STUDENT IN AN ORGANIZED HEALTH CARE EDUCATION/TRAINING PROGRAM

## 2022-01-10 PROCEDURE — 7100000000 HC PACU RECOVERY - FIRST 15 MIN: Performed by: ORTHOPAEDIC SURGERY

## 2022-01-10 PROCEDURE — 0SR90JZ REPLACEMENT OF RIGHT HIP JOINT WITH SYNTHETIC SUBSTITUTE, OPEN APPROACH: ICD-10-PCS | Performed by: ORTHOPAEDIC SURGERY

## 2022-01-10 PROCEDURE — 97165 OT EVAL LOW COMPLEX 30 MIN: CPT

## 2022-01-10 PROCEDURE — 3700000001 HC ADD 15 MINUTES (ANESTHESIA): Performed by: ORTHOPAEDIC SURGERY

## 2022-01-10 PROCEDURE — 7100000001 HC PACU RECOVERY - ADDTL 15 MIN: Performed by: ORTHOPAEDIC SURGERY

## 2022-01-10 PROCEDURE — 3600000013 HC SURGERY LEVEL 3 ADDTL 15MIN: Performed by: ORTHOPAEDIC SURGERY

## 2022-01-10 PROCEDURE — 88304 TISSUE EXAM BY PATHOLOGIST: CPT

## 2022-01-10 PROCEDURE — 2580000003 HC RX 258: Performed by: ORTHOPAEDIC SURGERY

## 2022-01-10 DEVICE — HEAD FEM DIA36MM +7.5MM OFFSET HIP BIOLOX DELT CERAMIC TAPR: Type: IMPLANTABLE DEVICE | Site: HIP | Status: FUNCTIONAL

## 2022-01-10 DEVICE — SHELL ACET DIA56MM HYDROXY APATITE 5 H CLUS H TRIDENT II: Type: IMPLANTABLE DEVICE | Site: HIP | Status: FUNCTIONAL

## 2022-01-10 DEVICE — STEM FEM SZ 6 L111MM NK L35MM 45MM OFFSET 127DEG HIP TI: Type: IMPLANTABLE DEVICE | Site: HIP | Status: FUNCTIONAL

## 2022-01-10 DEVICE — COMPONENT TOT HIP CAPPED LNR POLYETH H2STRYKER] STRYKER CORP]: Type: IMPLANTABLE DEVICE | Site: HIP | Status: FUNCTIONAL

## 2022-01-10 DEVICE — Z DUP USE 2377969 SCREW ACET HEX LOW PROFILE 6.5X40MM TRIDENT II: Type: IMPLANTABLE DEVICE | Site: HIP | Status: FUNCTIONAL

## 2022-01-10 DEVICE — INSERT ACET F 0 DEG 36 MM HIP X3 TRIDENT: Type: IMPLANTABLE DEVICE | Site: HIP | Status: FUNCTIONAL

## 2022-01-10 RX ORDER — LIDOCAINE HYDROCHLORIDE 20 MG/ML
INJECTION, SOLUTION INTRAVENOUS PRN
Status: DISCONTINUED | OUTPATIENT
Start: 2022-01-10 | End: 2022-01-10 | Stop reason: SDUPTHER

## 2022-01-10 RX ORDER — CARBAMAZEPINE 100 MG/1
100 TABLET, EXTENDED RELEASE ORAL 3 TIMES DAILY
Status: DISCONTINUED | OUTPATIENT
Start: 2022-01-10 | End: 2022-01-12 | Stop reason: HOSPADM

## 2022-01-10 RX ORDER — CHLORTHALIDONE 25 MG/1
25 TABLET ORAL DAILY
Status: DISCONTINUED | OUTPATIENT
Start: 2022-01-10 | End: 2022-01-12 | Stop reason: HOSPADM

## 2022-01-10 RX ORDER — SODIUM CHLORIDE 9 MG/ML
25 INJECTION, SOLUTION INTRAVENOUS PRN
Status: DISCONTINUED | OUTPATIENT
Start: 2022-01-10 | End: 2022-01-10 | Stop reason: SDUPTHER

## 2022-01-10 RX ORDER — SODIUM CHLORIDE 9 MG/ML
25 INJECTION, SOLUTION INTRAVENOUS PRN
Status: DISCONTINUED | OUTPATIENT
Start: 2022-01-10 | End: 2022-01-12 | Stop reason: HOSPADM

## 2022-01-10 RX ORDER — ROCURONIUM BROMIDE 10 MG/ML
INJECTION, SOLUTION INTRAVENOUS PRN
Status: DISCONTINUED | OUTPATIENT
Start: 2022-01-10 | End: 2022-01-10 | Stop reason: SDUPTHER

## 2022-01-10 RX ORDER — OXYCODONE HYDROCHLORIDE AND ACETAMINOPHEN 5; 325 MG/1; MG/1
1 TABLET ORAL EVERY 6 HOURS PRN
Qty: 28 TABLET | Refills: 0 | Status: SHIPPED | OUTPATIENT
Start: 2022-01-10 | End: 2022-02-02 | Stop reason: SDUPTHER

## 2022-01-10 RX ORDER — ONDANSETRON 2 MG/ML
4 INJECTION INTRAMUSCULAR; INTRAVENOUS EVERY 6 HOURS PRN
Status: DISCONTINUED | OUTPATIENT
Start: 2022-01-10 | End: 2022-01-12 | Stop reason: HOSPADM

## 2022-01-10 RX ORDER — MIDAZOLAM HYDROCHLORIDE 1 MG/ML
INJECTION INTRAMUSCULAR; INTRAVENOUS PRN
Status: DISCONTINUED | OUTPATIENT
Start: 2022-01-10 | End: 2022-01-10 | Stop reason: SDUPTHER

## 2022-01-10 RX ORDER — SODIUM CHLORIDE 0.9 % (FLUSH) 0.9 %
5-40 SYRINGE (ML) INJECTION PRN
Status: DISCONTINUED | OUTPATIENT
Start: 2022-01-10 | End: 2022-01-10 | Stop reason: SDUPTHER

## 2022-01-10 RX ORDER — EPHEDRINE SULFATE/0.9% NACL/PF 50 MG/5 ML
SYRINGE (ML) INTRAVENOUS PRN
Status: DISCONTINUED | OUTPATIENT
Start: 2022-01-10 | End: 2022-01-10 | Stop reason: SDUPTHER

## 2022-01-10 RX ORDER — ONDANSETRON 2 MG/ML
INJECTION INTRAMUSCULAR; INTRAVENOUS PRN
Status: DISCONTINUED | OUTPATIENT
Start: 2022-01-10 | End: 2022-01-10 | Stop reason: SDUPTHER

## 2022-01-10 RX ORDER — MEPERIDINE HYDROCHLORIDE 25 MG/ML
12.5 INJECTION INTRAMUSCULAR; INTRAVENOUS; SUBCUTANEOUS EVERY 5 MIN PRN
Status: DISCONTINUED | OUTPATIENT
Start: 2022-01-10 | End: 2022-01-10 | Stop reason: HOSPADM

## 2022-01-10 RX ORDER — GABAPENTIN 300 MG/1
300 CAPSULE ORAL 3 TIMES DAILY
Status: DISCONTINUED | OUTPATIENT
Start: 2022-01-10 | End: 2022-01-12 | Stop reason: HOSPADM

## 2022-01-10 RX ORDER — OXYCODONE HYDROCHLORIDE 10 MG/1
10 TABLET ORAL EVERY 4 HOURS PRN
Status: DISCONTINUED | OUTPATIENT
Start: 2022-01-10 | End: 2022-01-12 | Stop reason: HOSPADM

## 2022-01-10 RX ORDER — PANTOPRAZOLE SODIUM 40 MG/1
40 TABLET, DELAYED RELEASE ORAL DAILY
Status: DISCONTINUED | OUTPATIENT
Start: 2022-01-11 | End: 2022-01-12 | Stop reason: HOSPADM

## 2022-01-10 RX ORDER — OXYCODONE HYDROCHLORIDE 5 MG/1
5 TABLET ORAL EVERY 4 HOURS PRN
Status: DISCONTINUED | OUTPATIENT
Start: 2022-01-10 | End: 2022-01-12 | Stop reason: HOSPADM

## 2022-01-10 RX ORDER — PROMETHAZINE HYDROCHLORIDE 25 MG/1
12.5 TABLET ORAL EVERY 6 HOURS PRN
Status: DISCONTINUED | OUTPATIENT
Start: 2022-01-10 | End: 2022-01-12 | Stop reason: HOSPADM

## 2022-01-10 RX ORDER — SENNA AND DOCUSATE SODIUM 50; 8.6 MG/1; MG/1
1 TABLET, FILM COATED ORAL 2 TIMES DAILY
Status: DISCONTINUED | OUTPATIENT
Start: 2022-01-10 | End: 2022-01-12 | Stop reason: HOSPADM

## 2022-01-10 RX ORDER — SODIUM CHLORIDE 0.9 % (FLUSH) 0.9 %
10 SYRINGE (ML) INJECTION PRN
Status: DISCONTINUED | OUTPATIENT
Start: 2022-01-10 | End: 2022-01-10 | Stop reason: HOSPADM

## 2022-01-10 RX ORDER — PROPOFOL 10 MG/ML
INJECTION, EMULSION INTRAVENOUS PRN
Status: DISCONTINUED | OUTPATIENT
Start: 2022-01-10 | End: 2022-01-10 | Stop reason: SDUPTHER

## 2022-01-10 RX ORDER — ALBUTEROL SULFATE 90 UG/1
1 AEROSOL, METERED RESPIRATORY (INHALATION) EVERY 4 HOURS PRN
Status: DISCONTINUED | OUTPATIENT
Start: 2022-01-10 | End: 2022-01-10 | Stop reason: CLARIF

## 2022-01-10 RX ORDER — AMLODIPINE BESYLATE 5 MG/1
5 TABLET ORAL DAILY
Status: DISCONTINUED | OUTPATIENT
Start: 2022-01-11 | End: 2022-01-12 | Stop reason: HOSPADM

## 2022-01-10 RX ORDER — DIPHENHYDRAMINE HYDROCHLORIDE 50 MG/ML
25 INJECTION INTRAMUSCULAR; INTRAVENOUS EVERY 6 HOURS PRN
Status: DISCONTINUED | OUTPATIENT
Start: 2022-01-10 | End: 2022-01-12 | Stop reason: HOSPADM

## 2022-01-10 RX ORDER — NEOSTIGMINE METHYLSULFATE 1 MG/ML
INJECTION, SOLUTION INTRAVENOUS PRN
Status: DISCONTINUED | OUTPATIENT
Start: 2022-01-10 | End: 2022-01-10 | Stop reason: SDUPTHER

## 2022-01-10 RX ORDER — ASPIRIN 325 MG
325 TABLET, DELAYED RELEASE (ENTERIC COATED) ORAL 2 TIMES DAILY
Qty: 56 TABLET | Refills: 1 | Status: SHIPPED | OUTPATIENT
Start: 2022-01-10 | End: 2022-10-06

## 2022-01-10 RX ORDER — CALCIUM GLUCONATE 94 MG/ML
INJECTION, SOLUTION INTRAVENOUS PRN
Status: DISCONTINUED | OUTPATIENT
Start: 2022-01-10 | End: 2022-01-10 | Stop reason: ALTCHOICE

## 2022-01-10 RX ORDER — ALBUTEROL SULFATE 2.5 MG/3ML
2.5 SOLUTION RESPIRATORY (INHALATION) EVERY 4 HOURS PRN
Status: DISCONTINUED | OUTPATIENT
Start: 2022-01-10 | End: 2022-01-12 | Stop reason: HOSPADM

## 2022-01-10 RX ORDER — SODIUM CHLORIDE 0.9 % (FLUSH) 0.9 %
10 SYRINGE (ML) INJECTION EVERY 12 HOURS SCHEDULED
Status: DISCONTINUED | OUTPATIENT
Start: 2022-01-10 | End: 2022-01-10 | Stop reason: HOSPADM

## 2022-01-10 RX ORDER — SODIUM CHLORIDE 0.9 % (FLUSH) 0.9 %
5-40 SYRINGE (ML) INJECTION EVERY 12 HOURS SCHEDULED
Status: DISCONTINUED | OUTPATIENT
Start: 2022-01-10 | End: 2022-01-10 | Stop reason: SDUPTHER

## 2022-01-10 RX ORDER — FLUTICASONE PROPIONATE 50 MCG
2 SPRAY, SUSPENSION (ML) NASAL DAILY
Status: DISCONTINUED | OUTPATIENT
Start: 2022-01-10 | End: 2022-01-12 | Stop reason: HOSPADM

## 2022-01-10 RX ORDER — HYDRALAZINE HYDROCHLORIDE 20 MG/ML
5 INJECTION INTRAMUSCULAR; INTRAVENOUS EVERY 10 MIN PRN
Status: DISCONTINUED | OUTPATIENT
Start: 2022-01-10 | End: 2022-01-10 | Stop reason: HOSPADM

## 2022-01-10 RX ORDER — LABETALOL HYDROCHLORIDE 5 MG/ML
5 INJECTION, SOLUTION INTRAVENOUS EVERY 10 MIN PRN
Status: DISCONTINUED | OUTPATIENT
Start: 2022-01-10 | End: 2022-01-10 | Stop reason: HOSPADM

## 2022-01-10 RX ORDER — DEXAMETHASONE SODIUM PHOSPHATE 10 MG/ML
INJECTION INTRAMUSCULAR; INTRAVENOUS PRN
Status: DISCONTINUED | OUTPATIENT
Start: 2022-01-10 | End: 2022-01-10 | Stop reason: SDUPTHER

## 2022-01-10 RX ORDER — POTASSIUM CHLORIDE 750 MG/1
10 TABLET, EXTENDED RELEASE ORAL DAILY
Status: DISCONTINUED | OUTPATIENT
Start: 2022-01-10 | End: 2022-01-12 | Stop reason: HOSPADM

## 2022-01-10 RX ORDER — OXYCODONE HYDROCHLORIDE AND ACETAMINOPHEN 5; 325 MG/1; MG/1
1 TABLET ORAL
Status: DISCONTINUED | OUTPATIENT
Start: 2022-01-10 | End: 2022-01-10 | Stop reason: HOSPADM

## 2022-01-10 RX ORDER — SODIUM CHLORIDE 9 MG/ML
25 INJECTION, SOLUTION INTRAVENOUS PRN
Status: DISCONTINUED | OUTPATIENT
Start: 2022-01-10 | End: 2022-01-10 | Stop reason: HOSPADM

## 2022-01-10 RX ORDER — FENTANYL CITRATE 50 UG/ML
INJECTION, SOLUTION INTRAMUSCULAR; INTRAVENOUS PRN
Status: DISCONTINUED | OUTPATIENT
Start: 2022-01-10 | End: 2022-01-10 | Stop reason: SDUPTHER

## 2022-01-10 RX ORDER — ACYCLOVIR 200 MG/1
400 CAPSULE ORAL 3 TIMES DAILY
Status: DISCONTINUED | OUTPATIENT
Start: 2022-01-10 | End: 2022-01-10

## 2022-01-10 RX ORDER — LOSARTAN POTASSIUM 50 MG/1
100 TABLET ORAL DAILY
Status: DISCONTINUED | OUTPATIENT
Start: 2022-01-10 | End: 2022-01-12 | Stop reason: HOSPADM

## 2022-01-10 RX ORDER — TOBRAMYCIN 1.2 G/30ML
INJECTION, POWDER, LYOPHILIZED, FOR SOLUTION INTRAVENOUS PRN
Status: DISCONTINUED | OUTPATIENT
Start: 2022-01-10 | End: 2022-01-10 | Stop reason: ALTCHOICE

## 2022-01-10 RX ORDER — SODIUM CHLORIDE 0.9 % (FLUSH) 0.9 %
10 SYRINGE (ML) INJECTION EVERY 12 HOURS SCHEDULED
Status: DISCONTINUED | OUTPATIENT
Start: 2022-01-10 | End: 2022-01-12 | Stop reason: HOSPADM

## 2022-01-10 RX ORDER — GLYCOPYRROLATE 1 MG/5 ML
SYRINGE (ML) INTRAVENOUS PRN
Status: DISCONTINUED | OUTPATIENT
Start: 2022-01-10 | End: 2022-01-10 | Stop reason: SDUPTHER

## 2022-01-10 RX ORDER — SODIUM CHLORIDE 0.9 % (FLUSH) 0.9 %
10 SYRINGE (ML) INJECTION PRN
Status: DISCONTINUED | OUTPATIENT
Start: 2022-01-10 | End: 2022-01-12 | Stop reason: HOSPADM

## 2022-01-10 RX ORDER — PROMETHAZINE HYDROCHLORIDE 25 MG/ML
6.25 INJECTION, SOLUTION INTRAMUSCULAR; INTRAVENOUS
Status: DISCONTINUED | OUTPATIENT
Start: 2022-01-10 | End: 2022-01-10 | Stop reason: HOSPADM

## 2022-01-10 RX ORDER — ACETAMINOPHEN 325 MG/1
650 TABLET ORAL EVERY 6 HOURS
Status: DISCONTINUED | OUTPATIENT
Start: 2022-01-10 | End: 2022-01-12 | Stop reason: HOSPADM

## 2022-01-10 RX ORDER — SODIUM CHLORIDE 9 MG/ML
INJECTION, SOLUTION INTRAVENOUS CONTINUOUS
Status: ACTIVE | OUTPATIENT
Start: 2022-01-10 | End: 2022-01-11

## 2022-01-10 RX ADMIN — CARBAMAZEPINE 100 MG: 100 TABLET, EXTENDED RELEASE ORAL at 20:01

## 2022-01-10 RX ADMIN — METOPROLOL TARTRATE 37.5 MG: 25 TABLET, FILM COATED ORAL at 20:02

## 2022-01-10 RX ADMIN — ONDANSETRON 4 MG: 2 INJECTION INTRAMUSCULAR; INTRAVENOUS at 10:08

## 2022-01-10 RX ADMIN — ACETAMINOPHEN 650 MG: 325 TABLET ORAL at 17:31

## 2022-01-10 RX ADMIN — SODIUM CHLORIDE: 9 INJECTION, SOLUTION INTRAVENOUS at 12:26

## 2022-01-10 RX ADMIN — MIDAZOLAM 2 MG: 1 INJECTION INTRAMUSCULAR; INTRAVENOUS at 08:51

## 2022-01-10 RX ADMIN — HYDROMORPHONE HYDROCHLORIDE 0.5 MG: 1 INJECTION, SOLUTION INTRAMUSCULAR; INTRAVENOUS; SUBCUTANEOUS at 10:52

## 2022-01-10 RX ADMIN — TRANEXAMIC ACID 1000 MG: 1 INJECTION, SOLUTION INTRAVENOUS at 09:07

## 2022-01-10 RX ADMIN — Medication 2000 MG: at 17:30

## 2022-01-10 RX ADMIN — ACETAMINOPHEN 650 MG: 325 TABLET ORAL at 12:19

## 2022-01-10 RX ADMIN — TRANEXAMIC ACID 1000 MG: 1 INJECTION, SOLUTION INTRAVENOUS at 11:30

## 2022-01-10 RX ADMIN — SODIUM CHLORIDE 1000 ML: 9 INJECTION, SOLUTION INTRAVENOUS at 07:41

## 2022-01-10 RX ADMIN — SODIUM CHLORIDE: 9 INJECTION, SOLUTION INTRAVENOUS at 09:21

## 2022-01-10 RX ADMIN — ASPIRIN 325 MG: 325 TABLET, COATED ORAL at 12:20

## 2022-01-10 RX ADMIN — DEXAMETHASONE SODIUM PHOSPHATE 10 MG: 10 INJECTION INTRAMUSCULAR; INTRAVENOUS at 09:18

## 2022-01-10 RX ADMIN — OXYCODONE HYDROCHLORIDE 10 MG: 10 TABLET ORAL at 21:30

## 2022-01-10 RX ADMIN — Medication 0.3 MG: at 10:21

## 2022-01-10 RX ADMIN — GABAPENTIN 300 MG: 300 CAPSULE ORAL at 20:01

## 2022-01-10 RX ADMIN — FENTANYL CITRATE 50 MCG: 50 INJECTION, SOLUTION INTRAMUSCULAR; INTRAVENOUS at 09:44

## 2022-01-10 RX ADMIN — PROPOFOL 150 MG: 10 INJECTION, EMULSION INTRAVENOUS at 09:00

## 2022-01-10 RX ADMIN — ROCURONIUM BROMIDE 50 MG: 10 SOLUTION INTRAVENOUS at 09:00

## 2022-01-10 RX ADMIN — OXYCODONE HYDROCHLORIDE 10 MG: 10 TABLET ORAL at 17:31

## 2022-01-10 RX ADMIN — Medication 1.5 MG: at 10:21

## 2022-01-10 RX ADMIN — Medication 5 MG: at 09:54

## 2022-01-10 RX ADMIN — HYDROMORPHONE HYDROCHLORIDE 0.5 MG: 1 INJECTION, SOLUTION INTRAMUSCULAR; INTRAVENOUS; SUBCUTANEOUS at 11:00

## 2022-01-10 RX ADMIN — GABAPENTIN 300 MG: 300 CAPSULE ORAL at 15:00

## 2022-01-10 RX ADMIN — SENNOSIDES AND DOCUSATE SODIUM 1 TABLET: 50; 8.6 TABLET ORAL at 20:01

## 2022-01-10 RX ADMIN — Medication 0.2 MG: at 09:50

## 2022-01-10 RX ADMIN — ASPIRIN 325 MG: 325 TABLET, COATED ORAL at 20:01

## 2022-01-10 RX ADMIN — FENTANYL CITRATE 50 MCG: 50 INJECTION, SOLUTION INTRAMUSCULAR; INTRAVENOUS at 10:26

## 2022-01-10 RX ADMIN — FENTANYL CITRATE 50 MCG: 50 INJECTION, SOLUTION INTRAMUSCULAR; INTRAVENOUS at 09:17

## 2022-01-10 RX ADMIN — OXYCODONE HYDROCHLORIDE 10 MG: 10 TABLET ORAL at 12:20

## 2022-01-10 RX ADMIN — LIDOCAINE HYDROCHLORIDE 100 MG: 20 INJECTION, SOLUTION INTRAVENOUS at 09:00

## 2022-01-10 RX ADMIN — Medication 3000 MG: at 09:06

## 2022-01-10 RX ADMIN — FENTANYL CITRATE 100 MCG: 50 INJECTION, SOLUTION INTRAMUSCULAR; INTRAVENOUS at 09:00

## 2022-01-10 ASSESSMENT — PULMONARY FUNCTION TESTS
PIF_VALUE: 20
PIF_VALUE: 19
PIF_VALUE: 21
PIF_VALUE: 19
PIF_VALUE: 18
PIF_VALUE: 19
PIF_VALUE: 21
PIF_VALUE: 20
PIF_VALUE: 20
PIF_VALUE: 1
PIF_VALUE: 20
PIF_VALUE: 20
PIF_VALUE: 19
PIF_VALUE: 19
PIF_VALUE: 23
PIF_VALUE: 26
PIF_VALUE: 18
PIF_VALUE: 2
PIF_VALUE: 20
PIF_VALUE: 21
PIF_VALUE: 19
PIF_VALUE: 22
PIF_VALUE: 20
PIF_VALUE: 19
PIF_VALUE: 1
PIF_VALUE: 21
PIF_VALUE: 4
PIF_VALUE: 0
PIF_VALUE: 22
PIF_VALUE: 19
PIF_VALUE: 19
PIF_VALUE: 18
PIF_VALUE: 21
PIF_VALUE: 18
PIF_VALUE: 18
PIF_VALUE: 19
PIF_VALUE: 20
PIF_VALUE: 19
PIF_VALUE: 27
PIF_VALUE: 19
PIF_VALUE: 20
PIF_VALUE: 19
PIF_VALUE: 22
PIF_VALUE: 21
PIF_VALUE: 20
PIF_VALUE: 19
PIF_VALUE: 21
PIF_VALUE: 20
PIF_VALUE: 18
PIF_VALUE: 19
PIF_VALUE: 19
PIF_VALUE: 23
PIF_VALUE: 19
PIF_VALUE: 20
PIF_VALUE: 28
PIF_VALUE: 21
PIF_VALUE: 23
PIF_VALUE: 0
PIF_VALUE: 18
PIF_VALUE: 21
PIF_VALUE: 19
PIF_VALUE: 20
PIF_VALUE: 24
PIF_VALUE: 19
PIF_VALUE: 19
PIF_VALUE: 20
PIF_VALUE: 21
PIF_VALUE: 19
PIF_VALUE: 18
PIF_VALUE: 18
PIF_VALUE: 19
PIF_VALUE: 18
PIF_VALUE: 19
PIF_VALUE: 19
PIF_VALUE: 18
PIF_VALUE: 19
PIF_VALUE: 4
PIF_VALUE: 19
PIF_VALUE: 21
PIF_VALUE: 18
PIF_VALUE: 19
PIF_VALUE: 18
PIF_VALUE: 23
PIF_VALUE: 1
PIF_VALUE: 19
PIF_VALUE: 20
PIF_VALUE: 22
PIF_VALUE: 27

## 2022-01-10 ASSESSMENT — PAIN DESCRIPTION - PAIN TYPE
TYPE: SURGICAL PAIN

## 2022-01-10 ASSESSMENT — PAIN SCALES - GENERAL
PAINLEVEL_OUTOF10: 7
PAINLEVEL_OUTOF10: 9
PAINLEVEL_OUTOF10: 0
PAINLEVEL_OUTOF10: 3
PAINLEVEL_OUTOF10: 8
PAINLEVEL_OUTOF10: 0

## 2022-01-10 ASSESSMENT — PAIN DESCRIPTION - PROGRESSION: CLINICAL_PROGRESSION: NOT CHANGED

## 2022-01-10 ASSESSMENT — PAIN DESCRIPTION - DESCRIPTORS
DESCRIPTORS: ACHING;BURNING;DISCOMFORT
DESCRIPTORS: ACHING
DESCRIPTORS: ACHING;BURNING;CONSTANT

## 2022-01-10 ASSESSMENT — PAIN DESCRIPTION - ORIENTATION
ORIENTATION: RIGHT

## 2022-01-10 ASSESSMENT — PAIN DESCRIPTION - FREQUENCY
FREQUENCY: CONTINUOUS
FREQUENCY: CONTINUOUS

## 2022-01-10 ASSESSMENT — ENCOUNTER SYMPTOMS: SHORTNESS OF BREATH: 1

## 2022-01-10 ASSESSMENT — PAIN DESCRIPTION - LOCATION
LOCATION: HIP

## 2022-01-10 ASSESSMENT — PAIN - FUNCTIONAL ASSESSMENT
PAIN_FUNCTIONAL_ASSESSMENT: PREVENTS OR INTERFERES WITH MANY ACTIVE NOT PASSIVE ACTIVITIES
PAIN_FUNCTIONAL_ASSESSMENT: 0-10

## 2022-01-10 ASSESSMENT — PAIN DESCRIPTION - ONSET: ONSET: ON-GOING

## 2022-01-10 ASSESSMENT — LIFESTYLE VARIABLES: SMOKING_STATUS: 0

## 2022-01-10 NOTE — LETTER
[place] Department Medicaid  CERTIFICATION OF NECESSITY  FOR NON-EMERGENCY TRANSPORTATION   BY GROUND AMBULANCE      Individual Information   1. Name: General Devries 2. [place] Medicaid Billing Number:    3. Address: [address] [facility]      Transportation Provider Information   4. Provider Name:    5. [place] Medicaid Provider Number:  National Provider Identifier (NPI):      Certification  7. Criteria:  During transport, this individual requires:  [x] Medical treatment or continuous     supervision by an EMT. [] The administration or regulation of oxygen by another person. [] Supervised protective restraint. 8. Period Beginning Date:    5. Length  [x] Not more than 7 day(s)  [] One Year     Additional Information Relevant to Certification   10. Comments or Explanations, If Necessary or Appropriate   R HIP FX, HIGH FALL RISK     Certifying Practitioner Information   11. Name of Practitioner: DR. Foreign RORDÍGUEZ MD   12. [place] Medicaid Provider Number, If Applicable:  [address] Provider Identifier (NPI):      Signature Information   14. Date of Signature: 1/12/22 15. Name of Person Signing: SUZANNE Silvestre   16.  Signature and Professional Designation: Electronically signed by SUZANNE Carter on 1/12/2022 at 9:46 AM       [facility] [id number]  Rev. 7/2015

## 2022-01-10 NOTE — OP NOTE
Operative Note      Patient: Heriberto Esquivel  YOB: 1960  MRN: [id number]    Date of Procedure: 1/10/2022    Pre-Op Diagnosis: RIGHT HIP OSTEOARTHRITIS, severe    Post-Op Diagnosis: Same           Procedure:  Right Total Hip Arthroplasty        Surgeon(s):  Giancarlo Rajan MD    Assistant:   Resident: James Willis DO    Anesthesia: General    Estimated Blood Loss (mL): less than 498     Complications: None    Specimens:   ID Type Source Tests Collected by Time Destination   A : RIGHT FEMORAL HEAD Bone Hip SURGICAL PATHOLOGY Giancarlo Rajan MD 1/10/2022 [id number]        Implants:  * No implants in log *      Drains: * No LDAs found *    Findings: Used Accolade II Size#6 127 deg., 56mm cup with Biolox 36mm +7.5mm head    Detailed Description of Procedure:      BRIEF HISTORY:  Maurilio Duggan is a pleasant 42-year-old female I  have been following for some time for significant right hip  Osteoarthritis, I previously did her left hip. Patient has had multiple forms of conservative treatment  including physical therapy, NSAIDs, and injections with some relief,  although they stopped working. Patient did start using a cane, was having  difficulty doing their ADLs, therefore they inquired about total hip  arthroplasty. I discussed the surgery in detail with them. I talked  about the risks including death from anesthesia, possible infection,  possible wound healing issues, neurovascular damage, need for further  operations, pulmonary embolism, deep venous thrombosis, need for  transfusion, preoperative fracture, limb length discrepancy, etc.  They  understood and signed consent for the procedure. DESCRIPTION OF PROCEDURE:  The patient was brought into the operating  room in supine position on the hospital bed. The patient was then  transferred to the operating table by the operating room personnel in  a safe fashion with Anesthesia in control of the patient's C-spine  area.   Once on the operating table, all pressure points were  identified and well padded. The patient was placed in a left side  down, right side up lateral position, tim securely with the peg board, all pressure points were identified and well padded. Axillary roll was placed. Patient's right  lower extremity was sterilely prepped and draped in the standard  surgical fashion. A time-out was performed indicating the appropriate  identification of the patient, the procedure to be performed, side to  be performed, and this was all agreed by all individuals in the room. After the time-out was performed, incision was marked over the right  hip. It was re-prepped with a ChloraPrep. Once dry, Mitzy Delatorre was  slipped into position. 10 blade was used to make a skin incision. Careful dissection was sharply done on the iliotibial band which was  incised in line of its fibers. A Charnley retractor was then placed  freeing clear the sciatic nerve. An incision was then made in line of  the gluteus medius down distally in a hockey stick fashion with free  edge. Subperiosteal dissection was carried out anteriorly as the leg  was externally rotated. Once the brim was identified, hip was  dislocated. A femoral neck cut was made one fingerbreadth above the  lesser trochanter. Retractors were then put in position for the  acetabulum, and the labrum was removed along with any osteophytes. The condyloid fossa was cleaned out. Sequential reaming was then  carried out through 56 mm which gave us a good cancellous spherical  socket. The 56 mm cup was then impacted in position and was tight. The cup was washed out for the final implant. Liner was locked into position and checked for security which it was. Attention was then turned to the proximal  femur where a femoral elevator was placed underneath it. The box  cutter was placed laterally.   The canal finder was then placed on the  femoral canal.  Sequential broaching was carried out to size #6 stem. At this point in time, the canal was irrigated, dried and the final  implant was then backed into position. This was then trialed with a  with different neck lengths until stable to extremes of internal and external  rotation, checked out very nicely, had some tension on the quads, but  not undue tension. Therefore, this was re-dislocated and the final  head was placed, irrigated and dried, luna taper  backed into  position, checked for security which it was, relocated and trialed  through a nice range of motion. The wound was then copiously  irrigated with sterile normal saline. It was shot with Betadine for 3  minutes, diluted. It was re-irrigated once again. Vancomycin powder  was placed in the joint. The capsule was closed with Ethibond suture. Gluteus medius was closed with Ethibond suture. Irrigation was  carried out once again. Iliotibial band was closed with Ethibond  suture along with Stratafix. Irrigation was carried out once again. Subcutaneous tissues were closed with 3-0 Monocryl and skin with  staples. The patient had bacitracin, Adaptic, sterile dressing put in  position. Compartments were soft and compressible. The patient was  risen from anesthesia without complications. She was transferred back  to the hospital bed and taken to PACU in stable condition. Discharge plans Include:  1. Weight bear as tolerated, right lower extremity. 2.  DVT prophylaxis in the form of Lovenox while in the hospital.   Aspirin as an outpatient. 3.  Follow up in the office in 2 weeks for staple removal and repeat  x-rays of the right hip and AP pelvis.       Electronically signed by Franklyn Lam MD on 1/10/2022 at 10:09 AM

## 2022-01-10 NOTE — ANESTHESIA PRE PROCEDURE
Department of Anesthesiology  Preprocedure Note       Name:  Juliana Martinez   Age:  57 y.o.  :  1960                                          MRN:  [id number]         Date:  1/10/2022      Surgeon: Amaya San):  Suzanne Willingham MD    Procedure: Procedure(s):  RIGHT TOTAL HIP ARTHROPLASTY - STYKER    Medications prior to admission:   Prior to Admission medications    Medication Sig Start Date End Date Taking? Authorizing Provider   carBAMazepine (TEGRETOL XR) 100 MG extended release tablet Take 1 tablet by mouth 3 times daily 21 Yes Juan Farnsworth MD   acetaminophen-codeine (TYLENOL #4) 300-60 MG per tablet Take 1 tablet by mouth daily as needed for Pain for up to 18 days.  12/23/21 1/10/22 Yes SPENSER Graves   amLODIPine (NORVASC) 5 MG tablet TAKE ONE TABLET BY MOUTH EVERY DAY 12/3/21  Yes Agapito Traylor MD   chlorthalidone (HYGROTON) 25 MG tablet Take 1 tablet by mouth daily 12/3/21  Yes Agapito Traylor MD   fluticasone (FLONASE) 50 MCG/ACT nasal spray 2 sprays by Each Nostril route daily 12/3/21  Yes Agapiot Traylor MD   gabapentin (NEURONTIN) 300 MG capsule TAKE ONE CAPSULE BY MOUTH THREE TIMES A DAY 12/3/21 3/1/22 Yes Agapito Traylor MD   losartan (COZAAR) 100 MG tablet Take 1 tablet by mouth daily 12/3/21  Yes Agapito Traylor MD   metoprolol tartrate (LOPRESSOR) 25 MG tablet Take 1.5 tablets by mouth 2 times daily 12/3/21 1/2/22 Yes Agapito Traylor MD   pantoprazole (PROTONIX) 40 MG tablet TAKE ONE TABLET BY MOUTH EVERY DAY 12/3/21  Yes Agapito Traylor MD   potassium chloride (KLOR-CON M) 10 MEQ extended release tablet TAKE ONE TABLET BY MOUTH DAILY WITH BREAKFAST 12/3/21  Yes Agapito Traylor MD   valACYclovir (VALTREX) 1 g tablet Take 1 tablet by mouth daily For 5 days as needed for Herpes outbreak 21  Yes Agapito Traylor MD   albuterol sulfate  (90 Base) MCG/ACT inhaler INHALE TWO PUFFS BY MOUTH EVERY 6 HOURS AS NEEDED FOR WHEEZING. 3/22/21  Yes Dee Dee Sosa, DO   Multiple Vitamins-Minerals (THERAPEUTIC MULTIVITAMIN-MINERALS) tablet Take 1 tablet by mouth daily   Yes Historical Provider, MD   diclofenac sodium (VOLTAREN) 1 % GEL APPLY ONE GRAM EXTERNALLY TWICE A DAY TO NECK AND ONE GRAM EXTERNALLY TWICE A DAY TO BACK  11/3/20  Yes SPENSER Sands   BANOPHEN 50 MG capsule TAKE 1 CAPSULE BY MOUTH EVERY 4 HOURS FOR 24 HOURS PRIOR TO PROCEDURE 12/2/21   Historical Provider, MD   Misc. Devices (ROLLATOR) MISC 1 each by Does not apply route daily as needed (use as needed for stability) 3/17/20   Deanna Hester MD       Current medications:    Current Facility-Administered Medications   Medication Dose Route Frequency Provider Last Rate Last Admin    0.9 % sodium chloride infusion  25 mL IntraVENous PRN SPENSER Calero 100 mL/hr at 01/10/22 0741 1,000 mL at 01/10/22 0741    ceFAZolin (ANCEF) 2000 mg in sterile water 20 mL IV syringe  2,000 mg IntraVENous On Call to 23 Haritha Márquez PA-C        sodium chloride flush 0.9 % injection 10 mL  10 mL IntraVENous 2 times per day Lily Arauz PA-C        sodium chloride flush 0.9 % injection 10 mL  10 mL IntraVENous PRN Lily Arauz PA-C        tranexamic acid (CYKLOKAPRON) 1,000 mg in dextrose 5 % 100 mL IVPB  1,000 mg IntraVENous Once SPENSER Calero        tranexamic acid (CYKLOKAPRON) 1,000 mg in dextrose 5 % 100 mL IVPB  1,000 mg IntraVENous Once SPENSER Calero           Allergies: Allergies   Allergen Reactions    Latex Hives     Hives and rash    Iodine Rash     Hives . pt. States anaphalyxis    Aloe Hives     Hives     Celebrex [Celecoxib] Itching    Fish-Derived Products Other (See Comments)       Problem List:    Patient Active Problem List   Diagnosis Code    Obesity (BMI 30-39. 9) E66.9    Hypertension I10    Chronic pain syndrome G89.4    Lumbar facet arthropathy M47.816    Lumbar disc disorder M51.9    Primary osteoarthritis of both hips M16.0    Arthritis of right hip M16.11    Dysphagia R13.10    Heartburn R12    At risk for colon cancer Z91.89    Colon polyps K63.5    Degenerative disc disease, cervical M50.30    Arthropathy of cervical facet joint M47.812    Abnormal blood sugar R73.09    Arthritis of both hips M16.0    COVID-19 U07.1    H/O total hip arthroplasty, right Z96.641    History of total left hip arthroplasty Z96.642    Primary osteoarthritis of right hip M16.11    Pulmonary emphysema (HCC) J43.9    History of herpes genitalis Z86.19    History of subarachnoid hemorrhage Z86.79    History of COVID-19 Z86.16    Acute cystitis with hematuria N30.01    Aneurysm (HCC) I72.9       Past Medical History:        Diagnosis Date    Brain aneurysm 09/2018    H/O TIMES 2 THAT BURST PER PATIENT    Cerebral artery occlusion with cerebral infarction ([address]     RT SIDE AFFECTED-LEARNED TO WALK AND WRITE AGAIN    Degenerative arthritis of hand     Edentulous     Emphysema lung ([address]     lung dr Grace Glover Headache     Hiatal hernia     Hip problem     NEEDS HIP REPLACEMENT PER PATIENT    Hx of abnormal cervical Pap smear     Hypertension     Stroke (cerebrum) ([address]     Subarachnoid bleed ([address] 9/10/2018       Past Surgical History:        Procedure Laterality Date    BRAIN ANEURYSM SURGERY      coiling     COLONOSCOPY  08/30/2019    polyps--frank    COLONOSCOPY N/A 8/30/2019    COLONOSCOPY POLYPECTOMY SNARE/COLD BIOPSY performed by Dae Rodas MD at [address] [place] [address]- DONE AT [address] Left 2/22/2021    LEFT HIP TOTAL ARTHROPLASTY performed by Willa Kimball MD at [facility] ENDOSCOPY  08/30/2019    gastritis with superficial ulcerations; duodenitis--frank    UPPER GASTROINTESTINAL ENDOSCOPY N/A 8/30/2019    EGD BIOPSY performed by Dae Rodas MD at [address] [place] [address] Social History:    Social History     Tobacco Use    Smoking status: Former Smoker     Packs/day: 1.50     Years: 43.00     Pack years: 64.50     Types: Cigarettes     Quit date: 9/9/2018     Years since quitting: 3.3    Smokeless tobacco: Never Used   Substance Use Topics    Alcohol use: No                                Counseling given: Not Answered      Vital Signs (Current):   Vitals:    01/10/22 0713   BP: (!) 151/69   Pulse: 61   Resp: 20   Temp: 36.7 °C (98.1 °F)   TempSrc: Temporal   SpO2: 98%   Weight: 234 lb (106.1 kg)   Height: 5' 5\" (1.651 m)                                              BP Readings from Last 3 Encounters:   01/10/22 (!) 151/69   01/05/22 123/78   01/04/22 (!) 166/77       NPO Status: Time of last liquid consumption: 2330                        Time of last solid consumption: 2300                        Date of last liquid consumption: 01/09/22                        Date of last solid food consumption: 01/09/22    BMI:   Wt Readings from Last 3 Encounters:   01/10/22 234 lb (106.1 kg)   01/05/22 238 lb (108 kg)   01/04/22 234 lb (106.1 kg)     Body mass index is 38.94 kg/m². CBC:   Lab Results   Component Value Date    WBC 4.2 01/04/2022    RBC 4.20 01/04/2022    HGB 12.7 01/04/2022    HCT 39.4 01/04/2022    MCV 93.8 01/04/2022    RDW 13.7 01/04/2022     01/04/2022       CMP:   Lab Results   Component Value Date     01/04/2022    K 4.1 01/04/2022     01/04/2022    CO2 23 01/04/2022    BUN 20 01/04/2022    CREATININE 0.9 01/04/2022    GFRAA >60 01/04/2022    LABGLOM >60 01/04/2022    GLUCOSE 105 01/04/2022    PROT 7.1 01/04/2022    CALCIUM 9.5 01/04/2022    BILITOT 0.3 01/04/2022    ALKPHOS 90 01/04/2022    AST 15 01/04/2022    ALT 19 01/04/2022       POC Tests: No results for input(s): POCGLU, POCNA, POCK, POCCL, POCBUN, POCHEMO, POCHCT in the last 72 hours.     Coags:   Lab Results   Component Value Date    PROTIME 9.9 01/04/2022    INR 0.9 01/04/2022 APTT <20.0 01/04/2022       HCG (If Applicable): No results found for: PREGTESTUR, PREGSERUM, HCG, HCGQUANT     ABGs: No results found for: PHART, PO2ART, TOM0HBQ, STL6VOR, BEART, V5WKAOKP     Type & Screen (If Applicable):  No results found for: LABABO, LABRH    Drug/Infectious Status (If Applicable):  No results found for: HIV, HEPCAB    COVID-19 Screening (If Applicable):   Lab Results   Component Value Date    COVID19 Not Detected 01/04/2022    COVID19 DETECTED 11/10/2021     EKG 09/21/2021  Sinus  Rhythm   WITHIN NORMAL LIMITS    Echo 02/21/2019   Summary   Left ventricular internal dimensions were normal in diastole and systole. Mild left ventricular concentric hypertrophy noted. No regional wall motion abnormalities seen. Normal left ventricular ejection fraction. Mild aortic regurgitation is noted. CTA of Head 12/17/2021  Impression   1. No acute intracranial abnormality. 2. Embolization coils in the region of the supraclinoid left ICA, similar   compared to the prior study.  Portions of the distal left ICA and left   posterior cerebral artery are obscured by artifact due to the presence of the   metal coils. 3. Otherwise, unremarkable CTA of the head.  No evidence to suggest recurrent   aneurysm. Anesthesia Evaluation  Patient summary reviewed and Nursing notes reviewed no history of anesthetic complications:   Airway: Mallampati: II  TM distance: >3 FB   Neck ROM: full  Mouth opening: > = 3 FB Dental:    (+) edentulous      Pulmonary: breath sounds clear to auscultation  (+) COPD (Hx of emphysema, uses albuterol inhaler as needed):  shortness of breath:      (-) not a current smoker          Patient did not smoke on day of surgery.                 ROS comment: Hx of smoking (64 pack years)    Emphysema lung     FEV1 85%   Cardiovascular:  Exercise tolerance: poor (<4 METS),   (+) hypertension (on metoprolol, amlodipine, losartan.):,       ECG reviewed  Rhythm: regular  Rate: normal  Echocardiogram reviewed         Beta Blocker:  Dose within 24 Hrs      ROS comment: Stress Test 3/12/2020    Impression     Normal study. Neuro/Psych:   (+) CVA (hx of CVA and Subarachnoid bleed s/p Aneurysm coils in the left supraclinoid ICA region):, headaches (on tegretol):,              ROS comment: Hx of brain aneurysm, stable, neuro follows  Subarachnoid bleed     GI/Hepatic/Renal:   (+) hiatal hernia, GERD (on protonix): poorly controlled,          ROS comment: Dysphagia. Endo/Other:    (+) : arthritis (Degenerative disc disease, cervical. Arthritis of both hips (s/p L Hip arthroplasty). Degenerative arthritis of hand): OA., .                  ROS comment: obese Abdominal:   (+) obese,           Vascular: negative vascular ROS. Other Findings:               Anesthesia Plan      general     ASA 3     (18g PIV L wrist)  Induction: intravenous. BIS  MIPS: Postoperative opioids intended, Prophylactic antiemetics administered and Postoperative trial extubation. Anesthetic plan and risks discussed with patient and child/children. Use of blood products discussed with patient and child/children whom. Plan discussed with CRNA and attending. Tomás Duenas RN   1/10/2022      ------DOS anesthesiologist addendum-------  Patient seen and evaluated. Option for spinal vs. GETA discussed with patient. All patient's questions were answered to her satisfaction. Patient declines to have spinal anesthesia. Patient requests and consents to GETA.   Michael Black MD  1/10/22

## 2022-01-10 NOTE — INTERVAL H&P NOTE
Update History & Physical    The patient's History and Physical of December 23, 2022 was reviewed with the patient and I examined the patient. There was no change. The surgical site was confirmed by the patient and me. Plan: The risks, benefits, expected outcome, and alternative to the recommended procedure have been discussed with the patient. Patient understands and wants to proceed with the procedure. Talk to patient detail along with her  about the total hip surgery and the rehabilitation. Answered all questions her satisfaction. Went to the risk of surgery including death anesthesia, limb length discrepancy, wound healing issues, deep venous fibrosis, continued pain, and any other unforeseeable complications. They understood this and side to go forward with a right total hip arthroplasty.     Electronically signed by Hayley Vega MD on 1/10/2022 at 8:00 AM

## 2022-01-10 NOTE — PROGRESS NOTES
[address]  [address]      Date:1/10/2022                 Patient Name: Ruth Hogan  MRN: [id number]  : 1960  Room: [address] [place] [address]  Specific Provider Orders/Date: OT evaluation and treat 1/10/22    Evaluating OT: Josafat Adkins. Prashanth, OTR/L #[id number]    Diagnosis: S/P total right hip arthroplasty [Z96.641]      Surgery:   Past Surgical History:   Procedure Laterality Date    BRAIN ANEURYSM SURGERY      coiling     COLONOSCOPY  2019    polyps--frank    COLONOSCOPY N/A 2019    COLONOSCOPY POLYPECTOMY SNARE/COLD BIOPSY performed by Alicia Licona MD at [address] [place] [address]- DONE AT [address] [place] [address] Left 2021    LEFT HIP TOTAL ARTHROPLASTY performed by Shaggy Uribe MD at [facility] ENDOSCOPY  2019    gastritis with superficial ulcerations; duodenitis--frank    UPPER GASTROINTESTINAL ENDOSCOPY N/A 2019    EGD BIOPSY performed by Alicia Licona MD at [facility] History:  has a past medical history of Brain aneurysm, Cerebral artery occlusion with cerebral infarction [facility]), Degenerative arthritis of hand, Edentulous, Emphysema lung ([facility] [address] Headache, Hiatal hernia, Hip problem, Hx of abnormal cervical Pap smear, Hypertension, Stroke (cerebrum) ([facility] [address] and Subarachnoid bleed ([facility] [address] [facility]      Precautions:  Fall Risk, WBAT to RLE, anterolateral hip precautions    Assessment of current deficits   [x] Functional mobility  [x]ADLs  [] Strength               []Cognition   [x] Functional transfers   [x] IADLs         [x] Safety Awareness   [x]Endurance   [] Fine Coordination              [x] Balance      [] Vision/perception   [x]Sensation    [x]Gross Motor Coordination  [] ROM  [] Delirium                   [] Motor Control     OT PLAN OF CARE   OT POC based on physician orders, patient diagnosis and results of clinical assessment    Frequency/Duration   2-4 days/wk for 1 week PRN   Specific OT Treatment Interventions to include:   * Instruction/training on adapted ADL techniques and AE recommendations to increase functional independence within precautions       * Training on energy conservation strategies, correct breathing pattern and techniques to improve independence/tolerance for self-care routine  * Functional transfer/mobility training/DME recommendations for increased independence, safety, and fall prevention  * Patient/Family education to increase follow through with safety techniques and functional independence  * Recommendation of environmental modifications for increased safety with functional transfers/mobility and ADLs  * Therapeutic activities to facilitate/challenge dynamic balance, stand tolerance for increased safety and independence with ADLs      Recommended Adaptive Equipment: BSC, LHS    Home Living: Pt lives with son in a 2 story home with 5 steps and one hand rails. Bed and bath on main floor.   Bathroom setup: tub shower with seat   Equipment owned: ww, shower seat, hallway HR    Prior Level of Function: Independent with ADLs , Independent with IADLs; ambulated no   AD  Driving: no  Occupation: n/a  Medication management: self  Leisure: enjoys reading    Pain Level: 5/10  R hip  At rest 8-9/10 R hip with movement  Cognition: A&O: 3/4; Follows multi step directions   Memory:  Fair recall of anterolateral hip precautions   Sequencing:  Fair+   Problem solving:  Fair+   Judgement/safety:  Fair+     Functional Assessment:  AM-PAC Daily Activity Raw Score: 16/24   Initial Eval Status  Date: 1/10/21 Treatment Status  Date: STGs = LTGs  Time frame: 2-4 days   Feeding Independent     Grooming Setup sitting  Mod I standing at sink with ww   UB Dressing Min A donned gown like jagdeep  Mod I    LB Dressing Max A   SBA with AE    Bathing Simulated mod A   Mod I seated with LHS   Toileting Simulated max A needs BSC  Mod I to elevated with HR   Bed Mobility  Supine to sit: mod A  Sit to supine:  n/t  Supine to sit: mod I   Sit to supine: mod I    Functional Transfers Bed level mod A with ww and increased cues   Max A from lower chair   Mod I    Functional Mobility Stand pivot mod A with increased cues for sequencing and walker management  Mod I with ww   Balance Sitting:     Static:  SBA    Dynamic:CGA  Standing: min A with ww                                                                       Activity Tolerance Fair- limited by pain O2 RA 96% HR 56  Fair+   Visual/  Perceptual Glasses: yes WFL         Safety Fair+                                  Good recall and follow through of anterolateral precautions in ADL completion     Hand Dominance R    AROM (PROM) Strength Additional Info:    RUE  WFL 4/5 good  and wfl FMC/dexterity noted during ADL tasks       LUE WFL 4/5 good  and wfl FMC/dexterity noted during ADL tasks     [facility]   Sensation:  Decreased in B hands - positional otherwise No c/o numbness or tingling   Tone: WFL   Edema: none noted    Comments: Upon arrival patient supine and agreeable to evaluation. Performed OT evaluation with education on anterolateral hip precautions, WBAT to RLE and safety with ADL completion. Review of AE and DME for safety in bathroom. At end of session, patient sitting in chair and eating and  with call light and phone within reach, all lines and tubes intact. Nursing notified. Overall patient demonstrated  decreased independence and safety during completion of ADL/functional transfer/mobility tasks. Pt would benefit from continued skilled OT to increase safety and independence with completion of ADL/IADL tasks for functional independence and quality of life.     Treatment: OT treatment provided this date includes:    Instruction/training on safety and adapted techniques for completion of ADLs: to increase Tillman in self care with AE/DME prn     Instruction/training on safe functional mobility/transfer techniques: with focus on safety, technique & precautions with ww  ·  Instruction/training on energy conservation/work simplification for completion of ADLs: techniques to increase Tillman with self care ADLs & iADLs, work     simplification to improve endurance  ·  Proper Positioning/Alignment: for optimal healing, skin integrity to prevent breakdown, decrease edema  · Skilled monitoring of vitals: to include BP, spO2 & HR during session  · Sitting/standing Balance/Tolerance- to increased balance & activity tolerance during ADLs as well as facilitate proper posture and/or positioning. Rehab Potential: Good  for established goals     Patient / Family Goal: rehab then home with son to assist      Patient and/or family were instructed on functional diagnosis, prognosis/goals and OT plan of care. Demonstrated good understanding. Eval Complexity: low    Time In:  13:50  Time Out: 14:15  Total Treatment Time: 10 min. Min Units   OT Eval Low 52097  X     OT Eval Medium 48629      OT Eval High [id number]       OT Re-Eval [id number]       Therapeutic Ex [id number]       Therapeutic Activities 22457  10  1   ADL/Self Care 37797       Orthotic Management 36772       Neuro Re-Ed 77953       Non-Billable Time          Evaluation Time includes thorough review of current medical information, gathering information on past medical history/social history and prior level of function, completion of standardized testing/informal observation of tasks, assessment of data and education on plan of care and goals. Mary Enriquez.  [address], [address]

## 2022-01-10 NOTE — ANESTHESIA POSTPROCEDURE EVALUATION
Department of Anesthesiology  Postprocedure Note    Patient: Aníbal Johnson  MRN: [id number]  YOB: 1960  Date of evaluation: 1/10/2022  Time:  11:42 AM     Procedure Summary     Date: 01/10/22 Room / Location: [facility] OR 09 / [facility]    Anesthesia Start: 8118 Anesthesia Stop: 1033    Procedure: RIGHT TOTAL HIP ARTHROPLASTY - STYKER (Right Hip) Diagnosis: (RIGHT HIP OSTEOARTHRITIS)    Surgeons: Luiz Boo MD Responsible Provider: Shobha Wilkerson MD    Anesthesia Type: general ASA Status: 3          Anesthesia Type: general    Monica Phase I: Monica Score: 9    Monica Phase II:      Last vitals: Reviewed and per EMR flowsheets.        Anesthesia Post Evaluation    Patient location during evaluation: PACU  Patient participation: complete - patient participated  Level of consciousness: awake  Pain score: 2  Airway patency: patent  Nausea & Vomiting: no nausea and no vomiting  Complications: no  Cardiovascular status: hemodynamically stable  Respiratory status: acceptable  Hydration status: euvolemic

## 2022-01-10 NOTE — CONSULTS
Hospital Medicine  Consult History & Physical        Chief Complaint:  Hip pain Rt    Date of Service: Pt seen/examined in consultation on 1/10/2020    History Of Present Illness:      57 y.o. female who we are asked to see/evaluate by Yenifer Bianchi, * for medical management of Post Operative state. Has PMH of obesity, chronic pain, DJD of the spine, CVA with right-sided weakness, osteoarthritis of the hip status post right total hip arthroplasty done today, hypertension, COPD, subarachnoid hemorrhage, COVID-19 viral infection in November 2021, cerebral aneurysm ruptured twice, status post coiling of the left supraclinoid region. Patient complains of 5 out of 10 dull pain in the right hip since surgery, this is constant, worse with movement and nonradiating. She has numbness in both of her hands, this is not new, happens on and off. She denies any chest pain or shortness of breath. No nausea or vomiting. History of subarachnoid hemorrhage following ruptured cerebral aneurysm with resultant cognitive impairment. She is status post coiling  History of COPD/emphysema. Hypertension maintained on chlorthalidone, losartan, amlodipine and metoprolol. Blood pressure 151/69  Obesity with BMI of 38.9  OA of the hips SP Rt MERVAT done today    Vital signs notable for heart rate of 58. Labs showed white count of 4.2 otherwise normal chemistry and negative SARS-CoV-2. Imaging study shows postoperative changes.       Past Medical History:        Diagnosis Date    Brain aneurysm 09/2018    H/O TIMES 2 THAT BURST PER PATIENT    Cerebral artery occlusion with cerebral infarction (HCC)     RT SIDE AFFECTED-LEARNED TO WALK AND WRITE AGAIN    Degenerative arthritis of hand     Edentulous     Emphysema lung ([address]     lung dr Lia Gimenez Headache     Hiatal hernia     Hip problem     NEEDS HIP REPLACEMENT PER PATIENT    Hx of abnormal cervical Pap smear     Hypertension     Stroke (cerebrum) ([address]     Subarachnoid bleed ([facility] 9/10/2018       Past Surgical History:        Procedure Laterality Date    BRAIN ANEURYSM SURGERY      coiling     COLONOSCOPY  08/30/2019    polyps--frank    COLONOSCOPY N/A 8/30/2019    COLONOSCOPY POLYPECTOMY SNARE/COLD BIOPSY performed by Megha Lundberg MD at [facility]- DONE AT [address] Left 2/22/2021    LEFT HIP TOTAL ARTHROPLASTY performed by Yann Cruz MD at [facility] ENDOSCOPY  08/30/2019    gastritis with superficial ulcerations; duodenitis--frank    UPPER GASTROINTESTINAL ENDOSCOPY N/A 8/30/2019    EGD BIOPSY performed by Megha Lundberg MD at [facility]       Medications Prior to Admission:    Prior to Admission medications    Medication Sig Start Date End Date Taking? Authorizing Provider   oxyCODONE-acetaminophen (PERCOCET) 5-325 MG per tablet Take 1 tablet by mouth every 6 hours as needed for Pain for up to 7 days. Intended supply: 7 days.  Take lowest dose possible to manage pain 1/10/22 1/17/22 Yes Bar Ortiz DO   aspirin 325 MG EC tablet Take 1 tablet by mouth 2 times daily 1/10/22 1/10/23 Yes Bar Ortiz DO   carBAMazepine (TEGRETOL XR) 100 MG extended release tablet Take 1 tablet by mouth 3 times daily 12/27/21 6/25/22 Yes Hiral Nelson MD   amLODIPine (NORVASC) 5 MG tablet TAKE ONE TABLET BY MOUTH EVERY DAY 12/3/21  Yes Myrna Barnard MD   chlorthalidone (HYGROTON) 25 MG tablet Take 1 tablet by mouth daily 12/3/21  Yes Myrna Barnard MD   fluticasone (FLONASE) 50 MCG/ACT nasal spray 2 sprays by Each Nostril route daily 12/3/21  Yes Myrna Barnard MD   gabapentin (NEURONTIN) 300 MG capsule TAKE ONE CAPSULE BY MOUTH THREE TIMES A DAY 12/3/21 3/1/22 Yes Myrna Barnard MD   losartan (COZAAR) 100 MG tablet Take 1 tablet by mouth daily 12/3/21  Yes Myrna Barnard MD   metoprolol tartrate (LOPRESSOR) 25 MG tablet Take 1.5 tablets by mouth 2 times daily 12/3/21 1/2/22 Yes Fabienne Brito MD   pantoprazole (PROTONIX) 40 MG tablet TAKE ONE TABLET BY MOUTH EVERY DAY 12/3/21  Yes Fabienne Brito MD   potassium chloride (KLOR-CON M) 10 MEQ extended release tablet TAKE ONE TABLET BY MOUTH DAILY WITH BREAKFAST 12/3/21  Yes Fabienne Brito MD   valACYclovir (VALTREX) 1 g tablet Take 1 tablet by mouth daily For 5 days as needed for Herpes outbreak 4/27/21  Yes Fabienne Brito MD   albuterol sulfate  (90 Base) MCG/ACT inhaler INHALE TWO PUFFS BY MOUTH EVERY 6 HOURS AS NEEDED FOR WHEEZING. 3/22/21  Yes Meriam Belt, DO   Multiple Vitamins-Minerals (THERAPEUTIC MULTIVITAMIN-MINERALS) tablet Take 1 tablet by mouth daily   Yes Historical Provider, MD   diclofenac sodium (VOLTAREN) 1 % GEL APPLY ONE GRAM EXTERNALLY TWICE A DAY TO NECK AND ONE GRAM EXTERNALLY TWICE A DAY TO BACK  11/3/20  Yes SPENSER Hercules   BANOPHEN 50 MG capsule TAKE 1 CAPSULE BY MOUTH EVERY 4 HOURS FOR 24 HOURS PRIOR TO PROCEDURE 12/2/21   Historical Provider, MD   Misc. Devices (ROLLATOR) MISC 1 each by Does not apply route daily as needed (use as needed for stability) 3/17/20   Fabienne Brito MD       Allergies:  Latex, Iodine, Aloe, Celebrex [celecoxib], and Fish-derived products    Social History:      The patient currently lives at home  TOBACCO:   reports that she quit smoking about 3 years ago. Her smoking use included cigarettes. She has a 64.50 pack-year smoking history. She has never used smokeless tobacco.  ETOH:   reports no history of alcohol use. Family History:     Reviewed in detail and negative for DM, CAD, Cancer, CVA.  Positive as follows:        Problem Relation Age of Onset    Diabetes Mother     Arthritis Mother     Atrial Fibrillation Mother     Diabetes Father     Atrial Fibrillation Father     Arthritis Father     Emphysema Father     Cancer Sister        REVIEW OF SYSTEMS:   Pertinent positives as noted in the HPI. All other systems reviewed and negative. PHYSICAL EXAM:  /67   Pulse 58   Temp 98 °F (36.7 °C) (Temporal)   Resp 17   Ht 5' 5\" (1.651 m)   Wt 234 lb (106.1 kg)   SpO2 97%   BMI 38.94 kg/m²   General appearance: Middle-aged female, obese, mild painful distress, appears stated age and cooperative. Afebrile. HEENT: Normal cephalic, atraumatic without obvious deformity. Pupils equal, round, and reactive to light. Extra ocular muscles intact. Conjunctivae/corneas clear. Neck: Supple, with full range of motion. No jugular venous distention. Trachea midline. Respiratory:  Normal respiratory effort. Clear to auscultation, bilaterally without Rales/Wheezes/Rhonchi. Cardiovascular: Regular rate and rhythm with normal S1/S2 without murmurs, rubs or gallops. Abdomen: Soft, non-tender, non-distended with normal bowel sounds. Musculoskeletal: No clubbing, cyanosis or edema bilaterally. Skin: Skin color, texture, turgor normal.  No rashes or lesions. Neurologic:   Cranial nerves: II-XII intact, grossly non-focal.  Limited movement of right leg due to recent surgery  Psychiatric: Alert and oriented, thought content appropriate, normal insight  Capillary Refill: Brisk,< 3 seconds   Peripheral Pulses: +2 palpable, equal bilaterally     Labs: Reviewed and documented    No results for input(s): WBC, HGB, HCT, PLT in the last 72 hours. No results for input(s): NA, K, CL, CO2, BUN, CREATININE, CALCIUM, PHOS in the last 72 hours. Invalid input(s): MAGNES  No results for input(s): AST, ALT, BILIDIR, BILITOT, ALKPHOS in the last 72 hours. No results for input(s): INR in the last 72 hours. No results for input(s): Erle Keepers in the last 72 hours.     Urinalysis:    Lab Results   Component Value Date    NITRU Negative 01/04/2022    WBCUA NONE 03/07/2014    BACTERIA RARE 03/07/2014    RBCUA 2-5 03/07/2014    BLOODU Negative 01/04/2022    SPECGRAV 1.020 01/04/2022    GLUCOSEU Negative 01/04/2022       Radiology: I have reviewed the radiology reports with the following interpretation:     XR PELVIS (1-2 VIEWS)   Final Result   Expected postoperative changes of right total hip arthroplasty. XR HIP RIGHT (2-3 VIEWS)   Final Result   Expected postoperative changes of right total hip arthroplasty. ASSESSMENT:    Active Hospital Problems    Diagnosis Date Noted    S/P total right hip arthroplasty [Z96.641] 01/10/2022    Cognitive impairment [R41.89] 01/10/2022    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 [Z86.79] 12/03/2021    Pulmonary emphysema ([address] [J43.9] 04/27/2021    H/O total hip arthroplasty, right [Z96.641]     Hypertension [I10] 09/21/2018    Obesity (BMI 30-39. 9) [E66.9] 09/10/2018   . Sinus bradycardia    PLAN:  #1.  Postoperative hip pain, pain control. Patient is status post right total hip arthroplasty today. #2. Hypertension, blood pressure is mildly elevated, resume home meds. #3.  Sinus bradycardia likely secondary to beta-blocker use. Holding parameters. #4.  COPD, no acute exacerbation, breathing treatment as needed. #5.  Cognitive impairment in the patient with history of subarachnoid hemorrhage  #6. Cerebral aneurysm status post coiling. #7.  Postoperative care, pain control, bowel regimen, incentive spirometry, anticoagulation per primary  #8. Obesity, dietary and lifestyle modification. DVT Prophylaxis: Aspirin twice daily  Diet: ADULT DIET;  Regular  Code Status: Full Code    PT/OT Eval Status: Active and ongoing        Thank you for the consultation, will follow up as needed    Wilner Desir MD MD

## 2022-01-11 LAB
ANION GAP SERPL CALCULATED.3IONS-SCNC: 13 MMOL/L (ref 7–16)
BUN BLDV-MCNC: 20 MG/DL (ref 6–23)
CALCIUM SERPL-MCNC: 8.1 MG/DL (ref 8.6–10.2)
CHLORIDE BLD-SCNC: 108 MMOL/L (ref 98–107)
CO2: 21 MMOL/L (ref 22–29)
CREAT SERPL-MCNC: 1 MG/DL (ref 0.5–1)
GFR AFRICAN AMERICAN: >60
GFR NON-AFRICAN AMERICAN: 56 ML/MIN/1.73
GLUCOSE BLD-MCNC: 117 MG/DL (ref 74–99)
HCT VFR BLD CALC: 31 % (ref 34–48)
HEMOGLOBIN: 9.9 G/DL (ref 11.5–15.5)
MCH RBC QN AUTO: 30.2 PG (ref 26–35)
MCHC RBC AUTO-ENTMCNC: 31.9 % (ref 32–34.5)
MCV RBC AUTO: 94.5 FL (ref 80–99.9)
PDW BLD-RTO: 13.9 FL (ref 11.5–15)
PLATELET # BLD: 154 E9/L (ref 130–450)
PMV BLD AUTO: 9.6 FL (ref 7–12)
POTASSIUM REFLEX MAGNESIUM: 3.9 MMOL/L (ref 3.5–5)
RBC # BLD: 3.28 E12/L (ref 3.5–5.5)
SODIUM BLD-SCNC: 142 MMOL/L (ref 132–146)
WBC # BLD: 6.5 E9/L (ref 4.5–11.5)

## 2022-01-11 PROCEDURE — 2580000003 HC RX 258: Performed by: STUDENT IN AN ORGANIZED HEALTH CARE EDUCATION/TRAINING PROGRAM

## 2022-01-11 PROCEDURE — 36415 COLL VENOUS BLD VENIPUNCTURE: CPT

## 2022-01-11 PROCEDURE — 6360000002 HC RX W HCPCS: Performed by: STUDENT IN AN ORGANIZED HEALTH CARE EDUCATION/TRAINING PROGRAM

## 2022-01-11 PROCEDURE — 85027 COMPLETE CBC AUTOMATED: CPT

## 2022-01-11 PROCEDURE — 6370000000 HC RX 637 (ALT 250 FOR IP): Performed by: STUDENT IN AN ORGANIZED HEALTH CARE EDUCATION/TRAINING PROGRAM

## 2022-01-11 PROCEDURE — 2500000003 HC RX 250 WO HCPCS: Performed by: STUDENT IN AN ORGANIZED HEALTH CARE EDUCATION/TRAINING PROGRAM

## 2022-01-11 PROCEDURE — 97530 THERAPEUTIC ACTIVITIES: CPT

## 2022-01-11 PROCEDURE — 1200000000 HC SEMI PRIVATE

## 2022-01-11 PROCEDURE — 80048 BASIC METABOLIC PNL TOTAL CA: CPT

## 2022-01-11 PROCEDURE — 97161 PT EVAL LOW COMPLEX 20 MIN: CPT

## 2022-01-11 PROCEDURE — 97535 SELF CARE MNGMENT TRAINING: CPT

## 2022-01-11 RX ADMIN — GABAPENTIN 300 MG: 300 CAPSULE ORAL at 10:08

## 2022-01-11 RX ADMIN — SENNOSIDES AND DOCUSATE SODIUM 1 TABLET: 50; 8.6 TABLET ORAL at 20:35

## 2022-01-11 RX ADMIN — OXYCODONE HYDROCHLORIDE 10 MG: 10 TABLET ORAL at 16:53

## 2022-01-11 RX ADMIN — FLUTICASONE PROPIONATE 2 SPRAY: 50 SPRAY, METERED NASAL at 10:14

## 2022-01-11 RX ADMIN — ASPIRIN 325 MG: 325 TABLET, COATED ORAL at 20:35

## 2022-01-11 RX ADMIN — PANTOPRAZOLE SODIUM 40 MG: 40 TABLET, DELAYED RELEASE ORAL at 10:08

## 2022-01-11 RX ADMIN — SENNOSIDES AND DOCUSATE SODIUM 1 TABLET: 50; 8.6 TABLET ORAL at 10:09

## 2022-01-11 RX ADMIN — ACETAMINOPHEN 650 MG: 325 TABLET ORAL at 01:21

## 2022-01-11 RX ADMIN — GABAPENTIN 300 MG: 300 CAPSULE ORAL at 20:35

## 2022-01-11 RX ADMIN — ACETAMINOPHEN 650 MG: 325 TABLET ORAL at 18:34

## 2022-01-11 RX ADMIN — GABAPENTIN 300 MG: 300 CAPSULE ORAL at 14:00

## 2022-01-11 RX ADMIN — Medication 2000 MG: at 01:21

## 2022-01-11 RX ADMIN — ACETAMINOPHEN 650 MG: 325 TABLET ORAL at 12:30

## 2022-01-11 RX ADMIN — POTASSIUM CHLORIDE 10 MEQ: 750 TABLET, EXTENDED RELEASE ORAL at 10:09

## 2022-01-11 RX ADMIN — CARBAMAZEPINE 100 MG: 100 TABLET, EXTENDED RELEASE ORAL at 14:00

## 2022-01-11 RX ADMIN — ASPIRIN 325 MG: 325 TABLET, COATED ORAL at 10:08

## 2022-01-11 RX ADMIN — CARBAMAZEPINE 100 MG: 100 TABLET, EXTENDED RELEASE ORAL at 10:08

## 2022-01-11 RX ADMIN — CARBAMAZEPINE 100 MG: 100 TABLET, EXTENDED RELEASE ORAL at 20:37

## 2022-01-11 RX ADMIN — Medication 10 ML: at 10:13

## 2022-01-11 RX ADMIN — OXYCODONE HYDROCHLORIDE 10 MG: 10 TABLET ORAL at 02:10

## 2022-01-11 RX ADMIN — CHLORTHALIDONE 25 MG: 25 TABLET ORAL at 10:08

## 2022-01-11 RX ADMIN — LOSARTAN POTASSIUM 100 MG: 50 TABLET, FILM COATED ORAL at 10:08

## 2022-01-11 RX ADMIN — OXYCODONE HYDROCHLORIDE 10 MG: 10 TABLET ORAL at 10:56

## 2022-01-11 RX ADMIN — HYDROMORPHONE HYDROCHLORIDE 0.5 MG: 1 INJECTION, SOLUTION INTRAMUSCULAR; INTRAVENOUS; SUBCUTANEOUS at 14:10

## 2022-01-11 RX ADMIN — HYDROMORPHONE HYDROCHLORIDE 0.5 MG: 1 INJECTION, SOLUTION INTRAMUSCULAR; INTRAVENOUS; SUBCUTANEOUS at 03:17

## 2022-01-11 RX ADMIN — SODIUM CHLORIDE: 9 INJECTION, SOLUTION INTRAVENOUS at 03:17

## 2022-01-11 RX ADMIN — ACETAMINOPHEN 650 MG: 325 TABLET ORAL at 06:15

## 2022-01-11 RX ADMIN — OXYCODONE HYDROCHLORIDE 10 MG: 10 TABLET ORAL at 20:35

## 2022-01-11 ASSESSMENT — PAIN SCALES - GENERAL
PAINLEVEL_OUTOF10: 8
PAINLEVEL_OUTOF10: 3
PAINLEVEL_OUTOF10: 10
PAINLEVEL_OUTOF10: 10
PAINLEVEL_OUTOF10: 5
PAINLEVEL_OUTOF10: 6
PAINLEVEL_OUTOF10: 5
PAINLEVEL_OUTOF10: 3
PAINLEVEL_OUTOF10: 1
PAINLEVEL_OUTOF10: 9
PAINLEVEL_OUTOF10: 10
PAINLEVEL_OUTOF10: 9

## 2022-01-11 ASSESSMENT — PAIN DESCRIPTION - FREQUENCY
FREQUENCY: INTERMITTENT

## 2022-01-11 ASSESSMENT — PAIN DESCRIPTION - DESCRIPTORS
DESCRIPTORS: ACHING
DESCRIPTORS: ACHING;SORE
DESCRIPTORS: ACHING

## 2022-01-11 ASSESSMENT — PAIN DESCRIPTION - ORIENTATION
ORIENTATION: RIGHT

## 2022-01-11 ASSESSMENT — PAIN DESCRIPTION - LOCATION
LOCATION: HIP

## 2022-01-11 ASSESSMENT — PAIN DESCRIPTION - PROGRESSION
CLINICAL_PROGRESSION: GRADUALLY IMPROVING

## 2022-01-11 ASSESSMENT — PAIN DESCRIPTION - ONSET
ONSET: ON-GOING
ONSET: OTHER (COMMENT)
ONSET: ON-GOING

## 2022-01-11 ASSESSMENT — PAIN DESCRIPTION - PAIN TYPE
TYPE: ACUTE PAIN
TYPE: SURGICAL PAIN
TYPE: ACUTE PAIN

## 2022-01-11 ASSESSMENT — PAIN - FUNCTIONAL ASSESSMENT
PAIN_FUNCTIONAL_ASSESSMENT: PREVENTS OR INTERFERES SOME ACTIVE ACTIVITIES AND ADLS

## 2022-01-11 NOTE — CARE COORDINATION
1/11/2022 social work transition of care planning  Pt is from home with sons(1 is a  the other has mental health issues,per pt). Pt pcp is Dr. Stewart Bautista and pharmacy is [facility]. Pt has hx of [place]. Explained sw role in transition of care planning. Pt declines snf,agreeble to ar u(no preference requested pm&r consult). Sw will follow.   Electronically signed by SUZANNE Garcia on 1/11/2022 at 3:19 PM

## 2022-01-11 NOTE — PROGRESS NOTES
OCCUPATIONAL THERAPY TREATMENT NOTE     [facility]  OT BEDSIDE TREATMENT NOTE      Date:2022  Patient Name: Smiley Herndon  MRN: [id number]  : 1960  Room: [address] [place] [address]       Referring Carter [address], DO  Specific Provider Orders/Date: OT evaluation and treat 1/10/22     Evaluating OT: Miriam Hickey. Prashanht OTR/L #[id number]     Diagnosis: S/P total right hip arthroplasty [Z96.641]       Surgery:   Past Surgical History         Past Surgical History:   Procedure Laterality Date    BRAIN ANEURYSM SURGERY         coiling     COLONOSCOPY   2019     polyps--frank    COLONOSCOPY N/A 2019     COLONOSCOPY POLYPECTOMY SNARE/COLD BIOPSY performed by Mary Rosen MD at [place] [address]         FOR CANCER CELLS- DONE AT [facility]        TOTAL HIP ARTHROPLASTY Left 2021     LEFT HIP TOTAL ARTHROPLASTY performed by Jolynn Mathur MD at  [facility]   2019     gastritis with superficial ulcerations; duodenitis--farnk    UPPER GASTROINTESTINAL ENDOSCOPY N/A 2019     EGD BIOPSY performed by Mary Rosen MD at [facility]        Pertinent Medical History:  has a past medical history of Brain aneurysm, Cerebral artery occlusion with cerebral infarction [facility]), Degenerative arthritis of hand, Edentulous, Emphysema lung ([facility] Headache, Hiatal hernia, Hip problem, Hx of abnormal cervical Pap smear, Hypertension, Stroke (cerebrum) ([facility] and Subarachnoid bleed ([facility]    Precautions:  Fall Risk, WBAT to RLE, anterolateral hip precautions     Assessment of current deficits   [x]? Functional mobility             [x]?ADLs           []? Strength                  []?Cognition   [x]? Functional transfers           [x]? IADLs         [x]? Safety Awareness   [x]? Endurance   []? Fine Coordination              [x]? Balance      []? Vision/perception   [x]? Sensation     [x]? Melissa Velarde Motor Coordination  []? ROM           []? Delirium                   []? Motor Control      OT PLAN OF CARE   OT POC based on physician orders, patient diagnosis and results of clinical assessment     Frequency/Duration   2-4 days/wk for 1 week PRN   Specific OT Treatment Interventions to include:   * Instruction/training on adapted ADL techniques and AE recommendations to increase functional independence within precautions       * Training on energy conservation strategies, correct breathing pattern and techniques to improve independence/tolerance for self-care routine  * Functional transfer/mobility training/DME recommendations for increased independence, safety, and fall prevention  * Patient/Family education to increase follow through with safety techniques and functional independence  * Recommendation of environmental modifications for increased safety with functional transfers/mobility and ADLs  * Therapeutic activities to facilitate/challenge dynamic balance, stand tolerance for increased safety and independence with ADLs        Recommended Adaptive Equipment: BSC, LHS     Home Living: Pt lives with son in a 2 story home with 5 steps and one hand rails. Bed and bath on main floor. Bathroom setup: tub shower with seat   Equipment owned: ww, shower seat, hallway HR     Prior Level of Function: Independent with ADLs , Independent with IADLs; ambulated no   AD  Driving: no  Occupation: n/a  Medication management: self  Leisure: enjoys reading     Pain Level: 6/10 R hip pain during movement - 0/10 pain at rest. Per pt \"pain is worse than yesterday\". RN made aware.   Cognition: A&O: 3/4; Follows multi step directions              Memory:  Fair recall of anterolateral hip precautions              Sequencing:  Fair+              Problem solving:  Fair+              Judgement/safety:  Fair+                Functional Assessment:  AM-PAC Daily Activity Raw Score: 16/24    Initial Eval Status  Date: 1/10/21 Treatment Status  Date: 1/11/22 STGs = LTGs  Time frame: 2-4 days   Feeding Independent Independent      Grooming Setup sitting  Set up seated  Washed face supine in bed d/t increased pain of RLE movement throughout session. Mod I standing at sink with ww   UB Dressing Min A donned gown like robe  Minimal Assist Mod I    LB Dressing Max A   Max Assist  Pt utilized AE to don pants seated on bedside chair SBA with AE    Bathing Simulated mod A  Moderate Assist  Mod I seated with LHS   Toileting Simulated max A needs BSC Moderate Assist   Assist to maintain dynamic standing balance with use of BSC   Mod I to elevated with HR   Bed Mobility  Supine to sit: mod A  Sit to supine:  n/t  Supine to sit: NT  Sit to supine: Moderate Assist x2  Assist for RLE & trunk management Supine to sit: mod I   Sit to supine: mod I    Functional Transfers Bed level mod A with ww and increased cues   Max A from lower chair   Sit to stand: Moderate Assist x2   Stand to sit: Moderate Assist x2  Stand pivot: NT  Commode: Moderate Assist x2 (BSC)  Decreased dynamic standing balance  Mod I    Functional Mobility Stand pivot mod A with increased cues for sequencing and walker management  Moderate Assist x2   D/t increased pain RLE. Assist for ww management with verbal cues for safety. Mod I with ww   Balance Sitting:     Static:  SBA    Dynamic:CGA  Standing: min A with ww Sitting:     Static:  SBA    Dynamic:SBA  Standing:  Moderate Assist with ww                                                                       Activity Tolerance Fair- limited by pain O2 RA 96% HR 56 Fair-   D/t increased pain during RLE movement Fair+   Visual/  Perceptual Glasses: yes WFL           Safety Fair+  Fair+                                 Good recall and follow through of anterolateral precautions in ADL completion      Hand Dominance R     AROM (PROM) Strength Additional Info:    RUE  WFL 4/5 good  and wfl FMC/dexterity noted during ADL tasks         LUE WFL 4/5 good  and wfl FMC/dexterity noted during ADL tasks      Hearing: WFL   Sensation:  Decreased in B hands - positional otherwise No c/o numbness or tingling   Tone: WFL   Edema: none noted        Treatment: OT treatment provided this date includes:    · Instruction/training on safety and adapted techniques for completion of ADLs: Instruction & demonstration on use of AE (I.e. reacher) to improve safety & participation in LB dressing. · Instruction/training on safe functional mobility/transfer techniques: Verbal cues for ww management throughout functional transfers/mobility. Verbal cues to maintain anterolateral R hip precautions throughout. · Instruction/training on energy conservation/work simplification for completion of ADLs: Instruction on breathing techniques to improve energy conservation and ADL/IADL participation. · ADL-  Instruction/training on safety and adapted techniques for completion of ADLs: Provided pt with educational material to improve anterolateral R hip precaution understanding. Pt demonstrated use of AE to don pants seated in bedside chair with verbal cues for instruction. Pt utilized BSC with assist to maintain dynamic balance. Pt washed face supine in bed d/t increased pain of R hip throughout session resulting in decreased activity tolerance.       Comments: Upon arrival pt seated in bedside chair with RN in room. At end of session pt supine in bed with all lines and tubes intact, call light within reach. Therapist facilitated ADL tasks/functional transfers/functional mobility/bed mobility/safety precautions throughout session to improve safety and engagement in ADL/IADL activities. Therapist provided education to pt regarding anterolateral R hip precautions to improve safety awareness throughout daily activities. · Pt has made fair progress towards set goals.    · Continue with current plan of care      Treatment Time In:1005           Treatment Time Out: 3206 Treatment Charges: Mins Units   Ther Ex  12007     Manual Therapy 47833     Thera Activities 98533 10 1   ADL/Home Mgt 33888 29 2   Neuro Re-ed [id number]     Group Therapy      Orthotic manage/training  86296     Non-Billable Time     Total Timed Treatment 39 3         David Barr OTR/L; [id number]

## 2022-01-11 NOTE — PROGRESS NOTES
Hospitalist Progress Note      SYNOPSIS: Patient admitted on 1/10/2022 for     57 y.o. female who we are asked to see/evaluate by Giancarlo Rajan, for medical management of Post Operative state. Has PMH of obesity, chronic pain, DJD of the spine, CVA with right-sided weakness, osteoarthritis of the hip status post right total hip arthroplasty done 1/10/22, hypertension, COPD, subarachnoid hemorrhage, COVID-19 viral infection in 2021, cerebral aneurysm ruptured twice, status post coiling of the left supraclinoid region.    History of subarachnoid hemorrhage following ruptured cerebral aneurysm with resultant cognitive impairment. She is status post coiling  History of COPD/emphysema. SUBJECTIVE:    Patient seen and examined  Records reviewed. The pt states that she has pain but controlled with pain medications and improving. Denies any nausea vomiting fever or chills. Stable overnight. No other overnight issues reported. Temp (24hrs), Av °F (36.7 °C), Min:97.7 °F (36.5 °C), Max:98.2 °F (36.8 °C)    DIET: ADULT DIET; Regular  CODE: Full Code    Intake/Output Summary (Last 24 hours) at 2022 1419  Last data filed at 2022 1013  Gross per 24 hour   Intake 680 ml   Output --   Net 680 ml       OBJECTIVE:    BP (!) 91/50   Pulse 84   Temp 98.2 °F (36.8 °C) (Temporal)   Resp 18   Ht 5' 5\" (1.651 m)   Wt 234 lb (106.1 kg)   SpO2 98%   BMI 38.94 kg/m²     General appearance: No apparent distress, appears stated age and cooperative. HEENT:  Conjunctivae/corneas clear. Neck: Supple. No jugular venous distention. Respiratory: Clear to auscultation bilaterally, normal respiratory effort  Cardiovascular: Regular rate rhythm, normal S1-S2  Abdomen: Soft, nontender, nondistended  Musculoskeletal: No clubbing, cyanosis, no bilateral lower extremity edema. Brisk capillary refill.  Dressing CDI  Skin:  No rashes  on visible skin  Neurologic: awake, alert and following commands ASSESSMENT:    COPD  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   Cognitive impairment   HTN  Obesity  Hx of CVA     PLAN:    S/P total right hip arthroplasty 1/10  Pain control  Post op antibiotics. Cont losartan, amlodipine and metoprolol. Hold Chlorthalidone for now as hypotensive. Hold Amlodipine if BP does not improve. Will Unlikely need to hold Losartan and metoprolol. Cont ASA  Cont protonix  PTOT      DISPOSITION:     Medications:  REVIEWED DAILY    Infusion Medications    sodium chloride       Scheduled Medications    amLODIPine  5 mg Oral Daily    carBAMazepine  100 mg Oral TID    chlorthalidone  25 mg Oral Daily    fluticasone  2 spray Each Nostril Daily    gabapentin  300 mg Oral TID    losartan  100 mg Oral Daily    metoprolol tartrate  37.5 mg Oral BID    pantoprazole  40 mg Oral Daily    potassium chloride  10 mEq Oral Daily    sodium chloride flush  10 mL IntraVENous 2 times per day    acetaminophen  650 mg Oral Q6H    sennosides-docusate sodium  1 tablet Oral BID    aspirin  325 mg Oral BID     PRN Meds: sodium chloride flush, sodium chloride, oxyCODONE **OR** oxyCODONE, HYDROmorphone **OR** HYDROmorphone, promethazine **OR** ondansetron, diphenhydrAMINE, albuterol    Labs:     Recent Labs     01/11/22  0522   WBC 6.5   HGB 9.9*   HCT 31.0*          Recent Labs     01/11/22  0522      K 3.9   *   CO2 21*   BUN 20   CREATININE 1.0   CALCIUM 8.1*       No results for input(s): PROT, ALB, ALKPHOS, ALT, AST, BILITOT, AMYLASE, LIPASE in the last 72 hours. No results for input(s): INR in the last 72 hours. No results for input(s): Kiana Councilman in the last 72 hours.     Chronic labs:    Lab Results   Component Value Date    CHOL 175 12/04/2020    TRIG 180 (H) 12/04/2020    HDL 65 12/04/2020    LDLCALC 74 12/04/2020    TSH 1.290 02/14/2019    INR 0.9 01/04/2022    LABA1C 5.8 (H) 12/04/2020       Radiology: REVIEWED DAILY    +++++++++++++++++++++++++++++++++++++++++++++++++  Shazia Sorensen MD  [facility] Emergency Department Physician - [address] [place] [address]  +++++++++++++++++++++++++++++++++++++++++++++++++  NOTE: This report was transcribed using voice recognition software. Every effort was made to ensure accuracy; however, inadvertent computerized transcription errors may be present.

## 2022-01-11 NOTE — PROGRESS NOTES
sequencing. Sit to stand: Mod Ind  to fww  Stand to sit: Mod Ind    Stand pivot: Mod Ind  with fww   Ambulation    10 feet with fww Mod  slow rate. Distance limited by fatigue. 50 feet with Mod Ind  fww   Stair negotiation: ascended and descended  NT  3 steps with Min 1 rail    ROM BUE:  Defer to OT eval  BLE:  wfl     Strength BUE: Defer to OT eval  RLE:  Grossly 3+/5  LLE:  Grossly  4/5  4+/5   Balance Sitting EOB:  Ind  Dynamic Standing: Mod A with fww   Sitting EOB:  Ind  Dynamic Standing: Mod Ind     Patient is Alert & Oriented x person, place, time and situation and follows directions   Sensation:  Pt denies numbness and tingling to extremities  Edema:  none    Vitals:  Supine Seated   Blood Pressure at rest 105/56 Blood Pressure at rest 105/55   Heart Rate at rest - bpm Heart Rate at rest - bpm   SPO2 at rest 94% RA SPO2 at rest 93% RA     Therapeutic Exercises:  Functional activity as stated above. Patient education  Pt educated regarding role of PT evaluation, need for OOB activity, anterior hip precautions and weight bearing precautions for RLE    Patient response to education:   Pt verbalized understanding Pt demonstrated skill Pt requires further education in this area   yes yes Reminders     ASSESSMENT:    Conditions Requiring Skilled Therapeutic Intervention:    [x]Decreased strength     [x]Decreased ROM  [x]Decreased functional mobility  [x]Decreased balance   [x]Decreased endurance   [x]Decreased posture  []Decreased sensation  []Decreased coordination   []Decreased vision  [x]Decreased safety awareness   []Increased pain       Comments:    RN cleared patient for participation in therapy session. Patient was seen this date for PT evaluation. . Patient was agreeable to intervention. Results of the functional assessment are noted above. Upon entering the room patient was found supine in bed. Increased assist required to transition to EOB.  Sat EOB x 10 minutes to increase dynamic sitting balance and activity tolerance. Transfer and gait completed with use of fww as described above. At end of session, patient in chair with  call light and phone within reach,  all lines and tubes intact, nursing notified. This patient can benefit from the continuation of skilled PT possibly in rehab setting due to decreased function and lack of support in her home environment to maximize functional level and return to PLOF. Treatment:  Patient practiced and was instructed in the following treatment:    · Bed mobility training - pt given verbal and tactile cues to facilitate proper sequencing and safety during rolling and supine>sit as well as provided with physical assistance to complete task    · Assistive device training - pt educated on using [employer] during gait, [employer] approximation/negotiation, and hand placement during sit<>stand to [employer]  · STS and transfer training - educated on hand/foot placement, safety, and sequencing during STS and pivot transfers using assistive device  · Gait training - verbal cues for [employer] approximation/negotiation, upright posture, and safety during 90 and 180 degree turns during gait   · Education in WB precautions, and hip precautions. Pt's/ family goals   1. yes    Prognosis is good  for reaching above PT goals.     Patient and or family understand(s) diagnosis, prognosis, and plan of care.  yes,     PHYSICAL THERAPY PLAN OF CARE:    PT POC is established based on physician order and patient diagnosis     Referring provider/PT Order:  PT Eval and Treat    Electronically Signed By  Lesia Mora DO Date  Indra 10, 2022  11:48 AM       Diagnosis:  S/P total right hip arthroplasty [Z96.641]  Specific instructions for next treatment:  Transfer to bedside chair, Increase ambulation distance and BLE therapeutic exercise  Current Treatment Recommendations:     [x] Strengthening to improve independence with functional mobility   [x] ROM to improve independence with functional mobility [x] Balance Training to improve static/dynamic balance and to reduce fall risk  [x] Endurance Training to improve activity tolerance during functional mobility   [x] Transfer Training to improve safety and independence with all functional transfers   [x] Gait Training to improve gait mechanics, endurance and asses need for appropriate assistive device  [x] Stair Training in preparation for safe discharge home and/or into the community   [] Positioning to prevent skin breakdown and contractures  [x] Safety and Education Training   [] Patient/Caregiver Education   [] HEP  [] Other     PT long term treatment goals are located in above grid    Frequency of treatments: 2-5x/week x 1-2 weeks. Time in  0840  Time out  0920    Total Treatment Time  40 minutes     Evaluation Time includes thorough review of current medical information, gathering information on past medical history/social history and prior level of function, completion of standardized testing/informal observation of tasks, assessment of data and education on plan of care and goals.     CPT codes:  [x] Low Complexity PT evaluation 57239  [] Moderate Complexity PT evaluation 32611  [] High Complexity PT evaluation 42316  [] PT Re-evaluation 38895  [] Gait training 87566 - minutes  [] Manual therapy 98642 - minutes  [x] Therapeutic activities 28229 25 minutes  [] Therapeutic exercises 17396 - minutes  [] Neuromuscular reeducation 38553 - minutes     Lydia Elizabeth, [address] [facility]

## 2022-01-11 NOTE — PROGRESS NOTES
Department of Orthopedic Surgery  Resident Progress Note    Patient seen and examined. Pain controlled. No new complaints. Denies chest pain, shortness of breath, calf pain, dizziness/lightheadedness. Looking to work with therapy more today     VITALS:  BP (!) 128/112   Pulse 60   Temp 97.7 °F (36.5 °C) (Oral)   Resp 18   Ht 5' 5\" (1.651 m)   Wt 234 lb (106.1 kg)   SpO2 98%   BMI 38.94 kg/m²     GENERAL: alert and awake  MUSCULOSKELETAL:   right lower extremity:  · Dressing C/D/I  · Compartments soft and compressible, calf non-tender  · Palpable dorsalis pedis and posterior tibialis pulse, brisk cap refill to toes, foot warm and perfused  · Sensation intact to light touch in sural/deep peroneal/superficial peroneal/saphenous/posterior tibial nerve distributions to foot/ankle. · Demonstrates active ankle plantar/dorsiflexion/great toe extension    CBC:   Lab Results   Component Value Date    WBC 6.5 01/11/2022    HGB 9.9 01/11/2022    HCT 31.0 01/11/2022     01/11/2022       ASSESSMENT  · S/p R MERVAT 1/10    PLAN    · WBAT LLE  · Pain control po and IV- wean to orals  · DVT prophy  · Post op abx  · Monitor vitals  · Trend H&H  · Pt/ot  · D/c plan: await SW and PT recs. Possibly home later today vs tomorrow      Patient seen and examined. Patient with postoperative pain. Is up and eating. Appears fairly comfortable. Will await therapy today. If does well with therapy would recommend discharge to home with home therapy and nursing. Patient denies chest pain, shortness of breath, or lightheadedness.

## 2022-01-12 ENCOUNTER — APPOINTMENT (OUTPATIENT)
Dept: ULTRASOUND IMAGING | Age: 62
DRG: 470 | End: 2022-01-12
Attending: ORTHOPAEDIC SURGERY
Payer: MEDICARE

## 2022-01-12 VITALS
HEIGHT: 65 IN | OXYGEN SATURATION: 94 % | HEART RATE: 90 BPM | DIASTOLIC BLOOD PRESSURE: 66 MMHG | SYSTOLIC BLOOD PRESSURE: 134 MMHG | WEIGHT: 234 LBS | BODY MASS INDEX: 38.99 KG/M2 | RESPIRATION RATE: 18 BRPM | TEMPERATURE: 98.6 F

## 2022-01-12 LAB
ADENOVIRUS BY PCR: NOT DETECTED
BORDETELLA PARAPERTUSSIS BY PCR: NOT DETECTED
BORDETELLA PERTUSSIS BY PCR: NOT DETECTED
CHLAMYDOPHILIA PNEUMONIAE BY PCR: NOT DETECTED
CORONAVIRUS 229E BY PCR: NOT DETECTED
CORONAVIRUS HKU1 BY PCR: NOT DETECTED
CORONAVIRUS NL63 BY PCR: NOT DETECTED
CORONAVIRUS OC43 BY PCR: NOT DETECTED
HCT VFR BLD CALC: 31.6 % (ref 34–48)
HEMOGLOBIN: 10 G/DL (ref 11.5–15.5)
HUMAN METAPNEUMOVIRUS BY PCR: NOT DETECTED
HUMAN RHINOVIRUS/ENTEROVIRUS BY PCR: NOT DETECTED
INFLUENZA A BY PCR: NOT DETECTED
INFLUENZA B BY PCR: NOT DETECTED
MCH RBC QN AUTO: 30.2 PG (ref 26–35)
MCHC RBC AUTO-ENTMCNC: 31.6 % (ref 32–34.5)
MCV RBC AUTO: 95.5 FL (ref 80–99.9)
MYCOPLASMA PNEUMONIAE BY PCR: NOT DETECTED
PARAINFLUENZA VIRUS 1 BY PCR: NOT DETECTED
PARAINFLUENZA VIRUS 2 BY PCR: NOT DETECTED
PARAINFLUENZA VIRUS 3 BY PCR: NOT DETECTED
PARAINFLUENZA VIRUS 4 BY PCR: NOT DETECTED
PDW BLD-RTO: 14 FL (ref 11.5–15)
PLATELET # BLD: 147 E9/L (ref 130–450)
PMV BLD AUTO: 9.5 FL (ref 7–12)
RBC # BLD: 3.31 E12/L (ref 3.5–5.5)
RESPIRATORY SYNCYTIAL VIRUS BY PCR: NOT DETECTED
SARS-COV-2, PCR: NOT DETECTED
WBC # BLD: 6.8 E9/L (ref 4.5–11.5)

## 2022-01-12 PROCEDURE — 97530 THERAPEUTIC ACTIVITIES: CPT

## 2022-01-12 PROCEDURE — 93971 EXTREMITY STUDY: CPT

## 2022-01-12 PROCEDURE — 36415 COLL VENOUS BLD VENIPUNCTURE: CPT

## 2022-01-12 PROCEDURE — 85027 COMPLETE CBC AUTOMATED: CPT

## 2022-01-12 PROCEDURE — 6370000000 HC RX 637 (ALT 250 FOR IP): Performed by: STUDENT IN AN ORGANIZED HEALTH CARE EDUCATION/TRAINING PROGRAM

## 2022-01-12 PROCEDURE — 0202U NFCT DS 22 TRGT SARS-COV-2: CPT

## 2022-01-12 PROCEDURE — 2580000003 HC RX 258: Performed by: STUDENT IN AN ORGANIZED HEALTH CARE EDUCATION/TRAINING PROGRAM

## 2022-01-12 PROCEDURE — U0005 INFEC AGEN DETEC AMPLI PROBE: HCPCS

## 2022-01-12 PROCEDURE — 6370000000 HC RX 637 (ALT 250 FOR IP): Performed by: FAMILY MEDICINE

## 2022-01-12 PROCEDURE — 93971 EXTREMITY STUDY: CPT | Performed by: RADIOLOGY

## 2022-01-12 PROCEDURE — 97535 SELF CARE MNGMENT TRAINING: CPT

## 2022-01-12 PROCEDURE — U0003 INFECTIOUS AGENT DETECTION BY NUCLEIC ACID (DNA OR RNA); SEVERE ACUTE RESPIRATORY SYNDROME CORONAVIRUS 2 (SARS-COV-2) (CORONAVIRUS DISEASE [COVID-19]), AMPLIFIED PROBE TECHNIQUE, MAKING USE OF HIGH THROUGHPUT TECHNOLOGIES AS DESCRIBED BY CMS-2020-01-R: HCPCS

## 2022-01-12 PROCEDURE — 2700000000 HC OXYGEN THERAPY PER DAY

## 2022-01-12 RX ADMIN — POTASSIUM CHLORIDE 10 MEQ: 750 TABLET, EXTENDED RELEASE ORAL at 09:57

## 2022-01-12 RX ADMIN — GABAPENTIN 300 MG: 300 CAPSULE ORAL at 14:34

## 2022-01-12 RX ADMIN — AMLODIPINE BESYLATE 5 MG: 5 TABLET ORAL at 09:56

## 2022-01-12 RX ADMIN — OXYCODONE HYDROCHLORIDE 10 MG: 10 TABLET ORAL at 15:41

## 2022-01-12 RX ADMIN — PANTOPRAZOLE SODIUM 40 MG: 40 TABLET, DELAYED RELEASE ORAL at 09:57

## 2022-01-12 RX ADMIN — GABAPENTIN 300 MG: 300 CAPSULE ORAL at 09:56

## 2022-01-12 RX ADMIN — Medication 10 ML: at 10:30

## 2022-01-12 RX ADMIN — OXYCODONE HYDROCHLORIDE 10 MG: 10 TABLET ORAL at 06:58

## 2022-01-12 RX ADMIN — ACETAMINOPHEN 650 MG: 325 TABLET ORAL at 11:24

## 2022-01-12 RX ADMIN — LOSARTAN POTASSIUM 100 MG: 50 TABLET, FILM COATED ORAL at 09:56

## 2022-01-12 RX ADMIN — CARBAMAZEPINE 100 MG: 100 TABLET, EXTENDED RELEASE ORAL at 14:34

## 2022-01-12 RX ADMIN — CARBAMAZEPINE 100 MG: 100 TABLET, EXTENDED RELEASE ORAL at 09:57

## 2022-01-12 RX ADMIN — ACETAMINOPHEN 650 MG: 325 TABLET ORAL at 06:56

## 2022-01-12 RX ADMIN — METOPROLOL TARTRATE 37.5 MG: 25 TABLET, FILM COATED ORAL at 09:57

## 2022-01-12 RX ADMIN — FLUTICASONE PROPIONATE 2 SPRAY: 50 SPRAY, METERED NASAL at 09:55

## 2022-01-12 RX ADMIN — SENNOSIDES AND DOCUSATE SODIUM 1 TABLET: 50; 8.6 TABLET ORAL at 09:57

## 2022-01-12 RX ADMIN — CHLORTHALIDONE 25 MG: 25 TABLET ORAL at 10:30

## 2022-01-12 RX ADMIN — OXYCODONE HYDROCHLORIDE 10 MG: 10 TABLET ORAL at 11:24

## 2022-01-12 RX ADMIN — ACETAMINOPHEN 650 MG: 325 TABLET ORAL at 00:28

## 2022-01-12 RX ADMIN — ASPIRIN 325 MG: 325 TABLET, COATED ORAL at 09:56

## 2022-01-12 ASSESSMENT — PAIN DESCRIPTION - PROGRESSION: CLINICAL_PROGRESSION: GRADUALLY IMPROVING

## 2022-01-12 ASSESSMENT — PAIN SCALES - GENERAL
PAINLEVEL_OUTOF10: 8
PAINLEVEL_OUTOF10: 9
PAINLEVEL_OUTOF10: 10
PAINLEVEL_OUTOF10: 1
PAINLEVEL_OUTOF10: 5
PAINLEVEL_OUTOF10: 5

## 2022-01-12 ASSESSMENT — PAIN DESCRIPTION - PAIN TYPE
TYPE: SURGICAL PAIN
TYPE: SURGICAL PAIN

## 2022-01-12 ASSESSMENT — PAIN DESCRIPTION - LOCATION: LOCATION: HIP

## 2022-01-12 ASSESSMENT — PAIN DESCRIPTION - ORIENTATION: ORIENTATION: RIGHT

## 2022-01-12 ASSESSMENT — PAIN DESCRIPTION - FREQUENCY: FREQUENCY: INTERMITTENT

## 2022-01-12 ASSESSMENT — PAIN - FUNCTIONAL ASSESSMENT: PAIN_FUNCTIONAL_ASSESSMENT: PREVENTS OR INTERFERES SOME ACTIVE ACTIVITIES AND ADLS

## 2022-01-12 ASSESSMENT — PAIN DESCRIPTION - DESCRIPTORS: DESCRIPTORS: ACHING

## 2022-01-12 NOTE — PROGRESS NOTES
Met with patient in her room to discuss ARU and discharge planning. Per patient, her son is planning on taking time off work when she is discharged to home to assist her with care as needed. She states she is calling her son to discuss coming to [facility], where she has also been accepted. I provided her with my phone number and she states she will call me shortly with an answer. Will wait for her call. If patient choose [facility], she can admit today after obtaining a negative rapid COVID-19 test.    Justyna Acevedo RN, Pre-screener for Acute Rehab  P: [phone]  F: [phone]  [email or web address]

## 2022-01-12 NOTE — PROGRESS NOTES
Physical Therapy  Physical Therapy Treatment Note    Name: Zeus Arroyo  : 1960  MRN: [id number]      Date of Service: 2022    Evaluating PT:  Adam Krishnamurthy, PT [id number]      Room #:  7967/8258-Z  Diagnosis:  S/P total right hip arthroplasty [Z96.641]  PMHx/PSHx:     has a past medical history of Brain aneurysm, Cerebral artery occlusion with cerebral infarction [facility]), Degenerative arthritis of hand, Edentulous, Emphysema lung ([facility] [address] Headache, Hiatal hernia, Hip problem, Hx of abnormal cervical Pap smear, Hypertension, Stroke (cerebrum) ([facility] [address] and Subarachnoid bleed ([facility] [address] has a past surgical history that includes Tonsillectomy; Brain aneurysm surgery; other surgical history; Upper gastrointestinal endoscopy (2019); Colonoscopy (2019); Upper gastrointestinal endoscopy (N/A, 2019); Colonoscopy (N/A, 2019); Total hip arthroplasty (Left, 2021); and Total hip arthroplasty (Right, 1/10/2022). Procedure/Surgery: S/p R MERVAT 1/10    Precautions:  Falls,  WBAT (weight bearing as tolerated) RLE, Anterior hip precautions  Equipment Needs: To be determined,    SUBJECTIVE:    Patient lives with son who she states is over the road  and is only home 1-2 days per week other family members who are not reliable in a two story home resides first  with 5 steps to enter with 1Rail  Bed is on 1 floor and bath is on 1 floor. Patient ambulated with wheeled walker  PTA. Equipment owned: Wheelchair, Wheeled Walker and Rollator,      OBJECTIVE:   Initial Evaluation  Date: 22 Treatment  22 AM/PM Short Term/ Long Term   Goals   AM-PAC 6 Clicks  86/97    Was pt agreeable to Eval/treatment? yes yes    Does pt have pain? Yes R hip R hip pain. Bed Mobility  Rolling: NT   Supine to sit:   Mod    Sit to supine: Mod    Scooting: Mod   Rolling: NT   Supine to sit:   Mod    Sit to supine: Mod    Scooting: Mod   Rolling: Ind  Supine to sit:  Ind  Sit to supine: Ind  Scooting: Ind   Transfers Sit to stand: Mod  to fww  Stand to sit: Mod   Stand pivot: Mod  with fww  Cues required for sequencing. Sit to stand: Mod  to fww from bed and Max A from bedside chair lower surface. Stand to sit: Mod   Stand pivot: Mod  with fww  Cues required for sequencing. Sit to stand: Mod Ind  to fww  Stand to sit: Mod Ind    Stand pivot: Mod Ind  with fww   Ambulation    10 feet with fww Mod  slow rate. Distance limited by fatigue. 10 feet with fww Mod A very slow rate. .  50 feet with Mod Ind  fww   Stair negotiation: ascended and descended  NT  3 steps with Min 1 rail    ROM BUE:  Defer to OT eval  BLE:  wfl     Strength BUE: Defer to OT eval  RLE:  Grossly 3+/5  LLE:  Grossly  4/5  4+/5   Balance Sitting EOB:  Ind  Dynamic Standing: Mod A with fww   Sitting EOB:  Ind  Dynamic Standing: Mod Ind     Patient is Alert & Oriented x person, place, time and situation and follows directions   Sensation:  Pt denies numbness and tingling to extremities  Edema:  none    Vitals:  Supine Seated   Blood Pressure at rest 105/56 Blood Pressure at rest 105/55   Heart Rate at rest - bpm Heart Rate at rest - bpm   SPO2 at rest 94% RA SPO2 at rest 93% RA     Therapeutic Exercises:  Functional activity as stated above.      Patient education  Pt educated regarding role of PT evaluation, need for OOB activity, anterior hip precautions and weight bearing precautions for RLE    Patient response to education:   Pt verbalized understanding Pt demonstrated skill Pt requires further education in this area   yes yes Reminders     ASSESSMENT:    Conditions Requiring Skilled Therapeutic Intervention:    [x]Decreased strength     [x]Decreased ROM  [x]Decreased functional mobility  [x]Decreased balance   [x]Decreased endurance   [x]Decreased posture  []Decreased sensation  []Decreased coordination   []Decreased vision  [x]Decreased safety awareness   []Increased pain       Comments:    RN cleared patient for participation in therapy session. Patient was seen this date for PT treatment. . Patient was agreeable to intervention. Results of the functional assessment are noted above. Upon entering the room patient was found supine in bed. Increased assist required to transition to EOB. Sat EOB x 10 minutes to increase dynamic sitting balance and activity tolerance. Transfer completed to bedside chair. ROM completed while seated for BLE. Following lunch patient completed gait with fww. Increased assist required to complete STS from lower bedside chair surface. At end of session, patient in chair with  call light and phone within reach,  all lines and tubes intact, nursing notified. This patient can benefit from the continuation of skilled PT possibly in rehab setting due to decreased function and lack of support in her home environment to maximize functional level and return to PLOF. Treatment:  Patient practiced and was instructed in the following treatment:    · Bed mobility training - pt given verbal and tactile cues to facilitate proper sequencing and safety during rolling and supine>sit as well as provided with physical assistance to complete task    · Assistive device training - pt educated on using [facility] during gait, [facility] approximation/negotiation, and hand placement during sit<>stand to [facility]  · STS and transfer training - educated on hand/foot placement, safety, and sequencing during STS and pivot transfers using assistive device  · Gait training - verbal cues for [facility] approximation/negotiation, upright posture, and safety during 90 and 180 degree turns during gait   · Education in WB precautions, and hip precautions. Pt's/ family goals   1. yes    Prognosis is good  for reaching above PT goals.     Patient and or family understand(s) diagnosis, prognosis, and plan of care.  yes,     PHYSICAL THERAPY PLAN OF CARE:    PT POC is established based on physician order and patient diagnosis     Referring provider/PT Order:  PT Eval and Treat    Electronically Signed By  Aline Patton DO Date  Indra 10, 2022  11:48 AM       Diagnosis:  S/P total right hip arthroplasty [Z96.641]  Specific instructions for next treatment:  Transfer to bedside chair, Increase ambulation distance and BLE therapeutic exercise  Current Treatment Recommendations:     [x] Strengthening to improve independence with functional mobility   [x] ROM to improve independence with functional mobility   [x] Balance Training to improve static/dynamic balance and to reduce fall risk  [x] Endurance Training to improve activity tolerance during functional mobility   [x] Transfer Training to improve safety and independence with all functional transfers   [x] Gait Training to improve gait mechanics, endurance and asses need for appropriate assistive device  [x] Stair Training in preparation for safe discharge home and/or into the community   [] Positioning to prevent skin breakdown and contractures  [x] Safety and Education Training   [] Patient/Caregiver Education   [] HEP  [] Other     PT long term treatment goals are located in above grid    Frequency of treatments: 2-5x/week x 1-2 weeks. Time in  1140  Time out  1205    Time in  1350  Time out  1410    Total Treatment Time  40 minutes     Evaluation Time includes thorough review of current medical information, gathering information on past medical history/social history and prior level of function, completion of standardized testing/informal observation of tasks, assessment of data and education on plan of care and goals.     CPT codes:  [] Low Complexity PT evaluation 98087  [] Moderate Complexity PT evaluation 98435  [] High Complexity PT evaluation 99515  [] PT Re-evaluation 55915  [] Gait training 15123 - minutes  [] Manual therapy 57754 - minutes  [x] Therapeutic activities 08930 40 minutes  [] Therapeutic exercises 67363 - minutes  [] Neuromuscular reeducation 27790 - minutes     Danilo Nath, [address] [facility]

## 2022-01-12 NOTE — DISCHARGE INSTR - COC
Continuity of Care Form    Patient Name: Alexandra Gonzalez   :  1960  MRN:  [id number]    Admit date:  1/10/2022  Discharge date:  2022    Code Status Order: Full Code   Advance Directives:   [address] [facility] Documentation       Date/Time Healthcare Directive Type of Healthcare Directive Copy in [address] Agent's Name Healthcare Agent's Phone Number    01/10/22 3570 No, patient does not have an advance directive for healthcare treatment -- -- -- -- --    21 1415 No, patient does not have an advance directive for healthcare treatment -- -- -- -- --            Admitting Physician:  Obi Chang MD  PCP: Velia Kulkarni MD    Discharging Nurse: [facility] Unit/Room#: 7129/5991-G  Discharging Unit Phone Number: ***    Emergency Contact:   Extended Emergency Contact Information  Primary Emergency Contact: [address] Phone: [phone]  Relation: Child  Secondary Emergency Contact: [facility] [address] Phone: [phone]  Relation: Child    Past Surgical History:  Past Surgical History:   Procedure Laterality Date    BRAIN ANEURYSM SURGERY      coiling     COLONOSCOPY  2019    polyps--frank    COLONOSCOPY N/A 2019    COLONOSCOPY POLYPECTOMY SNARE/COLD BIOPSY performed by Garrick West MD at [place] [address]- DONE AT [address] [place] [address] Left 2021    LEFT HIP TOTAL ARTHROPLASTY performed by Obi Chang MD at [address] [place] [address] Right 1/10/2022    RIGHT TOTAL HIP ARTHROPLASTY - STYKER performed by Obi Chang MD at [facility].  2019    gastritis with superficial ulcerations; duodenitis--frank    UPPER GASTROINTESTINAL ENDOSCOPY N/A 2019    EGD BIOPSY performed by Garrick West MD at [address] [place] [address] Immunization History:   Immunization History   Administered Date(s) Administered    Pneumococcal Polysaccharide ([id number]) 2018    Tdap (Boostrix, Adacel) 2019       Active Problems:  Patient Active Problem List   Diagnosis Code    Obesity (BMI 30-39. 9) E66.9    Hypertension I10    Chronic pain syndrome G89.4    Lumbar facet arthropathy M47.816    Lumbar disc disorder M51.9    Primary osteoarthritis of both hips M16.0    Arthritis of right hip M16.11    Dysphagia R13.10    Heartburn R12    At risk for colon cancer Z91.89    Colon polyps K63.5    Degenerative disc disease, cervical M50.30    Arthropathy of cervical facet joint M47.812    Abnormal blood sugar R73.09    Arthritis of both hips M16.0    COVID-19 U07.1    H/O total hip arthroplasty, right Z96.641    History of total left hip arthroplasty Z96.642    Primary osteoarthritis of right hip M16.11    Pulmonary emphysema (HCC) J43.9    History of herpes genitalis Z86.19    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 Z86.79    History of COVID-19 Z86.16    Acute cystitis with hematuria N30.01    Aneurysm (HCC) I72.9    S/P total right hip arthroplasty Z96.641    Cognitive impairment R41.89       Isolation/Infection:   Isolation            No Isolation          Patient Infection Status       Infection Onset Added Last Indicated Last Indicated By Review Planned Expiration Resolved Resolved By    None active    Resolved    COVID-19 (Rule Out) 22 COVID-19 Ambulatory (Ordered)   22 Rule-Out Test Resulted    COVID-19 11/10/21 11/11/21 11/10/21 COVID-19   21     COVID-19 (Rule Out) 11/10/21 11/10/21 11/10/21 Covid-19 Ambulatory (Ordered)   21 Rule-Out Test Resulted    COVID-19 (Rule Out) 21 COVID-19 (Ordered)   21 Rule-Out Test Resulted    COVID-19 (Rule Out) 21 COVID-19 Ambulatory (Ordered)   21 Rule-Out Test Resulted            Nurse Assessment:  Last Vital Signs: BP (!) 126/54   Pulse 104   Temp 97.8 °F (36.6 °C) (Temporal)   Resp 17   Ht 5' 5\" (1.651 m)   Wt 234 lb (106.1 kg)   SpO2 95%   BMI 38.94 kg/m²     Last documented pain score (0-10 scale): Pain Level: 10  Last Weight:   Wt Readings from Last 1 Encounters:   01/10/22 234 lb (106.1 kg)     Mental Status:  oriented, alert, thought processes intact, and able to concentrate and follow conversation    IV Access:  - None    Nursing Mobility/ADLs:  Walking   Assisted  Transfer  Assisted  Bathing  Assisted  Dressing  Assisted  Toileting  Assisted  Feeding  Assisted  Med Admin  Assisted  Med Delivery   whole    Wound Care Documentation and Therapy:        Elimination:  Continence: Bowel: Yes  Bladder: Yes  Urinary Catheter: None   Colostomy/Ileostomy/Ileal Conduit: No       Date of Last BM: ***    Intake/Output Summary (Last 24 hours) at 1/12/2022 0935  Last data filed at 1/11/2022 1708  Gross per 24 hour   Intake 420 ml   Output 450 ml   Net -30 ml     I/O last 3 completed shifts: In: 36 [P.O.:920]  Out: 450 [Urine:450]    Safety Concerns: At Risk for Falls    Impairments/Disabilities:      None    Nutrition Therapy:  Current Nutrition Therapy:   - Oral Diet:  General    Routes of Feeding: Oral  Liquids: No Restrictions  Daily Fluid Restriction: no  Last Modified Barium Swallow with Video (Video Swallowing Test): not done    Treatments at the Time of Hospital Discharge:   Respiratory Treatments: ***  Oxygen Therapy:  is not on home oxygen therapy.   Ventilator:    - No ventilator support    Rehab Therapies: Physical Therapy and Occupational Therapy  Weight Bearing Status/Restrictions: No weight bearing restirctions  Other Medical Equipment (for information only, NOT a DME order):  walker and bedside commode  Other Treatments: ***    Patient's personal belongings (please select all that are sent with patient):  {[facility] DME Belongings:[id number]}    VIVIAN SIGNATURE:  Electronically signed by Surinder Goff RN on 1/12/22 at 4:20 PM EST    CASE MANAGEMENT/SOCIAL WORK SECTION    Inpatient Status Date: ***    Readmission Risk Assessment Score:  Readmission Risk              Risk of Unplanned Readmission:  13           Discharging to Facility/ Agency   Name: [facility]  Address:  Phone:  Fax:    Dialysis Facility (if applicable)   Name:  Address:  Dialysis Schedule:  Phone:  Fax:    / signature: Electronically signed by SUZANNE Curtis on 1/12/2022 at 9:36 AM      PHYSICIAN SECTION    Prognosis: {Prognosis:[id number]}    Condition at Discharge: Jeffrey Arguello Patient Condition:[id number]}    Rehab Potential (if transferring to Rehab): {Prognosis:3225803685}    Recommended Labs or Other Treatments After Discharge: ***    Physician Certification: I certify the above information and transfer of Chintan Rascon  is necessary for the continuing treatment of the diagnosis listed and that she requires Acute Rehab for less 30 days.      Update Admission H&P: {CHP DME Changes in IVLPI:[id number]}    PHYSICIAN SIGNATURE:  {Esignature:[id number]}

## 2022-01-12 NOTE — PROGRESS NOTES
Patient called me and states that she and her son have decided on [facility]. Thank you for this consult. Please contact me if anything changes. Puja Faust RN, Pre-screener for Acute Rehab  P: [phone]  F: [phone]  [email or web address]

## 2022-01-12 NOTE — PROGRESS NOTES
Hospitalist Progress Note      SYNOPSIS: Patient admitted on 1/10/2022 for     57 y.o. female who we are asked to see/evaluate by Jl Martínez, for medical management of Post Operative state. Has PMH of obesity, chronic pain, DJD of the spine, CVA with right-sided weakness, osteoarthritis of the hip status post right total hip arthroplasty done 1/10/22, hypertension, COPD, subarachnoid hemorrhage, COVID-19 viral infection in 2021, cerebral aneurysm ruptured twice, status post coiling of the left supraclinoid region.    History of subarachnoid hemorrhage following ruptured cerebral aneurysm with resultant cognitive impairment. She is status post coiling  History of COPD/emphysema. SUBJECTIVE:    Patient seen and examined  Records reviewed. The pt states that she has pain but controlled and improving. Denies any nausea vomiting fever or chills. Stable overnight. No other overnight issues reported. Temp (24hrs), Av.6 °F (37 °C), Min:97.4 °F (36.3 °C), Max:100.4 °F (38 °C)    DIET: ADULT DIET; Regular  CODE: Full Code    Intake/Output Summary (Last 24 hours) at 2022 1340  Last data filed at 2022 1050  Gross per 24 hour   Intake 480 ml   Output 450 ml   Net 30 ml       OBJECTIVE:    /66   Pulse 90   Temp 98.6 °F (37 °C) (Oral)   Resp 18   Ht 5' 5\" (1.651 m)   Wt 234 lb (106.1 kg)   SpO2 94%   BMI 38.94 kg/m²     General appearance: No apparent distress, appears stated age and cooperative. HEENT:  Conjunctivae/corneas clear. Neck: Supple. No jugular venous distention. Respiratory: Clear to auscultation bilaterally, normal respiratory effort  Cardiovascular: Regular rate rhythm, normal S1-S2  Abdomen: Soft, nontender, nondistended  Musculoskeletal: No clubbing, cyanosis, no bilateral lower extremity edema. Brisk capillary refill.  Dressing CDI  Skin:  No rashes  on visible skin  Neurologic: awake, alert and following commands     ASSESSMENT:    S/p Total right hip arthroplasty 1/10  COPD  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   Cognitive impairment   HTN  Obesity  Hx of CVA     PLAN:    Pain control  Post op antibiotics completed. Cont losartan, amlodipine and metoprolol. Cont Chlorthalidone and Amlodipine. Blood pressure improved. Cont ASA  Cont protonix  PTOT    Pt to discharge to Rehab today. Thank you for allowing us to participate in care for Ms. Roberto Ramirez. DISPOSITION:     Medications:  REVIEWED DAILY    Infusion Medications    sodium chloride       Scheduled Medications    amLODIPine  5 mg Oral Daily    carBAMazepine  100 mg Oral TID    chlorthalidone  25 mg Oral Daily    fluticasone  2 spray Each Nostril Daily    gabapentin  300 mg Oral TID    losartan  100 mg Oral Daily    metoprolol tartrate  37.5 mg Oral BID    pantoprazole  40 mg Oral Daily    potassium chloride  10 mEq Oral Daily    sodium chloride flush  10 mL IntraVENous 2 times per day    acetaminophen  650 mg Oral Q6H    sennosides-docusate sodium  1 tablet Oral BID    aspirin  325 mg Oral BID     PRN Meds: sodium chloride flush, sodium chloride, oxyCODONE **OR** oxyCODONE, HYDROmorphone **OR** HYDROmorphone, promethazine **OR** ondansetron, diphenhydrAMINE, albuterol    Labs:     Recent Labs     01/11/22  0522 01/12/22  0426   WBC 6.5 6.8   HGB 9.9* 10.0*   HCT 31.0* 31.6*    147       Recent Labs     01/11/22  0522      K 3.9   *   CO2 21*   BUN 20   CREATININE 1.0   CALCIUM 8.1*       No results for input(s): PROT, ALB, ALKPHOS, ALT, AST, BILITOT, AMYLASE, LIPASE in the last 72 hours. No results for input(s): INR in the last 72 hours. No results for input(s): Evangelista Sol in the last 72 hours.     Chronic labs:    Lab Results   Component Value Date    CHOL 175 12/04/2020    TRIG 180 (H) 12/04/2020    HDL 65 12/04/2020    LDLCALC 74 12/04/2020    TSH 1.290 02/14/2019    INR 0.9 01/04/2022    LABA1C 5.8 (H) 12/04/2020       Radiology: REVIEWED DAILY    +++++++++++++++++++++++++++++++++++++++++++++++++  Jamal Camargo MD  [employer] - [address] [place], [place]  +++++++++++++++++++++++++++++++++++++++++++++++++  NOTE: This report was transcribed using voice recognition software. Every effort was made to ensure accuracy; however, inadvertent computerized transcription errors may be present.

## 2022-01-12 NOTE — PROGRESS NOTES
OCCUPATIONAL THERAPY TREATMENT NOTE    [facility] TREATMENT NOTE      Date:2022  Patient Name: Bryan Rodriguez  MRN: [id number]  : 1960  Room: [address] [place] [address]       Referring [place] [address],   Specific Provider Orders/Date: OT evaluation and treat 1/10/22     Evaluating OT: Woodrow Engel. Prashanth OTR/L #[id number]     Diagnosis: S/P total right hip arthroplasty [Z96.641]       Surgery:   Past Surgical History         Past Surgical History:   Procedure Laterality Date    BRAIN ANEURYSM SURGERY         coiling     COLONOSCOPY   2019     polyps--frank    COLONOSCOPY N/A 2019     COLONOSCOPY POLYPECTOMY SNARE/COLD BIOPSY performed by Aby Sheffield MD at [facility].         FOR CANCER CELLS- DONE AT [address]        TOTAL HIP ARTHROPLASTY Left 2021     LEFT HIP TOTAL ARTHROPLASTY performed by Kenyetta Delgado MD at [address]   2019     gastritis with superficial ulcerations; duodenitis--frank    UPPER GASTROINTESTINAL ENDOSCOPY N/A 2019     EGD BIOPSY performed by Aby Sheffield MD at [facility]        Pertinent Medical History:  has a past medical history of Brain aneurysm, Cerebral artery occlusion with cerebral infarction [facility]), Degenerative arthritis of hand, Edentulous, Emphysema lung ([facility] [address] Headache, Hiatal hernia, Hip problem, Hx of abnormal cervical Pap smear, Hypertension, Stroke (cerebrum) ([facility] [address] and Subarachnoid bleed ([facility] [address]    Precautions:  Fall Risk, WBAT to RLE, anterolateral hip precautions     Assessment of current deficits   [x]? Functional mobility             [x]?ADLs           []? Strength                  []?Cognition   [x]? Functional transfers           [x]? IADLs         [x]? Safety Awareness   [x]? Endurance   []? Fine Coordination              [x]? Balance      []? Vision/perception   [x]? Sensation     [x]? Anni Constantino Motor Coordination  []? ROM           []? Delirium                   []? Motor Control      OT PLAN OF CARE   OT POC based on physician orders, patient diagnosis and results of clinical assessment     Frequency/Duration   2-4 days/wk for 1 week PRN   Specific OT Treatment Interventions to include:   * Instruction/training on adapted ADL techniques and AE recommendations to increase functional independence within precautions       * Training on energy conservation strategies, correct breathing pattern and techniques to improve independence/tolerance for self-care routine  * Functional transfer/mobility training/DME recommendations for increased independence, safety, and fall prevention  * Patient/Family education to increase follow through with safety techniques and functional independence  * Recommendation of environmental modifications for increased safety with functional transfers/mobility and ADLs  * Therapeutic activities to facilitate/challenge dynamic balance, stand tolerance for increased safety and independence with ADLs        Recommended Adaptive Equipment: BSC, LB AE     Home Living: Pt lives with son in a 2 story home with 5 steps and one hand rails. Bed and bath on main floor.   Bathroom setup: tub shower with seat   Equipment owned: ww, shower seat, hallway HR     Prior Level of Function: Independent with ADLs , Independent with IADLs; ambulated no   AD  Driving: no  Occupation: n/a  Medication management: self  Leisure: enjoys reading     Pain Level: Right hip pain, increased with movement  Cognition: A&O: 3/4; Follows multi step directions              Memory:  Fair recall of anterolateral hip precautions              Sequencing:  Fair+              Problem solving:  Fair+              Judgement/safety:  Fair+                Functional Assessment:  AM-PAC Daily Activity Raw Score: 16/24    Initial Eval Status  Date: 1/10/21 Treatment Status  Date: 1/12/22 STGs = LTGs  Time frame: 2-4 days   Feeding Independent Independent      Grooming Setup sitting  Set up   seated Mod I standing at sink with ww   UB Dressing Min A donned gown like jagdeep  SBA  seated Mod I    LB Dressing Max A   Mod Assist  Educated on LB AE, donned underwear seated and standing to pull over hips SBA with AE    Bathing Simulated mod A  Moderate Assist   Simulated seated Mod I seated with LHS   Toileting Simulated max A needs BSC Moderate Assist      Mod I to elevated with HR   Bed Mobility  Supine to sit: mod A  Sit to supine:  n/t  Supine to sit: NT  Sit to supine: Moderate Assist   Educated pt on technique to increase independence. Supine to sit: mod I   Sit to supine: mod I    Functional Transfers Bed level mod A with ww and increased cues   Max A from lower chair   Sit <-> stand: Max A   Cuing for hand placement and body mechanics Mod I    Functional Mobility Stand pivot mod A with increased cues for sequencing and walker management  Moderate Assist    Less than home distance using w/w Mod I with ww   Balance Sitting:     Static:  SBA    Dynamic:CGA  Standing: min A with ww Sitting:     Static:  SBA    Dynamic:SBA  Standing: Moderate Assist with ww                                                                       Activity Tolerance Fair- limited by pain O2 RA 96% HR 56 Fair Fair+   Visual/  Perceptual Glasses: yes WFL           Safety Fair+  Fair+                                 Good recall and follow through of anterolateral precautions in ADL completion         Comments: Upon arrival pt seated in bedside chair. Pt educated on techniques to increase independence and safety during ADL's, bed mobility, and functional transfers. At end of session pt supine in bed with all lines and tubes intact, call light within reach. · Pt has made fair progress towards set goals.    · Continue with current plan of care      Treatment Time In: 1:50           Treatment Time Out: 2:13               Treatment Charges: Mins Units   Ther Ex  [id number] Manual Therapy Aimee Moreno [phone] [id number] 10 1   ADL/Home Mgt 30396 13 1   Neuro Re-ed 74443     Group Therapy      Orthotic manage/training  20516     Non-Billable Time     Total Timed Treatment [address]

## 2022-01-12 NOTE — CARE COORDINATION
1/12/2022 social work transition of care planning  Sw notified that  [facility] will accept pt today. Will need bdmax covid test(will take with pending results). Sw/charge nurse will message physician for discharge order. Electronically signed by SUZANNE Gallagher on 1/12/2022 at 9:39 AM     Addendum: [facility] set up [address] ambulance for 4 pm to [facility]. Sw notified facility liaison,pt at [facility], and nursing.   Electronically signed by SUZANNE Gallagher on 1/12/2022 at 9:56 AM

## 2022-01-13 LAB — SARS-COV-2, PCR: NOT DETECTED

## 2022-01-17 NOTE — DISCHARGE SUMMARY
Physician Discharge Summary     Patient ID:  Fredrick Garcia  [id number]  7 y.o.  1960    Admit date: 1/10/2022    Discharge date and time: 1/12/2022  5:16 PM     Admitting Physician: Farhana Martines MD     Admission Diagnoses: S/P total right hip arthroplasty [Z96.641]    Discharge Diagnoses: Active Problems:    Obesity (BMI 30-39. 9)    Hypertension    H/O total hip arthroplasty, right    Pulmonary emphysema (HCC)    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    S/P total right hip arthroplasty    Cognitive impairment  Resolved Problems:    * No resolved hospital problems. *      Admission Condition: good    Discharged Condition: stable    Indication for Admission: s/p R [address] Course/Procedures/Operation/treatments:   1/10: patient underwent elective R MERVAT, hx of R hip osteoarthritis refractory to nonop treatment  1/11: worked with PT/OT, recommended SNF  1/12: Patient accepted to [facility] rehab, stable for DC            Consults:   IP CONSULT TO HOSPITALIST  IP CONSULT TO CASE MANAGEMENT  IP CONSULT TO SOCIAL WORK  IP CONSULT TO HOME CARE NEEDS  IP CONSULT TO PHYSICAL MEDICINE REHAB    Significant Diagnostic Studies:   XR CHEST (2 VW)    Result Date: 1/4/2022  EXAMINATION: TWO XRAY VIEWS OF THE CHEST 1/4/2022 10:50 am COMPARISON: Previous CT of the chest of 10/29/2019 HISTORY: ORDERING SYSTEM PROVIDED HISTORY: Pre-op testing TECHNOLOGIST PROVIDED HISTORY: What reading provider will be dictating this exam?->CRC FINDINGS: The lungs are without acute focal process. There is no effusion or pneumothorax. The cardiomediastinal silhouette is without acute process. The osseous structures are without acute process. No acute process. Discharge Exam:  GENERAL: alert and awake  MUSCULOSKELETAL:   right lower extremity:  · Dressing C/D/I  · Compartments soft and compressible, calf TTP posterior medial proximally.    · Palpable dorsalis pedis and posterior tibialis pulse, brisk cap refill to toes, foot warm and perfused  · Sensation intact to light touch in sural/deep peroneal/superficial peroneal/saphenous/posterior tibial nerve distributions to foot/ankle. · Demonstrates active ankle plantar/dorsiflexion/great toe extension      Disposition: SNF    In process/preliminary results:  Outstanding Order Results     No orders found from 12/12/2021 to 1/11/2022. Patient Instructions:   Discharge Medication List as of 1/12/2022  5:05 PM           Details   oxyCODONE-acetaminophen (PERCOCET) 5-325 MG per tablet Take 1 tablet by mouth every 6 hours as needed for Pain for up to 7 days. Intended supply: 7 days.  Take lowest dose possible to manage pain, Disp-28 tablet, R-0Print      aspirin 325 MG EC tablet Take 1 tablet by mouth 2 times daily, Disp-56 tablet, R-1Print              Details   carBAMazepine (TEGRETOL XR) 100 MG extended release tablet Take 1 tablet by mouth 3 times daily, Disp-90 tablet, R-5Normal      BANOPHEN 50 MG capsule TAKE 1 CAPSULE BY MOUTH EVERY 4 HOURS FOR 24 HOURS PRIOR TO PROCEDURE, DAWHistorical Med      amLODIPine (NORVASC) 5 MG tablet TAKE ONE TABLET BY MOUTH EVERY DAY, Disp-30 tablet, R-3Normal      chlorthalidone (HYGROTON) 25 MG tablet Take 1 tablet by mouth daily, Disp-30 tablet, R-3Normal      fluticasone (FLONASE) 50 MCG/ACT nasal spray 2 sprays by Each Nostril route daily, Disp-1 each, R-3Normal      gabapentin (NEURONTIN) 300 MG capsule TAKE ONE CAPSULE BY MOUTH THREE TIMES A DAY, Disp-90 capsule, R-3Normal      losartan (COZAAR) 100 MG tablet Take 1 tablet by mouth daily, Disp-30 tablet, R-3Normal      metoprolol tartrate (LOPRESSOR) 25 MG tablet Take 1.5 tablets by mouth 2 times daily, Disp-90 tablet, R-3Normal      pantoprazole (PROTONIX) 40 MG tablet TAKE ONE TABLET BY MOUTH EVERY DAY, Disp-30 tablet, R-3Normal      potassium chloride (KLOR-CON M) 10 MEQ extended release tablet TAKE ONE TABLET BY MOUTH DAILY WITH BREAKFAST, Disp-30 tablet, R-3Normal      valACYclovir (VALTREX) 1 g tablet Take 1 tablet by mouth daily For 5 days as needed for Herpes outbreak, Disp-5 tablet, R-1Normal      albuterol sulfate  (90 Base) MCG/ACT inhaler INHALE TWO PUFFS BY MOUTH EVERY 6 HOURS AS NEEDED FOR WHEEZING., Disp-1 Inhaler, R-3Normal      Multiple Vitamins-Minerals (THERAPEUTIC MULTIVITAMIN-MINERALS) tablet Take 1 tablet by mouth dailyHistorical Med      diclofenac sodium (VOLTAREN) 1 % GEL APPLY ONE GRAM EXTERNALLY TWICE A DAY TO NECK AND ONE GRAM EXTERNALLY TWICE A DAY TO BACK , Disp-100 g,R-2, Normal      Misc.  Devices (ROLLATOR) MISC DAILY PRN Starting Tue 3/17/2020, Disp-1 each, R-0, Print               Follow up:   MD ABHI Rondon [phone]    Schedule an appointment as soon as possible for a visit in 2 weeks      [place] [id number] [phone]           Signed:  Megan Abreu DO  1/17/2022  12:50 PM

## 2022-01-19 ENCOUNTER — TELEPHONE (OUTPATIENT)
Dept: ADMINISTRATIVE | Age: 62
End: 2022-01-19

## 2022-01-19 DIAGNOSIS — M16.11 PRIMARY OSTEOARTHRITIS OF RIGHT HIP: Primary | ICD-10-CM

## 2022-01-20 NOTE — TELEPHONE ENCOUNTER
Call returned to [facility] with Chris. Appointment rescheduled at this time. Provided directions. Encouraged to call with questions or concerns.      Future Appointments   Date Time Provider Shabnam Moore   1/28/2022  8:15 AM SCHEDULE, SE [facility]TH[facility] RES [facility]   2/21/2022 10:00 AM SCHEDULE, SE ORTHO APC [facility]   4/6/2022  9:45 AM Kenyetta Delgado MD  Ortho [facility]   8/12/2022  9:30 AM Fabienne Crook MD [facility]

## 2022-01-20 NOTE — CONSULTS
Chart reviewed. Case discussed with rehab admission coordinator. Please see her note for details.       Christofer Hicks MD [facility]

## 2022-01-28 ENCOUNTER — HOSPITAL ENCOUNTER (OUTPATIENT)
Dept: GENERAL RADIOLOGY | Age: 62
Discharge: HOME | End: 2022-01-30
Payer: MEDICARE

## 2022-01-28 ENCOUNTER — OFFICE VISIT (OUTPATIENT)
Dept: ORTHOPEDIC SURGERY | Age: 62
End: 2022-01-28
Payer: MEDICARE

## 2022-01-28 VITALS — TEMPERATURE: 97.5 F

## 2022-01-28 DIAGNOSIS — Z96.641 H/O TOTAL HIP ARTHROPLASTY, RIGHT: Primary | ICD-10-CM

## 2022-01-28 DIAGNOSIS — M16.11 PRIMARY OSTEOARTHRITIS OF RIGHT HIP: ICD-10-CM

## 2022-01-28 PROCEDURE — 73502 X-RAY EXAM HIP UNI 2-3 VIEWS: CPT

## 2022-01-28 PROCEDURE — 99212 OFFICE O/P EST SF 10 MIN: CPT

## 2022-01-28 PROCEDURE — 99024 POSTOP FOLLOW-UP VISIT: CPT | Performed by: PHYSICIAN ASSISTANT

## 2022-01-28 NOTE — PATIENT INSTRUCTIONS
Weightbearing as tolerated right lower extremity. Patient is going to start home therapy. If Steri-Strips do not fall off right hip incision in the next 7 days on their own, okay to remove. Continue aspirin for DVT prophylaxis until 28 days postop. Follow-up in 4 weeks for reevaluation and x-rays. Call if any questions or concerns.     Future Appointments   Date Time Provider Shabnam Moore   2/21/2022 10:00 AM [facility]   4/6/2022  9:45 AM Ismael Arreola MD [facility]   8/12/2022  9:30 AM Jo Montero MD [facility]

## 2022-02-02 DIAGNOSIS — Z96.641 S/P TOTAL RIGHT HIP ARTHROPLASTY: ICD-10-CM

## 2022-02-02 RX ORDER — OXYCODONE HYDROCHLORIDE AND ACETAMINOPHEN 5; 325 MG/1; MG/1
1 TABLET ORAL 2 TIMES DAILY PRN
Qty: 14 TABLET | Refills: 0 | Status: SHIPPED | OUTPATIENT
Start: 2022-02-02 | End: 2022-02-09

## 2022-02-02 NOTE — TELEPHONE ENCOUNTER
Pt left VM requesting refill of Percocet. OP:SURGEON: Dr. Chanel Callejas MD  DATE OF PROCEDURE: 1-10-22  PROCEDURE: Right Total Hip Arthroplasty    Order pended and routed for decision and signature.

## 2022-02-02 NOTE — TELEPHONE ENCOUNTER
Call placed to pt, notified of last refill of percocet. She verbalized understanding, questions answered.

## 2022-02-02 NOTE — TELEPHONE ENCOUNTER
Controlled Substance Monitoring:    Acute and Chronic Pain Monitoring:   RX Monitoring 2/2/2022   Periodic Controlled Substance Monitoring Potential drug abuse or diversion identified, see note documentation. Patient's last refill of Percocet prescribed by Dr. Anita Chatterjee on 1/24/22 and filled the same day. She is due to refill, but will be weaned to BID PRN and this will be the last script of Percocet written by ortho. Important to have narcotic pain meds filled by one office only. Will defer pain control to PM or primary as she is >3 weeks from ortho surgery.

## 2022-02-14 ENCOUNTER — OFFICE VISIT (OUTPATIENT)
Dept: PAIN MANAGEMENT | Age: 62
End: 2022-02-14
Payer: MEDICARE

## 2022-02-14 VITALS
HEIGHT: 65 IN | SYSTOLIC BLOOD PRESSURE: 115 MMHG | TEMPERATURE: 96.4 F | RESPIRATION RATE: 16 BRPM | HEART RATE: 52 BPM | DIASTOLIC BLOOD PRESSURE: 62 MMHG | WEIGHT: 234 LBS | BODY MASS INDEX: 38.99 KG/M2

## 2022-02-14 DIAGNOSIS — M47.816 LUMBAR SPONDYLOSIS: ICD-10-CM

## 2022-02-14 DIAGNOSIS — M47.816 LUMBAR FACET ARTHROPATHY: ICD-10-CM

## 2022-02-14 DIAGNOSIS — M51.9 LUMBAR DISC DISORDER: ICD-10-CM

## 2022-02-14 DIAGNOSIS — G89.4 CHRONIC PAIN SYNDROME: Primary | ICD-10-CM

## 2022-02-14 DIAGNOSIS — E66.01 SEVERE OBESITY (BMI 35.0-39.9) WITH COMORBIDITY (HCC): ICD-10-CM

## 2022-02-14 DIAGNOSIS — M16.12 PRIMARY OSTEOARTHRITIS OF LEFT HIP: ICD-10-CM

## 2022-02-14 DIAGNOSIS — M54.16 LUMBAR RADICULITIS: ICD-10-CM

## 2022-02-14 DIAGNOSIS — M50.30 DEGENERATION OF CERVICAL DISC WITHOUT MYELOPATHY: ICD-10-CM

## 2022-02-14 DIAGNOSIS — M16.11 ARTHRITIS OF RIGHT HIP: ICD-10-CM

## 2022-02-14 DIAGNOSIS — M47.812 FACET ARTHROPATHY, CERVICAL: ICD-10-CM

## 2022-02-14 PROCEDURE — G8427 DOCREV CUR MEDS BY ELIG CLIN: HCPCS | Performed by: PHYSICIAN ASSISTANT

## 2022-02-14 PROCEDURE — 99213 OFFICE O/P EST LOW 20 MIN: CPT | Performed by: PHYSICIAN ASSISTANT

## 2022-02-14 PROCEDURE — G8417 CALC BMI ABV UP PARAM F/U: HCPCS | Performed by: PHYSICIAN ASSISTANT

## 2022-02-14 PROCEDURE — G8484 FLU IMMUNIZE NO ADMIN: HCPCS | Performed by: PHYSICIAN ASSISTANT

## 2022-02-14 PROCEDURE — 3017F COLORECTAL CA SCREEN DOC REV: CPT | Performed by: PHYSICIAN ASSISTANT

## 2022-02-14 PROCEDURE — 1036F TOBACCO NON-USER: CPT | Performed by: PHYSICIAN ASSISTANT

## 2022-02-14 RX ORDER — METOPROLOL TARTRATE 37.5 MG/1
TABLET, FILM COATED ORAL
COMMUNITY
Start: 2022-01-24 | End: 2022-05-18

## 2022-02-14 RX ORDER — ASPIRIN 325 MG
TABLET ORAL
COMMUNITY
Start: 2022-01-24 | End: 2022-02-14

## 2022-02-14 RX ORDER — ACETAMINOPHEN AND CODEINE PHOSPHATE 60; 300 MG/1; MG/1
1 TABLET ORAL DAILY PRN
Qty: 30 TABLET | Refills: 1 | Status: SHIPPED
Start: 2022-02-14 | End: 2022-04-14 | Stop reason: SDUPTHER

## 2022-02-14 RX ORDER — POLYETHYLENE GLYCOL 3350 17 G/17G
POWDER, FOR SOLUTION ORAL
COMMUNITY
Start: 2022-01-24

## 2022-02-14 NOTE — PROGRESS NOTES
Do you currently have any of the following:    Fever: No  Headache:  No  Cough: No  Shortness of breath: No  Exposed to anyone with these symptoms: No                                                                                                                Ruth Hogan presents to the [address] [place] [id number] on 2/14/2022. Michael Smith is complaining of pain in lower back. . The pain is constant. The pain is described as aching, throbbing, shooting, stabbing and sharp. Pain is rated on her best day at a 7, on her worst day at a 10, and on average at a 8 on the VAS scale. She took her last dose of Neurontin and compound cream. . Michael Smith does not have issues with constipation. Any procedures since your last visit: Yes, Total right hip replacement on Jan. 10th, 2022  She is not on NSAIDS and  is  on anticoagulation medications to include ASA and is managed by Dylan Bailey MD  .     Pacemaker or defibrillator: No Physician managing device is NA. Medication Contract and Consent for Opioid Use Documents Filed     Patient Documents     Type of Document Status Date Received Received By Description    Medication Contract Received 3/1/2019  1:43 PM FRANCIS MCINTOSH PAIN MANAGEMENT PT AGREEMENT 3/1/2019    Medication Contract Received 10/8/2020  1:21 PM [facility] [address] [place] [address] pain pt agreement    Medication Contract Received 10/7/2021 10:36 AM BOLIVAR TRUJILLO Pain Mgmt Patient Agreement 10. 7.2021                   /62   Pulse 52   Temp 96.4 °F (35.8 °C) (Infrared)   Resp 16   Ht 5' 5\" (1.651 m)   Wt 234 lb (106.1 kg)   BMI 38.94 kg/m²      No LMP recorded.  Patient is postmenopausal.

## 2022-02-14 NOTE — PROGRESS NOTES
[facility]  [address] [facility], [address] [place] [address] Chad  [phone]    Follow up Note      Mayte March     Date of Visit:  2/14/2022    CC:  Patient presents for follow up   Chief Complaint   Patient presents with    Lower Back Pain       HPI:    Pain is better to b/l hips s/p MERVAT. Overall, doing well. Appropriate analgesia with current medications regimen: yes. Change in quality of symptoms:no. Medication side effects:none. Recent diagnostic testing:none. Recent interventional procedures:none. She has not been on anticoagulation medications to include none. The patient  has not been on herbal supplements. The patient is not diabetic.     Imaging studies:    Cervical XR 11/2019 Severe degenerative changes      Lumbar spine Xray 03/2019  Scoliosis and osteoarthritis.     Bilateral hip Xray 03/2019   Advanced osteoarthrosis of bilateral hips. Potential Aberrant Drug-Related Behavior: None     Urine Drug Screening:  First office visit saliva screen showed no narcotics   11/2019 Consistent, negative for all  06/2020 Consistent  12/2020 Consistent  06/2021 Consistent     OARRS report:  03/2019 consistent to 06/2021 Consistent  (norco on 9/24 from dentist for teeth extraction).   08/2021 Consistent to 02/2022 Consistent    Opioid Agreement:  10/07/2021    Past Medical History:   Diagnosis Date    Brain aneurysm 09/2018    H/O TIMES 2 THAT BURST PER PATIENT    Cerebral artery occlusion with cerebral infarction ([address]     RT SIDE AFFECTED-LEARNED TO WALK AND WRITE AGAIN    Degenerative arthritis of hand     Edentulous     Emphysema lung ([address]     lung dr   [facility] Headache     Hiatal hernia     Hip problem     NEEDS HIP REPLACEMENT PER PATIENT    Hx of abnormal cervical Pap smear     Hypertension     Stroke (cerebrum) ([address]     Subarachnoid bleed ([address] 9/10/2018       Past Surgical History:   Procedure Laterality Date    BRAIN ANEURYSM SURGERY      coiling     COLONOSCOPY  08/30/2019    polyps--frank    COLONOSCOPY N/A 8/30/2019    COLONOSCOPY POLYPECTOMY SNARE/COLD BIOPSY performed by Venkat Rodriguez MD at [address]      total hip replacement left and right    OTHER SURGICAL HISTORY      FOR CANCER CELLS- DONE AT [facility]    TONSILLECTOMY      TOTAL HIP ARTHROPLASTY Left 2/22/2021    LEFT HIP TOTAL ARTHROPLASTY performed by Moose Aranda MD at [facility] Right 1/10/2022    RIGHT TOTAL HIP ARTHROPLASTY - STYKER performed by Moose Aranda MD at [facility]  08/30/2019    gastritis with superficial ulcerations; duodenitis--frank    UPPER GASTROINTESTINAL ENDOSCOPY N/A 8/30/2019    EGD BIOPSY performed by Venkat Rodriguez MD at [facility]       Prior to Admission medications    Medication Sig Start Date End Date Taking? Authorizing Provider   polyethylene glycol (GLYCOLAX) 17 g packet DISSOLVE 1 PACKET (17 GRAMS) IN LIQUID AND TAKE BY MOUTH DAILY 1/24/22  Yes Historical Provider, MD   Metoprolol Tartrate 37.5 MG TABS TAKE ONE TABLET BY MOUTH EVERY 12 HOURS 1/24/22  Yes Historical Provider, MD   acetaminophen-codeine (TYLENOL #4) 300-60 MG per tablet Take 1 tablet by mouth daily as needed for Pain for up to 30 days.  2/14/22 3/16/22 Yes SPENSER [place]   aspirin 325 MG EC tablet Take 1 tablet by mouth 2 times daily 1/10/22 1/10/23 Yes Cora Gomes DO   carBAMazepine (TEGRETOL XR) 100 MG extended release tablet Take 1 tablet by mouth 3 times daily 12/27/21 6/25/22 Yes Leti Kessler MD   amLODIPine (NORVASC) 5 MG tablet TAKE ONE TABLET BY MOUTH EVERY DAY 12/3/21  Yes Sourav Rosario MD   chlorthalidone (HYGROTON) 25 MG tablet Take 1 tablet by mouth daily 12/3/21  Yes Sourav Rosario MD   fluticasone (FLONASE) 50 MCG/ACT nasal spray 2 sprays by Each Nostril route daily 12/3/21  Yes Sourav Rosario MD gabapentin (NEURONTIN) 300 MG capsule TAKE ONE CAPSULE BY MOUTH THREE TIMES A DAY 12/3/21 3/1/22 Yes Yefri Godfrey MD   losartan (COZAAR) 100 MG tablet Take 1 tablet by mouth daily 12/3/21  Yes Yefri Godfrey MD   pantoprazole (PROTONIX) 40 MG tablet TAKE ONE TABLET BY MOUTH EVERY DAY 12/3/21  Yes Yefri Godfrey MD   potassium chloride (KLOR-CON M) 10 MEQ extended release tablet TAKE ONE TABLET BY MOUTH DAILY WITH BREAKFAST 12/3/21  Yes Yefri Godfrey MD   valACYclovir (VALTREX) 1 g tablet Take 1 tablet by mouth daily For 5 days as needed for Herpes outbreak 4/27/21  Yes Yefri Godfrey MD   albuterol sulfate  (90 Base) MCG/ACT inhaler INHALE TWO PUFFS BY MOUTH EVERY 6 HOURS AS NEEDED FOR WHEEZING. 3/22/21  Yes Alcides Roger, DO   Multiple Vitamins-Minerals (THERAPEUTIC MULTIVITAMIN-MINERALS) tablet Take 1 tablet by mouth daily   Yes Historical Provider, MD   diclofenac sodium (VOLTAREN) 1 % GEL APPLY ONE GRAM EXTERNALLY TWICE A DAY TO NECK AND ONE GRAM EXTERNALLY TWICE A DAY TO BACK  11/3/20  Yes Jarred Donis, [address] Devices (ROLLATOR) MISC 1 each by Does not apply route daily as needed (use as needed for stability) 3/17/20  Yes Yefri Godfrey MD   metoprolol tartrate (LOPRESSOR) 25 MG tablet Take 1.5 tablets by mouth 2 times daily 12/3/21 1/2/22  Yefri Godfrey MD       Allergies   Allergen Reactions    Latex Hives     Hives and rash    Iodine Rash     Hives . pt.  States anaphalyxis    Aloe Hives     Hives     Celebrex [Celecoxib] Itching    Fish-Derived Products Other (See Comments)       Social History     Socioeconomic History    Marital status:      Spouse name: Not on file    Number of children: Not on file    Years of education: Not on file    Highest education level: Not on file   Occupational History    Not on file   Tobacco Use    Smoking status: Former Smoker     Packs/day: 1.50     Years: 43.00     Pack years: 64.50     Types: Cigarettes     Quit date: 9/9/2018     Years since quitting: 3.4    Smokeless tobacco: Never Used   Vaping Use    Vaping Use: Never used   Substance and Sexual Activity    Alcohol use: No    Drug use: No    Sexual activity: Not Currently   Other Topics Concern    Not on file   Social History Narrative    Not on file     Social Determinants of Health     Financial Resource Strain: Low Risk     Difficulty of Paying Living Expenses: Not hard at all   Food Insecurity: No Food Insecurity    Worried About Running Out of Food in the Last Year: Never true    Manjula of Food in the Last Year: Never true   Transportation Needs:     Lack of Transportation (Medical): Not on file    Lack of Transportation (Non-Medical):  Not on file   Physical Activity:     Days of Exercise per Week: Not on file    Minutes of Exercise per Session: Not on file   Stress:     Feeling of Stress : Not on file   Social Connections:     Frequency of Communication with Friends and Family: Not on file    Frequency of Social Gatherings with Friends and Family: Not on file    Attends Rastafari Services: Not on file    Active Member of Clubs or Organizations: Not on file    Attends Club or Organization Meetings: Not on file    Marital Status: Not on file   Intimate Partner Violence:     Fear of Current or Ex-Partner: Not on file    Emotionally Abused: Not on file    Physically Abused: Not on file    Sexually Abused: Not on file   Housing Stability:     Unable to Pay for Housing in the Last Year: Not on file    Number of [place] in the Last Year: Not on file    Unstable Housing in the Last Year: Not on file       Family History   Problem Relation Age of Onset    Diabetes Mother     Arthritis Mother     Atrial Fibrillation Mother     Diabetes Father     Atrial Fibrillation Father     Arthritis Father     Emphysema Father     Cancer Sister        REVIEW OF SYSTEMS:     Xin Shetty denies fever/chills, chest pain, shortness of breath, new bowel or bladder complaints. All other review of systems was negative. PHYSICAL EXAMINATION:      /62   Pulse 52   Temp 96.4 °F (35.8 °C) (Infrared)   Resp 16   Ht 5' 5\" (1.651 m)   Wt 234 lb (106.1 kg)   BMI 38.94 kg/m²     General:      General appearance:   pleasant and well-hydrated. , in no discomfort and A & O x3  Build:Overweight  Function:Rises from a seated position with difficulty    HEENT:    Head:normocephalic and atraumatic  Pupils:regular, round and equal.  Sclera: icterus absent,    Lungs:    Breathing:Normal expansion. No wheezing. Abdomen:    Shape:non-distended and normal    Extremities:    Tremors:None bilaterally upper and lower  Range of motion:Generally normal shoulders  Intact:Yes  Varicose veins:not assessed   Cyanosis:none  Edema:Normal    Neurological:    Gait: Not observed. Dermatology:    Skin:no unusual rashes, no skin lesions, no palpable subcutaneous nodules and good skin turgor    Impression:    Patient seen for follow up for her chronic bilateral hip pain and low back pain due to severe degenerative changes and axial c/o neck pain   Would benefit from Cervical MBB, patient uninterested   Patient is s/p:  Left MERVAT on 2/22/2021. Patient is s/p right MERVAT on 01/10/2022. Continue Gabapentin 300 mg TID per PCP  Patient is finished with post op pain medication through ortho. Re-start Tylenol #4 QD prn    Compounding cream. Helping a lot. OARRS report reviewed 02/2022  Patient encouraged to remain active as tolerated   Treatment plan discussed with the patient including medications and side effects     Controlled Substance Monitoring:    Acute and Chronic Pain Monitoring:   RX Monitoring 2/14/2022   Periodic Controlled Substance Monitoring Possible medication side effects, risk of tolerance/dependence & alternative treatments discussed. ;No signs of potential drug abuse or diversion identified. ;Assessed functional status. ;Obtaining appropriate analgesic effect of treatment. We discussed with the patient that combining opioids, benzodiazepines, alcohol, illicit drugs or sleep aids increases the risk of respiratory depression including death. We discussed that these medications may cause drowsiness, sedation or dizziness and have counseled the patient not to drive or operate machinery. We have discussed that these medications will be prescribed only by one provider. We have discussed with the patient about age related risk factors and have thoroughly discussed the importance of taking these medications as prescribed. The patient verbalizes understanding.     ccreferring physic

## 2022-02-15 DIAGNOSIS — Z96.641 S/P TOTAL RIGHT HIP ARTHROPLASTY: Primary | ICD-10-CM

## 2022-02-21 ENCOUNTER — OFFICE VISIT (OUTPATIENT)
Dept: ORTHOPEDIC SURGERY | Age: 62
End: 2022-02-21
Payer: MEDICARE

## 2022-02-21 ENCOUNTER — HOSPITAL ENCOUNTER (OUTPATIENT)
Dept: GENERAL RADIOLOGY | Age: 62
Discharge: HOME | End: 2022-02-23
Payer: MEDICARE

## 2022-02-21 DIAGNOSIS — Z96.641 H/O TOTAL HIP ARTHROPLASTY, RIGHT: Primary | ICD-10-CM

## 2022-02-21 DIAGNOSIS — Z96.641 S/P TOTAL RIGHT HIP ARTHROPLASTY: ICD-10-CM

## 2022-02-21 PROCEDURE — 99212 OFFICE O/P EST SF 10 MIN: CPT | Performed by: PHYSICIAN ASSISTANT

## 2022-02-21 PROCEDURE — 73502 X-RAY EXAM HIP UNI 2-3 VIEWS: CPT

## 2022-02-21 PROCEDURE — 99024 POSTOP FOLLOW-UP VISIT: CPT | Performed by: PHYSICIAN ASSISTANT

## 2022-02-21 NOTE — PROGRESS NOTES
Chief Complaint   Patient presents with    Follow Up After Procedure     Right MERVAT 01/10/22. residing at home, receiving [facility] PT with shahram. requesting additional therapy. ambulating with wheeled walker. c/o pain to lateral thigh. OP:SURGEON: Dr. Maki Caban MD  DATE OF PROCEDURE: 1/10/22  PROCEDURE:Right Total Hip Arthroplasty    Subjective:  Marcus Hayes is approximately 6 wks follow-up from the above surgery. Patient is WBAT on that extremity. She ambulates with assistive device, walker. Pain to extremity is mild, improving, intermittent and also describes pain from the lateral R hip radiating distally almost to the R knee. States she had the same type of pain on the same extremity from her L MERVAT that resolved after about 1 year. They denies paresthesias. Patient continues to use DVT prophylaxis. Patient is  participating in therapy, home health care and would like to continue this and states she is unable to drive at baseline. Denies calf pain, CP, SOB, fever, chills, malaise, myalgias. Review of Systems -  all pertinent positives and negatives in HPI. Objective:    General: Alert and oriented X 3, normocephalic atraumatic, external ears and eye normal, sclera clear, no acute distress, respirations easy and unlabored with no audible wheezes, skin warm and dry, speech and dress appropriate for noted age, affect euthymic. Extremity:  Right Lower Extremity  Skin clean dry and intact, without signs of infection  Incision healed with palpable scar tissue   Mild TTP over incision line, minimal tenderness distally down the thigh  4/5 adduction and abduction and flexion at the L hip   No edema noted  Compartments supple throughout thigh and leg  Calf supple and nontender  Demonstrates active knee flexion/extension, ankle plantar/dorsiflexion/great toe extension.    States sensation intact to touch in sural/deep peroneal/superficial peroneal/saphenous/posterior tibial nerve distributions to foot/ankle. Palpable dorsalis pedis and posterior tibialis pulses, cap refill brisk in toes, foot warm/perfused. There were no vitals taken for this visit. XR:   3 views of R hip and pelvis demonstrating s/p R MERVAT. Arthroplasty remains intact without interval displacement, loosening, or failure. No significant change in alignment. No acute fractures or dislocations or any other osseus abnormality identified. Assessment:   Diagnosis Orders   1. H/O total hip arthroplasty, right  External Referral To Home Health    XR HIP 2-3 VW W PELVIS RIGHT       Plan:   Reviewed x-rays with patient today in office    Can use lotion, bath normally   WBAT R LE using AD when needed  [facility] PT order placed for extended home therapy   Continue HEP, ROM, strengthening at home. Scar massage discussed    Can wean from hip precautions     Future Appointments   Date Time Provider Shabnam Teresa   4/6/2022  9:45 AM Moose Aranda MD [facility]   4/14/2022 11:30 AM SPENSER [place] [facility]   8/12/2022  9:30 AM [facility]         Electronically signed by Kee Ball PA-C on 2/21/2022 at 10:48 AM  Note: This report was completed using computerize voiced recognition software. Every effort has been made to ensure accuracy; however, inadvertent computerized transcription errors may be present.

## 2022-02-21 NOTE — PATIENT INSTRUCTIONS
Future Appointments   Date Time Provider Shabnam Moore   4/6/2022  9:45 AM Radha Choi MD [facility]   4/14/2022 11:30 AM SPENSER [place] [facility]   8/12/2022  9:30 AM Shoaib Mendez MD [facility]

## 2022-02-25 ENCOUNTER — OFFICE VISIT (OUTPATIENT)
Dept: FAMILY MEDICINE CLINIC | Age: 62
End: 2022-02-25
Payer: MEDICARE

## 2022-02-25 VITALS
SYSTOLIC BLOOD PRESSURE: 128 MMHG | TEMPERATURE: 97.1 F | DIASTOLIC BLOOD PRESSURE: 79 MMHG | BODY MASS INDEX: 39.99 KG/M2 | WEIGHT: 240 LBS | HEIGHT: 65 IN | OXYGEN SATURATION: 99 % | HEART RATE: 72 BPM

## 2022-02-25 DIAGNOSIS — I10 HYPERTENSION, UNSPECIFIED TYPE: ICD-10-CM

## 2022-02-25 DIAGNOSIS — R30.0 DYSURIA: Primary | ICD-10-CM

## 2022-02-25 DIAGNOSIS — Z87.891 PERSONAL HISTORY OF TOBACCO USE: ICD-10-CM

## 2022-02-25 DIAGNOSIS — I72.9 ANEURYSM (HCC): ICD-10-CM

## 2022-02-25 DIAGNOSIS — Z12.31 ENCOUNTER FOR SCREENING MAMMOGRAM FOR MALIGNANT NEOPLASM OF BREAST: ICD-10-CM

## 2022-02-25 DIAGNOSIS — R73.9 HYPERGLYCEMIA, UNSPECIFIED: ICD-10-CM

## 2022-02-25 LAB
BILIRUBIN, POC: NORMAL
BLOOD URINE, POC: NORMAL
CLARITY, POC: CLEAR
COLOR, POC: YELLOW
GLUCOSE URINE, POC: NORMAL
KETONES, POC: NORMAL
LEUKOCYTE EST, POC: NORMAL
NITRITE, POC: NORMAL
PH, POC: 6.5
PROTEIN, POC: NORMAL
SPECIFIC GRAVITY, POC: >1.03
UROBILINOGEN, POC: 0.2

## 2022-02-25 PROCEDURE — 99213 OFFICE O/P EST LOW 20 MIN: CPT | Performed by: FAMILY MEDICINE

## 2022-02-25 PROCEDURE — 3017F COLORECTAL CA SCREEN DOC REV: CPT | Performed by: FAMILY MEDICINE

## 2022-02-25 PROCEDURE — 81002 URINALYSIS NONAUTO W/O SCOPE: CPT | Performed by: FAMILY MEDICINE

## 2022-02-25 PROCEDURE — 1036F TOBACCO NON-USER: CPT | Performed by: FAMILY MEDICINE

## 2022-02-25 PROCEDURE — G8484 FLU IMMUNIZE NO ADMIN: HCPCS | Performed by: FAMILY MEDICINE

## 2022-02-25 PROCEDURE — G8417 CALC BMI ABV UP PARAM F/U: HCPCS | Performed by: FAMILY MEDICINE

## 2022-02-25 PROCEDURE — G0296 VISIT TO DETERM LDCT ELIG: HCPCS | Performed by: FAMILY MEDICINE

## 2022-02-25 PROCEDURE — G8427 DOCREV CUR MEDS BY ELIG CLIN: HCPCS | Performed by: FAMILY MEDICINE

## 2022-02-25 NOTE — PROGRESS NOTES
2/25/2022    Alexandra Gonzalez is a 57 y.o. female here for   Chief Complaint   Patient presents with   Byrd Other     possible UTI or kidney infection, left urine: sometimes itgching, back pain and burning     Regarding hypertension. Patient is  monitoring home blood pressures. Cardiovascular risk factors: dyslipidemia, hypertension, obesity (BMI >= 30 kg/m2), sedentary lifestyle and previous SAH. Patient does not smoke. reports that she quit smoking about 3 years ago. Her smoking use included cigarettes. She has a 64.50 pack-year smoking history. She has never used smokeless tobacco.   Currently on metoprolol 37.5 mg twice daily, losartan 100 mg daily, chlorthalidone 25 mg daily, amlodipine 5 mg daily . Taking as prescribed. No adverse effects. Today,  BP: 128/79 and she is asymptomatic. BP Readings from Last 3 Encounters:   02/25/22 128/79   02/14/22 115/62   01/12/22 134/66     Patient denies chest pain, diaphoresis, dyspnea, dyspnea on exertion, peripheral edema, palpitations, headache, vision changes. Doing well post operative  She is walkign with walker which she hadn't done for 3 years. She is doing well.      +constipation  Worse in the hospital - didn't go for 7 days, eventually started on bowel regimen  Now having diarrhea  Taking miralax (generic)  She is not taking fiber supplement  She will take miralax only if she doesn't go for a few days then will take it for a few days. Wt Readings from Last 3 Encounters:   02/25/22 240 lb (108.9 kg)   02/14/22 234 lb (106.1 kg)   01/10/22 234 lb (106.1 kg)       She  reports that she quit smoking about 3 years ago. Her smoking use included cigarettes. She has a 64.50 pack-year smoking history. She has never used smokeless tobacco.    Medications and allergies reviewed and updated in chart.     Current Outpatient Medications   Medication Sig Dispense Refill    polyethylene glycol (GLYCOLAX) 17 g packet DISSOLVE 1 PACKET (17 GRAMS) IN LIQUID AND TAKE BY MOUTH DAILY      Metoprolol Tartrate 37.5 MG TABS TAKE ONE TABLET BY MOUTH EVERY 12 HOURS      carBAMazepine (TEGRETOL XR) 100 MG extended release tablet Take 1 tablet by mouth 3 times daily 90 tablet 5    amLODIPine (NORVASC) 5 MG tablet TAKE ONE TABLET BY MOUTH EVERY DAY 30 tablet 3    chlorthalidone (HYGROTON) 25 MG tablet Take 1 tablet by mouth daily 30 tablet 3    fluticasone (FLONASE) 50 MCG/ACT nasal spray 2 sprays by Each Nostril route daily 1 each 3    gabapentin (NEURONTIN) 300 MG capsule TAKE ONE CAPSULE BY MOUTH THREE TIMES A DAY 90 capsule 3    losartan (COZAAR) 100 MG tablet Take 1 tablet by mouth daily 30 tablet 3    pantoprazole (PROTONIX) 40 MG tablet TAKE ONE TABLET BY MOUTH EVERY DAY 30 tablet 3    potassium chloride (KLOR-CON M) 10 MEQ extended release tablet TAKE ONE TABLET BY MOUTH DAILY WITH BREAKFAST 30 tablet 3    valACYclovir (VALTREX) 1 g tablet Take 1 tablet by mouth daily For 5 days as needed for Herpes outbreak 5 tablet 1    albuterol sulfate  (90 Base) MCG/ACT inhaler INHALE TWO PUFFS BY MOUTH EVERY 6 HOURS AS NEEDED FOR WHEEZING. 1 Inhaler 3    Multiple Vitamins-Minerals (THERAPEUTIC MULTIVITAMIN-MINERALS) tablet Take 1 tablet by mouth daily      diclofenac sodium (VOLTAREN) 1 % GEL APPLY ONE GRAM EXTERNALLY TWICE A DAY TO NECK AND ONE GRAM EXTERNALLY TWICE A DAY TO BACK  100 g 2    Misc. Devices (ROLLATOR) MISC 1 each by Does not apply route daily as needed (use as needed for stability) 1 each 0    acetaminophen-codeine (TYLENOL #4) 300-60 MG per tablet Take 1 tablet by mouth daily as needed for Pain for up to 30 days. (Patient not taking: Reported on 2/25/2022) 30 tablet 1    aspirin 325 MG EC tablet Take 1 tablet by mouth 2 times daily (Patient not taking: Reported on 2/25/2022) 56 tablet 1    metoprolol tartrate (LOPRESSOR) 25 MG tablet Take 1.5 tablets by mouth 2 times daily 90 tablet 3     No current facility-administered medications for this visit. Patient'spast medical, surgical, social and/or family history reviewed, updated in chart, and are non-contributory (unless otherwise stated). Review of Systems  Review of Systems   Constitutional: Negative for chills and fever. Respiratory: Negative for cough and wheezing. Cardiovascular: Negative for chest pain and leg swelling. Gastrointestinal: Positive for constipation. Negative for blood in stool. Endocrine: Positive for polyuria. Musculoskeletal: Positive for arthralgias. Neurological: Negative for dizziness and headaches. PE:  VS:  /79   Pulse 72   Temp 97.1 °F (36.2 °C)   Ht 5' 5\" (1.651 m)   Wt 240 lb (108.9 kg)   SpO2 99%   BMI 39.94 kg/m²   Physical Exam  Constitutional:       Appearance: She is well-developed. HENT:      Head: Normocephalic and atraumatic. Cardiovascular:      Rate and Rhythm: Normal rate and regular rhythm. Heart sounds: No murmur heard. No friction rub. No gallop. Pulmonary:      Effort: Pulmonary effort is normal.      Breath sounds: Normal breath sounds. No wheezing or rales. Musculoskeletal:         General: Tenderness present. Comments: Walking with walker   Skin:     General: Skin is warm and dry. Neurological:      Mental Status: She is alert and oriented to person, place, and time. Assessment/Plan:  Callie Carver was seen today for other. Diagnoses and all orders for this visit:    Dysuria  R/o UTI  Increase hydration  -     POCT Urinalysis no Micro    Aneurysm (HCC)  Mild residual deficits only  Maintain bp control    Encounter for screening mammogram for malignant neoplasm of breast  -     RADHA DIGITAL SCREEN BILATERAL PER PROTOCOL; Future    Personal history of tobacco use  Lung cancer screening  Previous tobacco use  -     LA VISIT TO DISCUSS LUNG CA SCREEN W LDCT  -     CT Lung Screen (Annual); Future    Hypertension, unspecified type  bp at goal  -     Comprehensive Metabolic Panel;  Future  -     CBC with

## 2022-03-03 ASSESSMENT — ENCOUNTER SYMPTOMS
CONSTIPATION: 1
WHEEZING: 0
COUGH: 0
BLOOD IN STOOL: 0

## 2022-04-06 ENCOUNTER — TELEPHONE (OUTPATIENT)
Dept: CASE MANAGEMENT | Age: 62
End: 2022-04-06

## 2022-04-06 ENCOUNTER — OFFICE VISIT (OUTPATIENT)
Dept: ORTHOPEDIC SURGERY | Age: 62
End: 2022-04-06
Payer: MEDICARE

## 2022-04-06 ENCOUNTER — HOSPITAL ENCOUNTER (OUTPATIENT)
Dept: GENERAL RADIOLOGY | Age: 62
Discharge: HOME | End: 2022-04-08
Payer: MEDICARE

## 2022-04-06 DIAGNOSIS — Z96.641 H/O TOTAL HIP ARTHROPLASTY, RIGHT: Primary | ICD-10-CM

## 2022-04-06 DIAGNOSIS — Z96.641 H/O TOTAL HIP ARTHROPLASTY, RIGHT: ICD-10-CM

## 2022-04-06 PROCEDURE — 99024 POSTOP FOLLOW-UP VISIT: CPT | Performed by: PHYSICIAN ASSISTANT

## 2022-04-06 PROCEDURE — 99212 OFFICE O/P EST SF 10 MIN: CPT

## 2022-04-06 PROCEDURE — 73502 X-RAY EXAM HIP UNI 2-3 VIEWS: CPT

## 2022-04-06 NOTE — PROGRESS NOTES
Andria Huff is a 60 y.o. female who presents for follow up of 12 Week PO Check Right MERVAT    SURGEON: Dr. Nayeli Gee MD  Date of Injury/Surgery: 1-  Date last seen in office: 2-    Symptoms: better  New complaints: Pt expressed having a sharp pain on the medial aspect of the Right Hip. Weightbearing: right lower Partial weight bearing      Assistive device Walker - standard and Small quad cane  Participating in therapy (location if yes)? yes, At Home.      Refills Needed: None  Order/Referral Needed: N/A

## 2022-04-06 NOTE — TELEPHONE ENCOUNTER
I called the patient and she confirmed her CT lung screening at [address] [place] [address] on 4/7/2022 at 8:30 am.  I reminded the patient to arrive at 8:00 am, enter through the main entrance, and register. Patient confirmed.           Electronically signed by Kyree Mai on 4/6/22 at 2:41 PM EDT

## 2022-04-06 NOTE — PROGRESS NOTES
Chief Complaint   Patient presents with    Follow-up     Post Op 12 week Check for Right MERVAT        OP:SURGEON: Dr. Eduar Morejon MD  DATE OF PROCEDURE: 1-10-22  PROCEDURE: Right Total Hip Arthroplasty     POD: 12 weeks    Subjective:  Jane Mattson is following up from the above surgery. She is WBAT on right lower extremity. She ambulates with assistive device, walker. Pain to extremity is mild and is not taking prescribed pain medication. They denies numbness or tingling to the right lower extremity. Denies calf pain, chest pain, or shortness of breath. Patient has finished DVT prophylaxis. Patient is  participating in therapy, outpatient therapy. She is doing well after surgery. States that she is making progress in PT. She states that she does have a mild leg length discrepancy with the right leg being slightly shorter than the left leg. She states that surgery and PT has helped with getting her right leg closer out to length with the left leg. Review of Systems -  All pertinent positives/negative in HPI     Objective:    General: Alert and oriented X 3, normocephalic atraumatic, external ears and eye normal, sclera clear, no acute distress, respirations easy and unlabored with no audible wheezes, skin warm and dry, speech and dress appropriate for noted age, affect euthymic. Extremity:  Right Lower Extremity  Skin clean dry and intact, without signs of infection   Incision well-healed  no edema noted  Compartments supple throughout thigh and leg  Calf supple and not tender  negative Homans  Demonstrates active motion with hip flexion, knee flexion/extension and ankle dorsi/plantar flexion. Ambulates well with a walker. States sensation intact to touch in sural, deep peroneal, superficial peroneal, saphenous, posterior tibial  nerve distributions to foot/ankle. Palpable dorsalis pedis and posterior tibialis pulses, cap refill brisk in toes, foot warm/perfused.     There were no vitals taken for this visit. XR:   Pelvis and right hip films demonstrate bilateral hip prosthesis with the hardware in stable position and alignment. No evidence of hardware loosening or failure. Assessment:   Diagnosis Orders   1. H/O total hip arthroplasty, right       Plan:  X-rays reviewed and discussed. Continue weightbearing as tolerated right lower extremity. Continue therapy. Discussed possible referral to [facility] orthotics to obtain a right shoe lift for leg length discrepancy. She would like to hold off for now and contact the office if she decides she would like to try an orthotic. Follow-up in 3 months for reevaluation and x-rays. Call any questions or concerns. Electronically signed by SPENSER Mancera on 4/6/2022 at 10:38 AM  Note: This report was completed using [employer] voiced recognition software. Every effort has been made to ensure accuracy; however, inadvertent computerized transcription errors may be present.

## 2022-04-14 ENCOUNTER — OFFICE VISIT (OUTPATIENT)
Dept: PAIN MANAGEMENT | Age: 62
End: 2022-04-14
Payer: MEDICARE

## 2022-04-14 VITALS
HEART RATE: 67 BPM | BODY MASS INDEX: 39.99 KG/M2 | DIASTOLIC BLOOD PRESSURE: 61 MMHG | OXYGEN SATURATION: 94 % | WEIGHT: 240 LBS | SYSTOLIC BLOOD PRESSURE: 100 MMHG | HEIGHT: 65 IN | TEMPERATURE: 95.7 F | RESPIRATION RATE: 16 BRPM

## 2022-04-14 DIAGNOSIS — M16.11 ARTHRITIS OF RIGHT HIP: ICD-10-CM

## 2022-04-14 DIAGNOSIS — M54.16 LUMBAR RADICULITIS: ICD-10-CM

## 2022-04-14 DIAGNOSIS — M50.30 DEGENERATION OF CERVICAL DISC WITHOUT MYELOPATHY: ICD-10-CM

## 2022-04-14 DIAGNOSIS — M51.9 LUMBAR DISC DISORDER: ICD-10-CM

## 2022-04-14 DIAGNOSIS — G89.4 CHRONIC PAIN SYNDROME: Primary | ICD-10-CM

## 2022-04-14 DIAGNOSIS — M47.812 FACET ARTHROPATHY, CERVICAL: ICD-10-CM

## 2022-04-14 DIAGNOSIS — M16.12 PRIMARY OSTEOARTHRITIS OF LEFT HIP: ICD-10-CM

## 2022-04-14 DIAGNOSIS — M47.816 LUMBAR FACET ARTHROPATHY: ICD-10-CM

## 2022-04-14 DIAGNOSIS — M47.816 LUMBAR SPONDYLOSIS: ICD-10-CM

## 2022-04-14 PROCEDURE — G8417 CALC BMI ABV UP PARAM F/U: HCPCS | Performed by: PHYSICIAN ASSISTANT

## 2022-04-14 PROCEDURE — 1036F TOBACCO NON-USER: CPT | Performed by: PHYSICIAN ASSISTANT

## 2022-04-14 PROCEDURE — 99213 OFFICE O/P EST LOW 20 MIN: CPT | Performed by: PHYSICIAN ASSISTANT

## 2022-04-14 PROCEDURE — G8427 DOCREV CUR MEDS BY ELIG CLIN: HCPCS | Performed by: PHYSICIAN ASSISTANT

## 2022-04-14 PROCEDURE — 3017F COLORECTAL CA SCREEN DOC REV: CPT | Performed by: PHYSICIAN ASSISTANT

## 2022-04-14 RX ORDER — ACETAMINOPHEN AND CODEINE PHOSPHATE 60; 300 MG/1; MG/1
1 TABLET ORAL DAILY PRN
Qty: 30 TABLET | Refills: 1 | Status: SHIPPED
Start: 2022-04-14 | End: 2022-06-09 | Stop reason: SDUPTHER

## 2022-04-14 NOTE — PROGRESS NOTES
Do you currently have any of the following:    Fever: No  Headache:  No  Cough: No  Shortness of breath: No  Exposed to anyone with these symptoms: No                                                                                                                Jane Mattson presents to the [facility] on 4/14/2022. Shila Durham is complaining of pain in lower back. She is doing PT for hip replacement. . The pain is constant. The pain is described as aching and throbbing. Pain is rated on her best day at a 7, on her worst day at a 10, and on average at a 8 on the VAS scale. She took her last dose of Tylenol . Shila Durham does not have issues with constipation. Any procedures since your last visit: No,    She is not on NSAIDS and  is  on anticoagulation medications to include ASA and is managed by Indira Bowser MD  .     Pacemaker or defibrillator: No Physician managing device is NA. Medication Contract and Consent for Opioid Use Documents Filed     Patient Documents     Type of Document Status Date Received Received By Description    Medication Contract Received 3/1/2019  1:43 PM FRANCIS MCINTOSH PAIN MANAGEMENT PT AGREEMENT 3/1/2019    Medication Contract Received 10/8/2020  1:21 PM DAYNA [address] [place] [address] pain pt agreement    Medication Contract Received 10/7/2021 10:36 AM BOLIVAR TRUJILLO Pain Mgmt Patient Agreement 10. 7.2021                   /61   Pulse 67   Temp 95.7 °F (35.4 °C) (Infrared)   Resp 16   Ht 5' 5\" (1.651 m)   Wt 240 lb (108.9 kg)   SpO2 94%   BMI 39.94 kg/m²      No LMP recorded.  Patient is postmenopausal.

## 2022-04-14 NOTE — PROGRESS NOTES
[address]  [address] [facility], [address] [place] [address] Chad  [phone]    Follow up Note      Zandra Sarah     Date of Visit:  4/14/2022    CC:  Patient presents for follow up   Chief Complaint   Patient presents with    Lower Back Pain       HPI:    Pain is doing well. Currently in PT. Appropriate analgesia with current medications regimen: yes. Change in quality of symptoms:no. Medication side effects:none. Recent diagnostic testing:none. Recent interventional procedures:none. She has not been on anticoagulation medications to include none. The patient  has not been on herbal supplements. The patient is not diabetic.     Imaging studies:    Cervical XR 11/2019 Severe degenerative changes      Lumbar spine Xray 03/2019  Scoliosis and osteoarthritis.     Bilateral hip Xray 03/2019   Advanced osteoarthrosis of bilateral hips. Potential Aberrant Drug-Related Behavior: None     Urine Drug Screening:  First office visit saliva screen showed no narcotics   11/2019 Consistent, negative for all  06/2020 Consistent  12/2020 Consistent  06/2021 Consistent     OARRS report:  03/2019 consistent to 06/2021 Consistent  (norco on 9/24 from dentist for teeth extraction).   08/2021 Consistent to 4/2022 Consistent    Opioid Agreement:  10/07/2021    Past Medical History:   Diagnosis Date    Brain aneurysm 09/2018    H/O TIMES 2 THAT BURST PER PATIENT    Cerebral artery occlusion with cerebral infarction ([address]     RT SIDE AFFECTED-LEARNED TO WALK AND WRITE AGAIN    Degenerative arthritis of hand     Edentulous     Emphysema lung ([address]     lung dr Delfin Johnson Headache     Hiatal hernia     Hip problem     NEEDS HIP REPLACEMENT PER PATIENT    Hx of abnormal cervical Pap smear     Hypertension     Stroke (cerebrum) ([address]     Subarachnoid bleed ([address] 9/10/2018       Past Surgical History:   Procedure Laterality Date    BRAIN ANEURYSM SURGERY coiling     COLONOSCOPY  08/30/2019    polyps--frank    COLONOSCOPY N/A 8/30/2019    COLONOSCOPY POLYPECTOMY SNARE/COLD BIOPSY performed by Elena Almanza MD at [facility]      total hip replacement left and right    OTHER SURGICAL HISTORY      FOR CANCER CELLS- DONE AT [address]      TOTAL HIP ARTHROPLASTY Left 2/22/2021    LEFT HIP TOTAL ARTHROPLASTY performed by Rita Yoo MD at [address] [facility] Right 1/10/2022    RIGHT TOTAL HIP ARTHROPLASTY - STYKER performed by Rita Yoo MD at [facility]  08/30/2019    gastritis with superficial ulcerations; duodenitis--frank    UPPER GASTROINTESTINAL ENDOSCOPY N/A 8/30/2019    EGD BIOPSY performed by Elena Almanza MD at [address]       Prior to Admission medications    Medication Sig Start Date End Date Taking?  Authorizing Provider   polyethylene glycol (GLYCOLAX) 17 g packet DISSOLVE 1 PACKET (17 GRAMS) IN LIQUID AND TAKE BY MOUTH DAILY 1/24/22  Yes Historical Provider, MD   Metoprolol Tartrate 37.5 MG TABS TAKE ONE TABLET BY MOUTH EVERY 12 HOURS 1/24/22  Yes Historical Provider, MD   aspirin 325 MG EC tablet Take 1 tablet by mouth 2 times daily 1/10/22 1/10/23 Yes Audrene Million, DO   carBAMazepine (TEGRETOL XR) 100 MG extended release tablet Take 1 tablet by mouth 3 times daily 12/27/21 6/25/22 Yes Jacob Hirsch MD   amLODIPine (NORVASC) 5 MG tablet TAKE ONE TABLET BY MOUTH EVERY DAY 12/3/21  Yes Eliseo Patton MD   chlorthalidone (HYGROTON) 25 MG tablet Take 1 tablet by mouth daily 12/3/21  Yes Eliseo Patton MD   fluticasone (FLONASE) 50 MCG/ACT nasal spray 2 sprays by Each Nostril route daily 12/3/21  Yes Eliseo Patton MD   losartan (COZAAR) 100 MG tablet Take 1 tablet by mouth daily 12/3/21  Yes Eliseo Patton MD   pantoprazole (PROTONIX) 40 MG tablet TAKE ONE TABLET BY MOUTH EVERY DAY 12/3/21  Yes Eliseo Patton MD   potassium chloride (KLOR-CON M) 10 MEQ extended release tablet TAKE ONE TABLET BY MOUTH DAILY WITH BREAKFAST 12/3/21  Yes Jelani Galindo MD   valACYclovir (VALTREX) 1 g tablet Take 1 tablet by mouth daily For 5 days as needed for Herpes outbreak 4/27/21  Yes Jelani Galindo MD   albuterol sulfate  (90 Base) MCG/ACT inhaler INHALE TWO PUFFS BY MOUTH EVERY 6 HOURS AS NEEDED FOR WHEEZING. 3/22/21  Yes Doug Setter, DO   Multiple Vitamins-Minerals (THERAPEUTIC MULTIVITAMIN-MINERALS) tablet Take 1 tablet by mouth daily   Yes Historical Provider, MD   diclofenac sodium (VOLTAREN) 1 % GEL APPLY ONE GRAM EXTERNALLY TWICE A DAY TO NECK AND ONE GRAM EXTERNALLY TWICE A DAY TO BACK  11/3/20  Yes Radha Devoid, [address] Devices (ROLLATOR) MISC 1 each by Does not apply route daily as needed (use as needed for stability) 3/17/20  Yes Jelani Galindo MD   gabapentin (NEURONTIN) 300 MG capsule TAKE ONE CAPSULE BY MOUTH THREE TIMES A DAY 12/3/21 3/1/22  Jelani Galindo MD   metoprolol tartrate (LOPRESSOR) 25 MG tablet Take 1.5 tablets by mouth 2 times daily 12/3/21 1/2/22  Jelani Galindo MD       Allergies   Allergen Reactions    Latex Hives     Hives and rash    Iodine Rash     Hives . pt.  States anaphalyxis    Aloe Hives     Hives     Celebrex [Celecoxib] Itching    Fish-Derived Products Other (See Comments)       Social History     Socioeconomic History    Marital status:      Spouse name: Not on file    Number of children: Not on file    Years of education: Not on file    Highest education level: Not on file   Occupational History    Not on file   Tobacco Use    Smoking status: Former Smoker     Packs/day: 1.50     Years: 43.00     Pack years: 64.50     Types: Cigarettes     Quit date: 9/9/2018     Years since quitting: 3.5    Smokeless tobacco: Never Used   Vaping Use    Vaping Use: Never used   Substance and Sexual Activity    Alcohol use: No    Drug use: No    Sexual activity: Not Currently   Other Topics Concern    Not on file   Social History Narrative    Not on file     Social Determinants of Health     Financial Resource Strain: Low Risk     Difficulty of Paying Living Expenses: Not hard at all   Food Insecurity: No Food Insecurity    Worried About Running Out of Food in the Last Year: Never true    [address] in the Last Year: Never true   Transportation Needs:     Lack of Transportation (Medical): Not on file    Lack of Transportation (Non-Medical): Not on file   Physical Activity:     Days of Exercise per Week: Not on file    Minutes of Exercise per Session: Not on file   Stress:     Feeling of Stress : Not on file   Social Connections:     Frequency of Communication with Friends and Family: Not on file    Frequency of Social Gatherings with Friends and Family: Not on file    Attends [employer] Services: Not on file    Active Member of Clubs or Organizations: Not on file    Attends Club or Organization Meetings: Not on file    Marital Status: Not on file   Intimate Partner Violence:     Fear of Current or Ex-Partner: Not on file    Emotionally Abused: Not on file    Physically Abused: Not on file    Sexually Abused: Not on file   Housing Stability:     Unable to Pay for Housing in the Last Year: Not on file    Number of [place] in the Last Year: Not on file    Unstable Housing in the Last Year: Not on file       Family History   Problem Relation Age of Onset    Diabetes Mother     Arthritis Mother     Atrial Fibrillation Mother     Diabetes Father     Atrial Fibrillation Father     Arthritis Father     Emphysema Father     Cancer Sister        REVIEW OF SYSTEMS:     Candy Maxwell denies fever/chills, chest pain, shortness of breath, new bowel or bladder complaints. All other review of systems was negative.     PHYSICAL EXAMINATION:      /61   Pulse 67   Temp 95.7 °F (35.4 °C) (Infrared)   Resp 16   Ht 5' 5\" (1.651 m)   Wt 240 lb (108.9 kg)   SpO2 94%   BMI 39.94 kg/m²     General:      General appearance:   pleasant and well-hydrated. , in no discomfort and A & O x3  Build:Overweight  Function:Rises from a seated position with difficulty    HEENT:    Head:normocephalic and atraumatic  Pupils:regular, round and equal.  Sclera: icterus absent,    Lungs:    Breathing:Normal expansion. No wheezing. Abdomen:    Shape:non-distended and normal    Extremities:    Tremors:None bilaterally upper and lower  Range of motion:Generally normal shoulders  Intact:Yes  Varicose veins:not assessed   Cyanosis:none  Edema:Normal    Neurological:    Gait: Not observed. Dermatology:    Skin:no unusual rashes, no skin lesions, no palpable subcutaneous nodules and good skin turgor    Impression:    Patient seen for follow up for her chronic bilateral hip pain and low back pain due to severe degenerative changes and axial c/o neck pain   Would benefit from Cervical MBB, patient uninterested   Patient is s/p:  Left MERVAT on 2/22/2021. Patient is s/p right MERVAT on 01/10/2022  Currently in PT for her hips. Continue Gabapentin 300 mg TID per PCP   Tylenol #4 QD prn    Compounding cream. Helping a lot. OARRS report reviewed 04/2022  Patient encouraged to remain active as tolerated   Treatment plan discussed with the patient including medications and side effects     Controlled Substance Monitoring:    Acute and Chronic Pain Monitoring:   RX Monitoring 4/14/2022   Periodic Controlled Substance Monitoring Possible medication side effects, risk of tolerance/dependence & alternative treatments discussed. ;No signs of potential drug abuse or diversion identified.;Obtaining appropriate analgesic effect of treatment. ;Assessed functional status. ;Random urine drug screen sent today. We discussed with the patient that combining opioids, benzodiazepines, alcohol, illicit drugs or sleep aids increases the risk of respiratory depression including death.  We discussed that these medications may cause drowsiness, sedation or dizziness and have counseled the patient not to drive or operate machinery. We have discussed that these medications will be prescribed only by one provider. We have discussed with the patient about age related risk factors and have thoroughly discussed the importance of taking these medications as prescribed. The patient verbalizes understanding.     ccreferring physic

## 2022-05-05 ENCOUNTER — HOSPITAL ENCOUNTER (OUTPATIENT)
Dept: MAMMOGRAPHY | Age: 62
Discharge: HOME | End: 2022-05-07
Payer: MEDICARE

## 2022-05-05 ENCOUNTER — HOSPITAL ENCOUNTER (OUTPATIENT)
Dept: CT IMAGING | Age: 62
Discharge: HOME | End: 2022-05-05
Payer: MEDICARE

## 2022-05-05 VITALS — WEIGHT: 200 LBS | HEIGHT: 65 IN | BODY MASS INDEX: 33.32 KG/M2

## 2022-05-05 DIAGNOSIS — Z87.891 PERSONAL HISTORY OF TOBACCO USE: ICD-10-CM

## 2022-05-05 DIAGNOSIS — Z12.31 ENCOUNTER FOR SCREENING MAMMOGRAM FOR MALIGNANT NEOPLASM OF BREAST: ICD-10-CM

## 2022-05-05 PROCEDURE — 71271 CT THORAX LUNG CANCER SCR C-: CPT

## 2022-05-05 PROCEDURE — 77067 SCR MAMMO BI INCL CAD: CPT

## 2022-05-18 DIAGNOSIS — Z76.0 MEDICATION REFILL: ICD-10-CM

## 2022-05-18 DIAGNOSIS — G89.4 CHRONIC PAIN SYNDROME: ICD-10-CM

## 2022-05-18 DIAGNOSIS — I10 HYPERTENSION, UNSPECIFIED TYPE: ICD-10-CM

## 2022-05-18 RX ORDER — GABAPENTIN 300 MG/1
CAPSULE ORAL
Qty: 90 CAPSULE | Refills: 3 | Status: SHIPPED
Start: 2022-05-18 | End: 2022-08-05 | Stop reason: SDUPTHER

## 2022-05-18 RX ORDER — CHLORTHALIDONE 25 MG/1
25 TABLET ORAL DAILY
Qty: 30 TABLET | Refills: 3 | Status: SHIPPED
Start: 2022-05-18 | End: 2022-08-05 | Stop reason: SDUPTHER

## 2022-05-18 RX ORDER — FLUTICASONE PROPIONATE 50 MCG
2 SPRAY, SUSPENSION (ML) NASAL DAILY
Qty: 1 EACH | Refills: 3 | Status: SHIPPED
Start: 2022-05-18 | End: 2022-08-05 | Stop reason: SDUPTHER

## 2022-05-18 RX ORDER — LOSARTAN POTASSIUM 100 MG/1
100 TABLET ORAL DAILY
Qty: 30 TABLET | Refills: 3 | Status: SHIPPED
Start: 2022-05-18 | End: 2022-08-05 | Stop reason: SDUPTHER

## 2022-05-18 RX ORDER — POTASSIUM CHLORIDE 750 MG/1
TABLET, EXTENDED RELEASE ORAL
Qty: 30 TABLET | Refills: 3 | Status: SHIPPED
Start: 2022-05-18 | End: 2022-08-05 | Stop reason: SDUPTHER

## 2022-05-18 RX ORDER — PANTOPRAZOLE SODIUM 40 MG/1
TABLET, DELAYED RELEASE ORAL
Qty: 30 TABLET | Refills: 3 | Status: SHIPPED
Start: 2022-05-18 | End: 2022-08-05 | Stop reason: SDUPTHER

## 2022-05-18 RX ORDER — ALBUTEROL SULFATE 90 UG/1
AEROSOL, METERED RESPIRATORY (INHALATION)
Qty: 1 EACH | Refills: 3 | Status: SHIPPED
Start: 2022-05-18 | End: 2022-08-05 | Stop reason: SDUPTHER

## 2022-05-18 RX ORDER — AMLODIPINE BESYLATE 5 MG/1
TABLET ORAL
Qty: 30 TABLET | Refills: 3 | Status: SHIPPED
Start: 2022-05-18 | End: 2022-08-05 | Stop reason: SDUPTHER

## 2022-05-27 ENCOUNTER — HOSPITAL ENCOUNTER (OUTPATIENT)
Age: 62
Discharge: HOME | End: 2022-05-27
Payer: MEDICARE

## 2022-05-27 DIAGNOSIS — I10 HYPERTENSION, UNSPECIFIED TYPE: ICD-10-CM

## 2022-05-27 DIAGNOSIS — R73.9 HYPERGLYCEMIA, UNSPECIFIED: ICD-10-CM

## 2022-05-27 LAB
ALBUMIN SERPL-MCNC: 4.2 G/DL (ref 3.5–5.2)
ALP BLD-CCNC: 97 U/L (ref 35–104)
ALT SERPL-CCNC: 15 U/L (ref 0–32)
ANION GAP SERPL CALCULATED.3IONS-SCNC: 15 MMOL/L (ref 7–16)
AST SERPL-CCNC: 14 U/L (ref 0–31)
BASOPHILS ABSOLUTE: 0.02 E9/L (ref 0–0.2)
BASOPHILS RELATIVE PERCENT: 0.3 % (ref 0–2)
BILIRUB SERPL-MCNC: 0.3 MG/DL (ref 0–1.2)
BUN BLDV-MCNC: 24 MG/DL (ref 6–23)
CALCIUM SERPL-MCNC: 9.4 MG/DL (ref 8.6–10.2)
CHLORIDE BLD-SCNC: 101 MMOL/L (ref 98–107)
CHOLESTEROL, TOTAL: 188 MG/DL (ref 0–199)
CO2: 24 MMOL/L (ref 22–29)
CREAT SERPL-MCNC: 1.1 MG/DL (ref 0.5–1)
EOSINOPHILS ABSOLUTE: 0.16 E9/L (ref 0.05–0.5)
EOSINOPHILS RELATIVE PERCENT: 2.8 % (ref 0–6)
GFR AFRICAN AMERICAN: >60
GFR NON-AFRICAN AMERICAN: 50 ML/MIN/1.73
GLUCOSE BLD-MCNC: 108 MG/DL (ref 74–99)
HBA1C MFR BLD: 5.6 % (ref 4–5.6)
HCT VFR BLD CALC: 40.1 % (ref 34–48)
HDLC SERPL-MCNC: 66 MG/DL
HEMOGLOBIN: 12.9 G/DL (ref 11.5–15.5)
IMMATURE GRANULOCYTES #: 0.02 E9/L
IMMATURE GRANULOCYTES %: 0.3 % (ref 0–5)
LDL CHOLESTEROL CALCULATED: 98 MG/DL (ref 0–99)
LYMPHOCYTES ABSOLUTE: 1.31 E9/L (ref 1.5–4)
LYMPHOCYTES RELATIVE PERCENT: 22.9 % (ref 20–42)
MCH RBC QN AUTO: 28.6 PG (ref 26–35)
MCHC RBC AUTO-ENTMCNC: 32.2 % (ref 32–34.5)
MCV RBC AUTO: 88.9 FL (ref 80–99.9)
MONOCYTES ABSOLUTE: 0.37 E9/L (ref 0.1–0.95)
MONOCYTES RELATIVE PERCENT: 6.5 % (ref 2–12)
NEUTROPHILS ABSOLUTE: 3.84 E9/L (ref 1.8–7.3)
NEUTROPHILS RELATIVE PERCENT: 67.2 % (ref 43–80)
PDW BLD-RTO: 13.5 FL (ref 11.5–15)
PLATELET # BLD: 241 E9/L (ref 130–450)
PMV BLD AUTO: 9.1 FL (ref 7–12)
POTASSIUM SERPL-SCNC: 4 MMOL/L (ref 3.5–5)
RBC # BLD: 4.51 E12/L (ref 3.5–5.5)
SODIUM BLD-SCNC: 140 MMOL/L (ref 132–146)
TOTAL PROTEIN: 7.2 G/DL (ref 6.4–8.3)
TRIGL SERPL-MCNC: 119 MG/DL (ref 0–149)
TSH SERPL DL<=0.05 MIU/L-ACNC: 1.51 UIU/ML (ref 0.27–4.2)
VLDLC SERPL CALC-MCNC: 24 MG/DL
WBC # BLD: 5.7 E9/L (ref 4.5–11.5)

## 2022-05-27 PROCEDURE — 83036 HEMOGLOBIN GLYCOSYLATED A1C: CPT

## 2022-05-27 PROCEDURE — 36415 COLL VENOUS BLD VENIPUNCTURE: CPT

## 2022-05-27 PROCEDURE — 84443 ASSAY THYROID STIM HORMONE: CPT

## 2022-05-27 PROCEDURE — 85025 COMPLETE CBC W/AUTO DIFF WBC: CPT

## 2022-05-27 PROCEDURE — 80053 COMPREHEN METABOLIC PANEL: CPT

## 2022-05-27 PROCEDURE — 80061 LIPID PANEL: CPT

## 2022-05-31 DIAGNOSIS — R79.89 ELEVATED SERUM CREATININE: Primary | ICD-10-CM

## 2022-05-31 DIAGNOSIS — I10 HYPERTENSION, UNSPECIFIED TYPE: ICD-10-CM

## 2022-06-03 ENCOUNTER — OFFICE VISIT (OUTPATIENT)
Dept: FAMILY MEDICINE CLINIC | Age: 62
End: 2022-06-03
Payer: MEDICARE

## 2022-06-03 VITALS
HEIGHT: 65 IN | DIASTOLIC BLOOD PRESSURE: 80 MMHG | BODY MASS INDEX: 33.82 KG/M2 | TEMPERATURE: 98.7 F | HEART RATE: 80 BPM | OXYGEN SATURATION: 96 % | SYSTOLIC BLOOD PRESSURE: 138 MMHG | WEIGHT: 203 LBS | RESPIRATION RATE: 16 BRPM

## 2022-06-03 DIAGNOSIS — R30.0 DYSURIA: Primary | ICD-10-CM

## 2022-06-03 DIAGNOSIS — N30.01 ACUTE CYSTITIS WITH HEMATURIA: ICD-10-CM

## 2022-06-03 DIAGNOSIS — R30.0 DYSURIA: ICD-10-CM

## 2022-06-03 DIAGNOSIS — J43.9 PULMONARY EMPHYSEMA, UNSPECIFIED EMPHYSEMA TYPE (HCC): ICD-10-CM

## 2022-06-03 DIAGNOSIS — R82.2 BILIRUBINURIA: ICD-10-CM

## 2022-06-03 PROBLEM — N18.30 CHRONIC RENAL DISEASE, STAGE III (HCC): Status: ACTIVE | Noted: 2022-06-03

## 2022-06-03 PROBLEM — N18.30 CHRONIC RENAL DISEASE, STAGE III (HCC): Status: RESOLVED | Noted: 2022-06-03 | Resolved: 2022-06-03

## 2022-06-03 LAB
ALBUMIN SERPL-MCNC: 4.3 G/DL (ref 3.5–5.2)
ALP BLD-CCNC: 99 U/L (ref 35–104)
ALT SERPL-CCNC: 10 U/L (ref 0–32)
ANION GAP SERPL CALCULATED.3IONS-SCNC: 15 MMOL/L (ref 7–16)
AST SERPL-CCNC: 21 U/L (ref 0–31)
BASOPHILS ABSOLUTE: 0.04 E9/L (ref 0–0.2)
BASOPHILS RELATIVE PERCENT: 0.7 % (ref 0–2)
BILIRUB SERPL-MCNC: 0.3 MG/DL (ref 0–1.2)
BILIRUBIN, POC: NORMAL
BLOOD URINE, POC: NORMAL
BUN BLDV-MCNC: 17 MG/DL (ref 6–23)
CALCIUM SERPL-MCNC: 9.2 MG/DL (ref 8.6–10.2)
CHLORIDE BLD-SCNC: 102 MMOL/L (ref 98–107)
CLARITY, POC: CLEAR
CO2: 23 MMOL/L (ref 22–29)
COLOR, POC: YELLOW
CREAT SERPL-MCNC: 1 MG/DL (ref 0.5–1)
EOSINOPHILS ABSOLUTE: 0.17 E9/L (ref 0.05–0.5)
EOSINOPHILS RELATIVE PERCENT: 2.9 % (ref 0–6)
GFR AFRICAN AMERICAN: >60
GFR NON-AFRICAN AMERICAN: 56 ML/MIN/1.73
GLUCOSE BLD-MCNC: 97 MG/DL (ref 74–99)
GLUCOSE URINE, POC: NORMAL
HCT VFR BLD CALC: 41.4 % (ref 34–48)
HEMOGLOBIN: 13.2 G/DL (ref 11.5–15.5)
IMMATURE GRANULOCYTES #: 0.02 E9/L
IMMATURE GRANULOCYTES %: 0.3 % (ref 0–5)
KETONES, POC: 15
LEUKOCYTE EST, POC: NORMAL
LYMPHOCYTES ABSOLUTE: 1.75 E9/L (ref 1.5–4)
LYMPHOCYTES RELATIVE PERCENT: 29.7 % (ref 20–42)
MCH RBC QN AUTO: 29 PG (ref 26–35)
MCHC RBC AUTO-ENTMCNC: 31.9 % (ref 32–34.5)
MCV RBC AUTO: 91 FL (ref 80–99.9)
MONOCYTES ABSOLUTE: 0.44 E9/L (ref 0.1–0.95)
MONOCYTES RELATIVE PERCENT: 7.5 % (ref 2–12)
NEUTROPHILS ABSOLUTE: 3.47 E9/L (ref 1.8–7.3)
NEUTROPHILS RELATIVE PERCENT: 58.9 % (ref 43–80)
NITRITE, POC: NORMAL
PDW BLD-RTO: 14 FL (ref 11.5–15)
PH, POC: 6
PLATELET # BLD: 245 E9/L (ref 130–450)
PMV BLD AUTO: 9.5 FL (ref 7–12)
POTASSIUM SERPL-SCNC: 4.4 MMOL/L (ref 3.5–5)
PROTEIN, POC: NORMAL
RBC # BLD: 4.55 E12/L (ref 3.5–5.5)
SODIUM BLD-SCNC: 140 MMOL/L (ref 132–146)
SPECIFIC GRAVITY, POC: 1.02
TOTAL PROTEIN: 7.3 G/DL (ref 6.4–8.3)
UROBILINOGEN, POC: 0.2
WBC # BLD: 5.9 E9/L (ref 4.5–11.5)

## 2022-06-03 PROCEDURE — G8427 DOCREV CUR MEDS BY ELIG CLIN: HCPCS | Performed by: FAMILY MEDICINE

## 2022-06-03 PROCEDURE — 3023F SPIROM DOC REV: CPT | Performed by: FAMILY MEDICINE

## 2022-06-03 PROCEDURE — 99214 OFFICE O/P EST MOD 30 MIN: CPT | Performed by: FAMILY MEDICINE

## 2022-06-03 PROCEDURE — 81002 URINALYSIS NONAUTO W/O SCOPE: CPT | Performed by: FAMILY MEDICINE

## 2022-06-03 PROCEDURE — 3017F COLORECTAL CA SCREEN DOC REV: CPT | Performed by: FAMILY MEDICINE

## 2022-06-03 PROCEDURE — 1036F TOBACCO NON-USER: CPT | Performed by: FAMILY MEDICINE

## 2022-06-03 PROCEDURE — G8417 CALC BMI ABV UP PARAM F/U: HCPCS | Performed by: FAMILY MEDICINE

## 2022-06-03 RX ORDER — FLUCONAZOLE 150 MG/1
150 TABLET ORAL ONCE
Qty: 1 TABLET | Refills: 0 | Status: SHIPPED | OUTPATIENT
Start: 2022-06-03 | End: 2022-06-03

## 2022-06-03 RX ORDER — CEFDINIR 300 MG/1
300 CAPSULE ORAL 2 TIMES DAILY
Qty: 10 CAPSULE | Refills: 0 | Status: SHIPPED | OUTPATIENT
Start: 2022-06-03 | End: 2022-06-08

## 2022-06-03 ASSESSMENT — PATIENT HEALTH QUESTIONNAIRE - PHQ9
SUM OF ALL RESPONSES TO PHQ QUESTIONS 1-9: 0
SUM OF ALL RESPONSES TO PHQ QUESTIONS 1-9: 0
SUM OF ALL RESPONSES TO PHQ9 QUESTIONS 1 & 2: 0
SUM OF ALL RESPONSES TO PHQ QUESTIONS 1-9: 0
2. FEELING DOWN, DEPRESSED OR HOPELESS: 0
SUM OF ALL RESPONSES TO PHQ QUESTIONS 1-9: 0
1. LITTLE INTEREST OR PLEASURE IN DOING THINGS: 0

## 2022-06-03 ASSESSMENT — ENCOUNTER SYMPTOMS
COUGH: 0
WHEEZING: 0

## 2022-06-03 NOTE — PROGRESS NOTES
6/3/2022    Awilda Deshpande is a 60 y.o. female here for   Chief Complaint   Patient presents with    Back Pain    Dysuria     Regarding hypertension. Patient is not monitoring home blood pressures. Cardiovascular risk factors: dyslipidemia, hypertension, obesity (BMI >= 30 kg/m2), sedentary lifestyle and previous SAH. Patient does not smoke. reports that she quit smoking about 3 years ago. Her smoking use included cigarettes. She has a 64.50 pack-year smoking history. She has never used smokeless tobacco.   Currently on amlodipine 5 mg daily, chlorthalidone 25 mg daily, losartan 100 mg daily, metoprolol 37.5 mg twice daily. Taking as prescribed. No adverse effects. Today,  BP: 132/88 and she is asymptomatic. BP Readings from Last 3 Encounters:   06/03/22 132/88   04/14/22 100/61   02/25/22 128/79     Patient denies chest pain, diaphoresis, dyspnea, dyspnea on exertion, peripheral edema, palpitations, headache, vision changes. Urinary issues off and on  Frequency  Back pain  No dysuria  +itching  No hematuria    No constipation on a regular basis - takes a powder/ water mix  Her surgery went well  She just completed PT    Uses albuterol prn for copd  Not currently on controller      Wt Readings from Last 3 Encounters:   06/03/22 203 lb (92.1 kg)   05/05/22 200 lb (90.7 kg)   04/14/22 240 lb (108.9 kg)       She  reports that she quit smoking about 3 years ago. Her smoking use included cigarettes. She has a 64.50 pack-year smoking history. She has never used smokeless tobacco.    Medications and allergies reviewed and updated in chart.     Current Outpatient Medications   Medication Sig Dispense Refill    amLODIPine (NORVASC) 5 MG tablet TAKE ONE TABLET BY MOUTH EVERY DAY 30 tablet 3    chlorthalidone (HYGROTON) 25 MG tablet Take 1 tablet by mouth daily 30 tablet 3    fluticasone (FLONASE) 50 MCG/ACT nasal spray 2 sprays by Each Nostril route daily 1 each 3    gabapentin (NEURONTIN) 300 MG capsule Resp 16   Ht 5' 5\" (1.651 m)   Wt 203 lb (92.1 kg)   SpO2 96%   BMI 33.78 kg/m²   Physical Exam  Constitutional:       Appearance: She is well-developed. HENT:      Head: Normocephalic and atraumatic. Cardiovascular:      Rate and Rhythm: Normal rate and regular rhythm. Heart sounds: No murmur heard. No friction rub. No gallop. Pulmonary:      Effort: Pulmonary effort is normal.      Breath sounds: Normal breath sounds. No wheezing or rales. Skin:     General: Skin is warm and dry. Neurological:      Mental Status: She is alert and oriented to person, place, and time. Assessment/Plan:  Leesa Oquendo was seen today for back pain and dysuria. Diagnoses and all orders for this visit:    Dysuria  Consistent with UTI  Note presence of bilirubin - will check lfts, urine culture  -     POCT Urinalysis no Micro  -     CBC with Auto Differential; Future  -     Comprehensive Metabolic Panel; Future  -     Culture, Urine; Future    Pulmonary emphysema, unspecified emphysema type (HCC)  Mild  Prn albuterol    Bilirubinuria  -     CBC with Auto Differential; Future  -     Comprehensive Metabolic Panel; Future    Acute cystitis with hematuria  Treat with omnicef  Labs as above    Other orders  -     cefdinir (OMNICEF) 300 MG capsule; Take 1 capsule by mouth 2 times daily for 5 days  -     fluconazole (DIFLUCAN) 150 MG tablet; Take 1 tablet by mouth once for 1 dose For yeast infection as needed    F/u in 3 months    Advised patient to call with any new medication issues. All questions answered.   Call or go to emergency department ifsymptoms worsen or persist.

## 2022-06-06 LAB
ORGANISM: ABNORMAL
URINE CULTURE, ROUTINE: ABNORMAL

## 2022-06-08 RX ORDER — FLUCONAZOLE 150 MG/1
TABLET ORAL
Qty: 1 TABLET | Refills: 1 | Status: SHIPPED
Start: 2022-06-08 | End: 2022-10-16 | Stop reason: ALTCHOICE

## 2022-06-08 RX ORDER — FLUCONAZOLE 150 MG/1
TABLET ORAL
COMMUNITY
Start: 2022-06-03 | End: 2022-06-08 | Stop reason: SDUPTHER

## 2022-06-08 NOTE — PROGRESS NOTES
[address]  [address] [facility], [address] [place] [address] Chad  [phone]    Follow up Note      Edgard Ruiz     Date of Visit:  6/9/2022    CC:  Patient presents for follow up   Chief Complaint   Patient presents with    Back Pain    Neck Pain       HPI:    Pain is a little sore to left hip. Finished with PT. Sees ortho next month. Appropriate analgesia with current medications regimen: yes. Change in quality of symptoms:no. Medication side effects:none. Recent diagnostic testing:none. Recent interventional procedures:none. She has not been on anticoagulation medications to include none. The patient  has not been on herbal supplements. The patient is not diabetic.     Imaging studies:    Cervical XR 11/2019 Severe degenerative changes      Lumbar spine Xray 03/2019  Scoliosis and osteoarthritis.     Bilateral hip Xray 03/2019   Advanced osteoarthrosis of bilateral hips. Potential Aberrant Drug-Related Behavior: None     Urine Drug Screening:  First office visit saliva screen showed no narcotics   11/2019 Consistent, negative for all  06/2020 Consistent  12/2020 Consistent  06/2021 Consistent  04/2022 Consistent     OARRS report:  03/2019 consistent to 06/2021 Consistent  (norco on 9/24 from dentist for teeth extraction).   08/2021 Consistent to 06/2022 Consistent    Opioid Agreement:  10/07/2021    Past Medical History:   Diagnosis Date    Brain aneurysm 09/2018    H/O TIMES 2 THAT BURST PER PATIENT    Cerebral artery occlusion with cerebral infarction ([address]     RT SIDE AFFECTED-LEARNED TO WALK AND WRITE AGAIN    Degenerative arthritis of hand     Edentulous     Emphysema lung ([address]     lung dr Byrd Headache     Hiatal hernia     Hip problem     NEEDS HIP REPLACEMENT PER PATIENT    Hx of abnormal cervical Pap smear     Hypertension     Stroke (cerebrum) ([address]     Subarachnoid bleed ([address] 9/10/2018       Past Surgical History:   Procedure Laterality Date    BRAIN ANEURYSM SURGERY      coiling     COLONOSCOPY  08/30/2019    polyps--frank    COLONOSCOPY N/A 8/30/2019    COLONOSCOPY POLYPECTOMY SNARE/COLD BIOPSY performed by Nuzhat Mullins MD at [address]      total hip replacement left and right    OTHER SURGICAL HISTORY      FOR CANCER CELLS- DONE AT [facility]    [facility]      TOTAL HIP ARTHROPLASTY Left 2/22/2021    LEFT HIP TOTAL ARTHROPLASTY performed by Audrey Rahman MD at [address] [facility] Right 1/10/2022    RIGHT TOTAL HIP ARTHROPLASTY - STYKER performed by Audrey Rahman MD at [address]  08/30/2019    gastritis with superficial ulcerations; duodenitis--frank    UPPER GASTROINTESTINAL ENDOSCOPY N/A 8/30/2019    EGD BIOPSY performed by Nuzhat Mullins MD at [facility]       Prior to Admission medications    Medication Sig Start Date End Date Taking? Authorizing Provider   acetaminophen-codeine (TYLENOL #4) 300-60 MG per tablet Take 1 tablet by mouth daily as needed for Pain for up to 30 days.  6/12/22 7/12/22 Yes SPENSER Narvaez   fluconazole (DIFLUCAN) 150 MG tablet TAKE ONE TABLET BY MOUTH ONCE FOR 1 DOSE FOR YEAST INFECTION AS NEEDED 6/8/22  Yes Indira Bowser MD   amLODIPine (NORVASC) 5 MG tablet TAKE ONE TABLET BY MOUTH EVERY DAY 5/18/22  Yes Indira Bowser MD   chlorthalidone (HYGROTON) 25 MG tablet Take 1 tablet by mouth daily 5/18/22  Yes Indira Bowser MD   fluticasone (FLONASE) 50 MCG/ACT nasal spray 2 sprays by Each Nostril route daily 5/18/22  Yes Indira Bowser MD   gabapentin (NEURONTIN) 300 MG capsule TAKE ONE CAPSULE BY MOUTH THREE TIMES A DAY 5/18/22 8/14/22 Yes Indira Bowser MD   losartan (COZAAR) 100 MG tablet Take 1 tablet by mouth daily 5/18/22  Yes Indira Bowser MD   metoprolol tartrate (LOPRESSOR) 25 MG tablet Take 1.5 tablets by mouth 2 times daily 5/18/22 6/17/22 Yes Tiffanie Avina MD   pantoprazole (PROTONIX) 40 MG tablet TAKE ONE TABLET BY MOUTH EVERY DAY 5/18/22  Yes Tiffanie Avina MD   potassium chloride (KLOR-CON M) 10 MEQ extended release tablet TAKE ONE TABLET BY MOUTH DAILY WITH BREAKFAST 5/18/22  Yes Tiffanie Avina MD   albuterol sulfate  (90 Base) MCG/ACT inhaler INHALE TWO PUFFS BY MOUTH EVERY 6 HOURS AS NEEDED FOR WHEEZING. 5/18/22  Yes Tiffanie Avina MD   polyethylene glycol (GLYCOLAX) 17 g packet DISSOLVE 1 PACKET (17 GRAMS) IN LIQUID AND TAKE BY MOUTH DAILY 1/24/22  Yes Historical Provider, MD   aspirin 325 MG EC tablet Take 1 tablet by mouth 2 times daily 1/10/22 1/10/23 Yes Daniel Ramsay DO   valACYclovir (VALTREX) 1 g tablet Take 1 tablet by mouth daily For 5 days as needed for Herpes outbreak 4/27/21  Yes Tiffanie Avina MD   Multiple Vitamins-Minerals (THERAPEUTIC MULTIVITAMIN-MINERALS) tablet Take 1 tablet by mouth daily   Yes Historical Provider, MD   diclofenac sodium (VOLTAREN) 1 % GEL APPLY ONE GRAM EXTERNALLY TWICE A DAY TO NECK AND ONE GRAM EXTERNALLY TWICE A DAY TO BACK  11/3/20  Yes Cathlyn Citrin, [address] Devices (ROLLATOR) MISC 1 each by Does not apply route daily as needed (use as needed for stability) 3/17/20  Yes Tiffanie Avina MD       Allergies   Allergen Reactions    Latex Hives     Hives and rash    Iodine Rash     Hives . pt.  States anaphalyxis    Aloe Hives     Hives     Celebrex [Celecoxib] Itching    Fish-Derived Products Other (See Comments)       Social History     Socioeconomic History    Marital status:      Spouse name: Not on file    Number of children: Not on file    Years of education: Not on file    Highest education level: Not on file   Occupational History    Not on file   Tobacco Use    Smoking status: Former Smoker     Packs/day: 1.50     Years: 43.00     Pack years: 64.50     Types: Cigarettes     Quit date: 9/9/2018     Years since quitting: 3.7    Smokeless tobacco: Never Used   Vaping Use    Vaping Use: Never used   Substance and Sexual Activity    Alcohol use: No    Drug use: No    Sexual activity: Not Currently   Other Topics Concern    Not on file   Social History Narrative    Not on file     Social Determinants of Health     Financial Resource Strain: Low Risk     Difficulty of Paying Living Expenses: Not hard at all   Food Insecurity: No Food Insecurity    Worried About Running Out of Food in the Last Year: Never true    Man[place]ula of Food in the Last Year: Never true   Transportation Needs:     Lack of Transportation (Medical): Not on file    Lack of Transportation (Non-Medical): Not on file   Physical Activity:     Days of Exercise per Week: Not on file    Minutes of Exercise per Session: Not on file   Stress:     Feeling of Stress : Not on file   Social Connections:     Frequency of Communication with Friends and Family: Not on file    Frequency of Social Gatherings with Friends and Family: Not on file    Attends Rastafari Services: Not on file    Active Member of Clubs or Organizations: Not on file    Attends Club or Organization Meetings: Not on file    Marital Status: Not on file   Intimate Partner Violence:     Fear of Current or Ex-Partner: Not on file    Emotionally Abused: Not on file    Physically Abused: Not on file    Sexually Abused: Not on file   Housing Stability:     Unable to Pay for Housing in the Last Year: Not on file    Number of [place] in the Last Year: Not on file    Unstable Housing in the Last Year: Not on file       Family History   Problem Relation Age of Onset    Diabetes Mother     Arthritis Mother     Atrial Fibrillation Mother     Diabetes Father     Atrial Fibrillation Father     Arthritis Father     Emphysema Father     Cancer Sister        REVIEW OF SYSTEMS:     Nayana Scale denies fever/chills, chest pain, shortness of breath, new bowel or bladder complaints.  All other review of systems was negative. PHYSICAL EXAMINATION:      /80   Pulse 68   Temp 97.4 °F (36.3 °C) (Infrared)   Ht 5' 5\" (1.651 m)   Wt 203 lb (92.1 kg)   SpO2 98%   BMI 33.78 kg/m²     General:      General appearance:   pleasant and well-hydrated. , in no discomfort and A & O x3  Build:Overweight  Function:Rises from a seated position with difficulty    HEENT:    Head:normocephalic and atraumatic  Pupils:regular, round and equal.  Sclera: icterus absent,    Lungs:    Breathing:Normal expansion. No wheezing. Abdomen:    Shape:non-distended and normal    Extremities:    Tremors:None bilaterally upper and lower  Range of motion:Generally normal shoulders, negative log roll on the left. Intact:Yes  Varicose veins:not assessed   Cyanosis:none  Edema:Normal    Neurological:    Gait: Not observed. Dermatology:    Skin:no unusual rashes, no skin lesions, no palpable subcutaneous nodules and good skin turgor    Impression:    Patient seen for follow up for her chronic bilateral hip pain and low back pain due to severe degenerative changes and axial c/o neck pain   Would benefit from Cervical MBB, patient uninterested   Patient is s/p:  Left MERVAT on 2/22/2021. Patient is s/p right MERVAT on 01/10/2022  Left hip soreness at night. X-ray ordered. Do not suspect it is structural as pain is only when she lays on it. F/u with ortho next month. Call sooner with any issues. Continue Gabapentin 300 mg TID per PCP  Continue Tylenol #4 QD prn with 1 RF. Compounding cream. Helping a lot. OARRS report reviewed 06/2022  Patient encouraged to remain active as tolerated   Treatment plan discussed with the patient including medications and side effects       Controlled Substance Monitoring:    Acute and Chronic Pain Monitoring:   RX Monitoring 6/9/2022   Periodic Controlled Substance Monitoring Possible medication side effects, risk of tolerance/dependence & alternative treatments discussed. ;No signs of potential drug abuse or diversion identified. ;Assessed functional status. ;Obtaining appropriate analgesic effect of treatment. We discussed with the patient that combining opioids, benzodiazepines, alcohol, illicit drugs or sleep aids increases the risk of respiratory depression including death. We discussed that these medications may cause drowsiness, sedation or dizziness and have counseled the patient not to drive or operate machinery. We have discussed that these medications will be prescribed only by one provider. We have discussed with the patient about age related risk factors and have thoroughly discussed the importance of taking these medications as prescribed. The patient verbalizes understanding.     ccreferring physic

## 2022-06-09 ENCOUNTER — OFFICE VISIT (OUTPATIENT)
Dept: PAIN MANAGEMENT | Age: 62
End: 2022-06-09
Payer: MEDICARE

## 2022-06-09 VITALS
WEIGHT: 203 LBS | TEMPERATURE: 97.4 F | BODY MASS INDEX: 33.82 KG/M2 | HEIGHT: 65 IN | OXYGEN SATURATION: 98 % | DIASTOLIC BLOOD PRESSURE: 80 MMHG | HEART RATE: 68 BPM | SYSTOLIC BLOOD PRESSURE: 112 MMHG

## 2022-06-09 DIAGNOSIS — M54.16 LUMBAR RADICULITIS: ICD-10-CM

## 2022-06-09 DIAGNOSIS — M47.816 LUMBAR FACET ARTHROPATHY: ICD-10-CM

## 2022-06-09 DIAGNOSIS — M51.9 LUMBAR DISC DISORDER: ICD-10-CM

## 2022-06-09 DIAGNOSIS — G89.4 CHRONIC PAIN SYNDROME: Primary | ICD-10-CM

## 2022-06-09 DIAGNOSIS — M16.12 PRIMARY OSTEOARTHRITIS OF LEFT HIP: ICD-10-CM

## 2022-06-09 DIAGNOSIS — Z96.642 S/P TOTAL LEFT HIP ARTHROPLASTY: ICD-10-CM

## 2022-06-09 DIAGNOSIS — M47.816 LUMBAR SPONDYLOSIS: ICD-10-CM

## 2022-06-09 DIAGNOSIS — M16.11 ARTHRITIS OF RIGHT HIP: ICD-10-CM

## 2022-06-09 DIAGNOSIS — M47.812 FACET ARTHROPATHY, CERVICAL: ICD-10-CM

## 2022-06-09 DIAGNOSIS — M50.30 DEGENERATION OF CERVICAL DISC WITHOUT MYELOPATHY: ICD-10-CM

## 2022-06-09 PROCEDURE — 3017F COLORECTAL CA SCREEN DOC REV: CPT | Performed by: PHYSICIAN ASSISTANT

## 2022-06-09 PROCEDURE — 1036F TOBACCO NON-USER: CPT | Performed by: PHYSICIAN ASSISTANT

## 2022-06-09 PROCEDURE — G8417 CALC BMI ABV UP PARAM F/U: HCPCS | Performed by: PHYSICIAN ASSISTANT

## 2022-06-09 PROCEDURE — 99213 OFFICE O/P EST LOW 20 MIN: CPT

## 2022-06-09 PROCEDURE — 99213 OFFICE O/P EST LOW 20 MIN: CPT | Performed by: PHYSICIAN ASSISTANT

## 2022-06-09 PROCEDURE — G8427 DOCREV CUR MEDS BY ELIG CLIN: HCPCS | Performed by: PHYSICIAN ASSISTANT

## 2022-06-09 RX ORDER — ACETAMINOPHEN AND CODEINE PHOSPHATE 60; 300 MG/1; MG/1
1 TABLET ORAL DAILY PRN
Qty: 30 TABLET | Refills: 1 | Status: SHIPPED
Start: 2022-06-12 | End: 2022-08-01 | Stop reason: SDUPTHER

## 2022-06-09 NOTE — PROGRESS NOTES
Do you currently have any of the following:    Fever: No  Headache:  No  Cough: No  Shortness of breath: No  Exposed to anyone with these symptoms: No                                                                                                                Michaela Sweeney presents to the [facility] on 6/9/2022. Krista Jerez is complaining of pain in back and neck. The pain is constant. The pain is described as aching, shooting and sharp. Pain is rated on her best day at a 5, on her worst day at a 10, and on average at a 8 on the VAS scale. She took her last dose of Neurontin today. Krista Jerez does not have issues with constipation. Any procedures since your last visit: No.    She is  on NSAIDS and  is not on anticoagulation medications to include none and is managed by none. Pacemaker or defibrillator: No.    Medication Contract and Consent for Opioid Use Documents Filed     Patient Documents     Type of Document Status Date Received Received By Description    Medication Contract Received 3/1/2019  1:43 PM FRANCIS MCINTOSH PAIN MANAGEMENT PT AGREEMENT 3/1/2019    Medication Contract Received 10/8/2020  1:21 PM Maggie MCINTOSH [address] [place] [address] pain pt agreement    Medication Contract Received 10/7/2021 10:36 AM BOLIVAR TRUJILLO Pain Mgmt Patient Agreement 10. 7.2021                   Ht 5' 5\" (1.651 m)   Wt 203 lb (92.1 kg)   BMI 33.78 kg/m²      No LMP recorded.  Patient is postmenopausal.

## 2022-06-14 ENCOUNTER — HOSPITAL ENCOUNTER (OUTPATIENT)
Dept: CT IMAGING | Age: 62
Discharge: HOME | End: 2022-06-16
Payer: MEDICARE

## 2022-06-14 DIAGNOSIS — I72.9 ANEURYSM (HCC): ICD-10-CM

## 2022-06-14 PROCEDURE — 70496 CT ANGIOGRAPHY HEAD: CPT

## 2022-06-14 PROCEDURE — 2580000003 HC RX 258: Performed by: STUDENT IN AN ORGANIZED HEALTH CARE EDUCATION/TRAINING PROGRAM

## 2022-06-14 PROCEDURE — 6360000004 HC RX CONTRAST MEDICATION: Performed by: STUDENT IN AN ORGANIZED HEALTH CARE EDUCATION/TRAINING PROGRAM

## 2022-06-14 RX ORDER — SODIUM CHLORIDE 0.9 % (FLUSH) 0.9 %
10 SYRINGE (ML) INJECTION
Status: COMPLETED | OUTPATIENT
Start: 2022-06-14 | End: 2022-06-14

## 2022-06-14 RX ADMIN — SODIUM CHLORIDE, PRESERVATIVE FREE 10 ML: 5 INJECTION INTRAVENOUS at 12:22

## 2022-06-14 RX ADMIN — IOPAMIDOL 60 ML: 755 INJECTION, SOLUTION INTRAVENOUS at 12:22

## 2022-06-20 ENCOUNTER — TELEPHONE (OUTPATIENT)
Dept: CASE MANAGEMENT | Age: 62
End: 2022-06-20

## 2022-06-20 NOTE — TELEPHONE ENCOUNTER
No call, encounter opened to process CT Lung Screening. CT Lung Screen: 5/5/2022    Impression   1. There is no pulmonary infiltrate, mass or suspicious pulmonary nodule. 2. Mild emphysematous changes.       LUNG RADS:   Per ACR Lung-RADS Version 1.1       Category 1, Negative (No nodules and definitely benign nodules).       Management: Continue annual lung screening with LDCT in 12 months.       RECOMMENDATIONS:   If you would like to [facility] your [facility], please contact the Nurse Navigator at   [phone]. Pack years: 62.5    Social History     Tobacco Use  Smoking Status: Former Smoker    Start Date:    Quit Date: 09/09/2018   Types: Cigarettes   Packs/Day: 1.5   Years: 37   Pack Years: 64.5   Smokeless Tobacco: Never Used         Results letter sent to patient via my chart or mailed.      [facility]

## 2022-07-13 ENCOUNTER — HOSPITAL ENCOUNTER (OUTPATIENT)
Dept: GENERAL RADIOLOGY | Age: 62
Discharge: HOME | End: 2022-07-15
Payer: MEDICARE

## 2022-07-13 ENCOUNTER — OFFICE VISIT (OUTPATIENT)
Dept: ORTHOPEDIC SURGERY | Age: 62
End: 2022-07-13
Payer: MEDICARE

## 2022-07-13 DIAGNOSIS — Z96.641 H/O TOTAL HIP ARTHROPLASTY, RIGHT: Primary | ICD-10-CM

## 2022-07-13 DIAGNOSIS — R52 PAIN: ICD-10-CM

## 2022-07-13 PROCEDURE — G8417 CALC BMI ABV UP PARAM F/U: HCPCS | Performed by: PHYSICIAN ASSISTANT

## 2022-07-13 PROCEDURE — 3017F COLORECTAL CA SCREEN DOC REV: CPT | Performed by: PHYSICIAN ASSISTANT

## 2022-07-13 PROCEDURE — 1036F TOBACCO NON-USER: CPT | Performed by: PHYSICIAN ASSISTANT

## 2022-07-13 PROCEDURE — 99215 OFFICE O/P EST HI 40 MIN: CPT | Performed by: PHYSICIAN ASSISTANT

## 2022-07-13 PROCEDURE — 73502 X-RAY EXAM HIP UNI 2-3 VIEWS: CPT

## 2022-07-13 PROCEDURE — G8427 DOCREV CUR MEDS BY ELIG CLIN: HCPCS | Performed by: PHYSICIAN ASSISTANT

## 2022-07-13 PROCEDURE — 99212 OFFICE O/P EST SF 10 MIN: CPT

## 2022-07-13 NOTE — PROGRESS NOTES
Chief Complaint   Patient presents with    Follow Up After Procedure     Right MERVAT  DOS 1-       OP:SURGEON: Dr. Josefa Orta MD  DATE OF PROCEDURE: 1-10-22  PROCEDURE: Right Total Hip Arthroplasty    Subjective:  Pama [place] is approximately 6 mostly above date of surgery. Still convinced she has leg length inequality and states her L LE is longer than her R. Has not received heel lifts yet. States she received home health PT that she finished a while ago, but never went to outpatient PT due to transportation issues. States she has a gait imbalance and states she has a \"penguin waddle\". Says she just transitioned from a walker to a cane today. Says she has pain throughout the day, but worse after activity when she finally sits down in a chair. Review of Systems -  all pertinent positives and negatives in HPI. Objective:    General: Alert and oriented X 3, normocephalic atraumatic, external ears and eye normal, sclera clear, no acute distress, respirations easy and unlabored with no audible wheezes, skin warm and dry, speech and dress appropriate for noted age, affect euthymic. Extremity:  Bilateral Lower Extremity  Skin clean dry and intact, without signs of infection  Incisions healed  Log roll negative   passive IR and ER without tenderness  LUIGI and FADIR negative   Moderate TTP at bilateral greater trochanters   No effusions or edema noted  Compartments supple throughout thigh and leg  Calf supple and nontender  Demonstrates active knee flexion/extension, ankle plantar/dorsiflexion/great toe extension. States sensation intact to touch in sural/deep peroneal/superficial peroneal/saphenous/posterior tibial nerve distributions to foot/ankle. Palpable dorsalis pedis and posterior tibialis pulses, cap refill brisk in toes, foot warm/perfused. Ambulates with cane with trendelenburg gait        XR:    3 views of R hip and pelvis demonstrating bilateral MERVAT.  Arthroplasties intact without interval displacement, loosening, or failure. No significant change in alignment. No acute fractures or dislocations or any other osseus abnormality identified. Assessment:   Diagnosis Orders   1. H/O total hip arthroplasty, right  Amb External Referral To Physical Therapy       Plan:   Reviewed x-rays with patient today in office    Extensive conversation regarding the patient feeling as though she has a leg length discrepancy. Strongly recommend PT as this is probably most muscular. AP pelvis view extensively discussed showing nearly uniform length of lesser trochanters. She also has a clear trendelenburg gait and is just now trying to come off of her walker, so recommend further strengthening B LE. Also discussed that her arthroplasties appear stable and appropriate on XR today.  Discussed with Dr. Flory Daigle    Extensive time spent over 40 mins with more than half of that time reviewing charts, imaging, medications, treatment plan, educating and counseling the patient. Follow up in 6 months with XR of the R hip and pelvis    Electronically signed by Geoffrey Reardon PA-C on 7/13/2022 at 4:16 PM  Note: This report was completed using [employer] voiced recognition software. Every effort has been made to ensure accuracy; however, inadvertent computerized transcription errors may be present.

## 2022-07-29 NOTE — PROGRESS NOTES
[address]  [address] [facility], [address] [place] [address] Chad  [phone]    Follow up Note      Salvador Wills     Date of Visit:  8/1/2022    CC:  Patient presents for follow up   Chief Complaint   Patient presents with    Follow-up     Back pain          HPI:    Pain is unchanged. No new changes. Appropriate analgesia with current medications regimen: yes. Change in quality of symptoms:no. Medication side effects:none. Recent diagnostic testing:none. Recent interventional procedures:none. She has not been on anticoagulation medications to include none. The patient  has not been on herbal supplements. The patient is not diabetic. Imaging studies:    06/09/222 Left hip x-ray    FINDINGS:   Anatomically aligned left MERVAT with no abnormal bone or soft tissue findings. Impression   Left MERVAT with no unexpected findings. Cervical XR 11/2019 Severe degenerative changes      Lumbar spine Xray 03/2019  Scoliosis and osteoarthritis. Bilateral hip Xray 03/2019   Advanced osteoarthrosis of bilateral hips. Potential Aberrant Drug-Related Behavior: None     Urine Drug Screening:  First office visit saliva screen showed no narcotics   11/2019 Consistent, negative for all  06/2020 Consistent  12/2020 Consistent  06/2021 Consistent  04/2022 Consistent     OARRS report:  03/2019 consistent to 06/2021 Consistent  (norco on 9/24 from dentist for teeth extraction).   08/2021 Consistent to 08/2022 Consistent    Opioid Agreement:  10/07/2021    Past Medical History:   Diagnosis Date    Brain aneurysm 09/2018    H/O TIMES 2 THAT BURST PER PATIENT    Cerebral artery occlusion with cerebral infarction ([address]     RT SIDE AFFECTED-LEARNED TO WALK AND WRITE AGAIN    Degenerative arthritis of hand     Edentulous     Emphysema lung ([address]     lung dr    Headache     Hiatal hernia     Hip problem     NEEDS HIP REPLACEMENT PER PATIENT Hx of abnormal cervical Pap smear     Hypertension     Stroke (cerebrum) (HCC)     Subarachnoid bleed ([facility] 9/10/2018       Past Surgical History:   Procedure Laterality Date    BRAIN ANEURYSM SURGERY      coiling     COLONOSCOPY  08/30/2019    polyps--frank    COLONOSCOPY N/A 8/30/2019    COLONOSCOPY POLYPECTOMY SNARE/COLD BIOPSY performed by Carson Bailey MD at [address]      total hip replacement left and right    OTHER SURGICAL HISTORY      FOR CANCER CELLS- DONE AT [facility] HIP ARTHROPLASTY Left 2/22/2021    LEFT HIP TOTAL ARTHROPLASTY performed by Marceil Landau, MD at [address] Right 1/10/2022    RIGHT TOTAL HIP ARTHROPLASTY - STYKER performed by Marceil Landau, MD at [address] [facility]  08/30/2019    gastritis with superficial ulcerations; duodenitis--frank    UPPER GASTROINTESTINAL ENDOSCOPY N/A 8/30/2019    EGD BIOPSY performed by Carson Bailey MD at [address]       Prior to Admission medications    Medication Sig Start Date End Date Taking?  Authorizing Provider   carBAMazepine (TEGRETOL-XR) 100 MG extended release tablet Take 1 tablet by mouth in the morning, at noon, and at bedtime 6/16/22  Yes Tang Lucero MD   fluconazole (DIFLUCAN) 150 MG tablet TAKE ONE TABLET BY MOUTH ONCE FOR 1 DOSE FOR YEAST INFECTION AS NEEDED 6/8/22  Yes Vicky Patel MD   amLODIPine (NORVASC) 5 MG tablet TAKE ONE TABLET BY MOUTH EVERY DAY 5/18/22  Yes Vicky Patel MD   chlorthalidone (HYGROTON) 25 MG tablet Take 1 tablet by mouth daily 5/18/22  Yes Vicky Patel MD   fluticasone (FLONASE) 50 MCG/ACT nasal spray 2 sprays by Each Nostril route daily 5/18/22  Yes Vicky Patel MD   gabapentin (NEURONTIN) 300 MG capsule TAKE ONE CAPSULE BY MOUTH THREE TIMES A DAY 5/18/22 8/14/22 Yes Vicky Patel MD   losartan (COZAAR) 100 MG tablet Take 1 tablet by mouth daily 5/18/22  Yes Roman Gardner MD   pantoprazole (PROTONIX) 40 MG tablet TAKE ONE TABLET BY MOUTH EVERY DAY 5/18/22  Yes Roman Gardner MD   potassium chloride (KLOR-CON M) 10 MEQ extended release tablet TAKE ONE TABLET BY MOUTH DAILY WITH BREAKFAST 5/18/22  Yes Roman Gardner MD   albuterol sulfate  (90 Base) MCG/ACT inhaler INHALE TWO PUFFS BY MOUTH EVERY 6 HOURS AS NEEDED FOR WHEEZING. 5/18/22  Yes Roman Gardner MD   polyethylene glycol (GLYCOLAX) 17 g packet DISSOLVE 1 PACKET (17 GRAMS) IN LIQUID AND TAKE BY MOUTH DAILY 1/24/22  Yes Historical Provider, MD   valACYclovir (VALTREX) 1 g tablet Take 1 tablet by mouth daily For 5 days as needed for Herpes outbreak 4/27/21  Yes Roman Gardner MD   Multiple Vitamins-Minerals (THERAPEUTIC MULTIVITAMIN-MINERALS) tablet Take 1 tablet by mouth daily   Yes Historical Provider, MD   diclofenac sodium (VOLTAREN) 1 % GEL APPLY ONE GRAM EXTERNALLY TWICE A DAY TO NECK AND ONE GRAM EXTERNALLY TWICE A DAY TO BACK  11/3/20  Yes Rafa Shira, [address] Devices (ROLLATOR) MISC 1 each by Does not apply route daily as needed (use as needed for stability) 3/17/20  Yes Roman Gardner MD   metoprolol tartrate (LOPRESSOR) 25 MG tablet Take 1.5 tablets by mouth 2 times daily 5/18/22 6/17/22  Roman Gardner MD   aspirin 325 MG EC tablet Take 1 tablet by mouth 2 times daily  Patient not taking: Reported on 8/1/2022 1/10/22 1/10/23  Twilla Liner, DO       Allergies   Allergen Reactions    Latex Hives     Hives and rash    Iodine Rash     Hives . pt.  States anaphalyxis    Aloe Hives     Hives     Celebrex [Celecoxib] Itching    Fish-Derived Products Other (See Comments)       Social History     Socioeconomic History    Marital status:      Spouse name: Not on file    Number of children: Not on file    Years of education: Not on file    Highest education level: Not on file   Occupational History    Not on file   Tobacco Use    Smoking status: Former     Packs/day: 1.50 Years: 43.00     Pack years: 64.50     Types: Cigarettes     Quit date: 9/9/2018     Years since quitting: 3.8    Smokeless tobacco: Never   Vaping Use    Vaping Use: Never used   Substance and Sexual Activity    Alcohol use: No    Drug use: No    Sexual activity: Not Currently   Other Topics Concern    Not on file   Social History Narrative    Not on file     Social Determinants of Health     Financial Resource Strain: Low Risk     Difficulty of Paying Living Expenses: Not hard at all   Food Insecurity: No Food Insecurity    Worried About Running Out of Food in the Last Year: Never true    Ran Out of Food in the Last Year: Never true   Transportation Needs: Not on file   Physical Activity: Not on file   Stress: Not on file   Social Connections: Not on file   Intimate Partner Violence: Not on file   Housing Stability: Not on file       Family History   Problem Relation Age of Onset    Diabetes Mother     Arthritis Mother     Atrial Fibrillation Mother     Diabetes Father     Atrial Fibrillation Father     Arthritis Father     Emphysema Father     Cancer Sister        REVIEW OF SYSTEMS:     Vinicius Quiles denies fever/chills, chest pain, shortness of breath, new bowel or bladder complaints. All other review of systems was negative. PHYSICAL EXAMINATION:      BP (!) 142/72   Pulse 75   Temp 96.9 °F (36.1 °C)   Resp 16   Ht 5' 6\" (1.676 m)   Wt 203 lb (92.1 kg)   SpO2 98%   BMI 32.77 kg/m²     General:      General appearance:   pleasant and well-hydrated. , in no discomfort and A & O x3  Build:Overweight  Function:Rises from a seated position with difficulty    HEENT:    Head:normocephalic and atraumatic  Pupils:regular, round and equal.  Sclera: icterus absent,    Lungs:    Breathing:Normal expansion. No wheezing.     Abdomen:    Shape:non-distended and normal    Extremities:    Tremors:None bilaterally upper and lower  Range of motion:Generally normal shoulders, negative log roll on the left.  Intact:Yes  Varicose veins:not assessed   Cyanosis:none  Edema:Normal    Neurological:    Gait: Not observed. Dermatology:    Skin:no unusual rashes, no skin lesions, no palpable subcutaneous nodules and good skin turgor    Impression:    Patient seen for follow up for her chronic bilateral hip pain and low back pain due to severe degenerative changes and axial c/o neck pain   Would benefit from Cervical MBB, patient uninterested   Patient is s/p:  Left MERVAT on 2/22/2021. Patient is s/p right MERVAT on 01/10/2022  Left hip soreness at last visit. X-ray reviewed. No pain at this time. Continue Gabapentin 300 mg TID per PCP  Continue Tylenol #4 QD prn with 1 RF. Compounding cream. Helping a lot. OARRS report reviewed 08/2022  Patient encouraged to remain active as tolerated   Treatment plan discussed with the patient including medications and side effects     Controlled Substance Monitoring:    Acute and Chronic Pain Monitoring:   RX Monitoring 8/1/2022   Periodic Controlled Substance Monitoring Possible medication side effects, risk of tolerance/dependence & alternative treatments discussed. ;No signs of potential drug abuse or diversion identified. ;Assessed functional status. ;Obtaining appropriate analgesic effect of treatment. We discussed with the patient that combining opioids, benzodiazepines, alcohol, illicit drugs or sleep aids increases the risk of respiratory depression including death. We discussed that these medications may cause drowsiness, sedation or dizziness and have counseled the patient not to drive or operate machinery. We have discussed that these medications will be prescribed only by one provider. We have discussed with the patient about age related risk factors and have thoroughly discussed the importance of taking these medications as prescribed. The patient verbalizes understanding.     ccreferring physic

## 2022-08-01 ENCOUNTER — OFFICE VISIT (OUTPATIENT)
Dept: PAIN MANAGEMENT | Age: 62
End: 2022-08-01
Payer: MEDICARE

## 2022-08-01 VITALS
WEIGHT: 203 LBS | SYSTOLIC BLOOD PRESSURE: 142 MMHG | HEART RATE: 75 BPM | BODY MASS INDEX: 32.62 KG/M2 | HEIGHT: 66 IN | RESPIRATION RATE: 16 BRPM | TEMPERATURE: 96.9 F | DIASTOLIC BLOOD PRESSURE: 72 MMHG | OXYGEN SATURATION: 98 %

## 2022-08-01 DIAGNOSIS — G89.4 CHRONIC PAIN SYNDROME: Primary | ICD-10-CM

## 2022-08-01 DIAGNOSIS — M54.16 LUMBAR RADICULITIS: ICD-10-CM

## 2022-08-01 DIAGNOSIS — M16.11 ARTHRITIS OF RIGHT HIP: ICD-10-CM

## 2022-08-01 DIAGNOSIS — M47.816 LUMBAR FACET ARTHROPATHY: ICD-10-CM

## 2022-08-01 DIAGNOSIS — Z96.642 S/P TOTAL LEFT HIP ARTHROPLASTY: ICD-10-CM

## 2022-08-01 DIAGNOSIS — M47.812 FACET ARTHROPATHY, CERVICAL: ICD-10-CM

## 2022-08-01 DIAGNOSIS — M47.816 LUMBAR SPONDYLOSIS: ICD-10-CM

## 2022-08-01 DIAGNOSIS — M50.30 DEGENERATION OF CERVICAL DISC WITHOUT MYELOPATHY: ICD-10-CM

## 2022-08-01 DIAGNOSIS — E66.01 SEVERE OBESITY (BMI 35.0-39.9) WITH COMORBIDITY (HCC): ICD-10-CM

## 2022-08-01 DIAGNOSIS — M16.12 PRIMARY OSTEOARTHRITIS OF LEFT HIP: ICD-10-CM

## 2022-08-01 DIAGNOSIS — M51.9 LUMBAR DISC DISORDER: ICD-10-CM

## 2022-08-01 PROCEDURE — 1036F TOBACCO NON-USER: CPT | Performed by: PHYSICIAN ASSISTANT

## 2022-08-01 PROCEDURE — 99213 OFFICE O/P EST LOW 20 MIN: CPT | Performed by: PHYSICIAN ASSISTANT

## 2022-08-01 PROCEDURE — G8427 DOCREV CUR MEDS BY ELIG CLIN: HCPCS | Performed by: PHYSICIAN ASSISTANT

## 2022-08-01 PROCEDURE — 3017F COLORECTAL CA SCREEN DOC REV: CPT | Performed by: PHYSICIAN ASSISTANT

## 2022-08-01 PROCEDURE — G8417 CALC BMI ABV UP PARAM F/U: HCPCS | Performed by: PHYSICIAN ASSISTANT

## 2022-08-01 RX ORDER — ACETAMINOPHEN AND CODEINE PHOSPHATE 60; 300 MG/1; MG/1
1 TABLET ORAL DAILY PRN
Qty: 30 TABLET | Refills: 1 | Status: SHIPPED
Start: 2022-08-10 | End: 2022-09-26 | Stop reason: SDUPTHER

## 2022-08-01 NOTE — PROGRESS NOTES
Do you currently have any of the following:    Fever: No  Headache:  No  Cough: No  Shortness of breath: No  Exposed to anyone with these symptoms: No                                                                                                                Michelle Jacobo presents to the [facility] on 8/1/2022. Deanna Avitia is complaining of pain in back . The pain is constant. The pain is described as throbbing, shooting, and stabbing. Pain is rated on her best day at a 5, on her worst day at a 10, and on average at a 8 on the VAS scale. She took her last dose of Neurontin and Tylenol with codeine 7/31/22. Deanna Avitia does have issues with constipation. Any procedures since your last visit: No    She is  on NSAIDS and  is not on anticoagulation medication. Pacemaker or defibrillator. Medication Contract and Consent for Opioid Use Documents Filed       Patient Documents       Type of Document Status Date Received Received By Description    Medication Contract Received 3/1/2019  1:43 PM FRANCIS MCINTOSH PAIN MANAGEMENT PT AGREEMENT 3/1/2019    Medication Contract Received 10/8/2020  1:21 PM Maggie MCINTOSH [address] [place] [address] pain pt agreement    Medication Contract Received 10/7/2021 10:36 AM BOLIVAR TRUJILLO Pain Mgmt Patient Agreement 10. 7.2021                       There were no vitals taken for this visit. No LMP recorded.  Patient is postmenopausal.

## 2022-08-05 ENCOUNTER — CLINICAL DOCUMENTATION (OUTPATIENT)
Dept: SPIRITUAL SERVICES | Age: 62
End: 2022-08-05

## 2022-08-05 ENCOUNTER — OFFICE VISIT (OUTPATIENT)
Dept: FAMILY MEDICINE CLINIC | Age: 62
End: 2022-08-05
Payer: MEDICARE

## 2022-08-05 VITALS
HEART RATE: 67 BPM | WEIGHT: 230 LBS | DIASTOLIC BLOOD PRESSURE: 70 MMHG | RESPIRATION RATE: 18 BRPM | BODY MASS INDEX: 36.96 KG/M2 | HEIGHT: 66 IN | SYSTOLIC BLOOD PRESSURE: 137 MMHG | OXYGEN SATURATION: 96 %

## 2022-08-05 DIAGNOSIS — I10 HYPERTENSION, UNSPECIFIED TYPE: ICD-10-CM

## 2022-08-05 DIAGNOSIS — R30.0 DYSURIA: ICD-10-CM

## 2022-08-05 DIAGNOSIS — Z76.0 MEDICATION REFILL: ICD-10-CM

## 2022-08-05 DIAGNOSIS — G89.4 CHRONIC PAIN SYNDROME: ICD-10-CM

## 2022-08-05 DIAGNOSIS — Z12.11 SCREEN FOR COLON CANCER: ICD-10-CM

## 2022-08-05 DIAGNOSIS — Z00.00 MEDICARE ANNUAL WELLNESS VISIT, SUBSEQUENT: Primary | ICD-10-CM

## 2022-08-05 LAB
BILIRUBIN, POC: NORMAL
BLOOD URINE, POC: NORMAL
CLARITY, POC: CLEAR
COLOR, POC: YELLOW
GLUCOSE URINE, POC: NORMAL
KETONES, POC: NORMAL
LEUKOCYTE EST, POC: NORMAL
NITRITE, POC: NORMAL
PH, POC: 5.5
PROTEIN, POC: NORMAL
SPECIFIC GRAVITY, POC: >=1.03
UROBILINOGEN, POC: 0.2

## 2022-08-05 PROCEDURE — G0439 PPPS, SUBSEQ VISIT: HCPCS | Performed by: FAMILY MEDICINE

## 2022-08-05 PROCEDURE — 3017F COLORECTAL CA SCREEN DOC REV: CPT | Performed by: FAMILY MEDICINE

## 2022-08-05 PROCEDURE — 81002 URINALYSIS NONAUTO W/O SCOPE: CPT | Performed by: FAMILY MEDICINE

## 2022-08-05 RX ORDER — ALBUTEROL SULFATE 90 UG/1
AEROSOL, METERED RESPIRATORY (INHALATION)
Qty: 1 EACH | Refills: 3 | Status: SHIPPED | OUTPATIENT
Start: 2022-08-05

## 2022-08-05 RX ORDER — CHLORTHALIDONE 25 MG/1
25 TABLET ORAL DAILY
Qty: 30 TABLET | Refills: 3 | Status: SHIPPED | OUTPATIENT
Start: 2022-08-05

## 2022-08-05 RX ORDER — AMLODIPINE BESYLATE 5 MG/1
TABLET ORAL
Qty: 30 TABLET | Refills: 3 | Status: SHIPPED | OUTPATIENT
Start: 2022-08-05

## 2022-08-05 RX ORDER — GABAPENTIN 300 MG/1
CAPSULE ORAL
Qty: 90 CAPSULE | Refills: 3 | Status: SHIPPED | OUTPATIENT
Start: 2022-08-05 | End: 2022-11-01

## 2022-08-05 RX ORDER — POTASSIUM CHLORIDE 750 MG/1
TABLET, EXTENDED RELEASE ORAL
Qty: 30 TABLET | Refills: 3 | Status: SHIPPED | OUTPATIENT
Start: 2022-08-05

## 2022-08-05 RX ORDER — LOSARTAN POTASSIUM 100 MG/1
100 TABLET ORAL DAILY
Qty: 30 TABLET | Refills: 3 | Status: SHIPPED | OUTPATIENT
Start: 2022-08-05

## 2022-08-05 RX ORDER — FLUTICASONE PROPIONATE 50 MCG
2 SPRAY, SUSPENSION (ML) NASAL DAILY
Qty: 1 EACH | Refills: 3 | Status: SHIPPED | OUTPATIENT
Start: 2022-08-05

## 2022-08-05 RX ORDER — PANTOPRAZOLE SODIUM 40 MG/1
TABLET, DELAYED RELEASE ORAL
Qty: 30 TABLET | Refills: 3 | Status: SHIPPED | OUTPATIENT
Start: 2022-08-05

## 2022-08-05 ASSESSMENT — PATIENT HEALTH QUESTIONNAIRE - PHQ9
2. FEELING DOWN, DEPRESSED OR HOPELESS: 1
SUM OF ALL RESPONSES TO PHQ QUESTIONS 1-9: 2
SUM OF ALL RESPONSES TO PHQ9 QUESTIONS 1 & 2: 2
DEPRESSION UNABLE TO ASSESS: FUNCTIONAL CAPACITY MOTIVATION LIMITS ACCURACY
1. LITTLE INTEREST OR PLEASURE IN DOING THINGS: 1

## 2022-08-05 ASSESSMENT — LIFESTYLE VARIABLES
HOW OFTEN DO YOU HAVE A DRINK CONTAINING ALCOHOL: NEVER
HOW MANY STANDARD DRINKS CONTAINING ALCOHOL DO YOU HAVE ON A TYPICAL DAY: PATIENT DOES NOT DRINK

## 2022-08-05 NOTE — PATIENT INSTRUCTIONS
Personalized Preventive Plan for Lluvia Silverman - 8/5/2022  Medicare offers a range of preventive health benefits. Some of the tests and screenings are paid in full while other may be subject to a deductible, co-insurance, and/or copay. Some of these benefits include a comprehensive review of your medical history including lifestyle, illnesses that may run in your family, and various assessments and screenings as appropriate. After reviewing your medical record and screening and assessments performed today your provider may have ordered immunizations, labs, imaging, and/or referrals for you. A list of these orders (if applicable) as well as your Preventive Care list are included within your After Visit Summary for your review. Other Preventive Recommendations:    A preventive eye exam performed by an eye specialist is recommended every 1-2 years to screen for glaucoma; cataracts, macular degeneration, and other eye disorders. A preventive dental visit is recommended every 6 months. Try to get at least 150 minutes of exercise per week or 10,000 steps per day on a pedometer . Order or download the FREE \"Exercise & Physical Activity: Your Everyday Guide\" from [employer]. Call 6-676.368.9996 or search [employer] online. You need 5831-9789 mg of calcium and 0665-5355 IU of vitamin D per day. It is possible to meet your calcium requirement with diet alone, but a vitamin D supplement is usually necessary to meet this goal.  When exposed to the sun, use a sunscreen that protects against both UVA and UVB radiation with an SPF of 30 or greater. Reapply every 2 to 3 hours or after sweating, drying off with a towel, or swimming. Always wear a seat belt when traveling in a car. Always wear a helmet when riding a bicycle or motorcycle.

## 2022-08-05 NOTE — ACP (ADVANCE CARE PLANNING)
Advance Care Planning   Ambulatory ACP Specialist Patient Outreach    Date:  8/5/2022  ACP Specialist:  Donnie Sol    Outreach call to patient in follow-up to ACP Specialist referral from: Dorothea Austin MD    [x] PCP  [] Provider   [] Ambulatory Care Management [] Other for Reason:    [x] Advance Directive Assistance  [] Code Status Discussion  [] Complete Portable DNR Order  [] Discuss Goals of Care  [] Complete POST/MOST  [] Early ACP Decision-Making  [] Other    Date Referral Received:8/5/22    Today's Outreach:  [x] First   [] Second  [] Third                               Third outreach made by []  phone  [] email []   [employer]     Intervention:  [] Spoke with Patient  [x] Left VM requesting return call    Spoke with her daughter    Outcome: I left a voice message requesting a return call and mailing documents to her. I will continue to follow up. Next Step:   [] ACP scheduled conversation  [] Outreach again in one week               [x] Email / Mail ACP Info Sheets  [x] Email / Mail Advance Directive            [] Close Referral. Routing closure to referring provider/staff and to ACP Specialist . [] Closure Letter mailed to Patient with Invitation to Contact ACP Specialist if/when ready.     Thank you for this referral.

## 2022-08-08 LAB — URINE CULTURE, ROUTINE: NORMAL

## 2022-08-10 ENCOUNTER — TELEPHONE (OUTPATIENT)
Dept: PULMONOLOGY | Age: 62
End: 2022-08-10

## 2022-08-10 ENCOUNTER — TELEPHONE (OUTPATIENT)
Dept: FAMILY MEDICINE CLINIC | Age: 62
End: 2022-08-10

## 2022-08-10 DIAGNOSIS — J43.9 PULMONARY EMPHYSEMA, UNSPECIFIED EMPHYSEMA TYPE (HCC): Primary | ICD-10-CM

## 2022-08-10 NOTE — TELEPHONE ENCOUNTER
----- Message from [address] [place] [address] [address] [facility] sent at 8/10/2022  9:24 AM EDT -----  Subject: Referral Request    Reason for referral request? Her previous Lung Doctor Raysa Ford is   no longer practicing at [facility] Neurologist has   retired. Provider patient wants to be referred to(if known):     Provider Phone Number(if known): Additional Information for Provider? Please call patient when referral has   been created;  This has been requested in the past Stacey Barakat MD;   Kyle Zelaya ; 8/5/2022  ---------------------------------------------------------------------------  --------------  [facility]    [phone]; OK to leave message on voicemail  ---------------------------------------------------------------------------  --------------

## 2022-08-10 NOTE — TELEPHONE ENCOUNTER
Call returned to pt after message requesting to re-establish as patient in our office since Dr. Irvin Meraz has left the practice. Advised pt that office will arrange for appt in office with new pulmonary provider and also testing to be done prior to appt. Pt requests Thursday appts since her son brings her(she does not dirve).

## 2022-08-24 NOTE — ACP (ADVANCE CARE PLANNING)
Advance Care Planning   Ambulatory ACP Specialist Patient Outreach    Date:  8/5/2022  ACP Specialist:  Denisse Benítez    Outreach call to patient in follow-up to ACP Specialist referral from: Alejo Colin MD    [x] PCP  [] Provider   [] Ambulatory Care Management [] Other for Reason:    [x] Advance Directive Assistance  [] Code Status Discussion  [] Complete Portable DNR Order  [] Discuss Goals of Care  [] Complete POST/MOST  [] Early ACP Decision-Making  [] Other    Date Referral Received:8/5/22    Today's Outreach:  [] First   [x] Second  [] Third                               Third outreach made by []  phone  [] email []   Apps[employer]All     Intervention:  [x] Spoke with Patient  [] Left VM requesting return call      Outcome:Scheduled appointment for Oct. 6 @ 11:30. On 10/6 Edgard Marie missed appointment but does want to reschedule. Next Step:   [x] ACP scheduled conversation  [] Outreach again in one week               [] Email / Mail ACP Info Sheets  [] Email / Mail Advance Directive            [] Close Referral. Routing closure to referring provider/staff and to ACP Specialist . [] Closure Letter mailed to Patient with Invitation to Contact ACP Specialist if/when ready.     Thank you for this referral.

## 2022-09-02 ENCOUNTER — TELEPHONE (OUTPATIENT)
Dept: PULMONOLOGY | Age: 62
End: 2022-09-02

## 2022-09-02 NOTE — TELEPHONE ENCOUNTER
Mailed a letter to patient informing her that her PFT is scheduled for 10-6-22 at 11:00 am at the [facility]. She must arrive by 10:30 am. She is to have no caffeine for 24 hours prior to test and no resp meds for at least 4 hours prior to test. She must also bring in proof of her COVID vaccines

## 2022-09-08 ENCOUNTER — TELEPHONE (OUTPATIENT)
Dept: FAMILY MEDICINE CLINIC | Age: 62
End: 2022-09-08

## 2022-09-08 NOTE — TELEPHONE ENCOUNTER
----- Message from [place] sent at 9/2/2022  9:30 AM EDT -----  Subject: Referral Request    Reason for referral request? Gynecologists  Provider patient wants to be referred to(if known):     Provider Phone Number(if known): Additional Information for Provider? Pt need a new Gynecologists.   ---------------------------------------------------------------------------  --------------  Robert LYN    [phone]; OK to leave message on voicemail  ---------------------------------------------------------------------------  --------------

## 2022-09-12 ENCOUNTER — TELEPHONE (OUTPATIENT)
Dept: FAMILY MEDICINE CLINIC | Age: 62
End: 2022-09-12

## 2022-09-12 DIAGNOSIS — N30.00 ACUTE CYSTITIS WITHOUT HEMATURIA: Primary | ICD-10-CM

## 2022-09-13 RX ORDER — CEFDINIR 300 MG/1
300 CAPSULE ORAL 2 TIMES DAILY
Qty: 10 CAPSULE | Refills: 0 | Status: SHIPPED | OUTPATIENT
Start: 2022-09-13 | End: 2022-09-18

## 2022-09-13 NOTE — TELEPHONE ENCOUNTER
She needs more assessment. We can try a telehealth visit  What symptom is she having? For how long? What has she tried if anything so far  Any fevers/ chills  Any productive sputum? Any covid exposures ?

## 2022-09-13 NOTE — TELEPHONE ENCOUNTER
Needs to have urine culture sent/ urinalysis too   If she can do this at outpatient lab that would be okay    Omnicef sent to pharmacy to treat UTI.    Again, it is important she gets her urine sent before she starts antibiotic    thanks

## 2022-09-13 NOTE — TELEPHONE ENCOUNTER
Symptoms for cough, no covid exp, 3-4 days, no sputum has been using nasal spray (Flonase) and inhaler, that didn't help like before, sometimes produces phlegm, doesn't seem concerned with color, feels like she has kidney infection, back hurts, freq urination, doesn't produce much liquid though, as of today burns when urinating, couple days ago is when that started, usually gets yeast inf from antibiotic as well

## 2022-09-20 ENCOUNTER — TELEPHONE (OUTPATIENT)
Dept: ADMINISTRATIVE | Age: 62
End: 2022-09-20

## 2022-09-20 NOTE — TELEPHONE ENCOUNTER
Patient having a lot of right hip pain and cramping on both side. Requesting appointment with Dr. Dominguez Fan. Please advise.

## 2022-09-21 NOTE — TELEPHONE ENCOUNTER
We can see within the next 2 weeks with TS  Electronically signed by James Castillo PA-C on 9/21/2022 at 8:08 AM

## 2022-09-21 NOTE — TELEPHONE ENCOUNTER
Call placed to patient, appointment made at this time.   Future Appointments   Date Time Provider Shabnam Teresa   9/26/2022  9:45 AM Arti Ferrer [facility]   9/28/2022  9:00 AM [facility] PFT STRESS LAB ROOM [facility] PFT [facility]   9/29/2022  9:50 [facility]   10/4/2022 12:30 PM Karolina Martínez MD [facility]   10/5/2022  9:00 AM Mary Escalante MD  Orth[facility]   10/13/2022 10:00 AM Jatinder Valenzuela MD [facility] Pulm [facility]   1/11/2023  9:30 AM Mary Escalante MD SE Ortho [facility]   8/11/2023  9:30 AM Angie Cain MD [facility]

## 2022-09-26 ENCOUNTER — OFFICE VISIT (OUTPATIENT)
Dept: PAIN MANAGEMENT | Age: 62
End: 2022-09-26
Payer: MEDICARE

## 2022-09-26 VITALS
HEIGHT: 66 IN | OXYGEN SATURATION: 94 % | DIASTOLIC BLOOD PRESSURE: 80 MMHG | BODY MASS INDEX: 36.96 KG/M2 | WEIGHT: 230 LBS | HEART RATE: 65 BPM | SYSTOLIC BLOOD PRESSURE: 130 MMHG | RESPIRATION RATE: 16 BRPM | TEMPERATURE: 96.6 F

## 2022-09-26 DIAGNOSIS — M50.30 DEGENERATION OF CERVICAL DISC WITHOUT MYELOPATHY: ICD-10-CM

## 2022-09-26 DIAGNOSIS — G89.4 CHRONIC PAIN SYNDROME: Primary | ICD-10-CM

## 2022-09-26 DIAGNOSIS — M47.816 LUMBAR FACET ARTHROPATHY: ICD-10-CM

## 2022-09-26 DIAGNOSIS — M54.16 LUMBAR RADICULITIS: ICD-10-CM

## 2022-09-26 DIAGNOSIS — M51.9 LUMBAR DISC DISORDER: ICD-10-CM

## 2022-09-26 DIAGNOSIS — M16.11 ARTHRITIS OF RIGHT HIP: ICD-10-CM

## 2022-09-26 DIAGNOSIS — M47.816 LUMBAR SPONDYLOSIS: ICD-10-CM

## 2022-09-26 DIAGNOSIS — Z96.642 S/P TOTAL LEFT HIP ARTHROPLASTY: ICD-10-CM

## 2022-09-26 DIAGNOSIS — M47.812 FACET ARTHROPATHY, CERVICAL: ICD-10-CM

## 2022-09-26 DIAGNOSIS — M16.12 PRIMARY OSTEOARTHRITIS OF LEFT HIP: ICD-10-CM

## 2022-09-26 PROCEDURE — G8417 CALC BMI ABV UP PARAM F/U: HCPCS | Performed by: PHYSICIAN ASSISTANT

## 2022-09-26 PROCEDURE — 99213 OFFICE O/P EST LOW 20 MIN: CPT | Performed by: PHYSICIAN ASSISTANT

## 2022-09-26 PROCEDURE — 1036F TOBACCO NON-USER: CPT | Performed by: PHYSICIAN ASSISTANT

## 2022-09-26 PROCEDURE — G8427 DOCREV CUR MEDS BY ELIG CLIN: HCPCS | Performed by: PHYSICIAN ASSISTANT

## 2022-09-26 PROCEDURE — 3017F COLORECTAL CA SCREEN DOC REV: CPT | Performed by: PHYSICIAN ASSISTANT

## 2022-09-26 RX ORDER — ACETAMINOPHEN AND CODEINE PHOSPHATE 60; 300 MG/1; MG/1
1 TABLET ORAL DAILY PRN
Qty: 30 TABLET | Refills: 1 | Status: SHIPPED | OUTPATIENT
Start: 2022-10-08 | End: 2022-11-07

## 2022-09-26 NOTE — PROGRESS NOTES
Do you currently have any of the following:    Fever: No  Headache:  No  Cough: No  Shortness of breath: No  Exposed to anyone with these symptoms: No                                                                                                                Alessandra Soler presents to the [facility] on 9/26/2022. Lourdes Longoria is complaining of pain in her lower back. . The pain is constant. The pain is described as aching, throbbing, shooting, stabbing, and sharp. Pain is rated on her best day at a 5, on her worst day at a 10, and on average at a 7 on the VAS scale. She took her last dose of Tylenol with codeine . Lourdes Longoria does not have issues with constipation. Any procedures since your last visit: No,   She is not on NSAIDS and  is  on anticoagulation medications to include ASA and is managed by Adalberto Shore MD  .     Pacemaker or defibrillator: No Physician managing device is NA. Medication Contract and Consent for Opioid Use Documents Filed       Patient Documents       Type of Document Status Date Received Received By Description    Medication Contract Received 3/1/2019  1:43 PM FRANCIS MCINTOSH PAIN MANAGEMENT PT AGREEMENT 3/1/2019    Medication Contract Received 10/8/2020  1:21 PM DAYNA [address] [place] [address] pain pt agreement    Medication Contract Received 10/7/2021 10:36 AM BOLIVAR TRUJILLO Pain Mgmt Patient Agreement 10. 7.2021                       /80   Pulse 65   Temp (!) 96.6 °F (35.9 °C) (Infrared)   Resp 16   Ht 5' 6\" (1.676 m)   Wt 230 lb (104.3 kg)   SpO2 94%   BMI 37.12 kg/m²      No LMP recorded.  Patient is postmenopausal.

## 2022-09-26 NOTE — PROGRESS NOTES
[facility]  [address] [facility], [address] [place] [address] Chad  [phone]    Follow up Note      Jorge Luis Green     Date of Visit:  9/26/2022    CC:  Patient presents for follow up   Chief Complaint   Patient presents with    Lower Back Pain         HPI:    Pain is worse to lower back since she has been in PT. Appropriate analgesia with current medications regimen: yes. Change in quality of symptoms:no. Medication side effects:none. Recent diagnostic testing:none. Recent interventional procedures:none. She has not been on anticoagulation medications to include none. The patient  has not been on herbal supplements. The patient is not diabetic. Imaging studies:    06/09/222 Left hip x-ray    FINDINGS:   Anatomically aligned left MERVAT with no abnormal bone or soft tissue findings. Impression   Left MERVAT with no unexpected findings. Cervical XR 11/2019 Severe degenerative changes      Lumbar spine Xray 03/2019  Scoliosis and osteoarthritis. Bilateral hip Xray 03/2019   Advanced osteoarthrosis of bilateral hips. Potential Aberrant Drug-Related Behavior: None     Urine Drug Screening:  First office visit saliva screen showed no narcotics   11/2019 Consistent, negative for all  06/2020 Consistent  12/2020 Consistent  06/2021 Consistent  04/2022 Consistent     OARRS report:  03/2019 consistent to 06/2021 Consistent  (norco on 9/24 from dentist for teeth extraction). 08/2021 Consistent to 09/2022 Consistent    Opioid Agreement:  10/07/2021 - new one to be filled out today.     Past Medical History:   Diagnosis Date    Brain aneurysm 09/2018    H/O TIMES 2 THAT BURST PER PATIENT    Cerebral artery occlusion with cerebral infarction (HCC)     RT SIDE AFFECTED-LEARNED TO WALK AND WRITE AGAIN    Degenerative arthritis of hand     Edentulous     Emphysema lung ([address]     lung     Headache     Hiatal hernia     Hip problem     NEEDS HIP REPLACEMENT PER PATIENT    Hx of abnormal cervical Pap smear     Hypertension     Stroke (cerebrum) (HCC)     Subarachnoid bleed ([facility] 9/10/2018       Past Surgical History:   Procedure Laterality Date    BRAIN ANEURYSM SURGERY      coiling     COLONOSCOPY  08/30/2019    polyps--farnk    COLONOSCOPY N/A 8/30/2019    COLONOSCOPY POLYPECTOMY SNARE/COLD BIOPSY performed by Chele Slater MD at [facility].      total hip replacement left and right    OTHER SURGICAL HISTORY      FOR CANCER CELLS- DONE AT [facility] 23 HIP ARTHROPLASTY Left 2/22/2021    LEFT HIP TOTAL ARTHROPLASTY performed by Judson Cornelius MD at [address] Right 1/10/2022    RIGHT TOTAL HIP ARTHROPLASTY - STYKER performed by Judson Cornelius MD at [facility]  08/30/2019    gastritis with superficial ulcerations; duodenitis--frank    UPPER GASTROINTESTINAL ENDOSCOPY N/A 8/30/2019    EGD BIOPSY performed by Chele Slater MD at [facility]       Prior to Admission medications    Medication Sig Start Date End Date Taking? Authorizing Provider   acetaminophen-codeine (TYLENOL #4) 300-60 MG per tablet Take 1 tablet by mouth daily as needed for Pain for up to 30 days. 10/8/22 11/7/22 Yes SPENSER Louis   amLODIPine (NORVASC) 5 MG tablet TAKE ONE TABLET BY MOUTH EVERY DAY 8/5/22  Yes Cirilo Camarillo MD   chlorthalidone (HYGROTON) 25 MG tablet Take 1 tablet by mouth in the morning. 8/5/22  Yes Cirilo Camarillo MD   fluticasone (FLONASE) 50 MCG/ACT nasal spray 2 sprays by Each Nostril route in the morning. 8/5/22  Yes Cirilo Camarillo MD   gabapentin (NEURONTIN) 300 MG capsule TAKE ONE CAPSULE BY MOUTH THREE TIMES A DAY 8/5/22 11/1/22 Yes Cirilo Camarillo MD   losartan (COZAAR) 100 MG tablet Take 1 tablet by mouth in the morning.  8/5/22  Yes Cirilo Camarillo MD   pantoprazole (PROTONIX) 40 MG tablet TAKE ONE TABLET BY MOUTH EVERY DAY 8/5/22  Yes Amor Colon MD   potassium chloride (KLOR-CON M) 10 MEQ extended release tablet TAKE ONE TABLET BY MOUTH DAILY WITH BREAKFAST 8/5/22  Yes Amor Colon MD   albuterol sulfate HFA (PROVENTIL;VENTOLIN;PROAIR) 108 (90 Base) MCG/ACT inhaler INHALE TWO PUFFS BY MOUTH EVERY 6 HOURS AS NEEDED FOR WHEEZING. 8/5/22  Yes Amor Colon MD   carBAMazepine (TEGRETOL-XR) 100 MG extended release tablet Take 1 tablet by mouth in the morning, at noon, and at bedtime 6/16/22  Yes Mohit Blanchard MD   fluconazole (DIFLUCAN) 150 MG tablet TAKE ONE TABLET BY MOUTH ONCE FOR 1 DOSE FOR YEAST INFECTION AS NEEDED 6/8/22  Yes Amor Colon MD   polyethylene glycol (GLYCOLAX) 17 g packet DISSOLVE 1 PACKET (17 GRAMS) IN LIQUID AND TAKE BY MOUTH DAILY 1/24/22  Yes Historical Provider, MD   aspirin 325 MG EC tablet Take 1 tablet by mouth 2 times daily 1/10/22 1/10/23 Yes Yvonne Barrientos DO   valACYclovir (VALTREX) 1 g tablet Take 1 tablet by mouth daily For 5 days as needed for Herpes outbreak 4/27/21  Yes Amor Colon MD   Multiple Vitamins-Minerals (THERAPEUTIC MULTIVITAMIN-MINERALS) tablet Take 1 tablet by mouth daily   Yes Historical Provider, MD   diclofenac sodium (VOLTAREN) 1 % GEL APPLY ONE GRAM EXTERNALLY TWICE A DAY TO NECK AND ONE GRAM EXTERNALLY TWICE A DAY TO BACK  11/3/20  Yes Heather Dom, [address] Devices (ROLLATOR) MISC 1 each by Does not apply route daily as needed (use as needed for stability) 3/17/20  Yes Amor Colon MD   metoprolol tartrate (LOPRESSOR) 25 MG tablet Take 1.5 tablets by mouth in the morning and 1.5 tablets before bedtime. 8/5/22 9/4/22  Amor Colon MD       Allergies   Allergen Reactions    Latex Hives     Hives and rash    Iodine Rash     Hives . pt.  States anaphalyxis    Aloe Hives     Hives     Celebrex [Celecoxib] Itching    Fish-Derived Products Other (See Comments)       Social History     Socioeconomic History    Marital status:      Spouse name: Not on file    Number of children: Not on file    Years of education: Not on file    Highest education level: Not on file   Occupational History    Not on file   Tobacco Use    Smoking status: Former     Packs/day: 1.50     Years: 43.00     Pack years: 64.50     Types: Cigarettes     Quit date: 2018     Years since quittin.0    Smokeless tobacco: Never   Vaping Use    Vaping Use: Never used   Substance and Sexual Activity    Alcohol use: No    Drug use: No    Sexual activity: Not Currently   Other Topics Concern    Not on file   Social History Narrative    Not on file     Social Determinants of Health     Financial Resource Strain: Not on file   Food Insecurity: Not on file   Transportation Needs: Not on file   Physical Activity: Sufficiently Active    Days of Exercise per Week: 6 days    Minutes of Exercise per Session: 60 min   Stress: Not on file   Social Connections: Not on file   Intimate Partner Violence: Not on file   Housing Stability: Not on file       Family History   Problem Relation Age of Onset    Diabetes Mother     Arthritis Mother     Atrial Fibrillation Mother     Diabetes Father     Atrial Fibrillation Father     Arthritis Father     Emphysema Father     Cancer Sister        REVIEW OF SYSTEMS:     Phoebe Elam denies fever/chills, chest pain, shortness of breath, new bowel or bladder complaints. All other review of systems was negative. PHYSICAL EXAMINATION:      /80   Pulse 65   Temp (!) 96.6 °F (35.9 °C) (Infrared)   Resp 16   Ht 5' 6\" (1.676 m)   Wt 230 lb (104.3 kg)   SpO2 94%   BMI 37.12 kg/m²     General:      General appearance:   pleasant and well-hydrated. , in no discomfort and A & O x3  Build:Overweight  Function:Rises from a seated position with difficulty    HEENT:    Head:normocephalic and atraumatic  Pupils:regular, round and equal.  Sclera: icterus absent,    Lungs:    Breathing:Normal expansion.    No wheezing. Abdomen:    Shape:non-distended and normal    Lumbar spine:  + midline and paraspinal TTP  Negative SI joint TTP b/l    Extremities:    Tremors:None bilaterally upper and lower  Range of motion:Generally normal shoulders, negative log roll on the left. Intact:Yes  Varicose veins:not assessed   Cyanosis:none  Edema:Normal    Neurological:    Gait: Not observed. Dermatology:    Skin:no unusual rashes, no skin lesions, no palpable subcutaneous nodules and good skin turgor    Impression:    Patient seen for follow up for her chronic bilateral hip pain and low back pain due to severe degenerative changes and axial c/o neck pain   Would benefit from Cervical MBB, patient uninterested   Patient is s/p:  Left MERVAT on 2/22/2021. Patient is s/p right MERVAT on 01/10/2022   Continue Gabapentin 300 mg TID per PCP  Continue Tylenol #4 QD prn with 1 RF. PT ordered for lower back per patient request.  Currently in PT for hips but lower back has been painful. Compounding cream. Helping a lot. OARRS report reviewed 09/2022  Patient encouraged to remain active as tolerated   Treatment plan discussed with the patient including medications and side effects       Controlled Substance Monitoring:    Acute and Chronic Pain Monitoring:   RX Monitoring 9/26/2022   Periodic Controlled Substance Monitoring Possible medication side effects, risk of tolerance/dependence & alternative treatments discussed. ;No signs of potential drug abuse or diversion identified. ;Assessed functional status. ;Obtaining appropriate analgesic effect of treatment. We discussed with the patient that combining opioids, benzodiazepines, alcohol, illicit drugs or sleep aids increases the risk of respiratory depression including death. We discussed that these medications may cause drowsiness, sedation or dizziness and have counseled the patient not to drive or operate machinery.  We have discussed that these medications will be prescribed only by one provider. We have discussed with the patient about age related risk factors and have thoroughly discussed the importance of taking these medications as prescribed. The patient verbalizes understanding.     ccreferring physic

## 2022-09-28 ENCOUNTER — HOSPITAL ENCOUNTER (OUTPATIENT)
Dept: PULMONOLOGY | Age: 62
Discharge: HOME | End: 2022-09-28
Payer: MEDICARE

## 2022-09-28 DIAGNOSIS — J43.9 PULMONARY EMPHYSEMA, UNSPECIFIED EMPHYSEMA TYPE (HCC): ICD-10-CM

## 2022-09-28 PROCEDURE — 94726 PLETHYSMOGRAPHY LUNG VOLUMES: CPT

## 2022-09-28 PROCEDURE — 94060 EVALUATION OF WHEEZING: CPT

## 2022-09-28 PROCEDURE — 94729 DIFFUSING CAPACITY: CPT

## 2022-09-29 ENCOUNTER — OFFICE VISIT (OUTPATIENT)
Dept: FAMILY MEDICINE CLINIC | Age: 62
End: 2022-09-29
Payer: MEDICARE

## 2022-09-29 VITALS
SYSTOLIC BLOOD PRESSURE: 133 MMHG | RESPIRATION RATE: 14 BRPM | BODY MASS INDEX: 36.48 KG/M2 | HEART RATE: 76 BPM | HEIGHT: 66 IN | TEMPERATURE: 97.3 F | WEIGHT: 227 LBS | OXYGEN SATURATION: 99 % | DIASTOLIC BLOOD PRESSURE: 64 MMHG

## 2022-09-29 DIAGNOSIS — I72.9 ANEURYSM (HCC): ICD-10-CM

## 2022-09-29 DIAGNOSIS — R30.0 DYSURIA: Primary | ICD-10-CM

## 2022-09-29 LAB
BILIRUBIN, POC: NORMAL
BLOOD URINE, POC: NORMAL
CLARITY, POC: CLEAR
COLOR, POC: YELLOW
GLUCOSE URINE, POC: NORMAL
KETONES, POC: NORMAL
LEUKOCYTE EST, POC: NORMAL
NITRITE, POC: NORMAL
PH, POC: 5.5
PROTEIN, POC: NORMAL
SPECIFIC GRAVITY, POC: 1.01
UROBILINOGEN, POC: 0.2

## 2022-09-29 PROCEDURE — G8417 CALC BMI ABV UP PARAM F/U: HCPCS

## 2022-09-29 PROCEDURE — 1036F TOBACCO NON-USER: CPT

## 2022-09-29 PROCEDURE — 99213 OFFICE O/P EST LOW 20 MIN: CPT

## 2022-09-29 PROCEDURE — 81002 URINALYSIS NONAUTO W/O SCOPE: CPT

## 2022-09-29 PROCEDURE — 3017F COLORECTAL CA SCREEN DOC REV: CPT

## 2022-09-29 PROCEDURE — G8427 DOCREV CUR MEDS BY ELIG CLIN: HCPCS

## 2022-09-29 NOTE — PROGRESS NOTES
[facility]  Department of Family Medicine  Family Medicine Residency Program      Patient: Joaquim Moss  1960 60 y.o. female     Date of Service: 9/29/2022      Chief complaint:   Chief Complaint   Patient presents with    COPD    Back Pain    Urinary Tract Infection     Recheck (treated with Cefdinir 9/13)    Referral - General     Needs new Neurology referral (Dr. Rocío Cervantes retired in June)       Rony Bob Moss is a 60 y.o. female presented to the clinic for a UTI recheck after being treated with cefdinir recently and a referral for Neurologist.    Patient used to follow Dr. Wayne Bowles after having surgery. She has a significant history of aneurysms, has pains in her head all over the place. They are not headaches as described by her and has been going on for 4 years . Patient states that she is worried knowing she still has those aneurysms. She describes having  a headache and blurry vision which was there for the day. This episode was 1 month ago. Eye doctor was seen march this year. No dizziness , tingling , numbness. Patient states that she quit smoking 4 years ago and does not use alcohol . Sleep hygeine discussed p patient gets likes 4 hours a night with some naps during the day. UTI   She states she has completed the antibiotics and denies any urinary symptoms now, no dysuria, discharge, burning, frequency.          Past Medical History:      Diagnosis Date    Brain aneurysm 09/2018    H/O TIMES 2 THAT BURST PER PATIENT    Cerebral artery occlusion with cerebral infarction ([address]     RT SIDE AFFECTED-LEARNED TO WALK AND WRITE AGAIN    Degenerative arthritis of hand     Edentulous     Emphysema lung (HCC)     lung dr    Headache     Hiatal hernia     Hip problem     NEEDS HIP REPLACEMENT PER PATIENT    Hx of abnormal cervical Pap smear     Hypertension     Stroke (cerebrum) ([address]     Subarachnoid bleed ([address] 9/10/2018     Past Surgical History:        Procedure Laterality Date    BRAIN ANEURYSM SURGERY      coiling     COLONOSCOPY  08/30/2019    polyps--frank    COLONOSCOPY N/A 8/30/2019    COLONOSCOPY POLYPECTOMY SNARE/COLD BIOPSY performed by Chidi Ramos MD at [address] [facility] [address]      total hip replacement left and right    OTHER SURGICAL HISTORY      [facility] HIP ARTHROPLASTY Left 2/22/2021    LEFT HIP TOTAL ARTHROPLASTY performed by Bel Darling MD at [address] Right 1/10/2022    RIGHT TOTAL HIP ARTHROPLASTY - STYKER performed by Ble Darling MD at [address] [facility] ENDOSCOPY  08/30/2019    gastritis with superficial ulcerations; duodenitis--frank    UPPER GASTROINTESTINAL ENDOSCOPY N/A 8/30/2019    EGD BIOPSY performed by Chidi Ramos MD at [facility] ENDOSCOPY     Allergies:    Latex, Iodine, Aloe, Celebrex [celecoxib], and Fish-derived products  Family History:       Problem Relation Age of Onset    Diabetes Mother     Arthritis Mother     Atrial Fibrillation Mother     Diabetes Father     Atrial Fibrillation Father     Arthritis Father     Emphysema Father     Cancer Sister      Review of Systems:   Review of Systems   Constitutional:  Positive for fatigue. Negative for fever and unexpected weight change. HENT:  Negative for ear pain, sinus pressure and sinus pain. Eyes:  Negative for pain, discharge, redness and visual disturbance. Respiratory:  Negative for cough, chest tightness and shortness of breath. Cardiovascular:  Negative for chest pain and leg swelling. Gastrointestinal:  Negative for abdominal pain, blood in stool, diarrhea, nausea and vomiting. Genitourinary:  Negative for difficulty urinating. Musculoskeletal:  Negative for arthralgias, gait problem, myalgias, neck pain and neck stiffness. Skin:  Negative for wound.    Neurological:  Negative for light-headedness, numbness and headaches. Hematological: Negative. Psychiatric/Behavioral:  Negative for sleep disturbance and suicidal ideas. PHYSICAL EXAM   Vitals: /64   Pulse 76   Temp 97.3 °F (36.3 °C) (Temporal)   Resp 14   Ht 5' 6\" (1.676 m)   Wt 227 lb (103 kg)   SpO2 99%   BMI 36.64 kg/m²   Physical Exam  Vitals reviewed. Constitutional:       Appearance: Normal appearance. HENT:      Head: Normocephalic and atraumatic. Nose: Nose normal. No congestion or rhinorrhea. Mouth/Throat:      Mouth: Mucous membranes are moist.      Pharynx: Oropharynx is clear. Eyes:      General: No scleral icterus. Extraocular Movements: Extraocular movements intact. Conjunctiva/sclera: Conjunctivae normal.      Pupils: Pupils are equal, round, and reactive to light. Neck:      Vascular: No carotid bruit. Cardiovascular:      Rate and Rhythm: Normal rate and regular rhythm. Heart sounds: Normal heart sounds. No murmur heard. No gallop. Pulmonary:      Effort: Pulmonary effort is normal.      Breath sounds: Normal breath sounds. No wheezing or rales. Abdominal:      General: Bowel sounds are normal. There is no distension. Palpations: Abdomen is soft. Tenderness: There is no abdominal tenderness. There is no guarding. Musculoskeletal:         General: Normal range of motion. Cervical back: Normal range of motion and neck supple. Right lower leg: No edema. Left lower leg: No edema. Skin:     Coloration: Skin is not jaundiced. Neurological:      General: No focal deficit present. Mental Status: She is alert and oriented to person, place, and time. Cranial Nerves: No cranial nerve deficit. Psychiatric:         Mood and Affect: Mood is anxious.        Lab Results   Component Value Date    LABA1C 5.6 05/27/2022     No results found for: EAG     BP Readings from Last 3 Encounters:   09/29/22 133/64   09/26/22 130/80   08/05/22 137/70        ASSESSMENT AND PLAN     1. Dysuria  -Improved   --encouraged staying hydrated with water   - POCT Urinalysis no Micro- reviewed with patient in detail, UA with normal limits. 2. Aneurysm ([address]  Chronic condition, patient has been following with neurosurgery in the past.   - patient encouraged to journal and document any new symptoms or changes that may arise regarding headaches,, vision changes, weakness, numbness and any triggers   -sleep hygiene discussed   - Lolly Lagunas MD, Neurosurgery, [facility] Maintenance:   Encouraged and counseled Edgard Marie  about  COVID and flu vaccines- patient kindly declined both. All questions and concerns about the vaccines were answered. Counseled regarding above diagnosis, including possible risks and complications, especially if left uncontrolled. Counseled regarding the possible side effects, risks, benefits and alternatives to treatment; patient and/or guardian verbalizes understanding, agrees, feels comfortable with and wishes to proceed with above treatment plan. Call or go to ED immediately if symptoms worsen or persist. Advised patient to call with any new medication issues, and, as applicable, read all Rx info from pharmacy to assure aware of all possible risks and side effects of medication before taking. Patient and/or guardian given opportunity to ask questions/raise concerns. The patient verbalized comfort and understanding of instructions. I encourage further reading and education about your health conditions. Information on many health conditions is provided by the American Academy of Family Physicians: https://familydoctor. org/  Please bring any questions to me at your next visit. Medication List:    Current Outpatient Medications   Medication Sig Dispense Refill    [START ON 10/8/2022] acetaminophen-codeine (TYLENOL #4) 300-60 MG per tablet Take 1 tablet by mouth daily as needed for Pain for up to 30 days.  27 tablet 1    amLODIPine (NORVASC) 5 MG tablet TAKE ONE TABLET BY MOUTH EVERY DAY 30 tablet 3    chlorthalidone (HYGROTON) 25 MG tablet Take 1 tablet by mouth in the morning. 30 tablet 3    fluticasone (FLONASE) 50 MCG/ACT nasal spray 2 sprays by Each Nostril route in the morning. 1 each 3    gabapentin (NEURONTIN) 300 MG capsule TAKE ONE CAPSULE BY MOUTH THREE TIMES A DAY 90 capsule 3    losartan (COZAAR) 100 MG tablet Take 1 tablet by mouth in the morning. 30 tablet 3    pantoprazole (PROTONIX) 40 MG tablet TAKE ONE TABLET BY MOUTH EVERY DAY 30 tablet 3    potassium chloride (KLOR-CON M) 10 MEQ extended release tablet TAKE ONE TABLET BY MOUTH DAILY WITH BREAKFAST 30 tablet 3    albuterol sulfate HFA (PROVENTIL;VENTOLIN;PROAIR) 108 (90 Base) MCG/ACT inhaler INHALE TWO PUFFS BY MOUTH EVERY 6 HOURS AS NEEDED FOR WHEEZING. 1 each 3    polyethylene glycol (GLYCOLAX) 17 g packet DISSOLVE 1 PACKET (17 GRAMS) IN LIQUID AND TAKE BY MOUTH DAILY      aspirin 325 MG EC tablet Take 1 tablet by mouth 2 times daily 56 tablet 1    valACYclovir (VALTREX) 1 g tablet Take 1 tablet by mouth daily For 5 days as needed for Herpes outbreak 5 tablet 1    Multiple Vitamins-Minerals (THERAPEUTIC MULTIVITAMIN-MINERALS) tablet Take 1 tablet by mouth daily      diclofenac sodium (VOLTAREN) 1 % GEL APPLY ONE GRAM EXTERNALLY TWICE A DAY TO NECK AND ONE GRAM EXTERNALLY TWICE A DAY TO BACK  100 g 2    Misc. Devices (ROLLATOR) MISC 1 each by Does not apply route daily as needed (use as needed for stability) 1 each 0    metoprolol tartrate (LOPRESSOR) 25 MG tablet Take 1.5 tablets by mouth in the morning and 1.5 tablets before bedtime.  90 tablet 3    carBAMazepine (TEGRETOL-XR) 100 MG extended release tablet Take 1 tablet by mouth in the morning, at noon, and at bedtime (Patient not taking: Reported on 9/29/2022) 270 tablet 0    fluconazole (DIFLUCAN) 150 MG tablet TAKE ONE TABLET BY MOUTH ONCE FOR 1 DOSE FOR YEAST INFECTION AS NEEDED (Patient not taking: Reported on 9/29/2022) 1 tablet 1     No current facility-administered medications for this visit. All medications has been reviewed with patient. Patient is taking the medications as prescribed and describes no side effects. Return to Office: Return in about 3 months (around 12/29/2022) for HTN and regular check up . This document may have been prepared at least partially through the use of voice recognition software. Although effort is taken to assure the accuracy of this document, it is possible that grammatical, syntax,  or spelling errors may occur.     Myah Berger MD   Family Medicine Resident Physician PGY-2

## 2022-09-29 NOTE — PROGRESS NOTES
[facility]  Precepting Note    Subjective:  Routine f/u     F/u UTI   She was previously treated with cefdinir  Repeat UA today was normal    Pt also requesting new neurosurg referral as her last provider retired   She has h/o brain aneurysm s/p coiling  She is c/o head pain but not headaches; it's pinpoint areas and chronic x 4 years  Had blurry vision, HA for a day about 1 month ago; vision now normal   Pt is UTD with optho, last seen in March   Most recent CTA in June was reassuring   Per telephone encounters, Dr. Soni Mason reviewed these images and didn't feel she needed new CTA or f/u with neurosurg for a couple of years unless new sx develop     ROS otherwise negative     Past medical, surgical, family and social history were reviewed, non-contributory, and unchanged unless otherwise stated. Objective:    /64   Pulse 76   Temp 97.3 °F (36.3 °C) (Temporal)   Resp 14   Ht 5' 6\" (1.676 m)   Wt 227 lb (103 kg)   SpO2 99%   BMI 36.64 kg/m²     Exam is as noted by resident with which I agree    Assessment/Plan:  Will place new neurosurg referral at pt's request       Attending Physician Statement  I have reviewed the chart, including any radiology or labs. I have discussed the case, including pertinent history and exam findings with the resident. I agree with the assessment, plan and orders as documented by the resident. Please refer to the resident note for additional information.       Electronically signed by Giuseppe Zelaya MD on 9/29/2022 at 10:44 AM

## 2022-09-30 ENCOUNTER — PROCEDURE VISIT (OUTPATIENT)
Dept: PULMONOLOGY | Age: 62
End: 2022-09-30
Payer: MEDICARE

## 2022-09-30 DIAGNOSIS — R94.2 ABNORMAL PFT: Primary | ICD-10-CM

## 2022-09-30 DIAGNOSIS — Z96.641 H/O TOTAL HIP ARTHROPLASTY, RIGHT: Primary | ICD-10-CM

## 2022-09-30 PROCEDURE — 94060 EVALUATION OF WHEEZING: CPT | Performed by: INTERNAL MEDICINE

## 2022-09-30 PROCEDURE — 94729 DIFFUSING CAPACITY: CPT | Performed by: INTERNAL MEDICINE

## 2022-09-30 PROCEDURE — 94726 PLETHYSMOGRAPHY LUNG VOLUMES: CPT | Performed by: INTERNAL MEDICINE

## 2022-09-30 NOTE — PROGRESS NOTES
Date of Exam: 9/28/2022    Pulmonary Function Test % Pred   FEV1 1.78 71   FEV1/FVC 73 (LLN-68)    TLC 5.95 114   DLCO 7.83 30     No obstruction  No Bronchodilator response  There is evidence of air trapping  No Restriction  Severe diffusion defect. Recommend evaluation for pulmonary vascular diseases, and pulmonary hypertension, CHF, early ILD.     Alvaro Aviles MD MS  Pulmonary & [address] [place] [address]

## 2022-10-02 LAB — URINE CULTURE, ROUTINE: NORMAL

## 2022-10-04 ENCOUNTER — OFFICE VISIT (OUTPATIENT)
Dept: SURGERY | Age: 62
End: 2022-10-04

## 2022-10-04 VITALS
HEIGHT: 66 IN | HEART RATE: 78 BPM | SYSTOLIC BLOOD PRESSURE: 124 MMHG | DIASTOLIC BLOOD PRESSURE: 88 MMHG | WEIGHT: 225 LBS | RESPIRATION RATE: 16 BRPM | BODY MASS INDEX: 36.16 KG/M2

## 2022-10-04 DIAGNOSIS — R10.30 LOWER ABDOMINAL PAIN: ICD-10-CM

## 2022-10-04 DIAGNOSIS — Z86.010 HX OF ADENOMATOUS COLONIC POLYPS: Primary | ICD-10-CM

## 2022-10-04 PROCEDURE — 1036F TOBACCO NON-USER: CPT | Performed by: SURGERY

## 2022-10-04 PROCEDURE — 3017F COLORECTAL CA SCREEN DOC REV: CPT | Performed by: SURGERY

## 2022-10-04 PROCEDURE — G8417 CALC BMI ABV UP PARAM F/U: HCPCS | Performed by: SURGERY

## 2022-10-04 PROCEDURE — G8427 DOCREV CUR MEDS BY ELIG CLIN: HCPCS | Performed by: SURGERY

## 2022-10-04 PROCEDURE — G8484 FLU IMMUNIZE NO ADMIN: HCPCS | Performed by: SURGERY

## 2022-10-04 PROCEDURE — 99999 PR OFFICE/OUTPT VISIT,PROCEDURE ONLY: CPT | Performed by: SURGERY

## 2022-10-04 RX ORDER — POLYETHYLENE GLYCOL 3350, SODIUM SULFATE ANHYDROUS, SODIUM BICARBONATE, SODIUM CHLORIDE, POTASSIUM CHLORIDE 236; 22.74; 6.74; 5.86; 2.97 G/4L; G/4L; G/4L; G/4L; G/4L
4 POWDER, FOR SOLUTION ORAL ONCE
Qty: 4000 ML | Refills: 0 | Status: SHIPPED | OUTPATIENT
Start: 2022-10-04 | End: 2022-10-04

## 2022-10-04 ASSESSMENT — ENCOUNTER SYMPTOMS
WHEEZING: 1
BLOOD IN STOOL: 0
CONSTIPATION: 1
COUGH: 1
EYES NEGATIVE: 1
NAUSEA: 0
SHORTNESS OF BREATH: 1
BACK PAIN: 1
DIARRHEA: 1
ABDOMINAL PAIN: 1
CHOKING: 1
COLOR CHANGE: 0
ABDOMINAL DISTENTION: 0
CHEST TIGHTNESS: 0
VOMITING: 0
ANAL BLEEDING: 0

## 2022-10-04 NOTE — LETTER
[address]  [address] [facility] [address]  Phone: [phone]  Fax: [phone]           Siria Oakes MD      October 4, 2022     Patient: Shaista Gardner   MR Number: [id number]   YOB: 1960   Date of Visit: 10/4/2022       Dear Dr. Bola Shearer:    Thank you for referring James Michael to me for evaluation/treatment. Below are the relevant portions of my assessment and plan of care. Hx of tubular adenoma  Lower abd pain    Recommend colonoscopy. The patient was explained the risks/benefits/alternatives/expected outcomes of the procedure. The patient was explained the risks of the procedure, including, but not limited to, the risk of reaction to the anesthesia medicine and the risk of perforation requiring further surgery. The patient was informed that they may require biopsy or polypectomy. These procedures may increase the risk of complication. All questions were answered. The patient verbalized understanding and agreed to proceed. Pt is higher risk due to BMI >36. If you have questions, please do not hesitate to call me. I look forward to following Gurvinder Diaz along with you.     Sincerely,        Siria Oakes MD    CC providers:  Kenji Baker MD  [place] [address]  [address] [facility] [address]

## 2022-10-04 NOTE — PATIENT INSTRUCTIONS
Call [phone] for any questions/concerns. BOWEL PREP INSTRUCTIONS      It is very important that you follow all of the instructions listed on this sheet carefully to ensure that your colon is cleaned out or your risk of side effects could be increased. What you will Need:  1. Nulytely, Golytely or Colyte Colon prep. You have been given a prescription. 2 Days or More Before Colonoscopy:  Obtain your colon prep from the pharmacy  Do not eat corn, tomatoes, peas, or watermelon 3 to 5 days before procedure. On the Day Before Colonoscopy: You may have clear liquids ONLY - No solid food. Do not drink milk   Do not eat or drink anything that is red or purple in color   Do not drink alcohol or beer. The following is OK to eat or drink:   Water, Limeade or lemonade   Strained fruit juices (no pulp) - including apple, orange, white grape or white  cranberry   Coffee or Tea - do not use any milk or creamer   Chicken broth   Jello without added fruit or toppings (No red or purple)    Directions to take Colon Prep:  Step 1. The colon prep can be used with or without the flavor packs. If using flavor, tear open packet and pour into bottle before mixing. Step 2. Add lukewarm water to top line on bottle. Put cap on bottle and shake to dissolve the powder. It should clear like water. Do not put anything else in the bottle. After mixing, keep in the refrigerator. You should drink it within 48 hours. Step 3. The first bowel movement occurs about 1 hour after you start drinking the bowel prep. Continue to drink the bowel prep until the bowel movement is clear like water and free of solid bowel movement  Step 4. Drink 1 (8 oz) glass every 10 minutes. Try not to sip small amounts, but instead drink each glass within a few minutes. Note:   Feelings of bloating and/or nausea are common. This is temporary and will get better once bowel movements begin.    If you have nausea or vomiting, call the doctor  Nothing to eat or drink after midnight the night before your colonoscopy. However, if you are taking any blood pressure medications or heart medications, you should take them with a sip of water. If you are on Insulin or Other medications for diabetes then check with your doctor as to how to take your medication for the day before and the day of the colonoscopy. Mick Scales - Dr. Piyush Adames, Dr. Bar Cline,  Dr. Carey Sterling, Dr. Puja Albert, Dr. Margarita Che, Dr. Marley Sorensen, Dr. Ricardo Jaramillo  Phone: [phone]  Fax:  [phone]       Patient Information and Instructions for Colonoscopy         Definition of Colonoscopy   A colonoscopy is the visual exam of the rectum and colon (large intestine). The exam is done with a tool called a colonoscope. The colonoscope is a flexible tube with a tiny camera on the end. This instrument allows the doctor to view the inside of your rectum and colon. Sigmoidoscopy is a shorter scope that views only the last one third of the colon. Reasons for Colonoscopy   It is used to examine, diagnose, and treat problems in your large intestine. The procedure is most often done for the following reasons: To determine the cause of abdominal pain, rectal bleeding, or a change in bowel habits   To detect and treat colon cancer or colon polyps   To obtain tissue samples for testing   To stop intestinal bleeding   Monitor response to treatment if you have inflammatory bowel disease     Possible Complications   Complications are rare, but no procedure is completely free of risk.  If you are planning to have a colonoscopy, your doctor will review a list of possible complications, which may include:   Bleeding   Reaction to the sedation causing drop in your blood pressure or problems breathing  Perforation or puncture of the bowel     Factors that may increase the risk of complications include:   Pre-existing heart or kidney condition Treatment with certain medicines, including aspirin and other drugs with anticoagulant or blood-thinning properties   Prior abdominal surgery or radiation treatments   Active colitis , diverticulitis , or other acute bowel disease   Previous treatment with radiation therapy     Be sure to discuss these risks with your doctor before the procedure. What to Expect   Prior to Procedure   Your doctor will likely do the following:   Physical exam   Health history   Review of medicines   Test your stool for hidden blood (called \"occult blood\")     Your colon must be completely clean before the procedure. Any stool left in the intestine will block the view. This preparation may start several days before the procedure. Follow your doctor's instructions. Leading up to your procedure:   Talk to your doctor about your medicines. You may be asked to stop taking some medicines up to one week before the procedure, like:   Anti-inflammatory drugs (e.g., aspirin )   Blood thinners like clopidogrel (Plavix) or warfarin (Coumadin)   Iron supplements or vitamins containing iron   The day or days before your procedure, go on a clear liquid diet (clear broth, clear juice, clear jello) with no red coloring  Do not eat or drink anything after midnight. Wear comfortable clothing. If you have diabetes, ask your doctor if you need to adjust your diabetes medicine on the day prior to your procedure and the day of your procedure. Arrange for a ride home after the procedure. Anesthesia   You will receive intravenous sedation medicine for the procedure so you will not feel anything during the procedure. Description of the Procedure   You will lie on your left side with knees bent and drawn up toward your chest. The colonoscope will be slowly inserted through the rectum and into the bowel. The colonoscope will inject air into the colon.  A small attached video camera will allow the doctor to view the colon's lining on a screen. The doctor will continue guiding the tool through the bowel and assess the lining. A tissue sample or polyps may be removed during the procedure. How Long Will It Take? Usually it takes about 30 to 45 minutes     Will It Hurt? Most people do not feel anything during the procedure and will not remember the procedure. After the procedure, gas pains and cramping are common. These pains should go away with the passing of gas. Post-procedure Care   If any tissue was removed: It will be sent to a lab to be examined. It may take 1-2 weeks for results. The doctor will usually give an initial report after the scope is removed. Other tests may be recommended. A small amount of bleeding may occur during the first few days after the procedure. When you return home after the procedure, be sure to follow your doctor's instructions, which may include:   Resume medicines as instructed by your doctor. Resume normal diet, unless directed otherwise by your doctor. The sedative will make you drowsy. Avoid driving, operating machinery, or making important decisions for the rest of the day. Rest for the remainder of the day. After arriving home, contact your doctor if any of the following occurs:   Bleeding from your rectum, notify your doctor if you pass a teaspoonful of blood or more. Black, tarry stools   Severe abdominal pain   Hard, swollen abdomen   Signs of infection, including fever or chills   Inability to pass gas or stool   Coughing, shortness of breath, chest pain, severe nausea or vomiting     In case of an emergency, CALL 911 .

## 2022-10-04 NOTE — PROGRESS NOTES
Subjective:      Patient ID: Juvenal Mack is a 60 y.o. female. HPI  60 yr old female with hx of tubular adenoma on prior colonoscopy (8/2019). Pt reports occasional lower abd pain. States this occurs intermittently. Does not radiate anywhere. Denies change in bowel habits, blood in stool, unintentional weight loss, or family hx of colon cancer.     Past Medical History:   Diagnosis Date    Brain aneurysm 09/2018    H/O TIMES 2 THAT BURST PER PATIENT    Cerebral artery occlusion with cerebral infarction ([address]     RT SIDE AFFECTED-LEARNED TO WALK AND WRITE AGAIN    Degenerative arthritis of hand     Edentulous     Emphysema lung (HCC)     lung dr    Headache     Hiatal hernia     Hip problem     NEEDS HIP REPLACEMENT PER PATIENT    Hx of abnormal cervical Pap smear     Hypertension     Stroke (cerebrum) ([address]     Subarachnoid bleed ([address] 9/10/2018       Past Surgical History:   Procedure Laterality Date    BRAIN ANEURYSM SURGERY      coiling     COLONOSCOPY  08/30/2019    polyps--frank    COLONOSCOPY N/A 8/30/2019    COLONOSCOPY POLYPECTOMY SNARE/COLD BIOPSY performed by Severiano Sox, MD at [address] [facility] [address]      total hip replacement left and right    OTHER SURGICAL HISTORY      FOR CANCER CELLS- DONE  [facility] Left 2/22/2021    LEFT HIP TOTAL ARTHROPLASTY performed by Michelle Valle MD at [address] [facility] Right 1/10/2022    RIGHT TOTAL HIP ARTHROPLASTY - [facility] performed by Michelle Valle MD at [address] [facility] ENDOSCOPY  08/30/2019    gastritis with superficial ulcerations; duodenitis--frank    UPPER GASTROINTESTINAL ENDOSCOPY N/A 8/30/2019    EGD BIOPSY performed by Severiano Sox, MD at [facility] ENDOSCOPY       Current Outpatient Medications   Medication Sig Dispense Refill    [START ON 10/8/2022] acetaminophen-codeine (TYLENOL #4) 300-60 MG per tablet Take 1 tablet by mouth daily as needed for Pain for up to 30 days. 30 tablet 1    amLODIPine (NORVASC) 5 MG tablet TAKE ONE TABLET BY MOUTH EVERY DAY 30 tablet 3    chlorthalidone (HYGROTON) 25 MG tablet Take 1 tablet by mouth in the morning. 30 tablet 3    fluticasone (FLONASE) 50 MCG/ACT nasal spray 2 sprays by Each Nostril route in the morning. 1 each 3    gabapentin (NEURONTIN) 300 MG capsule TAKE ONE CAPSULE BY MOUTH THREE TIMES A DAY 90 capsule 3    losartan (COZAAR) 100 MG tablet Take 1 tablet by mouth in the morning. 30 tablet 3    pantoprazole (PROTONIX) 40 MG tablet TAKE ONE TABLET BY MOUTH EVERY DAY 30 tablet 3    potassium chloride (KLOR-CON M) 10 MEQ extended release tablet TAKE ONE TABLET BY MOUTH DAILY WITH BREAKFAST 30 tablet 3    albuterol sulfate HFA (PROVENTIL;VENTOLIN;PROAIR) 108 (90 Base) MCG/ACT inhaler INHALE TWO PUFFS BY MOUTH EVERY 6 HOURS AS NEEDED FOR WHEEZING. 1 each 3    polyethylene glycol (GLYCOLAX) 17 g packet DISSOLVE 1 PACKET (17 GRAMS) IN LIQUID AND TAKE BY MOUTH DAILY      valACYclovir (VALTREX) 1 g tablet Take 1 tablet by mouth daily For 5 days as needed for Herpes outbreak 5 tablet 1    Multiple Vitamins-Minerals (THERAPEUTIC MULTIVITAMIN-MINERALS) tablet Take 1 tablet by mouth daily      diclofenac sodium (VOLTAREN) 1 % GEL APPLY ONE GRAM EXTERNALLY TWICE A DAY TO NECK AND ONE GRAM EXTERNALLY TWICE A DAY TO BACK  100 g 2    Misc. Devices (ROLLATOR) MISC 1 each by Does not apply route daily as needed (use as needed for stability) 1 each 0    metoprolol tartrate (LOPRESSOR) 25 MG tablet Take 1.5 tablets by mouth in the morning and 1.5 tablets before bedtime.  90 tablet 3    carBAMazepine (TEGRETOL-XR) 100 MG extended release tablet Take 1 tablet by mouth in the morning, at noon, and at bedtime (Patient not taking: No sig reported) 270 tablet 0    fluconazole (DIFLUCAN) 150 MG tablet TAKE ONE TABLET BY MOUTH ONCE FOR 1 DOSE FOR YEAST INFECTION AS NEEDED (Patient not taking: No sig reported) 1 tablet 1    aspirin 325 MG EC tablet Take 1 tablet by mouth 2 times daily (Patient not taking: Reported on 10/4/2022) 56 tablet 1     No current facility-administered medications for this visit. Allergies   Allergen Reactions    Latex Hives     Hives and rash    Iodine Rash     Hives . pt. States anaphalyxis    Aloe Hives     Hives     Celebrex [Celecoxib] Itching    Fish-Derived Products Other (See Comments)       Family History   Problem Relation Age of Onset    Diabetes Mother     Arthritis Mother     Atrial Fibrillation Mother     Diabetes Father     Atrial Fibrillation Father     Arthritis Father     Emphysema Father     Cancer Sister         \"female\"       Social History     Socioeconomic History    Marital status:      Spouse name: Not on file    Number of children: Not on file    Years of education: Not on file    Highest education level: Not on file   Occupational History    Not on file   Tobacco Use    Smoking status: Former     Packs/day: 1.50     Years: 43.00     Pack years: 64.50     Types: Cigarettes     Quit date: 2018     Years since quittin.0    Smokeless tobacco: Never   Vaping Use    Vaping Use: Never used   Substance and Sexual Activity    Alcohol use: No    Drug use: No    Sexual activity: Not Currently   Other Topics Concern    Not on file   Social History Narrative    Not on file     Social Determinants of Health     Financial Resource Strain: Not on file   Food Insecurity: Not on file   Transportation Needs: Not on file   Physical Activity: Sufficiently Active    Days of Exercise per Week: 6 days    Minutes of Exercise per Session: 60 min   Stress: Not on file   Social Connections: Not on file   Intimate Partner Violence: Not on file   Housing Stability: Not on file     Review of Systems   Constitutional:  Negative for activity change, appetite change, chills, fever and unexpected weight change. HENT: Negative. Eyes: Negative.     Respiratory:  Positive for cough, choking, shortness of breath and wheezing. Negative for chest tightness. Cardiovascular:  Negative for chest pain, palpitations and leg swelling. Gastrointestinal:  Positive for abdominal pain, constipation and diarrhea. Negative for abdominal distention, anal bleeding, blood in stool, nausea and vomiting. Endocrine: Negative for cold intolerance, heat intolerance, polydipsia and polyuria. Genitourinary:  Negative for dysuria, frequency, hematuria, urgency, vaginal bleeding, vaginal discharge and vaginal pain. Musculoskeletal:  Positive for arthralgias, back pain, gait problem, myalgias, neck pain and neck stiffness. Negative for joint swelling. Skin:  Negative for color change, pallor, rash and wound. Allergic/Immunologic: Positive for food allergies. Negative for environmental allergies. Neurological:  Positive for dizziness. Negative for seizures, syncope, weakness, light-headedness, numbness and headaches. Hematological:  Negative for adenopathy. Bruises/bleeds easily. Psychiatric/Behavioral:  Negative for agitation, confusion, decreased concentration, hallucinations, self-injury and suicidal ideas. The patient is not nervous/anxious and is not hyperactive. Objective:   Physical Exam  Constitutional:       General: She is not in acute distress. Appearance: Normal appearance. She is well-developed. She is obese. She is not ill-appearing, toxic-appearing or diaphoretic. HENT:      Head: Normocephalic and atraumatic. Nose: Nose normal.      Mouth/Throat:      Mouth: Mucous membranes are moist.      Pharynx: Oropharynx is clear. Eyes:      General: No scleral icterus. Right eye: No discharge. Left eye: No discharge. Extraocular Movements: Extraocular movements intact. Pupils: Pupils are equal, round, and reactive to light. Cardiovascular:      Rate and Rhythm: Normal rate and regular rhythm. Heart sounds: Normal heart sounds. No murmur heard.   Pulmonary:      Effort: Pulmonary effort is normal. No respiratory distress. Breath sounds: Normal breath sounds. No stridor. No wheezing, rhonchi or rales. Abdominal:      General: Bowel sounds are normal. There is no distension. Palpations: Abdomen is soft. There is no mass. Tenderness: There is no abdominal tenderness. There is no guarding or rebound. Hernia: No hernia is present. Musculoskeletal:         General: Normal range of motion. Cervical back: Normal range of motion and neck supple. Comments: Walks with walker   Skin:     General: Skin is warm and dry. Neurological:      General: No focal deficit present. Mental Status: She is alert and oriented to person, place, and time. Psychiatric:         Mood and Affect: Mood normal.         Behavior: Behavior normal.         Thought Content: Thought content normal.         Judgment: Judgment normal.   PCP notes personally reviewed    Assessment:      Hx of tubular adenoma  Lower abd pain      Plan:      Recommend colonoscopy. The patient was explained the risks/benefits/alternatives/expected outcomes of the procedure. The patient was explained the risks of the procedure, including, but not limited to, the risk of reaction to the anesthesia medicine and the risk of perforation requiring further surgery. The patient was informed that they may require biopsy or polypectomy. These procedures may increase the risk of complication. All questions were answered. The patient verbalized understanding and agreed to proceed. Pt is higher risk due to BMI >36.         Ibis Ricks MD

## 2022-10-05 ENCOUNTER — HOSPITAL ENCOUNTER (OUTPATIENT)
Dept: GENERAL RADIOLOGY | Age: 62
Discharge: HOME | End: 2022-10-07
Payer: MEDICARE

## 2022-10-05 ENCOUNTER — CLINICAL DOCUMENTATION (OUTPATIENT)
Dept: SPIRITUAL SERVICES | Age: 62
End: 2022-10-05

## 2022-10-05 ENCOUNTER — PREP FOR PROCEDURE (OUTPATIENT)
Dept: SURGERY | Age: 62
End: 2022-10-05

## 2022-10-05 ENCOUNTER — TELEPHONE (OUTPATIENT)
Dept: SURGERY | Age: 62
End: 2022-10-05

## 2022-10-05 ENCOUNTER — OFFICE VISIT (OUTPATIENT)
Dept: ORTHOPEDIC SURGERY | Age: 62
End: 2022-10-05
Payer: MEDICARE

## 2022-10-05 ENCOUNTER — HOSPITAL ENCOUNTER (OUTPATIENT)
Age: 62
Discharge: HOME | End: 2022-10-07
Payer: MEDICARE

## 2022-10-05 ENCOUNTER — TELEPHONE (OUTPATIENT)
Dept: FAMILY MEDICINE CLINIC | Age: 62
End: 2022-10-05

## 2022-10-05 VITALS — BODY MASS INDEX: 36.16 KG/M2 | HEIGHT: 66 IN | WEIGHT: 225 LBS | TEMPERATURE: 98 F

## 2022-10-05 DIAGNOSIS — M54.16 LUMBAR RADICULOPATHY: ICD-10-CM

## 2022-10-05 DIAGNOSIS — Z96.643 HISTORY OF BILATERAL TOTAL HIP ARTHROPLASTY: ICD-10-CM

## 2022-10-05 DIAGNOSIS — Z96.643 HISTORY OF BILATERAL TOTAL HIP ARTHROPLASTY: Primary | ICD-10-CM

## 2022-10-05 DIAGNOSIS — Z96.641 H/O TOTAL HIP ARTHROPLASTY, RIGHT: ICD-10-CM

## 2022-10-05 PROBLEM — Z86.0100 PERSONAL HISTORY OF COLONIC POLYPS: Status: ACTIVE | Noted: 2022-10-05

## 2022-10-05 PROBLEM — Z86.010 PERSONAL HISTORY OF COLONIC POLYPS: Status: ACTIVE | Noted: 2022-10-05

## 2022-10-05 PROCEDURE — G8417 CALC BMI ABV UP PARAM F/U: HCPCS | Performed by: PHYSICIAN ASSISTANT

## 2022-10-05 PROCEDURE — 1036F TOBACCO NON-USER: CPT | Performed by: PHYSICIAN ASSISTANT

## 2022-10-05 PROCEDURE — G8484 FLU IMMUNIZE NO ADMIN: HCPCS | Performed by: PHYSICIAN ASSISTANT

## 2022-10-05 PROCEDURE — 3017F COLORECTAL CA SCREEN DOC REV: CPT | Performed by: PHYSICIAN ASSISTANT

## 2022-10-05 PROCEDURE — G8427 DOCREV CUR MEDS BY ELIG CLIN: HCPCS | Performed by: PHYSICIAN ASSISTANT

## 2022-10-05 PROCEDURE — 73502 X-RAY EXAM HIP UNI 2-3 VIEWS: CPT

## 2022-10-05 PROCEDURE — 77073 BONE LENGTH STUDIES: CPT

## 2022-10-05 PROCEDURE — 99212 OFFICE O/P EST SF 10 MIN: CPT

## 2022-10-05 PROCEDURE — 99214 OFFICE O/P EST MOD 30 MIN: CPT | Performed by: PHYSICIAN ASSISTANT

## 2022-10-05 ASSESSMENT — ENCOUNTER SYMPTOMS
EYE PAIN: 0
VOMITING: 0
SINUS PRESSURE: 0
ABDOMINAL PAIN: 0
COUGH: 0
CHEST TIGHTNESS: 0
SINUS PAIN: 0
EYE DISCHARGE: 0
NAUSEA: 0
BLOOD IN STOOL: 0
DIARRHEA: 0
SHORTNESS OF BREATH: 0
EYE REDNESS: 0

## 2022-10-05 NOTE — TELEPHONE ENCOUNTER
Scheduled pt for Colonoscopy 12/9/22 at 12:30pm. Pt needs to arrive at [address] [place] [address] at 11:30am. Patient confirmed date and time, address and directions given in office. Prep instructions given in office, patient understood.     Electronically signed by Sourav Pascual MA on 10/5/22 at 9:28 AM EDT

## 2022-10-05 NOTE — PROGRESS NOTES
Chief Complaint   Patient presents with    Hip Pain     B/L MERVAT follow up. Right side 1/10/22. Right side still painful on lateral hip and going down into leg. Also has pain in the anterior hip she states. She is able to ambulate using a walker. Left MERVAT 2/22/21. Side feels painful in the anterior and has some cramping type pain at times. She also states of calf pain in the left side when she is sitting mostly about 4 times a week. Patient was in PT but has stopped due to pain in back and hips. Subjective:  Kate Smith is a very pleasant 60 y.o. female who presents today for follow up reporting bilateral hip pain. Has had bilateral MERVAT with Dr. Rich Demarco in the past.  Patient has had increasing groin and lateral thigh pain with activities. She denies start up pain, she denies pain with passive range of motion at her hips. She has increasing back pain. Increasing right lateral thigh pain with numbing and pins and needle sensations. She also gets recurrent left calf burning and stabbing pain particularly when seated in her recliner. Ambulates with a walker at baseline. Patient has felt that she has a limb length discrepancy this is attributed more to muscular imbalances and deconditioning after her bilateral total hips. Patient has had to stop PT at [place] due to worsening hip and back pain with increased radicular symptoms. Patient denies loss of bladder or bowel control, denies saddle anesthesia. Patient denies any new fall/trauma/injury     Review of Systems -  All pertinent positives/negative in HPI     Objective:    General: Alert and oriented X 3, normocephalic atraumatic, external ears and eye normal, sclera clear, no acute distress, respirations easy and unlabored with no audible wheezes, skin warm and dry, speech and dress appropriate for noted age, affect euthymic.     Extremity:  Bilateral Lower Extremity  Skin clean dry and intact, without signs of infection   Incisions well healed  Standing patient hyperextends bilateral knees  no edema noted  Compartments supple throughout thigh and leg  Calf supple and nontender  negative Homans  Demonstrates active hip flexion(4-/5), knee extension/flexion(4-/5, 4/5), ankle DF/PF, weak EHL bilaterally  Diminished sensation L1-L3 on the Right, Diminished sensation L5 on the left lower extremity  Patient has no pain at the hips with log roll  TTP over distal R ITB but no GT tenderness  + SLR Bilaterally    Temp 98 °F (36.7 °C)   Ht 5' 6\" (1.676 m)   Wt 225 lb (102.1 kg)   BMI 36.32 kg/m²     XR:   Xr bilateral hips demonstrates bilateral total hip arthroplasties in anatomic alignment, no evidence of loosening or subsidence. No evidence of fracture. Limb lengths appear equivocal. Limited views of the lumbar spine appears to have significant generative changes as well as scoliotic curvature     Assessment:   Diagnosis Orders   1. History of bilateral total hip arthroplasty  XR LEG LENGTH EVALUATION      2.  Lumbar radiculopathy  XR INJ MYELOGRAM LUMBAR CT    CT LUMBAR SPINE W CONTRAST          Plan:  Reviewed patient's imaging as well as prior lumbar imaging at length  Her symptoms today appear to be more related to a lumbar radiculopathy and weakness and has not improved with conservative treatments including physical therapy  Given patients history of brain rhythm treated with coil we will perform a CT myelogram rather than an MRI  Patient has known listed allergy to iodine, as tolerated CTs with contrast well in the past with premedication  We will also perform limb length x-ray to determine if patient has a structural versus functional limb length discrepancy after bilateral total hip arthroplasties  At this time patient is not interested in pursuing surgical intervention with her back, she is currently established with pain management who could perform epidural injections if indicated  We will hold off on physical therapy at this time until further evaluation is completed as patient has limited number of visits throughout the year  Anticipate that we will restart physical therapy but include aquatic therapy as well  We will determine plan of care for follow-up based upon CT myelogram results  Questions answered at bedside patient is agreeable with her plan of care    Electronically signed by Venkat Isabel PA-C on 10/5/2022 at 9:59 AM  Note: This report was completed using [employer] voiced recognition software. Every effort has been made to ensure accuracy; however, inadvertent computerized transcription errors may be present.

## 2022-10-05 NOTE — PATIENT INSTRUCTIONS
You were ordered CT of Lumbar (Myelogram)  by your Orthopedic Provider.   If you do not hear from that department please contact: [phone]- [phone]    Recommend land/aquatic therapy at [facility], we will hold off on this until further evaluation given with going on at your back to reserve visits for later in the year    Outpatient Xrays for limb length

## 2022-10-05 NOTE — TELEPHONE ENCOUNTER
Please call patient  She had PFTs - they were abnormal. Would like to review in person. Also was referred to pulmonology in August - does she have appointment ? (Referred to Dr Arun Molina).      Thanks

## 2022-10-05 NOTE — TELEPHONE ENCOUNTER
Prior Authorization Form:      DEMOGRAPHICS:                     Patient Name:  Horacio Krueger  Patient :  1960            Insurance:  Payor: MEDICARE / Plan: MEDICARE PART A AND B / Product Type: *No Product type* /   Insurance ID Number:    Payer/Plan Subscr  Sex Relation Sub. Ins. ID Effective Group Num   1. MEDICARE - Bethany Rodriguez 1960 Female Self [id number] 3/1/21                                    [address] [address]   2.  [place] Lui Rodriguez 1960 Female Self [id number]                                    [address]         DIAGNOSIS & PROCEDURE:                       Procedure/Operation: COLONOSCOPY           CPT Code: 75549    Diagnosis:  HISTORY OF ADENOMATOUS COLONIC POLYPS    ICD10 Code: Z86.010    [facility]    Surgeon:  Nikia Hollingsworth    SCHEDULING INFORMATION:                          Date: 22    Time: 12:30PM              Anesthesia:  MAC/TIVA                                                       Status:  Outpatient        Special Comments:  N/A       Electronically signed by Luis Muñoz MA on 10/5/2022 at 9:29 AM

## 2022-10-06 ENCOUNTER — OFFICE VISIT (OUTPATIENT)
Dept: FAMILY MEDICINE CLINIC | Age: 62
End: 2022-10-06
Payer: MEDICARE

## 2022-10-06 VITALS
RESPIRATION RATE: 17 BRPM | HEIGHT: 66 IN | DIASTOLIC BLOOD PRESSURE: 58 MMHG | OXYGEN SATURATION: 97 % | HEART RATE: 68 BPM | SYSTOLIC BLOOD PRESSURE: 120 MMHG | WEIGHT: 225 LBS | BODY MASS INDEX: 36.16 KG/M2

## 2022-10-06 DIAGNOSIS — R19.8 ALTERNATING CONSTIPATION AND DIARRHEA: ICD-10-CM

## 2022-10-06 DIAGNOSIS — J43.9 PULMONARY EMPHYSEMA, UNSPECIFIED EMPHYSEMA TYPE (HCC): ICD-10-CM

## 2022-10-06 DIAGNOSIS — R94.2 ABNORMAL DIFFUSION CAPACITY DETERMINED BY PULMONARY FUNCTION TEST: ICD-10-CM

## 2022-10-06 DIAGNOSIS — R06.00 DYSPNEA, UNSPECIFIED TYPE: ICD-10-CM

## 2022-10-06 DIAGNOSIS — R25.2 LEG CRAMPS: ICD-10-CM

## 2022-10-06 DIAGNOSIS — R79.9 ABNORMAL FINDING OF BLOOD CHEMISTRY, UNSPECIFIED: ICD-10-CM

## 2022-10-06 DIAGNOSIS — R20.2 NUMBNESS AND TINGLING: ICD-10-CM

## 2022-10-06 DIAGNOSIS — R20.0 NUMBNESS AND TINGLING: ICD-10-CM

## 2022-10-06 DIAGNOSIS — R06.00 DYSPNEA, UNSPECIFIED TYPE: Primary | ICD-10-CM

## 2022-10-06 DIAGNOSIS — T14.8XXA BRUISING: ICD-10-CM

## 2022-10-06 DIAGNOSIS — G56.03 BILATERAL CARPAL TUNNEL SYNDROME: ICD-10-CM

## 2022-10-06 LAB
ALBUMIN SERPL-MCNC: 4.5 G/DL (ref 3.5–5.2)
ALP BLD-CCNC: 80 U/L (ref 35–104)
ALT SERPL-CCNC: 15 U/L (ref 0–32)
ANION GAP SERPL CALCULATED.3IONS-SCNC: 14 MMOL/L (ref 7–16)
AST SERPL-CCNC: 13 U/L (ref 0–31)
BASOPHILS ABSOLUTE: 0.04 E9/L (ref 0–0.2)
BASOPHILS RELATIVE PERCENT: 0.7 % (ref 0–2)
BILIRUB SERPL-MCNC: 0.4 MG/DL (ref 0–1.2)
BUN BLDV-MCNC: 31 MG/DL (ref 6–23)
CALCIUM SERPL-MCNC: 9.8 MG/DL (ref 8.6–10.2)
CHLORIDE BLD-SCNC: 103 MMOL/L (ref 98–107)
CO2: 24 MMOL/L (ref 22–29)
CREAT SERPL-MCNC: 1.3 MG/DL (ref 0.5–1)
EOSINOPHILS ABSOLUTE: 0.14 E9/L (ref 0.05–0.5)
EOSINOPHILS RELATIVE PERCENT: 2.5 % (ref 0–6)
FERRITIN: 153 NG/ML
GFR AFRICAN AMERICAN: 50
GFR NON-AFRICAN AMERICAN: 41 ML/MIN/1.73
GLUCOSE BLD-MCNC: 99 MG/DL (ref 74–99)
HCT VFR BLD CALC: 41.2 % (ref 34–48)
HEMOGLOBIN: 13.3 G/DL (ref 11.5–15.5)
IMMATURE GRANULOCYTES #: 0.02 E9/L
IMMATURE GRANULOCYTES %: 0.4 % (ref 0–5)
LYMPHOCYTES ABSOLUTE: 1.6 E9/L (ref 1.5–4)
LYMPHOCYTES RELATIVE PERCENT: 28.2 % (ref 20–42)
MAGNESIUM: 2 MG/DL (ref 1.6–2.6)
MCH RBC QN AUTO: 29.8 PG (ref 26–35)
MCHC RBC AUTO-ENTMCNC: 32.3 % (ref 32–34.5)
MCV RBC AUTO: 92.2 FL (ref 80–99.9)
MONOCYTES ABSOLUTE: 0.35 E9/L (ref 0.1–0.95)
MONOCYTES RELATIVE PERCENT: 6.2 % (ref 2–12)
NEUTROPHILS ABSOLUTE: 3.52 E9/L (ref 1.8–7.3)
NEUTROPHILS RELATIVE PERCENT: 62 % (ref 43–80)
PDW BLD-RTO: 13.2 FL (ref 11.5–15)
PLATELET # BLD: 228 E9/L (ref 130–450)
PMV BLD AUTO: 9.6 FL (ref 7–12)
POTASSIUM SERPL-SCNC: 4.2 MMOL/L (ref 3.5–5)
RBC # BLD: 4.47 E12/L (ref 3.5–5.5)
SODIUM BLD-SCNC: 141 MMOL/L (ref 132–146)
TOTAL PROTEIN: 7.2 G/DL (ref 6.4–8.3)
VITAMIN B-12: 608 PG/ML (ref 211–946)
WBC # BLD: 5.7 E9/L (ref 4.5–11.5)

## 2022-10-06 PROCEDURE — 1036F TOBACCO NON-USER: CPT | Performed by: FAMILY MEDICINE

## 2022-10-06 PROCEDURE — G8427 DOCREV CUR MEDS BY ELIG CLIN: HCPCS | Performed by: FAMILY MEDICINE

## 2022-10-06 PROCEDURE — 3017F COLORECTAL CA SCREEN DOC REV: CPT | Performed by: FAMILY MEDICINE

## 2022-10-06 PROCEDURE — 99214 OFFICE O/P EST MOD 30 MIN: CPT | Performed by: FAMILY MEDICINE

## 2022-10-06 PROCEDURE — 3023F SPIROM DOC REV: CPT | Performed by: FAMILY MEDICINE

## 2022-10-06 PROCEDURE — G8484 FLU IMMUNIZE NO ADMIN: HCPCS | Performed by: FAMILY MEDICINE

## 2022-10-06 PROCEDURE — G8417 CALC BMI ABV UP PARAM F/U: HCPCS | Performed by: FAMILY MEDICINE

## 2022-10-06 RX ORDER — PSYLLIUM SEED (WITH DEXTROSE)
3.4 POWDER (GRAM) ORAL DAILY
Qty: 368 G | Refills: 5 | Status: SHIPPED | OUTPATIENT
Start: 2022-10-06

## 2022-10-06 SDOH — ECONOMIC STABILITY: FOOD INSECURITY: WITHIN THE PAST 12 MONTHS, YOU WORRIED THAT YOUR FOOD WOULD RUN OUT BEFORE YOU GOT MONEY TO BUY MORE.: NEVER TRUE

## 2022-10-06 SDOH — ECONOMIC STABILITY: FOOD INSECURITY: WITHIN THE PAST 12 MONTHS, THE FOOD YOU BOUGHT JUST DIDN'T LAST AND YOU DIDN'T HAVE MONEY TO GET MORE.: NEVER TRUE

## 2022-10-06 ASSESSMENT — SOCIAL DETERMINANTS OF HEALTH (SDOH): HOW HARD IS IT FOR YOU TO PAY FOR THE VERY BASICS LIKE FOOD, HOUSING, MEDICAL CARE, AND HEATING?: NOT VERY HARD

## 2022-10-06 ASSESSMENT — ENCOUNTER SYMPTOMS
DIARRHEA: 0
CONSTIPATION: 0
WHEEZING: 0
SHORTNESS OF BREATH: 0

## 2022-10-06 NOTE — PROGRESS NOTES
10/6/2022    Earl Altman is a 60 y.o. female here for   Chief Complaint   Patient presents with    Follow-Up from Hospital   Here for f/u PFT's, but also concerned about numbness/ tingling in her hands and feet. Has noticed a lot of cramping and bruising over the last few months  Bruising seems to appear all over 'me'  They seem to appear without trigger. Will get it on chest, stomach arms,   Doesn't recall even minor injuries  Ongoing for a few months. Reports cramping in hands, calves and in feet, toes. She drinks lot of water  Will be watching tv and her hand or foot will cramp up. Taking potassium. reports that she quit smoking about 4 years ago. Her smoking use included cigarettes. She has a 64.50 pack-year smoking history. She has never used smokeless tobacco.    PFTs reviewed    Date of Exam: 9/28/2022          Pulmonary Function Test % Pred   FEV1 1.78 71   FEV1/FVC 73 (LLN-68)     TLC 5.95 114   DLCO 7.83 30      No obstruction  No Bronchodilator response  There is evidence of air trapping  No Restriction  Severe diffusion defect. Recommend evaluation for pulmonary vascular diseases, and pulmonary hypertension, CHF, early ILD  Wt Readings from Last 3 Encounters:   10/06/22 225 lb (102.1 kg)   10/05/22 225 lb (102.1 kg)   10/04/22 225 lb (102.1 kg)       She  reports that she quit smoking about 4 years ago. Her smoking use included cigarettes. She has a 64.50 pack-year smoking history. She has never used smokeless tobacco.    Medications and allergies reviewed and updated in chart. Current Outpatient Medications   Medication Sig Dispense Refill    [START ON 10/8/2022] acetaminophen-codeine (TYLENOL #4) 300-60 MG per tablet Take 1 tablet by mouth daily as needed for Pain for up to 30 days. 30 tablet 1    amLODIPine (NORVASC) 5 MG tablet TAKE ONE TABLET BY MOUTH EVERY DAY 30 tablet 3    fluticasone (FLONASE) 50 MCG/ACT nasal spray 2 sprays by Each Nostril route in the morning.  1 each 3 gabapentin (NEURONTIN) 300 MG capsule TAKE ONE CAPSULE BY MOUTH THREE TIMES A DAY 90 capsule 3    losartan (COZAAR) 100 MG tablet Take 1 tablet by mouth in the morning. 30 tablet 3    pantoprazole (PROTONIX) 40 MG tablet TAKE ONE TABLET BY MOUTH EVERY DAY 30 tablet 3    potassium chloride (KLOR-CON M) 10 MEQ extended release tablet TAKE ONE TABLET BY MOUTH DAILY WITH BREAKFAST 30 tablet 3    albuterol sulfate HFA (PROVENTIL;VENTOLIN;PROAIR) 108 (90 Base) MCG/ACT inhaler INHALE TWO PUFFS BY MOUTH EVERY 6 HOURS AS NEEDED FOR WHEEZING. 1 each 3    polyethylene glycol (GLYCOLAX) 17 g packet DISSOLVE 1 PACKET (17 GRAMS) IN LIQUID AND TAKE BY MOUTH DAILY      valACYclovir (VALTREX) 1 g tablet Take 1 tablet by mouth daily For 5 days as needed for Herpes outbreak 5 tablet 1    Multiple Vitamins-Minerals (THERAPEUTIC MULTIVITAMIN-MINERALS) tablet Take 1 tablet by mouth daily      diclofenac sodium (VOLTAREN) 1 % GEL APPLY ONE GRAM EXTERNALLY TWICE A DAY TO NECK AND ONE GRAM EXTERNALLY TWICE A DAY TO BACK  100 g 2    Misc. Devices (ROLLATOR) MISC 1 each by Does not apply route daily as needed (use as needed for stability) 1 each 0    chlorthalidone (HYGROTON) 25 MG tablet Take 1 tablet by mouth in the morning. 30 tablet 3    metoprolol tartrate (LOPRESSOR) 25 MG tablet Take 1.5 tablets by mouth in the morning and 1.5 tablets before bedtime. 90 tablet 3    carBAMazepine (TEGRETOL-XR) 100 MG extended release tablet Take 1 tablet by mouth in the morning, at noon, and at bedtime (Patient not taking: No sig reported) 270 tablet 0    fluconazole (DIFLUCAN) 150 MG tablet TAKE ONE TABLET BY MOUTH ONCE FOR 1 DOSE FOR YEAST INFECTION AS NEEDED (Patient not taking: No sig reported) 1 tablet 1    aspirin 325 MG EC tablet Take 1 tablet by mouth 2 times daily (Patient not taking: No sig reported) 56 tablet 1     No current facility-administered medications for this visit.         Patient'spast medical, surgical, social and/or family history reviewed, updated in chart, and are non-contributory (unless otherwise stated). Review of Systems  Review of Systems   Constitutional:  Negative for chills, diaphoresis and fever. Eyes:  Negative for visual disturbance. Respiratory:  Negative for shortness of breath and wheezing. Cardiovascular:  Negative for chest pain. Gastrointestinal:  Negative for constipation and diarrhea. Neurological:  Positive for numbness. Negative for dizziness. Psychiatric/Behavioral:  Negative for dysphoric mood. PE:  VS:  BP (!) 120/58   Pulse 68   Resp 17   Ht 5' 6\" (1.676 m)   Wt 225 lb (102.1 kg)   SpO2 97%   BMI 36.32 kg/m²   Physical Exam  Constitutional:       Appearance: She is well-developed. HENT:      Head: Normocephalic and atraumatic. Cardiovascular:      Rate and Rhythm: Normal rate and regular rhythm. Heart sounds: No murmur heard. No friction rub. No gallop. Pulmonary:      Effort: Pulmonary effort is normal. No respiratory distress. Breath sounds: Rhonchi present. No wheezing or rales. Skin:     General: Skin is warm and dry. Comments: No ecchymosis   Neurological:      General: No focal deficit present. Mental Status: She is alert and oriented to person, place, and time. +phalens  Negative tinels      Assessment/Plan:  Sebas Gay was seen today for follow-up from hospital.    Diagnoses and all orders for this visit:    Dyspnea, unspecified type  Suspect progression of emphysema vs ILD. Note abnormal pfts  Cannot exclude anemia. Note symptoms of bruising though none present on exam at this time. Check echo to evaluate for pulmonary hypertension, congestive heart failure   -     Echocardiogram complete; Future  -     CBC with Auto Differential; Future  -     Ferritin; Future    Pulmonary emphysema, unspecified emphysema type ([address]  F/u with pulm as scheduled  -     Echocardiogram complete;  Future    Abnormal diffusion capacity determined by pulmonary function test  See PFT results  At risk for heart failure given hypertension  -     Echocardiogram complete; Future    Leg cramps  Suspect hypomagnesemia   -     CBC with Auto Differential; Future  -     Comprehensive Metabolic Panel; Future  -     Magnesium; Future  -     Ferritin; Future  -     Vitamin B12; Future    Bruising  R/o thrombocytopenia  -     CBC with Auto Differential; Future    Alternating constipation and diarrhea  Add fiber supplement  Consider further evaluation as needed  She is scheduled for colonoscopy  -     CBC with Auto Differential; Future  -     psyllium (METAMUCIL SMOOTH TEXTURE) 28.3 % POWD powder; Take 3.4 g by mouth daily  -     Comprehensive Metabolic Panel; Future  -     Magnesium; Future  -     Ferritin; Future    Abnormal finding of blood chemistry, unspecified   -     Ferritin; Future    Numbness and tingling  Upper extremity symptoms consistent with carpal tunnel however lower extremity symptomsnot explained by this. Evaluate for hyperglycemia, b12 deficiency. -     Vitamin B12; Future    Bilateral carpal tunnel syndrome  Reviewed conservative management  -     Misc. Devices (WRIST BRACE) MISC; Firm neutral position left wrist brace  Size to fit  Dx:  Wrist pain/carpal tunnel syndrome  -     Misc. Devices (WRIST BRACE) MISC; Firm neutral position right wrist brace  Size to fit  Dx:  Wrist pain/carpal tunnel syndrome      Return in about 3 months (around 1/6/2023). Advised patient to call with any new medication issues. All questions answered.   Call or go to emergency department ifsymptoms worsen or persist.

## 2022-10-07 ENCOUNTER — TELEPHONE (OUTPATIENT)
Dept: FAMILY MEDICINE CLINIC | Age: 62
End: 2022-10-07

## 2022-10-07 DIAGNOSIS — I10 HYPERTENSION, UNSPECIFIED TYPE: ICD-10-CM

## 2022-10-07 DIAGNOSIS — R79.89 ELEVATED SERUM CREATININE: Primary | ICD-10-CM

## 2022-10-07 NOTE — TELEPHONE ENCOUNTER
----- Message from Dora Manriquez sent at 10/7/2022  4:23 PM EDT -----  Pt is agreeable to see nephrologist

## 2022-10-12 NOTE — ACP (ADVANCE CARE PLANNING)
Advance Care Planning   Ambulatory ACP Specialist Patient Outreach    Date:  10/12/2022  ACP Specialist:  Shalom Costa    Outreach call to patient in follow-up to ACP Specialist referral from: Teresa Castaneda MD    [x] PCP  [] Provider   [] Ambulatory Care Management [] Other for Reason:    [x] Advance Directive Assistance  [] Code Status Discussion  [] Complete Portable DNR Order  [] Discuss Goals of Care  [] Complete POST/MOST  [] Early ACP Decision-Making  [] Other    Date Referral Received:8/5/22    Today's Outreach:  [] First   [] Second  [x] Third                               Third outreach made by []  phone  [] email []   Fourteen IPt     Intervention:  [x] Spoke with Patient  [] Left VM requesting return call      Outcome:Rescheduled for Nov 17 @ 1:00. Next Step:   [x] ACP scheduled conversation  [] Outreach again in one week               [] Email / Mail ACP Info Sheets  [] Email / Mail Advance Directive            [] Close Referral. Routing closure to referring provider/staff and to ACP Specialist . [] Closure Letter mailed to Patient with Invitation to Contact ACP Specialist if/when ready.     Thank you for this referral.

## 2022-10-13 ENCOUNTER — TELEPHONE (OUTPATIENT)
Dept: PULMONOLOGY | Age: 62
End: 2022-10-13

## 2022-10-13 ENCOUNTER — HOSPITAL ENCOUNTER (OUTPATIENT)
Dept: NUCLEAR MEDICINE | Age: 62
Discharge: HOME | End: 2022-10-15
Payer: MEDICARE

## 2022-10-13 ENCOUNTER — HOSPITAL ENCOUNTER (OUTPATIENT)
Dept: ULTRASOUND IMAGING | Age: 62
Discharge: HOME | End: 2022-10-15
Payer: MEDICARE

## 2022-10-13 ENCOUNTER — OFFICE VISIT (OUTPATIENT)
Dept: PULMONOLOGY | Age: 62
End: 2022-10-13
Payer: MEDICARE

## 2022-10-13 VITALS
BODY MASS INDEX: 37.49 KG/M2 | HEIGHT: 65 IN | OXYGEN SATURATION: 96 % | DIASTOLIC BLOOD PRESSURE: 65 MMHG | RESPIRATION RATE: 16 BRPM | TEMPERATURE: 96.6 F | SYSTOLIC BLOOD PRESSURE: 127 MMHG | HEART RATE: 66 BPM | WEIGHT: 225 LBS

## 2022-10-13 DIAGNOSIS — M79.662 PAIN OF LEFT CALF: ICD-10-CM

## 2022-10-13 DIAGNOSIS — R06.09 DOE (DYSPNEA ON EXERTION): Chronic | ICD-10-CM

## 2022-10-13 DIAGNOSIS — R94.2 ABNORMAL DIFFUSION CAPACITY DETERMINED BY PULMONARY FUNCTION TEST: ICD-10-CM

## 2022-10-13 DIAGNOSIS — E66.9 OBESITY (BMI 30-39.9): Chronic | ICD-10-CM

## 2022-10-13 DIAGNOSIS — I82.451 ACUTE DEEP VEIN THROMBOSIS (DVT) OF RIGHT PERONEAL VEIN (HCC): Primary | ICD-10-CM

## 2022-10-13 DIAGNOSIS — I82.451 DEEP VENOUS THROMBOSIS (DVT) OF RIGHT PERONEAL VEIN, UNSPECIFIED CHRONICITY (HCC): ICD-10-CM

## 2022-10-13 DIAGNOSIS — J43.2 CENTRILOBULAR EMPHYSEMA (HCC): Primary | Chronic | ICD-10-CM

## 2022-10-13 PROCEDURE — 3023F SPIROM DOC REV: CPT | Performed by: INTERNAL MEDICINE

## 2022-10-13 PROCEDURE — 3017F COLORECTAL CA SCREEN DOC REV: CPT | Performed by: INTERNAL MEDICINE

## 2022-10-13 PROCEDURE — 1036F TOBACCO NON-USER: CPT | Performed by: INTERNAL MEDICINE

## 2022-10-13 PROCEDURE — 93970 EXTREMITY STUDY: CPT

## 2022-10-13 PROCEDURE — G8427 DOCREV CUR MEDS BY ELIG CLIN: HCPCS | Performed by: INTERNAL MEDICINE

## 2022-10-13 PROCEDURE — A9539 TC99M PENTETATE: HCPCS | Performed by: RADIOLOGY

## 2022-10-13 PROCEDURE — 99213 OFFICE O/P EST LOW 20 MIN: CPT | Performed by: INTERNAL MEDICINE

## 2022-10-13 PROCEDURE — G8484 FLU IMMUNIZE NO ADMIN: HCPCS | Performed by: INTERNAL MEDICINE

## 2022-10-13 PROCEDURE — 3430000000 HC RX DIAGNOSTIC RADIOPHARMACEUTICAL: Performed by: RADIOLOGY

## 2022-10-13 PROCEDURE — A9540 TC99M MAA: HCPCS | Performed by: RADIOLOGY

## 2022-10-13 PROCEDURE — G1010 CDSM STANSON: HCPCS

## 2022-10-13 PROCEDURE — 99215 OFFICE O/P EST HI 40 MIN: CPT | Performed by: INTERNAL MEDICINE

## 2022-10-13 PROCEDURE — G8417 CALC BMI ABV UP PARAM F/U: HCPCS | Performed by: INTERNAL MEDICINE

## 2022-10-13 RX ORDER — KIT FOR THE PREPARATION OF TECHNETIUM TC 99M PENTETATE 20 MG/1
3 INJECTION, POWDER, LYOPHILIZED, FOR SOLUTION INTRAVENOUS; RESPIRATORY (INHALATION)
Status: COMPLETED | OUTPATIENT
Start: 2022-10-13 | End: 2022-10-13

## 2022-10-13 RX ORDER — RIVAROXABAN 10 MG/1
TABLET, FILM COATED ORAL
Qty: 75 TABLET | Refills: 3 | Status: SHIPPED
Start: 2022-10-13 | End: 2022-10-13

## 2022-10-13 RX ADMIN — KIT FOR THE PREPARATION OF TECHNETIUM TC 99M PENTETATE 35 MILLICURIE: 20 INJECTION, POWDER, LYOPHILIZED, FOR SOLUTION INTRAVENOUS; RESPIRATORY (INHALATION) at 12:40

## 2022-10-13 RX ADMIN — Medication 6 MILLICURIE: at 13:05

## 2022-10-13 NOTE — PROGRESS NOTES
Follow up in office today to establish new provider for pt. Reports calf pain and PFT shows decreased DLCO. Reviewed with pt and orders placed for VQ scan and Doppler testing to rule out P/E. Office arranged for STAT testing and pt advised to go over to [facility] hospital registration to complete testing. Office also gave pt samples of Stiloto inhaler and advised on use while at today's visit per orders samples given.  Office to follow up with pt with testing results later today

## 2022-10-13 NOTE — TELEPHONE ENCOUNTER
Call to pt to advise on today's test results after Dr. Brett Contreras has reviewed. New medication Xarelto to begin 15mg twice a day for 3 weeks(21 days ) then 20mg daily until she sees Dr. Brett Contreras in 3 mos. Office to mail out appt card. Advised pt on bleeding precautions with new start of blood thinner/anticoagulant meds. Encouraged to call if any questions of issues with getting new medication.

## 2022-10-13 NOTE — PROGRESS NOTES
[place]  Department of Pulmonary, Critical Care and Sleep Medicine  Dr. Yoli Jurado, Dr. Javier Resendiz, Dr. Joi Fajardo Note - Follow up      Assessment/Plan     Assessment & Plan     No problem-specific Assessment & Plan notes found for this encounter. Problem List Items Addressed This Visit          Respiratory    Pulmonary emphysema ([facility] [address] - Primary (Chronic)    Relevant Medications    tiotropium-olodaterol (STIOLTO RESPIMAT) 2.5-2.5 MCG/ACT AERS       Other    BELCHER (dyspnea on exertion) (Chronic)    Relevant Orders    Echocardiogram complete    NM LUNG VENT/PERFUSION (VQ) (Completed)    Obesity (BMI 30-39.9) (Chronic)    Abnormal diffusion capacity determined by pulmonary function test    Relevant Orders    Echocardiogram complete    NM LUNG VENT/PERFUSION (VQ) (Completed)    US DUP LOWER EXTREMITIES BILATERAL VENOUS (Completed)     Other Visit Diagnoses       Pain of left calf        Relevant Orders    US DUP LOWER EXTREMITY MAPPING BILAT VENOUS    NM LUNG VENT/PERFUSION (VQ) (Completed)    US DUP LOWER EXTREMITIES BILATERAL VENOUS (Completed)    Deep venous thrombosis (DVT) of right peroneal vein, unspecified chronicity ([facility]        Relevant Orders    US DUP LOWER EXTREMITY RIGHT RICARDO               Plan: With her calf pain, left pleuritic chest pain, severe diffusion defect, concern for VTE. Will check stat lower extremity Dopplers and VQ scan  Also checking echocardiogram    Trial of Stiolto  Advised to rinse mouth after each use. P.r.n. albuterol  Advised on proper inhaler technique, and adherence to prescribed inhalers    Glucocorticoid nasal spray, oral antihistamine for allergies    Maintain active and healthy lifestyle with weight reduction. COVID-19 precautions  Recommend yearly Influenza and appropriate pneumonia vaccinations.    =========    Update: Dopplers with evidence of right distal/peroneal DVT.   Patient is asymptomatic in her right leg. HAS-BLED score 4. Considering  her history of aneurysmal SAH s/p coiling, will hold off on anticoagulation. Repeat Dopplers in 2 weeks to evaluate for DVT extension. If there is evidence of extension may need to consider anticoagulation vs IVC filter. Discussed Doppler results with patient over the phone. Will defer further management to PCP. Follow up: Return in about 6 months (around 4/13/2023).     Inocente Barbour MD MS  Pulmonary [address]  Dr. Zheng Montalvo, Dr. Nguyễn Harris, Dr. Lacy [place] This Encounter   Procedures    US  [address] VENOUS     Standing Status:   Future     Number of Occurrences:   1     Standing Expiration Date:   10/13/2023     Order Specific Question:   Reason for exam:     Answer:   r/o DVT    NM LUNG VENT/PERFUSION (VQ)     Standing Status:   Future     Number of Occurrences:   1     Standing Expiration Date:   10/13/2023     Order Specific Question:   Reason for exam:     Answer:   Pain in left calf; abnormal dif capacity     Order Specific Question:   Reason for exam:     Answer:   STAT    US DUP LOWER EXTREMITIES BILATERAL VENOUS     Standing Status:   Future     Number of Occurrences:   1     Standing Expiration Date:   10/13/2023     Order Specific Question:   Reason for exam:     Answer:   pain in left calf    US DUP LOWER EXTREMITY RIGHT RICARDO     Standing Status:   Future     Standing Expiration Date:   10/13/2023     Order Specific Question:   Reason for exam:     Answer:   Evaluate for DVT extension    Echocardiogram complete     Standing Status:   Future     Standing Expiration Date:   12/12/2022     Order Specific Question:   Reason for exam:     Answer:   BELCHER         Immunization History   Administered Date(s) Administered    Pneumococcal Polysaccharide ([id number]) 07/17/2018    Tdap (Boostrix, Adacel) 08/16/2019       Subjective   Patient ID: Yuliet Salgado is a 60 y.o. female  Chief Complaint:   HPI: Patient is a 60 y.o. female is here for followup. Former smoker, CT evidence of mild pulmonary edema, dyspnea on exertion, history of spontaneous subarachnoid hemorrhage s/p coiling, CVA with right hemiparesis, bilateral hip replacement    History of smoking 1.5 to 2 packs daily for 40 years before quitting in 2018. Patient denies any shortness of breath at rest or with minimal daily activities however she reports shortness of breath with exertion with cleaning/walking a block, taking stairs. She also reports dry cough with her shortness of breath. When she takes a as needed albuterol when she gets shortness of breath with exertion she feels that it helps her breathing. Her mobility is limited by her back pain and hip pain. She does report left-sided sharp pain that has been going on for years. Also reports left calf swelling and pain for the past few months. She is on Flonase for environmental allergies    PFT   Date of Exam: 9/28/2022          Pulmonary Function Test % Pred   FEV1 1.78 71   FEV1/FVC 73 (LLN-68)     TLC 5.95 114   DLCO 7.83 30      No obstruction  No Bronchodilator response  There is evidence of air trapping  No Restriction  Severe diffusion defect. Recommend evaluation for pulmonary vascular diseases, and pulmonary hypertension, CHF, early ILD. No results found for: FEV1, FVC, FCF5KDO, TLC, DLCO    CT chest: 5/5/22    No mediastinal or hilar lymphadenopathy   mild emphysematous changes  no pulmonary nodule    Labs:    Eosinophil max - 280  Immunoglobulin E (IgE) - 4  RAST Neg    Sleep Study:    ALLERGIES:  Allergies   Allergen Reactions    Latex Hives     Hives and rash    Iodine Rash     Hives . pt.  States anaphalyxis    Aloe Hives     Hives     Celebrex [Celecoxib] Itching    Fish-Derived Products Other (See Comments)     SOCIAL HISTORY:   Social History     Tobacco Use    Smoking status: Former     Packs/day: 1.50     Years: 43.00     Pack years: 64.50     Types: Cigarettes     Quit date: 2018     Years since quittin.0    Smokeless tobacco: Never   Vaping Use    Vaping Use: Never used   Substance Use Topics    Alcohol use: No    Drug use: No     MEDS:   Current Outpatient Medications   Medication Sig Dispense Refill    tiotropium-olodaterol (STIOLTO RESPIMAT) 2.5-2.5 MCG/ACT AERS Inhale 2 puffs into the lungs daily 1 each 3    fluticasone (FLONASE) 50 MCG/ACT nasal spray 2 sprays by Each Nostril route in the morning. 1 each 3    albuterol sulfate HFA (PROVENTIL;VENTOLIN;PROAIR) 108 (90 Base) MCG/ACT inhaler INHALE TWO PUFFS BY MOUTH EVERY 6 HOURS AS NEEDED FOR WHEEZING. 1 each 3    psyllium (METAMUCIL SMOOTH TEXTURE) 28.3 % POWD powder Take 3.4 g by mouth daily 368 g 5    Misc. Devices (WRIST BRACE) MISC Firm neutral position left wrist brace  Size to fit  Dx:  Wrist pain/carpal tunnel syndrome 1 each 0    Misc. Devices (WRIST BRACE) MISC Firm neutral position right wrist brace  Size to fit  Dx:  Wrist pain/carpal tunnel syndrome 1 each 0    acetaminophen-codeine (TYLENOL #4) 300-60 MG per tablet Take 1 tablet by mouth daily as needed for Pain for up to 30 days. 30 tablet 1    amLODIPine (NORVASC) 5 MG tablet TAKE ONE TABLET BY MOUTH EVERY DAY 30 tablet 3    chlorthalidone (HYGROTON) 25 MG tablet Take 1 tablet by mouth in the morning. 30 tablet 3    gabapentin (NEURONTIN) 300 MG capsule TAKE ONE CAPSULE BY MOUTH THREE TIMES A DAY 90 capsule 3    losartan (COZAAR) 100 MG tablet Take 1 tablet by mouth in the morning. 30 tablet 3    pantoprazole (PROTONIX) 40 MG tablet TAKE ONE TABLET BY MOUTH EVERY DAY 30 tablet 3    potassium chloride (KLOR-CON M) 10 MEQ extended release tablet TAKE ONE TABLET BY MOUTH DAILY WITH BREAKFAST 30 tablet 3    metoprolol tartrate (LOPRESSOR) 25 MG tablet Take 1.5 tablets by mouth in the morning and 1.5 tablets before bedtime.  90 tablet 3    carBAMazepine (TEGRETOL-XR) 100 MG extended release tablet Take 1 tablet by mouth in the morning, at noon, and at bedtime 270 tablet 0    fluconazole (DIFLUCAN) 150 MG tablet TAKE ONE TABLET BY MOUTH ONCE FOR 1 DOSE FOR YEAST INFECTION AS NEEDED (Patient not taking: No sig reported) 1 tablet 1    polyethylene glycol (GLYCOLAX) 17 g packet DISSOLVE 1 PACKET (17 GRAMS) IN LIQUID AND TAKE BY MOUTH DAILY      valACYclovir (VALTREX) 1 g tablet Take 1 tablet by mouth daily For 5 days as needed for Herpes outbreak 5 tablet 1    Multiple Vitamins-Minerals (THERAPEUTIC MULTIVITAMIN-MINERALS) tablet Take 1 tablet by mouth daily      diclofenac sodium (VOLTAREN) 1 % GEL APPLY ONE GRAM EXTERNALLY TWICE A DAY TO NECK AND ONE GRAM EXTERNALLY TWICE A DAY TO BACK  100 g 2    Misc. Devices (ROLLATOR) MISC 1 each by Does not apply route daily as needed (use as needed for stability) 1 each 0     No current facility-administered medications for this visit. Review of Systems: -ve other than specified above. Objective       Vitals:  BP: 127/65, Heart Rate: 66, Resp: 16, Temp: (!) 96.6 °F (35.9 °C),  , SpO2: 96 %, Height: 5' 5\" (165.1 cm), Weight: 225 lb (102.1 kg)    BMI body mass index is 37.44 kg/m². Ideal Body Weight: Ideal body weight: 57 kg (125 lb 10.6 oz)  Adjusted ideal body weight: 75 kg (165 lb 6.4 oz)  ---------------  Physical Exam:    General: Alert and oriented x 3. No acute distress. Eyes:  Vision - grossly normal, PERRLA  HENT:  Head is atraumatic and normocephalic. Neck is supple, no jugular venous distention. Respiratory: Lungs are clear to auscultation, respirations are nonlabored, breath sounds are equal.  Cardiovascular:  S1, S2 normal, regular rate and rhythm, no murmur, no pedal edema  Gastrointestinal:  Soft, nontender, nondistended. Normal bowel sounds. No organomegaly  Neurologic:  Awake and alert, cranial nerves 2-12 grossly intact, no focal motor or sensory deficits.       Labs     CBC:   Lab Results   Component Value Date    WBC 5.7 10/06/2022    HGB 13.3 10/06/2022 HCT 41.2 10/06/2022    MCV 92.2 10/06/2022     10/06/2022     BMP:     Chemistry        Component Value Date/Time     10/06/2022 0939    K 4.2 10/06/2022 0939    K 3.9 01/11/2022 0522     10/06/2022 0939    CO2 24 10/06/2022 0939    BUN 31 (H) 10/06/2022 0939    CREATININE 1.3 (H) 10/06/2022 0939        Component Value Date/Time    CALCIUM 9.8 10/06/2022 0939    ALKPHOS 80 10/06/2022 0939    AST 13 10/06/2022 0939    ALT 15 10/06/2022 0939    BILITOT 0.4 10/06/2022 0939          LFTs:   Lab Results   Component Value Date    ALT 15 10/06/2022    AST 13 10/06/2022    ALKPHOS 80 10/06/2022    BILITOT 0.4 10/06/2022    PROT 7.2 10/06/2022     Coags:   Lab Results   Component Value Date    INR 0.9 01/04/2022     Imaging   Reviewed imaging studies personally and findings as below  XR LEG LENGTH EVALUATION    Result Date: 10/5/2022  EXAMINATION: BONE LENGTH STUDIES, 10/5/2022 10:28 am COMPARISON: None. HISTORY: ORDERING SYSTEM PROVIDED HISTORY: History of bilateral total hip arthroplasty TECHNOLOGIST PROVIDED HISTORY: What reading provider will be dictating this exam?->CRC FINDINGS: Right femur length = 49.9 cm. Right tibia length = 39.0 cm. Right total limb length = 88.9 cm. Left femur length = 49.2 cm. Left tibia length = 39.5 cm. Left total limb length = 88.7 cm. Total limb length discrepancy = 0.2 cm     XR HIP 2-3 VW W PELVIS RIGHT    Result Date: 10/5/2022  EXAMINATION: ONE XRAY VIEW OF THE PELVIS AND TWO XRAY VIEWS RIGHT HIP 10/5/2022 8:17 am COMPARISON: 13 July 2022 HISTORY: ORDERING SYSTEM PROVIDED HISTORY: H/O total hip arthroplasty, right TECHNOLOGIST PROVIDED HISTORY: What reading provider will be dictating this exam?->CRC FINDINGS: Anatomically aligned bilateral MERVAT. No abnormal bone or soft tissue findings. Bilateral MERVAT with no unexpected findings.

## 2022-10-14 ENCOUNTER — TELEPHONE (OUTPATIENT)
Dept: CARDIOLOGY | Age: 62
End: 2022-10-14

## 2022-10-14 ENCOUNTER — TELEPHONE (OUTPATIENT)
Dept: PULMONOLOGY | Age: 62
End: 2022-10-14

## 2022-10-14 PROBLEM — I82.451 ACUTE DEEP VEIN THROMBOSIS (DVT) OF RIGHT PERONEAL VEIN (HCC): Status: ACTIVE | Noted: 2022-10-14

## 2022-10-14 NOTE — TELEPHONE ENCOUNTER
Mailed a letter to patient informing her that her Ultrasound of right Lower Extremity is scheduled for 10-27-22 at 1:00 pm at the [facility]. She must arrive by 12:30 pm. There is no prep for this test

## 2022-10-14 NOTE — TELEPHONE ENCOUNTER
CALLED PATIENT AND LEFT MESSAGE TO SCHEDULE  ECHO.     Electronically signed by Akila Iqbal on 10/14/2022 at 12:17 PM

## 2022-10-21 NOTE — PROGRESS NOTES
Not on file        Family History:       Problem Relation Age of Onset    Diabetes Mother     Arthritis Mother     Atrial Fibrillation Mother     Diabetes Father     Atrial Fibrillation Father     Arthritis Father     Emphysema Father     Cancer Sister        Review of Systems:   Review of Systems    Physical Exam   Vitals: /64 (Site: Right Upper Arm)   Pulse 72   Temp 97.7 °F (36.5 °C) (Temporal)   Resp 16   Ht 5' 5\" (1.651 m)   Wt 230 lb (104.3 kg)   SpO2 97%   BMI 38.27 kg/m²   Physical Exam  Constitutional:       Appearance: She is well-developed. HENT:      Head: Normocephalic. Cardiovascular:      Rate and Rhythm: Normal rate and regular rhythm. Heart sounds: Murmur (systolic) heard. Pulmonary:      Effort: Pulmonary effort is normal. No respiratory distress. Breath sounds: Normal breath sounds. No wheezing. Abdominal:      General: Bowel sounds are normal.      Palpations: Abdomen is soft. Tenderness: There is abdominal tenderness (suprapubic). Musculoskeletal:         General: No tenderness. Normal range of motion. Skin:     General: Skin is warm and dry. Neurological:      Mental Status: She is alert and oriented to person, place, and time. Psychiatric:         Behavior: Behavior normal.             Assessment and Plan       1. Acute cystitis with hematuria  New issue  -Symptoms and UA suggestive of UTI, will f/u UC  -Of note, previous UC showed sensitivity to macrobid, therefore this abx was started  -Advised she call PCP if not improving   - nitrofurantoin, macrocrystal-monohydrate, (MACROBID) 100 MG capsule; Take 1 capsule by mouth 2 times daily for 5 days  Dispense: 10 capsule; Refill: 0  - Culture, Urine; Future    2. Frequency of urination  As above  - POCT Urinalysis no Micro    3. Yeast infection  Get this with abx use, will give 2 tablets as patient states she doesn't respond to one   - fluconazole (DIFLUCAN) 150 MG tablet;  Take 1 tablet by mouth every 72 hours  Dispense: 2 tablet; Refill: 0      Counseled regarding above diagnosis, including possible risks and complications,  especially if left uncontrolled. Counseled regarding the possible side effects, risks, benefits and alternatives to treatment;patient and/or guardian verbalizes understanding, agrees, feels comfortable with and wishes to proceed with above treatment plan. Call or go to [address] if symptoms worsen or persist. Advised patient to call with any new medication issues, and, as applicable, read all Rx info from pharmacy to assure aware of all possible risks and side effects of medicationbefore taking. Patient and/or guardian given opportunity to ask questions/raise concerns. The patient verbalized comfort and understanding ofinstructions. I encourage further reading and education about your health conditions. Information on many health conditions is provided by [facility] [employer]: [email or web address]  Please bring any questions to me at your nextvisit. Return to Office: Return if symptoms worsen or fail to improve.     Medication List:    Current Outpatient Medications   Medication Sig Dispense Refill    nitrofurantoin, macrocrystal-monohydrate, (MACROBID) 100 MG capsule Take 1 capsule by mouth 2 times daily for 5 days 10 capsule 0    fluconazole (DIFLUCAN) 150 MG tablet Take 1 tablet by mouth every 72 hours 2 tablet 0    dexamethasone (DECADRON) 4 MG tablet TAKE 1 TABLET BY MOUTH EVERY 6 HOURS 24 HOURS PRIOR TO PROCEDURE  AND TAKE 2 AND 1/2 TABLETS THE MORNING OF PROCEDURE.      BANOPHEN 50 MG capsule TAKE 1 CAPSULE BY MOUTH EVERY 4 HOURS FOR 24 HOURS PRIOR TO PROCEDURE      amLODIPine (NORVASC) 5 MG tablet TAKE ONE TABLET BY MOUTH EVERY DAY 30 tablet 3    chlorthalidone (HYGROTON) 25 MG tablet Take 1 tablet by mouth daily 30 tablet 3    fluticasone (FLONASE) 50 MCG/ACT nasal spray 2 sprays by Each Nostril route daily 1 each 3    gabapentin (NEURONTIN) 300 MG capsule TAKE ONE CAPSULE BY MOUTH THREE TIMES A DAY 90 capsule 3    losartan (COZAAR) 100 MG tablet Take 1 tablet by mouth daily 30 tablet 3    metoprolol tartrate (LOPRESSOR) 25 MG tablet Take 1.5 tablets by mouth 2 times daily 90 tablet 3    pantoprazole (PROTONIX) 40 MG tablet TAKE ONE TABLET BY MOUTH EVERY DAY 30 tablet 3    potassium chloride (KLOR-CON M) 10 MEQ extended release tablet TAKE ONE TABLET BY MOUTH DAILY WITH BREAKFAST 30 tablet 3    acetaminophen-codeine (TYLENOL #4) 300-60 MG per tablet Take 1 tablet by mouth daily as needed for Pain for up to 30 days. 30 tablet 0    carBAMazepine (TEGRETOL XR) 100 MG extended release tablet Take 1 tablet by mouth 2 times daily 60 tablet 1    valACYclovir (VALTREX) 1 g tablet Take 1 tablet by mouth daily For 5 days as needed for Herpes outbreak 5 tablet 1    albuterol sulfate  (90 Base) MCG/ACT inhaler INHALE TWO PUFFS BY MOUTH EVERY 6 HOURS AS NEEDED FOR WHEEZING. 1 Inhaler 3    Multiple Vitamins-Minerals (THERAPEUTIC MULTIVITAMIN-MINERALS) tablet Take 1 tablet by mouth daily      diclofenac sodium (VOLTAREN) 1 % GEL APPLY ONE GRAM EXTERNALLY TWICE A DAY TO NECK AND ONE GRAM EXTERNALLY TWICE A DAY TO BACK  100 g 2    Misc. Devices (ROLLATOR) MISC 1 each by Does not apply route daily as needed (use as needed for stability) 1 each 0     No current facility-administered medications for this visit. Caitlyn Carey DO       This document may have been prepared at least partially through the use of voice recognition software. Although effort is taken to assure the accuracy ofthis document, it is possible that grammatical, syntax,  or spelling errors may occur. 4 = No assist / stand by assistance

## 2022-10-27 ENCOUNTER — HOSPITAL ENCOUNTER (OUTPATIENT)
Dept: ULTRASOUND IMAGING | Age: 62
Discharge: HOME | End: 2022-10-29
Payer: MEDICARE

## 2022-10-27 DIAGNOSIS — I82.451 DEEP VENOUS THROMBOSIS (DVT) OF RIGHT PERONEAL VEIN, UNSPECIFIED CHRONICITY (HCC): ICD-10-CM

## 2022-10-27 PROCEDURE — 93971 EXTREMITY STUDY: CPT | Performed by: RADIOLOGY

## 2022-10-27 PROCEDURE — 93971 EXTREMITY STUDY: CPT

## 2022-11-01 ENCOUNTER — TELEPHONE (OUTPATIENT)
Dept: FAMILY MEDICINE CLINIC | Age: 62
End: 2022-11-01

## 2022-11-01 NOTE — TELEPHONE ENCOUNTER
----- Message from Martin Coronel MD sent at 10/31/2022  1:29 PM EDT -----  Called patient and left VM. Doppler of right lower extremity with stable distal DVT. We will continue to monitor.

## 2022-11-02 ENCOUNTER — TELEPHONE (OUTPATIENT)
Dept: FAMILY MEDICINE CLINIC | Age: 62
End: 2022-11-02

## 2022-11-02 ENCOUNTER — TELEPHONE (OUTPATIENT)
Dept: PULMONOLOGY | Age: 62
End: 2022-11-02

## 2022-11-02 DIAGNOSIS — I82.451 DEEP VENOUS THROMBOSIS (DVT) OF RIGHT PERONEAL VEIN, UNSPECIFIED CHRONICITY (HCC): Primary | ICD-10-CM

## 2022-11-02 DIAGNOSIS — Z86.79 HISTORY OF SUBARACHNOID HEMORRHAGE: ICD-10-CM

## 2022-11-02 DIAGNOSIS — I82.451 ACUTE DEEP VEIN THROMBOSIS (DVT) OF RIGHT PERONEAL VEIN (HCC): Primary | ICD-10-CM

## 2022-11-02 DIAGNOSIS — I72.9 ANEURYSM (HCC): ICD-10-CM

## 2022-11-02 NOTE — TELEPHONE ENCOUNTER
Spoke with Dr Jordy Sanabria regarding DVT   Plan for repeat doppler in 4 weeks  Hold off on IVF filter for now  Will refer to hematology. Please contact patient and let her know I would like her to see a hematology to determine if she needs further evaluation for her leg clot. Referral ordered.

## 2022-11-02 NOTE — TELEPHONE ENCOUNTER
Called to advise pt that Dr Eddie Tobin is ordering a new Ultrasound for her to have the DVT monitored. Office will get testing scheduled for 4 weeks after last US which will be the week after thanksgiving. Pt advised office will mail out appt letter. Pt verbalized understanding.

## 2022-11-23 ENCOUNTER — HOSPITAL ENCOUNTER (OUTPATIENT)
Dept: INFUSION THERAPY | Age: 62
Discharge: HOME | End: 2022-11-23
Payer: MEDICAID

## 2022-11-23 ENCOUNTER — OFFICE VISIT (OUTPATIENT)
Dept: ONCOLOGY | Age: 62
End: 2022-11-23
Payer: MEDICARE

## 2022-11-23 VITALS
HEART RATE: 67 BPM | DIASTOLIC BLOOD PRESSURE: 69 MMHG | SYSTOLIC BLOOD PRESSURE: 150 MMHG | RESPIRATION RATE: 16 BRPM | TEMPERATURE: 97.1 F | WEIGHT: 226 LBS | BODY MASS INDEX: 37.65 KG/M2 | HEIGHT: 65 IN | OXYGEN SATURATION: 68 %

## 2022-11-23 DIAGNOSIS — I82.451 ACUTE DEEP VEIN THROMBOSIS (DVT) OF RIGHT PERONEAL VEIN (HCC): Primary | ICD-10-CM

## 2022-11-23 DIAGNOSIS — I82.451 ACUTE DEEP VEIN THROMBOSIS (DVT) OF RIGHT PERONEAL VEIN (HCC): ICD-10-CM

## 2022-11-23 LAB
ALBUMIN SERPL-MCNC: 4.6 G/DL (ref 3.5–5.2)
ALP BLD-CCNC: 96 U/L (ref 35–104)
ALT SERPL-CCNC: 16 U/L (ref 0–32)
ANION GAP SERPL CALCULATED.3IONS-SCNC: 12 MMOL/L (ref 7–16)
APTT: 26.2 SEC (ref 24.5–35.1)
AST SERPL-CCNC: 15 U/L (ref 0–31)
BASOPHILS ABSOLUTE: 0.04 E9/L (ref 0–0.2)
BASOPHILS RELATIVE PERCENT: 0.6 % (ref 0–2)
BILIRUB SERPL-MCNC: 0.4 MG/DL (ref 0–1.2)
BUN BLDV-MCNC: 26 MG/DL (ref 6–23)
CALCIUM SERPL-MCNC: 10.2 MG/DL (ref 8.6–10.2)
CHLORIDE BLD-SCNC: 105 MMOL/L (ref 98–107)
CO2: 25 MMOL/L (ref 22–29)
CREAT SERPL-MCNC: 1.2 MG/DL (ref 0.5–1)
D DIMER: 419 NG/ML DDU
EOSINOPHILS ABSOLUTE: 0.21 E9/L (ref 0.05–0.5)
EOSINOPHILS RELATIVE PERCENT: 3.3 % (ref 0–6)
GFR SERPL CREATININE-BSD FRML MDRD: 51 ML/MIN/1.73
GLUCOSE BLD-MCNC: 105 MG/DL (ref 74–99)
HCT VFR BLD CALC: 43.8 % (ref 34–48)
HEMOGLOBIN: 14.5 G/DL (ref 11.5–15.5)
HOMOCYSTEINE: 20.1 UMOL/L (ref 0–15)
IMMATURE GRANULOCYTES #: 0.02 E9/L
IMMATURE GRANULOCYTES %: 0.3 % (ref 0–5)
INR BLD: 1
LYMPHOCYTES ABSOLUTE: 1.63 E9/L (ref 1.5–4)
LYMPHOCYTES RELATIVE PERCENT: 25.4 % (ref 20–42)
MCH RBC QN AUTO: 30.1 PG (ref 26–35)
MCHC RBC AUTO-ENTMCNC: 33.1 % (ref 32–34.5)
MCV RBC AUTO: 90.9 FL (ref 80–99.9)
MONOCYTES ABSOLUTE: 0.34 E9/L (ref 0.1–0.95)
MONOCYTES RELATIVE PERCENT: 5.3 % (ref 2–12)
NEUTROPHILS ABSOLUTE: 4.17 E9/L (ref 1.8–7.3)
NEUTROPHILS RELATIVE PERCENT: 65.1 % (ref 43–80)
PDW BLD-RTO: 13.2 FL (ref 11.5–15)
PLATELET # BLD: 275 E9/L (ref 130–450)
PMV BLD AUTO: 9 FL (ref 7–12)
POTASSIUM SERPL-SCNC: 3.9 MMOL/L (ref 3.5–5)
PROTHROMBIN TIME: 10.7 SEC (ref 9.3–12.4)
RBC # BLD: 4.82 E12/L (ref 3.5–5.5)
SODIUM BLD-SCNC: 142 MMOL/L (ref 132–146)
TOTAL PROTEIN: 7.8 G/DL (ref 6.4–8.3)
WBC # BLD: 6.4 E9/L (ref 4.5–11.5)

## 2022-11-23 PROCEDURE — 86038 ANTINUCLEAR ANTIBODIES: CPT

## 2022-11-23 PROCEDURE — 81400 MOPATH PROCEDURE LEVEL 1: CPT

## 2022-11-23 PROCEDURE — 85610 PROTHROMBIN TIME: CPT

## 2022-11-23 PROCEDURE — 80053 COMPREHEN METABOLIC PANEL: CPT

## 2022-11-23 PROCEDURE — 85306 CLOT INHIBIT PROT S FREE: CPT

## 2022-11-23 PROCEDURE — 81241 F5 GENE: CPT

## 2022-11-23 PROCEDURE — 85378 FIBRIN DEGRADE SEMIQUANT: CPT

## 2022-11-23 PROCEDURE — 81291 MTHFR GENE: CPT

## 2022-11-23 PROCEDURE — 85303 CLOT INHIBIT PROT C ACTIVITY: CPT

## 2022-11-23 PROCEDURE — 81240 F2 GENE: CPT

## 2022-11-23 PROCEDURE — 85730 THROMBOPLASTIN TIME PARTIAL: CPT

## 2022-11-23 PROCEDURE — 86147 CARDIOLIPIN ANTIBODY EA IG: CPT

## 2022-11-23 PROCEDURE — 83090 ASSAY OF HOMOCYSTEINE: CPT

## 2022-11-23 PROCEDURE — 99214 OFFICE O/P EST MOD 30 MIN: CPT

## 2022-11-23 PROCEDURE — 86146 BETA-2 GLYCOPROTEIN ANTIBODY: CPT

## 2022-11-23 PROCEDURE — 85025 COMPLETE CBC W/AUTO DIFF WBC: CPT

## 2022-11-23 PROCEDURE — 85300 ANTITHROMBIN III ACTIVITY: CPT

## 2022-11-23 PROCEDURE — 85613 RUSSELL VIPER VENOM DILUTED: CPT

## 2022-11-23 NOTE — PROGRESS NOTES
Department of [facility] Oncology  Attending Consult Note    Reason for Visit:  Consultation on a patient with LE DVT, h/o SAH aneurysm,     Referring Physician:  Abundio Spencer MD    PCP:  Abundio Spencer MD    History of Present Illness:  61 y/o female with history of smoking 1.5 to 2 packs daily for 40 years before quitting in 2018, hx of aneurysmal SAH s/p coiling by Dr. Raeann Cabrera 4 years ago    RLE U/S 01/12/2022: Within the visualized vessels there is no evidence for deep venous thrombosis    CT head 06/14/2022:  Status post aneurysm coiling at level of suprasellar cistern. No evidence of residual or recurrent intracranial aneurysm  No acute intracranial hemorrhage    Tim LE 10/13/2022:  No sonographic evidence of deep venous thrombosis above the knee in the right or left lower extremity. There is right below the knee deep venous thrombosis involving the peroneal veins. V/Q scan 10/13/2022: While a PIOPED classification cannot be applied without an accompanying chest radiograph, the ventilation and perfusion patterns are relatively matched. RLE U/S 10/27/2022: There is evidence for deep venous thrombosis, not significantly changed from previous. Referred to our clinic for further evaluation    Review of Systems;  CONSTITUTIONAL: No fever, chills. Fair appetite and energy level. ENMT: Eyes: No diplopia; Nose: No epistaxis. Mouth: No sore throat. RESPIRATORY: No hemoptysis, shortness of breath, cough. CARDIOVASCULAR: No chest pain, palpitations. GASTROINTESTINAL: No nausea/vomiting, abdominal pain, diarrhea/constipation. GENITOURINARY: No dysuria, urinary frequency, hematuria. NEURO: No syncope, presyncope, headache.   Remainder:  ROS NEGATIVE    Past Medical History:      Diagnosis Date    Acute deep vein thrombosis (DVT) of right peroneal vein ([address] 10/14/2022    See note from pulmonology    Brain aneurysm 09/2018    H/O TIMES 2 THAT BURST PER PATIENT    Cerebral artery occlusion with cerebral infarction ([facility]     RT SIDE AFFECTED-LEARNED TO WALK AND WRITE AGAIN    Degenerative arthritis of hand     Edentulous     Emphysema lung (HCC)     lung dr    Headache     Hiatal hernia     Hip problem     NEEDS HIP REPLACEMENT PER PATIENT    Hx of abnormal cervical Pap smear     Hypertension     Stroke (cerebrum) (HCC)     Subarachnoid bleed ([facility] 9/10/2018     Past Surgical History:      Procedure Laterality Date    BRAIN ANEURYSM SURGERY      coiling     COLONOSCOPY  08/30/2019    polyps--frank    COLONOSCOPY N/A 8/30/2019    COLONOSCOPY POLYPECTOMY SNARE/COLD BIOPSY performed by Taina Perrin MD at [address] [facility] [address]      total hip replacement left and right    OTHER SURGICAL HISTORY      FOR CANCER CELLS- DONE AT [address] ARTHROPLASTY Left 2/22/2021    LEFT HIP TOTAL ARTHROPLASTY performed by Cherie Sutton MD at [address] Right 1/10/2022    RIGHT TOTAL HIP ARTHROPLASTY - STYKER performed by Cherie Sutton MD at [address] [facility] ENDOSCOPY  08/30/2019    gastritis with superficial ulcerations; duodenitis--frank    UPPER GASTROINTESTINAL ENDOSCOPY N/A 8/30/2019    EGD BIOPSY performed by Taina Perrin MD at [facility] ENDOSCOPY     Family History:  Family History   Problem Relation Age of Onset    Diabetes Mother     Arthritis Mother     Atrial Fibrillation Mother     Diabetes Father     Atrial Fibrillation Father     Arthritis Father     Emphysema Father     Cancer Sister         \"female\"     Medications:  Reviewed and reconciled.     Social History:  Social History     Socioeconomic History    Marital status:      Spouse name: Not on file    Number of children: Not on file    Years of education: Not on file    Highest education level: Not on file   Occupational History    Not on file   Tobacco Use    Smoking status: Former     Packs/day: 1.50     Years: 43.00     Pack years: 64.50 Types: Cigarettes     Quit date: 2018     Years since quittin.2    Smokeless tobacco: Never   Vaping Use    Vaping Use: Never used   Substance and Sexual Activity    Alcohol use: No    Drug use: No    Sexual activity: Not Currently   Other Topics Concern    Not on file   Social History Narrative    Not on file     Social Determinants of Health     Financial Resource Strain: Low Risk     Difficulty of Paying Living Expenses: Not very hard   Food Insecurity: No Food Insecurity    Worried About Running Out of Food in the Last Year: Never true    Ran Out of Food in the Last Year: Never true   Transportation Needs: Not on file   Physical Activity: Sufficiently Active    Days of Exercise per Week: 6 days    Minutes of Exercise per Session: 60 min   Stress: Not on file   Social Connections: Not on file   Intimate Partner Violence: Not on file   Housing Stability: Not on file     Allergies: Allergies   Allergen Reactions    Latex Hives     Hives and rash    Iodine Rash     Hives . pt. States anaphalyxis    Aloe Hives     Hives     Celebrex [Celecoxib] Itching    Fish-Derived Products Other (See Comments)     Physical Exam:  BP (!) 150/69   Pulse 67   Temp 97.1 °F (36.2 °C) (Infrared)   Resp 16   Ht 5' 5\" (1.651 m)   Wt 226 lb (102.5 kg)   SpO2 (!) 68%   BMI 37.61 kg/m²   GENERAL: Alert, oriented x 3, not in acute distress. HEENT: PERRLA; EOMI. Oropharynx clear. NECK: Supple. Without lymphadenopathy. LUNGS: Good air entry bilaterally. No wheezing, crackles or ronchi. CARDIOVASCULAR: Regular rate. No murmurs, rubs or gallops. ABDOMEN: Soft. Non-tender, non-distended. EXTREMITIES: Without clubbing, cyanosis, or edema. NEUROLOGIC: No focal deficits. Impression/Plan:  59 y/o female with history of smoking 1.5 to 2 packs daily for 40 years before quitting in 2018, hx of aneurysmal SAH s/p coiling by Dr. Lucia Petersen 4 years ago    RLE U/S 2022:   Within the visualized vessels there is no evidence for deep venous thrombosis    CT head 06/14/2022:  Status post aneurysm coiling at level of suprasellar cistern. No evidence of residual or recurrent intracranial aneurysm  No acute intracranial hemorrhage    Tim LE 10/13/2022:  No sonographic evidence of deep venous thrombosis above the knee in the right or left lower extremity. There is right below the knee deep venous thrombosis involving the peroneal veins. V/Q scan 10/13/2022: While a PIOPED classification cannot be applied without an accompanying chest radiograph, the ventilation and perfusion patterns are relatively matched. RLE U/S 10/27/2022: There is evidence for deep venous thrombosis, not significantly changed from previous.     Referred to our clinic for further evaluation  Hyper-coag extensive work-up ordered and drawn today 11/23/2022  Repeat right lower extremity ultrasound on 11/28/2022  Neurosurgery consulted to follow-up on history of aneurysmal SAH s/p coiling by Dr. Catalina Witt   Vascular team consulted for evaluation of anticoagulation versus IVC filter    Thank you for allowing us to participate in the care of  Gorge Merlos MD   11/23/2022

## 2022-11-23 NOTE — PROGRESS NOTES
Alo Cunningham  1960 60 y.o. Referring Physician:     PCP: Farnaz Nuñez MD    Vitals:    22 1045   BP: (!) 150/69   Pulse: 67   Resp: 16   Temp: 97.1 °F (36.2 °C)   SpO2: (!) 68%        Wt Readings from Last 3 Encounters:   22 226 lb (102.5 kg)   10/13/22 225 lb (102.1 kg)   10/06/22 225 lb (102.1 kg)        Body mass index is 37.61 kg/m². Chief Complaint:   Chief Complaint   Patient presents with    New Patient           LMP: age 35    Age at first Menses: 5    : 5    Para: 3          Current Outpatient Medications:     tiotropium-olodaterol (STIOLTO RESPIMAT) 2.5-2.5 MCG/ACT AERS, Inhale 2 puffs into the lungs daily (Patient not taking: Reported on 2022), Disp: 1 each, Rfl: 3    psyllium (METAMUCIL SMOOTH TEXTURE) 28.3 % POWD powder, Take 3.4 g by mouth daily, Disp: 368 g, Rfl: 5    Misc. Devices (WRIST BRACE) MISC, Firm neutral position left wrist brace Size to fit Dx:  Wrist pain/carpal tunnel syndrome, Disp: 1 each, Rfl: 0    Misc.  Devices (WRIST BRACE) MISC, Firm neutral position right wrist brace Size to fit Dx:  Wrist pain/carpal tunnel syndrome, Disp: 1 each, Rfl: 0    amLODIPine (NORVASC) 5 MG tablet, TAKE ONE TABLET BY MOUTH EVERY DAY, Disp: 30 tablet, Rfl: 3    chlorthalidone (HYGROTON) 25 MG tablet, Take 1 tablet by mouth in the morning., Disp: 30 tablet, Rfl: 3    fluticasone (FLONASE) 50 MCG/ACT nasal spray, 2 sprays by Each Nostril route in the morning., Disp: 1 each, Rfl: 3    gabapentin (NEURONTIN) 300 MG capsule, TAKE ONE CAPSULE BY MOUTH THREE TIMES A DAY, Disp: 90 capsule, Rfl: 3    losartan (COZAAR) 100 MG tablet, Take 1 tablet by mouth in the morning., Disp: 30 tablet, Rfl: 3    pantoprazole (PROTONIX) 40 MG tablet, TAKE ONE TABLET BY MOUTH EVERY DAY, Disp: 30 tablet, Rfl: 3    potassium chloride (KLOR-CON M) 10 MEQ extended release tablet, TAKE ONE TABLET BY MOUTH DAILY WITH BREAKFAST, Disp: 30 tablet, Rfl: 3    metoprolol tartrate (LOPRESSOR) 25 MG tablet, Take 1.5 tablets by mouth in the morning and 1.5 tablets before bedtime. , Disp: 90 tablet, Rfl: 3    albuterol sulfate HFA (PROVENTIL;VENTOLIN;PROAIR) 108 (90 Base) MCG/ACT inhaler, INHALE TWO PUFFS BY MOUTH EVERY 6 HOURS AS NEEDED FOR WHEEZING., Disp: 1 each, Rfl: 3    carBAMazepine (TEGRETOL-XR) 100 MG extended release tablet, Take 1 tablet by mouth in the morning, at noon, and at bedtime, Disp: 270 tablet, Rfl: 0    polyethylene glycol (GLYCOLAX) 17 g packet, DISSOLVE 1 PACKET (17 GRAMS) IN LIQUID AND TAKE BY MOUTH DAILY, Disp: , Rfl:     valACYclovir (VALTREX) 1 g tablet, Take 1 tablet by mouth daily For 5 days as needed for Herpes outbreak, Disp: 5 tablet, Rfl: 1    Multiple Vitamins-Minerals (THERAPEUTIC MULTIVITAMIN-MINERALS) tablet, Take 1 tablet by mouth daily, Disp: , Rfl:     diclofenac sodium (VOLTAREN) 1 % GEL, APPLY ONE GRAM EXTERNALLY TWICE A DAY TO NECK AND ONE GRAM EXTERNALLY TWICE A DAY TO BACK , Disp: 100 g, Rfl: 2    Misc.  Devices (ROLLATOR) MISC, 1 each by Does not apply route daily as needed (use as needed for stability), Disp: 1 each, Rfl: 0       Past Medical History:   Diagnosis Date    Acute deep vein thrombosis (DVT) of right peroneal vein ([address] 10/14/2022    See note from pulmonology    Brain aneurysm 09/2018    H/O TIMES 2 THAT BURST PER PATIENT    Cerebral artery occlusion with cerebral infarction ([address]     RT SIDE AFFECTED-LEARNED TO WALK AND WRITE AGAIN    Degenerative arthritis of hand     Edentulous     Emphysema lung ([address]     lung dr    Headache     Hiatal hernia     Hip problem     NEEDS HIP REPLACEMENT PER PATIENT    Hx of abnormal cervical Pap smear     Hypertension     Stroke (cerebrum) ([address]     Subarachnoid bleed ([address] 9/10/2018       Past Surgical History:   Procedure Laterality Date    BRAIN ANEURYSM SURGERY      coiling     COLONOSCOPY  08/30/2019    polyps--frank    COLONOSCOPY N/A 8/30/2019    COLONOSCOPY POLYPECTOMY SNARE/COLD BIOPSY performed by Jesenia Blas Silvana Urbina MD at [address] [facility] [address]      total hip replacement left and right    OTHER SURGICAL HISTORY      FOR CANCER CELLS- DONE AT [address]      TOTAL HIP ARTHROPLASTY Left 2021    LEFT HIP TOTAL ARTHROPLASTY performed by Silvestre Garcia MD at [address] [facility] Right 1/10/2022    RIGHT TOTAL HIP ARTHROPLASTY - STYKER performed by Silvestre Garcia MD at [address]  2019    gastritis with superficial ulcerations; duodenitis--frank    UPPER GASTROINTESTINAL ENDOSCOPY N/A 2019    EGD BIOPSY performed by Tyler Albright MD at [place] [address] History   Problem Relation Age of Onset    Diabetes Mother     Arthritis Mother     Atrial Fibrillation Mother     Diabetes Father     Atrial Fibrillation Father     Arthritis Father     Emphysema Father     Cancer Sister         \"female\"       Social History     Socioeconomic History    Marital status:      Spouse name: Not on file    Number of children: Not on file    Years of education: Not on file    Highest education level: Not on file   Occupational History    Not on file   Tobacco Use    Smoking status: Former     Packs/day: 1.50     Years: 43.00     Pack years: 64.50     Types: Cigarettes     Quit date: 2018     Years since quittin.2    Smokeless tobacco: Never   Vaping Use    Vaping Use: Never used   Substance and Sexual Activity    Alcohol use: No    Drug use: No    Sexual activity: Not Currently   Other Topics Concern    Not on file   Social History Narrative    Not on file     Social Determinants of Health     Financial Resource Strain: Low Risk     Difficulty of Paying Living Expenses: Not very hard   Food Insecurity: No Food Insecurity    Worried About Running Out of Food in the Last Year: Never true    Ran Out of Food in the Last Year: Never true   Transportation Needs: Not on file   Physical Activity: Sufficiently fair minus Active    Days of Exercise per Week: 6 days    Minutes of Exercise per Session: 60 min   Stress: Not on file   Social Connections: Not on file   Intimate Partner Violence: Not on file   Housing Stability: Not on file           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 REVIEW OF SYSTEMS: <<For Level 5, 10 or more systems>>     Pacemaker/Defibulator/ICD:  No    Mediport: No           FALLS RISK SCREENING ASSESSMENT    Instructions:  Assess the patient and [place] the appropriate indicators that are present for fall risk identification. Total the numbers circled and assign a fall risk score from Table 2.  Reassess patient at a minimum every 12 weeks or with status change. Assessment   Date  11/23/2022     1. Mental Ability: confusion/cognitively impaired No - 0       2. Elimination Issues: incontinence, frequency No - 0       3. Ambulatory: use of assistive devices (walker, cane, off-loading devices), attached to equipment (IV pole, oxygen) Yes - 2     4. Sensory Limitations: dizziness, vertigo, impaired vision Yes - 3       5. Age Less than 65 years - 0       6. Medication: diuretics, strong analgesics, hypnotics, sedatives, antihypertensive agents   Yes - 3   7. Falls:  recent history of falls within the last 3 months (not to include slipping or tripping)   No - 0   TOTAL 8    If score of 4 or greater was education given? Yes       TABLE 2   Risk Score Risk Level Plan of Care   0-3 Little or  No Risk 1. Provide assistance as indicated for ambulation activities  2. Reorient confused/cognitively impaired patient  3. Call-light/bell within patient's reach  4. Chair/bed in low position, stretcher/bed with siderails up except when performing patient care activities  5. Educate patient/family/caregiver on falls prevention  6.  Reassess in 12 weeks or with any noted change in patient condition which places them at a risk for a fall   4-6 Moderate Risk 1.   Provide assistance as indicated for ambulation activities  2. Reorient confused/cognitively impaired patient  3. Call-light/bell within patient's reach  4. Chair/bed in low position, stretcher/bed with siderails up except when performing patient care activities  5. Educate patient/family/caregiver on falls prevention  6. Falls risk precaution (Yellow sticker Level II) placed on patient chart   7 or   Higher High Risk 1. Place patient in easily observable treatment room  2. Patient attended at all times by family member or staff  3. Provide assistance as indicated for ambulation activities  4. Reorient confused/cognitively impaired patient  5. Call-light/bell within patient's reach  6. Chair/bed in low position, stretcher/bed with siderails up except when performing patient care activities  7. Educate patient/family/caregiver on falls prevention  8.   Falls risk precaution (Yellow sticker Level III) placed on patient chart                     Eve Martines RN 26-Apr-2019 15:51

## 2022-11-24 LAB
AT-III ACTIVITY: 110 % ACTIVITY (ref 83–121)
LUPUS ANTICOAG DVVT: NEGATIVE
PROTEIN C ACTIVITY: >120 % ACTIVITY (ref 68–165)

## 2022-11-25 LAB
ANTI-NUCLEAR ANTIBODY (ANA): NEGATIVE
BETA-2 GLYCOPROTEIN 1 IGG ANTIBODY: <10 SGU
BETA-2 GLYCOPROTEIN 1 IGM ANTIBODY: 12 SMU
PROTEIN S ANTIGEN, FREE: 109 % (ref 55–123)

## 2022-11-28 ENCOUNTER — HOSPITAL ENCOUNTER (OUTPATIENT)
Dept: ULTRASOUND IMAGING | Age: 62
Discharge: HOME | End: 2022-11-30
Payer: MEDICARE

## 2022-11-28 DIAGNOSIS — I82.451 ACUTE DEEP VEIN THROMBOSIS (DVT) OF RIGHT PERONEAL VEIN (HCC): ICD-10-CM

## 2022-11-28 PROCEDURE — 93971 EXTREMITY STUDY: CPT | Performed by: RADIOLOGY

## 2022-11-28 PROCEDURE — 93971 EXTREMITY STUDY: CPT

## 2022-11-28 NOTE — ACP (ADVANCE CARE PLANNING)
Advance Care Planning   Advance Care Planning Note  Ambulatory Spiritual Care Services    Date:  11/28/2022    Received request from [facility] Provider. Consultation conversation participants:   Patient who understands ACP conversation     Goals of ACP Conversation:  Discuss advance care planning documents    Health Care Decision Makers:      Primary Decision MakerClio Cortes Child - [phone]    Secondary Decision Maker: Leeanna Harmon Child - [phone]   Summary:  Completed [address]    Advance Care Planning Documents (Patient Wishes)  Currently on file:   None    Assessment:   Cayla العراقي is here completing her living will/POA. She does have the support of her family. Interventions:  Provided education on documents for clarity and greater understanding  Assisted in the completion of documents according to patient's wishes at this time  Encouraged ongoing ACP conversation with future decision makers and loved ones    Care Preferences Communicated:     Hospitalization:  If the patient's health worsens and it becomes clear that the chance of recovery is unlikely,     the patient prefers comfort-focused treatment without hospitalization. She wants family to make decisions according to what's a available. Ventilation:   If the patient, in their present state of health, suddenly became very ill and unable to breathe on their own,     the patient would desire the use of a ventilator (breathing machine). If their health worsens and it becomes clear that the change of recovery is unlikely,     the patient would NOT desire the use of a ventilator (breathing machine). Resuscitation:  In the event the patient's heart stopped as a result of an underlying serious health condition, the patient communicates a preference for      resuscitative attempts (CPR). Outcomes:  New advance directive completed.   Returned original document(s) to patient, as well as copies for distribution to appointed agents  Copy of advance directive given to staff to scan into medical record. Routed ACP note to attending provider or other IDT member.     Patient / Healthcare Decision Maker Instructions:  Review completed ACP document(s) and update, if needed, with changes health or future preferences    Electronically signed by David Dye, [address] [employer] on 11/28/2022 at 10:54 AM.

## 2022-11-29 ENCOUNTER — TELEPHONE (OUTPATIENT)
Dept: VASCULAR SURGERY | Age: 62
End: 2022-11-29

## 2022-11-29 LAB
ANTICARDIOLIPIN IGA ANTIBODY: <10 APL
ANTICARDIOLIPIN IGG ANTIBODY: 16 GPL
CARDIOLIPIN AB IGM: 22 MPL
FACTOR V LEIDEN: NEGATIVE
MTHFR BY PCR SPECIMEN: NORMAL
MTHFR INTERPRETATION: NORMAL
MTHFR MUTATION A1298C: NEGATIVE
MTHFR MUTATION C677T: NEGATIVE
PROTHROMBIN G20210A MUTATION: NEGATIVE
PT PCR SPECIMEN: NORMAL
SPECIMEN: NORMAL

## 2022-11-30 DIAGNOSIS — Z76.0 MEDICATION REFILL: ICD-10-CM

## 2022-11-30 LAB
PLASMINOGEN ACT INHIBITOR-1: ABNORMAL
PLASMINOGEN ACT. INHIBITOR-1 (PAI-1) SPECIMEN: ABNORMAL

## 2022-12-01 RX ORDER — ALBUTEROL SULFATE 90 UG/1
AEROSOL, METERED RESPIRATORY (INHALATION)
Qty: 1 EACH | Refills: 3 | Status: SHIPPED | OUTPATIENT
Start: 2022-12-01

## 2022-12-02 ENCOUNTER — TELEPHONE (OUTPATIENT)
Dept: FAMILY MEDICINE CLINIC | Age: 62
End: 2022-12-02

## 2022-12-02 NOTE — TELEPHONE ENCOUNTER
Wondering if something can be called in for her, she has been experiencing a cough that is worsening x1 week as well as spitting up thick phlegm     Uses [facility] [address]

## 2022-12-05 ENCOUNTER — OFFICE VISIT (OUTPATIENT)
Dept: PAIN MANAGEMENT | Age: 62
End: 2022-12-05
Payer: MEDICARE

## 2022-12-05 ENCOUNTER — OFFICE VISIT (OUTPATIENT)
Dept: FAMILY MEDICINE CLINIC | Age: 62
End: 2022-12-05
Payer: MEDICARE

## 2022-12-05 VITALS — DIASTOLIC BLOOD PRESSURE: 57 MMHG | RESPIRATION RATE: 20 BRPM | HEART RATE: 69 BPM | SYSTOLIC BLOOD PRESSURE: 104 MMHG

## 2022-12-05 VITALS
HEIGHT: 65 IN | TEMPERATURE: 96.8 F | SYSTOLIC BLOOD PRESSURE: 120 MMHG | HEART RATE: 65 BPM | RESPIRATION RATE: 16 BRPM | BODY MASS INDEX: 37.65 KG/M2 | DIASTOLIC BLOOD PRESSURE: 90 MMHG | WEIGHT: 226 LBS | OXYGEN SATURATION: 96 %

## 2022-12-05 DIAGNOSIS — M47.816 LUMBAR FACET ARTHROPATHY: ICD-10-CM

## 2022-12-05 DIAGNOSIS — R35.0 FREQUENT URINATION: Primary | ICD-10-CM

## 2022-12-05 DIAGNOSIS — M47.812 FACET ARTHROPATHY, CERVICAL: ICD-10-CM

## 2022-12-05 DIAGNOSIS — M51.9 LUMBAR DISC DISORDER: ICD-10-CM

## 2022-12-05 DIAGNOSIS — M16.11 ARTHRITIS OF RIGHT HIP: ICD-10-CM

## 2022-12-05 DIAGNOSIS — M54.16 LUMBAR RADICULITIS: ICD-10-CM

## 2022-12-05 DIAGNOSIS — M16.12 PRIMARY OSTEOARTHRITIS OF LEFT HIP: ICD-10-CM

## 2022-12-05 DIAGNOSIS — M50.30 DEGENERATION OF CERVICAL DISC WITHOUT MYELOPATHY: ICD-10-CM

## 2022-12-05 DIAGNOSIS — M47.816 LUMBAR SPONDYLOSIS: ICD-10-CM

## 2022-12-05 DIAGNOSIS — G89.4 CHRONIC PAIN SYNDROME: Primary | ICD-10-CM

## 2022-12-05 LAB
BILIRUBIN, POC: NEGATIVE
BLOOD URINE, POC: NORMAL
CLARITY, POC: CLEAR
COLOR, POC: YELLOW
GLUCOSE URINE, POC: NEGATIVE
KETONES, POC: NEGATIVE
LEUKOCYTE EST, POC: NEGATIVE
NITRITE, POC: NEGATIVE
PH, POC: 6
PROTEIN, POC: NEGATIVE
SPECIFIC GRAVITY, POC: 1.01
UROBILINOGEN, POC: 0.2

## 2022-12-05 PROCEDURE — G8427 DOCREV CUR MEDS BY ELIG CLIN: HCPCS | Performed by: PHYSICIAN ASSISTANT

## 2022-12-05 PROCEDURE — 3078F DIAST BP <80 MM HG: CPT

## 2022-12-05 PROCEDURE — 3017F COLORECTAL CA SCREEN DOC REV: CPT | Performed by: PHYSICIAN ASSISTANT

## 2022-12-05 PROCEDURE — 3074F SYST BP LT 130 MM HG: CPT

## 2022-12-05 PROCEDURE — 99213 OFFICE O/P EST LOW 20 MIN: CPT

## 2022-12-05 PROCEDURE — 3078F DIAST BP <80 MM HG: CPT | Performed by: PHYSICIAN ASSISTANT

## 2022-12-05 PROCEDURE — 99213 OFFICE O/P EST LOW 20 MIN: CPT | Performed by: PHYSICIAN ASSISTANT

## 2022-12-05 PROCEDURE — G8417 CALC BMI ABV UP PARAM F/U: HCPCS | Performed by: PHYSICIAN ASSISTANT

## 2022-12-05 PROCEDURE — G8484 FLU IMMUNIZE NO ADMIN: HCPCS | Performed by: PHYSICIAN ASSISTANT

## 2022-12-05 PROCEDURE — 81002 URINALYSIS NONAUTO W/O SCOPE: CPT

## 2022-12-05 PROCEDURE — 3074F SYST BP LT 130 MM HG: CPT | Performed by: PHYSICIAN ASSISTANT

## 2022-12-05 PROCEDURE — 1036F TOBACCO NON-USER: CPT | Performed by: PHYSICIAN ASSISTANT

## 2022-12-05 RX ORDER — ACETAMINOPHEN AND CODEINE PHOSPHATE 60; 300 MG/1; MG/1
TABLET ORAL
COMMUNITY
Start: 2022-11-09 | End: 2022-12-05

## 2022-12-05 RX ORDER — NITROFURANTOIN 25; 75 MG/1; MG/1
100 CAPSULE ORAL 2 TIMES DAILY
Qty: 10 CAPSULE | Refills: 0 | Status: ON HOLD | OUTPATIENT
Start: 2022-12-05 | End: 2022-12-10

## 2022-12-05 RX ORDER — ACETAMINOPHEN AND CODEINE PHOSPHATE 60; 300 MG/1; MG/1
1 TABLET ORAL DAILY PRN
Qty: 30 TABLET | Refills: 1 | Status: ON HOLD | OUTPATIENT
Start: 2022-12-09 | End: 2023-01-08

## 2022-12-05 RX ORDER — FLUCONAZOLE 150 MG/1
TABLET ORAL
Qty: 3 TABLET | Refills: 0 | Status: ON HOLD | OUTPATIENT
Start: 2022-12-05

## 2022-12-05 NOTE — PROGRESS NOTES
[facility]  [address] [facility], [address] [place] [address] Chad  [phone]    Follow up Note      Kayley Hernández     Date of Visit:  12/5/2022    CC:  Patient presents for follow up   Chief Complaint   Patient presents with    Lower Back Pain     Neck pain also         HPI:    Pain is unchanged. No new changes. Appropriate analgesia with current medications regimen: yes. Change in quality of symptoms:no. Medication side effects:none. Recent diagnostic testing:none. Recent interventional procedures:none. She has not been on anticoagulation medications to include none. The patient  has not been on herbal supplements. The patient is not diabetic. Imaging studies:    06/09/222 Left hip x-ray    FINDINGS:   Anatomically aligned left MERVAT with no abnormal bone or soft tissue findings. Impression   Left MERVAT with no unexpected findings. Cervical XR 11/2019 Severe degenerative changes      Lumbar spine Xray 03/2019  Scoliosis and osteoarthritis. Bilateral hip Xray 03/2019   Advanced osteoarthrosis of bilateral hips. Potential Aberrant Drug-Related Behavior: None     Urine Drug Screening:  First office visit saliva screen showed no narcotics   11/2019 Consistent, negative for all  06/2020 Consistent  12/2020 Consistent  06/2021 Consistent  04/2022 Consistent     OARRS report:  03/2019 consistent to 06/2021 Consistent  (norco on 9/24 from dentist for teeth extraction).   08/2021 Consistent to 12/2022 Consistent    Opioid Agreement:  10/07/2021   Updated:  09/26/2022    Past Medical History:   Diagnosis Date    Acute deep vein thrombosis (DVT) of right peroneal vein ([address] 10/14/2022    See note from pulmonology    Brain aneurysm 09/2018    H/O TIMES 2 THAT BURST PER PATIENT    Cerebral artery occlusion with cerebral infarction ([address]     RT SIDE AFFECTED-LEARNED TO WALK AND WRITE AGAIN    Degenerative arthritis of hand     Edentulous     Emphysema lung ([facility]     lung dr    Emphysema of lung ([facility]     Headache     Hiatal hernia     Hip problem     NEEDS HIP REPLACEMENT PER PATIENT    Hx of abnormal cervical Pap smear     Hypertension     Stroke (cerebrum) ([facility]     Subarachnoid bleed ([facility] 09/10/2018       Past Surgical History:   Procedure Laterality Date    BRAIN ANEURYSM SURGERY      coiling     COLONOSCOPY  08/30/2019    polyps--frank    COLONOSCOPY N/A 08/30/2019    COLONOSCOPY POLYPECTOMY SNARE/COLD BIOPSY performed by Gracy Hart MD at [facility]      total hip replacement left and right    OTHER SURGICAL HISTORY      FOR CANCER CELLS- DONE AT [facility] HIP ARTHROPLASTY Left 02/22/2021    LEFT HIP TOTAL ARTHROPLASTY performed [place] Sus[facility] Lezama [place] [facility] [place] 01/10/2022    RIGHT TOTAL HIP ARTHROPLASTY - STYKER performed by Susana Lezama MD at [address]  08/30/2019    gastritis with superficial ulcerations; duodenitis--frank    UPPER GASTROINTESTINAL ENDOSCOPY N/A 08/30/2019    EGD BIOPSY performed by Gracy Hart MD at [facility]       Prior to Admission medications    Medication Sig Start Date End Date Taking? Authorizing Provider   acetaminophen-codeine (TYLENOL #4) 300-60 MG per tablet TAKE ONE TABLET BY MOUTH DAILY AS NEEDED FOR PAIN FOR UP TO 30 DAYS. REDUCE DOSES TAKEN AS PAIN BECOMES MANAGEABLE. 11/9/22  Yes Historical Provider, MD   albuterol sulfate HFA (PROVENTIL;VENTOLIN;PROAIR) 108 (90 Base) MCG/ACT inhaler INHALE TWO PUFFS BY MOUTH EVERY 6 HOURS AS NEEDED FOR WHEEZING. 12/1/22  Yes [employer], MD   psyllium (METAMUCIL SMOOTH TEXTURE) 28.3 % POWD powder Take 3.4 g by mouth daily 10/6/22  Yes Carrie Rojas MD   Misc.  Devices (WRIST BRACE) MISC Firm neutral position left wrist brace  Size to fit  Dx:  Wrist pain/carpal tunnel syndrome 10/6/22  Yes Sariah Bedoya MD   amLODIPine (NORVASC) 5 MG tablet TAKE ONE TABLET BY MOUTH EVERY DAY 8/5/22  Yes Sariah Bedoya MD   chlorthalidone (HYGROTON) 25 MG tablet Take 1 tablet by mouth in the morning. 8/5/22  Yes Sariah Bedoya MD   fluticasone (FLONASE) 50 MCG/ACT nasal spray 2 sprays by Each Nostril route in the morning. 8/5/22  Yes Sariah Bedoya MD   losartan (COZAAR) 100 MG tablet Take 1 tablet by mouth in the morning. 8/5/22  Yes Sariah Bedoya MD   pantoprazole (PROTONIX) 40 MG tablet TAKE ONE TABLET BY MOUTH EVERY DAY 8/5/22  Yes Sariah Bedoya MD   potassium chloride (KLOR-CON M) 10 MEQ extended release tablet TAKE ONE TABLET BY MOUTH DAILY WITH BREAKFAST 8/5/22  Yes Sariah Bedoya MD   carBAMazepine (TEGRETOL-XR) 100 MG extended release tablet Take 1 tablet by mouth in the morning, at noon, and at bedtime 6/16/22  Yes Maritza Sanchez MD   polyethylene glycol (GLYCOLAX) 17 g packet DISSOLVE 1 PACKET (17 GRAMS) IN LIQUID AND TAKE BY MOUTH DAILY 1/24/22  Yes Historical Provider, MD   valACYclovir (VALTREX) 1 g tablet Take 1 tablet by mouth daily For 5 days as needed for Herpes outbreak 4/27/21  Yes Sariah Bedoya MD   Multiple Vitamins-Minerals (THERAPEUTIC MULTIVITAMIN-MINERALS) tablet Take 1 tablet by mouth daily   Yes Historical Provider, MD   Misc. Devices (ROLLATOR) MISC 1 each by Does not apply route daily as needed (use as needed for stability) 3/17/20  Yes Sariah Bedoya MD   tiotropium-olodaterol (STIOLTO RESPIMAT) 2.5-2.5 MCG/ACT AERS Inhale 2 puffs into the lungs daily  Patient not taking: No sig reported 10/13/22   Kailyn Lee MD   gabapentin (NEURONTIN) 300 MG capsule TAKE ONE CAPSULE BY MOUTH THREE TIMES A DAY 8/5/22 11/1/22  Sariah Bedoya MD   metoprolol tartrate (LOPRESSOR) 25 MG tablet Take 1.5 tablets by mouth in the morning and 1.5 tablets before bedtime.  8/5/22 10/6/22  Sariah Bedoya MD   diclofenac sodium (VOLTAREN) 1 % GEL APPLY ONE GRAM EXTERNALLY TWICE A DAY TO NECK AND ONE GRAM EXTERNALLY TWICE A DAY TO BACK   Patient not taking: Reported on 2022 11/3/20   SPENSER Tabares       Allergies   Allergen Reactions    Latex Hives     Hives and rash    Iodine Rash     Hives . pt.  States anaphalyxis    Aloe Hives     Hives     Celebrex [Celecoxib] Itching    Fish-Derived Products Other (See Comments)       Social History     Socioeconomic History    Marital status:      Spouse name: Not on file    Number of children: Not on file    Years of education: Not on file    Highest education level: Not on file   Occupational History    Not on file   Tobacco Use    Smoking status: Former     Packs/day: 1.50     Years: 43.00     Pack years: 64.50     Types: Cigarettes     Quit date: 2018     Years since quittin.2    Smokeless tobacco: Never   Vaping Use    Vaping Use: Never used   Substance and Sexual Activity    Alcohol use: No    Drug use: No    Sexual activity: Not Currently   Other Topics Concern    Not on file   Social History Narrative    Not on file     Social Determinants of Health     Financial Resource Strain: Low Risk     Difficulty of Paying Living Expenses: Not very hard   Food Insecurity: No Food Insecurity    Worried About Running Out of Food in the Last Year: Never true    Ran Out of Food in the Last Year: Never true   Transportation Needs: Not on file   Physical Activity: Sufficiently Active    Days of Exercise per Week: 6 days    Minutes of Exercise per Session: 60 min   Stress: Not on file   Social Connections: Not on file   Intimate Partner Violence: Not on file   Housing Stability: Not on file       Family History   Problem Relation Age of Onset    Diabetes Mother     Arthritis Mother     Atrial Fibrillation Mother     Diabetes Father     Atrial Fibrillation Father     Arthritis Father     Emphysema Father     Cancer Sister         \"female\"       REVIEW OF SYSTEMS:     Kristopher Scott denies fever/chills, chest pain, shortness of breath, new bowel or bladder complaints. All other review of systems was negative. PHYSICAL EXAMINATION:      BP (!) 120/90   Pulse 65   Temp 96.8 °F (36 °C) (Infrared)   Resp 16   Ht 5' 5\" (1.651 m)   Wt 226 lb (102.5 kg)   SpO2 96%   BMI 37.61 kg/m²     General:      General appearance:   pleasant and well-hydrated. , in no discomfort and A & O x3  Build:Overweight  Function:Rises from a seated position with difficulty    HEENT:    Head:normocephalic and atraumatic  Pupils:regular, round and equal.  Sclera: icterus absent,    Lungs:    Breathing:Normal expansion. No wheezing. Abdomen:    Shape:non-distended and normal      Extremities:    Tremors:None bilaterally upper and lower  Range of motion:Generally normal shoulders, negative log roll on the left. Intact:Yes  Varicose veins:not assessed   Cyanosis:none  Edema:Normal    Neurological:    Gait: Not observed. Dermatology:    Skin:no unusual rashes, no skin lesions, no palpable subcutaneous nodules and good skin turgor    Impression:    Patient seen for follow up for her chronic bilateral hip pain and low back pain due to severe degenerative changes and axial c/o neck pain   Would benefit from Cervical MBB, patient uninterested   Patient is s/p:  Left MERVAT on 2/22/2021. Patient is s/p right MERVAT on 01/10/2022   Continue Gabapentin 300 mg TID per PCP  Continue Tylenol #4 QD prn with 1 RF. PT ordered for lower back per patient request last visit. Did not go due to a DVT. Compounding cream. Helping a lot. OARRS report reviewed 12/2022  Patient encouraged to remain active as tolerated   Treatment plan discussed with the patient including medications and side effects     Controlled Substance Monitoring:    Acute and Chronic Pain Monitoring:   RX Monitoring 12/5/2022   Periodic Controlled Substance Monitoring Possible medication side effects, risk of tolerance/dependence & alternative treatments discussed. ;No signs of potential drug abuse or diversion identified. ;Assessed functional status. ;Obtaining appropriate analgesic effect of treatment. We discussed with the patient that combining opioids, benzodiazepines, alcohol, illicit drugs or sleep aids increases the risk of respiratory depression including death. We discussed that these medications may cause drowsiness, sedation or dizziness and have counseled the patient not to drive or operate machinery. We have discussed that these medications will be prescribed only by one provider. We have discussed with the patient about age related risk factors and have thoroughly discussed the importance of taking these medications as prescribed. The patient verbalizes understanding.     ccreferring physic

## 2022-12-05 NOTE — PROGRESS NOTES
Do you currently have any of the following:    Fever: No  Headache:  No  Cough: No  Shortness of breath: No  Exposed to anyone with these symptoms: No                                                                                                                Joaquim Moss presents to the [facility] on 12/5/2022. Qing Porter is complaining of pain in her lower back and neck. . The pain is constant. The pain is described as aching, shooting, stabbing, and sharp. Pain is rated on her best day at a 6, on her worst day at a 10, and on average at a 7 on the VAS scale. She took her last dose of Neurontin and Tylenol with codeine . Qing Porter does have issues with constipation. Any procedures since your last visit: No,    She is not on NSAIDS and  is not on anticoagulation medications to include none and is managed by NA. Pacemaker or defibrillator: No Physician managing device is NA. Medication Contract and Consent for Opioid Use Documents Filed       Patient Documents       Type of Document Status Date Received Received By Description    Medication Contract Received 3/1/2019  1:43 PM FRANCIS MCINTOSH PAIN MANAGEMENT PT AGREEMENT 3/1/2019    Medication Contract Received 10/8/2020  1:21 PM DAYNA [address] [place] [address] pain pt agreement    Medication Contract Received 10/7/2021 10:36 AM BOLIVAR TRUJILLO Pain Mgmt Patient Agreement 10. 7.2021    Medication Contract Received 9/26/2022 10:40 AM ENMA WAGNER MEDICATION CONTRACT                       BP (!) 120/90   Pulse 65   Temp 96.8 °F (36 °C) (Infrared)   Resp 16   Ht 5' 5\" (1.651 m)   Wt 226 lb (102.5 kg)   SpO2 96%   BMI 37.61 kg/m²      No LMP recorded.  Patient is postmenopausal.

## 2022-12-05 NOTE — PROGRESS NOTES
22  Rocío Landon : 1960 Sex: female  Age 60 y.o. Subjective:  Chief Complaint   Patient presents with    Urinary Tract Infection     X  1 week        HPI:   Rocío Landon , 60 y.o. female presents to the clinic for evaluation of urinary tract infection x 7 days. Reports associated frequency, urgency, and suprapubic pressure. The patient has taken old antibiotics for 3 days for symptoms. The patient reports improving symptoms over time. Denies hematuria, flank pain, vaginal discharge, vaginal bleeding, possibility of pregnancy, or lethargy. The patient also denies headache, fever, chest pain, abdominal pain, shortness of breath, and nausea / vomiting / diarrhea. No LMP recorded. Patient is postmenopausal. Pt is being referred to a nephrologist.    ROS:   Unless otherwise stated in this report the patient's positive and negative responses for review of systems for constitutional, eyes, ENT, cardiovascular, respiratory, gastrointestinal, neurological, , musculoskeletal, and integument systems and related systems to the presenting problem are either stated in the history of present illness or were not pertinent or were negative for the symptoms and/or complaints related to the presenting medical problem. Positives and pertinent negatives as per HPI. All others reviewed and are negative.       PMH:     Past Medical History:   Diagnosis Date    Acute deep vein thrombosis (DVT) of right peroneal vein ([address] 10/14/2022    See note from pulmonology    Brain aneurysm 2018    H/O TIMES 2 THAT BURST PER PATIENT    Cerebral artery occlusion with cerebral infarction ([address]     RT SIDE AFFECTED-LEARNED TO WALK AND WRITE AGAIN    Degenerative arthritis of hand     Edentulous     Emphysema lung ([address]     lung dr    Emphysema of lung ([address]     Headache     Hiatal hernia     Hip problem     NEEDS HIP REPLACEMENT PER PATIENT    Hx of abnormal cervical Pap smear     Hypertension     Stroke (cerebrum) ([address] [facility]arachnoid bleed [facility] 09/10/2018       Past Surgical History:   Procedure Laterality Date    BRAIN ANEURYSM SURGERY      coiling     COLONOSCOPY  08/30/2019    polyps--frank    COLONOSCOPY N/A 08/30/2019    COLONOSCOPY POLYPECTOMY SNARE/COLD BIOPSY performed by Ted Levy MD at [facility]      total hip replacement left and right    OTHER SURGICAL HISTORY      FOR CANCER CELLS- DONE AT [address] HIP ARTHROPLASTY Left 02/22/2021    LEFT HIP TOTAL ARTHROPLASTY performed by Gilmar Bertrand MD at [address] Right 01/10/2022    RIGHT TOTAL HIP ARTHROPLASTY - STYKER performed by Gilmar Bertrand MD at [facility] ENDOSCOPY  08/30/2019    gastritis with superficial ulcerations; duodenitis--frank    UPPER GASTROINTESTINAL ENDOSCOPY N/A 08/30/2019    EGD BIOPSY performed by Ted eLvy MD at [facility] History   Problem Relation Age of Onset    Diabetes Mother     Arthritis Mother     Atrial Fibrillation Mother     Diabetes Father     Atrial Fibrillation Father     Arthritis Father     Emphysema Father     Cancer Sister         \"female\"       Medications:     Current Outpatient Medications:     [START ON 12/9/2022] acetaminophen-codeine (TYLENOL #4) 300-60 MG per tablet, Take 1 tablet by mouth daily as needed for Pain for up to 30 days. , Disp: 30 tablet, Rfl: 1    fluconazole (DIFLUCAN) 150 MG tablet, 1 tab po x 1 dose, may repeat in 72 hrs if still symptomatic., Disp: 3 tablet, Rfl: 0    nitrofurantoin, macrocrystal-monohydrate, (MACROBID) 100 MG capsule, Take 1 capsule by mouth 2 times daily for 5 days, Disp: 10 capsule, Rfl: 0    albuterol sulfate HFA (PROVENTIL;VENTOLIN;PROAIR) 108 (90 Base) MCG/ACT inhaler, INHALE TWO PUFFS BY MOUTH EVERY 6 HOURS AS NEEDED FOR WHEEZING., Disp: 1 each, Rfl: 3    tiotropium-olodaterol (STIOLTO RESPIMAT) 2.5-2.5 MCG/ACT AERS, Inhale 2 puffs into the lungs daily, Disp: 1 each, Rfl: 3    psyllium (METAMUCIL SMOOTH TEXTURE) 28.3 % POWD powder, Take 3.4 g by mouth daily, Disp: 368 g, Rfl: 5    Misc. Devices (WRIST BRACE) MISC, Firm neutral position left wrist brace Size to fit Dx:  Wrist pain/carpal tunnel syndrome, Disp: 1 each, Rfl: 0    amLODIPine (NORVASC) 5 MG tablet, TAKE ONE TABLET BY MOUTH EVERY DAY, Disp: 30 tablet, Rfl: 3    chlorthalidone (HYGROTON) 25 MG tablet, Take 1 tablet by mouth in the morning., Disp: 30 tablet, Rfl: 3    fluticasone (FLONASE) 50 MCG/ACT nasal spray, 2 sprays by Each Nostril route in the morning., Disp: 1 each, Rfl: 3    losartan (COZAAR) 100 MG tablet, Take 1 tablet by mouth in the morning., Disp: 30 tablet, Rfl: 3    pantoprazole (PROTONIX) 40 MG tablet, TAKE ONE TABLET BY MOUTH EVERY DAY, Disp: 30 tablet, Rfl: 3    potassium chloride (KLOR-CON M) 10 MEQ extended release tablet, TAKE ONE TABLET BY MOUTH DAILY WITH BREAKFAST, Disp: 30 tablet, Rfl: 3    polyethylene glycol (GLYCOLAX) 17 g packet, DISSOLVE 1 PACKET (17 GRAMS) IN LIQUID AND TAKE BY MOUTH DAILY, Disp: , Rfl:     valACYclovir (VALTREX) 1 g tablet, Take 1 tablet by mouth daily For 5 days as needed for Herpes outbreak, Disp: 5 tablet, Rfl: 1    Multiple Vitamins-Minerals (THERAPEUTIC MULTIVITAMIN-MINERALS) tablet, Take 1 tablet by mouth daily, Disp: , Rfl:     diclofenac sodium (VOLTAREN) 1 % GEL, APPLY ONE GRAM EXTERNALLY TWICE A DAY TO NECK AND ONE GRAM EXTERNALLY TWICE A DAY TO BACK , Disp: 100 g, Rfl: 2    Misc. Devices (ROLLATOR) MISC, 1 each by Does not apply route daily as needed (use as needed for stability), Disp: 1 each, Rfl: 0    gabapentin (NEURONTIN) 300 MG capsule, TAKE ONE CAPSULE BY MOUTH THREE TIMES A DAY, Disp: 90 capsule, Rfl: 3    metoprolol tartrate (LOPRESSOR) 25 MG tablet, Take 1.5 tablets by mouth in the morning and 1.5 tablets before bedtime. , Disp: 90 tablet, Rfl: 3    Allergies:      Allergies   Allergen Reactions    Latex Hives Hives and rash    Iodine Rash     Hives . pt. States anaphalyxis    Aloe Hives     Hives     Celebrex [Celecoxib] Itching    Fish-Derived Products Other (See Comments)       Social History:     Social History     Tobacco Use    Smoking status: Former     Packs/day: 1.50     Years: 43.00     Pack years: 64.50     Types: Cigarettes     Quit date: 2018     Years since quittin.2    Smokeless tobacco: Never   Vaping Use    Vaping Use: Never used   Substance Use Topics    Alcohol use: No    Drug use: No       Physical Exam:     Vitals:    22 1147   BP: (!) 104/57   Pulse: 69   Resp: 20       Physical Exam (PE)   Constitutional: Alert, development consistent with age. HENT:      Head: Normocephalic. Right Ear: External ear normal.      Left Ear: External ear normal.      Nose: Normal.      Mouth/Throat:     Mouth: Mucous membranes are moist.      Pharynx: Oropharynx is clear. Eyes: Pupils: Pupils are equal, round, and reactive to light. Neck: Normal ROM. Supple. Cardiovascular: Heart RRR without pathologic murmurs or gallops. Pulmonary: Respiratory effort normal.  Normal breath sounds. Abdomen: Soft, nondistended, with mild suprapubic tenderness. No rebound, rigidity, or guarding. BS+ X4. No organomegaly. Back: positive CVA tenderness on left side. Skin:  Normal turgor. Warm, dry, without visible rash, unless noted elsewhere. Musculoskeletal: General: Normal strength / ROM. Neurological:  Alert and oriented. Motor functions intact. Responds to verbal commands.    Psychiatric: Mood and Affect: Mood normal. Behavior: Behavior normal    Testing:   (All laboratory and radiology results have been personally reviewed by myself)  Labs:  Results for orders placed or performed in visit on 22   POCT Urinalysis no Micro   Result Value Ref Range    Color, UA Yellow     Clarity, UA Clear     Glucose, UA POC Negative     Bilirubin, UA Negative     Ketones, UA Negative     Spec Grav, UA 1.010 Blood, UA POC Trace-Intact     pH, UA 6.0     Protein, UA POC Negative     Urobilinogen, UA 0.2     Leukocytes, UA Negative     Nitrite, UA Negative        Imaging: All Radiology results interpreted by Radiologist unless otherwise noted. No orders to display       Assessment / Plan:   The patient's vitals, allergies, medications, and past medical history have been reviewed. Kristopher Scott was seen today for urinary tract infection. Diagnoses and all orders for this visit:    Frequent urination  -     POCT Urinalysis no Micro  -     Culture, Urine    Other orders  -     fluconazole (DIFLUCAN) 150 MG tablet; 1 tab po x 1 dose, may repeat in 72 hrs if still symptomatic.  -     nitrofurantoin, macrocrystal-monohydrate, (MACROBID) 100 MG capsule; Take 1 capsule by mouth 2 times daily for 5 days      - Disposition: home    - Educational material printed for patient's review and were included in patient instructions. After Visit Summary was given to patient at the end of visit. - Patient is advised to rest and increase oral fluids. Discussed other symptomatic treatments with the patient today. The patient is to schedule a follow-up with PCP in the next 2-3 days for reevaluation. Red flag symptoms were also discussed with the patient today. If symptoms worsen the patient is to go directly to the emergency department for reevaluation and treatment. Pt verbalizes understanding and is in agreement with plan of care. All questions answered. SIGNATURE: LYLY Reza - CNP      *NOTE: This report was transcribed using voice recognition software. Every effort was made to ensure accuracy; however, inadvertent computerized transcription errors may be present.

## 2022-12-06 NOTE — PROGRESS NOTES
[address] PRE-ADMISSION TESTING   ENDOSCOPY/ COLONSCOPY INSTRUCTIONS  PAT- Phone Number: [phone]    ENDOSCOPY/ COLONSCOPY INSTRUCTIONS:     [x] Bowel Prep instructions reviewed. [x] Colonoscopy- The day prior: No solid foods. Clear liquids only. [x] Nothing by mouth after midnight. Including no gum, candy, mints, or water. [x] You may brush your teeth, gargle, but do NOT swallow water. [x] Do not wear makeup, lotions, powders, deodorant. [x] Arrange transportation with a responsible adult  to and from the hospital. If you do not have a responsible adult  to transport you, you will need to make arrangements with a medical transportation company. Arrange for someone to be with you for the remainder of the day and for 24 hours after your procedure due to having had anesthesia. -Who will be your  for transportation?___Lucas___   -Who will be staying with you for 24 hrs after your procedure?__________________    PARKING INSTRUCTIONS:     [x] ARRIVAL DATE & TIME: 12/9/22 @ 1115am  [x] Enter into the [facility]. Two people may accompany you. Masks are not required but are recommended. [x] Parking Lot \"I\" is where you will park. It is located on the corner of [address] and [address]. The entrance is on [address]. Upon entering the parking lot, a voucher ticket will print. EDUCATION INSTRUCTIONS:    [x] Bring a complete list of your medications, please write the last time you took the medicine, give this list to the nurse in Pre-Op. [x] Take only the following medications the morning of surgery with 1-2 ounces of water: albuterol (bring), gabapentin, metoprolol, amlodipine, Protonix  [x] Stop all herbal supplements and vitamins 5 days before surgery. Stop NSAIDS 7 days before surgery. [] DO NOT take any diabetic medicine the morning of surgery. Follow instructions for insulin the day before surgery.   [] If you are diabetic and your blood sugar is low or you feel symptomatic, you may drink 1-2 ounces of apple juice or take a glucose tablet.            -The morning of your procedure, you may call the pre-op area if you have concerns about your blood sugar [phone]. [x] Use your inhalers the morning of surgery. Bring your emergency inhaler with you day of surgery. [x] Follow physician instructions regarding any blood thinners you may be taking. WHAT TO EXPECT:    [x] The day of your procedure you will be greeted and checked in by the [employer].  In addition, you will be registered in the [place] by a Patient Access Representative. Please bring your photo ID and insurance card. A nurse will greet you in accordance to the time you are needed in the pre-op area to prepare you for surgery. Please do not be discouraged if you are not greeted in the order you arrive as there are many variables that are involved in patient preparation. Your patience is greatly appreciated as you wait for your nurse. Please bring in items such as: books, magazines, newspapers, electronics, or any other items  to occupy your time in the waiting area. [x]  Delays may occur. Staff will make a sincere effort to keep you informed of delays. If any delays occur with your procedure, we apologize ahead of time for your inconvenience as we recognize the value of your time.

## 2022-12-07 LAB — URINE CULTURE, ROUTINE: NORMAL

## 2022-12-07 NOTE — TELEPHONE ENCOUNTER
Okay.  Let her know we can attempt a video visit if her cough worsens. Sometimes insurance companies will assist with transportation as well. Thanks.

## 2022-12-08 ENCOUNTER — HOSPITAL ENCOUNTER (OUTPATIENT)
Dept: INFUSION THERAPY | Age: 62
Discharge: HOME | End: 2022-12-08

## 2022-12-08 ENCOUNTER — OFFICE VISIT (OUTPATIENT)
Dept: ONCOLOGY | Age: 62
End: 2022-12-08
Payer: MEDICARE

## 2022-12-08 VITALS
HEART RATE: 79 BPM | DIASTOLIC BLOOD PRESSURE: 66 MMHG | BODY MASS INDEX: 37.65 KG/M2 | TEMPERATURE: 97.5 F | SYSTOLIC BLOOD PRESSURE: 143 MMHG | RESPIRATION RATE: 16 BRPM | WEIGHT: 226 LBS | HEIGHT: 65 IN | OXYGEN SATURATION: 94 %

## 2022-12-08 DIAGNOSIS — Z86.79: ICD-10-CM

## 2022-12-08 DIAGNOSIS — I82.451 ACUTE DEEP VEIN THROMBOSIS (DVT) OF RIGHT PERONEAL VEIN (HCC): Primary | ICD-10-CM

## 2022-12-08 PROCEDURE — 3017F COLORECTAL CA SCREEN DOC REV: CPT | Performed by: INTERNAL MEDICINE

## 2022-12-08 PROCEDURE — 99214 OFFICE O/P EST MOD 30 MIN: CPT | Performed by: INTERNAL MEDICINE

## 2022-12-08 PROCEDURE — 3074F SYST BP LT 130 MM HG: CPT | Performed by: INTERNAL MEDICINE

## 2022-12-08 PROCEDURE — 3078F DIAST BP <80 MM HG: CPT | Performed by: INTERNAL MEDICINE

## 2022-12-08 PROCEDURE — G8484 FLU IMMUNIZE NO ADMIN: HCPCS | Performed by: INTERNAL MEDICINE

## 2022-12-08 PROCEDURE — 1036F TOBACCO NON-USER: CPT | Performed by: INTERNAL MEDICINE

## 2022-12-08 PROCEDURE — G8417 CALC BMI ABV UP PARAM F/U: HCPCS | Performed by: INTERNAL MEDICINE

## 2022-12-08 PROCEDURE — 99212 OFFICE O/P EST SF 10 MIN: CPT

## 2022-12-08 PROCEDURE — G8427 DOCREV CUR MEDS BY ELIG CLIN: HCPCS | Performed by: INTERNAL MEDICINE

## 2022-12-08 RX ORDER — FOLIC ACID 1 MG/1
1 TABLET ORAL DAILY
Qty: 30 TABLET | Refills: 0 | Status: ON HOLD | OUTPATIENT
Start: 2022-12-08

## 2022-12-08 RX ORDER — PYRIDOXINE HCL (VITAMIN B6) 50 MG
50 TABLET ORAL DAILY
Qty: 30 TABLET | Refills: 0 | Status: ON HOLD | OUTPATIENT
Start: 2022-12-08

## 2022-12-08 NOTE — PROGRESS NOTES
Informed patient of the following:  Surgery scheduled on 12/9 time has been changed to 11:45 am will need to arrive at 10:30 am.  Patient verbalized understanding, repeated arrival time.

## 2022-12-08 NOTE — PROGRESS NOTES
Department of [facility] Oncology  Attending Clinic Note    Reason for Visit: Follow-up on a patient with LE DVT, h/o SAH aneurysm    PCP:  Peter Bailey MD    History of Present Illness:  59 y/o female with history of smoking 1.5 to 2 packs daily for 40 years before quitting in 2018, hx of aneurysmal SAH s/p coiling by Dr. Kelli Acevedo 4 years ago    RLE U/S 01/12/2022: Within the visualized vessels there is no evidence for deep venous thrombosis    CT head 06/14/2022:  Status post aneurysm coiling at level of suprasellar cistern. No evidence of residual or recurrent intracranial aneurysm  No acute intracranial hemorrhage    Tim LE 10/13/2022:  No sonographic evidence of deep venous thrombosis above the knee in the right or left lower extremity. There is right below the knee deep venous thrombosis involving the peroneal veins. V/Q scan 10/13/2022: While a PIOPED classification cannot be applied without an accompanying chest radiograph, the ventilation and perfusion patterns are relatively matched. RLE U/S 10/27/2022: There is evidence for deep venous thrombosis, not significantly changed from previous. Repeat right lower extremity ultrasound on 11/28/2022 evidence for deep venous thrombosis in the right peroneal vein which is nonocclusive. Today 12/08/2022; No fever, chills. Fair appetite and energy level. No chest pain or SOB. Scheduled for colonoscopy tomorrow    Review of Systems;  CONSTITUTIONAL: No fever, chills. Fair appetite and energy level. ENMT: Eyes: No diplopia; Nose: No epistaxis. Mouth: No sore throat. RESPIRATORY: No hemoptysis, shortness of breath, cough. CARDIOVASCULAR: No chest pain, palpitations. GASTROINTESTINAL: No nausea/vomiting, abdominal pain  GENITOURINARY: No dysuria, urinary frequency, hematuria. NEURO: No syncope, presyncope, headache.   Remainder:  ROS NEGATIVE    Past Medical History:      Diagnosis Date    Acute deep vein thrombosis (DVT) of right peroneal vein ([facility] 10/14/2022    See note from pulmonology    Brain aneurysm 09/2018    H/O TIMES 2 THAT BURST PER PATIENT    Cerebral artery occlusion with cerebral infarction ([facility]     RT SIDE AFFECTED-LEARNED TO WALK AND WRITE AGAIN    Degenerative arthritis of hand     Edentulous     Emphysema lung ([facility]     lung dr    Emphysema of lung ([facility]     Headache     Hiatal hernia     Hip problem     NEEDS HIP REPLACEMENT PER PATIENT    Hx of abnormal cervical Pap smear     Hypertension     Stroke (cerebrum) ([facility]     Subarachnoid bleed ([facility] 09/10/2018     Medications:  Reviewed and reconciled. Allergies: Allergies   Allergen Reactions    Latex Hives     Hives and rash    Iodine Rash     Hives . pt. States anaphalyxis    Aloe Hives     Hives     Celebrex [Celecoxib] Itching    Fish-Derived Products Other (See Comments)     Physical Exam:  BP (!) 143/66   Pulse 79   Temp 97.5 °F (36.4 °C) (Infrared)   Resp 16   Ht 5' 5\" (1.651 m)   Wt 226 lb (102.5 kg)   SpO2 94%   BMI 37.61 kg/m²   GENERAL: Alert, oriented x 3, not in acute distress. HEENT: PERRLA; EOMI. Oropharynx clear. EXTREMITIES: Without clubbing, cyanosis, or edema. NEUROLOGIC: No focal deficits.      Lab Results   Component Value Date    WBC 6.4 11/23/2022    HGB 14.5 11/23/2022    HCT 43.8 11/23/2022    MCV 90.9 11/23/2022     11/23/2022     Lab Results   Component Value Date     11/23/2022    K 3.9 11/23/2022     11/23/2022    CO2 25 11/23/2022    BUN 26 (H) 11/23/2022    CREATININE 1.2 (H) 11/23/2022    GLUCOSE 105 (H) 11/23/2022    CALCIUM 10.2 11/23/2022    PROT 7.8 11/23/2022    LABALBU 4.6 11/23/2022    BILITOT 0.4 11/23/2022    ALKPHOS 96 11/23/2022    AST 15 11/23/2022    ALT 16 11/23/2022    LABGLOM 51 11/23/2022    GFRAA 50 10/06/2022     Impression/Plan:  59 y/o female with history of smoking 1.5 to 2 packs daily for 40 years before quitting in 2018, hx of aneurysmal SAH s/p coiling by Dr. Deion Campbell 4 years ago    RLE U/S 01/12/2022: Within the visualized vessels there is no evidence for deep venous thrombosis    CT head 06/14/2022:  Status post aneurysm coiling at level of suprasellar cistern. No evidence of residual or recurrent intracranial aneurysm  No acute intracranial hemorrhage    Tim LE 10/13/2022:  No sonographic evidence of deep venous thrombosis above the knee in the right or left lower extremity. There is right below the knee deep venous thrombosis involving the peroneal veins. V/Q scan 10/13/2022: While a PIOPED classification cannot be applied without an accompanying chest radiograph, the ventilation and perfusion patterns are relatively matched. RLE U/S 10/27/2022: There is evidence for deep venous thrombosis, not significantly changed from previous. Referred to our clinic for further evaluation    Hyper-coag extensive work-up 11/23/2022  CBC WNL  BUN 26 Creat 1.2  LFTs wnl  PT 10.7    INR 1.0  PTT 26.2  D-Dimer 419    Factor V Leiden Negative  Prothrombin mutation Negative  OLAMIDE-1 4G/5G  MTHFR Mutation C677T Negative    MTHFR Mutation D5699I Negative    Factor XIII Negative    Protein C Activity >120  Protein S Ag Free 109%  AT-III Activity 110% (%)    Homocysteine 20.1 (7-29); prescribed Folic acid, VitB6 [id number]    AMY      Negative  DRVVT Negative    Anticardiolipin IgG 16  Cardiolipin       Ab   22  Anticardiolipin IgA <10    Beta-2 glyco 1 IgG <10  Beta-2 glyco 1 IgM   12  Labs reviewed. Repeat right lower extremity ultrasound on 11/28/2022 evidence for deep venous thrombosis in the right peroneal vein which is nonocclusive. Imaging reviewed. Labs reviewed. Elevated Homocysteine; prescribed Folic acid, VitB6 [id number]    Our team will coordinate appointments with NS and vascular teams. Neurosurgery consulted to follow-up on history of aneurysmal SAH s/p coiling by Dr. Teresa Pierson .  Vascular team consulted for evaluation of anticoagulation versus IVC filter    Scarlett Moreno MD 12/8/2022

## 2022-12-08 NOTE — PROGRESS NOTES
Followed up with patients referrals. Patient states she is aware of Dr. Barriga Eye visit.  12/22/22, and patient also aware Dr. Galarza Guard office will be calling to schedule

## 2022-12-09 ENCOUNTER — ANESTHESIA EVENT (OUTPATIENT)
Dept: ENDOSCOPY | Age: 62
End: 2022-12-09
Payer: MEDICARE

## 2022-12-09 ENCOUNTER — APPOINTMENT (OUTPATIENT)
Dept: GENERAL RADIOLOGY | Age: 62
DRG: 329 | End: 2022-12-09
Attending: SURGERY
Payer: MEDICARE

## 2022-12-09 ENCOUNTER — ANESTHESIA EVENT (OUTPATIENT)
Dept: OPERATING ROOM | Age: 62
End: 2022-12-09
Payer: MEDICARE

## 2022-12-09 ENCOUNTER — ANESTHESIA (OUTPATIENT)
Dept: ENDOSCOPY | Age: 62
End: 2022-12-09
Payer: MEDICARE

## 2022-12-09 ENCOUNTER — HOSPITAL ENCOUNTER (INPATIENT)
Age: 62
LOS: 12 days | Discharge: HOME | DRG: 329 | End: 2022-12-21
Attending: SURGERY | Admitting: SURGERY
Payer: MEDICARE

## 2022-12-09 ENCOUNTER — ANESTHESIA (OUTPATIENT)
Dept: OPERATING ROOM | Age: 62
End: 2022-12-09
Payer: MEDICARE

## 2022-12-09 DIAGNOSIS — K63.1 PERFORATED BOWEL (HCC): ICD-10-CM

## 2022-12-09 DIAGNOSIS — Z86.010 PERSONAL HISTORY OF COLONIC POLYPS: ICD-10-CM

## 2022-12-09 DIAGNOSIS — K63.1 BOWEL PERFORATION (HCC): Primary | ICD-10-CM

## 2022-12-09 LAB
ABO/RH: NORMAL
ANTIBODY SCREEN: NORMAL

## 2022-12-09 PROCEDURE — 2580000003 HC RX 258: Performed by: SURGERY

## 2022-12-09 PROCEDURE — 2720000010 HC SURG SUPPLY STERILE: Performed by: SURGERY

## 2022-12-09 PROCEDURE — 86901 BLOOD TYPING SEROLOGIC RH(D): CPT

## 2022-12-09 PROCEDURE — 6370000000 HC RX 637 (ALT 250 FOR IP): Performed by: STUDENT IN AN ORGANIZED HEALTH CARE EDUCATION/TRAINING PROGRAM

## 2022-12-09 PROCEDURE — 6360000002 HC RX W HCPCS

## 2022-12-09 PROCEDURE — 7100000001 HC PACU RECOVERY - ADDTL 15 MIN: Performed by: SURGERY

## 2022-12-09 PROCEDURE — 3700000000 HC ANESTHESIA ATTENDED CARE: Performed by: SURGERY

## 2022-12-09 PROCEDURE — 3600000014 HC SURGERY LEVEL 4 ADDTL 15MIN: Performed by: SURGERY

## 2022-12-09 PROCEDURE — 94640 AIRWAY INHALATION TREATMENT: CPT

## 2022-12-09 PROCEDURE — 2580000003 HC RX 258: Performed by: NURSE ANESTHETIST, CERTIFIED REGISTERED

## 2022-12-09 PROCEDURE — 45380 COLONOSCOPY AND BIOPSY: CPT | Performed by: SURGERY

## 2022-12-09 PROCEDURE — 2709999900 HC NON-CHARGEABLE SUPPLY: Performed by: SURGERY

## 2022-12-09 PROCEDURE — 86850 RBC ANTIBODY SCREEN: CPT

## 2022-12-09 PROCEDURE — A4217 STERILE WATER/SALINE, 500 ML: HCPCS | Performed by: SURGERY

## 2022-12-09 PROCEDURE — 2580000003 HC RX 258

## 2022-12-09 PROCEDURE — 88305 TISSUE EXAM BY PATHOLOGIST: CPT

## 2022-12-09 PROCEDURE — 6360000002 HC RX W HCPCS: Performed by: STUDENT IN AN ORGANIZED HEALTH CARE EDUCATION/TRAINING PROGRAM

## 2022-12-09 PROCEDURE — 0DTH0ZZ RESECTION OF CECUM, OPEN APPROACH: ICD-10-PCS | Performed by: SURGERY

## 2022-12-09 PROCEDURE — 6360000002 HC RX W HCPCS: Performed by: ANESTHESIOLOGY

## 2022-12-09 PROCEDURE — 88307 TISSUE EXAM BY PATHOLOGIST: CPT

## 2022-12-09 PROCEDURE — 7100000000 HC PACU RECOVERY - FIRST 15 MIN: Performed by: SURGERY

## 2022-12-09 PROCEDURE — 0DBH8ZX EXCISION OF CECUM, VIA NATURAL OR ARTIFICIAL OPENING ENDOSCOPIC, DIAGNOSTIC: ICD-10-PCS | Performed by: SURGERY

## 2022-12-09 PROCEDURE — 2500000003 HC RX 250 WO HCPCS

## 2022-12-09 PROCEDURE — 3700000001 HC ADD 15 MINUTES (ANESTHESIA): Performed by: SURGERY

## 2022-12-09 PROCEDURE — 36415 COLL VENOUS BLD VENIPUNCTURE: CPT

## 2022-12-09 PROCEDURE — 86900 BLOOD TYPING SEROLOGIC ABO: CPT

## 2022-12-09 PROCEDURE — 74022 RADEX COMPL AQT ABD SERIES: CPT

## 2022-12-09 PROCEDURE — 2580000003 HC RX 258: Performed by: STUDENT IN AN ORGANIZED HEALTH CARE EDUCATION/TRAINING PROGRAM

## 2022-12-09 PROCEDURE — 3609010600 HC COLONOSCOPY POLYPECTOMY SNARE/COLD BIOPSY: Performed by: SURGERY

## 2022-12-09 PROCEDURE — P9041 ALBUMIN (HUMAN),5%, 50ML: HCPCS

## 2022-12-09 PROCEDURE — 2060000000 HC ICU INTERMEDIATE R&B

## 2022-12-09 PROCEDURE — 3600000004 HC SURGERY LEVEL 4 BASE: Performed by: SURGERY

## 2022-12-09 RX ORDER — MAGNESIUM HYDROXIDE 1200 MG/15ML
LIQUID ORAL CONTINUOUS PRN
Status: COMPLETED | OUTPATIENT
Start: 2022-12-09 | End: 2022-12-09

## 2022-12-09 RX ORDER — ONDANSETRON 4 MG/1
4 TABLET, ORALLY DISINTEGRATING ORAL EVERY 8 HOURS PRN
Status: DISCONTINUED | OUTPATIENT
Start: 2022-12-09 | End: 2022-12-21 | Stop reason: HOSPADM

## 2022-12-09 RX ORDER — FENTANYL CITRATE 50 UG/ML
INJECTION, SOLUTION INTRAMUSCULAR; INTRAVENOUS PRN
Status: DISCONTINUED | OUTPATIENT
Start: 2022-12-09 | End: 2022-12-09 | Stop reason: SDUPTHER

## 2022-12-09 RX ORDER — DIPHENHYDRAMINE HYDROCHLORIDE 50 MG/ML
INJECTION INTRAMUSCULAR; INTRAVENOUS
Status: COMPLETED
Start: 2022-12-09 | End: 2022-12-09

## 2022-12-09 RX ORDER — NITROFURANTOIN 25; 75 MG/1; MG/1
100 CAPSULE ORAL 2 TIMES DAILY
Status: DISPENSED | OUTPATIENT
Start: 2022-12-09 | End: 2022-12-11

## 2022-12-09 RX ORDER — LOSARTAN POTASSIUM 50 MG/1
100 TABLET ORAL DAILY
Status: DISCONTINUED | OUTPATIENT
Start: 2022-12-09 | End: 2022-12-21 | Stop reason: HOSPADM

## 2022-12-09 RX ORDER — SODIUM CHLORIDE 9 MG/ML
25 INJECTION, SOLUTION INTRAVENOUS PRN
Status: DISCONTINUED | OUTPATIENT
Start: 2022-12-09 | End: 2022-12-09 | Stop reason: SDUPTHER

## 2022-12-09 RX ORDER — ONDANSETRON 2 MG/ML
INJECTION INTRAMUSCULAR; INTRAVENOUS PRN
Status: DISCONTINUED | OUTPATIENT
Start: 2022-12-09 | End: 2022-12-09 | Stop reason: SDUPTHER

## 2022-12-09 RX ORDER — SODIUM CHLORIDE 0.9 % (FLUSH) 0.9 %
5-40 SYRINGE (ML) INJECTION EVERY 12 HOURS SCHEDULED
Status: DISCONTINUED | OUTPATIENT
Start: 2022-12-09 | End: 2022-12-09 | Stop reason: HOSPADM

## 2022-12-09 RX ORDER — AMLODIPINE BESYLATE 5 MG/1
5 TABLET ORAL DAILY
Status: DISCONTINUED | OUTPATIENT
Start: 2022-12-09 | End: 2022-12-12

## 2022-12-09 RX ORDER — MEPERIDINE HYDROCHLORIDE 25 MG/ML
12.5 INJECTION INTRAMUSCULAR; INTRAVENOUS; SUBCUTANEOUS EVERY 5 MIN PRN
Status: DISCONTINUED | OUTPATIENT
Start: 2022-12-09 | End: 2022-12-09 | Stop reason: HOSPADM

## 2022-12-09 RX ORDER — PHENYLEPHRINE HCL IN 0.9% NACL 1 MG/10 ML
SYRINGE (ML) INTRAVENOUS PRN
Status: DISCONTINUED | OUTPATIENT
Start: 2022-12-09 | End: 2022-12-09 | Stop reason: SDUPTHER

## 2022-12-09 RX ORDER — ACETAMINOPHEN 325 MG/1
650 TABLET ORAL EVERY 6 HOURS
Status: DISCONTINUED | OUTPATIENT
Start: 2022-12-09 | End: 2022-12-21 | Stop reason: HOSPADM

## 2022-12-09 RX ORDER — SODIUM CHLORIDE, SODIUM LACTATE, POTASSIUM CHLORIDE, CALCIUM CHLORIDE 600; 310; 30; 20 MG/100ML; MG/100ML; MG/100ML; MG/100ML
INJECTION, SOLUTION INTRAVENOUS CONTINUOUS
Status: DISCONTINUED | OUTPATIENT
Start: 2022-12-09 | End: 2022-12-16

## 2022-12-09 RX ORDER — HEPARIN SODIUM 10000 [USP'U]/ML
5000 INJECTION, SOLUTION INTRAVENOUS; SUBCUTANEOUS EVERY 8 HOURS SCHEDULED
Status: DISCONTINUED | OUTPATIENT
Start: 2022-12-09 | End: 2022-12-21 | Stop reason: HOSPADM

## 2022-12-09 RX ORDER — GLYCOPYRROLATE 0.2 MG/ML
INJECTION INTRAMUSCULAR; INTRAVENOUS PRN
Status: DISCONTINUED | OUTPATIENT
Start: 2022-12-09 | End: 2022-12-09 | Stop reason: SDUPTHER

## 2022-12-09 RX ORDER — CEFAZOLIN SODIUM 1 G/3ML
INJECTION, POWDER, FOR SOLUTION INTRAMUSCULAR; INTRAVENOUS PRN
Status: DISCONTINUED | OUTPATIENT
Start: 2022-12-09 | End: 2022-12-09 | Stop reason: SDUPTHER

## 2022-12-09 RX ORDER — SODIUM CHLORIDE 0.9 % (FLUSH) 0.9 %
5-40 SYRINGE (ML) INJECTION PRN
Status: DISCONTINUED | OUTPATIENT
Start: 2022-12-09 | End: 2022-12-09 | Stop reason: HOSPADM

## 2022-12-09 RX ORDER — ALBUTEROL SULFATE 2.5 MG/3ML
2.5 SOLUTION RESPIRATORY (INHALATION) EVERY 4 HOURS PRN
Status: DISCONTINUED | OUTPATIENT
Start: 2022-12-09 | End: 2022-12-21 | Stop reason: HOSPADM

## 2022-12-09 RX ORDER — NEOSTIGMINE METHYLSULFATE 1 MG/ML
INJECTION, SOLUTION INTRAVENOUS PRN
Status: DISCONTINUED | OUTPATIENT
Start: 2022-12-09 | End: 2022-12-09 | Stop reason: SDUPTHER

## 2022-12-09 RX ORDER — SODIUM CHLORIDE 0.9 % (FLUSH) 0.9 %
5-40 SYRINGE (ML) INJECTION PRN
Status: DISCONTINUED | OUTPATIENT
Start: 2022-12-09 | End: 2022-12-15 | Stop reason: SDUPTHER

## 2022-12-09 RX ORDER — PANTOPRAZOLE SODIUM 40 MG/1
40 TABLET, DELAYED RELEASE ORAL DAILY
Status: DISCONTINUED | OUTPATIENT
Start: 2022-12-10 | End: 2022-12-11

## 2022-12-09 RX ORDER — SODIUM CHLORIDE 9 MG/ML
INJECTION, SOLUTION INTRAVENOUS PRN
Status: DISCONTINUED | OUTPATIENT
Start: 2022-12-09 | End: 2022-12-09 | Stop reason: HOSPADM

## 2022-12-09 RX ORDER — DIPHENHYDRAMINE HYDROCHLORIDE 50 MG/ML
12.5 INJECTION INTRAMUSCULAR; INTRAVENOUS
Status: DISCONTINUED | OUTPATIENT
Start: 2022-12-09 | End: 2022-12-09 | Stop reason: HOSPADM

## 2022-12-09 RX ORDER — ROCURONIUM BROMIDE 10 MG/ML
INJECTION, SOLUTION INTRAVENOUS PRN
Status: DISCONTINUED | OUTPATIENT
Start: 2022-12-09 | End: 2022-12-09 | Stop reason: SDUPTHER

## 2022-12-09 RX ORDER — SODIUM CHLORIDE 9 MG/ML
INJECTION, SOLUTION INTRAVENOUS CONTINUOUS
Status: DISCONTINUED | OUTPATIENT
Start: 2022-12-09 | End: 2022-12-09

## 2022-12-09 RX ORDER — ALBUMIN, HUMAN INJ 5% 5 %
SOLUTION INTRAVENOUS PRN
Status: DISCONTINUED | OUTPATIENT
Start: 2022-12-09 | End: 2022-12-09 | Stop reason: SDUPTHER

## 2022-12-09 RX ORDER — SODIUM CHLORIDE 9 MG/ML
INJECTION, SOLUTION INTRAVENOUS CONTINUOUS PRN
Status: DISCONTINUED | OUTPATIENT
Start: 2022-12-09 | End: 2022-12-09 | Stop reason: SDUPTHER

## 2022-12-09 RX ORDER — SODIUM CHLORIDE 9 MG/ML
INJECTION, SOLUTION INTRAVENOUS PRN
Status: DISCONTINUED | OUTPATIENT
Start: 2022-12-09 | End: 2022-12-21 | Stop reason: HOSPADM

## 2022-12-09 RX ORDER — OXYCODONE HYDROCHLORIDE 5 MG/1
2.5 TABLET ORAL EVERY 4 HOURS PRN
Status: DISCONTINUED | OUTPATIENT
Start: 2022-12-09 | End: 2022-12-12

## 2022-12-09 RX ORDER — SODIUM CHLORIDE 0.9 % (FLUSH) 0.9 %
5-40 SYRINGE (ML) INJECTION EVERY 12 HOURS SCHEDULED
Status: DISCONTINUED | OUTPATIENT
Start: 2022-12-09 | End: 2022-12-21 | Stop reason: HOSPADM

## 2022-12-09 RX ORDER — PROPOFOL 10 MG/ML
INJECTION, EMULSION INTRAVENOUS PRN
Status: DISCONTINUED | OUTPATIENT
Start: 2022-12-09 | End: 2022-12-09 | Stop reason: SDUPTHER

## 2022-12-09 RX ORDER — ONDANSETRON 2 MG/ML
4 INJECTION INTRAMUSCULAR; INTRAVENOUS EVERY 6 HOURS PRN
Status: DISCONTINUED | OUTPATIENT
Start: 2022-12-09 | End: 2022-12-21 | Stop reason: HOSPADM

## 2022-12-09 RX ORDER — OXYCODONE HYDROCHLORIDE 5 MG/1
5 TABLET ORAL EVERY 4 HOURS PRN
Status: DISCONTINUED | OUTPATIENT
Start: 2022-12-09 | End: 2022-12-12

## 2022-12-09 RX ORDER — SODIUM CHLORIDE 0.9 % (FLUSH) 0.9 %
5-40 SYRINGE (ML) INJECTION PRN
Status: DISCONTINUED | OUTPATIENT
Start: 2022-12-09 | End: 2022-12-21 | Stop reason: HOSPADM

## 2022-12-09 RX ORDER — KETAMINE HCL IN NACL, ISO-OSM 100MG/10ML
SYRINGE (ML) INJECTION PRN
Status: DISCONTINUED | OUTPATIENT
Start: 2022-12-09 | End: 2022-12-09 | Stop reason: SDUPTHER

## 2022-12-09 RX ORDER — SODIUM CHLORIDE 0.9 % (FLUSH) 0.9 %
5-40 SYRINGE (ML) INJECTION EVERY 12 HOURS SCHEDULED
Status: DISCONTINUED | OUTPATIENT
Start: 2022-12-09 | End: 2022-12-15 | Stop reason: SDUPTHER

## 2022-12-09 RX ORDER — METRONIDAZOLE 500 MG/100ML
INJECTION, SOLUTION INTRAVENOUS PRN
Status: DISCONTINUED | OUTPATIENT
Start: 2022-12-09 | End: 2022-12-09 | Stop reason: SDUPTHER

## 2022-12-09 RX ORDER — CHLORTHALIDONE 25 MG/1
25 TABLET ORAL DAILY
Status: DISCONTINUED | OUTPATIENT
Start: 2022-12-09 | End: 2022-12-21 | Stop reason: HOSPADM

## 2022-12-09 RX ORDER — GABAPENTIN 300 MG/1
300 CAPSULE ORAL 3 TIMES DAILY
Status: DISCONTINUED | OUTPATIENT
Start: 2022-12-09 | End: 2022-12-21 | Stop reason: HOSPADM

## 2022-12-09 RX ORDER — DIPHENHYDRAMINE HYDROCHLORIDE 50 MG/ML
12.5 INJECTION INTRAMUSCULAR; INTRAVENOUS
Status: COMPLETED | OUTPATIENT
Start: 2022-12-09 | End: 2022-12-09

## 2022-12-09 RX ORDER — DEXAMETHASONE SODIUM PHOSPHATE 10 MG/ML
INJECTION INTRAMUSCULAR; INTRAVENOUS PRN
Status: DISCONTINUED | OUTPATIENT
Start: 2022-12-09 | End: 2022-12-09 | Stop reason: SDUPTHER

## 2022-12-09 RX ORDER — ARFORMOTEROL TARTRATE 15 UG/2ML
15 SOLUTION RESPIRATORY (INHALATION) 2 TIMES DAILY
Status: DISCONTINUED | OUTPATIENT
Start: 2022-12-09 | End: 2022-12-20

## 2022-12-09 RX ORDER — LIDOCAINE HYDROCHLORIDE 20 MG/ML
INJECTION, SOLUTION INTRAVENOUS PRN
Status: DISCONTINUED | OUTPATIENT
Start: 2022-12-09 | End: 2022-12-09 | Stop reason: SDUPTHER

## 2022-12-09 RX ADMIN — OXYCODONE 5 MG: 5 TABLET ORAL at 22:11

## 2022-12-09 RX ADMIN — ROCURONIUM BROMIDE 40 MG: 10 INJECTION, SOLUTION INTRAVENOUS at 16:10

## 2022-12-09 RX ADMIN — Medication 25 MG: at 16:35

## 2022-12-09 RX ADMIN — IPRATROPIUM BROMIDE 0.5 MG: 0.5 SOLUTION RESPIRATORY (INHALATION) at 21:27

## 2022-12-09 RX ADMIN — METRONIDAZOLE 500 MG: 500 INJECTION, SOLUTION INTRAVENOUS at 16:26

## 2022-12-09 RX ADMIN — Medication 25 MG: at 16:10

## 2022-12-09 RX ADMIN — GLYCOPYRROLATE 0.6 MG: 0.2 INJECTION, SOLUTION INTRAMUSCULAR; INTRAVENOUS at 17:27

## 2022-12-09 RX ADMIN — CEFAZOLIN 2 G: 1 INJECTION, POWDER, FOR SOLUTION INTRAMUSCULAR; INTRAVENOUS at 16:23

## 2022-12-09 RX ADMIN — METOPROLOL TARTRATE 37.5 MG: 25 TABLET, FILM COATED ORAL at 22:11

## 2022-12-09 RX ADMIN — PHENYLEPHRINE HYDROCHLORIDE 100 MCG: 10 INJECTION INTRAVENOUS at 17:03

## 2022-12-09 RX ADMIN — MEPERIDINE HYDROCHLORIDE 12.5 MG: 25 INJECTION, SOLUTION INTRAMUSCULAR; INTRAVENOUS; SUBCUTANEOUS at 18:07

## 2022-12-09 RX ADMIN — SODIUM CHLORIDE, POTASSIUM CHLORIDE, SODIUM LACTATE AND CALCIUM CHLORIDE: 600; 310; 30; 20 INJECTION, SOLUTION INTRAVENOUS at 22:10

## 2022-12-09 RX ADMIN — FENTANYL CITRATE 50 MCG: 50 INJECTION, SOLUTION INTRAMUSCULAR; INTRAVENOUS at 12:21

## 2022-12-09 RX ADMIN — HYDROMORPHONE HYDROCHLORIDE 0.5 MG: 1 INJECTION, SOLUTION INTRAMUSCULAR; INTRAVENOUS; SUBCUTANEOUS at 13:05

## 2022-12-09 RX ADMIN — FENTANYL CITRATE 50 MCG: 50 INJECTION, SOLUTION INTRAMUSCULAR; INTRAVENOUS at 12:18

## 2022-12-09 RX ADMIN — LIDOCAINE HYDROCHLORIDE 100 MG: 20 INJECTION, SOLUTION INTRAVENOUS at 16:10

## 2022-12-09 RX ADMIN — PHENYLEPHRINE HYDROCHLORIDE 200 MCG: 10 INJECTION INTRAVENOUS at 16:17

## 2022-12-09 RX ADMIN — HYDROMORPHONE HYDROCHLORIDE 0.25 MG: 1 INJECTION, SOLUTION INTRAMUSCULAR; INTRAVENOUS; SUBCUTANEOUS at 18:34

## 2022-12-09 RX ADMIN — NITROFURANTOIN MONOHYDRATE/MACROCRYSTALLINE 100 MG: 25; 75 CAPSULE ORAL at 22:11

## 2022-12-09 RX ADMIN — PHENYLEPHRINE HYDROCHLORIDE 200 MCG: 10 INJECTION INTRAVENOUS at 16:42

## 2022-12-09 RX ADMIN — Medication 200 MCG: at 12:07

## 2022-12-09 RX ADMIN — HEPARIN SODIUM 5000 UNITS: 10000 INJECTION INTRAVENOUS; SUBCUTANEOUS at 22:12

## 2022-12-09 RX ADMIN — SODIUM CHLORIDE: 9 INJECTION, SOLUTION INTRAVENOUS at 16:04

## 2022-12-09 RX ADMIN — PROPOFOL 40 MG: 10 INJECTION, EMULSION INTRAVENOUS at 17:13

## 2022-12-09 RX ADMIN — Medication 200 MCG: at 12:12

## 2022-12-09 RX ADMIN — PROPOFOL 50 MG: 10 INJECTION, EMULSION INTRAVENOUS at 16:10

## 2022-12-09 RX ADMIN — AMLODIPINE BESYLATE 5 MG: 5 TABLET ORAL at 22:11

## 2022-12-09 RX ADMIN — CHLORTHALIDONE 25 MG: 25 TABLET ORAL at 22:11

## 2022-12-09 RX ADMIN — HYDROMORPHONE HYDROCHLORIDE 0.5 MG: 1 INJECTION, SOLUTION INTRAMUSCULAR; INTRAVENOUS; SUBCUTANEOUS at 18:10

## 2022-12-09 RX ADMIN — Medication 200 MCG: at 12:03

## 2022-12-09 RX ADMIN — HYDROMORPHONE HYDROCHLORIDE 0.5 MG: 1 INJECTION, SOLUTION INTRAMUSCULAR; INTRAVENOUS; SUBCUTANEOUS at 18:16

## 2022-12-09 RX ADMIN — SODIUM CHLORIDE: 9 INJECTION, SOLUTION INTRAVENOUS at 16:15

## 2022-12-09 RX ADMIN — FENTANYL CITRATE 50 MCG: 50 INJECTION, SOLUTION INTRAMUSCULAR; INTRAVENOUS at 17:13

## 2022-12-09 RX ADMIN — DIPHENHYDRAMINE HYDROCHLORIDE 12.5 MG: 50 INJECTION, SOLUTION INTRAMUSCULAR; INTRAVENOUS at 14:16

## 2022-12-09 RX ADMIN — ONDANSETRON 4 MG: 2 INJECTION INTRAMUSCULAR; INTRAVENOUS at 17:15

## 2022-12-09 RX ADMIN — FENTANYL CITRATE 100 MCG: 50 INJECTION, SOLUTION INTRAMUSCULAR; INTRAVENOUS at 16:10

## 2022-12-09 RX ADMIN — ALBUMIN (HUMAN) 25 G: 12.5 INJECTION, SOLUTION INTRAVENOUS at 16:42

## 2022-12-09 RX ADMIN — HYDROMORPHONE HYDROCHLORIDE 0.5 MG: 1 INJECTION, SOLUTION INTRAMUSCULAR; INTRAVENOUS; SUBCUTANEOUS at 14:16

## 2022-12-09 RX ADMIN — DEXAMETHASONE SODIUM PHOSPHATE 10 MG: 10 INJECTION INTRAMUSCULAR; INTRAVENOUS at 16:10

## 2022-12-09 RX ADMIN — LOSARTAN POTASSIUM 100 MG: 50 TABLET, FILM COATED ORAL at 22:11

## 2022-12-09 RX ADMIN — PHENYLEPHRINE HYDROCHLORIDE 200 MCG: 10 INJECTION INTRAVENOUS at 16:20

## 2022-12-09 RX ADMIN — ACETAMINOPHEN 650 MG: 325 TABLET ORAL at 22:11

## 2022-12-09 RX ADMIN — Medication 3 MG: at 17:28

## 2022-12-09 RX ADMIN — GABAPENTIN 300 MG: 300 CAPSULE ORAL at 22:11

## 2022-12-09 RX ADMIN — ARFORMOTEROL TARTRATE 15 MCG: 15 SOLUTION RESPIRATORY (INHALATION) at 21:27

## 2022-12-09 RX ADMIN — PROPOFOL 400 MG: 10 INJECTION, EMULSION INTRAVENOUS at 11:43

## 2022-12-09 RX ADMIN — Medication 0.5 MG: at 13:05

## 2022-12-09 RX ADMIN — SODIUM CHLORIDE: 9 INJECTION, SOLUTION INTRAVENOUS at 11:40

## 2022-12-09 RX ADMIN — DIPHENHYDRAMINE HYDROCHLORIDE 12.5 MG: 50 INJECTION INTRAMUSCULAR; INTRAVENOUS at 14:16

## 2022-12-09 ASSESSMENT — PAIN DESCRIPTION - DESCRIPTORS
DESCRIPTORS: SHARP;DISCOMFORT
DESCRIPTORS: ACHING;DISCOMFORT
DESCRIPTORS: DISCOMFORT;ACHING

## 2022-12-09 ASSESSMENT — PAIN SCALES - GENERAL
PAINLEVEL_OUTOF10: 9
PAINLEVEL_OUTOF10: 6
PAINLEVEL_OUTOF10: 10
PAINLEVEL_OUTOF10: 8
PAINLEVEL_OUTOF10: 0
PAINLEVEL_OUTOF10: 5
PAINLEVEL_OUTOF10: 0
PAINLEVEL_OUTOF10: 10
PAINLEVEL_OUTOF10: 7

## 2022-12-09 ASSESSMENT — PAIN DESCRIPTION - LOCATION
LOCATION: ABDOMEN
LOCATION: ABDOMEN;BACK
LOCATION: ABDOMEN
LOCATION: BACK
LOCATION: ABDOMEN
LOCATION: ABDOMEN;BACK
LOCATION: BACK

## 2022-12-09 ASSESSMENT — PAIN DESCRIPTION - PAIN TYPE
TYPE: CHRONIC PAIN
TYPE: SURGICAL PAIN
TYPE: SURGICAL PAIN;ACUTE PAIN
TYPE: CHRONIC PAIN

## 2022-12-09 ASSESSMENT — PAIN DESCRIPTION - FREQUENCY
FREQUENCY: CONTINUOUS

## 2022-12-09 ASSESSMENT — ENCOUNTER SYMPTOMS
SHORTNESS OF BREATH: 1
SHORTNESS OF BREATH: 1

## 2022-12-09 ASSESSMENT — PAIN - FUNCTIONAL ASSESSMENT: PAIN_FUNCTIONAL_ASSESSMENT: NONE - DENIES PAIN

## 2022-12-09 ASSESSMENT — PAIN DESCRIPTION - ORIENTATION
ORIENTATION: ANTERIOR
ORIENTATION: MID;INNER
ORIENTATION: INNER;MID

## 2022-12-09 ASSESSMENT — LIFESTYLE VARIABLES: SMOKING_STATUS: 0

## 2022-12-09 ASSESSMENT — PAIN DESCRIPTION - ONSET: ONSET: GRADUAL

## 2022-12-09 NOTE — PROGRESS NOTES
Dr Douglas Christy here and talked to patient. Notified patient she needed to go back to OR. Shankar Chen talked to son and son here to visit patient.

## 2022-12-09 NOTE — ANESTHESIA PRE PROCEDURE
Department of Anesthesiology  Preprocedure Note       Name:  Virginia Kelly   Age:  60 y.o.  :  1960                                          MRN:  [id number]         Date:  2022      Surgeon: Tarun Porras):  Antonio Cantrell MD    Procedure: Procedure(s):  LAPAROTOMY EXPLORATORY, POSSIBLE BOWEL RESECTION. POSSIBLE OSTOMY    Medications prior to admission:   Prior to Admission medications    Medication Sig Start Date End Date Taking? Authorizing Provider   folic acid (FOLVITE) 1 MG tablet Take 1 tablet by mouth daily  Patient not taking: Reported on 2022   Valorie Hale MD   pyridoxine (RA VITAMIN B-6) 50 MG tablet Take 1 tablet by mouth daily  Patient not taking: Reported on 2022   Valorie Hale MD   cyanocobalamin ([employer] VITAMIN B12) 1000 MCG tablet Take 1 tablet by mouth daily  Patient not taking: Reported on 2022   Valorie Hale MD   acetaminophen-codeine (TYLENOL #4) 300-60 MG per tablet Take 1 tablet by mouth daily as needed for Pain for up to 30 days. 22  SPENSER Hough   fluconazole (DIFLUCAN) 150 MG tablet 1 tab po x 1 dose, may repeat in 72 hrs if still symptomatic. Patient not taking: Reported on 2022   LYLY South CNP   nitrofurantoin, macrocrystal-monohydrate, (MACROBID) 100 MG capsule Take 1 capsule by mouth 2 times daily for 5 days 12/5/22 12/10/22  LYLY South CNP   albuterol sulfate HFA (PROVENTIL;VENTOLIN;PROAIR) 108 (90 Base) MCG/ACT inhaler INHALE TWO PUFFS BY MOUTH EVERY 6 HOURS AS NEEDED FOR WHEEZING. 22   Peter Bailey MD   tiotropium-olodaterol (STIOLTO RESPIMAT) 2.5-2.5 MCG/ACT AERS Inhale 2 puffs into the lungs daily  Patient not taking: Reported on 2022 10/13/22   Jennifer Joyce MD   psyllium (METAMUCIL SMOOTH TEXTURE) 28.3 % POWD powder Take 3.4 g by mouth daily 10/6/22   Peter Bailey MD   [facility] [place]  Devices (WRIST BRACE) MISC Firm neutral position left wrist brace  Size to fit  Dx:  Wrist pain/carpal tunnel syndrome 10/6/22   Linda Bonner MD   amLODIPine (NORVASC) 5 MG tablet TAKE ONE TABLET BY MOUTH EVERY DAY 8/5/22   Linda Bonner MD   chlorthalidone (HYGROTON) 25 MG tablet Take 1 tablet by mouth in the morning. 8/5/22   Linda Bonner MD   fluticasone (FLONASE) 50 MCG/ACT nasal spray 2 sprays by Each Nostril route in the morning. 8/5/22   Linda Bonner MD   gabapentin (NEURONTIN) 300 MG capsule TAKE ONE CAPSULE BY MOUTH THREE TIMES A DAY 8/5/22 12/9/22  Linda Bonner MD   losartan (COZAAR) 100 MG tablet Take 1 tablet by mouth in the morning. 8/5/22   Linda Bonner MD   pantoprazole (PROTONIX) 40 MG tablet TAKE ONE TABLET BY MOUTH EVERY DAY 8/5/22   Linda Bonner MD   potassium chloride (KLOR-CON M) 10 MEQ extended release tablet TAKE ONE TABLET BY MOUTH DAILY WITH BREAKFAST 8/5/22   Linda Bonner MD   metoprolol tartrate (LOPRESSOR) 25 MG tablet Take 1.5 tablets by mouth in the morning and 1.5 tablets before bedtime. 8/5/22 12/9/22  Linda Bonner MD   polyethylene glycol (GLYCOLAX) 17 g packet DISSOLVE 1 PACKET (17 GRAMS) IN LIQUID AND TAKE BY MOUTH DAILY 1/24/22   Historical Provider, MD   valACYclovir (VALTREX) 1 g tablet Take 1 tablet by mouth daily For 5 days as needed for Herpes outbreak  Patient not taking: Reported on 12/9/2022 4/27/21   Linda Bonner MD   Multiple Vitamins-Minerals (THERAPEUTIC MULTIVITAMIN-MINERALS) tablet Take 1 tablet by mouth daily    Historical Provider, MD   diclofenac sodium (VOLTAREN) 1 % GEL APPLY ONE GRAM EXTERNALLY TWICE A DAY TO NECK AND ONE GRAM EXTERNALLY TWICE A DAY TO BACK   Patient not taking: Reported on 12/9/2022 11/3/20   [facility] Allegra, [address] Devices (ROLLATOR) MISC 1 each by Does not apply route daily as needed (use as needed for stability) 3/17/20   Linda Bonner MD       Current medications:    No current facility-administered medications for this visit.      No current outpatient medications on file. Facility-Administered Medications Ordered in Other Visits   Medication Dose Route Frequency Provider Last Rate Last Admin    0.9 % sodium chloride infusion   IntraVENous Continuous Tom Henning MD        sodium chloride flush 0.9 % injection 5-40 mL  5-40 mL IntraVENous 2 times per day Tom Henning MD        sodium chloride flush 0.9 % injection 5-40 mL  5-40 mL IntraVENous PRN Tom Henning MD        0.9 % sodium chloride infusion  25 mL IntraVENous PRN Tom Henning MD        sodium chloride flush 0.9 % injection 5-40 mL  5-40 mL IntraVENous 2 times per day Tom Henning MD        sodium chloride flush 0.9 % injection 5-40 mL  5-40 mL IntraVENous PRN Tom Henning MD        0.9 % sodium chloride infusion   IntraVENous PRN Tom Henning MD           Allergies: Allergies   Allergen Reactions    Latex Hives     Hives and rash    Iodine Rash     Hives . pt. States anaphalyxis    Aloe Hives     Hives     Celebrex [Celecoxib] Itching    Fish-Derived Products Other (See Comments)       Problem List:    Patient Active Problem List   Diagnosis Code    Obesity (BMI 30-39. 9) E66.9    Hypertension I10    Chronic pain syndrome G89.4    Lumbar facet arthropathy M47.816    Lumbar disc disorder M51.9    Primary osteoarthritis of both hips M16.0    Arthritis of right hip M16.11    Dysphagia R13.10    Heartburn R12    At risk for colon cancer Z91.89    Colon polyps K63.5    Degenerative disc disease, cervical M50.30    Arthropathy of cervical facet joint M47.812    Abnormal blood sugar R73.09    Arthritis of both hips M16.0    COVID-19 U07.1    H/O total hip arthroplasty, right Z96.641    History of total left hip arthroplasty Z96.642    Primary osteoarthritis of right hip M16.11    Pulmonary emphysema (HCC) J43.9    History of herpes genitalis Z86.19    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 Z86.79    History of COVID-19 Z86.16    Acute cystitis with hematuria N30.01    Aneurysm (HCC) I72.9    S/P total right hip arthroplasty Z96.641    Cognitive impairment R41.89    Personal history of colonic polyps Z86.010    Abnormal diffusion capacity determined by pulmonary function test R94.2    BELCHER (dyspnea on exertion) R06.09    Acute deep vein thrombosis (DVT) of right peroneal vein (HCC) I82.451       Past Medical History:        Diagnosis Date    Acute deep vein thrombosis (DVT) of right peroneal vein ([facility] 10/14/2022    See note from pulmonology    Brain aneurysm 09/2018    H/O TIMES 2 THAT BURST PER PATIENT    Cerebral artery occlusion with cerebral infarction (HCC)     RT SIDE AFFECTED-LEARNED TO WALK AND WRITE AGAIN    Degenerative arthritis of hand     Edentulous     Emphysema lung ([facility] [address]     lung dr Macy Simon Emphysema of lung ([facility] [address] [facility]     Headache     Hiatal hernia     Hip problem     NEEDS HIP REPLACEMENT PER PATIENT    Hx of abnormal cervical Pap smear     Hypertension     Stroke (cerebrum) (HCC)     Subarachnoid bleed ([facility] [address] 09/10/2018       Past Surgical History:        Procedure Laterality Date    BRAIN ANEURYSM SURGERY      coiling     COLONOSCOPY  08/30/2019    polyps--frank    COLONOSCOPY N/A 08/30/2019    COLONOSCOPY POLYPECTOMY SNARE/COLD BIOPSY performed by Katherine Jacques MD at [address] [place] [address] W/ BIOPSIES  12/09/2022    polyp/cecal mass; diverticula--frank    JOINT REPLACEMENT      total hip replacement left and right    OTHER SURGICAL HISTORY      FOR CANCER CELLS- DONE AT [facility]    TONSILLECTOMY      TOTAL HIP ARTHROPLASTY Left 02/22/2021    LEFT HIP TOTAL ARTHROPLASTY performed by Jacqui Ryan MD at [address] [place] [address] Right 01/10/2022    RIGHT TOTAL HIP ARTHROPLASTY - STYKER performed by Jacqui Ryan MD at [facility] ENDOSCOPY  08/30/2019    gastritis with superficial ulcerations; duodenitis--frank    UPPER GASTROINTESTINAL ENDOSCOPY N/A 2019    EGD BIOPSY performed by Antonio Cantrell MD at [facility] History:    Social History     Tobacco Use    Smoking status: Former     Packs/day: 1.50     Years: 43.00     Pack years: 64.50     Types: Cigarettes     Quit date: 2018     Years since quittin.2    Smokeless tobacco: Never   Substance Use Topics    Alcohol use: No                                Counseling given: Not Answered      Vital Signs (Current): There were no vitals filed for this visit. BP Readings from Last 3 Encounters:   22 108/77   22 (!) 143/66   22 (!) 104/57       NPO Status:                                                                                 BMI:   Wt Readings from Last 3 Encounters:   22 226 lb (102.5 kg)   22 226 lb (102.5 kg)   22 226 lb (102.5 kg)     There is no height or weight on file to calculate BMI.    CBC:   Lab Results   Component Value Date/Time    WBC 6.4 2022 11:31 AM    RBC 4.82 2022 11:31 AM    HGB 14.5 2022 11:31 AM    HCT 43.8 2022 11:31 AM    MCV 90.9 2022 11:31 AM    RDW 13.2 2022 11:31 AM     2022 11:31 AM       CMP:   Lab Results   Component Value Date/Time     2022 11:31 AM    K 3.9 2022 11:31 AM    K 3.9 2022 05:22 AM     2022 11:31 AM    CO2 25 2022 11:31 AM    BUN 26 2022 11:31 AM    CREATININE 1.2 2022 11:31 AM    GFRAA 50 10/06/2022 09:39 AM    LABGLOM 51 2022 11:31 AM    GLUCOSE 105 2022 11:31 AM    PROT 7.8 2022 11:31 AM    CALCIUM 10.2 2022 11:31 AM    BILITOT 0.4 2022 11:31 AM    ALKPHOS 96 2022 11:31 AM    AST 15 2022 11:31 AM    ALT 16 2022 11:31 AM       POC Tests: No results for input(s): POCGLU, POCNA, POCK, POCCL, POCBUN, POCHEMO, POCHCT in the last 72 hours.     Coags:   Lab Results Component Value Date/Time    PROTIME 10.7 11/23/2022 11:31 AM    INR 1.0 11/23/2022 11:31 AM    APTT 26.2 11/23/2022 11:31 AM       HCG (If Applicable): No results found for: PREGTESTUR, PREGSERUM, HCG, HCGQUANT     ABGs: No results found for: PHART, PO2ART, JBW8YHD, TNL0OTJ, BEART, R0PICGDS     Type & Screen (If Applicable):  No results found for: LABABO, LABRH    Drug/Infectious Status (If Applicable):  No results found for: HIV, HEPCAB    COVID-19 Screening (If Applicable):   Lab Results   Component Value Date/Time    COVID19 Not Detected 01/12/2022 02:00 PM     EKG 09/21/2021  Sinus  Rhythm   WITHIN NORMAL LIMITS    Echo 02/21/2019   Summary   Left ventricular internal dimensions were normal in diastole and systole. Mild left ventricular concentric hypertrophy noted. No regional wall motion abnormalities seen. Normal left ventricular ejection fraction. Mild aortic regurgitation is noted. CTA of Head 12/17/2021  Impression   1. No acute intracranial abnormality. 2. Embolization coils in the region of the supraclinoid left ICA, similar   compared to the prior study.  Portions of the distal left ICA and left   posterior cerebral artery are obscured by artifact due to the presence of the   metal coils. 3. Otherwise, unremarkable CTA of the head.  No evidence to suggest recurrent   aneurysm.              Anesthesia Evaluation  Patient summary reviewed and Nursing notes reviewed no history of anesthetic complications:   Airway: Mallampati: II  TM distance: >3 FB   Neck ROM: full  Mouth opening: > = 3 FB   Dental:    (+) edentulous      Pulmonary: breath sounds clear to auscultation  (+) COPD (Hx of emphysema, uses albuterol inhaler as needed):  shortness of breath:                            ROS comment: Hx of smoking (64 pack years)    Emphysema lung     FEV1 85%   Cardiovascular:  Exercise tolerance: poor (<4 METS),   (+) hypertension (on metoprolol, amlodipine, losartan.):,       ECG

## 2022-12-09 NOTE — PROGRESS NOTES
Nurse to Nurse report called  Transport requested   Telemetry pack requested from nurse and for transport to

## 2022-12-09 NOTE — PROGRESS NOTES
Patient taken back to the [facility] for surgery. Etienne Ervin RN given son's name and number to contact him with updates. Patient on 100% non-rebreather to or.

## 2022-12-09 NOTE — PROGRESS NOTES
Pt with ongoing pain in recovery room. X-ray reveals pneumoperitoneum. Discussed with pt and son Génesis Elise at bedside--need for ex lap, possible bowel resection, possible ostomy. Discussed risks of procedure. Risks include, but are not limited to, bleeding, infection, reaction to anesthesia medicine, and damage to bowel or surrounding structures. Son is concerned about known hx of brain aneurysms and newly diagnosed blood clot. States she cannot have anticoagulation due to aneurysms. All questions answered.       Jesse Yepez MD, FACS  12/9/2022  3:27 PM

## 2022-12-09 NOTE — PROGRESS NOTES
Dr. Alem Alamo here to check patient. Patient states her back is hurting her to Dr. Alem Alamo. CXR recalled for. No new orders taken from Dr. Alem Alamo at this time.

## 2022-12-09 NOTE — PROGRESS NOTES
Patient cleaned up for moderate amount watery yellow stool. Patient placed on hospital bed. Clean linnens applied.

## 2022-12-09 NOTE — PROGRESS NOTES
Chest X ray here and taken. Dr. Amarilis Gonsalez here and looked at chest. Dr. Bri Izquierdo notified that CXR was done. See orders. Will Be over to see patient and xray as soon as he is done. Free air studies done.

## 2022-12-09 NOTE — PROGRESS NOTES
Dr. Mauro Barnhart here and notified of patient's status, BP low, Patient on 100% nonrebreather. Checked patient. Patient continues to thrash in bed. Patient repositioned numerous times. Chest Xray called for and ordered.

## 2022-12-09 NOTE — PROGRESS NOTES
Patient rolling all over the bed. Patient pulled IV out. Patient uncooperative at this time. Patient placed on monitors. Patient repositioned numerous times. Dr. Aly Chery called to come check patient.

## 2022-12-09 NOTE — BRIEF OP NOTE
Brief Postoperative Note      Patient: Martinez Yi  YOB: 1960  MRN: [id number]    Date of Procedure: 12/9/2022    Pre-Op Diagnosis: Perforated bowel ([address] [K63.1]    Post-Op Diagnosis: Same and Cecal perforation, purulent peritonitis       Procedure(s):  LAPAROTOMY EXPLORATORY, ILIOCECECTOMY    Surgeon(s):  MD Lavell Abernathy MD    Assistant:  Resident: Federico Wang MD PGY4    Anesthesia: General    Estimated Blood Loss (mL): 25    Complications: None    Specimens:   ID Type Source Tests Collected by Time Destination   A : A. ILIOCECECTOMY Tissue Tissue SURGICAL PATHOLOGY Gracy Hart MD 12/9/2022 [id number]          Findings: Cecal perforation, purulent peritonitis.  Ileocecectomy     Electronically signed by Federico Wang MD on 12/9/2022 at 5:48 PM

## 2022-12-09 NOTE — OP NOTE
Operative Note      Patient: Joaquim Moss  YOB: 1960  MRN: [id number]    Date of Procedure: 12/9/2022    Pre-Op Diagnosis: Perforated bowel ([facility] [address] [K63.1]    Post-Op Diagnosis: Same and cecal perforation, purulent peritonitis       Procedure(s):  LAPAROTOMY EXPLORATORY, ILIOCECECTOMY    Surgeon(s):  MD Nancy Horton MD    Assistant:   Resident: Amelia Lanier MD PGY4    Anesthesia: General    Estimated Blood Loss (mL): 25    Complications: None    Specimens:   ID Type Source Tests Collected by Time Destination   A : A. ILIOCECECTOMY Tissue Tissue SURGICAL PATHOLOGY Sally Garcia MD 12/9/2022 [id number]        Implants:  * No implants in log *      Drains: * No LDAs found *    Findings: Cecal perforation, purulent peritonitis. Ileocecectomy    HISTORY: Joaquim Moss is a 60 y.o. female who presented with abdominal pain and pneumoperitoneum after colonoscopy. Exploratory laparotomy, possible bowel resection was recommended. The risks benefits and alternatives of the procedure were discussed with the patient who stated understanding and agreed to proceed. DESCRIPTION OF PROCEDURE: The patient was brought to the operating room and positioned supine on the OR table. Sequential compression devices were placed on the patient's lower extremities and functioning. Preoperative antibiotics were administered. Anesthesia was obtained without complication as per the anesthesia record. Immediately prior to the procedure a time-out was called and the surgical checklist was reviewed and agreed upon by all present. The patient was prepped and draped in the usual sterile fashion. A midline incision was made with a #10 blade and carried down to the fascia with electrocautery. The peritoneum was entered bluntly and we immediately encountered pneumoperitoneum. There was purulent ascites consistent with purulent peritonitis which was suctioned out.  We then inspected the bowel and noted a small perforation at the cecum. This was oversewn with a figure of 8 3-0 silk suture to prevent further spillage of stool. The white line of toldt was taken down to mobilize the ascending colon with cautery. Due to the small perforation decision was made to perform an ileocecectomy. A small rent in the mesentery was then made on the terminal ileum approximately 10 cm from the ileocecal valve using electrocautery. A ANDRES 75 mm blue load stapler was used to transect the small bowel. In a similar fashion a small rent was made in the large bowel mesentery using electrocautery distal to the perforation. This was transected with a ANDRES 75 mm blue load stapler. The mesentery was dissected with the ligasure device. There was one area of the mesentery that was oozing which was oversewn with a figure of 8 3-0 silk suture. The specimen was then passed off the field for pathology. The terminal ileum and ascending colon was then grasped with babcocks and using electrocautery we made an enterotomy on the antimesenteric border for the TI and on the taenia coli and used the ANDRES 75 mm blue load to create a side to side functional end to end anastomosis. The common channel defect was grasped with meek and allis clamps and closed with ANDRES 75 mm blue load x2. The 3-0 silk suture was used as a crotch stitch. We then irrigated the abdominal cavity with warm saline. We inspected our area of dissection and it was noted to be hemostatic. We then inspected the remaining colon and there were no other areas of injury noted. The fascia was then closed with #1 strattafix x2. The skin was closed with staples and sterile dressing was applied. Needle, sponge, and instrument counts were reported as correct x2. Dr. Nona Mcdonnell was present and scrubbed throughout the case. The patient tolerated the procedure well without complications. She was transferred to the recovery area in good condition.     Electronically signed by Brittany Smith MD on 12/9/2022 at 6:11 PM

## 2022-12-09 NOTE — ANESTHESIA PRE PROCEDURE
Department of Anesthesiology  Preprocedure Note       Name:  Khoa Bee   Age:  60 y.o.  :  1960                                          MRN:  [id number]         Date:  2022      Surgeon: Rick Mercedes):  Glenny Gerber MD    Procedure: Procedure(s):  COLONOSCOPY DIAGNOSTIC    Medications prior to admission:   Prior to Admission medications    Medication Sig Start Date End Date Taking? Authorizing Provider   folic acid (FOLVITE) 1 MG tablet Take 1 tablet by mouth daily  Patient not taking: Reported on 2022   Edy Martinez MD   pyridoxine (RA VITAMIN B-6) 50 MG tablet Take 1 tablet by mouth daily  Patient not taking: Reported on 2022   Edy Martinez MD   cyanocobalamin ([employer] VITAMIN B12) 1000 MCG tablet Take 1 tablet by mouth daily  Patient not taking: Reported on 2022   Edy Martinez MD   acetaminophen-codeine (TYLENOL #4) 300-60 MG per tablet Take 1 tablet by mouth daily as needed for Pain for up to 30 days. 22  SPENSER Adkins   fluconazole (DIFLUCAN) 150 MG tablet 1 tab po x 1 dose, may repeat in 72 hrs if still symptomatic. Patient not taking: Reported on 2022   LYLY Nieto CNP   nitrofurantoin, macrocrystal-monohydrate, (MACROBID) 100 MG capsule Take 1 capsule by mouth 2 times daily for 5 days 12/5/22 12/10/22  LYLY Nieto CNP   albuterol sulfate HFA (PROVENTIL;VENTOLIN;PROAIR) 108 (90 Base) MCG/ACT inhaler INHALE TWO PUFFS BY MOUTH EVERY 6 HOURS AS NEEDED FOR WHEEZING. 22   Miriam Burks MD   tiotropium-olodaterol (STIOLTO RESPIMAT) 2.5-2.5 MCG/ACT AERS Inhale 2 puffs into the lungs daily  Patient not taking: Reported on 2022 10/13/22   Yaneth Knight MD   psyllium (METAMUCIL SMOOTH TEXTURE) 28.3 % POWD powder Take 3.4 g by mouth daily 10/6/22   MD Meagan Lomelic.  Devices (WRIST BRACE) MISC Firm neutral position left wrist brace  Size to fit  Dx:  Wrist pain/carpal tunnel syndrome 10/6/22   Susan Forbes MD   amLODIPine (NORVASC) 5 MG tablet TAKE ONE TABLET BY MOUTH EVERY DAY 8/5/22   Susan Forbes MD   chlorthalidone (HYGROTON) 25 MG tablet Take 1 tablet by mouth in the morning. 8/5/22   Susan Forbes MD   fluticasone (FLONASE) 50 MCG/ACT nasal spray 2 sprays by Each Nostril route in the morning. 8/5/22   Susan Forbes MD   gabapentin (NEURONTIN) 300 MG capsule TAKE ONE CAPSULE BY MOUTH THREE TIMES A DAY 8/5/22 12/9/22  Susan Forbes MD   losartan (COZAAR) 100 MG tablet Take 1 tablet by mouth in the morning. 8/5/22   Susan Forbes MD   pantoprazole (PROTONIX) 40 MG tablet TAKE ONE TABLET BY MOUTH EVERY DAY 8/5/22   Susan Forbes MD   potassium chloride (KLOR-CON M) 10 MEQ extended release tablet TAKE ONE TABLET BY MOUTH DAILY WITH BREAKFAST 8/5/22   Susan Forbes MD   metoprolol tartrate (LOPRESSOR) 25 MG tablet Take 1.5 tablets by mouth in the morning and 1.5 tablets before bedtime. 8/5/22 12/9/22  Susan Forbes MD   polyethylene glycol (GLYCOLAX) 17 g packet DISSOLVE 1 PACKET (17 GRAMS) IN LIQUID AND TAKE BY MOUTH DAILY 1/24/22   Historical Provider, MD   valACYclovir (VALTREX) 1 g tablet Take 1 tablet by mouth daily For 5 days as needed for Herpes outbreak  Patient not taking: Reported on 12/9/2022 4/27/21   Susan Forbes MD   Multiple Vitamins-Minerals (THERAPEUTIC MULTIVITAMIN-MINERALS) tablet Take 1 tablet by mouth daily    Historical Provider, MD   diclofenac sodium (VOLTAREN) 1 % GEL APPLY ONE GRAM EXTERNALLY TWICE A DAY TO NECK AND ONE GRAM EXTERNALLY TWICE A DAY TO BACK   Patient not taking: Reported on 12/9/2022 11/3/20   Ruben Doctor, [address] Devices (ROLLATOR) MISC 1 each by Does not apply route daily as needed (use as needed for stability) 3/17/20   Susan Forbes MD       Current medications:    No current facility-administered medications for this visit. No current outpatient medications on file.      Facility-Administered thrombosis (DVT) of right peroneal vein (HCC) I82.451       Past Medical History:        Diagnosis Date    Acute deep vein thrombosis (DVT) of right peroneal vein ([address] 10/14/2022    See note from pulmonology    Brain aneurysm 2018    H/O TIMES 2 THAT BURST PER PATIENT    Cerebral artery occlusion with cerebral infarction ([address]     RT SIDE AFFECTED-LEARNED TO WALK AND WRITE AGAIN    Degenerative arthritis of hand     Edentulous     Emphysema lung ([address]     lung dr [place] Emphysema of lung ([address]     Headache     Hiatal hernia     Hip problem     NEEDS HIP REPLACEMENT PER PATIENT    Hx of abnormal cervical Pap smear     Hypertension     Stroke (cerebrum) ([address]     Subarachnoid bleed ([address] 09/10/2018       Past Surgical History:        Procedure Laterality Date    BRAIN ANEURYSM SURGERY      coiling     COLONOSCOPY  2019    polyps--frank    COLONOSCOPY N/A 2019    COLONOSCOPY POLYPECTOMY SNARE/COLD BIOPSY performed by Jarred Monahan MD at [address]      total hip replacement left and right    OTHER SURGICAL HISTORY      FOR CANCER CELLS- DONE AT [facility] [address]      TOTAL HIP ARTHROPLASTY Left 2021    LEFT HIP TOTAL ARTHROPLASTY performed by Yolanda Prakash MD at [address] [facility] Right 01/10/2022    RIGHT TOTAL HIP ARTHROPLASTY - STYKER performed by Yolanda Prakash MD at [address] [facility]  2019    gastritis with superficial ulcerations; duodenitis--frank    UPPER GASTROINTESTINAL ENDOSCOPY N/A 2019    EGD BIOPSY performed by Jarred Monahan MD at [address] History:    Social History     Tobacco Use    Smoking status: Former     Packs/day: 1.50     Years: 43.00     Pack years: 64.50     Types: Cigarettes     Quit date: 2018     Years since quittin.2    Smokeless tobacco: Never   Substance Use Topics    Alcohol use:  No Counseling given: Not Answered      Vital Signs (Current): There were no vitals filed for this visit. BP Readings from Last 3 Encounters:   12/09/22 (!) 145/66   12/08/22 (!) 143/66   12/05/22 (!) 104/57       NPO Status:                                                                                 BMI:   Wt Readings from Last 3 Encounters:   12/09/22 226 lb (102.5 kg)   12/08/22 226 lb (102.5 kg)   12/05/22 226 lb (102.5 kg)     There is no height or weight on file to calculate BMI.    CBC:   Lab Results   Component Value Date/Time    WBC 6.4 11/23/2022 11:31 AM    RBC 4.82 11/23/2022 11:31 AM    HGB 14.5 11/23/2022 11:31 AM    HCT 43.8 11/23/2022 11:31 AM    MCV 90.9 11/23/2022 11:31 AM    RDW 13.2 11/23/2022 11:31 AM     11/23/2022 11:31 AM       CMP:   Lab Results   Component Value Date/Time     11/23/2022 11:31 AM    K 3.9 11/23/2022 11:31 AM    K 3.9 01/11/2022 05:22 AM     11/23/2022 11:31 AM    CO2 25 11/23/2022 11:31 AM    BUN 26 11/23/2022 11:31 AM    CREATININE 1.2 11/23/2022 11:31 AM    GFRAA 50 10/06/2022 09:39 AM    LABGLOM 51 11/23/2022 11:31 AM    GLUCOSE 105 11/23/2022 11:31 AM    PROT 7.8 11/23/2022 11:31 AM    CALCIUM 10.2 11/23/2022 11:31 AM    BILITOT 0.4 11/23/2022 11:31 AM    ALKPHOS 96 11/23/2022 11:31 AM    AST 15 11/23/2022 11:31 AM    ALT 16 11/23/2022 11:31 AM       POC Tests: No results for input(s): POCGLU, POCNA, POCK, POCCL, POCBUN, POCHEMO, POCHCT in the last 72 hours.     Coags:   Lab Results   Component Value Date/Time    PROTIME 10.7 11/23/2022 11:31 AM    INR 1.0 11/23/2022 11:31 AM    APTT 26.2 11/23/2022 11:31 AM       HCG (If Applicable): No results found for: PREGTESTUR, PREGSERUM, HCG, HCGQUANT     ABGs: No results found for: PHART, PO2ART, YZG1EFS, JEB6YYT, BEART, V5AQYDTS     Type & Screen (If Applicable):  No results found for: LABABO, LABRH    Drug/Infectious Status (If Applicable):  No results found for: HIV, HEPCAB    COVID-19 Screening (If Applicable):   Lab Results   Component Value Date/Time    COVID19 Not Detected 01/12/2022 02:00 PM     EKG 09/21/2021  Sinus  Rhythm   WITHIN NORMAL LIMITS    Echo 02/21/2019   Summary   Left ventricular internal dimensions were normal in diastole and systole. Mild left ventricular concentric hypertrophy noted. No regional wall motion abnormalities seen. Normal left ventricular ejection fraction. Mild aortic regurgitation is noted. CTA of Head 12/17/2021  Impression   1. No acute intracranial abnormality. 2. Embolization coils in the region of the supraclinoid left ICA, similar   compared to the prior study.  Portions of the distal left ICA and left   posterior cerebral artery are obscured by artifact due to the presence of the   metal coils. 3. Otherwise, unremarkable CTA of the head.  No evidence to suggest recurrent   aneurysm. Anesthesia Evaluation  Patient summary reviewed and Nursing notes reviewed no history of anesthetic complications:   Airway: Mallampati: II  TM distance: >3 FB   Neck ROM: full  Mouth opening: > = 3 FB   Dental:    (+) edentulous      Pulmonary: breath sounds clear to auscultation  (+) COPD (Hx of emphysema, uses albuterol inhaler as needed):  shortness of breath:      (-) not a current smoker          Patient did not smoke on day of surgery. ROS comment: Hx of smoking (64 pack years)    Emphysema lung     FEV1 85%   Cardiovascular:  Exercise tolerance: poor (<4 METS),   (+) hypertension (on metoprolol, amlodipine, losartan.):,       ECG reviewed  Rhythm: regular  Rate: normal  Echocardiogram reviewed         Beta Blocker:  Dose within 24 Hrs      ROS comment: Stress Test 3/12/2020    Impression     Normal study.          Neuro/Psych:   (+) CVA (hx of CVA and Subarachnoid bleed s/p Aneurysm coils in the left supraclinoid ICA region):, headaches (on tegretol):,              ROS comment: Hx of brain aneurysm, stable, neuro follows  Subarachnoid bleed     GI/Hepatic/Renal:   (+) hiatal hernia, GERD (on protonix): poorly controlled,          ROS comment: Dysphagia. Endo/Other:    (+) : arthritis (Degenerative disc disease, cervical. Arthritis of both hips (s/p L Hip arthroplasty). Degenerative arthritis of hand): OA., .                  ROS comment: obese Abdominal:   (+) obese,           Vascular: negative vascular ROS. Other Findings:             Anesthesia Plan      MAC     ASA 3     (18g PIV L wrist)  Induction: intravenous. BIS    Anesthetic plan and risks discussed with patient. Use of blood products discussed with patient whom. Plan discussed with CRNA and attending. LYLY Mendez CRNA   12/9/2022      ------DOS anesthesiologist addendum-------  Patient seen and evaluated. Option for spinal vs. GETA discussed with patient. All patient's questions were answered to her satisfaction. Patient declines to have spinal anesthesia. Patient requests and consents to GETA.   LYLY Mendez CRNA  12/9/22

## 2022-12-09 NOTE — DISCHARGE INSTRUCTIONS
Call [phone] for any questions/concerns. Biopsy taken--letter will be sent with results. Scattered diverticula seen--maintain high fiber diet. Colonoscopy: What to Expect at [facility]  After a colonoscopy, you'll stay at the clinic until you wake up. Then you can go home. But you'll need to arrange for a ride. Your doctor will tell you when you can eat and do your other usual activities. Your doctor will talk to you about when you'll need your next colonoscopy. Your doctor can help you decide how often you need to be checked. This will depend on the results of your test and your risk for colorectal cancer. After the test, you may be bloated or have gas pains. You may need to pass gas. If a biopsy was done or a polyp was removed, you may have streaks of blood in your stool (feces) for a few days. Problems such as heavy rectal bleeding may not occur until several weeks after the test. This isn't common. But it can happen after polyps are removed. This care sheet gives you a general idea about how long it will take for you to recover. But each person recovers at a different pace. Follow the steps below to get better as quickly as possible. How can you care for yourself at home? Activity    Rest when you feel tired. You can do your normal activities when it feels okay to do so. Diet    Follow your doctor's directions for eating. Unless your doctor has told you not to, drink plenty of fluids. This helps to replace the fluids that were lost during the colon prep. Do not drink alcohol. Medicines    Your doctor will tell you if and when you can restart your medicines. You will also be given instructions about taking any new medicines. If you stopped taking aspirin or some other blood thinner, your doctor will tell you when to start taking it again.      If polyps were removed or a biopsy was done during the test, your doctor may tell you not to take aspirin or other anti-inflammatory medicines for a few days. These include ibuprofen (Advil, Motrin) and naproxen (Aleve). Other instructions    For your safety, do not drive or operate machinery until the medicine wears off and you can think clearly. Your doctor may tell you not to drive or operate machinery until the day after your test.     Do not sign legal documents or make major decisions until the medicine wears off and you can think clearly. The anesthesia can make it hard for you to fully understand what you are agreeing to. Follow-up care is a key part of your treatment and safety. Be sure to make and go to all appointments, and call your doctor if you are having problems. It's also a good idea to know your test results and keep a list of the medicines you take. When should you call for help? Call 911 anytime you think you may need emergency care. For example, call if:    You passed out (lost consciousness). You pass maroon or bloody stools. You have trouble breathing. Call your doctor now or seek immediate medical care if:    You have pain that does not get better after you take pain medicine. You are sick to your stomach or cannot drink fluids. You have new or worse belly pain. You have blood in your stools. You have a fever. You cannot pass stools or gas. Watch closely for changes in your health, and be sure to contact your doctor if you have any problems. Where can you learn more? Go to http://www.woods.com/ and enter E264 to learn more about \"Colonoscopy: What to Expect at Home. \"  Current as of: May 4, 2022               Content Version: 13.5  © 2006-2022 Healthwise, Inc[employer]. Care instructions adapted under license by [facility] ([facility]). If you have questions about a medical condition or this instruction, always ask your healthcare professional. [place] [address] any warranty or liability for your use of this information.          Colon Polyps: Care Instructions  Your Care Instructions     Colon polyps are growths in the colon or the rectum. The cause of most colon polyps is not known, and most people who get them do not have any problems. But a certain kind can turn into cancer. For this reason, regular testing for colon polyps is important for people as they get older. It is also important for anyone who has an increased risk for colon cancer. Polyps are usually found through routine colon cancer screening tests. Although most colon polyps are not cancerous, they are usually removed and then tested for cancer. Screening for colon cancer saves lives because the cancer can usually be cured if it is caught early. If you have a polyp that is the type that can turn into cancer, you may need more tests to examine your entire colon. The doctor will remove any other polyps that are found, and you will be tested more often. Follow-up care is a key part of your treatment and safety. Be sure to make and go to all appointments, and call your doctor if you are having problems. It's also a good idea to know your test results and keep a list of the medicines you take. How can you care for yourself at home? Regular exams to look for colon polyps are the best way to prevent polyps from turning into colon cancer. These can include stool tests, sigmoidoscopy, colonoscopy, and CT colonography. Talk with your doctor about a testing schedule that is right for you. To prevent polyps  There is no home treatment that can prevent colon polyps. But these steps may help lower your risk for cancer. Stay active. Being active can help you get to and stay at a healthy weight. Try to exercise on most days of the week. Walking is a good choice. Eat well. Choose a variety of vegetables, fruits, legumes (such as peas and beans), fish, poultry, and whole grains. Do not smoke. If you need help quitting, talk to your doctor about stop-smoking programs and medicines.  These can increase your chances of quitting for good. If you drink alcohol, limit how much you drink. Limit alcohol to 2 drinks a day for men and 1 drink a day for women. When should you call for help? Call your doctor now or seek immediate medical care if:    You have severe belly pain. Your stools are maroon or very bloody. Watch closely for changes in your health, and be sure to contact your doctor if:    You have a fever. You have nausea or vomiting. You have a change in bowel habits (new constipation or diarrhea). Your symptoms get worse or are not improving as expected. Where can you learn more? Go to http://www.woods.com/ and enter C571 to learn more about \"Colon Polyps: Care Instructions. \"  Current as of: June 6, 2022               Content Version: 13.5  © 4973-6491 [employer]. Care instructions adapted under license by [facility] ([facility]). If you have questions about a medical condition or this instruction, always ask your healthcare professional. [place] [address] any warranty or liability for your use of this information. Diverticulosis: Care Instructions  Your Care Instructions  In diverticulosis, pouches called diverticula form in the wall of the large intestine (colon). The pouches do not cause any pain or other symptoms. Most people who have diverticulosis do not know they have it. But the pouches sometimes bleed, and if they become infected, they can cause pain and other symptoms. When this happens, it is called diverticulitis. Diverticula form when pressure pushes the wall of the colon outward at certain weak points. A diet that is too low in fiber can cause diverticula. Follow-up care is a key part of your treatment and safety. Be sure to make and go to all appointments, and call your doctor if you are having problems. It's also a good idea to know your test results and keep a list of the medicines you take.   How can you care for yourself at home? Include fruits, leafy green vegetables, beans, and whole grains in your diet each day. These foods are high in fiber. Take a fiber supplement, such as Citrucel or Metamucil, every day if needed. Read and follow all instructions on the label. Drink plenty of fluids. If you have kidney, heart, or liver disease and have to limit fluids, talk with your doctor before you increase the amount of fluids you drink. Get at least 30 minutes of exercise on most days of the week. Walking is a good choice. You also may want to do other activities, such as running, swimming, cycling, or playing tennis or team sports. Cut out foods that cause gas, pain, or other symptoms. When should you call for help? Call your doctor now or seek immediate medical care if:    You have belly pain. You pass maroon or very bloody stools. You have a fever. You have nausea and vomiting. You have unusual changes in your bowel movements or abdominal swelling. You have burning pain when you urinate. You have abnormal vaginal discharge. You have shoulder pain. You have cramping pain that does not get better when you have a bowel movement or pass gas. You pass gas or stool from your urethra while urinating. Watch closely for changes in your health, and be sure to contact your doctor if you have any problems. Where can you learn more? Go to http://www.woods.com/ and enter K072 to learn more about \"Diverticulosis: Care Instructions. \"  Current as of: June 6, 2022               Content Version: 13.5  © 2006-2022 Healthwise, [employer]. Care instructions adapted under license by [facility] ([facility]). If you have questions about a medical condition or this instruction, always ask your healthcare professional. [place] [address] any warranty or liability for your use of this information.          High-Fiber Diet: Care Instructions  Overview     A high-fiber diet may help you relieve constipation and feel less bloated. Your doctor and dietitian will help you make a high-fiber eating plan based on your personal needs. The plan will include the things you like to eat. It will also make sure that you get 25 to 35 grams of fiber a day. Before you make changes to the way you eat, be sure to talk with your doctor or dietitian. Follow-up care is a key part of your treatment and safety. Be sure to make and go to all appointments, and call your doctor if you are having problems. It's also a good idea to know your test results and keep a list of the medicines you take. How can you care for yourself at home? You can increase how much fiber you get if you eat more of certain foods. These foods include:  Whole-grain breads and cereals. Fruits, such as pears, apples, and peaches. Eat the skins and peels if you can. Vegetables, such as broccoli, cabbage, spinach, carrots, asparagus, and squash. Starchy vegetables. These include potatoes with skins, kidney beans, and lima beans. Take a fiber supplement every day if your doctor recommends it. Examples are Benefiber, Citrucel, FiberCon, and Metamucil. Ask your doctor how much to take. Drink plenty of fluids. If you have kidney, heart, or liver disease and have to limit fluids, talk with your doctor before you increase the amount of fluids you drink. Where can you learn more? Go to http://www.woods.com/ and enter Y316 to learn more about \"High-Fiber Diet: Care Instructions. \"  Current as of: May 9, 2022               Content Version: 13.5  © 2660-0099 Healthwise, Incorporated. Care instructions adapted under license by [facility] ([facility]). If you have questions about a medical condition or this instruction, always ask your healthcare professional. [address] any warranty or liability for your use of this information.         TRAUMA SERVICES DISCHARGE INSTRUCTIONS    Call [phone], option 2, for any questions/concerns and for follow-up appointment in 2 week(s). Please follow the instructions checked below:    Please follow-up with your primary care provider. ACTIVITY INSTRUCTIONS  Increase activity as tolerated  No heavy lifting or strenuous activity  Take your incentive spirometer home and use 4-6 times/day    WOUND/DRESSING INSTRUCTIONS:  You may shower. No sitting in bath tub, hot tub or swimming until cleared by physician. Ice to areas of pain for first 24 hours. Heat to areas of pain after that. Wash areas of lacerations/abrasions with soap & water. Rinse well. Pat dry with clean towel. Apply thin layer of Bacitracin, Neosporin, or triple antibiotic cream to affected area 2-3 times per day. Keep wounds clean and dry. [x]  Sutures/Staples are to be removed in 2 week(s). MEDICATION INSTRUCTIONS  Take medication as prescribed. When taking pain medications, you may experience dizziness or drowsiness. Do not drink alcohol or drive when taking these medications. You may experience constipation while taking pain medication. You may take over the counter stool softeners such as docusate (Colace), sennosides S (Senokot-S), or Miralax. [x]  You may take Ibuprofen (over the counter) as directed for mild pain. --You may take up to 800mg every 8 hours for pain, please take with food or milk. [x]  You may take acetaminophen (Tylenol) products. Do NOT take more than 4000mg of Tylenol in 24h. []  Do not take any other acetaminophen (Tylenol) products if you are taking Percocet or Norco, as these contain Tylenol. --Do NOT take more than 4000mg of Tylenol in 24h. OPIOID MEDICATION INSTRUCTIONS  Read the medication guide that is included with your prescription. Take your medication exactly as prescribed. Store medication away from children and in a safe place. Do NOT share your medication with others. Do NOT take medication unless it is prescribed for you.   Do NOT drink alcohol while taking opioids (I.e., Norco, Percocet, Oxycodone, etc). Discuss with the Trauma Clinic staff if the dose of medication you are taking does not control your pain and any side effects that you may be having. CALL 911 OR YOUR LOCAL EMERGENCY SERVICE:  --If you take too much medication  --If you have trouble breathing or shortness of breath  --A child has taken this medication. WORK:  You may not return to work until you receive follow-up with the Trauma Clinic or clearance by all consultants. Call the trauma clinic for any of the following or for questions/concerns;  --fever over 101F  --redness, swelling, hardness or warmth at the wound site(s).   --Unrelieved nausea/vomiting  --Foul smelling or cloudy drainage at the wound site(s)  --Unrelieved pain or increase in pain  --Increase in shortness of breath    Follow-up:  Trauma Clinic: [phone] option [address]  [place], [phone] [address]

## 2022-12-10 LAB
ANION GAP SERPL CALCULATED.3IONS-SCNC: 15 MMOL/L (ref 7–16)
BASOPHILS ABSOLUTE: 0.01 E9/L (ref 0–0.2)
BASOPHILS RELATIVE PERCENT: 0.1 % (ref 0–2)
BUN BLDV-MCNC: 26 MG/DL (ref 6–23)
CALCIUM SERPL-MCNC: 8.7 MG/DL (ref 8.6–10.2)
CHLORIDE BLD-SCNC: 105 MMOL/L (ref 98–107)
CO2: 23 MMOL/L (ref 22–29)
CREAT SERPL-MCNC: 1.7 MG/DL (ref 0.5–1)
EOSINOPHILS ABSOLUTE: 0 E9/L (ref 0.05–0.5)
EOSINOPHILS RELATIVE PERCENT: 0 % (ref 0–6)
GFR SERPL CREATININE-BSD FRML MDRD: 33 ML/MIN/1.73
GLUCOSE BLD-MCNC: 155 MG/DL (ref 74–99)
HCT VFR BLD CALC: 41.5 % (ref 34–48)
HEMOGLOBIN: 13.3 G/DL (ref 11.5–15.5)
IMMATURE GRANULOCYTES #: 0.02 E9/L
IMMATURE GRANULOCYTES %: 0.2 % (ref 0–5)
LYMPHOCYTES ABSOLUTE: 0.29 E9/L (ref 1.5–4)
LYMPHOCYTES RELATIVE PERCENT: 3.4 % (ref 20–42)
MCH RBC QN AUTO: 29.3 PG (ref 26–35)
MCHC RBC AUTO-ENTMCNC: 32 % (ref 32–34.5)
MCV RBC AUTO: 91.4 FL (ref 80–99.9)
MONOCYTES ABSOLUTE: 0.38 E9/L (ref 0.1–0.95)
MONOCYTES RELATIVE PERCENT: 4.5 % (ref 2–12)
NEUTROPHILS ABSOLUTE: 7.77 E9/L (ref 1.8–7.3)
NEUTROPHILS RELATIVE PERCENT: 91.8 % (ref 43–80)
PDW BLD-RTO: 13 FL (ref 11.5–15)
PLATELET # BLD: 218 E9/L (ref 130–450)
PMV BLD AUTO: 9.2 FL (ref 7–12)
POTASSIUM REFLEX MAGNESIUM: 3.7 MMOL/L (ref 3.5–5)
RBC # BLD: 4.54 E12/L (ref 3.5–5.5)
RBC # BLD: NORMAL 10*6/UL
SODIUM BLD-SCNC: 143 MMOL/L (ref 132–146)
WBC # BLD: 8.5 E9/L (ref 4.5–11.5)

## 2022-12-10 PROCEDURE — 2580000003 HC RX 258: Performed by: STUDENT IN AN ORGANIZED HEALTH CARE EDUCATION/TRAINING PROGRAM

## 2022-12-10 PROCEDURE — 94640 AIRWAY INHALATION TREATMENT: CPT

## 2022-12-10 PROCEDURE — 6370000000 HC RX 637 (ALT 250 FOR IP): Performed by: STUDENT IN AN ORGANIZED HEALTH CARE EDUCATION/TRAINING PROGRAM

## 2022-12-10 PROCEDURE — 36415 COLL VENOUS BLD VENIPUNCTURE: CPT

## 2022-12-10 PROCEDURE — 6360000002 HC RX W HCPCS: Performed by: STUDENT IN AN ORGANIZED HEALTH CARE EDUCATION/TRAINING PROGRAM

## 2022-12-10 PROCEDURE — 80048 BASIC METABOLIC PNL TOTAL CA: CPT

## 2022-12-10 PROCEDURE — 2060000000 HC ICU INTERMEDIATE R&B

## 2022-12-10 PROCEDURE — 99024 POSTOP FOLLOW-UP VISIT: CPT | Performed by: SURGERY

## 2022-12-10 PROCEDURE — 85025 COMPLETE CBC W/AUTO DIFF WBC: CPT

## 2022-12-10 PROCEDURE — 2700000000 HC OXYGEN THERAPY PER DAY

## 2022-12-10 RX ADMIN — IPRATROPIUM BROMIDE 0.5 MG: 0.5 SOLUTION RESPIRATORY (INHALATION) at 12:11

## 2022-12-10 RX ADMIN — SODIUM CHLORIDE, PRESERVATIVE FREE 10 ML: 5 INJECTION INTRAVENOUS at 20:41

## 2022-12-10 RX ADMIN — SODIUM CHLORIDE, POTASSIUM CHLORIDE, SODIUM LACTATE AND CALCIUM CHLORIDE: 600; 310; 30; 20 INJECTION, SOLUTION INTRAVENOUS at 06:34

## 2022-12-10 RX ADMIN — SODIUM CHLORIDE, PRESERVATIVE FREE 10 ML: 5 INJECTION INTRAVENOUS at 09:19

## 2022-12-10 RX ADMIN — ACETAMINOPHEN 650 MG: 325 TABLET ORAL at 09:22

## 2022-12-10 RX ADMIN — OXYCODONE 5 MG: 5 TABLET ORAL at 03:18

## 2022-12-10 RX ADMIN — OXYCODONE 5 MG: 5 TABLET ORAL at 20:40

## 2022-12-10 RX ADMIN — IPRATROPIUM BROMIDE 0.5 MG: 0.5 SOLUTION RESPIRATORY (INHALATION) at 19:51

## 2022-12-10 RX ADMIN — IPRATROPIUM BROMIDE 0.5 MG: 0.5 SOLUTION RESPIRATORY (INHALATION) at 09:08

## 2022-12-10 RX ADMIN — HEPARIN SODIUM 5000 UNITS: 10000 INJECTION INTRAVENOUS; SUBCUTANEOUS at 20:40

## 2022-12-10 RX ADMIN — GABAPENTIN 300 MG: 300 CAPSULE ORAL at 16:31

## 2022-12-10 RX ADMIN — HEPARIN SODIUM 5000 UNITS: 10000 INJECTION INTRAVENOUS; SUBCUTANEOUS at 06:34

## 2022-12-10 RX ADMIN — PIPERACILLIN AND TAZOBACTAM 3375 MG: 3; .375 INJECTION, POWDER, FOR SOLUTION INTRAVENOUS at 11:12

## 2022-12-10 RX ADMIN — GABAPENTIN 300 MG: 300 CAPSULE ORAL at 20:41

## 2022-12-10 RX ADMIN — ACETAMINOPHEN 650 MG: 325 TABLET ORAL at 16:31

## 2022-12-10 RX ADMIN — NITROFURANTOIN MONOHYDRATE/MACROCRYSTALLINE 100 MG: 25; 75 CAPSULE ORAL at 09:19

## 2022-12-10 RX ADMIN — ACETAMINOPHEN 650 MG: 325 TABLET ORAL at 03:19

## 2022-12-10 RX ADMIN — PANTOPRAZOLE SODIUM 40 MG: 40 TABLET, DELAYED RELEASE ORAL at 09:19

## 2022-12-10 RX ADMIN — ARFORMOTEROL TARTRATE 15 MCG: 15 SOLUTION RESPIRATORY (INHALATION) at 09:09

## 2022-12-10 RX ADMIN — ARFORMOTEROL TARTRATE 15 MCG: 15 SOLUTION RESPIRATORY (INHALATION) at 19:51

## 2022-12-10 RX ADMIN — OXYCODONE 2.5 MG: 5 TABLET ORAL at 09:22

## 2022-12-10 RX ADMIN — PIPERACILLIN AND TAZOBACTAM 3375 MG: 3; .375 INJECTION, POWDER, FOR SOLUTION INTRAVENOUS at 18:27

## 2022-12-10 RX ADMIN — GABAPENTIN 300 MG: 300 CAPSULE ORAL at 09:19

## 2022-12-10 RX ADMIN — SODIUM CHLORIDE, PRESERVATIVE FREE 10 ML: 5 INJECTION INTRAVENOUS at 18:23

## 2022-12-10 RX ADMIN — CHLORTHALIDONE 25 MG: 25 TABLET ORAL at 09:20

## 2022-12-10 RX ADMIN — LOSARTAN POTASSIUM 100 MG: 50 TABLET, FILM COATED ORAL at 09:20

## 2022-12-10 RX ADMIN — METOPROLOL TARTRATE 37.5 MG: 25 TABLET, FILM COATED ORAL at 09:20

## 2022-12-10 RX ADMIN — PIPERACILLIN AND TAZOBACTAM 3375 MG: 3; .375 INJECTION, POWDER, FOR SOLUTION INTRAVENOUS at 03:22

## 2022-12-10 RX ADMIN — ACETAMINOPHEN 650 MG: 325 TABLET ORAL at 20:40

## 2022-12-10 RX ADMIN — HEPARIN SODIUM 5000 UNITS: 10000 INJECTION INTRAVENOUS; SUBCUTANEOUS at 16:32

## 2022-12-10 RX ADMIN — SODIUM CHLORIDE, POTASSIUM CHLORIDE, SODIUM LACTATE AND CALCIUM CHLORIDE: 600; 310; 30; 20 INJECTION, SOLUTION INTRAVENOUS at 16:30

## 2022-12-10 RX ADMIN — IPRATROPIUM BROMIDE 0.5 MG: 0.5 SOLUTION RESPIRATORY (INHALATION) at 15:44

## 2022-12-10 RX ADMIN — AMLODIPINE BESYLATE 5 MG: 5 TABLET ORAL at 09:22

## 2022-12-10 ASSESSMENT — PAIN DESCRIPTION - FREQUENCY
FREQUENCY: CONTINUOUS

## 2022-12-10 ASSESSMENT — PAIN DESCRIPTION - DESCRIPTORS
DESCRIPTORS: ACHING
DESCRIPTORS: ACHING
DESCRIPTORS: DISCOMFORT;SORE;TENDER

## 2022-12-10 ASSESSMENT — PAIN - FUNCTIONAL ASSESSMENT
PAIN_FUNCTIONAL_ASSESSMENT: PREVENTS OR INTERFERES SOME ACTIVE ACTIVITIES AND ADLS

## 2022-12-10 ASSESSMENT — PAIN DESCRIPTION - LOCATION
LOCATION: ABDOMEN

## 2022-12-10 ASSESSMENT — PAIN SCALES - GENERAL
PAINLEVEL_OUTOF10: 7
PAINLEVEL_OUTOF10: 8
PAINLEVEL_OUTOF10: 4
PAINLEVEL_OUTOF10: 7
PAINLEVEL_OUTOF10: 5
PAINLEVEL_OUTOF10: 3
PAINLEVEL_OUTOF10: 5

## 2022-12-10 ASSESSMENT — PAIN DESCRIPTION - PAIN TYPE
TYPE: ACUTE PAIN;SURGICAL PAIN

## 2022-12-10 ASSESSMENT — PAIN DESCRIPTION - ONSET
ONSET: ON-GOING

## 2022-12-10 ASSESSMENT — PAIN DESCRIPTION - ORIENTATION
ORIENTATION: ANTERIOR
ORIENTATION: MID
ORIENTATION: MID
ORIENTATION: ANTERIOR
ORIENTATION: MID

## 2022-12-10 NOTE — PROGRESS NOTES
General Surgery Progress Note:    CC: post op    S: doing well pain controlled       Objective:  @/69   Pulse 89   Temp 98.1 °F (36.7 °C) (Oral)   Resp 24   Ht 5' 5\" (1.651 m)   Wt 226 lb (102.5 kg)   SpO2 91%   BMI 37.61 kg/m² @    Physical -     Gen: NAD  Resp: Breathing Comfortably, no resp distress clear b/l   CV: Reg Rate no extra heart sounds  Abd: dressing CD appropriate post op tenderness   EXT nvi     Assessment/Plan: s/p ex lap ileocecectomy for cecal perforation during colonoscopy     Liquids slowly, ok for gum candy  Pain control SMI deep breathing  Home meds  PTOT dc planning as able  Await bowel function.       VTE Prophylaxis: scds heparin TID       Demetra Montez MD FACS   See d/c summary     9:53 AM

## 2022-12-10 NOTE — ANESTHESIA POSTPROCEDURE EVALUATION
Department of Anesthesiology  Postprocedure Note    Patient: Brooklyn Gan  MRN: [id number]  YOB: 1960  Date of evaluation: 12/10/2022      Procedure Summary     Date: 12/09/22 Room / Location: [address] [place] [address] / [facility]    Anesthesia Start: 1600 Anesthesia Stop: 2588    Procedure: LAPAROTOMY EXPLORATORY, ILIOCECECTOMY Diagnosis:       Perforated bowel ([address]      (Perforated bowel ([address] [K63.1])    Surgeons: Renée Gregg MD Responsible Provider: Cassandra Davis MD    Anesthesia Type: general ASA Status: 4 - Emergent          Anesthesia Type: No value filed.     Monica Phase I: Monica Score: 8    Monica Phase II:        Anesthesia Post Evaluation    Patient location during evaluation: PACU  Patient participation: complete - patient participated  Level of consciousness: awake and alert  Airway patency: patent  Nausea & Vomiting: no nausea and no vomiting  Complications: no  Cardiovascular status: hemodynamically stable  Respiratory status: acceptable  Hydration status: euvolemic  Multimodal analgesia pain management approach

## 2022-12-10 NOTE — ANESTHESIA POSTPROCEDURE EVALUATION
Department of Anesthesiology  Postprocedure Note    Patient: Latrell Sykes  MRN: [id number]  YOB: 1960  Date of evaluation: 12/10/2022      Procedure Summary     Date: 12/09/22 Room / Location: [address] [place] [address] / [facility]    Anesthesia Start: 2664 Anesthesia Stop: 1236    Procedure: COLONOSCOPY POLYPECTOMY SNARE/COLD BIOPSY Diagnosis:       Personal history of colonic polyps      (Personal history of colonic polyps [Z86.010])    Surgeons: Ibis Ricks MD Responsible Provider: Lyubov Valera MD    Anesthesia Type: MAC ASA Status: 3          Anesthesia Type: No value filed. Monica Phase I: Monica Score: 8    Monica Phase II:        Anesthesia Post Evaluation    Patient location during evaluation: PACU  Patient participation: complete - patient participated  Level of consciousness: awake and alert  Airway patency: patent  Nausea & Vomiting: no nausea and no vomiting  Complications: no  Cardiovascular status: hemodynamically stable  Respiratory status: face mask  Hydration status: euvolemic  There was medical reason for not using a multimodal analgesia pain management approach.

## 2022-12-11 ENCOUNTER — APPOINTMENT (OUTPATIENT)
Dept: GENERAL RADIOLOGY | Age: 62
DRG: 329 | End: 2022-12-11
Attending: SURGERY
Payer: MEDICARE

## 2022-12-11 LAB
ANION GAP SERPL CALCULATED.3IONS-SCNC: 13 MMOL/L (ref 7–16)
ANION GAP SERPL CALCULATED.3IONS-SCNC: 13 MMOL/L (ref 7–16)
BASOPHILS ABSOLUTE: 0.02 E9/L (ref 0–0.2)
BASOPHILS ABSOLUTE: 0.02 E9/L (ref 0–0.2)
BASOPHILS RELATIVE PERCENT: 0.2 % (ref 0–2)
BASOPHILS RELATIVE PERCENT: 0.2 % (ref 0–2)
BUN BLDV-MCNC: 17 MG/DL (ref 6–23)
BUN BLDV-MCNC: 25 MG/DL (ref 6–23)
CALCIUM SERPL-MCNC: 9.1 MG/DL (ref 8.6–10.2)
CALCIUM SERPL-MCNC: 9.2 MG/DL (ref 8.6–10.2)
CHLORIDE BLD-SCNC: 102 MMOL/L (ref 98–107)
CHLORIDE BLD-SCNC: 98 MMOL/L (ref 98–107)
CO2: 25 MMOL/L (ref 22–29)
CO2: 26 MMOL/L (ref 22–29)
CREAT SERPL-MCNC: 1.1 MG/DL (ref 0.5–1)
CREAT SERPL-MCNC: 1.4 MG/DL (ref 0.5–1)
EOSINOPHILS ABSOLUTE: 0.01 E9/L (ref 0.05–0.5)
EOSINOPHILS ABSOLUTE: 0.03 E9/L (ref 0.05–0.5)
EOSINOPHILS RELATIVE PERCENT: 0.1 % (ref 0–6)
EOSINOPHILS RELATIVE PERCENT: 0.3 % (ref 0–6)
GFR SERPL CREATININE-BSD FRML MDRD: 42 ML/MIN/1.73
GFR SERPL CREATININE-BSD FRML MDRD: 56 ML/MIN/1.73
GLUCOSE BLD-MCNC: 119 MG/DL (ref 74–99)
GLUCOSE BLD-MCNC: 120 MG/DL (ref 74–99)
HCT VFR BLD CALC: 38.2 % (ref 34–48)
HCT VFR BLD CALC: 38.6 % (ref 34–48)
HEMOGLOBIN: 12.4 G/DL (ref 11.5–15.5)
HEMOGLOBIN: 12.7 G/DL (ref 11.5–15.5)
IMMATURE GRANULOCYTES #: 0.06 E9/L
IMMATURE GRANULOCYTES #: 0.12 E9/L
IMMATURE GRANULOCYTES %: 0.6 % (ref 0–5)
IMMATURE GRANULOCYTES %: 1 % (ref 0–5)
LACTIC ACID: 1.4 MMOL/L (ref 0.5–2.2)
LYMPHOCYTES ABSOLUTE: 0.68 E9/L (ref 1.5–4)
LYMPHOCYTES ABSOLUTE: 0.84 E9/L (ref 1.5–4)
LYMPHOCYTES RELATIVE PERCENT: 6.4 % (ref 20–42)
LYMPHOCYTES RELATIVE PERCENT: 7.2 % (ref 20–42)
MAGNESIUM: 1.8 MG/DL (ref 1.6–2.6)
MAGNESIUM: 2 MG/DL (ref 1.6–2.6)
MCH RBC QN AUTO: 29.3 PG (ref 26–35)
MCH RBC QN AUTO: 29.5 PG (ref 26–35)
MCHC RBC AUTO-ENTMCNC: 32.5 % (ref 32–34.5)
MCHC RBC AUTO-ENTMCNC: 32.9 % (ref 32–34.5)
MCV RBC AUTO: 89.1 FL (ref 80–99.9)
MCV RBC AUTO: 90.7 FL (ref 80–99.9)
MONOCYTES ABSOLUTE: 0.31 E9/L (ref 0.1–0.95)
MONOCYTES ABSOLUTE: 0.33 E9/L (ref 0.1–0.95)
MONOCYTES RELATIVE PERCENT: 2.8 % (ref 2–12)
MONOCYTES RELATIVE PERCENT: 2.9 % (ref 2–12)
NEUTROPHILS ABSOLUTE: 10.33 E9/L (ref 1.8–7.3)
NEUTROPHILS ABSOLUTE: 9.49 E9/L (ref 1.8–7.3)
NEUTROPHILS RELATIVE PERCENT: 88.5 % (ref 43–80)
NEUTROPHILS RELATIVE PERCENT: 89.8 % (ref 43–80)
PDW BLD-RTO: 12.9 FL (ref 11.5–15)
PDW BLD-RTO: 13.1 FL (ref 11.5–15)
PLATELET # BLD: 211 E9/L (ref 130–450)
PLATELET # BLD: 218 E9/L (ref 130–450)
PMV BLD AUTO: 9.1 FL (ref 7–12)
PMV BLD AUTO: 9.2 FL (ref 7–12)
POTASSIUM REFLEX MAGNESIUM: 3.5 MMOL/L (ref 3.5–5)
POTASSIUM SERPL-SCNC: 3.3 MMOL/L (ref 3.5–5)
RBC # BLD: 4.21 E12/L (ref 3.5–5.5)
RBC # BLD: 4.33 E12/L (ref 3.5–5.5)
SODIUM BLD-SCNC: 137 MMOL/L (ref 132–146)
SODIUM BLD-SCNC: 140 MMOL/L (ref 132–146)
TROPONIN, HIGH SENSITIVITY: 21 NG/L (ref 0–9)
WBC # BLD: 10.6 E9/L (ref 4.5–11.5)
WBC # BLD: 11.7 E9/L (ref 4.5–11.5)

## 2022-12-11 PROCEDURE — 2500000003 HC RX 250 WO HCPCS

## 2022-12-11 PROCEDURE — 71045 X-RAY EXAM CHEST 1 VIEW: CPT

## 2022-12-11 PROCEDURE — 84484 ASSAY OF TROPONIN QUANT: CPT

## 2022-12-11 PROCEDURE — 2700000000 HC OXYGEN THERAPY PER DAY

## 2022-12-11 PROCEDURE — 2580000003 HC RX 258: Performed by: STUDENT IN AN ORGANIZED HEALTH CARE EDUCATION/TRAINING PROGRAM

## 2022-12-11 PROCEDURE — 6360000002 HC RX W HCPCS: Performed by: STUDENT IN AN ORGANIZED HEALTH CARE EDUCATION/TRAINING PROGRAM

## 2022-12-11 PROCEDURE — 83605 ASSAY OF LACTIC ACID: CPT

## 2022-12-11 PROCEDURE — 2500000003 HC RX 250 WO HCPCS: Performed by: SURGERY

## 2022-12-11 PROCEDURE — 6370000000 HC RX 637 (ALT 250 FOR IP): Performed by: SURGERY

## 2022-12-11 PROCEDURE — 93005 ELECTROCARDIOGRAM TRACING: CPT

## 2022-12-11 PROCEDURE — 2500000003 HC RX 250 WO HCPCS: Performed by: STUDENT IN AN ORGANIZED HEALTH CARE EDUCATION/TRAINING PROGRAM

## 2022-12-11 PROCEDURE — 94640 AIRWAY INHALATION TREATMENT: CPT

## 2022-12-11 PROCEDURE — 6360000002 HC RX W HCPCS: Performed by: SURGERY

## 2022-12-11 PROCEDURE — 2580000003 HC RX 258

## 2022-12-11 PROCEDURE — 6370000000 HC RX 637 (ALT 250 FOR IP): Performed by: STUDENT IN AN ORGANIZED HEALTH CARE EDUCATION/TRAINING PROGRAM

## 2022-12-11 PROCEDURE — 36415 COLL VENOUS BLD VENIPUNCTURE: CPT

## 2022-12-11 PROCEDURE — 93005 ELECTROCARDIOGRAM TRACING: CPT | Performed by: SURGERY

## 2022-12-11 PROCEDURE — 80048 BASIC METABOLIC PNL TOTAL CA: CPT

## 2022-12-11 PROCEDURE — 85025 COMPLETE CBC W/AUTO DIFF WBC: CPT

## 2022-12-11 PROCEDURE — 2060000000 HC ICU INTERMEDIATE R&B

## 2022-12-11 PROCEDURE — 2580000003 HC RX 258: Performed by: SURGERY

## 2022-12-11 PROCEDURE — 83735 ASSAY OF MAGNESIUM: CPT

## 2022-12-11 RX ORDER — METOPROLOL TARTRATE 5 MG/5ML
7.5 INJECTION INTRAVENOUS EVERY 6 HOURS
Status: DISCONTINUED | OUTPATIENT
Start: 2022-12-12 | End: 2022-12-11

## 2022-12-11 RX ORDER — LABETALOL HYDROCHLORIDE 5 MG/ML
5 INJECTION, SOLUTION INTRAVENOUS ONCE
Status: COMPLETED | OUTPATIENT
Start: 2022-12-11 | End: 2022-12-11

## 2022-12-11 RX ORDER — LIDOCAINE HYDROCHLORIDE 20 MG/ML
JELLY TOPICAL ONCE
Status: COMPLETED | OUTPATIENT
Start: 2022-12-11 | End: 2022-12-11

## 2022-12-11 RX ORDER — MAGNESIUM SULFATE IN WATER 40 MG/ML
2000 INJECTION, SOLUTION INTRAVENOUS ONCE
Status: COMPLETED | OUTPATIENT
Start: 2022-12-11 | End: 2022-12-11

## 2022-12-11 RX ORDER — SODIUM CHLORIDE, SODIUM LACTATE, POTASSIUM CHLORIDE, CALCIUM CHLORIDE 600; 310; 30; 20 MG/100ML; MG/100ML; MG/100ML; MG/100ML
500 INJECTION, SOLUTION INTRAVENOUS ONCE
Status: COMPLETED | OUTPATIENT
Start: 2022-12-11 | End: 2022-12-11

## 2022-12-11 RX ORDER — SODIUM CHLORIDE, SODIUM LACTATE, POTASSIUM CHLORIDE, CALCIUM CHLORIDE 600; 310; 30; 20 MG/100ML; MG/100ML; MG/100ML; MG/100ML
1000 INJECTION, SOLUTION INTRAVENOUS ONCE
Status: COMPLETED | OUTPATIENT
Start: 2022-12-11 | End: 2022-12-11

## 2022-12-11 RX ORDER — PANTOPRAZOLE SODIUM 40 MG/10ML
40 INJECTION, POWDER, LYOPHILIZED, FOR SOLUTION INTRAVENOUS 2 TIMES DAILY
Status: DISCONTINUED | OUTPATIENT
Start: 2022-12-12 | End: 2022-12-20

## 2022-12-11 RX ORDER — LABETALOL HYDROCHLORIDE 5 MG/ML
5 INJECTION, SOLUTION INTRAVENOUS EVERY 30 MIN PRN
Status: DISCONTINUED | OUTPATIENT
Start: 2022-12-11 | End: 2022-12-12

## 2022-12-11 RX ORDER — METOPROLOL TARTRATE 5 MG/5ML
5 INJECTION INTRAVENOUS ONCE
Status: COMPLETED | OUTPATIENT
Start: 2022-12-11 | End: 2022-12-11

## 2022-12-11 RX ORDER — METOPROLOL TARTRATE 5 MG/5ML
5 INJECTION INTRAVENOUS EVERY 6 HOURS
Status: DISCONTINUED | OUTPATIENT
Start: 2022-12-12 | End: 2022-12-11

## 2022-12-11 RX ORDER — OXYMETAZOLINE HYDROCHLORIDE 0.05 G/100ML
2 SPRAY NASAL ONCE
Status: COMPLETED | OUTPATIENT
Start: 2022-12-11 | End: 2022-12-11

## 2022-12-11 RX ORDER — METOPROLOL TARTRATE 5 MG/5ML
5 INJECTION INTRAVENOUS ONCE
Status: COMPLETED | OUTPATIENT
Start: 2022-12-12 | End: 2022-12-11

## 2022-12-11 RX ADMIN — ARFORMOTEROL TARTRATE 15 MCG: 15 SOLUTION RESPIRATORY (INHALATION) at 08:09

## 2022-12-11 RX ADMIN — IPRATROPIUM BROMIDE 0.5 MG: 0.5 SOLUTION RESPIRATORY (INHALATION) at 15:50

## 2022-12-11 RX ADMIN — METOPROLOL TARTRATE 5 MG: 5 INJECTION, SOLUTION INTRAVENOUS at 23:58

## 2022-12-11 RX ADMIN — SODIUM CHLORIDE, POTASSIUM CHLORIDE, SODIUM LACTATE AND CALCIUM CHLORIDE: 600; 310; 30; 20 INJECTION, SOLUTION INTRAVENOUS at 18:12

## 2022-12-11 RX ADMIN — IPRATROPIUM BROMIDE 0.5 MG: 0.5 SOLUTION RESPIRATORY (INHALATION) at 20:14

## 2022-12-11 RX ADMIN — METOPROLOL TARTRATE 5 MG: 5 INJECTION, SOLUTION INTRAVENOUS at 22:12

## 2022-12-11 RX ADMIN — Medication 2 SPRAY: at 11:04

## 2022-12-11 RX ADMIN — PIPERACILLIN AND TAZOBACTAM 3375 MG: 3; .375 INJECTION, POWDER, FOR SOLUTION INTRAVENOUS at 18:49

## 2022-12-11 RX ADMIN — HEPARIN SODIUM 5000 UNITS: 10000 INJECTION INTRAVENOUS; SUBCUTANEOUS at 22:00

## 2022-12-11 RX ADMIN — SODIUM CHLORIDE, POTASSIUM CHLORIDE, SODIUM LACTATE AND CALCIUM CHLORIDE 500 ML: 600; 310; 30; 20 INJECTION, SOLUTION INTRAVENOUS at 23:44

## 2022-12-11 RX ADMIN — HEPARIN SODIUM 5000 UNITS: 10000 INJECTION INTRAVENOUS; SUBCUTANEOUS at 06:58

## 2022-12-11 RX ADMIN — MAGNESIUM SULFATE HEPTAHYDRATE 2000 MG: 40 INJECTION, SOLUTION INTRAVENOUS at 12:15

## 2022-12-11 RX ADMIN — SODIUM CHLORIDE, POTASSIUM CHLORIDE, SODIUM LACTATE AND CALCIUM CHLORIDE 1000 ML: 600; 310; 30; 20 INJECTION, SOLUTION INTRAVENOUS at 09:18

## 2022-12-11 RX ADMIN — SODIUM CHLORIDE, PRESERVATIVE FREE 10 ML: 5 INJECTION INTRAVENOUS at 09:20

## 2022-12-11 RX ADMIN — IPRATROPIUM BROMIDE 0.5 MG: 0.5 SOLUTION RESPIRATORY (INHALATION) at 08:09

## 2022-12-11 RX ADMIN — LABETALOL HYDROCHLORIDE 5 MG: 5 INJECTION INTRAVENOUS at 09:12

## 2022-12-11 RX ADMIN — ACETAMINOPHEN 650 MG: 325 TABLET ORAL at 03:51

## 2022-12-11 RX ADMIN — PIPERACILLIN AND TAZOBACTAM 3375 MG: 3; .375 INJECTION, POWDER, FOR SOLUTION INTRAVENOUS at 03:51

## 2022-12-11 RX ADMIN — ARFORMOTEROL TARTRATE 15 MCG: 15 SOLUTION RESPIRATORY (INHALATION) at 20:14

## 2022-12-11 RX ADMIN — SODIUM CHLORIDE, POTASSIUM CHLORIDE, SODIUM LACTATE AND CALCIUM CHLORIDE: 600; 310; 30; 20 INJECTION, SOLUTION INTRAVENOUS at 10:26

## 2022-12-11 RX ADMIN — BENZOCAINE, BUTAMBEN, AND TETRACAINE HYDROCHLORIDE 1 SPRAY: .028; .004; .004 AEROSOL, SPRAY TOPICAL at 11:06

## 2022-12-11 RX ADMIN — LABETALOL HYDROCHLORIDE 5 MG: 5 INJECTION INTRAVENOUS at 15:16

## 2022-12-11 RX ADMIN — LIDOCAINE HYDROCHLORIDE: 20 JELLY TOPICAL at 11:05

## 2022-12-11 ASSESSMENT — PAIN DESCRIPTION - LOCATION
LOCATION: ABDOMEN
LOCATION: HEAD
LOCATION: ABDOMEN
LOCATION: ABDOMEN

## 2022-12-11 ASSESSMENT — PAIN SCALES - GENERAL
PAINLEVEL_OUTOF10: 0
PAINLEVEL_OUTOF10: 5
PAINLEVEL_OUTOF10: 6
PAINLEVEL_OUTOF10: 5

## 2022-12-11 ASSESSMENT — PAIN DESCRIPTION - ONSET
ONSET: ON-GOING
ONSET: ON-GOING

## 2022-12-11 ASSESSMENT — PAIN - FUNCTIONAL ASSESSMENT
PAIN_FUNCTIONAL_ASSESSMENT: PREVENTS OR INTERFERES SOME ACTIVE ACTIVITIES AND ADLS
PAIN_FUNCTIONAL_ASSESSMENT: PREVENTS OR INTERFERES SOME ACTIVE ACTIVITIES AND ADLS

## 2022-12-11 ASSESSMENT — PAIN DESCRIPTION - DESCRIPTORS
DESCRIPTORS: DISCOMFORT;SORE;TENDER
DESCRIPTORS: ACHING
DESCRIPTORS: ACHING
DESCRIPTORS: DISCOMFORT;SORE;TENDER

## 2022-12-11 ASSESSMENT — PAIN DESCRIPTION - ORIENTATION
ORIENTATION: MID
ORIENTATION: ANTERIOR
ORIENTATION: MID

## 2022-12-11 ASSESSMENT — PAIN DESCRIPTION - FREQUENCY
FREQUENCY: INTERMITTENT
FREQUENCY: INTERMITTENT

## 2022-12-11 ASSESSMENT — PAIN DESCRIPTION - PAIN TYPE
TYPE: SURGICAL PAIN;ACUTE PAIN
TYPE: ACUTE PAIN;SURGICAL PAIN

## 2022-12-11 NOTE — DISCHARGE SUMMARY
Physician Discharge Summary     Patient ID:  Claude Zhang  [id number]  60 y.o.  1960    Admit date: 12/9/2022    Discharge date and time: No discharge date for patient encounter. Admitting Physician: Tyler Albright MD     Admission Diagnoses: Personal history of colonic polyps [Z86.010]  Bowel perforation ([facility] [K63.1]    Discharge Diagnoses: Principal Problem:    Personal history of colonic polyps  Active Problems:    Bowel perforation ([facility]    Acute respiratory failure with hypoxia (HCC)    Atrial flutter (HCC)    Atrial fibrillation with RVR (HCC)    Pleural effusion, bilateral    LFT elevation    Hypoalbuminemia  Resolved Problems:    * No resolved hospital problems. *      Admission Condition: poor    Discharged Condition: stable    Indication for Admission: iatrogenic colonic perforation. 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 She underwent exploratory laparotomy with ileocecectomy  12/10 she was started on gum and hard candy. 12/11 she developed nausea and emesis and had an NG tube placed. 12/12: Patient had tachycardia and hypoxia throughout the day and was transferred down to the ICU overnight. Cardiology consulted and started on Cardizem gtt. 12/13--remains on Cardizem drip; for thoracentesis today (not done yesterday due to prophylactic subQ heparin) off cardizem   12/14--underwent thoracentesis yesterday culture pending diet started  12/15: No issues overnight for L thoracentesis today  12/16: No issues overnight decreasing O2 requirements, transfer out of sicu   12/18: Patient feeling remarkably better, diarrhea, tolerating regular diet, on 3 L nasal cannula  12/19: No issues. Tolerating diet, having bowel function. Continue heart medications per cardiology. Needs further PT, discharge planning. 12/20:  Tolerating diet, having bowel function  12/21:  still on 2L O2. Tolerating diet with bowel function. Pulm consulted for assistance with weaning O2. Consults:   IP CONSULT TO CARDIOLOGY  IP CONSULT TO PULMONOLOGY  IP CONSULT TO HOME CARE NEEDS    Significant Diagnostic Studies:   XR ACUTE ABD SERIES CHEST 1 VW    Result Date: 12/9/2022  EXAMINATION: TWO XRAY VIEWS OF THE ABDOMEN AND SINGLE  XRAY VIEW OF THE CHEST 12/9/2022 1:29 pm COMPARISON: 01/04/2022 HISTORY: ORDERING SYSTEM PROVIDED HISTORY: post surgery TECHNOLOGIST PROVIDED HISTORY: hypoxia Reason for exam:->post surgery What reading provider will be dictating this exam?->CRC FINDINGS: There is a large amount of pneumoperitoneum likely related to recent laparoscopic surgery. There is subsegmental basilar atelectasis with some increased density in the peripheral left lower lobe which may represent atelectasis or pneumonia. No pneumothorax detected. Heart appears normal in size. Osseous structures are unchanged. 1.  Moderate to severe pneumoperitoneum possibly related to recent laparoscopic surgery. This would be a normal finding if surgery was completed immediately prior to imaging, otherwise bowel perforation should be excluded, CT abdomen and pelvis recommended. 2.  Atelectasis and possible pneumonia in the peripheral left lower lobe. 3.  No evidence of pneumothorax or pneumomediastinum. RECOMMENDATION: Consider CT abdomen and pelvis with contrast if the abdominal surgery was not performed immediately prior to imaging. Discharge Exam:  General: In no acute distress  Cardiovascular: Warm throughout  Respiratory: Equal chest rise bilaterally, no retractions, no audible wheezing  Abdomen: soft, grossly nontender palpation throughout without rebound or guarding, incision with staples that is clean dry and intact    Disposition: home    In process/preliminary results:  Outstanding Order Results       No orders found from 11/10/2022 to 12/10/2022.             Patient Instructions:   Current Discharge Medication List             Details   ondansetron (ZOFRAN-ODT) 4 MG disintegrating tablet Take 1 tablet by mouth every 8 hours as needed for Nausea or Vomiting  Qty: 20 tablet, Refills: 0      oxyCODONE (ROXICODONE) 5 MG immediate release tablet Take 1 tablet by mouth every 4 hours as needed for Pain for up to 5 days. Max Daily Amount: 30 mg  Qty: 20 tablet, Refills: 0    Comments: Reduce doses taken as pain becomes manageable  Associated Diagnoses: Bowel perforation (HCC)                Details   folic acid (FOLVITE) 1 MG tablet Take 1 tablet by mouth daily  Qty: 30 tablet, Refills: 0      pyridoxine (RA VITAMIN B-6) 50 MG tablet Take 1 tablet by mouth daily  Qty: 30 tablet, Refills: 0      cyanocobalamin ([employer] VITAMIN B12) 1000 MCG tablet Take 1 tablet by mouth daily  Qty: 30 tablet, Refills: 0      acetaminophen-codeine (TYLENOL #4) 300-60 MG per tablet Take 1 tablet by mouth daily as needed for Pain for up to 30 days. Qty: 30 tablet, Refills: 1    Comments: Reduce doses taken as pain becomes manageable  Associated Diagnoses: Chronic pain syndrome; Lumbar radiculitis; Degeneration of cervical disc without myelopathy; Lumbar facet arthropathy; Facet arthropathy, cervical; Lumbar spondylosis; Arthritis of right hip; Lumbar disc disorder; Primary osteoarthritis of left hip      fluconazole (DIFLUCAN) 150 MG tablet 1 tab po x 1 dose, may repeat in 72 hrs if still symptomatic. Qty: 3 tablet, Refills: 0      albuterol sulfate HFA (PROVENTIL;VENTOLIN;PROAIR) 108 (90 Base) MCG/ACT inhaler INHALE TWO PUFFS BY MOUTH EVERY 6 HOURS AS NEEDED FOR WHEEZING.   Qty: 1 each, Refills: 3    Associated Diagnoses: Medication refill      tiotropium-olodaterol (STIOLTO RESPIMAT) 2.5-2.5 MCG/ACT AERS Inhale 2 puffs into the lungs daily  Qty: 1 each, Refills: 3      psyllium (METAMUCIL SMOOTH TEXTURE) 28.3 % POWD powder Take 3.4 g by mouth daily  Qty: 368 g, Refills: 5    Comments: May substitute any psyllium product covered by insurance  Associated Diagnoses: Alternating constipation and diarrhea      !! Misc. Devices (WRIST BRACE) [facility] neutral position left wrist brace  Size to fit  Dx:  Wrist pain/carpal tunnel syndrome  Qty: 1 each, Refills: 0    Associated Diagnoses: Bilateral carpal tunnel syndrome      amLODIPine (NORVASC) 5 MG tablet TAKE ONE TABLET BY MOUTH EVERY DAY  Qty: 30 tablet, Refills: 3    Associated Diagnoses: Hypertension, unspecified type; Medication refill      chlorthalidone (HYGROTON) 25 MG tablet Take 1 tablet by mouth in the morning. Qty: 30 tablet, Refills: 3    Associated Diagnoses: Hypertension, unspecified type; Medication refill      fluticasone (FLONASE) 50 MCG/ACT nasal spray 2 sprays by Each Nostril route in the morning. Qty: 1 each, Refills: 3    Associated Diagnoses: Medication refill      gabapentin (NEURONTIN) 300 MG capsule TAKE ONE CAPSULE BY MOUTH THREE TIMES A DAY  Qty: 90 capsule, Refills: 3    Associated Diagnoses: Chronic pain syndrome      losartan (COZAAR) 100 MG tablet Take 1 tablet by mouth in the morning. Qty: 30 tablet, Refills: 3    Associated Diagnoses: Hypertension, unspecified type      pantoprazole (PROTONIX) 40 MG tablet TAKE ONE TABLET BY MOUTH EVERY DAY  Qty: 30 tablet, Refills: 3    Associated Diagnoses: Medication refill      potassium chloride (KLOR-CON M) 10 MEQ extended release tablet TAKE ONE TABLET BY MOUTH DAILY WITH BREAKFAST  Qty: 30 tablet, Refills: 3    Associated Diagnoses: Medication refill      metoprolol tartrate (LOPRESSOR) 25 MG tablet Take 1.5 tablets by mouth in the morning and 1.5 tablets before bedtime.   Qty: 90 tablet, Refills: 3    Associated Diagnoses: Hypertension, unspecified type      polyethylene glycol (GLYCOLAX) 17 g packet DISSOLVE 1 PACKET (17 GRAMS) IN LIQUID AND TAKE BY MOUTH DAILY      valACYclovir (VALTREX) 1 g tablet Take 1 tablet by mouth daily For 5 days as needed for Herpes outbreak  Qty: 5 tablet, Refills: 1    Associated Diagnoses: History of herpes genitalis      Multiple Vitamins-Minerals (THERAPEUTIC MULTIVITAMIN-MINERALS) tablet Take 1 tablet by mouth daily      diclofenac sodium (VOLTAREN) 1 % GEL APPLY ONE GRAM EXTERNALLY TWICE A DAY TO NECK AND ONE GRAM EXTERNALLY TWICE A DAY TO BACK   Qty: 100 g, Refills: 2      !! Misc. Devices (ROLLATOR) MISC 1 each by Does not apply route daily as needed (use as needed for stability)  Qty: 1 each, Refills: 0    Associated Diagnoses: Primary osteoarthritis of both hips; Degenerative disc disease, cervical       !! - Potential duplicate medications found. Please discuss with provider. Call [phone] for any questions/concerns. Biopsy taken--letter will be sent with results. Scattered diverticula seen--maintain high fiber diet. Colonoscopy: What to Expect at [address] [place] [address]  After a colonoscopy, you'll stay at the clinic until you wake up. Then you can go home. But you'll need to arrange for a ride. Your doctor will tell you when you can eat and do your other usual activities. Your doctor will talk to you about when you'll need your next colonoscopy. Your doctor can help you decide how often you need to be checked. This will depend on the results of your test and your risk for colorectal cancer. After the test, you may be bloated or have gas pains. You may need to pass gas. If a biopsy was done or a polyp was removed, you may have streaks of blood in your stool (feces) for a few days. Problems such as heavy rectal bleeding may not occur until several weeks after the test. This isn't common. But it can happen after polyps are removed. This care sheet gives you a general idea about how long it will take for you to recover. But each person recovers at a different pace. Follow the steps below to get better as quickly as possible. How can you care for yourself at home? Activity    Rest when you feel tired.      You can do your normal activities when it feels okay to do so.   Diet    Follow your doctor's directions for eating. Unless your doctor has told you not to, drink plenty of fluids. This helps to replace the fluids that were lost during the colon prep. Do not drink alcohol. Medicines    Your doctor will tell you if and when you can restart your medicines. You will also be given instructions about taking any new medicines. If you stopped taking aspirin or some other blood thinner, your doctor will tell you when to start taking it again. If polyps were removed or a biopsy was done during the test, your doctor may tell you not to take aspirin or other anti-inflammatory medicines for a few days. These include ibuprofen (Advil, Motrin) and naproxen (Aleve). Other instructions    For your safety, do not drive or operate machinery until the medicine wears off and you can think clearly. Your doctor may tell you not to drive or operate machinery until the day after your test.     Do not sign legal documents or make major decisions until the medicine wears off and you can think clearly. The anesthesia can make it hard for you to fully understand what you are agreeing to. Follow-up care is a key part of your treatment and safety. Be sure to make and go to all appointments, and call your doctor if you are having problems. It's also a good idea to know your test results and keep a list of the medicines you take. When should you call for help? Call 911 anytime you think you may need emergency care. For example, call if:    You passed out (lost consciousness). You pass maroon or bloody stools. You have trouble breathing. Call your doctor now or seek immediate medical care if:    You have pain that does not get better after you take pain medicine. You are sick to your stomach or cannot drink fluids. You have new or worse belly pain. You have blood in your stools. You have a fever. You cannot pass stools or gas.    Watch closely for changes in your health, and be sure to contact your doctor if you have any problems. Where can you learn more? Go to http://www.woods.com/ and enter E264 to learn more about \"Colonoscopy: What to Expect at Home. \"  Current as of: May 4, 2022               Content Version: 13.5  © 2006-2022 [employer]. Care instructions adapted under license by [facility] ([facility]). If you have questions about a medical condition or this instruction, always ask your healthcare professional. [address] any warranty or liability for your use of this information. Colon Polyps: Care Instructions  Your Care Instructions     Colon polyps are growths in the colon or the rectum. The cause of most colon polyps is not known, and most people who get them do not have any problems. But a certain kind can turn into cancer. For this reason, regular testing for colon polyps is important for people as they get older. It is also important for anyone who has an increased risk for colon cancer. Polyps are usually found through routine colon cancer screening tests. Although most colon polyps are not cancerous, they are usually removed and then tested for cancer. Screening for colon cancer saves lives because the cancer can usually be cured if it is caught early. If you have a polyp that is the type that can turn into cancer, you may need more tests to examine your entire colon. The doctor will remove any other polyps that are found, and you will be tested more often. Follow-up care is a key part of your treatment and safety. Be sure to make and go to all appointments, and call your doctor if you are having problems. It's also a good idea to know your test results and keep a list of the medicines you take. How can you care for yourself at home? Regular exams to look for colon polyps are the best way to prevent polyps from turning into colon cancer.  These can include stool tests, sigmoidoscopy, colonoscopy, and CT colonography. Talk with your doctor about a testing schedule that is right for you. To prevent polyps  There is no home treatment that can prevent colon polyps. But these steps may help lower your risk for cancer. Stay active. Being active can help you get to and stay at a healthy weight. Try to exercise on most days of the week. Walking is a good choice. Eat well. Choose a variety of vegetables, fruits, legumes (such as peas and beans), fish, poultry, and whole grains. Do not smoke. If you need help quitting, talk to your doctor about stop-smoking programs and medicines. These can increase your chances of quitting for good. If you drink alcohol, limit how much you drink. Limit alcohol to 2 drinks a day for men and 1 drink a day for women. When should you call for help? Call your doctor now or seek immediate medical care if:    You have severe belly pain. Your stools are maroon or very bloody. Watch closely for changes in your health, and be sure to contact your doctor if:    You have a fever. You have nausea or vomiting. You have a change in bowel habits (new constipation or diarrhea). Your symptoms get worse or are not improving as expected. Where can you learn more? Go to http://www.woods.com/ and enter C571 to learn more about \"Colon Polyps: Care Instructions. \"  Current as of: June 6, 2022               Content Version: 13.5  © 3138-4385 [employer]. Care instructions adapted under license by [facility] ([facility]). If you have questions about a medical condition or this instruction, always ask your healthcare professional. [place] [address] any warranty or liability for your use of this information. Diverticulosis: Care Instructions  Your Care Instructions  In diverticulosis, pouches called diverticula form in the wall of the large intestine (colon). The pouches do not cause any pain or other symptoms.  Most people who have diverticulosis do not know they have it. But the pouches sometimes bleed, and if they become infected, they can cause pain and other symptoms. When this happens, it is called diverticulitis. Diverticula form when pressure pushes the wall of the colon outward at certain weak points. A diet that is too low in fiber can cause diverticula. Follow-up care is a key part of your treatment and safety. Be sure to make and go to all appointments, and call your doctor if you are having problems. It's also a good idea to know your test results and keep a list of the medicines you take. How can you care for yourself at home? Include fruits, leafy green vegetables, beans, and whole grains in your diet each day. These foods are high in fiber. Take a fiber supplement, such as Citrucel or Metamucil, every day if needed. Read and follow all instructions on the label. Drink plenty of fluids. If you have kidney, heart, or liver disease and have to limit fluids, talk with your doctor before you increase the amount of fluids you drink. Get at least 30 minutes of exercise on most days of the week. Walking is a good choice. You also may want to do other activities, such as running, swimming, cycling, or playing tennis or team sports. Cut out foods that cause gas, pain, or other symptoms. When should you call for help? Call your doctor now or seek immediate medical care if:    You have belly pain. You pass maroon or very bloody stools. You have a fever. You have nausea and vomiting. You have unusual changes in your bowel movements or abdominal swelling. You have burning pain when you urinate. You have abnormal vaginal discharge. You have shoulder pain. You have cramping pain that does not get better when you have a bowel movement or pass gas. You pass gas or stool from your urethra while urinating.    Watch closely for changes in your health, and be sure to contact your doctor if you have any problems. Where can you learn more? Go to http://www.woods.com/ and enter K072 to learn more about \"Diverticulosis: Care Instructions. \"  Current as of: June 6, 2022               Content Version: 13.5  © 5137-9269 Healthwise, [employer]. Care instructions adapted under license by [facility] ([facility]). If you have questions about a medical condition or this instruction, always ask your healthcare professional. [address] any warranty or liability for your use of this information. High-Fiber Diet: Care Instructions  Overview     A high-fiber diet may help you relieve constipation and feel less bloated. Your doctor and dietitian will help you make a high-fiber eating plan based on your personal needs. The plan will include the things you like to eat. It will also make sure that you get 25 to 35 grams of fiber a day. Before you make changes to the way you eat, be sure to talk with your doctor or dietitian. Follow-up care is a key part of your treatment and safety. Be sure to make and go to all appointments, and call your doctor if you are having problems. It's also a good idea to know your test results and keep a list of the medicines you take. How can you care for yourself at home? You can increase how much fiber you get if you eat more of certain foods. These foods include:  Whole-grain breads and cereals. Fruits, such as pears, apples, and peaches. Eat the skins and peels if you can. Vegetables, such as broccoli, cabbage, spinach, carrots, asparagus, and squash. Starchy vegetables. These include potatoes with skins, kidney beans, and lima beans. Take a fiber supplement every day if your doctor recommends it. Examples are Benefiber, Citrucel, FiberCon, and Metamucil. Ask your doctor how much to take. Drink plenty of fluids.  If you have kidney, heart, or liver disease and have to limit fluids, talk with your doctor before you increase the amount of fluids you drink.  Where can you learn more? Go to http://www.woods.com/ and enter Q899 to learn more about \"High-Fiber Diet: Care Instructions. \"  Current as of: May 9, 2022               Content Version: 13.5  © 5005-9195 Health[employer], [employer]. Care instructions adapted under license by [facility] ([facility]). If you have questions about a medical condition or this instruction, always ask your healthcare professional. [address] any warranty or liability for your use of this information. TRAUMA SERVICES DISCHARGE INSTRUCTIONS    Call [phone], option 2, for any questions/concerns and for follow-up appointment in 2 week(s). Please follow the instructions checked below:    Please follow-up with your primary care provider. ACTIVITY INSTRUCTIONS  Increase activity as tolerated  No heavy lifting or strenuous activity  Take your incentive spirometer home and use 4-6 times/day    WOUND/DRESSING INSTRUCTIONS:  You may shower. No sitting in bath tub, hot tub or swimming until cleared by physician. Ice to areas of pain for first 24 hours. Heat to areas of pain after that. Wash areas of lacerations/abrasions with soap & water. Rinse well. Pat dry with clean towel. Apply thin layer of Bacitracin, Neosporin, or triple antibiotic cream to affected area 2-3 times per day. Keep wounds clean and dry. [x]  Sutures/Staples are to be removed in 2 week(s). MEDICATION INSTRUCTIONS  Take medication as prescribed. When taking pain medications, you may experience dizziness or drowsiness. Do not drink alcohol or drive when taking these medications. You may experience constipation while taking pain medication. You may take over the counter stool softeners such as docusate (Colace), sennosides S (Senokot-S), or Miralax. [x]  You may take Ibuprofen (over the counter) as directed for mild pain. --You may take up to 800mg every 8 hours for pain, please take with food or milk.    [x] You may take acetaminophen (Tylenol) products. Do NOT take more than 4000mg of Tylenol in 24h. []  Do not take any other acetaminophen (Tylenol) products if you are taking Percocet or Norco, as these contain Tylenol. --Do NOT take more than 4000mg of Tylenol in 24h. OPIOID MEDICATION INSTRUCTIONS  Read the medication guide that is included with your prescription. Take your medication exactly as prescribed. Store medication away from children and in a safe place. Do NOT share your medication with others. Do NOT take medication unless it is prescribed for you. Do NOT drink alcohol while taking opioids (I.e., Norco, Percocet, Oxycodone, etc). Discuss with the Trauma Clinic staff if the dose of medication you are taking does not control your pain and any side effects that you may be having. CALL [facility] OR YOUR LOCAL EMERGENCY SERVICE:  --If you take too much medication  --If you have trouble breathing or shortness of breath  --A child has taken this medication. WORK:  You may not return to work until you receive follow-up with the Trauma Clinic or clearance by all consultants. Call the trauma clinic for any of the following or for questions/concerns;  --fever over 101F  --redness, swelling, hardness or warmth at the wound site(s).   --Unrelieved nausea/vomiting  --Foul smelling or cloudy drainage at the wound site(s)  --Unrelieved pain or increase in pain  --Increase in shortness of breath    Follow-up:  Trauma Clinic: [phone] option [address]  [place], [address] [place]              Follow up:   Renée Gregg MD  [address] [place] [address]  [phone]    Call  As needed    [facility] [address] [place] [address]  [address]  [address] [phone]  Follow up in 2 day(s) Signed:  Electronically signed by Lalitha James DO on 12/21/2022 at 1:43 PM

## 2022-12-12 ENCOUNTER — APPOINTMENT (OUTPATIENT)
Dept: CT IMAGING | Age: 62
DRG: 329 | End: 2022-12-12
Attending: SURGERY
Payer: MEDICARE

## 2022-12-12 ENCOUNTER — APPOINTMENT (OUTPATIENT)
Dept: GENERAL RADIOLOGY | Age: 62
DRG: 329 | End: 2022-12-12
Attending: SURGERY
Payer: MEDICARE

## 2022-12-12 LAB
AADO2: 601.9 MMHG
AADO2: 618.3 MMHG
APTT: 23 SEC (ref 24.5–35.1)
B.E.: 4.4 MMOL/L (ref -3–3)
B.E.: 6.5 MMOL/L (ref -3–3)
B.E.: 8.1 MMOL/L
BASOPHILS ABSOLUTE: 0.02 E9/L (ref 0–0.2)
BASOPHILS RELATIVE PERCENT: 0.2 % (ref 0–2)
CALCIUM IONIZED: 1.24 MMOL/L (ref 1.15–1.33)
COHB: 0.7 % (ref 0–1.5)
COHB: 0.7 % (ref 0–1.5)
COHB: 0.9 % (ref 0–1.5)
CRITICAL: ABNORMAL
DATE ANALYZED: ABNORMAL
DATE OF COLLECTION: ABNORMAL
EKG ATRIAL RATE: 122 BPM
EKG ATRIAL RATE: 306 BPM
EKG P AXIS: 49 DEGREES
EKG P-R INTERVAL: 156 MS
EKG Q-T INTERVAL: 288 MS
EKG Q-T INTERVAL: 296 MS
EKG Q-T INTERVAL: 306 MS
EKG QRS DURATION: 94 MS
EKG QRS DURATION: 96 MS
EKG QRS DURATION: 96 MS
EKG QTC CALCULATION (BAZETT): 421 MS
EKG QTC CALCULATION (BAZETT): 471 MS
EKG QTC CALCULATION (BAZETT): 488 MS
EKG R AXIS: 70 DEGREES
EKG R AXIS: 73 DEGREES
EKG R AXIS: 85 DEGREES
EKG T AXIS: -48 DEGREES
EKG T AXIS: 19 DEGREES
EKG T AXIS: 32 DEGREES
EKG VENTRICULAR RATE: 122 BPM
EKG VENTRICULAR RATE: 153 BPM
EKG VENTRICULAR RATE: 161 BPM
EOSINOPHILS ABSOLUTE: 0.03 E9/L (ref 0.05–0.5)
EOSINOPHILS RELATIVE PERCENT: 0.2 % (ref 0–6)
FIO2: 100 %
FIO2: 100 %
HCO3: 26.6 MMOL/L (ref 22–26)
HCO3: 28.6 MMOL/L (ref 22–26)
HCO3: 32 MMOL/L
HCT VFR BLD CALC: 38.8 % (ref 34–48)
HEMOGLOBIN: 12.8 G/DL (ref 11.5–15.5)
HHB: 40.5 %
HHB: 6.3 % (ref 0–5)
HHB: 9.1 % (ref 0–5)
IMMATURE GRANULOCYTES #: 0.17 E9/L
IMMATURE GRANULOCYTES %: 1.4 % (ref 0–5)
INR BLD: 1.2
LAB: ABNORMAL
LYMPHOCYTES ABSOLUTE: 0.85 E9/L (ref 1.5–4)
LYMPHOCYTES RELATIVE PERCENT: 6.8 % (ref 20–42)
Lab: ABNORMAL
MAGNESIUM: 1.7 MG/DL (ref 1.6–2.6)
MCH RBC QN AUTO: 29.9 PG (ref 26–35)
MCHC RBC AUTO-ENTMCNC: 33 % (ref 32–34.5)
MCV RBC AUTO: 90.7 FL (ref 80–99.9)
METHB: 0.2 % (ref 0–1.5)
METHB: 0.2 % (ref 0–1.5)
METHB: 0.3 % (ref 0–1.5)
MODE: ABNORMAL
MONOCYTES ABSOLUTE: 0.36 E9/L (ref 0.1–0.95)
MONOCYTES RELATIVE PERCENT: 2.9 % (ref 2–12)
NEUTROPHILS ABSOLUTE: 11.03 E9/L (ref 1.8–7.3)
NEUTROPHILS RELATIVE PERCENT: 88.5 % (ref 43–80)
O2 SATURATION: 59.1 %
O2 SATURATION: 90.8 % (ref 92–98.5)
O2 SATURATION: 93.6 % (ref 92–98.5)
O2HB: 58.6 %
O2HB: 89.9 % (ref 94–97)
O2HB: 92.6 % (ref 94–97)
OPERATOR ID: 1394
OPERATOR ID: 2962
OPERATOR ID: 2962
PATIENT TEMP: 37 C
PCO2: 32.4 MMHG (ref 35–45)
PCO2: 33.1 MMHG (ref 35–45)
PCO2: 41.6 MMHG (ref 40–52)
PDW BLD-RTO: 12.9 FL (ref 11.5–15)
PFO2: 0.28 MMHG/%
PFO2: 0.53 MMHG/%
PH BLOOD GAS: 7.5 (ref 7.3–7.42)
PH BLOOD GAS: 7.53 (ref 7.35–7.45)
PH BLOOD GAS: 7.55 (ref 7.35–7.45)
PHOSPHORUS: 3 MG/DL (ref 2.5–4.5)
PLATELET # BLD: 221 E9/L (ref 130–450)
PMV BLD AUTO: 9.4 FL (ref 7–12)
PO2: 28.1 MMHG (ref 30–50)
PO2: 53 MMHG (ref 75–100)
PO2: 64.6 MMHG (ref 75–100)
PROTHROMBIN TIME: 13.2 SEC (ref 9.3–12.4)
RBC # BLD: 4.28 E12/L (ref 3.5–5.5)
RI(T): 11.36
RI(T): ABNORMAL
SOURCE, BLOOD GAS: ABNORMAL
THB: 13.7 G/DL (ref 11.5–16.5)
THB: 13.9 G/DL (ref 11.5–16.5)
THB: 14.2 G/DL (ref 11.5–16.5)
TIME ANALYZED: 220
TIME ANALYZED: 306
TIME ANALYZED: 824
TROPONIN, HIGH SENSITIVITY: 18 NG/L (ref 0–9)
WBC # BLD: 12.5 E9/L (ref 4.5–11.5)

## 2022-12-12 PROCEDURE — 2580000003 HC RX 258: Performed by: STUDENT IN AN ORGANIZED HEALTH CARE EDUCATION/TRAINING PROGRAM

## 2022-12-12 PROCEDURE — 6360000002 HC RX W HCPCS: Performed by: STUDENT IN AN ORGANIZED HEALTH CARE EDUCATION/TRAINING PROGRAM

## 2022-12-12 PROCEDURE — C9113 INJ PANTOPRAZOLE SODIUM, VIA: HCPCS | Performed by: STUDENT IN AN ORGANIZED HEALTH CARE EDUCATION/TRAINING PROGRAM

## 2022-12-12 PROCEDURE — 84484 ASSAY OF TROPONIN QUANT: CPT

## 2022-12-12 PROCEDURE — 85025 COMPLETE CBC W/AUTO DIFF WBC: CPT

## 2022-12-12 PROCEDURE — 74177 CT ABD & PELVIS W/CONTRAST: CPT | Performed by: RADIOLOGY

## 2022-12-12 PROCEDURE — 2580000003 HC RX 258

## 2022-12-12 PROCEDURE — 82805 BLOOD GASES W/O2 SATURATION: CPT

## 2022-12-12 PROCEDURE — 84100 ASSAY OF PHOSPHORUS: CPT

## 2022-12-12 PROCEDURE — 83735 ASSAY OF MAGNESIUM: CPT

## 2022-12-12 PROCEDURE — 6360000002 HC RX W HCPCS

## 2022-12-12 PROCEDURE — 2500000003 HC RX 250 WO HCPCS: Performed by: STUDENT IN AN ORGANIZED HEALTH CARE EDUCATION/TRAINING PROGRAM

## 2022-12-12 PROCEDURE — 82330 ASSAY OF CALCIUM: CPT

## 2022-12-12 PROCEDURE — 71275 CT ANGIOGRAPHY CHEST: CPT

## 2022-12-12 PROCEDURE — 6360000002 HC RX W HCPCS: Performed by: INTERNAL MEDICINE

## 2022-12-12 PROCEDURE — 36415 COLL VENOUS BLD VENIPUNCTURE: CPT

## 2022-12-12 PROCEDURE — 99223 1ST HOSP IP/OBS HIGH 75: CPT | Performed by: INTERNAL MEDICINE

## 2022-12-12 PROCEDURE — 02HV33Z INSERTION OF INFUSION DEVICE INTO SUPERIOR VENA CAVA, PERCUTANEOUS APPROACH: ICD-10-PCS | Performed by: SURGERY

## 2022-12-12 PROCEDURE — 94667 MNPJ CHEST WALL 1ST: CPT

## 2022-12-12 PROCEDURE — 94640 AIRWAY INHALATION TREATMENT: CPT

## 2022-12-12 PROCEDURE — 94150 VITAL CAPACITY TEST: CPT

## 2022-12-12 PROCEDURE — 74177 CT ABD & PELVIS W/CONTRAST: CPT

## 2022-12-12 PROCEDURE — 6360000004 HC RX CONTRAST MEDICATION: Performed by: RADIOLOGY

## 2022-12-12 PROCEDURE — 85730 THROMBOPLASTIN TIME PARTIAL: CPT

## 2022-12-12 PROCEDURE — 0W993ZZ DRAINAGE OF RIGHT PLEURAL CAVITY, PERCUTANEOUS APPROACH: ICD-10-PCS | Performed by: RADIOLOGY

## 2022-12-12 PROCEDURE — 93010 ELECTROCARDIOGRAM REPORT: CPT | Performed by: INTERNAL MEDICINE

## 2022-12-12 PROCEDURE — 36592 COLLECT BLOOD FROM PICC: CPT

## 2022-12-12 PROCEDURE — 99291 CRITICAL CARE FIRST HOUR: CPT | Performed by: SURGERY

## 2022-12-12 PROCEDURE — 93005 ELECTROCARDIOGRAM TRACING: CPT | Performed by: STUDENT IN AN ORGANIZED HEALTH CARE EDUCATION/TRAINING PROGRAM

## 2022-12-12 PROCEDURE — 2500000003 HC RX 250 WO HCPCS: Performed by: INTERNAL MEDICINE

## 2022-12-12 PROCEDURE — 2000000000 HC ICU R&B

## 2022-12-12 PROCEDURE — 71045 X-RAY EXAM CHEST 1 VIEW: CPT

## 2022-12-12 PROCEDURE — 2700000000 HC OXYGEN THERAPY PER DAY

## 2022-12-12 PROCEDURE — 85610 PROTHROMBIN TIME: CPT

## 2022-12-12 RX ORDER — SODIUM CHLORIDE 0.9 % (FLUSH) 0.9 %
10 SYRINGE (ML) INJECTION PRN
Status: DISCONTINUED | OUTPATIENT
Start: 2022-12-12 | End: 2022-12-21 | Stop reason: HOSPADM

## 2022-12-12 RX ORDER — SODIUM CHLORIDE, SODIUM LACTATE, POTASSIUM CHLORIDE, CALCIUM CHLORIDE 600; 310; 30; 20 MG/100ML; MG/100ML; MG/100ML; MG/100ML
500 INJECTION, SOLUTION INTRAVENOUS ONCE
Status: COMPLETED | OUTPATIENT
Start: 2022-12-12 | End: 2022-12-12

## 2022-12-12 RX ORDER — DIGOXIN 0.25 MG/ML
250 INJECTION INTRAMUSCULAR; INTRAVENOUS EVERY 6 HOURS
Status: COMPLETED | OUTPATIENT
Start: 2022-12-12 | End: 2022-12-12

## 2022-12-12 RX ORDER — POTASSIUM CHLORIDE 29.8 MG/ML
20 INJECTION INTRAVENOUS
Status: COMPLETED | OUTPATIENT
Start: 2022-12-12 | End: 2022-12-12

## 2022-12-12 RX ORDER — HYDRALAZINE HYDROCHLORIDE 20 MG/ML
10 INJECTION INTRAMUSCULAR; INTRAVENOUS EVERY 6 HOURS PRN
Status: DISCONTINUED | OUTPATIENT
Start: 2022-12-12 | End: 2022-12-21 | Stop reason: HOSPADM

## 2022-12-12 RX ORDER — MAGNESIUM SULFATE IN WATER 40 MG/ML
2000 INJECTION, SOLUTION INTRAVENOUS ONCE
Status: COMPLETED | OUTPATIENT
Start: 2022-12-12 | End: 2022-12-12

## 2022-12-12 RX ORDER — DIPHENHYDRAMINE HYDROCHLORIDE 50 MG/ML
50 INJECTION INTRAMUSCULAR; INTRAVENOUS ONCE
Status: COMPLETED | OUTPATIENT
Start: 2022-12-12 | End: 2022-12-12

## 2022-12-12 RX ORDER — DILTIAZEM HYDROCHLORIDE 5 MG/ML
15 INJECTION INTRAVENOUS ONCE
Status: COMPLETED | OUTPATIENT
Start: 2022-12-12 | End: 2022-12-12

## 2022-12-12 RX ORDER — DIPHENHYDRAMINE HYDROCHLORIDE 50 MG/ML
12.5 INJECTION INTRAMUSCULAR; INTRAVENOUS ONCE
Status: COMPLETED | OUTPATIENT
Start: 2022-12-12 | End: 2022-12-12

## 2022-12-12 RX ORDER — LABETALOL HYDROCHLORIDE 5 MG/ML
10 INJECTION, SOLUTION INTRAVENOUS EVERY 30 MIN PRN
Status: DISCONTINUED | OUTPATIENT
Start: 2022-12-12 | End: 2022-12-21 | Stop reason: HOSPADM

## 2022-12-12 RX ORDER — DIGOXIN 0.25 MG/ML
500 INJECTION INTRAMUSCULAR; INTRAVENOUS ONCE
Status: COMPLETED | OUTPATIENT
Start: 2022-12-12 | End: 2022-12-12

## 2022-12-12 RX ORDER — SODIUM CHLORIDE 0.9 % (FLUSH) 0.9 %
10 SYRINGE (ML) INJECTION
Status: ACTIVE | OUTPATIENT
Start: 2022-12-12 | End: 2022-12-13

## 2022-12-12 RX ORDER — DILTIAZEM HYDROCHLORIDE 5 MG/ML
10 INJECTION INTRAVENOUS ONCE
Status: COMPLETED | OUTPATIENT
Start: 2022-12-12 | End: 2022-12-12

## 2022-12-12 RX ADMIN — DIPHENHYDRAMINE HYDROCHLORIDE 50 MG: 50 INJECTION, SOLUTION INTRAMUSCULAR; INTRAVENOUS at 00:24

## 2022-12-12 RX ADMIN — MAGNESIUM SULFATE HEPTAHYDRATE 2000 MG: 40 INJECTION, SOLUTION INTRAVENOUS at 08:27

## 2022-12-12 RX ADMIN — DILTIAZEM HYDROCHLORIDE 10 MG: 5 INJECTION INTRAVENOUS at 06:35

## 2022-12-12 RX ADMIN — ARFORMOTEROL TARTRATE 15 MCG: 15 SOLUTION RESPIRATORY (INHALATION) at 20:20

## 2022-12-12 RX ADMIN — DEXTROSE MONOHYDRATE 5 MG/HR: 50 INJECTION, SOLUTION INTRAVENOUS at 07:07

## 2022-12-12 RX ADMIN — IOPAMIDOL 75 ML: 755 INJECTION, SOLUTION INTRAVENOUS at 06:46

## 2022-12-12 RX ADMIN — SODIUM CHLORIDE 500 ML: 9 INJECTION, SOLUTION INTRAVENOUS at 17:25

## 2022-12-12 RX ADMIN — POTASSIUM CHLORIDE 20 MEQ: 29.8 INJECTION, SOLUTION INTRAVENOUS at 10:16

## 2022-12-12 RX ADMIN — DEXTROSE MONOHYDRATE 7.5 MG/HR: 50 INJECTION, SOLUTION INTRAVENOUS at 15:12

## 2022-12-12 RX ADMIN — IPRATROPIUM BROMIDE 0.5 MG: 0.5 SOLUTION RESPIRATORY (INHALATION) at 12:26

## 2022-12-12 RX ADMIN — LABETALOL HYDROCHLORIDE 10 MG: 5 INJECTION INTRAVENOUS at 20:44

## 2022-12-12 RX ADMIN — PIPERACILLIN AND TAZOBACTAM 3375 MG: 3; .375 INJECTION, POWDER, FOR SOLUTION INTRAVENOUS at 10:24

## 2022-12-12 RX ADMIN — POTASSIUM CHLORIDE 20 MEQ: 29.8 INJECTION, SOLUTION INTRAVENOUS at 08:30

## 2022-12-12 RX ADMIN — IPRATROPIUM BROMIDE 0.5 MG: 0.5 SOLUTION RESPIRATORY (INHALATION) at 20:20

## 2022-12-12 RX ADMIN — IPRATROPIUM BROMIDE 0.5 MG: 0.5 SOLUTION RESPIRATORY (INHALATION) at 08:42

## 2022-12-12 RX ADMIN — DILTIAZEM HYDROCHLORIDE 15 MG: 5 INJECTION, SOLUTION INTRAVENOUS at 10:03

## 2022-12-12 RX ADMIN — PANTOPRAZOLE SODIUM 40 MG: 40 INJECTION, POWDER, FOR SOLUTION INTRAVENOUS at 01:35

## 2022-12-12 RX ADMIN — DIGOXIN 500 MCG: 0.25 INJECTION INTRAMUSCULAR; INTRAVENOUS at 10:04

## 2022-12-12 RX ADMIN — DIGOXIN 250 MCG: 250 INJECTION, SOLUTION INTRAMUSCULAR; INTRAVENOUS; PARENTERAL at 15:30

## 2022-12-12 RX ADMIN — PIPERACILLIN AND TAZOBACTAM 3375 MG: 3; .375 INJECTION, POWDER, FOR SOLUTION INTRAVENOUS at 17:31

## 2022-12-12 RX ADMIN — ARFORMOTEROL TARTRATE 15 MCG: 15 SOLUTION RESPIRATORY (INHALATION) at 08:42

## 2022-12-12 RX ADMIN — IPRATROPIUM BROMIDE 0.5 MG: 0.5 SOLUTION RESPIRATORY (INHALATION) at 16:16

## 2022-12-12 RX ADMIN — POTASSIUM CHLORIDE 20 MEQ: 29.8 INJECTION, SOLUTION INTRAVENOUS at 09:21

## 2022-12-12 RX ADMIN — SODIUM CHLORIDE, PRESERVATIVE FREE 10 ML: 5 INJECTION INTRAVENOUS at 20:54

## 2022-12-12 RX ADMIN — DIGOXIN 250 MCG: 250 INJECTION, SOLUTION INTRAMUSCULAR; INTRAVENOUS; PARENTERAL at 21:44

## 2022-12-12 RX ADMIN — PANTOPRAZOLE SODIUM 40 MG: 40 INJECTION, POWDER, FOR SOLUTION INTRAVENOUS at 08:33

## 2022-12-12 RX ADMIN — SODIUM CHLORIDE, POTASSIUM CHLORIDE, SODIUM LACTATE AND CALCIUM CHLORIDE: 600; 310; 30; 20 INJECTION, SOLUTION INTRAVENOUS at 16:02

## 2022-12-12 RX ADMIN — SODIUM CHLORIDE 1 MG/MIN: 9 INJECTION, SOLUTION INTRAVENOUS at 03:00

## 2022-12-12 RX ADMIN — HEPARIN SODIUM 5000 UNITS: 10000 INJECTION INTRAVENOUS; SUBCUTANEOUS at 05:53

## 2022-12-12 RX ADMIN — SODIUM CHLORIDE, PRESERVATIVE FREE 10 ML: 5 INJECTION INTRAVENOUS at 20:55

## 2022-12-12 RX ADMIN — DIPHENHYDRAMINE HYDROCHLORIDE 12.5 MG: 50 INJECTION INTRAMUSCULAR; INTRAVENOUS at 06:35

## 2022-12-12 RX ADMIN — HEPARIN SODIUM 5000 UNITS: 10000 INJECTION INTRAVENOUS; SUBCUTANEOUS at 13:38

## 2022-12-12 RX ADMIN — HYDROMORPHONE HYDROCHLORIDE 0.5 MG: 1 INJECTION, SOLUTION INTRAMUSCULAR; INTRAVENOUS; SUBCUTANEOUS at 11:08

## 2022-12-12 RX ADMIN — IOPAMIDOL 75 ML: 755 INJECTION, SOLUTION INTRAVENOUS at 00:47

## 2022-12-12 RX ADMIN — PANTOPRAZOLE SODIUM 40 MG: 40 INJECTION, POWDER, FOR SOLUTION INTRAVENOUS at 20:54

## 2022-12-12 RX ADMIN — SODIUM CHLORIDE 150 MG: 9 INJECTION, SOLUTION INTRAVENOUS at 02:41

## 2022-12-12 RX ADMIN — PIPERACILLIN AND TAZOBACTAM 3375 MG: 3; .375 INJECTION, POWDER, FOR SOLUTION INTRAVENOUS at 03:17

## 2022-12-12 RX ADMIN — SODIUM CHLORIDE, POTASSIUM CHLORIDE, SODIUM LACTATE AND CALCIUM CHLORIDE 500 ML: 600; 310; 30; 20 INJECTION, SOLUTION INTRAVENOUS at 06:37

## 2022-12-12 RX ADMIN — SODIUM CHLORIDE, PRESERVATIVE FREE 10 ML: 5 INJECTION INTRAVENOUS at 08:32

## 2022-12-12 ASSESSMENT — PAIN DESCRIPTION - FREQUENCY
FREQUENCY: INTERMITTENT
FREQUENCY: INTERMITTENT

## 2022-12-12 ASSESSMENT — PAIN DESCRIPTION - ORIENTATION
ORIENTATION: MID
ORIENTATION: MID;LOWER

## 2022-12-12 ASSESSMENT — PAIN SCALES - GENERAL
PAINLEVEL_OUTOF10: 4
PAINLEVEL_OUTOF10: 0
PAINLEVEL_OUTOF10: 3
PAINLEVEL_OUTOF10: 2
PAINLEVEL_OUTOF10: 2
PAINLEVEL_OUTOF10: 5
PAINLEVEL_OUTOF10: 5
PAINLEVEL_OUTOF10: 2
PAINLEVEL_OUTOF10: 7
PAINLEVEL_OUTOF10: 4

## 2022-12-12 ASSESSMENT — PAIN DESCRIPTION - LOCATION
LOCATION: ABDOMEN

## 2022-12-12 ASSESSMENT — PAIN DESCRIPTION - ONSET
ONSET: GRADUAL
ONSET: GRADUAL

## 2022-12-12 ASSESSMENT — PAIN DESCRIPTION - DESCRIPTORS
DESCRIPTORS: SORE
DESCRIPTORS: SORE
DESCRIPTORS: ACHING;DISCOMFORT
DESCRIPTORS: SORE

## 2022-12-12 ASSESSMENT — PAIN DESCRIPTION - PAIN TYPE
TYPE: SURGICAL PAIN
TYPE: SURGICAL PAIN

## 2022-12-12 NOTE — PROGRESS NOTES
[facility]  SURGICAL INTENSIVE CARE UNIT (SICU)  ATTENDING PHYSICIAN CRITICAL CARE PROGRESS NOTE     I have examined the patient, reviewed the record, and discussed the case with the APN/ resident. Please refer to the APN/ resident's note. I agree with the assessment and plan. I have reviewed all relevant labs and imaging data. The following summarizes my clinical findings and independent assessment. CC:  critical care management for hypoxia    Hospital Course/Overnight Events:  12/9--underwent colonoscopy--iatrogenic perforation of cecum; underwent ex lap with ileocectomy  12/11--A.fib with RVR  12/12--hypoxia overnight; transferred to SICU; on high flow NC; on cardizem drip    Pt states she seems to be breathing easier currently. Reports abdominal pain is slightly improved compared to prior.     Awake and alert  Follows commands  Hrt:  tachy; no murmur  Lungs:  fairly clear bilaterally; diminished in bases  Abd:  obese; soft; hypoactive BS; minimal expected post-op tenderness; no rebound; no guarding  Skin:  warm/dry  Ext:  moving all 4 ext    Labs personally reviewed  Films personally reviewed/interpreted--R > L effusions with compression atelectasis    Patient Active Problem List    Diagnosis Date Noted    Bowel perforation ([address] 12/09/2022    BELCHER (dyspnea on exertion) 10/13/2022    Abnormal diffusion capacity determined by pulmonary function test 10/06/2022    Acute deep vein thrombosis (DVT) of right peroneal vein ([address] 10/14/2022    Personal history of colonic polyps 10/05/2022    S/P total right hip arthroplasty 01/10/2022    Cognitive impairment 01/10/2022    Aneurysm ([address] 12/17/2021    Acute cystitis with hematuria 12/16/2021    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 12/03/2021    History of COVID-19 12/03/2021    Pulmonary emphysema ([address] 04/27/2021    History of herpes genitalis 04/27/2021    Primary osteoarthritis of right hip 04/05/2021 History of total left hip arthroplasty 03/08/2021    Arthritis of both hips 02/22/2021    COVID-19     H/O total hip arthroplasty, right     Abnormal blood sugar 02/21/2020    Degenerative disc disease, cervical 12/12/2019    Arthropathy of cervical facet joint 12/12/2019    Colon polyps 09/06/2019    Dysphagia     Heartburn     At risk for colon cancer     Arthritis of right hip 08/07/2019    Chronic pain syndrome 03/01/2019    Lumbar facet arthropathy 03/01/2019    Lumbar disc disorder 03/01/2019    Primary osteoarthritis of both hips 03/01/2019    Hypertension 09/21/2018    Obesity (BMI 30-39.9) 09/10/2018     S/p ex lap/ileocectomy for iatrogenic cecal perf from colonoscopy  Acute respiratory failure with hypoxia--cont supplemental oxygen--wean as able  Bilateral effusions--thoracentesis  A.fib/flutter--on cardizem drip  Post-op ileus--cont NG tube/NPO  Zosyn #4 for intra-abd process  DVT risk--PCDs/subQ heparin    Pt is at risk for respiratory/hemodynamic/metabolic deterioration for which I am actively managing; requires ICU care    Anthony Mercedes MD, [facility]  12/12/2022  7:44 AM    Critical care time exclusive of teaching and procedures = 38 minutes

## 2022-12-12 NOTE — SIGNIFICANT EVENT
Name: Yuliet Salgado  : 1960  MRN: [id number]    Date:  22    Time: 12:16 AM EST    Benefits of immediately proceeding with Radiology exam(s) without pre-testing outweigh the risks or are not indicated as specified below and therefore the following is/are being waived:    [] Pregnancy test   [] Patients LMP on-time and regular.   [] Patient had Tubal Ligation or has other Contraception Device. [] Patient  is Menopausal or Premenarcheal.    [] Patient had Full or Partial Hysterectomy. [x] Protocol for Iodine allergy    [] MRI Questionnaire     [] BUN/Creatinine   [] Patient age w/no hx of renal dysfunction. [] Patient on Dialysis. [] Recent Normal Labs. Electronically signed by Monica Lyons MD on 22 at 12:16 AM EST          Patient states anaphylaxis low likelihood will give benadryl for hives and obtain stat CT scan to evaluate.     Monica Lyons MD

## 2022-12-12 NOTE — PROGRESS NOTES
Dr. Martita Ambriz at beside to assess patient. Ordered additional dose of IV lopressor and transfer to ICU.

## 2022-12-12 NOTE — PLAN OF CARE
Problem: Chronic Conditions and Co-morbidities  Goal: Patient's chronic conditions and co-morbidity symptoms are monitored and maintained or improved  Recent Flowsheet Documentation  Taken 12/12/2022 0200 by Kimberly Metcalf Patient's Chronic Conditions and Co-Morbidity Symptoms are Monitored and Maintained or Improved:   Monitor and assess patient's chronic conditions and comorbid symptoms for stability, deterioration, or improvement   Collaborate with multidisciplinary team to address chronic and comorbid conditions and prevent exacerbation or deterioration     Problem: Pain  Goal: Verbalizes/displays adequate comfort level or baseline comfort level  Recent Flowsheet Documentation  Taken 12/12/2022 0200 by Andrew Chase  Verbalizes/displays adequate comfort level or baseline comfort level:   Encourage patient to monitor pain and request assistance   Assess pain using appropriate pain scale     Problem: Discharge Planning  Goal: Discharge to home or other facility with appropriate resources  Recent Flowsheet Documentation  Taken 12/12/2022 0200 by Andrew Chase  Discharge to home or other facility with appropriate resources: Identify barriers to discharge with patient and caregiver

## 2022-12-12 NOTE — CONSULTS
CHIEF COMPLAINT: Afib    HISTORY OF PRESENT ILLNESS: Patient is a 60 y.o. female seen at the request of Shanice Borrero MD and not followed by cardiology. Patient admitted for abdominal pain. Colonoscopy with mass and resulting perforation. Developed atrial flutter with RVR prompting cardiology consultation. Patient is SOB. No CP. Past Medical History:   Diagnosis Date    Acute deep vein thrombosis (DVT) of right peroneal vein ([address] 10/14/2022    See note from pulmonology    Brain aneurysm 09/2018    H/O TIMES 2 THAT BURST PER PATIENT    Cerebral artery occlusion with cerebral infarction ([address]     RT SIDE AFFECTED-LEARNED TO WALK AND WRITE AGAIN    Degenerative arthritis of hand     Edentulous     Emphysema lung ([address]     lung dr    Emphysema of lung ([address]     Headache     Hiatal hernia     Hip problem     NEEDS HIP REPLACEMENT PER PATIENT    Hx of abnormal cervical Pap smear     Hypertension     Stroke (cerebrum) ([address]     Subarachnoid bleed ([address] 09/10/2018       Patient Active Problem List   Diagnosis    Obesity (BMI 30-39. 9)    Hypertension    Chronic pain syndrome    Lumbar facet arthropathy    Lumbar disc disorder    Primary osteoarthritis of both hips    Arthritis of right hip    Dysphagia    Heartburn    At risk for colon cancer    Colon polyps    Degenerative disc disease, cervical    Arthropathy of cervical facet joint    Abnormal blood sugar    Arthritis of both hips    COVID-19    H/O total hip arthroplasty, right    History of total left hip arthroplasty    Primary osteoarthritis of right hip    Pulmonary emphysema (HCC)    History of herpes genitalis    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    History of COVID-19    Acute cystitis with hematuria    Aneurysm (HCC)    S/P total right hip arthroplasty    Cognitive impairment    Personal history of colonic polyps    Abnormal diffusion capacity determined by pulmonary function test    BELCHER (dyspnea on exertion)    Acute deep vein thrombosis (DVT) of right peroneal vein (HCC)    Bowel perforation (HCC)       Allergies   Allergen Reactions    Latex Hives     Hives and rash    Iodine Rash     Hives . pt.  States anaphalyxis    Aloe Hives     Hives     Celebrex [Celecoxib] Itching    Fish-Derived Products Other (See Comments)       Current Facility-Administered Medications   Medication Dose Route Frequency Provider Last Rate Last Admin    labetalol (NORMODYNE;TRANDATE) injection 10 mg  10 mg IntraVENous Q30 Min PRN Alejandro White MD        HYDROmorphone (DILAUDID) injection 0.25 mg  0.25 mg IntraVENous Q4H PRN Alejandro White MD        Or    HYDROmorphone (DILAUDID) injection 0.5 mg  0.5 mg IntraVENous Q4H PRN Alejandro White MD        hydrALAZINE (APRESOLINE) injection 10 mg  10 mg IntraVENous Q6H PRN Alejandro White MD        sodium chloride flush 0.9 % injection 10 mL  10 mL IntraVENous PRN Zane Lima,         sodium chloride flush 0.9 % injection 10 mL  10 mL IntraVENous Once PRN Eula Woodward MD        dilTIAZem 100 mg in dextrose 5 % 100 mL infusion (ADD-[place])  2.5-15 mg/hr IntraVENous Continuous Chalex Jaramillo MD 10 mL/hr at 12/12/22 0908 10 mg/hr at 12/12/22 0908    magnesium sulfate 2000 mg in 50 mL IVPB premix  2,000 mg IntraVENous Once Alejandra Castellanos MD 25 mL/hr at 12/12/22 0827 2,000 mg at 12/12/22 0827    potassium chloride 20 mEq/50 mL IVPB (Central Line)  20 mEq IntraVENous Q1H Alejandra Castellanos MD 50 mL/hr at 12/12/22 0830 20 mEq at 12/12/22 0830    pantoprazole (PROTONIX) injection 40 mg  40 mg IntraVENous BID Alejandro White MD   40 mg at 12/12/22 4120    sodium chloride flush 0.9 % injection 5-40 mL  5-40 mL IntraVENous 2 times per day Yareli Claudio MD        sodium chloride flush 0.9 % injection 5-40 mL  5-40 mL IntraVENous PRN Yareli Claudio MD        piperacillin-tazobactam (ZOSYN) 3,375 mg in sodium chloride 0.9 % 50 mL IVPB (Epes1Gfr) 3,375 mg IntraVENous Ashley Briggs MD   Stopped at 12/12/22 0518    albuterol (PROVENTIL) nebulizer solution 2.5 mg  2.5 mg Nebulization Q4H PRN Jalyn Rajan MD        amLODIPine (NORVASC) tablet 5 mg  5 mg Oral Daily Jalyn Rajan MD   5 mg at 12/10/22 5607    [Held by provider] chlorthalidone (HYGROTON) tablet 25 mg  25 mg Oral Daily Jalyn Rajan MD   25 mg at 12/10/22 0920    gabapentin (NEURONTIN) capsule 300 mg  300 mg Oral TID Jalyn Rajan MD   300 mg at 12/10/22 2041    [Held by provider] losartan (COZAAR) tablet 100 mg  100 mg Oral Daily Jalyn Rajan MD   100 mg at 12/10/22 0920    sodium chloride flush 0.9 % injection 5-40 mL  5-40 mL IntraVENous 2 times per day Jalyn Rajan MD   10 mL at 12/12/22 2036    sodium chloride flush 0.9 % injection 5-40 mL  5-40 mL IntraVENous PRN Jalyn Rajan MD   10 mL at 12/10/22 1823    0.9 % sodium chloride infusion   IntraVENous PRN Jalyn Rajan MD        ondansetron (ZOFRAN-ODT) disintegrating tablet 4 mg  4 mg Oral Q8H PRN Jalyn Rajan MD        Or    ondansetron [facility]) injection 4 mg  4 mg IntraVENous Q6H PRN Jalyn Rajan MD        lactated ringers infusion   IntraVENous Continuous Zackery Campos  mL/hr at 12/12/22 0600 Rate Verify at 12/12/22 0600    acetaminophen (TYLENOL) tablet 650 mg  650 mg Oral Q6H Jalyn Rajan MD   650 mg at 12/11/22 0351    heparin (porcine) injection 5,000 Units  5,000 Units SubCUTAneous 3 times per day Jalyn Rajan MD   5,000 Units at 12/12/22 0553    ipratropium (ATROVENT) 0.02 % nebulizer solution 0.5 mg  0.5 mg Nebulization 4x daily Jalyn Rajan MD   0.5 mg at 12/12/22 0169    And    Arformoterol Tartrate (BROVANA) nebulizer solution 15 mcg  15 mcg Nebulization BID Jalyn Rajan MD   15 mcg at 12/12/22 6469       Social History     Socioeconomic History    Marital status:      Spouse name: Not on file    Number of children: Not on file Years of education: Not on file    Highest education level: Not on file   Occupational History    Not on file   Tobacco Use    Smoking status: Former     Packs/day: 1.50     Years: 43.00     Pack years: 64.50     Types: Cigarettes     Quit date: 2018     Years since quittin.2    Smokeless tobacco: Never   Vaping Use    Vaping Use: Never used   Substance and Sexual Activity    Alcohol use: No    Drug use: No    Sexual activity: Not Currently   Other Topics Concern    Not on file   Social History Narrative    Not on file     Social Determinants of Health     Financial Resource Strain: Low Risk     Difficulty of Paying Living Expenses: Not very hard   Food Insecurity: No Food Insecurity    Worried About Running Out of Food in the Last Year: Never true    Ran Out of Food in the Last Year: Never true   Transportation Needs: Not on file   Physical Activity: Sufficiently Active    Days of Exercise per Week: 6 days    Minutes of Exercise per Session: 60 min   Stress: Not on file   Social Connections: Not on file   Intimate Partner Violence: Not on file   Housing Stability: Not on file       Family History   Problem Relation Age of Onset    Diabetes Mother     Arthritis Mother     Atrial Fibrillation Mother     Diabetes Father     Atrial Fibrillation Father     Arthritis Father     Emphysema Father     Cancer Sister         \"female\"       Review of Systems:   Heart: as above   Lungs: as above   Eyes: denies changes in vision or discharge. Ears: denies changes in hearing or pain. Nose: denies epistaxis or masses   Throat: denies sore throat or trouble swallowing. Neuro: denies numbness, tingling, tremors. Skin: denies rashes or itching. : denies hematuria, dysuria   GI: denies vomiting, diarrhea   Psych: denies mood changed, anxiety, depression. all others negative.     Physical Exam   BP (!) 133/97   Pulse (!) 156   Temp 99.2 °F (37.3 °C) (Oral)   Resp 25   Ht 5' 5\" (1.651 m)   Wt 226 lb (102.5 kg) SpO2 95%   BMI 37.61 kg/m²   Constitutional: Oriented to person, place, and time. Well-developed and well-nourished. No distress. Head: Normocephalic and atraumatic. Eyes: EOM are normal. Pupils are equal, round, and reactive to light. Neck: Normal range of motion. Neck supple. No hepatojugular reflux and no JVD present. Carotid bruit is not present. No tracheal deviation present. No thyromegaly present. Cardiovascular: Normal rate, regular rhythm, normal heart sounds and intact distal pulses. Exam reveals no gallop and no friction rub. No murmur heard. Pulmonary/Chest: Effort normal and breath sounds normal. No respiratory distress. No wheezes. No rales. No tenderness. Abdominal: Soft. Bowel sounds are normal. No distension and no mass. No tenderness. No rebound and no guarding. Musculoskeletal: Normal range of motion. No edema and no tenderness. Lymphadenopathy:   No cervical adenopathy. No groin adenopathy. Neurological: Alert and oriented to person, place, and time. Skin: Skin is warm and dry. No rash noted. Not diaphoretic. No erythema. Psychiatric: Normal mood and affect.  Behavior is normal.     CBC:   Lab Results   Component Value Date/Time    WBC 12.5 12/12/2022 05:15 AM    RBC 4.28 12/12/2022 05:15 AM    HGB 12.8 12/12/2022 05:15 AM    HCT 38.8 12/12/2022 05:15 AM    MCV 90.7 12/12/2022 05:15 AM    MCH 29.9 12/12/2022 05:15 AM    MCHC 33.0 12/12/2022 05:15 AM    RDW 12.9 12/12/2022 05:15 AM     12/12/2022 05:15 AM    MPV 9.4 12/12/2022 05:15 AM     BMP:   Lab Results   Component Value Date/Time     12/11/2022 10:30 PM    K 3.3 12/11/2022 10:30 PM    K 3.5 12/11/2022 05:22 AM    CL 98 12/11/2022 10:30 PM    CO2 26 12/11/2022 10:30 PM    BUN 17 12/11/2022 10:30 PM    LABALBU 4.6 11/23/2022 11:31 AM    CREATININE 1.1 12/11/2022 10:30 PM    CALCIUM 9.2 12/11/2022 10:30 PM    GFRAA 50 10/06/2022 09:39 AM    LABGLOM 56 12/11/2022 10:30 PM     Magnesium:    Lab Results Component Value Date/Time    MG 1.7 12/12/2022 05:15 AM     Cardiac Enzymes:   Lab Results   Component Value Date    TROPHS 18 (H) 12/12/2022    TROPHS 21 (H) 12/11/2022      PT/INR:    Lab Results   Component Value Date/Time    PROTIME 13.2 12/12/2022 08:08 AM    INR 1.2 12/12/2022 08:08 AM     TSH:    Lab Results   Component Value Date/Time    TSH 1.510 05/27/2022 09:18 AM     Rhythm Strip: Atrial flutter with RVR. EKG:  Atrial flutter with RVR, nonspecific ST and T waves changes. Normal stress 3/12/2020. ASSESSMENT AND PLAN:  Patient Active Problem List   Diagnosis    Obesity (BMI 30-39. 9)    Hypertension    Chronic pain syndrome    Lumbar facet arthropathy    Lumbar disc disorder    Primary osteoarthritis of both hips    Arthritis of right hip    Dysphagia    Heartburn    At risk for colon cancer    Colon polyps    Degenerative disc disease, cervical    Arthropathy of cervical facet joint    Abnormal blood sugar    Arthritis of both hips    COVID-19    H/O total hip arthroplasty, right    History of total left hip arthroplasty    Primary osteoarthritis of right hip    Pulmonary emphysema (HCC)    History of herpes genitalis    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    History of COVID-19    Acute cystitis with hematuria    Aneurysm (HCC)    S/P total right hip arthroplasty    Cognitive impairment    Personal history of colonic polyps    Abnormal diffusion capacity determined by pulmonary function test    BELCHER (dyspnea on exertion)    Acute deep vein thrombosis (DVT) of right peroneal vein (HCC)    Bowel perforation ([address]     1. Atrial Flutter:     Chart/labs/EKG reviewed. Echo ordered. NPO currently. Cardizem gtt started. Re-bolus cardizem. Load digoxin. 2. Perforated bowel/ID issues: Per surgery. 3. SOB: Supportive care. 4. ORLIN: Follow labs. 5. Hx of DVT    6. Hx of SAH/Aneurysm    Tyler Rivers D.O.   Cardiologist  Cardiology, [facility] [place] ([facility]

## 2022-12-12 NOTE — PLAN OF CARE
Problem: Chronic Conditions and Co-morbidities  Goal: Patient's chronic conditions and co-morbidity symptoms are monitored and maintained or improved  Outcome: Progressing  Flowsheets (Taken 12/12/2022 0200 by Sarahi English)  Care Plan - Patient's Chronic Conditions and Co-Morbidity Symptoms are Monitored and Maintained or Improved:   Monitor and assess patient's chronic conditions and comorbid symptoms for stability, deterioration, or improvement   Collaborate with multidisciplinary team to address chronic and comorbid conditions and prevent exacerbation or deterioration     Problem: Pain  Goal: Verbalizes/displays adequate comfort level or baseline comfort level  Outcome: Progressing  Flowsheets (Taken 12/12/2022 0200 by Sarahi English)  Verbalizes/displays adequate comfort level or baseline comfort level:   Encourage patient to monitor pain and request assistance   Assess pain using appropriate pain scale     Problem: Discharge Planning  Goal: Discharge to home or other facility with appropriate resources  Outcome: Progressing  Flowsheets (Taken 12/12/2022 0200 by Sarahi English)  Discharge to home or other facility with appropriate resources: Identify barriers to discharge with patient and caregiver     Problem: Skin/Tissue Integrity  Goal: Absence of new skin breakdown  Description: 1. Monitor for areas of redness and/or skin breakdown  2. Assess vascular access sites hourly  3. Every 4-6 hours minimum:  Change oxygen saturation probe site  4. Every 4-6 hours:  If on nasal continuous positive airway pressure, respiratory therapy assess nares and determine need for appliance change or resting period.   Outcome: Progressing     Problem: Safety - Adult  Goal: Free from fall injury  Outcome: Progressing     Problem: ABCDS Injury Assessment  Goal: Absence of physical injury  Outcome: Progressing

## 2022-12-12 NOTE — PROCEDURES
Latrell Sykes is a 60 y.o. female patient. 1. Personal history of colonic polyps    2. Personal history of colonic polyps    3. Perforated bowel ([address]      Past Medical History:   Diagnosis Date    Acute deep vein thrombosis (DVT) of right peroneal vein ([address] 10/14/2022    See note from pulmonology    Brain aneurysm 09/2018    H/O TIMES 2 THAT BURST PER PATIENT    Cerebral artery occlusion with cerebral infarction ([address]     RT SIDE AFFECTED-LEARNED TO WALK AND WRITE AGAIN    Degenerative arthritis of hand     Edentulous     Emphysema lung ([address]     lung dr    Emphysema of lung ([address]     Headache     Hiatal hernia     Hip problem     NEEDS HIP REPLACEMENT PER PATIENT    Hx of abnormal cervical Pap smear     Hypertension     Stroke (cerebrum) (HCC)     Subarachnoid bleed ([address] 09/10/2018     Blood pressure 123/89, pulse (!) 152, temperature 97.9 °F (36.6 °C), temperature source Axillary, resp. rate 17, height 5' 5\" (1.651 m), weight 226 lb (102.5 kg), SpO2 96 %, not currently breastfeeding. Central Line    Date/Time: 12/12/2022 3:53 AM  Performed by: Jennifer Sheffield MD  Authorized by: Jennifer Sheffield MD   Consent: Written consent obtained.   Risks and benefits: risks, benefits and alternatives were discussed  Consent given by: patient  Patient understanding: patient states understanding of the procedure being performed  Patient consent: the patient's understanding of the procedure matches consent given  Procedure consent: procedure consent matches procedure scheduled  Relevant documents: relevant documents present and verified  Test results: test results available and properly labeled  Site marked: the operative site was marked  Imaging studies: imaging studies available  Required items: required blood products, implants, devices, and special equipment available  Patient identity confirmed: verbally with patient  Indications: vascular access  Anesthesia: see MAR for details    Anesthesia:  Local Anesthetic: lidocaine 1% with epinephrine  Preparation: skin prepped with 2% chlorhexidine  Skin prep agent dried: skin prep agent completely dried prior to procedure  Sterile barriers: all five maximum sterile barriers used - cap, mask, sterile gown, sterile gloves, and large sterile sheet  Location details: right internal jugular  Patient position: flat  Catheter type: triple lumen  Catheter size: 7 Fr  Pre-procedure: landmarks identified  Ultrasound guidance: yes  Sterile ultrasound techniques: sterile gel and sterile probe covers were used  Successful placement: yes  Post-procedure: line sutured and dressing applied  Assessment: blood return through all ports and free fluid flow  Patient tolerance: patient tolerated the procedure well with no immediate complications    Dr. Angie Hughes was readily available throughout the entire procedure.      Adwoa Dyson MD  12/12/2022

## 2022-12-12 NOTE — CARE COORDINATION
Pod #3 s/p ileocecectomy for perforated cecum. Transferred to ICU from telemetry floor during the night d/t a fib with rvr and hypoxia. Currently on cardizem drip and 60 L AirVO. Met with pt. She is alert and oriented. She lives with her son Nadia Crowe in a 2 story home. Her bed and bath are on 1st floor. There are 6 steps to enter with 1 rail. Pt uses a cane and walker to ambulate. She also owns a wc, but doesn't use. She is not on home o2. Pcp is Dr Shanika Rojas and preferred pharmacy is [facility] on [address]. Pt has hx of [facility] and would use them again if needed. Will ask [facility] to follow. She states she has a hx of [facility] after coiling of brain aneurysms and hx of [facility] after hip replacement. Dc plan to be determined once pt stable for PT/OT evals.

## 2022-12-12 NOTE — PROGRESS NOTES
Patient in Afib RVR HR 150s-170s, O2 sats 86% on 6LNC, placed on high flow nasal cannula. Dr. Lise Orona at bedside. EKG completed and given IV metoprolol. Per Dr. Lise Orona hold PO meds and continue to monitor HR.

## 2022-12-12 NOTE — PROGRESS NOTES
Patient received SQ heparin at 1338 today, unable to perform procedure. Hold heparin dose this evening and tomorrow am for thoracentesis tomorrow per ordering physician, also keep NPO after midnight.

## 2022-12-12 NOTE — SIGNIFICANT EVENT
Extensive discussion had with the patient's son, Juanpablo Smith at the bedside. We discussed the recommendation for DVT chemoprophylaxis given that he had concerns earlier hospital course about the possibility of her coiled/clipped aneurysms rebleeding. After discussion he agreed that chemoprophylaxis with heparin 3 times daily is appropriate. We also discussed her CODE STATUS at length. For now she is a full code. He described conversations that he had had with his mother in the past stating that she would not want any prolonged life support devices, specifically greater than 72 hours. For now her CODE STATUS will remain full code. All questions answered to the best of my ability and to the patient's son satisfaction.     Venancio Corona MD  PGY-4

## 2022-12-12 NOTE — PROGRESS NOTES
Surgical Intensive Care Unit   Daily Progress Note     Patient's name:  Reilly Gracia  Age/Gender: 60 y.o. female  Date of Admission: 12/9/2022 10:18 AM  Length of Stay: 3    Reason for ICU: Perforated bowel      Overnight Events: Tachycardic in the 160's, given amiodarone. Cardiology consulted- started Cardizem drip. Continues on heated high flow for hypoxia. Reports generalized abdominal pain      Hospital Course:   12/9 patient underwent colonoscopy with snare and cold biopsy of cecal mass which was complicated by cecal perforation. She underwent exploratory laparotomy with ileocecectomy  12/10 she was started on gum and hard candy. 12/11 she developed nausea and emesis and had an NG tube placed. 12/12 she developed new onset a fib with rvr, started on cordarone followed by cardizem. CTA PE showed no PE. Problem List:   Patient Active Problem List   Diagnosis    Obesity (BMI 30-39. 9)    Hypertension    Chronic pain syndrome    Lumbar facet arthropathy    Lumbar disc disorder    Primary osteoarthritis of both hips    Arthritis of right hip    Dysphagia    Heartburn    At risk for colon cancer    Colon polyps    Degenerative disc disease, cervical    Arthropathy of cervical facet joint    Abnormal blood sugar    Arthritis of both hips    COVID-19    H/O total hip arthroplasty, right    History of total left hip arthroplasty    Primary osteoarthritis of right hip    Pulmonary emphysema (HCC)    History of herpes genitalis    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    History of COVID-19    Acute cystitis with hematuria    Aneurysm (HCC)    S/P total right hip arthroplasty    Cognitive impairment    Personal history of colonic polyps    Abnormal diffusion capacity determined by pulmonary function test    BELCHER (dyspnea on exertion)    Acute deep vein thrombosis (DVT) of right peroneal vein (HCC)    Bowel perforation ([address]       Surgical/Interventional Procedures:       Vent Settings: Additional Respiratory Assessments  Heart Rate: (!) 155  Resp: 17  SpO2: 97 %  Humidification Source: Heated wire  Humidification Temp: 31  ABG:   Recent Labs     22  0306   PH 7.504*   PCO2 41.6   PO2 28.1*   HCO3 32.0   BE 8.1   O2SAT 59.1       I/O:  I/O last 3 completed shifts:   In: 6845 [P.O.:1080; I.V.:2600; IV Piggyback:250]  Out: 3625 [UPOSO:5494; Emesis/NG output:950]  I/O this shift:  In: -   Out: 1250 [Urine:1250]  Urinary Catheter 22-Output (mL): 250 mL  NG/OG/NJ/NE Tube Nasogastric 14 fr Left nostril-Output (mL): 200 ml         Drips:   amiodarone 1 mg/min (22 031)    Followed by    amiodarone      sodium chloride      lactated ringers 100 mL/hr at 22 031       Physical Exam:   BP (!) 151/82   Pulse (!) 155   Temp 98.2 °F (36.8 °C) (Oral)   Resp 17   Ht 5' 5\" (1.651 m)   Wt 226 lb (102.5 kg)   SpO2 97%   BMI 37.61 kg/m²     Average, Min, and Max for last 24 hours Vitals:  Temp:  Temp  Av.6 °F (37 °C)  Min: 97.9 °F (36.6 °C)  Max: 99.9 °F (37.7 °C)  RR: Resp  Av.2  Min: 13  Max: 32  HR: Pulse  Av  Min: 126  Max: 968  BP:  Systolic (56HZE), PNT:410 , Min:105 , ZSP:390   ; Diastolic (78DRH), RGV:85, Min:64, Max:112    SpO2: SpO2  Av.6 %  Min: 86 %  Max: 98 %        GCS:    4 - Opens eyes on own   6 - Follows simple motor commands  5 - Alert and oriented    Pupil size:  Left 3 mm    Right 3 mm    Pupil reaction: Yes    Wiggles fingers: Left Yes Right Yes    Hand grasp:   Left normal       Right normal    Wiggles toes: Left Yes    Right Yes    Plantar flexion: Left normal     Right normal      CONSTITUTIONAL: no acute distress, lying in hospital bed,  NEUROLOGIC: PERRL, oriented x 4  CARDIOVASCULAR: S1 S2, tachycardic, regular rhythm  PULMONARY: b/l crackles at the bases, no use of accessory muscles  RENAL: fischer to gravity, clear yellow urine  ABDOMEN: soft, obese, generalized tenderness to palpation,  midline incision with bandage in place, nondistended, nontympanic, no masses, no organomegaly, normal bowel sounds   MUSCULOSKELETAL: moves all extremities purposefully, 5/5 strength   SKIN/EXTREMITIES: no rashes/ecchymosis, no edema/clubbing, warm/dry, good capillary refill       ASSESSMENT / PLAN:   Neuro:  acute pain- tylenol dilaudid, neurontin  CV: A.  Fib with RVR- amiodarone, start cardizem drip, cardiology following  HTN- norvasc  Pulm: B/l pulmonary effusions, acute respiratory failure with hypoxia- Pulmonary toilet, IR for right thoracentesis, CTA PE pending  GI: NPO- await return of bowel function  NG to suction  Renal: ORLIN- LR 100cc  ID: surgical prophylaxis- zosyn day 4  Endocrine: maintain glucose <180  MSK: PT/OT- AMPAC score    Heme: no active issues      Pain/Analgesia: dilaudid, tylenol  Bowel regimen: protonix  Diet: Diet NPO Exceptions are: Sips of Water with Meds  DVT proph: heparin  GI proph: protonix  Mouth/eye care: per patient  Sams: present  CVC sites: Right IJ  Ancillary consults: Cardiology  Family Update: as available  CODE Status: Full Code    Dispo: SICU      Electronically signed by Taz Dominguez DO 12/12/2022  5:36 AM

## 2022-12-12 NOTE — HOME CARE
[address] [address] received referral. Will follow after discharge.   Sarai Dueñas LPN, [address] [address]

## 2022-12-13 ENCOUNTER — APPOINTMENT (OUTPATIENT)
Dept: GENERAL RADIOLOGY | Age: 62
DRG: 329 | End: 2022-12-13
Attending: SURGERY
Payer: MEDICARE

## 2022-12-13 ENCOUNTER — APPOINTMENT (OUTPATIENT)
Dept: INTERVENTIONAL RADIOLOGY/VASCULAR | Age: 62
DRG: 329 | End: 2022-12-13
Attending: SURGERY
Payer: MEDICARE

## 2022-12-13 LAB
ALBUMIN SERPL-MCNC: 2.8 G/DL (ref 3.5–5.2)
ALP BLD-CCNC: 64 U/L (ref 35–104)
ALT SERPL-CCNC: 20 U/L (ref 0–32)
ANION GAP SERPL CALCULATED.3IONS-SCNC: 14 MMOL/L (ref 7–16)
AST SERPL-CCNC: 34 U/L (ref 0–31)
B.E.: 3.9 MMOL/L (ref -3–3)
BASOPHILS ABSOLUTE: 0.02 E9/L (ref 0–0.2)
BASOPHILS RELATIVE PERCENT: 0.3 % (ref 0–2)
BILIRUB SERPL-MCNC: 1.2 MG/DL (ref 0–1.2)
BUN BLDV-MCNC: 12 MG/DL (ref 6–23)
CALCIUM IONIZED: 1.26 MMOL/L (ref 1.15–1.33)
CALCIUM SERPL-MCNC: 9 MG/DL (ref 8.6–10.2)
CHLORIDE BLD-SCNC: 97 MMOL/L (ref 98–107)
CO2: 25 MMOL/L (ref 22–29)
CREAT SERPL-MCNC: 0.8 MG/DL (ref 0.5–1)
DELIVERY SYSTEMS: ABNORMAL
DEVICE: ABNORMAL
EOSINOPHILS ABSOLUTE: 0.09 E9/L (ref 0.05–0.5)
EOSINOPHILS RELATIVE PERCENT: 1.2 % (ref 0–6)
FIO2: 90
GFR SERPL CREATININE-BSD FRML MDRD: >60 ML/MIN/1.73
GLUCOSE BLD-MCNC: 107 MG/DL (ref 74–99)
HCO3: 25.1 MMOL/L (ref 22–26)
HCT VFR BLD CALC: 37.1 % (ref 34–48)
HEMATOCRIT: 42 % (ref 34–48)
HEMOGLOBIN: 12.5 G/DL (ref 11.5–15.5)
HEMOGLOBIN: 14.2 G/DL (ref 11.5–15.5)
IMMATURE GRANULOCYTES #: 0.04 E9/L
IMMATURE GRANULOCYTES %: 0.5 % (ref 0–5)
INR BLD: 1.1
LYMPHOCYTES ABSOLUTE: 0.86 E9/L (ref 1.5–4)
LYMPHOCYTES RELATIVE PERCENT: 11.6 % (ref 20–42)
MAGNESIUM: 1.7 MG/DL (ref 1.6–2.6)
MCH RBC QN AUTO: 30 PG (ref 26–35)
MCHC RBC AUTO-ENTMCNC: 33.7 % (ref 32–34.5)
MCV RBC AUTO: 89 FL (ref 80–99.9)
MONOCYTES ABSOLUTE: 0.47 E9/L (ref 0.1–0.95)
MONOCYTES RELATIVE PERCENT: 6.4 % (ref 2–12)
NEUTROPHILS ABSOLUTE: 5.92 E9/L (ref 1.8–7.3)
NEUTROPHILS RELATIVE PERCENT: 80 % (ref 43–80)
O2 SATURATION: 90.8 % (ref 92–98.5)
OPERATOR ID: 421
PATIENT TEMP: 37
PCO2 (TEMP CORRECTED): 28 MMHG (ref 35–45)
PDW BLD-RTO: 12.7 FL (ref 11.5–15)
PH (TEMPERATURE CORRECTED): 7.56 (ref 7.35–7.45)
PHOSPHORUS: 2.8 MG/DL (ref 2.5–4.5)
PLATELET # BLD: 234 E9/L (ref 130–450)
PMV BLD AUTO: 9.4 FL (ref 7–12)
PO2 (TEMP CORRECTED): 50.8 MMHG (ref 60–80)
POC SOURCE: ABNORMAL
POTASSIUM SERPL-SCNC: 3.5 MMOL/L (ref 3.5–5)
PROTHROMBIN TIME: 12.3 SEC (ref 9.3–12.4)
RBC # BLD: 4.17 E12/L (ref 3.5–5.5)
SODIUM BLD-SCNC: 136 MMOL/L (ref 132–146)
TOTAL PROTEIN: 6 G/DL (ref 6.4–8.3)
WBC # BLD: 7.4 E9/L (ref 4.5–11.5)

## 2022-12-13 PROCEDURE — 94640 AIRWAY INHALATION TREATMENT: CPT

## 2022-12-13 PROCEDURE — 6360000002 HC RX W HCPCS: Performed by: STUDENT IN AN ORGANIZED HEALTH CARE EDUCATION/TRAINING PROGRAM

## 2022-12-13 PROCEDURE — 36592 COLLECT BLOOD FROM PICC: CPT

## 2022-12-13 PROCEDURE — 85025 COMPLETE CBC W/AUTO DIFF WBC: CPT

## 2022-12-13 PROCEDURE — 87070 CULTURE OTHR SPECIMN AEROBIC: CPT

## 2022-12-13 PROCEDURE — 32555 ASPIRATE PLEURA W/ IMAGING: CPT | Performed by: RADIOLOGY

## 2022-12-13 PROCEDURE — 82330 ASSAY OF CALCIUM: CPT

## 2022-12-13 PROCEDURE — 2500000003 HC RX 250 WO HCPCS: Performed by: STUDENT IN AN ORGANIZED HEALTH CARE EDUCATION/TRAINING PROGRAM

## 2022-12-13 PROCEDURE — 2580000003 HC RX 258: Performed by: STUDENT IN AN ORGANIZED HEALTH CARE EDUCATION/TRAINING PROGRAM

## 2022-12-13 PROCEDURE — 32555 ASPIRATE PLEURA W/ IMAGING: CPT

## 2022-12-13 PROCEDURE — 36415 COLL VENOUS BLD VENIPUNCTURE: CPT

## 2022-12-13 PROCEDURE — 71045 X-RAY EXAM CHEST 1 VIEW: CPT | Performed by: RADIOLOGY

## 2022-12-13 PROCEDURE — 71045 X-RAY EXAM CHEST 1 VIEW: CPT

## 2022-12-13 PROCEDURE — 2700000000 HC OXYGEN THERAPY PER DAY

## 2022-12-13 PROCEDURE — 87205 SMEAR GRAM STAIN: CPT

## 2022-12-13 PROCEDURE — 80053 COMPREHEN METABOLIC PANEL: CPT

## 2022-12-13 PROCEDURE — C9113 INJ PANTOPRAZOLE SODIUM, VIA: HCPCS | Performed by: STUDENT IN AN ORGANIZED HEALTH CARE EDUCATION/TRAINING PROGRAM

## 2022-12-13 PROCEDURE — 93306 TTE W/DOPPLER COMPLETE: CPT

## 2022-12-13 PROCEDURE — 99291 CRITICAL CARE FIRST HOUR: CPT | Performed by: SURGERY

## 2022-12-13 PROCEDURE — 6370000000 HC RX 637 (ALT 250 FOR IP): Performed by: STUDENT IN AN ORGANIZED HEALTH CARE EDUCATION/TRAINING PROGRAM

## 2022-12-13 PROCEDURE — 2500000003 HC RX 250 WO HCPCS: Performed by: RADIOLOGY

## 2022-12-13 PROCEDURE — 99233 SBSQ HOSP IP/OBS HIGH 50: CPT | Performed by: INTERNAL MEDICINE

## 2022-12-13 PROCEDURE — 85610 PROTHROMBIN TIME: CPT

## 2022-12-13 PROCEDURE — 82803 BLOOD GASES ANY COMBINATION: CPT

## 2022-12-13 PROCEDURE — 84100 ASSAY OF PHOSPHORUS: CPT

## 2022-12-13 PROCEDURE — 2000000000 HC ICU R&B

## 2022-12-13 PROCEDURE — 83735 ASSAY OF MAGNESIUM: CPT

## 2022-12-13 PROCEDURE — 6360000002 HC RX W HCPCS

## 2022-12-13 RX ORDER — MAGNESIUM SULFATE IN WATER 40 MG/ML
2000 INJECTION, SOLUTION INTRAVENOUS ONCE
Status: COMPLETED | OUTPATIENT
Start: 2022-12-13 | End: 2022-12-13

## 2022-12-13 RX ORDER — POTASSIUM CHLORIDE 29.8 MG/ML
20 INJECTION INTRAVENOUS
Status: COMPLETED | OUTPATIENT
Start: 2022-12-13 | End: 2022-12-13

## 2022-12-13 RX ORDER — POTASSIUM CHLORIDE 29.8 MG/ML
INJECTION INTRAVENOUS
Status: COMPLETED
Start: 2022-12-13 | End: 2022-12-13

## 2022-12-13 RX ORDER — LIDOCAINE HYDROCHLORIDE 20 MG/ML
INJECTION, SOLUTION INFILTRATION; PERINEURAL
Status: COMPLETED | OUTPATIENT
Start: 2022-12-13 | End: 2022-12-13

## 2022-12-13 RX ADMIN — IPRATROPIUM BROMIDE 0.5 MG: 0.5 SOLUTION RESPIRATORY (INHALATION) at 11:34

## 2022-12-13 RX ADMIN — HYDROMORPHONE HYDROCHLORIDE 0.5 MG: 1 INJECTION, SOLUTION INTRAMUSCULAR; INTRAVENOUS; SUBCUTANEOUS at 18:36

## 2022-12-13 RX ADMIN — GABAPENTIN 300 MG: 300 CAPSULE ORAL at 20:44

## 2022-12-13 RX ADMIN — IPRATROPIUM BROMIDE 0.5 MG: 0.5 SOLUTION RESPIRATORY (INHALATION) at 16:34

## 2022-12-13 RX ADMIN — SODIUM CHLORIDE, POTASSIUM CHLORIDE, SODIUM LACTATE AND CALCIUM CHLORIDE: 600; 310; 30; 20 INJECTION, SOLUTION INTRAVENOUS at 23:23

## 2022-12-13 RX ADMIN — LIDOCAINE HYDROCHLORIDE 8 ML: 20 INJECTION, SOLUTION INFILTRATION; PERINEURAL at 13:48

## 2022-12-13 RX ADMIN — POTASSIUM CHLORIDE 20 MEQ: 29.8 INJECTION, SOLUTION INTRAVENOUS at 16:02

## 2022-12-13 RX ADMIN — ARFORMOTEROL TARTRATE 15 MCG: 15 SOLUTION RESPIRATORY (INHALATION) at 19:59

## 2022-12-13 RX ADMIN — SODIUM CHLORIDE, PRESERVATIVE FREE 10 ML: 5 INJECTION INTRAVENOUS at 20:45

## 2022-12-13 RX ADMIN — POTASSIUM CHLORIDE 20 MEQ: 29.8 INJECTION INTRAVENOUS at 16:02

## 2022-12-13 RX ADMIN — ACETAMINOPHEN 650 MG: 325 TABLET ORAL at 20:44

## 2022-12-13 RX ADMIN — ACETAMINOPHEN 650 MG: 325 TABLET ORAL at 15:23

## 2022-12-13 RX ADMIN — SODIUM CHLORIDE, PRESERVATIVE FREE 10 ML: 5 INJECTION INTRAVENOUS at 07:44

## 2022-12-13 RX ADMIN — PIPERACILLIN AND TAZOBACTAM 3375 MG: 3; .375 INJECTION, POWDER, FOR SOLUTION INTRAVENOUS at 10:51

## 2022-12-13 RX ADMIN — GABAPENTIN 300 MG: 300 CAPSULE ORAL at 15:15

## 2022-12-13 RX ADMIN — PIPERACILLIN AND TAZOBACTAM 3375 MG: 3; .375 INJECTION, POWDER, FOR SOLUTION INTRAVENOUS at 02:46

## 2022-12-13 RX ADMIN — DEXTROSE MONOHYDRATE 7.5 MG/HR: 50 INJECTION, SOLUTION INTRAVENOUS at 09:13

## 2022-12-13 RX ADMIN — POTASSIUM CHLORIDE 20 MEQ: 29.8 INJECTION INTRAVENOUS at 15:18

## 2022-12-13 RX ADMIN — SODIUM CHLORIDE, POTASSIUM CHLORIDE, SODIUM LACTATE AND CALCIUM CHLORIDE: 600; 310; 30; 20 INJECTION, SOLUTION INTRAVENOUS at 12:07

## 2022-12-13 RX ADMIN — IPRATROPIUM BROMIDE 0.5 MG: 0.5 SOLUTION RESPIRATORY (INHALATION) at 08:27

## 2022-12-13 RX ADMIN — ONDANSETRON 4 MG: 2 INJECTION INTRAMUSCULAR; INTRAVENOUS at 14:52

## 2022-12-13 RX ADMIN — IPRATROPIUM BROMIDE 0.5 MG: 0.5 SOLUTION RESPIRATORY (INHALATION) at 19:59

## 2022-12-13 RX ADMIN — SODIUM CHLORIDE, PRESERVATIVE FREE 10 ML: 5 INJECTION INTRAVENOUS at 07:43

## 2022-12-13 RX ADMIN — MAGNESIUM SULFATE HEPTAHYDRATE 2000 MG: 40 INJECTION, SOLUTION INTRAVENOUS at 15:19

## 2022-12-13 RX ADMIN — PANTOPRAZOLE SODIUM 40 MG: 40 INJECTION, POWDER, FOR SOLUTION INTRAVENOUS at 20:45

## 2022-12-13 RX ADMIN — ARFORMOTEROL TARTRATE 15 MCG: 15 SOLUTION RESPIRATORY (INHALATION) at 08:27

## 2022-12-13 RX ADMIN — PANTOPRAZOLE SODIUM 40 MG: 40 INJECTION, POWDER, FOR SOLUTION INTRAVENOUS at 07:43

## 2022-12-13 RX ADMIN — PIPERACILLIN AND TAZOBACTAM 3375 MG: 3; .375 INJECTION, POWDER, FOR SOLUTION INTRAVENOUS at 18:32

## 2022-12-13 ASSESSMENT — PAIN DESCRIPTION - LOCATION
LOCATION: ABDOMEN;BACK
LOCATION: ABDOMEN

## 2022-12-13 ASSESSMENT — PAIN SCALES - GENERAL
PAINLEVEL_OUTOF10: 2
PAINLEVEL_OUTOF10: 5
PAINLEVEL_OUTOF10: 0
PAINLEVEL_OUTOF10: 8
PAINLEVEL_OUTOF10: 0
PAINLEVEL_OUTOF10: 3

## 2022-12-13 ASSESSMENT — PAIN DESCRIPTION - ONSET: ONSET: GRADUAL

## 2022-12-13 ASSESSMENT — PAIN DESCRIPTION - PAIN TYPE: TYPE: SURGICAL PAIN

## 2022-12-13 ASSESSMENT — PAIN - FUNCTIONAL ASSESSMENT: PAIN_FUNCTIONAL_ASSESSMENT: PREVENTS OR INTERFERES SOME ACTIVE ACTIVITIES AND ADLS

## 2022-12-13 ASSESSMENT — PAIN DESCRIPTION - ORIENTATION
ORIENTATION: LOWER
ORIENTATION: LOWER

## 2022-12-13 ASSESSMENT — PAIN DESCRIPTION - DESCRIPTORS: DESCRIPTORS: ACHING;SORE

## 2022-12-13 ASSESSMENT — PAIN DESCRIPTION - FREQUENCY: FREQUENCY: INTERMITTENT

## 2022-12-13 NOTE — PROGRESS NOTES
Surgical Intensive Care Unit   Daily Progress Note     Patient's name:  Adelso Greco  Age/Gender: 60 y.o. female  Date of Admission: 12/9/2022 10:18 AM  Length of Stay: 4    Reason for ICU: Perforated bowel      Overnight Events: No acute events overnight. Denies flatus or BM. Hospital Course:   12/9 patient underwent colonoscopy with snare and cold biopsy of cecal mass which was complicated by cecal perforation. She underwent exploratory laparotomy with ileocecectomy  12/10 she was started on gum and hard candy. 12/11 she developed nausea and emesis and had an NG tube placed. 12/12 she developed new onset a fib with rvr, started on cordarone followed by cardizem. CTA PE showed no PE.  12/13: Heart rate improved on cardizem gtt. IR today for thoracentesis    Problem List:   Patient Active Problem List   Diagnosis    Obesity (BMI 30-39. 9)    Hypertension    Chronic pain syndrome    Lumbar facet arthropathy    Lumbar disc disorder    Primary osteoarthritis of both hips    Arthritis of right hip    Dysphagia    Heartburn    At risk for colon cancer    Colon polyps    Degenerative disc disease, cervical    Arthropathy of cervical facet joint    Abnormal blood sugar    Arthritis of both hips    COVID-19    H/O total hip arthroplasty, right    History of total left hip arthroplasty    Primary osteoarthritis of right hip    Pulmonary emphysema (HCC)    History of herpes genitalis    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    History of COVID-19    Acute cystitis with hematuria    Aneurysm (HCC)    S/P total right hip arthroplasty    Cognitive impairment    Personal history of colonic polyps    Abnormal diffusion capacity determined by pulmonary function test    BELCHER (dyspnea on exertion)    Acute deep vein thrombosis (DVT) of right peroneal vein (HCC)    Bowel perforation ([address]       Surgical/Interventional Procedures:       Vent Settings: Additional Respiratory Assessments  Heart Rate: (!) 107  Resp: 21  SpO2: 90 %  Humidification Source: Heated wire  Humidification Temp: 31  ABG:   Recent Labs     22  0824   PH 7.554*   PCO2 33.1*   PO2 53.0*   HCO3 28.6*   BE 6.5*   O2SAT 90.8*         I/O:  I/O last 3 completed shifts: In: 0349 [I.V.:8753.6; IV Piggyback:458.4]  Out: 8711 [Urine:4325; Emesis/NG output:1750]  I/O this shift:   In: 917.8 [I.V.:869.6; IV Piggyback:48.2]  Out: 925 [Urine:775; Emesis/NG output:150]  Urinary Catheter 22-Output (mL): 75 mL  NG/OG/NJ/NE Tube Nasogastric 14 fr Left nostril-Output (mL): 0 ml  Stool (measured) : 0 mL      Drips:   dilTIAZem (CARDIZEM) 100 mg in dextrose 5% 100 mL (ADD-[place]) 2.5 mg/hr (22)    sodium chloride 10 mL/hr at 22    lactated ringers 100 mL/hr at 22       Physical Exam:   BP (!) 145/95   Pulse (!) 107   Temp 98.5 °F (36.9 °C) (Oral)   Resp 21   Ht 5' 5\" (1.651 m)   Wt 226 lb (102.5 kg)   SpO2 90%   BMI 37.61 kg/m²     Average, Min, and Max for last 24 hours Vitals:  Temp:  Temp  Av.9 °F (37.2 °C)  Min: 98.2 °F (36.8 °C)  Max: 99.2 °F (37.3 °C)  RR: Resp  Av.2  Min: 12  Max: 34  HR: Pulse  Av.7  Min: 81  Max: 697  BP:  Systolic (99BBB), VFX:300 , Min:105 , WEH:841   ; Diastolic (27ULO), LZD:03, Min:70, Max:155    SpO2: SpO2  Av.8 %  Min: 90 %  Max: 97 %        GCS:    4 - Opens eyes on own   6 - Follows simple motor commands  5 - Alert and oriented    Pupil size:  Left 3 mm    Right 3 mm    Pupil reaction: Yes    Wiggles fingers: Left Yes Right Yes    Hand grasp:   Left normal       Right normal    Wiggles toes: Left Yes    Right Yes    Plantar flexion: Left normal     Right normal      CONSTITUTIONAL: no acute distress, lying in hospital bed,  NEUROLOGIC: PERRL, oriented x 4  CARDIOVASCULAR: S1 S2, tachycardic  PULMONARY: b/l crackles at the bases, no use of accessory muscles  RENAL: fischer to gravity, clear yellow urine  ABDOMEN: soft, obese, generalized tenderness to palpation,  midline incision with bandage in place, nondistended, nontympanic, no masses, no organomegaly, normal bowel sounds   MUSCULOSKELETAL: moves all extremities purposefully, 5/5 strength   SKIN/EXTREMITIES: no rashes/ecchymosis, no edema/clubbing, warm/dry, good capillary refill       ASSESSMENT / PLAN:   Neuro:  acute pain- tylenol dilaudid, neurontin  CV: A. flutter- cardizem drip, digoxin, cardiology following, echo pending  HTN- norvasc  Pulm: B/l pulmonary effusions, acute respiratory failure with hypoxia- Pulmonary toilet, IR for right thoracentesis  GI:  S/p ex lap/ileocectomy for iatrogenic cecal perf from colonoscopy- PostOP ileus- NPO- await return of bowel function, continue NG  Renal: ORLIN- LR 100cc  ID: intra abdominal process/ possible pneumonia- zosyn   Endocrine: maintain glucose <180  MSK: PT/OT- AMPAC score    Heme: no active issues      Pain/Analgesia: dilaudid, tylenol  Bowel regimen: protonix  Diet: Diet NPO Exceptions are: Sips of Water with Meds  DVT proph: heparin- held for IR  GI proph: protonix  Mouth/eye care: per patient  Sams: present  CVC sites: Right IJ  Ancillary consults: Cardiology  Family Update: as available  CODE Status: Full Code    Dispo: SICU      Electronically signed by Lauri Lassitre DO 12/13/2022  5:21 AM

## 2022-12-13 NOTE — INTERVAL H&P NOTE
H&P Update    Patient's History and Physical  was reviewed. The patient appears likely to able to tolerate the procedure. Risk and benefits discussed including ultimate complications, possibly death and consent obtained. Patient and/family had the opportunity to ask questions. All questions were answered and patient/family wishes to proceed.      Jerson Acosta, II

## 2022-12-13 NOTE — BRIEF OP NOTE
Brief Postoperative Note    Latrell Sykes  YOB: 1960  [id number]    Pre-operative Diagnosis: IR GUIDED THORACENTESIS PLEURAL  R pleural effusion plan thoracentesis    Post-operative Diagnosis: Same    Procedure:thoracentesis    Anesthesia: Local    Estimated Blood Loss: < 10 cc    Surgeon: Cande DAVENPORT     Complications: none    Specimen obtained: Yes, fluid, serous    Findings: fluid, drained with catheter drainage      Onur Trivedi II, MD   12/13/2022 1:38 PM

## 2022-12-13 NOTE — PROGRESS NOTES
Patient complaining of Airvo at this time, states she can't tolerate the prongs in her nose, requesting a break off. Placed on 15 L NRB SpO2 95%. Tolerating well, pt understands that if her oxygen goes down she will need to go back on the Airvo. Resting comfortably at this time.     Kristopher Daniel, RRT-ACCS normal...

## 2022-12-13 NOTE — PROGRESS NOTES
[facility] Inpatient Progress Note    Patient is a 60 y.o. female of Seb Mary MD seen in hospital follow up. Chief complaint: Afib    HPI: Some SOB. No CP. Patient Active Problem List   Diagnosis    Obesity (BMI 30-39. 9)    Hypertension    Chronic pain syndrome    Lumbar facet arthropathy    Lumbar disc disorder    Primary osteoarthritis of both hips    Arthritis of right hip    Dysphagia    Heartburn    At risk for colon cancer    Colon polyps    Degenerative disc disease, cervical    Arthropathy of cervical facet joint    Abnormal blood sugar    Arthritis of both hips    COVID-19    H/O total hip arthroplasty, right    History of total left hip arthroplasty    Primary osteoarthritis of right hip    Pulmonary emphysema (HCC)    History of herpes genitalis    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    History of COVID-19    Acute cystitis with hematuria    Aneurysm (HCC)    S/P total right hip arthroplasty    Cognitive impairment    Personal history of colonic polyps    Abnormal diffusion capacity determined by pulmonary function test    BELCHER (dyspnea on exertion)    Acute deep vein thrombosis (DVT) of right peroneal vein (HCC)    Bowel perforation (HCC)       Allergies   Allergen Reactions    Latex Hives     Hives and rash    Iodine Rash     Hives . pt.  States anaphalyxis    Aloe Hives     Hives     Celebrex [Celecoxib] Itching    Fish-Derived Products Other (See Comments)       Current Facility-Administered Medications   Medication Dose Route Frequency Provider Last Rate Last Admin    labetalol (NORMODYNE;TRANDATE) injection 10 mg  10 mg IntraVENous Q30 Min PRN Violette Bynum MD   10 mg at 12/12/22 2044    HYDROmorphone (DILAUDID) injection 0.25 mg  0.25 mg IntraVENous Q4H PRN Violette Bynum MD        Or    HYDROmorphone (DILAUDID) injection 0.5 mg  0.5 mg IntraVENous Q4H PRN Violette Bynum MD   0.5 mg at 12/12/22 1108 hydrALAZINE (APRESOLINE) injection 10 mg  10 mg IntraVENous Q6H PRN Cynthia Maravilla MD        sodium chloride flush 0.9 % injection 10 mL  10 mL IntraVENous PRN Zane Lima DO        dilTIAZem 100 mg in dextrose 5 % 100 mL infusion (ADD-[place])  2.5-15 mg/hr IntraVENous Continuous Shasta Matson MD 10 mL/hr at 12/13/22 0647 10 mg/hr at 12/13/22 0647    pantoprazole (PROTONIX) injection 40 mg  40 mg IntraVENous BID Cynthia Maravilla MD   40 mg at 12/13/22 0743    sodium chloride flush 0.9 % injection 5-40 mL  5-40 mL IntraVENous 2 times per day Corwni Blake MD   10 mL at 12/13/22 0744    sodium chloride flush 0.9 % injection 5-40 mL  5-40 mL IntraVENous PRN Corwin Blake MD        piperacillin-tazobactam (ZOSYN) 3,375 mg in sodium chloride 0.9 % 50 mL IVPB ([id number])  3,375 mg IntraVENous Ezra Dueñas MD   Stopped at 12/13/22 0701    albuterol (PROVENTIL) nebulizer solution 2.5 mg  2.5 mg Nebulization Q4H PRN Corwin Blake MD        [Held by provider] chlorthalidone (HYGROTON) tablet 25 mg  25 mg Oral Daily Corwin Blake MD   25 mg at 12/10/22 0920    gabapentin (NEURONTIN) capsule 300 mg  300 mg Oral TID Corwin Blake MD   300 mg at 12/10/22 2041    [Held by provider] losartan (COZAAR) tablet 100 mg  100 mg Oral Daily Corwin Blake MD   100 mg at 12/10/22 0920    sodium chloride flush 0.9 % injection 5-40 mL  5-40 mL IntraVENous 2 times per day Corwin Blake MD   10 mL at 12/13/22 0743    sodium chloride flush 0.9 % injection 5-40 mL  5-40 mL IntraVENous PRN Corwin Blake MD   10 mL at 12/10/22 1823    0.9 % sodium chloride infusion   IntraVENous PRN Corwin Blake MD   Stopped at 12/13/22 0246    ondansetron (ZOFRAN-ODT) disintegrating tablet 4 mg  4 mg Oral Q8H PRN Corwin Blake MD        Or    ondansetron [facility]) injection 4 mg  4 mg IntraVENous Q6H PRN Corwin Blake MD        lactated ringers infusion   IntraVENous Continuous Svetlana Sinclairain MD Usama 100 mL/hr at 12/13/22 0647 Rate Verify at 12/13/22 0647    acetaminophen (TYLENOL) tablet 650 mg  650 mg Oral Q6H Jalyn Rajan MD   650 mg at 12/11/22 0351    [Held by provider] heparin (porcine) injection 5,000 Units  5,000 Units SubCUTAneous 3 times per day Jalyn Rajan MD   5,000 Units at 12/12/22 1338    ipratropium (ATROVENT) 0.02 % nebulizer solution 0.5 mg  0.5 mg Nebulization 4x daily Jalyn Rajan MD   0.5 mg at 12/13/22 6848    And    Arformoterol Tartrate (BROVANA) nebulizer solution 15 mcg  15 mcg Nebulization BID Jalyn Rajan MD   15 mcg at 12/13/22 0827       Review of systems:   Heart: as above   Lungs: as above   Eyes: denies changes in vision or discharge. Ears: denies changes in hearing or pain. Nose: denies epistaxis or masses   Throat: denies sore throat or trouble swallowing. Neuro: denies numbness, tingling, tremors. Skin: denies rashes or itching. : denies hematuria, dysuria   GI: denies vomiting, diarrhea   Psych: denies mood changed, anxiety, depression. Physical Exam   BP (!) 144/87   Pulse (!) 105   Temp 98.1 °F (36.7 °C) (Oral)   Resp 25   Ht 5' 5\" (1.651 m)   Wt 226 lb (102.5 kg)   SpO2 90%   BMI 37.61 kg/m²   Constitutional: Oriented to person, place, and time. No distress. Well developed. Head: Normocephalic and atraumatic. Neck: Neck supple. No hepatojugular reflux. No JVD present. Carotid bruit is not present. No tracheal deviation present. No thyromegaly present. Cardiovascular: Normal rate, regular rhythm, normal heart sounds. intact distal pulses. No gallop and no friction rub. No murmur heard. Pulmonary: Breath sounds normal. No respiratory distress. No wheezes. No rales. Chest: Effort normal. No tenderness. Abdominal: Soft. Bowel sounds are normal. No distension or mass. No tenderness, rebound or guarding. Musculoskeletal: . No tenderness. No clubbing or cyanosis. Extremitites: Intact distal pulses.  No edema  Neurological: Alert and oriented to person, place, and time. Skin: Skin is warm and dry. No rash noted. Not diaphoretic. No erythema. Psychiatric: Normal mood and affect. Behavior is normal.   Lymphadenopathy: No cervical adenopathy. No groin adenopathy. CBC:   Lab Results   Component Value Date/Time    WBC 7.4 12/13/2022 04:52 AM    RBC 4.17 12/13/2022 04:52 AM    HGB 12.5 12/13/2022 04:52 AM    HCT 42.0 12/13/2022 08:33 AM    HCT 37.1 12/13/2022 04:52 AM    MCV 89.0 12/13/2022 04:52 AM    MCH 30.0 12/13/2022 04:52 AM    MCHC 33.7 12/13/2022 04:52 AM    RDW 12.7 12/13/2022 04:52 AM     12/13/2022 04:52 AM    MPV 9.4 12/13/2022 04:52 AM     BMP:   Lab Results   Component Value Date/Time     12/11/2022 10:30 PM    K 3.3 12/11/2022 10:30 PM    K 3.5 12/11/2022 05:22 AM    CL 98 12/11/2022 10:30 PM    CO2 26 12/11/2022 10:30 PM    BUN 17 12/11/2022 10:30 PM    LABALBU 4.6 11/23/2022 11:31 AM    CREATININE 1.1 12/11/2022 10:30 PM    CALCIUM 9.2 12/11/2022 10:30 PM    GFRAA 50 10/06/2022 09:39 AM    LABGLOM 56 12/11/2022 10:30 PM     Magnesium:    Lab Results   Component Value Date/Time    MG 1.7 12/13/2022 04:52 AM     Cardiac Enzymes:   Lab Results   Component Value Date    TROPHS 18 (H) 12/12/2022    TROPHS 21 (H) 12/11/2022      PT/INR:    Lab Results   Component Value Date/Time    PROTIME 12.3 12/13/2022 04:52 AM    INR 1.1 12/13/2022 04:52 AM     TSH:    Lab Results   Component Value Date/Time    TSH 1.510 05/27/2022 09:18 AM     Rhythm Strip: atrial flutter. ASSESSMENT & PLAN:    Patient Active Problem List   Diagnosis    Obesity (BMI 30-39. 9)    Hypertension    Chronic pain syndrome    Lumbar facet arthropathy    Lumbar disc disorder    Primary osteoarthritis of both hips    Arthritis of right hip    Dysphagia    Heartburn    At risk for colon cancer    Colon polyps    Degenerative disc disease, cervical    Arthropathy of cervical facet joint    Abnormal blood sugar    Arthritis of both hips    COVID-19    H/O total hip arthroplasty, right    History of total left hip arthroplasty    Primary osteoarthritis of right hip    Pulmonary emphysema (HCC)    History of herpes genitalis    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    History of COVID-19    Acute cystitis with hematuria    Aneurysm (HCC)    S/P total right hip arthroplasty    Cognitive impairment    Personal history of colonic polyps    Abnormal diffusion capacity determined by pulmonary function test    BELCHER (dyspnea on exertion)    Acute deep vein thrombosis (DVT) of right peroneal vein (HCC)    Bowel perforation ([address]     1. Atrial Flutter:     Chart/labs/EKG reviewed. Echo ordered. NPO currently. Rate control with IV cardizem gtt and IV dig as needed. Start PO when able. 2. Perforated bowel/ID issues: Per surgery. 3. SOB: Supportive care. 4. ORLIN: Follow labs. 5. Hx of DVT    6. Hx of SAH/Aneurysm    Jeffory Snellen, D.O.   Cardiologist  Cardiology, [facility]

## 2022-12-13 NOTE — PROGRESS NOTES
Physician Progress Note      PATIENT:               Thelma Alvares  CSN #:                  [id number]  :                       1960  ADMIT DATE:       2022 10:18 AM  DISCH DATE:  RESPONDING  PROVIDER #:        Jimy Chavarria MD          QUERY TEXT:    Dear Provider,    Pt underwent a colonoscopy with cold snare biopsy of cecal mass on  ,   noted documentation of cecal perforation. ? If possible, please document in   progress notes and discharge summary the relationship if any between the cecal   perforation and the surgery: The medical record reflects the following:  Risk Factors: Colonoscopy , polypectomy snare/cold biopsy of cecal mass,   Scattered diverticula starting at  30 cm  Clinical Indicators: Patient c/o of ongoing pain in recovery from after   colonoscopy procedure. Xray revealed  pneumoperitoneum. Ileocecectomy findings: cecal perforation, purulent peritonitis. Ileocecectomy  --underwent colonoscopy--iatrogenic perforation of cecum; underwent ex lap   with ileocectomy  Treatment: Imaging, Ileocecectomy    Thank you,  Alivia Diggs RN  Clinical Documentation Improvement  [phone]  Options provided:  -- Cecal perforation is due to the procedure  -- Cecal perforation is not due to the procedure, but is due to scattered   diverticula  -- Other - I will add my own diagnosis  -- Disagree - Not applicable / Not valid  -- Disagree - Clinically unable to determine / Unknown  -- Refer to Clinical Documentation Reviewer    PROVIDER RESPONSE TEXT:    Patient has cecal perforation due to the procedure.     Query created by: Elpidio Dakin on 2022 9:56 AM      Electronically signed by:  Jimy Chavarria MD 2022 7:42 AM

## 2022-12-13 NOTE — PROGRESS NOTES
Comprehensive Nutrition Assessment    Type and Reason for Visit:  Initial (LOS ICU)    Nutrition Recommendations/Plan:      Continue NPO (pending procedure, will add ONS if diet advanced)       Malnutrition Assessment:  Malnutrition Status: At risk for malnutrition (12/13/22 1536)    Context:  Acute Illness     Findings of the 6 clinical characteristics of malnutrition:  Energy Intake:  Mild decrease in energy intake   Weight Loss:  Unable to assess (no hx on file)     Body Fat Loss:  No significant body fat loss     Muscle Mass Loss:  No significant muscle mass loss    Fluid Accumulation:  No significant fluid accumulation     Strength:  Not Performed    Nutrition Assessment:    Pt admit for elective c-scop 2/2 cecal mass complicated by bowel perf now s/p ex lap ileocecectomy. Noted B/L Plueral effusions pending thoracentesis. PMHx tubular adenoma. Minimal nutrition x 4 days. Will monitor & provide recs as needed. Nutrition Related Findings:    Pt alert, MAP WNL, +I/O's 6L, +2 LUE edema, hypoactive BS s/p ex lap, noted diarrhea Wound Type: Surgical Incision (abdomen)       Current Nutrition Intake & Therapies:    Average Meal Intake: NPO     Diet NPO Exceptions are: Sips of Water with Meds    Anthropometric Measures:  Height: 5' 5\" (165.1 cm)  Ideal Body Weight (IBW): 125 lbs (57 kg)    Current Body Weight: 226 lb (102.5 kg) (stated, UTO updated wt), 180.8 % IBW.     Current BMI (kg/m2): 37.6  Usual Body Weight:  (UTO no measured wt hx on file, noted pt denies intentional loss per H&P)                       BMI Categories: Obese Class 2 (BMI 35.0 -39.9)    Estimated Daily Nutrient Needs:  Energy Requirements Based On: Formula  Weight Used for Energy Requirements: Current  Energy (kcal/day): MSJ 1582 x 1.2 SF= 2742-8550  Weight Used for Protein Requirements: Ideal  Protein (g/day): 1.5-1.8 g/kg IBW;   Fluid (ml/day): per critical care    Nutrition Diagnosis:   Inadequate oral intake related to altered GI function (bowel perf) as evidenced by NPO or clear liquid status due to medical condition    Nutrition Interventions:   Nutrition Education/Counseling: No recommendation at this time  Coordination of Nutrition Care: Continue to monitor while inpatient       Goals:    Specify Other Goals: Nutrition Progression    Nutrition Monitoring and Evaluation:      Food/Nutrient Intake Outcomes: Diet Advancement/Tolerance  Physical Signs/Symptoms Outcomes: Biochemical Data, Skin, Nutrition Focused Physical Findings, Weight, GI Status, Fluid Status or Edema, Hemodynamic Status    Discharge Planning:     Too soon to determine     Gladys Finch RD, LD  Contact: ext [phone]

## 2022-12-13 NOTE — PROGRESS NOTES
[facility]  SURGICAL INTENSIVE CARE UNIT (SICU)  ATTENDING PHYSICIAN CRITICAL CARE PROGRESS NOTE     I have examined the patient, reviewed the record, and discussed the case with the APN/ resident. Please refer to the APN/ resident's note. I agree with the assessment and plan. I have reviewed all relevant labs and imaging data. The following summarizes my clinical findings and independent assessment. CC:  critical care management for hypoxia    Hospital Course/Overnight Events:  12/9--underwent colonoscopy--iatrogenic perforation of cecum; underwent ex lap with ileocectomy  12/11--A.fib with RVR  12/12--hypoxia overnight; transferred to SICU; on high flow NC; on cardizem drip  12/13--remains on Cardizem drip; for thoracentesis today (not done yesterday due to prophylactic subQ heparin)    Pt without complaints. Denies SOB or abd pain--states her abdomen is \"sore. \"  Denies flatus.     Asleep but arousable  Follows commands  Hrt:  tachy; no murmur  Lungs:  fairly clear bilaterally; diminished in bases  Abd:  obese; soft; hypoactive BS; minimal expected post-op tenderness; no rebound; no guarding  Skin:  warm/dry  Ext:  moving all 4 ext    Labs personally reviewed    Patient Active Problem List    Diagnosis Date Noted    Bowel perforation ([address] 12/09/2022    BELCHER (dyspnea on exertion) 10/13/2022    Abnormal diffusion capacity determined by pulmonary function test 10/06/2022    Acute deep vein thrombosis (DVT) of right peroneal vein ([address] 10/14/2022    Personal history of colonic polyps 10/05/2022    S/P total right hip arthroplasty 01/10/2022    Cognitive impairment 01/10/2022    Aneurysm ([address] 12/17/2021    Acute cystitis with hematuria 12/16/2021    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 12/03/2021    History of COVID-19 12/03/2021    Pulmonary emphysema ([address] 04/27/2021    History of herpes genitalis 04/27/2021    Primary osteoarthritis of right hip 04/05/2021    History of total left hip arthroplasty 03/08/2021    Arthritis of both hips 02/22/2021    COVID-19     H/O total hip arthroplasty, right     Abnormal blood sugar 02/21/2020    Degenerative disc disease, cervical 12/12/2019    Arthropathy of cervical facet joint 12/12/2019    Colon polyps 09/06/2019    Dysphagia     Heartburn     At risk for colon cancer     Arthritis of right hip 08/07/2019    Chronic pain syndrome 03/01/2019    Lumbar facet arthropathy 03/01/2019    Lumbar disc disorder 03/01/2019    Primary osteoarthritis of both hips 03/01/2019    Hypertension 09/21/2018    Obesity (BMI 30-39.9) 09/10/2018     S/p ex lap/ileocectomy for iatrogenic cecal perf from colonoscopy  Acute respiratory failure with hypoxia--cont supplemental oxygen--wean as able  Bilateral effusions--thoracentesis  A.fib/flutter--on cardizem drip  Post-op ileus--cont NG tube/NPO  Zosyn #5 for intra-abd process/suspected pneumonia  DVT risk--PCDs/subQ heparin    Lengthy discussion with pt and son at bedside yesterday    Pt is at risk for respiratory/hemodynamic/metabolic deterioration for which I am actively managing; requires ICU care    Tyler Albright MD, FACS  12/13/2022  7:46 AM    Critical care time exclusive of teaching and procedures = 37 minutes

## 2022-12-13 NOTE — CARE COORDINATION
Pod #4. On 15L nrb o2. Off cardizem drip. For  IR guided Thoracentesis today. Dc plan to be determined. [address] [place] [address] is following at this time if able to go home at discharge.

## 2022-12-13 NOTE — PLAN OF CARE
Problem: Chronic Conditions and Co-morbidities  Goal: Patient's chronic conditions and co-morbidity symptoms are monitored and maintained or improved  12/13/2022 1032 by Marjorie Kumar RN  Outcome: Progressing  12/12/2022 2342 by Janett Gentile RN  Outcome: Progressing  Flowsheets (Taken 12/12/2022 2000)  Care Plan - Patient's Chronic Conditions and Co-Morbidity Symptoms are Monitored and Maintained or Improved: Monitor and assess patient's chronic conditions and comorbid symptoms for stability, deterioration, or improvement     Problem: Pain  Goal: Verbalizes/displays adequate comfort level or baseline comfort level  12/13/2022 1032 by Marjorie Kumar RN  Outcome: Progressing  12/12/2022 2342 by Janett Gentile RN  Outcome: Progressing     Problem: Discharge Planning  Goal: Discharge to home or other facility with appropriate resources  12/13/2022 1032 by Marjorie Kumar RN  Outcome: Progressing  12/12/2022 2342 by Janett Gentile RN  Outcome: Brittani Labor (Taken 12/12/2022 2000)  Discharge to home or other facility with appropriate resources: Identify barriers to discharge with patient and caregiver     Problem: Skin/Tissue Integrity  Goal: Absence of new skin breakdown  Description: 1. Monitor for areas of redness and/or skin breakdown  2. Assess vascular access sites hourly  3. Every 4-6 hours minimum:  Change oxygen saturation probe site  4. Every 4-6 hours:  If on nasal continuous positive airway pressure, respiratory therapy assess nares and determine need for appliance change or resting period.   12/13/2022 1032 by Marjorie Kumar RN  Outcome: Progressing  12/12/2022 2342 by Janett Gentile RN  Outcome: Progressing     Problem: Safety - Adult  Goal: Free from fall injury  12/13/2022 1032 by Marjorie Kumar RN  Outcome: Progressing  12/12/2022 2342 by Janett Gentile RN  Outcome: Progressing  Flowsheets (Taken 12/12/2022 2200)  Free From Fall Injury: Emily Good family/caregiver on patient safety     Problem: ABCDS Injury Assessment  Goal: Absence of physical injury  12/13/2022 1032 by Trupti Gonzalez RN  Outcome: Progressing  12/12/2022 2342 by Renee Rodriguez RN  Outcome: Progressing  Flowsheets (Taken 12/12/2022 2200)  Absence of Physical Injury: Implement safety measures based on patient assessment     Problem: Neurosensory - Adult  Goal: Achieves stable or improved neurological status  12/13/2022 1032 by Trupti Gonzalez RN  Outcome: Progressing  12/12/2022 2342 by Renee Rodriguez RN  Outcome: Progressing  Goal: Achieves maximal functionality and self care  Outcome: Progressing     Problem: Respiratory - Adult  Goal: Achieves optimal ventilation and oxygenation  12/13/2022 1032 by Trupti Gonzalez RN  Outcome: Progressing  12/12/2022 2342 by Renee Rodriguez RN  Outcome: Progressing  Flowsheets (Taken 12/12/2022 2200)  Achieves optimal ventilation and oxygenation:   Assess for changes in respiratory status   Assess for changes in mentation and behavior     Problem: Cardiovascular - Adult  Goal: Maintains optimal cardiac output and hemodynamic stability  12/13/2022 1032 by Trupti Gonzalez RN  Outcome: Progressing  12/12/2022 2342 by Renee Rodriguez RN  Outcome: Progressing  Goal: Absence of cardiac dysrhythmias or at baseline  Outcome: Progressing     Problem: Skin/Tissue Integrity - Adult  Goal: Skin integrity remains intact  12/13/2022 1032 by Trupti Gonzalez RN  Outcome: Progressing  Flowsheets (Taken 12/13/2022 1032)  Skin Integrity Remains Intact:   Monitor for areas of redness and/or skin breakdown   Assess vascular access sites hourly   Every 4-6 hours: If on nasal continuous positive airway pressure, respiratory therapy assesses nares and determine need for appliance change or resting period   Every 4-6 hours minimum: Change oxygen saturation probe site  12/12/2022 2342 by Renee Rodriguez RN  Outcome: Progressing  Flowsheets  Taken 12/12/2022 2200  Skin Integrity Remains Intact: Monitor for areas of redness and/or skin breakdown  Taken 12/12/2022 2000  Skin Integrity Remains Intact: Monitor for areas of redness and/or skin breakdown  Goal: Incisions, wounds, or drain sites healing without S/S of infection  Outcome: Progressing  Goal: Oral mucous membranes remain intact  Outcome: Progressing     Problem: Musculoskeletal - Adult  Goal: Return mobility to safest level of function  12/13/2022 1032 by Fritz Iniguez RN  Outcome: Progressing  12/12/2022 2342 by Rob Urrutia RN  Outcome: Progressing  Goal: Return ADL status to a safe level of function  Outcome: Progressing     Problem: Gastrointestinal - Adult  Goal: Minimal or absence of nausea and vomiting  12/13/2022 1032 by Fritz Iinguez RN  Outcome: Progressing  12/12/2022 2342 by Rob Urrutia RN  Outcome: Progressing  Flowsheets (Taken 12/12/2022 2000)  Minimal or absence of nausea and vomiting: Administer IV fluids as ordered to ensure adequate hydration  Goal: Maintains or returns to baseline bowel function  Outcome: Progressing  Goal: Maintains adequate nutritional intake  Outcome: Progressing     Problem: Genitourinary - Adult  Goal: Absence of urinary retention  12/13/2022 1032 by Fritz Iniguez RN  Outcome: Progressing  12/12/2022 2342 by Rob Urrutia RN  Outcome: Progressing  Goal: Urinary catheter remains patent  12/13/2022 1032 by Fritz Iniguez RN  Outcome: Progressing  12/12/2022 2342 by Rob Urrutia RN  Outcome: Progressing     Problem: Infection - Adult  Goal: Absence of infection at discharge  12/13/2022 1032 by Fritz Iniguez RN  Outcome: Progressing  12/12/2022 2342 by Rob Urrutia RN  Outcome: Progressing  Flowsheets (Taken 12/12/2022 2000)  Absence of infection at discharge:   Assess and monitor for signs and symptoms of infection   Monitor lab/diagnostic results  Goal: Absence of infection during hospitalization  12/13/2022 1032 by Alvester Jennifer, RN  Outcome: Progressing  12/12/2022 2342 by Milon Essex, RN  Outcome: Progressing     Problem: Metabolic/Fluid and Electrolytes - Adult  Goal: Electrolytes maintained within normal limits  12/13/2022 1032 by Fredy Rodriguez RN  Outcome: Progressing  12/12/2022 2342 by Milon Essex, RN  Outcome: Progressing  Flowsheets (Taken 12/12/2022 2000)  Electrolytes maintained within normal limits:   Monitor labs and assess patient for signs and symptoms of electrolyte imbalances   Administer electrolyte replacement as ordered  Goal: Hemodynamic stability and optimal renal function maintained  12/13/2022 1032 by Fredy Rodriguez RN  Outcome: Progressing  12/12/2022 2342 by Milon Essex, RN  Outcome: Progressing     Problem: Hematologic - Adult  Goal: Maintains hematologic stability  12/13/2022 1032 by Fredy Rodriguez RN  Outcome: Progressing  12/12/2022 2342 by Milon Essex, RN  Outcome: Progressing  Flowsheets (Taken 12/12/2022 2000)  Maintains hematologic stability: Assess for signs and symptoms of bleeding or hemorrhage

## 2022-12-14 ENCOUNTER — APPOINTMENT (OUTPATIENT)
Dept: GENERAL RADIOLOGY | Age: 62
DRG: 329 | End: 2022-12-14
Attending: SURGERY
Payer: MEDICARE

## 2022-12-14 LAB
ALBUMIN SERPL-MCNC: 2.8 G/DL (ref 3.5–5.2)
ALP BLD-CCNC: 68 U/L (ref 35–104)
ALT SERPL-CCNC: 20 U/L (ref 0–32)
ANION GAP SERPL CALCULATED.3IONS-SCNC: 13 MMOL/L (ref 7–16)
AST SERPL-CCNC: 28 U/L (ref 0–31)
BASOPHILS ABSOLUTE: 0.04 E9/L (ref 0–0.2)
BASOPHILS RELATIVE PERCENT: 0.5 % (ref 0–2)
BILIRUB SERPL-MCNC: 1 MG/DL (ref 0–1.2)
BUN BLDV-MCNC: 16 MG/DL (ref 6–23)
CALCIUM IONIZED: 1.24 MMOL/L (ref 1.15–1.33)
CALCIUM SERPL-MCNC: 8.5 MG/DL (ref 8.6–10.2)
CHLORIDE BLD-SCNC: 99 MMOL/L (ref 98–107)
CO2: 27 MMOL/L (ref 22–29)
CREAT SERPL-MCNC: 0.9 MG/DL (ref 0.5–1)
EOSINOPHILS ABSOLUTE: 0.24 E9/L (ref 0.05–0.5)
EOSINOPHILS RELATIVE PERCENT: 3.2 % (ref 0–6)
GFR SERPL CREATININE-BSD FRML MDRD: >60 ML/MIN/1.73
GLUCOSE BLD-MCNC: 81 MG/DL (ref 74–99)
HCT VFR BLD CALC: 34.6 % (ref 34–48)
HEMOGLOBIN: 11.7 G/DL (ref 11.5–15.5)
IMMATURE GRANULOCYTES #: 0.07 E9/L
IMMATURE GRANULOCYTES %: 0.9 % (ref 0–5)
LYMPHOCYTES ABSOLUTE: 1.08 E9/L (ref 1.5–4)
LYMPHOCYTES RELATIVE PERCENT: 14.3 % (ref 20–42)
MAGNESIUM: 2.2 MG/DL (ref 1.6–2.6)
MCH RBC QN AUTO: 29.5 PG (ref 26–35)
MCHC RBC AUTO-ENTMCNC: 33.8 % (ref 32–34.5)
MCV RBC AUTO: 87.2 FL (ref 80–99.9)
MONOCYTES ABSOLUTE: 0.58 E9/L (ref 0.1–0.95)
MONOCYTES RELATIVE PERCENT: 7.7 % (ref 2–12)
NEUTROPHILS ABSOLUTE: 5.54 E9/L (ref 1.8–7.3)
NEUTROPHILS RELATIVE PERCENT: 73.4 % (ref 43–80)
PDW BLD-RTO: 12.9 FL (ref 11.5–15)
PHOSPHORUS: 3.8 MG/DL (ref 2.5–4.5)
PLATELET # BLD: 224 E9/L (ref 130–450)
PMV BLD AUTO: 9.2 FL (ref 7–12)
POTASSIUM SERPL-SCNC: 3.6 MMOL/L (ref 3.5–5)
RBC # BLD: 3.97 E12/L (ref 3.5–5.5)
SODIUM BLD-SCNC: 139 MMOL/L (ref 132–146)
TOTAL PROTEIN: 5.7 G/DL (ref 6.4–8.3)
WBC # BLD: 7.6 E9/L (ref 4.5–11.5)

## 2022-12-14 PROCEDURE — 2580000003 HC RX 258: Performed by: STUDENT IN AN ORGANIZED HEALTH CARE EDUCATION/TRAINING PROGRAM

## 2022-12-14 PROCEDURE — 85025 COMPLETE CBC W/AUTO DIFF WBC: CPT

## 2022-12-14 PROCEDURE — 80053 COMPREHEN METABOLIC PANEL: CPT

## 2022-12-14 PROCEDURE — 99291 CRITICAL CARE FIRST HOUR: CPT | Performed by: SURGERY

## 2022-12-14 PROCEDURE — 94640 AIRWAY INHALATION TREATMENT: CPT

## 2022-12-14 PROCEDURE — 36592 COLLECT BLOOD FROM PICC: CPT

## 2022-12-14 PROCEDURE — 2000000000 HC ICU R&B

## 2022-12-14 PROCEDURE — 2700000000 HC OXYGEN THERAPY PER DAY

## 2022-12-14 PROCEDURE — 6370000000 HC RX 637 (ALT 250 FOR IP): Performed by: STUDENT IN AN ORGANIZED HEALTH CARE EDUCATION/TRAINING PROGRAM

## 2022-12-14 PROCEDURE — 6370000000 HC RX 637 (ALT 250 FOR IP)

## 2022-12-14 PROCEDURE — 6370000000 HC RX 637 (ALT 250 FOR IP): Performed by: INTERNAL MEDICINE

## 2022-12-14 PROCEDURE — 84100 ASSAY OF PHOSPHORUS: CPT

## 2022-12-14 PROCEDURE — 83735 ASSAY OF MAGNESIUM: CPT

## 2022-12-14 PROCEDURE — 6360000002 HC RX W HCPCS: Performed by: STUDENT IN AN ORGANIZED HEALTH CARE EDUCATION/TRAINING PROGRAM

## 2022-12-14 PROCEDURE — 99233 SBSQ HOSP IP/OBS HIGH 50: CPT | Performed by: INTERNAL MEDICINE

## 2022-12-14 PROCEDURE — 2500000003 HC RX 250 WO HCPCS: Performed by: STUDENT IN AN ORGANIZED HEALTH CARE EDUCATION/TRAINING PROGRAM

## 2022-12-14 PROCEDURE — 36415 COLL VENOUS BLD VENIPUNCTURE: CPT

## 2022-12-14 PROCEDURE — 71045 X-RAY EXAM CHEST 1 VIEW: CPT

## 2022-12-14 PROCEDURE — C9113 INJ PANTOPRAZOLE SODIUM, VIA: HCPCS | Performed by: STUDENT IN AN ORGANIZED HEALTH CARE EDUCATION/TRAINING PROGRAM

## 2022-12-14 PROCEDURE — 82330 ASSAY OF CALCIUM: CPT

## 2022-12-14 RX ORDER — METOPROLOL SUCCINATE 50 MG/1
25 TABLET, EXTENDED RELEASE ORAL 2 TIMES DAILY
Status: DISCONTINUED | OUTPATIENT
Start: 2022-12-14 | End: 2022-12-16

## 2022-12-14 RX ORDER — DIGOXIN 125 MCG
125 TABLET ORAL DAILY
Status: DISCONTINUED | OUTPATIENT
Start: 2022-12-14 | End: 2022-12-21 | Stop reason: HOSPADM

## 2022-12-14 RX ORDER — OXYCODONE HYDROCHLORIDE 10 MG/1
10 TABLET ORAL EVERY 4 HOURS PRN
Status: DISCONTINUED | OUTPATIENT
Start: 2022-12-14 | End: 2022-12-21 | Stop reason: HOSPADM

## 2022-12-14 RX ORDER — OXYCODONE HYDROCHLORIDE 5 MG/1
5 TABLET ORAL EVERY 4 HOURS PRN
Status: DISCONTINUED | OUTPATIENT
Start: 2022-12-14 | End: 2022-12-21 | Stop reason: HOSPADM

## 2022-12-14 RX ADMIN — PANTOPRAZOLE SODIUM 40 MG: 40 INJECTION, POWDER, FOR SOLUTION INTRAVENOUS at 19:51

## 2022-12-14 RX ADMIN — PIPERACILLIN AND TAZOBACTAM 3375 MG: 3; .375 INJECTION, POWDER, FOR SOLUTION INTRAVENOUS at 02:16

## 2022-12-14 RX ADMIN — PANTOPRAZOLE SODIUM 40 MG: 40 INJECTION, POWDER, FOR SOLUTION INTRAVENOUS at 08:45

## 2022-12-14 RX ADMIN — POTASSIUM BICARBONATE 40 MEQ: 782 TABLET, EFFERVESCENT ORAL at 08:41

## 2022-12-14 RX ADMIN — ARFORMOTEROL TARTRATE 15 MCG: 15 SOLUTION RESPIRATORY (INHALATION) at 20:36

## 2022-12-14 RX ADMIN — GABAPENTIN 300 MG: 300 CAPSULE ORAL at 19:51

## 2022-12-14 RX ADMIN — IPRATROPIUM BROMIDE 0.5 MG: 0.5 SOLUTION RESPIRATORY (INHALATION) at 08:16

## 2022-12-14 RX ADMIN — GABAPENTIN 300 MG: 300 CAPSULE ORAL at 14:30

## 2022-12-14 RX ADMIN — OXYCODONE HYDROCHLORIDE 10 MG: 10 TABLET ORAL at 23:23

## 2022-12-14 RX ADMIN — ACETAMINOPHEN 650 MG: 325 TABLET ORAL at 14:30

## 2022-12-14 RX ADMIN — METOPROLOL SUCCINATE 25 MG: 50 TABLET, EXTENDED RELEASE ORAL at 19:51

## 2022-12-14 RX ADMIN — ACETAMINOPHEN 650 MG: 325 TABLET ORAL at 19:51

## 2022-12-14 RX ADMIN — SODIUM CHLORIDE, PRESERVATIVE FREE 10 ML: 5 INJECTION INTRAVENOUS at 08:41

## 2022-12-14 RX ADMIN — PIPERACILLIN AND TAZOBACTAM 3375 MG: 3; .375 INJECTION, POWDER, FOR SOLUTION INTRAVENOUS at 18:19

## 2022-12-14 RX ADMIN — METOPROLOL SUCCINATE 25 MG: 50 TABLET, EXTENDED RELEASE ORAL at 10:48

## 2022-12-14 RX ADMIN — HEPARIN SODIUM 5000 UNITS: 10000 INJECTION INTRAVENOUS; SUBCUTANEOUS at 05:40

## 2022-12-14 RX ADMIN — SODIUM CHLORIDE, POTASSIUM CHLORIDE, SODIUM LACTATE AND CALCIUM CHLORIDE: 600; 310; 30; 20 INJECTION, SOLUTION INTRAVENOUS at 10:06

## 2022-12-14 RX ADMIN — ACETAMINOPHEN 650 MG: 325 TABLET ORAL at 08:41

## 2022-12-14 RX ADMIN — IPRATROPIUM BROMIDE 0.5 MG: 0.5 SOLUTION RESPIRATORY (INHALATION) at 16:09

## 2022-12-14 RX ADMIN — PIPERACILLIN AND TAZOBACTAM 3375 MG: 3; .375 INJECTION, POWDER, FOR SOLUTION INTRAVENOUS at 10:05

## 2022-12-14 RX ADMIN — IPRATROPIUM BROMIDE 0.5 MG: 0.5 SOLUTION RESPIRATORY (INHALATION) at 20:36

## 2022-12-14 RX ADMIN — IPRATROPIUM BROMIDE 0.5 MG: 0.5 SOLUTION RESPIRATORY (INHALATION) at 12:34

## 2022-12-14 RX ADMIN — GABAPENTIN 300 MG: 300 CAPSULE ORAL at 08:41

## 2022-12-14 RX ADMIN — DIGOXIN 125 MCG: 125 TABLET ORAL at 08:41

## 2022-12-14 RX ADMIN — SODIUM CHLORIDE, PRESERVATIVE FREE 10 ML: 5 INJECTION INTRAVENOUS at 19:51

## 2022-12-14 RX ADMIN — HEPARIN SODIUM 5000 UNITS: 10000 INJECTION INTRAVENOUS; SUBCUTANEOUS at 14:32

## 2022-12-14 RX ADMIN — ARFORMOTEROL TARTRATE 15 MCG: 15 SOLUTION RESPIRATORY (INHALATION) at 08:16

## 2022-12-14 RX ADMIN — LABETALOL HYDROCHLORIDE 10 MG: 5 INJECTION INTRAVENOUS at 06:44

## 2022-12-14 ASSESSMENT — PAIN DESCRIPTION - LOCATION
LOCATION: ABDOMEN
LOCATION: ABDOMEN

## 2022-12-14 ASSESSMENT — PAIN SCALES - GENERAL
PAINLEVEL_OUTOF10: 8
PAINLEVEL_OUTOF10: 0
PAINLEVEL_OUTOF10: 4
PAINLEVEL_OUTOF10: 5

## 2022-12-14 ASSESSMENT — PAIN DESCRIPTION - DESCRIPTORS
DESCRIPTORS: DULL
DESCRIPTORS: ACHING

## 2022-12-14 ASSESSMENT — PAIN DESCRIPTION - ORIENTATION
ORIENTATION: MID
ORIENTATION: MID

## 2022-12-14 NOTE — PROGRESS NOTES
[facility]  SURGICAL INTENSIVE CARE UNIT (SICU)  ATTENDING PHYSICIAN CRITICAL CARE PROGRESS NOTE     I have examined the patient, reviewed the record, and discussed the case with the APN/ resident. Please refer to the APN/ resident's note. I agree with the assessment and plan. I have reviewed all relevant labs and imaging data. The following summarizes my clinical findings and independent assessment. CC:  critical care management for hypoxia    Hospital Course/Overnight Events:  12/9--underwent colonoscopy--iatrogenic perforation of cecum; underwent ex lap with ileocectomy  12/11--A.fib with RVR  12/12--hypoxia overnight; transferred to SICU; on high flow NC; on cardizem drip  12/13--remains on Cardizem drip; for thoracentesis today (not done yesterday due to prophylactic subQ heparin)  12/14--underwent thoracentesis yesterday    Pt denies shortness of breath. Denies abd pain. Reports passing some flatus and had small BM.     Awake and alert  Follows commands  Hrt:  tachy/irregular; no murmur  Lungs:  fairly clear bilaterally; diminished in bases  Abd:  obese; soft; hypoactive BS; nontender; no rebound; no guarding  Skin:  warm/dry  Ext:  moving all 4 ext    Labs personally reviewed  Films personally reviewed/interpreted--persistent left effusion    Patient Active Problem List    Diagnosis Date Noted    Bowel perforation ([address] 12/09/2022    BELCHER (dyspnea on exertion) 10/13/2022    Abnormal diffusion capacity determined by pulmonary function test 10/06/2022    Acute deep vein thrombosis (DVT) of right peroneal vein ([address] 10/14/2022    Personal history of colonic polyps 10/05/2022    S/P total right hip arthroplasty 01/10/2022    Cognitive impairment 01/10/2022    Aneurysm ([address] 12/17/2021    Acute cystitis with hematuria 12/16/2021    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 12/03/2021    History of COVID-19 12/03/2021    Pulmonary emphysema ([address] 04/27/2021 History of herpes genitalis 04/27/2021    Primary osteoarthritis of right hip 04/05/2021    History of total left hip arthroplasty 03/08/2021    Arthritis of both hips 02/22/2021    COVID-19     H/O total hip arthroplasty, right     Abnormal blood sugar 02/21/2020    Degenerative disc disease, cervical 12/12/2019    Arthropathy of cervical facet joint 12/12/2019    Colon polyps 09/06/2019    Dysphagia     Heartburn     At risk for colon cancer     Arthritis of right hip 08/07/2019    Chronic pain syndrome 03/01/2019    Lumbar facet arthropathy 03/01/2019    Lumbar disc disorder 03/01/2019    Primary osteoarthritis of both hips 03/01/2019    Hypertension 09/21/2018    Obesity (BMI 30-39.9) 09/10/2018     S/p ex lap/ileocectomy for iatrogenic cecal perf from colonoscopy  Acute respiratory failure with hypoxia--cont supplemental oxygen--wean as able  Bilateral effusions--s/p right thoracentesis; may need left side drained  A.fib/flutter--off of cardizem drip; start dig  Post-op ileus--start clears  Zosyn #6 for intra-abd process/suspected pneumonia  DVT risk--PCDs/subQ heparin    Pt is at risk for respiratory/hemodynamic/metabolic deterioration for which I am actively managing; requires ICU care    Gracy Hart MD, FACS  12/14/2022  7:38 AM    Critical care time exclusive of teaching and procedures = 36 minutes

## 2022-12-14 NOTE — PLAN OF CARE
Problem: Pain  Goal: Verbalizes/displays adequate comfort level or baseline comfort level  12/14/2022 0928 by Vidal Harman RN  Outcome: Progressing     Problem: Skin/Tissue Integrity  Goal: Absence of new skin breakdown  Description: 1. Monitor for areas of redness and/or skin breakdown  2. Assess vascular access sites hourly  3. Every 4-6 hours minimum:  Change oxygen saturation probe site  4. Every 4-6 hours:  If on nasal continuous positive airway pressure, respiratory therapy assess nares and determine need for appliance change or resting period.   12/14/2022 0928 by Vidal Harman RN  Outcome: Progressing     Problem: Safety - Adult  Goal: Free from fall injury  12/14/2022 0928 by Vidal Harman RN  Outcome: Progressing     Problem: ABCDS Injury Assessment  Goal: Absence of physical injury  12/14/2022 0928 by Vidal Harman RN  Outcome: Progressing     Problem: Respiratory - Adult  Goal: Achieves optimal ventilation and oxygenation  12/14/2022 0928 by Vidal Harman RN  Outcome: Progressing     Problem: Skin/Tissue Integrity - Adult  Goal: Skin integrity remains intact  12/14/2022 0928 by Vidal Harman RN  Outcome: Progressing     Problem: Genitourinary - Adult  Goal: Urinary catheter remains patent  Outcome: Progressing

## 2022-12-14 NOTE — PROGRESS NOTES
Surgical Intensive Care Unit   Daily Progress Note     Patient's name:  Radha Mcclellan  Age/Gender: 60 y.o. female  Date of Admission: 12/9/2022 10:18 AM  Length of Stay: 5    Reason for ICU: Perforated bowel    Overnight Events: No acute events overnight. Reports some flatus, no BM. Respirations improved after thoracentesis. Hospital Course:   12/9 patient underwent colonoscopy with snare and cold biopsy of cecal mass which was complicated by cecal perforation. She underwent exploratory laparotomy with ileocecectomy  12/10 she was started on gum and hard candy. 12/11 she developed nausea and emesis and had an NG tube placed. 12/12 she developed new onset a fib with rvr, started on cordarone followed by cardizem. CTA PE showed no PE.  12/13: Heart rate improved on cardizem gtt. IR today for thoracentesis  12/14:Patient tolerated thoracentesis procedure yesterday    Problem List:   Patient Active Problem List   Diagnosis    Obesity (BMI 30-39. 9)    Hypertension    Chronic pain syndrome    Lumbar facet arthropathy    Lumbar disc disorder    Primary osteoarthritis of both hips    Arthritis of right hip    Dysphagia    Heartburn    At risk for colon cancer    Colon polyps    Degenerative disc disease, cervical    Arthropathy of cervical facet joint    Abnormal blood sugar    Arthritis of both hips    COVID-19    H/O total hip arthroplasty, right    History of total left hip arthroplasty    Primary osteoarthritis of right hip    Pulmonary emphysema (HCC)    History of herpes genitalis    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    History of COVID-19    Acute cystitis with hematuria    Aneurysm (HCC)    S/P total right hip arthroplasty    Cognitive impairment    Personal history of colonic polyps    Abnormal diffusion capacity determined by pulmonary function test    BELCHER (dyspnea on exertion)    Acute deep vein thrombosis (DVT) of right peroneal vein (HCC)    Bowel perforation [facility]       Surgical/Interventional Procedures:   Thoracentesis     Vent Settings: Additional Respiratory Assessments  Heart Rate: 86  Resp: 19  SpO2: 93 %  Humidification Source: Heated wire  Humidification Temp: 31  ABG:   Recent Labs     22  0824 22  08   PH 7.554*  --    PCO2 33.1*  --    PO2 53.0*  --    HCO3 28.6* 25.1   BE 6.5* 3.9*   O2SAT 90.8* 90.8*       I/O:  I/O last 3 completed shifts: In: 4369.1 [P.O.:60; I.V.:3938.5; IV Piggyback:370.6]  Out: 9680 [Urine:2540; Emesis/NG output:800; Other:400]  No intake/output data recorded.   Urinary Catheter 22-Output (mL): 40 mL  [REMOVED] NG/OG/NJ/NE Tube Nasogastric 14 fr Left nostril-Output (mL): 200 ml  Stool (measured) : 0 mL      Drips:   dilTIAZem (CARDIZEM) 100 mg in dextrose 5% 100 mL (ADD-[place]) Stopped (22 1121)    sodium chloride Stopped (22 1832)    lactated ringers 100 mL/hr at 22 0620       Physical Exam:   /76   Pulse 86   Temp 98.1 °F (36.7 °C) (Axillary)   Resp 19   Ht 5' 5\" (1.651 m)   Wt 226 lb (102.5 kg)   SpO2 93%   BMI 37.61 kg/m²     Average, Min, and Max for last 24 hours Vitals:  Temp:  Temp  Av.6 °F (37 °C)  Min: 98.1 °F (36.7 °C)  Max: 99.2 °F (37.3 °C)  RR: Resp  Av.4  Min: 14  Max: 29  HR: Pulse  Av.7  Min: 76  Max: 893  BP:  Systolic (00BBG), KCO:839 , Min:100 , MJU:194   ; Diastolic (54APF), PDF:31, Min:60, Max:92    SpO2: SpO2  Av.5 %  Min: 88 %  Max: 100 %        GCS:    4 - Opens eyes on own   6 - Follows simple motor commands  5 - Alert and oriented    Pupil size:  Left 3 mm    Right 3 mm    Pupil reaction: Yes    Wiggles fingers: Left Yes Right Yes    Hand grasp:   Left normal       Right normal    Wiggles toes: Left Yes    Right Yes    Plantar flexion: Left normal     Right normal      CONSTITUTIONAL: no acute distress, lying in hospital bed,  NEUROLOGIC: PERRL, oriented x 4  CARDIOVASCULAR: S1 S2, tachycardic  PULMONARY: clear breath sounds b/l, no use of accessory muscles  RENAL: fischer to gravity, clear yellow urine  ABDOMEN: soft, obese, generalized tenderness to palpation,  midline incision with bandage in place, nondistended, nontympanic, no masses, no organomegaly, normal bowel sounds   MUSCULOSKELETAL: moves all extremities purposefully, 5/5 strength   SKIN/EXTREMITIES: no rashes/ecchymosis, no edema/clubbing, warm/dry, good capillary refill       ASSESSMENT / PLAN:   Neuro:  acute pain- tylenol dilaudid, neurontin  CV: A. Flutter/fib- off cardizem drip, start digoxin, cardiology following, echo pending  HTN- norvas  Pulm: B/l pulmonary effusions, acute respiratory failure with hypoxia-  wean supplemental oxygen as able Pulmonary toilet, right thoracentesis 12/13, consult IR for Left thoracentesis  GI:  S/p ex lap/ileocectomy for iatrogenic cecal perf from colonoscopy- PostOP ileus- advance diet to clears  Renal: ORLIN- LR 100cc, replace electrolytes as needed, monitor BUN/creat/UO  ID: intra abdominal process/ possible pneumonia- zosyn   Endocrine: maintain glucose <180  MSK: PT/OT- AMPAC score    Heme: no active issues      Pain/Analgesia: dilaudid, tylenol  Bowel regimen: protonix  Diet: ADULT DIET; Clear Liquid;  Low Sodium (2 gm)  DVT proph: heparin- held for IR  GI proph: protonix  Mouth/eye care: per patient  Fischer: present  CVC sites: Right IJ  Ancillary consults: Cardiology  Family Update: as available  CODE Status: Full Code    Dispo: SICU      Electronically signed by Harriet Flores DO 12/14/2022  7:48 AM

## 2022-12-14 NOTE — PROGRESS NOTES
Spoke with RN regarding patient's ordered L thoracentesis. Pt received heparin this morning. RN reports that L Thoracentesis request is for tomorrow 12/15/22. Patient is able to sign consent.

## 2022-12-14 NOTE — PROGRESS NOTES
[id number] [facility] Cardiology Inpatient Progress Note    Patient is a 60 y.o. female of Naveen Medina MD seen in hospital follow up. Chief complaint: Afib    HPI: Some SOB. No CP. Patient Active Problem List   Diagnosis    Obesity (BMI 30-39. 9)    Hypertension    Chronic pain syndrome    Lumbar facet arthropathy    Lumbar disc disorder    Primary osteoarthritis of both hips    Arthritis of right hip    Dysphagia    Heartburn    At risk for colon cancer    Colon polyps    Degenerative disc disease, cervical    Arthropathy of cervical facet joint    Abnormal blood sugar    Arthritis of both hips    COVID-19    H/O total hip arthroplasty, right    History of total left hip arthroplasty    Primary osteoarthritis of right hip    Pulmonary emphysema (HCC)    History of herpes genitalis    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    History of COVID-19    Acute cystitis with hematuria    Aneurysm (HCC)    S/P total right hip arthroplasty    Cognitive impairment    Personal history of colonic polyps    Abnormal diffusion capacity determined by pulmonary function test    BELCHER (dyspnea on exertion)    Acute deep vein thrombosis (DVT) of right peroneal vein (HCC)    Bowel perforation (HCC)       Allergies   Allergen Reactions    Latex Hives     Hives and rash    Iodine Rash     Hives . pt.  States anaphalyxis    Aloe Hives     Hives     Celebrex [Celecoxib] Itching    Fish-Derived Products Other (See Comments)       Current Facility-Administered Medications   Medication Dose Route Frequency Provider Last Rate Last Admin    oxyCODONE (ROXICODONE) immediate release tablet 5 mg  5 mg Oral Q4H PRN Kranthi Navarro MD        Or    oxyCODONE HCl (OXY-IR) immediate release tablet 10 mg  10 mg Oral Q4H PRN Kranthi Navarro MD        digoxin (LANOXIN) tablet 125 mcg  125 mcg Oral Daily Kranthi Navarro MD   125 mcg at 12/14/22 0841    perflutren lipid microspheres (DEFINITY) injection 1.5 mL  1.5 mL IntraVENous ONCE PRN Vanessa Sandy DO        labetalol (NORMODYNE;TRANDATE) injection 10 mg  10 mg IntraVENous Q30 Min PRN Stacie Mccloud MD   10 mg at 12/14/22 0644    HYDROmorphone (DILAUDID) injection 0.25 mg  0.25 mg IntraVENous Q4H PRN Stacie Mccloud MD        Or    HYDROmorphone (DILAUDID) injection 0.5 mg  0.5 mg IntraVENous Q4H PRN Stacie Mccloud MD   0.5 mg at 12/13/22 1836    hydrALAZINE (APRESOLINE) injection 10 mg  10 mg IntraVENous Q6H PRN Stacie Mccloud MD        sodium chloride flush 0.9 % injection 10 mL  10 mL IntraVENous PRN Zane Lima DO        dilTIAZem 100 mg in dextrose 5 % 100 mL infusion ([facility])  2.5-15 mg/hr IntraVENous Continuous Hoang Landeros MD   Stopped at 12/13/22 1121    pantoprazole (PROTONIX) injection 40 mg  40 mg IntraVENous BID Stacie Mccloud MD   40 mg at 12/14/22 0845    sodium chloride flush 0.9 % injection 5-40 mL  5-40 mL IntraVENous 2 times per day Ovidio Mendoza MD   10 mL at 12/13/22 2045    sodium chloride flush 0.9 % injection 5-40 mL  5-40 mL IntraVENous PRN Ovidio Mendoza MD        piperacillin-tazobactam (ZOSYN) 3,375 mg in sodium chloride 0.9 % 50 mL IVPB ([id number])  3,375 mg IntraVENous Garrick Monday, MD 12.5 mL/hr at 12/14/22 1005 3,375 mg at 12/14/22 1005    albuterol (PROVENTIL) nebulizer solution 2.5 mg  2.5 mg Nebulization Q4H PRN Ovidio Mendoza MD        [Held by provider] chlorthalidone (HYGROTON) tablet 25 mg  25 mg Oral Daily Ovidio Mendoza MD   25 mg at 12/10/22 0920    gabapentin (NEURONTIN) capsule 300 mg  300 mg Oral TID Ovidio Mendoza MD   300 mg at 12/14/22 0841    [Held by provider] losartan (COZAAR) tablet 100 mg  100 mg Oral Daily Ovidio Mendoza MD   100 mg at 12/10/22 0920    sodium chloride flush 0.9 % injection 5-40 mL  5-40 mL IntraVENous 2 times per day Ovidio Mendoza MD   10 mL at 12/14/22 0841    sodium chloride flush 0.9 % injection 5-40 mL  5-40 mL IntraVENous PRN Patric Urbina MD   10 mL at 12/10/22 1823    0.9 % sodium chloride infusion   IntraVENous PRN Patric Urbina MD   Stopped at 12/13/22 1832    ondansetron (ZOFRAN-ODT) disintegrating tablet 4 mg  4 mg Oral Q8H PRN Patric Urbina MD        Or    [facility]ron [facility] injection 4 mg  4 mg IntraVENous Q6H PRN Patric Urbina MD   4 mg at 12/13/22 1452    lactated ringers infusion   IntraVENous Continuous Oralia Logan  mL/hr at 12/14/22 1006 New Bag at 12/14/22 1006    acetaminophen (TYLENOL) tablet 650 mg  650 mg Oral Q6H Patric Urbina MD   650 mg at 12/14/22 0841    heparin (porcine) injection 5,000 Units  5,000 Units SubCUTAneous 3 times per day Patric Urbina MD   5,000 Units at 12/14/22 0540    ipratropium (ATROVENT) 0.02 % nebulizer solution 0.5 mg  0.5 mg Nebulization 4x daily Patric Urbina MD   0.5 mg at 12/14/22 1915    And    Arformoterol Tartrate (BROVANA) nebulizer solution 15 mcg  15 mcg Nebulization BID Patric Urbina MD   15 mcg at 12/14/22 3819       Review of systems:   Heart: as above   Lungs: as above   Eyes: denies changes in vision or discharge. Ears: denies changes in hearing or pain. Nose: denies epistaxis or masses   Throat: denies sore throat or trouble swallowing. Neuro: denies numbness, tingling, tremors. Skin: denies rashes or itching. : denies hematuria, dysuria   GI: denies vomiting, diarrhea   Psych: denies mood changed, anxiety, depression. Physical Exam   /79   Pulse 96   Temp 98.1 °F (36.7 °C) (Oral)   Resp 20   Ht 5' 5\" (1.651 m)   Wt 226 lb (102.5 kg)   SpO2 94%   BMI 37.61 kg/m²   Constitutional: Oriented to person, place, and time. No distress. Well developed. Head: Normocephalic and atraumatic. Neck: Neck supple. No hepatojugular reflux. No JVD present. Carotid bruit is not present. No tracheal deviation present. No thyromegaly present.    Cardiovascular: Normal rate, regular rhythm, normal heart sounds. intact distal pulses. No gallop and no friction rub. No murmur heard. Pulmonary: Breath sounds normal. No respiratory distress. No wheezes. No rales. Chest: Effort normal. No tenderness. Abdominal: Soft. Bowel sounds are normal. No distension or mass. No tenderness, rebound or guarding. Musculoskeletal: . No tenderness. No clubbing or cyanosis. Extremitites: Intact distal pulses. No edema  Neurological: Alert and oriented to person, place, and time. Skin: Skin is warm and dry. No rash noted. Not diaphoretic. No erythema. Psychiatric: Normal mood and affect. Behavior is normal.   Lymphadenopathy: No cervical adenopathy. No groin adenopathy. CBC:   Lab Results   Component Value Date/Time    WBC 7.6 12/14/2022 04:32 AM    RBC 3.97 12/14/2022 04:32 AM    HGB 11.7 12/14/2022 04:32 AM    HCT 34.6 12/14/2022 04:32 AM    HCT 42.0 12/13/2022 08:33 AM    MCV 87.2 12/14/2022 04:32 AM    MCH 29.5 12/14/2022 04:32 AM    MCHC 33.8 12/14/2022 04:32 AM    RDW 12.9 12/14/2022 04:32 AM     12/14/2022 04:32 AM    MPV 9.2 12/14/2022 04:32 AM     BMP:   Lab Results   Component Value Date/Time     12/14/2022 04:32 AM    K 3.6 12/14/2022 04:32 AM    K 3.5 12/11/2022 05:22 AM    CL 99 12/14/2022 04:32 AM    CO2 27 12/14/2022 04:32 AM    BUN 16 12/14/2022 04:32 AM    LABALBU 2.8 12/14/2022 04:32 AM    CREATININE 0.9 12/14/2022 04:32 AM    CALCIUM 8.5 12/14/2022 04:32 AM    GFRAA 50 10/06/2022 09:39 AM    LABGLOM >60 12/14/2022 04:32 AM     Magnesium:    Lab Results   Component Value Date/Time    MG 2.2 12/14/2022 04:32 AM     Cardiac Enzymes:   Lab Results   Component Value Date    TROPHS 18 (H) 12/12/2022    TROPHS 21 (H) 12/11/2022      PT/INR:    Lab Results   Component Value Date/Time    PROTIME 12.3 12/13/2022 04:52 AM    INR 1.1 12/13/2022 04:52 AM     TSH:    Lab Results   Component Value Date/Time    TSH 1.510 05/27/2022 09:18 AM     Rhythm Strip: atrial flutter.     ASSESSMENT & PLAN:    Patient Active Problem List   Diagnosis    Obesity (BMI 30-39. 9)    Hypertension    Chronic pain syndrome    Lumbar facet arthropathy    Lumbar disc disorder    Primary osteoarthritis of both hips    Arthritis of right hip    Dysphagia    Heartburn    At risk for colon cancer    Colon polyps    Degenerative disc disease, cervical    Arthropathy of cervical facet joint    Abnormal blood sugar    Arthritis of both hips    COVID-19    H/O total hip arthroplasty, right    History of total left hip arthroplasty    Primary osteoarthritis of right hip    Pulmonary emphysema (HCC)    History of herpes genitalis    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    History of COVID-19    Acute cystitis with hematuria    Aneurysm (HCC)    S/P total right hip arthroplasty    Cognitive impairment    Personal history of colonic polyps    Abnormal diffusion capacity determined by pulmonary function test    BELCHER (dyspnea on exertion)    Acute deep vein thrombosis (DVT) of right peroneal vein (HCC)    Bowel perforation ([address]     1. Atrial Flutter:     Chart/labs/EKG reviewed. Echo pending. Rate control. PO dig and toprol. No systemic anticoagulation due to Hx of SAH/aneurysm. 2. Perforated bowel/ID issues: Per surgery. 3. SOB: Supportive care. 4. ORLIN: Follow labs. 5. Hx of DVT    6. Hx of SAH/Aneurysm    Juan David Hillman D.O.   Cardiologist  Cardiology, [facility]

## 2022-12-15 ENCOUNTER — APPOINTMENT (OUTPATIENT)
Dept: GENERAL RADIOLOGY | Age: 62
DRG: 329 | End: 2022-12-15
Attending: SURGERY
Payer: MEDICARE

## 2022-12-15 ENCOUNTER — APPOINTMENT (OUTPATIENT)
Dept: INTERVENTIONAL RADIOLOGY/VASCULAR | Age: 62
DRG: 329 | End: 2022-12-15
Attending: SURGERY
Payer: MEDICARE

## 2022-12-15 LAB
ALBUMIN SERPL-MCNC: 3 G/DL (ref 3.5–5.2)
ALP BLD-CCNC: 82 U/L (ref 35–104)
ALT SERPL-CCNC: 27 U/L (ref 0–32)
ANION GAP SERPL CALCULATED.3IONS-SCNC: 11 MMOL/L (ref 7–16)
AST SERPL-CCNC: 32 U/L (ref 0–31)
BASOPHILS ABSOLUTE: 0.13 E9/L (ref 0–0.2)
BASOPHILS RELATIVE PERCENT: 1.7 % (ref 0–2)
BILIRUB SERPL-MCNC: 0.8 MG/DL (ref 0–1.2)
BUN BLDV-MCNC: 15 MG/DL (ref 6–23)
CALCIUM IONIZED: 1.23 MMOL/L (ref 1.15–1.33)
CALCIUM SERPL-MCNC: 8.8 MG/DL (ref 8.6–10.2)
CHLORIDE BLD-SCNC: 99 MMOL/L (ref 98–107)
CO2: 28 MMOL/L (ref 22–29)
CREAT SERPL-MCNC: 0.8 MG/DL (ref 0.5–1)
EOSINOPHILS ABSOLUTE: 0.34 E9/L (ref 0.05–0.5)
EOSINOPHILS RELATIVE PERCENT: 4.4 % (ref 0–6)
GFR SERPL CREATININE-BSD FRML MDRD: >60 ML/MIN/1.73
GLUCOSE BLD-MCNC: 86 MG/DL (ref 74–99)
HCT VFR BLD CALC: 34.2 % (ref 34–48)
HEMOGLOBIN: 11.4 G/DL (ref 11.5–15.5)
INR BLD: 1.1
LYMPHOCYTES ABSOLUTE: 1.01 E9/L (ref 1.5–4)
LYMPHOCYTES RELATIVE PERCENT: 13 % (ref 20–42)
MAGNESIUM: 1.9 MG/DL (ref 1.6–2.6)
MCH RBC QN AUTO: 29.5 PG (ref 26–35)
MCHC RBC AUTO-ENTMCNC: 33.3 % (ref 32–34.5)
MCV RBC AUTO: 88.4 FL (ref 80–99.9)
MONOCYTES ABSOLUTE: 0.47 E9/L (ref 0.1–0.95)
MONOCYTES RELATIVE PERCENT: 6.1 % (ref 2–12)
NEUTROPHILS ABSOLUTE: 5.85 E9/L (ref 1.8–7.3)
NEUTROPHILS RELATIVE PERCENT: 74.8 % (ref 43–80)
OVALOCYTES: ABNORMAL
PDW BLD-RTO: 13 FL (ref 11.5–15)
PHOSPHORUS: 4.1 MG/DL (ref 2.5–4.5)
PLATELET # BLD: 257 E9/L (ref 130–450)
PMV BLD AUTO: 9.4 FL (ref 7–12)
POIKILOCYTES: ABNORMAL
POLYCHROMASIA: ABNORMAL
POTASSIUM SERPL-SCNC: 3.9 MMOL/L (ref 3.5–5)
PROTHROMBIN TIME: 12.4 SEC (ref 9.3–12.4)
RBC # BLD: 3.87 E12/L (ref 3.5–5.5)
SODIUM BLD-SCNC: 138 MMOL/L (ref 132–146)
TOTAL PROTEIN: 6.1 G/DL (ref 6.4–8.3)
WBC # BLD: 7.8 E9/L (ref 4.5–11.5)

## 2022-12-15 PROCEDURE — 36592 COLLECT BLOOD FROM PICC: CPT

## 2022-12-15 PROCEDURE — 2700000000 HC OXYGEN THERAPY PER DAY

## 2022-12-15 PROCEDURE — 6370000000 HC RX 637 (ALT 250 FOR IP): Performed by: STUDENT IN AN ORGANIZED HEALTH CARE EDUCATION/TRAINING PROGRAM

## 2022-12-15 PROCEDURE — 6370000000 HC RX 637 (ALT 250 FOR IP)

## 2022-12-15 PROCEDURE — 99233 SBSQ HOSP IP/OBS HIGH 50: CPT | Performed by: SURGERY

## 2022-12-15 PROCEDURE — 6370000000 HC RX 637 (ALT 250 FOR IP): Performed by: INTERNAL MEDICINE

## 2022-12-15 PROCEDURE — 2580000003 HC RX 258: Performed by: STUDENT IN AN ORGANIZED HEALTH CARE EDUCATION/TRAINING PROGRAM

## 2022-12-15 PROCEDURE — 99233 SBSQ HOSP IP/OBS HIGH 50: CPT | Performed by: INTERNAL MEDICINE

## 2022-12-15 PROCEDURE — 6360000002 HC RX W HCPCS: Performed by: STUDENT IN AN ORGANIZED HEALTH CARE EDUCATION/TRAINING PROGRAM

## 2022-12-15 PROCEDURE — 71045 X-RAY EXAM CHEST 1 VIEW: CPT

## 2022-12-15 PROCEDURE — 85610 PROTHROMBIN TIME: CPT

## 2022-12-15 PROCEDURE — 6360000002 HC RX W HCPCS: Performed by: SURGERY

## 2022-12-15 PROCEDURE — 71045 X-RAY EXAM CHEST 1 VIEW: CPT | Performed by: RADIOLOGY

## 2022-12-15 PROCEDURE — 82330 ASSAY OF CALCIUM: CPT

## 2022-12-15 PROCEDURE — 32555 ASPIRATE PLEURA W/ IMAGING: CPT | Performed by: RADIOLOGY

## 2022-12-15 PROCEDURE — 94640 AIRWAY INHALATION TREATMENT: CPT

## 2022-12-15 PROCEDURE — 83735 ASSAY OF MAGNESIUM: CPT

## 2022-12-15 PROCEDURE — 80053 COMPREHEN METABOLIC PANEL: CPT

## 2022-12-15 PROCEDURE — 32555 ASPIRATE PLEURA W/ IMAGING: CPT

## 2022-12-15 PROCEDURE — 2000000000 HC ICU R&B

## 2022-12-15 PROCEDURE — 85025 COMPLETE CBC W/AUTO DIFF WBC: CPT

## 2022-12-15 PROCEDURE — 84100 ASSAY OF PHOSPHORUS: CPT

## 2022-12-15 PROCEDURE — 36415 COLL VENOUS BLD VENIPUNCTURE: CPT

## 2022-12-15 PROCEDURE — 2500000003 HC RX 250 WO HCPCS: Performed by: RADIOLOGY

## 2022-12-15 PROCEDURE — C9113 INJ PANTOPRAZOLE SODIUM, VIA: HCPCS | Performed by: STUDENT IN AN ORGANIZED HEALTH CARE EDUCATION/TRAINING PROGRAM

## 2022-12-15 PROCEDURE — 0W9B3ZZ DRAINAGE OF LEFT PLEURAL CAVITY, PERCUTANEOUS APPROACH: ICD-10-PCS | Performed by: RADIOLOGY

## 2022-12-15 PROCEDURE — 6360000002 HC RX W HCPCS

## 2022-12-15 PROCEDURE — 2580000003 HC RX 258: Performed by: SURGERY

## 2022-12-15 PROCEDURE — C1729 CATH, DRAINAGE: HCPCS

## 2022-12-15 RX ORDER — LIDOCAINE HYDROCHLORIDE 20 MG/ML
INJECTION, SOLUTION INFILTRATION; PERINEURAL
Status: COMPLETED | OUTPATIENT
Start: 2022-12-15 | End: 2022-12-15

## 2022-12-15 RX ORDER — MAGNESIUM SULFATE IN WATER 40 MG/ML
2000 INJECTION, SOLUTION INTRAVENOUS ONCE
Status: COMPLETED | OUTPATIENT
Start: 2022-12-15 | End: 2022-12-15

## 2022-12-15 RX ADMIN — IPRATROPIUM BROMIDE 0.5 MG: 0.5 SOLUTION RESPIRATORY (INHALATION) at 07:42

## 2022-12-15 RX ADMIN — ARFORMOTEROL TARTRATE 15 MCG: 15 SOLUTION RESPIRATORY (INHALATION) at 19:41

## 2022-12-15 RX ADMIN — SODIUM CHLORIDE, PRESERVATIVE FREE 10 ML: 5 INJECTION INTRAVENOUS at 10:50

## 2022-12-15 RX ADMIN — ARFORMOTEROL TARTRATE 15 MCG: 15 SOLUTION RESPIRATORY (INHALATION) at 07:42

## 2022-12-15 RX ADMIN — METOPROLOL SUCCINATE 25 MG: 50 TABLET, EXTENDED RELEASE ORAL at 20:48

## 2022-12-15 RX ADMIN — METOPROLOL SUCCINATE 25 MG: 50 TABLET, EXTENDED RELEASE ORAL at 09:51

## 2022-12-15 RX ADMIN — ACETAMINOPHEN 650 MG: 325 TABLET ORAL at 03:03

## 2022-12-15 RX ADMIN — ACETAMINOPHEN 650 MG: 325 TABLET ORAL at 09:45

## 2022-12-15 RX ADMIN — DIGOXIN 125 MCG: 125 TABLET ORAL at 09:46

## 2022-12-15 RX ADMIN — PIPERACILLIN AND TAZOBACTAM 3375 MG: 3; .375 INJECTION, POWDER, FOR SOLUTION INTRAVENOUS at 10:48

## 2022-12-15 RX ADMIN — PANTOPRAZOLE SODIUM 40 MG: 40 INJECTION, POWDER, FOR SOLUTION INTRAVENOUS at 20:49

## 2022-12-15 RX ADMIN — PIPERACILLIN AND TAZOBACTAM 3375 MG: 3; .375 INJECTION, POWDER, FOR SOLUTION INTRAVENOUS at 18:38

## 2022-12-15 RX ADMIN — POTASSIUM BICARBONATE 20 MEQ: 782 TABLET, EFFERVESCENT ORAL at 07:08

## 2022-12-15 RX ADMIN — IPRATROPIUM BROMIDE 0.5 MG: 0.5 SOLUTION RESPIRATORY (INHALATION) at 19:41

## 2022-12-15 RX ADMIN — ACETAMINOPHEN 650 MG: 325 TABLET ORAL at 20:49

## 2022-12-15 RX ADMIN — SODIUM CHLORIDE, POTASSIUM CHLORIDE, SODIUM LACTATE AND CALCIUM CHLORIDE: 600; 310; 30; 20 INJECTION, SOLUTION INTRAVENOUS at 06:00

## 2022-12-15 RX ADMIN — IPRATROPIUM BROMIDE 0.5 MG: 0.5 SOLUTION RESPIRATORY (INHALATION) at 15:45

## 2022-12-15 RX ADMIN — GABAPENTIN 300 MG: 300 CAPSULE ORAL at 20:48

## 2022-12-15 RX ADMIN — MAGNESIUM SULFATE HEPTAHYDRATE 2000 MG: 40 INJECTION, SOLUTION INTRAVENOUS at 07:11

## 2022-12-15 RX ADMIN — OXYCODONE HYDROCHLORIDE 10 MG: 10 TABLET ORAL at 18:33

## 2022-12-15 RX ADMIN — GABAPENTIN 300 MG: 300 CAPSULE ORAL at 09:51

## 2022-12-15 RX ADMIN — PANTOPRAZOLE SODIUM 40 MG: 40 INJECTION, POWDER, FOR SOLUTION INTRAVENOUS at 09:47

## 2022-12-15 RX ADMIN — SODIUM CHLORIDE, PRESERVATIVE FREE 10 ML: 5 INJECTION INTRAVENOUS at 20:49

## 2022-12-15 RX ADMIN — LIDOCAINE HYDROCHLORIDE 5 ML: 20 INJECTION, SOLUTION INFILTRATION; PERINEURAL at 14:59

## 2022-12-15 RX ADMIN — IPRATROPIUM BROMIDE 0.5 MG: 0.5 SOLUTION RESPIRATORY (INHALATION) at 11:09

## 2022-12-15 RX ADMIN — SODIUM CHLORIDE, PRESERVATIVE FREE 10 ML: 5 INJECTION INTRAVENOUS at 09:47

## 2022-12-15 RX ADMIN — PIPERACILLIN AND TAZOBACTAM 3375 MG: 3; .375 INJECTION, POWDER, FOR SOLUTION INTRAVENOUS at 03:05

## 2022-12-15 ASSESSMENT — PAIN SCALES - GENERAL
PAINLEVEL_OUTOF10: 4
PAINLEVEL_OUTOF10: 0
PAINLEVEL_OUTOF10: 5
PAINLEVEL_OUTOF10: 2
PAINLEVEL_OUTOF10: 0

## 2022-12-15 ASSESSMENT — PAIN DESCRIPTION - ORIENTATION
ORIENTATION: MID
ORIENTATION: MID;LOWER

## 2022-12-15 ASSESSMENT — PAIN DESCRIPTION - LOCATION
LOCATION: ABDOMEN
LOCATION: ABDOMEN

## 2022-12-15 ASSESSMENT — PAIN DESCRIPTION - DESCRIPTORS
DESCRIPTORS: ACHING
DESCRIPTORS: ACHING

## 2022-12-15 ASSESSMENT — PAIN - FUNCTIONAL ASSESSMENT: PAIN_FUNCTIONAL_ASSESSMENT: PREVENTS OR INTERFERES SOME ACTIVE ACTIVITIES AND ADLS

## 2022-12-15 NOTE — CARE COORDINATION
S/p R thoracentesis on 12/13. Planning L thoracentesis today. Started on cl liq diet yesterday, but npo today for test. Currently on 11 L high flow o2. Pt is hoping to be able to go home when stable. [facility] is following. Will need Pt/Ot ordered when appropriate.

## 2022-12-15 NOTE — HOME CARE
[address] [address] received referral. Will follow after discharge. Spoke with patient and verified demographics.   Pam Jaime LPN, [id number] [address]

## 2022-12-15 NOTE — PROCEDURES
Patient arrived to radiology department for left thoracentesis. Allergies, medications, H&P and fasting instructions reviewed with patient. Vital signs taken. TLC flushed and prn adapter attached. Procedural instructions given, questions answered, understanding expressed and consent signed.  Patient given fluoroscopy education, no questions at this time

## 2022-12-15 NOTE — PROGRESS NOTES
Surgical Intensive Care Unit   Daily Progress Note     Patient's name:  Earl Altman  Age/Gender: 60 y.o. female  Date of Admission: 12/9/2022 10:18 AM  Length of Stay: 6    Reason for ICU: Perforated bowel    Overnight Events: No acute events overnight. Reports some flatus, no BM. Respirations improved after thoracentesis. Hospital Course:   12/9 patient underwent colonoscopy with snare and cold biopsy of cecal mass which was complicated by cecal perforation. She underwent exploratory laparotomy with ileocecectomy  12/10 she was started on gum and hard candy. 12/11 she developed nausea and emesis and had an NG tube placed. 12/12 she developed new onset a fib with rvr, started on cordarone followed by cardizem. CTA PE showed no PE.  12/13: Heart rate improved on cardizem gtt. IR today for thoracentesis  12/14: MIC thoracentesis plan for 12/15. Heparin to be held at [address]   12/15: MIC, plan for thoracentesis today. Problem List:   Patient Active Problem List   Diagnosis    Obesity (BMI 30-39. 9)    Hypertension    Chronic pain syndrome    Lumbar facet arthropathy    Lumbar disc disorder    Primary osteoarthritis of both hips    Arthritis of right hip    Dysphagia    Heartburn    At risk for colon cancer    Colon polyps    Degenerative disc disease, cervical    Arthropathy of cervical facet joint    Abnormal blood sugar    Arthritis of both hips    COVID-19    H/O total hip arthroplasty, right    History of total left hip arthroplasty    Primary osteoarthritis of right hip    Pulmonary emphysema (HCC)    History of herpes genitalis    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    History of COVID-19    Acute cystitis with hematuria    Aneurysm (HCC)    S/P total right hip arthroplasty    Cognitive impairment    Personal history of colonic polyps    Abnormal diffusion capacity determined by pulmonary function test    BELCHER (dyspnea on exertion)    Acute deep vein thrombosis (DVT) of right peroneal vein (HCC)    Bowel perforation (HCC)     Overnight Events:  No acute events overnight. Patient denies any fevers, chills, abdominal pain, nausea. Patient had 1 bowel movement yesterday. Surgical/Interventional Procedures:   Thoracentesis     Vent Settings: Additional Respiratory Assessments  Heart Rate: (!) 116  Resp: 25  SpO2: 94 %  Humidification Source: Heated wire  Humidification Temp: 31  ABG:   Recent Labs     22  0824 22  0833   PH 7.554*  --    PCO2 33.1*  --    PO2 53.0*  --    HCO3 28.6* 25.1   BE 6.5* 3.9*   O2SAT 90.8* 90.8*       I/O:  I/O last 3 completed shifts: In: 8466 [P.O.:420; I.V.:3045.1; IV Piggyback:321.9]  Out: 1700 [Urine:2090; Emesis/NG output:200;  Other:400]  I/O this shift:  In: -   Out: 710 [Urine:710]  Urinary Catheter 22-Output (mL): 50 mL  [REMOVED] NG/OG/NJ/NE Tube Nasogastric 14 fr Left nostril-Output (mL): 200 ml  Stool (measured) : 0 mL      Drips:   sodium chloride Stopped (22 1832)    lactated ringers 100 mL/hr at 22 1400       Physical Exam:   BP (!) 170/96   Pulse (!) 116   Temp 98.1 °F (36.7 °C) (Oral)   Resp 25   Ht 5' 5\" (1.651 m)   Wt 226 lb (102.5 kg)   SpO2 94%   BMI 37.61 kg/m²     Average, Min, and Max for last 24 hours Vitals:  Temp:  Temp  Av.1 °F (36.7 °C)  Min: 98.1 °F (36.7 °C)  Max: 98.2 °F (36.8 °C)  RR: Resp  Av.8  Min: 15  Max: 28  HR: Pulse  Av.9  Min: 86  Max: 252  BP:  Systolic (41PLV), WAN:361 , Min:112 , KVM:341   ; Diastolic (27PJK), PYV:53, Min:65, Max:96    SpO2: SpO2  Av.8 %  Min: 88 %  Max: 98 %        GCS:    4 - Opens eyes on own   6 - Follows simple motor commands  5 - Alert and oriented    Pupil size:  Left 3 mm    Right 3 mm    Pupil reaction: Yes    Wiggles fingers: Left Yes Right Yes    Hand grasp:   Left normal       Right normal    Wiggles toes: Left Yes    Right Yes    Plantar flexion: Left normal     Right normal      CONSTITUTIONAL: no acute distress, lying in hospital bed,  NEUROLOGIC: PERRL, oriented x 4  CARDIOVASCULAR: S1 S2, tachycardic  PULMONARY: clear breath sounds b/l, no use of accessory muscles  RENAL: fischer to gravity, clear yellow urine  ABDOMEN: soft, obese, generalized tenderness to palpation,  midline incision with bandage in place, nondistended, nontympanic, no masses, no organomegaly, normal bowel sounds   MUSCULOSKELETAL: moves all extremities purposefully, 5/5 strength   SKIN/EXTREMITIES: no rashes/ecchymosis, no edema/clubbing, warm/dry, good capillary refill       ASSESSMENT / PLAN:   Neuro:  acute pain- tylenol dilaudid, neurontin  CV: A.  Flutter/fib- off cardizem drip, start digoxin, cardiology following, echo pending  HTN- norvasc  Pulm: B/l pulmonary effusions, acute respiratory failure with hypoxia-  wean supplemental oxygen as able Pulmonary toilet, right thoracentesis 12/13, consult IR for Left thoracentesis  GI:  S/p ex lap/ileocectomy for iatrogenic cecal perf from colonoscopy- PostOP ileus- advance diet to clears  Renal: ORLIN- LR 100cc, replace electrolytes as needed, monitor BUN/creat/UO  ID: intra abdominal process/ possible pneumonia- zosyn   Endocrine: maintain glucose <180  MSK: PT/OT- AMPAC score    Heme: no active issues      Pain/Analgesia: dilaudid, tylenol  Bowel regimen: protonix  Diet: Diet NPO Exceptions are: Sips of Water with Meds  DVT proph: heparin- held for IR  GI proph: protonix  Mouth/eye care: per patient  Fischer: present  CVC sites: Right IJ  Ancillary consults: Cardiology  Family Update: as available  CODE Status: Full Code    Dispo: SICU      Electronically signed by Jocelyn Mojica MD 12/15/2022  5:38 AM

## 2022-12-15 NOTE — PROGRESS NOTES
release tablet 25 mg  25 mg Oral BID Zondra Du DO   25 mg at 12/15/22 4374    perflutren lipid microspheres (DEFINITY) injection 1.5 mL  1.5 mL IntraVENous ONCE PRN Vanessa Sandy DO        labetalol (NORMODYNE;TRANDATE) injection 10 mg  10 mg IntraVENous Q30 Min PRN Stacie Mccloud MD   10 mg at 12/14/22 0644    HYDROmorphone (DILAUDID) injection 0.25 mg  0.25 mg IntraVENous Q4H PRN Stacei Mccloud MD        Or    HYDROmorphone (DILAUDID) injection 0.5 mg  0.5 mg IntraVENous Q4H PRN Stacie Mccloud MD   0.5 mg at 12/13/22 1836    hydrALAZINE (APRESOLINE) injection 10 mg  10 mg IntraVENous Q6H PRN Stacie Mccloud MD        sodium chloride flush 0.9 % injection 10 mL  10 mL IntraVENous PRN Zane Lima DO        pantoprazole (PROTONIX) injection 40 mg  40 mg IntraVENous BID Stacie Mccloud MD   40 mg at 12/15/22 0947    sodium chloride flush 0.9 % injection 5-40 mL  5-40 mL IntraVENous 2 times per day Ovidio Mendoza MD   10 mL at 12/13/22 2045    sodium chloride flush 0.9 % injection 5-40 mL  5-40 mL IntraVENous PRN Ovidio Mendoza MD        piperacillin-tazobactam (ZOSYN) 3,375 mg in sodium chloride 0.9 % 50 mL IVPB ([id number])  3,375 mg IntraVENous Modeuin Siemens, MD   Stopped at 12/15/22 0705    albuterol (PROVENTIL) nebulizer solution 2.5 mg  2.5 mg Nebulization Q4H PRN Ovidio Mendoza MD        [Held by provider] chlorthalidone (HYGROTON) tablet 25 mg  25 mg Oral Daily Ovidio Mendoza MD   25 mg at 12/10/22 0920    gabapentin (NEURONTIN) capsule 300 mg  300 mg Oral TID Ovidio Mendoza MD   300 mg at 12/15/22 0951    [Held by provider] losartan (COZAAR) tablet 100 mg  100 mg Oral Daily Ovidio Mendoza MD   100 mg at 12/10/22 0920    sodium chloride flush 0.9 % injection 5-40 mL  5-40 mL IntraVENous 2 times per day Ovidio Mendoza MD   10 mL at 12/15/22 0947    sodium chloride flush 0.9 % injection 5-40 mL  5-40 mL IntraVENous PRN Ovidio MD Reji   10 mL at 12/10/22 1823    0.9 % sodium chloride infusion   IntraVENous PRN Jimi Sepulveda MD   Stopped at 12/13/22 1832    ondansetron (ZOFRAN-ODT) disintegrating tablet 4 mg  4 mg Oral Q8H PRN Jimi Sepulveda MD        Or    ondansetron [facility]) injection 4 mg  4 mg IntraVENous Q6H PRN Jimi Sepulveda MD   4 mg at 12/13/22 1452    lactated ringers infusion   IntraVENous Continuous Laree Councilman,  mL/hr at 12/15/22 0600 New Bag at 12/15/22 0600    acetaminophen (TYLENOL) tablet 650 mg  650 mg Oral Q6H Jimi Sepulveda MD   650 mg at 12/15/22 0945    [Held by provider] heparin (porcine) injection 5,000 Units  5,000 Units SubCUTAneous 3 times per day Jimi Sepulveda MD   5,000 Units at 12/14/22 1432    ipratropium (ATROVENT) 0.02 % nebulizer solution 0.5 mg  0.5 mg Nebulization 4x daily Jimi Sepulveda MD   0.5 mg at 12/15/22 1192    And    Arformoterol Tartrate (BROVANA) nebulizer solution 15 mcg  15 mcg Nebulization BID Jimi Sepulveda MD   15 mcg at 12/15/22 7331       Review of systems:   Heart: as above   Lungs: as above   Eyes: denies changes in vision or discharge. Ears: denies changes in hearing or pain. Nose: denies epistaxis or masses   Throat: denies sore throat or trouble swallowing. Neuro: denies numbness, tingling, tremors. Skin: denies rashes or itching. : denies hematuria, dysuria   GI: denies vomiting, diarrhea   Psych: denies mood changed, anxiety, depression. Physical Exam   /81   Pulse 93   Temp 97.6 °F (36.4 °C) (Axillary)   Resp 21   Ht 5' 5\" (1.651 m)   Wt 226 lb (102.5 kg)   SpO2 94%   BMI 37.61 kg/m²   Constitutional: Oriented to person, place, and time. No distress. Well developed. Head: Normocephalic and atraumatic. Neck: Neck supple. No hepatojugular reflux. No JVD present. Carotid bruit is not present. No tracheal deviation present. No thyromegaly present. Cardiovascular: Normal rate, regular rhythm, normal heart sounds.   intact distal pulses. No gallop and no friction rub. No murmur heard. Pulmonary: Breath sounds normal. No respiratory distress. No wheezes. No rales. Chest: Effort normal. No tenderness. Abdominal: Soft. Bowel sounds are normal. No distension or mass. No tenderness, rebound or guarding. Musculoskeletal: . No tenderness. No clubbing or cyanosis. Extremitites: Intact distal pulses. No edema  Neurological: Alert and oriented to person, place, and time. Skin: Skin is warm and dry. No rash noted. Not diaphoretic. No erythema. Psychiatric: Normal mood and affect. Behavior is normal.   Lymphadenopathy: No cervical adenopathy. No groin adenopathy. CBC:   Lab Results   Component Value Date/Time    WBC 7.8 12/15/2022 04:40 AM    RBC 3.87 12/15/2022 04:40 AM    HGB 11.4 12/15/2022 04:40 AM    HCT 34.2 12/15/2022 04:40 AM    HCT 42.0 12/13/2022 08:33 AM    MCV 88.4 12/15/2022 04:40 AM    MCH 29.5 12/15/2022 04:40 AM    MCHC 33.3 12/15/2022 04:40 AM    RDW 13.0 12/15/2022 04:40 AM     12/15/2022 04:40 AM    MPV 9.4 12/15/2022 04:40 AM     BMP:   Lab Results   Component Value Date/Time     12/15/2022 04:40 AM    K 3.9 12/15/2022 04:40 AM    K 3.5 12/11/2022 05:22 AM    CL 99 12/15/2022 04:40 AM    CO2 28 12/15/2022 04:40 AM    BUN 15 12/15/2022 04:40 AM    LABALBU 3.0 12/15/2022 04:40 AM    CREATININE 0.8 12/15/2022 04:40 AM    CALCIUM 8.8 12/15/2022 04:40 AM    GFRAA 50 10/06/2022 09:39 AM    LABGLOM >60 12/15/2022 04:40 AM     Magnesium:    Lab Results   Component Value Date/Time    MG 1.9 12/15/2022 04:40 AM     Cardiac Enzymes:   Lab Results   Component Value Date    TROPHS 18 (H) 12/12/2022    TROPHS 21 (H) 12/11/2022      PT/INR:    Lab Results   Component Value Date/Time    PROTIME 12.4 12/15/2022 04:40 AM    INR 1.1 12/15/2022 04:40 AM     TSH:    Lab Results   Component Value Date/Time    TSH 1.510 05/27/2022 09:18 AM     Rhythm Strip: atrial flutter.     ASSESSMENT & PLAN:    Patient Active Problem List   Diagnosis    Obesity (BMI 30-39. 9)    Hypertension    Chronic pain syndrome    Lumbar facet arthropathy    Lumbar disc disorder    Primary osteoarthritis of both hips    Arthritis of right hip    Dysphagia    Heartburn    At risk for colon cancer    Colon polyps    Degenerative disc disease, cervical    Arthropathy of cervical facet joint    Abnormal blood sugar    Arthritis of both hips    COVID-19    H/O total hip arthroplasty, right    History of total left hip arthroplasty    Primary osteoarthritis of right hip    Pulmonary emphysema (HCC)    History of herpes genitalis    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    History of COVID-19    Acute cystitis with hematuria    Aneurysm (HCC)    S/P total right hip arthroplasty    Cognitive impairment    Personal history of colonic polyps    Abnormal diffusion capacity determined by pulmonary function test    BELCHER (dyspnea on exertion)    Acute deep vein thrombosis (DVT) of right peroneal vein (HCC)    Bowel perforation ([address]     1. Atrial Flutter:     Chart/labs/EKG reviewed. Echo pending. Rate control. PO dig and toprol. No systemic anticoagulation due to Hx of SAH/aneurysm. 2. Perforated bowel/ID issues: Per surgery. 3. SOB: Supportive care. 4. ORLIN: Follow labs. 5. Hx of DVT    6. Hx of SAH/Aneurysm    Rhonda Cross D.O.   Cardiologist  Cardiology, [address]

## 2022-12-15 NOTE — PROGRESS NOTES
Spoke with RN regarding patient's ordered L Thoracentesis. , Keep patient NPO, all thinners on hold for procedure. Patient is  able to sign consent.

## 2022-12-15 NOTE — PROGRESS NOTES
[facility]  SURGICAL INTENSIVE CARE UNIT (SICU)  ATTENDING PHYSICIAN CRITICAL CARE PROGRESS NOTE     I have examined the patient, reviewed the record, and discussed the case with the APN/ resident. Please refer to the APN/ resident's note. I agree with the assessment and plan. I have reviewed all relevant labs and imaging data. The following summarizes my clinical findings and independent assessment. CC:  critical care management for hypoxia    Hospital Course/Overnight Events:  12/9--underwent colonoscopy--iatrogenic perforation of cecum; underwent ex lap with ileocectomy  12/11--A.fib with RVR  12/12--hypoxia overnight; transferred to SICU; on high flow NC; on cardizem drip  12/13--remains on Cardizem drip; for thoracentesis today (not done yesterday due to prophylactic subQ heparin)  12/14--underwent thoracentesis yesterday  12/15--for thoracentesis today    Pt reports passing flatus and having BM. States she is breathing much better today.     Awake and alert  Follows commands  Hrt:  irregular rhythm; no murmur  Lungs:  fairly clear bilaterally; diminished in bases  Abd:  obese; soft; hypoactive BS; nontender; no rebound; no guarding  Skin:  warm/dry  Ext:  moving all 4 ext    Labs personally reviewed    Patient Active Problem List    Diagnosis Date Noted    Bowel perforation ([address] 12/09/2022    BELCHER (dyspnea on exertion) 10/13/2022    Abnormal diffusion capacity determined by pulmonary function test 10/06/2022    Acute deep vein thrombosis (DVT) of right peroneal vein ([address] 10/14/2022    Personal history of colonic polyps 10/05/2022    S/P total right hip arthroplasty 01/10/2022    Cognitive impairment 01/10/2022    Aneurysm ([address] 12/17/2021    Acute cystitis with hematuria 12/16/2021    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 12/03/2021    History of COVID-19 12/03/2021    Pulmonary emphysema ([address] 04/27/2021    History of herpes genitalis 04/27/2021 Primary osteoarthritis of right hip 04/05/2021    History of total left hip arthroplasty 03/08/2021    Arthritis of both hips 02/22/2021    COVID-19     H/O total hip arthroplasty, right     Abnormal blood sugar 02/21/2020    Degenerative disc disease, cervical 12/12/2019    Arthropathy of cervical facet joint 12/12/2019    Colon polyps 09/06/2019    Dysphagia     Heartburn     At risk for colon cancer     Arthritis of right hip 08/07/2019    Chronic pain syndrome 03/01/2019    Lumbar facet arthropathy 03/01/2019    Lumbar disc disorder 03/01/2019    Primary osteoarthritis of both hips 03/01/2019    Hypertension 09/21/2018    Obesity (BMI 30-39.9) 09/10/2018     S/p ex lap/ileocectomy for iatrogenic cecal perf from colonoscopy  Acute respiratory failure with hypoxia--cont supplemental oxygen--wean as able  Bilateral effusions--s/p right thoracentesis; for left thoracentesis today  A.fib/flutter--cont dig; metoprolol  Hypoalbuminemia--diet as tolerated after thoracentesis  Zosyn #6/8 for intra-abd process/suspected pneumonia  LFT elevation--monitor labs  DVT risk--PCDs/subQ heparin    Jarred Monahan MD, FACS  12/15/2022  7:52 AM

## 2022-12-16 ENCOUNTER — APPOINTMENT (OUTPATIENT)
Dept: GENERAL RADIOLOGY | Age: 62
DRG: 329 | End: 2022-12-16
Attending: SURGERY
Payer: MEDICARE

## 2022-12-16 PROBLEM — J96.01 ACUTE RESPIRATORY FAILURE WITH HYPOXIA (HCC): Status: ACTIVE | Noted: 2022-12-16

## 2022-12-16 PROBLEM — J90 PLEURAL EFFUSION, BILATERAL: Status: ACTIVE | Noted: 2022-12-16

## 2022-12-16 PROBLEM — I48.91 ATRIAL FIBRILLATION WITH RVR (HCC): Status: ACTIVE | Noted: 2022-12-16

## 2022-12-16 PROBLEM — E88.09 HYPOALBUMINEMIA: Status: ACTIVE | Noted: 2022-12-16

## 2022-12-16 PROBLEM — I48.92 ATRIAL FLUTTER (HCC): Status: ACTIVE | Noted: 2022-12-16

## 2022-12-16 PROBLEM — R79.89 LFT ELEVATION: Status: ACTIVE | Noted: 2022-12-16

## 2022-12-16 LAB
ALBUMIN SERPL-MCNC: 2.8 G/DL (ref 3.5–5.2)
ALP BLD-CCNC: 74 U/L (ref 35–104)
ALT SERPL-CCNC: 37 U/L (ref 0–32)
ANION GAP SERPL CALCULATED.3IONS-SCNC: 13 MMOL/L (ref 7–16)
ANISOCYTOSIS: ABNORMAL
AST SERPL-CCNC: 39 U/L (ref 0–31)
BASOPHILIC STIPPLING: ABNORMAL
BASOPHILS ABSOLUTE: 0 E9/L (ref 0–0.2)
BASOPHILS RELATIVE PERCENT: 0.4 % (ref 0–2)
BILIRUB SERPL-MCNC: 0.8 MG/DL (ref 0–1.2)
BODY FLUID CULTURE, STERILE: NORMAL
BUN BLDV-MCNC: 14 MG/DL (ref 6–23)
BURR CELLS: ABNORMAL
CALCIUM IONIZED: 1.22 MMOL/L (ref 1.15–1.33)
CALCIUM SERPL-MCNC: 8.9 MG/DL (ref 8.6–10.2)
CHLORIDE BLD-SCNC: 98 MMOL/L (ref 98–107)
CO2: 24 MMOL/L (ref 22–29)
CREAT SERPL-MCNC: 0.7 MG/DL (ref 0.5–1)
EOSINOPHILS ABSOLUTE: 0.34 E9/L (ref 0.05–0.5)
EOSINOPHILS RELATIVE PERCENT: 4.4 % (ref 0–6)
GFR SERPL CREATININE-BSD FRML MDRD: >60 ML/MIN/1.73
GLUCOSE BLD-MCNC: 69 MG/DL (ref 74–99)
GRAM STAIN RESULT: NORMAL
HCT VFR BLD CALC: 35.2 % (ref 34–48)
HEMOGLOBIN: 11.5 G/DL (ref 11.5–15.5)
LYMPHOCYTES ABSOLUTE: 1.31 E9/L (ref 1.5–4)
LYMPHOCYTES RELATIVE PERCENT: 16.7 % (ref 20–42)
MAGNESIUM: 1.9 MG/DL (ref 1.6–2.6)
MCH RBC QN AUTO: 29.6 PG (ref 26–35)
MCHC RBC AUTO-ENTMCNC: 32.7 % (ref 32–34.5)
MCV RBC AUTO: 90.5 FL (ref 80–99.9)
MONOCYTES ABSOLUTE: 0.31 E9/L (ref 0.1–0.95)
MONOCYTES RELATIVE PERCENT: 4.4 % (ref 2–12)
NEUTROPHILS ABSOLUTE: 5.78 E9/L (ref 1.8–7.3)
NEUTROPHILS RELATIVE PERCENT: 74.5 % (ref 43–80)
OVALOCYTES: ABNORMAL
PDW BLD-RTO: 12.7 FL (ref 11.5–15)
PHOSPHORUS: 3.3 MG/DL (ref 2.5–4.5)
PLATELET # BLD: 267 E9/L (ref 130–450)
PMV BLD AUTO: 9.5 FL (ref 7–12)
POIKILOCYTES: ABNORMAL
POLYCHROMASIA: ABNORMAL
POTASSIUM SERPL-SCNC: 4 MMOL/L (ref 3.5–5)
RBC # BLD: 3.89 E12/L (ref 3.5–5.5)
SODIUM BLD-SCNC: 135 MMOL/L (ref 132–146)
TOTAL PROTEIN: 6.1 G/DL (ref 6.4–8.3)
WBC # BLD: 7.7 E9/L (ref 4.5–11.5)

## 2022-12-16 PROCEDURE — 2580000003 HC RX 258: Performed by: STUDENT IN AN ORGANIZED HEALTH CARE EDUCATION/TRAINING PROGRAM

## 2022-12-16 PROCEDURE — 6360000002 HC RX W HCPCS: Performed by: STUDENT IN AN ORGANIZED HEALTH CARE EDUCATION/TRAINING PROGRAM

## 2022-12-16 PROCEDURE — 2700000000 HC OXYGEN THERAPY PER DAY

## 2022-12-16 PROCEDURE — 36592 COLLECT BLOOD FROM PICC: CPT

## 2022-12-16 PROCEDURE — 85025 COMPLETE CBC W/AUTO DIFF WBC: CPT

## 2022-12-16 PROCEDURE — 6370000000 HC RX 637 (ALT 250 FOR IP)

## 2022-12-16 PROCEDURE — 6370000000 HC RX 637 (ALT 250 FOR IP): Performed by: INTERNAL MEDICINE

## 2022-12-16 PROCEDURE — 71045 X-RAY EXAM CHEST 1 VIEW: CPT

## 2022-12-16 PROCEDURE — 2060000000 HC ICU INTERMEDIATE R&B

## 2022-12-16 PROCEDURE — 36415 COLL VENOUS BLD VENIPUNCTURE: CPT

## 2022-12-16 PROCEDURE — C9113 INJ PANTOPRAZOLE SODIUM, VIA: HCPCS | Performed by: STUDENT IN AN ORGANIZED HEALTH CARE EDUCATION/TRAINING PROGRAM

## 2022-12-16 PROCEDURE — 99232 SBSQ HOSP IP/OBS MODERATE 35: CPT | Performed by: SURGERY

## 2022-12-16 PROCEDURE — 84100 ASSAY OF PHOSPHORUS: CPT

## 2022-12-16 PROCEDURE — 83735 ASSAY OF MAGNESIUM: CPT

## 2022-12-16 PROCEDURE — 2580000003 HC RX 258: Performed by: SURGERY

## 2022-12-16 PROCEDURE — 82330 ASSAY OF CALCIUM: CPT

## 2022-12-16 PROCEDURE — 80053 COMPREHEN METABOLIC PANEL: CPT

## 2022-12-16 PROCEDURE — 94669 MECHANICAL CHEST WALL OSCILL: CPT

## 2022-12-16 PROCEDURE — 6370000000 HC RX 637 (ALT 250 FOR IP): Performed by: STUDENT IN AN ORGANIZED HEALTH CARE EDUCATION/TRAINING PROGRAM

## 2022-12-16 PROCEDURE — 6360000002 HC RX W HCPCS: Performed by: SURGERY

## 2022-12-16 PROCEDURE — 94640 AIRWAY INHALATION TREATMENT: CPT

## 2022-12-16 PROCEDURE — 99233 SBSQ HOSP IP/OBS HIGH 50: CPT | Performed by: INTERNAL MEDICINE

## 2022-12-16 RX ORDER — METOPROLOL SUCCINATE 50 MG/1
50 TABLET, EXTENDED RELEASE ORAL 2 TIMES DAILY
Status: DISCONTINUED | OUTPATIENT
Start: 2022-12-16 | End: 2022-12-17

## 2022-12-16 RX ADMIN — IPRATROPIUM BROMIDE 0.5 MG: 0.5 SOLUTION RESPIRATORY (INHALATION) at 09:19

## 2022-12-16 RX ADMIN — PANTOPRAZOLE SODIUM 40 MG: 40 INJECTION, POWDER, FOR SOLUTION INTRAVENOUS at 20:42

## 2022-12-16 RX ADMIN — GABAPENTIN 300 MG: 300 CAPSULE ORAL at 20:42

## 2022-12-16 RX ADMIN — ACETAMINOPHEN 650 MG: 325 TABLET ORAL at 20:42

## 2022-12-16 RX ADMIN — ARFORMOTEROL TARTRATE 15 MCG: 15 SOLUTION RESPIRATORY (INHALATION) at 09:19

## 2022-12-16 RX ADMIN — IPRATROPIUM BROMIDE 0.5 MG: 0.5 SOLUTION RESPIRATORY (INHALATION) at 19:10

## 2022-12-16 RX ADMIN — DIGOXIN 125 MCG: 125 TABLET ORAL at 09:53

## 2022-12-16 RX ADMIN — GABAPENTIN 300 MG: 300 CAPSULE ORAL at 09:53

## 2022-12-16 RX ADMIN — HEPARIN SODIUM 5000 UNITS: 10000 INJECTION INTRAVENOUS; SUBCUTANEOUS at 21:48

## 2022-12-16 RX ADMIN — METOPROLOL SUCCINATE 25 MG: 50 TABLET, EXTENDED RELEASE ORAL at 09:53

## 2022-12-16 RX ADMIN — HEPARIN SODIUM 5000 UNITS: 10000 INJECTION INTRAVENOUS; SUBCUTANEOUS at 06:30

## 2022-12-16 RX ADMIN — ACETAMINOPHEN 650 MG: 325 TABLET ORAL at 09:53

## 2022-12-16 RX ADMIN — OXYCODONE HYDROCHLORIDE 10 MG: 10 TABLET ORAL at 06:04

## 2022-12-16 RX ADMIN — PIPERACILLIN AND TAZOBACTAM 3375 MG: 3; .375 INJECTION, POWDER, FOR SOLUTION INTRAVENOUS at 02:56

## 2022-12-16 RX ADMIN — IPRATROPIUM BROMIDE 0.5 MG: 0.5 SOLUTION RESPIRATORY (INHALATION) at 15:35

## 2022-12-16 RX ADMIN — GABAPENTIN 300 MG: 300 CAPSULE ORAL at 14:35

## 2022-12-16 RX ADMIN — PIPERACILLIN AND TAZOBACTAM 3375 MG: 3; .375 INJECTION, POWDER, FOR SOLUTION INTRAVENOUS at 10:56

## 2022-12-16 RX ADMIN — HEPARIN SODIUM 5000 UNITS: 10000 INJECTION INTRAVENOUS; SUBCUTANEOUS at 14:36

## 2022-12-16 RX ADMIN — OXYCODONE HYDROCHLORIDE 10 MG: 10 TABLET ORAL at 21:48

## 2022-12-16 RX ADMIN — PANTOPRAZOLE SODIUM 40 MG: 40 INJECTION, POWDER, FOR SOLUTION INTRAVENOUS at 09:53

## 2022-12-16 RX ADMIN — SODIUM CHLORIDE, POTASSIUM CHLORIDE, SODIUM LACTATE AND CALCIUM CHLORIDE: 600; 310; 30; 20 INJECTION, SOLUTION INTRAVENOUS at 01:55

## 2022-12-16 RX ADMIN — ACETAMINOPHEN 650 MG: 325 TABLET ORAL at 14:36

## 2022-12-16 RX ADMIN — SODIUM CHLORIDE, PRESERVATIVE FREE 10 ML: 5 INJECTION INTRAVENOUS at 09:54

## 2022-12-16 RX ADMIN — OXYCODONE HYDROCHLORIDE 10 MG: 10 TABLET ORAL at 12:27

## 2022-12-16 RX ADMIN — IPRATROPIUM BROMIDE 0.5 MG: 0.5 SOLUTION RESPIRATORY (INHALATION) at 12:35

## 2022-12-16 RX ADMIN — ARFORMOTEROL TARTRATE 15 MCG: 15 SOLUTION RESPIRATORY (INHALATION) at 19:10

## 2022-12-16 RX ADMIN — METOPROLOL SUCCINATE 50 MG: 50 TABLET, EXTENDED RELEASE ORAL at 20:42

## 2022-12-16 ASSESSMENT — PAIN SCALES - GENERAL
PAINLEVEL_OUTOF10: 0
PAINLEVEL_OUTOF10: 7
PAINLEVEL_OUTOF10: 8
PAINLEVEL_OUTOF10: 0
PAINLEVEL_OUTOF10: 7
PAINLEVEL_OUTOF10: 0
PAINLEVEL_OUTOF10: 0

## 2022-12-16 ASSESSMENT — PAIN DESCRIPTION - ORIENTATION
ORIENTATION: RIGHT;LEFT
ORIENTATION: RIGHT;LOWER
ORIENTATION: POSTERIOR;LEFT

## 2022-12-16 ASSESSMENT — PAIN - FUNCTIONAL ASSESSMENT: PAIN_FUNCTIONAL_ASSESSMENT: PREVENTS OR INTERFERES WITH ALL ACTIVE AND SOME PASSIVE ACTIVITIES

## 2022-12-16 ASSESSMENT — PAIN DESCRIPTION - DESCRIPTORS
DESCRIPTORS: ACHING;SORE
DESCRIPTORS: SHARP;THROBBING

## 2022-12-16 ASSESSMENT — PAIN DESCRIPTION - LOCATION
LOCATION: ABDOMEN

## 2022-12-16 NOTE — PROGRESS NOTES
Spoke to Dr. Judy Lezama. Since patient is now on 6 liters NC and oxygen saturation is 92%, ABG does not need drawn.

## 2022-12-16 NOTE — CARE COORDINATION
L thoracentesis done yesterday. O2 weaned to 6L. Pt got up to chair for the first time today. She states she feels weak. [facility] following. Discussed possible rehab. Pt is hoping to be able to go home since the holidays are near. If inpt  rehab needed, she would like [facility]. Explained that she may not have an acute rehab diagnosis. Will ask Jer[facility] Bernheim to follow once pt/ot evals are done. Pt declines nerissa list at this time.

## 2022-12-16 NOTE — PROGRESS NOTES
[facility]  SURGICAL INTENSIVE CARE UNIT (SICU)  ATTENDING PHYSICIAN CRITICAL CARE PROGRESS NOTE     I have examined the patient, reviewed the record, and discussed the case with the APN/ resident. Please refer to the APN/ resident's note. I agree with the assessment and plan. I have reviewed all relevant labs and imaging data. The following summarizes my clinical findings and independent assessment. CC:  critical care management for hypoxia    Hospital Course/Overnight Events:  12/9--underwent colonoscopy--iatrogenic perforation of cecum; underwent ex lap with ileocectomy  12/11--A.fib with RVR  12/12--hypoxia overnight; transferred to SICU; on high flow NC; on cardizem drip  12/13--remains on Cardizem drip; for thoracentesis today (not done yesterday due to prophylactic subQ heparin)  12/14--underwent thoracentesis yesterday  12/15--for thoracentesis today  12/16--no issues overnight    Pt states she is feeling much better.     Awake and alert  Follows commands  Hrt:  irregular rhythm; no murmur  Lungs:  fairly clear bilaterally; diminished in bases  Abd:  obese; soft; BS active; nontender; no rebound; no guarding  Skin:  warm/dry  Ext:  moving all 4 ext    Labs personally reviewed  Films personally reviewed/interpreted--minimal effusions vs interstitial edema    Patient Active Problem List    Diagnosis Date Noted    Bowel perforation ([address] 12/09/2022    BELCHER (dyspnea on exertion) 10/13/2022    Abnormal diffusion capacity determined by pulmonary function test 10/06/2022    Acute deep vein thrombosis (DVT) of right peroneal vein ([address] 10/14/2022    Personal history of colonic polyps 10/05/2022    S/P total right hip arthroplasty 01/10/2022    Cognitive impairment 01/10/2022    Aneurysm ([address] 12/17/2021    Acute cystitis with hematuria 12/16/2021    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 12/03/2021    History of COVID-19 12/03/2021    Pulmonary emphysema ([facility] [address] 04/27/2021    History of herpes genitalis 04/27/2021    Primary osteoarthritis of right hip 04/05/2021    History of total left hip arthroplasty 03/08/2021    Arthritis of both hips 02/22/2021    COVID-19     H/O total hip arthroplasty, right     Abnormal blood sugar 02/21/2020    Degenerative disc disease, cervical 12/12/2019    Arthropathy of cervical facet joint 12/12/2019    Colon polyps 09/06/2019    Dysphagia     Heartburn     At risk for colon cancer     Arthritis of right hip 08/07/2019    Chronic pain syndrome 03/01/2019    Lumbar facet arthropathy 03/01/2019    Lumbar disc disorder 03/01/2019    Primary osteoarthritis of both hips 03/01/2019    Hypertension 09/21/2018    Obesity (BMI 30-39.9) 09/10/2018     S/p ex lap/ileocectomy for iatrogenic cecal perf from colonoscopy  Acute respiratory failure with hypoxia--cont supplemental oxygen--wean as able  Bilateral effusions--s/p bilateral thoracentesis  A.fib/flutter--cont dig; metoprolol  Hypoalbuminemia--diet as tolerated after thoracentesis  Completed Zosyn   LFT elevation--monitor labs  DVT risk--PCDs/subQ heparin    Taina Perrin MD, FACS  12/16/2022  11:04 AM

## 2022-12-16 NOTE — PROGRESS NOTES
[id number] [facility] Cardiology Inpatient Progress Note    Patient is a 60 y.o. female of Haley Marin MD seen in hospital follow up. Chief complaint: Afib    HPI: Some SOB. No CP. Patient Active Problem List   Diagnosis    Obesity (BMI 30-39. 9)    Hypertension    Chronic pain syndrome    Lumbar facet arthropathy    Lumbar disc disorder    Primary osteoarthritis of both hips    Arthritis of right hip    Dysphagia    Heartburn    At risk for colon cancer    Colon polyps    Degenerative disc disease, cervical    Arthropathy of cervical facet joint    Abnormal blood sugar    Arthritis of both hips    COVID-19    H/O total hip arthroplasty, right    History of total left hip arthroplasty    Primary osteoarthritis of right hip    Pulmonary emphysema (HCC)    History of herpes genitalis    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    History of COVID-19    Acute cystitis with hematuria    Aneurysm (HCC)    S/P total right hip arthroplasty    Cognitive impairment    Personal history of colonic polyps    Abnormal diffusion capacity determined by pulmonary function test    BELCHER (dyspnea on exertion)    Acute deep vein thrombosis (DVT) of right peroneal vein (HCC)    Bowel perforation (HCC)       Allergies   Allergen Reactions    Latex Hives     Hives and rash    Iodine Rash     Hives . pt.  States anaphalyxis    Aloe Hives     Hives     Celebrex [Celecoxib] Itching    Fish-Derived Products Other (See Comments)       Current Facility-Administered Medications   Medication Dose Route Frequency Provider Last Rate Last Admin    oxyCODONE (ROXICODONE) immediate release tablet 5 mg  5 mg Oral Q4H PRN Teresita Navarro MD        Or    oxyCODONE HCl (OXY-IR) immediate release tablet 10 mg  10 mg Oral Q4H PRN Teresita Navarro MD   10 mg at 12/16/22 0604    digoxin (LANOXIN) tablet 125 mcg  125 mcg Oral Daily Teresita Navarro MD   125 mcg at 12/15/22 0946    metoprolol succinate (TOPROL XL) extended release tablet 25 mg  25 mg Oral BID Corina Hernandez DO   25 mg at 12/15/22 2048    perflutren lipid microspheres (DEFINITY) injection 1.5 mL  1.5 mL IntraVENous ONCE PRN Corina Hernandez DO        labetalol (NORMODYNE;TRANDATE) injection 10 mg  10 mg IntraVENous Q30 Min PRN Keely Nicholas MD   10 mg at 12/14/22 0644    HYDROmorphone (DILAUDID) injection 0.25 mg  0.25 mg IntraVENous Q4H PRN Keely Nicholas MD        Or    HYDROmorphone (DILAUDID) injection 0.5 mg  0.5 mg IntraVENous Q4H PRN Keely Nicholas MD   0.5 mg at 12/13/22 1836    hydrALAZINE (APRESOLINE) injection 10 mg  10 mg IntraVENous Q6H PRN Keely Nicholas MD        sodium chloride flush 0.9 % injection 10 mL  10 mL IntraVENous PRN Zane Lima DO        pantoprazole (PROTONIX) injection 40 mg  40 mg IntraVENous BID Keely Nicholas MD   40 mg at 12/15/22 2049    sodium chloride flush 0.9 % injection 5-40 mL  5-40 mL IntraVENous 2 times per day Erum White MD   10 mL at 12/15/22 2049    sodium chloride flush 0.9 % injection 5-40 mL  5-40 mL IntraVENous PRN Erum White MD        piperacillin-tazobactam (ZOSYN) 3,375 mg in sodium chloride 0.9 % 50 mL IVPB ([id number])  3,375 mg IntraVENous Q8H Jesse Yepez MD 12.5 mL/hr at 12/16/22 0500 Rate Verify at 12/16/22 0500    albuterol (PROVENTIL) nebulizer solution 2.5 mg  2.5 mg Nebulization Q4H PRN Erum White MD        [Held by provider] chlorthalidone (HYGROTON) tablet 25 mg  25 mg Oral Daily Erum White MD   25 mg at 12/10/22 0920    gabapentin (NEURONTIN) capsule 300 mg  300 mg Oral TID Erum White MD   300 mg at 12/15/22 2048    [Held by provider] losartan (COZAAR) tablet 100 mg  100 mg Oral Daily Erum White MD   100 mg at 12/10/22 0920    0.9 % sodium chloride infusion   IntraVENous PRN Erum White MD   Stopped at 12/13/22 1832    ondansetron (ZOFRAN-ODT) disintegrating tablet 4 mg  4 mg Oral Q8H PRN Erum White MD        Or ondansetron (ZOFRAN) injection 4 mg  4 mg IntraVENous Q6H PRN Keyla Dunlap MD   4 mg at 12/13/22 1452    acetaminophen (TYLENOL) tablet 650 mg  650 mg Oral Q6H Keyla Dunlap MD   650 mg at 12/15/22 2049    heparin (porcine) injection 5,000 Units  5,000 Units SubCUTAneous 3 times per day Keyla Dunlap MD   5,000 Units at 12/16/22 0630    ipratropium (ATROVENT) 0.02 % nebulizer solution 0.5 mg  0.5 mg Nebulization 4x daily Keyla Dunlap MD   0.5 mg at 12/15/22 1941    And    Arformoterol Tartrate (BROVANA) nebulizer solution 15 mcg  15 mcg Nebulization BID Keyla Dunlap MD   15 mcg at 12/15/22 1941       Review of systems:   Heart: as above   Lungs: as above   Eyes: denies changes in vision or discharge. Ears: denies changes in hearing or pain. Nose: denies epistaxis or masses   Throat: denies sore throat or trouble swallowing. Neuro: denies numbness, tingling, tremors. Skin: denies rashes or itching. : denies hematuria, dysuria   GI: denies vomiting, diarrhea   Psych: denies mood changed, anxiety, depression. Physical Exam   BP (!) 144/81   Pulse 86   Temp 97.9 °F (36.6 °C) (Oral)   Resp 20   Ht 5' 5\" (1.651 m)   Wt 226 lb (102.5 kg)   SpO2 94%   BMI 37.61 kg/m²   Constitutional: Oriented to person, place, and time. No distress. Well developed. Head: Normocephalic and atraumatic. Neck: Neck supple. No hepatojugular reflux. No JVD present. Carotid bruit is not present. No tracheal deviation present. No thyromegaly present. Cardiovascular: Normal rate, regular rhythm, normal heart sounds. intact distal pulses. No gallop and no friction rub. No murmur heard. Pulmonary: Breath sounds normal. No respiratory distress. No wheezes. No rales. Chest: Effort normal. No tenderness. Abdominal: Soft. Bowel sounds are normal. No distension or mass. No tenderness, rebound or guarding. Musculoskeletal: . No tenderness. No clubbing or cyanosis.   Extremitites: Intact distal pulses. No edema  Neurological: Alert and oriented to person, place, and time. Skin: Skin is warm and dry. No rash noted. Not diaphoretic. No erythema. Psychiatric: Normal mood and affect. Behavior is normal.   Lymphadenopathy: No cervical adenopathy. No groin adenopathy. CBC:   Lab Results   Component Value Date/Time    WBC 7.7 12/16/2022 05:56 AM    RBC 3.89 12/16/2022 05:56 AM    HGB 11.5 12/16/2022 05:56 AM    HCT 35.2 12/16/2022 05:56 AM    HCT 42.0 12/13/2022 08:33 AM    MCV 90.5 12/16/2022 05:56 AM    MCH 29.6 12/16/2022 05:56 AM    MCHC 32.7 12/16/2022 05:56 AM    RDW 12.7 12/16/2022 05:56 AM     12/16/2022 05:56 AM    MPV 9.5 12/16/2022 05:56 AM     BMP:   Lab Results   Component Value Date/Time     12/16/2022 05:56 AM    K 4.0 12/16/2022 05:56 AM    K 3.5 12/11/2022 05:22 AM    CL 98 12/16/2022 05:56 AM    CO2 24 12/16/2022 05:56 AM    BUN 14 12/16/2022 05:56 AM    LABALBU 2.8 12/16/2022 05:56 AM    CREATININE 0.7 12/16/2022 05:56 AM    CALCIUM 8.9 12/16/2022 05:56 AM    GFRAA 50 10/06/2022 09:39 AM    LABGLOM >60 12/16/2022 05:56 AM     Magnesium:    Lab Results   Component Value Date/Time    MG 1.9 12/16/2022 05:56 AM     Cardiac Enzymes:   Lab Results   Component Value Date    TROPHS 18 (H) 12/12/2022    TROPHS 21 (H) 12/11/2022      PT/INR:    Lab Results   Component Value Date/Time    PROTIME 12.4 12/15/2022 04:40 AM    INR 1.1 12/15/2022 04:40 AM     TSH:    Lab Results   Component Value Date/Time    TSH 1.510 05/27/2022 09:18 AM     Rhythm Strip: atrial flutter. ASSESSMENT & PLAN:    Patient Active Problem List   Diagnosis    Obesity (BMI 30-39. 9)    Hypertension    Chronic pain syndrome    Lumbar facet arthropathy    Lumbar disc disorder    Primary osteoarthritis of both hips    Arthritis of right hip    Dysphagia    Heartburn    At risk for colon cancer    Colon polyps    Degenerative disc disease, cervical    Arthropathy of cervical facet joint    Abnormal blood sugar Arthritis of both hips    COVID-19    H/O total hip arthroplasty, right    History of total left hip arthroplasty    Primary osteoarthritis of right hip    Pulmonary emphysema (HCC)    History of herpes genitalis    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    History of COVID-19    Acute cystitis with hematuria    Aneurysm (HCC)    S/P total right hip arthroplasty    Cognitive impairment    Personal history of colonic polyps    Abnormal diffusion capacity determined by pulmonary function test    BELCHER (dyspnea on exertion)    Acute deep vein thrombosis (DVT) of right peroneal vein (HCC)    Bowel perforation ([address]     1. Atrial Flutter:     Chart/labs/EKG reviewed. Echo pending. Rate control. PO dig and toprol. No systemic anticoagulation due to Hx of SAH/aneurysm. 2. Perforated bowel/ID issues: Per surgery. 3. SOB: Supportive care. 4. ORLIN: Follow labs. 5. Hx of DVT    6. Hx of SAH/Aneurysm    Juan David Hillman D.O.   Cardiologist  Cardiology, [facility]

## 2022-12-17 PROCEDURE — 6360000002 HC RX W HCPCS

## 2022-12-17 PROCEDURE — 6370000000 HC RX 637 (ALT 250 FOR IP)

## 2022-12-17 PROCEDURE — 6370000000 HC RX 637 (ALT 250 FOR IP): Performed by: STUDENT IN AN ORGANIZED HEALTH CARE EDUCATION/TRAINING PROGRAM

## 2022-12-17 PROCEDURE — C9113 INJ PANTOPRAZOLE SODIUM, VIA: HCPCS

## 2022-12-17 PROCEDURE — 94669 MECHANICAL CHEST WALL OSCILL: CPT

## 2022-12-17 PROCEDURE — 6360000002 HC RX W HCPCS: Performed by: STUDENT IN AN ORGANIZED HEALTH CARE EDUCATION/TRAINING PROGRAM

## 2022-12-17 PROCEDURE — 2700000000 HC OXYGEN THERAPY PER DAY

## 2022-12-17 PROCEDURE — 6370000000 HC RX 637 (ALT 250 FOR IP): Performed by: INTERNAL MEDICINE

## 2022-12-17 PROCEDURE — 2060000000 HC ICU INTERMEDIATE R&B

## 2022-12-17 PROCEDURE — 2580000003 HC RX 258

## 2022-12-17 PROCEDURE — 99024 POSTOP FOLLOW-UP VISIT: CPT | Performed by: SURGERY

## 2022-12-17 PROCEDURE — C9113 INJ PANTOPRAZOLE SODIUM, VIA: HCPCS | Performed by: STUDENT IN AN ORGANIZED HEALTH CARE EDUCATION/TRAINING PROGRAM

## 2022-12-17 PROCEDURE — 94640 AIRWAY INHALATION TREATMENT: CPT

## 2022-12-17 PROCEDURE — 2580000003 HC RX 258: Performed by: STUDENT IN AN ORGANIZED HEALTH CARE EDUCATION/TRAINING PROGRAM

## 2022-12-17 PROCEDURE — 99233 SBSQ HOSP IP/OBS HIGH 50: CPT | Performed by: INTERNAL MEDICINE

## 2022-12-17 RX ORDER — METOPROLOL SUCCINATE 50 MG/1
50 TABLET, EXTENDED RELEASE ORAL ONCE
Status: COMPLETED | OUTPATIENT
Start: 2022-12-17 | End: 2022-12-17

## 2022-12-17 RX ORDER — METOPROLOL SUCCINATE 100 MG/1
100 TABLET, EXTENDED RELEASE ORAL 2 TIMES DAILY
Status: DISCONTINUED | OUTPATIENT
Start: 2022-12-17 | End: 2022-12-20

## 2022-12-17 RX ADMIN — SODIUM CHLORIDE, PRESERVATIVE FREE 10 ML: 5 INJECTION INTRAVENOUS at 20:14

## 2022-12-17 RX ADMIN — IPRATROPIUM BROMIDE 0.5 MG: 0.5 SOLUTION RESPIRATORY (INHALATION) at 15:56

## 2022-12-17 RX ADMIN — ACETAMINOPHEN 650 MG: 325 TABLET ORAL at 14:19

## 2022-12-17 RX ADMIN — HEPARIN SODIUM 5000 UNITS: 10000 INJECTION INTRAVENOUS; SUBCUTANEOUS at 14:19

## 2022-12-17 RX ADMIN — METOPROLOL SUCCINATE 50 MG: 50 TABLET, EXTENDED RELEASE ORAL at 09:51

## 2022-12-17 RX ADMIN — ARFORMOTEROL TARTRATE 15 MCG: 15 SOLUTION RESPIRATORY (INHALATION) at 09:27

## 2022-12-17 RX ADMIN — PANTOPRAZOLE SODIUM 40 MG: 40 INJECTION, POWDER, FOR SOLUTION INTRAVENOUS at 20:14

## 2022-12-17 RX ADMIN — METOPROLOL SUCCINATE 50 MG: 50 TABLET, EXTENDED RELEASE ORAL at 12:50

## 2022-12-17 RX ADMIN — DIGOXIN 125 MCG: 125 TABLET ORAL at 09:51

## 2022-12-17 RX ADMIN — GABAPENTIN 300 MG: 300 CAPSULE ORAL at 14:19

## 2022-12-17 RX ADMIN — IPRATROPIUM BROMIDE 0.5 MG: 0.5 SOLUTION RESPIRATORY (INHALATION) at 20:34

## 2022-12-17 RX ADMIN — HEPARIN SODIUM 5000 UNITS: 10000 INJECTION INTRAVENOUS; SUBCUTANEOUS at 05:51

## 2022-12-17 RX ADMIN — ARFORMOTEROL TARTRATE 15 MCG: 15 SOLUTION RESPIRATORY (INHALATION) at 20:34

## 2022-12-17 RX ADMIN — IPRATROPIUM BROMIDE 0.5 MG: 0.5 SOLUTION RESPIRATORY (INHALATION) at 13:48

## 2022-12-17 RX ADMIN — IPRATROPIUM BROMIDE 0.5 MG: 0.5 SOLUTION RESPIRATORY (INHALATION) at 09:27

## 2022-12-17 RX ADMIN — HEPARIN SODIUM 5000 UNITS: 10000 INJECTION INTRAVENOUS; SUBCUTANEOUS at 21:37

## 2022-12-17 RX ADMIN — SODIUM CHLORIDE, PRESERVATIVE FREE 10 ML: 5 INJECTION INTRAVENOUS at 09:52

## 2022-12-17 RX ADMIN — PANTOPRAZOLE SODIUM 40 MG: 40 INJECTION, POWDER, FOR SOLUTION INTRAVENOUS at 09:51

## 2022-12-17 RX ADMIN — GABAPENTIN 300 MG: 300 CAPSULE ORAL at 20:14

## 2022-12-17 RX ADMIN — METOPROLOL SUCCINATE 100 MG: 100 TABLET, FILM COATED, EXTENDED RELEASE ORAL at 20:14

## 2022-12-17 RX ADMIN — GABAPENTIN 300 MG: 300 CAPSULE ORAL at 09:51

## 2022-12-17 RX ADMIN — ACETAMINOPHEN 650 MG: 325 TABLET ORAL at 20:14

## 2022-12-17 RX ADMIN — ACETAMINOPHEN 650 MG: 325 TABLET ORAL at 09:51

## 2022-12-17 ASSESSMENT — PAIN DESCRIPTION - DESCRIPTORS: DESCRIPTORS: ACHING;DISCOMFORT;GNAWING

## 2022-12-17 ASSESSMENT — PAIN DESCRIPTION - FREQUENCY: FREQUENCY: INTERMITTENT

## 2022-12-17 ASSESSMENT — PAIN DESCRIPTION - LOCATION: LOCATION: ABDOMEN

## 2022-12-17 ASSESSMENT — PAIN DESCRIPTION - ORIENTATION: ORIENTATION: MID

## 2022-12-17 ASSESSMENT — PAIN SCALES - GENERAL: PAINLEVEL_OUTOF10: 4

## 2022-12-17 ASSESSMENT — PAIN - FUNCTIONAL ASSESSMENT: PAIN_FUNCTIONAL_ASSESSMENT: PREVENTS OR INTERFERES SOME ACTIVE ACTIVITIES AND ADLS

## 2022-12-17 ASSESSMENT — PAIN DESCRIPTION - PAIN TYPE: TYPE: SURGICAL PAIN

## 2022-12-17 ASSESSMENT — PAIN DESCRIPTION - ONSET: ONSET: ON-GOING

## 2022-12-17 NOTE — PLAN OF CARE
Problem: Chronic Conditions and Co-morbidities  Goal: Patient's chronic conditions and co-morbidity symptoms are monitored and maintained or improved  Outcome: Progressing     Problem: Pain  Goal: Verbalizes/displays adequate comfort level or baseline comfort level  Outcome: Progressing     Problem: Discharge Planning  Goal: Discharge to home or other facility with appropriate resources  Outcome: Progressing     Problem: Skin/Tissue Integrity  Goal: Absence of new skin breakdown  Description: 1. Monitor for areas of redness and/or skin breakdown  2. Assess vascular access sites hourly  3. Every 4-6 hours minimum:  Change oxygen saturation probe site  4. Every 4-6 hours:  If on nasal continuous positive airway pressure, respiratory therapy assess nares and determine need for appliance change or resting period.   Outcome: Progressing     Problem: Safety - Adult  Goal: Free from fall injury  Outcome: Progressing  Flowsheets (Taken 12/17/2022 0800)  Free From Fall Injury: Based on caregiver fall risk screen, instruct family/caregiver to ask for assistance with transferring infant if caregiver noted to have fall risk factors     Problem: ABCDS Injury Assessment  Goal: Absence of physical injury  Outcome: Progressing  Flowsheets (Taken 12/17/2022 0800)  Absence of Physical Injury: Implement safety measures based on patient assessment     Problem: Neurosensory - Adult  Goal: Achieves stable or improved neurological status  Outcome: Progressing  Flowsheets (Taken 12/17/2022 0800)  Achieves stable or improved neurological status: Assess for and report changes in neurological status  Goal: Achieves maximal functionality and self care  Outcome: Progressing  Flowsheets (Taken 12/17/2022 0800)  Achieves maximal functionality and self care: Monitor swallowing and airway patency with patient fatigue and changes in neurological status     Problem: Respiratory - Adult  Goal: Achieves optimal ventilation and oxygenation  Outcome: Progressing  Flowsheets (Taken 12/17/2022 0800)  Achieves optimal ventilation and oxygenation: Assess for changes in respiratory status     Problem: Cardiovascular - Adult  Goal: Maintains optimal cardiac output and hemodynamic stability  Outcome: Progressing  Flowsheets (Taken 12/17/2022 0800)  Maintains optimal cardiac output and hemodynamic stability:   Monitor blood pressure and heart rate   Monitor urine output and notify Licensed Independent Practitioner for values outside of normal range  Goal: Absence of cardiac dysrhythmias or at baseline  Outcome: Progressing  Flowsheets (Taken 12/17/2022 0800)  Absence of cardiac dysrhythmias or at baseline:   Monitor cardiac rate and rhythm   Assess for signs of decreased cardiac output     Problem: Skin/Tissue Integrity - Adult  Goal: Skin integrity remains intact  Outcome: Progressing  Flowsheets (Taken 12/17/2022 0800)  Skin Integrity Remains Intact: Monitor for areas of redness and/or skin breakdown  Goal: Incisions, wounds, or drain sites healing without S/S of infection  Outcome: Progressing  Flowsheets (Taken 12/17/2022 0800)  Incisions, Wounds, or Drain Sites Healing Without Sign and Symptoms of Infection: TWICE DAILY: Assess and document skin integrity  Goal: Oral mucous membranes remain intact  Outcome: Progressing  Flowsheets (Taken 12/17/2022 0800)  Oral Mucous Membranes Remain Intact: Assess oral mucosa and hygiene practices     Problem: Musculoskeletal - Adult  Goal: Return mobility to safest level of function  Outcome: Progressing  Flowsheets (Taken 12/17/2022 0800)  Return Mobility to Safest Level of Function: Assess patient stability and activity tolerance for standing, transferring and ambulating with or without assistive devices  Goal: Return ADL status to a safe level of function  Outcome: Progressing  Flowsheets (Taken 12/17/2022 0800)  Return ADL Status to a Safe Level of Function: Administer medication as ordered     Problem: Gastrointestinal - Adult  Goal: Minimal or absence of nausea and vomiting  Outcome: Progressing  Flowsheets (Taken 12/17/2022 0800)  Minimal or absence of nausea and vomiting: Administer IV fluids as ordered to ensure adequate hydration  Goal: Maintains or returns to baseline bowel function  Outcome: Progressing  Flowsheets (Taken 12/17/2022 0800)  Maintains or returns to baseline bowel function:   Assess bowel function   Encourage oral fluids to ensure adequate hydration  Goal: Maintains adequate nutritional intake  Outcome: Progressing  Flowsheets (Taken 12/17/2022 0800)  Maintains adequate nutritional intake: Monitor percentage of each meal consumed     Problem: Genitourinary - Adult  Goal: Absence of urinary retention  Outcome: Progressing  Flowsheets (Taken 12/17/2022 0800)  Absence of urinary retention:   Assess patients ability to void and empty bladder   Monitor intake/output and perform bladder scan as needed  Goal: Urinary catheter remains patent  Outcome: Progressing     Problem: Infection - Adult  Goal: Absence of infection at discharge  Outcome: Progressing  Goal: Absence of infection during hospitalization  Outcome: Progressing     Problem: Metabolic/Fluid and Electrolytes - Adult  Goal: Electrolytes maintained within normal limits  Outcome: Progressing  Goal: Hemodynamic stability and optimal renal function maintained  Outcome: Progressing     Problem: Hematologic - Adult  Goal: Maintains hematologic stability  Outcome: Progressing     Problem: Nutrition Deficit:  Goal: Optimize nutritional status  Outcome: Progressing

## 2022-12-17 NOTE — PROGRESS NOTES
[facility] Cardiology Inpatient Progress Note    Patient is a 60 y.o. female of Susan Forbes MD seen in hospital follow up. Chief complaint: Afib    HPI: Some SOB. No CP. Patient Active Problem List   Diagnosis    Obesity (BMI 30-39. 9)    Hypertension    Chronic pain syndrome    Lumbar facet arthropathy    Lumbar disc disorder    Primary osteoarthritis of both hips    Arthritis of right hip    Dysphagia    Heartburn    At risk for colon cancer    Colon polyps    Degenerative disc disease, cervical    Arthropathy of cervical facet joint    Abnormal blood sugar    Arthritis of both hips    COVID-19    H/O total hip arthroplasty, right    History of total left hip arthroplasty    Primary osteoarthritis of right hip    Pulmonary emphysema (HCC)    History of herpes genitalis    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    History of COVID-19    Acute cystitis with hematuria    Aneurysm (HCC)    S/P total right hip arthroplasty    Cognitive impairment    Personal history of colonic polyps    Abnormal diffusion capacity determined by pulmonary function test    BELCHER (dyspnea on exertion)    Acute deep vein thrombosis (DVT) of right peroneal vein (HCC)    Bowel perforation (HCC)    Acute respiratory failure with hypoxia (HCC)    Atrial flutter (HCC)    Atrial fibrillation with RVR (HCC)    Pleural effusion, bilateral    LFT elevation    Hypoalbuminemia       Allergies   Allergen Reactions    Latex Hives     Hives and rash    Iodine Rash     Hives . pt.  States anaphalyxis    Aloe Hives     Hives     Celebrex [Celecoxib] Itching    Fish-Derived Products Other (See Comments)       Current Facility-Administered Medications   Medication Dose Route Frequency Provider Last Rate Last Admin    metoprolol succinate (TOPROL XL) extended release tablet 50 mg  50 mg Oral BID Juan Bush DO   50 mg at 12/16/22 2042    oxyCODONE (ROXICODONE) immediate release tablet 5 mg  5 mg Oral Q4H PRN Toi Bernabe MD        Or    oxyCODONE HCl (OXY-IR) immediate release tablet 10 mg  10 mg Oral Q4H PRN Toi Bernabe MD   10 mg at 12/16/22 2148    digoxin (LANOXIN) tablet 125 mcg  125 mcg Oral Daily Toi Bernabe MD   125 mcg at 12/16/22 7836    perflutren lipid microspheres (DEFINITY) injection 1.5 mL  1.5 mL IntraVENous ONCE PRN Patria Rg, DO        labetalol (NORMODYNE;TRANDATE) injection 10 mg  10 mg IntraVENous Q30 Min PRN Cynthia Maravilla MD   10 mg at 12/14/22 0644    HYDROmorphone (DILAUDID) injection 0.25 mg  0.25 mg IntraVENous Q4H PRN Cynthia Maravilla MD        Or    HYDROmorphone (DILAUDID) injection 0.5 mg  0.5 mg IntraVENous Q4H PRN Cynthia Maravilla MD   0.5 mg at 12/13/22 1836    hydrALAZINE (APRESOLINE) injection 10 mg  10 mg IntraVENous Q6H PRN Cynthia Maravilla MD        sodium chloride flush 0.9 % injection 10 mL  10 mL IntraVENous PRN Zane Lima, DO        pantoprazole (PROTONIX) injection 40 mg  40 mg IntraVENous BID Cynthia Maravilla MD   40 mg at 12/16/22 2042    sodium chloride flush 0.9 % injection 5-40 mL  5-40 mL IntraVENous 2 times per day Corwin Blake MD   10 mL at 12/16/22 0954    sodium chloride flush 0.9 % injection 5-40 mL  5-40 mL IntraVENous PRN Corwin Blake MD        albuterol (PROVENTIL) nebulizer solution 2.5 mg  2.5 mg Nebulization Q4H PRN Corwin Blake MD        [Held by provider] chlorthalidone (HYGROTON) tablet 25 mg  25 mg Oral Daily Corwin Blake MD   25 mg at 12/10/22 0920    gabapentin (NEURONTIN) capsule 300 mg  300 mg Oral TID Corwin Blake MD   300 mg at 12/16/22 2042    [Held by provider] losartan (COZAAR) tablet 100 mg  100 mg Oral Daily Corwin Blake MD   100 mg at 12/10/22 0920    0.9 % sodium chloride infusion   IntraVENous PRN Corwin Blake MD   Stopped at 12/13/22 1832    ondansetron (ZOFRAN-ODT) disintegrating tablet 4 mg  4 mg Oral Q8H PRN Corwin Blake MD        Or    [facility]ron [facility]) injection 4 mg  4 mg IntraVENous Q6H PRN Tamia Hester MD   4 mg at 12/13/22 1452    acetaminophen (TYLENOL) tablet 650 mg  650 mg Oral Q6H Tamia Hester MD   650 mg at 12/16/22 2042    heparin (porcine) injection 5,000 Units  5,000 Units SubCUTAneous 3 times per day Tamia Hester MD   5,000 Units at 12/17/22 0551    ipratropium (ATROVENT) 0.02 % nebulizer solution 0.5 mg  0.5 mg Nebulization 4x daily Tamia Hester MD   0.5 mg at 12/16/22 1910    And    Arformoterol Tartrate (BROVANA) nebulizer solution 15 mcg  15 mcg Nebulization BID Tamia Hester MD   15 mcg at 12/16/22 1910       Review of systems:   Heart: as above   Lungs: as above   Eyes: denies changes in vision or discharge. Ears: denies changes in hearing or pain. Nose: denies epistaxis or masses   Throat: denies sore throat or trouble swallowing. Neuro: denies numbness, tingling, tremors. Skin: denies rashes or itching. : denies hematuria, dysuria   GI: denies vomiting, diarrhea   Psych: denies mood changed, anxiety, depression. Physical Exam   /62   Pulse 90   Temp 99 °F (37.2 °C) (Oral)   Resp 20   Ht 5' 5\" (1.651 m)   Wt 226 lb (102.5 kg)   SpO2 96%   BMI 37.61 kg/m²   Constitutional: Oriented to person, place, and time. No distress. Well developed. Head: Normocephalic and atraumatic. Neck: Neck supple. No hepatojugular reflux. No JVD present. Carotid bruit is not present. No tracheal deviation present. No thyromegaly present. Cardiovascular: Normal rate, regular rhythm, normal heart sounds. intact distal pulses. No gallop and no friction rub. No murmur heard. Pulmonary: Breath sounds normal. No respiratory distress. No wheezes. No rales. Chest: Effort normal. No tenderness. Abdominal: Soft. Bowel sounds are normal. No distension or mass. No tenderness, rebound or guarding. Musculoskeletal: . No tenderness. No clubbing or cyanosis. Extremitites: Intact distal pulses.  No edema  Neurological: Alert and oriented to person, place, and time. Skin: Skin is warm and dry. No rash noted. Not diaphoretic. No erythema. Psychiatric: Normal mood and affect. Behavior is normal.   Lymphadenopathy: No cervical adenopathy. No groin adenopathy. CBC:   Lab Results   Component Value Date/Time    WBC 7.7 12/16/2022 05:56 AM    RBC 3.89 12/16/2022 05:56 AM    HGB 11.5 12/16/2022 05:56 AM    HCT 35.2 12/16/2022 05:56 AM    HCT 42.0 12/13/2022 08:33 AM    MCV 90.5 12/16/2022 05:56 AM    MCH 29.6 12/16/2022 05:56 AM    MCHC 32.7 12/16/2022 05:56 AM    RDW 12.7 12/16/2022 05:56 AM     12/16/2022 05:56 AM    MPV 9.5 12/16/2022 05:56 AM     BMP:   Lab Results   Component Value Date/Time     12/16/2022 05:56 AM    K 4.0 12/16/2022 05:56 AM    K 3.5 12/11/2022 05:22 AM    CL 98 12/16/2022 05:56 AM    CO2 24 12/16/2022 05:56 AM    BUN 14 12/16/2022 05:56 AM    LABALBU 2.8 12/16/2022 05:56 AM    CREATININE 0.7 12/16/2022 05:56 AM    CALCIUM 8.9 12/16/2022 05:56 AM    GFRAA 50 10/06/2022 09:39 AM    LABGLOM >60 12/16/2022 05:56 AM     Magnesium:    Lab Results   Component Value Date/Time    MG 1.9 12/16/2022 05:56 AM     Cardiac Enzymes:   Lab Results   Component Value Date    TROPHS 18 (H) 12/12/2022    TROPHS 21 (H) 12/11/2022      PT/INR:    Lab Results   Component Value Date/Time    PROTIME 12.4 12/15/2022 04:40 AM    INR 1.1 12/15/2022 04:40 AM     TSH:    Lab Results   Component Value Date/Time    TSH 1.510 05/27/2022 09:18 AM     Rhythm Strip: atrial flutter. Echo Summary 12/14/2022:   Ejection fraction is visually estimated at 70%. No regional wall motion abnormalities seen. Moderate left ventricular concentric hypertrophy noted. Normal right ventricle structure and function. Physiologic and/or trace aortic regurgitation is noted. Physiologic and/or trace mitral regurgitation is present. No evidence for hemodynamically significant pericardial effusion.     ASSESSMENT & PLAN:    Patient Active Problem List   Diagnosis    Obesity (BMI 30-39. 9)    Hypertension    Chronic pain syndrome    Lumbar facet arthropathy    Lumbar disc disorder    Primary osteoarthritis of both hips    Arthritis of right hip    Dysphagia    Heartburn    At risk for colon cancer    Colon polyps    Degenerative disc disease, cervical    Arthropathy of cervical facet joint    Abnormal blood sugar    Arthritis of both hips    COVID-19    H/O total hip arthroplasty, right    History of total left hip arthroplasty    Primary osteoarthritis of right hip    Pulmonary emphysema (HCC)    History of herpes genitalis    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    History of COVID-19    Acute cystitis with hematuria    Aneurysm (HCC)    S/P total right hip arthroplasty    Cognitive impairment    Personal history of colonic polyps    Abnormal diffusion capacity determined by pulmonary function test    BELCHER (dyspnea on exertion)    Acute deep vein thrombosis (DVT) of right peroneal vein (HCC)    Bowel perforation (HCC)    Acute respiratory failure with hypoxia (HCC)    Atrial flutter (HCC)    Atrial fibrillation with RVR (HCC)    Pleural effusion, bilateral    LFT elevation    Hypoalbuminemia     1. Atrial Flutter:     Chart/labs/EKG reviewed. Echo as above. Rate control. PO dig and toprol. No systemic anticoagulation due to Hx of SAH/aneurysm. 2. Perforated bowel/ID issues: Per surgery. 3. SOB: Supportive care. 4. ORLIN: Follow labs. 5. Hx of DVT    6. Hx of SAH/Aneurysm    Christian Price D.O.   Cardiologist  Cardiology, [address]

## 2022-12-17 NOTE — PROGRESS NOTES
Armando Morales NP, notified of patient HR maintaining 130-160's aflutter most this morning and of medication she has received. Message read via perfect serve, awaiting orders.     Electronically signed by Annmarie Clay RN on 12/17/22 at 11:26 AM EST

## 2022-12-17 NOTE — PROGRESS NOTES
[facility]  PROGRESS NOTE  ATTENDING NOTE    S:-60ear-old female who underwent a colonoscopy esophagitis iatrogenic perforation cecum. She underwent exploratory laparotomy with ileocecectomy. He was transferred out of the ICU overnight. She is tolerating diet. She is having bowel function.     /62   Pulse 83   Temp 97.9 °F (36.6 °C) (Oral)   Resp 16   Ht 5' 5\" (1.651 m)   Wt 226 lb (102.5 kg)   SpO2 92%   BMI 37.61 kg/m²   Gen:  NAD  Abd:  soft, ND, mild incisional TTP  Wound:  dressing dry    ASSESSMENT/PLAN:  Iatrogenic perforation cecum--s/p ex lap, ileocecectomy  --ADAT  --ambulate  --PT/OT  --heparin subq    Eusebia Cruz MD, MSc, FACS  12/17/2022  12:48 PM

## 2022-12-18 LAB
ALBUMIN SERPL-MCNC: 3 G/DL (ref 3.5–5.2)
ALP BLD-CCNC: 73 U/L (ref 35–104)
ALT SERPL-CCNC: 40 U/L (ref 0–32)
ANION GAP SERPL CALCULATED.3IONS-SCNC: 9 MMOL/L (ref 7–16)
AST SERPL-CCNC: 32 U/L (ref 0–31)
BASOPHILS ABSOLUTE: 0.03 E9/L (ref 0–0.2)
BASOPHILS RELATIVE PERCENT: 0.4 % (ref 0–2)
BILIRUB SERPL-MCNC: 0.4 MG/DL (ref 0–1.2)
BUN BLDV-MCNC: 12 MG/DL (ref 6–23)
CALCIUM IONIZED: 1.24 MMOL/L (ref 1.15–1.33)
CALCIUM SERPL-MCNC: 8.7 MG/DL (ref 8.6–10.2)
CHLORIDE BLD-SCNC: 103 MMOL/L (ref 98–107)
CO2: 29 MMOL/L (ref 22–29)
CREAT SERPL-MCNC: 0.7 MG/DL (ref 0.5–1)
EOSINOPHILS ABSOLUTE: 0.13 E9/L (ref 0.05–0.5)
EOSINOPHILS RELATIVE PERCENT: 1.8 % (ref 0–6)
GFR SERPL CREATININE-BSD FRML MDRD: >60 ML/MIN/1.73
GLUCOSE BLD-MCNC: 99 MG/DL (ref 74–99)
HCT VFR BLD CALC: 33 % (ref 34–48)
HEMOGLOBIN: 10.8 G/DL (ref 11.5–15.5)
IMMATURE GRANULOCYTES #: 0.09 E9/L
IMMATURE GRANULOCYTES %: 1.3 % (ref 0–5)
LYMPHOCYTES ABSOLUTE: 1.03 E9/L (ref 1.5–4)
LYMPHOCYTES RELATIVE PERCENT: 14.7 % (ref 20–42)
MAGNESIUM: 1.9 MG/DL (ref 1.6–2.6)
MCH RBC QN AUTO: 29.4 PG (ref 26–35)
MCHC RBC AUTO-ENTMCNC: 32.7 % (ref 32–34.5)
MCV RBC AUTO: 89.9 FL (ref 80–99.9)
MONOCYTES ABSOLUTE: 0.47 E9/L (ref 0.1–0.95)
MONOCYTES RELATIVE PERCENT: 6.7 % (ref 2–12)
NEUTROPHILS ABSOLUTE: 5.28 E9/L (ref 1.8–7.3)
NEUTROPHILS RELATIVE PERCENT: 75.1 % (ref 43–80)
PDW BLD-RTO: 12.8 FL (ref 11.5–15)
PHOSPHORUS: 3.3 MG/DL (ref 2.5–4.5)
PLATELET # BLD: 320 E9/L (ref 130–450)
PMV BLD AUTO: 9.5 FL (ref 7–12)
POTASSIUM SERPL-SCNC: 3.5 MMOL/L (ref 3.5–5)
RBC # BLD: 3.67 E12/L (ref 3.5–5.5)
SODIUM BLD-SCNC: 141 MMOL/L (ref 132–146)
TOTAL PROTEIN: 5.9 G/DL (ref 6.4–8.3)
WBC # BLD: 7 E9/L (ref 4.5–11.5)

## 2022-12-18 PROCEDURE — C9113 INJ PANTOPRAZOLE SODIUM, VIA: HCPCS

## 2022-12-18 PROCEDURE — 6360000002 HC RX W HCPCS

## 2022-12-18 PROCEDURE — 82330 ASSAY OF CALCIUM: CPT

## 2022-12-18 PROCEDURE — 6370000000 HC RX 637 (ALT 250 FOR IP)

## 2022-12-18 PROCEDURE — 99233 SBSQ HOSP IP/OBS HIGH 50: CPT | Performed by: INTERNAL MEDICINE

## 2022-12-18 PROCEDURE — 84100 ASSAY OF PHOSPHORUS: CPT

## 2022-12-18 PROCEDURE — 2580000003 HC RX 258

## 2022-12-18 PROCEDURE — 6370000000 HC RX 637 (ALT 250 FOR IP): Performed by: INTERNAL MEDICINE

## 2022-12-18 PROCEDURE — 2700000000 HC OXYGEN THERAPY PER DAY

## 2022-12-18 PROCEDURE — 2060000000 HC ICU INTERMEDIATE R&B

## 2022-12-18 PROCEDURE — 83735 ASSAY OF MAGNESIUM: CPT

## 2022-12-18 PROCEDURE — 85025 COMPLETE CBC W/AUTO DIFF WBC: CPT

## 2022-12-18 PROCEDURE — 36415 COLL VENOUS BLD VENIPUNCTURE: CPT

## 2022-12-18 PROCEDURE — 99024 POSTOP FOLLOW-UP VISIT: CPT | Performed by: SURGERY

## 2022-12-18 PROCEDURE — 80053 COMPREHEN METABOLIC PANEL: CPT

## 2022-12-18 PROCEDURE — 94640 AIRWAY INHALATION TREATMENT: CPT

## 2022-12-18 RX ORDER — DILTIAZEM HYDROCHLORIDE 180 MG/1
180 CAPSULE, COATED, EXTENDED RELEASE ORAL DAILY
Status: DISCONTINUED | OUTPATIENT
Start: 2022-12-18 | End: 2022-12-21 | Stop reason: HOSPADM

## 2022-12-18 RX ADMIN — PANTOPRAZOLE SODIUM 40 MG: 40 INJECTION, POWDER, FOR SOLUTION INTRAVENOUS at 20:04

## 2022-12-18 RX ADMIN — ACETAMINOPHEN 650 MG: 325 TABLET ORAL at 13:32

## 2022-12-18 RX ADMIN — ARFORMOTEROL TARTRATE 15 MCG: 15 SOLUTION RESPIRATORY (INHALATION) at 08:05

## 2022-12-18 RX ADMIN — METOPROLOL SUCCINATE 100 MG: 100 TABLET, FILM COATED, EXTENDED RELEASE ORAL at 09:31

## 2022-12-18 RX ADMIN — GABAPENTIN 300 MG: 300 CAPSULE ORAL at 13:33

## 2022-12-18 RX ADMIN — HEPARIN SODIUM 5000 UNITS: 10000 INJECTION INTRAVENOUS; SUBCUTANEOUS at 22:00

## 2022-12-18 RX ADMIN — HEPARIN SODIUM 5000 UNITS: 10000 INJECTION INTRAVENOUS; SUBCUTANEOUS at 13:33

## 2022-12-18 RX ADMIN — GABAPENTIN 300 MG: 300 CAPSULE ORAL at 20:05

## 2022-12-18 RX ADMIN — GABAPENTIN 300 MG: 300 CAPSULE ORAL at 09:30

## 2022-12-18 RX ADMIN — OXYCODONE HYDROCHLORIDE 10 MG: 10 TABLET ORAL at 14:46

## 2022-12-18 RX ADMIN — IPRATROPIUM BROMIDE 0.5 MG: 0.5 SOLUTION RESPIRATORY (INHALATION) at 20:36

## 2022-12-18 RX ADMIN — PANTOPRAZOLE SODIUM 40 MG: 40 INJECTION, POWDER, FOR SOLUTION INTRAVENOUS at 09:31

## 2022-12-18 RX ADMIN — ACETAMINOPHEN 650 MG: 325 TABLET ORAL at 09:31

## 2022-12-18 RX ADMIN — METOPROLOL SUCCINATE 100 MG: 100 TABLET, FILM COATED, EXTENDED RELEASE ORAL at 20:04

## 2022-12-18 RX ADMIN — IPRATROPIUM BROMIDE 0.5 MG: 0.5 SOLUTION RESPIRATORY (INHALATION) at 13:07

## 2022-12-18 RX ADMIN — DILTIAZEM HYDROCHLORIDE 180 MG: 180 CAPSULE, COATED, EXTENDED RELEASE ORAL at 14:46

## 2022-12-18 RX ADMIN — ARFORMOTEROL TARTRATE 15 MCG: 15 SOLUTION RESPIRATORY (INHALATION) at 20:35

## 2022-12-18 RX ADMIN — HEPARIN SODIUM 5000 UNITS: 10000 INJECTION INTRAVENOUS; SUBCUTANEOUS at 06:08

## 2022-12-18 RX ADMIN — SODIUM CHLORIDE, PRESERVATIVE FREE 10 ML: 5 INJECTION INTRAVENOUS at 20:05

## 2022-12-18 RX ADMIN — SODIUM CHLORIDE, PRESERVATIVE FREE 10 ML: 5 INJECTION INTRAVENOUS at 09:32

## 2022-12-18 RX ADMIN — DIGOXIN 125 MCG: 125 TABLET ORAL at 09:31

## 2022-12-18 RX ADMIN — ACETAMINOPHEN 650 MG: 325 TABLET ORAL at 20:05

## 2022-12-18 RX ADMIN — IPRATROPIUM BROMIDE 0.5 MG: 0.5 SOLUTION RESPIRATORY (INHALATION) at 08:05

## 2022-12-18 ASSESSMENT — PAIN DESCRIPTION - ORIENTATION: ORIENTATION: MID

## 2022-12-18 ASSESSMENT — PAIN SCALES - WONG BAKER: WONGBAKER_NUMERICALRESPONSE: 0

## 2022-12-18 ASSESSMENT — PAIN DESCRIPTION - DESCRIPTORS: DESCRIPTORS: ACHING;DISCOMFORT;ITCHING

## 2022-12-18 ASSESSMENT — PAIN DESCRIPTION - LOCATION
LOCATION: ABDOMEN

## 2022-12-18 ASSESSMENT — PAIN SCALES - GENERAL
PAINLEVEL_OUTOF10: 0
PAINLEVEL_OUTOF10: 3
PAINLEVEL_OUTOF10: 7
PAINLEVEL_OUTOF10: 5

## 2022-12-18 ASSESSMENT — PAIN - FUNCTIONAL ASSESSMENT: PAIN_FUNCTIONAL_ASSESSMENT: PREVENTS OR INTERFERES SOME ACTIVE ACTIVITIES AND ADLS

## 2022-12-18 NOTE — PROGRESS NOTES
PT DAILY TREATMENT NOTE 8    Patient Name: Albertine Paget  Date:3/7/2017  : 1973  [x]  Patient  Verified  Payor: BLUE CROSS / Plan: [address] [place] [address] [place] / Product Type: PPO /    In time:10:00  Out time:11:10  Total Treatment Time (min): 70  Total Timed Codes (min): 70  1:1 Treatment Time (min):   Visit #: 6 of 12    Treatment Area: Cervicalgia [M54.2]    SUBJECTIVE  Pain Level (0-10 scale): 7/10  Any medication changes, allergies to medications, adverse drug reactions, diagnosis change, or new procedure performed?: [x] No    [] Yes (see summary sheet for update)  Subjective functional status/changes:   [] No changes reported  The middle of my back has been bothering me more than anything lately, but I also feel some knots in my lower back when I try to massage it on my own.     OBJECTIVE  Modality rationale: decrease pain and increase tissue extensibility to improve the patients ability to improve functional abilities   Min Type Additional Details   15 [] Estim: []Att   [x]Unatt        []TENS instruct                  []IFC  []Premod   []NMES                     []Other:  []w/US   []w/ice   []w/heat  Position:supine  Location:MH to C-spine and L-spine; IFC to L-spine    []  Traction: [] Cervical       []Lumbar                       [] Prone          []Supine                       []Intermittent   []Continuous Lbs:  [] before manual  [] after manual    []  Ultrasound: []Continuous   [] Pulsed                           []1MHz   []3MHz Location:  W/cm2:    []  Iontophoresis with dexamethasone         Location: [] Take home patch   [] In clinic    []  Ice     []  heat  []  Ice massage Position:  Location:    []  Vasopneumatic Device Pressure:       [] lo [] med [] hi   Temperature: [] lo [] med [] hi   [] Skin assessment post-treatment:  []intact []redness- no adverse reaction       []redness  adverse reaction:       55 min Therapeutic Exercise:  [x] See flow sheet :   Rationale: increase ROM and [facility] system  PROGRESS NOTE  ATTENDING NOTE    S:-60ear-old female who underwent a colonoscopy esophagitis iatrogenic perforation cecum. She underwent exploratory laparotomy with ileocecectomy. He was transferred out of the ICU overnight. She is tolerating diet. She is having bowel function.     BP (!) 120/54   Pulse 89   Temp 98.5 °F (36.9 °C) (Oral)   Resp 20   Ht 5' 5\" (1.651 m)   Wt 226 lb (102.5 kg)   SpO2 92%   BMI 37.61 kg/m²   Gen:  NAD  Abd:  soft, ND, mild incisional TTP  Wound:  dressing dry--removed, staples c/d/i    ASSESSMENT/PLAN:  Iatrogenic perforation cecum--s/p ex lap, ileocecectomy  --ADAT  --ambulate  --PT/OT  --heparin subq    Farhan Tobin MD, MSc, FACS  12/18/2022  4:14 PM increase strength to improve the patients ability to improve functional abilities        min Patient Education: [x] Review HEP    [] Progressed/Changed HEP based on:   [] positioning   [] body mechanics   [] transfers   [] heat/ice application        Other Objective/Functional Measures:     Pain Level (0-10 scale) post treatment: 3/10    ASSESSMENT/Changes in Function: Patient reports approximately 30% overall improvement from therapy since initial evaluation. Pt with chief c/o R>L lumbar quadrant pain since last tx and needs continued work on postural awareness and strengthening as well. Will continue to progress/advance patient within current POC as tolerated with monitoring symptoms. Patient will continue to benefit from skilled PT services to modify and progress therapeutic interventions, address functional mobility deficits, address ROM deficits, address strength deficits, analyze and address soft tissue restrictions, analyze and cue movement patterns, analyze and modify body mechanics/ergonomics and assess and modify postural abnormalities to attain remaining goals.      []  See Plan of Care  []  See progress note/recertification  []  See Discharge Summary         Progress towards goals / Updated goals:      PLAN  [x]  Upgrade activities as tolerated     []  Continue plan of care  []  Update interventions per flow sheet       []  Discharge due to:_  []  Other:_      Delaney Vera, THANH 3/7/2017  9:53 AM

## 2022-12-18 NOTE — PROGRESS NOTES
[facility] Cardiology Inpatient Progress Note    Patient is a 60 y.o. female of Kenn Crooks MD seen in hospital follow up. Chief complaint: Afib    HPI: Some SOB. No CP. Patient Active Problem List   Diagnosis    Obesity (BMI 30-39. 9)    Hypertension    Chronic pain syndrome    Lumbar facet arthropathy    Lumbar disc disorder    Primary osteoarthritis of both hips    Arthritis of right hip    Dysphagia    Heartburn    At risk for colon cancer    Colon polyps    Degenerative disc disease, cervical    Arthropathy of cervical facet joint    Abnormal blood sugar    Arthritis of both hips    COVID-19    H/O total hip arthroplasty, right    History of total left hip arthroplasty    Primary osteoarthritis of right hip    Pulmonary emphysema (HCC)    History of herpes genitalis    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    History of COVID-19    Acute cystitis with hematuria    Aneurysm (HCC)    S/P total right hip arthroplasty    Cognitive impairment    Personal history of colonic polyps    Abnormal diffusion capacity determined by pulmonary function test    BELCHER (dyspnea on exertion)    Acute deep vein thrombosis (DVT) of right peroneal vein (HCC)    Bowel perforation (HCC)    Acute respiratory failure with hypoxia (HCC)    Atrial flutter (HCC)    Atrial fibrillation with RVR (HCC)    Pleural effusion, bilateral    LFT elevation    Hypoalbuminemia       Allergies   Allergen Reactions    Latex Hives     Hives and rash    Iodine Rash     Hives . pt.  States anaphalyxis    Aloe Hives     Hives     Celebrex [Celecoxib] Itching    Fish-Derived Products Other (See Comments)       Current Facility-Administered Medications   Medication Dose Route Frequency Provider Last Rate Last Admin    metoprolol succinate (TOPROL XL) extended release tablet 100 mg  100 mg Oral BID Kathrene Alpers, DO   100 mg at 12/17/22 2014    oxyCODONE (ROXICODONE) immediate release tablet 5 mg  5 mg Oral Q4H PREMMANUEL Montalvo MD        Or    oxyCODONE HCl (OXY-IR) immediate release tablet 10 mg  10 mg Oral Q4H PRN Debi Montalvo MD   10 mg at 12/16/22 2148    digoxin (LANOXIN) tablet 125 mcg  125 mcg Oral Daily Debi Montalvo MD   125 mcg at 12/17/22 0951    perflutren lipid microspheres (DEFINITY) injection 1.5 mL  1.5 mL IntraVENous ONCE PRN Debi Montalvo MD        labetalol (NORMODYNE;TRANDATE) injection 10 mg  10 mg IntraVENous Q30 Min PRN Debi Montalvo MD   10 mg at 12/14/22 0644    hydrALAZINE (APRESOLINE) injection 10 mg  10 mg IntraVENous Q6H PRN Debi Montalvo MD        sodium chloride flush 0.9 % injection 10 mL  10 mL IntraVENous PRN Debi Montalvo MD        pantoprazole (PROTONIX) injection 40 mg  40 mg IntraVENous BID Debi Montalvo MD   40 mg at 12/17/22 2014    sodium chloride flush 0.9 % injection 5-40 mL  5-40 mL IntraVENous 2 times per day Debi Montalvo MD   10 mL at 12/17/22 2014    sodium chloride flush 0.9 % injection 5-40 mL  5-40 mL IntraVENous PRN Debi Montalvo MD        albuterol (PROVENTIL) nebulizer solution 2.5 mg  2.5 mg Nebulization Q4H PRN Debi Montalvo MD        [Held by provider] chlorthalidone (HYGROTON) tablet 25 mg  25 mg Oral Daily Debi Montalvo MD   25 mg at 12/10/22 0920    gabapentin (NEURONTIN) capsule 300 mg  300 mg Oral TID Debi Montalvo MD   300 mg at 12/17/22 2014    [Held by provider] losartan (COZAAR) tablet 100 mg  100 mg Oral Daily Jimi Sepulveda MD   100 mg at 12/10/22 0920    0.9 % sodium chloride infusion   IntraVENous PRN Debi Montalvo MD   Stopped at 12/13/22 1832    ondansetron (ZOFRAN-ODT) disintegrating tablet 4 mg  4 mg Oral Q8H PRN Debi Montalvo MD        Or    ondansetron (ZOFRAN) injection 4 mg  4 mg IntraVENous Q6H PRN Debi Montalvo MD   4 mg at 12/13/22 1452    acetaminophen (TYLENOL) tablet 650 mg  650 mg Oral Q6H Debi Montalvo MD   650 mg at 12/17/22 2014    heparin (porcine) injection 5,000 Units  5,000 Units SubCUTAneous 3 times per day Lazarus Cartwright MD   5,000 Units at 12/18/22 0608    ipratropium (ATROVENT) 0.02 % nebulizer solution 0.5 mg  0.5 mg Nebulization 4x daily Lazarus Cartwright MD   0.5 mg at 12/18/22 0805    And    Arformoterol Tartrate (BROVANA) nebulizer solution 15 mcg  15 mcg Nebulization BID Lazarus Cartwright MD   15 mcg at 12/18/22 0805       Review of systems:   Heart: as above   Lungs: as above   Eyes: denies changes in vision or discharge. Ears: denies changes in hearing or pain. Nose: denies epistaxis or masses   Throat: denies sore throat or trouble swallowing. Neuro: denies numbness, tingling, tremors. Skin: denies rashes or itching. : denies hematuria, dysuria   GI: denies vomiting, diarrhea   Psych: denies mood changed, anxiety, depression. Physical Exam   /69   Pulse 98   Temp 98.1 °F (36.7 °C) (Axillary)   Resp 19   Ht 5' 5\" (1.651 m)   Wt 226 lb (102.5 kg)   SpO2 93%   BMI 37.61 kg/m²   Constitutional: Oriented to person, place, and time. No distress. Well developed. Head: Normocephalic and atraumatic. Neck: Neck supple. No hepatojugular reflux. No JVD present. Carotid bruit is not present. No tracheal deviation present. No thyromegaly present. Cardiovascular: Normal rate, regular rhythm, normal heart sounds. intact distal pulses. No gallop and no friction rub. No murmur heard. Pulmonary: Breath sounds normal. No respiratory distress. No wheezes. No rales. Chest: Effort normal. No tenderness. Abdominal: Soft. Bowel sounds are normal. No distension or mass. No tenderness, rebound or guarding. Musculoskeletal: . No tenderness. No clubbing or cyanosis. Extremitites: Intact distal pulses. No edema  Neurological: Alert and oriented to person, place, and time. Skin: Skin is warm and dry. No rash noted. Not diaphoretic. No erythema. Psychiatric: Normal mood and affect.  Behavior is normal.   Lymphadenopathy: No cervical adenopathy. No groin adenopathy. CBC:   Lab Results   Component Value Date/Time    WBC 7.0 12/18/2022 04:42 AM    RBC 3.67 12/18/2022 04:42 AM    HGB 10.8 12/18/2022 04:42 AM    HCT 33.0 12/18/2022 04:42 AM    HCT 42.0 12/13/2022 08:33 AM    MCV 89.9 12/18/2022 04:42 AM    MCH 29.4 12/18/2022 04:42 AM    MCHC 32.7 12/18/2022 04:42 AM    RDW 12.8 12/18/2022 04:42 AM     12/18/2022 04:42 AM    MPV 9.5 12/18/2022 04:42 AM     BMP:   Lab Results   Component Value Date/Time     12/18/2022 04:42 AM    K 3.5 12/18/2022 04:42 AM    K 3.5 12/11/2022 05:22 AM     12/18/2022 04:42 AM    CO2 29 12/18/2022 04:42 AM    BUN 12 12/18/2022 04:42 AM    LABALBU 3.0 12/18/2022 04:42 AM    CREATININE 0.7 12/18/2022 04:42 AM    CALCIUM 8.7 12/18/2022 04:42 AM    GFRAA 50 10/06/2022 09:39 AM    LABGLOM >60 12/18/2022 04:42 AM     Magnesium:    Lab Results   Component Value Date/Time    MG 1.9 12/18/2022 04:42 AM     Cardiac Enzymes:   Lab Results   Component Value Date    TROPHS 18 (H) 12/12/2022    TROPHS 21 (H) 12/11/2022      PT/INR:    Lab Results   Component Value Date/Time    PROTIME 12.4 12/15/2022 04:40 AM    INR 1.1 12/15/2022 04:40 AM     TSH:    Lab Results   Component Value Date/Time    TSH 1.510 05/27/2022 09:18 AM     Rhythm Strip: atrial flutter. Echo Summary 12/14/2022:   Ejection fraction is visually estimated at 70%. No regional wall motion abnormalities seen. Moderate left ventricular concentric hypertrophy noted. Normal right ventricle structure and function. Physiologic and/or trace aortic regurgitation is noted. Physiologic and/or trace mitral regurgitation is present. No evidence for hemodynamically significant pericardial effusion. ASSESSMENT & PLAN:    Patient Active Problem List   Diagnosis    Obesity (BMI 30-39. 9)    Hypertension    Chronic pain syndrome    Lumbar facet arthropathy    Lumbar disc disorder    Primary osteoarthritis of both hips    Arthritis of right hip Dysphagia    Heartburn    At risk for colon cancer    Colon polyps    Degenerative disc disease, cervical    Arthropathy of cervical facet joint    Abnormal blood sugar    Arthritis of both hips    COVID-19    H/O total hip arthroplasty, right    History of total left hip arthroplasty    Primary osteoarthritis of right hip    Pulmonary emphysema (HCC)    History of herpes genitalis    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    History of COVID-19    Acute cystitis with hematuria    Aneurysm (HCC)    S/P total right hip arthroplasty    Cognitive impairment    Personal history of colonic polyps    Abnormal diffusion capacity determined by pulmonary function test    BELCHER (dyspnea on exertion)    Acute deep vein thrombosis (DVT) of right peroneal vein (HCC)    Bowel perforation (HCC)    Acute respiratory failure with hypoxia (HCC)    Atrial flutter (HCC)    Atrial fibrillation with RVR (HCC)    Pleural effusion, bilateral    LFT elevation    Hypoalbuminemia     1. Atrial Flutter:     Chart/labs/EKG reviewed. Echo as above. Rate control. BB/CaCB/dig. No systemic anticoagulation due to Hx of SAH/aneurysm. 2. Perforated bowel/ID issues: Per surgery. 3. SOB: Supportive care. 4. ORLIN: Follow labs. 5. Hx of DVT    6. Hx of SAH/Aneurysm    Patient stable from CV standpoint. Please call if needed. TYVM. Emily Otero D.O.   Cardiologist  Cardiology, [address] [facility] [place]

## 2022-12-19 LAB
ALBUMIN SERPL-MCNC: 3 G/DL (ref 3.5–5.2)
ALP BLD-CCNC: 66 U/L (ref 35–104)
ALT SERPL-CCNC: 40 U/L (ref 0–32)
ANION GAP SERPL CALCULATED.3IONS-SCNC: 9 MMOL/L (ref 7–16)
AST SERPL-CCNC: 30 U/L (ref 0–31)
BASOPHILS ABSOLUTE: 0.03 E9/L (ref 0–0.2)
BASOPHILS RELATIVE PERCENT: 0.4 % (ref 0–2)
BILIRUB SERPL-MCNC: 0.3 MG/DL (ref 0–1.2)
BUN BLDV-MCNC: 15 MG/DL (ref 6–23)
CALCIUM IONIZED: 1.23 MMOL/L (ref 1.15–1.33)
CALCIUM SERPL-MCNC: 8.7 MG/DL (ref 8.6–10.2)
CHLORIDE BLD-SCNC: 100 MMOL/L (ref 98–107)
CO2: 30 MMOL/L (ref 22–29)
CREAT SERPL-MCNC: 0.9 MG/DL (ref 0.5–1)
EOSINOPHILS ABSOLUTE: 0.21 E9/L (ref 0.05–0.5)
EOSINOPHILS RELATIVE PERCENT: 2.7 % (ref 0–6)
GFR SERPL CREATININE-BSD FRML MDRD: >60 ML/MIN/1.73
GLUCOSE BLD-MCNC: 102 MG/DL (ref 74–99)
HCT VFR BLD CALC: 30.5 % (ref 34–48)
HEMOGLOBIN: 10.1 G/DL (ref 11.5–15.5)
IMMATURE GRANULOCYTES #: 0.14 E9/L
IMMATURE GRANULOCYTES %: 1.8 % (ref 0–5)
LYMPHOCYTES ABSOLUTE: 1.31 E9/L (ref 1.5–4)
LYMPHOCYTES RELATIVE PERCENT: 16.8 % (ref 20–42)
MAGNESIUM: 1.8 MG/DL (ref 1.6–2.6)
MCH RBC QN AUTO: 29.5 PG (ref 26–35)
MCHC RBC AUTO-ENTMCNC: 33.1 % (ref 32–34.5)
MCV RBC AUTO: 89.2 FL (ref 80–99.9)
MONOCYTES ABSOLUTE: 0.57 E9/L (ref 0.1–0.95)
MONOCYTES RELATIVE PERCENT: 7.3 % (ref 2–12)
NEUTROPHILS ABSOLUTE: 5.56 E9/L (ref 1.8–7.3)
NEUTROPHILS RELATIVE PERCENT: 71 % (ref 43–80)
PDW BLD-RTO: 13.2 FL (ref 11.5–15)
PHOSPHORUS: 4 MG/DL (ref 2.5–4.5)
PLATELET # BLD: 377 E9/L (ref 130–450)
PMV BLD AUTO: 9.3 FL (ref 7–12)
POTASSIUM SERPL-SCNC: 3.5 MMOL/L (ref 3.5–5)
RBC # BLD: 3.42 E12/L (ref 3.5–5.5)
SODIUM BLD-SCNC: 139 MMOL/L (ref 132–146)
TOTAL PROTEIN: 6.2 G/DL (ref 6.4–8.3)
WBC # BLD: 7.8 E9/L (ref 4.5–11.5)

## 2022-12-19 PROCEDURE — 6370000000 HC RX 637 (ALT 250 FOR IP): Performed by: INTERNAL MEDICINE

## 2022-12-19 PROCEDURE — 6370000000 HC RX 637 (ALT 250 FOR IP): Performed by: STUDENT IN AN ORGANIZED HEALTH CARE EDUCATION/TRAINING PROGRAM

## 2022-12-19 PROCEDURE — 6360000002 HC RX W HCPCS

## 2022-12-19 PROCEDURE — 80053 COMPREHEN METABOLIC PANEL: CPT

## 2022-12-19 PROCEDURE — 36415 COLL VENOUS BLD VENIPUNCTURE: CPT

## 2022-12-19 PROCEDURE — 6370000000 HC RX 637 (ALT 250 FOR IP)

## 2022-12-19 PROCEDURE — 84100 ASSAY OF PHOSPHORUS: CPT

## 2022-12-19 PROCEDURE — 97535 SELF CARE MNGMENT TRAINING: CPT

## 2022-12-19 PROCEDURE — 97165 OT EVAL LOW COMPLEX 30 MIN: CPT

## 2022-12-19 PROCEDURE — 82330 ASSAY OF CALCIUM: CPT

## 2022-12-19 PROCEDURE — 2060000000 HC ICU INTERMEDIATE R&B

## 2022-12-19 PROCEDURE — 83735 ASSAY OF MAGNESIUM: CPT

## 2022-12-19 PROCEDURE — 94640 AIRWAY INHALATION TREATMENT: CPT

## 2022-12-19 PROCEDURE — 2580000003 HC RX 258

## 2022-12-19 PROCEDURE — 2700000000 HC OXYGEN THERAPY PER DAY

## 2022-12-19 PROCEDURE — C9113 INJ PANTOPRAZOLE SODIUM, VIA: HCPCS

## 2022-12-19 PROCEDURE — 85025 COMPLETE CBC W/AUTO DIFF WBC: CPT

## 2022-12-19 RX ORDER — POTASSIUM CHLORIDE 20 MEQ/1
40 TABLET, EXTENDED RELEASE ORAL ONCE
Status: COMPLETED | OUTPATIENT
Start: 2022-12-19 | End: 2022-12-19

## 2022-12-19 RX ORDER — FLUCONAZOLE 100 MG/1
200 TABLET ORAL ONCE
Status: COMPLETED | OUTPATIENT
Start: 2022-12-19 | End: 2022-12-19

## 2022-12-19 RX ORDER — MAGNESIUM SULFATE IN WATER 40 MG/ML
2000 INJECTION, SOLUTION INTRAVENOUS ONCE
Status: COMPLETED | OUTPATIENT
Start: 2022-12-19 | End: 2022-12-19

## 2022-12-19 RX ADMIN — DILTIAZEM HYDROCHLORIDE 180 MG: 180 CAPSULE, COATED, EXTENDED RELEASE ORAL at 08:45

## 2022-12-19 RX ADMIN — FLUCONAZOLE 200 MG: 100 TABLET ORAL at 12:19

## 2022-12-19 RX ADMIN — DIGOXIN 125 MCG: 125 TABLET ORAL at 08:45

## 2022-12-19 RX ADMIN — MAGNESIUM SULFATE HEPTAHYDRATE 2000 MG: 40 INJECTION, SOLUTION INTRAVENOUS at 12:25

## 2022-12-19 RX ADMIN — GABAPENTIN 300 MG: 300 CAPSULE ORAL at 08:45

## 2022-12-19 RX ADMIN — SODIUM CHLORIDE, PRESERVATIVE FREE 10 ML: 5 INJECTION INTRAVENOUS at 08:46

## 2022-12-19 RX ADMIN — OXYCODONE HYDROCHLORIDE 10 MG: 10 TABLET ORAL at 19:50

## 2022-12-19 RX ADMIN — GABAPENTIN 300 MG: 300 CAPSULE ORAL at 14:47

## 2022-12-19 RX ADMIN — POTASSIUM CHLORIDE 40 MEQ: 1500 TABLET, EXTENDED RELEASE ORAL at 12:19

## 2022-12-19 RX ADMIN — METOPROLOL SUCCINATE 100 MG: 100 TABLET, FILM COATED, EXTENDED RELEASE ORAL at 08:48

## 2022-12-19 RX ADMIN — HEPARIN SODIUM 5000 UNITS: 10000 INJECTION INTRAVENOUS; SUBCUTANEOUS at 14:46

## 2022-12-19 RX ADMIN — ACETAMINOPHEN 650 MG: 325 TABLET ORAL at 08:45

## 2022-12-19 RX ADMIN — METOPROLOL SUCCINATE 100 MG: 100 TABLET, FILM COATED, EXTENDED RELEASE ORAL at 20:46

## 2022-12-19 RX ADMIN — PANTOPRAZOLE SODIUM 40 MG: 40 INJECTION, POWDER, FOR SOLUTION INTRAVENOUS at 20:46

## 2022-12-19 RX ADMIN — PANTOPRAZOLE SODIUM 40 MG: 40 INJECTION, POWDER, FOR SOLUTION INTRAVENOUS at 08:46

## 2022-12-19 RX ADMIN — ACETAMINOPHEN 650 MG: 325 TABLET ORAL at 20:45

## 2022-12-19 RX ADMIN — ACETAMINOPHEN 650 MG: 325 TABLET ORAL at 14:47

## 2022-12-19 RX ADMIN — HEPARIN SODIUM 5000 UNITS: 10000 INJECTION INTRAVENOUS; SUBCUTANEOUS at 06:10

## 2022-12-19 RX ADMIN — GABAPENTIN 300 MG: 300 CAPSULE ORAL at 20:46

## 2022-12-19 RX ADMIN — IPRATROPIUM BROMIDE 0.5 MG: 0.5 SOLUTION RESPIRATORY (INHALATION) at 15:40

## 2022-12-19 RX ADMIN — HEPARIN SODIUM 5000 UNITS: 10000 INJECTION INTRAVENOUS; SUBCUTANEOUS at 20:46

## 2022-12-19 RX ADMIN — SODIUM CHLORIDE, PRESERVATIVE FREE 10 ML: 5 INJECTION INTRAVENOUS at 20:46

## 2022-12-19 ASSESSMENT — PAIN DESCRIPTION - DESCRIPTORS: DESCRIPTORS: ACHING;DISCOMFORT

## 2022-12-19 ASSESSMENT — PAIN SCALES - GENERAL
PAINLEVEL_OUTOF10: 0
PAINLEVEL_OUTOF10: 0
PAINLEVEL_OUTOF10: 9

## 2022-12-19 ASSESSMENT — PAIN DESCRIPTION - LOCATION: LOCATION: ABDOMEN

## 2022-12-19 ASSESSMENT — PAIN - FUNCTIONAL ASSESSMENT: PAIN_FUNCTIONAL_ASSESSMENT: ACTIVITIES ARE NOT PREVENTED

## 2022-12-19 NOTE — CARE COORDINATION
12/19/2022 - Care Coordination - admitted for bowel perforation. S/P ileoecectomy om 12/9. S/P thoracentesis on 12/13. Treated for a fib rvr. Cardiology, general surgery following. Wearing 3L NC. [facility] following. [facility] following. Reviewed discharge plan with pt. She would prefer to go home if she can. Her son lives with her and daughter is at her house to see her daily. Need updated PT/OT to determine appropriate discharge plan. SW/CM will follow.

## 2022-12-19 NOTE — PROGRESS NOTES
GENERAL SURGERY  DAILY PROGRESS NOTE  12/19/2022      SUBJECTIVE:  No acute events overnight. Pain controlled. Tolerating diet. Having loose stools, passing flatus. OBJECTIVE:  /62   Pulse 74   Temp 98.6 °F (37 °C) (Temporal)   Resp 18   Ht 5' 5\" (1.651 m)   Wt 226 lb (102.5 kg)   SpO2 95%   BMI 37.61 kg/m²     GENERAL:  NAD. A&Ox3. LUNGS:  No increased work of breathing. CARDIOVASCULAR: RR  ABDOMEN:  Soft, non-distended, non-tender. Incision c/d/I staples in place. No guarding, rigidity, rebound.     ASSESSMENT/PLAN:  60 y.o. female with iatrogenic perforation after colonoscopy s/p open ileocecectomy 12/9, new AFlutter resolved, respiratory insufficiency s/p IR thoracentesis 12/13 & 12/15    Doing well from surgery standpoint, however deconditioned and will benefit from further PT/OT  Cardiology following for new AFlutter -- continue Rx, f/u outpt  Wean supplemental O2 as able, breathing exercises, SMI  Diet as tolerated  Difflucan x1 for yeast infx      Kenney Barcenas DO  Surgery Resident PGY-5  12/19/2022  11:00 AM

## 2022-12-19 NOTE — PROGRESS NOTES
Comprehensive Nutrition Assessment    Type and Reason for Visit:  Reassess    Nutrition Recommendations/Plan:   Continue Diet. Will Start ONS and monitor. Malnutrition Assessment:  Malnutrition Status: At risk for malnutrition (Comment) (12/13/22 1536)    Context:  Acute Illness     Findings of the 6 clinical characteristics of malnutrition:  Energy Intake:  Mild decrease in energy intake (Comment)  Weight Loss:  Unable to assess (no hx on file)     Body Fat Loss:  No significant body fat loss     Muscle Mass Loss:  No significant muscle mass loss    Fluid Accumulation:  No significant fluid accumulation     Strength:  Not Performed    Nutrition Assessment:    Pt admit for elective c-scope 2/2 cecal mass complicated by bowel perf now s/p ex lap ileocecectomy 12/9 w/ noted post-op N/V resulting in NG placement 12/11, then removed 12/13. Noted B/L Plueral effusions, s/p thoracentesis x 2 (12/13, 12/15). PMHx tubular adenoma, CVA, SAH/Aneurysm s/p coiling (12/2021), Dysphagia. Pt remains at nutritional risk d/t ongoing decreased andre/intake s/p NPO/Clears x ~5-6d earlier during adm. Currently tolerating PO intake. Will Start ONS and monitor. Nutrition Related Findings:    A&O, poor dentition, Abd/BS WDL, +1 edema, +I/O's Wound Type: Surgical Incision       Current Nutrition Intake & Therapies:    Average Meal Intake: 51-75%  Average Supplements Intake: None Ordered  ADULT DIET; Regular; Low Sodium (2 gm)    Anthropometric Measures:  Height: 5' 5\" (165.1 cm)  Ideal Body Weight (IBW): 125 lbs (57 kg)    Admission Body Weight: 226 lb (102.5 kg) (stated 12/6)  Current Body Weight: 226 lb (102.5 kg) (stated 12/6), 180.8 % IBW.  Weight Source: Stated  Current BMI (kg/m2): 37.6  Usual Body Weight:  (UTO no measured wt hx on file, noted pt denies intentional loss per H&P)     Weight Adjustment For: No Adjustment                 BMI Categories: Obese Class 2 (BMI 35.0 -39.9)    Estimated Daily Nutrient Needs:  Energy Requirements Based On: Formula  Weight Used for Energy Requirements: Current  Energy (kcal/day): MSJ 1582 x 1.2 SF= 8036-6129  Weight Used for Protein Requirements: Ideal  Protein (g/day): 1.5-1.8 g/kg IBW;      Fluid (ml/day): per critical care    Nutrition Diagnosis:   Inadequate oral intake related to altered GI function (bowel perf) as evidenced by NPO or clear liquid status due to medical condition    Nutrition Interventions:   Food and/or Nutrient Delivery: Continue Current Diet, Start Oral Nutrition Supplement (Continue Diet. Will Start ONS and monitor.)  Nutrition Education/Counseling: Education not indicated  Coordination of Nutrition Care: Continue to monitor while inpatient       Goals:  Previous Goal Met: Goal(s) Achieved  Goals: PO intake 75% or greater, by next RD assessment  Specify Other Goals: Nutrition Progression    Nutrition Monitoring and Evaluation:   Behavioral-Environmental Outcomes: None Identified  Food/Nutrient Intake Outcomes: Food and Nutrient Intake, Supplement Intake  Physical Signs/Symptoms Outcomes: Biochemical Data, Chewing or Swallowing, GI Status, Nausea or Vomiting, Fluid Status or Edema, Nutrition Focused Physical Findings, Skin, Weight    Discharge Planning:     Too soon to determine     Taye Valdez RD, LD  Contact: ext [phone]

## 2022-12-19 NOTE — PROGRESS NOTES
[address] [place] [address]      DVMM:                                                  Patient Name: Yuliet Salgado  MRN: [id number]  : 1960  Room: [address] [place] [address]    Evaluating OT: Luis Manuel Haney MOT, OTR/L  # [id number]    Referring Provider:  Yara Mccarty MD  Specific Provider Orders:  Lance Hartley and Treat\"  22    Diagnosis: Personal history of colonic polyps [Z86.010]  Bowel perforation ([address] [K63.1]    Pt presented to this facility for a Colonoscopy, experienced complications -   36-3-77:  Underwent urgent LAPAROTOMY EXPLORATORY, ILIOCECECTOMY  22:  developed new A Fib w/ RVR  22:  Became SOB and Hypoxic - Transferred to ICU  22:  Underwent Thoracentesis  12-15-22:  Underwent Thoracentesis  22: Tachycardic  Transferred to Step-Down Unit     Pertinent Medical History:  Pt has a past medical history of Acute deep vein thrombosis (DVT) of right peroneal vein ([address] Brain aneurysm, Cerebral artery occlusion with cerebral infarction [facility]), Degenerative arthritis of hand, Edentulous, Emphysema lung ([address] Emphysema of lung ([address] Headache, Hiatal hernia, Hip problem, Hx of abnormal cervical Pap smear, Hypertension, Stroke (cerebrum) ([address] and Subarachnoid bleed ([address] ,  has a past surgical history that includes Tonsillectomy; Brain aneurysm surgery; other surgical history; Upper gastrointestinal endoscopy (2019); Colonoscopy (2019); Upper gastrointestinal endoscopy (N/A, 2019); Colonoscopy (N/A, 2019); Total hip arthroplasty (Left, 2021); Total hip arthroplasty (Right, 01/10/2022); joint replacement; Colonoscopy w/ biopsies (2022); laparotomy (N/A, 2022); thoracentesis (Right, 2022); and Colonoscopy (N/A, 2022).     Surgeries this admission: Noted above     Precautions:  Fall Risk  Abdominal Precautions  2-3L O2    Assessment of current deficits   [x] Functional mobility  [x]ADLs  [x] Strength               []Cognition   [x] Functional transfers   [x] IADLs         [x] Safety Awareness   [x]Endurance   [] Fine Coordination              [x] Balance     [] Vision/perception   []Sensation    []Gross Motor Coordination  [] ROM  [] Delirium                  [] Motor Control       OT PLAN OF CARE   OT POC based on physician orders, patient diagnosis and results of clinical assessment    Frequency/Duration 1-3 days/wk for 2 weeks PRN   Specific OT Treatment to include:     * Instruction/training on adapted ADL techniques and AE recommendations to increase functional independence within precautions       * Training on energy conservation strategies, correct breathing pattern and techniques to improve independence/tolerance for self-care routine  * Functional transfer/mobility training/DME recommendations for increased independence, safety, and fall prevention  * Patient/Family education to increase follow through with safety techniques and functional independence  * Recommendation of environmental modifications for increased safety with functional transfers/mobility and ADLs  * Cognitive retraining/development of therapeutic activities to improve problem solving, judgement, memory, and attention for increased safety/participation in ADL/IADL tasks  * Therapeutic exercise to improve motor endurance, ROM, and functional strength for ADLs/functional transfers  * Therapeutic activities to facilitate/challenge dynamic balance, stand tolerance for increased safety and independence with ADLs  * Therapeutic activities to facilitate gross/fine motor skills for increased independence with ADLs  * Neuro-muscular re-education: facilitation of righting/equilibrium reactions  * Positioning to improve skin integrity, interaction with environment and functional independence  * Delirium prevention/treatment  * Manual techniques for edema management  Other:    Recommended Adaptive Equipment: TBD as pt progresses  - TUB BENCH      Home Living:  Pt lives with her Carley Lemus, Colleen, and her 21 yr old Granddaughter in a 2-story house. Bed/bath/Laundry Facilities on the Main floor.  (+) Basement. Bathroom setup:  Tub-Shower, Standard-height Commode   Equipment owned:  W/C, [employer], [facility], [facility], Shower Chair, Reacher, [employer] Aid, [employer]    Available Family Assist:  Family can assist PRN    Prior Level of Function:  Pt reported being IND w/ all ADLs, Light IADLs, Transfers and Mobility using SPC PRN for ambulation. Driving:  No  Occupation:  None reported    Pain Level:  4-5/10 abdominal pain - mild Relief w/ Rest and Repositioning, Nsg Notified   Additional Complaints:  Mild SOB, Hypoxia    Cognition: A & O x 4   Able to Follow Multi-Step Commands INDly   Memory:  good    Sequencing:  good    Problem solving:  good    Judgement/safety:  good   Additional Comments:  Pt was pleasant and cooperative.       Vitals/Lab Values:  Found During Trial on Room Air - O2 sats 87%  O2 sats in standing - 82% -> replaced 2L O2 -> O2 sats improved to 86% after 1-2 mins -> returned to sitting for safety  O2 sats seated on 2L = 87% - Increased to 3L -> 91%     2nd trial of standing ax w/ 3L - O2 sats ranged from 88%-90%    RN Made aware - left pt seated in chair w/ 3L O2       Functional Assessment:  AM-PAC Daily Activity Raw Score: 15/24     Initial Eval Status  Date: 12-19-22   Treatment Status  Date: STGs = LTGs  Time frame: 10-14 days   Feeding IND    Seated in chair    NA   Grooming SUP/Set up    Able to complete tasks after set up  Seated in chair    Unable to tolerate ambulating to or standing at sink    Mod I  Seated/Standing at the [employer] A/Set up    Donning robe seated in chair    Mod I     LB Dressing Mod A/Set up    Min A to don socks w/ cross-legged tech, Mod A - pants - simulated in sitting/standing    Pt ed for safe/adaptive techs, use of adaptive equip - has all AE - to resume use at d/c    Mod I     Bathing NT    Pt ed re: Benefits of use of Tub Bench for improved safety w/ Tub Transfer and for Energy Conservation Purposes, resources for obtaining equip    Mod I      Toileting Max A    Assist for hygiene/clothing adjustment, Min A for safety w/ standing balance - simulated, pt ed re: Benefits of use of BSC    Mod I     Bed Mobility  Supine to sit: NT   Sit to supine:  NT     Pt seated in chair    Supine to sit: IND  Sit to supine: IND     Functional Transfers Min A    Standing from Low Chair 2x w/ rest breaks b/t trials  Pt ed for safety/hand placement    Mod I     Functional Mobility Min A w/ WW    Very short distance at b/s d/t hypoxia  Pt ed for safety/improved safety awareness, walker safety    Mod I     Balance Sitting:     Static:  IND in chair    Dynamic:  Remote SUP w/ functional ax in chair     Standing:     Static:  Min A w/ [employer]    Dynamic:  Min A w/ functional ax/mobility w/ [employer]    Sitting:     Static:  IND    Dynamic:  IND w/ functional ax    Standing:     Static:  Mod I w/ AD PRN    Dynamic:  Mod I w/ functional ax/mobility w/ AD PRN   Activity Tolerance Fair    Limited by Pain and Hypoxia    Good(-)   Visual/  Perceptual    Hearing: WNL   Glasses: yes    WFL   Hearing Aids:  no               Hand Dominance: Right   AROM Strength Additional Info:    RUE  WFL WFL - NT d/t pain, Recent procedure Good ;   Good FMC/dexterity noted during ADL tasks     LUE WFL WFL - NT Good ;    Good FMC/dexterity noted during ADL tasks       Sensation:  Denies numbness or tingling Tim UEs   Tone: WFL Tim UEs   Edema: None Noted Tim UEs     Comments: Upon arrival, patient was found seated in chair. She was agreeable to participate in therapeutic ax. No Family present during session. Received permission from RN prior to engaging pt in OT services. Educated pt on role of OT services.       At the end of the session, patient was properly positioned in b/s chair. Call light and phone within reach, all lines and tubes intact. Oriented pt to call bell. Made all appropriate Environmental Modifications to facilitate pt's level of IND and safety. All needs met. Requested BSC for use at b/s. Reported pt's performance and current position to RN         Overall patient demonstrated decreased independence and safety during completion of ADL/functional transfer/mobility tasks. Pt would benefit from continued skilled OT to increase safety and independence with completion of ADL/IADL tasks for functional independence and quality of life. Treatment: OT treatment provided this date includes:   Instruction/training on safety and adapted techniques for completion of ADLs, use of DME/AD/Adaptive equip:    Instruction/training on safe functional mobility/transfer techniques, use of DME/AD:    Instruction/training on energy conservation techs (EC)/Pursed-Lip Breathing (PLB)/work simplification for completion of ADLs:     Neuromuscular Reeducation to facilitate balance/righting reactions for increased function with ADLs:    Skilled positioning/alignment for Pain Mgmt, to maximize Pt's safety and ability to safely and INDly interact w/ his/her environment, maximize respiratory status  Activity tolerance - Sitting/Standing to improve endurance w/ functional ax   Cognitive retraining -  Cues for safety/safety awareness, sequencing, problem solving    Skilled monitoring of Vitals during session and pt's response to tx ax      Pt/family ed re: benefits of participate in post-acute therapy program - Sub-acute Rehab vs  Therapy     Consulted RN, SW/CM    Made all appropriate Environmental Modifications to facilitate pt's level of IND and safety.     Recommendations for Continued Participation in OT services during Hospitalization and at D/C - SNF vs  OT - pt currently declining consideration of SNF    Pt and/or Family verbalized/demonstrated a Good understanding of education provided. Will Review PRN. Rehab Potential: Good for established goals     Patient / Family Goal: Return Home with family assist      Patient and/or family were instructed on functional diagnosis, prognosis/goals and OT plan of care. Demonstrated Good understanding. Eval Complexity: Low    Time In: 1336  Time Out: 1417  Total Treatment Time: 26 minutes    Min Units   OT Eval Low 97165  X  1   OT Eval Medium 50550      OT Eval High 38123      OT Re-Eval [id number]       Therapeutic Ex 73187       Therapeutic Activities 18307       ADL/Self Care 55472  26  2   Orthotic Management 19910       Manual [id number]     Neuro Re-Ed 43121       Non-Billable Time              Evaluation Time additionally includes thorough review of current medical information, gathering information on past medical history/social history and prior level of function, completion of standardized testing/informal observation of tasks, assessment of data and education on plan of care and goals.             Evon Heath, MOT, OTR/L  # [id number]

## 2022-12-19 NOTE — PLAN OF CARE
Problem: Chronic Conditions and Co-morbidities  Goal: Patient's chronic conditions and co-morbidity symptoms are monitored and maintained or improved  Outcome: Progressing  Flowsheets (Taken 12/18/2022 2300 by Paco Goodwin RN)  Care Plan - Patient's Chronic Conditions and Co-Morbidity Symptoms are Monitored and Maintained or Improved: Monitor and assess patient's chronic conditions and comorbid symptoms for stability, deterioration, or improvement     Problem: Pain  Goal: Verbalizes/displays adequate comfort level or baseline comfort level  Outcome: Progressing  Flowsheets (Taken 12/18/2022 2300 by Paco Goodwin RN)  Verbalizes/displays adequate comfort level or baseline comfort level:   Encourage patient to monitor pain and request assistance   Assess pain using appropriate pain scale   Administer analgesics based on type and severity of pain and evaluate response     Problem: Discharge Planning  Goal: Discharge to home or other facility with appropriate resources  Outcome: Progressing  Flowsheets (Taken 12/18/2022 2300 by Paco Goodwin RN)  Discharge to home or other facility with appropriate resources:   Identify barriers to discharge with patient and caregiver   Identify discharge learning needs (meds, wound care, etc)     Problem: Skin/Tissue Integrity  Goal: Absence of new skin breakdown  Description: 1. Monitor for areas of redness and/or skin breakdown  2. Assess vascular access sites hourly  3. Every 4-6 hours minimum:  Change oxygen saturation probe site  4. Every 4-6 hours:  If on nasal continuous positive airway pressure, respiratory therapy assess nares and determine need for appliance change or resting period.   Outcome: Progressing     Problem: Safety - Adult  Goal: Free from fall injury  Outcome: Progressing     Problem: ABCDS Injury Assessment  Goal: Absence of physical injury  Outcome: Progressing     Problem: Neurosensory - Adult  Goal: Achieves stable or improved neurological status  Outcome: Progressing  Flowsheets (Taken 12/18/2022 2300 by Corliss Koyanagi, RN)  Achieves stable or improved neurological status: Assess for and report changes in neurological status  Goal: Achieves maximal functionality and self care  Outcome: Progressing  Flowsheets (Taken 12/18/2022 2300 by Corliss Koyanagi, RN)  Achieves maximal functionality and self care: Monitor swallowing and airway patency with patient fatigue and changes in neurological status     Problem: Respiratory - Adult  Goal: Achieves optimal ventilation and oxygenation  Outcome: Progressing  Flowsheets (Taken 12/18/2022 2300 by Corliss Koyanagi, RN)  Achieves optimal ventilation and oxygenation:   Assess for changes in respiratory status   Assess for changes in mentation and behavior   Position to facilitate oxygenation and minimize respiratory effort     Problem: Cardiovascular - Adult  Goal: Maintains optimal cardiac output and hemodynamic stability  Outcome: Progressing  Flowsheets (Taken 12/18/2022 2300 by Corliss Koyanagi, RN)  Maintains optimal cardiac output and hemodynamic stability:   Monitor blood pressure and heart rate   Monitor urine output and notify Licensed Independent Practitioner for values outside of normal range   Assess for signs of decreased cardiac output  Goal: Absence of cardiac dysrhythmias or at baseline  Outcome: Progressing  Flowsheets (Taken 12/18/2022 2300 by Corliss Koyanagi, RN)  Absence of cardiac dysrhythmias or at baseline: Monitor cardiac rate and rhythm     Problem: Skin/Tissue Integrity - Adult  Goal: Skin integrity remains intact  Outcome: Progressing  Flowsheets (Taken 12/18/2022 2300 by Corliss Koyanagi, RN)  Skin Integrity Remains Intact: Monitor for areas of redness and/or skin breakdown  Goal: Incisions, wounds, or drain sites healing without S/S of infection  Outcome: Progressing  Flowsheets (Taken 12/18/2022 2300 by Corliss Koyanagi, RN)  Incisions, Wounds, or Drain Sites Healing Without Sign and Symptoms of Infection: TWICE DAILY: Assess and document skin integrity  Goal: Oral mucous membranes remain intact  Outcome: Progressing  Flowsheets (Taken 12/18/2022 2300 by Marshall Anthony RN)  Oral Mucous Membranes Remain Intact: Assess oral mucosa and hygiene practices     Problem: Musculoskeletal - Adult  Goal: Return mobility to safest level of function  Outcome: Progressing  Flowsheets (Taken 12/18/2022 2300 by Marshall Anthony RN)  Return Mobility to Safest Level of Function:   Assess patient stability and activity tolerance for standing, transferring and ambulating with or without assistive devices   Assist with transfers and ambulation using safe patient handling equipment as needed   Ensure adequate protection for wounds/incisions during mobilization  Goal: Return ADL status to a safe level of function  Outcome: Progressing  Flowsheets (Taken 12/18/2022 2300 by Marshall Anthony RN)  Return ADL Status to a Safe Level of Function: Administer medication as ordered     Problem: Gastrointestinal - Adult  Goal: Minimal or absence of nausea and vomiting  Outcome: Progressing  Flowsheets (Taken 12/18/2022 2300 by Marshall Anthony RN)  Minimal or absence of nausea and vomiting:   Administer IV fluids as ordered to ensure adequate hydration   Maintain NPO status until nausea and vomiting are resolved   Nasogastric tube to low intermittent suction as ordered  Goal: Maintains or returns to baseline bowel function  Outcome: Progressing  Flowsheets (Taken 12/18/2022 2300 by Marshall Anthony RN)  Maintains or returns to baseline bowel function:   Assess bowel function   Encourage oral fluids to ensure adequate hydration   Administer IV fluids as ordered to ensure adequate hydration  Goal: Maintains adequate nutritional intake  Outcome: Progressing  Flowsheets (Taken 12/18/2022 2300 by Marshall Anthony RN)  Maintains adequate nutritional intake: Monitor percentage of each meal consumed     Problem: Genitourinary - Adult  Goal: Absence of urinary retention  Outcome: Progressing  Flowsheets (Taken 12/18/2022 2300 by Paco Goodwin RN)  Absence of urinary retention:   Assess patients ability to void and empty bladder   Monitor intake/output and perform bladder scan as needed  Goal: Urinary catheter remains patent  Outcome: Progressing  Flowsheets (Taken 12/18/2022 2300 by Pcao Goodwin RN)  Urinary catheter remains patent: Assess patency of urinary catheter     Problem: Metabolic/Fluid and Electrolytes - Adult  Goal: Electrolytes maintained within normal limits  Outcome: Progressing  Flowsheets (Taken 12/18/2022 2300 by Paco Goodwin RN)  Electrolytes maintained within normal limits: Monitor labs and assess patient for signs and symptoms of electrolyte imbalances  Goal: Hemodynamic stability and optimal renal function maintained  Outcome: Progressing  Flowsheets (Taken 12/18/2022 2300 by Paco Goodwin RN)  Hemodynamic stability and optimal renal function maintained:   Monitor labs and assess for signs and symptoms of volume excess or deficit   Monitor intake, output and patient weight   Monitor urine specific gravity, serum osmolarity and serum sodium as indicated or ordered     Problem: Hematologic - Adult  Goal: Maintains hematologic stability  Outcome: Progressing  Flowsheets (Taken 12/18/2022 2300 by Paco Goodwin RN)  Maintains hematologic stability: Assess for signs and symptoms of bleeding or hemorrhage     Problem: Nutrition Deficit:  Goal: Optimize nutritional status  Outcome: Progressing

## 2022-12-20 LAB
ANION GAP SERPL CALCULATED.3IONS-SCNC: 10 MMOL/L (ref 7–16)
BACTERIA: ABNORMAL /HPF
BASOPHILS ABSOLUTE: 0.04 E9/L (ref 0–0.2)
BASOPHILS RELATIVE PERCENT: 0.5 % (ref 0–2)
BILIRUBIN URINE: NEGATIVE
BLOOD, URINE: ABNORMAL
BUN BLDV-MCNC: 16 MG/DL (ref 6–23)
CALCIUM SERPL-MCNC: 8.7 MG/DL (ref 8.6–10.2)
CHLORIDE BLD-SCNC: 102 MMOL/L (ref 98–107)
CLARITY: CLEAR
CO2: 26 MMOL/L (ref 22–29)
COLOR: YELLOW
CREAT SERPL-MCNC: 0.9 MG/DL (ref 0.5–1)
EOSINOPHILS ABSOLUTE: 0.2 E9/L (ref 0.05–0.5)
EOSINOPHILS RELATIVE PERCENT: 2.3 % (ref 0–6)
GFR SERPL CREATININE-BSD FRML MDRD: >60 ML/MIN/1.73
GLUCOSE BLD-MCNC: 92 MG/DL (ref 74–99)
GLUCOSE URINE: NEGATIVE MG/DL
HCT VFR BLD CALC: 30.8 % (ref 34–48)
HEMOGLOBIN: 9.8 G/DL (ref 11.5–15.5)
IMMATURE GRANULOCYTES #: 0.11 E9/L
IMMATURE GRANULOCYTES %: 1.3 % (ref 0–5)
KETONES, URINE: NEGATIVE MG/DL
LEUKOCYTE ESTERASE, URINE: ABNORMAL
LYMPHOCYTES ABSOLUTE: 1.38 E9/L (ref 1.5–4)
LYMPHOCYTES RELATIVE PERCENT: 16 % (ref 20–42)
MCH RBC QN AUTO: 29.3 PG (ref 26–35)
MCHC RBC AUTO-ENTMCNC: 31.8 % (ref 32–34.5)
MCV RBC AUTO: 92.2 FL (ref 80–99.9)
MONOCYTES ABSOLUTE: 0.65 E9/L (ref 0.1–0.95)
MONOCYTES RELATIVE PERCENT: 7.5 % (ref 2–12)
NEUTROPHILS ABSOLUTE: 6.23 E9/L (ref 1.8–7.3)
NEUTROPHILS RELATIVE PERCENT: 72.4 % (ref 43–80)
NITRITE, URINE: NEGATIVE
PDW BLD-RTO: 13.3 FL (ref 11.5–15)
PH UA: 5.5 (ref 5–9)
PLATELET # BLD: 400 E9/L (ref 130–450)
PMV BLD AUTO: 9.4 FL (ref 7–12)
POTASSIUM REFLEX MAGNESIUM: 4.1 MMOL/L (ref 3.5–5)
PROTEIN UA: ABNORMAL MG/DL
RBC # BLD: 3.34 E12/L (ref 3.5–5.5)
RBC UA: ABNORMAL /HPF (ref 0–2)
RENAL EPITHELIAL, UA: ABNORMAL /HPF
SODIUM BLD-SCNC: 138 MMOL/L (ref 132–146)
SPECIFIC GRAVITY UA: 1.02 (ref 1–1.03)
UROBILINOGEN, URINE: 0.2 E.U./DL
WBC # BLD: 8.6 E9/L (ref 4.5–11.5)
WBC UA: ABNORMAL /HPF (ref 0–5)

## 2022-12-20 PROCEDURE — 6360000002 HC RX W HCPCS

## 2022-12-20 PROCEDURE — 81001 URINALYSIS AUTO W/SCOPE: CPT

## 2022-12-20 PROCEDURE — 2580000003 HC RX 258

## 2022-12-20 PROCEDURE — 6360000002 HC RX W HCPCS: Performed by: INTERNAL MEDICINE

## 2022-12-20 PROCEDURE — 97535 SELF CARE MNGMENT TRAINING: CPT

## 2022-12-20 PROCEDURE — 97161 PT EVAL LOW COMPLEX 20 MIN: CPT

## 2022-12-20 PROCEDURE — 97530 THERAPEUTIC ACTIVITIES: CPT

## 2022-12-20 PROCEDURE — 6370000000 HC RX 637 (ALT 250 FOR IP)

## 2022-12-20 PROCEDURE — 2060000000 HC ICU INTERMEDIATE R&B

## 2022-12-20 PROCEDURE — 80048 BASIC METABOLIC PNL TOTAL CA: CPT

## 2022-12-20 PROCEDURE — 6370000000 HC RX 637 (ALT 250 FOR IP): Performed by: INTERNAL MEDICINE

## 2022-12-20 PROCEDURE — 2500000003 HC RX 250 WO HCPCS: Performed by: INTERNAL MEDICINE

## 2022-12-20 PROCEDURE — 94669 MECHANICAL CHEST WALL OSCILL: CPT

## 2022-12-20 PROCEDURE — 6370000000 HC RX 637 (ALT 250 FOR IP): Performed by: STUDENT IN AN ORGANIZED HEALTH CARE EDUCATION/TRAINING PROGRAM

## 2022-12-20 PROCEDURE — C9113 INJ PANTOPRAZOLE SODIUM, VIA: HCPCS

## 2022-12-20 PROCEDURE — 2700000000 HC OXYGEN THERAPY PER DAY

## 2022-12-20 PROCEDURE — 85025 COMPLETE CBC W/AUTO DIFF WBC: CPT

## 2022-12-20 PROCEDURE — P9046 ALBUMIN (HUMAN), 25%, 20 ML: HCPCS | Performed by: INTERNAL MEDICINE

## 2022-12-20 PROCEDURE — 94640 AIRWAY INHALATION TREATMENT: CPT

## 2022-12-20 PROCEDURE — 36415 COLL VENOUS BLD VENIPUNCTURE: CPT

## 2022-12-20 RX ORDER — BUMETANIDE 0.25 MG/ML
2 INJECTION, SOLUTION INTRAMUSCULAR; INTRAVENOUS ONCE
Status: COMPLETED | OUTPATIENT
Start: 2022-12-20 | End: 2022-12-20

## 2022-12-20 RX ORDER — IPRATROPIUM BROMIDE AND ALBUTEROL SULFATE 2.5; .5 MG/3ML; MG/3ML
1 SOLUTION RESPIRATORY (INHALATION) 4 TIMES DAILY
Status: DISCONTINUED | OUTPATIENT
Start: 2022-12-20 | End: 2022-12-21 | Stop reason: HOSPADM

## 2022-12-20 RX ORDER — PHENAZOPYRIDINE HYDROCHLORIDE 100 MG/1
200 TABLET, FILM COATED ORAL
Status: DISCONTINUED | OUTPATIENT
Start: 2022-12-20 | End: 2022-12-21 | Stop reason: HOSPADM

## 2022-12-20 RX ORDER — METOPROLOL SUCCINATE 100 MG/1
100 TABLET, EXTENDED RELEASE ORAL 2 TIMES DAILY
Status: DISCONTINUED | OUTPATIENT
Start: 2022-12-21 | End: 2022-12-21 | Stop reason: HOSPADM

## 2022-12-20 RX ORDER — PANTOPRAZOLE SODIUM 40 MG/1
40 TABLET, DELAYED RELEASE ORAL
Status: DISCONTINUED | OUTPATIENT
Start: 2022-12-21 | End: 2022-12-21 | Stop reason: HOSPADM

## 2022-12-20 RX ORDER — BUDESONIDE 0.5 MG/2ML
0.5 INHALANT ORAL 2 TIMES DAILY
Status: DISCONTINUED | OUTPATIENT
Start: 2022-12-20 | End: 2022-12-21 | Stop reason: HOSPADM

## 2022-12-20 RX ORDER — GUAIFENESIN 400 MG/1
400 TABLET ORAL 3 TIMES DAILY
Status: DISCONTINUED | OUTPATIENT
Start: 2022-12-20 | End: 2022-12-21 | Stop reason: HOSPADM

## 2022-12-20 RX ADMIN — PHENAZOPYRIDINE 200 MG: 100 TABLET ORAL at 17:17

## 2022-12-20 RX ADMIN — HEPARIN SODIUM 5000 UNITS: 10000 INJECTION INTRAVENOUS; SUBCUTANEOUS at 05:39

## 2022-12-20 RX ADMIN — SODIUM CHLORIDE, PRESERVATIVE FREE 10 ML: 5 INJECTION INTRAVENOUS at 09:27

## 2022-12-20 RX ADMIN — IPRATROPIUM BROMIDE 0.5 MG: 0.5 SOLUTION RESPIRATORY (INHALATION) at 13:48

## 2022-12-20 RX ADMIN — DIGOXIN 125 MCG: 125 TABLET ORAL at 09:25

## 2022-12-20 RX ADMIN — OXYCODONE HYDROCHLORIDE 10 MG: 10 TABLET ORAL at 09:25

## 2022-12-20 RX ADMIN — PANTOPRAZOLE SODIUM 40 MG: 40 INJECTION, POWDER, FOR SOLUTION INTRAVENOUS at 09:27

## 2022-12-20 RX ADMIN — DILTIAZEM HYDROCHLORIDE 180 MG: 180 CAPSULE, COATED, EXTENDED RELEASE ORAL at 09:25

## 2022-12-20 RX ADMIN — BUDESONIDE 500 MCG: 0.5 SUSPENSION RESPIRATORY (INHALATION) at 20:17

## 2022-12-20 RX ADMIN — ACETAMINOPHEN 650 MG: 325 TABLET ORAL at 15:37

## 2022-12-20 RX ADMIN — BUMETANIDE 2 MG: 0.25 INJECTION, SOLUTION INTRAMUSCULAR; INTRAVENOUS at 17:16

## 2022-12-20 RX ADMIN — GUAIFENESIN 400 MG: 400 TABLET ORAL at 17:16

## 2022-12-20 RX ADMIN — GUAIFENESIN 400 MG: 400 TABLET ORAL at 19:57

## 2022-12-20 RX ADMIN — SODIUM CHLORIDE, PRESERVATIVE FREE 10 ML: 5 INJECTION INTRAVENOUS at 19:59

## 2022-12-20 RX ADMIN — GABAPENTIN 300 MG: 300 CAPSULE ORAL at 15:37

## 2022-12-20 RX ADMIN — METOPROLOL SUCCINATE 100 MG: 100 TABLET, FILM COATED, EXTENDED RELEASE ORAL at 09:25

## 2022-12-20 RX ADMIN — ACETAMINOPHEN 650 MG: 325 TABLET ORAL at 19:57

## 2022-12-20 RX ADMIN — IPRATROPIUM BROMIDE AND ALBUTEROL SULFATE 1 AMPULE: .5; 2.5 SOLUTION RESPIRATORY (INHALATION) at 20:18

## 2022-12-20 RX ADMIN — HEPARIN SODIUM 5000 UNITS: 10000 INJECTION INTRAVENOUS; SUBCUTANEOUS at 20:00

## 2022-12-20 RX ADMIN — PHENAZOPYRIDINE 200 MG: 100 TABLET ORAL at 15:38

## 2022-12-20 RX ADMIN — GABAPENTIN 300 MG: 300 CAPSULE ORAL at 19:57

## 2022-12-20 RX ADMIN — ACETAMINOPHEN 650 MG: 325 TABLET ORAL at 09:26

## 2022-12-20 RX ADMIN — GABAPENTIN 300 MG: 300 CAPSULE ORAL at 09:26

## 2022-12-20 RX ADMIN — HEPARIN SODIUM 5000 UNITS: 10000 INJECTION INTRAVENOUS; SUBCUTANEOUS at 15:38

## 2022-12-20 RX ADMIN — ALBUMIN HUMAN 25 G: 0.25 SOLUTION INTRAVENOUS at 17:21

## 2022-12-20 RX ADMIN — IPRATROPIUM BROMIDE 0.5 MG: 0.5 SOLUTION RESPIRATORY (INHALATION) at 16:08

## 2022-12-20 ASSESSMENT — PAIN SCALES - GENERAL: PAINLEVEL_OUTOF10: 9

## 2022-12-20 NOTE — PROGRESS NOTES
GENERAL SURGERY  DAILY PROGRESS NOTE  2022    Subjective:  Still having loose stools. Tolerating diet. Urinating appropriately. Objective:  Last 24Hrs  Temp  Av.4 °F (36.9 °C)  Min: 98.2 °F (36.8 °C)  Max: 98.6 °F (37 °C)  Resp  Av.2  Min: 18  Max: 19  Pulse  Av.3  Min: 74  Max: 77  Systolic (58XJO), AOK:632 , Min:109 , U     Diastolic (70ZWA), HMF:06, Min:59, Max:67    SpO2  Av %  Min: 82 %  Max: 95 %    I/O last 3 completed shifts: In: 390 [P.O.:390]  Out: 0       General: In no acute distress  Cardiovascular: Warm throughout  Respiratory: Equal chest rise bilaterally, no retractions, no audible wheezing  Abdomen: soft, grossly nontender palpation throughout without rebound or guarding, incision with staples that is clean dry and intact    CBC  Recent Labs     22  0541 22  0528   WBC 7.0 7.8 8.6   RBC 3.67 3.42* 3.34*   HGB 10.8* 10.1* 9.8*   HCT 33.0* 30.5* 30.8*   MCV 89.9 89.2 92.2   MCH 29.4 29.5 29.3   MCHC 32.7 33.1 31.8*   RDW 12.8 13.2 13.3    377 400   MPV 9.5 9.3 9.4       CMP  Recent Labs     22  0541 22  0528    139 138   K 3.5 3.5 4.1    100 102   CO2 29 30* 26   BUN 12 15 16   CREATININE 0.7 0.9 0.9   GLUCOSE 99 102* 92   CALCIUM 8.7 8.7 8.7   PROT 5.9* 6.2*  --    LABALBU 3.0* 3.0*  --    BILITOT 0.4 0.3  --    ALKPHOS 73 66  --    AST 32* 30  --    ALT 40* 40*  --          Assessment/Plan:  60 y.o. female status post iatrogenic perforation after colonoscopy status post open ileocecectomy on . Her course was complicated by respiratory insufficiency requiring IR thoracentesis x2 and new onset atrial flutter now resolved. She is tolerating diet and having multiple bowel movements.     Wean O2 as able  IS/pulmonary hygiene  Continue regular diet    Will benefit from physical therapy evaluation to assist in discharge planning        Garr Lesches, MD  General Surgery Resident, PGY-4    Electronically signed on 12/20/2022 at 7:14 AM      Attending Physician Statement:  I have examined the patient, reviewed the record, and discussed the case with the surgical team.  I agree with the assessment and plan with the following additions, corrections, and changes.     Electronically signed by Piyush Adames MD on 12/20/22 at 12:50 PM EST

## 2022-12-20 NOTE — PROGRESS NOTES
Physical Therapy    Initial Assessment     Name: Nely Lagunas  : 1960  MRN: [id number]      Date of Service: 2022    Evaluating PT: Del Goodwin PT, DPT [id number]      Room #:  4093/5119-D  Diagnosis:  Personal history of colonic polyps [Z86.010]  Bowel perforation ([facility] [address] [K63.1]  PMHx/PSHx:  OA, HTN, brain aneurysm, CVA, emphysema, subarachnoid bleed  Procedure/Surgery:  Colonoscopy polypectomy snare/cold biopsy (), exploratory laparotomy, iliocecectomy (), thoracentesis ()  Precautions:  Fall risk, abdominal precautions, O2    SUBJECTIVE:    Pt lives with son and granddaughter in a 2 story house with 5 stair(s) and 1 rail(s) to enter. Bed and bath are on the first floor. Pt ambulated with SPC, quad cane, or WW prior to admission. OBJECTIVE:   Initial Evaluation  Date: 22 Treatment Date: Short Term/ Long Term   Goals   AM-PAC 6 Clicks /     Was pt agreeable to Eval/treatment? Yes     Does pt have pain? 8/10 back and incisional pain; RN notified     Bed Mobility  Rolling: NT  Supine to sit: SBA  Sit to supine: NT  Scooting: SBA to EOB  Rolling: Independent   Supine to sit: Independent   Sit to supine: Independent   Scooting: Independent    Transfers Sit to stand: SBA  Stand to sit: SBA  Stand pivot: SBA with [employer]  Sit to stand: Independent   Stand to sit: Independent   Stand pivot: Mod Independent with [employer]   Ambulation   75 feet x2 with [employer] with SBA  >400 feet with [employer] Mod Independent    Stair negotiation: ascended and descended NT  5 step(s) with 1 rail(s) with Supervision   ROM BUE: Refer to OT note  BLE: WFL     Strength BUE: Refer to OT note  BLE: WFL     Balance Sitting EOB: Supervision  Dynamic Standing: SBA with [employer]  Sitting EOB: Independent   Dynamic Standing: Mod Independent with [employer]     Pt is A & O x: 4 to person, place, month/year, and situation. Sensation: Pt denies numbness and tingling of extremities. Edema: Unremarkable.      Patient education  Pt educated on PT role in acute care setting. Patient response to education:   Pt verbalized understanding Pt demonstrated skill Pt requires further education in this area   Yes  NA No     ASSESSMENT:    Conditions Requiring Skilled Therapeutic Intervention:    [x]Decreased strength     []Decreased ROM  [x]Decreased functional mobility  [x]Decreased balance   [x]Decreased endurance   []Decreased posture  []Decreased sensation  []Decreased coordination   []Decreased vision  []Decreased safety awareness   [x]Increased pain       Comments:    Pt was in bed upon room entry; agreeable to PT evaluation. Pt was pleasant and cooperative. Pt remained on 2 L O2/min throughout session. O2 sat was 92% with all activity. Pt ambulated in hallway with WW. Gait was slow but steady. No LOB occurred. Pt required brief standing rest break before ambulating back to room. All questions and concerns were addressed. Pt was left in chair with all needs met at conclusion of session. RN aware. Treatment:  Patient practiced and was instructed in the following treatment:    Therapeutic activities:  Bed mobility: Pt was cued for technique during supine to sit transfer. Transfers: Pt was cued for hand placement during sit <> stand transfers. Pt completed multiple transfers from various surfaces (EOB x1, chair x1). Ambulation: Pt ambulated x2 reps with [address]. Pt was cued for [address] technique and posture. Vitals and symptoms were closely monitored throughout session. Education: Pt was educated on abdominal precautions. Pt's/family goals:  1. To return home. Prognosis is Good for reaching above PT goals. Patient and or family understand(s) diagnosis, prognosis, and plan of care. Yes.     PHYSICAL THERAPY PLAN OF CARE:    PT POC is established based on physician order and patient diagnosis     Referring provider/PT Order:    Start   Ordering Provider    12/16/22 1500  PT eval and treat  Start:  12/16/22 1500,   End:  12/16/22 1500,   ONE TIME,   Standing Count:  1 Occurrences,   R,   Status:  Canceled        Order went unreviewed at transfer on Sat Dec 17, 2022 12:44 PM    Julián Miller MD      Diagnosis:  Personal history of colonic polyps [Z86.010]  Bowel perforation ([facility] [address] [K63.1]  Specific instructions for next treatment:  Progress activity. Current Treatment Recommendations:     [x] Strengthening to improve independence with functional mobility   [] ROM to improve independence with functional mobility   [x] Balance Training to improve static/dynamic balance and to reduce fall risk  [x] Endurance Training to improve activity tolerance during functional mobility   [x] Transfer Training to improve safety and independence with all functional transfers   [x] Gait Training to improve gait mechanics, endurance and assess need for appropriate assistive device  [x] Stair Training in preparation for safe discharge home and/or into the community   [] Positioning to prevent skin breakdown and contractures  [x] Safety and Education Training   [] Patient/Caregiver Education   [] HEP  [] Other     PT long term treatment goals are located in above grid    Frequency of treatments: 2-5x/week x 1-2 weeks. Time in  0840  Time out  0900    Total Treatment Time  10 minutes     Evaluation Time includes thorough review of current medical information, gathering information on past medical history/social history and prior level of function, completion of standardized testing/informal observation of tasks, assessment of data and education on plan of care and goals.     CPT codes:  [x] Low Complexity PT evaluation 17071  [] Moderate Complexity PT evaluation 89801  [] High Complexity PT evaluation 52134  [] PT Re-evaluation 00061  [] Gait training 37382 0 minutes  [] Manual therapy 31033 0 minutes  [x] Therapeutic activities 22611 10 minutes  [] Therapeutic exercises 37235 0 minutes  [] Neuromuscular reeducation 75734 0 minutes     Juan Jhaveri PT, DPT  [id number]

## 2022-12-20 NOTE — PROGRESS NOTES
[address] [address] [id number] [place] [address]      YQOQ:                                                  Patient Name: Virginia Kelly  MRN: [id number]  : 1960  Room: [address] [place] [address]    Evaluating OT: SUSAN Gonzáles, OTR/L  # [id number]    Referring Provider:  Debi Montalvo MD  Specific Provider Orders:  Michael Samarae and Treat\"  22    Diagnosis: Personal history of colonic polyps [Z86.010]  Bowel perforation ([address] [K63.1]    Pt presented to this facility for a Colonoscopy, experienced complications -   54-0-37:  Underwent urgent LAPAROTOMY EXPLORATORY, ILIOCECECTOMY  22:  developed new A Fib w/ RVR  22:  Became SOB and Hypoxic - Transferred to ICU  22:  Underwent Thoracentesis  12-15-22:  Underwent Thoracentesis  22: Tachycardic  Transferred to Step-Down Unit     Pertinent Medical History:  Pt has a past medical history of Acute deep vein thrombosis (DVT) of right peroneal vein ([address] Brain aneurysm, Cerebral artery occlusion with cerebral infarction [facility]), Degenerative arthritis of hand, Edentulous, Emphysema lung ([address] Emphysema of lung ([address] Headache, Hiatal hernia, Hip problem, Hx of abnormal cervical Pap smear, Hypertension, Stroke (cerebrum) ([address] and Subarachnoid bleed ([address] ,  has a past surgical history that includes Tonsillectomy; Brain aneurysm surgery; other surgical history; Upper gastrointestinal endoscopy (2019); Colonoscopy (2019); Upper gastrointestinal endoscopy (N/A, 2019); Colonoscopy (N/A, 2019); Total hip arthroplasty (Left, 2021); Total hip arthroplasty (Right, 01/10/2022); joint replacement; Colonoscopy w/ biopsies (2022); laparotomy (N/A, 2022); thoracentesis (Right, 2022); and Colonoscopy (N/A, 2022).     Surgeries this admission: Noted above     Precautions:  Fall Risk  Abdominal Precautions  2-3L O2    Assessment of current deficits   [x] Functional mobility  [x]ADLs  [x] Strength               []Cognition   [x] Functional transfers   [x] IADLs         [x] Safety Awareness   [x]Endurance   [] Fine Coordination              [x] Balance     [] Vision/perception   []Sensation    []Gross Motor Coordination  [] ROM  [] Delirium                  [] Motor Control       OT PLAN OF CARE   OT POC based on physician orders, patient diagnosis and results of clinical assessment    Frequency/Duration 1-3 days/wk for 2 weeks PRN   Specific OT Treatment to include:     * Instruction/training on adapted ADL techniques and AE recommendations to increase functional independence within precautions       * Training on energy conservation strategies, correct breathing pattern and techniques to improve independence/tolerance for self-care routine  * Functional transfer/mobility training/DME recommendations for increased independence, safety, and fall prevention  * Patient/Family education to increase follow through with safety techniques and functional independence  * Recommendation of environmental modifications for increased safety with functional transfers/mobility and ADLs  * Cognitive retraining/development of therapeutic activities to improve problem solving, judgement, memory, and attention for increased safety/participation in ADL/IADL tasks  * Therapeutic exercise to improve motor endurance, ROM, and functional strength for ADLs/functional transfers  * Therapeutic activities to facilitate/challenge dynamic balance, stand tolerance for increased safety and independence with ADLs  * Therapeutic activities to facilitate gross/fine motor skills for increased independence with ADLs  * Neuro-muscular re-education: facilitation of righting/equilibrium reactions  * Positioning to improve skin integrity, interaction with environment and functional independence  * Delirium prevention/treatment  * Manual techniques for edema management  Other:    Recommended Adaptive Equipment: TBD as pt progresses  - TUB BENCH      Home Living:  Pt lives with her James Fan, Staci العلي, and her 21 yr old Granddaughter in a 2-story house. Bed/bath/Laundry Facilities on the Main floor.  (+) Basement. Bathroom setup:  Tub-Shower, Standard-height Commode   Equipment owned:  W/C, [employer], [employer], [facility], Shower Chair, Reacher, [employer] Aid, [employer]    Available Family Assist:  Family can assist PRN    Prior Level of Function:  Pt reported being IND w/ all ADLs, Light IADLs, Transfers and Mobility using SPC PRN for ambulation. Driving:  No  Occupation:  None reported    Pain Level:  4-5/10 abdominal pain - mild Relief w/ Rest and Repositioning, Nsg Notified   Additional Complaints:  Mild SOB, Hypoxia    Cognition: A & O x 4   Able to Follow Multi-Step Commands INDly   Memory:  good    Sequencing:  good    Problem solving:  good    Judgement/safety:  good   Additional Comments:  Pt was pleasant and cooperative.       Vitals/Lab Values:  Found During Trial on Room Air - O2 sats 87%  O2 sats in standing - 82% -> replaced 2L O2 -> O2 sats improved to 86% after 1-2 mins -> returned to sitting for safety  O2 sats seated on 2L = 87% - Increased to 3L -> 91%     2nd trial of standing ax w/ 3L - O2 sats ranged from 88%-90%    RN Made aware - left pt seated in chair w/ 3L O2       Functional Assessment:  AM-PAC Daily Activity Raw Score: 15/24     Initial Eval Status  Date: 12-19-22   Treatment Status  Date:  12-20-22 STGs = LTGs  Time frame: 10-14 days   Feeding IND    Seated in chair   IND    Seated in chair NA   Grooming SUP/Set up    Able to complete tasks after set up  Seated in chair    Unable to tolerate ambulating to or standing at sink   NT Mod I  Seated/Standing at the [employer] A/Set up    [facility] seated in chair   NT Mod I     LB Dressing Mod A/Set up    Min A to don socks w/ cross-legged tech, Mod A - pants - simulated in sitting/standing    Pt ed for safe/adaptive techs, use of adaptive equip - has all AE - to resume use at d/c   NT Mod I     Bathing NT    Pt ed re: Benefits of use of Tub Bench for improved safety w/ Tub Transfer and for Energy Conservation Purposes, resources for obtaining equip   Pt ed re; benefits of use of Tub-Bench for improved safety w/ Tub-Transfer, pt ed/demo for safe transfer tech w/ use of Bench, provided resources for obtaining DME, Spoke w/ CM re; possible insurance coverage as pt verbalized concerns re; cost of DME     Mod I      Toileting Max A    Assist for hygiene/clothing adjustment, Min A for safety w/ standing balance - simulated, pt ed re: Benefits of use of BSC   NT Mod I     Bed Mobility  Supine to sit: NT   Sit to supine:  NT     Pt seated in chair    Supine to sit: IND  Sit to supine: IND     Functional Transfers Min A    Standing from Low Chair 2x w/ rest breaks b/t trials  Pt ed for safety/hand placement    Mod I     Functional Mobility Min A w/ [employer]    Very short distance at b/s d/t hypoxia  Pt ed for safety/improved safety awareness, walker safety    Mod I     Balance Sitting:     Static:  IND in chair    Dynamic:  Remote SUP w/ functional ax in chair     Standing:     Static:  Min A w/ [employer]    Dynamic:  Min A w/ functional ax/mobility w/ [employer]    Sitting:     Static:  IND    Dynamic:  IND w/ functional ax    Standing:     Static:  Mod I w/ AD PRN    Dynamic:  Mod I w/ functional ax/mobility w/ AD PRN   Activity Tolerance Fair    Limited by Pain and Hypoxia    Good(-)   Visual/  Perceptual    Hearing:  WNL   Glasses: yes    WFL   Hearing Aids:  no               Hand Dominance: Right   AROM Strength Additional Info:    RUE  [facility] WFL - NT d/t pain, Recent procedure Good ;   Good FMC/dexterity noted during ADL tasks     LUE [facility] WFL - NT Good ;    Good FMC/dexterity noted during ADL tasks       Sensation:  Denies numbness or tingling Tim UEs   Tone: WFL Tim UEs   Edema: None Noted Tim UEs     Comments: Upon arrival, patient was found seated in chair. She was agreeable to participate in therapeutic ax. No Family present during session. Received permission from RN prior to engaging pt in OT services. Educated pt on role of OT services. At the end of the session, patient remained properly positioned in b/s chair. Call light and phone within reach, all lines and tubes intact. Oriented pt to call bell. Made all appropriate Environmental Modifications to facilitate pt's level of IND and safety. All needs met. BSC and WW at b/s. Reported pt's performance and current position to RN         Overall patient demonstrated decreased independence and safety during completion of ADL/functional transfer/mobility tasks. Pt would benefit from continued skilled OT to increase safety and independence with completion of ADL/IADL tasks for functional independence and quality of life. Treatment: OT treatment provided this date includes:   Instruction/training on safety and adapted techniques for completion of ADLs, use of DME/AD/Adaptive equip:    Instruction/training on safe functional mobility/transfer techniques, use of DME/AD:    Instruction/training on energy conservation techs (EC)/work simplification for completion of ADLs:     Skilled positioning/alignment for Pain Mgmt, to maximize Pt's safety and ability to safely and INDly interact w/ his/her environment, maximize respiratory status  Cognitive retraining -  Cues for safety/safety awareness, sequencing, problem solving      Pt/family ed re: benefits of participate in post-acute therapy program - Sub-acute Rehab vs  Therapy     Consulted RN, SW/CM    Made all appropriate Environmental Modifications to facilitate pt's level of IND and safety. Recommendations for Continued Participation in OT services during Hospitalization and at D/C - SNF vs  OT     Pt and/or Family verbalized/demonstrated a Good understanding of education provided. Will Review PRN.         Time In:

## 2022-12-20 NOTE — PROGRESS NOTES
PULSE OX ON ROOM AIR SITTING _92__%   PULSE OX ON ROOM AIR AMBULATING _87__%   PULSE OX ON _3_ LITERS AMBULATING RECOVERY _92__% Electronically signed by Juan Yoo RN on 12/20/22 at 1:32 PM EST

## 2022-12-20 NOTE — DISCHARGE INSTR - COC
Continuity of Care Form    Patient Name: Kate Smith   :  1960  MRN:  [id number]    Admit date:  2022  Discharge date:  ***    Code Status Order: Full Code   Advance Directives:   Advance Care Flowsheet Documentation       Date/Time Healthcare Directive Type of Healthcare Directive Copy in [address] Agent's Name Healthcare Agent's Phone Number    22 1042 Yes, patient has an advance directive for healthcare treatment Durable power of  for health care Yes, copy in chart Healthcare power of  son --            Admitting Physician:  Toney Rock MD  PCP: Chely Kevin MD    Discharging Nurse: [facility] Unit/Room#: 1156/0038-J  Discharging Unit Phone Number: ***    Emergency Contact:   Extended Emergency Contact Information  Primary Emergency Contact: Kelvin Anguiano  Address: [place] [address]. Wendy Ellis [facility] [address] Phone: [phone]  Mobile Phone: [phone]  Relation: Child  Secondary Emergency Contact: Ryder Anguiano  Address: [place] [address].            [facility] [address] Phone: [phone]  Relation: Child    Past Surgical History:  Past Surgical History:   Procedure Laterality Date    BRAIN ANEURYSM SURGERY      coiling     COLONOSCOPY  2019    polyps--frank    COLONOSCOPY N/A 2019    COLONOSCOPY POLYPECTOMY SNARE/COLD BIOPSY performed by Toney Rock MD at [address] [facility] N/A 2022    COLONOSCOPY POLYPECTOMY SNARE/COLD BIOPSY performed by Toney Rock MD at [address] [facility]  2022    polyp/cecal mass; diverticula--frank    JOINT REPLACEMENT      total hip replacement left and right    LAPAROTOMY N/A 2022    LAPAROTOMY EXPLORATORY, ILIOCECECTOMY performed by Toney Rock MD at [facility] [address] DONE  [facility] 2022    [address] colored    TONSILLECTOMY      TOTAL HIP ARTHROPLASTY Left 02/22/2021    LEFT HIP TOTAL ARTHROPLASTY performed by Toribio Raygoza MD at [address] [facility] [place] Right 01/10/2022    RIGHT TOTAL HIP ARTHROPLASTY - STYKER performed by Toribio Raygoza MD at [address] [place] [address]  08/30/2019    gastritis with superficial ulcerations; duodenitis--frank    UPPER GASTROINTESTINAL ENDOSCOPY N/A 08/30/2019    EGD BIOPSY performed by Brandi Ferrara MD at [facility]       Immunization History:   Immunization History   Administered Date(s) Administered    Pneumococcal Polysaccharide ([id number]) 07/17/2018    Tdap (Boostrix, Adacel) 08/16/2019       Active Problems:  Patient Active Problem List   Diagnosis Code    Obesity (BMI 30-39. 9) E66.9    Hypertension I10    Chronic pain syndrome G89.4    Lumbar facet arthropathy M47.816    Lumbar disc disorder M51.9    Primary osteoarthritis of both hips M16.0    Arthritis of right hip M16.11    Dysphagia R13.10    Heartburn R12    At risk for colon cancer Z91.89    Colon polyps K63.5    Degenerative disc disease, cervical M50.30    Arthropathy of cervical facet joint M47.812    Abnormal blood sugar R73.09    Arthritis of both hips M16.0    COVID-19 U07.1    H/O total hip arthroplasty, right Z96.641    History of total left hip arthroplasty Z96.642    Primary osteoarthritis of right hip M16.11    Pulmonary emphysema (HCC) J43.9    History of herpes genitalis Z86.19    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 Z86.79    History of COVID-19 Z86.16    Acute cystitis with hematuria N30.01    Aneurysm (HCC) I72.9    S/P total right hip arthroplasty Z96.641    Cognitive impairment R41.89    Personal history of colonic polyps Z86.010    Abnormal diffusion capacity determined by pulmonary function test R94.2    BELCHER (dyspnea on exertion) R06.09    Acute deep vein thrombosis (DVT) of right peroneal vein (HCC) I82.451    Bowel perforation (HCC) K63.1    Acute respiratory failure with hypoxia ([facility]) J96.01    Atrial flutter (HCC) I48.92    Atrial fibrillation with RVR ([facility]) I48.91    Pleural effusion, bilateral J90    LFT elevation R79.89    Hypoalbuminemia E88.09       Isolation/Infection:   Isolation            No Isolation          Patient Infection Status       Infection Onset Added Last Indicated Last Indicated By Review Planned Expiration Resolved Resolved By    None active    Resolved    COVID-19 (Rule Out) 22 Respiratory Panel, Molecular, with COVID-19 (Restricted: peds pts or suitable admitted adults) (Ordered)   22 Rule-Out Test Resulted    COVID-19 (Rule Out) 22 COVID-19 Ambulatory (Ordered)   22 Rule-Out Test Resulted    COVID-19 11/10/21 11/11/21 11/10/21 COVID-19   21     COVID-19 (Rule Out) 11/10/21 11/10/21 11/10/21 Covid-19 Ambulatory (Ordered)   21 Rule-Out Test Resulted    COVID-19 (Rule Out) 21 COVID-19 (Ordered)   21 Rule-Out Test Resulted    COVID-19 (Rule Out) 21 COVID-19 Ambulatory (Ordered)   21 Rule-Out Test Resulted            Nurse Assessment:  Last Vital Signs: BP (!) 88/42   Pulse 72   Temp 97.1 °F (36.2 °C)   Resp 18   Ht 5' 5\" (1.651 m)   Wt 226 lb (102.5 kg)   SpO2 96%   BMI 37.61 kg/m²     Last documented pain score (0-10 scale): Pain Level: 9  Last Weight:   Wt Readings from Last 1 Encounters:   22 226 lb (102.5 kg)     Mental Status:  {IP PT MENTAL STATUS:}    IV Access:  {[facility] IV ACCESS:506538025}    Nursing Mobility/ADLs:  Walking   {[facility] DME TJPA:372732401}  Transfer  {[facility] DME GWCU:652621659}  Bathing  {[facility] DME JZJV:865784349}  Dressing  {[facility] DME MUOW:355949057}  Toileting  {[facility] DME SSGL:570393546}  Feeding  {[facility] DME DDI}  Med Admin  {[facility] DME OIKW:117550098}  Med Delivery   { HOSSEIN MED Delivery:132858686}    Wound Care Documentation and Therapy:  Puncture 12/13/22 Back (Active)   Mary-wound Assessment Warm; Intact 12/13/22 1540   Closure None 12/13/22 1540   Drainage Amount None 12/13/22 1540   Odor None 12/13/22 1540   Dressing/Treatment Gauze dressing/dressing sponge;Tegaderm/transparent film dressing 12/13/22 1540   Dressing Changed Changed/New 12/13/22 1540   Dressing Status Clean, dry & intact 12/13/22 1540   Number of days: 6       Incision 12/09/22 Abdomen Medial (Active)   Dressing Status Clean;Dry; Intact 12/20/22 0800   Incision Cleansed Cleansed with saline 12/18/22 2300   Dressing/Treatment ABD pad 12/20/22 0800   Closure Staples 12/18/22 2300   Margins Approximated 12/18/22 2300   Incision Assessment Dry 12/18/22 2300   Drainage Amount None 12/20/22 0800   Odor None 12/20/22 0800   Number of days: 10        Elimination:  Continence: Bowel: {YES / OM:05525}  Bladder: {YES / GH:37738}  Urinary Catheter: {Urinary Catheter:082905002}   Colostomy/Ileostomy/Ileal Conduit: {YES / OP:12714}       Date of Last BM: ***    Intake/Output Summary (Last 24 hours) at 12/20/2022 1445  Last data filed at 12/20/2022 1310  Gross per 24 hour   Intake 559.84 ml   Output --   Net 559.84 ml     I/O last 3 completed shifts:   In: 46 [P.O.:390]  Out: 0     Safety Concerns:     [address] Safety Concerns:462835414}    Impairments/Disabilities:      [address] Impairments/Disabilities:413030724}    Nutrition Therapy:  Current Nutrition Therapy:   [address] Diet List:740076732}    Routes of Feeding: {[facility] DME Other Feedings:358296088}  Liquids: {Slp liquid thickness:39096}  Daily Fluid Restriction: {[facility] DME Yes amt example:664450126}  Last Modified Barium Swallow with Video (Video Swallowing Test): {Done Not Done OSXD:329662677}    Treatments at the Time of Hospital Discharge:   Respiratory Treatments: ***  Oxygen Therapy:  {Therapy; copd oxygen:92796}  Ventilator:    { CC Vent JHJY:461244859}    Rehab Therapies: {THERAPEUTIC INTERVENTION:6838204494}  Weight Bearing Status/Restrictions: 50Nestor Arguello CC Weight Bearin}  Other Medical Equipment (for information only, NOT a DME order):  {EQUIPMENT:917138978}  Other Treatments: ***    Patient's personal belongings (please select all that are sent with patient):  {P DME Belongings:491515952}    RN SIGNATURE:  {Esignature:139182751}    CASE MANAGEMENT/SOCIAL WORK SECTION    Inpatient Status Date: 2022    Readmission Risk Assessment Score:  Readmission Risk              Risk of Unplanned Readmission:  16           Discharging to Facility/ Agency   Name: [facility]  [facility] [address] [facility]  Phone: [phone]  Fax:    Dialysis Facility (if applicable)   Name:  Address:  Dialysis Schedule:  Phone:  Fax:    / signature: Electronically signed by Jona Dhaliwal RN on 22 at 2:45 PM EST    PHYSICIAN SECTION    Prognosis: {Prognosis:0674619397}    Condition at Discharge: 50Nestor Arguello Patient Condition:523211303}    Rehab Potential (if transferring to Rehab): {Prognosis:9637575942}    Recommended Labs or Other Treatments After Discharge: ***    Physician Certification: I certify the above information and transfer of Giuseppe Sanchez  is necessary for the continuing treatment of the diagnosis listed and that she requires {Admit to Appropriate Level of Care:05075} for {GREATER/LESS:351022220} 30 days.      Update Admission H&P: {[facility] DME Changes in LNWEY:333989661}    PHYSICIAN SIGNATURE:  {Esignature:122719721}

## 2022-12-20 NOTE — CARE COORDINATION
12/20 Update CM note. Per bedside RN, pt requires oxygen for home and pt is requesting HHC. OT also recommends tub transfer bench. CM met with pt at bedside to discuss above. Pt states she has hx with [facility] and wants referral to them (sent to Donya-will review). Pt does NOT have preference on oxygen or DME supplier. Orders placed and referral called to [facility] at [facility] DME. New pulmonology consult is noted. 1446  Received message from Donya at [facility], she has accepted pt for SN/PT/OT. HOSSEIN/destination complete.      DANIEL HamiltonN, RN  [facility] Case Management   Cell: [phone]

## 2022-12-20 NOTE — PROGRESS NOTES
GENERAL SURGERY  DAILY PROGRESS NOTE  12/20/2022      Subjective:  Upon seeing patient today, she is pleasant. She states that's shes been \"feeling good\" and denies any nausea and vomiting, but states that she's had 4 days of diarrhea after her colonoscopy and is seen sitting on a commode. She denies any fever, chills, headaches, tingling, abdominal pain, or other symptoms of pain. She says that Physical therapy has not seen her yet. Objective:  BP (!) 88/42   Pulse 72   Temp 97.1 °F (36.2 °C)   Resp 18   Ht 5' 5\" (1.651 m)   Wt 226 lb (102.5 kg)   SpO2 96%   BMI 37.61 kg/m²     GENERAL:  Laying in bed, awake, alert, cooperative, no apparent distress  HEAD: Normocephalic, atraumatic  EYES: No sclera icterus, pupils equal  LUNGS:  No increased work of breathing  CARDIOVASCULAR:  Regular rate, normal rhythm  ABDOMEN:  Soft, non-tender, non-distended.  Incision with staples that is intact and dry  EXTREMITIES: No edema or swelling  SKIN: Warm and dry    Assessment/Plan:  60 y.o. female with iatrogenic perforation after colonoscopy    Hospital (Problems being addressed during this admission)    Bowel perforation (HCC)           Acute respiratory failure with hypoxia (HCC)           Atrial flutter (HCC)           Atrial fibrillation with RVR (HCC)           Pleural effusion, bilateral           LFT elevation           Hypoalbuminemia           Personal history of colonic polyps                Re-consult PT and follow recommendations for discharge planning    Cardio: Atrial Flutter-- Resolved, cardiology following, continue to monitor fluids and urine output  GI: Monitor diet as tolerated  Diarrhea: Continue IV fluids ; monitor vitals  Pulmonary: Wean O2 as appropriate  Heme: No acute abnormalities  MSK: No acute abnormalities    Electronically signed by Perla Chan on 12/20/2022 at 3:00 PM

## 2022-12-20 NOTE — CONSULTS
[place]  Department of Internal Medicine  Division of Pulmonary, Critical Care and Sleep Medicine  Consult Note    Renita Montiel MD, MS    Patient: Virginia Kelly  MRN: [id number]  : 1960    Encounter Time: 4:35 PM     Date of Admission: 2022 10:18 AM    Primary Care Physician: Peter Bailey MD    Reason for Consultation: Respiratory failure     HISTORY OF PRESENT ILLNESS : Virginia Kelly 60 y.o. female was seen in consultation regarding the above chief compliant. History of pulmonary emphysema, former smoker, DVT, spontaneous subarachnoid hemorrhage s/p coiling, CVA with right hemiparesis, bilateral hip replacement. Admitted initially on  for routine colonoscopy, complicated by perforation s/p ex lap/ileocecectomy. Prolonged hospitalization with atrial flutter, respiratory failure, bilateral pleural effusions s/p bilateral thoracentesis. No pleural fluid studies were sent. Patient has persistent hypoxia. She states that she currently has no shortness of breath however she just started working with physical therapy. She had significant desaturation with ambulation, currently on 3 L oxygen supplementation. At home she is supposed to be on Stiolto however patient states that she stopped using this due to worsening cough. PAST MEDICAL HISTORY:  has a past medical history of Acute deep vein thrombosis (DVT) of right peroneal vein ([address] Brain aneurysm, Cerebral artery occlusion with cerebral infarction [facility]), Degenerative arthritis of hand, Edentulous, Emphysema lung ([address] Emphysema of lung ([address] Headache, Hiatal hernia, Hip problem, Hx of abnormal cervical Pap smear, Hypertension, Stroke (cerebrum) ([address] and Subarachnoid bleed ([address] SURGICAL HISTORY:  has a past surgical history that includes Tonsillectomy; Brain aneurysm surgery; other surgical history; Upper gastrointestinal endoscopy (2019);  Colonoscopy (2019); Upper gastrointestinal endoscopy (N/A, 08/30/2019); Colonoscopy (N/A, 08/30/2019); Total hip arthroplasty (Left, 02/22/2021); Total hip arthroplasty (Right, 01/10/2022); joint replacement; Colonoscopy w/ biopsies (12/09/2022); laparotomy (N/A, 12/09/2022); thoracentesis (Right, 12/13/2022); and Colonoscopy (N/A, 12/9/2022). SOCIAL HISTORY:  reports that she quit smoking about 4 years ago. Her smoking use included cigarettes. She has a 64.50 pack-year smoking history. She has never used smokeless tobacco. She reports that she does not drink alcohol and does not use drugs. FAMILY  HISTORY: family history includes Arthritis in her father and mother; Atrial Fibrillation in her father and mother; Cancer in her sister; Diabetes in her father and mother; Emphysema in her father. MEDICATIONS:    Prior to Admission medications    Medication Sig Start Date End Date Taking? Authorizing Provider   folic acid (FOLVITE) 1 MG tablet Take 1 tablet by mouth daily  Patient not taking: Reported on 12/9/2022 12/8/22   Phani Blandon MD   pyridoxine (RA VITAMIN B-6) 50 MG tablet Take 1 tablet by mouth daily  Patient not taking: Reported on 12/9/2022 12/8/22   Phani Blandon MD   cyanocobalamin ([facility] VITAMIN B12) 1000 MCG tablet Take 1 tablet by mouth daily  Patient not taking: Reported on 12/9/2022 12/8/22   Phani Blandon MD   acetaminophen-codeine (TYLENOL #4) 300-60 MG per tablet Take 1 tablet by mouth daily as needed for Pain for up to 30 days. 12/9/22 1/8/23  SPENSER Bolaños   fluconazole (DIFLUCAN) 150 MG tablet 1 tab po x 1 dose, may repeat in 72 hrs if still symptomatic.   Patient not taking: Reported on 12/9/2022 12/5/22   LYLY Regalado - ILANA   albuterol sulfate HFA (PROVENTIL;VENTOLIN;PROAIR) 108 (90 Base) MCG/ACT inhaler INHALE TWO PUFFS BY MOUTH EVERY 6 HOURS AS NEEDED FOR WHEEZING. 12/1/22   Mariaa Osman MD   tiotropium-olodaterol (STIOLTO RESPIMAT) 2.5-2.5 MCG/ACT AERS Inhale 2 puffs into the lungs daily  Patient not taking: Reported on 12/9/2022 10/13/22   Jatinder Sierra MD   psyllium (METAMUCIL SMOOTH TEXTURE) 28.3 % POWD powder Take 3.4 g by mouth daily 10/6/22   Elkin Csae MD   Misc. Devices (WRI[facility] BRACE) [facility] neutral position left wrist brace  Size to fit  Dx:  Wrist pain/carpal tunnel syndrome 10/6/22   Elkin Case MD   amLODIPine (NORVASC) 5 MG tablet TAKE ONE TABLET BY MOUTH EVERY DAY 8/5/22   Elkin Case MD   chlorthalidone (HYGROTON) 25 MG tablet Take 1 tablet by mouth in the morning. 8/5/22   Elkin Case MD   fluticasone (FLONASE) 50 MCG/ACT nasal spray 2 sprays by Each Nostril route in the morning. 8/5/22   Elkin Case MD   gabapentin (NEURONTIN) 300 MG capsule TAKE ONE CAPSULE BY MOUTH THREE TIMES A DAY 8/5/22 12/9/22  Elkin Case MD   losartan (COZAAR) 100 MG tablet Take 1 tablet by mouth in the morning. 8/5/22   Elkin Case MD   pantoprazole (PROTONIX) 40 MG tablet TAKE ONE TABLET BY MOUTH EVERY DAY 8/5/22   Elkin Case MD   potassium chloride (KLOR-CON M) 10 MEQ extended release tablet TAKE ONE TABLET BY MOUTH DAILY WITH BREAKFAST 8/5/22   Elkin Case MD   metoprolol tartrate (LOPRESSOR) 25 MG tablet Take 1.5 tablets by mouth in the morning and 1.5 tablets before bedtime.  8/5/22 12/9/22  Elkin Case MD   polyethylene glycol (GLYCOLAX) 17 g packet DISSOLVE 1 PACKET (17 GRAMS) IN LIQUID AND TAKE BY MOUTH DAILY 1/24/22   Historical Provider, MD   valACYclovir (VALTREX) 1 g tablet Take 1 tablet by mouth daily For 5 days as needed for Herpes outbreak  Patient not taking: Reported on 12/9/2022 4/27/21   Elkin Case MD   Multiple Vitamins-Minerals (THERAPEUTIC MULTIVITAMIN-MINERALS) tablet Take 1 tablet by mouth daily    Historical Provider, MD   diclofenac sodium (VOLTAREN) 1 % GEL APPLY ONE GRAM EXTERNALLY TWICE A DAY TO NECK AND ONE GRAM EXTERNALLY TWICE A DAY TO BACK   Patient not taking: Reported on 12/9/2022 11/3/20 SPENSER Bernardo   Misc. Devices (ROLLATOR) MISC 1 each by Does not apply route daily as needed (use as needed for stability) 3/17/20   Kenn Crooks MD       ALLERGIES: Latex, Iodine, Aloe, Celebrex [celecoxib], and Fish-derived products       REVIEW OF SYSTEMS:  Otherwise negative if not reported or listed below    A 10-point review of systems was completed, and ROS is negative except as mentioned in HPI. OBJECTIVE:     PHYSICAL EXAM:   VITALS:   Vitals:    12/20/22 0955 12/20/22 1155 12/20/22 1349 12/20/22 1609   BP:  (!) 88/42     Pulse:  72  67   Resp: 18 18  14   Temp:  97.1 °F (36.2 °C)     TempSrc:       SpO2:  97% 96% 95%   Weight:       Height:            Intake/Output Summary (Last 24 hours) at 12/20/2022 1635  Last data filed at 12/20/2022 1310  Gross per 24 hour   Intake 559.84 ml   Output --   Net 559.84 ml        1. General: Alert and oriented x 3, No acute distress. 2. Eyes: Vision - grossly normal, PERRLA   3. HENT: Head is atraumatic & normocephalic. Neck Supple, No jugular venous distention   4. Respiratory: Diminished breath sounds in bilateral bases, Respirations are non-labored, Breath sounds are equal   5. Cardiovascular: S1, S2 normal, Regular rate & rhythm, No murmur, No pedal edema   6. Gastrointestinal: Soft, Non-tender, Non-distended, Normal bowel sounds. No organomegaly. 7. Neurologic: Awake & Alert, Cranial nerves 2-12 grossly intact, No focal motor or sensory deficits. 8. Skin: no rashes, breakdown  9. Musculoskeletal: no LE edema, no joint effusion.   10. Psychiatric - No Anxiety, Depression    Medications Prior to Admission: folic acid (FOLVITE) 1 MG tablet, Take 1 tablet by mouth daily (Patient not taking: Reported on 12/9/2022)  pyridoxine (RA VITAMIN B-6) 50 MG tablet, Take 1 tablet by mouth daily (Patient not taking: Reported on 12/9/2022)  cyanocobalamin ([facility] VITAMIN B12) 1000 MCG tablet, Take 1 tablet by mouth daily (Patient not taking: Reported on 2022)  acetaminophen-codeine (TYLENOL #4) 300-60 MG per tablet, Take 1 tablet by mouth daily as needed for Pain for up to 30 days. fluconazole (DIFLUCAN) 150 MG tablet, 1 tab po x 1 dose, may repeat in 72 hrs if still symptomatic. (Patient not taking: Reported on 2022)  [] nitrofurantoin, macrocrystal-monohydrate, (MACROBID) 100 MG capsule, Take 1 capsule by mouth 2 times daily for 5 days  albuterol sulfate HFA (PROVENTIL;VENTOLIN;PROAIR) 108 (90 Base) MCG/ACT inhaler, INHALE TWO PUFFS BY MOUTH EVERY 6 HOURS AS NEEDED FOR WHEEZING. tiotropium-olodaterol (STIOLTO RESPIMAT) 2.5-2.5 MCG/ACT AERS, Inhale 2 puffs into the lungs daily (Patient not taking: Reported on 2022)  psyllium (METAMUCIL SMOOTH TEXTURE) 28.3 % POWD powder, Take 3.4 g by mouth daily  Misc. Devices (WRIST BRACE) MISC, Firm neutral position left wrist brace Size to fit Dx:  Wrist pain/carpal tunnel syndrome  amLODIPine (NORVASC) 5 MG tablet, TAKE ONE TABLET BY MOUTH EVERY DAY  chlorthalidone (HYGROTON) 25 MG tablet, Take 1 tablet by mouth in the morning. fluticasone (FLONASE) 50 MCG/ACT nasal spray, 2 sprays by Each Nostril route in the morning.  gabapentin (NEURONTIN) 300 MG capsule, TAKE ONE CAPSULE BY MOUTH THREE TIMES A DAY  losartan (COZAAR) 100 MG tablet, Take 1 tablet by mouth in the morning. pantoprazole (PROTONIX) 40 MG tablet, TAKE ONE TABLET BY MOUTH EVERY DAY  potassium chloride (KLOR-CON M) 10 MEQ extended release tablet, TAKE ONE TABLET BY MOUTH DAILY WITH BREAKFAST  metoprolol tartrate (LOPRESSOR) 25 MG tablet, Take 1.5 tablets by mouth in the morning and 1.5 tablets before bedtime.   polyethylene glycol (GLYCOLAX) 17 g packet, DISSOLVE 1 PACKET (17 GRAMS) IN LIQUID AND TAKE BY MOUTH DAILY  valACYclovir (VALTREX) 1 g tablet, Take 1 tablet by mouth daily For 5 days as needed for Herpes outbreak (Patient not taking: Reported on 2022)  Multiple Vitamins-Minerals (THERAPEUTIC MULTIVITAMIN-MINERALS) tablet, Take 1 tablet by mouth daily  diclofenac sodium (VOLTAREN) 1 % GEL, APPLY ONE GRAM EXTERNALLY TWICE A DAY TO NECK AND ONE GRAM EXTERNALLY TWICE A DAY TO BACK  (Patient not taking: Reported on 12/9/2022)  Misc.  Devices (ROLLATOR) MISC, 1 each by Does not apply route daily as needed (use as needed for stability)    Current Facility-Administered Medications:     phenazopyridine (PYRIDIUM) tablet 200 mg, 200 mg, Oral, TID WC, Jose Luis Paige MD, 200 mg at 12/20/22 1538    [START ON 12/21/2022] pantoprazole (PROTONIX) tablet 40 mg, 40 mg, Oral, QAM AC, Chan Elias MD    budesonide (PULMICORT) nebulizer suspension 500 mcg, 0.5 mg, Nebulization, BID, Jatinder Mauricio MD    guaiFENesin tablet 400 mg, 400 mg, Oral, TID, Jatinder Mauricio MD    ipratropium-albuterol (DUONEB) nebulizer solution 1 ampule, 1 ampule, Inhalation, 4x daily, Jatinder Mauricio MD    bumetanide (BUMEX) injection 2 mg, 2 mg, IntraVENous, Once **AND** albumin human-kjda 25 % IV solution 25 g, 25 g, IntraVENous, Once, Jatinder Mauricio MD    dilTIAZem (CARDIZEM CD) extended release capsule 180 mg, 180 mg, Oral, Daily, Zondrlucila Sandy, DO, 180 mg at 12/20/22 0499    white petrolatum ointment, , Topical, PRN, Ted Levy MD    metoprolol succinate (TOPROL XL) extended release tablet 100 mg, 100 mg, Oral, BID, Zondra Du, DO, 100 mg at 12/20/22 5003    oxyCODONE (ROXICODONE) immediate release tablet 5 mg, 5 mg, Oral, Q4H PRN **OR** oxyCODONE HCl (OXY-IR) immediate release tablet 10 mg, 10 mg, Oral, Q4H PRN, Estela Headley MD, 10 mg at 12/20/22 0925    digoxin (LANOXIN) tablet 125 mcg, 125 mcg, Oral, Daily, Estela Headley MD, 125 mcg at 12/20/22 0925    perflutren lipid microspheres (DEFINITY) injection 1.5 mL, 1.5 mL, IntraVENous, ONCE PRN, Estela Headley MD    labetalol (NORMODYNE;TRANDATE) injection 10 mg, 10 mg, IntraVENous, Q30 Min PRN, Estela Headley MD, 10 mg at 12/14/22 0644    hydrALAZINE (APRESOLINE) injection 10 mg, 10 mg, IntraVENous, Q6H PRN, Yara Mccarty MD    sodium chloride flush 0.9 % injection 10 mL, 10 mL, IntraVENous, PRN, Yara Mccarty MD    sodium chloride flush 0.9 % injection 5-40 mL, 5-40 mL, IntraVENous, 2 times per day, Yara Mccarty MD, 10 mL at 12/20/22 0927    sodium chloride flush 0.9 % injection 5-40 mL, 5-40 mL, IntraVENous, PRN, Yara Mccarty MD    albuterol (PROVENTIL) nebulizer solution 2.5 mg, 2.5 mg, Nebulization, Q4H PRN, Yara Mccarty MD    [Held by provider] chlorthalidone (HYGROTON) tablet 25 mg, 25 mg, Oral, Daily, Yara Mccarty MD, 25 mg at 12/10/22 0920    gabapentin (NEURONTIN) capsule 300 mg, 300 mg, Oral, TID, Yara Mccarty MD, 300 mg at 12/20/22 1537    [Held by provider] losartan (COZAAR) tablet 100 mg, 100 mg, Oral, Daily, Anthony Escalera MD, 100 mg at 12/10/22 0920    0.9 % sodium chloride infusion, , IntraVENous, PRN, Yara Mccarty MD, Stopped at 12/13/22 1832    ondansetron (ZOFRAN-ODT) disintegrating tablet 4 mg, 4 mg, Oral, Q8H PRN **OR** ondansetron (ZOFRAN) injection 4 mg, 4 mg, IntraVENous, Q6H PRN, Yara Mccarty MD, 4 mg at 12/13/22 1452    acetaminophen (TYLENOL) tablet 650 mg, 650 mg, Oral, Q6H, Yara Mccarty MD, 650 mg at 12/20/22 1537    heparin (porcine) injection 5,000 Units, 5,000 Units, SubCUTAneous, 3 times per day, Yara Mccarty MD, 5,000 Units at 12/20/22 1538    DATA: IMAGING & TESTING:     LABORATORY TESTS:  CBC:   Lab Results   Component Value Date    WBC 8.6 12/20/2022    HGB 9.8 (L) 12/20/2022    HCT 30.8 (L) 12/20/2022    MCV 92.2 12/20/2022     12/20/2022     BMP: [unfilled]  LFTs:   Lab Results   Component Value Date    ALT 40 (H) 12/19/2022    AST 30 12/19/2022    ALKPHOS 66 12/19/2022    BILITOT 0.3 12/19/2022    PROT 6.2 (L) 12/19/2022     Coags:   Lab Results   Component Value Date    INR 1.1 12/15/2022     Micro: [unfilled]    PRO-BNP: No results found for: PROBNP   ABGs:   Lab Results   Component Value Date/Time Tab 30 7.554 12/12/2022 08:24 AM    PO2 53.0 12/12/2022 08:24 AM    PCO2 33.1 12/12/2022 08:24 AM     Hemoglobin A1C: No components found for: HGBA1C    RADIOLOGY:  Imaging tests were completed and reviewed and discussed radiology and care team involved and reveals     XR ACUTE ABD SERIES CHEST 1 VW    Result Date: 12/9/2022  EXAMINATION: TWO XRAY VIEWS OF THE ABDOMEN AND SINGLE  XRAY VIEW OF THE CHEST 12/9/2022 1:29 pm COMPARISON: 01/04/2022 HISTORY: ORDERING SYSTEM PROVIDED HISTORY: post surgery TECHNOLOGIST PROVIDED HISTORY: hypoxia Reason for exam:->post surgery What reading provider will be dictating this exam?->CRC FINDINGS: There is a large amount of pneumoperitoneum likely related to recent laparoscopic surgery. There is subsegmental basilar atelectasis with some increased density in the peripheral left lower lobe which may represent atelectasis or pneumonia. No pneumothorax detected. Heart appears normal in size. Osseous structures are unchanged. 1.  Moderate to severe pneumoperitoneum possibly related to recent laparoscopic surgery. This would be a normal finding if surgery was completed immediately prior to imaging, otherwise bowel perforation should be excluded, CT abdomen and pelvis recommended. 2.  Atelectasis and possible pneumonia in the peripheral left lower lobe. 3.  No evidence of pneumothorax or pneumomediastinum. RECOMMENDATION: Consider CT abdomen and pelvis with contrast if the abdominal surgery was not performed immediately prior to imaging. CT ABDOMEN PELVIS W IV CONTRAST Additional Contrast? None    Result Date: 12/12/2022  EXAMINATION: CT OF THE ABDOMEN AND PELVIS WITH CONTRAST 12/11/2022 11:47 pm TECHNIQUE: CT of the abdomen and pelvis was performed with the administration of intravenous contrast. Multiplanar reformatted images are provided for review.  Automated exposure control, iterative reconstruction, and/or weight based adjustment of the mA/kV was utilized to reduce the radiation dose to as low as reasonably achievable. COMPARISON: Supine abdomen, 12/11/2022 HISTORY: ORDERING SYSTEM PROVIDED HISTORY: poss early anastomotic leak TECHNOLOGIST PROVIDED HISTORY: Additional Contrast?->None Reason for exam:->poss early anastomotic leak What reading provider will be dictating this exam?->CRC FINDINGS: Lower Chest: There are small bilateral pleural effusions with posterior bilateral lower lobe compressive atelectasis and collapse. Liver: The liver is enlarged measuring approximately 23 cm. .  No infiltrative process or space-occupying lesion. Gallbladder/biliary tree: No gallstones, gallbladder wall thickening, or pericholecystic fluid. No significant intrahepatic or extrahepatic biliary dilatation. Spleen: Mild splenomegaly (13.6 cm). Pancreas: No significant findings. Adrenal glands: There is 8 mm low attenuation adenoma in the left adrenal gland. Kidneys: Normal anatomic position. There is 2.0 cm simple cyst at the lower pole of right kidney. No significant genitourinary tract calculi or hydronephrosis. GI/Bowel: A nasogastric tube extends into the gastric lumen. There is presumed partial resection of the ascending colon with ileocolic anastomosis. There is moderate diffuse dilated and fluid-filled small bowel, with most prominent dilatation in the proximal-mid small bowel and slightly less dilated distal small bowel ileal loops. Constellation of findings may have association with an adynamic ileus. There is moderate sigmoid diverticulosis. No evidence for diverticulitis. Appendix: Poorly localized; presumed resected. Pelvis: The visualized uterus and ovaries are within normal limits for patient's age. There is a Sams balloon catheter in the urinary bladder. Peritoneum/Retroperitoneum: There is advanced aortoiliac atherosclerotic disease. The IVC is within normal limits. Mild perihepatic ascites.   There is trace free fluid in the distal right pericolic gutter near region of anastomosis. There is also trace free fluid in the distal left pericolic gutter. Bones/Soft Tissues: The bones are osteopenic. There is mild-moderate thoracolumbar spine degenerative change and scoliosis. There are bilateral total hip arthroplasties. .     Presumed ileocolic anastomosis with trace free fluid in the adjacent distal right pericolic gutter. Mild free fluid in perihepatic and distal left pericolic gutter spaces. No convincing evidence for a bowel leak. Small bilateral pleural effusions with associated posterior bilateral lower lobe compressive atelectasis/collapse. Right renal simple cyst. Hepatosplenomegaly. XR CHEST PORTABLE    Result Date: 2022  EXAMINATION: ONE XRAY VIEW OF THE CHEST 2022 5:15 am COMPARISON: 12/15/2022 HISTORY: ORDERING SYSTEM PROVIDED HISTORY: increased O2 TECHNOLOGIST PROVIDED HISTORY: Reason for exam:->increased O2 What reading provider will be dictating this exam?->CRC FINDINGS: Right IJ central line visualized with catheter tip in the SVC. EKG leads are seen superimposed over the chest. The cardiomediastinal silhouette is moderately enlarged demonstrating no significant change in comparison to the prior study. . Prominence of the bronchovascular and interstitial lung markings is visualized in bilateral lung fields with patchy airspace opacification visualized overlying bilateral costophrenic angle. Linear subsegmental atelectatic streaks are seen. Peribronchial cuffing bilateral hilar prominence is seen. Blunting of bilateral costophrenic angles suggestive of small bilateral pleural effusions that demonstrates subtle increase in comparison to the prior study. Degenerative bone changes seen. Congestive changes, slightly increased opacification in the right lung suggestive of the increased pleural fluid.      XR CHEST PORTABLE    Result Date: 12/15/2022  Patient MRN: [id number] : 1960 Age:  60 years Gender: Female Order Date: 12/15/2022 3:10 PM Exam: XR CHEST PORTABLE Number of Images: 1 view Indication:   Post thoracentesis Post thoracentesis What reading provider will be dictating this exam?->[facility] Comparison: 2022 Findings: There is a right central venous catheter tip the tip is in the superior vena cava The heart is enlarged. There are bilateral pleural effusions with associated infiltrates There is no evidence for pneumothorax status post left thoracentesis. There is proved aeration of the left lung. Status post left thoracentesis without evidence for pneumothorax or trapped lung     XR CHEST PORTABLE    Result Date: 2022  EXAMINATION: ONE XRAY VIEW OF THE CHEST 2022 6:23 am COMPARISON: 2022 HISTORY: ORDERING SYSTEM PROVIDED HISTORY: s/p thora TECHNOLOGIST PROVIDED HISTORY: Reason for exam:->s/p thora What reading provider will be dictating this exam?->CRC FINDINGS: Right IJ central line visualized with tip in the SVC. Moderate cardiomegaly is seen the demonstrates no significant change in comparison to the prior study. Prominence of the bronchovascular interstitial lung markings visualized in bilateral lung fields, haziness overlying the left hemithorax demonstrates prominence in comparison to the prior study. Mild blunting of the right costophrenic angle is seen. No evidence of pneumothorax is seen. Mild degenerative joint changes seen. Slightly increased airspace opacification in the left hemithorax in comparison to the prior study. No evidence of pneumothorax is seen. XR CHEST PORTABLE    Result Date: 2022  Patient MRN: [id number] : 1960 Age:  60 years Gender: Female Order Date: 2022 2:05 PM Exam: XR CHEST PORTABLE Number of Images: 1 view Indication:   R pleural effusion plan thoracentesis 1. Perform xray immediately following thoracentesis 2. Do not d/c patient or send back to unit until CXR has been reviewed and read by radiologist. Temitope Schulteen: 2022 Findings:  There are bibasilar infiltrates. There is a right central venous catheter present tip is at the caval atrial junction. Cardiac margins are poorly seen. NG tube is present tip is in the gastric antrum. Status post right thoracentesis without pneumothorax or trapped lung     XR CHEST PORTABLE    Result Date: 12/13/2022  EXAMINATION: ONE XRAY VIEW OF THE CHEST 12/13/2022 8:58 am COMPARISON: 12/12/2022. HISTORY: ORDERING SYSTEM PROVIDED HISTORY: high o2 req TECHNOLOGIST PROVIDED HISTORY: Reason for exam:->high o2 req What reading provider will be dictating this exam?->CRC FINDINGS: Right IJ central catheter seen in position. EKG leads are seen superimposed over the chest. The cardiomediastinal silhouette is mildly enlarged. Prominence of the bronchovascular interstitial lung markings visualized in bilateral lung fields with obscuration of bilateral hemidiaphragms and costophrenic angles seen. Obliteration of bilateral costophrenic angles is seen with fluid levels. No evidence of pneumothorax is seen. The osseous structures are without acute process. Bilateral pleural effusions demonstrate prominence in comparison to the prior study. XR CHEST PORTABLE    Result Date: 12/12/2022  EXAMINATION: ONE XRAY VIEW OF THE CHEST12/12/2022 TECHNIQUE: Frontal view submitted COMPARISON: 12/9/2022 HISTORY: ORDERING SYSTEM PROVIDED HISTORY: increased O2 TECHNOLOGIST PROVIDED HISTORY: Reason for exam:->increased O2 What reading provider will be dictating this exam?->CRC FINDINGS: There are mild layering pleural fluid collections at both lung bases. There is cardiomegaly. Pulmonary vascularity is unremarkable. Feeding tube is in good position. Right-sided IJ catheter tip is in SVC. No pneumothorax. Opacification in both lung bases likely related pleural fluid however underlying consolidation/pneumonia cannot be excluded. Feeding tube is in good position.      CTA PULMONARY W CONTRAST    Result Date: 12/12/2022  EXAMINATION: CTA OF THE CHEST 12/12/2022 6:45 am TECHNIQUE: CTA of the chest was performed after the administration of intravenous contrast.  Multiplanar reformatted images are provided for review. 3D and MIP images are provided for review. Automated exposure control, iterative reconstruction, and/or weight based adjustment of the mA/kV was utilized to reduce the radiation dose to as low as reasonably achievable. COMPARISON: Chest x-ray 3 hours prior, CT lung screening low-dose chest 05/05/2022 HISTORY: ORDERING SYSTEM PROVIDED HISTORY: eval for PE TECHNOLOGIST PROVIDED HISTORY: Reason for exam:->eval for PE What reading provider will be dictating this exam?->CRC FINDINGS: Pulmonary Arteries: Pulmonary arteries are adequately opacified for evaluation. No evidence of intraluminal filling defect to suggest pulmonary embolism. Main pulmonary artery is normal in caliber. Mediastinum: No evidence of mediastinal lymphadenopathy. Borderline cardiomegaly without pericardial effusion there is no acute abnormality of the thoracic aorta. Lungs/pleura: No focal consolidative opacifications however moderate to large right and small to moderate left pleural effusions with adjacent atelectasis. Upper Abdomen: Visualized portions of the upper abdomen reveal perihepatic ascites and low attenuation throughout the liver concerning for potential congestive hepatopathy or hepatic parenchymal process with perihepatic ascites however no focal liver lesion on partial imaging Soft Tissues/Bones: No acute bone or soft tissue abnormality. No evidence of pulmonary embolism. Moderate to large right and small to moderate left pleural effusions with adjacent atelectasis in the setting of at least borderline cardiomegaly.  Visualized portions of the upper abdomen reveal perihepatic ascites and low attenuation throughout the liver concerning for potential congestive hepatopathy or hepatic parenchymal process with perihepatic ascites however no focal liver lesion on partial imaging     US DUP LOWER EXTREMITY RIGHT RICARDO    Result Date: 2022  Patient MRN:  [id number] : 1960 Age: 60 years Gender: Female Order Date:  2022 10:04 AM EXAM: US DUP LOWER EXTREMITY RIGHT RICARDO NUMBER OF IMAGES:  28 INDICATION: I82.451 Acute deep vein thrombosis (DVT) of right peroneal vein (HCC) DVT Right What reading provider will be dictating this exam?->MERCY There is evidence for deep venous thrombosis in the right peroneal vein, which is nonocclusive There is otherwise good compressibility, there is good augmentation, there is good color flow. There is evidence for deep venous thrombosis ALERT:  THIS IS AN ABNORMAL REPORT     XR CHEST ABDOMEN NG PLACEMENT    Result Date: 2022  EXAMINATION: ONE SUPINE XRAY VIEW(S) OF THE ABDOMEN 2022 5:17 pm COMPARISON: None. HISTORY: ORDERING SYSTEM PROVIDED HISTORY: Confirm NG tube placement TECHNOLOGIST PROVIDED HISTORY: Reason for exam:->Confirm NG tube placement What reading provider will be dictating this exam?->CRC FINDINGS: Study not intend to fully evaluate the chest or the abdomen. NG tube in the area the body of the stomach in good position. Air distension of small bowel segments are seen measuring up to 4.4 cm. NG tube in good position. IR GUIDED THORACENTESIS PLEURAL    Result Date: 2022  Patient MRN:  [id number] : 1960 Age: 60 years Gender: Female Order Date:  12/15/2022 2:30 PM EXAM: IR GUIDED THORACENTESIS PLEURAL NUMBER OF IMAGES:  2 INDICATION:  L sided thoracentesis L sided thoracentesis Which side should the procedure be performed? ->Left What reading provider will be dictating this exam?->MERCY After obtaining informed consent and after routine sterile prep and drape and after administration of a local anesthesia, a system of needles, and  cannulas was guided by ultrasound control into the pleural cavity. The needle was visualized directly under ultrasound while placing the needle within the pleural space. An image of the needle and positioned was stored in PACS. Time out occurred at 1459 . A total of 150 cc of colored fluid was removed. The patient tolerated the procedure well. There were no complications. The patient was released in stable condition. Successful thoracentesis. IR GUIDED THORACENTESIS PLEURAL    Result Date: 2022  Patient MRN:  [id number] : 1960 Age: 60 years Gender: Female Order Date:  2022 1:24 PM EXAM: IR GUIDED THORACENTESIS PLEURAL NUMBER OF IMAGES:  2 INDICATION:  R effusion, thoracentesis R effusion, thoracentesis Which side should the procedure be performed?->Right What reading provider will be dictating this exam?->[facility] After obtaining informed consent and after routine sterile prep and drape and after administration of a local anesthesia, a system of needles, and  cannulas was guided by ultrasound control into the pleural cavity. The needle was visualized directly under ultrasound while placing the needle within the pleural space. An image of the needle and positioned was stored in PACS. Time out occurred at [phone] . A total of 400 cc of colored fluid was removed. The patient tolerated the procedure well. There were no complications. The patient was released in stable condition. Successful thoracentesis. Assessment:     Acute hypoxic respiratory failure  COPD/emphysema  H/o RLE DVT  H/o SAH / coiling  H/o CVA    Plan:     Changed her bronchodilators to DuoNeb and Pulmicort  Added guaifenesin  Strongly recommended use of incentive spirometry and flutter valve  PT/OT/out of bed    Patient is almost 8 L positive since admission. Trial of Bumex/albumin. DVT PPX: Subcu heparin    Case was discussed with care team.    I reviewed the patients chart including labs and images with the patient.     My signature verifies the accuracy and relevance of my note, including documentation of information that has been copy pasted/forward, note templates, and Patrice German.     Keyla Waldrop MD MS  Pulmonary & [address] [place] [address]

## 2022-12-20 NOTE — PROGRESS NOTES
Consult placed via [employer].  Electronically signed by Destiny Torres RN on 12/20/22 at 1:57 PM EST

## 2022-12-21 ENCOUNTER — TELEPHONE (OUTPATIENT)
Dept: VASCULAR SURGERY | Age: 62
End: 2022-12-21

## 2022-12-21 ENCOUNTER — APPOINTMENT (OUTPATIENT)
Dept: GENERAL RADIOLOGY | Age: 62
DRG: 329 | End: 2022-12-21
Attending: SURGERY
Payer: MEDICARE

## 2022-12-21 VITALS
RESPIRATION RATE: 15 BRPM | HEIGHT: 65 IN | HEART RATE: 81 BPM | BODY MASS INDEX: 37.65 KG/M2 | DIASTOLIC BLOOD PRESSURE: 116 MMHG | WEIGHT: 226 LBS | TEMPERATURE: 98.5 F | SYSTOLIC BLOOD PRESSURE: 136 MMHG | OXYGEN SATURATION: 89 %

## 2022-12-21 LAB
ANION GAP SERPL CALCULATED.3IONS-SCNC: 13 MMOL/L (ref 7–16)
BASOPHILS ABSOLUTE: 0.03 E9/L (ref 0–0.2)
BASOPHILS RELATIVE PERCENT: 0.3 % (ref 0–2)
BUN BLDV-MCNC: 16 MG/DL (ref 6–23)
CALCIUM SERPL-MCNC: 8.9 MG/DL (ref 8.6–10.2)
CHLORIDE BLD-SCNC: 100 MMOL/L (ref 98–107)
CO2: 25 MMOL/L (ref 22–29)
CREAT SERPL-MCNC: 0.8 MG/DL (ref 0.5–1)
EOSINOPHILS ABSOLUTE: 0.15 E9/L (ref 0.05–0.5)
EOSINOPHILS RELATIVE PERCENT: 1.6 % (ref 0–6)
GFR SERPL CREATININE-BSD FRML MDRD: >60 ML/MIN/1.73
GLUCOSE BLD-MCNC: 116 MG/DL (ref 74–99)
HCT VFR BLD CALC: 29.9 % (ref 34–48)
HEMOGLOBIN: 9.7 G/DL (ref 11.5–15.5)
IMMATURE GRANULOCYTES #: 0.1 E9/L
IMMATURE GRANULOCYTES %: 1 % (ref 0–5)
LYMPHOCYTES ABSOLUTE: 1.2 E9/L (ref 1.5–4)
LYMPHOCYTES RELATIVE PERCENT: 12.4 % (ref 20–42)
MCH RBC QN AUTO: 29.8 PG (ref 26–35)
MCHC RBC AUTO-ENTMCNC: 32.4 % (ref 32–34.5)
MCV RBC AUTO: 91.7 FL (ref 80–99.9)
MONOCYTES ABSOLUTE: 0.64 E9/L (ref 0.1–0.95)
MONOCYTES RELATIVE PERCENT: 6.6 % (ref 2–12)
NEUTROPHILS ABSOLUTE: 7.55 E9/L (ref 1.8–7.3)
NEUTROPHILS RELATIVE PERCENT: 78.1 % (ref 43–80)
PDW BLD-RTO: 13.2 FL (ref 11.5–15)
PLATELET # BLD: 454 E9/L (ref 130–450)
PMV BLD AUTO: 9.5 FL (ref 7–12)
POTASSIUM REFLEX MAGNESIUM: 3.9 MMOL/L (ref 3.5–5)
RBC # BLD: 3.26 E12/L (ref 3.5–5.5)
SODIUM BLD-SCNC: 138 MMOL/L (ref 132–146)
WBC # BLD: 9.7 E9/L (ref 4.5–11.5)

## 2022-12-21 PROCEDURE — 97535 SELF CARE MNGMENT TRAINING: CPT

## 2022-12-21 PROCEDURE — 6370000000 HC RX 637 (ALT 250 FOR IP): Performed by: SURGERY

## 2022-12-21 PROCEDURE — 80048 BASIC METABOLIC PNL TOTAL CA: CPT

## 2022-12-21 PROCEDURE — 6370000000 HC RX 637 (ALT 250 FOR IP): Performed by: INTERNAL MEDICINE

## 2022-12-21 PROCEDURE — 6370000000 HC RX 637 (ALT 250 FOR IP): Performed by: STUDENT IN AN ORGANIZED HEALTH CARE EDUCATION/TRAINING PROGRAM

## 2022-12-21 PROCEDURE — 6370000000 HC RX 637 (ALT 250 FOR IP)

## 2022-12-21 PROCEDURE — 94640 AIRWAY INHALATION TREATMENT: CPT

## 2022-12-21 PROCEDURE — 6360000002 HC RX W HCPCS

## 2022-12-21 PROCEDURE — 36415 COLL VENOUS BLD VENIPUNCTURE: CPT

## 2022-12-21 PROCEDURE — 2700000000 HC OXYGEN THERAPY PER DAY

## 2022-12-21 PROCEDURE — 71045 X-RAY EXAM CHEST 1 VIEW: CPT

## 2022-12-21 PROCEDURE — 2580000003 HC RX 258

## 2022-12-21 PROCEDURE — 85025 COMPLETE CBC W/AUTO DIFF WBC: CPT

## 2022-12-21 RX ORDER — ONDANSETRON 4 MG/1
4 TABLET, ORALLY DISINTEGRATING ORAL EVERY 8 HOURS PRN
Qty: 20 TABLET | Refills: 0 | Status: SHIPPED | OUTPATIENT
Start: 2022-12-21

## 2022-12-21 RX ORDER — OXYCODONE HYDROCHLORIDE 5 MG/1
5 TABLET ORAL EVERY 4 HOURS PRN
Qty: 20 TABLET | Refills: 0 | Status: SHIPPED | OUTPATIENT
Start: 2022-12-21 | End: 2022-12-26

## 2022-12-21 RX ADMIN — DILTIAZEM HYDROCHLORIDE 180 MG: 180 CAPSULE, COATED, EXTENDED RELEASE ORAL at 09:25

## 2022-12-21 RX ADMIN — SODIUM CHLORIDE, PRESERVATIVE FREE 10 ML: 5 INJECTION INTRAVENOUS at 09:26

## 2022-12-21 RX ADMIN — IPRATROPIUM BROMIDE AND ALBUTEROL SULFATE 1 AMPULE: .5; 2.5 SOLUTION RESPIRATORY (INHALATION) at 09:25

## 2022-12-21 RX ADMIN — METOPROLOL SUCCINATE 100 MG: 100 TABLET, FILM COATED, EXTENDED RELEASE ORAL at 09:26

## 2022-12-21 RX ADMIN — DIGOXIN 125 MCG: 125 TABLET ORAL at 09:26

## 2022-12-21 RX ADMIN — GUAIFENESIN 400 MG: 400 TABLET ORAL at 09:26

## 2022-12-21 RX ADMIN — BUDESONIDE 500 MCG: 0.5 SUSPENSION RESPIRATORY (INHALATION) at 09:25

## 2022-12-21 RX ADMIN — GABAPENTIN 300 MG: 300 CAPSULE ORAL at 09:26

## 2022-12-21 RX ADMIN — IPRATROPIUM BROMIDE AND ALBUTEROL SULFATE 1 AMPULE: .5; 2.5 SOLUTION RESPIRATORY (INHALATION) at 13:12

## 2022-12-21 RX ADMIN — PANTOPRAZOLE SODIUM 40 MG: 40 TABLET, DELAYED RELEASE ORAL at 05:52

## 2022-12-21 RX ADMIN — HEPARIN SODIUM 5000 UNITS: 10000 INJECTION INTRAVENOUS; SUBCUTANEOUS at 05:51

## 2022-12-21 RX ADMIN — PHENAZOPYRIDINE 200 MG: 100 TABLET ORAL at 11:19

## 2022-12-21 RX ADMIN — PHENAZOPYRIDINE 200 MG: 100 TABLET ORAL at 09:25

## 2022-12-21 RX ADMIN — ACETAMINOPHEN 650 MG: 325 TABLET ORAL at 09:25

## 2022-12-21 ASSESSMENT — PAIN SCALES - GENERAL: PAINLEVEL_OUTOF10: 6

## 2022-12-21 NOTE — PROGRESS NOTES
[address]  [address] [place], [place]      EBPJ:                                                  Patient Name: Rocío Landon  MRN: [id number]  : 1960  Room: [address] [place] [address]    Evaluating OT: SUSAN Lynn, OTR/L  # [id number]    Referring Provider:  Michele Walker MD  Specific Provider Orders:  Berdine Shivers and Treat\"  22    Diagnosis: Personal history of colonic polyps [Z86.010]  Bowel perforation ([address] [K63.1]    Pt presented to this facility for a Colonoscopy, experienced complications -   :  Underwent urgent LAPAROTOMY EXPLORATORY, ILIOCECECTOMY  22:  developed new A Fib w/ RVR  22:  Became SOB and Hypoxic - Transferred to ICU  22:  Underwent Thoracentesis  12-15-22:  Underwent Thoracentesis  22: Tachycardic  Transferred to Step-Down Unit     Pertinent Medical History:  Pt has a past medical history of Acute deep vein thrombosis (DVT) of right peroneal vein ([address] Brain aneurysm, Cerebral artery occlusion with cerebral infarction [facility]), Degenerative arthritis of hand, Edentulous, Emphysema lung ([address] Emphysema of lung ([address] Headache, Hiatal hernia, Hip problem, Hx of abnormal cervical Pap smear, Hypertension, Stroke (cerebrum) ([address] and Subarachnoid bleed ([address] ,  has a past surgical history that includes Tonsillectomy; Brain aneurysm surgery; other surgical history; Upper gastrointestinal endoscopy (2019); Colonoscopy (2019); Upper gastrointestinal endoscopy (N/A, 2019); Colonoscopy (N/A, 2019); Total hip arthroplasty (Left, 2021); Total hip arthroplasty (Right, 01/10/2022); joint replacement; Colonoscopy w/ biopsies (2022); laparotomy (N/A, 2022); thoracentesis (Right, 2022); and Colonoscopy (N/A, 2022).     Surgeries this admission: Noted above     Precautions:  Fall Risk  Abdominal Precautions  2-4L O2    Assessment of current deficits   [x] Functional mobility  [x]ADLs  [x] Strength               []Cognition   [x] Functional transfers   [x] IADLs         [x] Safety Awareness   [x]Endurance   [] Fine Coordination              [x] Balance     [] Vision/perception   []Sensation    []Gross Motor Coordination  [] ROM  [] Delirium                  [] Motor Control       OT PLAN OF CARE   OT POC based on physician orders, patient diagnosis and results of clinical assessment    Frequency/Duration 1-3 days/wk for 2 weeks PRN   Specific OT Treatment to include:     * Instruction/training on adapted ADL techniques and AE recommendations to increase functional independence within precautions       * Training on energy conservation strategies, correct breathing pattern and techniques to improve independence/tolerance for self-care routine  * Functional transfer/mobility training/DME recommendations for increased independence, safety, and fall prevention  * Patient/Family education to increase follow through with safety techniques and functional independence  * Recommendation of environmental modifications for increased safety with functional transfers/mobility and ADLs  * Cognitive retraining/development of therapeutic activities to improve problem solving, judgement, memory, and attention for increased safety/participation in ADL/IADL tasks  * Therapeutic exercise to improve motor endurance, ROM, and functional strength for ADLs/functional transfers  * Therapeutic activities to facilitate/challenge dynamic balance, stand tolerance for increased safety and independence with ADLs  * Therapeutic activities to facilitate gross/fine motor skills for increased independence with ADLs  * Neuro-muscular re-education: facilitation of righting/equilibrium reactions  * Positioning to improve skin integrity, interaction with environment and functional independence  * Delirium prevention/treatment  * Manual techniques for edema management  Other:    Recommended Adaptive Equipment: TBD as pt progresses  - TUB BENCH      Home Living:  Pt lives with her Osker Chelsey, Earlene Douglass, and her 21 yr old Granddaughter in a 2-story house. Bed/bath/Laundry Facilities on the Main floor.  (+) Basement. Bathroom setup:  Tub-Shower, Standard-height Commode   Equipment owned:  W/C, [employer], [employer], [facility], Shower Chair, Reacher, [address] [employer], [employer]    Available Family Assist:  Family can assist PRN    Prior Level of Function:  Pt reported being IND w/ all ADLs, Light IADLs, Transfers and Mobility using SPC PRN for ambulation. Driving:  No  Occupation:  None reported    Pain Level:  2-3/10 abdominal pain - mild Relief w/ Rest and Repositioning, Nsg Notified   Additional Complaints:  Mild SOB, Hypoxia    Cognition: A & O x 4   Able to Follow Multi-Step Commands INDly   Memory:  good    Sequencing:  good    Problem solving:  good    Judgement/safety:  good   Additional Comments:  Pt was pleasant and cooperative.       Vitals/Lab Values:    O2 sats w/ 2L static sitting EOB = 96%  O2 sats w/ 2L static standing = 90%  O2 sats w/ 3L for ambulation - 85% after ~ 20', 89% after 1 min of PLB - increased O2 to 4L  O2 sats w/ 4L for ambulation - 90% after 20'   O2 sats seated on 2L - 91%        RN Made aware of O2 requirements       Functional Assessment:  AM-PAC Daily Activity Raw Score: 15/24     Initial Eval Status  Date: 12-19-22   Treatment Status  Date:  12/21/22 STGs = LTGs  Time frame: 10-14 days   Feeding IND    Seated in chair   IND    Seated in chair NA   Grooming SUP/Set up    Able to complete tasks after set up  Seated in chair    Unable to tolerate ambulating to or standing at sink   SUP/Set up    Able to complete tasks after set up seated in chair    Unable to tolerate standing for ax after ambulating to bathroom d/t SOB/Hypoxia   Mod I  Seated/Standing at the [address] [place] [address] Ln A/Set up    Shiloh gannon seated in chair   NT Mod I     LB Dressing Mod A/Set up    Min A to don socks w/ cross-legged tech, Mod A - pants - simulated in sitting/standing    Pt ed for safe/adaptive techs, use of adaptive equip - has all AE - to resume use at d/c   NT Mod I     Bathing NT    Pt ed re: Benefits of use of Tub Bench for improved safety w/ Tub Transfer and for MILTON, resources for obtaining equip   NT Mod I      Toileting Max A    Able to perform hygiene, Min A for simulated clothing adjustment,   Close SUP for safety w/ standing balance  Pt ed re: Benefits of use of BSC   Min A     Mod I     Bed Mobility  Supine to sit: NT   Sit to supine:  NT     Pt seated in chair   Supine to sit: SUP  Sit to supine:  NT     VCs for safety Supine to sit: IND  Sit to supine: IND     Functional Transfers Min A    Standing from Low Chair 2x w/ rest breaks b/t trials  Pt ed for safety/hand placement   Close SUP    Standing from Low Chair 1x, BSC 1x, EOB 2x w/ rest breaks b/t trials  Pt ed for safety/hand placement Mod I     Functional Mobility Min A w/ WW    Very short distance at b/s d/t hypoxia  Pt ed for safety/improved safety awareness, walker safety   Close SUP w/ [employer]    Household distances in room, short distances at b/s w/ [employer]  VCs for safety, PLB Mod I     Balance Sitting:     Static:  IND in chair    Dynamic:  Remote SUP w/ functional ax in chair     Standing:     Static:  Min A w/ [employer]    Dynamic:  Min A w/ functional ax/mobility w/ [employer]   Sitting:     Static:  IND in chair    Dynamic:  Remote SUP w/ functional ax EOB    Standing:     Static:  Close SUP w/ [employer]    Dynamic:  Close SUP w/ functional ax/mobility w/ [employer] Sitting:     Static:  IND    Dynamic:  IND w/ functional ax    Standing:     Static:  Mod I w/ AD PRN    Dynamic:  Mod I w/ functional ax/mobility w/ AD PRN   Activity Tolerance Fair    Limited by Pain and Hypoxia   Fair(+) Good(-)   Visual/  Perceptual    Hearing:  WNL   Glasses: yes    WFL   Hearing Aids:  no               Hand Dominance: Right   AROM Strength Additional Info:    RUE  WFL WFL - NT d/t pain, Recent procedure Good ;   Good FMC/dexterity noted during ADL tasks     LUE WFL WFL - NT Good ;    Good FMC/dexterity noted during ADL tasks       Sensation:  Denies numbness or tingling Tim UEs   Tone: WFL Tim UEs   Edema: None Noted Tim UEs     Comments: Upon arrival, patient was found in semi-supine. She was agreeable to participate in therapeutic ax. No Family present during session. Received permission from RN prior to engaging pt in OT services. Educated pt on role of OT services. At the end of the session, patient was properly positioned in b/s chair. Call light and phone within reach, all lines and tubes intact. Oriented pt to call bell. Made all appropriate Environmental Modifications to facilitate pt's level of IND and safety. All needs met. Reported pt's performance and current position to RN         Overall patient demonstrated decreased independence and safety during completion of ADL/functional transfer/mobility tasks. Pt would benefit from continued skilled OT to increase safety and independence with completion of ADL/IADL tasks for functional independence and quality of life.     Treatment: OT treatment provided this date includes:   Instruction/training on safety and adapted techniques for completion of ADLs, use of DME/AD/Adaptive equip:    Instruction/training on safe functional mobility/transfer techniques, use of DME/AD: Tub Bench   Instruction/training on energy conservation techs (EC)/Pursed-Lip Breathing (PLB)/work simplification for completion of ADLs:     Neuromuscular Reeducation to facilitate balance/righting reactions for increased function with ADLs:    Skilled positioning/alignment for Pain Mgmt, to maximize Pt's safety and ability to safely and INDly interact w/ his/her environment, maximize respiratory status  Activity tolerance - Sitting/Standing to improve endurance w/ functional ax   Cognitive retraining -  Cues for safety/safety awareness, sequencing, problem solving    Skilled monitoring of Vitals during session and pt's response to tx ax      Pt/family ed re: benefits of participate in post-acute therapy program - Sub-acute Rehab vs HH Therapy     Consulted RN    Made all appropriate Environmental Modifications to facilitate pt's level of IND and safety. Recommendations for Continued Participation in OT services during Hospitalization and at D/C - [facility] OT     Pt and/or Family verbalized/demonstrated a Good understanding of education provided. Will Review PRN.         Time In: 1143  Time Out: 1216  Total Treatment Time: 33 minutes    Min Units   OT Eval Low [id number]       OT Eval Medium [id number]      OT Eval High [id number]      OT Re-Eval [id number]       Therapeutic Ex [phone]       Therapeutic Activities [id number]       ADL/Self Care [id number]  33  2   Orthotic Management [id number]       Manual [id number]     Neuro Re-Ed 47572       Non-Billable Time            SUSAN Goodwin, OTR/L  # [id number]

## 2022-12-21 NOTE — PROGRESS NOTES
CLINICAL PHARMACY NOTE: MEDS TO BEDS    Total # of Prescriptions Filled: 3   The following medications were delivered to the patient:  ANORO ELIPTA 62.5/25 MCG   OXYCODONE HCL 5 MG   ZOFRAN ODT 4 MG     Additional Documentation:

## 2022-12-21 NOTE — PROGRESS NOTES
[place]  Department of Internal Medicine  Division of Pulmonary, Critical Care and Sleep Medicine  Consult Note    Emani Oreilly MD, MS    Patient: Earl Altman  MRN: [id number]  : 1960    Encounter Time: 2:40 PM     Date of Admission: 2022 10:18 AM    Primary Care Physician: Ibis Hernandez MD    Reason for Consultation: Respiratory failure    Subjective:    Patient seen and examined on 2022    Patient is sitting up by her bed. Getting ready for physical therapy. Reports improvement in shortness of breath however states that she is anxious. Currently saturating 90-91% on room air however she desaturates to 80s with exertion/ambulation. HISTORY OF PRESENT ILLNESS : Earl Altman 60 y.o. female was seen in consultation regarding the above chief compliant. History of pulmonary emphysema, former smoker, DVT, spontaneous subarachnoid hemorrhage s/p coiling, CVA with right hemiparesis, bilateral hip replacement. Admitted initially on  for routine colonoscopy, complicated by perforation s/p ex lap/ileocecectomy. Prolonged hospitalization with atrial flutter, respiratory failure, bilateral pleural effusions s/p bilateral thoracentesis. No pleural fluid studies were sent. Patient has persistent hypoxia. She states that she currently has no shortness of breath however she just started working with physical therapy. She had significant desaturation with ambulation, currently on 3 L oxygen supplementation. At home she is supposed to be on Stiolto however patient states that she stopped using this due to worsening cough.     PAST MEDICAL HISTORY:  has a past medical history of Acute deep vein thrombosis (DVT) of right peroneal vein ([facility] [address] Brain aneurysm, Cerebral artery occlusion with cerebral infarction [facility]), Degenerative arthritis of hand, Edentulous, Emphysema lung ([address] Emphysema of lung ([address] Headache, Hiatal hernia, Hip problem, Hx of abnormal cervical Pap smear, Hypertension, Stroke (cerebrum) ([facility] [address] and Subarachnoid bleed ([facility] [address] SURGICAL HISTORY:  has a past surgical history that includes Tonsillectomy; Brain aneurysm surgery; other surgical history; Upper gastrointestinal endoscopy (08/30/2019); Colonoscopy (08/30/2019); Upper gastrointestinal endoscopy (N/A, 08/30/2019); Colonoscopy (N/A, 08/30/2019); Total hip arthroplasty (Left, 02/22/2021); Total hip arthroplasty (Right, 01/10/2022); joint replacement; Colonoscopy w/ biopsies (12/09/2022); laparotomy (N/A, 12/09/2022); thoracentesis (Right, 12/13/2022); and Colonoscopy (N/A, 12/9/2022). SOCIAL HISTORY:  reports that she quit smoking about 4 years ago. Her smoking use included cigarettes. She has a 64.50 pack-year smoking history. She has never used smokeless tobacco. She reports that she does not drink alcohol and does not use drugs. FAMILY  HISTORY: family history includes Arthritis in her father and mother; Atrial Fibrillation in her father and mother; Cancer in her sister; Diabetes in her father and mother; Emphysema in her father. MEDICATIONS:    Prior to Admission medications    Medication Sig Start Date End Date Taking? Authorizing Provider   ondansetron (ZOFRAN-ODT) 4 MG disintegrating tablet Take 1 tablet by mouth every 8 hours as needed for Nausea or Vomiting 12/21/22  Yes Michael Gonzalez,    oxyCODONE (ROXICODONE) 5 MG immediate release tablet Take 1 tablet by mouth every 4 hours as needed for Pain for up to 5 days.  Max Daily Amount: 30 mg 12/21/22 12/26/22 Yes Jean Pierre Camarena DO   Umeclidinium-Vilanterol 62.5-25 MCG/ACT AEPB Inhale 1 puff into the lungs daily 12/21/22  Yes Adilene Wells MD   folic acid (FOLVITE) 1 MG tablet Take 1 tablet by mouth daily  Patient not taking: Reported on 12/9/2022 12/8/22   Marichuy Hughes MD   pyridoxine (RA VITAMIN B-6) 50 MG tablet Take 1 tablet by mouth daily  Patient not taking: Reported on 12/9/2022 12/8/22   Alysha Beauchamp MD   cyanocobalamin ([facility] VITAMIN B12) 1000 MCG tablet Take 1 tablet by mouth daily  Patient not taking: Reported on 12/9/2022 12/8/22   Alysha Beauchamp MD   acetaminophen-codeine (TYLENOL #4) 300-60 MG per tablet Take 1 tablet by mouth daily as needed for Pain for up to 30 days. 12/9/22 1/8/23  Georgette Carrel, PA   fluconazole (DIFLUCAN) 150 MG tablet 1 tab po x 1 dose, may repeat in 72 hrs if still symptomatic. Patient not taking: Reported on 12/9/2022 12/5/22   LYLY Orr - CNP   albuterol sulfate HFA (PROVENTIL;VENTOLIN;PROAIR) 108 (90 Base) MCG/ACT inhaler INHALE TWO PUFFS BY MOUTH EVERY 6 HOURS AS NEEDED FOR WHEEZING. 12/1/22   Nahed Patrick MD   psyllium (METAMUCIL SMOOTH TEXTURE) 28.3 % POWD powder Take 3.4 g by mouth daily 10/6/22   Nahed Patrick MD   [facility] [place] Devices (WRIST BRACE) MISC Firm neutral position left wrist brace  Size to fit  Dx:  Wrist pain/carpal tunnel syndrome 10/6/22   Nahed Patrick MD   amLODIPine (NORVASC) 5 MG tablet TAKE ONE TABLET BY MOUTH EVERY DAY 8/5/22   Nahed Patrick MD   chlorthalidone (HYGROTON) 25 MG tablet Take 1 tablet by mouth in the morning. 8/5/22   Nahed Patrick MD   fluticasone (FLONASE) 50 MCG/ACT nasal spray 2 sprays by Each Nostril route in the morning. 8/5/22   Nahed Patrick MD   gabapentin (NEURONTIN) 300 MG capsule TAKE ONE CAPSULE BY MOUTH THREE TIMES A DAY 8/5/22 12/9/22  Nahed Patrick MD   losartan (COZAAR) 100 MG tablet Take 1 tablet by mouth in the morning. 8/5/22   Nahed Patrick MD   pantoprazole (PROTONIX) 40 MG tablet TAKE ONE TABLET BY MOUTH EVERY DAY 8/5/22   Nahed Patrick MD   potassium chloride (KLOR-CON M) 10 MEQ extended release tablet TAKE ONE TABLET BY MOUTH DAILY WITH BREAKFAST 8/5/22   Nahed Patrick MD   metoprolol tartrate (LOPRESSOR) 25 MG tablet Take 1.5 tablets by mouth in the morning and 1.5 tablets before bedtime.  8/5/22 12/9/22  Nahed Patrick MD   polyethylene glycol Gastrointestinal: Soft, Non-tender, Non-distended, Normal bowel sounds. No organomegaly. 7. Neurologic: Awake & Alert, Cranial nerves 2-12 grossly intact, No focal motor or sensory deficits. 8. Skin: no rashes, breakdown  9. Musculoskeletal: no LE edema, no joint effusion. 10. Psychiatric - No Anxiety, Depression    Medications Prior to Admission: folic acid (FOLVITE) 1 MG tablet, Take 1 tablet by mouth daily (Patient not taking: Reported on 2022)  pyridoxine (RA VITAMIN B-6) 50 MG tablet, Take 1 tablet by mouth daily (Patient not taking: Reported on 2022)  cyanocobalamin ([facility] VITAMIN B12) 1000 MCG tablet, Take 1 tablet by mouth daily (Patient not taking: Reported on 2022)  acetaminophen-codeine (TYLENOL #4) 300-60 MG per tablet, Take 1 tablet by mouth daily as needed for Pain for up to 30 days. fluconazole (DIFLUCAN) 150 MG tablet, 1 tab po x 1 dose, may repeat in 72 hrs if still symptomatic. (Patient not taking: Reported on 2022)  [] nitrofurantoin, macrocrystal-monohydrate, (MACROBID) 100 MG capsule, Take 1 capsule by mouth 2 times daily for 5 days  albuterol sulfate HFA (PROVENTIL;VENTOLIN;PROAIR) 108 (90 Base) MCG/ACT inhaler, INHALE TWO PUFFS BY MOUTH EVERY 6 HOURS AS NEEDED FOR WHEEZING. [DISCONTINUED] tiotropium-olodaterol (STIOLTO RESPIMAT) 2.5-2.5 MCG/ACT AERS, Inhale 2 puffs into the lungs daily (Patient not taking: Reported on 2022)  psyllium (METAMUCIL SMOOTH TEXTURE) 28.3 % POWD powder, Take 3.4 g by mouth daily  Misc. Devices (WRIST BRACE) MISC, Firm neutral position left wrist brace Size to fit Dx:  Wrist pain/carpal tunnel syndrome  amLODIPine (NORVASC) 5 MG tablet, TAKE ONE TABLET BY MOUTH EVERY DAY  chlorthalidone (HYGROTON) 25 MG tablet, Take 1 tablet by mouth in the morning.   fluticasone (FLONASE) 50 MCG/ACT nasal spray, 2 sprays by Each Nostril route in the morning.  gabapentin (NEURONTIN) 300 MG capsule, TAKE ONE CAPSULE BY MOUTH THREE TIMES A DAY  losartan (COZAAR) 100 MG tablet, Take 1 tablet by mouth in the morning. pantoprazole (PROTONIX) 40 MG tablet, TAKE ONE TABLET BY MOUTH EVERY DAY  potassium chloride (KLOR-CON M) 10 MEQ extended release tablet, TAKE ONE TABLET BY MOUTH DAILY WITH BREAKFAST  metoprolol tartrate (LOPRESSOR) 25 MG tablet, Take 1.5 tablets by mouth in the morning and 1.5 tablets before bedtime. polyethylene glycol (GLYCOLAX) 17 g packet, DISSOLVE 1 PACKET (17 GRAMS) IN LIQUID AND TAKE BY MOUTH DAILY  valACYclovir (VALTREX) 1 g tablet, Take 1 tablet by mouth daily For 5 days as needed for Herpes outbreak (Patient not taking: Reported on 12/9/2022)  Multiple Vitamins-Minerals (THERAPEUTIC MULTIVITAMIN-MINERALS) tablet, Take 1 tablet by mouth daily  diclofenac sodium (VOLTAREN) 1 % GEL, APPLY ONE GRAM EXTERNALLY TWICE A DAY TO NECK AND ONE GRAM EXTERNALLY TWICE A DAY TO BACK  (Patient not taking: Reported on 12/9/2022)  Misc.  Devices (ROLLATOR) MISC, 1 each by Does not apply route daily as needed (use as needed for stability)    Current Facility-Administered Medications:     phenazopyridine (PYRIDIUM) tablet 200 mg, 200 mg, Oral, TID WC, Ida David MD, 200 mg at 12/21/22 1119    pantoprazole (PROTONIX) tablet 40 mg, 40 mg, Oral, QAM AC, Chan Elias MD, 40 mg at 12/21/22 0552    budesonide (PULMICORT) nebulizer suspension 500 mcg, 0.5 mg, Nebulization, BID, Jatinder Curtis MD, 500 mcg at 12/21/22 5757    guaiFENesin tablet 400 mg, 400 mg, Oral, TID, Jatinder Curtis MD, 400 mg at 12/21/22 2730    ipratropium-albuterol (DUONEB) nebulizer solution 1 ampule, 1 ampule, Inhalation, 4x daily, Jatinder Curtis MD, 1 ampule at 12/21/22 1312    metoprolol succinate (TOPROL XL) extended release tablet 100 mg, 100 mg, Oral, BID, Jatinder Curtis MD, 100 mg at 12/21/22 0926    dilTIAZem (CARDIZEM CD) extended release capsule 180 mg, 180 mg, Oral, Daily, Osman Nguyen DO, 180 mg at 12/21/22 8680    white petrolatum ointment, , Topical, PRN, [facility] Eleuterio Sandy MD    oxyCODONE (ROXICODONE) immediate release tablet 5 mg, 5 mg, Oral, Q4H PRN **OR** oxyCODONE HCl (OXY-IR) immediate release tablet 10 mg, 10 mg, Oral, Q4H PRN, Michele Walker MD, 10 mg at 12/20/22 0925    digoxin (LANOXIN) tablet 125 mcg, 125 mcg, Oral, Daily, Michele Walker MD, 125 mcg at 12/21/22 0926    perflutren lipid microspheres (DEFINITY) injection 1.5 mL, 1.5 mL, IntraVENous, ONCE PRN, Michele Walker MD    labetalol (NORMODYNE;TRANDATE) injection 10 mg, 10 mg, IntraVENous, Q30 Min PRN, Michele Walker MD, 10 mg at 12/14/22 0644    hydrALAZINE (APRESOLINE) injection 10 mg, 10 mg, IntraVENous, Q6H PRN, Michele Walker MD    sodium chloride flush 0.9 % injection 10 mL, 10 mL, IntraVENous, PRN, Michele Walker MD    sodium chloride flush 0.9 % injection 5-40 mL, 5-40 mL, IntraVENous, 2 times per day, Michele Walker MD, 10 mL at 12/21/22 0926    sodium chloride flush 0.9 % injection 5-40 mL, 5-40 mL, IntraVENous, PRN, Michele Walker MD    albuterol (PROVENTIL) nebulizer solution 2.5 mg, 2.5 mg, Nebulization, Q4H PRN, Michele Walker MD    [Held by provider] chlorthalidone (HYGROTON) tablet 25 mg, 25 mg, Oral, Daily, Michele Walker MD, 25 mg at 12/10/22 0920    gabapentin (NEURONTIN) capsule 300 mg, 300 mg, Oral, TID, Michele Walker MD, 300 mg at 12/21/22 0926    [Held by provider] losartan (COZAAR) tablet 100 mg, 100 mg, Oral, Daily, Adilene Kwong MD, 100 mg at 12/10/22 0920    0.9 % sodium chloride infusion, , IntraVENous, PRN, Michele Walker MD, Stopped at 12/13/22 1832    ondansetron (ZOFRAN-ODT) disintegrating tablet 4 mg, 4 mg, Oral, Q8H PRN **OR** ondansetron (ZOFRAN) injection 4 mg, 4 mg, IntraVENous, Q6H PRN, Michele Walker MD, 4 mg at 12/13/22 1452    acetaminophen (TYLENOL) tablet 650 mg, 650 mg, Oral, Q6H, Michele Walker MD, 650 mg at 12/21/22 0931    heparin (porcine) injection 5,000 Units, 5,000 Units, SubCUTAneous, 3 times per day, Luci HIGUERA Tauna Dancer, MD, 5,000 Units at 12/21/22 0551    DATA: IMAGING & TESTING:     LABORATORY TESTS:  CBC:   Lab Results   Component Value Date    WBC 9.7 12/21/2022    HGB 9.7 (L) 12/21/2022    HCT 29.9 (L) 12/21/2022    MCV 91.7 12/21/2022     (H) 12/21/2022     BMP: [unfilled]  LFTs:   Lab Results   Component Value Date    ALT 40 (H) 12/19/2022    AST 30 12/19/2022    ALKPHOS 66 12/19/2022    BILITOT 0.3 12/19/2022    PROT 6.2 (L) 12/19/2022     Coags:   Lab Results   Component Value Date    INR 1.1 12/15/2022     Micro: [unfilled]    PRO-BNP: No results found for: PROBNP   ABGs:   Lab Results   Component Value Date/Time    PH 7.554 12/12/2022 08:24 AM    PO2 53.0 12/12/2022 08:24 AM    PCO2 33.1 12/12/2022 08:24 AM     Hemoglobin A1C: No components found for: HGBA1C    RADIOLOGY:  Imaging tests were completed and reviewed and discussed radiology and care team involved and reveals     XR ACUTE ABD SERIES CHEST 1 VW    Result Date: 12/9/2022  EXAMINATION: TWO XRAY VIEWS OF THE ABDOMEN AND SINGLE  XRAY VIEW OF THE CHEST 12/9/2022 1:29 pm COMPARISON: 01/04/2022 HISTORY: ORDERING SYSTEM PROVIDED HISTORY: post surgery TECHNOLOGIST PROVIDED HISTORY: hypoxia Reason for exam:->post surgery What reading provider will be dictating this exam?->CRC FINDINGS: There is a large amount of pneumoperitoneum likely related to recent laparoscopic surgery. There is subsegmental basilar atelectasis with some increased density in the peripheral left lower lobe which may represent atelectasis or pneumonia. No pneumothorax detected. Heart appears normal in size. Osseous structures are unchanged. 1.  Moderate to severe pneumoperitoneum possibly related to recent laparoscopic surgery. This would be a normal finding if surgery was completed immediately prior to imaging, otherwise bowel perforation should be excluded, CT abdomen and pelvis recommended. 2.  Atelectasis and possible pneumonia in the peripheral left lower lobe. 3.  No evidence of pneumothorax or pneumomediastinum. RECOMMENDATION: Consider CT abdomen and pelvis with contrast if the abdominal surgery was not performed immediately prior to imaging. CT ABDOMEN PELVIS W IV CONTRAST Additional Contrast? None    Result Date: 12/12/2022  EXAMINATION: CT OF THE ABDOMEN AND PELVIS WITH CONTRAST 12/11/2022 11:47 pm TECHNIQUE: CT of the abdomen and pelvis was performed with the administration of intravenous contrast. Multiplanar reformatted images are provided for review. Automated exposure control, iterative reconstruction, and/or weight based adjustment of the mA/kV was utilized to reduce the radiation dose to as low as reasonably achievable. COMPARISON: Supine abdomen, 12/11/2022 HISTORY: ORDERING SYSTEM PROVIDED HISTORY: poss early anastomotic leak TECHNOLOGIST PROVIDED HISTORY: Additional Contrast?->None Reason for exam:->poss early anastomotic leak What reading provider will be dictating this exam?->CRC FINDINGS: Lower Chest: There are small bilateral pleural effusions with posterior bilateral lower lobe compressive atelectasis and collapse. Liver: The liver is enlarged measuring approximately 23 cm. .  No infiltrative process or space-occupying lesion. Gallbladder/biliary tree: No gallstones, gallbladder wall thickening, or pericholecystic fluid. No significant intrahepatic or extrahepatic biliary dilatation. Spleen: Mild splenomegaly (13.6 cm). Pancreas: No significant findings. Adrenal glands: There is 8 mm low attenuation adenoma in the left adrenal gland. Kidneys: Normal anatomic position. There is 2.0 cm simple cyst at the lower pole of right kidney. No significant genitourinary tract calculi or hydronephrosis. GI/Bowel: A nasogastric tube extends into the gastric lumen. There is presumed partial resection of the ascending colon with ileocolic anastomosis.  There is moderate diffuse dilated and fluid-filled small bowel, with most prominent dilatation in the proximal-mid small bowel and slightly less dilated distal small bowel ileal loops. Constellation of findings may have association with an adynamic ileus. There is moderate sigmoid diverticulosis. No evidence for diverticulitis. Appendix: Poorly localized; presumed resected. Pelvis: The visualized uterus and ovaries are within normal limits for patient's age. There is a Sams balloon catheter in the urinary bladder. Peritoneum/Retroperitoneum: There is advanced aortoiliac atherosclerotic disease. The IVC is within normal limits. Mild perihepatic ascites. There is trace free fluid in the distal right pericolic gutter near region of anastomosis. There is also trace free fluid in the distal left pericolic gutter. Bones/Soft Tissues: The bones are osteopenic. There is mild-moderate thoracolumbar spine degenerative change and scoliosis. There are bilateral total hip arthroplasties. .     Presumed ileocolic anastomosis with trace free fluid in the adjacent distal right pericolic gutter. Mild free fluid in perihepatic and distal left pericolic gutter spaces. No convincing evidence for a bowel leak. Small bilateral pleural effusions with associated posterior bilateral lower lobe compressive atelectasis/collapse. Right renal simple cyst. Hepatosplenomegaly. XR CHEST PORTABLE    Result Date: 12/16/2022  EXAMINATION: ONE XRAY VIEW OF THE CHEST 12/16/2022 5:15 am COMPARISON: 12/15/2022 HISTORY: ORDERING SYSTEM PROVIDED HISTORY: increased O2 TECHNOLOGIST PROVIDED HISTORY: Reason for exam:->increased O2 What reading provider will be dictating this exam?->CRC FINDINGS: Right IJ central line visualized with catheter tip in the SVC. EKG leads are seen superimposed over the chest. The cardiomediastinal silhouette is moderately enlarged demonstrating no significant change in comparison to the prior study. . Prominence of the bronchovascular and interstitial lung markings is visualized in bilateral lung fields with patchy airspace opacification visualized overlying bilateral costophrenic angle. Linear subsegmental atelectatic streaks are seen. Peribronchial cuffing bilateral hilar prominence is seen. Blunting of bilateral costophrenic angles suggestive of small bilateral pleural effusions that demonstrates subtle increase in comparison to the prior study. Degenerative bone changes seen. Congestive changes, slightly increased opacification in the right lung suggestive of the increased pleural fluid. XR CHEST PORTABLE    Result Date: 12/15/2022  Patient MRN: [id number] : 1960 Age:  60 years Gender: Female Order Date: 12/15/2022 3:10 PM Exam: XR CHEST PORTABLE Number of Images: 1 view Indication:   Post thoracentesis Post thoracentesis What reading provider will be dictating this exam?->[facility] Comparison: 2022 Findings: There is a right central venous catheter tip the tip is in the superior vena cava The heart is enlarged. There are bilateral pleural effusions with associated infiltrates There is no evidence for pneumothorax status post left thoracentesis. There is proved aeration of the left lung. Status post left thoracentesis without evidence for pneumothorax or trapped lung     XR CHEST PORTABLE    Result Date: 2022  EXAMINATION: ONE XRAY VIEW OF THE CHEST 2022 6:23 am COMPARISON: 2022 HISTORY: ORDERING SYSTEM PROVIDED HISTORY: s/p cathy TECHNOLOGIST PROVIDED HISTORY: Reason for exam:->s/p thora What reading provider will be dictating this exam?->CRC FINDINGS: Right IJ central line visualized with tip in the SVC. Moderate cardiomegaly is seen the demonstrates no significant change in comparison to the prior study. Prominence of the bronchovascular interstitial lung markings visualized in bilateral lung fields, haziness overlying the left hemithorax demonstrates prominence in comparison to the prior study. Mild blunting of the right costophrenic angle is seen.   No evidence of pneumothorax is seen. Mild degenerative joint changes seen. Slightly increased airspace opacification in the left hemithorax in comparison to the prior study. No evidence of pneumothorax is seen. XR CHEST PORTABLE    Result Date: 2022  Patient MRN: [id number] : 1960 Age:  60 years Gender: Female Order Date: 2022 2:05 PM Exam: XR CHEST PORTABLE Number of Images: 1 view Indication:   R pleural effusion plan thoracentesis 1. Perform xray immediately following thoracentesis 2. Do not d/c patient or send back to unit until CXR has been reviewed and read by radiologist. Lien Balaji: 2022 Findings: There are bibasilar infiltrates. There is a right central venous catheter present tip is at the caval atrial junction. Cardiac margins are poorly seen. NG tube is present tip is in the gastric antrum. Status post right thoracentesis without pneumothorax or trapped lung     XR CHEST PORTABLE    Result Date: 2022  EXAMINATION: ONE XRAY VIEW OF THE CHEST 2022 8:58 am COMPARISON: 2022. HISTORY: ORDERING SYSTEM PROVIDED HISTORY: high o2 req TECHNOLOGIST PROVIDED HISTORY: Reason for exam:->high o2 req What reading provider will be dictating this exam?->CRC FINDINGS: Right IJ central catheter seen in position. EKG leads are seen superimposed over the chest. The cardiomediastinal silhouette is mildly enlarged. Prominence of the bronchovascular interstitial lung markings visualized in bilateral lung fields with obscuration of bilateral hemidiaphragms and costophrenic angles seen. Obliteration of bilateral costophrenic angles is seen with fluid levels. No evidence of pneumothorax is seen. The osseous structures are without acute process. Bilateral pleural effusions demonstrate prominence in comparison to the prior study.      XR CHEST PORTABLE    Result Date: 2022  EXAMINATION: ONE XRAY VIEW OF THE CHEST2022 TECHNIQUE: Frontal view submitted COMPARISON: 2022 HISTORY: ORDERING SYSTEM PROVIDED HISTORY: increased O2 TECHNOLOGIST PROVIDED HISTORY: Reason for exam:->increased O2 What reading provider will be dictating this exam?->CRC FINDINGS: There are mild layering pleural fluid collections at both lung bases. There is cardiomegaly. Pulmonary vascularity is unremarkable. Feeding tube is in good position. Right-sided IJ catheter tip is in SVC. No pneumothorax. Opacification in both lung bases likely related pleural fluid however underlying consolidation/pneumonia cannot be excluded. Feeding tube is in good position. CTA PULMONARY W CONTRAST    Result Date: 12/12/2022  EXAMINATION: CTA OF THE CHEST 12/12/2022 6:45 am TECHNIQUE: CTA of the chest was performed after the administration of intravenous contrast.  Multiplanar reformatted images are provided for review. 3D and MIP images are provided for review. Automated exposure control, iterative reconstruction, and/or weight based adjustment of the mA/kV was utilized to reduce the radiation dose to as low as reasonably achievable. COMPARISON: Chest x-ray 3 hours prior, CT lung screening low-dose chest 05/05/2022 HISTORY: ORDERING SYSTEM PROVIDED HISTORY: eval for PE TECHNOLOGIST PROVIDED HISTORY: Reason for exam:->eval for PE What reading provider will be dictating this exam?->CRC FINDINGS: Pulmonary Arteries: Pulmonary arteries are adequately opacified for evaluation. No evidence of intraluminal filling defect to suggest pulmonary embolism. Main pulmonary artery is normal in caliber. Mediastinum: No evidence of mediastinal lymphadenopathy. Borderline cardiomegaly without pericardial effusion there is no acute abnormality of the thoracic aorta. Lungs/pleura: No focal consolidative opacifications however moderate to large right and small to moderate left pleural effusions with adjacent atelectasis.  Upper Abdomen: Visualized portions of the upper abdomen reveal perihepatic ascites and low attenuation throughout the liver concerning for potential congestive hepatopathy or hepatic parenchymal process with perihepatic ascites however no focal liver lesion on partial imaging Soft Tissues/Bones: No acute bone or soft tissue abnormality. No evidence of pulmonary embolism. Moderate to large right and small to moderate left pleural effusions with adjacent atelectasis in the setting of at least borderline cardiomegaly. Visualized portions of the upper abdomen reveal perihepatic ascites and low attenuation throughout the liver concerning for potential congestive hepatopathy or hepatic parenchymal process with perihepatic ascites however no focal liver lesion on partial imaging     US DUP LOWER EXTREMITY RIGHT RICARDO    Result Date: 2022  Patient MRN:  [id number] : 1960 Age: 60 years Gender: Female Order Date:  2022 10:04 AM EXAM: US DUP LOWER EXTREMITY RIGHT RICARDO NUMBER OF IMAGES:  35 INDICATION: I82.451 Acute deep vein thrombosis (DVT) of right peroneal vein (HCC) DVT Right What reading provider will be dictating this exam?->[facility] There is evidence for deep venous thrombosis in the right peroneal vein, which is nonocclusive There is otherwise good compressibility, there is good augmentation, there is good color flow. There is evidence for deep venous thrombosis ALERT:  THIS IS AN ABNORMAL REPORT     XR CHEST ABDOMEN NG PLACEMENT    Result Date: 2022  EXAMINATION: ONE SUPINE XRAY VIEW(S) OF THE ABDOMEN 2022 5:17 pm COMPARISON: None. HISTORY: ORDERING SYSTEM PROVIDED HISTORY: Confirm NG tube placement TECHNOLOGIST PROVIDED HISTORY: Reason for exam:->Confirm NG tube placement What reading provider will be dictating this exam?->CRC FINDINGS: Study not intend to fully evaluate the chest or the abdomen. NG tube in the area the body of the stomach in good position. Air distension of small bowel segments are seen measuring up to 4.4 cm. NG tube in good position.      IR GUIDED THORACENTESIS PLEURAL    Result Date: 2022  Patient MRN:  [id number] : 1960 Age: 60 years Gender: Female Order Date:  12/15/2022 2:30 PM EXAM: IR GUIDED THORACENTESIS PLEURAL NUMBER OF IMAGES:  2 INDICATION:  L sided thoracentesis L sided thoracentesis Which side should the procedure be performed? ->Left What reading provider will be dictating this exam?->[facility] After obtaining informed consent and after routine sterile prep and drape and after administration of a local anesthesia, a system of needles, and  cannulas was guided by ultrasound control into the pleural cavity. The needle was visualized directly under ultrasound while placing the needle within the pleural space. An image of the needle and positioned was stored in PACS. Time out occurred at 1459 . A total of 150 cc of colored fluid was removed. The patient tolerated the procedure well. There were no complications. The patient was released in stable condition. Successful thoracentesis. IR GUIDED THORACENTESIS PLEURAL    Result Date: 2022  Patient MRN:  [id number] : 1960 Age: 60 years Gender: Female Order Date:  2022 1:24 PM EXAM: IR GUIDED THORACENTESIS PLEURAL NUMBER OF IMAGES:  2 INDICATION:  R effusion, thoracentesis R effusion, thoracentesis Which side should the procedure be performed?->Right What reading provider will be dictating this exam?->[facility] After obtaining informed consent and after routine sterile prep and drape and after administration of a local anesthesia, a system of needles, and  cannulas was guided by ultrasound control into the pleural cavity. The needle was visualized directly under ultrasound while placing the needle within the pleural space. An image of the needle and positioned was stored in PACS. Time out occurred at 0345 [id number] [phone] 21 . A total of 400 cc of colored fluid was removed. The patient tolerated the procedure well. There were no complications. The patient was released in stable condition. Successful thoracentesis. Assessment:     Acute hypoxic respiratory failure  COPD/emphysema  H/o RLE DVT  H/o SAH / coiling  H/o CVA    Plan:     Chest x-ray personally reviewed. No evidence of any pleural or parenchymal abnormalities. Continue bronchodilators, DuoNeb, Pulmicort  Continue guaifenesin  Incentive spirometry, flutter valve  PT/OT/OOB    Diuresed well with Bumex yesterday. Currently on room air. Oxygen testing with ambulatory oxygen on discharge  Patient is not using Stiolto as she is uncomfortable with that drug delivery form. Discussed with patient about changing inhalers to Anoro, patient agreeable  Will change to Anoro Ellipta -sent in prescription. DVT PPX: Subcu heparin    I reviewed the patients chart including labs and images with the patient. 35 Minutes of which greater than 50% was spent counseling or coordinating care. My signature verifies the accuracy and relevance of my note, including documentation of information that has been copy pasted/forward, note templates, and Inna Bruce.     Philip Thornton MD MS  Pulmonary & [address] [place] [address]

## 2022-12-21 NOTE — PROGRESS NOTES
GENERAL SURGERY  DAILY PROGRESS NOTE  2022    Subjective:  Less diarrhea. On 2L O2. Denies dyspnea  Tolerating diet. Urinating appropriately. Objective:  Last 24Hrs  Temp  Av.9 °F (36.6 °C)  Min: 97.1 °F (36.2 °C)  Max: 98.4 °F (36.9 °C)  Resp  Av.3  Min: 14  Max: 20  Pulse  Av  Min: 67  Max: 78  Systolic (86FRG), TVM:694 , Min:88 , NIO:144     Diastolic (45LMI), TST:66, Min:42, Max:60    SpO2  Av.2 %  Min: 93 %  Max: 97 %    I/O last 3 completed shifts: In: 559.8 [P.O.:510; IV Piggyback:49.8]  Out: -       General: In no acute distress  Cardiovascular: Warm throughout  Respiratory: Equal chest rise bilaterally, no retractions, no audible wheezing  Abdomen: soft, grossly nontender palpation throughout without rebound or guarding, incision with staples that is clean dry and intact    CBC  Recent Labs     22  0541 22  0528 22  0454   WBC 7.8 8.6 9.7   RBC 3.42* 3.34* 3.26*   HGB 10.1* 9.8* 9.7*   HCT 30.5* 30.8* 29.9*   MCV 89.2 92.2 91.7   MCH 29.5 29.3 29.8   MCHC 33.1 31.8* 32.4   RDW 13.2 13.3 13.2    400 454*   MPV 9.3 9.4 9.5         CMP  Recent Labs     22  0541 22  0528 22  0454    138 138   K 3.5 4.1 3.9    102 100   CO2 30* 26 25   BUN 15 16 16   CREATININE 0.9 0.9 0.8   GLUCOSE 102* 92 116*   CALCIUM 8.7 8.7 8.9   PROT 6.2*  --   --    LABALBU 3.0*  --   --    BILITOT 0.3  --   --    ALKPHOS 66  --   --    AST 30  --   --    ALT 40*  --   --            Assessment/Plan:  60 y.o. female status post iatrogenic perforation after colonoscopy status post open ileocecectomy on . Her course was complicated by respiratory insufficiency requiring IR thoracentesis x2 and new onset atrial flutter now resolved. She is tolerating diet and having multiple bowel movements. Wean O2 as able. Pulm consulted, bronchodilator changed.   S/p Bumex/albumin  IS/pulmonary hygiene  Continue regular diet    Will benefit from physical therapy evaluation to assist in discharge [address] [place] [address]  General Surgery Resident, PGY-4    Electronically signed on 12/21/2022 at 7:20 AM      Attending Physician Statement:  I have examined the patient, reviewed the record, and discussed the case with the surgical team.  I agree with the assessment and plan with the following additions, corrections, and changes. Possible DC home with [place] [id number] if pulmonary status remains stable.       Electronically signed by Criss Lopez MD on 12/21/22 at 1:51 PM EST

## 2022-12-21 NOTE — CARE COORDINATION
12/21 Update CM note. Discharge order noted. Donya from [address] notified of discharge. [facility] orders in chart. HOSSEIN/destination complete. Jana from [facility] notified of discharge and need for oxygen and tub transfer bench. Family to provide transportation home.     DANIEL AndradeN, RN  [facility] Case Management   Cell: [phone]

## 2022-12-22 ENCOUNTER — CARE COORDINATION (OUTPATIENT)
Dept: CASE MANAGEMENT | Age: 62
End: 2022-12-22

## 2022-12-22 NOTE — CARE COORDINATION
[facility] Care Transitions Initial Follow Up Call    Call within 2 business days of discharge: Yes       Patient: Radha Mcclellan Patient : 1960   MRN: [id number]  Reason for Admission: personal history of colonic polyps, s/p COLONOSCOPY POLYPECTOMY SNARE/COLD BIOPSY, LAPAROTOMY EXPLORATORY, ILIOCECECTOMY 22  Discharge Date: 22 RARS: Readmission Risk Score: 17.2      Last Discharge [address]       Date Complaint Diagnosis Description Type Department Provider    22  Bowel perforation ([facility] [address] . .. Admission (Discharged) Leo Xavier MD          -First attempt to reach the patient for initial Care Transition call post hospital discharge. Message left with CTN's contact information requesting return phone call.    -CTN phoned [facility] and spoke with Mao Sanchez [facility] today(22.)               Follow Up  Future Appointments   Date Time Provider Shabnam Moore   2022  8:45 AM [facility] MED ONC FAST TRACK 1 [facility] Med Onc [address] [facility]   2022  9:00 AM Carmelina Ornelas MD MED ONC [facility]   2023 10:00 AM Breanne Bonner MD [facility]   2023  9:30 AM Yancy Be MD [facility]   2023  2:15 PM Marcel Salas MD [facility]   2023 10:45 AM MD Jayne Michaud [facility]   2023  8:45 AM DO Bruce Guillen [facility]   2023  9:15 AM SPENSER Ng [facility]   2023  9:30 AM MD Jayne Michaud [facility]         Toby Webber, VIVIAN

## 2022-12-23 ENCOUNTER — CARE COORDINATION (OUTPATIENT)
Dept: CASE MANAGEMENT | Age: 62
End: 2022-12-23

## 2022-12-23 NOTE — CARE COORDINATION
[facility] Care Transitions Initial Follow Up Call    Call within 2 business days of discharge: Yes    Care Transition Nurse contacted the patient by telephone to perform post hospital discharge assessment. Verified name and  with patient as identifiers. Provided introduction to self, and explanation of the Care Transition Nurse role. Patient: Anna Garcia Patient : 1960   MRN: [id number]  Reason for Admission: personal history of colonic polyps, s/p COLONOSCOPY POLYPECTOMY SNARE/COLD BIOPSY, LAPAROTOMY EXPLORATORY, ILIOCECECTOMY 22  Discharge Date: 22 RARS: Readmission Risk Score: 17.2      Last Discharge [address]       Date Complaint Diagnosis Description Type Department Provider    22  Bowel perforation ([facility] [address] . .. Admission (Discharged) Shayy Bai MD            Was this an external facility discharge? No Discharge Facility: [facility]    Challenges to be reviewed by the provider   Additional needs identified to be addressed with provider: No  none               Method of communication with provider: none.      -Spoke with patient for initial care transition call post hospital discharge.   -Patient reports she is doing \"okay\" and has been sleeping well, appetite for solids fair, drinking fluids well. Urinating well, having loose stools especially in am.  Patient states her staples were removed prior to hospital discharge.  -Patient states [facility] to visit today between noon and 1 pm.  -Patient denies any needs, questions, or concerns at this time and is agreeable to continued follow up from CTN. Care Transition Nurse reviewed discharge instructions, medical action plan, and red flags with patient who verbalized understanding. The patient was given an opportunity to ask questions and does not have any further questions or concerns at this time. Were discharge instructions available to patient? Yes.  Reviewed appropriate site of care based on symptoms and resources available to patient including: PCP  Specialist  Home health. The patient agrees to contact the PCP office for questions related to their healthcare. Advance Care Planning:   Does patient have an Advance Directive: decision maker reviewed and current. Medication reconciliation was not performed with patient and she did not wish a call from [facility] pharmacy to review medications. CTN able to confirm new/stopped medications as per AVS.  1111F entered: no    Was patient discharged with a pulse oximeter? no, but patient states she purchased a oximeter on her way home from hospital.  Wearing 3 L NC oxygen as needed, oximeter maintaining greater than 90%, mainly 95% or above. Non-face-to-face services provided:  Scheduled appointment with PCP-CTN explained to patient recommendation to have contact with PCP 1-2 weeks post hospital discharge. CTN will route to PCP office  Scheduled appointment with Specialist-as noted below. Patient to call trauma clinic for 2 week follow up as per AVS.  Obtained and reviewed discharge summary and/or continuity of care documents    Offered patient enrollment in the Remote Patient Monitoring (RPM) program for in-home monitoring:  not discussed on this call.  .    Care Transitions 24 Hour Call    Do you have a copy of your discharge instructions?: Yes  Do you have all of your prescriptions and are they filled?: Yes  Have you been contacted by a [address] Pharmacist?: No  Have you scheduled your follow up appointment?: No  Do you feel like you have everything you need to keep you well at home?: Yes  Care Transitions Interventions    Pharmacist: Declined             Follow Up  Future Appointments   Date Time Provider Shabnam Moore   12/29/2022  8:45 AM [facility] MED ONC FAST TRACK 1 [address] [facility]   12/29/2022  9:00 AM Mary English MD MED ONC [facility]   1/4/2023 10:00 AM Margret Tony MD ACC Pulm [facility]   1/11/2023  9:30 AM Erin Ortega MD SE Ortho [facility]   1/12/2023 2:15 PM Jesusita Morse MD VAS/MED [facility]   1/20/2023 10:45 AM Abundio Spencer MD [facility]   1/27/2023  8:45 AM DO Bruce Collins Card [facility]   2/8/2023  9:15 AM SPENSER Pe[facility]   8/11/2023  9:30 AM Abundio Spencer MD [facility]       Care Transition Nurse provided contact information. Plan for follow-up call in 5-7 days based on severity of symptoms and risk factors. Plan for next call: symptom management-any post op issues? self management-oximeter  follow-up appointment-PCP/trauma clinic dates?   Offer RPM    Ericka Triplett RN

## 2022-12-28 ENCOUNTER — OFFICE VISIT (OUTPATIENT)
Dept: FAMILY MEDICINE CLINIC | Age: 62
End: 2022-12-28

## 2022-12-28 VITALS
DIASTOLIC BLOOD PRESSURE: 57 MMHG | TEMPERATURE: 97.9 F | HEART RATE: 72 BPM | WEIGHT: 218.4 LBS | SYSTOLIC BLOOD PRESSURE: 119 MMHG | BODY MASS INDEX: 36.39 KG/M2 | OXYGEN SATURATION: 97 % | HEIGHT: 65 IN

## 2022-12-28 DIAGNOSIS — I48.92 ATRIAL FLUTTER, UNSPECIFIED TYPE (HCC): ICD-10-CM

## 2022-12-28 DIAGNOSIS — R35.0 FREQUENT URINATION: ICD-10-CM

## 2022-12-28 DIAGNOSIS — Z86.79 HISTORY OF SUBARACHNOID HEMORRHAGE: ICD-10-CM

## 2022-12-28 DIAGNOSIS — Z09 HOSPITAL DISCHARGE FOLLOW-UP: Primary | ICD-10-CM

## 2022-12-28 DIAGNOSIS — K63.1 BOWEL PERFORATION (HCC): ICD-10-CM

## 2022-12-28 DIAGNOSIS — I72.9 ANEURYSM (HCC): ICD-10-CM

## 2022-12-28 LAB
BILIRUBIN, POC: NORMAL
BLOOD URINE, POC: NORMAL
CLARITY, POC: NORMAL
COLOR, POC: NORMAL
GLUCOSE URINE, POC: NORMAL
KETONES, POC: NORMAL
LEUKOCYTE EST, POC: NORMAL
NITRITE, POC: NORMAL
PH, POC: 7.5
PROTEIN, POC: NORMAL
SPECIFIC GRAVITY, POC: 1.02
UROBILINOGEN, POC: 1

## 2022-12-28 ASSESSMENT — ENCOUNTER SYMPTOMS
ABDOMINAL PAIN: 1
BACK PAIN: 1
WHEEZING: 0
NAUSEA: 0
COUGH: 0
SHORTNESS OF BREATH: 0

## 2022-12-28 NOTE — PROGRESS NOTES
[facility]  [facility]    Subjective:  Hospital follow up for bowel perforation after colonoscopy  C/b hypoxia s/p thoracentesis for b/l thoracentesis, new onset afib, hx of SAH and aneurysm not on AC  Weaned off O2, sats remaining above 90%  + back and abdominal pain  Home PT  +urinary urgency, frequency    ROS otherwise negative    Past medical, surgical, family and social history were reviewed, non-contributory, and unchanged unless otherwise stated. Objective:    BP (!) 119/57   Pulse 72   Temp 97.9 °F (36.6 °C) (Temporal)   Ht 5' 5\" (1.651 m)   Wt 218 lb 6.4 oz (99.1 kg)   SpO2 97%   BMI 36.34 kg/m²     Exam is as noted by resident with the following changes, additions or corrections:    Cardio- irregulary irregular. Normal rate  Lung: CTAB  Abd- incision C/D/I    Assessment/Plan:    Bowel perforation s/p repair: Incision healing well. Follow up with surgery as scheduled   Urinary frequency- UA  Home PT  Follow up 1 month     Attending Physician Statement  I have reviewed the chart, including any radiology or labs, and have seen the patient with the resident(s). I personally reviewed and performed key elements of the history and exam.  I agree with the assessment, plan and orders as documented by the resident. Please refer to the resident note for additional information.       Electronically signed by Regis Villatoro MD on 12/28/2022 at 2:03 PM

## 2022-12-28 NOTE — PROGRESS NOTES
Post-Discharge Transitional Care Follow Up      Pau Reese   YOB: 1960    Date of Office Visit:  12/28/2022  Date of Hospital Admission: 12/9/22  Date of Hospital Discharge: 12/21/22  Readmission Risk Score (high >=14%. Medium >=10%):Readmission Risk Score: 17.2      Care management risk score Rising risk (score 2-5) and Complex Care (Scores >=6): No Risk Score On File     Non face to face  following discharge, date last encounter closed (first attempt may have been earlier): 12/23/2022     Call initiated 2 business days of discharge: Yes     Hospital discharge follow-up  -     NH DISCHARGE MEDS RECONCILED W/ CURRENT OUTPATIENT MED LIST  Atrial flutter, unspecified type ([address]  Bowel perforation ([address]  History of subarachnoid hemorrhage  -     Annmarie - Dex Thomas, APN, Neurology, [facility])  -     Judith [id number], APN, Neurology, [facility]  Frequent urination  -     POCT Urinalysis no Micro    Medical Decision Making:   Return in 1 month (on 1/28/2023). Subjective:   Admission 12/9-12/21 [facility]  Ceccum perf during colonoscopy with biopsy  Hospital course complication by hypoxia  Transferred to SICU  Cardio consulted for a fib-- became a flutter, rate controlled (No systemic anticoagulation due to Hx of SAH/aneurysm.)  Thoracentesis (Right and Left) performed on 12/17  Pt on O2 (weaned to 2L)  Pulm consulted for hypoxia  Discharged with O2 but has not had to wear because O2 sats have been >90% ( was told not to wear it if >90)      Inpatient course: Discharge summary reviewed- see chart.     Interval history/Current status:   Still have back and abdominal pain  Had questions regarding pleural effusions  Feels weak currently   Still having abdominal pain but getting better  Doing home PT   Lives with son  Has specialist follow ups in next month    Urine Symptoms   Urine frequency, hesitancy, urgency since hospitalization  Hx kidney and urine infections as well as yeast infection    Hx Aneurysm  Needs referral to new neuro because her old one is retired now    Patient Active Problem List   Diagnosis    Obesity (BMI 30-39. 9)    Hypertension    Chronic pain syndrome    Lumbar facet arthropathy    Lumbar disc disorder    Primary osteoarthritis of both hips    Arthritis of right hip    Dysphagia    Heartburn    At risk for colon cancer    Colon polyps    Degenerative disc disease, cervical    Arthropathy of cervical facet joint    Abnormal blood sugar    Arthritis of both hips    COVID-19    H/O total hip arthroplasty, right    History of total left hip arthroplasty    Primary osteoarthritis of right hip    Pulmonary emphysema (HCC)    History of herpes genitalis    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    History of COVID-19    Acute cystitis with hematuria    Aneurysm (HCC)    S/P total right hip arthroplasty    Cognitive impairment    Personal history of colonic polyps    Abnormal diffusion capacity determined by pulmonary function test    BELCHER (dyspnea on exertion)    Acute deep vein thrombosis (DVT) of right peroneal vein (HCC)    Bowel perforation (HCC)    Acute respiratory failure with hypoxia (HCC)    Atrial flutter (HCC)    Atrial fibrillation with RVR (HCC)    Pleural effusion, bilateral    LFT elevation    Hypoalbuminemia       Medications listed as ordered at the time of discharge from hospital     Medication List            Accurate as of December 28, 2022  5:05 PM. If you have any questions, ask your nurse or doctor. CONTINUE taking these medications      acetaminophen-codeine 300-60 MG per tablet  Commonly known as: TYLENOL #4  Take 1 tablet by mouth daily as needed for Pain for up to 30 days.      albuterol sulfate  (90 Base) MCG/ACT inhaler  Commonly known as: PROVENTIL;VENTOLIN;PROAIR  INHALE TWO PUFFS BY MOUTH EVERY 6 HOURS AS NEEDED FOR WHEEZING.     amLODIPine 5 MG tablet  Commonly known as: NORVASC  TAKE ONE TABLET BY MOUTH EVERY DAY     chlorthalidone 25 MG tablet  Commonly known as: HYGROTON  Take 1 tablet by mouth in the morning. fluticasone 50 MCG/ACT nasal spray  Commonly known as: FLONASE  2 sprays by Each Nostril route in the morning.     gabapentin 300 MG capsule  Commonly known as: NEURONTIN  TAKE ONE CAPSULE BY MOUTH THREE TIMES A DAY     losartan 100 MG tablet  Commonly known as: COZAAR  Take 1 tablet by mouth in the morning. Metamucil Smooth Texture 28.3 % Powd powder  Generic drug: psyllium  Take 3.4 g by mouth daily     metoprolol tartrate 25 MG tablet  Commonly known as: LOPRESSOR  Take 1.5 tablets by mouth in the morning and 1.5 tablets before bedtime. ondansetron 4 MG disintegrating tablet  Commonly known as: ZOFRAN-ODT  Take 1 tablet by mouth every 8 hours as needed for Nausea or Vomiting     pantoprazole 40 MG tablet  Commonly known as: PROTONIX  TAKE ONE TABLET BY MOUTH EVERY DAY     polyethylene glycol 17 g packet  Commonly known as: GLYCOLAX     potassium chloride 10 MEQ extended release tablet  Commonly known as: KLOR-CON M  TAKE ONE TABLET BY MOUTH DAILY WITH BREAKFAST     * Rollator Misc  1 each by Does not apply route daily as needed (use as needed for stability)     * Wrist Brace Misc  Firm neutral position left wrist brace  Size to fit  Dx:  Wrist pain/carpal tunnel syndrome     therapeutic multivitamin-minerals tablet     Umeclidinium-Vilanterol 62.5-25 MCG/ACT Aepb  Inhale 1 puff into the lungs daily           * This list has 2 medication(s) that are the same as other medications prescribed for you. Read the directions carefully, and ask your doctor or other care provider to review them with you.                    Medications marked \"taking\" at this time  Outpatient Medications Marked as Taking for the 12/28/22 encounter (Office Visit) with Scot Allison, DO   Medication Sig Dispense Refill    ondansetron (ZOFRAN-ODT) 4 MG disintegrating tablet Take 1 tablet by mouth every 8 hours as needed for Nausea or Vomiting 20 tablet 0    Umeclidinium-Vilanterol 62.5-25 MCG/ACT AEPB Inhale 1 puff into the lungs daily 1 each 3    acetaminophen-codeine (TYLENOL #4) 300-60 MG per tablet Take 1 tablet by mouth daily as needed for Pain for up to 30 days. 30 tablet 1    psyllium (METAMUCIL SMOOTH TEXTURE) 28.3 % POWD powder Take 3.4 g by mouth daily 368 g 5    amLODIPine (NORVASC) 5 MG tablet TAKE ONE TABLET BY MOUTH EVERY DAY 30 tablet 3    chlorthalidone (HYGROTON) 25 MG tablet Take 1 tablet by mouth in the morning. 30 tablet 3    fluticasone (FLONASE) 50 MCG/ACT nasal spray 2 sprays by Each Nostril route in the morning. 1 each 3    gabapentin (NEURONTIN) 300 MG capsule TAKE ONE CAPSULE BY MOUTH THREE TIMES A DAY 90 capsule 3    losartan (COZAAR) 100 MG tablet Take 1 tablet by mouth in the morning. 30 tablet 3    pantoprazole (PROTONIX) 40 MG tablet TAKE ONE TABLET BY MOUTH EVERY DAY 30 tablet 3    potassium chloride (KLOR-CON M) 10 MEQ extended release tablet TAKE ONE TABLET BY MOUTH DAILY WITH BREAKFAST 30 tablet 3    metoprolol tartrate (LOPRESSOR) 25 MG tablet Take 1.5 tablets by mouth in the morning and 1.5 tablets before bedtime. 90 tablet 3    polyethylene glycol (GLYCOLAX) 17 g packet DISSOLVE 1 PACKET (17 GRAMS) IN LIQUID AND TAKE BY MOUTH DAILY      Multiple Vitamins-Minerals (THERAPEUTIC MULTIVITAMIN-MINERALS) tablet Take 1 tablet by mouth daily          Medications patient taking as of now reconciled against medications ordered at time of hospital discharge: Yes    Review of Systems   Constitutional:  Positive for activity change. Negative for chills, fatigue and fever. Respiratory:  Negative for cough, shortness of breath and wheezing. Cardiovascular:  Negative for chest pain and leg swelling. Gastrointestinal:  Positive for abdominal pain. Negative for nausea. Genitourinary:  Positive for decreased urine volume, difficulty urinating, dysuria, flank pain and urgency. Musculoskeletal:  Positive for back pain. Skin:  Positive for wound. Negative for rash. Neurological:  Positive for weakness. Negative for dizziness and light-headedness. Objective:    BP (!) 119/57   Pulse 72   Temp 97.9 °F (36.6 °C) (Temporal)   Ht 5' 5\" (1.651 m)   Wt 218 lb 6.4 oz (99.1 kg)   SpO2 97%   BMI 36.34 kg/m²   Physical Exam  Constitutional:       General: She is not in acute distress. Appearance: Normal appearance. HENT:      Head: Normocephalic and atraumatic. Cardiovascular:      Rate and Rhythm: Normal rate. Rhythm irregular. Heart sounds: Normal heart sounds. No murmur heard. No gallop. Pulmonary:      Effort: Pulmonary effort is normal.      Breath sounds: Normal breath sounds. No wheezing or rhonchi. Abdominal:      General: Bowel sounds are normal. There is no distension. Palpations: Abdomen is soft. Tenderness: There is no abdominal tenderness. Comments: Midline lower abdominal incision CDI and healing well   Musculoskeletal:      Cervical back: No tenderness. Right lower leg: No edema. Left lower leg: No edema. Lymphadenopathy:      Cervical: No cervical adenopathy. Neurological:      General: No focal deficit present. Mental Status: She is alert and oriented to person, place, and time. Assessment/plan:    1. Hospital follow-up  Patient doing well overall  Continue home PT  F/U 1 mo with PCP    2. A flutter  Patient is rate controlled today  Advised to keep cardiology appointment  Unable to rhythm control or shock per cardio due to history of subarachnoid hemorrhage so cannot be on anticoagulation at this time    3. Recent bowel perforation  Patient is healing well  Midline incision is clean dry intact  Advised to keep follow up appointment with surgery    4. History of subarachnoid hemorrhage/aneurysm  Referral to neurology given    5.   Frequent urination  UA performed in office today negative  Discussed results with patient's  Advised to continue monitoring symptoms  Does not need antibiotics at this time      An electronic signature was used to authenticate this note.   --Luis Juarez, DO

## 2022-12-30 ENCOUNTER — CARE COORDINATION (OUTPATIENT)
Dept: CASE MANAGEMENT | Age: 62
End: 2022-12-30

## 2022-12-30 NOTE — CARE COORDINATION
Remote Patient Monitoring Enrollment Note      Date/Time:  2022 12:43 PM    Offered patient enrollment in the [facility] Remote Patient Monitoring (RPM) program for in home monitoring for HTN. Patient declined RPM services. [facility] Care Transitions Follow Up Call    Patient: Yeimi Addison  Patient : 1960   MRN: [id number]  Reason for Admission: personal history of colonic polyps, s/p COLONOSCOPY POLYPECTOMY SNARE/COLD BIOPSY, LAPAROTOMY EXPLORATORY, ILIOCECECTOMY 22  Discharge Date: 22 RARS: Readmission Risk Score: 17.2    -First attempt to reach the patient for subsequent Care Transition call. VM box full, unable to leave message.    -Patient phoned [facility] back. Care Transition Nurse spoke with the patient by telephone to follow up after admission on 22. Needs to be reviewed by the provider   Additional needs identified to be addressed with provider: No  none             Method of communication with provider: none.      -Spoke with patient for follow up care transition call.   -Patient having slow/steady improvement-better appetite, lessening of loose stools. Has not been wearing oxygen as oximeter has maintained greater than 90% in RA. -Awaiting call from neurology as PCP placed referral at visit on 22. Has tried to contact trauma clinic but states each time she has called receives that office is closed. -Active now with [facility],-nursing visiting, PT initial eval completed-approved for twice weekly therapy x 3 weeks to begin next Wednesday and Friday.  -Aware of below appointments.  -Patient denies any needs, questions, or concerns at this time and is agreeable to continued follow up from CTN. Addressed changes since last contact:  home health care-changed from [facility] to [facility]  Completed PCP appointment on 22    Discussed follow-up appointments. If no appointment was previously scheduled, appointment scheduling offered: n/a.    Is follow up appointment scheduled within 7 days of discharge? Yes, PCP visit completed 12/28/22. Follow Up  Future Appointments   Date Time Provider Shabnam Teresa   1/4/2023 10:00 AM [facility]   1/11/2023  9:30 AM Kan Villatoro MD [facility]   1/12/2023  2:15 PM Chery Salvador MD VASC/MED [facility]   1/19/2023 10:30 AM [facility] MED ONC FAST TRACK 1 [facility] Med Onc [facility]   1/19/2023 10:45 AM Marcio Del Rosario MD MED ONC [facility]   1/20/2023 10:45 AM MD Jayne Davis PC [facility]   1/27/2023  8:45 AM Brett Valdez,  [facility], [address] Card [facility]   2/8/2023  9:15 AM SPENSER [facility]   8/11/2023  9:30 AM MD Jayne Davis PC [facility]     Non-[facility] follow up appointment(s): none      Patients top risk factors for readmission: medical condition-post op, HTN, AFib/flutter, acute respiratory failure and polypharmacy  Interventions to address risk factors: Scheduled appointment with PCP-continue consistent follow up, next visit 1/20/23, Scheduled appointment with Specialist-as noted above, and maintain with [address] [place] [address] patient enrollment in the Remote Patient Monitoring (RPM) program for in-home monitoring: Patient declined. Care Transitions Subsequent and Final Call    Subsequent and Final Calls  Care Transitions Interventions    Pharmacist: Declined    Other Interventions:             Care Transition Nurse provided contact information for future needs. Plan for follow-up call in 10-14 days based on severity of symptoms and risk factors.   Plan for next call: self management-oximeter  follow-up appointment-review any provider appointments as applicable    Shaun Overton RN

## 2023-01-04 ENCOUNTER — OFFICE VISIT (OUTPATIENT)
Dept: PULMONOLOGY | Age: 63
End: 2023-01-04
Payer: MEDICARE

## 2023-01-04 VITALS
WEIGHT: 214 LBS | SYSTOLIC BLOOD PRESSURE: 107 MMHG | HEART RATE: 78 BPM | BODY MASS INDEX: 35.61 KG/M2 | TEMPERATURE: 97.5 F | DIASTOLIC BLOOD PRESSURE: 60 MMHG | RESPIRATION RATE: 20 BRPM | OXYGEN SATURATION: 95 %

## 2023-01-04 DIAGNOSIS — I50.32 CHRONIC HEART FAILURE WITH PRESERVED EJECTION FRACTION (HCC): Chronic | ICD-10-CM

## 2023-01-04 DIAGNOSIS — R94.2 ABNORMAL DIFFUSION CAPACITY DETERMINED BY PULMONARY FUNCTION TEST: ICD-10-CM

## 2023-01-04 DIAGNOSIS — I82.451 ACUTE DEEP VEIN THROMBOSIS (DVT) OF RIGHT PERONEAL VEIN (HCC): ICD-10-CM

## 2023-01-04 DIAGNOSIS — E66.01 SEVERE OBESITY (BMI 35.0-39.9) WITH COMORBIDITY (HCC): ICD-10-CM

## 2023-01-04 DIAGNOSIS — J43.2 CENTRILOBULAR EMPHYSEMA (HCC): Primary | Chronic | ICD-10-CM

## 2023-01-04 PROBLEM — I50.30 (HFPEF) HEART FAILURE WITH PRESERVED EJECTION FRACTION (HCC): Chronic | Status: ACTIVE | Noted: 2023-01-04

## 2023-01-04 PROCEDURE — 99213 OFFICE O/P EST LOW 20 MIN: CPT | Performed by: INTERNAL MEDICINE

## 2023-01-04 RX ORDER — GUAIFENESIN 600 MG/1
600 TABLET, EXTENDED RELEASE ORAL 2 TIMES DAILY
Qty: 30 TABLET | Refills: 0 | Status: SHIPPED | OUTPATIENT
Start: 2023-01-04 | End: 2023-01-19

## 2023-01-04 NOTE — PROGRESS NOTES
[place]  Department of Pulmonary, Critical Care and Sleep Medicine  Dr. Jared Jaramillo, Dr. Gera Garrido, Dr. Inrgid Anguiano Note - Follow up      Assessment/Plan     Assessment & Plan     No problem-specific Assessment & Plan notes found for this encounter. Problem List Items Addressed This Visit          Circulatory    (HFpEF) heart failure with preserved ejection fraction (HCC) (Chronic)    Acute deep vein thrombosis (DVT) of right peroneal vein (HCC)       Respiratory    Pulmonary emphysema (HCC) - Primary (Chronic)    Relevant Medications    guaiFENesin (MUCINEX) 600 MG extended release tablet       Other    Abnormal diffusion capacity determined by pulmonary function test     Other Visit Diagnoses       Severe obesity (BMI 35.0-39. 9) with comorbidity ([facility] [address]                   Plan: With patient's current shortness of breath and productive cough, advised her to start using her Anoro + scheduled guaifenesin. Advised her to call back in 2 days if she does not note any improvement in her shortness of breath or coughing. Might trial her on steroids/antibiotics if no improvement. Continue Anoro  Advised to rinse mouth after each use. P.r.n. albuterol  Advised on proper inhaler technique, and adherence to prescribed inhalers. Also suspect her shortness of breath contributed by her atrial flutter and HFpEF. Advised her to keep her cardiology appointment. Follow-up with vascular surgery/neurosurgery/hematology regarding her DVT in the setting of history of SAH. Glucocorticoid nasal spray, oral antihistamine for allergies    Maintain active and healthy lifestyle with weight reduction. COVID-19 precautions  Recommend yearly Influenza and appropriate pneumonia vaccinations.     The patient is here with her son Myrna Valladares  I reviewed the patients chart including prior labs and images prior to our office visit and we also reviewed them together today.  Reviewed treatment plan and answered all questions to satisfaction. .  60 Minutes of which greater than 50% was spent counseling or coordinating care. My signature verifies the accuracy and relevance of my note, including documentation of information that has been copy pasted/forward, note templates, and Corrina Marsh. Follow up: Return in about 3 months (around 4/4/2023). Derrick Becker MD MS  Pulmonary [facility], Dr. Isis Gutierres, Dr. Mario Escalante    No orders of the defined types were placed in this encounter. Immunization History   Administered Date(s) Administered    Pneumococcal Polysaccharide ([id number]) 07/17/2018    Tdap (Boostrix, Adacel) 08/16/2019       Subjective   Patient ID: Troy Ortiz is a 60 y.o. female  Chief Complaint:   HPI: Patient is a 60 y.o. female is here for followup. History of pulmonary emphysema, former smoker, DVT, spontaneous subarachnoid hemorrhage s/p coiling, CVA with right hemiparesis, atrial fibrillation/flutter, bilateral hip replacement. Recent hospitalization in December for routine colonoscopy, complicated by perforation s/p ex lap/ileocecectomy. Prolonged hospitalization with atrial flutter, respiratory failure, bilateral pleural effusions s/p bilateral thoracentesis. No pleural fluid studies were sent. Patient reports significant improvement in her shortness of breath and cough since discharge. She still has shortness of breath on exertion and occasional cough with productive phlegm. Patient was discharged on home oxygen however currently she was satting 94 to 95% on room air. She is already using her oxygen as needed. Patient is also not using her Anoro. Also reports palpitations with her known atrial flutter heart rate ranging from . She has a upcoming follow-up with cardiology Dr. Pete Resendiz.     Regarding her right peroneal DVT she is following up with Dr. Marissa Olivares of oncology. She is scheduled for follow-up with vascular neurosurgery for deciding between IVC filter vs anticoagulation. History of smoking 1.5 to 2 packs daily for 40 years before quitting in 2018. Her mobility is limited by her back pain and hip pain. She is on Flonase for environmental allergies    PFT   Date of Exam: 2022          Pulmonary Function Test % Pred   FEV1 1.78 71   FEV1/FVC 73 (LLN-68)     TLC 5.95 114   DLCO 7.83 30      No obstruction  No Bronchodilator response  There is evidence of air trapping  No Restriction  Severe diffusion defect. Recommend evaluation for pulmonary vascular diseases, and pulmonary hypertension, CHF, early ILD. No results found for: FEV1, FVC, PDA0DBB, TLC, DLCO    CT chest: 22    No mediastinal or hilar lymphadenopathy   mild emphysematous changes  no pulmonary nodule    Labs:    Eosinophil max - 280  Immunoglobulin E (IgE) - 4  RAST Neg    Sleep Study:    ALLERGIES:  Allergies   Allergen Reactions    Latex Hives     Hives and rash    Iodine Rash     Hives . pt.  States anaphalyxis    Aloe Hives     Hives     Celebrex [Celecoxib] Itching    Fish-Derived Products Other (See Comments)     SOCIAL HISTORY:   Social History     Tobacco Use    Smoking status: Former     Packs/day: 1.50     Years: 43.00     Pack years: 64.50     Types: Cigarettes     Quit date: 2018     Years since quittin.3    Smokeless tobacco: Never   Vaping Use    Vaping Use: Never used   Substance Use Topics    Alcohol use: No    Drug use: No     MEDS:   Current Outpatient Medications   Medication Sig Dispense Refill    guaiFENesin (MUCINEX) 600 MG extended release tablet Take 1 tablet by mouth 2 times daily for 15 days 30 tablet 0    ondansetron (ZOFRAN-ODT) 4 MG disintegrating tablet Take 1 tablet by mouth every 8 hours as needed for Nausea or Vomiting 20 tablet 0    albuterol sulfate HFA (PROVENTIL;VENTOLIN;PROAIR) 108 (90 Base) MCG/ACT inhaler INHALE TWO PUFFS BY MOUTH EVERY 6 HOURS AS NEEDED FOR WHEEZING. 1 each 3    psyllium (METAMUCIL SMOOTH TEXTURE) 28.3 % POWD powder Take 3.4 g by mouth daily 368 g 5    amLODIPine (NORVASC) 5 MG tablet TAKE ONE TABLET BY MOUTH EVERY DAY 30 tablet 3    chlorthalidone (HYGROTON) 25 MG tablet Take 1 tablet by mouth in the morning. 30 tablet 3    fluticasone (FLONASE) 50 MCG/ACT nasal spray 2 sprays by Each Nostril route in the morning. 1 each 3    losartan (COZAAR) 100 MG tablet Take 1 tablet by mouth in the morning. 30 tablet 3    pantoprazole (PROTONIX) 40 MG tablet TAKE ONE TABLET BY MOUTH EVERY DAY 30 tablet 3    potassium chloride (KLOR-CON M) 10 MEQ extended release tablet TAKE ONE TABLET BY MOUTH DAILY WITH BREAKFAST 30 tablet 3    polyethylene glycol (GLYCOLAX) 17 g packet DISSOLVE 1 PACKET (17 GRAMS) IN LIQUID AND TAKE BY MOUTH DAILY      Umeclidinium-Vilanterol 62.5-25 MCG/ACT AEPB Inhale 1 puff into the lungs daily (Patient not taking: Reported on 1/4/2023) 1 each 3    acetaminophen-codeine (TYLENOL #4) 300-60 MG per tablet Take 1 tablet by mouth daily as needed for Pain for up to 30 days. 30 tablet 1    Misc. Devices (WRIST BRACE) MISC Firm neutral position left wrist brace  Size to fit  Dx:  Wrist pain/carpal tunnel syndrome 1 each 0    gabapentin (NEURONTIN) 300 MG capsule TAKE ONE CAPSULE BY MOUTH THREE TIMES A DAY 90 capsule 3    metoprolol tartrate (LOPRESSOR) 25 MG tablet Take 1.5 tablets by mouth in the morning and 1.5 tablets before bedtime. 90 tablet 3    Multiple Vitamins-Minerals (THERAPEUTIC MULTIVITAMIN-MINERALS) tablet Take 1 tablet by mouth daily      Misc. Devices (ROLLATOR) MISC 1 each by Does not apply route daily as needed (use as needed for stability) 1 each 0     No current facility-administered medications for this visit. Review of Systems: -ve other than specified above.       Objective       Vitals:  BP: 107/60, Heart Rate: 78, Resp: 20, Temp: 97.5 °F (36.4 °C),  , SpO2: 95 %,  , Weight: 214 lb (97.1 kg)    BMI body mass index is 35.61 kg/m². Ideal Body Weight: Ideal body weight: 57 kg (125 lb 10.6 oz)  Adjusted ideal body weight: 73 kg (161 lb)  ---------------  Physical Exam:    General: Alert and oriented x 3. No acute distress. Eyes:  Vision - grossly normal, PERRLA  HENT:  Head is atraumatic and normocephalic. Neck is supple, no jugular venous distention. Respiratory: Mildly diminished breath sounds in bilateral bases, respirations are nonlabored, breath sounds are equal.  Cardiovascular: Irregularly irregular rate and rhythm, no murmur, no pedal edema  Gastrointestinal:  Soft, nontender, nondistended. Normal bowel sounds. No organomegaly  Neurologic:  Awake and alert, cranial nerves 2-12 grossly intact, no focal motor or sensory deficits. Labs     CBC:   Lab Results   Component Value Date    WBC 9.7 12/21/2022    HGB 9.7 (L) 12/21/2022    HCT 29.9 (L) 12/21/2022    MCV 91.7 12/21/2022     (H) 12/21/2022     BMP:     Chemistry        Component Value Date/Time     12/21/2022 0454    K 3.9 12/21/2022 0454     12/21/2022 0454    CO2 25 12/21/2022 0454    BUN 16 12/21/2022 0454    CREATININE 0.8 12/21/2022 0454        Component Value Date/Time    CALCIUM 8.9 12/21/2022 0454    ALKPHOS 66 12/19/2022 0541    AST 30 12/19/2022 0541    ALT 40 (H) 12/19/2022 0541    BILITOT 0.3 12/19/2022 0541          LFTs:   Lab Results   Component Value Date    ALT 40 (H) 12/19/2022    AST 30 12/19/2022    ALKPHOS 66 12/19/2022    BILITOT 0.3 12/19/2022    PROT 6.2 (L) 12/19/2022     Coags:   Lab Results   Component Value Date    INR 1.1 12/15/2022     Imaging   Reviewed imaging studies personally and findings as below  XR LEG LENGTH EVALUATION    Result Date: 10/5/2022  EXAMINATION: BONE LENGTH STUDIES, 10/5/2022 10:28 am COMPARISON: None.  HISTORY: ORDERING SYSTEM PROVIDED HISTORY: History of bilateral total hip arthroplasty TECHNOLOGIST PROVIDED HISTORY: What reading provider will be dictating this exam?->CRC FINDINGS: Right femur length = 49.9 cm. Right tibia length = 39.0 cm. Right total limb length = 88.9 cm. Left femur length = 49.2 cm. Left tibia length = 39.5 cm. Left total limb length = 88.7 cm. Total limb length discrepancy = 0.2 cm     XR HIP 2-3 VW W PELVIS RIGHT    Result Date: 10/5/2022  EXAMINATION: ONE XRAY VIEW OF THE PELVIS AND TWO XRAY VIEWS RIGHT HIP 10/5/2022 8:17 am COMPARISON: 13 July 2022 HISTORY: ORDERING SYSTEM PROVIDED HISTORY: H/O total hip arthroplasty, right TECHNOLOGIST PROVIDED HISTORY: What reading provider will be dictating this exam?->CRC FINDINGS: Anatomically aligned bilateral MERVAT. No abnormal bone or soft tissue findings. Bilateral MERVAT with no unexpected findings.

## 2023-01-04 NOTE — PROGRESS NOTES
Hospital follow up visit today in office. Pt coughing and having some congestion. Has not started Anoro inhaler yet. Reviewed meds and added Mucinex to daily routine to help with mucous. Plan is for pt to begin Anoro and Mucinex and if she does not see an improvement in symptoms she is to call office. Plan is for follow up in office in 3 mos. Pt to continue to monitor O2 levels and ear O2 according to levels dropping in low 90's or below. Pt and son aware of meds regimen and plan for follow up.

## 2023-01-09 ENCOUNTER — CARE COORDINATION (OUTPATIENT)
Dept: CASE MANAGEMENT | Age: 63
End: 2023-01-09

## 2023-01-09 ENCOUNTER — TELEPHONE (OUTPATIENT)
Dept: FAMILY MEDICINE CLINIC | Age: 63
End: 2023-01-09

## 2023-01-09 ENCOUNTER — TELEPHONE (OUTPATIENT)
Dept: ORTHOPEDIC SURGERY | Age: 63
End: 2023-01-09

## 2023-01-09 DIAGNOSIS — T50.8X5A ALLERGIC REACTION TO CONTRAST MATERIAL, INITIAL ENCOUNTER: Primary | ICD-10-CM

## 2023-01-09 DIAGNOSIS — I10 HYPERTENSION, UNSPECIFIED TYPE: ICD-10-CM

## 2023-01-09 NOTE — CARE COORDINATION
[facility] Care Transitions Follow Up Call    Care Transition Nurse contacted the patient by telephone to follow up after admission on 22. Patient: Nina Giron  Patient : 1960   MRN: [id number]  Reason for Admission: personal history of colonic polyps, s/p COLONOSCOPY POLYPECTOMY SNARE/COLD BIOPSY, LAPAROTOMY EXPLORATORY, ILIOCECECTOMY 22  Discharge Date: 22 RARS: Readmission Risk Score: 17.2      Needs to be reviewed by the provider   Additional needs identified to be addressed with provider: No  none             Method of communication with provider: none.      -Spoke with patient for follow up care transition call.   -Patient reports she is \"feeling pretty good\" but does admits to some soreness today which she attributes to PT with [facility]. -CTN reviewed pulmonary visit with patient that occurred on 23. Patient states she is using her Anoro inhaler when she remembers. CTN explained to patient this is a maintenance inhaler and should be used daily. Patient states she did not obtain the mucinex d/t cost of medication and called the pulmonary office to inform. Patient states her cough and SOB are improved. -Patient denies any needs, questions, or concerns at this time and is agreeable to final call from CTN next outreach.     Addressed changes since last contact:   completed pulmonary appointment  on 23, neurology appointment scheduled      Follow Up  Future Appointments   Date Time Provider Shabnam Moore   2023  9:30 AM Radha Carranza MD [facility]   2023  2:15 PM Rustam Baez MD [facility]   2023 10:45 AM Chanel Russo MD [facility]   2023  8:45 AM Garret Damian,  [facility],  [address] [facility]   2/3/2023 12:15 PM MD Tasneem Garcia Neurology -   2023  9:15 AM SPENSER [facility]   2023 10:30 AM [facility] MED ONC FAST TRACK 1 [facility] Med Onc [facility]   2023 10:45 AM Monique Herman MD MED ONC [facility]   4/4/2023  9:30 AM Jatinder Presley MD [facility]   8/11/2023  9:30 AM [facility]     Non-[facility] follow up appointment(s): none      Patients top risk factors for readmission: medical condition-post op, HTN, AFib/flutter, acute respiratory failure and polypharmacy  Interventions to address risk factors:  continue consistent follow up with providers, maintain with [facility] Transitions Subsequent and Final Call    Subsequent and Final Calls  Do you have any questions related to your medications?: No  Do you currently have any active services?: Yes  Are you currently active with any services?: [address] you have any needs or concerns that I can assist you with?: No  Identified Barriers: None  Care Transitions Interventions  No Identified Needs    Pharmacist: Declined    Other Interventions:              Plan for follow-up call in 7-10 days based on severity of symptoms and risk factors.   Plan for next call: follow-up appointment-review any provider visits as applicable  Final call    Abby Sparrow RN

## 2023-01-09 NOTE — TELEPHONE ENCOUNTER
Call to pt to cancel appt d/t per provider she never completed CT of Lumbar. Per pt never completed d/t she is allergic to iodine and will need Rx before getting CT. She stated she has a blood clot in her rt calf but is experiencing pain in her rt thigh. Routing to provider for review before cancelling appt.

## 2023-01-10 ENCOUNTER — TELEPHONE (OUTPATIENT)
Dept: PULMONOLOGY | Age: 63
End: 2023-01-10

## 2023-01-10 DIAGNOSIS — J43.2 CENTRILOBULAR EMPHYSEMA (HCC): Primary | ICD-10-CM

## 2023-01-10 RX ORDER — UMECLIDINIUM BROMIDE AND VILANTEROL TRIFENATATE 62.5; 25 UG/1; UG/1
1 POWDER RESPIRATORY (INHALATION) DAILY
Qty: 1 EACH | Refills: 6 | Status: SHIPPED | OUTPATIENT
Start: 2023-01-10

## 2023-01-10 NOTE — TELEPHONE ENCOUNTER
Recommend she see who is treating for blood clot regarding right thigh pain.  We can refer patient to neurosurgery as well if she would prefer, she has received contrast in the past it seems and undergone pre-treatment protocol by radiology  Electronically signed by Jose Sesay PA-C on 1/10/2023 at 7:19 AM

## 2023-01-10 NOTE — TELEPHONE ENCOUNTER
Pt called into office and left VM indicating Inhaler was not covered. RN called pt and she reports the Mucinex was not covered (inhaler is good). Advised pt that Mucinex is OTC and she could pay less for it without script. She will do so and get med later today. Advised this med will help with secretions and cough.

## 2023-01-10 NOTE — TELEPHONE ENCOUNTER
Call back to pt advised. Recommend she see who is treating for blood clot regarding right thigh pain. (Pt stated she has had this rt thigh pain way before blood clot. Possibly from laying on rt side too long.) Advised We can refer patient to neurosurgery as well if she would prefer. (Pt stated why Neurosurgery - She does not want surgery) Advised she has received contrast in the past it seems and undergone pre-treatment protocol by radiology. (Pt indicated has had CT's in the past ordered by Dr. Samina Beltran and he always orders a steroid & benedryl 1 wk prior to her completing a CT.)    Cancelled appt. Routing to provider to verify if we can order rx prior to CT. Also inquiring if pt will need to contact Radiology scheduling since CT was ordered 10/5/22.

## 2023-01-11 ENCOUNTER — TELEPHONE (OUTPATIENT)
Dept: VASCULAR SURGERY | Age: 63
End: 2023-01-11

## 2023-01-12 ENCOUNTER — OFFICE VISIT (OUTPATIENT)
Dept: VASCULAR SURGERY | Age: 63
End: 2023-01-12
Payer: MEDICARE

## 2023-01-12 DIAGNOSIS — I82.551 CHRONIC DEEP VEIN THROMBOSIS (DVT) OF RIGHT PERONEAL VEIN (HCC): ICD-10-CM

## 2023-01-12 PROBLEM — K63.1 BOWEL PERFORATION (HCC): Status: RESOLVED | Noted: 2022-12-09 | Resolved: 2023-01-12

## 2023-01-12 PROBLEM — J96.01 ACUTE RESPIRATORY FAILURE WITH HYPOXIA (HCC): Status: RESOLVED | Noted: 2022-12-16 | Resolved: 2023-01-12

## 2023-01-12 PROBLEM — E88.09 HYPOALBUMINEMIA: Status: RESOLVED | Noted: 2022-12-16 | Resolved: 2023-01-12

## 2023-01-12 PROBLEM — I82.451 ACUTE DEEP VEIN THROMBOSIS (DVT) OF RIGHT PERONEAL VEIN (HCC): Status: RESOLVED | Noted: 2022-10-14 | Resolved: 2023-01-12

## 2023-01-12 PROBLEM — J90 PLEURAL EFFUSION, BILATERAL: Status: RESOLVED | Noted: 2022-12-16 | Resolved: 2023-01-12

## 2023-01-12 PROBLEM — N30.01 ACUTE CYSTITIS WITH HEMATURIA: Status: RESOLVED | Noted: 2021-12-16 | Resolved: 2023-01-12

## 2023-01-12 PROCEDURE — 99204 OFFICE O/P NEW MOD 45 MIN: CPT | Performed by: SURGERY

## 2023-01-12 NOTE — PROGRESS NOTES
Chief Complaint:   Chief Complaint   Patient presents with    Surgical Consult     DVT right leg, unable to take anticoagulants due to brain aneurysms. HPI: Patient came to the office, with her son, for the evaluation of vascular status right lower extremity, history of abnormal venous ultrasound, in October revealed localized thrombosis of right peroneal vein, at that time, patient also underwent a CT of the chest, evidence of pulmonary embolus, at that time she was also seen by pulmonologist    Patient at the time was not anticoagulated because of history of intracranial aneurysms in the past, several years ago patient had a ruptured intracranial aneurysm that were repaired, and patient was told that she has 2 other initial aneurysm that was not treated, patient does have an appointment to see one of the neurological consultant in the next week or 2 for the same    Patient also tells me that she was told not to take aspirin either    Patient subsequently underwent repeat ultrasound of the right leg, later in October and again in November of last year, no change other than localized occlusion of the right peroneal vein without involvement of the proximal veins      Patient denies any focal lateralizing neurological symptoms like loss of speech, vision or loss of function of extremity    Patient can walk short distances with the help of walker because of her spinal arthritis and osteoarthritis of the hip joints and the knee joints, and denies any symptoms of rest pain    Allergies   Allergen Reactions    Latex Hives     Hives and rash    Iodine Rash     Hives . pt.  States anaphalyxis    Shellfish-Derived Products Anaphylaxis and Hives    Aloe Hives     Hives     Celebrex [Celecoxib] Itching    Fish-Derived Products Other (See Comments)       Current Outpatient Medications   Medication Sig Dispense Refill    Umeclidinium-Vilanterol (ANORO ELLIPTA) 62.5-25 MCG/ACT AEPB Inhale 1 puff into the lungs daily 1 each 6    metoprolol tartrate (LOPRESSOR) 25 MG tablet Take 1.5 tablets by mouth 2 times daily 90 tablet 3    guaiFENesin (MUCINEX) 600 MG extended release tablet Take 1 tablet by mouth 2 times daily for 15 days 30 tablet 0    ondansetron (ZOFRAN-ODT) 4 MG disintegrating tablet Take 1 tablet by mouth every 8 hours as needed for Nausea or Vomiting 20 tablet 0    albuterol sulfate HFA (PROVENTIL;VENTOLIN;PROAIR) 108 (90 Base) MCG/ACT inhaler INHALE TWO PUFFS BY MOUTH EVERY 6 HOURS AS NEEDED FOR WHEEZING. 1 each 3    psyllium (METAMUCIL SMOOTH TEXTURE) 28.3 % POWD powder Take 3.4 g by mouth daily 368 g 5    amLODIPine (NORVASC) 5 MG tablet TAKE ONE TABLET BY MOUTH EVERY DAY 30 tablet 3    chlorthalidone (HYGROTON) 25 MG tablet Take 1 tablet by mouth in the morning. 30 tablet 3    fluticasone (FLONASE) 50 MCG/ACT nasal spray 2 sprays by Each Nostril route in the morning. 1 each 3    gabapentin (NEURONTIN) 300 MG capsule TAKE ONE CAPSULE BY MOUTH THREE TIMES A DAY 90 capsule 3    losartan (COZAAR) 100 MG tablet Take 1 tablet by mouth in the morning. 30 tablet 3    pantoprazole (PROTONIX) 40 MG tablet TAKE ONE TABLET BY MOUTH EVERY DAY 30 tablet 3    potassium chloride (KLOR-CON M) 10 MEQ extended release tablet TAKE ONE TABLET BY MOUTH DAILY WITH BREAKFAST 30 tablet 3    polyethylene glycol (GLYCOLAX) 17 g packet DISSOLVE 1 PACKET (17 GRAMS) IN LIQUID AND TAKE BY MOUTH DAILY      Multiple Vitamins-Minerals (THERAPEUTIC MULTIVITAMIN-MINERALS) tablet Take 1 tablet by mouth daily      Misc. Devices (ROLLATOR) MISC 1 each by Does not apply route daily as needed (use as needed for stability) 1 each 0    Misc. Devices (WRIST BRACE) MISC Firm neutral position left wrist brace  Size to fit  Dx:  Wrist pain/carpal tunnel syndrome (Patient not taking: Reported on 1/12/2023) 1 each 0     No current facility-administered medications for this visit.        Past Medical History:   Diagnosis Date    A-fib ([facility] [address]     Acute deep vein thrombosis (DVT) of right peroneal vein ([address] 10/14/2022    See note from pulmonology    Brain aneurysm 09/2018    H/O TIMES 2 THAT BURST PER PATIENT    Cerebral artery occlusion with cerebral infarction ([address]     RT SIDE AFFECTED-LEARNED TO WALK AND WRITE AGAIN    Chronic deep vein thrombosis (DVT) of right peroneal vein ([address] 1/12/2023    Degenerative arthritis of hand     Edentulous     Emphysema lung ([address]     lung dr    Emphysema of lung ([address]     Headache     Hiatal hernia     Hip problem     NEEDS HIP REPLACEMENT PER PATIENT    Hx of abnormal cervical Pap smear     Hypertension     Stroke (cerebrum) ([address]     Subarachnoid bleed ([address] 09/10/2018       Past Surgical History:   Procedure Laterality Date    BRAIN ANEURYSM SURGERY      coiling     COLONOSCOPY  08/30/2019    polyps--frank    COLONOSCOPY N/A 08/30/2019    COLONOSCOPY POLYPECTOMY SNARE/COLD BIOPSY performed by Jeanne Reis MD at [address] [facility] [place] N/A 12/9/2022    COLONOSCOPY POLYPECTOMY SNARE/COLD BIOPSY performed by Jeanne Reis MD at [address] [facility]  12/09/2022    polyp/cecal mass; diverticula--frank    JOINT REPLACEMENT      total hip replacement left and right    LAPAROTOMY N/A 12/09/2022    LAPAROTOMY EXPLORATORY, ILIOCECECTOMY performed by Jeanne Reis MD at [facility] [address] DONE  [facility] Right 12/13/2022    [address]    TONSILLECTOMY      TOTAL HIP ARTHROPLASTY Left 02/22/2021    LEFT HIP TOTAL ARTHROPLASTY performed by Anna Ross MD at [address] [facility] Right 01/10/2022    RIGHT TOTAL HIP ARTHROPLASTY - STYKER performed by Anna Ross MD at [address] [facility] ENDOSCOPY  08/30/2019    gastritis with superficial ulcerations; duodenitis--frank    UPPER GASTROINTESTINAL ENDOSCOPY N/A 08/30/2019    EGD BIOPSY performed by Jeanne Reis MD at [facility] History Problem Relation Age of Onset    Diabetes Mother     Arthritis Mother     Atrial Fibrillation Mother     Diabetes Father     Atrial Fibrillation Father     Arthritis Father     Emphysema Father     Cancer Sister         \"female\"       Social History     Socioeconomic History    Marital status:      Spouse name: Not on file    Number of children: Not on file    Years of education: Not on file    Highest education level: Not on file   Occupational History    Not on file   Tobacco Use    Smoking status: Former     Packs/day: 1.50     Years: 43.00     Pack years: 64.50     Types: Cigarettes     Quit date: 2018     Years since quittin.3    Smokeless tobacco: Never   Vaping Use    Vaping Use: Never used   Substance and Sexual Activity    Alcohol use: No    Drug use: No    Sexual activity: Not Currently   Other Topics Concern    Not on file   Social History Narrative    Not on file     Social Determinants of Health     Financial Resource Strain: Low Risk     Difficulty of Paying Living Expenses: Not very hard   Food Insecurity: No Food Insecurity    Worried About Running Out of Food in the Last Year: Never true    [address] in the Last Year: Never true   Transportation Needs: Not on file   Physical Activity: Sufficiently Active    Days of Exercise per Week: 6 days    Minutes of Exercise per Session: 60 min   Stress: Not on file   Social Connections: Not on file   Intimate Partner Violence: Not on file   Housing Stability: Not on file       Review of Systems:  Skin:  No abnormal pigmentation or rash  Eyes:  No blurring, diplopia or vision loss  Ears/Nose/Throat:  No hearing loss or vertigo  Respiratory:  No cough, pleuritic chest pain, dyspnea, or wheezing. Chronic obstructive lung disease  Cardiovascular: No angina, palpitations . Hypertension, hyperlipidemia  Gastrointestinal:  No nausea or vomiting; no abdominal pain or rectal bleeding  Musculoskeletal:  No arthritis or weakness.   Neurologic: No paralysis, paresis, paresthesia, seizures or headaches. History of intracranial aneurysm, ruptured, the time had a stroke, underwent repair of the aneurysms  Hematologic/Lymphatic/Immunologic:  No anemia, abnormal bleeding/bruising, fever, chills or night sweats. Endocrine:  No heat or cold intolerance. No polyphagia, polydipsia or polyuria. Physical Exam:  General appearance:  Alert, awake, oriented x 3. No distress. Skin:  Warm and dry  Head:  Normocephalic. No masses, lesions or tenderness  Eyes:  Conjunctivae appear normal; PERRL  Ears:  External ears normal  Nose/Sinuses:  Septum midline, mucosa normal; no drainage  Oropharynx:  Clear, no exudate noted  Neck:  No jugular venous distention, lymphadenopathy or thyromegaly. No evidence of carotid bruit  Lungs:  Clear to ausculation bilaterally. No rhonchi, crackles, wheezes  Heart:  Regular rate and rhythm. No rub or murmur  Abdomen:  Soft, non-tender. No masses, organomegaly. Musculoskeletal : No joint effusions, tenderness swelling    Neuro: Speech is intact. Moving all extremities. No focal motor or sensory deficits      Extremities:  Both feet are warm to touch. The color of both feet is normal.    No calf tenderness    Minimal ankle puffiness noted bilaterally  Pulses Right  Left    Brachial 3 3    Radial    3=normal   Femoral 2 2  2=diminished   Popliteal    1=barely palpable   Dorsalis pedis    0=absent   Posterior tibial 2 2  4=aneurysmal             Other pertinent information:1. The past medical records were reviewed. 2.  The 2 venous ultrasound right leg, done in October 1 done in November also in year 2022 were personally reviewed, localized occlusion of the right peroneal vein    Assessment:    1.  Chronic deep vein thrombosis (DVT) of right peroneal vein (HCC)              Plan:       Discussed the patient and her son, options, risks benefits and alternatives explained, informed them that have personally reviewed the multiple venous ultrasound, no change noted in the localized thrombosis right peroneal vein, clinically asymptomatic, reassured, for future monitoring, recommended repeat ultrasound but call milligrams his pain and swelling of legs in the future  . All the questions were answered. Orders Placed This Encounter   Procedures    US DUP LOWER EXTREMITY RIGHT RICARDO             Indicated follow-up: Return if symptoms worsen or fail to improve.

## 2023-01-13 RX ORDER — DIPHENHYDRAMINE HCL 50 MG
50 CAPSULE ORAL ONCE
Qty: 1 CAPSULE | Refills: 0 | Status: SHIPPED | OUTPATIENT
Start: 2023-01-13 | End: 2023-01-13

## 2023-01-13 RX ORDER — PREDNISONE 50 MG/1
TABLET ORAL
Qty: 3 TABLET | Refills: 0 | Status: SHIPPED | OUTPATIENT
Start: 2023-01-13

## 2023-01-13 NOTE — TELEPHONE ENCOUNTER
Call placed to patient. Gave her ph # to call to schedule myelogram. States she has taken benadryl/prednisone combo prior to contrast dye in the past with no problems. See attached orders for contrast prophylaxis. Discussed referral to neurosurgery pending her results. She agrees to plan.

## 2023-01-17 ENCOUNTER — TELEPHONE (OUTPATIENT)
Dept: ORTHOPEDIC SURGERY | Age: 63
End: 2023-01-17

## 2023-01-17 DIAGNOSIS — M54.16 LUMBAR RADICULOPATHY: Primary | ICD-10-CM

## 2023-01-17 NOTE — TELEPHONE ENCOUNTER
Patient message states that she is set up for an ultrasound but that she has some questions. Requesting a call back.      Future Appointments   Date Time Provider Shabnam Moore   1/18/2023  9:00 AM [facility]   1/20/2023 10:45 AM MD Jayne Stratton [facility]   1/27/2023  8:45 AM DO Bruce Mcgraw [facility]   2/3/2023 12:15 PM Sarabjit Campos MD [facility] Neurology -   2/8/2023  9:15 AM SPENSER [facility] [place] [facility]   2/9/2023 10:30 AM [facility]C FAST TRACK 1 [facility] Med Onc [facility]   2/9/2023 10:45 AM Axel Santa MD MED ONC [facility]   4/4/2023  9:30 AM Vipin Francoise Angelucci, MD [facility] Pulm [facility]   8/11/2023  9:30 AM MD Jayne Stratton [facility]

## 2023-01-17 NOTE — TELEPHONE ENCOUNTER
Called and spoke with patient. Patient states that she misspoke and that she was calling about the CT of the lumbar spine and the myelogram. Patient states that she called schedule and they are stating that the order is not in there. There is an order from 10/5/22 for CT Lumbar Spine w contrast but no myleogram so IR would not schedule without both orders. Patient also had questions about her medications for the CT w contrast. She states that when she has a CT of her head in the past that there was more medication that she had to take and she wants to make sure that the medication is correct. Requests call back.      Future Appointments   Date Time Provider Shabnam Moore   1/18/2023  9:00 AM [facility] VAS US  Shana   1/20/2023 10:45 AM MD Jayne Pugh [facility]   1/27/2023  8:45 AM DO Bruce Carranza [facility]   2/3/2023 12:15 PM Jane Live MD [facility] Neurology    2/8/2023  9:15 AM SPENSER [facility] [place] [facility]   2/9/2023 10:30 AM [facility] ONC FAST TRACK 1 [facility] Med On[facility]   2/9/2023 10:45 AM Santosh Fong MD MED ONC [facility]   4/4/2023  9:30 AM Yaneth Peters MD [facility] Pul[facility]   8/11/2023  9:30 AM MD Jayne Pugh [facility]     Pend and routed

## 2023-01-18 ENCOUNTER — HOSPITAL ENCOUNTER (OUTPATIENT)
Dept: CARDIOLOGY | Age: 63
Discharge: HOME | End: 2023-01-18
Payer: MEDICARE

## 2023-01-18 ENCOUNTER — TELEPHONE (OUTPATIENT)
Dept: VASCULAR SURGERY | Age: 63
End: 2023-01-18

## 2023-01-18 DIAGNOSIS — I82.551 CHRONIC DEEP VEIN THROMBOSIS (DVT) OF RIGHT PERONEAL VEIN (HCC): ICD-10-CM

## 2023-01-18 PROCEDURE — 93971 EXTREMITY STUDY: CPT

## 2023-01-18 NOTE — PROGRESS NOTES
[facility] Heart & Vascular Lab - [facility]    This is a pre read worksheet - prior to official physician interpretationRuth Fiore    1960  Date of study: 1/18/23    Indication for study:  Leg pain and swelling  Study : Right Lower Extremity Venous Duplex Examination    Duplex examination of the common, deep, superficial femoral, and the popliteal veins of the RIGHT lower extremity identifies spontaneous flow. Posterior tibial veins appear wnl. Additional comments: Positive, chronic- appearing, non- occlusive thrombosis of the right peroneal vein.

## 2023-01-18 NOTE — TELEPHONE ENCOUNTER
Discussed with the patient regarding the venous ultrasound that was done, evidence of chronic changes involving the peroneal vein, otherwise rest of the veins are open, no evidence of acute deep vein thrombosis, patient was reassured but to call me if any symptoms of pain or swelling of legs in the future, all her questions were answered

## 2023-01-19 ENCOUNTER — CARE COORDINATION (OUTPATIENT)
Dept: CASE MANAGEMENT | Age: 63
End: 2023-01-19

## 2023-01-19 NOTE — CARE COORDINATION
[facility] Care Transitions Follow Up Call    Care Transition Nurse contacted the patient by telephone to follow up after admission on 23. Patient: Ale Leggett  Patient : 1960   MRN: [id number]  Reason for Admission: personal history of colonic polyps, s/p COLONOSCOPY POLYPECTOMY SNARE/COLD BIOPSY, LAPAROTOMY EXPLORATORY, ILIOCECECTOMY 22  Discharge Date: 22 RARS: Readmission Risk Score: 17.2      Needs to be reviewed by the provider   Additional needs identified to be addressed with provider: No  none             Method of communication with provider: none.      -Spoke with patient for final care transition call.   -Patient doing well, having no issues, verbalized how happy she was with the good news provided by vascular surgery on the results of her leg ultrasound.  -Patient aware of tomorrow's appointment with PCP. -Patient denies any needs, questions, or concerns at this time. -CTN to sign off.     Addressed changes since last contact:   completed vascular surgery appointment, had right leg ultrasound        Follow Up  Future Appointments   Date Time Provider Shabnam Moore   2023 10:45 AM MD Jayne [facility]uelas [facility]   2023  8:45 AM DO Bruce Hua [facility]   2/3/2023 12:15 PM Clearnce Homans, MD [facility] -   2023  9:15 AM SPENSER [facility]   2023 10:30 AM [facility] MED ONC FAST TRACK 1 [facility] Med Onc [facility]   2023 10:45 AM Gaylin Essex, MD MED ONC [facility]   2023  9:30 AM [facility] Ll[facility]   [facility]30 [facility]     Non[facility] follow up appointment(s): none       Care Transitions Subsequent and Final Call    Subsequent and Final Calls  Do you have any ongoing symptoms?: No  Have your medications changed?: No  Do you have any questions related to your medications?: No  Do you currently have any active services?: Yes  Are you currently active with any services?: Home Health  Do you have any needs or concerns that I can assist you with?: No  Identified Barriers: None  Care Transitions Interventions  No Identified Needs    Pharmacist: Declined    Other Interventions:             No further follow-up call indicated based on severity of symptoms and risk factors.     Cheko Irving RN

## 2023-01-19 NOTE — PROCEDURES
[address]                  [address] [facility],  [facility]                                VASCULAR REPORT    PATIENT NAME: Johanna Cartagena                    :        1960  MED REC NO:   [id number]                            ROOM:  ACCOUNT NO:   [de-identified]                           ADMIT DATE: 2023  PROVIDER:     Kelly Spangler MD    DATE OF PROCEDURE:  2023    VENOUS ULTRASOUND STUDY OF RIGHT LEG    INDICATION:  History of deep vein thrombosis of the right leg. FINDINGS:  Venous duplex study of right lower extremity revealed that  all the major deep veins are patent with normal color flow and normal  compression without evidence of deep vein thrombosis including the  visualized portions of the posterior tibial veins. The patient however has chronic-appearing nonocclusive findings  involving the peroneal vein without evidence of acute thrombus  formation.         Lizzy Green MD    D: 2023 15:16:27       T: 2023 18:57:41     LISA/[email or web address]  Job#: [id number]     Doc#: [id number]    CC:  Wilson Shepherd MD

## 2023-01-20 ENCOUNTER — OFFICE VISIT (OUTPATIENT)
Dept: FAMILY MEDICINE CLINIC | Age: 63
End: 2023-01-20

## 2023-01-20 VITALS
WEIGHT: 219 LBS | OXYGEN SATURATION: 96 % | HEART RATE: 56 BPM | SYSTOLIC BLOOD PRESSURE: 122 MMHG | BODY MASS INDEX: 36.49 KG/M2 | DIASTOLIC BLOOD PRESSURE: 60 MMHG | HEIGHT: 65 IN | RESPIRATION RATE: 16 BRPM

## 2023-01-20 DIAGNOSIS — Z23 NEED FOR SHINGLES VACCINE: ICD-10-CM

## 2023-01-20 DIAGNOSIS — I48.92 ATRIAL FLUTTER, UNSPECIFIED TYPE (HCC): ICD-10-CM

## 2023-01-20 DIAGNOSIS — R19.8 ALTERNATING CONSTIPATION AND DIARRHEA: ICD-10-CM

## 2023-01-20 DIAGNOSIS — I10 HYPERTENSION, UNSPECIFIED TYPE: Primary | ICD-10-CM

## 2023-01-20 DIAGNOSIS — M54.16 LUMBAR RADICULOPATHY: ICD-10-CM

## 2023-01-20 DIAGNOSIS — T14.90XA INJURY, UNSPECIFIED, INITIAL ENCOUNTER: ICD-10-CM

## 2023-01-20 DIAGNOSIS — T50.8X5A ALLERGIC REACTION TO CONTRAST MATERIAL, INITIAL ENCOUNTER: Primary | ICD-10-CM

## 2023-01-20 DIAGNOSIS — G89.4 CHRONIC PAIN SYNDROME: ICD-10-CM

## 2023-01-20 DIAGNOSIS — Z76.0 MEDICATION REFILL: ICD-10-CM

## 2023-01-20 RX ORDER — CHLORTHALIDONE 25 MG/1
25 TABLET ORAL DAILY
Qty: 30 TABLET | Refills: 3 | Status: SHIPPED | OUTPATIENT
Start: 2023-01-20

## 2023-01-20 RX ORDER — PREDNISONE 50 MG/1
TABLET ORAL
Qty: 3 TABLET | Refills: 0 | Status: SHIPPED | OUTPATIENT
Start: 2023-01-20

## 2023-01-20 RX ORDER — DIPHENHYDRAMINE HCL 50 MG
50 CAPSULE ORAL ONCE
Qty: 1 CAPSULE | Refills: 0 | Status: SHIPPED | OUTPATIENT
Start: 2023-01-20 | End: 2023-01-20

## 2023-01-20 RX ORDER — LOSARTAN POTASSIUM 100 MG/1
100 TABLET ORAL DAILY
Qty: 30 TABLET | Refills: 3 | Status: SHIPPED | OUTPATIENT
Start: 2023-01-20

## 2023-01-20 RX ORDER — GABAPENTIN 300 MG/1
CAPSULE ORAL
Qty: 90 CAPSULE | Refills: 3 | Status: SHIPPED | OUTPATIENT
Start: 2023-01-20 | End: 2023-06-29

## 2023-01-20 RX ORDER — AMLODIPINE BESYLATE 5 MG/1
TABLET ORAL
Qty: 30 TABLET | Refills: 3 | Status: SHIPPED | OUTPATIENT
Start: 2023-01-20

## 2023-01-20 RX ORDER — PANTOPRAZOLE SODIUM 40 MG/1
TABLET, DELAYED RELEASE ORAL
Qty: 30 TABLET | Refills: 3 | Status: SHIPPED | OUTPATIENT
Start: 2023-01-20

## 2023-01-20 RX ORDER — PSYLLIUM SEED (WITH DEXTROSE)
3.4 POWDER (GRAM) ORAL 2 TIMES DAILY
Qty: 538 G | Refills: 5 | Status: SHIPPED | OUTPATIENT
Start: 2023-01-20

## 2023-01-20 ASSESSMENT — PATIENT HEALTH QUESTIONNAIRE - PHQ9
SUM OF ALL RESPONSES TO PHQ QUESTIONS 1-9: 0
DEPRESSION UNABLE TO ASSESS: FUNCTIONAL CAPACITY MOTIVATION LIMITS ACCURACY
1. LITTLE INTEREST OR PLEASURE IN DOING THINGS: 0
SUM OF ALL RESPONSES TO PHQ QUESTIONS 1-9: 0
SUM OF ALL RESPONSES TO PHQ9 QUESTIONS 1 & 2: 0
2. FEELING DOWN, DEPRESSED OR HOPELESS: 0

## 2023-01-20 ASSESSMENT — ENCOUNTER SYMPTOMS
SHORTNESS OF BREATH: 1
DIARRHEA: 0
WHEEZING: 0
CONSTIPATION: 0

## 2023-01-20 NOTE — PROGRESS NOTES
1/20/2023    Edgard Ruiz is a 60 y.o. female here for   Chief Complaint   Patient presents with    Shortness of Breath     3 month f/u for sob    Health Maintenance     Declined flu shot      Here for routine f/u  Multiple medical issues since our last visit including bowel perforation following colonoscopy and subsequent abdominal surgery. She developed afib/ aflutter in the hospital. She had thoracentesis. See hospital f/u appointment for more details. Currently she is feeling okay. She is feeling a little bit less short of breath though admits to forgetting to take Anoro inhaler daily. She does not always feel the need to take it. She primarily only gets short of breath if she is doing something somewhat strenuous, though has a lot of questions about what restrictions if any she has to follow given her surgery and cardiac conditions. Recent visit with vascular. This was very reassuring. She was told that the clot in her leg was chronic and its not really there anymore and she has no need for follow-up. She does have upcoming appointment with neurology to discuss her history of aneurysm and subarachnoid hemorrhage and specifically whether or not anticoagulation could be considered in the future. However vascular did not think that anticoagulation was needed at this time. Note also episode of atrial fibrillation and a flutter during hospitalization. She has not had any symptoms of palpitations. She is on metoprolol. She is not on any antiplatelet or anticoagulation at this time. She has follow-up next week with cardiology. She denies any exertional chest pain though she has had on and off episodes of a sharp chest pain in the lying against her left chest that has occurred intermittently over the last few months. It does not stop her in her tracks it goes away fairly quickly. She is hesitant to get a flu shot due to previously not feeling well after getting flu shot several years ago. However she is motivated to stay healthy. Walking with walker. Pain under good control. Taking gabapentin 300 mg 3 times a day. OARRS reviewed. Consistent    Wt Readings from Last 3 Encounters:   01/20/23 219 lb (99.3 kg)   01/04/23 214 lb (97.1 kg)   12/28/22 218 lb 6.4 oz (99.1 kg)       She  reports that she quit smoking about 4 years ago. Her smoking use included cigarettes. She has a 64.50 pack-year smoking history. She has never used smokeless tobacco.    Medications and allergies reviewed and updated in chart. Current Outpatient Medications   Medication Sig Dispense Refill    predniSONE (DELTASONE) 50 MG tablet Take 1 tab 13 hours prior to procedure, 1 tab 7 hours prior to procedure, and 1 tab 1 hour prior to procedure 3 tablet 0    Umeclidinium-Vilanterol (ANORO ELLIPTA) 62.5-25 MCG/ACT AEPB Inhale 1 puff into the lungs daily 1 each 6    metoprolol tartrate (LOPRESSOR) 25 MG tablet Take 1.5 tablets by mouth 2 times daily 90 tablet 3    ondansetron (ZOFRAN-ODT) 4 MG disintegrating tablet Take 1 tablet by mouth every 8 hours as needed for Nausea or Vomiting 20 tablet 0    albuterol sulfate HFA (PROVENTIL;VENTOLIN;PROAIR) 108 (90 Base) MCG/ACT inhaler INHALE TWO PUFFS BY MOUTH EVERY 6 HOURS AS NEEDED FOR WHEEZING. 1 each 3    psyllium (METAMUCIL SMOOTH TEXTURE) 28.3 % POWD powder Take 3.4 g by mouth daily 368 g 5    Misc. Devices (WRIST BRACE) MISC Firm neutral position left wrist brace  Size to fit  Dx:  Wrist pain/carpal tunnel syndrome (Patient taking differently: Firm neutral position left wrist brace  Size to fit  Dx:  Wrist pain/carpal tunnel syndrome) 1 each 0    amLODIPine (NORVASC) 5 MG tablet TAKE ONE TABLET BY MOUTH EVERY DAY 30 tablet 3    chlorthalidone (HYGROTON) 25 MG tablet Take 1 tablet by mouth in the morning. 30 tablet 3    fluticasone (FLONASE) 50 MCG/ACT nasal spray 2 sprays by Each Nostril route in the morning.  1 each 3    losartan (COZAAR) 100 MG tablet Take 1 tablet by mouth in the morning. 30 tablet 3    pantoprazole (PROTONIX) 40 MG tablet TAKE ONE TABLET BY MOUTH EVERY DAY 30 tablet 3    potassium chloride (KLOR-CON M) 10 MEQ extended release tablet TAKE ONE TABLET BY MOUTH DAILY WITH BREAKFAST 30 tablet 3    polyethylene glycol (GLYCOLAX) 17 g packet DISSOLVE 1 PACKET (17 GRAMS) IN LIQUID AND TAKE BY MOUTH DAILY      Multiple Vitamins-Minerals (THERAPEUTIC MULTIVITAMIN-MINERALS) tablet Take 1 tablet by mouth daily      Misc. Devices (ROLLATOR) MISC 1 each by Does not apply route daily as needed (use as needed for stability) 1 each 0    gabapentin (NEURONTIN) 300 MG capsule TAKE ONE CAPSULE BY MOUTH THREE TIMES A DAY 90 capsule 3     No current facility-administered medications for this visit.        Patient'spast medical, surgical, social and/or family history reviewed, updated in chart, and are non-contributory (unless otherwise stated).      Review of Systems  Review of Systems   Constitutional:  Negative for chills, diaphoresis and fever.   Eyes:  Negative for visual disturbance.   Respiratory:  Positive for shortness of breath (only mild shortness of breath with activity). Negative for wheezing.    Cardiovascular:  Negative for chest pain (reports intermittent chest pain in a straight line across her left chest on and off for several months. no exertional chest pain).   Gastrointestinal:  Negative for constipation and diarrhea.   Neurological:  Negative for dizziness.   Psychiatric/Behavioral:  Negative for dysphoric mood.      PE:  VS:  /60   Pulse 56   Resp 16   Ht 5' 5\" (1.651 m)   Wt 219 lb (99.3 kg)   SpO2 96%   BMI 36.44 kg/m²   Physical Exam  Constitutional:       Appearance: She is well-developed.   HENT:      Head: Normocephalic and atraumatic.   Cardiovascular:      Rate and Rhythm: Normal rate and regular rhythm.      Heart sounds: No murmur heard.    No friction rub. No gallop.   Pulmonary:      Effort: Pulmonary effort is normal.      Breath sounds: Normal  breath sounds. No wheezing or rales. Musculoskeletal:      Right lower leg: Edema (trace) present. Left lower leg: Edema (trace) present. Skin:     General: Skin is warm and dry. Neurological:      General: No focal deficit present. Mental Status: She is alert and oriented to person, place, and time. Assessment/Plan:  Garo Degroot was seen today for shortness of breath and health maintenance. Diagnoses and all orders for this visit:    Hypertension, unspecified type  Bp at goal on losartan, chlorthalidone and amlodipine  Now on metoprolol due to h/o atrial arrhythmia  -     losartan (COZAAR) 100 MG tablet; Take 1 tablet by mouth daily  -     chlorthalidone (HYGROTON) 25 MG tablet; Take 1 tablet by mouth daily  -     amLODIPine (NORVASC) 5 MG tablet; TAKE ONE TABLET BY MOUTH EVERY DAY    Atrial flutter, unspecified type ([facility] [address]  Currently regula rhythm  F/u with cardiology next week  This occurred in post op period  On metoprolol for rate control       Alternating constipation and diarrhea  Recent bowel surgery  Increase fiber to bid  -     psyllium (METAMUCIL SMOOTH TEXTURE) 28.3 % POWD powder; Take 3.4 g by mouth 2 times daily    Chronic pain syndrome  -     gabapentin (NEURONTIN) 300 MG capsule; TAKE ONE CAPSULE BY MOUTH THREE TIMES A DAY    Need for shingles vaccine    Other orders  -     Influenza, FLUCELVAX, (age 10 mo+), IM, Preservative Free, 0.5 mL      Return in about 3 months (around 4/20/2023) for routine followup, hypertension. Advised patient to call with any new medication issues. All questions answered.   Call or go to emergency department ifsymptoms worsen or persist.

## 2023-01-24 DIAGNOSIS — Z76.0 MEDICATION REFILL: ICD-10-CM

## 2023-01-25 RX ORDER — POTASSIUM CHLORIDE 750 MG/1
TABLET, EXTENDED RELEASE ORAL
Qty: 30 TABLET | Refills: 3 | Status: SHIPPED | OUTPATIENT
Start: 2023-01-25

## 2023-01-27 ENCOUNTER — OFFICE VISIT (OUTPATIENT)
Dept: CARDIOLOGY CLINIC | Age: 63
End: 2023-01-27
Payer: MEDICARE

## 2023-01-27 VITALS
WEIGHT: 210.9 LBS | RESPIRATION RATE: 18 BRPM | SYSTOLIC BLOOD PRESSURE: 122 MMHG | DIASTOLIC BLOOD PRESSURE: 64 MMHG | HEIGHT: 65 IN | HEART RATE: 51 BPM | BODY MASS INDEX: 35.14 KG/M2

## 2023-01-27 DIAGNOSIS — I48.91 ATRIAL FIBRILLATION WITH RVR (HCC): Primary | ICD-10-CM

## 2023-01-27 PROCEDURE — 3017F COLORECTAL CA SCREEN DOC REV: CPT | Performed by: INTERNAL MEDICINE

## 2023-01-27 PROCEDURE — 93000 ELECTROCARDIOGRAM COMPLETE: CPT | Performed by: INTERNAL MEDICINE

## 2023-01-27 PROCEDURE — 3074F SYST BP LT 130 MM HG: CPT | Performed by: INTERNAL MEDICINE

## 2023-01-27 PROCEDURE — G8417 CALC BMI ABV UP PARAM F/U: HCPCS | Performed by: INTERNAL MEDICINE

## 2023-01-27 PROCEDURE — G8427 DOCREV CUR MEDS BY ELIG CLIN: HCPCS | Performed by: INTERNAL MEDICINE

## 2023-01-27 PROCEDURE — 3078F DIAST BP <80 MM HG: CPT | Performed by: INTERNAL MEDICINE

## 2023-01-27 PROCEDURE — 99214 OFFICE O/P EST MOD 30 MIN: CPT | Performed by: INTERNAL MEDICINE

## 2023-01-27 PROCEDURE — G8482 FLU IMMUNIZE ORDER/ADMIN: HCPCS | Performed by: INTERNAL MEDICINE

## 2023-01-27 PROCEDURE — 1036F TOBACCO NON-USER: CPT | Performed by: INTERNAL MEDICINE

## 2023-01-27 NOTE — PROGRESS NOTES
[facility] Cardiology Progress Note    Patient is a 60 y.o. female of Rojelio Ramirez MD seen in follow up. Chief complaint: PAfib    HPI: No CP or SOB. Patient Active Problem List   Diagnosis    Obesity (BMI 30-39. 9)    Hypertension    Chronic pain syndrome    Lumbar facet arthropathy    Lumbar disc disorder    Primary osteoarthritis of both hips    Arthritis of right hip    Dysphagia    Heartburn    At risk for colon cancer    Colon polyps    Degenerative disc disease, cervical    Arthropathy of cervical facet joint    Abnormal blood sugar    Arthritis of both hips    COVID-19    H/O total hip arthroplasty, right    History of total left hip arthroplasty    Primary osteoarthritis of right hip    Pulmonary emphysema (HCC)    History of herpes genitalis    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    History of COVID-19    Aneurysm (HCC)    S/P total right hip arthroplasty    Cognitive impairment    Personal history of colonic polyps    Abnormal diffusion capacity determined by pulmonary function test    BELCHER (dyspnea on exertion)    Atrial flutter (HCC)    Atrial fibrillation with RVR (HCC)    LFT elevation    (HFpEF) heart failure with preserved ejection fraction (HCC)    Chronic deep vein thrombosis (DVT) of right peroneal vein (HCC)       Allergies   Allergen Reactions    Latex Hives     Hives and rash    Iodine Rash     Hives . pt.  States anaphalyxis    Shellfish-Derived Products Anaphylaxis and Hives    Aloe Hives     Hives     Celebrex [Celecoxib] Itching    Fish-Derived Products Other (See Comments)       Current Outpatient Medications   Medication Sig Dispense Refill    Fexofenadine HCl (MUCINEX ALLERGY PO) Take by mouth      potassium chloride (KLOR-CON M) 10 MEQ extended release tablet TAKE ONE TABLET BY MOUTH DAILY WITH BREAKFAST 30 tablet 3    losartan (COZAAR) 100 MG tablet Take 1 tablet by mouth daily 30 tablet 3    pantoprazole (PROTONIX) 40 MG tablet TAKE ONE TABLET BY MOUTH EVERY DAY 30 tablet 3    chlorthalidone (HYGROTON) 25 MG tablet Take 1 tablet by mouth daily 30 tablet 3    amLODIPine (NORVASC) 5 MG tablet TAKE ONE TABLET BY MOUTH EVERY DAY 30 tablet 3    psyllium (METAMUCIL SMOOTH TEXTURE) 28.3 % POWD powder Take 3.4 g by mouth 2 times daily 538 g 5    gabapentin (NEURONTIN) 300 MG capsule TAKE ONE CAPSULE BY MOUTH THREE TIMES A DAY 90 capsule 3    Umeclidinium-Vilanterol (ANORO ELLIPTA) 62.5-25 MCG/ACT AEPB Inhale 1 puff into the lungs daily 1 each 6    metoprolol tartrate (LOPRESSOR) 25 MG tablet Take 1.5 tablets by mouth 2 times daily 90 tablet 3    albuterol sulfate HFA (PROVENTIL;VENTOLIN;PROAIR) 108 (90 Base) MCG/ACT inhaler INHALE TWO PUFFS BY MOUTH EVERY 6 HOURS AS NEEDED FOR WHEEZING. 1 each 3    fluticasone (FLONASE) 50 MCG/ACT nasal spray 2 sprays by Each Nostril route in the morning. 1 each 3    polyethylene glycol (GLYCOLAX) 17 g packet DISSOLVE 1 PACKET (17 GRAMS) IN LIQUID AND TAKE BY MOUTH DAILY      Multiple Vitamins-Minerals (THERAPEUTIC MULTIVITAMIN-MINERALS) tablet Take 1 tablet by mouth daily      Misc. Devices (ROLLATOR) MISC 1 each by Does not apply route daily as needed (use as needed for stability) 1 each 0    predniSONE (DELTASONE) 50 MG tablet 50 mg 13 hours prior to test, 50 mg 7 hours prior to test, 50 mg 1 hour before testing (Patient not taking: Reported on 1/27/2023) 3 tablet 0    predniSONE (DELTASONE) 50 MG tablet Take 1 tab 13 hours prior to procedure, 1 tab 7 hours prior to procedure, and 1 tab 1 hour prior to procedure (Patient not taking: Reported on 1/27/2023) 3 tablet 0    Misc. Devices (WRIST BRACE) MISC Firm neutral position left wrist brace  Size to fit  Dx:  Wrist pain/carpal tunnel syndrome (Patient not taking: Reported on 1/27/2023) 1 each 0     No current facility-administered medications for this visit. Review of systems:   Heart: as above   Lungs: as above   Eyes: denies changes in vision or discharge. Ears: denies changes in hearing or pain. Nose: denies epistaxis or masses   Throat: denies sore throat or trouble swallowing. Neuro: denies numbness, tingling, tremors. Skin: denies rashes or itching. : denies hematuria, dysuria   GI: denies vomiting, diarrhea   Psych: denies mood changed, anxiety, depression. Physical Exam   /64   Pulse 51   Resp 18   Ht 5' 5\" (1.651 m)   Wt 210 lb 14.4 oz (95.7 kg)   BMI 35.10 kg/m²   Constitutional: Oriented to person, place, and time. No distress. Well developed. Head: Normocephalic and atraumatic. Neck: Neck supple. No hepatojugular reflux. No JVD present. Carotid bruit is not present. No tracheal deviation present. No thyromegaly present. Cardiovascular: Normal rate, regular rhythm, normal heart sounds. intact distal pulses. No gallop and no friction rub. No murmur heard. Pulmonary: Breath sounds normal. No respiratory distress. No wheezes. No rales. Chest: Effort normal. No tenderness. Abdominal: Soft. Bowel sounds are normal. No distension or mass. No tenderness, rebound or guarding. Musculoskeletal: . No tenderness. No clubbing or cyanosis. Extremitites: Intact distal pulses. No edema  Neurological: Alert and oriented to person, place, and time. Skin: Skin is warm and dry. No rash noted. Not diaphoretic. No erythema. Psychiatric: Normal mood and affect. Behavior is normal.   Lymphadenopathy: No cervical adenopathy. No groin adenopathy. EKG: Sinus bradycardia. Echo Summary 12/14/2022:   Ejection fraction is visually estimated at 70%. No regional wall motion abnormalities seen. Moderate left ventricular concentric hypertrophy noted. Normal right ventricle structure and function. Physiologic and/or trace aortic regurgitation is noted. Physiologic and/or trace mitral regurgitation is present. No evidence for hemodynamically significant pericardial effusion.     ASSESSMENT & PLAN:    Patient Active Problem List Diagnosis    Obesity (BMI 30-39. 9)    Hypertension    Chronic pain syndrome    Lumbar facet arthropathy    Lumbar disc disorder    Primary osteoarthritis of both hips    Arthritis of right hip    Dysphagia    Heartburn    At risk for colon cancer    Colon polyps    Degenerative disc disease, cervical    Arthropathy of cervical facet joint    Abnormal blood sugar    Arthritis of both hips    COVID-19    H/O total hip arthroplasty, right    History of total left hip arthroplasty    Primary osteoarthritis of right hip    Pulmonary emphysema (HCC)    History of herpes genitalis    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    History of COVID-19    Aneurysm (HCC)    S/P total right hip arthroplasty    Cognitive impairment    Personal history of colonic polyps    Abnormal diffusion capacity determined by pulmonary function test    BELCHER (dyspnea on exertion)    Atrial flutter (HCC)    Atrial fibrillation with RVR (HCC)    LFT elevation    (HFpEF) heart failure with preserved ejection fraction (HCC)    Chronic deep vein thrombosis (DVT) of right peroneal vein ([address]     1. PAFlutter:     Echo as above. Back in sinus. BB. No systemic anticoagulation due to Hx of SAH/aneurysm. 2. Recent perforated bowel/ID issues: Per surgery. 3. SOB: Improved. 4. ORLIN: Resolved. 5. Anemia: Follow labs. 6. Hx of DVT    7. Hx of SAH/Aneurysm    Jennifer Burns D.O.   Cardiologist  Cardiology, [id number] [facility]

## 2023-02-03 ENCOUNTER — OFFICE VISIT (OUTPATIENT)
Dept: NEUROLOGY | Age: 63
End: 2023-02-03
Payer: MEDICARE

## 2023-02-03 VITALS
HEART RATE: 55 BPM | SYSTOLIC BLOOD PRESSURE: 105 MMHG | BODY MASS INDEX: 36.15 KG/M2 | DIASTOLIC BLOOD PRESSURE: 67 MMHG | WEIGHT: 217 LBS | TEMPERATURE: 97.6 F | RESPIRATION RATE: 16 BRPM | HEIGHT: 65 IN | OXYGEN SATURATION: 97 %

## 2023-02-03 DIAGNOSIS — I67.1 CEREBRAL ANEURYSM: Primary | ICD-10-CM

## 2023-02-03 DIAGNOSIS — R51.9 NONINTRACTABLE HEADACHE, UNSPECIFIED CHRONICITY PATTERN, UNSPECIFIED HEADACHE TYPE: ICD-10-CM

## 2023-02-03 PROCEDURE — G8482 FLU IMMUNIZE ORDER/ADMIN: HCPCS | Performed by: PSYCHIATRY & NEUROLOGY

## 2023-02-03 PROCEDURE — G8417 CALC BMI ABV UP PARAM F/U: HCPCS | Performed by: PSYCHIATRY & NEUROLOGY

## 2023-02-03 PROCEDURE — 3078F DIAST BP <80 MM HG: CPT | Performed by: PSYCHIATRY & NEUROLOGY

## 2023-02-03 PROCEDURE — 99204 OFFICE O/P NEW MOD 45 MIN: CPT | Performed by: PSYCHIATRY & NEUROLOGY

## 2023-02-03 PROCEDURE — 3074F SYST BP LT 130 MM HG: CPT | Performed by: PSYCHIATRY & NEUROLOGY

## 2023-02-03 PROCEDURE — G8427 DOCREV CUR MEDS BY ELIG CLIN: HCPCS | Performed by: PSYCHIATRY & NEUROLOGY

## 2023-02-03 PROCEDURE — 3017F COLORECTAL CA SCREEN DOC REV: CPT | Performed by: PSYCHIATRY & NEUROLOGY

## 2023-02-03 PROCEDURE — 1036F TOBACCO NON-USER: CPT | Performed by: PSYCHIATRY & NEUROLOGY

## 2023-02-03 NOTE — PROGRESS NOTES
Neuro endovascular Surgery Consultation          Reilly Gracia is a 60 y.o. woman with a past medical history of ruptured subarachnoid hemorrhage in 2018 secondary to 2 left ENRRIQUE aneurysms and multiple other aneurysms. However she had a complicated history and eventually became better. She is here for follow-up. She is referred by Vitaliy Rosas MD to me to provide services as a Neuro endovascular specialist as her physician Dr. Macy Pérez has retired.       Past Medical History:     Past Medical History:   Diagnosis Date    A-fib ([address]     Acute deep vein thrombosis (DVT) of right peroneal vein ([address] 10/14/2022    See note from pulmonology    Brain aneurysm 09/2018    H/O TIMES 2 THAT BURST PER PATIENT    Cerebral artery occlusion with cerebral infarction ([address]     RT SIDE AFFECTED-LEARNED TO WALK AND WRITE AGAIN    Chronic deep vein thrombosis (DVT) of right peroneal vein ([address] 1/12/2023    Degenerative arthritis of hand     Edentulous     Emphysema lung ([address]     lung dr    Emphysema of lung ([address]     Headache     Hiatal hernia     Hip problem     NEEDS HIP REPLACEMENT PER PATIENT    Hx of abnormal cervical Pap smear     Hypertension     Stroke (cerebrum) ([address]     Subarachnoid bleed ([address] 09/10/2018       Past Surgical History:     Past Surgical History:   Procedure Laterality Date    BRAIN ANEURYSM SURGERY      coiling     COLONOSCOPY  08/30/2019    polyps--frank    COLONOSCOPY N/A 08/30/2019    COLONOSCOPY POLYPECTOMY SNARE/COLD BIOPSY performed by Chidi Ramos MD at [address] [facility] N/A 12/9/2022    COLONOSCOPY POLYPECTOMY SNARE/COLD BIOPSY performed by Chidi Ramos MD at [address] [facility]  12/09/2022    polyp/cecal mass; diverticula--frank    JOINT REPLACEMENT      total hip replacement left and right    LAPAROTOMY N/A 12/09/2022    LAPAROTOMY EXPLORATORY, ILIOCECECTOMY performed by Chidi Ramos MD at [address] AT [facility]    THORACENTESIS Right 12/13/2022    [address] colored    TONSILLECTOMY      TOTAL HIP ARTHROPLASTY Left 02/22/2021    LEFT HIP TOTAL ARTHROPLASTY performed by Shyam Cai MD at [address] [facility] [address] [facility] [address] Right 01/10/2022    RIGHT TOTAL HIP ARTHROPLASTY - STYKER performed by Shyam Cai MD at [address] [facility]  08/30/2019    gastritis with superficial ulcerations; duodenitis--frank    UPPER GASTROINTESTINAL ENDOSCOPY N/A 08/30/2019    EGD BIOPSY performed by Simin Hernandez MD at [facility] ENDOSCOPY       Allergies:     Latex, Iodine, Shellfish-derived products, Aloe, Celebrex [celecoxib], and Fish-derived products    Medications:     Prior to Admission medications    Medication Sig Start Date End Date Taking?  Authorizing Provider   Fexofenadine HCl (MUCINEX ALLERGY PO) Take 1 tablet by mouth as needed   Yes Historical Provider, MD   potassium chloride (KLOR-CON M) 10 MEQ extended release tablet TAKE ONE TABLET BY MOUTH DAILY WITH BREAKFAST 1/25/23  Yes Yareli Cox MD   losartan (COZAAR) 100 MG tablet Take 1 tablet by mouth daily 1/20/23  Yes Yareli Cox MD   pantoprazole (PROTONIX) 40 MG tablet TAKE ONE TABLET BY MOUTH EVERY DAY 1/20/23  Yes Yareli Cox MD   chlorthalidone (HYGROTON) 25 MG tablet Take 1 tablet by mouth daily 1/20/23  Yes Yareli Cox MD   amLODIPine (NORVASC) 5 MG tablet TAKE ONE TABLET BY MOUTH EVERY DAY 1/20/23  Yes Yareli Cox MD   psyllium (METAMUCIL SMOOTH TEXTURE) 28.3 % POWD powder Take 3.4 g by mouth 2 times daily 1/20/23  Yes Yareli Cox MD   gabapentin (NEURONTIN) 300 MG capsule TAKE ONE CAPSULE BY MOUTH THREE TIMES A DAY 1/20/23 6/29/23 Yes Yareli Cox MD   Umeclidinium-Vilanterol (ANORO ELLIPTA) 62.5-25 MCG/ACT AEPB Inhale 1 puff into the lungs daily 1/10/23  Yes Barb Garcia MD   metoprolol tartrate (LOPRESSOR) 25 MG tablet Take 1.5 tablets by mouth 2 times daily 1/9/23 2/8/23 Yes Haley Marin MD   albuterol sulfate HFA (PROVENTIL;VENTOLIN;PROAIR) 108 (90 Base) MCG/ACT inhaler INHALE TWO PUFFS BY MOUTH EVERY 6 HOURS AS NEEDED FOR WHEEZING. 22  Yes Haley Marin MD   fluticasone (FLONASE) 50 MCG/ACT nasal spray 2 sprays by Each Nostril route in the morning. Patient taking differently: 2 sprays by Each Nostril route daily as needed 22  Yes Haley Marin MD   polyethylene glycol (GLYCOLAX) 17 g packet DISSOLVE 1 PACKET (17 GRAMS) IN LIQUID AND TAKE BY MOUTH DAILY 22  Yes Historical Provider, MD   Multiple Vitamins-Minerals (THERAPEUTIC MULTIVITAMIN-MINERALS) tablet Take 1 tablet by mouth daily   Yes Historical Provider, MD   predniSONE (DELTASONE) 50 MG tablet 50 mg 13 hours prior to test, 50 mg 7 hours prior to test, 50 mg 1 hour before testing  Patient not taking: No sig reported 23   Kelley Kwong PA-C   predniSONE (DELTASONE) 50 MG tablet Take 1 tab 13 hours prior to procedure, 1 tab 7 hours prior to procedure, and 1 tab 1 hour prior to procedure  Patient not taking: No sig reported 23   IVAN Mckeon. Devices (WRIST BRACE) MISC Firm neutral position left wrist brace  Size to fit  Dx:  Wrist pain/carpal tunnel syndrome  Patient not taking: No sig reported 10/6/22   Haley Marin MD   Misc.  Devices (ROLLATOR) MISC 1 each by Does not apply route daily as needed (use as needed for stability)  Patient not taking: Reported on 2/3/2023 3/17/20   Haley Marin MD       Social History:     Social History     Tobacco Use    Smoking status: Former     Packs/day: 1.50     Years: 43.00     Pack years: 64.50     Types: Cigarettes     Quit date: 2018     Years since quittin.4    Smokeless tobacco: Never   Vaping Use    Vaping Use: Never used   Substance Use Topics    Alcohol use: No    Drug use: No       Review of Systems:     No chest pain or palpitations  No SOB  No vertigo, lightheadedness or loss of consciousness  No falls, tripping or stumbling  No incontinence of bowels or bladder  No itching or bruising appreciated  No numbness, tingling or focal arm/leg weakness    ROS otherwise negative     Family History:     Family History   Problem Relation Age of Onset    Diabetes Mother     Arthritis Mother     Atrial Fibrillation Mother     Diabetes Father     Atrial Fibrillation Father     Arthritis Father     Emphysema Father     Cancer Sister         \"female\"        History of Present Illness: This is a 58-year-old female who has a long complicated history and summary I will summarize as below. Patient was in usual state of self and then lost consciousness she was rushed to the ER in 2018 and found to have a subarachnoid hemorrhage multiple intracranial aneurysms were found. Initial coiling was performed which then led to recurrent hemorrhage and patient was then taken again for vasospasm with repeat coiling. Ultimately there was Kellie Chough 1 occlusion of only one of the aneurysms that second aneurysm was still filling however it was determined to be adequate at that time patient has been doing well and has not had a follow-up conventional angiogram since 2018. She also reports that there she had another carotid aneurysm and a middle cerebral artery aneurysm on the opposite side. Objective:   /67   Pulse 55   Temp 97.6 °F (36.4 °C)   Resp 16   Ht 5' 5\" (1.651 m)   Wt 217 lb (98.4 kg)   SpO2 97%   BMI 36.11 kg/m²      General appearance: alert, appears stated age and cooperative  Head: Normocephalic, without obvious abnormality, atraumatic  Neck: no adenopathy, no carotid bruit and limited ROM  Lungs: clear to auscultation bilaterally  Heart: regular rate and rhythm  Extremities: no cyanosis or edema  Pulses: 2+ and symmetric  Skin: no rashes or lesions     Constitutional: Well developed, well nourished and in no acute distress.    Respiratory: Clear to auscultation bilaterally with no use of accessory muscles during respiration. Cardiovascular:  No murmurs auscultated. Carotid arteries without bruits. Pedal pulses and radial pulses 2+ bilaterally. No edema in all four extremities. Mental Status:    Alert and oriented to person, place, and time. Recent and remote memory: Intact  Attention and concentration: Intact  Speech and language : Intact  Fund of knowledge: Intact  Judgement and Insight: Intact    Cranial Nerves:     II: Visual Fields: Full to confrontation bilaterally   III, IV, VI: Pupils: equally round and reactive to light, 3  to 2  mm bilaterally. EOMs: full with no nystagmus. V: Sensation intact to light touch and pin prick sensation bilaterally  VII: symmetric and strong in the upper and lower face bilaterally  VIII: Hearing intact to finger rub bilaterally   IX,X: Palate elevates symmetrically. XI: head turn (sternocleidomastoid) and shoulder shrug (trapezius) 5/5 bilaterally   XII: Tongue is midline upon protrusion    Motor:   Normal tone and bulk. Normal fine finger movements. No pronator drift. No resting tremors, postural tremors or myoclonus. Upper Extremity Right Left  Lower Extremity Right Left  Deltoid              5 5      Hip Flexion             5 5  Biceps              5 5   Hip Extension             5 5  Triceps                        5 5   Hip Adduction             5 5  Wrist Extension 5 5   Knee Extension 5 5  Wrist Flexion             5 5                Knee Flexion 5 5  Index Finger Flexion 5 5  Ankle Dorsiflexion 5 5  Finger Extension 5 5  Ankle Plantarflexion 5 5  APB                        5 5   Extensor Hallucis Longus 5 5      Sensory:  Intact light touch and pinprick in upper and lower extremities bilaterally. Intact proprioception and vibration sense in upper and lower extremities bilaterally.      Coordination:   Alternating hand and foot movements intact in the UE and LE bilaterally  Finger-to-nose and Heel-to-shin with no ataxia or intention tremor bilaterally    Gait/Station:   Patient rises from a seated position without assistance. Romberg's test negative. Gait pattern is without hesitation, a wide-base, and of normal stride length. Heel, toe and tandem walking are intact.     Laboratory/Radiology:     CBC:   Lab Results   Component Value Date/Time    WBC 9.7 12/21/2022 04:54 AM    RBC 3.26 12/21/2022 04:54 AM    HGB 9.7 12/21/2022 04:54 AM    HCT 29.9 12/21/2022 04:54 AM    HCT 42.0 12/13/2022 08:33 AM    MCV 91.7 12/21/2022 04:54 AM    MCH 29.8 12/21/2022 04:54 AM    MCHC 32.4 12/21/2022 04:54 AM    RDW 13.2 12/21/2022 04:54 AM     12/21/2022 04:54 AM    MPV 9.5 12/21/2022 04:54 AM     CBC with Differential:    Lab Results   Component Value Date/Time    WBC 9.7 12/21/2022 04:54 AM    RBC 3.26 12/21/2022 04:54 AM    HGB 9.7 12/21/2022 04:54 AM    HCT 29.9 12/21/2022 04:54 AM    HCT 42.0 12/13/2022 08:33 AM     12/21/2022 04:54 AM    MCV 91.7 12/21/2022 04:54 AM    MCH 29.8 12/21/2022 04:54 AM    MCHC 32.4 12/21/2022 04:54 AM    RDW 13.2 12/21/2022 04:54 AM    LYMPHOPCT 12.4 12/21/2022 04:54 AM    MONOPCT 6.6 12/21/2022 04:54 AM    BASOPCT 0.3 12/21/2022 04:54 AM    MONOSABS 0.64 12/21/2022 04:54 AM    LYMPHSABS 1.20 12/21/2022 04:54 AM    EOSABS 0.15 12/21/2022 04:54 AM    BASOSABS 0.03 12/21/2022 04:54 AM     Platelets:    Lab Results   Component Value Date/Time     12/21/2022 04:54 AM     Hemoglobin/Hematocrit:    Lab Results   Component Value Date/Time    HGB 9.7 12/21/2022 04:54 AM    HCT 29.9 12/21/2022 04:54 AM    HCT 42.0 12/13/2022 08:33 AM     CMP:    Lab Results   Component Value Date/Time     12/21/2022 04:54 AM    K 3.9 12/21/2022 04:54 AM     12/21/2022 04:54 AM    CO2 25 12/21/2022 04:54 AM    BUN 16 12/21/2022 04:54 AM    CREATININE 0.8 12/21/2022 04:54 AM    GFRAA 50 10/06/2022 09:39 AM    LABGLOM >60 12/21/2022 04:54 AM    GLUCOSE 116 12/21/2022 04:54 AM    PROT 6.2 12/19/2022 05:41 AM    LABALBU 3.0 12/19/2022 05:41 AM    CALCIUM 8.9 12/21/2022 04:54 AM    BILITOT 0.3 12/19/2022 05:41 AM    ALKPHOS 66 12/19/2022 05:41 AM    AST 30 12/19/2022 05:41 AM    ALT 40 12/19/2022 05:41 AM     BMP:    Lab Results   Component Value Date/Time     12/21/2022 04:54 AM    K 3.9 12/21/2022 04:54 AM     12/21/2022 04:54 AM    CO2 25 12/21/2022 04:54 AM    BUN 16 12/21/2022 04:54 AM    LABALBU 3.0 12/19/2022 05:41 AM    CREATININE 0.8 12/21/2022 04:54 AM    CALCIUM 8.9 12/21/2022 04:54 AM    GFRAA 50 10/06/2022 09:39 AM    LABGLOM >60 12/21/2022 04:54 AM    GLUCOSE 116 12/21/2022 04:54 AM       I independently reviewed the labs and imaging studies today. Assessment:       Encounter Diagnoses   Name Primary? Cerebral aneurysm Yes    Nonintractable headache, unspecified chronicity pattern, unspecified headache type          Plan:     Patient continues to have headaches. She did not have a Cristobal 1 occlusion. Ideal volume CT is not present to confirm this. Due to these issues I have recommended a follow-up angiogram conventional angiogram with the patient. It would be in the radial approach. Risk and benefits of the procedure were discussed. Patient verbalized understanding. We will go ahead and schedule her with the angiogram to follow-up the aneurysms. Warning signs of a rectal rupture were discussed and she is advised to go to the ER as symptoms but the [facility] should something happen. Lonnie Martinez MD  12:59 PM  2/3/2023    I spent 45 minutes with the patient, with 50% or more counseling them on their diagnosis, diagnostic workup and treatment options.

## 2023-02-07 NOTE — PROGRESS NOTES
[address]  [address] [facility], [address] [place] [address] Chad  [phone]    Follow up Note      Crayton Hashimoto     Date of Visit:  2/8/2023    CC:  Patient presents for follow up   Chief Complaint   Patient presents with    Back Pain           HPI:    Pain is unchanged. No new changes. Appropriate analgesia with current medications regimen: yes. Change in quality of symptoms:no. Medication side effects:none. Recent diagnostic testing:none. Recent interventional procedures:none. She has not been on anticoagulation medications to include none. The patient  has not been on herbal supplements. The patient is not diabetic. Imaging studies:    06/09/222 Left hip x-ray    FINDINGS:   Anatomically aligned left MERVAT with no abnormal bone or soft tissue findings. Impression   Left MERVAT with no unexpected findings. Cervical XR 11/2019 Severe degenerative changes      Lumbar spine Xray 03/2019  Scoliosis and osteoarthritis. Bilateral hip Xray 03/2019   Advanced osteoarthrosis of bilateral hips. Potential Aberrant Drug-Related Behavior: None     Urine Drug Screening:  First office visit saliva screen showed no narcotics   11/2019 Consistent, negative for all  06/2020 Consistent  12/2020 Consistent  06/2021 Consistent  04/2022 Consistent     OARRS report:  03/2019 consistent to 06/2021 Consistent  (norco on 9/24 from dentist for teeth extraction).   08/2021 Consistent to 02/2023 Consistent    Opioid Agreement:  10/07/2021   Updated:  09/26/2022    Past Medical History:   Diagnosis Date    A-fib [facility])     Acute deep vein thrombosis (DVT) of right peroneal vein ([facility] [address] 10/14/2022    See note from pulmonology    Brain aneurysm 09/2018    H/O TIMES 2 THAT BURST PER PATIENT    Cerebral artery occlusion with cerebral infarction ([address]     RT SIDE AFFECTED-LEARNED TO WALK AND WRITE AGAIN    Chronic deep vein thrombosis (DVT) of right peroneal vein ([facility] [address] 1/12/2023    Degenerative arthritis of hand     Edentulous     Emphysema lung ([facility] [address]     lung dr    Emphysema of lung ([facility] [address]     Headache     Hiatal hernia     Hip problem     NEEDS HIP REPLACEMENT PER PATIENT    Hx of abnormal cervical Pap smear     Hypertension     Stroke (cerebrum) ([facility] [address]     Subarachnoid bleed ([facility] [address] 09/10/2018       Past Surgical History:   Procedure Laterality Date    BRAIN ANEURYSM SURGERY      coiling     COLONOSCOPY  08/30/2019    polyps--frank    COLONOSCOPY N/A 08/30/2019    COLONOSCOPY POLYPECTOMY SNARE/COLD BIOPSY performed by Yung Salmon MD at [address] [facility] N/A 12/9/2022    COLONOSCOPY POLYPECTOMY SNARE/COLD BIOPSY performed by Yung Salmon MD at [address] [facility]  12/09/2022    polyp/cecal mass; diverticula--frank    JOINT REPLACEMENT      total hip replacement left and right    LAPAROTOMY N/A 12/09/2022    LAPAROTOMY EXPLORATORY, ILIOCECECTOMY performed by Yung Salmon MD at [facility] [address] DONE  [facility] Right 12/13/2022    [address] colored    TONSILLECTOMY      TOTAL HIP ARTHROPLASTY Left 02/22/2021    LEFT HIP TOTAL ARTHROPLASTY performed by Salima Moody MD at [address] [facility] Right 01/10/2022    RIGHT TOTAL HIP ARTHROPLASTY - STYKER performed by Salima Moody MD at [facility]  08/30/2019    gastritis with superficial ulcerations; duodenitis--frank    UPPER GASTROINTESTINAL ENDOSCOPY N/A 08/30/2019    EGD BIOPSY performed by Yung Salmon MD at [facility]       Prior to Admission medications    Medication Sig Start Date End Date Taking?  Authorizing Provider   Fexofenadine HCl (MUCINEX ALLERGY PO) Take 1 tablet by mouth as needed   Yes Historical Provider, MD   potassium chloride (KLOR-CON M) 10 MEQ extended release tablet TAKE ONE TABLET BY MOUTH DAILY WITH BREAKFAST 1/25/23  Yes Gurjit Modi MD   losartan (COZAAR) 100 MG tablet Take 1 tablet by mouth daily 1/20/23  Yes Gurjit Modi MD   pantoprazole (PROTONIX) 40 MG tablet TAKE ONE TABLET BY MOUTH EVERY DAY 1/20/23  Yes Gurjit Modi MD   chlorthalidone (HYGROTON) 25 MG tablet Take 1 tablet by mouth daily 1/20/23  Yes Gurjit Modi MD   amLODIPine (NORVASC) 5 MG tablet TAKE ONE TABLET BY MOUTH EVERY DAY 1/20/23  Yes Gurjit Modi MD   psyllium (METAMUCIL SMOOTH TEXTURE) 28.3 % POWD powder Take 3.4 g by mouth 2 times daily 1/20/23  Yes Gurjit Modi MD   gabapentin (NEURONTIN) 300 MG capsule TAKE ONE CAPSULE BY MOUTH THREE TIMES A DAY 1/20/23 6/29/23 Yes Gurjit Modi MD   Umeclidinium-Vilanterol (ANORO ELLIPTA) 62.5-25 MCG/ACT AEPB Inhale 1 puff into the lungs daily 1/10/23  Yes Anabell Morales MD   metoprolol tartrate (LOPRESSOR) 25 MG tablet Take 1.5 tablets by mouth 2 times daily 1/9/23 2/8/23 Yes Gurjit Modi MD   albuterol sulfate HFA (PROVENTIL;VENTOLIN;PROAIR) 108 (90 Base) MCG/ACT inhaler INHALE TWO PUFFS BY MOUTH EVERY 6 HOURS AS NEEDED FOR WHEEZING. 12/1/22  Yes Gurjit Modi MD   [facility]. Devices (WRIST BRACE) [facility] [employer] neutral position left wrist brace  Size to fit  Dx:  Wrist pain/carpal tunnel syndrome  Patient taking differently: Firm neutral position left wrist brace  Size to fit  Dx:  Wrist pain/carpal tunnel syndrome 10/6/22  Yes Gurjit Modi MD   fluticasone (FLONASE) 50 MCG/ACT nasal spray 2 sprays by Each Nostril route in the morning. Patient taking differently: 2 sprays by Each Nostril route daily as needed 8/5/22  Yes Gurjit Modi MD   polyethylene glycol (GLYCOLAX) 17 g packet DISSOLVE 1 PACKET (17 GRAMS) IN LIQUID AND TAKE BY MOUTH DAILY 1/24/22  Yes Historical Provider, MD   Multiple Vitamins-Minerals (THERAPEUTIC MULTIVITAMIN-MINERALS) tablet Take 1 tablet by mouth daily   Yes Historical Provider, MD   Misc.  Devices (ROLLATOR) MISC 1 each by Does not apply route daily as needed (use as needed for stability) 3/17/20  Yes Magdalena Sargent MD   predniSONE (DELTASONE) 50 MG tablet 50 mg 13 hours prior to test, 50 mg 7 hours prior to test, 50 mg 1 hour before testing  Patient not taking: Reported on 2023   Sylvie Kwong PA-C   predniSONE (DELTASONE) 50 MG tablet Take 1 tab 13 hours prior to procedure, 1 tab 7 hours prior to procedure, and 1 tab 1 hour prior to procedure  Patient not taking: Reported on 2023   Ramos Garibay PA-C       Allergies   Allergen Reactions    Latex Hives     Hives and rash    Iodine Rash     Hives . pt.  States anaphalyxis    Shellfish-Derived Products Anaphylaxis and Hives    Aloe Hives     Hives     Celebrex [Celecoxib] Itching    Fish-Derived Products Other (See Comments)       Social History     Socioeconomic History    Marital status:      Spouse name: Not on file    Number of children: Not on file    Years of education: Not on file    Highest education level: Not on file   Occupational History    Not on file   Tobacco Use    Smoking status: Former     Packs/day: 1.50     Years: 43.00     Pack years: 64.50     Types: Cigarettes     Quit date: 2018     Years since quittin.4    Smokeless tobacco: Never   Vaping Use    Vaping Use: Never used   Substance and Sexual Activity    Alcohol use: No    Drug use: No    Sexual activity: Not Currently   Other Topics Concern    Not on file   Social History Narrative    Not on file     Social Determinants of Health     Financial Resource Strain: Low Risk     Difficulty of Paying Living Expenses: Not very hard   Food Insecurity: No Food Insecurity    Worried About Running Out of Food in the Last Year: Never true    Ran Out of Food in the Last Year: Never true   Transportation Needs: Not on file   Physical Activity: Sufficiently Active    Days of Exercise per Week: 6 days    Minutes of Exercise per Session: 60 min   Stress: Not on file   Social Connections: Not on file   Intimate Partner Violence: Not on file   Housing Stability: Not on file       Family History   Problem Relation Age of Onset    Diabetes Mother     Arthritis Mother     Atrial Fibrillation Mother     Diabetes Father     Atrial Fibrillation Father     Arthritis Father     Emphysema Father     Cancer Sister         \"female\"       REVIEW OF SYSTEMS:     Daisy Frank denies fever/chills, chest pain, shortness of breath, new bowel or bladder complaints. All other review of systems was negative. PHYSICAL EXAMINATION:      /72   Pulse 65   Ht 5' 5\" (1.651 m)   Wt 217 lb (98.4 kg)   SpO2 97%   BMI 36.11 kg/m²     General:      General appearance:   pleasant and well-hydrated. , in no discomfort and A & O x3  Build:Overweight  Function:Rises from a seated position with difficulty    HEENT:    Head:normocephalic and atraumatic  Pupils:regular, round and equal.  Sclera: icterus absent,    Lungs:    Breathing:Normal expansion. No wheezing. Abdomen:    Shape:non-distended and normal      Extremities:    Tremors:None bilaterally upper and lower  Range of motion:Generally normal shoulders, negative log roll on the left. Intact:Yes  Varicose veins:not assessed   Cyanosis:none  Edema:Normal    Neurological:    Gait: Not observed. Dermatology:    Skin:no unusual rashes, no skin lesions, no palpable subcutaneous nodules and good skin turgor    Impression:    Patient seen for follow up for her chronic bilateral hip pain and low back pain due to severe degenerative changes and axial c/o neck pain   Would benefit from Cervical MBB, patient uninterested   Patient is s/p:  Left MERVAT on 2/22/2021. Patient is s/p right MERVAT on 01/10/2022   Continue Gabapentin 300 mg TID per PCP  Continue Tylenol #4 QD prn with 1 RF. PT ordered for lower back per patient request last visit. Did not go due to a DVT. Compounding cream. Helping a lot.   OARRS report reviewed   Buccal screen today  Patient encouraged to remain active as tolerated   Treatment plan discussed with the patient including medications and side effects     Controlled Substance Monitoring:    Acute and Chronic Pain Monitoring:   RX Monitoring 2/8/2023   Periodic Controlled Substance Monitoring Possible medication side effects, risk of tolerance/dependence & alternative treatments discussed. ;No signs of potential drug abuse or diversion identified. ;Assessed functional status. ;Obtaining appropriate analgesic effect of treatment. ;Random urine drug screen sent today. We discussed with the patient that combining opioids, benzodiazepines, alcohol, illicit drugs or sleep aids increases the risk of respiratory depression including death. We discussed that these medications may cause drowsiness, sedation or dizziness and have counseled the patient not to drive or operate machinery. We have discussed that these medications will be prescribed only by one provider. We have discussed with the patient about age related risk factors and have thoroughly discussed the importance of taking these medications as prescribed. The patient verbalizes understanding.     ccrefjesusing physic

## 2023-02-08 ENCOUNTER — TELEPHONE (OUTPATIENT)
Dept: CASE MANAGEMENT | Age: 63
End: 2023-02-08

## 2023-02-08 ENCOUNTER — OFFICE VISIT (OUTPATIENT)
Dept: PAIN MANAGEMENT | Age: 63
End: 2023-02-08
Payer: MEDICARE

## 2023-02-08 ENCOUNTER — TELEPHONE (OUTPATIENT)
Dept: PAIN MANAGEMENT | Age: 63
End: 2023-02-08

## 2023-02-08 VITALS
BODY MASS INDEX: 36.15 KG/M2 | WEIGHT: 217 LBS | HEIGHT: 65 IN | DIASTOLIC BLOOD PRESSURE: 72 MMHG | HEART RATE: 65 BPM | SYSTOLIC BLOOD PRESSURE: 116 MMHG | OXYGEN SATURATION: 97 %

## 2023-02-08 DIAGNOSIS — G89.4 CHRONIC PAIN SYNDROME: ICD-10-CM

## 2023-02-08 DIAGNOSIS — M50.30 DEGENERATION OF CERVICAL DISC WITHOUT MYELOPATHY: ICD-10-CM

## 2023-02-08 DIAGNOSIS — Z96.642 S/P TOTAL LEFT HIP ARTHROPLASTY: ICD-10-CM

## 2023-02-08 DIAGNOSIS — I82.451 ACUTE DEEP VEIN THROMBOSIS (DVT) OF RIGHT PERONEAL VEIN (HCC): Primary | ICD-10-CM

## 2023-02-08 DIAGNOSIS — G89.4 CHRONIC PAIN SYNDROME: Primary | ICD-10-CM

## 2023-02-08 DIAGNOSIS — M47.816 LUMBAR SPONDYLOSIS: ICD-10-CM

## 2023-02-08 DIAGNOSIS — M16.11 ARTHRITIS OF RIGHT HIP: ICD-10-CM

## 2023-02-08 DIAGNOSIS — M47.816 LUMBAR FACET ARTHROPATHY: ICD-10-CM

## 2023-02-08 DIAGNOSIS — M51.9 LUMBAR DISC DISORDER: ICD-10-CM

## 2023-02-08 DIAGNOSIS — M16.12 PRIMARY OSTEOARTHRITIS OF LEFT HIP: ICD-10-CM

## 2023-02-08 DIAGNOSIS — M54.16 LUMBAR RADICULITIS: ICD-10-CM

## 2023-02-08 DIAGNOSIS — M47.812 FACET ARTHROPATHY, CERVICAL: ICD-10-CM

## 2023-02-08 PROCEDURE — 99213 OFFICE O/P EST LOW 20 MIN: CPT | Performed by: PHYSICIAN ASSISTANT

## 2023-02-08 PROCEDURE — G8427 DOCREV CUR MEDS BY ELIG CLIN: HCPCS | Performed by: PHYSICIAN ASSISTANT

## 2023-02-08 PROCEDURE — 3074F SYST BP LT 130 MM HG: CPT | Performed by: PHYSICIAN ASSISTANT

## 2023-02-08 PROCEDURE — G8482 FLU IMMUNIZE ORDER/ADMIN: HCPCS | Performed by: PHYSICIAN ASSISTANT

## 2023-02-08 PROCEDURE — 1036F TOBACCO NON-USER: CPT | Performed by: PHYSICIAN ASSISTANT

## 2023-02-08 PROCEDURE — G8417 CALC BMI ABV UP PARAM F/U: HCPCS | Performed by: PHYSICIAN ASSISTANT

## 2023-02-08 PROCEDURE — 3078F DIAST BP <80 MM HG: CPT | Performed by: PHYSICIAN ASSISTANT

## 2023-02-08 PROCEDURE — 3017F COLORECTAL CA SCREEN DOC REV: CPT | Performed by: PHYSICIAN ASSISTANT

## 2023-02-08 RX ORDER — ACETAMINOPHEN AND CODEINE PHOSPHATE 60; 300 MG/1; MG/1
1 TABLET ORAL DAILY PRN
Qty: 30 TABLET | Refills: 1 | Status: SHIPPED | OUTPATIENT
Start: 2023-02-08 | End: 2023-03-10

## 2023-02-08 NOTE — PROGRESS NOTES
Do you currently have any of the following:    Fever: No  Headache:  No  Cough: No  Shortness of breath: No  Exposed to anyone with these symptoms: No                                                                                                                Ian Gustafson presents to the [facility] on 2/8/2023. Trip Prasad is complaining of pain in back. The pain is constant. The pain is described as aching and sharp. Pain is rated on her best day at a 8, on her worst day at a 9, and on average at a 8 on the VAS scale. She took her last dose of Tylenol with codeine yesterday. Trip Prasad does not have issues with constipation. Any procedures since your last visit: No.    She is not on NSAIDS and  is not on anticoagulation medications to include none and is managed by none. Pacemaker or defibrillator: No.    Medication Contract and Consent for Opioid Use Documents Filed       Patient Documents       Type of Document Status Date Received Received By Description    Medication Contract Received 3/1/2019  1:43 PM FRANCIS MCINTOSH PAIN MANAGEMENT PT AGREEMENT 3/1/2019    Medication Contract Received 10/8/2020  1:21 PM DAYNA [address] [place] [address] pain pt agreement    Medication Contract Received 10/7/2021 10:36 AM BOLIVAR TRUJILLO Pain Mgmt Patient Agreement 10. 7.2021    Medication Contract Received 9/26/2022 10:40 AM ENMA WAGNER MEDICATION CONTRACT                       There were no vitals taken for this visit. No LMP recorded.  Patient is postmenopausal.

## 2023-02-08 NOTE — TELEPHONE ENCOUNTER
I called the patient's insurance [employer] on 2/7/2023 and I spoke with Deion Weiner authorization rep., he stated no authorization is required for CPT code 34817. I notified Alyssa Hutchinson ( IR tech) and Lynne with Dr. José Miguel Orr office.              Electronically signed by Adrián Odell on 2/8/23 at 8:46 AM EST

## 2023-02-08 NOTE — TELEPHONE ENCOUNTER
I called the patient to let her know that ortho said to follow up with NSG if she does not want to get the myelogram.  She may do that or I can work up her back as well at her next appt or she can come in sooner if she would like. I could not leave a message because her mailbox was full so please relay this message to her if she calls back.

## 2023-02-09 ENCOUNTER — HOSPITAL ENCOUNTER (OUTPATIENT)
Dept: INFUSION THERAPY | Age: 63
Discharge: HOME | End: 2023-02-09

## 2023-02-09 ENCOUNTER — OFFICE VISIT (OUTPATIENT)
Dept: ONCOLOGY | Age: 63
End: 2023-02-09
Payer: MEDICARE

## 2023-02-09 VITALS
WEIGHT: 215 LBS | SYSTOLIC BLOOD PRESSURE: 120 MMHG | OXYGEN SATURATION: 98 % | HEIGHT: 65 IN | RESPIRATION RATE: 16 BRPM | TEMPERATURE: 97.1 F | DIASTOLIC BLOOD PRESSURE: 57 MMHG | BODY MASS INDEX: 35.82 KG/M2 | HEART RATE: 68 BPM

## 2023-02-09 DIAGNOSIS — I82.451 ACUTE DEEP VEIN THROMBOSIS (DVT) OF RIGHT PERONEAL VEIN (HCC): ICD-10-CM

## 2023-02-09 DIAGNOSIS — Z86.79: Primary | ICD-10-CM

## 2023-02-09 PROCEDURE — G8427 DOCREV CUR MEDS BY ELIG CLIN: HCPCS | Performed by: INTERNAL MEDICINE

## 2023-02-09 PROCEDURE — 3078F DIAST BP <80 MM HG: CPT | Performed by: INTERNAL MEDICINE

## 2023-02-09 PROCEDURE — 3017F COLORECTAL CA SCREEN DOC REV: CPT | Performed by: INTERNAL MEDICINE

## 2023-02-09 PROCEDURE — G8417 CALC BMI ABV UP PARAM F/U: HCPCS | Performed by: INTERNAL MEDICINE

## 2023-02-09 PROCEDURE — 1036F TOBACCO NON-USER: CPT | Performed by: INTERNAL MEDICINE

## 2023-02-09 PROCEDURE — 3074F SYST BP LT 130 MM HG: CPT | Performed by: INTERNAL MEDICINE

## 2023-02-09 PROCEDURE — G8482 FLU IMMUNIZE ORDER/ADMIN: HCPCS | Performed by: INTERNAL MEDICINE

## 2023-02-09 PROCEDURE — 99213 OFFICE O/P EST LOW 20 MIN: CPT | Performed by: INTERNAL MEDICINE

## 2023-02-09 PROCEDURE — 99212 OFFICE O/P EST SF 10 MIN: CPT

## 2023-02-09 NOTE — PROGRESS NOTES
Department of [facility] Oncology  Attending Clinic Note    Reason for Visit: Follow-up on a patient with LE DVT, h/o SAH aneurysm    PCP:  Clare Faust MD    History of Present Illness:  61 y/o female with history of smoking 1.5 to 2 packs daily for 40 years before quitting in 2018, hx of aneurysmal SAH s/p coiling by Dr. Shadi Lee 4 years ago    RLE U/S 01/12/2022: Within the visualized vessels there is no evidence for deep venous thrombosis    CT head 06/14/2022:  Status post aneurysm coiling at level of suprasellar cistern. No evidence of residual or recurrent intracranial aneurysm  No acute intracranial hemorrhage    Tim LE 10/13/2022:  No sonographic evidence of deep venous thrombosis above the knee in the right or left lower extremity. There is right below the knee deep venous thrombosis involving the peroneal veins. V/Q scan 10/13/2022: While a PIOPED classification cannot be applied without an accompanying chest radiograph, the ventilation and perfusion patterns are relatively matched. RLE U/S 10/27/2022: There is evidence for deep venous thrombosis, not significantly changed from previous. Repeat right lower extremity ultrasound on 11/28/2022 evidence for deep venous thrombosis in the right peroneal vein which is nonocclusive. Repeat right lower extremity ultrasound on 01/18/2023 Venous duplex study of right lower extremity revealed that all the major deep veins are patent with normal color flow and normal  compression without evidence of deep vein thrombosis including the visualized portions of the posterior tibial veins. The patient however has chronic-appearing nonocclusive findings  involving the peroneal vein without evidence of acute thrombus formation. Today 02/09/2023; No fever, chills. Fair appetite and energy level. No chest pain or SOB. Chronic headache. Review of Systems;  CONSTITUTIONAL: No fever, chills. Fair appetite and energy level.    ENMT: Eyes: No diplopia; Nose: No epistaxis. Mouth: No sore throat. RESPIRATORY: No hemoptysis, shortness of breath, cough. CARDIOVASCULAR: No chest pain, palpitations. GASTROINTESTINAL: No nausea/vomiting, abdominal pain  GENITOURINARY: No dysuria, urinary frequency, hematuria. NEURO: No syncope, presyncope. Chronic headache. Remainder:  ROS NEGATIVE    Past Medical History:      Diagnosis Date    A-fib ([facility] [address]     Acute deep vein thrombosis (DVT) of right peroneal vein ([facility] [address] 10/14/2022    See note from pulmonology    Brain aneurysm 09/2018    H/O TIMES 2 THAT BURST PER PATIENT    Cerebral artery occlusion with cerebral infarction ([facility] [address]     RT SIDE AFFECTED-LEARNED TO WALK AND WRITE AGAIN    Chronic deep vein thrombosis (DVT) of right peroneal vein ([facility] [address] 1/12/2023    Degenerative arthritis of hand     Edentulous     Emphysema lung ([facility] [address]     lung dr    Emphysema of lung ([facility] [address]     Headache     Hiatal hernia     Hip problem     NEEDS HIP REPLACEMENT PER PATIENT    Hx of abnormal cervical Pap smear     Hypertension     Stroke (cerebrum) ([facility] [address]     Subarachnoid bleed ([facility] [address] 09/10/2018     Medications:  Reviewed and reconciled. Allergies: Allergies   Allergen Reactions    Latex Hives     Hives and rash    Iodine Rash     Hives . pt. States anaphalyxis    Shellfish-Derived Products Anaphylaxis and Hives    Aloe Hives     Hives     Celebrex [Celecoxib] Itching    Fish-Derived Products Other (See Comments)     Physical Exam:  BP (!) 120/57 (Site: Right Upper Arm, Position: Sitting, Cuff Size: Large Adult)   Pulse 68   Temp 97.1 °F (36.2 °C) (Infrared)   Resp 16   Ht 5' 5\" (1.651 m)   Wt 215 lb (97.5 kg)   SpO2 98%   BMI 35.78 kg/m²   GENERAL: Alert, oriented x 3, not in acute distress. HEENT: PERRLA; EOMI. Oropharynx clear. EXTREMITIES: Without clubbing, cyanosis, or edema. NEUROLOGIC: No focal deficits.        Impression/Plan:  59 y/o female with history of smoking 1.5 to 2 packs daily for 40 years before quitting in 2018, hx of aneurysmal SAH s/p coiling by Dr. Marissa Hooks 4 years ago    RLE U/S 01/12/2022: Within the visualized vessels there is no evidence for deep venous thrombosis    CT head 06/14/2022:  Status post aneurysm coiling at level of suprasellar cistern. No evidence of residual or recurrent intracranial aneurysm  No acute intracranial hemorrhage    Tim LE 10/13/2022:  No sonographic evidence of deep venous thrombosis above the knee in the right or left lower extremity. There is right below the knee deep venous thrombosis involving the peroneal veins. V/Q scan 10/13/2022: While a PIOPED classification cannot be applied without an accompanying chest radiograph, the ventilation and perfusion patterns are relatively matched. RLE U/S 10/27/2022: There is evidence for deep venous thrombosis, not significantly changed from previous. Referred to our clinic for further evaluation    Hyper-coag extensive work-up 11/23/2022  CBC WNL  BUN 26 Creat 1.2  LFTs wnl  PT 10.7    INR 1.0  PTT 26.2  D-Dimer 419    Factor V Leiden Negative  Prothrombin mutation Negative  OLAMIDE-1 4G/5G  MTHFR Mutation C677T Negative    MTHFR Mutation V1765P Negative    Factor XIII Negative    Protein C Activity >120  Protein S Ag Free 109%  AT-III Activity 110% (%)    Homocysteine 54.3 (2-40); on Folic acid, VitB6 AOND72    AMY      Negative  DRVVT Negative    Anticardiolipin IgG 16  Cardiolipin       Ab   22  Anticardiolipin IgA <10    Beta-2 glyco 1 IgG <10  Beta-2 glyco 1 IgM   12    Repeat right lower extremity ultrasound on 11/28/2022 evidence for deep venous thrombosis in the right peroneal vein which is nonocclusive. Elevated Homocysteine; prescribed Folic acid, VitB6 [id number]    Repeat right lower extremity venous ultrasound 01/18/2023, evidence of chronic changes involving the peroneal vein, otherwise rest of the veins are open, no evidence of acute deep vein thrombosis. Imaging reviewed. Labs reviewed. Vascular note reviewed. Neuro-endovascular note reviewed. Neuro Endovascular recommended follow-up conventional angiogram.   Continue to follow-up with PCP.  RTC FERNY Corbett MD   2/9/2023

## 2023-02-13 ENCOUNTER — TELEPHONE (OUTPATIENT)
Dept: NEUROLOGY | Age: 63
End: 2023-02-13

## 2023-02-13 DIAGNOSIS — Z91.041 CONTRAST MEDIA ALLERGY: Primary | ICD-10-CM

## 2023-02-13 RX ORDER — PREDNISONE 20 MG/1
20 TABLET ORAL DAILY
Qty: 5 TABLET | Refills: 0 | Status: SHIPPED | OUTPATIENT
Start: 2023-02-13 | End: 2023-02-18

## 2023-02-13 NOTE — TELEPHONE ENCOUNTER
Prednisone 20 mg for  5 days can start today   Will cover in case she has post op issues with contrast

## 2023-02-16 ENCOUNTER — HOSPITAL ENCOUNTER (OUTPATIENT)
Dept: INTERVENTIONAL RADIOLOGY/VASCULAR | Age: 63
Discharge: HOME | End: 2023-02-18
Payer: MEDICARE

## 2023-02-16 VITALS
OXYGEN SATURATION: 97 % | TEMPERATURE: 97.8 F | DIASTOLIC BLOOD PRESSURE: 65 MMHG | WEIGHT: 217 LBS | SYSTOLIC BLOOD PRESSURE: 132 MMHG | HEIGHT: 65 IN | RESPIRATION RATE: 18 BRPM | BODY MASS INDEX: 36.15 KG/M2 | HEART RATE: 64 BPM

## 2023-02-16 DIAGNOSIS — Z76.0 MEDICATION REFILL: ICD-10-CM

## 2023-02-16 DIAGNOSIS — I67.1 CEREBRAL ANEURYSM, NONRUPTURED: ICD-10-CM

## 2023-02-16 LAB
ANION GAP SERPL CALCULATED.3IONS-SCNC: 16 MMOL/L (ref 7–16)
BASOPHILS ABSOLUTE: 0.04 E9/L (ref 0–0.2)
BASOPHILS RELATIVE PERCENT: 0.5 % (ref 0–2)
BUN BLDV-MCNC: 27 MG/DL (ref 6–23)
CALCIUM SERPL-MCNC: 9.3 MG/DL (ref 8.6–10.2)
CHLORIDE BLD-SCNC: 102 MMOL/L (ref 98–107)
CO2: 19 MMOL/L (ref 22–29)
CREAT SERPL-MCNC: 0.9 MG/DL (ref 0.5–1)
EOSINOPHILS ABSOLUTE: 0.21 E9/L (ref 0.05–0.5)
EOSINOPHILS RELATIVE PERCENT: 2.8 % (ref 0–6)
GFR SERPL CREATININE-BSD FRML MDRD: >60 ML/MIN/1.73
GLUCOSE BLD-MCNC: 119 MG/DL (ref 74–99)
HCT VFR BLD CALC: 37.4 % (ref 34–48)
HEMOGLOBIN: 12.4 G/DL (ref 11.5–15.5)
IMMATURE GRANULOCYTES #: 0.04 E9/L
IMMATURE GRANULOCYTES %: 0.5 % (ref 0–5)
INR BLD: 1
LYMPHOCYTES ABSOLUTE: 1.37 E9/L (ref 1.5–4)
LYMPHOCYTES RELATIVE PERCENT: 18.1 % (ref 20–42)
MCH RBC QN AUTO: 28.4 PG (ref 26–35)
MCHC RBC AUTO-ENTMCNC: 33.2 % (ref 32–34.5)
MCV RBC AUTO: 85.8 FL (ref 80–99.9)
MONOCYTES ABSOLUTE: 0.33 E9/L (ref 0.1–0.95)
MONOCYTES RELATIVE PERCENT: 4.4 % (ref 2–12)
NEUTROPHILS ABSOLUTE: 5.59 E9/L (ref 1.8–7.3)
NEUTROPHILS RELATIVE PERCENT: 73.7 % (ref 43–80)
PDW BLD-RTO: 13.9 FL (ref 11.5–15)
PLATELET # BLD: 291 E9/L (ref 130–450)
PMV BLD AUTO: 9.1 FL (ref 7–12)
POTASSIUM SERPL-SCNC: 4.5 MMOL/L (ref 3.5–5)
PROTHROMBIN TIME: 10.6 SEC (ref 9.3–12.4)
RBC # BLD: 4.36 E12/L (ref 3.5–5.5)
SODIUM BLD-SCNC: 137 MMOL/L (ref 132–146)
WBC # BLD: 7.6 E9/L (ref 4.5–11.5)

## 2023-02-16 PROCEDURE — 36223 PLACE CATH CAROTID/INOM ART: CPT

## 2023-02-16 PROCEDURE — 85025 COMPLETE CBC W/AUTO DIFF WBC: CPT

## 2023-02-16 PROCEDURE — 6360000004 HC RX CONTRAST MEDICATION: Performed by: PSYCHIATRY & NEUROLOGY

## 2023-02-16 PROCEDURE — 7100000010 HC PHASE II RECOVERY - FIRST 15 MIN

## 2023-02-16 PROCEDURE — 2500000003 HC RX 250 WO HCPCS: Performed by: PSYCHIATRY & NEUROLOGY

## 2023-02-16 PROCEDURE — 36415 COLL VENOUS BLD VENIPUNCTURE: CPT

## 2023-02-16 PROCEDURE — C1769 GUIDE WIRE: HCPCS

## 2023-02-16 PROCEDURE — 7100000011 HC PHASE II RECOVERY - ADDTL 15 MIN

## 2023-02-16 PROCEDURE — 76377 3D RENDER W/INTRP POSTPROCES: CPT

## 2023-02-16 PROCEDURE — 6360000002 HC RX W HCPCS: Performed by: PSYCHIATRY & NEUROLOGY

## 2023-02-16 PROCEDURE — 36226 PLACE CATH VERTEBRAL ART: CPT

## 2023-02-16 PROCEDURE — 80048 BASIC METABOLIC PNL TOTAL CA: CPT

## 2023-02-16 PROCEDURE — 85610 PROTHROMBIN TIME: CPT

## 2023-02-16 RX ORDER — FENTANYL CITRATE 50 UG/ML
INJECTION, SOLUTION INTRAMUSCULAR; INTRAVENOUS
Status: COMPLETED | OUTPATIENT
Start: 2023-02-16 | End: 2023-02-16

## 2023-02-16 RX ORDER — HEPARIN SODIUM 1000 [USP'U]/ML
INJECTION, SOLUTION INTRAVENOUS; SUBCUTANEOUS
Status: COMPLETED | OUTPATIENT
Start: 2023-02-16 | End: 2023-02-16

## 2023-02-16 RX ORDER — HEPARIN SODIUM 10000 [USP'U]/ML
INJECTION, SOLUTION INTRAVENOUS; SUBCUTANEOUS
Status: COMPLETED | OUTPATIENT
Start: 2023-02-16 | End: 2023-02-16

## 2023-02-16 RX ORDER — LIDOCAINE HYDROCHLORIDE 20 MG/ML
INJECTION, SOLUTION INFILTRATION; PERINEURAL
Status: COMPLETED | OUTPATIENT
Start: 2023-02-16 | End: 2023-02-16

## 2023-02-16 RX ORDER — MIDAZOLAM HYDROCHLORIDE 2 MG/2ML
INJECTION, SOLUTION INTRAMUSCULAR; INTRAVENOUS
Status: COMPLETED | OUTPATIENT
Start: 2023-02-16 | End: 2023-02-16

## 2023-02-16 RX ORDER — DIPHENHYDRAMINE HYDROCHLORIDE 50 MG/ML
25 INJECTION INTRAMUSCULAR; INTRAVENOUS ONCE
Status: DISCONTINUED | OUTPATIENT
Start: 2023-02-16 | End: 2023-02-16 | Stop reason: ALTCHOICE

## 2023-02-16 RX ORDER — ASPIRIN 81 MG/1
81 TABLET ORAL DAILY
Qty: 90 TABLET | Refills: 1 | Status: SHIPPED | OUTPATIENT
Start: 2023-02-16

## 2023-02-16 RX ORDER — CLOPIDOGREL BISULFATE 75 MG/1
75 TABLET ORAL DAILY
Qty: 30 TABLET | Refills: 0 | Status: SHIPPED | OUTPATIENT
Start: 2023-02-16

## 2023-02-16 RX ADMIN — Medication 5000 UNITS: at 10:06

## 2023-02-16 RX ADMIN — MIDAZOLAM HYDROCHLORIDE 1 MG: 1 INJECTION, SOLUTION INTRAMUSCULAR; INTRAVENOUS at 09:36

## 2023-02-16 RX ADMIN — Medication 1000 ML: at 10:06

## 2023-02-16 RX ADMIN — DIPHENHYDRAMINE HYDROCHLORIDE 25 MG: 50 INJECTION, SOLUTION INTRAMUSCULAR; INTRAVENOUS at 09:33

## 2023-02-16 RX ADMIN — HEPARIN SODIUM 2000 UNITS: 1000 INJECTION INTRAVENOUS; SUBCUTANEOUS at 09:47

## 2023-02-16 RX ADMIN — LIDOCAINE HYDROCHLORIDE 8 ML: 20 INJECTION, SOLUTION INFILTRATION; PERINEURAL at 09:40

## 2023-02-16 RX ADMIN — FENTANYL CITRATE 50 MCG: 50 INJECTION, SOLUTION INTRAMUSCULAR; INTRAVENOUS at 09:35

## 2023-02-16 RX ADMIN — MIDAZOLAM HYDROCHLORIDE 1 MG: 1 INJECTION, SOLUTION INTRAMUSCULAR; INTRAVENOUS at 09:42

## 2023-02-16 RX ADMIN — IOPAMIDOL 65 ML: 612 INJECTION, SOLUTION INTRAVENOUS at 10:15

## 2023-02-16 RX ADMIN — FENTANYL CITRATE 50 MCG: 50 INJECTION, SOLUTION INTRAMUSCULAR; INTRAVENOUS at 09:41

## 2023-02-16 ASSESSMENT — PAIN - FUNCTIONAL ASSESSMENT: PAIN_FUNCTIONAL_ASSESSMENT: NONE - DENIES PAIN

## 2023-02-16 NOTE — PROGRESS NOTES
Handoff report given to JACK FERRO RN. Instructions to when to remove air from TR band given. JACK FERRO, RN verbalized understanding. Pt alert, talking and has an overall very pleasant affect.

## 2023-02-16 NOTE — PROCEDURES
Reviewed labs, saw that INR was still active and called [id number] line, spoke with Lance, and he stated that he did not receive a blue tube, would speak to the front lab and I advised lab to call back once they find it. [facility] called back and stated that it was not with the cbc or bmp, and that we would need to redraw. Re-henrique the blue top, and resent it down. Called Brian Schulz after I sent the second draw, and he received it, and will put it through rapidly.

## 2023-02-16 NOTE — H&P
Neurointervention Pre procedure Note      Medical record number:  [id number]      Procedure: Diagnostic cerebral angiogram   Indications:  Prior ruptured ICA  aneurysm  Labs:    Reviewed as available    The patient's record including history and physical, available labs and procedures, medications and allergies has been reviewed. PRE-PROCEDURE ATTESTATION STATEMENT  I have explained the potential risks, benefits, and side effects of the proposed procedure/treatment, including the risk of death to the patient and/or surrogate. Also, the possibility for the transfusion of blood or blood components (only if potential for transfusion is applicable) was discussed with risks, benefits, and alternatives. This explanation included discussion of the likelihood of the patient achieving his or her goals and any potential problems that might occur during recuperation. Reasonable alternatives to the proposed procedure/treatment including risks, benefits, and side effects were also discussed, as were the risks related to not receiving the proposed procedure/treatment. The patient and/or surrogate have elected to proceed with the proposed procedure/treatment. .      Sedation and/or anesthesia risk, benefits, and alternatives discussed and questions answered. Vital Signs:  Vitals:    02/16/23 1145 02/16/23 1200 02/16/23 1215 02/16/23 1230   BP: 123/72 128/75 128/70    Pulse: 62 62 70 64   Resp: 16 16 16 16   Temp:       TempSrc:       SpO2:  94% 99%    Weight:       Height:             GENERAL: NPO: YES  ASA: ASA 2 - Patient with mild systemic disease with no functional limitations  MALLAMPATI: II (soft palate, uvula, fauces visible)    Anesthesia Plan:  Plan for conscious sedation. / Please see anesthesia plan      Impression & Plan:   1.   Previously ruptured cerebral aneurysm that was coiled with residual filling- RROC 1 occlusion was never achieved    Proceed with diagnostic angiogram     Neuro endovascular Surgery Consultation             Anika Alfred is a 60 y.o. woman with a past medical history of ruptured subarachnoid hemorrhage in 2018 secondary to 2 left ENRRIQUE aneurysms and multiple other aneurysms. However she had a complicated history and eventually became better. She is here for follow-up. She is referred by Jas Wetzel MD to me to provide services as a Neuro endovascular specialist as her physician Dr. Lukasz Alfredo has retired.         Past Medical History:      Past Medical History        Past Medical History:   Diagnosis Date    A-fib ([address]      Acute deep vein thrombosis (DVT) of right peroneal vein ([address] 10/14/2022     See note from pulmonology    Brain aneurysm 09/2018     H/O TIMES 2 THAT BURST PER PATIENT    Cerebral artery occlusion with cerebral infarction ([address]       RT SIDE AFFECTED-LEARNED TO WALK AND WRITE AGAIN    Chronic deep vein thrombosis (DVT) of right peroneal vein ([address] 1/12/2023    Degenerative arthritis of hand      Edentulous      Emphysema lung ([address]       lung dr    Emphysema of lung ([address]      Headache      Hiatal hernia      Hip problem       NEEDS HIP REPLACEMENT PER PATIENT    Hx of abnormal cervical Pap smear      Hypertension      Stroke (cerebrum) ([address]      Subarachnoid bleed ([address] 09/10/2018            Past Surgical History:      Past Surgical History         Past Surgical History:   Procedure Laterality Date    BRAIN ANEURYSM SURGERY         coiling     COLONOSCOPY   08/30/2019     polyps--frank    COLONOSCOPY N/A 08/30/2019     COLONOSCOPY POLYPECTOMY SNARE/COLD BIOPSY performed by Alejandrina Gaffney MD at [address] [facility] [place] [facility] N/A 12/9/2022     COLONOSCOPY POLYPECTOMY SNARE/COLD BIOPSY performed by Alejandrina Gaffney MD at [facility]   12/09/2022     polyp/cecal mass; diverticula--frank    JOINT REPLACEMENT         total hip replacement left and right    LAPAROTOMY N/A 12/09/2022     LAPAROTOMY EXPLORATORY, ILIOCECECTOMY performed by Alejandrina Gaffney MD at [facility]- DONE  [facility] Right 12/13/2022     [address] colored    TONSILLECTOMY        TOTAL HIP ARTHROPLASTY Left 02/22/2021     LEFT HIP TOTAL ARTHROPLASTY performed by Kiley Quinn MD at [address] Right 01/10/2022     RIGHT TOTAL HIP ARTHROPLASTY - STYKER performed by Kiley Quinn MD at [address]   08/30/2019     gastritis with superficial ulcerations; duodenitis--frank    UPPER GASTROINTESTINAL ENDOSCOPY N/A 08/30/2019     EGD BIOPSY performed by Oz Pringle MD at [facility] ENDOSCOPY            Allergies:      Latex, Iodine, Shellfish-derived products, Aloe, Celebrex [celecoxib], and Fish-derived products     Medications:      Home Medications           Prior to Admission medications    Medication Sig Start Date End Date Taking?  Authorizing Provider   Fexofenadine HCl (MUCINEX ALLERGY PO) Take 1 tablet by mouth as needed     Yes Historical Provider, MD   potassium chloride (KLOR-CON M) 10 MEQ extended release tablet TAKE ONE TABLET BY MOUTH DAILY WITH BREAKFAST 1/25/23   Yes Dia العراقي MD   losartan (COZAAR) 100 MG tablet Take 1 tablet by mouth daily 1/20/23   Yes Dia العراقي MD   pantoprazole (PROTONIX) 40 MG tablet TAKE ONE TABLET BY MOUTH EVERY DAY 1/20/23   Yes Dia العراقي MD   chlorthalidone (HYGROTON) 25 MG tablet Take 1 tablet by mouth daily 1/20/23   Yes Dia العراقي MD   amLODIPine (NORVASC) 5 MG tablet TAKE ONE TABLET BY MOUTH EVERY DAY 1/20/23   Yes Dia العراقي MD   psyllium (METAMUCIL SMOOTH TEXTURE) 28.3 % POWD powder Take 3.4 g by mouth 2 times daily 1/20/23   Yes Dia العراقي MD   gabapentin (NEURONTIN) 300 MG capsule TAKE ONE CAPSULE BY MOUTH THREE TIMES A DAY 1/20/23 6/29/23 Yes Dia العراقي MD   Umeclidinium-Vilanterol (ANORO ELLIPTA) 62.5-25 MCG/ACT AEPB Inhale 1 puff into the lungs daily 1/10/23 Yes Shanice Wiggins MD   metoprolol tartrate (LOPRESSOR) 25 MG tablet Take 1.5 tablets by mouth 2 times daily 23 Yes Marco Hernandez MD   albuterol sulfate HFA (PROVENTIL;VENTOLIN;PROAIR) 108 (90 Base) MCG/ACT inhaler INHALE TWO PUFFS BY MOUTH EVERY 6 HOURS AS NEEDED FOR WHEEZING. 22   Yes Marco Hernandez MD   fluticasone (FLONASE) 50 MCG/ACT nasal spray 2 sprays by Each Nostril route in the morning. Patient taking differently: 2 sprays by Each Nostril route daily as needed 22   Yes Marco Hernandez MD   polyethylene glycol (GLYCOLAX) 17 g packet DISSOLVE 1 PACKET (17 GRAMS) IN LIQUID AND TAKE BY MOUTH DAILY 22   Yes Historical Provider, MD   Multiple Vitamins-Minerals (THERAPEUTIC MULTIVITAMIN-MINERALS) tablet Take 1 tablet by mouth daily     Yes Historical Provider, MD   predniSONE (DELTASONE) 50 MG tablet 50 mg 13 hours prior to test, 50 mg 7 hours prior to test, 50 mg 1 hour before testing  Patient not taking: No sig reported 23     Alireza Kwong PA-C   predniSONE (DELTASONE) 50 MG tablet Take 1 tab 13 hours prior to procedure, 1 tab 7 hours prior to procedure, and 1 tab 1 hour prior to procedure  Patient not taking: No sig reported 23     Rachid Esquivel PA-C   Misc. Devices (WRIST BRACE) MISC Firm neutral position left wrist brace  Size to fit  Dx:  Wrist pain/carpal tunnel syndrome  Patient not taking: No sig reported 10/6/22     Marco Hernandez MD   Misc.  Devices (ROLLATOR) MISC 1 each by Does not apply route daily as needed (use as needed for stability)  Patient not taking: Reported on 2/3/2023 3/17/20     Marco Hernandez MD            Social History:      Social History            Tobacco Use    Smoking status: Former       Packs/day: 1.50       Years: 43.00       Pack years: 64.50       Types: Cigarettes       Quit date: 2018       Years since quittin.4    Smokeless tobacco: Never   Vaping Use    Vaping Use: Never used   Substance Use Topics    Alcohol use: No    Drug use: No         Review of Systems:      No chest pain or palpitations  No SOB  No vertigo, lightheadedness or loss of consciousness  No falls, tripping or stumbling  No incontinence of bowels or bladder  No itching or bruising appreciated  No numbness, tingling or focal arm/leg weakness     ROS otherwise negative      Family History:      Family History         Family History   Problem Relation Age of Onset    Diabetes Mother      Arthritis Mother      Atrial Fibrillation Mother      Diabetes Father      Atrial Fibrillation Father      Arthritis Father      Emphysema Father      Cancer Sister           \"female\"            History of Present Illness: This is a 59-year-old female who has a long complicated history and summary I will summarize as below. Patient was in usual state of self and then lost consciousness she was rushed to the ER in 2018 and found to have a subarachnoid hemorrhage multiple intracranial aneurysms were found. Initial coiling was performed which then led to recurrent hemorrhage and patient was then taken again for vasospasm with repeat coiling. Ultimately there was Urvashi Annabelle 1 occlusion of only one of the aneurysms that second aneurysm was still filling however it was determined to be adequate at that time patient has been doing well and has not had a follow-up conventional angiogram since 2018. She also reports that there she had another carotid aneurysm and a middle cerebral artery aneurysm on the opposite side.         Objective:   /67   Pulse 55   Temp 97.6 °F (36.4 °C)   Resp 16   Ht 5' 5\" (1.651 m)   Wt 217 lb (98.4 kg)   SpO2 97%   BMI 36.11 kg/m²       General appearance: alert, appears stated age and cooperative  Head: Normocephalic, without obvious abnormality, atraumatic  Neck: no adenopathy, no carotid bruit and limited ROM  Lungs: clear to auscultation bilaterally  Heart: regular rate and rhythm  Extremities: no cyanosis or edema  Pulses: 2+ and symmetric  Skin: no rashes or lesions      Constitutional: Well developed, well nourished and in no acute distress. Respiratory: Clear to auscultation bilaterally with no use of accessory muscles during respiration. Cardiovascular:  No murmurs auscultated. Carotid arteries without bruits. Pedal pulses and radial pulses 2+ bilaterally. No edema in all four extremities. Mental Status:     Alert and oriented to person, place, and time. Recent and remote memory: Intact  Attention and concentration: Intact  Speech and language : Intact  Fund of knowledge: Intact  Judgement and Insight: Intact     Cranial Nerves:      II: Visual Fields: Full to confrontation bilaterally   III, IV, VI: Pupils: equally round and reactive to light, 3  to 2  mm bilaterally. EOMs: full with no nystagmus. V: Sensation intact to light touch and pin prick sensation bilaterally  VII: symmetric and strong in the upper and lower face bilaterally  VIII: Hearing intact to finger rub bilaterally   IX,X: Palate elevates symmetrically. XI: head turn (sternocleidomastoid) and shoulder shrug (trapezius) 5/5 bilaterally   XII: Tongue is midline upon protrusion     Motor:   Normal tone and bulk. Normal fine finger movements. No pronator drift. No resting tremors, postural tremors or myoclonus.      Upper Extremity          Right    Left                  Lower Extremity          Right    Left  Deltoid                         5          5                          Hip Flexion              5          5  Biceps                         5          5                       Hip Extension             5          5  Triceps                        5            5                       Hip Adduction             5          5  Wrist Extension           5          5                       Knee Extension          5          5  Wrist Flexion               5          5                         Knee Flexion            5 5  Index Finger Flexion   5          5                      Ankle Dorsiflexion       5          5  Finger Extension         5          5                      Ankle Plantarflexion    5          5  APB                            5           5            Extensor Hallucis Longus     5          5        Sensory:  Intact light touch and pinprick in upper and lower extremities bilaterally. Intact proprioception and vibration sense in upper and lower extremities bilaterally. Coordination:     Alternating hand and foot movements intact in the UE and LE bilaterally  Finger-to-nose and Heel-to-shin with no ataxia or intention tremor bilaterally     Gait/Station:   Patient rises from a seated position without assistance. Romberg's test negative. Gait pattern is without hesitation, a wide-base, and of normal stride length. Heel, toe and tandem walking are intact.      Laboratory/Radiology:      CBC:         Lab Results   Component Value Date/Time     WBC 9.7 12/21/2022 04:54 AM     RBC 3.26 12/21/2022 04:54 AM     HGB 9.7 12/21/2022 04:54 AM     HCT 29.9 12/21/2022 04:54 AM     HCT 42.0 12/13/2022 08:33 AM     MCV 91.7 12/21/2022 04:54 AM     MCH 29.8 12/21/2022 04:54 AM     MCHC 32.4 12/21/2022 04:54 AM     RDW 13.2 12/21/2022 04:54 AM      12/21/2022 04:54 AM     MPV 9.5 12/21/2022 04:54 AM      CBC with Differential:          Lab Results   Component Value Date/Time     WBC 9.7 12/21/2022 04:54 AM     RBC 3.26 12/21/2022 04:54 AM     HGB 9.7 12/21/2022 04:54 AM     HCT 29.9 12/21/2022 04:54 AM     HCT 42.0 12/13/2022 08:33 AM      12/21/2022 04:54 AM     MCV 91.7 12/21/2022 04:54 AM     MCH 29.8 12/21/2022 04:54 AM     MCHC 32.4 12/21/2022 04:54 AM     RDW 13.2 12/21/2022 04:54 AM     LYMPHOPCT 12.4 12/21/2022 04:54 AM     MONOPCT 6.6 12/21/2022 04:54 AM     BASOPCT 0.3 12/21/2022 04:54 AM     MONOSABS 0.64 12/21/2022 04:54 AM     LYMPHSABS 1.20 12/21/2022 04:54 AM     EOSABS 0.15 12/21/2022 04:54 AM     BASOSABS 0.03 12/21/2022 04:54 AM      Platelets:          Lab Results   Component Value Date/Time      12/21/2022 04:54 AM      Hemoglobin/Hematocrit:          Lab Results   Component Value Date/Time     HGB 9.7 12/21/2022 04:54 AM     HCT 29.9 12/21/2022 04:54 AM     HCT 42.0 12/13/2022 08:33 AM      CMP:          Lab Results   Component Value Date/Time      12/21/2022 04:54 AM     K 3.9 12/21/2022 04:54 AM      12/21/2022 04:54 AM     CO2 25 12/21/2022 04:54 AM     BUN 16 12/21/2022 04:54 AM     CREATININE 0.8 12/21/2022 04:54 AM     GFRAA 50 10/06/2022 09:39 AM     LABGLOM >60 12/21/2022 04:54 AM     GLUCOSE 116 12/21/2022 04:54 AM     PROT 6.2 12/19/2022 05:41 AM     LABALBU 3.0 12/19/2022 05:41 AM     CALCIUM 8.9 12/21/2022 04:54 AM     BILITOT 0.3 12/19/2022 05:41 AM     ALKPHOS 66 12/19/2022 05:41 AM     AST 30 12/19/2022 05:41 AM     ALT 40 12/19/2022 05:41 AM      BMP:          Lab Results   Component Value Date/Time      12/21/2022 04:54 AM     K 3.9 12/21/2022 04:54 AM      12/21/2022 04:54 AM     CO2 25 12/21/2022 04:54 AM     BUN 16 12/21/2022 04:54 AM     LABALBU 3.0 12/19/2022 05:41 AM     CREATININE 0.8 12/21/2022 04:54 AM     CALCIUM 8.9 12/21/2022 04:54 AM     GFRAA 50 10/06/2022 09:39 AM     LABGLOM >60 12/21/2022 04:54 AM     GLUCOSE 116 12/21/2022 04:54 AM         I independently reviewed the labs and imaging studies today. Assessment:              Encounter Diagnoses   Name Primary? Cerebral aneurysm Yes    Nonintractable headache, unspecified chronicity pattern, unspecified headache type              Plan:      Patient continues to have headaches. She did not have a Cristobal 1 occlusion. Ideal volume CT is not present to confirm this. Due to these issues I have recommended a follow-up angiogram conventional angiogram with the patient. It would be in the radial approach. Risk and benefits of the procedure were discussed.   Patient verbalized understanding. We will go ahead and schedule her with the angiogram to follow-up the aneurysms.

## 2023-02-16 NOTE — DISCHARGE INSTRUCTIONS
*Please leave dressing  on R hand for 2 days,    *do not use Right arm if at all possible. ( No lifting/pushing/pulling)  *Please call with any increase in pain, numbness weakness or swelling.     *DR Gigi Rodriguez called in  orders for you to start blood thinners 5 days BEFORE YOUR NEXT PROCEDURE 3/1/2023

## 2023-02-16 NOTE — PROCEDURES
1039 took patient to post op recovery, gave bedside report to [facility], advised her of TR band, 14cc of air, had her assess the site, soft, non tender, no signs of bleeding, right radial pulse is palpable. Educated patient to notify bedside nurse in regards to any change in hand, educated of tingling, purple skin color, pain, bleeding notify RN immediately. Alfonzo Priest RN report of procedure medicine, vital trend. Connected patient to continuous monitoring. Patient is alert, comfortable, denies pain, and stable.

## 2023-02-16 NOTE — PROCEDURES
Patient returned from procedure. Trb and  and checked, clean, dry, and intact, hand color pink, brisk cap refill, pulses present. Patient stable. No s/s of complications noted or reported. Family at bedside Vitals will be checked q 15min, see flow sheets. 1035 Per report TR applied 1023 14cc air   1045 2cc removed  12cc present   1100 2cc removed 10cc present   1115 2cc  removed 8cc remaining  1130 2cc removed  6cc remaining   1145 2cc removed 4cc remaining  1200 2cc removed 2cc remaining  1215 2cc removed 0 remaining  1230 TR band removed. 1230: Patient eating and drinking well with no s/s of complications noted or reported. Patient discharged, site was checked with every set of vitals. Site clean dry and intact. Discharge papers reviewed with patient, questions answered, discharge paper signed. Patient taken to door via ***. Patient in stable condition, no s/s of complications noted or reported.

## 2023-02-16 NOTE — PROCEDURES
Patient arrived via ambulation with walker to Radiology department for Diagnostic angiogram with Dr. Jamie Singh. . Allergies, home medications, H&P and fasting instructions reviewed with patient. Confirmed patient was taking her prednisone 4 days prior to procedure, due to Iodide allergy. Vital signs taken. 20 gauge  IV placed, blood obtained, IV flushed and prn adapter attached. Blood sample sent to lab for ordered tests. Dr. Jamie Singh at the bedside with patient discussing procedure, and answering any questions the patient may have. Procedural instructions given, questions answered, understanding expressed and consent signed. Patient given fluoroscopy education, no questions at this time. Patient;s groin was shaved bilaterally, pulses bilaterally assessed, palpable, and plus 2 bilaterally.

## 2023-02-20 ENCOUNTER — TELEPHONE (OUTPATIENT)
Dept: FAMILY MEDICINE CLINIC | Age: 63
End: 2023-02-20

## 2023-02-21 ENCOUNTER — TELEPHONE (OUTPATIENT)
Dept: FAMILY MEDICINE CLINIC | Age: 63
End: 2023-02-21

## 2023-02-21 RX ORDER — ALBUTEROL SULFATE 90 UG/1
POWDER, METERED RESPIRATORY (INHALATION)
Qty: 1 EACH | Refills: 2 | Status: SHIPPED | OUTPATIENT
Start: 2023-02-21

## 2023-02-23 ENCOUNTER — TELEPHONE (OUTPATIENT)
Dept: CASE MANAGEMENT | Age: 63
End: 2023-02-23

## 2023-02-23 NOTE — TELEPHONE ENCOUNTER
I called the patient's insurance [facility] on 2/16/23 and I spoke with Karol Mcfarlane authorization rep., she stated no authorization is required for CPT code 42327. But authorization is required for inpatient stay. So I called back today to check the status of the authorization and I spoke with Nani Stallworth she stated authorization is approved.      Authorization: [id number]  Valid Date: 3/1/2023    Electronically signed by Mely Babcock on 2/23/23 at 9:51 AM EST

## 2023-02-24 ENCOUNTER — TELEPHONE (OUTPATIENT)
Dept: NEUROLOGY | Age: 63
End: 2023-02-24

## 2023-02-24 RX ORDER — PREDNISONE 10 MG/1
10 TABLET ORAL DAILY
Qty: 7 TABLET | Refills: 0 | Status: ON HOLD
Start: 2023-02-24 | End: 2023-03-01

## 2023-02-24 NOTE — TELEPHONE ENCOUNTER
Patient called regarding her upcoming procedure on 3/1. She is allergic to Iodine. Needs medication to take prior to test because of her allergy.

## 2023-03-01 ENCOUNTER — ANESTHESIA (OUTPATIENT)
Dept: INTERVENTIONAL RADIOLOGY/VASCULAR | Age: 63
End: 2023-03-01
Payer: MEDICARE

## 2023-03-01 ENCOUNTER — ANESTHESIA EVENT (OUTPATIENT)
Dept: INTERVENTIONAL RADIOLOGY/VASCULAR | Age: 63
End: 2023-03-01
Payer: MEDICARE

## 2023-03-01 ENCOUNTER — HOSPITAL ENCOUNTER (INPATIENT)
Dept: INTERVENTIONAL RADIOLOGY/VASCULAR | Age: 63
LOS: 1 days | Discharge: HOME | DRG: 027 | End: 2023-03-02
Attending: PSYCHIATRY & NEUROLOGY | Admitting: PSYCHIATRY & NEUROLOGY
Payer: MEDICARE

## 2023-03-01 DIAGNOSIS — I67.1 CEREBRAL ANEURYSM, NONRUPTURED: ICD-10-CM

## 2023-03-01 DIAGNOSIS — Z95.828 PRESENCE OF INTERNAL CAROTID STENT: Primary | ICD-10-CM

## 2023-03-01 DIAGNOSIS — Z78.9 PLAVIX RESISTANCE: ICD-10-CM

## 2023-03-01 LAB
ANION GAP SERPL CALCULATED.3IONS-SCNC: 12 MMOL/L (ref 7–16)
BASOPHILS ABSOLUTE: 0.06 E9/L (ref 0–0.2)
BASOPHILS RELATIVE PERCENT: 0.8 % (ref 0–2)
BUN BLDV-MCNC: 22 MG/DL (ref 6–23)
CALCIUM SERPL-MCNC: 9.4 MG/DL (ref 8.6–10.2)
CHLORIDE BLD-SCNC: 105 MMOL/L (ref 98–107)
CO2: 23 MMOL/L (ref 22–29)
COLLAGEN ADENOSINE-5'-DIPHOSPHATE (ADP) TIME: 64 SEC (ref 48–103)
COLLAGEN EPINEPHRINE TIME: 143 SEC (ref 83–176)
CREAT SERPL-MCNC: 1.1 MG/DL (ref 0.5–1)
EOSINOPHILS ABSOLUTE: 0.38 E9/L (ref 0.05–0.5)
EOSINOPHILS RELATIVE PERCENT: 4.9 % (ref 0–6)
GFR SERPL CREATININE-BSD FRML MDRD: 56 ML/MIN/1.73
GLUCOSE BLD-MCNC: 121 MG/DL (ref 74–99)
HCT VFR BLD CALC: 39.1 % (ref 34–48)
HEMOGLOBIN: 12.5 G/DL (ref 11.5–15.5)
IMMATURE GRANULOCYTES #: 0.05 E9/L
IMMATURE GRANULOCYTES %: 0.6 % (ref 0–5)
INR BLD: 1
LYMPHOCYTES ABSOLUTE: 1.64 E9/L (ref 1.5–4)
LYMPHOCYTES RELATIVE PERCENT: 21.2 % (ref 20–42)
MCH RBC QN AUTO: 28.5 PG (ref 26–35)
MCHC RBC AUTO-ENTMCNC: 32 % (ref 32–34.5)
MCV RBC AUTO: 89.3 FL (ref 80–99.9)
MONOCYTES ABSOLUTE: 0.36 E9/L (ref 0.1–0.95)
MONOCYTES RELATIVE PERCENT: 4.7 % (ref 2–12)
NEUTROPHILS ABSOLUTE: 5.24 E9/L (ref 1.8–7.3)
NEUTROPHILS RELATIVE PERCENT: 67.8 % (ref 43–80)
PDW BLD-RTO: 14 FL (ref 11.5–15)
PLATELET # BLD: 303 E9/L (ref 130–450)
PMV BLD AUTO: 9.1 FL (ref 7–12)
POTASSIUM SERPL-SCNC: 4.1 MMOL/L (ref 3.5–5)
PROTHROMBIN TIME: 10.8 SEC (ref 9.3–12.4)
RBC # BLD: 4.38 E12/L (ref 3.5–5.5)
SODIUM BLD-SCNC: 140 MMOL/L (ref 132–146)
WBC # BLD: 7.7 E9/L (ref 4.5–11.5)

## 2023-03-01 PROCEDURE — 75898 FOLLOW-UP ANGIOGRAPHY: CPT | Performed by: PSYCHIATRY & NEUROLOGY

## 2023-03-01 PROCEDURE — 85347 COAGULATION TIME ACTIVATED: CPT

## 2023-03-01 PROCEDURE — 61624 TCAT PERM OCCLS/EMBOLJ CNS: CPT | Performed by: PSYCHIATRY & NEUROLOGY

## 2023-03-01 PROCEDURE — 3700000000 HC ANESTHESIA ATTENDED CARE

## 2023-03-01 PROCEDURE — 37215 TRANSCATH STENT CCA W/EPS: CPT | Performed by: PSYCHIATRY & NEUROLOGY

## 2023-03-01 PROCEDURE — 37215 TRANSCATH STENT CCA W/EPS: CPT

## 2023-03-01 PROCEDURE — 85025 COMPLETE CBC W/AUTO DIFF WBC: CPT

## 2023-03-01 PROCEDURE — 3700000001 HC ADD 15 MINUTES (ANESTHESIA)

## 2023-03-01 PROCEDURE — 6360000004 HC RX CONTRAST MEDICATION: Performed by: PSYCHIATRY & NEUROLOGY

## 2023-03-01 PROCEDURE — 36415 COLL VENOUS BLD VENIPUNCTURE: CPT

## 2023-03-01 PROCEDURE — 2580000003 HC RX 258

## 2023-03-01 PROCEDURE — 85576 BLOOD PLATELET AGGREGATION: CPT

## 2023-03-01 PROCEDURE — 7100000000 HC PACU RECOVERY - FIRST 15 MIN

## 2023-03-01 PROCEDURE — 94640 AIRWAY INHALATION TREATMENT: CPT

## 2023-03-01 PROCEDURE — 2000000000 HC ICU R&B

## 2023-03-01 PROCEDURE — 6360000002 HC RX W HCPCS

## 2023-03-01 PROCEDURE — 75898 FOLLOW-UP ANGIOGRAPHY: CPT

## 2023-03-01 PROCEDURE — 6370000000 HC RX 637 (ALT 250 FOR IP): Performed by: PSYCHIATRY & NEUROLOGY

## 2023-03-01 PROCEDURE — 80048 BASIC METABOLIC PNL TOTAL CA: CPT

## 2023-03-01 PROCEDURE — 75894 X-RAYS TRANSCATH THERAPY: CPT | Performed by: PSYCHIATRY & NEUROLOGY

## 2023-03-01 PROCEDURE — 75894 X-RAYS TRANSCATH THERAPY: CPT

## 2023-03-01 PROCEDURE — 37242 VASC EMBOLIZE/OCCLUDE ARTERY: CPT

## 2023-03-01 PROCEDURE — 37799 UNLISTED PX VASCULAR SURGERY: CPT

## 2023-03-01 PROCEDURE — C1894 INTRO/SHEATH, NON-LASER: HCPCS

## 2023-03-01 PROCEDURE — 6360000002 HC RX W HCPCS: Performed by: PSYCHIATRY & NEUROLOGY

## 2023-03-01 PROCEDURE — 99221 1ST HOSP IP/OBS SF/LOW 40: CPT | Performed by: PSYCHIATRY & NEUROLOGY

## 2023-03-01 PROCEDURE — 2500000003 HC RX 250 WO HCPCS: Performed by: PSYCHIATRY & NEUROLOGY

## 2023-03-01 PROCEDURE — 94664 DEMO&/EVAL PT USE INHALER: CPT

## 2023-03-01 PROCEDURE — 75605 CONTRAST EXAM THORACIC AORTA: CPT

## 2023-03-01 PROCEDURE — 85610 PROTHROMBIN TIME: CPT

## 2023-03-01 PROCEDURE — 7100000001 HC PACU RECOVERY - ADDTL 15 MIN

## 2023-03-01 PROCEDURE — 36620 INSERTION CATHETER ARTERY: CPT

## 2023-03-01 PROCEDURE — 61624 TCAT PERM OCCLS/EMBOLJ CNS: CPT

## 2023-03-01 PROCEDURE — 03VG3HZ RESTRICTION OF INTRACRANIAL ARTERY WITH INTRALUMINAL DEVICE, FLOW DIVERTER, PERCUTANEOUS APPROACH: ICD-10-PCS | Performed by: PSYCHIATRY & NEUROLOGY

## 2023-03-01 PROCEDURE — 2500000003 HC RX 250 WO HCPCS

## 2023-03-01 RX ORDER — SODIUM CHLORIDE 9 MG/ML
INJECTION, SOLUTION INTRAVENOUS PRN
Status: DISCONTINUED | OUTPATIENT
Start: 2023-03-01 | End: 2023-03-02 | Stop reason: HOSPADM

## 2023-03-01 RX ORDER — LOSARTAN POTASSIUM 50 MG/1
100 TABLET ORAL DAILY
Status: DISCONTINUED | OUTPATIENT
Start: 2023-03-02 | End: 2023-03-02 | Stop reason: HOSPADM

## 2023-03-01 RX ORDER — CHLORTHALIDONE 25 MG/1
25 TABLET ORAL DAILY
Status: DISCONTINUED | OUTPATIENT
Start: 2023-03-02 | End: 2023-03-02 | Stop reason: HOSPADM

## 2023-03-01 RX ORDER — ALBUTEROL SULFATE 2.5 MG/3ML
2.5 SOLUTION RESPIRATORY (INHALATION) EVERY 6 HOURS PRN
Status: DISCONTINUED | OUTPATIENT
Start: 2023-03-01 | End: 2023-03-02 | Stop reason: HOSPADM

## 2023-03-01 RX ORDER — ONDANSETRON 2 MG/ML
4 INJECTION INTRAMUSCULAR; INTRAVENOUS
Status: ACTIVE | OUTPATIENT
Start: 2023-03-01 | End: 2023-03-02

## 2023-03-01 RX ORDER — GLYCOPYRROLATE 1 MG/5 ML
SYRINGE (ML) INTRAVENOUS PRN
Status: DISCONTINUED | OUTPATIENT
Start: 2023-03-01 | End: 2023-03-01 | Stop reason: SDUPTHER

## 2023-03-01 RX ORDER — HYDROCODONE BITARTRATE AND ACETAMINOPHEN 10; 325 MG/1; MG/1
1 TABLET ORAL EVERY 6 HOURS PRN
Status: DISCONTINUED | OUTPATIENT
Start: 2023-03-01 | End: 2023-03-02 | Stop reason: HOSPADM

## 2023-03-01 RX ORDER — HYDRALAZINE HYDROCHLORIDE 20 MG/ML
5 INJECTION INTRAMUSCULAR; INTRAVENOUS EVERY 10 MIN PRN
Status: DISCONTINUED | OUTPATIENT
Start: 2023-03-01 | End: 2023-03-02 | Stop reason: HOSPADM

## 2023-03-01 RX ORDER — LIDOCAINE HYDROCHLORIDE 20 MG/ML
INJECTION, SOLUTION INFILTRATION; PERINEURAL
Status: COMPLETED | OUTPATIENT
Start: 2023-03-01 | End: 2023-03-01

## 2023-03-01 RX ORDER — ALBUTEROL SULFATE 90 UG/1
1 AEROSOL, METERED RESPIRATORY (INHALATION) EVERY 6 HOURS PRN
Status: DISCONTINUED | OUTPATIENT
Start: 2023-03-01 | End: 2023-03-01

## 2023-03-01 RX ORDER — EPHEDRINE SULFATE 50 MG/ML
INJECTION INTRAVENOUS PRN
Status: DISCONTINUED | OUTPATIENT
Start: 2023-03-01 | End: 2023-03-01 | Stop reason: SDUPTHER

## 2023-03-01 RX ORDER — METHYLPREDNISOLONE SODIUM SUCCINATE 1 G/16ML
INJECTION, POWDER, LYOPHILIZED, FOR SOLUTION INTRAMUSCULAR; INTRAVENOUS PRN
Status: DISCONTINUED | OUTPATIENT
Start: 2023-03-01 | End: 2023-03-01 | Stop reason: SDUPTHER

## 2023-03-01 RX ORDER — HEPARIN SODIUM 10000 [USP'U]/ML
INJECTION, SOLUTION INTRAVENOUS; SUBCUTANEOUS
Status: COMPLETED | OUTPATIENT
Start: 2023-03-01 | End: 2023-03-01

## 2023-03-01 RX ORDER — PANTOPRAZOLE SODIUM 40 MG/1
40 TABLET, DELAYED RELEASE ORAL DAILY
Status: DISCONTINUED | OUTPATIENT
Start: 2023-03-02 | End: 2023-03-02 | Stop reason: HOSPADM

## 2023-03-01 RX ORDER — AMLODIPINE BESYLATE 5 MG/1
5 TABLET ORAL DAILY
Status: DISCONTINUED | OUTPATIENT
Start: 2023-03-02 | End: 2023-03-02 | Stop reason: HOSPADM

## 2023-03-01 RX ORDER — HEPARIN SODIUM 1000 [USP'U]/ML
INJECTION, SOLUTION INTRAVENOUS; SUBCUTANEOUS PRN
Status: DISCONTINUED | OUTPATIENT
Start: 2023-03-01 | End: 2023-03-01 | Stop reason: SDUPTHER

## 2023-03-01 RX ORDER — SODIUM CHLORIDE 0.9 % (FLUSH) 0.9 %
5-40 SYRINGE (ML) INJECTION PRN
Status: DISCONTINUED | OUTPATIENT
Start: 2023-03-01 | End: 2023-03-02 | Stop reason: HOSPADM

## 2023-03-01 RX ORDER — SODIUM CHLORIDE 0.9 % (FLUSH) 0.9 %
5-40 SYRINGE (ML) INJECTION EVERY 12 HOURS SCHEDULED
Status: DISCONTINUED | OUTPATIENT
Start: 2023-03-01 | End: 2023-03-02 | Stop reason: HOSPADM

## 2023-03-01 RX ORDER — LABETALOL HYDROCHLORIDE 5 MG/ML
5 INJECTION, SOLUTION INTRAVENOUS EVERY 10 MIN PRN
Status: DISCONTINUED | OUTPATIENT
Start: 2023-03-01 | End: 2023-03-02 | Stop reason: HOSPADM

## 2023-03-01 RX ORDER — NEOSTIGMINE METHYLSULFATE 1 MG/ML
INJECTION, SOLUTION INTRAVENOUS PRN
Status: DISCONTINUED | OUTPATIENT
Start: 2023-03-01 | End: 2023-03-01 | Stop reason: SDUPTHER

## 2023-03-01 RX ORDER — LIDOCAINE HYDROCHLORIDE 20 MG/ML
INJECTION, SOLUTION EPIDURAL; INFILTRATION; INTRACAUDAL; PERINEURAL PRN
Status: DISCONTINUED | OUTPATIENT
Start: 2023-03-01 | End: 2023-03-01 | Stop reason: SDUPTHER

## 2023-03-01 RX ORDER — ACETAMINOPHEN 325 MG/1
650 TABLET ORAL EVERY 4 HOURS PRN
Status: DISCONTINUED | OUTPATIENT
Start: 2023-03-01 | End: 2023-03-02 | Stop reason: HOSPADM

## 2023-03-01 RX ORDER — MEPERIDINE HYDROCHLORIDE 25 MG/ML
12.5 INJECTION INTRAMUSCULAR; INTRAVENOUS; SUBCUTANEOUS
Status: DISCONTINUED | OUTPATIENT
Start: 2023-03-01 | End: 2023-03-02 | Stop reason: HOSPADM

## 2023-03-01 RX ORDER — DEXAMETHASONE SODIUM PHOSPHATE 4 MG/ML
4 INJECTION, SOLUTION INTRA-ARTICULAR; INTRALESIONAL; INTRAMUSCULAR; INTRAVENOUS; SOFT TISSUE EVERY 6 HOURS PRN
Status: DISCONTINUED | OUTPATIENT
Start: 2023-03-01 | End: 2023-03-02 | Stop reason: HOSPADM

## 2023-03-01 RX ORDER — MIDAZOLAM HYDROCHLORIDE 1 MG/ML
INJECTION INTRAMUSCULAR; INTRAVENOUS PRN
Status: DISCONTINUED | OUTPATIENT
Start: 2023-03-01 | End: 2023-03-01 | Stop reason: SDUPTHER

## 2023-03-01 RX ORDER — ROCURONIUM BROMIDE 10 MG/ML
INJECTION, SOLUTION INTRAVENOUS PRN
Status: DISCONTINUED | OUTPATIENT
Start: 2023-03-01 | End: 2023-03-01 | Stop reason: SDUPTHER

## 2023-03-01 RX ORDER — FAMOTIDINE 20 MG/1
20 TABLET, FILM COATED ORAL DAILY
Status: DISCONTINUED | OUTPATIENT
Start: 2023-03-01 | End: 2023-03-02 | Stop reason: HOSPADM

## 2023-03-01 RX ORDER — POTASSIUM CHLORIDE 20 MEQ/1
20 TABLET, EXTENDED RELEASE ORAL DAILY
Status: DISCONTINUED | OUTPATIENT
Start: 2023-03-02 | End: 2023-03-02 | Stop reason: HOSPADM

## 2023-03-01 RX ORDER — ONDANSETRON 4 MG/1
4 TABLET, ORALLY DISINTEGRATING ORAL EVERY 8 HOURS PRN
Status: DISCONTINUED | OUTPATIENT
Start: 2023-03-01 | End: 2023-03-02 | Stop reason: HOSPADM

## 2023-03-01 RX ORDER — ASPIRIN 81 MG/1
81 TABLET ORAL DAILY
Status: DISCONTINUED | OUTPATIENT
Start: 2023-03-02 | End: 2023-03-02 | Stop reason: HOSPADM

## 2023-03-01 RX ORDER — PROPOFOL 10 MG/ML
INJECTION, EMULSION INTRAVENOUS PRN
Status: DISCONTINUED | OUTPATIENT
Start: 2023-03-01 | End: 2023-03-01 | Stop reason: SDUPTHER

## 2023-03-01 RX ORDER — DIPHENHYDRAMINE HYDROCHLORIDE 50 MG/ML
INJECTION INTRAMUSCULAR; INTRAVENOUS PRN
Status: DISCONTINUED | OUTPATIENT
Start: 2023-03-01 | End: 2023-03-01 | Stop reason: SDUPTHER

## 2023-03-01 RX ORDER — ACETAMINOPHEN AND CODEINE PHOSPHATE 60; 300 MG/1; MG/1
1 TABLET ORAL EVERY 6 HOURS PRN
Status: DISCONTINUED | OUTPATIENT
Start: 2023-03-01 | End: 2023-03-01 | Stop reason: CLARIF

## 2023-03-01 RX ORDER — ATORVASTATIN CALCIUM 40 MG/1
40 TABLET, FILM COATED ORAL NIGHTLY
Status: DISCONTINUED | OUTPATIENT
Start: 2023-03-01 | End: 2023-03-02 | Stop reason: HOSPADM

## 2023-03-01 RX ORDER — SODIUM CHLORIDE 9 MG/ML
INJECTION, SOLUTION INTRAVENOUS CONTINUOUS PRN
Status: DISCONTINUED | OUTPATIENT
Start: 2023-03-01 | End: 2023-03-01 | Stop reason: SDUPTHER

## 2023-03-01 RX ORDER — FENTANYL CITRATE 50 UG/ML
INJECTION, SOLUTION INTRAMUSCULAR; INTRAVENOUS PRN
Status: DISCONTINUED | OUTPATIENT
Start: 2023-03-01 | End: 2023-03-01 | Stop reason: SDUPTHER

## 2023-03-01 RX ORDER — ARFORMOTEROL TARTRATE 15 UG/2ML
15 SOLUTION RESPIRATORY (INHALATION) 2 TIMES DAILY
Status: DISCONTINUED | OUTPATIENT
Start: 2023-03-01 | End: 2023-03-02 | Stop reason: HOSPADM

## 2023-03-01 RX ORDER — ONDANSETRON 2 MG/ML
INJECTION INTRAMUSCULAR; INTRAVENOUS PRN
Status: DISCONTINUED | OUTPATIENT
Start: 2023-03-01 | End: 2023-03-01 | Stop reason: SDUPTHER

## 2023-03-01 RX ORDER — GABAPENTIN 300 MG/1
300 CAPSULE ORAL 3 TIMES DAILY
Status: DISCONTINUED | OUTPATIENT
Start: 2023-03-01 | End: 2023-03-02 | Stop reason: HOSPADM

## 2023-03-01 RX ORDER — ONDANSETRON 2 MG/ML
4 INJECTION INTRAMUSCULAR; INTRAVENOUS EVERY 6 HOURS PRN
Status: DISCONTINUED | OUTPATIENT
Start: 2023-03-01 | End: 2023-03-02 | Stop reason: HOSPADM

## 2023-03-01 RX ADMIN — ATORVASTATIN CALCIUM 40 MG: 40 TABLET, FILM COATED ORAL at 19:45

## 2023-03-01 RX ADMIN — HEPARIN SODIUM 500 UNITS: 1000 INJECTION INTRAVENOUS; SUBCUTANEOUS at 11:06

## 2023-03-01 RX ADMIN — SODIUM CHLORIDE: 9 INJECTION, SOLUTION INTRAVENOUS at 11:27

## 2023-03-01 RX ADMIN — FAMOTIDINE 20 MG: 20 TABLET ORAL at 13:54

## 2023-03-01 RX ADMIN — PROPOFOL 100 MG: 10 INJECTION, EMULSION INTRAVENOUS at 09:56

## 2023-03-01 RX ADMIN — Medication 100 MG: at 09:56

## 2023-03-01 RX ADMIN — METHYLPREDNISOLONE SODIUM SUCCINATE 50 MG: 1 INJECTION, POWDER, FOR SOLUTION INTRAMUSCULAR; INTRAVENOUS at 10:06

## 2023-03-01 RX ADMIN — EPHEDRINE SULFATE 5 MG: 50 INJECTION, SOLUTION INTRAVENOUS at 10:43

## 2023-03-01 RX ADMIN — NEOSTIGMINE METHYLSULFATE 3 MG: 1 INJECTION, SOLUTION INTRAVENOUS at 11:40

## 2023-03-01 RX ADMIN — Medication 5000 UNITS: at 11:13

## 2023-03-01 RX ADMIN — ARFORMOTEROL TARTRATE 15 MCG: 15 SOLUTION RESPIRATORY (INHALATION) at 20:46

## 2023-03-01 RX ADMIN — EPHEDRINE SULFATE 5 MG: 50 INJECTION, SOLUTION INTRAVENOUS at 11:15

## 2023-03-01 RX ADMIN — Medication 0.2 MG: at 10:48

## 2023-03-01 RX ADMIN — Medication 1000 ML: at 11:12

## 2023-03-01 RX ADMIN — IOPAMIDOL 115 ML: 612 INJECTION, SOLUTION INTRAVENOUS at 10:30

## 2023-03-01 RX ADMIN — LIDOCAINE HYDROCHLORIDE 5 ML: 20 INJECTION, SOLUTION INFILTRATION; PERINEURAL at 10:06

## 2023-03-01 RX ADMIN — SODIUM CHLORIDE: 9 INJECTION, SOLUTION INTRAVENOUS at 09:38

## 2023-03-01 RX ADMIN — GABAPENTIN 300 MG: 300 CAPSULE ORAL at 13:55

## 2023-03-01 RX ADMIN — FENTANYL CITRATE 25 MCG: 50 INJECTION, SOLUTION INTRAMUSCULAR; INTRAVENOUS at 10:15

## 2023-03-01 RX ADMIN — Medication 1000 ML: at 11:13

## 2023-03-01 RX ADMIN — MIDAZOLAM 2 MG: 1 INJECTION INTRAMUSCULAR; INTRAVENOUS at 09:41

## 2023-03-01 RX ADMIN — HEPARIN SODIUM 500 UNITS: 1000 INJECTION INTRAVENOUS; SUBCUTANEOUS at 10:53

## 2023-03-01 RX ADMIN — TICAGRELOR 90 MG: 90 TABLET ORAL at 13:54

## 2023-03-01 RX ADMIN — ONDANSETRON HYDROCHLORIDE 4 MG: 2 SOLUTION INTRAMUSCULAR; INTRAVENOUS at 10:22

## 2023-03-01 RX ADMIN — GABAPENTIN 300 MG: 300 CAPSULE ORAL at 19:45

## 2023-03-01 RX ADMIN — HEPARIN SODIUM 2000 UNITS: 1000 INJECTION INTRAVENOUS; SUBCUTANEOUS at 10:36

## 2023-03-01 RX ADMIN — ROCURONIUM BROMIDE 50 MG: 10 INJECTION, SOLUTION INTRAVENOUS at 09:56

## 2023-03-01 RX ADMIN — IPRATROPIUM BROMIDE 0.5 MG: 0.5 SOLUTION RESPIRATORY (INHALATION) at 20:46

## 2023-03-01 RX ADMIN — FENTANYL CITRATE 50 MCG: 50 INJECTION, SOLUTION INTRAMUSCULAR; INTRAVENOUS at 09:56

## 2023-03-01 RX ADMIN — TICAGRELOR 90 MG: 90 TABLET ORAL at 19:45

## 2023-03-01 RX ADMIN — FENTANYL CITRATE 25 MCG: 50 INJECTION, SOLUTION INTRAMUSCULAR; INTRAVENOUS at 10:26

## 2023-03-01 RX ADMIN — HEPARIN SODIUM 1500 UNITS: 1000 INJECTION INTRAVENOUS; SUBCUTANEOUS at 10:43

## 2023-03-01 RX ADMIN — Medication 0.6 MG: at 11:40

## 2023-03-01 RX ADMIN — DIPHENHYDRAMINE HYDROCHLORIDE 25 MG: 50 INJECTION, SOLUTION INTRAMUSCULAR; INTRAVENOUS at 10:06

## 2023-03-01 ASSESSMENT — ENCOUNTER SYMPTOMS: SHORTNESS OF BREATH: 1

## 2023-03-01 ASSESSMENT — LIFESTYLE VARIABLES: SMOKING_STATUS: 0

## 2023-03-01 NOTE — H&P
Neurointervention Pre procedure Note      Medical record number:  [id number]      Procedure: Embolization of left Supraclinoid ICA aneurysm   Indications:  Prior ruptured ICA  aneurysm with inadequate treatment Cristobal 3 occlusion  Labs:    Reviewed as available    The patient's record including history and physical, available labs and procedures, medications and allergies has been reviewed. PRE-PROCEDURE ATTESTATION STATEMENT  I have explained the potential risks, benefits, and side effects of the proposed procedure/treatment, including the risk of death to the patient and/or surrogate. Also, the possibility for the transfusion of blood or blood components (only if potential for transfusion is applicable) was discussed with risks, benefits, and alternatives. This explanation included discussion of the likelihood of the patient achieving his or her goals and any potential problems that might occur during recuperation. Reasonable alternatives to the proposed procedure/treatment including risks, benefits, and side effects were also discussed, as were the risks related to not receiving the proposed procedure/treatment. The patient and/or surrogate have elected to proceed with the proposed procedure/treatment. .      Sedation and/or anesthesia risk, benefits, and alternatives discussed and questions answered. Vital Signs:  Vitals:    03/01/23 0845 03/01/23 1204 03/01/23 1210 03/01/23 1216   BP: (!) 98/57   (!) 100/56   Pulse: 60   52   Resp: 20   16   Temp:  97.6 °F (36.4 °C) 97.6 °F (36.4 °C) 97.6 °F (36.4 °C)   TempSrc:  Oral Oral Temporal   SpO2: 97%   100%         GENERAL: NPO: YES  ASA: ASA 2 - Patient with mild systemic disease with no functional limitations  MALLAMPATI: II (soft palate, uvula, fauces visible)    Anesthesia Plan:  Plan for conscious sedation. / Please see anesthesia plan      Impression & Plan:   1.   Previously ruptured cerebral aneurysm that was coiled with residual filling-    Proceed with planned embolization as requested by tge patient             Dorothy Dumont is a 60 y.o. woman with a past medical history of ruptured subarachnoid hemorrhage in 2018 secondary to 2 left ENRRIQUE aneurysms and multiple other aneurysms. However she had a complicated history and eventually became better. She is here for follow-up. She is referred by Chiara Vazquez MD to me to provide services as a Neuro endovascular specialist as her physician Dr. Leonor Gant has retired.         Past Medical History:      Past Medical History        Past Medical History:   Diagnosis Date    A-fib ([address]      Acute deep vein thrombosis (DVT) of right peroneal vein ([address] 10/14/2022     See note from pulmonology    Brain aneurysm 09/2018     H/O TIMES 2 THAT BURST PER PATIENT    Cerebral artery occlusion with cerebral infarction ([address]       RT SIDE AFFECTED-LEARNED TO WALK AND WRITE AGAIN    Chronic deep vein thrombosis (DVT) of right peroneal vein ([address] 1/12/2023    Degenerative arthritis of hand      Edentulous      Emphysema lung ([address]       lung dr    Emphysema of lung ([address]      Headache      Hiatal hernia      Hip problem       NEEDS HIP REPLACEMENT PER PATIENT    Hx of abnormal cervical Pap smear      Hypertension      Stroke (cerebrum) ([address]      Subarachnoid bleed ([address] 09/10/2018            Past Surgical History:      Past Surgical History         Past Surgical History:   Procedure Laterality Date    BRAIN ANEURYSM SURGERY         coiling     COLONOSCOPY   08/30/2019     polyps--frank    COLONOSCOPY N/A 08/30/2019     COLONOSCOPY POLYPECTOMY SNARE/COLD BIOPSY performed by Alton Richter MD at [address] [facility] N/A 12/9/2022     COLONOSCOPY POLYPECTOMY SNARE/COLD BIOPSY performed by Alton Richter MD at [facility]   12/09/2022     polyp/cecal mass; diverticula--frank    JOINT REPLACEMENT         total hip replacement left and right    LAPAROTOMY N/A 12/09/2022     LAPAROTOMY EXPLORATORY, ILIOCECECTOMY performed by Yung Salmon MD at [facility]- DONE  [facility] Right 12/13/2022     [address] colored    TONSILLECTOMY        TOTAL HIP ARTHROPLASTY Left 02/22/2021     LEFT HIP TOTAL ARTHROPLASTY performed by Salima Moody MD at [address] [address] Right 01/10/2022     RIGHT TOTAL HIP ARTHROPLASTY - STYKER performed by Salima Moody MD at [address] [place] [address]   08/30/2019     gastritis with superficial ulcerations; duodenitis--frank    UPPER GASTROINTESTINAL ENDOSCOPY N/A 08/30/2019     EGD BIOPSY performed by Yung Salmon MD at [facility] ENDOSCOPY            Allergies:      Latex, Iodine, Shellfish-derived products, Aloe, Celebrex [celecoxib], and Fish-derived products     Medications:      Home Medications           Prior to Admission medications    Medication Sig Start Date End Date Taking?  Authorizing Provider   Fexofenadine HCl (MUCINEX ALLERGY PO) Take 1 tablet by mouth as needed     Yes Historical Provider, MD   potassium chloride (KLOR-CON M) 10 MEQ extended release tablet TAKE ONE TABLET BY MOUTH DAILY WITH BREAKFAST 1/25/23   Yes Helen Durbin MD   losartan (COZAAR) 100 MG tablet Take 1 tablet by mouth daily 1/20/23   Yes Helen Durbin MD   pantoprazole (PROTONIX) 40 MG tablet TAKE ONE TABLET BY MOUTH EVERY DAY 1/20/23   Yes Helen Durbin MD   chlorthalidone (HYGROTON) 25 MG tablet Take 1 tablet by mouth daily 1/20/23   Yes Helen Durbin MD   amLODIPine (NORVASC) 5 MG tablet TAKE ONE TABLET BY MOUTH EVERY DAY 1/20/23   Yes Helen Durbin MD   psyllium (METAMUCIL SMOOTH TEXTURE) 28.3 % POWD powder Take 3.4 g by mouth 2 times daily 1/20/23   Yes Helen Durbin MD   gabapentin (NEURONTIN) 300 MG capsule TAKE ONE CAPSULE BY MOUTH THREE TIMES A DAY 1/20/23 6/29/23 Yes Helen Durbin MD   Umeclidinium-Vilanterol (ANORO ELLIPTA) 62.5-25 MCG/ACT AEPB Inhale 1 puff into the lungs daily 1/10/23   Yes Jatinder Pratt MD   metoprolol tartrate (LOPRESSOR) 25 MG tablet Take 1.5 tablets by mouth 2 times daily 23 Yes Elvie Cerna MD   albuterol sulfate HFA (PROVENTIL;VENTOLIN;PROAIR) 108 (90 Base) MCG/ACT inhaler INHALE TWO PUFFS BY MOUTH EVERY 6 HOURS AS NEEDED FOR WHEEZING. 22   Yes Elvie Cerna MD   fluticasone (FLONASE) 50 MCG/ACT nasal spray 2 sprays by Each Nostril route in the morning. Patient taking differently: 2 sprays by Each Nostril route daily as needed 22   Yes Elvie Cerna MD   polyethylene glycol (GLYCOLAX) 17 g packet DISSOLVE 1 PACKET (17 GRAMS) IN LIQUID AND TAKE BY MOUTH DAILY 22   Yes Historical Provider, MD   Multiple Vitamins-Minerals (THERAPEUTIC MULTIVITAMIN-MINERALS) tablet Take 1 tablet by mouth daily     Yes Historical Provider, MD   predniSONE (DELTASONE) 50 MG tablet 50 mg 13 hours prior to test, 50 mg 7 hours prior to test, 50 mg 1 hour before testing  Patient not taking: No sig reported 23     Carlitos Kwong PA-C   predniSONE (DELTASONE) 50 MG tablet Take 1 tab 13 hours prior to procedure, 1 tab 7 hours prior to procedure, and 1 tab 1 hour prior to procedure  Patient not taking: No sig reported 23     IVAN Santiago. Devices (WRIST BRACE) MISC Firm neutral position left wrist brace  Size to fit  Dx:  Wrist pain/carpal tunnel syndrome  Patient not taking: No sig reported 10/6/22     Elvie Cerna MD   Misc.  Devices (ROLLATOR) MISC 1 each by Does not apply route daily as needed (use as needed for stability)  Patient not taking: Reported on 2/3/2023 3/17/20     Elvie Cerna MD            Social History:      Social History            Tobacco Use    Smoking status: Former       Packs/day: 1.50       Years: 43.00       Pack years: 64.50       Types: Cigarettes       Quit date: 2018       Years since quittin.4    Smokeless tobacco: Never   Vaping Use    Vaping Use: Never used   Substance Use Topics    Alcohol use: No    Drug use: No         Review of Systems:      No chest pain or palpitations  No SOB  No vertigo, lightheadedness or loss of consciousness  No falls, tripping or stumbling  No incontinence of bowels or bladder  No itching or bruising appreciated  No numbness, tingling or focal arm/leg weakness     ROS otherwise negative      Family History:      Family History         Family History   Problem Relation Age of Onset    Diabetes Mother      Arthritis Mother      Atrial Fibrillation Mother      Diabetes Father      Atrial Fibrillation Father      Arthritis Father      Emphysema Father      Cancer Sister           \"female\"            History of Present Illness: This is a 58-year-old female who has a long complicated history and summary I will summarize as below. Patient was in usual state of self and then lost consciousness she was rushed to the ER in 2018 and found to have a subarachnoid hemorrhage multiple intracranial aneurysms were found. Initial coiling was performed which then led to recurrent hemorrhage and patient was then taken again for vasospasm with repeat coiling. Ultimately there was Trudee Gambler 1 occlusion of only one of the aneurysms that second aneurysm was still filling however it was determined to be adequate at that time patient has been doing well and has not had a follow-up conventional angiogram since 2018. She also reports that there she had another carotid aneurysm and a middle cerebral artery aneurysm on the opposite side.         Objective:   /67   Pulse 55   Temp 97.6 °F (36.4 °C)   Resp 16   Ht 5' 5\" (1.651 m)   Wt 217 lb (98.4 kg)   SpO2 97%   BMI 36.11 kg/m²       General appearance: alert, appears stated age and cooperative  Head: Normocephalic, without obvious abnormality, atraumatic  Neck: no adenopathy, no carotid bruit and limited ROM  Lungs: clear to auscultation bilaterally  Heart: regular rate and rhythm  Extremities: no cyanosis or edema  Pulses: 2+ and symmetric  Skin: no rashes or lesions      Constitutional: Well developed, well nourished and in no acute distress. Respiratory: Clear to auscultation bilaterally with no use of accessory muscles during respiration. Cardiovascular:  No murmurs auscultated. Carotid arteries without bruits. Pedal pulses and radial pulses 2+ bilaterally. No edema in all four extremities. Mental Status:     Alert and oriented to person, place, and time. Recent and remote memory: Intact  Attention and concentration: Intact  Speech and language : Intact  Fund of knowledge: Intact  Judgement and Insight: Intact     Cranial Nerves:      II: Visual Fields: Full to confrontation bilaterally   III, IV, VI: Pupils: equally round and reactive to light, 3  to 2  mm bilaterally. EOMs: full with no nystagmus. V: Sensation intact to light touch and pin prick sensation bilaterally  VII: symmetric and strong in the upper and lower face bilaterally  VIII: Hearing intact to finger rub bilaterally   IX,X: Palate elevates symmetrically. XI: head turn (sternocleidomastoid) and shoulder shrug (trapezius) 5/5 bilaterally   XII: Tongue is midline upon protrusion     Motor:   Normal tone and bulk. Normal fine finger movements. No pronator drift. No resting tremors, postural tremors or myoclonus.      Upper Extremity          Right    Left                  Lower Extremity          Right    Left  Deltoid                         5          5                          Hip Flexion              5          5  Biceps                         5          5                       Hip Extension             5          5  Triceps                        5            5                       Hip Adduction             5          5  Wrist Extension           5          5                       Knee Extension          5          5  Wrist Flexion               5          5 Knee Flexion            5          5  Index Finger Flexion   5          5                      Ankle Dorsiflexion       5          5  Finger Extension         5          5                      Ankle Plantarflexion    5          5  APB                            5           5            Extensor Hallucis Longus     5          5        Sensory:  Intact light touch and pinprick in upper and lower extremities bilaterally. Intact proprioception and vibration sense in upper and lower extremities bilaterally. Coordination:     Alternating hand and foot movements intact in the UE and LE bilaterally  Finger-to-nose and Heel-to-shin with no ataxia or intention tremor bilaterally     Gait/Station:   Patient rises from a seated position without assistance. Romberg's test negative. Gait pattern is without hesitation, a wide-base, and of normal stride length. Heel, toe and tandem walking are intact.      Laboratory/Radiology:      CBC:         Lab Results   Component Value Date/Time     WBC 9.7 12/21/2022 04:54 AM     RBC 3.26 12/21/2022 04:54 AM     HGB 9.7 12/21/2022 04:54 AM     HCT 29.9 12/21/2022 04:54 AM     HCT 42.0 12/13/2022 08:33 AM     MCV 91.7 12/21/2022 04:54 AM     MCH 29.8 12/21/2022 04:54 AM     MCHC 32.4 12/21/2022 04:54 AM     RDW 13.2 12/21/2022 04:54 AM      12/21/2022 04:54 AM     MPV 9.5 12/21/2022 04:54 AM      CBC with Differential:          Lab Results   Component Value Date/Time     WBC 9.7 12/21/2022 04:54 AM     RBC 3.26 12/21/2022 04:54 AM     HGB 9.7 12/21/2022 04:54 AM     HCT 29.9 12/21/2022 04:54 AM     HCT 42.0 12/13/2022 08:33 AM      12/21/2022 04:54 AM     MCV 91.7 12/21/2022 04:54 AM     MCH 29.8 12/21/2022 04:54 AM     MCHC 32.4 12/21/2022 04:54 AM     RDW 13.2 12/21/2022 04:54 AM     LYMPHOPCT 12.4 12/21/2022 04:54 AM     MONOPCT 6.6 12/21/2022 04:54 AM     BASOPCT 0.3 12/21/2022 04:54 AM     MONOSABS 0.64 12/21/2022 04:54 AM     LYMPHSABS 1.20 12/21/2022 04:54 AM     EOSABS 0.15 12/21/2022 04:54 AM     BASOSABS 0.03 12/21/2022 04:54 AM      Platelets:          Lab Results   Component Value Date/Time      12/21/2022 04:54 AM      Hemoglobin/Hematocrit:          Lab Results   Component Value Date/Time     HGB 9.7 12/21/2022 04:54 AM     HCT 29.9 12/21/2022 04:54 AM     HCT 42.0 12/13/2022 08:33 AM      CMP:          Lab Results   Component Value Date/Time      12/21/2022 04:54 AM     K 3.9 12/21/2022 04:54 AM      12/21/2022 04:54 AM     CO2 25 12/21/2022 04:54 AM     BUN 16 12/21/2022 04:54 AM     CREATININE 0.8 12/21/2022 04:54 AM     GFRAA 50 10/06/2022 09:39 AM     LABGLOM >60 12/21/2022 04:54 AM     GLUCOSE 116 12/21/2022 04:54 AM     PROT 6.2 12/19/2022 05:41 AM     LABALBU 3.0 12/19/2022 05:41 AM     CALCIUM 8.9 12/21/2022 04:54 AM     BILITOT 0.3 12/19/2022 05:41 AM     ALKPHOS 66 12/19/2022 05:41 AM     AST 30 12/19/2022 05:41 AM     ALT 40 12/19/2022 05:41 AM      BMP:          Lab Results   Component Value Date/Time      12/21/2022 04:54 AM     K 3.9 12/21/2022 04:54 AM      12/21/2022 04:54 AM     CO2 25 12/21/2022 04:54 AM     BUN 16 12/21/2022 04:54 AM     LABALBU 3.0 12/19/2022 05:41 AM     CREATININE 0.8 12/21/2022 04:54 AM     CALCIUM 8.9 12/21/2022 04:54 AM     GFRAA 50 10/06/2022 09:39 AM     LABGLOM >60 12/21/2022 04:54 AM     GLUCOSE 116 12/21/2022 04:54 AM         I independently reviewed the labs and imaging studies today. Assessment:              Encounter Diagnoses   Name Primary? Cerebral aneurysm Yes    Nonintractable headache, unspecified chronicity pattern, unspecified headache type              Plan:      Conservative options were discussed   However patient is symptomatic again over the past year and wishes to have the aneurysm treated after seeing the pictures.

## 2023-03-01 NOTE — PROGRESS NOTES
Called Dr. Chai Steel and made aware of numbness and MRI ordered per Dr. Bennett Henriquez. To be up to see patient. MRI canceled due to intracranial stent.

## 2023-03-01 NOTE — PROGRESS NOTES
Neurointerventional POST Procedure Note      Date of Service: 23      Patient Name: Crayton Hashimoto   : 1960  Medical record number:  [id number]        Procedure: Left supraclinoid ICA complex aneurysm Embolization with VANE X device    Physician: Pranay Montejo MD  Assistant: Reddy Willson RT  Access:Right Femoral artery  Vessels injected:   Left ICA, Left CCA  Hemostasis: Achieved 8F angioseal  Anesthesia: Please see flowchart  Specimens: None  Blood loss: 100 ml  Contrast Material:  please see dictation in PACS  Fluoro time: Please see dictation in PACS      Diagnosis/Findings:   1. Successful treatment  of the previous [place] 3 Ruptured cerebral aneurysm with VANE X embolization Device with Devere Brooke 1 occlusion   2. Successful Left ICA stent assisted angioplasty of the 70% stenosis of the left internal carotid artery with distal embolic protection, this was done as part of intracranial treatment in order to successful treat the intracranial aneurysm     Plan:  1. Q15 min neuro checks x2 hours, Q30 for 6 hours and q1h for 24 hours and then q4h/q6h till discharge   2. Maintain SBP <160  3. Neurovascular checks   4. Discussed with family . Patient doing well post anesthesia reversal   6. Antiplatelet Recs ASA 81 + Switch to Ticagrelor today as PLATELET EPI does not indicate therapeutic range . ,Hence will need to d/c plavix and start Ticagrelor today

## 2023-03-01 NOTE — ANESTHESIA PROCEDURE NOTES
Arterial Line:    An arterial line was placed using surface landmarks, in the OR for the following indication(s): continuous blood pressure monitoring and blood sampling needed. A 20 gauge (size), 1 and 3/8 inch (length), Arrow (type) catheter was placed, Seldinger technique not used, into the left radial artery, secured by tape. Anesthesia type: Local  Local infiltration: Injection    Events:  patient tolerated procedure well with no complications.   Anesthesiologist: Barbara Wasserman DO  Performed: Anesthesiologist   Preanesthetic Checklist  Completed: patient identified, IV checked, site marked, risks and benefits discussed, surgical/procedural consents, equipment checked, pre-op evaluation, timeout performed, anesthesia consent given, oxygen available and monitors applied/VS acknowledged

## 2023-03-01 NOTE — PROGRESS NOTES
4 Eyes Skin Assessment     NAME:  Stephanie Bates OF BIRTH:  1960  MEDICAL RECORD NUMBER:  [id number]    The patient is being assessed for  Post-Op Surgical    I agree that One RN has performed a thorough Head to Toe Skin Assessment on the patient. ALL assessment sites listed below have been assessed. Areas assessed by both nurses:    Head, Face, Ears, Shoulders, Back, Chest, Arms, Elbows, Hands, Sacrum. Buttock, Coccyx, Ischium, and Legs. Feet and Heels        Does the Patient have a Wound?  Other right groin angioseal, ecchymotic left leg, right forearm       Doni Prevention initiated by RN: No   Wound Care Orders initiated by RN: No    Pressure Injury (Stage 3,4, Unstageable, DTI, NWPT, and Complex wounds) if present, place referral order by RN under : No    New and Established Ostomies, if present place, referral order under : No      Nurse 1 eSignature: Electronically signed by Albino Guevara RN on 3/1/23 at 2:28 PM EST    **SHARE this note so that the co-signing nurse can place an eSignature**    Nurse 2 eSignature: Electronically signed by Guilherme Fan RN on 3/1/23 at 2:29 PM EST

## 2023-03-01 NOTE — ANESTHESIA PRE PROCEDURE
Department of Anesthesiology  Preprocedure Note       Name:  Felton Gudino   Age:  60 y.o.  :  1960                                          MRN:  [id number]         Date:  3/1/2023      Surgeon: Dr. Yaya Faria    Procedure: IR for Cerebral Aneurysm    Medications prior to admission:   Prior to Admission medications    Medication Sig Start Date End Date Taking? Authorizing Provider   predniSONE (DELTASONE) 10 MG tablet Take 1 tablet by mouth daily for 7 days 2/24/23 3/3/23  Angle Tapia MD   albuterol sulfate (PROAIR RESPICLICK) 525 (90 Base) MCG/ACT aerosol powder inhalation Take 1-2 puffs every 4-6 hours as needed for wheezing or shortness of breath  Patient not taking: Reported on 3/1/2023 2/21/23   Anuradha Reed MD   aspirin EC 81 MG EC tablet Take 1 tablet by mouth daily 23   Angle Tapia MD   clopidogrel (PLAVIX) 75 MG tablet Take 1 tablet by mouth daily 23   Angle Tapia MD   acetaminophen-codeine (TYLENOL #4) 300-60 MG per tablet Take 1 tablet by mouth daily as needed for Pain for up to 30 days.  2/8/23 3/10/23  SPENSER Cook   Fexofenadine HCl (MUCINEX ALLERGY PO) Take 1 tablet by mouth as needed    Historical Provider, MD   potassium chloride (KLOR-CON M) 10 MEQ extended release tablet TAKE ONE TABLET BY MOUTH DAILY WITH BREAKFAST 23   Anuradha Reed MD   losartan (COZAAR) 100 MG tablet Take 1 tablet by mouth daily 23   Anuradha Reed MD   pantoprazole (PROTONIX) 40 MG tablet TAKE ONE TABLET BY MOUTH EVERY DAY 23   Anuradha Reed MD   chlorthalidone (HYGROTON) 25 MG tablet Take 1 tablet by mouth daily 23   Anuradha Reed MD   amLODIPine (NORVASC) 5 MG tablet TAKE ONE TABLET BY MOUTH EVERY DAY 23   Anuradha Reed MD   psyllium (METAMUCIL SMOOTH TEXTURE) 28.3 % POWD powder Take 3.4 g by mouth 2 times daily 23   Anuradha Reed MD   gabapentin (NEURONTIN) 300 MG capsule TAKE ONE CAPSULE BY MOUTH THREE TIMES A DAY 1/20/23 6/29/23  Scot Leong MD   predniSONE (DELTASONE) 50 MG tablet 50 mg 13 hours prior to test, 50 mg 7 hours prior to test, 50 mg 1 hour before testing 1/20/23   Dyan Kwong PA-C   predniSONE (DELTASONE) 50 MG tablet Take 1 tab 13 hours prior to procedure, 1 tab 7 hours prior to procedure, and 1 tab 1 hour prior to procedure 1/13/23   Mary Goldberg PA-C   Umeclidinium-Vilanterol (ANORO ELLIPTA) 62.5-25 MCG/ACT AEPB Inhale 1 puff into the lungs daily 1/10/23   Jatinder Cooney MD   metoprolol tartrate (LOPRESSOR) 25 MG tablet Take 1.5 tablets by mouth 2 times daily 1/9/23 2/8/23  Scot Leong MD   albuterol sulfate HFA (PROVENTIL;VENTOLIN;PROAIR) 108 (90 Base) MCG/ACT inhaler INHALE TWO PUFFS BY MOUTH EVERY 6 HOURS AS NEEDED FOR WHEEZING. 12/1/22   Scot Leong MD   [facility]. Devices (WRIST BRACE) [facility] Firm neutral position left wrist brace  Size to fit  Dx:  Wrist pain/carpal tunnel syndrome  Patient taking differently: Firm neutral position left wrist brace  Size to fit  Dx:  Wrist pain/carpal tunnel syndrome 10/6/22   Scot Leong MD   fluticasone (FLONASE) 50 MCG/ACT nasal spray 2 sprays by Each Nostril route in the morning. Patient taking differently: 2 sprays by Each Nostril route daily as needed 8/5/22   Scot Leong MD   polyethylene glycol (GLYCOLAX) 17 g packet DISSOLVE 1 PACKET (17 GRAMS) IN LIQUID AND TAKE BY MOUTH DAILY 1/24/22   Historical Provider, MD   Multiple Vitamins-Minerals (THERAPEUTIC MULTIVITAMIN-MINERALS) tablet Take 1 tablet by mouth daily    Historical Provider, MD   Misc.  Devices (ROLLATOR) MISC 1 each by Does not apply route daily as needed (use as needed for stability) 3/17/20   Scot Leong MD       Current medications:    Current Outpatient Medications   Medication Sig Dispense Refill    predniSONE (DELTASONE) 10 MG tablet Take 1 tablet by mouth daily for 7 days 7 tablet 0    albuterol sulfate (PROAIR RESPICLICK) 685 (90 Base) MCG/ACT aerosol powder inhalation Take 1-2 puffs every 4-6 hours as needed for wheezing or shortness of breath (Patient not taking: Reported on 3/1/2023) 1 each 2    aspirin EC 81 MG EC tablet Take 1 tablet by mouth daily 90 tablet 1    clopidogrel (PLAVIX) 75 MG tablet Take 1 tablet by mouth daily 30 tablet 0    acetaminophen-codeine (TYLENOL #4) 300-60 MG per tablet Take 1 tablet by mouth daily as needed for Pain for up to 30 days. 30 tablet 1    Fexofenadine HCl (MUCINEX ALLERGY PO) Take 1 tablet by mouth as needed      potassium chloride (KLOR-CON M) 10 MEQ extended release tablet TAKE ONE TABLET BY MOUTH DAILY WITH BREAKFAST 30 tablet 3    losartan (COZAAR) 100 MG tablet Take 1 tablet by mouth daily 30 tablet 3    pantoprazole (PROTONIX) 40 MG tablet TAKE ONE TABLET BY MOUTH EVERY DAY 30 tablet 3    chlorthalidone (HYGROTON) 25 MG tablet Take 1 tablet by mouth daily 30 tablet 3    amLODIPine (NORVASC) 5 MG tablet TAKE ONE TABLET BY MOUTH EVERY DAY 30 tablet 3    psyllium (METAMUCIL SMOOTH TEXTURE) 28.3 % POWD powder Take 3.4 g by mouth 2 times daily 538 g 5    gabapentin (NEURONTIN) 300 MG capsule TAKE ONE CAPSULE BY MOUTH THREE TIMES A DAY 90 capsule 3    predniSONE (DELTASONE) 50 MG tablet 50 mg 13 hours prior to test, 50 mg 7 hours prior to test, 50 mg 1 hour before testing 3 tablet 0    predniSONE (DELTASONE) 50 MG tablet Take 1 tab 13 hours prior to procedure, 1 tab 7 hours prior to procedure, and 1 tab 1 hour prior to procedure 3 tablet 0    Umeclidinium-Vilanterol (ANORO ELLIPTA) 62.5-25 MCG/ACT AEPB Inhale 1 puff into the lungs daily 1 each 6    metoprolol tartrate (LOPRESSOR) 25 MG tablet Take 1.5 tablets by mouth 2 times daily 90 tablet 3    albuterol sulfate HFA (PROVENTIL;VENTOLIN;PROAIR) 108 (90 Base) MCG/ACT inhaler INHALE TWO PUFFS BY MOUTH EVERY 6 HOURS AS NEEDED FOR WHEEZING. 1 each 3    Misc.  Devices (WRIST BRACE) MISC Firm neutral position left wrist brace  Size to fit  Dx:  Wrist pain/carpal tunnel syndrome (Patient taking differently: Firm neutral position left wrist brace  Size to fit  Dx:  Wrist pain/carpal tunnel syndrome) 1 each 0    fluticasone (FLONASE) 50 MCG/ACT nasal spray 2 sprays by Each Nostril route in the morning. (Patient taking differently: 2 sprays by Each Nostril route daily as needed) 1 each 3    polyethylene glycol (GLYCOLAX) 17 g packet DISSOLVE 1 PACKET (17 GRAMS) IN LIQUID AND TAKE BY MOUTH DAILY      Multiple Vitamins-Minerals (THERAPEUTIC MULTIVITAMIN-MINERALS) tablet Take 1 tablet by mouth daily      Misc. Devices (ROLLATOR) MISC 1 each by Does not apply route daily as needed (use as needed for stability) 1 each 0     No current facility-administered medications for this encounter. Allergies: Allergies   Allergen Reactions    Latex Hives     Hives and rash    Iodine Rash     Hives . pt. States anaphalyxis    Shellfish-Derived Products Anaphylaxis and Hives    Aloe Hives     Hives     Celebrex [Celecoxib] Itching    Fish-Derived Products Other (See Comments)       Problem List:    Patient Active Problem List   Diagnosis Code    Obesity (BMI 30-39. 9) E66.9    Hypertension I10    Chronic pain syndrome G89.4    Lumbar facet arthropathy M47.816    Lumbar disc disorder M51.9    Primary osteoarthritis of both hips M16.0    Arthritis of right hip M16.11    Dysphagia R13.10    Heartburn R12    At risk for colon cancer Z91.89    Colon polyps K63.5    Degenerative disc disease, cervical M50.30    Arthropathy of cervical facet joint M47.812    Abnormal blood sugar R73.09    Arthritis of both hips M16.0    COVID-19 U07.1    H/O total hip arthroplasty, right Z96.641    History of total left hip arthroplasty Z96.642    Primary osteoarthritis of right hip M16.11    Pulmonary emphysema (HCC) J43.9    History of herpes genitalis Z86.19    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 Z86.79    History of COVID-19 Z86.16    Aneurysm ([address] I72.9    S/P total right hip arthroplasty Z96.641    Cognitive impairment R41.89    Personal history of colonic polyps Z86.010    Abnormal diffusion capacity determined by pulmonary function test R94.2    BELCHER (dyspnea on exertion) R06.09    Atrial flutter (HCC) I48.92    Atrial fibrillation with RVR (HCC) I48.91    LFT elevation R79.89    (HFpEF) heart failure with preserved ejection fraction (HCC) I50.30    Chronic deep vein thrombosis (DVT) of right peroneal vein (HCC) I82.551    Cerebral aneurysm I67.1       Past Medical History:        Diagnosis Date    A-fib ([address]     Acute deep vein thrombosis (DVT) of right peroneal vein ([address] 10/14/2022    See note from pulmonology    Brain aneurysm 09/2018    H/O TIMES 2 THAT BURST PER PATIENT    Cerebral artery occlusion with cerebral infarction ([address]     RT SIDE AFFECTED-LEARNED TO WALK AND WRITE AGAIN    Chronic deep vein thrombosis (DVT) of right peroneal vein ([address] 1/12/2023    Degenerative arthritis of hand     Edentulous     Emphysema lung ([address]     lung dr Gilmar Isaac Emphysema of lung ([address]     Headache     Hiatal hernia     Hip problem     NEEDS HIP REPLACEMENT PER PATIENT    Hx of abnormal cervical Pap smear     Hypertension     Stroke (cerebrum) ([address]     Subarachnoid bleed ([address] 09/10/2018       Past Surgical History:        Procedure Laterality Date    BRAIN ANEURYSM SURGERY      coiling     COLONOSCOPY  08/30/2019    polyps--frank    COLONOSCOPY N/A 08/30/2019    COLONOSCOPY POLYPECTOMY SNARE/COLD BIOPSY performed by Namita Cardona MD at [address] COLONOSCOPY N/A 12/9/2022    COLONOSCOPY POLYPECTOMY SNARE/COLD BIOPSY performed by Namita Cardona MD at [facility] 9 [address]/ BIOPSIES  12/09/2022    polyp/cecal mass; diverticula--frank    JOINT REPLACEMENT      total hip replacement left and right    LAPAROTOMY N/A 12/09/2022    LAPAROTOMY EXPLORATORY, ILIOCECECTOMY performed by Nadira Oshea Antonieta Crowe MD at [address] [facility] Right 2022    [address] colored    TONSILLECTOMY      TOTAL HIP ARTHROPLASTY Left 2021    LEFT HIP TOTAL ARTHROPLASTY performed by Catherine Barfield MD at [address] [facility] Right 01/10/2022    RIGHT TOTAL HIP ARTHROPLASTY - STYKER performed by Catherine Barfield MD at [address]  2019    gastritis with superficial ulcerations; duodenitis--frank    UPPER GASTROINTESTINAL ENDOSCOPY N/A 2019    EGD BIOPSY performed by Arsen Jung MD at [facility] History:    Social History     Tobacco Use    Smoking status: Former     Packs/day: 1.50     Years: 43.00     Pack years: 64.50     Types: Cigarettes     Quit date: 2018     Years since quittin.4    Smokeless tobacco: Never   Substance Use Topics    Alcohol use:  No                                Counseling given: Not Answered      Vital Signs (Current):   Vitals:    23 0845   BP: (!) 98/57   Pulse: 60   Resp: 20   SpO2: 97%                                              BP Readings from Last 3 Encounters:   23 (!) 98/57   23 132/65   23 (!) 120/57       NPO Status:                                                                                 BMI:   Wt Readings from Last 3 Encounters:   23 217 lb (98.4 kg)   23 215 lb (97.5 kg)   23 217 lb (98.4 kg)     There is no height or weight on file to calculate BMI.    CBC:   Lab Results   Component Value Date/Time    WBC 7.6 2023 08:00 AM    RBC 4.36 2023 08:00 AM    HGB 12.4 2023 08:00 AM    HCT 37.4 2023 08:00 AM    HCT 42.0 2022 08:33 AM    MCV 85.8 2023 08:00 AM    RDW 13.9 2023 08:00 AM     2023 08:00 AM       CMP:   Lab Results   Component Value Date/Time     2023 08:00 AM    K 4.5 02/16/2023 08:00 AM    K 3.9 12/21/2022 04:54 AM     02/16/2023 08:00 AM    CO2 19 02/16/2023 08:00 AM    BUN 27 02/16/2023 08:00 AM    CREATININE 0.9 02/16/2023 08:00 AM    GFRAA 50 10/06/2022 09:39 AM    LABGLOM >60 02/16/2023 08:00 AM    GLUCOSE 119 02/16/2023 08:00 AM    PROT 6.2 12/19/2022 05:41 AM    CALCIUM 9.3 02/16/2023 08:00 AM    BILITOT 0.3 12/19/2022 05:41 AM    ALKPHOS 66 12/19/2022 05:41 AM    AST 30 12/19/2022 05:41 AM    ALT 40 12/19/2022 05:41 AM       POC Tests: No results for input(s): POCGLU, POCNA, POCK, POCCL, POCBUN, POCHEMO, POCHCT in the last 72 hours. Coags:   Lab Results   Component Value Date/Time    PROTIME 10.6 02/16/2023 08:00 AM    INR 1.0 02/16/2023 08:00 AM    APTT 23.0 12/12/2022 08:08 AM       HCG (If Applicable): No results found for: PREGTESTUR, PREGSERUM, HCG, HCGQUANT     ABGs: No results found for: PHART, PO2ART, RON9MYB, DKM4DJO, BEART, P7KPTZLO     Type & Screen (If Applicable):  No results found for: LABABO, LABRH    Drug/Infectious Status (If Applicable):  No results found for: HIV, HEPCAB    COVID-19 Screening (If Applicable):   Lab Results   Component Value Date/Time    COVID19 Not Detected 01/12/2022 02:00 PM     EKG 09/21/2021  Sinus  Rhythm   WITHIN NORMAL LIMITS    Echo 02/21/2019   Summary   Left ventricular internal dimensions were normal in diastole and systole. Mild left ventricular concentric hypertrophy noted. No regional wall motion abnormalities seen. Normal left ventricular ejection fraction. Mild aortic regurgitation is noted. CTA of Head 12/17/2021  Impression   1. No acute intracranial abnormality. 2. Embolization coils in the region of the supraclinoid left ICA, similar   compared to the prior study.  Portions of the distal left ICA and left   posterior cerebral artery are obscured by artifact due to the presence of the   metal coils. 3. Otherwise, unremarkable CTA of the head.  No evidence to suggest recurrent   aneurysm. Anesthesia Evaluation  Patient summary reviewed and Nursing notes reviewed no history of anesthetic complications:   Airway: Mallampati: II  TM distance: >3 FB   Neck ROM: full  Mouth opening: > = 3 FB   Dental:    (+) edentulous      Pulmonary: breath sounds clear to auscultation  (+) COPD (Hx of emphysema, uses albuterol inhaler as needed):  shortness of breath:      (-) not a current smoker                          ROS comment: Hx of smoking (64 pack years)    Emphysema lung     FEV1 85%   Cardiovascular:  Exercise tolerance: poor (<4 METS),   (+) hypertension (on metoprolol, amlodipine, losartan.):, BELCHER:,       ECG reviewed  Rhythm: regular  Rate: normal  Echocardiogram reviewed         Beta Blocker:  Dose within 24 Hrs      ROS comment: Stress Test 3/12/2020    Impression     Normal study. Neuro/Psych:   (+) CVA (hx of CVA and Subarachnoid bleed s/p Aneurysm coils in the left supraclinoid ICA region):, headaches (on tegretol):,              ROS comment: Hx of brain aneurysm, stable, neuro follows  Subarachnoid bleed     GI/Hepatic/Renal:   (+) hiatal hernia, GERD (on protonix): well controlled,          ROS comment: Dysphagia  . Endo/Other:    (+) : arthritis (Degenerative disc disease, cervical. Arthritis of both hips (s/p L Hip arthroplasty). Degenerative arthritis of hand): OA., .                  ROS comment: obese Abdominal:             Vascular: negative vascular ROS. Other Findings:             Anesthesia Plan      general     ASA 4     (PIV 20g x 2)  Induction: intravenous. MIPS: Postoperative opioids intended and Prophylactic antiemetics administered. Anesthetic plan and risks discussed with patient. Use of blood products discussed with patient whom consented to blood products. Plan discussed with attending.                     Richard Coker, LYLY - CRNA   3/1/2023

## 2023-03-01 NOTE — PROGRESS NOTES
Dr. Garcia Husbands here and made aware of right hand thumb, index finger, and middle finger numbness. To assess patient.

## 2023-03-02 VITALS
RESPIRATION RATE: 22 BRPM | SYSTOLIC BLOOD PRESSURE: 99 MMHG | DIASTOLIC BLOOD PRESSURE: 47 MMHG | TEMPERATURE: 98.1 F | OXYGEN SATURATION: 97 % | HEART RATE: 85 BPM

## 2023-03-02 LAB
ANION GAP SERPL CALCULATED.3IONS-SCNC: 10 MMOL/L (ref 7–16)
BASOPHILS ABSOLUTE: 0.03 E9/L (ref 0–0.2)
BASOPHILS RELATIVE PERCENT: 0.4 % (ref 0–2)
BUN BLDV-MCNC: 24 MG/DL (ref 6–23)
CALCIUM IONIZED: 1.27 MMOL/L (ref 1.15–1.33)
CALCIUM SERPL-MCNC: 8.5 MG/DL (ref 8.6–10.2)
CHLORIDE BLD-SCNC: 104 MMOL/L (ref 98–107)
CO2: 22 MMOL/L (ref 22–29)
CREAT SERPL-MCNC: 1.2 MG/DL (ref 0.5–1)
EOSINOPHILS ABSOLUTE: 0.08 E9/L (ref 0.05–0.5)
EOSINOPHILS RELATIVE PERCENT: 0.9 % (ref 0–6)
GFR SERPL CREATININE-BSD FRML MDRD: 51 ML/MIN/1.73
GLUCOSE BLD-MCNC: 105 MG/DL (ref 74–99)
HCT VFR BLD CALC: 31.3 % (ref 34–48)
HEMOGLOBIN: 10.3 G/DL (ref 11.5–15.5)
IMMATURE GRANULOCYTES #: 0.05 E9/L
IMMATURE GRANULOCYTES %: 0.6 % (ref 0–5)
LYMPHOCYTES ABSOLUTE: 1.42 E9/L (ref 1.5–4)
LYMPHOCYTES RELATIVE PERCENT: 16.8 % (ref 20–42)
MAGNESIUM: 2.1 MG/DL (ref 1.6–2.6)
MCH RBC QN AUTO: 28.9 PG (ref 26–35)
MCHC RBC AUTO-ENTMCNC: 32.9 % (ref 32–34.5)
MCV RBC AUTO: 87.7 FL (ref 80–99.9)
MONOCYTES ABSOLUTE: 0.52 E9/L (ref 0.1–0.95)
MONOCYTES RELATIVE PERCENT: 6.1 % (ref 2–12)
NEUTROPHILS ABSOLUTE: 6.36 E9/L (ref 1.8–7.3)
NEUTROPHILS RELATIVE PERCENT: 75.2 % (ref 43–80)
PDW BLD-RTO: 14.3 FL (ref 11.5–15)
PHOSPHORUS: 4.6 MG/DL (ref 2.5–4.5)
PLATELET # BLD: 259 E9/L (ref 130–450)
PMV BLD AUTO: 9.3 FL (ref 7–12)
POC ACT LR: 135 SECONDS
POC ACT LR: 169 SECONDS
POC ACT LR: 191 SECONDS
POC ACT LR: 201 SECONDS
POTASSIUM SERPL-SCNC: 4 MMOL/L (ref 3.5–5)
RBC # BLD: 3.57 E12/L (ref 3.5–5.5)
SODIUM BLD-SCNC: 136 MMOL/L (ref 132–146)
WBC # BLD: 8.5 E9/L (ref 4.5–11.5)

## 2023-03-02 PROCEDURE — 99239 HOSP IP/OBS DSCHRG MGMT >30: CPT | Performed by: PSYCHIATRY & NEUROLOGY

## 2023-03-02 PROCEDURE — 80048 BASIC METABOLIC PNL TOTAL CA: CPT

## 2023-03-02 PROCEDURE — 94640 AIRWAY INHALATION TREATMENT: CPT

## 2023-03-02 PROCEDURE — 6360000002 HC RX W HCPCS: Performed by: PSYCHIATRY & NEUROLOGY

## 2023-03-02 PROCEDURE — 2580000003 HC RX 258: Performed by: ANESTHESIOLOGY

## 2023-03-02 PROCEDURE — 36415 COLL VENOUS BLD VENIPUNCTURE: CPT

## 2023-03-02 PROCEDURE — 82330 ASSAY OF CALCIUM: CPT

## 2023-03-02 PROCEDURE — 6370000000 HC RX 637 (ALT 250 FOR IP): Performed by: PSYCHIATRY & NEUROLOGY

## 2023-03-02 PROCEDURE — 2580000003 HC RX 258: Performed by: PSYCHIATRY & NEUROLOGY

## 2023-03-02 PROCEDURE — 37799 UNLISTED PX VASCULAR SURGERY: CPT

## 2023-03-02 PROCEDURE — 84100 ASSAY OF PHOSPHORUS: CPT

## 2023-03-02 PROCEDURE — 83735 ASSAY OF MAGNESIUM: CPT

## 2023-03-02 PROCEDURE — 85025 COMPLETE CBC W/AUTO DIFF WBC: CPT

## 2023-03-02 RX ORDER — ATORVASTATIN CALCIUM 40 MG/1
40 TABLET, FILM COATED ORAL NIGHTLY
Qty: 90 TABLET | Refills: 1 | Status: SHIPPED | OUTPATIENT
Start: 2023-03-02 | End: 2023-08-29

## 2023-03-02 RX ADMIN — SODIUM CHLORIDE, PRESERVATIVE FREE 10 ML: 5 INJECTION INTRAVENOUS at 08:22

## 2023-03-02 RX ADMIN — ARFORMOTEROL TARTRATE 15 MCG: 15 SOLUTION RESPIRATORY (INHALATION) at 08:14

## 2023-03-02 RX ADMIN — IPRATROPIUM BROMIDE 0.5 MG: 0.5 SOLUTION RESPIRATORY (INHALATION) at 08:14

## 2023-03-02 RX ADMIN — FAMOTIDINE 20 MG: 20 TABLET ORAL at 08:19

## 2023-03-02 RX ADMIN — PANTOPRAZOLE SODIUM 40 MG: 40 TABLET, DELAYED RELEASE ORAL at 08:21

## 2023-03-02 RX ADMIN — CHLORTHALIDONE 25 MG: 25 TABLET ORAL at 08:21

## 2023-03-02 RX ADMIN — IPRATROPIUM BROMIDE 0.5 MG: 0.5 SOLUTION RESPIRATORY (INHALATION) at 11:23

## 2023-03-02 RX ADMIN — GABAPENTIN 300 MG: 300 CAPSULE ORAL at 08:30

## 2023-03-02 RX ADMIN — TICAGRELOR 90 MG: 90 TABLET ORAL at 08:20

## 2023-03-02 RX ADMIN — ASPIRIN 81 MG: 81 TABLET, COATED ORAL at 08:20

## 2023-03-02 RX ADMIN — POTASSIUM CHLORIDE 20 MEQ: 1500 TABLET, EXTENDED RELEASE ORAL at 08:21

## 2023-03-02 NOTE — PLAN OF CARE
Problem: Chronic Conditions and Co-morbidities  Goal: Patient's chronic conditions and co-morbidity symptoms are monitored and maintained or improved  3/1/2023 1947 by Tia Coulter RN  Outcome: Progressing     Problem: Discharge Planning  Goal: Discharge to home or other facility with appropriate resources  Outcome: Progressing  Flowsheets (Taken 3/1/2023 1412 by Edwina Lopez RN)  Discharge to home or other facility with appropriate resources: Arrange for needed discharge resources and transportation as appropriate     Problem: ABCDS Injury Assessment  Goal: Absence of physical injury  3/1/2023 1947 by Tia Coulter RN  Outcome: Progressing

## 2023-03-02 NOTE — ANESTHESIA POSTPROCEDURE EVALUATION
Department of Anesthesiology  Postprocedure Note    Patient: Ruth Anguiano  MRN: [id number]  YOB: 1960  Date of evaluation: 3/2/2023      Procedure Summary     Date: 03/01/23 Room / Location: [facility] Special Procedures; [facility] Radiology    Anesthesia Start: 0941 Anesthesia Stop: 1217    Procedures:       IR VASC EMBOLIZE OCCLUDE ARTERY      IR OCCLUSIVE OR EMBOL PERC CENTL NERV SYS      IR TRANSCATH EMBOLIZATION      IR ANGIO THROUGH CATHETER FOLLOW UP TRANSCATHETER STUDY      IR CAROTID STENT UNI W PROTECTION      IR AORTAGRAM THORACIC SERIALOGRAM Diagnosis:       Cerebral aneurysm, nonruptured      Cerebral aneurysm, nonruptured      Cerebral aneurysm      (VANE)      (VANE)      (VANE)      (VANE)      (VANE)      (VANE)    Scheduled Providers: [facility] General Radiologist Responsible Provider: Jasper Fabian DO    Anesthesia Type: general ASA Status: 4          Anesthesia Type: No value filed.    Monica Phase I:      Monica Phase II:        Anesthesia Post Evaluation    Patient location during evaluation: ICU  Patient participation: complete - patient cannot participate  Level of consciousness: awake and alert  Airway patency: patent  Nausea & Vomiting: no nausea and no vomiting  Complications: no  Cardiovascular status: blood pressure returned to baseline  Respiratory status: acceptable  Hydration status: euvolemic  There was medical reason for not using a multimodal analgesia pain management approach.

## 2023-03-02 NOTE — PROGRESS NOTES
Patient's son asking questions regarding the procedure yesterday and about the prior test a month ago and why they didn't see the blockage a month ago. Tried to explain to patient's son that the procedure last month was diagnostic and that areas of stenosis develop overtime. Patient's son became argumentative with this, I offered to call Dr. Mikel Paulino to come answer these questions. Dr. Mikel Paulino called, will be to bedside in about 30-40 minutes.

## 2023-03-02 NOTE — DISCHARGE INSTRUCTIONS
I  have stopped several blood thinners as your blood pressure has been persietently low and moniotred closely with an arterial line for 24 hours and See no reason for you to be on several blood thinners  Now that your carotid stenosis and the left Intracranial aneurysm is treated - this may be your new baseline  However I do advise follow up with your PCP to recheck BP again Monday of next week  Home BP monitoring is also recommended and check with your PCP if BP IS HIGH

## 2023-03-02 NOTE — PLAN OF CARE
Problem: Chronic Conditions and Co-morbidities  Goal: Patient's chronic conditions and co-morbidity symptoms are monitored and maintained or improved  Outcome: Completed     Problem: Discharge Planning  Goal: Discharge to home or other facility with appropriate resources  Outcome: Completed     Problem: ABCDS Injury Assessment  Goal: Absence of physical injury  Outcome: Completed

## 2023-03-02 NOTE — CARE COORDINATION
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 She went for an embolization of the left supraclinoid. Met with the patient at bedside she is alert and orient and she lives with her son in a 2 story home but her bed and bath are on the main floor. She has 6 steps to get into the home. Dme at home is a wheeled walker,cane,shower bench and a raised toilet seat. She has been to [facility] in the past and has had [facility] in the past. Her plan is to return home today with her son. She has up and back to the bathroom without difficult. Offered home care and she declined. Her plan is to return home today . Will follow             Case Management Assessment  Initial Evaluation    Date/Time of Evaluation: 3/2/2023 12:51 PM  Assessment Completed by: Ryan Douglass RN    If patient is discharged prior to next notation, then this note serves as note for discharge by case management. Patient Name: Daina Gonzales                   YOB: 1960  Diagnosis: Cerebral aneurysm, nonruptured [I67.1]  Cerebral aneurysm [I67.1]                   Date / Time: 3/1/2023 12:10 PM    Patient Admission Status: Inpatient   Readmission Risk (Low < 19, Mod (19-27), High > 27): Readmission Risk Score: 14.9    Current PCP: Liborio Mortimer, MD  PCP verified by ? Yes    Chart Reviewed: Yes      History Provided by: Patient  Patient Orientation: Alert and Oriented    Patient Cognition: Alert    Hospitalization in the last 30 days (Readmission):  No    If yes, Readmission Assessment in  Navigator will be completed.     Advance Directives:      Code Status: Full Code   Patient's Primary Decision Maker is: Named in [address] [place] [address]    Primary Decision MakePauline Jardo Child - [phone]    Secondary Decision Maker: Db Coughlin Child - [phone]    Discharge Planning:    Patient lives with: Children Type of Home: House  Primary Care Giver: Family  Patient Support Systems include: Children   Current Financial resources:    Current community resources:    Current services prior to admission: None            Current DME:              Type of Home Care services:  None    ADLS  Prior functional level: Independent in ADLs/IADLs  Current functional level: Independent in ADLs/IADLs    PT AM-PAC:   /24  OT AM-PAC:   /24    Family can provide assistance at DC: Yes  Would you like Case Management to discuss the discharge plan with any other family members/significant others, and if so, who? Yes  Plans to Return to Present Housing: Yes  Other Identified Issues/Barriers to RETURNING to current housing:   Potential Assistance needed at discharge: N/A            Potential DME:    Patient expects to discharge to: [address] [place] [address] for transportation at discharge:      Financial    Payor: Dionte Smith / Plan: Jani Jimenez / Product Type: *No Product type* /     Does insurance require precert for SNF: Yes    Potential assistance Purchasing Medications: No  Meds-to-Beds request: Yes      [facility] [address] [id number]  Phone: [phone] Fax: Sarai Cabrales [address] [place] [address] [address] [facility] [phone] [facility] [phone]  [place] [address]  Phone: [phone] Fax: [phone]    Sarai Weber "Patrica" [address] [place] [address]  Phone: [phone] Fax: [phone]      Notes:    Factors facilitating achievement of predicted outcomes: Family support    Barriers to discharge:      Additional Case Management Notes: See cm note    The Plan for Transition of Care is related to the following treatment goals of Cerebral aneurysm, nonruptured [I67.1]  Cerebral aneurysm [K00.2]    IF APPLICABLE: The Patient and/or patient representative Flaco Forbes and her family were provided with a choice of provider and agrees with the discharge plan. Freedom of choice list with basic dialogue that supports the patient's individualized plan of care/goals and shares the quality data associated with the providers was provided to:     Patient Representative Name:       The Patient and/or Patient Representative Agree with the Discharge Plan?       Wilfredo Verdin RN  Case Management Department  Ph: [phone]

## 2023-03-02 NOTE — DISCHARGE SUMMARY
[address] [facility]  Department of Neuroendovascular surgery  Discharge Summary      Mehreen Arguello  [id number]  3/2/2023  60 y.o. female    Date of Admission: 3/1/2023   date of Discharge: 3/2/2023    Discharge Diagnosis:  1. 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 2.  Left internal carotid artery stent assisted angioplasty of the 70% stenosis of the left cervical portion of the left internal carotid artery with distal embolic protection done as part of intracranial treatment. 3.  Plavix resistance. 4.  Hypertension that has seemed to gotten much  better post carotid stenting  5. Peripheral arterial disease. Attending: Beecher Severs MD    Consults: Anesthesiology-   Procedures:    Diagnosis/Findings:   1. Successful treatment  of the previous [place] 3 Ruptured cerebral aneurysm with VANE X embolization Device with Deary Pellet 1 occlusion   2. Successful Left ICA stent assisted angioplasty of the 70% stenosis of the left internal carotid artery with distal embolic protection, this was done as part of intracranial treatment in order to successful treat the intracranial aneurysm     Hospital Course: This is a 23-year-old female whose family elected to have her aneurysm electively treated she had a history of rupture several years ago and has been on disability secondary to the same. She has some poststroke post recovery issues. She has had chronic headaches for the past 3 years however CTAs have been performed and no follow-up angiogram has been performed. After she established with me as a new patient we did do an angiogram which showed that the aneurysm was still filling and patient had electively chosen to get it treated with the flow diverter secondary to what I presume was the dissected segment of the aneurysm with true lumen and subtle lumen in the true lumen still filling in the neck still persistent. Following the diagnostic angiogram we did electively treat it with a successful flow diverter. The aneurysm was instantaneously embolized. Patient was transferred to the ICU she did have an episode where she had thumb and index finger numbness which was secondary to positioning which completely resolved. Her blood pressure has been consistently low even without antihypertensive meds. This I think was secondary to treating her left internal carotid artery and hence I have asked her to stop her blood pressure medications. She does have a history of atrial flutter that is documented on her chart history she is not on anticoagulation I am unsure if she has atrial fibrillation however she is advised to talk to her primary care physician should she have atrial fibrillation she would eventually need to be placed on anticoagulation. Subarachnoid hemorrhage or history of subarachnoid hemorrhage is not a contraindication for the same. Patient will be discharged in a stable condition. Discharge physical Exam  Vitals:    03/02/23 1200   BP: (!) 108/46   Pulse: 77   Resp: 29   Temp:    SpO2:          General appearance: alert, appears stated age and cooperative  Head: Normocephalic, without obvious abnormality, atraumatic  Neck: no adenopathy, no carotid bruit and limited ROM  Lungs: clear to auscultation bilaterally  Heart: regular rate and rhythm  Extremities: no cyanosis or edema  Pulses: 2+ and symmetric  Skin: no rashes or lesions      Constitutional: Well developed, well nourished and in no acute distress. Respiratory: Clear to auscultation bilaterally with no use of accessory muscles during respiration. Cardiovascular:  No murmurs auscultated. Carotid arteries without bruits. Pedal pulses and radial pulses 2+ bilaterally. No edema in all four extremities. Mental Status:     Alert and oriented to person, place, and time.   Recent and remote memory: Intact  Attention and concentration: Intact  Speech and language : Intact  Fund of knowledge: Intact  Judgement and Insight: Intact     Cranial Nerves:      II: Visual Fields: Full to confrontation bilaterally   III, IV, VI: Pupils: equally round and reactive to light, 3  to 2  mm bilaterally. EOMs: full with no nystagmus. V: Sensation intact to light touch and pin prick sensation bilaterally  VII: symmetric and strong in the upper and lower face bilaterally  VIII: Hearing intact to finger rub bilaterally   IX,X: Palate elevates symmetrically. XI: head turn (sternocleidomastoid) and shoulder shrug (trapezius) 5/5 bilaterally   XII: Tongue is midline upon protrusion     Motor:   Normal tone and bulk. Normal fine finger movements. No pronator drift. No resting tremors, postural tremors or myoclonus. Upper Extremity          Right    Left                  Lower Extremity          Right    Left  Deltoid                         5          5                          Hip Flexion              5          5  Biceps                         5          5                       Hip Extension             5          5  Triceps                        5            5                       Hip Adduction             5          5  Wrist Extension           5          5                       Knee Extension          5          5  Wrist Flexion               5          5                         Knee Flexion            5          5  Index Finger Flexion   5          5                      Ankle Dorsiflexion       5          5  Finger Extension         5          5                      Ankle Plantarflexion    5          5  APB                            5           5            Extensor Hallucis Longus     5          5        Sensory:  Intact light touch and pinprick in upper and lower extremities bilaterally. Intact proprioception and vibration sense in upper and lower extremities bilaterally.         Discharge Condition: stable  Discharge Medications:    Disposition:home  Patient Instructions:       Diet: Regular Healthy diet is always recommended, a low fat diet is also recommended  Activity: activity as tolerated and activity as tolerated and no driving for today  Exercise: As tolerated  DriveNo restrictions unless It was discussed with you specifically in person    Prosper Baker MD  3/2/2023    Prosper Baker     Time spent for Discharge 62 min

## 2023-03-02 NOTE — PROGRESS NOTES
Dr. Dee Carvalho at bedside to see patient, aware of low BP, ok to hold morning antihypertensives. Plan is to d/c patient today.

## 2023-03-02 NOTE — PROGRESS NOTES
Patient discharged with son and mother. Patient ambulated to vehicle with sons assistance. All belongings given to patient. Discharge instructions provided and meds to beds delivered new Brilinta prescription. Dr. Princess Fontenot did come to bedside to speak to patient and family prior to discharge. All questions answered.

## 2023-03-03 ENCOUNTER — CARE COORDINATION (OUTPATIENT)
Dept: CASE MANAGEMENT | Age: 63
End: 2023-03-03

## 2023-03-03 DIAGNOSIS — I67.1 CEREBRAL ANEURYSM: Primary | ICD-10-CM

## 2023-03-03 PROCEDURE — 1111F DSCHRG MED/CURRENT MED MERGE: CPT | Performed by: FAMILY MEDICINE

## 2023-03-03 NOTE — CARE COORDINATION
[facility] Care Transitions Initial Follow Up Call    Call within 2 business days of discharge: Yes    Patient Current Location: [address] [place] [address] Transition Nurse contacted the patient by telephone to perform post hospital discharge assessment. Verified name and  with patient as identifiers. Provided introduction to self, and explanation of the Care Transition Nurse role. Patient: Angeles Morfin Patient : 1960   MRN: <[id number]>  Reason for Admission: 3/1/2023 - 3/2/2023 [facility] . [facility] with VANE X embolization Device with Eleni Chapman 1 occlusion, Successful Left ICA stent assisted angioplasty of the 70% stenosis of the left internal carotid artery with distal embolic protection, this was done as part of intracranial treatment in order to successful treat the intracranial aneurysm. Discharge Date: 3/2/23 RARS: Readmission Risk Score: 14.9  NR  CT    P YX [facility]ICAL STAFF routed to schedule 3 day hosp fu PCP. Needs 6 wk post-op NeuroSx/Venkatachalam      START taking:  atorvastatin (LIPITOR)  ticagrelor (BRILINTA)  CHANGE how you take:  albuterol sulfate HFA (PROVENTIL;VENTOLIN;PROAIR)  fluticasone (FLONASE)  STOP taking:  acetaminophen-codeine 300-60 MG per  tablet (TYLENOL #4)  chlorthalidone 25 MG tablet (HYGROTON)  clopidogrel 75 MG tablet (Plavix)  losartan 100 MG tablet (COZAAR)  metoprolol tartrate 25 MG tablet (LOPRESSOR)  predniSONE 10 MG tablet (DELTASONE)  predniSONE 50 MG tablet (DELTASONE)    Last Discharge  Street       Date Complaint Diagnosis Description Type Department Provider    3/1/23  Presence of internal carotid stent . .. Admission (Discharged) from [facility] Candice Barnes MD            Was this an external facility discharge? No Discharge Facility: [facility] .     Challenges to be reviewed by the provider   Additional needs identified to be addressed with provider: No  none               Method of communication with provider: none. Phylicia Jimenez reports she is doing well and has no acute pain concerns. States she has a slight sore throat. Enc warm tea w/ honey and lozenge for comfort, v/u. No sinus congestion. States left groin site has no swelling/bruising concerns. No HA, neck ROM difficulty, or vision concerns. Declines need for home support and has/taking meds as directed. We did review cardiac healthy diet and to manage cholesterol intake to no more than 200 mg/day, v/u. Enc to increase soluble fiber intake and to avoid fast foods and food high in trans fat, v/u. Reviewed new med Brilinta and side effects to report to her physician. Care Transition Nurse reviewed discharge instructions with patient who verbalized understanding. The patient was given an opportunity to ask questions and does not have any further questions or concerns at this time. Were discharge instructions available to patient? Yes. Reviewed appropriate site of care based on symptoms and resources available to patient including: PCP  Specialist  Home health  When to call 911  Condition related references. The patient agrees to contact the PCP office for questions related to their healthcare. Advance Care Planning:   Does patient have an Advance Directive: Jane Beltre [phone]       Medication reconciliation was performed with patient, who verbalizes understanding of administration of home medications. Medications reviewed, 1111F entered: yes    Was patient discharged with a pulse oximeter? no    Non-face-to-face services provided:  See above note for dietary review. Pt advised on post-op infection/complication to return to ED.     Offered patient enrollment in the Remote Patient Monitoring (RPM) program for in-home monitoring: NA.    Care Transitions 24 Hour Call    Do you have all of your prescriptions and are they filled?: Yes  Care Transitions Interventions         Discussed follow-up appointments. If no appointment was previously scheduled, appointment scheduling offered: Yes. Is follow up appointment scheduled within 7 days of discharge? Pt declines this CTN to schedule her appts and advised as appt list above. Follow Up  Future Appointments   Date Time Provider Shabnam Moore   4/3/2023 10:00 AM Arti Segovia [facility]   4/10/2023  9:30 AM Jonelle Gomez MD [facility]   5/19/2023 10:15 AM Lonnie Grubbs MD [facility]   8/18/2023  9:30 AM [facility]       Care Transition Nurse provided contact information. Plan for follow-up call in 5-7 days based on severity of symptoms and risk factors. Plan for next call:  CT FU, Check daily cholesterol intake and diet tolerance, Check groin site and symptom concerns.      Omari Jalloh RN

## 2023-03-04 NOTE — OP NOTE
Operative Note    Operative report could not be placed in PACS due to technical difficulties in saving the report from [employer]    IR VASC EMBOLIZE OCCLUDE ARTERY, IR TRANSCATH EMBOLIZATION, IR OCCLUSION/EMBOLIZATION PERC CNS, IR CAROTID STENT W PROTECTION, IR AORTAGRAM THORACIC SERIALOGRAM, IR ANGIO THROUGH CATHETER FOLLOW UP TRANSCATHETER STUDY    PROCEDURE PERFORMED: Transcatheter placement of VANE X Device in the previously ruptured left supraclinoid internal carotid artery aneurysms    PERFORMED BY: Madhu Keyes MD      COMPLETED DATE:  3/1/2023 10:00 AM    CLINICAL HISTORY/PRE-PROCEDURE DIAGNOSIS:  Patient with a history of previously ruptured supraclinoid internal carotid artery aneurysms with rerupture status post recurrent coiling. Comes in for a 5 year follow-up and shows persistent Cristobal 3 filling of the previously ruptured aneurysms. POST-PROCEDURE DIAGNOSIS: Successful flow diversion of the above-mentioned left supraclinoid ICA aneurysms    COMPARISON: Prior diagnostic cerebral angiogram 2/16/2023    ANESTHESIA: General anesthesia    VESSELS CATHETERIZED:    1. Left common carotid artery. 2. Left internal carotid artery. 3. Left middle cerebral artery. RADIATION EXPOSURE DATA:  1240 MG Y    TOTAL FLUOROSCOPY TIME: 48 minutes and 17 seconds    CONTRAST USED: 115 mL of Isovue-300    PROCEDURE:   After informed consent was obtained, the patient was brought into the angiography suite. General anesthesia was then induced, and the patient was intubated. Right Radial and both Groin sites were then prepped and draped in the usual sterile fashion. A 5-German Micropuncture kit needle was then used to access the right femoral artery. A 8 German sheath was then inserted. Sonographic access and guidance was documented in use. Following this it was connected to a long sheath. Angiogram obtained of the aorta revealed infrarenal aortic stenosis likely secondary to peripheral arterial disease. Following this carefully a Trac Star access catheter was utilized along with the 125 Nahid diagnostic catheter and taken up into the descending aorta double flushed and connected to continuous heparinized saline. Following this the left common carotid artery was selectively catheterized. During observation of the common carotid artery is severe calcified free-floating plaque is identified. It is felt in order to reach the intracranial aneurysm this needs to be treated due to the narrowest portion being about 1.9 mm in size. With the distal portion measuring 6.2 mm in size this correlates with a 70% stenosis and is at a high risk of distal embolization should the left internal carotid artery be catheterized. Hence it was decided to proceed with stenting of the left carotid artery. A 6 mm spider device was deployed into the petrous segment of the left internal carotid artery using a tri wire. Following this a 8 mm cross 29 mm carotid Wallstent was deployed under continuous fluoroscopic monitoring. Repeat imaging revealed less than 30% stenosis. A 6 mm via track balloon was taken up and angioplasty was performed in the region of maximal stenosis. This revealed a significant increase in caliber. Following this the [address] was taken into the left internal carotid artery. Using a [employer] intermediate catheter was taken up into the left cavernous portion of the internal carotid artery. Following this using the Synchro soft and the headway 27 it was taken into the inferior division of the left middle cerebral artery. There was severe tension in the system. Hence initially the Jonothan Ruthann device was taken up however the system did pullback. Hence in a similar manner the Jonothan Ruthann was re-sheath and the headway 27 was taken back into the left middle cerebral artery.   Following this the following Jose device JOSE X 4.0 mm x 7/13 mm was deployed in the supraclinoid left internal carotid artery with the distal FLAIR ends just proximal to the A1 segment of the left ENRRIQUE with the flow diverting segment covering the 5 mm neck of the 2 aneurysms. The proximal portion of the flow diverting device is just past the origin of the left ophthalmic artery. Repeat imaging obtained reveals significant obliteration of the neck of the aneurysm. After satisfactory images were obtained intracranially all the vessels are open on repeat angiogram. An 8 Cuban Angio-Seal was deployed hemostasis was obtained. Anesthesia was reversed and patient was transferred to ICU in a stable condition. TOTAL ESTIMATED BLOOD LOSS: 195 mL   COMPLICATIONS: None, immediate. FINDINGS IN DETAIL:   1. Left common carotid artery injections:narrowest portion being about 1.9 mm in size. With the distal portion measuring     6.2 mm in size this correlates with a 70% stenosis  per NASCET Criteria    2. Left internal carotid artery injection: Intracranially, there was normal opacification of the left ophthalmic artery, left posterior communicating artery, left anterior choroidal artery, left middle cerebral artery and its branches, and left anterior cerebral artery and its branches. . There are 2 coil masses seen in the region of the previously treated laterally projecting internal carotid artery/posterior commuting artery aneurysms. There is some contrast opacification seen in the lower part of the aneurysm once again the aneurysm neck of both aneurysm which is a wider single neck or a true neck and a false neck measuring about 5.0 millimeter at the base. Contrast opacification is seen here. Indicating Cristobal 3 occlusion. 3. Post left internal carotid artery stenting with distal embolic protection: Posterior internal carotid artery stenting with distal embolic protection residual stenosis estimated to be approximately 30%. Hence balloon angioplasty was performed.  Post balloon angioplasty residual stenosis is less than 15% the complex calcified plaque is well intraoperative behind the stent. 4. Post FREDX  embolization device deployment: Digital subtraction angiography performed after deploying the pipeline embolization device showed that the device was extending from the origin of the left anterior cerebral artery distally to just prior to the origin of the left ophthalmic artery proximally. The device was seen well opposed to the vessel wall       IMPRESSION:   1. Technically successful transcatheter placement of VANE X embolization device in the  left supraclinoid internal carotid artery over both aneurysms necks. 2. 70% stenosis of the left internal carotid artery. Status post successful stenting with distal embolic protection performed to reach the left supraclinoid ICA aneurysm as part of intracranial treatment. RECOMMENDATIONS:   Admit to the Intensive Care Unit.    Continue ASPIRIN 81 mg,  Ticagrelor  Blood pressure parameters as mentioned in note  Stat CT head for neurological decline or severe headache  Contact me and or [facility] on call immediately   Follow up angiogram in 6-12 months     Electronically signed by Tin Cesar MD on 3/3/2023 at 8:00 PM

## 2023-03-06 ENCOUNTER — TELEPHONE (OUTPATIENT)
Dept: PAIN MANAGEMENT | Age: 63
End: 2023-03-06

## 2023-03-07 ENCOUNTER — TELEPHONE (OUTPATIENT)
Dept: NEUROLOGY | Age: 63
End: 2023-03-07

## 2023-03-07 ENCOUNTER — TELEPHONE (OUTPATIENT)
Dept: FAMILY MEDICINE CLINIC | Age: 63
End: 2023-03-07

## 2023-03-07 ENCOUNTER — OFFICE VISIT (OUTPATIENT)
Dept: FAMILY MEDICINE CLINIC | Age: 63
End: 2023-03-07

## 2023-03-07 VITALS
HEIGHT: 65 IN | RESPIRATION RATE: 18 BRPM | SYSTOLIC BLOOD PRESSURE: 136 MMHG | BODY MASS INDEX: 36.32 KG/M2 | WEIGHT: 218 LBS | DIASTOLIC BLOOD PRESSURE: 75 MMHG | HEART RATE: 81 BPM | OXYGEN SATURATION: 98 %

## 2023-03-07 DIAGNOSIS — Z76.0 MEDICATION REFILL: ICD-10-CM

## 2023-03-07 DIAGNOSIS — R25.2 LEG CRAMPS: ICD-10-CM

## 2023-03-07 DIAGNOSIS — Z78.9 PLAVIX RESISTANCE: ICD-10-CM

## 2023-03-07 DIAGNOSIS — Z86.79 HISTORY OF CAROTID ARTERY STENOSIS: ICD-10-CM

## 2023-03-07 DIAGNOSIS — I67.1 CEREBRAL ANEURYSM: Primary | ICD-10-CM

## 2023-03-07 DIAGNOSIS — I10 PRIMARY HYPERTENSION: ICD-10-CM

## 2023-03-07 LAB
ALBUMIN SERPL-MCNC: 4.4 G/DL (ref 3.5–5.2)
ALP BLD-CCNC: 87 U/L (ref 35–104)
ALT SERPL-CCNC: 16 U/L (ref 0–32)
ANION GAP SERPL CALCULATED.3IONS-SCNC: 11 MMOL/L (ref 7–16)
AST SERPL-CCNC: 17 U/L (ref 0–31)
BASOPHILS ABSOLUTE: 0.03 E9/L (ref 0–0.2)
BASOPHILS RELATIVE PERCENT: 0.4 % (ref 0–2)
BILIRUB SERPL-MCNC: 0.3 MG/DL (ref 0–1.2)
BUN BLDV-MCNC: 18 MG/DL (ref 6–23)
CALCIUM SERPL-MCNC: 9.5 MG/DL (ref 8.6–10.2)
CHLORIDE BLD-SCNC: 104 MMOL/L (ref 98–107)
CO2: 25 MMOL/L (ref 22–29)
CREAT SERPL-MCNC: 1 MG/DL (ref 0.5–1)
EOSINOPHILS ABSOLUTE: 0.48 E9/L (ref 0.05–0.5)
EOSINOPHILS RELATIVE PERCENT: 6.8 % (ref 0–6)
GFR SERPL CREATININE-BSD FRML MDRD: >60 ML/MIN/1.73
GLUCOSE BLD-MCNC: 97 MG/DL (ref 74–99)
HCT VFR BLD CALC: 39.4 % (ref 34–48)
HEMOGLOBIN: 12.3 G/DL (ref 11.5–15.5)
IMMATURE GRANULOCYTES #: 0.02 E9/L
IMMATURE GRANULOCYTES %: 0.3 % (ref 0–5)
LYMPHOCYTES ABSOLUTE: 1.77 E9/L (ref 1.5–4)
LYMPHOCYTES RELATIVE PERCENT: 25 % (ref 20–42)
MCH RBC QN AUTO: 28.6 PG (ref 26–35)
MCHC RBC AUTO-ENTMCNC: 31.2 % (ref 32–34.5)
MCV RBC AUTO: 91.6 FL (ref 80–99.9)
MONOCYTES ABSOLUTE: 0.48 E9/L (ref 0.1–0.95)
MONOCYTES RELATIVE PERCENT: 6.8 % (ref 2–12)
NEUTROPHILS ABSOLUTE: 4.29 E9/L (ref 1.8–7.3)
NEUTROPHILS RELATIVE PERCENT: 60.7 % (ref 43–80)
PDW BLD-RTO: 14.2 FL (ref 11.5–15)
PLATELET # BLD: 291 E9/L (ref 130–450)
PMV BLD AUTO: 9.7 FL (ref 7–12)
POTASSIUM SERPL-SCNC: 4 MMOL/L (ref 3.5–5)
RBC # BLD: 4.3 E12/L (ref 3.5–5.5)
SODIUM BLD-SCNC: 140 MMOL/L (ref 132–146)
TOTAL CK: 36 U/L (ref 20–180)
TOTAL PROTEIN: 7.6 G/DL (ref 6.4–8.3)
WBC # BLD: 7.1 E9/L (ref 4.5–11.5)

## 2023-03-07 RX ORDER — METOPROLOL SUCCINATE 25 MG/1
12.5 TABLET, EXTENDED RELEASE ORAL DAILY
Qty: 15 TABLET | Refills: 2 | Status: SHIPPED
Start: 2023-03-07 | End: 2023-03-15

## 2023-03-07 RX ORDER — ALBUTEROL SULFATE 90 UG/1
2 AEROSOL, METERED RESPIRATORY (INHALATION) EVERY 6 HOURS PRN
Qty: 18 G | Refills: 3 | Status: SHIPPED | OUTPATIENT
Start: 2023-03-07

## 2023-03-07 NOTE — PROGRESS NOTES
Post-Discharge Transitional Care Management Services      Joaquim Moss   YOB: 1960    Date of Visit:  3/7/2023    Allergies   Allergen Reactions    Latex Hives     Hives and rash    Iodine Rash     Hives . pt. States anaphalyxis    Shellfish-Derived Products Anaphylaxis and Hives    Aloe Hives     Hives     Celebrex [Celecoxib] Itching    Fish-Derived Products Other (See Comments)     Outpatient Medications Marked as Taking for the 3/7/23 encounter (Office Visit) with Kenn Crooks MD   Medication Sig Dispense Refill    metoprolol succinate (TOPROL XL) 25 MG extended release tablet Take 0.5 tablets by mouth daily 15 tablet 2    albuterol sulfate HFA (PROVENTIL;VENTOLIN;PROAIR) 108 (90 Base) MCG/ACT inhaler Inhale 2 puffs into the lungs every 6 hours as needed for Wheezing 18 g 3    ticagrelor (BRILINTA) 90 MG TABS tablet Take 1 tablet by mouth 2 times daily 60 tablet 5    atorvastatin (LIPITOR) 40 MG tablet Take 1 tablet by mouth nightly 90 tablet 1    aspirin EC 81 MG EC tablet Take 1 tablet by mouth daily 90 tablet 1    Fexofenadine HCl (MUCINEX ALLERGY PO) Take 1 tablet by mouth as needed      potassium chloride (KLOR-CON M) 10 MEQ extended release tablet TAKE ONE TABLET BY MOUTH DAILY WITH BREAKFAST 30 tablet 3    pantoprazole (PROTONIX) 40 MG tablet TAKE ONE TABLET BY MOUTH EVERY DAY 30 tablet 3    amLODIPine (NORVASC) 5 MG tablet TAKE ONE TABLET BY MOUTH EVERY DAY 30 tablet 3    psyllium (METAMUCIL SMOOTH TEXTURE) 28.3 % POWD powder Take 3.4 g by mouth 2 times daily 538 g 5    gabapentin (NEURONTIN) 300 MG capsule TAKE ONE CAPSULE BY MOUTH THREE TIMES A DAY 90 capsule 3    Umeclidinium-Vilanterol (ANORO ELLIPTA) 62.5-25 MCG/ACT AEPB Inhale 1 puff into the lungs daily 1 each 6    albuterol sulfate HFA (PROVENTIL;VENTOLIN;PROAIR) 108 (90 Base) MCG/ACT inhaler INHALE TWO PUFFS BY MOUTH EVERY 6 HOURS AS NEEDED FOR WHEEZING. 1 each 3    Misc.  Devices (WRIST BRACE) MISC Firm neutral position left wrist brace  Size to fit  Dx:  Wrist pain/carpal tunnel syndrome (Patient taking differently: Firm neutral position left wrist brace  Size to fit  Dx:  Wrist pain/carpal tunnel syndrome) 1 each 0    fluticasone (FLONASE) 50 MCG/ACT nasal spray 2 sprays by Each Nostril route in the morning. 1 each 3    polyethylene glycol (GLYCOLAX) 17 g packet DISSOLVE 1 PACKET (17 GRAMS) IN LIQUID AND TAKE BY MOUTH DAILY      Multiple Vitamins-Minerals (THERAPEUTIC MULTIVITAMIN-MINERALS) tablet Take 1 tablet by mouth daily      Misc. Devices (ROLLATOR) MISC 1 each by Does not apply route daily as needed (use as needed for stability) 1 each 0         Vitals:    03/07/23 1243   BP: 136/75   Pulse: 81   Resp: 18   SpO2: 98%   Weight: 218 lb (98.9 kg)   Height: 5' 5\" (1.651 m)     Body mass index is 36.28 kg/m². Wt Readings from Last 3 Encounters:   02/16/23 217 lb (98.4 kg)   02/09/23 215 lb (97.5 kg)   02/08/23 217 lb (98.4 kg)     BP Readings from Last 3 Encounters:   03/07/23 136/75   03/02/23 (!) 99/47   02/16/23 132/65        Patient was admitted to [facility] (1-) for carotid stenosis and intracranial aneurysm . Date of Admission:   3/1/2023                                  date of Discharge: 3/2/2023       Discharge Diagnosis:  1. 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 2.  Left internal carotid artery stent assisted angioplasty of the 70% stenosis of the left cervical portion of the left internal carotid artery with distal embolic protection done as part of intracranial treatment. 3.  Plavix resistance. 4.  Hypertension that has seemed to gotten much  better post carotid stenting  5. Peripheral arterial disease. Of note her blood pressure medications were all stopped during hospitalization. Regarding h/o atrial fibrillation \"Subarachnoid hemorrhage or history of subarachnoid hemorrhage is not a contraindication for the same. \"  Of note she also has history of DVT in post operative period. It has since been determined that she no longer needs anticoagulation by vascular and hematology. Current status: Patient overall feeling okay. She is still having head pains but she has had these since the time of her SAH. Not headaches  \"Just a pain\" sometimes sharp, sometimes different. She was pleased with how things went with her aneurysm stent procedure. She is feeling out of breath howver. Has checked her blood pressure a few times and her home bps have bene int eh 413 to 814'N systolic. Relates to the atorvastatin   It did not start until she started to take  She stated this due to findgins of PVD at time of procedure \"I had plaques all over\"      Review of Systems:  A comprehensive review of systems was negative except for what was noted in the HPI. Physical Exam:  PE    /75   Pulse 81   Resp 18   Ht 5' 5\" (1.651 m)   Wt 218 lb (98.9 kg)   SpO2 98%   BMI 36.28 kg/m²     General Appearance: alert and oriented to person, place and time, well-developed and well nourished and in no acute distress  Pulmonary/Chest: clear to auscultation bilaterally- no wheezes, rales or rhonchi, normal air movement, no respiratory distress  Cardiovascular: normal rate, normal S1 and S2, no murmurs and no gallops  Psych - Normal affect and judgement  Neurologic: no cranial nerve deficit, gait and coordination normal and speech normal  Extremities: no erythema, swelling or tenderness of extremities . Antalgic gait. Initial post-discharge communication occurred between nurse care coordinator and patient on 3/3/23- see documentation in chart: telephone encounter. Assessment/Plan:  Candice Johnson was seen today for post-op check.     Diagnoses and all orders for this visit:    Cerebral aneurysm  S/p VANE procedure  Also carotid artery stent  No complications  On antiplatelet - brilinta and aspirin due to histoyr of plavix resistance  Also statin; which she is having some side effects from    Leg cramps  R/o myopathy - check labs  Reduce statin dose  Consider alternative statin if reducing dose does not help  -     Comprehensive Metabolic Panel; Future  -     CK; Future  -     CBC with Auto Differential; Future    Medication refill    Primary hypertension  Bp low in hospital - medications adjusted at that time  Resume low dose BB     Plavix resistance    History of carotid artery stenosis    Other orders  -     metoprolol succinate (TOPROL XL) 25 MG extended release tablet; Take 0.5 tablets by mouth daily  -     albuterol sulfate HFA (PROVENTIL;VENTOLIN;PROAIR) 108 (90 Base) MCG/ACT inhaler;  Inhale 2 puffs into the lungs every 6 hours as needed for Wheezing        Diagnostic test results reviewed: inpatient labs and vascular study-see above    Patient risk of morbidity and mortality: moderate    Medical Decision Making: moderate complexity

## 2023-03-07 NOTE — TELEPHONE ENCOUNTER
Pt told  when she was here that she takes 2 atorvastatin but actually does not.. She takes 1 pill daily, @ bedtime. She is confused on what dr told her to take as far as dosage, should she be taking 1/2 pill?? Please advise

## 2023-03-07 NOTE — TELEPHONE ENCOUNTER
Patient called this afternoon with updates on her medicine. She was at her PCP this morning and Dr. Kinsey Coker told her to cut her Lipitor in half due to shortness of breath and leg cramps. Also Dr. Yana Constantino put her back on the Metoprolol, 12.5 mg (take 0.5 tab daily. Patient wanted to make sure you were aware and are OK w/these changes. She also contacted the [facility] about stopping the Tylenol w/codeine and the pain clinic wants to know what they can order for her instead of the tylenol w/codeine? (See June Pac note). Patient has lumbar DDD. Also, she wants to know do you want to see her back in 6 weeks (per AVS) after her procedure on 3/3 or in 6 months?

## 2023-03-08 ENCOUNTER — CARE COORDINATION (OUTPATIENT)
Dept: CASE MANAGEMENT | Age: 63
End: 2023-03-08

## 2023-03-08 DIAGNOSIS — I10 HYPERTENSION, UNSPECIFIED TYPE: ICD-10-CM

## 2023-03-08 DIAGNOSIS — I50.32 CHRONIC HEART FAILURE WITH PRESERVED EJECTION FRACTION (HCC): Primary | ICD-10-CM

## 2023-03-08 NOTE — TELEPHONE ENCOUNTER
Pt will do half and let us know. Was taking T4 and Dr Cher Zimmer said to stop taking. Any suggestions on what is compatible?

## 2023-03-08 NOTE — CARE COORDINATION
Remote Patient Monitoring Enrollment Note      Date/Time:  3/8/2023 1:54 PM    Offered patient enrollment in the [employer] Remote Patient Monitoring (RPM) program for in home monitoring for CHF and HTN (Has h/o AF, DVT, Emphysema)  Patient accepted RPM services. Patient will be monitoring the following daily:  activity level  blood pressure heart rate  medications  pulse ox reading  survey response  weight    CTN reviewed the information below with patient:    Emergency Contact (name and contact number): Ashwin James [phone]       [x] A member from the care coordination team will reach out to notify the patient once the RPM kit is ordered. [x] Once the kit is delivered, the [facility] team will contact the patient after UPS deliver to assist with set up. [x] Determined BP cuff size: regular (9.05\"-15.74\")      [x] Determined weight scale: regular (<330lbs)                                                 [x] Hours of ACM monitoring - Monday-Friday [id number]                  Wt 218 lbs Ht 5'5\"       All questions about RPM program answered at this time. [address] Care Transitions Follow Up Call    Patient Current Location: [address] Transition Nurse contacted the patient by telephone to follow up after admission. Verified name and  with patient as identifiers. Patient: Troy Ortiz  Patient : 1960   MRN: <[id number]>  Reason for Admission: 3/1/2023 - 3/2/2023 [facility]. Successful treatment of the previous [place] 3 Ruptured cerebral aneurysm with VANE X embolization Device with Nafisa Condon 1 occlusion, Successful Left ICA stent assisted angioplasty of the 70% stenosis of the left internal carotid artery with distal embolic protection, this was done as part of intracranial treatment in order to successful treat the intracranial aneurysm. Discharge Date: 3/2/23 RARS: Readmission Risk Score: 14.9  NR  CT    PCP 3/7/23 Attended.  Next appt 5/19 10:15  Pain/K Theodore Orench 4/3 10:00  Pulm/V Zarina 4/10 9:30  Needs 6 wk post-op NeuroSx/Venkatachalam. Waiting office call back. Needs to be reviewed by the provider   Additional needs identified to be addressed with provider: No  none             Method of communication with provider: none. Glenn Stephen reports no acute headache but has had fleeting episodes of sharp to dull pains to front top of head for the past 5 yrs. She states this is not new or worse. No acute headaches or pain concerns. Reports sore throat resolved. Saw PCP yesterday and had some med changes. She has decreased Lipitor to 20 mg nightly d/t leg cramps and SOB on the medication. No current SOB concerns. Added Metoprolol Succinate 12.5 mg daily. Yesterday's office BP reading was 136/75 HR 81. She is planning on getting a new BP monitor feeling hers is unreliable after 5 yrs. She just replaced batteries and still has to attempt inflation 2-3 times before it works. She is trying to manage her cholesterol intake and monitroing diet better. No home or med refill needs. Pending office call back for NeuroSx/Venkatachalam appt to be scheduled stating he is out of town. Agreeable to RPM Program and referral placed. Addressed changes since last contact:  none  Discussed follow-up appointments. If no appointment was previously scheduled, appointment scheduling offered: Yes. Is follow up appointment scheduled within 7 days of discharge? Yes. Follow Up  Future Appointments   Date Time Provider Shabnam Moore   4/3/2023 10:00 AM [facility]   4/10/2023  9:30 AM Jatinder Albright MD [facility] Pulm [facility]   5/19/2023 10:15 AM MD Jayne Adams PC [facility]   8/18/2023  9:30 AM MD Jayne Adams PC [facility]     Non-[facility] follow up appointment(s):     Care Transition Nurse reviewed red flags with patient and discussed any barriers to care and/or understanding of plan of care after discharge.  Discussed appropriate site of care based on symptoms and resources available to patient including: Specialist  When to call 911  Condition related references. The patient agrees to contact the PCP office for questions related to their healthcare. Advance Care Planning:   reviewed and current. Patients top risk factors for readmission:  HTN  Interventions to address risk factors:  Reviewed to monitor home BP/HR daily and log for physician review. Advised to report trending  or higher to PCP/Cardiology. Offered patient enrollment in the Remote Patient Monitoring (RPM) program for in-home monitoring: Yes, patient already enrolled. Care Transitions Subsequent and Final Call    Subsequent and Final Calls  Do you have any ongoing symptoms?: Yes  Patient-reported symptoms: Other  Have your medications changed?: Yes  Patient Reports: Decreased Lipitor to 20 mg nightly, On Metoprolol Succinate 12.5 mg daily. Do you have any questions related to your medications?: No  Do you currently have any active services?: No  Do you have any needs or concerns that I can assist you with?: No  Identified Barriers: Lack of Education  Care Transitions Interventions    Pharmacist: Declined    Other Interventions:             Care Transition Nurse provided contact information for future needs. Plan for follow-up call in 7-10 days based on severity of symptoms and risk factors. Plan for next call:  CT FU/Final, Check HA and home BP/HR readings, New RPM and needs ACM,  Pending neurosx appt.     Estelle Dahl RN

## 2023-03-08 NOTE — PROGRESS NOTES
3/8/23 5:32 PM       Remote Patient Monitoring Treatment Plan    Received request from ANTHONY/CASSY Foreman RN  to order remote patient monitoring for in home monitoring of CHF and HTN and order completed. Patient will be monitoring blood pressure   pulse ox   weight. Patient will engage in Remote Patient Monitoring each day to develop the skills necessary for self management. RPM Care Team Responsibilities:   Alerts will be reviewed daily and addressed within 2-4 hours during operational hours (Monday -Friday 9 am-4 pm)  Alert response and intervention documented in patient medical record  Alert response escalated to PCP per protocol and documented in patient medical record  Patient monitored over approximately  days  Discharge from program based on self-management readiness    See care coordination encounters for additional details.       Ari Quarles DNP, FNP-C, Remote Patient Monitoring NP, (Ph) [phone]

## 2023-03-08 NOTE — TELEPHONE ENCOUNTER
Pt states that PM told her to call you to see what her next step would be but she is waiting on a call back from them.

## 2023-03-09 ENCOUNTER — CARE COORDINATION (OUTPATIENT)
Dept: CARE COORDINATION | Age: 63
End: 2023-03-09

## 2023-03-09 ENCOUNTER — TELEPHONE (OUTPATIENT)
Dept: NEUROLOGY | Age: 63
End: 2023-03-09

## 2023-03-09 NOTE — TELEPHONE ENCOUNTER
Follow-up to a phone call on 3/7. Dr. Magy David notified me that he reviewed the message from patient regarding changes in her medication from her PCP. He is ok with all the changes. Patient is also able to start back on the Tylenol with codeine. This was stopped due to her low blood pressure. Patient notified and verbalized understanding.

## 2023-03-09 NOTE — CARE COORDINATION
Remote Patient Kit Ordering Note      Date/Time:  3/9/2023 8:36 AM      [x] [facility] confirmed patient shipping address  [x] Patient will receive package over the next 2-4 business days. Someone 21 years or older must be present to sign for UPS delivery.  [x] Patient to contact virtual installation-specific phone number listed in the patient instructions.  [x] If the patient does not contact HRS within 24 hours, an HRS  will call the patient directly: If the patient does not answer, HRS will follow up with the clinical team notifying them about the unsuccessful attempt to contact the patient. HRS will make three call attempts to the patient.  [x] LPN will contact patient once equipment is active to welcome them to the program.                                                         [x] Hours of RPM monitoring - Monday-Friday [id number]                     All questions answered at this time. ACM made aware the RPM kit has been ordered.      CCSS notified patient of RPM equipment order.

## 2023-03-09 NOTE — TELEPHONE ENCOUNTER
Would encourage her to schedule with pain management. Reach out to Dr Rochelle Ocampo regarding tylenol 4 - it is possible that he wanted her to stop it for short term. Ice/ heat/ stretching and over the counter tylenol may be of some benefit.

## 2023-03-13 ENCOUNTER — CARE COORDINATION (OUTPATIENT)
Dept: CARE COORDINATION | Age: 63
End: 2023-03-13

## 2023-03-13 PROBLEM — Z86.79 HISTORY OF CAROTID ARTERY STENOSIS: Status: ACTIVE | Noted: 2023-03-13

## 2023-03-13 NOTE — CARE COORDINATION
Remote Patient Monitoring Welcome Note  Date/Time:  3/13/2023 2:38 PM  Patient Current Location: Home: [address] [address]  Verified patients name and  as identifiers. Completed and confirmed the following:   Emergency Contact: Moraima De Leon [phone]  [x] Patient received all RPM equipment (tablet, scale, blood pressure device and cuff, and pulse oximeter)  Cuff Size: Small  []  Regular   [x]  Large  []   Weight Scale: Regular   [x]  Bariatric  []               [x] Instructed patient keep box for use when returning equipment                                                          [x] Reviewed Patient Welcome Letter with patient                         [x] Reviewed expectations for patient and care team       [x] Instructed patient to keep scale on flat surface                                                         [x] Instructed patient to keep tablet plugged in at all times                         [x] Instructed how to contact IT support (number listed on welcome letter)  [x] Provided Remote Patient Monitoring care  information               All questions answered at this time. Remote Patient Monitoring Note  LPN reviewed patients reported daily Remote Patient Monitoring metrics. All reported metrics are within alert parameters. Plan/Follow Up:  Will continue to review, monitor and address alerts with follow up based on severity of symptoms and risk factors  Current Patient Metrics ---- Blood Pressure: 152/86, 76bpm Pulseox: 96%, 79bpm Survey: C Weight: 216.8lbs Note Created at: 2023 02:38 PM ET ---- Time-Spent: 12 minutes 0 seconds

## 2023-03-14 ENCOUNTER — CARE COORDINATION (OUTPATIENT)
Dept: CARE COORDINATION | Age: 63
End: 2023-03-14

## 2023-03-15 ENCOUNTER — TELEPHONE (OUTPATIENT)
Dept: FAMILY MEDICINE CLINIC | Age: 63
End: 2023-03-15

## 2023-03-15 ENCOUNTER — CARE COORDINATION (OUTPATIENT)
Dept: CASE MANAGEMENT | Age: 63
End: 2023-03-15

## 2023-03-15 RX ORDER — LOSARTAN POTASSIUM 25 MG/1
25 TABLET ORAL DAILY
Qty: 30 TABLET | Refills: 1 | Status: SHIPPED | OUTPATIENT
Start: 2023-03-15

## 2023-03-15 RX ORDER — METOPROLOL SUCCINATE 25 MG/1
25 TABLET, EXTENDED RELEASE ORAL DAILY
Qty: 30 TABLET | Refills: 2
Start: 2023-03-15 | End: 2023-03-16 | Stop reason: SDUPTHER

## 2023-03-15 NOTE — CARE COORDINATION
[facility] Care Transitions Follow Up Call    Patient Current Location:  Home: [address] [place], [place] [id number]    Care Transition Nurse contacted the patient by telephone to follow up after admission. Verified name and  with patient as identifiers. Patient: Juvenal Mack  Patient : 1960   MRN: <[id number]>  Reason for Admission:  3/1/2023 - 3/2/2023 [facility] 74. Successful treatment of the previous [facility] 3 Ruptured cerebral aneurysm with VANE X embolization Device with Mo Lal 1 occlusion, Successful Left ICA stent assisted angioplasty of the 70% stenosis of the left internal carotid artery with distal embolic protection, this was done as part of intracranial treatment in order to successful treat the intracranial aneurysm. Discharge Date: 3/2/23 RARS: Readmission Risk Score: 14.9  NR  CT  RPM Program     PCP 3/7/23 Attended. Next appt  10:15  Pain/K Charlene Ebbing 4/3 10:00  Pulm/V Vill[place] 4/10 9:30  Post-op NeuroSx/Venkatachalam  9:30    PMH: CHF, AF, HTN, DVT, Emphysema, Cerebral aneurysm rupture/bleed. Needs to be reviewed by the provider   Additional needs identified to be addressed with provider: Yes  PCP advised of pt BP readings and plan to continue daily monitoring. Method of communication with provider: Vidhi Espinosa contacted this writer to review home BP/HR ranges. 3/14/23 RPM BP Monitor 154/96 HR 87. Home BP Monitor readings 148/84 and 128/80.  3/15/23 RPM BP Monitor 166/100 HR 79 w/ recheck 156/99 HR 83. Home BP Monitor readings 133/83 HR 79, w/ recheck 146/81 HR 78. Pt is taking Norvasc 5 mg daily and Torpol XL 25 mg 1/2 tablet daily. She denies any headaches, flushing, muffled hearing, palpitations, or dizziness. Instructed pt to continue monitoring and we will review daily readings to discuss med needs w/ PCP on Tue, v/u. Advised to report trending  or greater to her physician, v/u.      Addressed changes since last contact: none  Discussed follow-up appointments. If no appointment was previously scheduled, appointment scheduling offered: Yes. Is follow up appointment scheduled within 7 days of discharge? Yes. Follow Up  Future Appointments   Date Time Provider Shabnam Moore   4/3/2023 10:00 AM Bing Ivey, [address] Pain [facility]   4/10/2023  9:30 AM Barb Garcia MD [facility] Pulm [facility]   4/14/2023  9:30 AM Hoda Berry MD [facility] Neurology -   5/19/2023 10:15 AM MD Jayne Baez [facility]   8/18/2023  9:30 AM MD Jayne Baez [facility]     Non-[facility] follow up appointment(s):     Care Transition Nurse reviewed red flags with patient and discussed any barriers to care and/or understanding of plan of care after discharge. Discussed appropriate site of care based on symptoms and resources available to patient including: When to call 911  Condition related references. The patient agrees to contact the PCP office for questions related to their healthcare. Advance Care Planning:   reviewed and current. Patients top risk factors for readmission:  HTN  Interventions to address risk factors:  RPM Program pt, Pt is monitoring her BP/HR on program provided BP monitor and her own home BP monitor. Offered patient enrollment in the Remote Patient Monitoring (RPM) program for in-home monitoring: Yes, patient already enrolled. Care Transitions Subsequent and Final Call    Subsequent and Final Calls  Do you have any ongoing symptoms?: No  Do you currently have any active services?: Yes  Are you currently active with any services?: Other  Do you have any needs or concerns that I can assist you with?: No  Identified Barriers: Lack of Education  Care Transitions Interventions    Pharmacist: Declined    Other Interventions:             Care Transition Nurse provided contact information for future needs. Plan for follow-up call in 7-10 days based on severity of symptoms and risk factors.   Plan for next call:  CT FU, Check BP/HR log and symptoms.     Salina Fajardo RN

## 2023-03-15 NOTE — TELEPHONE ENCOUNTER
Please call patient  Her blood pressure is running higher than I would like  I would recommend restarting her losartan at a lower dose  She was taking 100 mg prior to her hospitalization. We can start at just 25 mg daily. Also please start taking a full dose of metoprolol xl - not a half tablet . This would be metoprolol xl 25 mg daily.

## 2023-03-15 NOTE — TELEPHONE ENCOUNTER
I sent a phone message to my staff with some recommended changes for her blood pressure  - take metoprolol xl 25 mg (a full tablet instead of half)  Restart losartan at a lower dose - 25 mg daily

## 2023-03-16 ENCOUNTER — CARE COORDINATION (OUTPATIENT)
Dept: CASE MANAGEMENT | Age: 63
End: 2023-03-16

## 2023-03-16 RX ORDER — METOPROLOL SUCCINATE 25 MG/1
25 TABLET, EXTENDED RELEASE ORAL DAILY
Qty: 30 TABLET | Refills: 2 | Status: SHIPPED | OUTPATIENT
Start: 2023-03-16

## 2023-03-16 NOTE — CARE COORDINATION
CTN outreached pt for new med change review. Advised to take Topril XL 25 mg (full dose) daily and restart Losartan (lower dose) 25 mg daily. States her dtr will  new meds today. She had 1/2 doses of Torpol XL and took two half doses today. Her /91 HR 69. States her home BP Monitor showed BP as 141/89 showing less discrepancy today. Pt was instructed on how to take her resting BP/HR on yesterday's call. //SP, RN CTN

## 2023-03-21 ENCOUNTER — CARE COORDINATION (OUTPATIENT)
Dept: CASE MANAGEMENT | Age: 63
End: 2023-03-21

## 2023-03-21 ENCOUNTER — CARE COORDINATION (OUTPATIENT)
Dept: CARE COORDINATION | Age: 63
End: 2023-03-21

## 2023-03-21 ENCOUNTER — TELEPHONE (OUTPATIENT)
Dept: PHARMACY | Facility: CLINIC | Age: 63
End: 2023-03-21

## 2023-03-21 SDOH — ECONOMIC STABILITY: TRANSPORTATION INSECURITY
IN THE PAST 12 MONTHS, HAS LACK OF TRANSPORTATION KEPT YOU FROM MEETINGS, WORK, OR FROM GETTING THINGS NEEDED FOR DAILY LIVING?: NO

## 2023-03-21 SDOH — ECONOMIC STABILITY: FOOD INSECURITY: WITHIN THE PAST 12 MONTHS, YOU WORRIED THAT YOUR FOOD WOULD RUN OUT BEFORE YOU GOT MONEY TO BUY MORE.: NEVER TRUE

## 2023-03-21 SDOH — ECONOMIC STABILITY: TRANSPORTATION INSECURITY
IN THE PAST 12 MONTHS, HAS THE LACK OF TRANSPORTATION KEPT YOU FROM MEDICAL APPOINTMENTS OR FROM GETTING MEDICATIONS?: NO

## 2023-03-21 SDOH — ECONOMIC STABILITY: HOUSING INSECURITY: IN THE LAST 12 MONTHS, HOW MANY PLACES HAVE YOU LIVED?: 1

## 2023-03-21 SDOH — ECONOMIC STABILITY: INCOME INSECURITY: IN THE LAST 12 MONTHS, WAS THERE A TIME WHEN YOU WERE NOT ABLE TO PAY THE MORTGAGE OR RENT ON TIME?: NO

## 2023-03-21 SDOH — ECONOMIC STABILITY: HOUSING INSECURITY
IN THE LAST 12 MONTHS, WAS THERE A TIME WHEN YOU DID NOT HAVE A STEADY PLACE TO SLEEP OR SLEPT IN A SHELTER (INCLUDING NOW)?: NO

## 2023-03-21 SDOH — ECONOMIC STABILITY: FOOD INSECURITY: WITHIN THE PAST 12 MONTHS, THE FOOD YOU BOUGHT JUST DIDN'T LAST AND YOU DIDN'T HAVE MONEY TO GET MORE.: NEVER TRUE

## 2023-03-21 SDOH — HEALTH STABILITY: PHYSICAL HEALTH: ON AVERAGE, HOW MANY MINUTES DO YOU ENGAGE IN EXERCISE AT THIS LEVEL?: 0 MIN

## 2023-03-21 SDOH — HEALTH STABILITY: PHYSICAL HEALTH: ON AVERAGE, HOW MANY DAYS PER WEEK DO YOU ENGAGE IN MODERATE TO STRENUOUS EXERCISE (LIKE A BRISK WALK)?: 0 DAYS

## 2023-03-21 ASSESSMENT — LIFESTYLE VARIABLES
HOW MANY STANDARD DRINKS CONTAINING ALCOHOL DO YOU HAVE ON A TYPICAL DAY: PATIENT DOES NOT DRINK
HOW OFTEN DO YOU HAVE A DRINK CONTAINING ALCOHOL: NEVER

## 2023-03-21 ASSESSMENT — SOCIAL DETERMINANTS OF HEALTH (SDOH)
HOW OFTEN DO YOU ATTENT MEETINGS OF THE CLUB OR ORGANIZATION YOU BELONG TO?: NEVER
DO YOU BELONG TO ANY CLUBS OR ORGANIZATIONS SUCH AS CHURCH GROUPS UNIONS, FRATERNAL OR ATHLETIC GROUPS, OR SCHOOL GROUPS?: NO
HOW OFTEN DO YOU GET TOGETHER WITH FRIENDS OR RELATIVES?: MORE THAN THREE TIMES A WEEK
IN A TYPICAL WEEK, HOW MANY TIMES DO YOU TALK ON THE PHONE WITH FAMILY, FRIENDS, OR NEIGHBORS?: MORE THAN THREE TIMES A WEEK
HOW OFTEN DO YOU ATTEND CHURCH OR RELIGIOUS SERVICES?: NEVER
HOW OFTEN DO YOU ATTENT MEETINGS OF THE CLUB OR ORGANIZATION YOU BELONG TO?: NEVER
HOW OFTEN DO YOU ATTEND CHURCH OR RELIGIOUS SERVICES?: NEVER
IN A TYPICAL WEEK, HOW MANY TIMES DO YOU TALK ON THE PHONE WITH FAMILY, FRIENDS, OR NEIGHBORS?: MORE THAN THREE TIMES A WEEK
DO YOU BELONG TO ANY CLUBS OR ORGANIZATIONS SUCH AS CHURCH GROUPS UNIONS, FRATERNAL OR ATHLETIC GROUPS, OR SCHOOL GROUPS?: NO
HOW HARD IS IT FOR YOU TO PAY FOR THE VERY BASICS LIKE FOOD, HOUSING, MEDICAL CARE, AND HEATING?: SOMEWHAT HARD
HOW OFTEN DO YOU GET TOGETHER WITH FRIENDS OR RELATIVES?: MORE THAN THREE TIMES A WEEK

## 2023-03-21 ASSESSMENT — ENCOUNTER SYMPTOMS: DYSPNEA ASSOCIATED WITH: EXERTION

## 2023-03-21 NOTE — LETTER
3/21/2023    [facility] [address]    Dear Flex Dai,    I enjoyed speaking with you and wanted to send some additional information. Michelle Garcia MD and I will work together to ensure your needs are met and help you achieve your health goals. We are committed to walk with you on this journey and look forward to working with you. Please feel free to contact me with any questions or concerns.   I am available by phone    In good health,     Neema Croft MSN, RN, [address] [place] [address] Manager  Cell: [phone]

## 2023-03-21 NOTE — TELEPHONE ENCOUNTER
Received a referral:  from Care Coordinator  to review patients medications. Called patient to schedule a time to speak with a pharmacist over the telephone. Spoke to patient and advised them of the above message.   Patient verified understanding and scheduled their appointment: 3/27/23 at [address]    Program: [facility] in place:  No  Recommendation Provided To: Patient/Caregiver: 1 via Telephone  Intervention Detail: Scheduled Appointment  Intervention Accepted By: Patient/Caregiver: 1  Gap Closed?: Yes   Time Spent (min): [facility] [address] [facility] free: [phone]

## 2023-03-21 NOTE — CARE COORDINATION
Is follow up appointment scheduled within 7 days of discharge? Yes. Follow Up  Future Appointments   Date Time Provider Shabnam Moore   4/3/2023 10:00 AM Arti Jin Pain [facility]   4/10/2023  9:30 AM Carlotta Molina MD ACC Pulm [facility]   4/14/2023  9:30 [facility]   5/19/2023 10:15 AM MD Jayne Katz PC [facility]   8/18/2023  9:30 AM MD Jayne Katz PC [facility]     Non-[facility] follow up appointment(s):     Care Transition Nurse reviewed red flags with patient and discussed any barriers to care and/or understanding of plan of care after discharge. Discussed appropriate site of care based on symptoms and resources available to patient including: Condition related references. The patient agrees to contact the PCP office for questions related to their healthcare. Advance Care Planning:   reviewed and current. Patients top risk factors for readmission:  AF  Interventions to address risk factors:  Pt v/u AF symptoms to report to physician/cardiologist/return to ED    Offered patient enrollment in the Remote Patient Monitoring (RPM) program for in-home monitoring: Yes, patient already enrolled. Care Transitions Subsequent and Final Call    Subsequent and Final Calls  Do you have any ongoing symptoms?: Yes  Patient-reported symptoms: Other  Have your medications changed?: No  Do you have any questions related to your medications?: No  Do you currently have any active services?: Yes  Are you currently active with any services?: Other  Do you have any needs or concerns that I can assist you with?: No  Identified Barriers: Lack of Education  Care Transitions Interventions    Pharmacist: Declined    Other Interventions:             Care Transition Nurse provided contact information for future needs. No further follow-up call indicated based on severity of symptoms and risk factors. ACM referral. CTN s/o.     Fransico Crowe RN

## 2023-03-21 NOTE — TELEPHONE ENCOUNTER
----- Message from Kamryn Price RN sent at 3/21/2023 12:04 PM EDT -----  Pt states she would like to discuss medications with a pharmacist as she has recently had some changes and doesn't feel confident in what they are for. Pt would like for pharmacist to review medications and discuss what they are for.   ACM to refer pt to [facility] Pharmacist.   Thank you

## 2023-03-21 NOTE — CARE COORDINATION
medications?: No     No patient-reported symptoms         Symptoms:  None: Yes      Symptom course: stable  Breathlessness: exertion  Increase use of rapid acting/rescue inhaled medications?: No  Change in chronic cough?: No/At Baseline  Change in sputum?: No/At Baseline  Self Monitoring - SaO2: Yes

## 2023-03-23 NOTE — TELEPHONE ENCOUNTER
See previous note. Thanks. Double O-Z Plasty Text: We discussed various closure modalities with the patient, including healing by second intention, primary closure, skin graft and various flaps.  The location and configuration of the defect indicated that a double O-Z rotation flap would result in the least disturbance of the position and function of the surrounding anatomic structures, and provide the best result.  The technique, its benefits, alternatives and risks were discussed with the patient.  The patient underwent the procedure as follows: The patient was positioned supine on the operating room table.  The area of the defect and the surrounding skin were anesthetized with buffered 1% lidocaine with epinephrine.  The area was washed with chlorhexidine.  Sterile drapes were applied. \\n\\nThe defect edges were debeveled with a #15 scalpel blade.  Given the location of the defect, shape of the defect and the proximity to free margins a Double O-Z plasty (double transposition flap) was deemed most appropriate.  Using a sterile surgical marker, the appropriate transposition flaps were drawn incorporating the defect and placing the expected incisions within the relaxed skin tension lines where possible. The area thus outlined was incised deep to adipose tissue with a #15 scalpel blade.  The skin margins were undermined to an appropriate distance in all directions utilizing iris scissors.  Hemostasis was achieved with electrocautery.  The flaps were then transposed into place, one clockwise and the other counterclockwise, and anchored with interrupted buried subcutaneous sutures.

## 2023-03-27 ENCOUNTER — TELEPHONE (OUTPATIENT)
Dept: PHARMACY | Facility: CLINIC | Age: 63
End: 2023-03-27

## 2023-03-27 NOTE — TELEPHONE ENCOUNTER
Umeclidinium-Vilanterol (ANORO ELLIPTA) 62.5-25 MCG/ACT AEPB Inhale 1 puff into the lungs daily - Will start taking 1 puff daily again. Had been using PRN    albuterol sulfate HFA (PROVENTIL;VENTOLIN;PROAIR) 108 (90 Base) MCG/ACT inhaler INHALE TWO PUFFS BY MOUTH EVERY 6 HOURS AS NEEDED FOR WHEEZING. - duplicate    Misc. Devices (WRIST BRACE) MISC Firm neutral position left wrist brace  Size to fit  Dx:  Wrist pain/carpal tunnel syndrome (Patient taking differently: Firm neutral position left wrist brace  Size to fit  Dx:  Wrist pain/carpal tunnel syndrome)     fluticasone (FLONASE) 50 MCG/ACT nasal spray 2 sprays by Each Nostril route in the morning. - OTC- using prn    polyethylene glycol (GLYCOLAX) 17 g packet DISSOLVE 1 PACKET (17 GRAMS) IN LIQUID AND TAKE BY MOUTH DAILY - OTC    Multiple Vitamins-Minerals (THERAPEUTIC MULTIVITAMIN-MINERALS) tablet Take 1 tablet by mouth daily - OTC    Misc. Devices (ROLLATOR) MISC 1 each by Does not apply route daily as needed (use as needed for stability)        Additional Medications (including OTC/Herbal Supplements):  Taking Hair, skin, nail vitamin- added to med list  Recommended CoQ10- has not started, so did not yet add to med list    Allergies: Allergies   Allergen Reactions    Latex Hives     Hives and rash    Iodine Rash     Hives . pt.  States anaphalyxis    Shellfish-Derived Products Anaphylaxis and Hives    Aloe Hives     Hives     Celebrex [Celecoxib] Itching    Fish-Derived Products Other (See Comments)       Pertinent Labs/Vitals:  BP Readings from Last 3 Encounters:   03/27/23 (!) 144/87   03/07/23 136/75   03/02/23 (!) 99/47     No results found for: Marco Farrar [id number]  Lab Results   Component Value Date    LABA1C 5.6 05/27/2022    LABA1C 5.8 (H) 12/04/2020    LABA1C 5.5 09/17/2020     Lab Results   Component Value Date    CHOL 188 05/27/2022    TRIG 119 05/27/2022    HDL 66 05/27/2022    LDLCALC 98 05/27/2022     ALT   Date Value Ref Range Status   03/07/2023

## 2023-03-28 ENCOUNTER — CARE COORDINATION (OUTPATIENT)
Dept: CARE COORDINATION | Age: 63
End: 2023-03-28

## 2023-03-28 RX ORDER — LOSARTAN POTASSIUM 50 MG/1
50 TABLET ORAL DAILY
Qty: 30 TABLET | Refills: 0 | Status: SHIPPED
Start: 2023-03-28 | End: 2023-04-13

## 2023-03-28 ASSESSMENT — ENCOUNTER SYMPTOMS: DYSPNEA ASSOCIATED WITH: EXERTION

## 2023-03-28 NOTE — CARE COORDINATION
Pt identified by name and   Pt informed to change her Losartan to 50mg daily per PCP note. Pt verbalized understanding.
Progress     Conditions and Symptoms   On track     I will schedule office visits, as directed by my provider. I will keep my appointment or reschedule if I have to cancel. I will notify my provider of any barriers to my plan of care. I will follow my Zone Management tool to seek urgent or emergent care. I will notify my provider of any symptoms that indicate a worsening of my condition. Barriers: lack of education  Plan for overcoming my barriers: Notify care team if there are concerns or questions  Confidence: 10/10  Anticipated Goal Completion Date: 6/21/23         Medication Management   On track     I will take my medication as directed. I will notify my provider of any problems with medications, like adverse effects or side effects. I will notify my provider/Care Coordinator if I am unable to afford my medications. I will notify my provider for advice before I stop taking any of my medication.     Barriers: lack of education  Plan for overcoming my barriers: Speak with [facility] Pharmacist.  Ask questions and voice concerns to care team.  Notify care team if there are any changes  Confidence: 10/10  Anticipated Goal Completion Date: 6/21/23              Future Appointments   Date Time Provider Shabnam Moore   4/3/2023 10:00 AM [facility]   4/14/2023  9:30 AM Josue Chung MD [facility] -   4/19/2023  9:00 AM Maria C Abebe MD [facility]   5/19/2023 10:15 AM MD Jayne Scott [facility]   8/18/2023  9:30 AM MD Jayne Scott [facility]   ,   Congestive Heart Failure Assessment    Are you currently restricting fluids?: No Restriction  Do you understand a low sodium diet?: Yes  Do you understand how to read food labels?: Yes  How many restaurant meals do you eat per week?: 0  Do you salt your food before tasting it?: No     No patient-reported symptoms      Symptoms:  None: Yes      Symptom course: stable  Patient-reported weight (lb):

## 2023-03-29 NOTE — H&P
Specialty Pharmacy - Refill Coordination    Specialty Medication Orders Linked to Encounter      Flowsheet Row Most Recent Value   Medication #1 epoetin dodie-epbx (RETACRIT) 10,000 unit/mL imjection (Order#[id number], Rx#[id number])          Pts daughter thinks they may have extra retacrit. Asks that we send this weeks shipment and when evaluate next week.    Refill Questions - Documented Responses      Flowsheet Row Most Recent Value   Patient Availability and HIPAA Verification    Does patient want to proceed with activity? Yes   HIPAA/medical authority confirmed? Yes   Relationship to patient of person spoken to? Child   Refill Screening Questions    Changes to allergies? No   Changes to medications? No   New conditions since last clinic visit? No   Unplanned office visit, urgent care, ED, or hospital admission in the last 4 weeks? No   How does patient/caregiver feel medication is working? Good   Financial problems or insurance changes? No   How many doses of your specialty medications were missed in the last 4 weeks? 0   Would patient like to speak to a pharmacist? No   When does the patient need to receive the medication? 04/05/23   Refill Delivery Questions    How will the patient receive the medication? MEDRx   When does the patient need to receive the medication? 04/05/23   Shipping Address Home   Address in [facility] confirmed and updated if neccessary? Yes   Expected Copay ($) 100   Is the patient able to afford the medication copay? Yes   Payment Method CC on file   Days supply of Refill 7   Supplies needed? Alcohol Swabs   Refill activity completed? Yes   Refill activity plan Refill scheduled   Shipment/Pickup Date: 03/30/23            Current Outpatient Medications   Medication Sig    acetaminophen (TYLENOL) 325 MG tablet Take 325 mg by mouth every 6 (six) hours as needed for Pain.    aspirin 325 MG tablet Take 1 tablet by mouth.    atorvastatin (LIPITOR) 20 MG tablet Take 1 tablet (20 mg  potassium chloride  10 mEq Oral Daily with breakfast    sodium chloride flush  10 mL Intravenous 2 times per day      Allergies:    Latex; Iodine; Aloe; and Fish-derived products  Social History:    Social History     Social History    Marital status:      Spouse name: N/A    Number of children: N/A    Years of education: N/A     Social History Main Topics    Smoking status: Former Smoker     Packs/day: 1.50     Years: 43.00     Types: Cigarettes     Quit date: 9/9/2018    Smokeless tobacco: Never Used    Alcohol use No    Drug use: No    Sexual activity: Not on file     Other Topics Concern    Not on file     Social History Narrative    No narrative on file      Family History:   Family History   Problem Relation Age of Onset    Diabetes Mother     Arthritis Mother     Atrial Fibrillation Mother     Diabetes Father     Atrial Fibrillation Father     Arthritis Father     Emphysema Father     Cancer Sister      REVIEW OF SYSTEMS:    Constitutional: Negative for chills, fever and weight loss. HENT: Negative for congestion, ear pain, hearing loss, sore throat and tinnitus. Eyes: Negative for blurred vision, double vision and discharge. Respiratory: Negative for cough, hemoptysis and shortness of breath. Cardiovascular: Negative for chest pain and palpitations. Gastrointestinal: Negative for heartburn, nausea and vomiting. Genitourinary: Negative for dysuria, frequency, hematuria and urgency. Musculoskeletal: as per [place]   Skin: Negative for rash. Neurological: Negative for dizziness, tingling, tremors, focal weakness, seizures and headaches.     BEHAVIOR/PSYCH: negative    PHYSICAL EXAM:    Vitals:    /64   Pulse 54   Temp 97.3 °F (36.3 °C) (Temporal)   Resp 13   Wt 212 lb (96.2 kg)   SpO2 98%   BMI 35.28 kg/m²   Patient Vitals for the past 24 hrs:   BP Temp Temp src Pulse Resp SpO2 Weight   11/10/18 0645 - - - - - - 212 lb (96.2 kg)   11/10/18 0600 122/64 - - 54 total) by mouth once daily.    blood sugar diagnostic Strp To check BG once daily, to use with insurance preferred meter    blood-glucose meter kit To check BG once daily, to use with insurance preferred meter (Patient not taking: Reported on 9/21/2020)    cyanocobalamin (VITAMIN B-12) 1000 MCG tablet Take 1 tablet by mouth.    diclofenac sodium (VOLTAREN) 1 % Gel Apply 2 g topically 4 (four) times daily. (Patient not taking: Reported on 3/28/2023)    epoetin dodie-epbx (RETACRIT) 10,000 unit/mL imjection Inject 0.8 mLs (8,000 Units total) into the skin every 7 days.    gabapentin (NEURONTIN) 100 MG capsule Take 1 capsule (100 mg total) by mouth 3 (three) times daily.    hydroCHLOROthiazide (HYDRODIURIL) 25 MG tablet TAKE ONE TABLET BY MOUTH DAILY FOR FLUID.    lactulose (CHRONULAC) 10 gram/15 mL solution Take 30 mLs (20 g total) by mouth once daily. For chronic constipation.    lancets Misc To check BG once daily, to use with insurance preferred meter    levocetirizine (XYZAL) 5 MG tablet Take 1 tablet (5 mg total) by mouth daily as needed for Allergies.    losartan (COZAAR) 50 MG tablet TAKE 1 TABLET BY MOUTH EVERY DAY FOR HIGH BLOOD PRESSURE    meclizine (ANTIVERT) 12.5 mg tablet Take 1 tablet by mouth three times a day as needed for dizziness (Patient not taking: Reported on 3/28/2023)    metoprolol succinate (TOPROL-XL) 25 MG 24 hr tablet TAKE 1 TABLET BY MOUTH DAILY FOR HIGH BLOOD PRESSURE    multivitamin (THERAGRAN) per tablet Take 1 tablet by mouth once daily.    naloxone (NARCAN) 4 mg/actuation Spry 4mg by nasal route as needed for opioid overdose; may repeat every 2-3 minutes in alternating nostrils until medical help arrives. Call 911 (Patient not taking: Reported on 3/28/2023)    ondansetron (ZOFRAN) 4 MG tablet Take 1 tablet (4 mg total) by mouth every 12 (twelve) hours as needed for Nausea. (Patient not taking: Reported on 3/28/2023)    traMADoL (ULTRAM) 50 mg tablet Take 1 tablet (50 mg total) by mouth  13 98 % -   11/10/18 0500 (!) 113/59 - - 59 13 98 % -   11/10/18 0400 117/69 97.3 °F (36.3 °C) Temporal 56 18 95 % -   11/10/18 0300 (!) 100/51 - - 56 27 94 % -   11/10/18 0200 (!) 97/52 - - 56 15 95 % -   11/10/18 0100 (!) 99/51 - - 62 18 97 % -   11/10/18 0000 120/60 97.5 °F (36.4 °C) Temporal 61 16 94 % -   11/09/18 2300 (!) 96/44 - - 63 18 92 % -   11/09/18 2200 122/63 - - 70 20 93 % -   11/09/18 2113 136/61 - - 72 13 98 % -   11/09/18 2100 (!) 128/59 - - 65 17 97 % -   11/09/18 2000 123/85 97.2 °F (36.2 °C) - 66 12 96 % -   11/09/18 1928 - - - - - - 208 lb 14.4 oz (94.8 kg)   11/09/18 1830 - - - 63 - - -   11/09/18 1815 115/62 98.2 °F (36.8 °C) Temporal 67 13 94 % -     CONSTITUTIONAL:  awake, alert, not in apparent acute distress. EYES:  No pallor or icterus. ENT:  Normocephalic, without obvious abnormality, atraumatic, tongue moist  NECK:  supple. JVD not appreciated  LUNGS:  No increased work of breathing, clear to auscultation bilaterally  CHEST: unremarkable  CARDIOVASCULAR:  S1 S2 regular rate and rhythm  ABDOMEN:  Normal bowel sounds, soft, non-tender, no masses palpable  MUSCULOSKELETAL:  Moves all extremeties equally bilaterally. NEUROLOGIC:  Awake, alert and oriented. No gross focal deficit noted.   SKIN:  unremarkable  EXTREMETIES: atraumatic, no cyanosis or edema  Rectal: deferred  Genitalia:  deferred  DATA:      Recent Labs      11/09/18   1356  11/10/18   0535   WBC  6.8  5.2   HGB  13.1  12.3   PLT  261  226     Recent Labs      11/09/18   1351  11/09/18   1356  11/10/18   0535   NA   --   140  143   K   --   4.4  4.4   CL   --   104  103   CO2   --   25  23   BUN   --   24*  28*   CREATININE   --   1.0  0.9   GLUCOSE  109  113*  94     Recent Labs      11/09/18   1356   AST  12   ALT  14   ALKPHOS  86   BILITOT  0.2     Recent Labs      11/09/18   1356   TROPONINI  <0.01       Recent Labs      11/09/18   1356   PROT  7.1   Ct Head Wo Contrast    Result Date: 11/9/2018  LOCATION: [facility] EXAM: every 12 (twelve) hours as needed for Pain.    walker Misc Use daily for assisted ambulation (Patient not taking: Reported on 3/28/2023)   Last reviewed on 3/28/2023 10:52 AM by Zac Andres MD    Review of patient's allergies indicates:  No Known Allergies Last reviewed on  3/28/2023 10:52 AM by Zac Andres      Tasks added this encounter   No tasks added.   Tasks due within next 3 months   5/29/2023 - Clinical - Follow Up Assesement (Annual)  3/29/2023 - Refill Call (Auto Added)     Elaina Andres, PharmD  [facility]  [address]  [facility] [address]  Phone: [phone]  Fax: [phone]

## 2023-04-03 ENCOUNTER — OFFICE VISIT (OUTPATIENT)
Dept: PAIN MANAGEMENT | Age: 63
End: 2023-04-03
Payer: MEDICARE

## 2023-04-03 VITALS
BODY MASS INDEX: 35.99 KG/M2 | SYSTOLIC BLOOD PRESSURE: 124 MMHG | DIASTOLIC BLOOD PRESSURE: 68 MMHG | HEART RATE: 69 BPM | RESPIRATION RATE: 18 BRPM | OXYGEN SATURATION: 98 % | WEIGHT: 216 LBS | TEMPERATURE: 96.9 F | HEIGHT: 65 IN

## 2023-04-03 DIAGNOSIS — M16.12 PRIMARY OSTEOARTHRITIS OF LEFT HIP: ICD-10-CM

## 2023-04-03 DIAGNOSIS — M54.16 LUMBAR RADICULITIS: Primary | ICD-10-CM

## 2023-04-03 DIAGNOSIS — M47.812 FACET ARTHROPATHY, CERVICAL: ICD-10-CM

## 2023-04-03 DIAGNOSIS — E66.01 SEVERE OBESITY (BMI 35.0-39.9) WITH COMORBIDITY (HCC): ICD-10-CM

## 2023-04-03 DIAGNOSIS — M50.30 DEGENERATION OF CERVICAL DISC WITHOUT MYELOPATHY: ICD-10-CM

## 2023-04-03 DIAGNOSIS — M51.9 LUMBAR DISC DISORDER: ICD-10-CM

## 2023-04-03 DIAGNOSIS — G89.4 CHRONIC PAIN SYNDROME: ICD-10-CM

## 2023-04-03 DIAGNOSIS — Z96.642 S/P TOTAL LEFT HIP ARTHROPLASTY: ICD-10-CM

## 2023-04-03 DIAGNOSIS — M47.816 LUMBAR FACET ARTHROPATHY: ICD-10-CM

## 2023-04-03 DIAGNOSIS — M16.11 ARTHRITIS OF RIGHT HIP: ICD-10-CM

## 2023-04-03 DIAGNOSIS — M47.816 LUMBAR SPONDYLOSIS: ICD-10-CM

## 2023-04-03 PROCEDURE — 3017F COLORECTAL CA SCREEN DOC REV: CPT | Performed by: PHYSICIAN ASSISTANT

## 2023-04-03 PROCEDURE — 1036F TOBACCO NON-USER: CPT | Performed by: PHYSICIAN ASSISTANT

## 2023-04-03 PROCEDURE — G8417 CALC BMI ABV UP PARAM F/U: HCPCS | Performed by: PHYSICIAN ASSISTANT

## 2023-04-03 PROCEDURE — 99213 OFFICE O/P EST LOW 20 MIN: CPT | Performed by: PHYSICIAN ASSISTANT

## 2023-04-03 PROCEDURE — 3074F SYST BP LT 130 MM HG: CPT | Performed by: PHYSICIAN ASSISTANT

## 2023-04-03 PROCEDURE — 3078F DIAST BP <80 MM HG: CPT | Performed by: PHYSICIAN ASSISTANT

## 2023-04-03 PROCEDURE — G8427 DOCREV CUR MEDS BY ELIG CLIN: HCPCS | Performed by: PHYSICIAN ASSISTANT

## 2023-04-03 RX ORDER — ACETAMINOPHEN AND CODEINE PHOSPHATE 60; 300 MG/1; MG/1
1 TABLET ORAL DAILY PRN
Qty: 30 TABLET | Refills: 1 | Status: SHIPPED | OUTPATIENT
Start: 2023-04-03 | End: 2023-05-03

## 2023-04-03 NOTE — PROGRESS NOTES
Anna Garcia presents to the [facility] on 4/3/2023. Wan Santos is complaining of pain low back. The pain is constant. The pain is described as aching and throbbing. Pain is rated on her best day at a 3, on her worst day at a 10, and on average at a 7 on the VAS scale. She took her last dose of Neurontin and Tylenol with codeine yesterday. Wan Santos does not have issues with constipation. Any procedures since your last visit: No  She is not on NSAIDS and  is  on anticoagulation medications to include ASA and is managed by Dr. Huseyin Cruz.     Pacemaker or defibrillator: No     Medication Contract and Consent for Opioid Use Documents Filed       Patient Documents       Type of Document Status Date Received Received By Description    Medication Contract Received 3/1/2019  1:43 PM DAYNA, [address] [place] [address] PT AGREEMENT 3/1/2019    Medication Contract Received 10/8/2020  1:21 PM DAYNA, [address] [place] [address] pain pt agreement    Medication Contract Received 10/7/2021 10:36 AM BOLIVAR TRUJILLO Pain Mgmt Patient Agreement 10. 7.2021    Medication Contract Received 9/26/2022 10:40 AM ENMA WAGNER MEDICATION CONTRACT                       /68   Pulse 69   Temp 96.9 °F (36.1 °C) (Infrared)   Resp 18   Ht 5' 5\" (1.651 m)   Wt 216 lb (98 kg)   SpO2 98%   BMI 35.94 kg/m²      No LMP recorded.  Patient is postmenopausal.
occlusion with cerebral infarction ([facility]     RT SIDE AFFECTED-LEARNED TO WALK AND WRITE AGAIN    Chronic deep vein thrombosis (DVT) of right peroneal vein ([facility] 1/12/2023    Degenerative arthritis of hand     Edentulous     Emphysema lung ([facility]     lung dr    Emphysema of lung ([facility]     Headache     Hiatal hernia     Hip problem     NEEDS HIP REPLACEMENT PER PATIENT    Hx of abnormal cervical Pap smear     Hypertension     Stroke (cerebrum) ([facility]     Subarachnoid bleed ([facility] 09/10/2018       Past Surgical History:   Procedure Laterality Date    BRAIN ANEURYSM SURGERY      coiling     COLONOSCOPY  08/30/2019    polyps--frank    COLONOSCOPY N/A 08/30/2019    COLONOSCOPY POLYPECTOMY SNARE/COLD BIOPSY performed by Jerrod Roberts MD at [address] [facility] N/A 12/9/2022    COLONOSCOPY POLYPECTOMY SNARE/COLD BIOPSY performed by Jerrod Roberts MD at [address] [facility]  12/09/2022    polyp/cecal mass; diverticula--frank    IR CAROTID STENT UNI W PROTECTION  3/1/2023    IR CAROTID STENT UNI W PROTECTION 3/1/2023 Shana Barrera MD [facility]    IR OCCLUSIVE OR EMBOL PERC CENTL NERV SYS  3/1/2023    IR OCCLUSIVE OR EMBOL PERC CENTL NERV SYS 3/1/2023 Shana Barrera MD [facility]    IR VASC EMBOLIZE OCCLUDE ARTERY  3/1/2023    IR VASC EMBOLIZE OCCLUDE ARTERY 3/1/2023 Shana Barrera MD [facility]    JOINT REPLACEMENT      total hip replacement left and right    LAPAROTOMY N/A 12/09/2022    LAPAROTOMY EXPLORATORY, ILIOCECECTOMY performed by Jerrod Roberts MD at [facility] [address] DONE  [facility] Right 12/13/2022    [address] kathy colored    TONSILLECTOMY      TOTAL HIP ARTHROPLASTY Left 02/22/2021    LEFT HIP TOTAL ARTHROPLASTY performed by Zheng Sweet MD at [address] [facility] Right 01/10/2022    RIGHT TOTAL HIP ARTHROPLASTY - STYKER performed by Selma Elizabeth
normal...

## 2023-04-04 ENCOUNTER — CARE COORDINATION (OUTPATIENT)
Dept: CARE COORDINATION | Age: 63
End: 2023-04-04

## 2023-04-04 NOTE — CARE COORDINATION
Left HIPAA compliant message for patient/caregiver to return call to [phone]. Re: Follow up:   CHF  COPD  Review RPM Metrics  Appointment Reminders  Review Plan of Care  Is pt taking Losartan 50mg daily per PCP Note?

## 2023-04-14 ENCOUNTER — OFFICE VISIT (OUTPATIENT)
Dept: NEUROLOGY | Age: 63
End: 2023-04-14
Payer: MEDICARE

## 2023-04-14 VITALS
SYSTOLIC BLOOD PRESSURE: 165 MMHG | DIASTOLIC BLOOD PRESSURE: 79 MMHG | TEMPERATURE: 76 F | OXYGEN SATURATION: 97 % | HEART RATE: 72 BPM

## 2023-04-14 DIAGNOSIS — I67.1 CEREBRAL ANEURYSM: Primary | ICD-10-CM

## 2023-04-14 DIAGNOSIS — I48.92 ATRIAL FLUTTER, UNSPECIFIED TYPE (HCC): ICD-10-CM

## 2023-04-14 DIAGNOSIS — Z86.79: ICD-10-CM

## 2023-04-14 PROCEDURE — 3017F COLORECTAL CA SCREEN DOC REV: CPT | Performed by: PSYCHIATRY & NEUROLOGY

## 2023-04-14 PROCEDURE — G8427 DOCREV CUR MEDS BY ELIG CLIN: HCPCS | Performed by: PSYCHIATRY & NEUROLOGY

## 2023-04-14 PROCEDURE — 3078F DIAST BP <80 MM HG: CPT | Performed by: PSYCHIATRY & NEUROLOGY

## 2023-04-14 PROCEDURE — 1036F TOBACCO NON-USER: CPT | Performed by: PSYCHIATRY & NEUROLOGY

## 2023-04-14 PROCEDURE — 3074F SYST BP LT 130 MM HG: CPT | Performed by: PSYCHIATRY & NEUROLOGY

## 2023-04-14 PROCEDURE — G8417 CALC BMI ABV UP PARAM F/U: HCPCS | Performed by: PSYCHIATRY & NEUROLOGY

## 2023-04-14 PROCEDURE — 99214 OFFICE O/P EST MOD 30 MIN: CPT | Performed by: PSYCHIATRY & NEUROLOGY

## 2023-04-17 NOTE — PLAN OF CARE
Problem: Pain:  Goal: Pain level will decrease  Pain level will decrease   Outcome: Met This Shift      Problem: Falls - Risk of:  Goal: Will remain free from falls  Will remain free from falls   Outcome: Met This Shift      Problem: Mobility - Impaired:  Goal: Able to ambulate independently  Able to ambulate independently   Outcome: Not Met This Shift  Assist x1 , pt ot ordered and will work with patient, walker ordered Resulted

## 2023-04-18 ENCOUNTER — CARE COORDINATION (OUTPATIENT)
Dept: CARE COORDINATION | Age: 63
End: 2023-04-18

## 2023-04-18 NOTE — CARE COORDINATION
Remote Alert Monitoring Note  Date/Time:  2023 10:27 AM  Patient Current Location: Home: [address] [address]  LPN contacted patient by telephone regarding yellow alert received for blood pressure reading (176/96). Verified patients name and  as identifiers. Background: HTN, CHF  Refer to 911 immediately if:  Patient unresponsive or unable to provide history  Change in cognition or sudden confusion  Patient unable to respond in complete sentences  Intense chest pain/tightness  Any concern for any clinical emergency  Red Alert: Provider response time of 1 hr required for any red alert requiring intervention  Yellow Alert: Provider response time of 3hr required for any escalated yellow alert  BP Triage  Are you having any Chest Pain? no   Are you having any Shortness of Breath? Baseline. Pt has a history of emphysema    Do you have a headache or have any vision changes? no   Are you having any numbness or tingling? no   Are you having any other health concerns or issues? no   Clinical Interventions: Reviewed and followed up on alerts and treatments-Pt asymptomatic. Denies CP, headache. States SOB is at baseline. Upon recheck /80 HR 87.     Plan/Follow Up: Will continue to review, monitor and address alerts with follow up based on severity of symptoms and risk factors.

## 2023-04-19 ENCOUNTER — HOSPITAL ENCOUNTER (OUTPATIENT)
Age: 63
Discharge: HOME | End: 2023-04-19
Payer: MEDICARE

## 2023-04-19 ENCOUNTER — OFFICE VISIT (OUTPATIENT)
Dept: PULMONOLOGY | Age: 63
End: 2023-04-19
Payer: MEDICARE

## 2023-04-19 VITALS
HEART RATE: 78 BPM | TEMPERATURE: 97 F | BODY MASS INDEX: 36.82 KG/M2 | HEIGHT: 65 IN | RESPIRATION RATE: 18 BRPM | DIASTOLIC BLOOD PRESSURE: 79 MMHG | OXYGEN SATURATION: 99 % | WEIGHT: 221 LBS | SYSTOLIC BLOOD PRESSURE: 141 MMHG

## 2023-04-19 DIAGNOSIS — J43.2 CENTRILOBULAR EMPHYSEMA (HCC): Primary | ICD-10-CM

## 2023-04-19 DIAGNOSIS — J44.1 COPD EXACERBATION (HCC): ICD-10-CM

## 2023-04-19 LAB
BASOPHILS # BLD: 0.03 E9/L (ref 0–0.2)
BASOPHILS NFR BLD: 0.4 % (ref 0–2)
EOSINOPHIL # BLD: 0.31 E9/L (ref 0.05–0.5)
EOSINOPHIL NFR BLD: 4.4 % (ref 0–6)
ERYTHROCYTE [DISTWIDTH] IN BLOOD BY AUTOMATED COUNT: 13.2 FL (ref 11.5–15)
HCT VFR BLD AUTO: 40.5 % (ref 34–48)
HGB BLD-MCNC: 12.5 G/DL (ref 11.5–15.5)
IMM GRANULOCYTES # BLD: 0.04 E9/L
IMM GRANULOCYTES NFR BLD: 0.6 % (ref 0–5)
LYMPHOCYTES # BLD: 1.56 E9/L (ref 1.5–4)
LYMPHOCYTES NFR BLD: 22.2 % (ref 20–42)
MCH RBC QN AUTO: 27.9 PG (ref 26–35)
MCHC RBC AUTO-ENTMCNC: 30.9 % (ref 32–34.5)
MCV RBC AUTO: 90.4 FL (ref 80–99.9)
MONOCYTES # BLD: 0.47 E9/L (ref 0.1–0.95)
MONOCYTES NFR BLD: 6.7 % (ref 2–12)
NEUTROPHILS # BLD: 4.61 E9/L (ref 1.8–7.3)
NEUTS SEG NFR BLD: 65.7 % (ref 43–80)
PLATELET # BLD AUTO: 279 E9/L (ref 130–450)
PMV BLD AUTO: 9.2 FL (ref 7–12)
PROCALCITONIN: 0.06 NG/ML (ref 0–0.08)
RBC # BLD AUTO: 4.48 E12/L (ref 3.5–5.5)
WBC # BLD: 7 E9/L (ref 4.5–11.5)

## 2023-04-19 PROCEDURE — 87206 SMEAR FLUORESCENT/ACID STAI: CPT

## 2023-04-19 PROCEDURE — G8427 DOCREV CUR MEDS BY ELIG CLIN: HCPCS | Performed by: INTERNAL MEDICINE

## 2023-04-19 PROCEDURE — 87070 CULTURE OTHR SPECIMN AEROBIC: CPT

## 2023-04-19 PROCEDURE — 85025 COMPLETE CBC W/AUTO DIFF WBC: CPT

## 2023-04-19 PROCEDURE — 3077F SYST BP >= 140 MM HG: CPT | Performed by: INTERNAL MEDICINE

## 2023-04-19 PROCEDURE — 3017F COLORECTAL CA SCREEN DOC REV: CPT | Performed by: INTERNAL MEDICINE

## 2023-04-19 PROCEDURE — 1036F TOBACCO NON-USER: CPT | Performed by: INTERNAL MEDICINE

## 2023-04-19 PROCEDURE — 3078F DIAST BP <80 MM HG: CPT | Performed by: INTERNAL MEDICINE

## 2023-04-19 PROCEDURE — 84145 PROCALCITONIN (PCT): CPT

## 2023-04-19 PROCEDURE — 36415 COLL VENOUS BLD VENIPUNCTURE: CPT

## 2023-04-19 PROCEDURE — 99215 OFFICE O/P EST HI 40 MIN: CPT | Performed by: INTERNAL MEDICINE

## 2023-04-19 PROCEDURE — G8417 CALC BMI ABV UP PARAM F/U: HCPCS | Performed by: INTERNAL MEDICINE

## 2023-04-19 PROCEDURE — 3023F SPIROM DOC REV: CPT | Performed by: INTERNAL MEDICINE

## 2023-04-19 RX ORDER — PREDNISONE 20 MG/1
20 TABLET ORAL DAILY
Qty: 5 TABLET | Refills: 0 | Status: SHIPPED | OUTPATIENT
Start: 2023-04-19 | End: 2023-04-24

## 2023-04-19 RX ORDER — IPRATROPIUM BROMIDE AND ALBUTEROL SULFATE 2.5; .5 MG/3ML; MG/3ML
1 SOLUTION RESPIRATORY (INHALATION) EVERY 6 HOURS PRN
Qty: 360 ML | Refills: 5 | Status: SHIPPED | OUTPATIENT
Start: 2023-04-19

## 2023-04-19 RX ORDER — FLUTICASONE FUROATE, UMECLIDINIUM BROMIDE AND VILANTEROL TRIFENATATE 100; 62.5; 25 UG/1; UG/1; UG/1
1 POWDER RESPIRATORY (INHALATION) DAILY
Qty: 1 EACH | Refills: 3 | Status: SHIPPED | OUTPATIENT
Start: 2023-04-19

## 2023-04-19 RX ORDER — AZITHROMYCIN 250 MG/1
250 TABLET, FILM COATED ORAL SEE ADMIN INSTRUCTIONS
Qty: 6 TABLET | Refills: 0 | Status: SHIPPED | OUTPATIENT
Start: 2023-04-19 | End: 2023-04-24

## 2023-04-19 NOTE — PROGRESS NOTES
3 mos follow up in office. Some shortness of breath at times but overall doing well. Changed inhaler to Trelegy from Anoro and sample given per orders today. Scripts for Proctor-Acevedo med and steroid taper sent to pharmacy. Also pt reports productive cough to provider during visit. Sputum Culture script and container given and advised pt to take to lab and additional lab orders scripts also given. Plan for O2 in home to be discontinued; office to determine DME provider and will send orders to DC along with new orders for nebulizer. Follow up appt in office in 6 mos; appt card given.
femur length = 49.9 cm. Right tibia length = 39.0 cm. Right total limb length = 88.9 cm. Left femur length = 49.2 cm. Left tibia length = 39.5 cm. Left total limb length = 88.7 cm. Total limb length discrepancy = 0.2 cm     XR HIP 2-3 VW W PELVIS RIGHT    Result Date: 10/5/2022  EXAMINATION: ONE XRAY VIEW OF THE PELVIS AND TWO XRAY VIEWS RIGHT HIP 10/5/2022 8:17 am COMPARISON: 13 July 2022 HISTORY: ORDERING SYSTEM PROVIDED HISTORY: H/O total hip arthroplasty, right TECHNOLOGIST PROVIDED HISTORY: What reading provider will be dictating this exam?->CRC FINDINGS: Anatomically aligned bilateral MERVAT. No abnormal bone or soft tissue findings. Bilateral MERVAT with no unexpected findings.

## 2023-04-21 LAB
BACTERIA SPEC RESP CULT: NORMAL
SMEAR, RESPIRATORY: NORMAL

## 2023-04-24 NOTE — PROGRESS NOTES
Neuro endovascular Surgery Consultation          Jonny Apple is a 58 y.o. woman with a past medical history of ruptured subarachnoid hemorrhage in 2018 secondary to 2 left ENRRIQUE aneurysms and multiple other aneurysms. However she had a complicated history and eventually became better. She is here for post op follow-up. Post VANE X treatment to treat previously coiled aneurysm    She is referred by Elkin Case MD to me to provide services as a Neuro endovascular specialist as her physician Dr. Jimmy Olivarez has retired.       Past Medical History:     Past Medical History:   Diagnosis Date    A-fib ([address]     Acute deep vein thrombosis (DVT) of right peroneal vein ([address] 10/14/2022    See note from pulmonology    Brain aneurysm 09/2018    H/O TIMES 2 THAT BURST PER PATIENT    Cerebral artery occlusion with cerebral infarction ([address]     RT SIDE AFFECTED-LEARNED TO WALK AND WRITE AGAIN    Chronic deep vein thrombosis (DVT) of right peroneal vein ([address] 1/12/2023    Degenerative arthritis of hand     Edentulous     Emphysema lung ([address]     lung dr    Emphysema of lung ([address]     Headache     Hiatal hernia     Hip problem     NEEDS HIP REPLACEMENT PER PATIENT    Hx of abnormal cervical Pap smear     Hypertension     Stroke (cerebrum) ([address]     Subarachnoid bleed ([address] 09/10/2018       Past Surgical History:     Past Surgical History:   Procedure Laterality Date    BRAIN ANEURYSM SURGERY      coiling     COLONOSCOPY  08/30/2019    polyps--frank    COLONOSCOPY N/A 08/30/2019    COLONOSCOPY POLYPECTOMY SNARE/COLD BIOPSY performed by Cong Miller MD at [address] [facility] [facility] N/A 12/9/2022    COLONOSCOPY POLYPECTOMY SNARE/COLD BIOPSY performed by Cong Miller MD at [address]  12/09/2022    polyp/cecal mass; diverticula--frank    IR CAROTID STENT W PROTECTION  3/1/2023    IR CAROTID STENT UNI W PROTECTION 3/1/2023 Maricruz White MD [facility]    IR OCCLUSIVE OR EMBOL

## 2023-04-25 ENCOUNTER — CARE COORDINATION (OUTPATIENT)
Dept: CARE COORDINATION | Age: 63
End: 2023-04-25

## 2023-04-25 NOTE — CARE COORDINATION
Left HIPAA compliant message for patient/caregiver to return call to [phone]. Re: Follow up:   CHF  COPD  Review RPM Metrics  Appointment Reminders  Review Plan of Care  Did pt change her Losartan dose?   Discuss Pulm 4/19: Changed Anoro to Trelegy Inhaler, PRN albuterol, prn Duoneb

## 2023-05-02 ENCOUNTER — CARE COORDINATION (OUTPATIENT)
Dept: CARE COORDINATION | Age: 63
End: 2023-05-02

## 2023-05-02 NOTE — CARE COORDINATION
Attempted to contact pt. No answer and mailbox full. Noted RPM LPN spoke with pt earlier  ACM called Re: Follow up:   CHF  COPD  Review RPM Metrics  Appointment Reminders  Review Plan of Care  Did pt change her Losartan dose?   Discuss Pulm 4/19: Changed Anoro to Trelegy Inhaler, PRN albuterol, prn Duoneb

## 2023-05-02 NOTE — CARE COORDINATION
Remote Patient Monitoring Note  Date/Time:  2023 10:29 AM  Patient Current Location: Home: [address] [place] [address]  LPN contacted patient by telephone regarding red alert received for weight increase (3 lbs x 1 day). Verified patients name and  as identifiers. Background: High Blood-Pressure, CHF  Clinical Interventions: Reviewed and followed up on alerts and treatments-Pt asymptomatic, denies SOB, dyspnea, new/increased edema. Pt states that the scale fell off a chair today when she was cleaning. Pt agreeable to continuing weights and reassess tomorrow. Plan/Follow Up: Will continue to review, monitor and address alerts with follow up based on severity of symptoms and risk factors.

## 2023-05-05 ENCOUNTER — CARE COORDINATION (OUTPATIENT)
Dept: CARE COORDINATION | Age: 63
End: 2023-05-05

## 2023-05-05 NOTE — CARE COORDINATION
Remote Alert Monitoring Note  Date/Time:  5/5/2023 9:08 AM  Patient Current Location: Home: [address] [place] [address]  LPN attempted to contact patient by telephone regarding red alert received for blood pressure reading (136/113). Background:  High Blood-Pressure, CHF  Clinical Interventions: Reviewed and followed up on alerts and treatments-LPN left HIPPA compliant message requesting return call. Plan/Follow Up: Will continue to review, monitor and address alerts with follow up based on severity of symptoms and risk factors.

## 2023-05-11 ENCOUNTER — TELEPHONE (OUTPATIENT)
Dept: FAMILY MEDICINE CLINIC | Age: 63
End: 2023-05-11

## 2023-05-11 NOTE — TELEPHONE ENCOUNTER
PT has been fluctuating around 2 lbs a day to then minus a lb or 2 overnight since last week. Doesn't feel like legs are swollen or anything. Please advise.

## 2023-05-11 NOTE — TELEPHONE ENCOUNTER
She does not use salt in anything she cooks and tries to stay away from it unless it's already in something.  No chest pain or SOB, does have emphysema but has been normal

## 2023-05-11 NOTE — TELEPHONE ENCOUNTER
Ask her to keep track of her salt / sodium intake for a week or so and see if it correlates.     If any shortness of breath, chest pain or difficulties breathing when lying flat please let me know asap

## 2023-05-11 NOTE — TELEPHONE ENCOUNTER
Patient has been weighing herself every evening and is gaining two pounds per night. She was told by her pharmacist to call her PCP for advice.

## 2023-05-12 ENCOUNTER — CARE COORDINATION (OUTPATIENT)
Dept: CARE COORDINATION | Age: 63
End: 2023-05-12

## 2023-05-12 VITALS
DIASTOLIC BLOOD PRESSURE: 81 MMHG | SYSTOLIC BLOOD PRESSURE: 148 MMHG | OXYGEN SATURATION: 98 % | HEART RATE: 65 BPM | WEIGHT: 221 LBS | BODY MASS INDEX: 36.78 KG/M2

## 2023-05-12 DIAGNOSIS — I10 HYPERTENSION, UNSPECIFIED TYPE: ICD-10-CM

## 2023-05-12 DIAGNOSIS — Z76.0 MEDICATION REFILL: ICD-10-CM

## 2023-05-12 DIAGNOSIS — I50.32 CHRONIC HEART FAILURE WITH PRESERVED EJECTION FRACTION (HCC): Primary | Chronic | ICD-10-CM

## 2023-05-12 DIAGNOSIS — J43.2 CENTRILOBULAR EMPHYSEMA (HCC): Chronic | ICD-10-CM

## 2023-05-12 DIAGNOSIS — I10 HYPERTENSION, UNSPECIFIED TYPE: Chronic | ICD-10-CM

## 2023-05-12 RX ORDER — CHLORTHALIDONE 25 MG/1
25 TABLET ORAL DAILY
Qty: 30 TABLET | Refills: 0 | Status: SHIPPED
Start: 2023-05-12 | End: 2023-06-30 | Stop reason: SDUPTHER

## 2023-05-12 ASSESSMENT — ENCOUNTER SYMPTOMS: DYSPNEA ASSOCIATED WITH: EXERTION

## 2023-05-12 NOTE — CARE COORDINATION
questions  Confidence: 10/10  Anticipated Goal Completion Date: 6/21/23         Medication Management   On track     I will take my medication as directed. I will notify my provider of any problems with medications, like adverse effects or side effects. I will notify my provider/Care Coordinator if I am unable to afford my medications. I will notify my provider for advice before I stop taking any of my medication.     Barriers: lack of education  Plan for overcoming my barriers: Speak with [facility] Pharmacist.  Ask questions and voice concerns to care team.  Notify care team if there are any changes  Confidence: 10/10  Anticipated Goal Completion Date: 6/21/23              Future Appointments   Date Time Provider Shabnam Cowani   5/19/2023 10:15 AM MD Jayne Mcbride [facility]   5/31/2023  9:30 AM SPENSER [facility]   6/9/2023 10:00 AM Kvng Adams MD [facility] -   8/18/2023  9:30 AM MD Jayne Mcbride [facility]   10/19/2023  9:00 AM Arjun Bajwa MD [facility]   ,   Congestive Heart Failure Assessment    Are you currently restricting fluids?: No Restriction  Do you understand a low sodium diet?: Yes  Do you understand how to read food labels?: Yes  How many restaurant meals do you eat per week?: 0  Do you salt your food before tasting it?: No     No patient-reported symptoms      Symptoms:  None: Yes      Symptom course: stable  Patient-reported weight (lb): 221  Weight trend: stable  Salt intake watch compared to last visit: stable     , and   COPD Assessment    Does the patient understand envrionmental exposure?: Yes  Is the patient able to verbalize Rescue vs. Long Acting medications?: Yes  Does the patient have a nebulizer?: No  Does the patient use a space with inhaled medications?: No     No patient-reported symptoms         Symptoms:  None: Yes      Symptom course: stable  Breathlessness: exertion  Increase use of rapid acting/rescue inhaled

## 2023-05-12 NOTE — TELEPHONE ENCOUNTER
Pt states she thought she wasn't suppose to be on it due to a different medication she is on, not sure of the name

## 2023-05-12 NOTE — PROGRESS NOTES
Remote Patient Order Discontinued    Received request from Care Management Team to discontinue order for remote patient monitoring and order completed. Thank you,     Tez Spencer M.D., M.P.H. [facility] Remote Patient Monitoring Provider   [address] [place] [address],[address]   Phone: [phone]  Fax: [phone]       --Jorge Garcia MD on 5/12/2023 at 4:46 PM    An electronic signature was used to authenticate this note.

## 2023-05-12 NOTE — PROGRESS NOTES
eRemote Patient Order Discontinued    Received request from Care Management Team to discontinue order for remote patient monitoring and order completed. Thank you,     Nidhi Webster M.D., M.P.H. [facility] Remote Patient Monitoring Provider   [address] [facility],[address]   Phone: [phone]  Fax: [phone]       --Marcin Richard MD on 5/12/2023 at 4:48 PM    An electronic signature was used to authenticate this note.

## 2023-05-19 ENCOUNTER — OFFICE VISIT (OUTPATIENT)
Dept: FAMILY MEDICINE CLINIC | Age: 63
End: 2023-05-19

## 2023-05-19 VITALS
DIASTOLIC BLOOD PRESSURE: 74 MMHG | HEART RATE: 62 BPM | WEIGHT: 224 LBS | SYSTOLIC BLOOD PRESSURE: 140 MMHG | HEIGHT: 65 IN | BODY MASS INDEX: 37.32 KG/M2

## 2023-05-19 DIAGNOSIS — G89.4 CHRONIC PAIN SYNDROME: ICD-10-CM

## 2023-05-19 DIAGNOSIS — M79.661 BILATERAL CALF PAIN: ICD-10-CM

## 2023-05-19 DIAGNOSIS — R25.2 LEG CRAMPS: ICD-10-CM

## 2023-05-19 DIAGNOSIS — R35.0 FREQUENT URINATION: ICD-10-CM

## 2023-05-19 DIAGNOSIS — Z76.0 MEDICATION REFILL: ICD-10-CM

## 2023-05-19 DIAGNOSIS — I10 PRIMARY HYPERTENSION: ICD-10-CM

## 2023-05-19 DIAGNOSIS — M79.662 BILATERAL CALF PAIN: ICD-10-CM

## 2023-05-19 DIAGNOSIS — I10 HYPERTENSION, UNSPECIFIED TYPE: Primary | ICD-10-CM

## 2023-05-19 DIAGNOSIS — Z86.718 HISTORY OF DVT (DEEP VEIN THROMBOSIS): ICD-10-CM

## 2023-05-19 DIAGNOSIS — R09.89 OTHER SPECIFIED SYMPTOMS AND SIGNS INVOLVING THE CIRCULATORY AND RESPIRATORY SYSTEMS: ICD-10-CM

## 2023-05-19 LAB
BILIRUB UR QL STRIP: NEGATIVE
BILIRUBIN, POC: NORMAL
BLOOD URINE, POC: NORMAL
CLARITY UR: CLEAR
CLARITY, POC: CLEAR
COLOR UR: YELLOW
COLOR, POC: YELLOW
GLUCOSE UR STRIP-MCNC: NEGATIVE MG/DL
GLUCOSE URINE, POC: NORMAL
HGB UR QL STRIP: NEGATIVE
KETONES UR STRIP-MCNC: NEGATIVE MG/DL
KETONES, POC: NORMAL
LEUKOCYTE EST, POC: NORMAL
LEUKOCYTE ESTERASE UR QL STRIP: NEGATIVE
NITRITE UR QL STRIP: NEGATIVE
NITRITE, POC: NORMAL
PH UR STRIP: 6.5 [PH] (ref 5–9)
PH, POC: 6.5
PROT UR STRIP-MCNC: NEGATIVE MG/DL
PROTEIN, POC: NORMAL
SP GR UR STRIP: <=1.005 (ref 1–1.03)
SPECIFIC GRAVITY, POC: 1.01
UROBILINOGEN UR STRIP-ACNC: 0.2 E.U./DL
UROBILINOGEN, POC: 0.2

## 2023-05-19 RX ORDER — METOPROLOL SUCCINATE 25 MG/1
25 TABLET, EXTENDED RELEASE ORAL DAILY
Qty: 30 TABLET | Refills: 2 | Status: SHIPPED | OUTPATIENT
Start: 2023-05-19

## 2023-05-19 RX ORDER — LOSARTAN POTASSIUM 100 MG/1
100 TABLET ORAL DAILY
Qty: 30 TABLET | Refills: 2 | Status: SHIPPED | OUTPATIENT
Start: 2023-05-19

## 2023-05-19 RX ORDER — ACETAMINOPHEN AND CODEINE PHOSPHATE 60; 300 MG/1; MG/1
TABLET ORAL
COMMUNITY
Start: 2023-05-13

## 2023-05-19 RX ORDER — AMLODIPINE BESYLATE 5 MG/1
TABLET ORAL
Qty: 30 TABLET | Refills: 3 | Status: SHIPPED | OUTPATIENT
Start: 2023-05-19

## 2023-05-19 RX ORDER — POTASSIUM CHLORIDE 750 MG/1
TABLET, EXTENDED RELEASE ORAL
Qty: 30 TABLET | Refills: 3 | Status: SHIPPED | OUTPATIENT
Start: 2023-05-19

## 2023-05-19 RX ORDER — GABAPENTIN 300 MG/1
CAPSULE ORAL
Qty: 90 CAPSULE | Refills: 3 | Status: SHIPPED | OUTPATIENT
Start: 2023-05-19 | End: 2023-10-26

## 2023-05-19 RX ORDER — PANTOPRAZOLE SODIUM 20 MG/1
TABLET, DELAYED RELEASE ORAL
Qty: 30 TABLET | Refills: 3 | Status: SHIPPED | OUTPATIENT
Start: 2023-05-19

## 2023-05-19 ASSESSMENT — ENCOUNTER SYMPTOMS
WHEEZING: 0
BACK PAIN: 1
CONSTIPATION: 0
SHORTNESS OF BREATH: 0
DIARRHEA: 0

## 2023-05-19 NOTE — PROGRESS NOTES
DAY 90 capsule 3    albuterol sulfate HFA (PROVENTIL;VENTOLIN;PROAIR) 108 (90 Base) MCG/ACT inhaler INHALE TWO PUFFS BY MOUTH EVERY 6 HOURS AS NEEDED FOR WHEEZING. 1 each 3    fluticasone (FLONASE) 50 MCG/ACT nasal spray 2 sprays by Each Nostril route in the morning. 1 each 3    polyethylene glycol (GLYCOLAX) 17 g packet DISSOLVE 1 PACKET (17 GRAMS) IN LIQUID AND TAKE BY MOUTH DAILY      Multiple Vitamins-Minerals (THERAPEUTIC MULTIVITAMIN-MINERALS) tablet Take 1 tablet by mouth daily      Misc. Devices (ROLLATOR) MISC 1 each by Does not apply route daily as needed (use as needed for stability) 1 each 0    Misc. Devices (WRIST BRACE) MISC Firm neutral position left wrist brace  Size to fit  Dx:  Wrist pain/carpal tunnel syndrome (Patient not taking: Reported on 4/14/2023) 1 each 0     No current facility-administered medications for this visit. Patient'spast medical, surgical, social and/or family history reviewed, updated in chart, and are non-contributory (unless otherwise stated). Review of Systems  Review of Systems   Constitutional:  Negative for chills, diaphoresis and fever. Eyes:  Negative for visual disturbance. Respiratory:  Negative for shortness of breath and wheezing. Cardiovascular:  Negative for chest pain. Gastrointestinal:  Negative for constipation and diarrhea. Musculoskeletal:  Positive for arthralgias and back pain. Cramps   Neurological:  Negative for dizziness. Psychiatric/Behavioral:  Negative for dysphoric mood. PE:  VS:  BP (!) 140/74   Pulse 62   Ht 5' 5\" (1.651 m)   Wt 224 lb (101.6 kg)   BMI 37.28 kg/m²   Physical Exam  Constitutional:       Appearance: She is well-developed. HENT:      Head: Normocephalic and atraumatic. Cardiovascular:      Rate and Rhythm: Normal rate and regular rhythm. Heart sounds: No murmur heard. No friction rub. No gallop.    Pulmonary:      Effort: Pulmonary effort is normal.      Breath sounds: Normal breath

## 2023-05-20 LAB
BACTERIA URNS QL MICRO: ABNORMAL /HPF
EPI CELLS #/AREA URNS HPF: ABNORMAL /HPF
RBC #/AREA URNS HPF: ABNORMAL /HPF (ref 0–2)
WBC #/AREA URNS HPF: ABNORMAL /HPF (ref 0–5)

## 2023-05-31 ENCOUNTER — OFFICE VISIT (OUTPATIENT)
Dept: PAIN MANAGEMENT | Age: 63
End: 2023-05-31
Payer: MEDICARE

## 2023-05-31 VITALS
HEIGHT: 65 IN | SYSTOLIC BLOOD PRESSURE: 118 MMHG | RESPIRATION RATE: 18 BRPM | HEART RATE: 76 BPM | OXYGEN SATURATION: 94 % | TEMPERATURE: 97.3 F | DIASTOLIC BLOOD PRESSURE: 70 MMHG | BODY MASS INDEX: 37.32 KG/M2 | WEIGHT: 224 LBS

## 2023-05-31 DIAGNOSIS — M16.12 PRIMARY OSTEOARTHRITIS OF LEFT HIP: ICD-10-CM

## 2023-05-31 DIAGNOSIS — G89.4 CHRONIC PAIN SYNDROME: ICD-10-CM

## 2023-05-31 DIAGNOSIS — M54.16 LUMBAR RADICULITIS: Primary | ICD-10-CM

## 2023-05-31 DIAGNOSIS — M16.11 ARTHRITIS OF RIGHT HIP: ICD-10-CM

## 2023-05-31 DIAGNOSIS — M47.816 LUMBAR FACET ARTHROPATHY: ICD-10-CM

## 2023-05-31 DIAGNOSIS — Z96.642 S/P TOTAL LEFT HIP ARTHROPLASTY: ICD-10-CM

## 2023-05-31 DIAGNOSIS — M50.30 DEGENERATION OF CERVICAL DISC WITHOUT MYELOPATHY: ICD-10-CM

## 2023-05-31 DIAGNOSIS — M47.812 FACET ARTHROPATHY, CERVICAL: ICD-10-CM

## 2023-05-31 DIAGNOSIS — M47.816 LUMBAR SPONDYLOSIS: ICD-10-CM

## 2023-05-31 DIAGNOSIS — M51.9 LUMBAR DISC DISORDER: ICD-10-CM

## 2023-05-31 DIAGNOSIS — E66.01 SEVERE OBESITY (BMI 35.0-39.9) WITH COMORBIDITY (HCC): ICD-10-CM

## 2023-05-31 PROCEDURE — G8417 CALC BMI ABV UP PARAM F/U: HCPCS | Performed by: PHYSICIAN ASSISTANT

## 2023-05-31 PROCEDURE — 3078F DIAST BP <80 MM HG: CPT | Performed by: PHYSICIAN ASSISTANT

## 2023-05-31 PROCEDURE — 3017F COLORECTAL CA SCREEN DOC REV: CPT | Performed by: PHYSICIAN ASSISTANT

## 2023-05-31 PROCEDURE — 3074F SYST BP LT 130 MM HG: CPT | Performed by: PHYSICIAN ASSISTANT

## 2023-05-31 PROCEDURE — G8427 DOCREV CUR MEDS BY ELIG CLIN: HCPCS | Performed by: PHYSICIAN ASSISTANT

## 2023-05-31 PROCEDURE — 99213 OFFICE O/P EST LOW 20 MIN: CPT | Performed by: PHYSICIAN ASSISTANT

## 2023-05-31 PROCEDURE — 1036F TOBACCO NON-USER: CPT | Performed by: PHYSICIAN ASSISTANT

## 2023-05-31 RX ORDER — ACETAMINOPHEN AND CODEINE PHOSPHATE 60; 300 MG/1; MG/1
1 TABLET ORAL DAILY PRN
Qty: 30 TABLET | Refills: 1 | Status: SHIPPED | OUTPATIENT
Start: 2023-06-13 | End: 2023-07-13

## 2023-05-31 NOTE — PROGRESS NOTES
Ju Jaquez presents to the [facility] on 5/31/2023. Mary Stapleton is complaining of pain in her neck and back. The pain is constant. The pain is described as aching, burning, and nagging. Pain is rated on her best day at a 4, on her worst day at a 10, and on average at a 5 on the VAS scale. She took her last dose of Neurontin and Tylenol with codeine today. Mary Stapleton does not have issues with constipation. Any procedures since your last visit: No,       She is  on NSAIDS and  is  on anticoagulation medications to include ASA and Brilinta is managed by Alexandru Patel MD       Pacemaker or defibrillator: No Physician managing device is NA. Medication Contract and Consent for Opioid Use Documents Filed       Patient Documents       Type of Document Status Date Received Received By Description    Medication Contract Received 3/1/2019  1:43 PM FRANCIS MCINTOSH PAIN MANAGEMENT PT AGREEMENT 3/1/2019    Medication Contract Received 10/8/2020  1:21 PM DAYNA [address] [place] [address] pain pt agreement    Medication Contract Received 10/7/2021 10:36 AM BOLIVAR TRUJILLO Pain Mgmt Patient Agreement 10. 7.2021    Medication Contract Received 9/26/2022 10:40 AM ENMA WAGNER MEDICATION CONTRACT                       Temp 97.3 °F (36.3 °C) (Infrared)   Resp 18   Ht 5' 5\" (1.651 m)   Wt 224 lb (101.6 kg)   BMI 37.28 kg/m²      No LMP recorded.  Patient is postmenopausal.
Atrial Fibrillation Mother     Diabetes Father     Atrial Fibrillation Father     Arthritis Father     Emphysema Father     Cancer Sister         \"female\"       REVIEW OF SYSTEMS:     Florentino Oliver denies fever/chills, chest pain, shortness of breath, new bowel or bladder complaints. All other review of systems was negative. PHYSICAL EXAMINATION:      /70   Pulse 76   Temp 97.3 °F (36.3 °C) (Infrared)   Resp 18   Ht 5' 5\" (1.651 m)   Wt 224 lb (101.6 kg)   SpO2 94%   BMI 37.28 kg/m²     General:      General appearance:   pleasant and well-hydrated. , in no discomfort and A & O x3  Build:Overweight  Function:Rises from a seated position with difficulty    HEENT:    Head:normocephalic and atraumatic  Pupils:regular, round and equal.  Sclera: icterus absent,    Lungs:    Breathing:Normal expansion. No wheezing. Abdomen:    Shape:non-distended and normal      Extremities:    Tremors:None bilaterally upper and lower  Range of motion:Generally normal shoulders, negative log roll on the left. Intact:Yes  Varicose veins:not assessed   Cyanosis:none  Edema:Normal    Neurological:    Gait: Not observed. Dermatology:    Skin:no unusual rashes, no skin lesions, no palpable subcutaneous nodules and good skin turgor    Impression:    Patient seen for follow up for her chronic bilateral hip pain and low back pain due to severe degenerative changes and axial c/o neck pain   Would benefit from Cervical MBB, patient uninterested   Patient is s/p:  Left MERVAT on 2/22/2021. Patient is s/p right MERVAT on 01/10/2022   Continue Gabapentin 300 mg TID per PCP  Continue Tylenol #4 QD prn with 1 RF. PT - not currently attending. Doing okay. Compounding cream. Helping a lot.   OARRS report reviewed   Patient encouraged to remain active as tolerated   Treatment plan discussed with the patient including medications and side effects     Controlled Substance Monitoring:    Acute and Chronic Pain Monitoring:   RX Monitoring

## 2023-06-02 ENCOUNTER — CARE COORDINATION (OUTPATIENT)
Dept: CARE COORDINATION | Age: 63
End: 2023-06-02

## 2023-06-02 ASSESSMENT — ENCOUNTER SYMPTOMS: DYSPNEA ASSOCIATED WITH: EXERTION

## 2023-06-02 NOTE — CARE COORDINATION
Ambulatory Care Coordination Note  2023    Patient Current Location:  Home: [address] [place] [id number]     ACM contacted the patient by telephone. Verified name and  with patient as identifiers. Provided introduction to self, and explanation of the ACM role. Challenges to be reviewed by the provider   Additional needs identified to be addressed with provider: No  none               Method of communication with provider: chart routing. ACM: [place] Moya RN    COPD:  Denies s/s of COPD Exacerbation. Discussed the Zone Tool and verbalizes understanding. CHF:  Denies s/s CHF Exacerbation. Discussed Zone Tool and verbalizes understanding. Today's weight: Pt not weighing self daily. Graduated RPM    Discussed Red Flag symptoms for which patient should seek emergent care. Patient verbalizes understanding. Denies falls/near falls    Pt aware of upcoming appts: Neurology, Labs, US of legs    Offered patient enrollment in the Remote Patient Monitoring (RPM) program for in-home monitoring:  Pt graduated from RPM .    FOLLOW-UP PLAN:    [address]:   -CHF Management/Zone Tool  -COPD Management/Zone Tool  -Falls/Near Falls? -OV AVS Reinforcement  -Appointment Reminders    Next Anticipated Outreach by [address] [place] [address] Team (or sooner if needed):  3 Weeks  Pt has ACM's contact information to reach out sooner if needed. Lab Results       None            Care Coordination Interventions    Referral from Primary Care Provider: No  Suggested Interventions and Community Resources  Fall Risk Prevention: In Process (Comment: Fall precautions, Oxygen safety)  Home Health Services: Declined  Medication Assistance Program: Declined  Medi Set or Pill Pack: Declined  Pharmacist: In Process (Comment: PHP Pharmacist)  Registered Dietician: Declined  Smoking Cessation: Completed  Social Work: Declined  Transportation Support: In Process  Zone Management Tools:  In

## 2023-06-07 ENCOUNTER — NURSE ONLY (OUTPATIENT)
Dept: FAMILY MEDICINE CLINIC | Age: 63
End: 2023-06-07
Payer: MEDICARE

## 2023-06-07 DIAGNOSIS — I10 PRIMARY HYPERTENSION: ICD-10-CM

## 2023-06-07 DIAGNOSIS — I10 HYPERTENSION, UNSPECIFIED TYPE: Primary | ICD-10-CM

## 2023-06-07 DIAGNOSIS — I10 HYPERTENSION, UNSPECIFIED TYPE: ICD-10-CM

## 2023-06-07 LAB
ALBUMIN SERPL-MCNC: 4.1 G/DL (ref 3.5–5.2)
ALP SERPL-CCNC: 100 U/L (ref 35–104)
ALT SERPL-CCNC: 12 U/L (ref 0–32)
ANION GAP SERPL CALCULATED.3IONS-SCNC: 13 MMOL/L (ref 7–16)
AST SERPL-CCNC: 15 U/L (ref 0–31)
BILIRUB SERPL-MCNC: 0.4 MG/DL (ref 0–1.2)
BUN SERPL-MCNC: 19 MG/DL (ref 6–23)
CALCIUM SERPL-MCNC: 9.5 MG/DL (ref 8.6–10.2)
CHLORIDE SERPL-SCNC: 109 MMOL/L (ref 98–107)
CHOLESTEROL, TOTAL: 119 MG/DL (ref 0–199)
CO2 SERPL-SCNC: 22 MMOL/L (ref 22–29)
CREAT SERPL-MCNC: 1 MG/DL (ref 0.5–1)
GLUCOSE SERPL-MCNC: 104 MG/DL (ref 74–99)
HDLC SERPL-MCNC: 74 MG/DL
LDLC SERPL CALC-MCNC: 31 MG/DL (ref 0–99)
POTASSIUM SERPL-SCNC: 4 MMOL/L (ref 3.5–5)
PROT SERPL-MCNC: 7 G/DL (ref 6.4–8.3)
SODIUM SERPL-SCNC: 144 MMOL/L (ref 132–146)
TRIGL SERPL-MCNC: 72 MG/DL (ref 0–149)
VLDLC SERPL CALC-MCNC: 14 MG/DL

## 2023-06-07 PROCEDURE — 36415 COLL VENOUS BLD VENIPUNCTURE: CPT | Performed by: FAMILY MEDICINE

## 2023-06-23 ENCOUNTER — CARE COORDINATION (OUTPATIENT)
Dept: CARE COORDINATION | Age: 63
End: 2023-06-23

## 2023-06-23 ASSESSMENT — ENCOUNTER SYMPTOMS: DYSPNEA ASSOCIATED WITH: EXERTION

## 2023-06-23 NOTE — CARE COORDINATION
COPD)          Goals Addressed                   This Visit's Progress     Conditions and Symptoms   On track     I will schedule office visits, as directed by my provider. I will keep my appointment or reschedule if I have to cancel. I will notify my provider of any barriers to my plan of care. I will follow my Zone Management tool to seek urgent or emergent care. I will notify my provider of any symptoms that indicate a worsening of my condition. Barriers: lack of education  Plan for overcoming my barriers: Notify care team if there are concerns or questions  Confidence: 10/10  Anticipated Goal Completion Date: 6/21/23         Medication Management   On track     I will take my medication as directed. I will notify my provider of any problems with medications, like adverse effects or side effects. I will notify my provider/Care Coordinator if I am unable to afford my medications. I will notify my provider for advice before I stop taking any of my medication.     Barriers: lack of education  Plan for overcoming my barriers: Speak with [facility] Pharmacist.  Ask questions and voice concerns to care team.  Notify care team if there are any changes  Confidence: 10/10  Anticipated Goal Completion Date: 6/21/23              Future Appointments   Date Time Provider Shabnam Moore   6/30/2023 10:00 AM [facility]   6/30/2023 10:30 AM [facility] US RM 1 [facility] US [facility]   6/30/2023 11:00 AM [facility] VASCULAR LAB RM 2 [facility]   7/28/2023  8:15 AM Nikkie Mace  [facility],  [address] Card [facility]   8[facility]4/2023 10:00 [facility]   8/18/2023  9:30 AM MD [facility]ne [facility]   8/28/2023  9:30 AM SPENSER [facility]   10/19/2023  9:00 AM [facility]   12/22/2023 11:15 AM Chico Guardado MD [address] Neurology -   ,   Congestive Heart Failure Assessment    Are you currently restricting fluids?: No Restriction  Do you

## 2023-06-27 ENCOUNTER — TELEPHONE (OUTPATIENT)
Dept: FAMILY MEDICINE CLINIC | Age: 63
End: 2023-06-27

## 2023-06-27 DIAGNOSIS — Z76.0 MEDICATION REFILL: ICD-10-CM

## 2023-06-27 DIAGNOSIS — I10 HYPERTENSION, UNSPECIFIED TYPE: ICD-10-CM

## 2023-06-30 ENCOUNTER — HOSPITAL ENCOUNTER (OUTPATIENT)
Dept: INTERVENTIONAL RADIOLOGY/VASCULAR | Age: 63
End: 2023-06-30
Payer: MEDICARE

## 2023-06-30 ENCOUNTER — HOSPITAL ENCOUNTER (OUTPATIENT)
Dept: ULTRASOUND IMAGING | Age: 63
End: 2023-06-30
Payer: MEDICARE

## 2023-06-30 DIAGNOSIS — M79.661 BILATERAL CALF PAIN: ICD-10-CM

## 2023-06-30 DIAGNOSIS — R09.89 OTHER SPECIFIED SYMPTOMS AND SIGNS INVOLVING THE CIRCULATORY AND RESPIRATORY SYSTEMS: ICD-10-CM

## 2023-06-30 DIAGNOSIS — M79.662 BILATERAL CALF PAIN: ICD-10-CM

## 2023-06-30 DIAGNOSIS — Z86.718 HISTORY OF DVT (DEEP VEIN THROMBOSIS): ICD-10-CM

## 2023-06-30 DIAGNOSIS — R25.2 LEG CRAMPS: ICD-10-CM

## 2023-06-30 PROCEDURE — 93922 UPR/L XTREMITY ART 2 LEVELS: CPT

## 2023-06-30 PROCEDURE — 93970 EXTREMITY STUDY: CPT

## 2023-06-30 RX ORDER — CHLORTHALIDONE 25 MG/1
25 TABLET ORAL DAILY
Qty: 30 TABLET | Refills: 2 | Status: SHIPPED | OUTPATIENT
Start: 2023-06-30

## 2023-06-30 RX ORDER — DOXYCYCLINE HYCLATE 100 MG
100 TABLET ORAL 2 TIMES DAILY
Qty: 10 TABLET | Refills: 0 | Status: SHIPPED | OUTPATIENT
Start: 2023-06-30 | End: 2023-07-05

## 2023-06-30 RX ORDER — PREDNISONE 20 MG/1
40 TABLET ORAL DAILY
Qty: 10 TABLET | Refills: 0 | Status: SHIPPED | OUTPATIENT
Start: 2023-06-30 | End: 2023-07-05

## 2023-07-05 ENCOUNTER — TELEPHONE (OUTPATIENT)
Dept: FAMILY MEDICINE CLINIC | Age: 63
End: 2023-07-05

## 2023-07-05 ENCOUNTER — CLINICAL DOCUMENTATION (OUTPATIENT)
Dept: VASCULAR SURGERY | Age: 63
End: 2023-07-05

## 2023-07-05 NOTE — TELEPHONE ENCOUNTER
Called and spoke with patient. Note doppler results    She reports all her symptoms are on her left leg. Claudication symptoms - improves with rest, aggravated with movement. She has h/o DVT on right - was followed by vascular. Asked her to schedule with vascular - patient to call office. She just had a death in her family so cancelled f/u visit this Friday.

## 2023-07-05 NOTE — PROGRESS NOTES
The vascular lab testing that was ordered by her PCP were reviewed    1. Ankle-brachial index, normal with good arterial flow to both feet    2.   Venous ultrasound study of the right leg was reviewed, chronic nonocclusive changes involving the peroneal vein, I see no evidence of acute deep vein thrombosis, no changes noted from the past ultrasound studies

## 2023-07-14 ENCOUNTER — OFFICE VISIT (OUTPATIENT)
Dept: FAMILY MEDICINE CLINIC | Age: 63
End: 2023-07-14
Payer: MEDICARE

## 2023-07-14 VITALS
BODY MASS INDEX: 36.49 KG/M2 | OXYGEN SATURATION: 98 % | WEIGHT: 219 LBS | DIASTOLIC BLOOD PRESSURE: 62 MMHG | SYSTOLIC BLOOD PRESSURE: 112 MMHG | HEART RATE: 67 BPM | HEIGHT: 65 IN | RESPIRATION RATE: 18 BRPM

## 2023-07-14 DIAGNOSIS — J30.1 SEASONAL ALLERGIC RHINITIS DUE TO POLLEN: Primary | ICD-10-CM

## 2023-07-14 PROCEDURE — 3017F COLORECTAL CA SCREEN DOC REV: CPT | Performed by: STUDENT IN AN ORGANIZED HEALTH CARE EDUCATION/TRAINING PROGRAM

## 2023-07-14 PROCEDURE — G8427 DOCREV CUR MEDS BY ELIG CLIN: HCPCS | Performed by: STUDENT IN AN ORGANIZED HEALTH CARE EDUCATION/TRAINING PROGRAM

## 2023-07-14 PROCEDURE — 3074F SYST BP LT 130 MM HG: CPT | Performed by: STUDENT IN AN ORGANIZED HEALTH CARE EDUCATION/TRAINING PROGRAM

## 2023-07-14 PROCEDURE — G8417 CALC BMI ABV UP PARAM F/U: HCPCS | Performed by: STUDENT IN AN ORGANIZED HEALTH CARE EDUCATION/TRAINING PROGRAM

## 2023-07-14 PROCEDURE — 3078F DIAST BP <80 MM HG: CPT | Performed by: STUDENT IN AN ORGANIZED HEALTH CARE EDUCATION/TRAINING PROGRAM

## 2023-07-14 PROCEDURE — 99214 OFFICE O/P EST MOD 30 MIN: CPT | Performed by: STUDENT IN AN ORGANIZED HEALTH CARE EDUCATION/TRAINING PROGRAM

## 2023-07-14 PROCEDURE — 1036F TOBACCO NON-USER: CPT | Performed by: STUDENT IN AN ORGANIZED HEALTH CARE EDUCATION/TRAINING PROGRAM

## 2023-07-14 RX ORDER — MOMETASONE FUROATE 50 UG/1
2 SPRAY, METERED NASAL DAILY
Qty: 1 EACH | Refills: 1 | Status: SHIPPED | OUTPATIENT
Start: 2023-07-14

## 2023-07-14 ASSESSMENT — ENCOUNTER SYMPTOMS
SORE THROAT: 0
WHEEZING: 0
DIARRHEA: 1
CONSTIPATION: 1
CHEST TIGHTNESS: 1
BLOOD IN STOOL: 0
COUGH: 1
ABDOMINAL PAIN: 1
NAUSEA: 0
SHORTNESS OF BREATH: 1

## 2023-07-20 ENCOUNTER — OFFICE VISIT (OUTPATIENT)
Dept: VASCULAR SURGERY | Age: 63
End: 2023-07-20
Payer: MEDICARE

## 2023-07-20 VITALS — BODY MASS INDEX: 38.15 KG/M2 | HEIGHT: 65 IN | WEIGHT: 229 LBS

## 2023-07-20 DIAGNOSIS — I82.551 CHRONIC DEEP VEIN THROMBOSIS (DVT) OF RIGHT PERONEAL VEIN (HCC): Primary | ICD-10-CM

## 2023-07-20 DIAGNOSIS — I78.1 SPIDER VEINS: ICD-10-CM

## 2023-07-20 DIAGNOSIS — I83.813 VARICOSE VEINS OF BOTH LOWER EXTREMITIES WITH PAIN: ICD-10-CM

## 2023-07-20 PROCEDURE — 3017F COLORECTAL CA SCREEN DOC REV: CPT | Performed by: SURGERY

## 2023-07-20 PROCEDURE — 99214 OFFICE O/P EST MOD 30 MIN: CPT | Performed by: SURGERY

## 2023-07-20 PROCEDURE — G8427 DOCREV CUR MEDS BY ELIG CLIN: HCPCS | Performed by: SURGERY

## 2023-07-20 PROCEDURE — G8417 CALC BMI ABV UP PARAM F/U: HCPCS | Performed by: SURGERY

## 2023-07-20 PROCEDURE — 1036F TOBACCO NON-USER: CPT | Performed by: SURGERY

## 2023-07-20 NOTE — PROGRESS NOTES
Chief Complaint:   Chief Complaint   Patient presents with    Circulatory Problem     Bilateral lower extremities pain         HPI: Patient came to the office with her son Mohit Simon, for the evaluation of vascular status of the legs, tells me she is constant aching of both legs, below the knee mainly along the calf, left more than the right, no ago degenerative disc disease, also mild to moderate varicose veins with spider veins, with past history of right calf and thrombosis of the peroneal vein many years ago, cannot be anticoagulated, and the patient is concerned because of her intracranial aneurysms, recently because of her symptoms underwent an ankle-brachial index and a venous ultrasound, which was reportedly abnormal on the right side and patient referred by her PCP for further evaluation    Patient also recently underwent a cerebral angiogram and angioplasty and stenting and coiling etc. of the intracranial aneurysms at [facility]      Patient denies any focal lateralizing neurological symptoms like loss of speech, vision or loss of function of extremity    Patient can walk 1 block comfortably with help of a cane, and denies any symptoms of rest pain    Allergies   Allergen Reactions    Latex Hives     Hives and rash    Iodine Rash     Hives . pt.  States anaphalyxis    Shellfish-Derived Products Anaphylaxis and Hives    Aloe Hives     Hives     Celebrex [Celecoxib] Itching    Fish-Derived Products Other (See Comments)       Current Outpatient Medications   Medication Sig Dispense Refill    Elastic Bandages & Supports (JOBST KNEE HIGH COMPRESSION ) MISC Knee high with 20- 30 mmhg of compression 1 each 10    mometasone (NASONEX) 50 MCG/ACT nasal spray 2 sprays by Nasal route daily 1 each 1    chlorthalidone (HYGROTON) 25 MG tablet Take 1 tablet by mouth daily 30 tablet 2    Biotin 87947 MCG TABS Take 10,000 mcg by mouth every other day 30 tablet 2    ticagrelor (BRILINTA) 90 MG TABS tablet Take 1

## 2023-07-28 ENCOUNTER — CARE COORDINATION (OUTPATIENT)
Dept: CARE COORDINATION | Age: 63
End: 2023-07-28

## 2023-07-28 ENCOUNTER — OFFICE VISIT (OUTPATIENT)
Dept: CARDIOLOGY CLINIC | Age: 63
End: 2023-07-28
Payer: MEDICARE

## 2023-07-28 VITALS
WEIGHT: 222 LBS | BODY MASS INDEX: 36.99 KG/M2 | DIASTOLIC BLOOD PRESSURE: 70 MMHG | HEIGHT: 65 IN | RESPIRATION RATE: 12 BRPM | HEART RATE: 56 BPM | SYSTOLIC BLOOD PRESSURE: 130 MMHG

## 2023-07-28 DIAGNOSIS — I48.91 ATRIAL FIBRILLATION WITH RVR (HCC): Primary | ICD-10-CM

## 2023-07-28 PROCEDURE — 3074F SYST BP LT 130 MM HG: CPT | Performed by: INTERNAL MEDICINE

## 2023-07-28 PROCEDURE — G8417 CALC BMI ABV UP PARAM F/U: HCPCS | Performed by: INTERNAL MEDICINE

## 2023-07-28 PROCEDURE — 3078F DIAST BP <80 MM HG: CPT | Performed by: INTERNAL MEDICINE

## 2023-07-28 PROCEDURE — 99214 OFFICE O/P EST MOD 30 MIN: CPT | Performed by: INTERNAL MEDICINE

## 2023-07-28 PROCEDURE — 3017F COLORECTAL CA SCREEN DOC REV: CPT | Performed by: INTERNAL MEDICINE

## 2023-07-28 PROCEDURE — 93000 ELECTROCARDIOGRAM COMPLETE: CPT | Performed by: INTERNAL MEDICINE

## 2023-07-28 PROCEDURE — 1036F TOBACCO NON-USER: CPT | Performed by: INTERNAL MEDICINE

## 2023-07-28 PROCEDURE — G8427 DOCREV CUR MEDS BY ELIG CLIN: HCPCS | Performed by: INTERNAL MEDICINE

## 2023-07-28 NOTE — CARE COORDINATION
Unable to leave HIPAA compliant message for patient/caregiver to return call to [phone].     Re: Follow up:   CHF  COPD  Review AVS from appointment  Appointment Reminders

## 2023-07-28 NOTE — PROGRESS NOTES
[facility] Cardiology Progress Note    Patient is a 58 y.o. female of Jana Giron MD seen in follow up. Chief complaint: PAfib    HPI: No CP or SOB. Patient Active Problem List   Diagnosis    Obesity (BMI 30-39. 9)    Hypertension    Chronic pain syndrome    Lumbar facet arthropathy    Lumbar disc disorder    Primary osteoarthritis of both hips    Arthritis of right hip    Dysphagia    Heartburn    At risk for colon cancer    Colon polyps    Degenerative disc disease, cervical    Arthropathy of cervical facet joint    Abnormal blood sugar    Arthritis of both hips    COVID-19    H/O total hip arthroplasty, right    History of total left hip arthroplasty    Primary osteoarthritis of right hip    Pulmonary emphysema (HCC)    History of herpes genitalis    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    History of COVID-19    Aneurysm (HCC)    S/P total right hip arthroplasty    Cognitive impairment    Personal history of colonic polyps    Abnormal diffusion capacity determined by pulmonary function test    BELCHER (dyspnea on exertion)    Atrial flutter (HCC)    Atrial fibrillation with RVR (HCC)    LFT elevation    (HFpEF) heart failure with preserved ejection fraction (HCC)    Chronic deep vein thrombosis (DVT) of right peroneal vein (HCC)    Cerebral aneurysm    History of carotid artery stenosis    Varicose veins of both lower extremities with pain    Spider veins       Allergies   Allergen Reactions    Latex Hives     Hives and rash    Iodine Rash     Hives . pt.  States anaphalyxis    Shellfish-Derived Products Anaphylaxis and Hives    Aloe Hives     Hives     Celebrex [Celecoxib] Itching    Fish-Derived Products Other (See Comments)       Current Outpatient Medications   Medication Sig Dispense Refill    Elastic Bandages & Supports (JOBST KNEE HIGH COMPRESSION SM) MISC Knee high with 20- 30 mmhg of compression 1 each 10    chlorthalidone (HYGROTON) 25 MG tablet Take 1

## 2023-08-04 ENCOUNTER — CARE COORDINATION (OUTPATIENT)
Dept: CARE COORDINATION | Age: 63
End: 2023-08-04

## 2023-08-04 ASSESSMENT — ENCOUNTER SYMPTOMS: DYSPNEA ASSOCIATED WITH: EXERTION

## 2023-08-04 NOTE — CARE COORDINATION
patient have a nebulizer?: No  Does the patient use a space with inhaled medications?: No     No patient-reported symptoms         Symptoms:  None: Yes      Symptom course: stable  Breathlessness: exertion  Increase use of rapid acting/rescue inhaled medications?: No  Change in chronic cough?: No/At Baseline  Change in sputum?: No/At Baseline  Self Monitoring - SaO2: No  Have you had a recent diagnosis of pneumonia either by PCP or at a hospital?: No

## 2023-08-18 ENCOUNTER — TELEPHONE (OUTPATIENT)
Dept: FAMILY MEDICINE CLINIC | Age: 63
End: 2023-08-18

## 2023-08-18 ENCOUNTER — HOSPITAL ENCOUNTER (OUTPATIENT)
Age: 63
End: 2023-08-18
Payer: MEDICARE

## 2023-08-18 ENCOUNTER — HOSPITAL ENCOUNTER (OUTPATIENT)
Dept: GENERAL RADIOLOGY | Age: 63
End: 2023-08-18
Payer: MEDICARE

## 2023-08-18 ENCOUNTER — OFFICE VISIT (OUTPATIENT)
Dept: FAMILY MEDICINE CLINIC | Age: 63
End: 2023-08-18

## 2023-08-18 ENCOUNTER — HOSPITAL ENCOUNTER (OUTPATIENT)
Age: 63
Discharge: HOME | End: 2023-08-18
Payer: MEDICARE

## 2023-08-18 VITALS
OXYGEN SATURATION: 95 % | DIASTOLIC BLOOD PRESSURE: 72 MMHG | RESPIRATION RATE: 16 BRPM | HEIGHT: 65 IN | SYSTOLIC BLOOD PRESSURE: 136 MMHG | BODY MASS INDEX: 37.32 KG/M2 | HEART RATE: 75 BPM | WEIGHT: 224 LBS

## 2023-08-18 DIAGNOSIS — I10 HYPERTENSION, UNSPECIFIED TYPE: ICD-10-CM

## 2023-08-18 DIAGNOSIS — R35.0 FREQUENT URINATION: ICD-10-CM

## 2023-08-18 DIAGNOSIS — M47.816 OSTEOARTHRITIS OF LUMBAR SPINE, UNSPECIFIED SPINAL OSTEOARTHRITIS COMPLICATION STATUS: ICD-10-CM

## 2023-08-18 DIAGNOSIS — R07.89 CHEST HEAVINESS: ICD-10-CM

## 2023-08-18 DIAGNOSIS — G89.4 CHRONIC PAIN SYNDROME: ICD-10-CM

## 2023-08-18 DIAGNOSIS — M54.12 CERVICAL RADICULOPATHY: ICD-10-CM

## 2023-08-18 DIAGNOSIS — Z76.0 MEDICATION REFILL: ICD-10-CM

## 2023-08-18 DIAGNOSIS — Z00.00 MEDICARE ANNUAL WELLNESS VISIT, SUBSEQUENT: Primary | ICD-10-CM

## 2023-08-18 LAB
ALBUMIN SERPL-MCNC: 4.5 G/DL (ref 3.5–5.2)
ALP SERPL-CCNC: 94 U/L (ref 35–104)
ALT SERPL-CCNC: 17 U/L (ref 0–32)
ANION GAP SERPL CALCULATED.3IONS-SCNC: 12 MMOL/L (ref 7–16)
AST SERPL-CCNC: 15 U/L (ref 0–31)
BACTERIA: ABNORMAL
BASOPHILS # BLD: 0.04 K/UL (ref 0–0.2)
BASOPHILS NFR BLD: 1 % (ref 0–2)
BILIRUB SERPL-MCNC: 0.4 MG/DL (ref 0–1.2)
BILIRUBIN URINE: NEGATIVE
BILIRUBIN, POC: NORMAL
BLOOD URINE, POC: NORMAL
BUN SERPL-MCNC: 23 MG/DL (ref 6–23)
CALCIUM SERPL-MCNC: 9.8 MG/DL (ref 8.6–10.2)
CHLORIDE SERPL-SCNC: 104 MMOL/L (ref 98–107)
CLARITY, POC: CLEAR
CO2 SERPL-SCNC: 25 MMOL/L (ref 22–29)
COLOR, POC: YELLOW
COLOR: YELLOW
CREAT SERPL-MCNC: 1.1 MG/DL (ref 0.5–1)
D DIMER: 299 NG/ML DDU (ref 0–232)
EOSINOPHIL # BLD: 0.19 K/UL (ref 0.05–0.5)
EOSINOPHILS RELATIVE PERCENT: 3 % (ref 0–6)
EPITHELIAL CELLS UA: ABNORMAL /HPF
ERYTHROCYTE [DISTWIDTH] IN BLOOD BY AUTOMATED COUNT: 14.4 % (ref 11.5–15)
GFR SERPL CREATININE-BSD FRML MDRD: 54 ML/MIN/1.73M2
GLUCOSE SERPL-MCNC: 114 MG/DL (ref 74–99)
GLUCOSE URINE, POC: NORMAL
GLUCOSE URINE: NEGATIVE MG/DL
HCT VFR BLD AUTO: 40.2 % (ref 34–48)
HGB BLD-MCNC: 12.8 G/DL (ref 11.5–15.5)
IMM GRANULOCYTES # BLD AUTO: 0.03 K/UL (ref 0–0.58)
IMM GRANULOCYTES NFR BLD: 1 % (ref 0–5)
KETONES, POC: NORMAL
KETONES, URINE: NEGATIVE MG/DL
LEUKOCYTE EST, POC: NORMAL
LEUKOCYTE ESTERASE, URINE: NEGATIVE
LYMPHOCYTES NFR BLD: 1.65 K/UL (ref 1.5–4)
LYMPHOCYTES RELATIVE PERCENT: 25 % (ref 20–42)
MCH RBC QN AUTO: 28.8 PG (ref 26–35)
MCHC RBC AUTO-ENTMCNC: 31.8 G/DL (ref 32–34.5)
MCV RBC AUTO: 90.3 FL (ref 80–99.9)
MONOCYTES NFR BLD: 0.35 K/UL (ref 0.1–0.95)
MONOCYTES NFR BLD: 5 % (ref 2–12)
NEUTROPHILS NFR BLD: 65 % (ref 43–80)
NEUTS SEG NFR BLD: 4.23 K/UL (ref 1.8–7.3)
NITRITE, POC: NORMAL
NITRITE, URINE: NEGATIVE
PH UA: 6 (ref 5–9)
PH, POC: 6
PLATELET # BLD AUTO: 281 K/UL (ref 130–450)
PMV BLD AUTO: 9.2 FL (ref 7–12)
POTASSIUM SERPL-SCNC: 3.6 MMOL/L (ref 3.5–5)
PROT SERPL-MCNC: 7.3 G/DL (ref 6.4–8.3)
PROTEIN UA: NEGATIVE MG/DL
PROTEIN, POC: NORMAL
RBC # BLD AUTO: 4.45 M/UL (ref 3.5–5.5)
RBC UA: ABNORMAL /HPF
SODIUM SERPL-SCNC: 141 MMOL/L (ref 132–146)
SPECIFIC GRAVITY UA: <1.005 (ref 1–1.03)
SPECIFIC GRAVITY, POC: 1.01
TURBIDITY: CLEAR
URINE HGB: ABNORMAL
UROBILINOGEN, POC: 0.2
UROBILINOGEN, URINE: 0.2 EU/DL (ref 0–1)
WBC OTHER # BLD: 6.5 K/UL (ref 4.5–11.5)
WBC UA: ABNORMAL /HPF

## 2023-08-18 PROCEDURE — 36415 COLL VENOUS BLD VENIPUNCTURE: CPT

## 2023-08-18 PROCEDURE — 72050 X-RAY EXAM NECK SPINE 4/5VWS: CPT

## 2023-08-18 PROCEDURE — 85379 FIBRIN DEGRADATION QUANT: CPT

## 2023-08-18 PROCEDURE — 80053 COMPREHEN METABOLIC PANEL: CPT

## 2023-08-18 PROCEDURE — 85025 COMPLETE CBC W/AUTO DIFF WBC: CPT

## 2023-08-18 RX ORDER — AMLODIPINE BESYLATE 5 MG/1
TABLET ORAL
Qty: 30 TABLET | Refills: 3 | Status: SHIPPED | OUTPATIENT
Start: 2023-08-18

## 2023-08-18 RX ORDER — LOSARTAN POTASSIUM 100 MG/1
100 TABLET ORAL DAILY
Qty: 30 TABLET | Refills: 3 | Status: SHIPPED | OUTPATIENT
Start: 2023-08-18

## 2023-08-18 RX ORDER — GABAPENTIN 300 MG/1
CAPSULE ORAL
Qty: 90 CAPSULE | Refills: 3 | Status: SHIPPED | OUTPATIENT
Start: 2023-08-18 | End: 2024-01-25

## 2023-08-18 RX ORDER — PANTOPRAZOLE SODIUM 20 MG/1
TABLET, DELAYED RELEASE ORAL
Qty: 30 TABLET | Refills: 3 | Status: SHIPPED | OUTPATIENT
Start: 2023-08-18

## 2023-08-18 RX ORDER — POTASSIUM CHLORIDE 750 MG/1
TABLET, EXTENDED RELEASE ORAL
Qty: 30 TABLET | Refills: 3 | Status: SHIPPED | OUTPATIENT
Start: 2023-08-18

## 2023-08-18 RX ORDER — CHLORTHALIDONE 25 MG/1
25 TABLET ORAL DAILY
Qty: 30 TABLET | Refills: 3 | Status: SHIPPED | OUTPATIENT
Start: 2023-08-18

## 2023-08-18 RX ORDER — METOPROLOL SUCCINATE 25 MG/1
25 TABLET, EXTENDED RELEASE ORAL DAILY
Qty: 30 TABLET | Refills: 3 | Status: SHIPPED | OUTPATIENT
Start: 2023-08-18

## 2023-08-18 RX ORDER — MOMETASONE FUROATE 50 UG/1
SPRAY, METERED NASAL
COMMUNITY
Start: 2023-08-10

## 2023-08-18 ASSESSMENT — PATIENT HEALTH QUESTIONNAIRE - PHQ9
SUM OF ALL RESPONSES TO PHQ9 QUESTIONS 1 & 2: 0
SUM OF ALL RESPONSES TO PHQ QUESTIONS 1-9: 0
2. FEELING DOWN, DEPRESSED OR HOPELESS: 0
SUM OF ALL RESPONSES TO PHQ QUESTIONS 1-9: 0
SUM OF ALL RESPONSES TO PHQ QUESTIONS 1-9: 0
1. LITTLE INTEREST OR PLEASURE IN DOING THINGS: 0
SUM OF ALL RESPONSES TO PHQ QUESTIONS 1-9: 0

## 2023-08-18 NOTE — TELEPHONE ENCOUNTER
Please call patient and let her know d dimer was abnormal  Would recommend ED visit to evaluate for PE

## 2023-08-18 NOTE — TELEPHONE ENCOUNTER
Patient informed D-Dimer came back abnormal and that she should go to the ED for evaluation for possible blood clot. Patient states she is unable to go today as she has her grandchildren to watch. I stressed the importance of a work up and potential problems this could indicate. Patient verbalized understanding and stated she would go as soon as she is able.

## 2023-08-18 NOTE — PROGRESS NOTES
Medicare Annual Wellness Visit    Katina Vega is here for Medicare AWV and Urinary Tract Infection (Pain in back )    Assessment & Plan   Medicare annual wellness visit, subsequent  Will consider lung cancer screening - not ready to consider at this time. Pap is scheduled  Flu shot not yet available  Frequent urination  Ua is not consistent with UTI  -     POCT Urinalysis no Micro  -     Urinalysis with Microscopic; Future  -     CBC with Auto Differential; Future  -     Comprehensive Metabolic Panel; Future  Chronic pain syndrome  Refer to physical therapy  Refill gabapentin  Suspect cervical radiculopathy  Check cervical spine xr    -     gabapentin (NEURONTIN) 300 MG capsule; TAKE ONE CAPSULE BY MOUTH THREE TIMES A DAY, Disp-90 capsule, R-3Normal  -     External Referral To Physical Therapy  -     XR CERVICAL SPINE (4-5 VIEWS); Future  Medication refill  -     pantoprazole (PROTONIX) 20 MG tablet; TAKE ONE TABLET BY MOUTH EVERY DAY, Disp-30 tablet, R-3Normal  -     amLODIPine (NORVASC) 5 MG tablet; TAKE ONE TABLET BY MOUTH EVERY DAY, Disp-30 tablet, R-3Normal  -     potassium chloride (KLOR-CON M) 10 MEQ extended release tablet; TAKE ONE TABLET BY MOUTH DAILY WITH BREAKFAST, Disp-30 tablet, R-3Normal  -     chlorthalidone (HYGROTON) 25 MG tablet; Take 1 tablet by mouth daily, Disp-30 tablet, R-3Normal  Hypertension, unspecified type  Bp is at goal  -     amLODIPine (NORVASC) 5 MG tablet; TAKE ONE TABLET BY MOUTH EVERY DAY, Disp-30 tablet, R-3Normal  -     chlorthalidone (HYGROTON) 25 MG tablet; Take 1 tablet by mouth daily, Disp-30 tablet, R-3Normal  -     CBC with Auto Differential; Future  -     Comprehensive Metabolic Panel; Future  Cervical radiculopathy  Refer to PT    -     External Referral To Physical Therapy  -     XR CERVICAL SPINE (4-5 VIEWS);  Future  Osteoarthritis of lumbar spine, unspecified spinal osteoarthritis complication status  -     External Referral To Physical Therapy  Chest

## 2023-08-23 ENCOUNTER — APPOINTMENT (OUTPATIENT)
Dept: GENERAL RADIOLOGY | Age: 63
End: 2023-08-23
Payer: MEDICARE

## 2023-08-23 ENCOUNTER — HOSPITAL ENCOUNTER (EMERGENCY)
Age: 63
Discharge: HOME | End: 2023-08-23
Attending: EMERGENCY MEDICINE
Payer: MEDICARE

## 2023-08-23 ENCOUNTER — APPOINTMENT (OUTPATIENT)
Dept: CT IMAGING | Age: 63
End: 2023-08-23
Payer: MEDICARE

## 2023-08-23 VITALS
SYSTOLIC BLOOD PRESSURE: 170 MMHG | OXYGEN SATURATION: 98 % | HEART RATE: 88 BPM | TEMPERATURE: 97.8 F | DIASTOLIC BLOOD PRESSURE: 80 MMHG | RESPIRATION RATE: 16 BRPM

## 2023-08-23 DIAGNOSIS — R06.02 SHORTNESS OF BREATH: Primary | ICD-10-CM

## 2023-08-23 DIAGNOSIS — I51.7 CARDIOMEGALY: ICD-10-CM

## 2023-08-23 DIAGNOSIS — K44.9 HIATAL HERNIA: ICD-10-CM

## 2023-08-23 LAB
ALBUMIN SERPL-MCNC: 4.6 G/DL (ref 3.5–5.2)
ALP SERPL-CCNC: 103 U/L (ref 35–104)
ALT SERPL-CCNC: 16 U/L (ref 0–32)
ANION GAP SERPL CALCULATED.3IONS-SCNC: 13 MMOL/L (ref 7–16)
AST SERPL-CCNC: 14 U/L (ref 0–31)
BASOPHILS # BLD: 0.04 K/UL (ref 0–0.2)
BASOPHILS NFR BLD: 1 % (ref 0–2)
BILIRUB SERPL-MCNC: 0.7 MG/DL (ref 0–1.2)
BNP SERPL-MCNC: 92 PG/ML (ref 0–125)
BUN SERPL-MCNC: 22 MG/DL (ref 6–23)
CALCIUM SERPL-MCNC: 10.1 MG/DL (ref 8.6–10.2)
CHLORIDE SERPL-SCNC: 103 MMOL/L (ref 98–107)
CO2 SERPL-SCNC: 24 MMOL/L (ref 22–29)
CREAT SERPL-MCNC: 1.3 MG/DL (ref 0.5–1)
D DIMER: 379 NG/ML DDU (ref 0–232)
EKG ATRIAL RATE: 66 BPM
EKG P AXIS: 56 DEGREES
EKG P-R INTERVAL: 166 MS
EKG Q-T INTERVAL: 404 MS
EKG QRS DURATION: 100 MS
EKG QTC CALCULATION (BAZETT): 423 MS
EKG R AXIS: 68 DEGREES
EKG T AXIS: 42 DEGREES
EKG VENTRICULAR RATE: 66 BPM
EOSINOPHIL # BLD: 0.34 K/UL (ref 0.05–0.5)
EOSINOPHILS RELATIVE PERCENT: 5 % (ref 0–6)
ERYTHROCYTE [DISTWIDTH] IN BLOOD BY AUTOMATED COUNT: 14.1 % (ref 11.5–15)
GFR SERPL CREATININE-BSD FRML MDRD: 46 ML/MIN/1.73M2
GLUCOSE SERPL-MCNC: 136 MG/DL (ref 74–99)
HCT VFR BLD AUTO: 42.3 % (ref 34–48)
HGB BLD-MCNC: 14 G/DL (ref 11.5–15.5)
IMM GRANULOCYTES # BLD AUTO: 0.03 K/UL (ref 0–0.58)
IMM GRANULOCYTES NFR BLD: 1 % (ref 0–5)
INR PPP: 1
LYMPHOCYTES NFR BLD: 1.72 K/UL (ref 1.5–4)
LYMPHOCYTES RELATIVE PERCENT: 26 % (ref 20–42)
MCH RBC QN AUTO: 28.8 PG (ref 26–35)
MCHC RBC AUTO-ENTMCNC: 33.1 G/DL (ref 32–34.5)
MCV RBC AUTO: 87 FL (ref 80–99.9)
MONOCYTES NFR BLD: 0.49 K/UL (ref 0.1–0.95)
MONOCYTES NFR BLD: 7 % (ref 2–12)
NEUTROPHILS NFR BLD: 60 % (ref 43–80)
NEUTS SEG NFR BLD: 3.99 K/UL (ref 1.8–7.3)
PARTIAL THROMBOPLASTIN TIME: 27.7 SEC (ref 24.5–35.1)
PLATELET # BLD AUTO: 305 K/UL (ref 130–450)
PMV BLD AUTO: 9.2 FL (ref 7–12)
POTASSIUM SERPL-SCNC: 3.8 MMOL/L (ref 3.5–5)
PROT SERPL-MCNC: 7.9 G/DL (ref 6.4–8.3)
PROTHROMBIN TIME: 11.4 SEC (ref 9.3–12.4)
RBC # BLD AUTO: 4.86 M/UL (ref 3.5–5.5)
SODIUM SERPL-SCNC: 140 MMOL/L (ref 132–146)
TROPONIN I SERPL HS-MCNC: 8 NG/L (ref 0–9)
WBC OTHER # BLD: 6.6 K/UL (ref 4.5–11.5)

## 2023-08-23 PROCEDURE — 6360000002 HC RX W HCPCS: Performed by: EMERGENCY MEDICINE

## 2023-08-23 PROCEDURE — 99285 EMERGENCY DEPT VISIT HI MDM: CPT

## 2023-08-23 PROCEDURE — 84484 ASSAY OF TROPONIN QUANT: CPT

## 2023-08-23 PROCEDURE — 2500000003 HC RX 250 WO HCPCS: Performed by: EMERGENCY MEDICINE

## 2023-08-23 PROCEDURE — 71275 CT ANGIOGRAPHY CHEST: CPT

## 2023-08-23 PROCEDURE — 96374 THER/PROPH/DIAG INJ IV PUSH: CPT

## 2023-08-23 PROCEDURE — 96375 TX/PRO/DX INJ NEW DRUG ADDON: CPT

## 2023-08-23 PROCEDURE — 93005 ELECTROCARDIOGRAM TRACING: CPT | Performed by: EMERGENCY MEDICINE

## 2023-08-23 PROCEDURE — 85730 THROMBOPLASTIN TIME PARTIAL: CPT

## 2023-08-23 PROCEDURE — 85610 PROTHROMBIN TIME: CPT

## 2023-08-23 PROCEDURE — 71045 X-RAY EXAM CHEST 1 VIEW: CPT

## 2023-08-23 PROCEDURE — 83880 ASSAY OF NATRIURETIC PEPTIDE: CPT

## 2023-08-23 PROCEDURE — 2580000003 HC RX 258: Performed by: EMERGENCY MEDICINE

## 2023-08-23 PROCEDURE — 85025 COMPLETE CBC W/AUTO DIFF WBC: CPT

## 2023-08-23 PROCEDURE — 6360000004 HC RX CONTRAST MEDICATION: Performed by: RADIOLOGY

## 2023-08-23 PROCEDURE — 85379 FIBRIN DEGRADATION QUANT: CPT

## 2023-08-23 PROCEDURE — 80053 COMPREHEN METABOLIC PANEL: CPT

## 2023-08-23 PROCEDURE — 93010 ELECTROCARDIOGRAM REPORT: CPT | Performed by: INTERNAL MEDICINE

## 2023-08-23 PROCEDURE — A4216 STERILE WATER/SALINE, 10 ML: HCPCS | Performed by: EMERGENCY MEDICINE

## 2023-08-23 RX ORDER — DIPHENHYDRAMINE HYDROCHLORIDE 50 MG/ML
50 INJECTION INTRAMUSCULAR; INTRAVENOUS ONCE
Status: COMPLETED | OUTPATIENT
Start: 2023-08-23 | End: 2023-08-23

## 2023-08-23 RX ORDER — 0.9 % SODIUM CHLORIDE 0.9 %
1000 INTRAVENOUS SOLUTION INTRAVENOUS ONCE
Status: COMPLETED | OUTPATIENT
Start: 2023-08-23 | End: 2023-08-23

## 2023-08-23 RX ADMIN — IOPAMIDOL 75 ML: 755 INJECTION, SOLUTION INTRAVENOUS at 11:46

## 2023-08-23 RX ADMIN — METHYLPREDNISOLONE SODIUM SUCCINATE 125 MG: 125 INJECTION, POWDER, LYOPHILIZED, FOR SOLUTION INTRAMUSCULAR; INTRAVENOUS at 08:59

## 2023-08-23 RX ADMIN — DIPHENHYDRAMINE HYDROCHLORIDE 50 MG: 50 INJECTION INTRAMUSCULAR; INTRAVENOUS at 09:00

## 2023-08-23 RX ADMIN — FAMOTIDINE 20 MG: 10 INJECTION, SOLUTION INTRAVENOUS at 09:00

## 2023-08-23 RX ADMIN — SODIUM CHLORIDE 1000 ML: 9 INJECTION, SOLUTION INTRAVENOUS at 08:59

## 2023-08-23 NOTE — ED PROVIDER NOTES
[address] ENCOUNTER        Pt Name: Chapin Peñaloza  MRN: [id number]  [facility] 1960  Date of evaluation: 8/23/2023  Provider: Samantha Lam DO  PCP: Abeba Ruiz MD  Note Started: 8:58 AM EDT 8/23/23    CHIEF COMPLAINT       Chief Complaint   Patient presents with    Abnormal Lab     D-dimer elevated sent to er  8/18/23 was 299       HISTORY OF PRESENT ILLNESS: 1 or more Elements   History From: patient and son andmedicla record    Limitations to history : memory difficulty  stated by patient since her [address]    Chapin Peñaloza is a 58 y.o. female who presents with elevated d-dimer from last week (299 per son). Patient reports she has emphysema and some baseline shortness of breath but it had been worsening the past several weeks. She had seen her pulmonologist, as it was accompanied by a cough, and was started on an antibiotic a steroid and Trellegy inhaler. She states the Trellegy gave her a foul taste in her mouth and so she saw her PCP last week and stopped the Trellegy and was started on Anoro inhaler. She states that every time she took the Anoro inhaler it made her dizzy so she stopped taking it and her dizziness resolved. She had blood work for all of this and now she was sent here for elevated d-dimer to rule out PE. He history is complicated by shellfish and iodine allergy (documented as hives, patient reports swelling/anaphylaxi but never occurs if premedicated). She has history of intracranial aneurysm s/p coiling/stenting and DVT in leg with last visit per medical record with vascular on 7/20/23 recommending conservative therapy for chronic DVT. The complaint has been persistent, moderate in severity, and worsened by nothing.   Patient denies fever/chills, sore throat, cough, congestion, chest pain, edema, headache, visual disturbances, focal paresthesias, focal weakness, abdominal pain, nausea, vomiting, diarrhea,

## 2023-08-28 ENCOUNTER — TELEPHONE (OUTPATIENT)
Dept: NEUROSURGERY | Age: 63
End: 2023-08-28

## 2023-08-28 ENCOUNTER — OFFICE VISIT (OUTPATIENT)
Dept: PAIN MANAGEMENT | Age: 63
End: 2023-08-28
Payer: MEDICARE

## 2023-08-28 VITALS
TEMPERATURE: 97.1 F | SYSTOLIC BLOOD PRESSURE: 130 MMHG | HEIGHT: 65 IN | OXYGEN SATURATION: 94 % | DIASTOLIC BLOOD PRESSURE: 62 MMHG | RESPIRATION RATE: 18 BRPM | HEART RATE: 52 BPM | BODY MASS INDEX: 37.32 KG/M2 | WEIGHT: 224 LBS

## 2023-08-28 DIAGNOSIS — Z96.642 S/P TOTAL LEFT HIP ARTHROPLASTY: ICD-10-CM

## 2023-08-28 DIAGNOSIS — M47.816 LUMBAR SPONDYLOSIS: ICD-10-CM

## 2023-08-28 DIAGNOSIS — M16.12 PRIMARY OSTEOARTHRITIS OF LEFT HIP: ICD-10-CM

## 2023-08-28 DIAGNOSIS — M47.816 LUMBAR FACET ARTHROPATHY: ICD-10-CM

## 2023-08-28 DIAGNOSIS — M54.16 LUMBAR RADICULITIS: Primary | ICD-10-CM

## 2023-08-28 DIAGNOSIS — G89.4 CHRONIC PAIN SYNDROME: ICD-10-CM

## 2023-08-28 DIAGNOSIS — M47.812 FACET ARTHROPATHY, CERVICAL: ICD-10-CM

## 2023-08-28 DIAGNOSIS — M50.30 DEGENERATION OF CERVICAL DISC WITHOUT MYELOPATHY: ICD-10-CM

## 2023-08-28 DIAGNOSIS — M51.9 LUMBAR DISC DISORDER: ICD-10-CM

## 2023-08-28 DIAGNOSIS — M16.11 ARTHRITIS OF RIGHT HIP: ICD-10-CM

## 2023-08-28 DIAGNOSIS — E66.01 SEVERE OBESITY (BMI 35.0-39.9) WITH COMORBIDITY (HCC): ICD-10-CM

## 2023-08-28 PROCEDURE — 99213 OFFICE O/P EST LOW 20 MIN: CPT | Performed by: PHYSICIAN ASSISTANT

## 2023-08-28 PROCEDURE — 1036F TOBACCO NON-USER: CPT | Performed by: PHYSICIAN ASSISTANT

## 2023-08-28 PROCEDURE — 3017F COLORECTAL CA SCREEN DOC REV: CPT | Performed by: PHYSICIAN ASSISTANT

## 2023-08-28 PROCEDURE — G8427 DOCREV CUR MEDS BY ELIG CLIN: HCPCS | Performed by: PHYSICIAN ASSISTANT

## 2023-08-28 PROCEDURE — 3078F DIAST BP <80 MM HG: CPT | Performed by: PHYSICIAN ASSISTANT

## 2023-08-28 PROCEDURE — 3075F SYST BP GE 130 - 139MM HG: CPT | Performed by: PHYSICIAN ASSISTANT

## 2023-08-28 PROCEDURE — G8417 CALC BMI ABV UP PARAM F/U: HCPCS | Performed by: PHYSICIAN ASSISTANT

## 2023-08-28 RX ORDER — ACETAMINOPHEN AND CODEINE PHOSPHATE 300; 60 MG/1; MG/1
1 TABLET ORAL DAILY PRN
Qty: 30 TABLET | Refills: 1 | Status: SHIPPED | OUTPATIENT
Start: 2023-08-28 | End: 2023-09-27

## 2023-08-28 NOTE — PROGRESS NOTES
[address], [address] [place] [address]  [phone]    Follow up Note      Laura Amaro     Date of Visit:  8/28/2023    CC:  Patient presents for follow up   Chief Complaint   Patient presents with    Back Pain    Neck Pain             HPI:    Pain is unchanged. No new changes. Appropriate analgesia with current medications regimen: yes. Change in quality of symptoms:no. Medication side effects:none. Recent diagnostic testing:none. Recent interventional procedures:none. She has not been on anticoagulation medications to include none. The patient  has not been on herbal supplements. The patient is not diabetic. Imaging studies:    06/09/222 Left hip x-ray    FINDINGS:   Anatomically aligned left MERVAT with no abnormal bone or soft tissue findings. Impression   Left MERVAT with no unexpected findings. Cervical XR 11/2019 Severe degenerative changes      Lumbar spine Xray 03/2019  Scoliosis and osteoarthritis. Bilateral hip Xray 03/2019   Advanced osteoarthrosis of bilateral hips. Potential Aberrant Drug-Related Behavior: None     Urine Drug Screening:  First office visit saliva screen showed no narcotics   11/2019 Consistent, negative for all  06/2020 Consistent  12/2020 Consistent  06/2021 Consistent  04/2022 Consistent  02/2023 Consistent for gabapentin, consistent for no tylenol with codeine as she did not have an active script. OARRS report:  03/2019 consistent to 06/2021 Consistent  (norco on 9/24 from dentist for teeth extraction). 08/2021 Consistent to 08/2023 Consistent    Opioid Agreement:  10/07/2021   Updated:  09/26/2022 - update today.     Past Medical History:   Diagnosis Date    A-fib [facility])     Acute deep vein thrombosis (DVT) of right peroneal vein ([address]) 10/14/2022    See note from pulmonology    Brain aneurysm 09/2018    H/O TIMES 2 THAT BURST PER PATIENT

## 2023-08-28 NOTE — TELEPHONE ENCOUNTER
Patient called the [facility] office for a refill on Atorvastatin (Lipitor) to be sent to   [facility], [address]    Thank you

## 2023-08-29 ENCOUNTER — TELEPHONE (OUTPATIENT)
Dept: FAMILY MEDICINE CLINIC | Age: 63
End: 2023-08-29

## 2023-08-29 LAB
6-MAM, QUANTITATIVE, URINE: <10 NG/ML
6-MONOACETYLMORPHINE, URINE: NEGATIVE
7-AMINOCLONAZEPAM, QUANTITATIVE, URINE: <50 NG/ML
ABNORMAL SPECIMEN VALIDITY TEST: ABNORMAL
ALCOHOL URINE: NOT DETECTED MG/DL
ALPHA-HYDROXYALPRAZOLAM, QUANTITATIVE, URINE: <50 NG/ML
ALPHA-HYDROXYMIDAZOLAM, QUANTITATIVE, URINE: <50 NG/ML
ALPHA-HYDROXYTRIAZOLAM, QUANTITATIVE, URINE: <50 NG/ML
ALPRAZOLAM URINE QUANT: <50 NG/ML
AMPHETAMINE SCREEN URINE: NEGATIVE
BARBITURATE SCREEN URINE: NEGATIVE
BENZODIAZEPINE SCREEN, URINE: NEGATIVE
BUPRENORPHINE URINE: NEGATIVE
CANNABINOID SCREEN URINE: NEGATIVE
CHLORDIAZEPOXIDE, QUANTITATIVE, URINE: <50 NG/ML
CLONAZEPAM, QUANTITATIVE, URINE: <50 NG/ML
COCAINE METABOLITE, URINE: NEGATIVE
CODEINE, QUANTITATIVE, URINE: >1000 NG/ML
COMPLIANCE DRUG ANALYSIS, URINE: NORMAL
DIAZEPAM URINE QUANT: <50 NG/ML
FENTANYL URINE: NEGATIVE
FLUNITRAZEPAM, QUANTITATIVE, URINE: <50 NG/ML
FLURAZEPAM, QUANTITATIVE, URINE: <50 NG/ML
HYDROCODONE, QUANTITATIVE, URINE: <50 NG/ML
HYDROMORPHONE, QUANTITATIVE, URINE: <50 NG/ML
INTEGRITY CHECK, CREATININE, URINE: 41.3
INTEGRITY CHECK, OXIDANT, URINE: <40
INTEGRITY CHECK, PH, URINE: 6.3
INTEGRITY CHECK, SPECIFIC GRAVITY, URINE: 1.01
LORAZEPAM URINE QUANT: <50 NG/ML
METHADONE SCREEN, URINE: NEGATIVE
MIDAZOLAM URINE QUANT: <50 NG/ML
MORPHINE, QUANTITATIVE, URINE: 317.3 NG/ML
NORDIAZEPAM URINE QUANT: <50 NG/ML
NORHYDROCODONE, QUANTITATIVE, URINE: <50 NG/ML
NOROXYCODONE, QUANTITATIVE, URINE: <50 NG/ML
OPIATES, URINE: POSITIVE
OXAZEPAM URINE QUANT: <50 NG/ML
OXYCODONE SCREEN URINE: NEGATIVE
OXYCODONE URINE, QUANTITATIVE: <50 NG/ML
OXYMORPHONE, QUANTITATIVE, URINE: <50 NG/ML
PHENCYCLIDINE, URINE: NEGATIVE
TEMAZEPAM, QUANTITATIVE, URINE: <50 NG/ML
TEST INFORMATION: ABNORMAL
TRAMADOL, URINE: NEGATIVE

## 2023-08-29 RX ORDER — ATORVASTATIN CALCIUM 40 MG/1
40 TABLET, FILM COATED ORAL NIGHTLY
Qty: 90 TABLET | Refills: 2 | Status: SHIPPED | OUTPATIENT
Start: 2023-08-29 | End: 2024-05-25

## 2023-08-29 NOTE — TELEPHONE ENCOUNTER
----- Message from Patrickkennedi Ge sent at 8/29/2023  9:48 AM EDT -----  Subject: Results Request    QUESTIONS  Results: CT LUNGS; Ordered by:  Elías Middleton   Date Performed: 2023-08-23  ---------------------------------------------------------------------------  --------------  Stephanie Farrar INFO    [phone]; OK to leave message on voicemail  ---------------------------------------------------------------------------  --------------

## 2023-08-30 ENCOUNTER — TELEPHONE (OUTPATIENT)
Dept: FAMILY MEDICINE CLINIC | Age: 63
End: 2023-08-30

## 2023-08-30 NOTE — TELEPHONE ENCOUNTER
CT scan done in the ED results reviewed    There was no sign of clot (pulmonary embolism)  She had some chronic findings related to her heart (mild enlargement of the heart) but no signs of heart failure/ exacerbation. Overall results were okay. I was mostly concerned about a lung clot which was ruled out.

## 2023-08-30 NOTE — TELEPHONE ENCOUNTER
Spoke with patient that there was no sign of clot (pulmonary embolism)  She had some chronic findings related to her heart (mild enlargement of the heart) but no signs of heart failure/ exacerbation.       Overall results were okay

## 2023-08-30 NOTE — TELEPHONE ENCOUNTER
----- Message from Kareem Bassett sent at 8/29/2023  9:48 AM EDT -----  Subject: Results Request    QUESTIONS  Results: CT LUNGS; Ordered by:  Devin Henley   Date Performed: 2023-08-23  ---------------------------------------------------------------------------  --------------  Yue Marker INFO    [phone]; OK to leave message on voicemail  ---------------------------------------------------------------------------  --------------

## 2023-09-25 ENCOUNTER — TELEPHONE (OUTPATIENT)
Dept: FAMILY MEDICINE CLINIC | Age: 63
End: 2023-09-25

## 2023-09-25 NOTE — TELEPHONE ENCOUNTER
Patient called in with complaints of a kidney infection and wanted something called in. I explained that it is not our typical policy to phone in meds without being seen and testing urine. Patient states that she is unable to get here for an appointment, I advised that she can go to an urgent care also. She stated that she did not have transportation.      Her symptoms are:   Urinary urgency/frequency but then very little comes out  Back pain   Burning with urination   Symptoms x 2 days     If agreeable to send in medication, it will go to [facility]

## 2023-09-26 ENCOUNTER — OFFICE VISIT (OUTPATIENT)
Dept: FAMILY MEDICINE CLINIC | Age: 63
End: 2023-09-26
Payer: MEDICARE

## 2023-09-26 VITALS
TEMPERATURE: 97 F | OXYGEN SATURATION: 98 % | HEIGHT: 65 IN | BODY MASS INDEX: 37.15 KG/M2 | WEIGHT: 223 LBS | SYSTOLIC BLOOD PRESSURE: 130 MMHG | RESPIRATION RATE: 16 BRPM | HEART RATE: 63 BPM | DIASTOLIC BLOOD PRESSURE: 68 MMHG

## 2023-09-26 DIAGNOSIS — R31.9 URINARY TRACT INFECTION WITH HEMATURIA, SITE UNSPECIFIED: Primary | ICD-10-CM

## 2023-09-26 DIAGNOSIS — B37.9 ANTIBIOTIC-INDUCED YEAST INFECTION: ICD-10-CM

## 2023-09-26 DIAGNOSIS — R39.9 UTI SYMPTOMS: ICD-10-CM

## 2023-09-26 DIAGNOSIS — T36.95XA ANTIBIOTIC-INDUCED YEAST INFECTION: ICD-10-CM

## 2023-09-26 DIAGNOSIS — N39.0 URINARY TRACT INFECTION WITH HEMATURIA, SITE UNSPECIFIED: Primary | ICD-10-CM

## 2023-09-26 PROCEDURE — 1036F TOBACCO NON-USER: CPT | Performed by: NURSE PRACTITIONER

## 2023-09-26 PROCEDURE — G8417 CALC BMI ABV UP PARAM F/U: HCPCS | Performed by: NURSE PRACTITIONER

## 2023-09-26 PROCEDURE — 3075F SYST BP GE 130 - 139MM HG: CPT | Performed by: NURSE PRACTITIONER

## 2023-09-26 PROCEDURE — 3078F DIAST BP <80 MM HG: CPT | Performed by: NURSE PRACTITIONER

## 2023-09-26 PROCEDURE — 99214 OFFICE O/P EST MOD 30 MIN: CPT | Performed by: NURSE PRACTITIONER

## 2023-09-26 PROCEDURE — G8427 DOCREV CUR MEDS BY ELIG CLIN: HCPCS | Performed by: NURSE PRACTITIONER

## 2023-09-26 PROCEDURE — 81002 URINALYSIS NONAUTO W/O SCOPE: CPT | Performed by: NURSE PRACTITIONER

## 2023-09-26 PROCEDURE — 3017F COLORECTAL CA SCREEN DOC REV: CPT | Performed by: NURSE PRACTITIONER

## 2023-09-26 RX ORDER — CEFDINIR 300 MG/1
300 CAPSULE ORAL 2 TIMES DAILY
Qty: 14 CAPSULE | Refills: 0 | Status: SHIPPED | OUTPATIENT
Start: 2023-09-26 | End: 2023-10-03

## 2023-09-26 RX ORDER — AMOXICILLIN AND CLAVULANATE POTASSIUM 875; 125 MG/1; MG/1
1 TABLET, FILM COATED ORAL 2 TIMES DAILY
Qty: 10 TABLET | Refills: 0 | Status: SHIPPED | OUTPATIENT
Start: 2023-09-26 | End: 2023-10-01

## 2023-09-26 NOTE — PROGRESS NOTES
with age. HENT:      Head: Normocephalic. Right Ear: External ear normal.      Left Ear: External ear normal.      Nose: Normal.      Mouth/Throat:     Mouth: Mucous membranes are moist.      Pharynx: Oropharynx is clear. Eyes: Pupils: Pupils are equal, round, and reactive to light. Neck: Normal ROM. Supple. Cardiovascular: Heart RRR without pathologic murmurs or gallops. Pulmonary: Respiratory effort normal.  Normal breath sounds. Abdomen: Soft, nondistended, with mild suprapubic tenderness. No rebound, rigidity, or guarding. BS+ X4. No organomegaly. Back: Mild left CVA tenderness. Skin:  Normal turgor. Warm, dry, without visible rash, unless noted elsewhere. Musculoskeletal: General: Normal strength / ROM. Neurological:  Alert and oriented. Motor functions intact. Responds to verbal commands. Psychiatric: Mood and Affect: Mood normal. Behavior: Behavior normal    Testing:   (All laboratory and radiology results have been personally reviewed by myself)  Labs:  Results for orders placed or performed in visit on 09/26/23   POCT Urinalysis no Micro   Result Value Ref Range    Color, UA yellow     Clarity, UA clear     Glucose, UA POC neg     Bilirubin, UA neg     Ketones, UA neg     Spec Grav, UA 1.015     Blood, UA POC small     pH, UA 5.5     Protein, UA POC neg     Urobilinogen, UA 0.2     Leukocytes, UA neg     Nitrite, UA trace        Imaging: All Radiology results interpreted by Radiologist unless otherwise noted. No orders to display       Assessment / Plan:   The patient's vitals, allergies, medications, and past medical history have been reviewed. Evins Scheuermann was seen today for urinary tract infection. Diagnoses and all orders for this visit:    Urinary tract infection with hematuria, site unspecified  -     cefdinir (OMNICEF) 300 MG capsule;  Take 1 capsule by mouth 2 times daily for 7 days    UTI symptoms  -     POCT Urinalysis no Micro  -     Culture, Urine;

## 2023-09-26 NOTE — TELEPHONE ENCOUNTER
Antibiotic sent.    Please note to triage messages to covering physician on Mondays that need to be addressed same day

## 2023-09-28 LAB
CULTURE: ABNORMAL
SPECIMEN DESCRIPTION: ABNORMAL

## 2023-10-05 ENCOUNTER — TELEPHONE (OUTPATIENT)
Dept: FAMILY MEDICINE CLINIC | Age: 63
End: 2023-10-05

## 2023-10-05 NOTE — TELEPHONE ENCOUNTER
Patient is having an allergic reaction to the vaginal suppository, she has taken benadryl at 430 and 5 am will take another one per instructions. Does not know if she still has a yeast infection because she is having such a back reaction to the medication. She has finished the antibiotics, Is there anything that she can do?

## 2023-10-05 NOTE — TELEPHONE ENCOUNTER
Scheduled pt for 10/6 @8:45.    Pt to return call if her son is unable to bring her at that time, in which case she will go to urgent care

## 2023-10-06 ENCOUNTER — OFFICE VISIT (OUTPATIENT)
Dept: FAMILY MEDICINE CLINIC | Age: 63
End: 2023-10-06

## 2023-10-06 VITALS
OXYGEN SATURATION: 98 % | TEMPERATURE: 97.8 F | WEIGHT: 224 LBS | DIASTOLIC BLOOD PRESSURE: 73 MMHG | BODY MASS INDEX: 37.32 KG/M2 | SYSTOLIC BLOOD PRESSURE: 125 MMHG | RESPIRATION RATE: 17 BRPM | HEART RATE: 71 BPM | HEIGHT: 65 IN

## 2023-10-06 DIAGNOSIS — T78.40XA ALLERGIC REACTION, INITIAL ENCOUNTER: Primary | ICD-10-CM

## 2023-10-06 LAB
BILIRUBIN, POC: NORMAL
BLOOD URINE, POC: NORMAL
CLARITY, POC: CLEAR
COLOR, POC: NORMAL
GLUCOSE URINE, POC: NORMAL
KETONES, POC: NORMAL
LEUKOCYTE EST, POC: NORMAL
NITRITE, POC: NORMAL
PH, POC: 5.5
PROTEIN, POC: NORMAL
SPECIFIC GRAVITY, POC: 1.01
UROBILINOGEN, POC: 0.2

## 2023-10-06 RX ORDER — PREDNISONE 20 MG/1
20 TABLET ORAL 2 TIMES DAILY
Qty: 10 TABLET | Refills: 0 | Status: SHIPPED | OUTPATIENT
Start: 2023-10-06 | End: 2023-10-11

## 2023-10-06 ASSESSMENT — ENCOUNTER SYMPTOMS
SHORTNESS OF BREATH: 0
EYE REDNESS: 0
COUGH: 0
SINUS PAIN: 0
ABDOMINAL PAIN: 0
NAUSEA: 0
EYE PAIN: 0
CHEST TIGHTNESS: 0
DIARRHEA: 0
EYE DISCHARGE: 0
SINUS PRESSURE: 0
VOMITING: 0
BLOOD IN STOOL: 0

## 2023-10-10 DIAGNOSIS — Z76.0 MEDICATION REFILL: ICD-10-CM

## 2023-10-10 RX ORDER — ALBUTEROL SULFATE 90 UG/1
AEROSOL, METERED RESPIRATORY (INHALATION)
Qty: 8.5 G | Refills: 0 | Status: SHIPPED | OUTPATIENT
Start: 2023-10-10

## 2023-10-16 ENCOUNTER — TELEPHONE (OUTPATIENT)
Dept: FAMILY MEDICINE CLINIC | Age: 63
End: 2023-10-16

## 2023-10-16 NOTE — TELEPHONE ENCOUNTER
Patient called in that her insurance will no longer cover the potassium chloride 10 MEQ, I asked if they told her another medication that will be covered and she said no they told her to call her PCP.  Please advise

## 2023-10-19 ENCOUNTER — OFFICE VISIT (OUTPATIENT)
Dept: PULMONOLOGY | Age: 63
End: 2023-10-19

## 2023-10-19 VITALS
RESPIRATION RATE: 20 BRPM | SYSTOLIC BLOOD PRESSURE: 132 MMHG | HEART RATE: 78 BPM | DIASTOLIC BLOOD PRESSURE: 62 MMHG | OXYGEN SATURATION: 97 % | TEMPERATURE: 97.4 F

## 2023-10-19 DIAGNOSIS — I50.32 CHRONIC HEART FAILURE WITH PRESERVED EJECTION FRACTION (HCC): ICD-10-CM

## 2023-10-19 DIAGNOSIS — J43.9 PULMONARY EMPHYSEMA, UNSPECIFIED EMPHYSEMA TYPE (HCC): Primary | Chronic | ICD-10-CM

## 2023-10-19 NOTE — PROGRESS NOTES
6 mos follow up visit. Pt given samples of Breztri with spacer and advise on how to use per orders. Plan for pt to have follow up in office in 6 mos. Appt given.
pain.    =======================================================    PFT   Date of Exam: 2022          Pulmonary Function Test % Pred   FEV1 1.78 71   FEV1/FVC 73 (LLN-68)     TLC 5.95 114   DLCO 7.83 30      No obstruction  No Bronchodilator response  There is evidence of air trapping  No Restriction  Severe diffusion defect. Recommend evaluation for pulmonary vascular diseases, and pulmonary hypertension, CHF, early ILD. No results found for: \"FEV1\", \"FVC\", \"SPT0FSJ\", \"TLC\", \"DLCO\"    CT chest: 22    No mediastinal or hilar lymphadenopathy   mild emphysematous changes  no pulmonary nodule    Labs:    Eosinophil max - 280  Immunoglobulin E (IgE) - 4  RAST Neg    Sleep Study:    ALLERGIES:  Allergies   Allergen Reactions    Latex Hives     Hives and rash    Iodine Rash     Hives . pt.  States anaphalyxis    Shellfish-Derived Products Anaphylaxis and Hives    Aloe Hives     Hives     Cefdinir Rash    Celebrex [Celecoxib] Itching    Fish-Derived Products Other (See Comments)     SOCIAL HISTORY:   Social History     Tobacco Use    Smoking status: Former     Packs/day: 1.50     Years: 43.00     Additional pack years: 0.00     Total pack years: 64.50     Types: Cigarettes     Quit date: 2018     Years since quittin.1    Smokeless tobacco: Never   Vaping Use    Vaping Use: Never used   Substance Use Topics    Alcohol use: No    Drug use: No     MEDS:   Current Outpatient Medications   Medication Sig Dispense Refill    Budeson-Glycopyrrol-Formoterol 160-9-4.8 MCG/ACT AERO Inhale 1 puff into the lungs in the morning and at bedtime 1 each 5    albuterol sulfate HFA (PROVENTIL;VENTOLIN;PROAIR) 108 (90 Base) MCG/ACT inhaler INHALE TWO PUFFS BY MOUTH EVERY 6 HOURS AS NEEDED FOR WHEEZING 8.5 g 0    ipratropium-albuterol (DUONEB) 0.5-2.5 (3) MG/3ML SOLN nebulizer solution Inhale 3 mLs into the lungs every 6 hours as needed for Shortness of Breath 360 mL 5    miconazole (MICOTIN) 200 MG vaginal suppository Place

## 2023-10-19 NOTE — TELEPHONE ENCOUNTER
Pharmacy states that med is now going through covered by insurance. Notified patient that its now covered by insurance again and pharmacy stated she will be good for whenever her refill is due. Advised pt to call office if any further complications arise.

## 2023-10-24 ENCOUNTER — OFFICE VISIT (OUTPATIENT)
Dept: PAIN MANAGEMENT | Age: 63
End: 2023-10-24
Payer: MEDICARE

## 2023-10-24 VITALS
DIASTOLIC BLOOD PRESSURE: 84 MMHG | SYSTOLIC BLOOD PRESSURE: 138 MMHG | HEART RATE: 75 BPM | TEMPERATURE: 96.4 F | RESPIRATION RATE: 18 BRPM | WEIGHT: 224 LBS | BODY MASS INDEX: 37.32 KG/M2 | HEIGHT: 65 IN | OXYGEN SATURATION: 100 %

## 2023-10-24 DIAGNOSIS — E66.01 SEVERE OBESITY (BMI 35.0-39.9) WITH COMORBIDITY (HCC): ICD-10-CM

## 2023-10-24 DIAGNOSIS — M54.16 LUMBAR RADICULITIS: ICD-10-CM

## 2023-10-24 DIAGNOSIS — M50.30 DEGENERATION OF CERVICAL DISC WITHOUT MYELOPATHY: ICD-10-CM

## 2023-10-24 DIAGNOSIS — M51.9 LUMBAR DISC DISORDER: ICD-10-CM

## 2023-10-24 DIAGNOSIS — M47.812 FACET ARTHROPATHY, CERVICAL: ICD-10-CM

## 2023-10-24 DIAGNOSIS — Z96.642 S/P TOTAL LEFT HIP ARTHROPLASTY: ICD-10-CM

## 2023-10-24 DIAGNOSIS — M16.12 PRIMARY OSTEOARTHRITIS OF LEFT HIP: ICD-10-CM

## 2023-10-24 DIAGNOSIS — M47.816 LUMBAR SPONDYLOSIS: ICD-10-CM

## 2023-10-24 DIAGNOSIS — G89.4 CHRONIC PAIN SYNDROME: ICD-10-CM

## 2023-10-24 DIAGNOSIS — M51.9 LUMBAR DISC DISORDER: Primary | ICD-10-CM

## 2023-10-24 DIAGNOSIS — M47.816 LUMBAR FACET ARTHROPATHY: ICD-10-CM

## 2023-10-24 DIAGNOSIS — M16.11 ARTHRITIS OF RIGHT HIP: ICD-10-CM

## 2023-10-24 PROCEDURE — 3075F SYST BP GE 130 - 139MM HG: CPT | Performed by: PHYSICIAN ASSISTANT

## 2023-10-24 PROCEDURE — G8417 CALC BMI ABV UP PARAM F/U: HCPCS | Performed by: PHYSICIAN ASSISTANT

## 2023-10-24 PROCEDURE — 3017F COLORECTAL CA SCREEN DOC REV: CPT | Performed by: PHYSICIAN ASSISTANT

## 2023-10-24 PROCEDURE — 1036F TOBACCO NON-USER: CPT | Performed by: PHYSICIAN ASSISTANT

## 2023-10-24 PROCEDURE — 99213 OFFICE O/P EST LOW 20 MIN: CPT | Performed by: PHYSICIAN ASSISTANT

## 2023-10-24 PROCEDURE — G8484 FLU IMMUNIZE NO ADMIN: HCPCS | Performed by: PHYSICIAN ASSISTANT

## 2023-10-24 PROCEDURE — G8427 DOCREV CUR MEDS BY ELIG CLIN: HCPCS | Performed by: PHYSICIAN ASSISTANT

## 2023-10-24 PROCEDURE — 3079F DIAST BP 80-89 MM HG: CPT | Performed by: PHYSICIAN ASSISTANT

## 2023-10-24 RX ORDER — ACETAMINOPHEN AND CODEINE PHOSPHATE 300; 60 MG/1; MG/1
1 TABLET ORAL DAILY PRN
Qty: 30 TABLET | Refills: 1 | Status: SHIPPED | OUTPATIENT
Start: 2023-10-24 | End: 2023-11-23

## 2023-10-24 NOTE — PROGRESS NOTES
Jim Rivera presents to the [address] [place] [address] on 10/24/2023. Frida Chang is complaining of pain in back. The pain is constant. The pain is described as aching. Pain is rated on her best day at a 8, on her worst day at a 9, and on average at a 8 on the VAS scale. She took her last dose of  Tylenol #4  yesterday. Any procedures since your last visit: No.    She is  on NSAIDS and  is  on anticoagulation medications to include none and is managed by none. Pacemaker or defibrillator: No.    Medication Contract and Consent for Opioid Use Documents Filed       Patient Documents       Type of Document Status Date Received Received By Description    Medication Contract Received 3/1/2019  1:43 PM FRANCIS MCINTOSH PAIN MANAGEMENT PT AGREEMENT 3/1/2019    Medication Contract Received 10/8/2020  1:21 PM Jodi MCINTOSH [address] pain pt agreement    Medication Contract Received 10/7/2021 10:36 AM BOLIVAR TRUJILLO Pain Mgmt Patient Agreement 10. 7.2021    Medication Contract Received 9/26/2022 10:40 AM Teena WAGNER    Consent Opioid Use Received 8/28/2023 10:09 AM OLU VALENZUELA pt agreement 8/28/23                       There were no vitals taken for this visit. No LMP recorded.  Patient is postmenopausal.
Monitoring:    Acute and Chronic Pain Monitoring:   RX Monitoring Periodic Controlled Substance Monitoring   10/24/2023   9:36 AM Possible medication side effects, risk of tolerance/dependence & alternative treatments discussed. ;No signs of potential drug abuse or diversion identified. ;Assessed functional status (ability to engage in work or other purposeful activities, the pain intensity and its interference with activities of daily living, quality of family life and social activities, and the physical activity); Obtaining appropriate analgesic effect of treatment. We discussed with the patient that combining opioids, benzodiazepines, alcohol, illicit drugs or sleep aids increases the risk of respiratory depression including death. We discussed that these medications may cause drowsiness, sedation or dizziness and have counseled the patient not to drive or operate machinery. We have discussed that these medications will be prescribed only by one provider. We have discussed with the patient about age related risk factors and have thoroughly discussed the importance of taking these medications as prescribed. The patient verbalizes understanding.     ccreferring physic

## 2023-11-20 ENCOUNTER — OFFICE VISIT (OUTPATIENT)
Dept: OBGYN | Age: 63
End: 2023-11-20
Payer: MEDICARE

## 2023-11-20 VITALS
DIASTOLIC BLOOD PRESSURE: 66 MMHG | WEIGHT: 222 LBS | HEART RATE: 73 BPM | BODY MASS INDEX: 36.99 KG/M2 | SYSTOLIC BLOOD PRESSURE: 145 MMHG | HEIGHT: 65 IN

## 2023-11-20 DIAGNOSIS — N81.6 RECTOCELE: ICD-10-CM

## 2023-11-20 DIAGNOSIS — Z12.4 SCREENING FOR CERVICAL CANCER: Primary | ICD-10-CM

## 2023-11-20 DIAGNOSIS — N81.4 CYSTOCELE WITH PROLAPSE: ICD-10-CM

## 2023-11-20 DIAGNOSIS — Z12.31 ENCOUNTER FOR SCREENING MAMMOGRAM FOR BREAST CANCER: ICD-10-CM

## 2023-11-20 DIAGNOSIS — Z01.419 ENCOUNTER FOR WELL WOMAN EXAM: ICD-10-CM

## 2023-11-20 PROCEDURE — 3078F DIAST BP <80 MM HG: CPT | Performed by: OBSTETRICS & GYNECOLOGY

## 2023-11-20 PROCEDURE — 3077F SYST BP >= 140 MM HG: CPT | Performed by: OBSTETRICS & GYNECOLOGY

## 2023-11-20 PROCEDURE — G8484 FLU IMMUNIZE NO ADMIN: HCPCS | Performed by: OBSTETRICS & GYNECOLOGY

## 2023-11-20 PROCEDURE — 99386 PREV VISIT NEW AGE 40-64: CPT | Performed by: OBSTETRICS & GYNECOLOGY

## 2023-11-20 NOTE — PROGRESS NOTES
Joslyn Sacks     Patient presents for annual exam.     Patient had a normal pap smear with negative HPV in 2020, patient was 63years old. Reviewed ASCCP guidelines. Patient states she does want one today.  She does have a history of abnormal pap smears in the past. She had a LEEP in the past.     Past Medical History:   Diagnosis Date    A-fib ([address])     Acute deep vein thrombosis (DVT) of right peroneal vein ([address]) 10/14/2022    See note from pulmonology    Brain aneurysm 09/2018    H/O TIMES 2 THAT BURST PER PATIENT    Cerebral artery occlusion with cerebral infarction ([address])     RT SIDE AFFECTED-LEARNED TO WALK AND WRITE AGAIN    Chronic deep vein thrombosis (DVT) of right peroneal vein ([address]) 1/12/2023    Degenerative arthritis of hand     Edentulous     Emphysema lung ([address])     lung dr    Emphysema of lung ([address])     Headache     Hiatal hernia     Hip problem     NEEDS HIP REPLACEMENT PER PATIENT    Hx of abnormal cervical Pap smear     Hypertension     Spider veins 7/20/2023    Stroke (cerebrum) ([address])     Subarachnoid bleed ([address]) 09/10/2018    Varicose veins of both lower extremities with pain 7/20/2023        Past Surgical History:   Procedure Laterality Date    BRAIN ANEURYSM SURGERY      coiling     COLONOSCOPY  08/30/2019    polyps--frank    COLONOSCOPY N/A 08/30/2019    COLONOSCOPY POLYPECTOMY SNARE/COLD BIOPSY performed by Christian Rdz MD at [address] [place] [address]  N/A 12/9/2022    COLONOSCOPY POLYPECTOMY SNARE/COLD BIOPSY performed by Christian Rdz MD at [facility]  12/09/2022    polyp/cecal mass; diverticula--frank    IR CAROTID STENT W PROTECTION  3/1/2023    IR CAROTID STENT UNI W PROTECTION 3/1/2023 Anca Campos MD [facility]    IR OCCLUSIVE OR EMBOL PERC CENTL NERV SYS  3/1/2023    IR OCCLUSIVE OR EMBOL PERC CENTL NERV SYS 3/1/2023 Anca Campos MD [facility]    IR VASC EMBOLIZE OCCLUDE ARTERY  3/1/2023    IR VASC

## 2023-11-24 LAB — GYNECOLOGY CYTOLOGY REPORT: NORMAL

## 2023-11-27 DIAGNOSIS — Z76.0 MEDICATION REFILL: ICD-10-CM

## 2023-11-27 NOTE — TELEPHONE ENCOUNTER
Refill request    Albuterol Sulfate Inhaler    RICHIE @ [facility]    Last Appointment   10/6/2023  Next Appointment  Visit date not found

## 2023-11-28 RX ORDER — ALBUTEROL SULFATE 90 UG/1
2 AEROSOL, METERED RESPIRATORY (INHALATION) EVERY 6 HOURS PRN
Qty: 18 G | Refills: 3 | Status: SHIPPED | OUTPATIENT
Start: 2023-11-28

## 2023-12-29 ENCOUNTER — OFFICE VISIT (OUTPATIENT)
Dept: PAIN MANAGEMENT | Age: 63
End: 2023-12-29
Payer: MEDICARE

## 2023-12-29 ENCOUNTER — TELEPHONE (OUTPATIENT)
Dept: FAMILY MEDICINE CLINIC | Age: 63
End: 2023-12-29

## 2023-12-29 VITALS
OXYGEN SATURATION: 94 % | SYSTOLIC BLOOD PRESSURE: 142 MMHG | RESPIRATION RATE: 18 BRPM | BODY MASS INDEX: 36.99 KG/M2 | HEART RATE: 65 BPM | WEIGHT: 222 LBS | HEIGHT: 65 IN | DIASTOLIC BLOOD PRESSURE: 64 MMHG

## 2023-12-29 DIAGNOSIS — M51.9 LUMBAR DISC DISORDER: ICD-10-CM

## 2023-12-29 DIAGNOSIS — M47.812 FACET ARTHROPATHY, CERVICAL: ICD-10-CM

## 2023-12-29 DIAGNOSIS — M16.12 PRIMARY OSTEOARTHRITIS OF LEFT HIP: ICD-10-CM

## 2023-12-29 DIAGNOSIS — M47.816 LUMBAR FACET ARTHROPATHY: Primary | ICD-10-CM

## 2023-12-29 DIAGNOSIS — G89.4 CHRONIC PAIN SYNDROME: ICD-10-CM

## 2023-12-29 DIAGNOSIS — M54.16 LUMBAR RADICULITIS: ICD-10-CM

## 2023-12-29 DIAGNOSIS — M50.30 DEGENERATION OF CERVICAL DISC WITHOUT MYELOPATHY: ICD-10-CM

## 2023-12-29 DIAGNOSIS — M47.816 LUMBAR FACET ARTHROPATHY: ICD-10-CM

## 2023-12-29 DIAGNOSIS — Z96.642 S/P TOTAL LEFT HIP ARTHROPLASTY: ICD-10-CM

## 2023-12-29 DIAGNOSIS — E66.01 SEVERE OBESITY (BMI 35.0-39.9) WITH COMORBIDITY (HCC): ICD-10-CM

## 2023-12-29 DIAGNOSIS — M16.11 ARTHRITIS OF RIGHT HIP: ICD-10-CM

## 2023-12-29 DIAGNOSIS — M47.816 LUMBAR SPONDYLOSIS: ICD-10-CM

## 2023-12-29 PROCEDURE — 99213 OFFICE O/P EST LOW 20 MIN: CPT | Performed by: PHYSICIAN ASSISTANT

## 2023-12-29 PROCEDURE — G8417 CALC BMI ABV UP PARAM F/U: HCPCS | Performed by: PHYSICIAN ASSISTANT

## 2023-12-29 PROCEDURE — G8484 FLU IMMUNIZE NO ADMIN: HCPCS | Performed by: PHYSICIAN ASSISTANT

## 2023-12-29 PROCEDURE — 3017F COLORECTAL CA SCREEN DOC REV: CPT | Performed by: PHYSICIAN ASSISTANT

## 2023-12-29 PROCEDURE — 1036F TOBACCO NON-USER: CPT | Performed by: PHYSICIAN ASSISTANT

## 2023-12-29 PROCEDURE — G8427 DOCREV CUR MEDS BY ELIG CLIN: HCPCS | Performed by: PHYSICIAN ASSISTANT

## 2023-12-29 PROCEDURE — 3077F SYST BP >= 140 MM HG: CPT | Performed by: PHYSICIAN ASSISTANT

## 2023-12-29 PROCEDURE — 3078F DIAST BP <80 MM HG: CPT | Performed by: PHYSICIAN ASSISTANT

## 2023-12-29 RX ORDER — ACETAMINOPHEN AND CODEINE PHOSPHATE 300; 60 MG/1; MG/1
1 TABLET ORAL DAILY PRN
Qty: 30 TABLET | Refills: 1 | Status: SHIPPED | OUTPATIENT
Start: 2023-12-29 | End: 2024-01-28

## 2023-12-29 NOTE — PROGRESS NOTES
[address], [address] [place] [address]  [phone]    Follow up Note      Phylicia Tovar     Date of Visit:  12/29/2023    CC:  Patient presents for follow up   Chief Complaint   Patient presents with    Back Pain             HPI:    Pain is unchanged. No new changes. Appropriate analgesia with current medications regimen: yes. Change in quality of symptoms:no. Medication side effects:none. Recent diagnostic testing:none. Recent interventional procedures:none. She has not been on anticoagulation medications to include none. The patient  has not been on herbal supplements. The patient is not diabetic. Imaging studies:    06/09/222 Left hip x-ray    FINDINGS:   Anatomically aligned left MERVAT with no abnormal bone or soft tissue findings. Impression   Left MERVAT with no unexpected findings. Cervical XR 11/2019 Severe degenerative changes      Lumbar spine Xray 03/2019  Scoliosis and osteoarthritis. Bilateral hip Xray 03/2019   Advanced osteoarthrosis of bilateral hips. Potential Aberrant Drug-Related Behavior: None     Urine Drug Screening:  First office visit saliva screen showed no narcotics   11/2019 Consistent, negative for all  06/2020 Consistent  12/2020 Consistent  06/2021 Consistent  04/2022 Consistent  02/2023 Consistent for gabapentin, consistent for no tylenol with codeine as she did not have an active script.    08/2023 Consistent     OARRS report:  03/2019 consistent to 06/2021 Consistent  (norco on 9/24 from dentist for teeth extraction).   08/2021 Consistent to 12/2023 Consistent    Opioid Agreement:  10/07/2021   Updated:  09/26/2022   Updated: 08/28/2023    Past Medical History:   Diagnosis Date    A-fib [facility])     Acute deep vein thrombosis (DVT) of right peroneal vein ([address]) 10/14/2022    See note from pulmonology    Brain aneurysm 09/2018    H/O TIMES 2 THAT BURST PER

## 2023-12-29 NOTE — TELEPHONE ENCOUNTER
Please schedule patient for routine visit within 3 months for management of hypertension and other medical conditions

## 2024-01-03 ENCOUNTER — TELEPHONE (OUTPATIENT)
Dept: PAIN MANAGEMENT | Age: 64
End: 2024-01-03

## 2024-01-03 NOTE — TELEPHONE ENCOUNTER
Can you please resend Ruth's Tylenol #4 to [facility]? It was originally sent to [facility] and they are out of med. Thanks.

## 2024-01-05 DIAGNOSIS — M51.9 LUMBAR DISC DISORDER: ICD-10-CM

## 2024-01-05 DIAGNOSIS — M16.11 ARTHRITIS OF RIGHT HIP: ICD-10-CM

## 2024-01-05 DIAGNOSIS — G89.4 CHRONIC PAIN SYNDROME: ICD-10-CM

## 2024-01-05 DIAGNOSIS — M16.12 PRIMARY OSTEOARTHRITIS OF LEFT HIP: ICD-10-CM

## 2024-01-05 DIAGNOSIS — M47.816 LUMBAR SPONDYLOSIS: ICD-10-CM

## 2024-01-05 DIAGNOSIS — M50.30 DEGENERATION OF CERVICAL DISC WITHOUT MYELOPATHY: ICD-10-CM

## 2024-01-05 DIAGNOSIS — E66.01 SEVERE OBESITY (BMI 35.0-39.9) WITH COMORBIDITY (HCC): ICD-10-CM

## 2024-01-05 DIAGNOSIS — Z96.642 S/P TOTAL LEFT HIP ARTHROPLASTY: ICD-10-CM

## 2024-01-05 DIAGNOSIS — M54.16 LUMBAR RADICULITIS: ICD-10-CM

## 2024-01-05 DIAGNOSIS — M47.816 LUMBAR FACET ARTHROPATHY: ICD-10-CM

## 2024-01-05 DIAGNOSIS — M47.812 FACET ARTHROPATHY, CERVICAL: ICD-10-CM

## 2024-01-05 RX ORDER — ACETAMINOPHEN AND CODEINE PHOSPHATE 300; 60 MG/1; MG/1
1 TABLET ORAL DAILY PRN
Qty: 30 TABLET | Refills: 1 | Status: SHIPPED | OUTPATIENT
Start: 2024-01-05 | End: 2024-03-05

## 2024-01-14 DIAGNOSIS — Z76.0 MEDICATION REFILL: ICD-10-CM

## 2024-01-14 DIAGNOSIS — I10 HYPERTENSION, UNSPECIFIED TYPE: ICD-10-CM

## 2024-01-16 RX ORDER — AMLODIPINE BESYLATE 5 MG/1
TABLET ORAL
Qty: 30 TABLET | Refills: 3 | Status: SHIPPED | OUTPATIENT
Start: 2024-01-16

## 2024-01-19 DIAGNOSIS — I10 HYPERTENSION, UNSPECIFIED TYPE: ICD-10-CM

## 2024-01-19 DIAGNOSIS — G89.4 CHRONIC PAIN SYNDROME: ICD-10-CM

## 2024-01-19 DIAGNOSIS — Z76.0 MEDICATION REFILL: ICD-10-CM

## 2024-01-19 RX ORDER — CHLORTHALIDONE 25 MG/1
25 TABLET ORAL DAILY
Qty: 30 TABLET | Refills: 0 | Status: SHIPPED | OUTPATIENT
Start: 2024-01-19

## 2024-01-19 RX ORDER — LOSARTAN POTASSIUM 100 MG/1
100 TABLET ORAL DAILY
Qty: 30 TABLET | Refills: 0 | Status: SHIPPED | OUTPATIENT
Start: 2024-01-19

## 2024-01-19 RX ORDER — PANTOPRAZOLE SODIUM 20 MG/1
TABLET, DELAYED RELEASE ORAL
Qty: 30 TABLET | Refills: 1 | Status: SHIPPED | OUTPATIENT
Start: 2024-01-19

## 2024-01-19 RX ORDER — POTASSIUM CHLORIDE 750 MG/1
TABLET, EXTENDED RELEASE ORAL
Qty: 30 TABLET | Refills: 1 | Status: SHIPPED | OUTPATIENT
Start: 2024-01-19

## 2024-01-19 RX ORDER — ALBUTEROL SULFATE 90 UG/1
2 AEROSOL, METERED RESPIRATORY (INHALATION) EVERY 6 HOURS PRN
Qty: 18 G | Refills: 3 | Status: SHIPPED | OUTPATIENT
Start: 2024-01-19

## 2024-01-19 RX ORDER — GABAPENTIN 300 MG/1
CAPSULE ORAL
Qty: 90 CAPSULE | Refills: 1 | Status: SHIPPED | OUTPATIENT
Start: 2024-01-19 | End: 2025-01-19

## 2024-01-19 RX ORDER — METOPROLOL SUCCINATE 25 MG/1
25 TABLET, EXTENDED RELEASE ORAL DAILY
Qty: 30 TABLET | Refills: 1 | Status: SHIPPED | OUTPATIENT
Start: 2024-01-19

## 2024-01-23 PROBLEM — N81.6 RECTOCELE: Status: ACTIVE | Noted: 2024-01-23

## 2024-01-24 ENCOUNTER — TELEPHONE (OUTPATIENT)
Dept: CASE MANAGEMENT | Age: 64
End: 2024-01-24

## 2024-01-24 NOTE — H&P (VIEW-ONLY)
Pre procedure telephone call made to verify meds, allergies, history and any special arrangements needed. Blood thinners held.  Patient instructed to where and when to report for procedure.  Patient instructed to fast after midnight, may take AM meds with sip of water, dress comfortably, will need a  and approximate length of time they will be here.   Chart assembled with appropriate papers  0730 arrival time

## 2024-01-24 NOTE — TELEPHONE ENCOUNTER
I called the patient's insurance [facility] and I spoke with Cliff LEBRON authorization rep., she stated no authorization is required for CPT code 77447.   I notified Mart ramos ( [place]).       Electronically signed by Maria Dolores Victor on 1/24/24 at 3:51 PM EST

## 2024-01-30 NOTE — PROGRESS NOTES
"Dl Regan is a 1 y.o. male on day 47 of admission presenting with Respiratory failure (CMS/HCC).      Subjective    Neurology team contacted regarding improvement in exam (trying to reach to his ET tube).     Objective     Last Recorded Vitals  Blood pressure 93/62, pulse 121, temperature 37.2 °C (99 °F), resp. rate 20, height 0.91 m (2' 11.83\"), weight 15.1 kg, head circumference 50 cm, SpO2 99 %.    Neurological examination:  Laying in bed with ETT in place   Patient's eyes are closed but open to tactile stimulation   No gaze deviation  Pupils asymmetric but reactive to light bilaterally (R pupil 4mm, L pupil 2mm)  Patient able to withdraw to tickle/nox stimulation in Ues (R>L)  Withdrawal vs triple flexion in LEs symmetrically.   Plantar response equivocal bilaterally  ~4-5 beats of clonus in LEs bilaterally  Per report, patient coughing intermittently (cough reflex was not personally illicited)  No abnormal movements appreciated  Reflexes present in bilateral ankles, minimal in knees (R>L),  but unable to be illicited in bilateral biceps       Relevant Results          Head Circumference: 50 cm       Assessment/Plan   Principal Problem:    Respiratory failure (CMS/HCC)  Active Problems:    S/P ventriculoperitoneal shunt    History of general anesthesia    Cerebral infarction (CMS/HCC)    CVA (cerebral vascular accident) (CMS/HCC)    Communicating hydrocephalus (CMS/HCC)    Hydrocephalus (CMS/HCC)    Dl Regan is a 23 m.o. previously healthy male who is admitted to [facility] post arrest for obstructive noncommunicating hydrocephalus, for which the patient has undergone EVD placement, SOC, and C1 lami. Course was complicated by absent brainstem reflexes on arrival, but EEG continues to demonstrate delta coma ruling-out brain death. Comprehensive neuroimaging reveals bilateral asymmetric cerebellar diffusion restriction (sparing the inferior vermis, flocculus, and tonsils) with significant expansile " Consult received. Dr. Iveth Paulino to evaluate patient's appropriateness for ARU. Per therapy documentation, patient does not have good home support. She will need a safe discharge plan in order to be considered for ARU. Brittney Ortiz RN, Pre-screener for Acute Rehab  P: [phone]  F: [phone]  [email or web address] cytotoxic edema resulting in compression of the 4th & cerebral aqueduct along with upward herniation. DDx for cerebellar disease includes most likely fulminant cerebellitis. There is additionally neuroimaging evidence of anoxic brain injury, but this is very mild. Patient received 5 days of 20mg/kg of IVMP. Initially, neurologic examination was poor with diminished brainstem reflexes and no spontaneous movements, however on 12/27 Naajir began coughing on the vent and on 12/29 patient developed reactive pupils and started withdrawing to noxious stimuli. Patient was last seen by neurology on 1/2/2024 with recommendations to continue GOC discussion as well as neurosurgical management.     Since he was last seen by neurology, patient Levetiracetam (which was started for seizure ppx with no clear history of any seizures) was stopped on 1/27. He failed a trial of extubation on 1/24 due to poor respiratory effort and inability to manage secretions. Details of neurosurgical management can be found in the neursurery recent progress note, most recently patient EVD was Dc'd on 1/14 with improvement in hygroma but it was replaced on 1/16 given increased 3rd ventricle caliber.     Some improvement was noted by the primary team as well as other health care providers. Our neurological examination with some improvement with regard to his ability to open his eyes to stimulation but still has an overall poor examination.                         Joanie Mcgowan MD

## 2024-01-31 ENCOUNTER — HOSPITAL ENCOUNTER (OUTPATIENT)
Dept: INTERVENTIONAL RADIOLOGY/VASCULAR | Age: 64
Discharge: HOME | End: 2024-02-02
Payer: MEDICARE

## 2024-01-31 VITALS
DIASTOLIC BLOOD PRESSURE: 60 MMHG | HEART RATE: 63 BPM | RESPIRATION RATE: 18 BRPM | SYSTOLIC BLOOD PRESSURE: 135 MMHG | OXYGEN SATURATION: 97 %

## 2024-01-31 DIAGNOSIS — I67.1 CEREBRAL ANEURYSM, NONRUPTURED: ICD-10-CM

## 2024-01-31 LAB
ANION GAP SERPL CALCULATED.3IONS-SCNC: 12 MMOL/L (ref 7–16)
BUN SERPL-MCNC: 19 MG/DL (ref 6–23)
CALCIUM SERPL-MCNC: 9.8 MG/DL (ref 8.6–10.2)
CHLORIDE SERPL-SCNC: 100 MMOL/L (ref 98–107)
CO2 SERPL-SCNC: 27 MMOL/L (ref 22–29)
CREAT SERPL-MCNC: 1 MG/DL (ref 0.5–1)
ERYTHROCYTE [DISTWIDTH] IN BLOOD BY AUTOMATED COUNT: 13.3 % (ref 11.5–15)
GFR SERPL CREATININE-BSD FRML MDRD: >60 ML/MIN/1.73M2
GLUCOSE SERPL-MCNC: 129 MG/DL (ref 74–99)
HCT VFR BLD AUTO: 42.2 % (ref 34–48)
HGB BLD-MCNC: 13.7 G/DL (ref 11.5–15.5)
INR PPP: 0.9
MCH RBC QN AUTO: 29 PG (ref 26–35)
MCHC RBC AUTO-ENTMCNC: 32.5 G/DL (ref 32–34.5)
MCV RBC AUTO: 89.2 FL (ref 80–99.9)
PLATELET # BLD AUTO: 254 K/UL (ref 130–450)
PMV BLD AUTO: 9.4 FL (ref 7–12)
POTASSIUM SERPL-SCNC: 3.8 MMOL/L (ref 3.5–5)
PROTHROMBIN TIME: 10.2 SEC (ref 9.3–12.4)
RBC # BLD AUTO: 4.73 M/UL (ref 3.5–5.5)
SODIUM SERPL-SCNC: 139 MMOL/L (ref 132–146)
WBC OTHER # BLD: 8 K/UL (ref 4.5–11.5)

## 2024-01-31 PROCEDURE — 2580000003 HC RX 258: Performed by: PSYCHIATRY & NEUROLOGY

## 2024-01-31 PROCEDURE — 85027 COMPLETE CBC AUTOMATED: CPT

## 2024-01-31 PROCEDURE — 85610 PROTHROMBIN TIME: CPT

## 2024-01-31 PROCEDURE — 36223 PLACE CATH CAROTID/INOM ART: CPT

## 2024-01-31 PROCEDURE — 7100000010 HC PHASE II RECOVERY - FIRST 15 MIN

## 2024-01-31 PROCEDURE — 80048 BASIC METABOLIC PNL TOTAL CA: CPT

## 2024-01-31 PROCEDURE — 36415 COLL VENOUS BLD VENIPUNCTURE: CPT

## 2024-01-31 PROCEDURE — C1769 GUIDE WIRE: HCPCS

## 2024-01-31 PROCEDURE — A4216 STERILE WATER/SALINE, 10 ML: HCPCS | Performed by: PSYCHIATRY & NEUROLOGY

## 2024-01-31 PROCEDURE — 2500000003 HC RX 250 WO HCPCS: Performed by: PSYCHIATRY & NEUROLOGY

## 2024-01-31 PROCEDURE — 6360000002 HC RX W HCPCS: Performed by: PSYCHIATRY & NEUROLOGY

## 2024-01-31 PROCEDURE — 7100000011 HC PHASE II RECOVERY - ADDTL 15 MIN

## 2024-01-31 RX ORDER — FENTANYL CITRATE 50 UG/ML
INJECTION, SOLUTION INTRAMUSCULAR; INTRAVENOUS PRN
Status: DISCONTINUED | OUTPATIENT
Start: 2024-01-31 | End: 2024-02-03 | Stop reason: HOSPADM

## 2024-01-31 RX ORDER — HEPARIN SODIUM 10000 [USP'U]/ML
INJECTION, SOLUTION INTRAVENOUS; SUBCUTANEOUS PRN
Status: DISCONTINUED | OUTPATIENT
Start: 2024-01-31 | End: 2024-02-03 | Stop reason: HOSPADM

## 2024-01-31 RX ORDER — DIPHENHYDRAMINE HYDROCHLORIDE 50 MG/ML
50 INJECTION INTRAMUSCULAR; INTRAVENOUS ONCE
Status: DISCONTINUED | OUTPATIENT
Start: 2024-01-31 | End: 2024-01-31

## 2024-01-31 RX ORDER — DIPHENHYDRAMINE HYDROCHLORIDE 50 MG/ML
25 INJECTION INTRAMUSCULAR; INTRAVENOUS ONCE
Status: COMPLETED | OUTPATIENT
Start: 2024-01-31 | End: 2024-01-31

## 2024-01-31 RX ORDER — LIDOCAINE HYDROCHLORIDE 20 MG/ML
INJECTION, SOLUTION INFILTRATION; PERINEURAL PRN
Status: DISCONTINUED | OUTPATIENT
Start: 2024-01-31 | End: 2024-02-03 | Stop reason: HOSPADM

## 2024-01-31 RX ORDER — MIDAZOLAM HYDROCHLORIDE 2 MG/2ML
INJECTION, SOLUTION INTRAMUSCULAR; INTRAVENOUS PRN
Status: DISCONTINUED | OUTPATIENT
Start: 2024-01-31 | End: 2024-02-03 | Stop reason: HOSPADM

## 2024-01-31 RX ORDER — METHYLPREDNISOLONE SODIUM SUCCINATE 40 MG/ML
40 INJECTION, POWDER, LYOPHILIZED, FOR SOLUTION INTRAMUSCULAR; INTRAVENOUS ONCE
Status: COMPLETED | OUTPATIENT
Start: 2024-01-31 | End: 2024-01-31

## 2024-01-31 RX ADMIN — Medication 1000 ML: at 10:13

## 2024-01-31 RX ADMIN — Medication 5000 UNITS: at 10:13

## 2024-01-31 RX ADMIN — Medication 5000 UNITS: at 10:14

## 2024-01-31 RX ADMIN — FAMOTIDINE 20 MG: 10 INJECTION INTRAVENOUS at 09:26

## 2024-01-31 RX ADMIN — MIDAZOLAM HYDROCHLORIDE 1 MG: 1 INJECTION, SOLUTION INTRAMUSCULAR; INTRAVENOUS at 10:17

## 2024-01-31 RX ADMIN — METHYLPREDNISOLONE SODIUM SUCCINATE 40 MG: 40 INJECTION INTRAMUSCULAR; INTRAVENOUS at 09:25

## 2024-01-31 RX ADMIN — VERAPAMIL HYDROCHLORIDE 5 ML: 2.5 INJECTION INTRAVENOUS at 10:28

## 2024-01-31 RX ADMIN — FENTANYL CITRATE 25 MCG: 50 INJECTION, SOLUTION INTRAMUSCULAR; INTRAVENOUS at 10:32

## 2024-01-31 RX ADMIN — FENTANYL CITRATE 50 MCG: 50 INJECTION, SOLUTION INTRAMUSCULAR; INTRAVENOUS at 10:16

## 2024-01-31 RX ADMIN — LIDOCAINE HYDROCHLORIDE 5 ML: 20 INJECTION, SOLUTION INFILTRATION; PERINEURAL at 10:22

## 2024-01-31 RX ADMIN — DIPHENHYDRAMINE HYDROCHLORIDE 25 MG: 50 INJECTION INTRAMUSCULAR; INTRAVENOUS at 09:34

## 2024-01-31 NOTE — PROCEDURES
1059Patient returned from procedure.  Patient stable. No s/s of complications noted or reported. Vitals will be checked q 15min, see flow sheets.  TR band applied in room at 1045.  Pulse oximeter placed on hand where TR band is applied.     TR band air removal per protocol with 2cc air removed from balloon and assessment of pulse, pallor and pain [id number].     16ml air placed in balloon in room at 1045  1100 2ml air removed to 14 ml  1115 2ml air removed to 12ml  1130 2ml air removed to 10ml  1145 2ml air removed to 8ml  1200 2ml air removed to 6ml  1215 2ml air removed to 4ml  1230 2ml air removed to 2ml  1245 2ml air removed to 0ml  1245TR band left in place with 0ml air in balloon  1300 TR band removed, pressure dressing applied    1130Patient eating and drinking well with no s/s of complications noted or reported.    1315 TR band site was checked with every set of vitals. Site clean dry and intact. Discharge papers reviewed with patient, questions answered, discharge paper signed. Patient taken to door. Patient in stable condition, no s/s of complications noted or reported.

## 2024-01-31 NOTE — INTERVAL H&P NOTE
Update History & Physical    The patient's History and Physical of December 22, 2024 was reviewed with the patient and I examined the patient. There was no change. The surgical site was confirmed by the patient and me.     Plan: The risks, benefits, expected outcome, and alternative to the recommended procedure have been discussed with the patient. Patient understands and wants to proceed with the procedure.       Neurointervention Pre procedure Note      Medical record number:  [id number]      Procedure: Diagnostic Cerebral Angiogram   Indications:  Follow of VANE X  Labs:          The patient's record including history and physical, available labs and procedures, medications and allergies has been reviewed.     PRE-PROCEDURE ATTESTATION STATEMENT  I have explained the potential risks, benefits, and side effects of the proposed procedure/treatment, including the risk of death to the patient and/or surrogate. Also, the possibility for the transfusion of blood or blood components (only if potential for transfusion is applicable) was discussed with risks, benefits, and alternatives.   This explanation included discussion of the likelihood of the patient achieving his or her goals and any potential problems that might occur during recuperation. Reasonable alternatives to the proposed procedure/treatment including risks, benefits, and side effects were also discussed, as were the risks related to not receiving the proposed procedure/treatment.  The patient and/or surrogate have elected to proceed with the proposed procedure/treatment..      Sedation and/or anesthesia risk, benefits, and alternatives discussed and questions answered.     Vital Signs:        GENERAL: NPO: YES  ASA: ASA 2 - Patient with mild systemic disease with no functional limitations  MALLAMPATI: II (soft palate, uvula, fauces visible)    Anesthesia Plan:  Plan for conscious sedation. / Please see anesthesia plan                Electronically signed by

## 2024-01-31 NOTE — OR NURSING
Patient successfully and safely transferred from bed to procedure table. No complications or adverse events during the transfer. Continuous telemetry and VS monitoring initiated. RN to monitor patient sedation throughout procedure. Intravenous access is secure and patent. Required medications and emergency equipment are readily available.

## 2024-02-22 DIAGNOSIS — I10 HYPERTENSION, UNSPECIFIED TYPE: ICD-10-CM

## 2024-02-22 DIAGNOSIS — Z76.0 MEDICATION REFILL: ICD-10-CM

## 2024-02-22 RX ORDER — ATORVASTATIN CALCIUM 40 MG/1
40 TABLET, FILM COATED ORAL NIGHTLY
Qty: 90 TABLET | Refills: 2 | Status: SHIPPED | OUTPATIENT
Start: 2024-02-22 | End: 2024-11-18

## 2024-02-22 RX ORDER — CHLORTHALIDONE 25 MG/1
25 TABLET ORAL DAILY
Qty: 30 TABLET | Refills: 0 | Status: SHIPPED | OUTPATIENT
Start: 2024-02-22

## 2024-02-22 NOTE — TELEPHONE ENCOUNTER
Patient needs refill on:    Chlorthalidone 25 mg     Atorvastatin 40 mg   Next visit 4/24.     Patient is currently out of both medications - [facility] [address]

## 2024-02-23 DIAGNOSIS — I10 HYPERTENSION, UNSPECIFIED TYPE: ICD-10-CM

## 2024-02-23 DIAGNOSIS — Z76.0 MEDICATION REFILL: ICD-10-CM

## 2024-02-23 RX ORDER — LOSARTAN POTASSIUM 100 MG/1
100 TABLET ORAL DAILY
Qty: 30 TABLET | Refills: 0 | Status: CANCELLED
Start: 2024-02-23

## 2024-02-23 RX ORDER — ATORVASTATIN CALCIUM 40 MG/1
40 TABLET, FILM COATED ORAL NIGHTLY
Qty: 90 TABLET | Refills: 2 | Status: CANCELLED | OUTPATIENT
Start: 2024-02-23 | End: 2024-11-19

## 2024-02-27 ENCOUNTER — OFFICE VISIT (OUTPATIENT)
Dept: PAIN MANAGEMENT | Age: 64
End: 2024-02-27
Payer: MEDICARE

## 2024-02-27 VITALS
HEART RATE: 66 BPM | BODY MASS INDEX: 36.99 KG/M2 | TEMPERATURE: 97.1 F | DIASTOLIC BLOOD PRESSURE: 64 MMHG | WEIGHT: 222 LBS | OXYGEN SATURATION: 99 % | SYSTOLIC BLOOD PRESSURE: 122 MMHG | RESPIRATION RATE: 18 BRPM | HEIGHT: 65 IN

## 2024-02-27 DIAGNOSIS — M16.12 PRIMARY OSTEOARTHRITIS OF LEFT HIP: ICD-10-CM

## 2024-02-27 DIAGNOSIS — M47.816 LUMBAR FACET ARTHROPATHY: ICD-10-CM

## 2024-02-27 DIAGNOSIS — M54.16 LUMBAR RADICULITIS: ICD-10-CM

## 2024-02-27 DIAGNOSIS — G89.4 CHRONIC PAIN SYNDROME: ICD-10-CM

## 2024-02-27 DIAGNOSIS — E66.01 SEVERE OBESITY (BMI 35.0-39.9) WITH COMORBIDITY (HCC): ICD-10-CM

## 2024-02-27 DIAGNOSIS — M50.30 DEGENERATION OF CERVICAL DISC WITHOUT MYELOPATHY: ICD-10-CM

## 2024-02-27 DIAGNOSIS — M16.11 ARTHRITIS OF RIGHT HIP: ICD-10-CM

## 2024-02-27 DIAGNOSIS — M51.9 LUMBAR DISC DISORDER: ICD-10-CM

## 2024-02-27 DIAGNOSIS — M47.812 FACET ARTHROPATHY, CERVICAL: ICD-10-CM

## 2024-02-27 DIAGNOSIS — Z96.642 S/P TOTAL LEFT HIP ARTHROPLASTY: ICD-10-CM

## 2024-02-27 DIAGNOSIS — M47.816 LUMBAR SPONDYLOSIS: ICD-10-CM

## 2024-02-27 DIAGNOSIS — G89.4 CHRONIC PAIN SYNDROME: Primary | ICD-10-CM

## 2024-02-27 PROCEDURE — G8427 DOCREV CUR MEDS BY ELIG CLIN: HCPCS | Performed by: PHYSICIAN ASSISTANT

## 2024-02-27 PROCEDURE — 99213 OFFICE O/P EST LOW 20 MIN: CPT | Performed by: PHYSICIAN ASSISTANT

## 2024-02-27 PROCEDURE — 3078F DIAST BP <80 MM HG: CPT | Performed by: PHYSICIAN ASSISTANT

## 2024-02-27 PROCEDURE — 1036F TOBACCO NON-USER: CPT | Performed by: PHYSICIAN ASSISTANT

## 2024-02-27 PROCEDURE — G8484 FLU IMMUNIZE NO ADMIN: HCPCS | Performed by: PHYSICIAN ASSISTANT

## 2024-02-27 PROCEDURE — 3074F SYST BP LT 130 MM HG: CPT | Performed by: PHYSICIAN ASSISTANT

## 2024-02-27 PROCEDURE — G8417 CALC BMI ABV UP PARAM F/U: HCPCS | Performed by: PHYSICIAN ASSISTANT

## 2024-02-27 PROCEDURE — 3017F COLORECTAL CA SCREEN DOC REV: CPT | Performed by: PHYSICIAN ASSISTANT

## 2024-02-27 RX ORDER — ACETAMINOPHEN AND CODEINE PHOSPHATE 300; 60 MG/1; MG/1
1 TABLET ORAL DAILY PRN
Qty: 30 TABLET | Refills: 1 | Status: SHIPPED | OUTPATIENT
Start: 2024-03-18 | End: 2024-05-17

## 2024-02-27 NOTE — PROGRESS NOTES
Ruth Anguiano presents to the [facility] on 2/27/2024. Ruth is complaining of pain back. The pain is constant. The pain is described as aching, throbbing, stabbing, sharp, penetrating, and nagging. Pain is rated on her best day at a 5, on her worst day at a 10, and on average at a 7 on the VAS scale. She took her last dose of Neurontin and Tylenol with codeine today.      Any procedures since your last visit: No  She is not on NSAIDS and  is  on anticoagulation medications to include ASA and is managed by Neurology.     Pacemaker or defibrillator: No   Medication Contract and Consent for Opioid Use Documents Filed       Patient Documents       Type of Document Status Date Received Received By Description    Medication Contract Received 3/1/2019  1:43 PM FRANCIS MCINTOSH PAIN MANAGEMENT PT AGREEMENT 3/1/2019    Medication Contract Received 10/8/2020  1:21 PM FRANCIS MCINTOSH pain pt agreement    Medication Contract Received 10/7/2021 10:36 AM BOLIVAR TRUJILLO Pain Mgmt Patient Agreement 10.7.2021    Medication Contract Received 9/26/2022 10:40 AM ENMA WAGNER MEDICATION CONTRACT    Consent Opioid Use Received 8/28/2023 10:09 AM OLU VALENZUELA pt agreement 8/28/23                       Resp 18   Ht 1.651 m (5' 5\")   Wt 100.7 kg (222 lb)   BMI 36.94 kg/m²      No LMP recorded. Patient is postmenopausal.  
Monitoring Periodic Controlled Substance Monitoring   2/27/2024   9:20 AM Possible medication side effects, risk of tolerance/dependence & alternative treatments discussed.;No signs of potential drug abuse or diversion identified.;Assessed functional status (ability to engage in work or other purposeful activities, the pain intensity and its interference with activities of daily living, quality of family life and social activities, and the physical activity);Obtaining appropriate analgesic effect of treatment.               We discussed with the patient that combining opioids, benzodiazepines, alcohol, illicit drugs or sleep aids increases the risk of respiratory depression including death. We discussed that these medications may cause drowsiness, sedation or dizziness and have counseled the patient not to drive or operate machinery. We have discussed that these medications will be prescribed only by one provider. We have discussed with the patient about age related risk factors and have thoroughly discussed the importance of taking these medications as prescribed. The patient verbalizes understanding.    ccreferring physic

## 2024-03-11 ENCOUNTER — HOSPITAL ENCOUNTER (OUTPATIENT)
Dept: CARDIOLOGY | Age: 64
Discharge: HOME | End: 2024-03-13
Attending: PSYCHIATRY & NEUROLOGY
Payer: MEDICARE

## 2024-03-11 VITALS
HEIGHT: 65 IN | SYSTOLIC BLOOD PRESSURE: 115 MMHG | DIASTOLIC BLOOD PRESSURE: 59 MMHG | BODY MASS INDEX: 38.32 KG/M2 | WEIGHT: 230 LBS

## 2024-03-11 DIAGNOSIS — Z86.79 H/O CHF: ICD-10-CM

## 2024-03-11 DIAGNOSIS — R55 POSTURAL DIZZINESS WITH PRESYNCOPE: ICD-10-CM

## 2024-03-11 DIAGNOSIS — I67.1 CEREBRAL ANEURYSM: ICD-10-CM

## 2024-03-11 DIAGNOSIS — R42 POSTURAL DIZZINESS WITH PRESYNCOPE: ICD-10-CM

## 2024-03-11 DIAGNOSIS — Z95.828 PRESENCE OF INTERNAL CAROTID STENT: ICD-10-CM

## 2024-03-11 DIAGNOSIS — Z78.9 PLAVIX RESISTANCE: ICD-10-CM

## 2024-03-11 LAB
ECHO AR MAX VEL PISA: 3.5 M/S
ECHO AV CUSP MM: 1.7 CM
ECHO AV MEAN GRADIENT: 3 MMHG
ECHO AV MEAN VELOCITY: 0.8 M/S
ECHO AV PEAK GRADIENT: 8 MMHG
ECHO AV PEAK VELOCITY: 1.4 M/S
ECHO AV REGURGITANT PHT: 562 MILLISECOND
ECHO AV VTI: 28.7 CM
ECHO BSA: 2.19 M2
ECHO LA DIAMETER INDEX: 1.43 CM/M2
ECHO LA DIAMETER: 3 CM
ECHO LA VOL A-L A2C: 38 ML (ref 22–52)
ECHO LA VOL A-L A4C: 56 ML (ref 22–52)
ECHO LA VOL MOD A2C: 34 ML (ref 22–52)
ECHO LA VOL MOD A4C: 52 ML (ref 22–52)
ECHO LA VOLUME AREA LENGTH: 50 ML
ECHO LA VOLUME INDEX A-L A2C: 18 ML/M2 (ref 16–34)
ECHO LA VOLUME INDEX A-L A4C: 27 ML/M2 (ref 16–34)
ECHO LA VOLUME INDEX AREA LENGTH: 24 ML/M2 (ref 16–34)
ECHO LA VOLUME INDEX MOD A2C: 16 ML/M2 (ref 16–34)
ECHO LA VOLUME INDEX MOD A4C: 25 ML/M2 (ref 16–34)
ECHO LV FRACTIONAL SHORTENING: 28 % (ref 28–44)
ECHO LV INTERNAL DIMENSION DIASTOLE INDEX: 1.86 CM/M2
ECHO LV INTERNAL DIMENSION DIASTOLIC: 3.9 CM (ref 3.9–5.3)
ECHO LV INTERNAL DIMENSION SYSTOLIC INDEX: 1.33 CM/M2
ECHO LV INTERNAL DIMENSION SYSTOLIC: 2.8 CM
ECHO LV IVSD: 1.2 CM (ref 0.6–0.9)
ECHO LV MASS 2D: 159.3 G (ref 67–162)
ECHO LV MASS INDEX 2D: 75.9 G/M2 (ref 43–95)
ECHO LV POSTERIOR WALL DIASTOLIC: 1.2 CM (ref 0.6–0.9)
ECHO LV RELATIVE WALL THICKNESS RATIO: 0.62
ECHO MV A VELOCITY: 0.56 M/S
ECHO MV E DECELERATION TIME (DT): 210.8 MS
ECHO MV E VELOCITY: 0.69 M/S
ECHO MV E/A RATIO: 1.23
ECHO MV MAX VELOCITY: 0.7 M/S
ECHO MV MEAN GRADIENT: 1 MMHG
ECHO MV MEAN VELOCITY: 0.4 M/S
ECHO MV PEAK GRADIENT: 2 MMHG
ECHO MV VTI: 24.1 CM
ECHO RV INTERNAL DIMENSION: 2.1 CM

## 2024-03-11 PROCEDURE — 93306 TTE W/DOPPLER COMPLETE: CPT | Performed by: INTERNAL MEDICINE

## 2024-03-11 PROCEDURE — 93306 TTE W/DOPPLER COMPLETE: CPT

## 2024-03-15 ENCOUNTER — TELEPHONE (OUTPATIENT)
Dept: PAIN MANAGEMENT | Age: 64
End: 2024-03-15

## 2024-03-15 DIAGNOSIS — M16.12 PRIMARY OSTEOARTHRITIS OF LEFT HIP: ICD-10-CM

## 2024-03-15 DIAGNOSIS — Z96.642 S/P TOTAL LEFT HIP ARTHROPLASTY: ICD-10-CM

## 2024-03-15 DIAGNOSIS — G89.4 CHRONIC PAIN SYNDROME: ICD-10-CM

## 2024-03-15 DIAGNOSIS — M16.11 ARTHRITIS OF RIGHT HIP: ICD-10-CM

## 2024-03-15 DIAGNOSIS — M47.816 LUMBAR FACET ARTHROPATHY: ICD-10-CM

## 2024-03-15 DIAGNOSIS — M50.30 DEGENERATION OF CERVICAL DISC WITHOUT MYELOPATHY: ICD-10-CM

## 2024-03-15 DIAGNOSIS — E66.01 SEVERE OBESITY (BMI 35.0-39.9) WITH COMORBIDITY (HCC): ICD-10-CM

## 2024-03-15 DIAGNOSIS — M51.9 LUMBAR DISC DISORDER: ICD-10-CM

## 2024-03-15 DIAGNOSIS — M47.816 LUMBAR SPONDYLOSIS: ICD-10-CM

## 2024-03-15 DIAGNOSIS — M47.812 FACET ARTHROPATHY, CERVICAL: ICD-10-CM

## 2024-03-15 DIAGNOSIS — M54.16 LUMBAR RADICULITIS: ICD-10-CM

## 2024-03-15 RX ORDER — ACETAMINOPHEN AND CODEINE PHOSPHATE 300; 60 MG/1; MG/1
1 TABLET ORAL DAILY PRN
Qty: 30 TABLET | Refills: 1 | Status: SHIPPED | OUTPATIENT
Start: 2024-03-18 | End: 2024-05-17

## 2024-03-15 NOTE — TELEPHONE ENCOUNTER
Ruth called today asking for the prescription for Tylenol #4 to be re sent to [facility]. The paper prescription she got in February has . Thank you. She is aware the start date is 3/18/24.

## 2024-03-21 ENCOUNTER — HOSPITAL ENCOUNTER (OUTPATIENT)
Dept: ULTRASOUND IMAGING | Age: 64
Discharge: HOME | End: 2024-03-23
Attending: FAMILY MEDICINE
Payer: MEDICARE

## 2024-03-21 ENCOUNTER — OFFICE VISIT (OUTPATIENT)
Dept: FAMILY MEDICINE CLINIC | Age: 64
End: 2024-03-21
Payer: MEDICARE

## 2024-03-21 VITALS
DIASTOLIC BLOOD PRESSURE: 80 MMHG | OXYGEN SATURATION: 99 % | HEART RATE: 77 BPM | RESPIRATION RATE: 18 BRPM | WEIGHT: 222.66 LBS | BODY MASS INDEX: 37.05 KG/M2 | SYSTOLIC BLOOD PRESSURE: 128 MMHG | TEMPERATURE: 98.2 F

## 2024-03-21 DIAGNOSIS — I10 HYPERTENSION, UNSPECIFIED TYPE: Primary | Chronic | ICD-10-CM

## 2024-03-21 DIAGNOSIS — M25.561 POSTERIOR RIGHT KNEE PAIN: ICD-10-CM

## 2024-03-21 DIAGNOSIS — M79.89 RIGHT LEG SWELLING: ICD-10-CM

## 2024-03-21 DIAGNOSIS — Z86.718 HISTORY OF DVT (DEEP VEIN THROMBOSIS): ICD-10-CM

## 2024-03-21 DIAGNOSIS — Z23 NEED FOR PNEUMOCOCCAL 20-VALENT CONJUGATE VACCINATION: ICD-10-CM

## 2024-03-21 DIAGNOSIS — R35.0 FREQUENCY OF URINATION: ICD-10-CM

## 2024-03-21 DIAGNOSIS — N81.6 RECTOCELE: ICD-10-CM

## 2024-03-21 LAB
BILIRUBIN, POC: NORMAL
BLOOD URINE, POC: NORMAL
CLARITY, POC: CLEAR
COLOR, POC: YELLOW
GLUCOSE URINE, POC: NORMAL
KETONES, POC: NORMAL
LEUKOCYTE EST, POC: NORMAL
NITRITE, POC: NORMAL
PH, POC: 6
PROTEIN, POC: NORMAL
SPECIFIC GRAVITY, POC: 1.02
UROBILINOGEN, POC: 0.2

## 2024-03-21 PROCEDURE — 1036F TOBACCO NON-USER: CPT | Performed by: FAMILY MEDICINE

## 2024-03-21 PROCEDURE — G8427 DOCREV CUR MEDS BY ELIG CLIN: HCPCS | Performed by: FAMILY MEDICINE

## 2024-03-21 PROCEDURE — 3074F SYST BP LT 130 MM HG: CPT | Performed by: FAMILY MEDICINE

## 2024-03-21 PROCEDURE — 99214 OFFICE O/P EST MOD 30 MIN: CPT | Performed by: FAMILY MEDICINE

## 2024-03-21 PROCEDURE — 93971 EXTREMITY STUDY: CPT

## 2024-03-21 PROCEDURE — G8484 FLU IMMUNIZE NO ADMIN: HCPCS | Performed by: FAMILY MEDICINE

## 2024-03-21 PROCEDURE — G8417 CALC BMI ABV UP PARAM F/U: HCPCS | Performed by: FAMILY MEDICINE

## 2024-03-21 PROCEDURE — 3079F DIAST BP 80-89 MM HG: CPT | Performed by: FAMILY MEDICINE

## 2024-03-21 PROCEDURE — 3017F COLORECTAL CA SCREEN DOC REV: CPT | Performed by: FAMILY MEDICINE

## 2024-03-21 PROCEDURE — 81002 URINALYSIS NONAUTO W/O SCOPE: CPT | Performed by: FAMILY MEDICINE

## 2024-03-21 RX ORDER — PSYLLIUM SEED (WITH DEXTROSE)
3.4 POWDER (GRAM) ORAL 2 TIMES DAILY
Qty: 538 G | Refills: 5 | Status: SHIPPED | OUTPATIENT
Start: 2024-03-21

## 2024-03-21 RX ORDER — LOSARTAN POTASSIUM 100 MG/1
100 TABLET ORAL DAILY
Qty: 90 TABLET | Refills: 1 | Status: SHIPPED | OUTPATIENT
Start: 2024-03-21

## 2024-03-21 SDOH — ECONOMIC STABILITY: INCOME INSECURITY: HOW HARD IS IT FOR YOU TO PAY FOR THE VERY BASICS LIKE FOOD, HOUSING, MEDICAL CARE, AND HEATING?: NOT HARD AT ALL

## 2024-03-21 SDOH — ECONOMIC STABILITY: FOOD INSECURITY: WITHIN THE PAST 12 MONTHS, THE FOOD YOU BOUGHT JUST DIDN'T LAST AND YOU DIDN'T HAVE MONEY TO GET MORE.: NEVER TRUE

## 2024-03-21 SDOH — ECONOMIC STABILITY: FOOD INSECURITY: WITHIN THE PAST 12 MONTHS, YOU WORRIED THAT YOUR FOOD WOULD RUN OUT BEFORE YOU GOT MONEY TO BUY MORE.: NEVER TRUE

## 2024-03-21 ASSESSMENT — PATIENT HEALTH QUESTIONNAIRE - PHQ9
SUM OF ALL RESPONSES TO PHQ QUESTIONS 1-9: 0
1. LITTLE INTEREST OR PLEASURE IN DOING THINGS: NOT AT ALL
SUM OF ALL RESPONSES TO PHQ QUESTIONS 1-9: 0
SUM OF ALL RESPONSES TO PHQ9 QUESTIONS 1 & 2: 0
2. FEELING DOWN, DEPRESSED OR HOPELESS: NOT AT ALL

## 2024-03-21 NOTE — PROGRESS NOTES
3/21/2024    Ruth Anguiano is a 63 y.o. female here for   Chief Complaint   Patient presents with    Hypertension     She is having pain in right posterior leg/ thigh  She is off brilinta since she was on it for a year  Since she stopped it ohwever she has noticed right posterior leg symptoms  Radiating from calf to posterior thigh.   She has known h/o DVT  Worse when she first gets up from sitting  No knee pain      She is off brilinta  Thinks it has gotten worse since  Was seen by urology   She has a rectocele - a pessary was tried, however fell out. They are considering surgical repair     8/23/23  Last appointment was having chest heaviness, positive D Dimer  Subsequently seen in the ED  CTA from 8/23/23  IMPRESSION:  1. No pulmonary embolism.  2.  No acute cardiopulmonary abnormality.  3. Thickening of the wall along the left ventricular apex, likely due to  prior infarction.  No evidence of aneurysm or pseudoaneurysm.  4. Mild cardiomegaly with left ventricular wall hypertrophy.  No evidence of  a significant CHF exacerbation.  5. Small hiatal hernia.      She had a cath of carotids in February 2024  IMPRESSION:     1.  There is endothelialization noticed across the neck of both  aneurysms indicating a Cristobal 1 occlusion and complete exclusion of  the aneurysm from the intracranial circulation        2. Persistent patency of the left internal and common carotid artery  with moderate nonflow-limiting endothelialization seen in the common  carotid artery    She had Echo 3/11/24    Left Ventricle: Normal left ventricular systolic function with a visually estimated EF of 55 - 60%. Left ventricle size is normal. Mildly increased wall thickness. Normal wall motion.    Right Ventricle: Normal systolic function.    Aortic Valve: Trace regurgitation.    Mitral Valve: Trace regurgitation.    Pericardium: No pericardial effusion.     Her pap is up to emily e- done 11/20/23    She has been following with pain

## 2024-03-22 ENCOUNTER — TELEPHONE (OUTPATIENT)
Dept: FAMILY MEDICINE CLINIC | Age: 64
End: 2024-03-22

## 2024-03-22 DIAGNOSIS — Z76.0 MEDICATION REFILL: ICD-10-CM

## 2024-03-22 DIAGNOSIS — I10 HYPERTENSION, UNSPECIFIED TYPE: ICD-10-CM

## 2024-03-22 RX ORDER — METOPROLOL SUCCINATE 25 MG/1
25 TABLET, EXTENDED RELEASE ORAL DAILY
Qty: 90 TABLET | Refills: 0 | Status: SHIPPED
Start: 2024-03-22 | End: 2024-05-16 | Stop reason: SDUPTHER

## 2024-03-22 RX ORDER — PANTOPRAZOLE SODIUM 20 MG/1
TABLET, DELAYED RELEASE ORAL
Qty: 90 TABLET | Refills: 0 | Status: SHIPPED
Start: 2024-03-22 | End: 2024-05-16 | Stop reason: SDUPTHER

## 2024-03-22 RX ORDER — CHLORTHALIDONE 25 MG/1
25 TABLET ORAL DAILY
Qty: 90 TABLET | Refills: 0 | Status: SHIPPED | OUTPATIENT
Start: 2024-03-22

## 2024-03-22 RX ORDER — POTASSIUM CHLORIDE 750 MG/1
TABLET, EXTENDED RELEASE ORAL
Qty: 90 TABLET | Refills: 0 | Status: SHIPPED
Start: 2024-03-22 | End: 2024-05-16 | Stop reason: SDUPTHER

## 2024-03-25 ENCOUNTER — TELEPHONE (OUTPATIENT)
Dept: CARDIOLOGY CLINIC | Age: 64
End: 2024-03-25

## 2024-03-25 NOTE — TELEPHONE ENCOUNTER
I spoke with patient, she said she doesn't know when surgery will be scheduled and that she will call us back to schedule for cardiac clearance.

## 2024-03-25 NOTE — TELEPHONE ENCOUNTER
----- Message from Anne Polk DO sent at 3/25/2024  7:15 AM EDT -----  Please arrange an OV for pre-op clearance

## 2024-03-26 NOTE — TELEPHONE ENCOUNTER
I called patient again to schedule for cardiac clearance but she declined stating she will call and schedule a follow up once surgery is scheduled.

## 2024-04-11 ENCOUNTER — HOSPITAL ENCOUNTER (OUTPATIENT)
Dept: GENERAL RADIOLOGY | Age: 64
Discharge: HOME | End: 2024-04-13
Payer: MEDICARE

## 2024-04-11 ENCOUNTER — OFFICE VISIT (OUTPATIENT)
Dept: FAMILY MEDICINE CLINIC | Age: 64
End: 2024-04-11
Payer: MEDICARE

## 2024-04-11 ENCOUNTER — HOSPITAL ENCOUNTER (OUTPATIENT)
Age: 64
Discharge: HOME | End: 2024-04-13
Payer: MEDICARE

## 2024-04-11 VITALS
SYSTOLIC BLOOD PRESSURE: 138 MMHG | HEART RATE: 63 BPM | RESPIRATION RATE: 18 BRPM | HEIGHT: 65 IN | BODY MASS INDEX: 37.65 KG/M2 | DIASTOLIC BLOOD PRESSURE: 73 MMHG | OXYGEN SATURATION: 97 % | WEIGHT: 226 LBS | TEMPERATURE: 97.6 F

## 2024-04-11 DIAGNOSIS — M25.561 POSTERIOR RIGHT KNEE PAIN: ICD-10-CM

## 2024-04-11 DIAGNOSIS — Z01.818 ENCOUNTER FOR PREOPERATIVE ASSESSMENT: Primary | ICD-10-CM

## 2024-04-11 DIAGNOSIS — I10 HYPERTENSION, UNSPECIFIED TYPE: ICD-10-CM

## 2024-04-11 PROCEDURE — G8427 DOCREV CUR MEDS BY ELIG CLIN: HCPCS | Performed by: FAMILY MEDICINE

## 2024-04-11 PROCEDURE — 1090F PRES/ABSN URINE INCON ASSESS: CPT | Performed by: FAMILY MEDICINE

## 2024-04-11 PROCEDURE — G8417 CALC BMI ABV UP PARAM F/U: HCPCS | Performed by: FAMILY MEDICINE

## 2024-04-11 PROCEDURE — 73562 X-RAY EXAM OF KNEE 3: CPT

## 2024-04-11 PROCEDURE — 93000 ELECTROCARDIOGRAM COMPLETE: CPT | Performed by: FAMILY MEDICINE

## 2024-04-11 PROCEDURE — 3017F COLORECTAL CA SCREEN DOC REV: CPT | Performed by: FAMILY MEDICINE

## 2024-04-11 PROCEDURE — 3078F DIAST BP <80 MM HG: CPT | Performed by: FAMILY MEDICINE

## 2024-04-11 PROCEDURE — 3075F SYST BP GE 130 - 139MM HG: CPT | Performed by: FAMILY MEDICINE

## 2024-04-11 PROCEDURE — 1123F ACP DISCUSS/DSCN MKR DOCD: CPT | Performed by: FAMILY MEDICINE

## 2024-04-11 PROCEDURE — 99214 OFFICE O/P EST MOD 30 MIN: CPT | Performed by: FAMILY MEDICINE

## 2024-04-11 PROCEDURE — 1036F TOBACCO NON-USER: CPT | Performed by: FAMILY MEDICINE

## 2024-04-11 PROCEDURE — G8400 PT W/DXA NO RESULTS DOC: HCPCS | Performed by: FAMILY MEDICINE

## 2024-04-11 RX ORDER — BUDESONIDE AND FORMOTEROL FUMARATE DIHYDRATE 80; 4.5 UG/1; UG/1
2 AEROSOL RESPIRATORY (INHALATION) 2 TIMES DAILY
Qty: 10.2 G | Refills: 2 | Status: SHIPPED | OUTPATIENT
Start: 2024-04-11

## 2024-04-11 NOTE — PROGRESS NOTES
GASTROINTESTINAL ENDOSCOPY N/A 2019    EGD BIOPSY performed by Isaías Mello MD at [facility] END[facility]     Family History   Problem Relation Age of Onset    Diabetes Mother     Arthritis Mother     Atrial Fibrillation Mother     Diabetes Father     Atrial Fibrillation Father     Arthritis Father     Emphysema Father     Cancer Sister         \"female\"     Social History     Socioeconomic History    Marital status:      Spouse name: Not on file    Number of children: 3    Years of education: Not on file    Highest education level: GED or equivalent   Occupational History    Not on file   Tobacco Use    Smoking status: Former     Current packs/day: 0.00     Average packs/day: 1.5 packs/day for 43.0 years (64.5 ttl pk-yrs)     Types: Cigarettes     Start date: 1975     Quit date: 2018     Years since quittin.5    Smokeless tobacco: Never   Vaping Use    Vaping Use: Never used   Substance and Sexual Activity    Alcohol use: No    Drug use: No    Sexual activity: Not Currently     Partners: Male   Other Topics Concern    Not on file   Social History Narrative    Not on file     Social Determinants of Health     Financial Resource Strain: Low Risk  (3/21/2024)    Overall Financial Resource Strain (CARDIA)     Difficulty of Paying Living Expenses: Not hard at all   Food Insecurity: No Food Insecurity (3/21/2024)    Hunger Vital Sign     Worried About Running Out of Food in the Last Year: Never true     Ran Out of Food in the Last Year: Never true   Transportation Needs: Unknown (3/21/2024)    PRAPARE - Transportation     Lack of Transportation (Medical): Not on file     Lack of Transportation (Non-Medical): No   Physical Activity: Sufficiently Active (2023)    Exercise Vital Sign     Days of Exercise per Week: 5 days     Minutes of Exercise per Session: 30 min   Stress: No Stress Concern Present (3/21/2023)    [employer] [place] [employer] - Occupational Stress Questionnaire     Feeling

## 2024-04-16 ENCOUNTER — OFFICE VISIT (OUTPATIENT)
Dept: PULMONOLOGY | Age: 64
End: 2024-04-16
Payer: MEDICARE

## 2024-04-16 VITALS
DIASTOLIC BLOOD PRESSURE: 63 MMHG | HEART RATE: 87 BPM | RESPIRATION RATE: 20 BRPM | SYSTOLIC BLOOD PRESSURE: 148 MMHG | TEMPERATURE: 97.5 F | OXYGEN SATURATION: 97 %

## 2024-04-16 DIAGNOSIS — J44.1 COPD EXACERBATION (HCC): ICD-10-CM

## 2024-04-16 DIAGNOSIS — J43.9 PULMONARY EMPHYSEMA, UNSPECIFIED EMPHYSEMA TYPE (HCC): Primary | ICD-10-CM

## 2024-04-16 DIAGNOSIS — G47.33 OSA (OBSTRUCTIVE SLEEP APNEA): ICD-10-CM

## 2024-04-16 PROCEDURE — 1036F TOBACCO NON-USER: CPT | Performed by: INTERNAL MEDICINE

## 2024-04-16 PROCEDURE — 3017F COLORECTAL CA SCREEN DOC REV: CPT | Performed by: INTERNAL MEDICINE

## 2024-04-16 PROCEDURE — 3077F SYST BP >= 140 MM HG: CPT | Performed by: INTERNAL MEDICINE

## 2024-04-16 PROCEDURE — G8428 CUR MEDS NOT DOCUMENT: HCPCS | Performed by: INTERNAL MEDICINE

## 2024-04-16 PROCEDURE — 3023F SPIROM DOC REV: CPT | Performed by: INTERNAL MEDICINE

## 2024-04-16 PROCEDURE — 1090F PRES/ABSN URINE INCON ASSESS: CPT | Performed by: INTERNAL MEDICINE

## 2024-04-16 PROCEDURE — G8400 PT W/DXA NO RESULTS DOC: HCPCS | Performed by: INTERNAL MEDICINE

## 2024-04-16 PROCEDURE — 3078F DIAST BP <80 MM HG: CPT | Performed by: INTERNAL MEDICINE

## 2024-04-16 PROCEDURE — G8417 CALC BMI ABV UP PARAM F/U: HCPCS | Performed by: INTERNAL MEDICINE

## 2024-04-16 PROCEDURE — 99215 OFFICE O/P EST HI 40 MIN: CPT | Performed by: INTERNAL MEDICINE

## 2024-04-16 PROCEDURE — 1123F ACP DISCUSS/DSCN MKR DOCD: CPT | Performed by: INTERNAL MEDICINE

## 2024-04-16 RX ORDER — IPRATROPIUM BROMIDE AND ALBUTEROL SULFATE 2.5; .5 MG/3ML; MG/3ML
1 SOLUTION RESPIRATORY (INHALATION) EVERY 6 HOURS PRN
Qty: 360 ML | Refills: 12 | Status: SHIPPED | OUTPATIENT
Start: 2024-04-16

## 2024-04-16 RX ORDER — BUDESONIDE AND FORMOTEROL FUMARATE DIHYDRATE 80; 4.5 UG/1; UG/1
2 AEROSOL RESPIRATORY (INHALATION) 2 TIMES DAILY
Qty: 10.2 G | Refills: 12 | Status: SHIPPED | OUTPATIENT
Start: 2024-04-16

## 2024-04-16 NOTE — PROGRESS NOTES
[facility]  Department of Pulmonary, Critical Care and Sleep Medicine  Dr. Hatfield, Dr. Hanley, Dr. Srinivasan    Pulmonary & Critical Care Office Note - Follow up      Assessment/Plan     Assessment & Plan     No problem-specific Assessment & Plan notes found for this encounter.      Problem List Items Addressed This Visit          Respiratory    Pulmonary emphysema (HCC) - Primary (Chronic)    Relevant Medications    ipratropium 0.5 mg-albuterol 2.5 mg (DUONEB) 0.5-2.5 (3) MG/3ML SOLN nebulizer solution    budesonide-formoterol (SYMBICORT) 80-4.5 MCG/ACT AERO     Other Visit Diagnoses       MACARIO (obstructive sleep apnea)        Relevant Orders    Home Sleep Study    COPD exacerbation (HCC)        Relevant Medications    ipratropium 0.5 mg-albuterol 2.5 mg (DUONEB) 0.5-2.5 (3) MG/3ML SOLN nebulizer solution    budesonide-formoterol (SYMBICORT) 80-4.5 MCG/ACT AERO               Plan:     Pulmonary emphysema    Extensively counseled patient on the need for maintenance inhalers to improve her shortness of breath and activity.    Ct Symbicort  Advised to rinse mouth after each use.  P.r.n. albuterol, added prn Duoneb nebulizations  Advised on proper inhaler technique, and adherence to prescribed inhalers.    Environmental allergies - she is currently not on any Flonase or antihistamines.   Advised saline rinses    Chronic DVT - stable, vascular surgery following    HFpEF - counseled on cardiac diet, increasing activity    Ok for Posterior repair surgery from a pulmonary perspective  If performed under general anesthesia, recommend extubate to CPAP  Minimize opioids and benzodiazepines    Will get outpatient home sleep study to rule out sleep apnea    Maintain active and healthy lifestyle with weight reduction.  COVID-19 precautions  Recommend yearly Influenza and appropriate pneumonia vaccinations.    I reviewed the patients chart including prior labs and images prior to our

## 2024-04-16 NOTE — PROGRESS NOTES
6 mos follow up in office. Pt unable to take Spiriva(causes dizziness). Using Symbicort and Albuterol and DuoNeb aerosols prn with good results. Plan for home Sleep and follow up in 3 mos. Appt card given.

## 2024-04-18 ENCOUNTER — OFFICE VISIT (OUTPATIENT)
Dept: CARDIOLOGY CLINIC | Age: 64
End: 2024-04-18
Payer: MEDICARE

## 2024-04-18 VITALS
HEART RATE: 68 BPM | SYSTOLIC BLOOD PRESSURE: 120 MMHG | RESPIRATION RATE: 17 BRPM | HEIGHT: 65 IN | BODY MASS INDEX: 37.69 KG/M2 | WEIGHT: 226.2 LBS | DIASTOLIC BLOOD PRESSURE: 60 MMHG

## 2024-04-18 DIAGNOSIS — I48.91 ATRIAL FIBRILLATION, UNSPECIFIED TYPE (HCC): Primary | ICD-10-CM

## 2024-04-18 PROCEDURE — 93000 ELECTROCARDIOGRAM COMPLETE: CPT | Performed by: INTERNAL MEDICINE

## 2024-04-18 PROCEDURE — 3017F COLORECTAL CA SCREEN DOC REV: CPT | Performed by: INTERNAL MEDICINE

## 2024-04-18 PROCEDURE — 3074F SYST BP LT 130 MM HG: CPT | Performed by: INTERNAL MEDICINE

## 2024-04-18 PROCEDURE — 1123F ACP DISCUSS/DSCN MKR DOCD: CPT | Performed by: INTERNAL MEDICINE

## 2024-04-18 PROCEDURE — G8400 PT W/DXA NO RESULTS DOC: HCPCS | Performed by: INTERNAL MEDICINE

## 2024-04-18 PROCEDURE — 1090F PRES/ABSN URINE INCON ASSESS: CPT | Performed by: INTERNAL MEDICINE

## 2024-04-18 PROCEDURE — 1036F TOBACCO NON-USER: CPT | Performed by: INTERNAL MEDICINE

## 2024-04-18 PROCEDURE — G8417 CALC BMI ABV UP PARAM F/U: HCPCS | Performed by: INTERNAL MEDICINE

## 2024-04-18 PROCEDURE — G8427 DOCREV CUR MEDS BY ELIG CLIN: HCPCS | Performed by: INTERNAL MEDICINE

## 2024-04-18 PROCEDURE — 3078F DIAST BP <80 MM HG: CPT | Performed by: INTERNAL MEDICINE

## 2024-04-18 PROCEDURE — 99214 OFFICE O/P EST MOD 30 MIN: CPT | Performed by: INTERNAL MEDICINE

## 2024-04-18 NOTE — PROGRESS NOTES
[facility] Cardiology Progress Note    Patient is a 64 y.o. female of Hien Stokes MD seen in follow up.     Chief complaint: PAfib    HPI: Baseline BELCHER. No CP or angina.    Patient Active Problem List   Diagnosis    Obesity (BMI 30-39.9)    Hypertension    Chronic pain syndrome    Lumbar facet arthropathy    Lumbar disc disorder    Primary osteoarthritis of both hips    Arthritis of right hip    Dysphagia    Heartburn    At risk for colon cancer    Colon polyps    Degenerative disc disease, cervical    Arthropathy of cervical facet joint    Abnormal blood sugar    Arthritis of both hips    COVID-19    H/O total hip arthroplasty, right    History of total left hip arthroplasty    Primary osteoarthritis of right hip    Pulmonary emphysema (HCC)    History of herpes genitalis    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    History of COVID-19    Aneurysm (HCC)    S/P total right hip arthroplasty    Cognitive impairment    Personal history of colonic polyps    Abnormal diffusion capacity determined by pulmonary function test    BELCHER (dyspnea on exertion)    Atrial flutter (HCC)    Atrial fibrillation with RVR (HCC)    LFT elevation    (HFpEF) heart failure with preserved ejection fraction (HCC)    Chronic deep vein thrombosis (DVT) of right peroneal vein (HCC)    Cerebral aneurysm    History of carotid artery stenosis    Varicose veins of both lower extremities with pain    Spider veins    Rectocele       Allergies   Allergen Reactions    Latex Hives     Hives and rash    Iodine Rash     Hives .pt. States anaphalyxis    Shellfish-Derived Products Anaphylaxis and Hives    Spiriva Respimat [Tiotropium Bromide Monohydrate] Dizziness or Vertigo    Aloe Hives     Hives     Cefdinir Rash    Celebrex [Celecoxib] Itching    Fish-Derived Products Other (See Comments)       Current Outpatient Medications   Medication Sig Dispense Refill    ipratropium 0.5 mg-albuterol 2.5 mg (DUONEB)

## 2024-04-19 ENCOUNTER — TELEPHONE (OUTPATIENT)
Dept: FAMILY MEDICINE CLINIC | Age: 64
End: 2024-04-19

## 2024-04-19 DIAGNOSIS — G89.4 CHRONIC PAIN SYNDROME: ICD-10-CM

## 2024-04-19 RX ORDER — GABAPENTIN 300 MG/1
CAPSULE ORAL
Qty: 90 CAPSULE | Refills: 2 | Status: SHIPPED | OUTPATIENT
Start: 2024-04-19 | End: 2025-04-19

## 2024-04-19 NOTE — TELEPHONE ENCOUNTER
Refill request    Gabapentin 300mg    RICHIE @ [facility]    Last Appointment   4/11/2024  Next Appointment  8/23/2024

## 2024-04-22 DIAGNOSIS — G89.4 CHRONIC PAIN SYNDROME: ICD-10-CM

## 2024-04-23 RX ORDER — GABAPENTIN 300 MG/1
300 CAPSULE ORAL 3 TIMES DAILY
Qty: 90 CAPSULE | Refills: 2 | Status: SHIPPED | OUTPATIENT
Start: 2024-04-23 | End: 2025-04-23

## 2024-04-24 ENCOUNTER — TRANSCRIBE ORDERS (OUTPATIENT)
Dept: SLEEP CENTER | Age: 64
End: 2024-04-24

## 2024-04-24 DIAGNOSIS — G47.33 OBSTRUCTIVE SLEEP APNEA (ADULT) (PEDIATRIC): Primary | ICD-10-CM

## 2024-04-25 ENCOUNTER — PREP FOR PROCEDURE (OUTPATIENT)
Dept: UROLOGY | Age: 64
End: 2024-04-25

## 2024-04-25 RX ORDER — SODIUM CHLORIDE 9 MG/ML
INJECTION, SOLUTION INTRAVENOUS CONTINUOUS
Status: CANCELLED | OUTPATIENT
Start: 2024-04-25

## 2024-04-25 RX ORDER — SODIUM CHLORIDE 0.9 % (FLUSH) 0.9 %
5-40 SYRINGE (ML) INJECTION PRN
Status: CANCELLED | OUTPATIENT
Start: 2024-04-25

## 2024-04-25 RX ORDER — SODIUM CHLORIDE 9 MG/ML
INJECTION, SOLUTION INTRAVENOUS PRN
Status: CANCELLED | OUTPATIENT
Start: 2024-04-25

## 2024-04-25 RX ORDER — SODIUM CHLORIDE 0.9 % (FLUSH) 0.9 %
5-40 SYRINGE (ML) INJECTION EVERY 12 HOURS SCHEDULED
Status: CANCELLED | OUTPATIENT
Start: 2024-04-25

## 2024-04-29 NOTE — PROGRESS NOTES
[facility] PRE-ADMISSION TESTING INSTRUCTIONS    The Preadmission Testing patient is instructed accordingly using the following criteria (check applicable):    ARRIVAL INSTRUCTIONS:  [x] Parking the day of Surgery is located in the Main Entrance lot.  Upon entering the door, make an immediate right to the surgery reception desk    [x] Bring photo ID and insurance card    [] Bring in a copy of Living will or Durable Power of  papers.    [x] Please be sure to arrange for responsible adult to provide transportation to and from the hospital    [x] Please arrange for responsible adult to be with you for the 24 hour period post procedure due to having anesthesia    [x] If you awake am of surgery not feeling well or have temperature >100 please call [phone]    GENERAL INSTRUCTIONS:    [x] May have clear liquids until 4 hours prior to surgery. Examples include water, fruit juices (no pulp), jello, popsicles, black coffee or tea, beef or chicken broth.         No gum, candy or mints.    [x] You may brush your teeth, but do not swallow any water    [x] Take medications as instructed with 1-2 oz of water    [] Stop herbal supplements and vitamins 5 days prior to procedure    [x] Follow preop dosing of blood thinners per physician instructions    [] Take 1/2 dose of evening insulin, but no insulin after midnight    [] No oral diabetic medications after midnight    [] If diabetic and have low blood sugar or feel symptomatic, take 1-2oz apple juice only    [x] Bring inhalers day of surgery    [] Bring C-PAP/ Bi-Pap day of surgery    [] Bring urine specimen day of surgery    [x] Shower or bath with soap, lather and rinse well, AM of Surgery, no lotion, powders or creams to surgical site    [] Follow bowel prep as instructed per surgeon    [x] No tobacco products within 24 hours of surgery     [x] No alcohol or illegal drug use within 24 hours of surgery.    [x] Jewelry, body piercing's,

## 2024-04-30 ENCOUNTER — ANESTHESIA EVENT (OUTPATIENT)
Dept: OPERATING ROOM | Age: 64
End: 2024-04-30
Payer: MEDICARE

## 2024-04-30 ASSESSMENT — LIFESTYLE VARIABLES: SMOKING_STATUS: 0

## 2024-04-30 ASSESSMENT — ENCOUNTER SYMPTOMS: SHORTNESS OF BREATH: 1

## 2024-05-01 ENCOUNTER — ANESTHESIA (OUTPATIENT)
Dept: OPERATING ROOM | Age: 64
End: 2024-05-01
Payer: MEDICARE

## 2024-05-01 ENCOUNTER — HOSPITAL ENCOUNTER (OUTPATIENT)
Age: 64
Setting detail: OBSERVATION
Discharge: HOME | End: 2024-05-02
Attending: UROLOGY | Admitting: UROLOGY
Payer: MEDICARE

## 2024-05-01 LAB
ANION GAP SERPL CALCULATED.3IONS-SCNC: 11 MMOL/L (ref 7–16)
BUN SERPL-MCNC: 28 MG/DL (ref 6–23)
CALCIUM SERPL-MCNC: 9.4 MG/DL (ref 8.6–10.2)
CHLORIDE SERPL-SCNC: 103 MMOL/L (ref 98–107)
CO2 SERPL-SCNC: 25 MMOL/L (ref 22–29)
CREAT SERPL-MCNC: 1.3 MG/DL (ref 0.5–1)
ERYTHROCYTE [DISTWIDTH] IN BLOOD BY AUTOMATED COUNT: 13.1 % (ref 11.5–15)
GFR, ESTIMATED: 46 ML/MIN/1.73M2
GLUCOSE SERPL-MCNC: 118 MG/DL (ref 74–99)
HCT VFR BLD AUTO: 39.4 % (ref 34–48)
HGB BLD-MCNC: 12.9 G/DL (ref 11.5–15.5)
MCH RBC QN AUTO: 29.4 PG (ref 26–35)
MCHC RBC AUTO-ENTMCNC: 32.7 G/DL (ref 32–34.5)
MCV RBC AUTO: 89.7 FL (ref 80–99.9)
PLATELET # BLD AUTO: 212 K/UL (ref 130–450)
PMV BLD AUTO: 9 FL (ref 7–12)
POTASSIUM SERPL-SCNC: 3.7 MMOL/L (ref 3.5–5)
RBC # BLD AUTO: 4.39 M/UL (ref 3.5–5.5)
SODIUM SERPL-SCNC: 139 MMOL/L (ref 132–146)
WBC OTHER # BLD: 5.8 K/UL (ref 4.5–11.5)

## 2024-05-01 PROCEDURE — 6360000002 HC RX W HCPCS: Performed by: ANESTHESIOLOGY

## 2024-05-01 PROCEDURE — 6370000000 HC RX 637 (ALT 250 FOR IP): Performed by: UROLOGY

## 2024-05-01 PROCEDURE — 2580000003 HC RX 258: Performed by: NURSE PRACTITIONER

## 2024-05-01 PROCEDURE — G0378 HOSPITAL OBSERVATION PER HR: HCPCS

## 2024-05-01 PROCEDURE — 6360000002 HC RX W HCPCS: Performed by: UROLOGY

## 2024-05-01 PROCEDURE — 3700000000 HC ANESTHESIA ATTENDED CARE: Performed by: UROLOGY

## 2024-05-01 PROCEDURE — 2500000003 HC RX 250 WO HCPCS: Performed by: NURSE ANESTHETIST, CERTIFIED REGISTERED

## 2024-05-01 PROCEDURE — 94664 DEMO&/EVAL PT USE INHALER: CPT

## 2024-05-01 PROCEDURE — 80048 BASIC METABOLIC PNL TOTAL CA: CPT

## 2024-05-01 PROCEDURE — 2700000000 HC OXYGEN THERAPY PER DAY

## 2024-05-01 PROCEDURE — 6360000002 HC RX W HCPCS: Performed by: NURSE ANESTHETIST, CERTIFIED REGISTERED

## 2024-05-01 PROCEDURE — 3700000001 HC ADD 15 MINUTES (ANESTHESIA): Performed by: UROLOGY

## 2024-05-01 PROCEDURE — 6360000002 HC RX W HCPCS: Performed by: NURSE PRACTITIONER

## 2024-05-01 PROCEDURE — 2709999900 HC NON-CHARGEABLE SUPPLY: Performed by: UROLOGY

## 2024-05-01 PROCEDURE — 7100000000 HC PACU RECOVERY - FIRST 15 MIN: Performed by: UROLOGY

## 2024-05-01 PROCEDURE — 85027 COMPLETE CBC AUTOMATED: CPT

## 2024-05-01 PROCEDURE — 2500000003 HC RX 250 WO HCPCS: Performed by: UROLOGY

## 2024-05-01 PROCEDURE — 6370000000 HC RX 637 (ALT 250 FOR IP): Performed by: NURSE PRACTITIONER

## 2024-05-01 PROCEDURE — 2580000003 HC RX 258: Performed by: UROLOGY

## 2024-05-01 PROCEDURE — 7100000001 HC PACU RECOVERY - ADDTL 15 MIN: Performed by: UROLOGY

## 2024-05-01 PROCEDURE — 2580000003 HC RX 258: Performed by: NURSE ANESTHETIST, CERTIFIED REGISTERED

## 2024-05-01 PROCEDURE — 94640 AIRWAY INHALATION TREATMENT: CPT

## 2024-05-01 PROCEDURE — 3600000012 HC SURGERY LEVEL 2 ADDTL 15MIN: Performed by: UROLOGY

## 2024-05-01 PROCEDURE — 3600000002 HC SURGERY LEVEL 2 BASE: Performed by: UROLOGY

## 2024-05-01 RX ORDER — CIPROFLOXACIN 2 MG/ML
200 INJECTION, SOLUTION INTRAVENOUS EVERY 12 HOURS
Status: DISCONTINUED | OUTPATIENT
Start: 2024-05-01 | End: 2024-05-02 | Stop reason: HOSPADM

## 2024-05-01 RX ORDER — SODIUM CHLORIDE 0.9 % (FLUSH) 0.9 %
5-40 SYRINGE (ML) INJECTION EVERY 12 HOURS SCHEDULED
Status: DISCONTINUED | OUTPATIENT
Start: 2024-05-01 | End: 2024-05-01 | Stop reason: HOSPADM

## 2024-05-01 RX ORDER — MIDAZOLAM HYDROCHLORIDE 1 MG/ML
INJECTION INTRAMUSCULAR; INTRAVENOUS PRN
Status: DISCONTINUED | OUTPATIENT
Start: 2024-05-01 | End: 2024-05-01 | Stop reason: SDUPTHER

## 2024-05-01 RX ORDER — SODIUM CHLORIDE, SODIUM LACTATE, POTASSIUM CHLORIDE, CALCIUM CHLORIDE 600; 310; 30; 20 MG/100ML; MG/100ML; MG/100ML; MG/100ML
INJECTION, SOLUTION INTRAVENOUS CONTINUOUS PRN
Status: DISCONTINUED | OUTPATIENT
Start: 2024-05-01 | End: 2024-05-01 | Stop reason: SDUPTHER

## 2024-05-01 RX ORDER — GABAPENTIN 300 MG/1
300 CAPSULE ORAL 3 TIMES DAILY
Status: DISCONTINUED | OUTPATIENT
Start: 2024-05-01 | End: 2024-05-02 | Stop reason: HOSPADM

## 2024-05-01 RX ORDER — LOSARTAN POTASSIUM 50 MG/1
100 TABLET ORAL DAILY
Status: DISCONTINUED | OUTPATIENT
Start: 2024-05-01 | End: 2024-05-02 | Stop reason: HOSPADM

## 2024-05-01 RX ORDER — DEXAMETHASONE SODIUM PHOSPHATE 4 MG/ML
INJECTION, SOLUTION INTRA-ARTICULAR; INTRALESIONAL; INTRAMUSCULAR; INTRAVENOUS; SOFT TISSUE PRN
Status: DISCONTINUED | OUTPATIENT
Start: 2024-05-01 | End: 2024-05-01 | Stop reason: SDUPTHER

## 2024-05-01 RX ORDER — M-VIT,TX,IRON,MINS/CALC/FOLIC 27MG-0.4MG
1 TABLET ORAL DAILY
Status: DISCONTINUED | OUTPATIENT
Start: 2024-05-01 | End: 2024-05-02 | Stop reason: HOSPADM

## 2024-05-01 RX ORDER — FENTANYL CITRATE 50 UG/ML
INJECTION, SOLUTION INTRAMUSCULAR; INTRAVENOUS PRN
Status: DISCONTINUED | OUTPATIENT
Start: 2024-05-01 | End: 2024-05-01 | Stop reason: SDUPTHER

## 2024-05-01 RX ORDER — PROCHLORPERAZINE EDISYLATE 5 MG/ML
5 INJECTION INTRAMUSCULAR; INTRAVENOUS
Status: DISCONTINUED | OUTPATIENT
Start: 2024-05-01 | End: 2024-05-01 | Stop reason: HOSPADM

## 2024-05-01 RX ORDER — POTASSIUM CHLORIDE 20 MEQ/1
10 TABLET, EXTENDED RELEASE ORAL
Status: DISCONTINUED | OUTPATIENT
Start: 2024-05-02 | End: 2024-05-02 | Stop reason: HOSPADM

## 2024-05-01 RX ORDER — METOPROLOL SUCCINATE 25 MG/1
25 TABLET, EXTENDED RELEASE ORAL DAILY
Status: DISCONTINUED | OUTPATIENT
Start: 2024-05-01 | End: 2024-05-02 | Stop reason: HOSPADM

## 2024-05-01 RX ORDER — SODIUM CHLORIDE 9 MG/ML
INJECTION, SOLUTION INTRAVENOUS CONTINUOUS
Status: DISCONTINUED | OUTPATIENT
Start: 2024-05-01 | End: 2024-05-01

## 2024-05-01 RX ORDER — SODIUM CHLORIDE 0.9 % (FLUSH) 0.9 %
5-40 SYRINGE (ML) INJECTION PRN
Status: DISCONTINUED | OUTPATIENT
Start: 2024-05-01 | End: 2024-05-01

## 2024-05-01 RX ORDER — NALOXONE HYDROCHLORIDE 0.4 MG/ML
INJECTION, SOLUTION INTRAMUSCULAR; INTRAVENOUS; SUBCUTANEOUS PRN
Status: DISCONTINUED | OUTPATIENT
Start: 2024-05-01 | End: 2024-05-01 | Stop reason: HOSPADM

## 2024-05-01 RX ORDER — PANTOPRAZOLE SODIUM 20 MG/1
20 TABLET, DELAYED RELEASE ORAL DAILY
Status: DISCONTINUED | OUTPATIENT
Start: 2024-05-01 | End: 2024-05-02 | Stop reason: HOSPADM

## 2024-05-01 RX ORDER — POLYETHYLENE GLYCOL 3350 17 G/17G
17 POWDER, FOR SOLUTION ORAL ONCE
Status: COMPLETED | OUTPATIENT
Start: 2024-05-01 | End: 2024-05-01

## 2024-05-01 RX ORDER — SODIUM CHLORIDE 0.9 % (FLUSH) 0.9 %
5-40 SYRINGE (ML) INJECTION EVERY 12 HOURS SCHEDULED
Status: DISCONTINUED | OUTPATIENT
Start: 2024-05-01 | End: 2024-05-02 | Stop reason: HOSPADM

## 2024-05-01 RX ORDER — LEVOFLOXACIN 5 MG/ML
500 INJECTION, SOLUTION INTRAVENOUS
Status: COMPLETED | OUTPATIENT
Start: 2024-05-01 | End: 2024-05-01

## 2024-05-01 RX ORDER — BUDESONIDE 0.25 MG/2ML
0.25 INHALANT ORAL
Status: DISCONTINUED | OUTPATIENT
Start: 2024-05-01 | End: 2024-05-02 | Stop reason: HOSPADM

## 2024-05-01 RX ORDER — ASPIRIN 81 MG/1
81 TABLET ORAL DAILY
Status: DISCONTINUED | OUTPATIENT
Start: 2024-05-01 | End: 2024-05-02 | Stop reason: HOSPADM

## 2024-05-01 RX ORDER — SODIUM CHLORIDE 0.9 % (FLUSH) 0.9 %
5-40 SYRINGE (ML) INJECTION EVERY 12 HOURS SCHEDULED
Status: DISCONTINUED | OUTPATIENT
Start: 2024-05-01 | End: 2024-05-01

## 2024-05-01 RX ORDER — SODIUM CHLORIDE 9 MG/ML
INJECTION, SOLUTION INTRAVENOUS PRN
Status: DISCONTINUED | OUTPATIENT
Start: 2024-05-01 | End: 2024-05-02 | Stop reason: HOSPADM

## 2024-05-01 RX ORDER — SODIUM CHLORIDE 0.9 % (FLUSH) 0.9 %
5-40 SYRINGE (ML) INJECTION PRN
Status: DISCONTINUED | OUTPATIENT
Start: 2024-05-01 | End: 2024-05-02 | Stop reason: HOSPADM

## 2024-05-01 RX ORDER — IPRATROPIUM BROMIDE AND ALBUTEROL SULFATE 2.5; .5 MG/3ML; MG/3ML
1 SOLUTION RESPIRATORY (INHALATION) EVERY 6 HOURS PRN
Status: DISCONTINUED | OUTPATIENT
Start: 2024-05-01 | End: 2024-05-02 | Stop reason: HOSPADM

## 2024-05-01 RX ORDER — SODIUM CHLORIDE 9 MG/ML
INJECTION, SOLUTION INTRAVENOUS PRN
Status: DISCONTINUED | OUTPATIENT
Start: 2024-05-01 | End: 2024-05-01 | Stop reason: HOSPADM

## 2024-05-01 RX ORDER — BUPIVACAINE HYDROCHLORIDE AND EPINEPHRINE 5; 5 MG/ML; UG/ML
INJECTION, SOLUTION EPIDURAL; INTRACAUDAL; PERINEURAL PRN
Status: DISCONTINUED | OUTPATIENT
Start: 2024-05-01 | End: 2024-05-01 | Stop reason: ALTCHOICE

## 2024-05-01 RX ORDER — AMLODIPINE BESYLATE 5 MG/1
5 TABLET ORAL DAILY
Status: DISCONTINUED | OUTPATIENT
Start: 2024-05-01 | End: 2024-05-02 | Stop reason: HOSPADM

## 2024-05-01 RX ORDER — DIPHENHYDRAMINE HYDROCHLORIDE 50 MG/ML
INJECTION INTRAMUSCULAR; INTRAVENOUS PRN
Status: DISCONTINUED | OUTPATIENT
Start: 2024-05-01 | End: 2024-05-01 | Stop reason: SDUPTHER

## 2024-05-01 RX ORDER — SODIUM CHLORIDE 9 MG/ML
INJECTION, SOLUTION INTRAVENOUS PRN
Status: DISCONTINUED | OUTPATIENT
Start: 2024-05-01 | End: 2024-05-01

## 2024-05-01 RX ORDER — SODIUM CHLORIDE 0.9 % (FLUSH) 0.9 %
5-40 SYRINGE (ML) INJECTION PRN
Status: DISCONTINUED | OUTPATIENT
Start: 2024-05-01 | End: 2024-05-01 | Stop reason: HOSPADM

## 2024-05-01 RX ORDER — OXYCODONE HYDROCHLORIDE AND ACETAMINOPHEN 5; 325 MG/1; MG/1
1 TABLET ORAL EVERY 8 HOURS PRN
Status: DISCONTINUED | OUTPATIENT
Start: 2024-05-01 | End: 2024-05-02 | Stop reason: HOSPADM

## 2024-05-01 RX ORDER — DEXTROSE MONOHYDRATE, SODIUM CHLORIDE, AND POTASSIUM CHLORIDE 50; 1.49; 4.5 G/1000ML; G/1000ML; G/1000ML
INJECTION, SOLUTION INTRAVENOUS CONTINUOUS
Status: DISCONTINUED | OUTPATIENT
Start: 2024-05-01 | End: 2024-05-02 | Stop reason: HOSPADM

## 2024-05-01 RX ORDER — LIDOCAINE HYDROCHLORIDE 20 MG/ML
INJECTION, SOLUTION EPIDURAL; INFILTRATION; INTRACAUDAL; PERINEURAL PRN
Status: DISCONTINUED | OUTPATIENT
Start: 2024-05-01 | End: 2024-05-01 | Stop reason: SDUPTHER

## 2024-05-01 RX ORDER — ACETAMINOPHEN AND CODEINE PHOSPHATE 300; 60 MG/1; MG/1
1 TABLET ORAL DAILY PRN
Status: DISCONTINUED | OUTPATIENT
Start: 2024-05-01 | End: 2024-05-01

## 2024-05-01 RX ORDER — ATORVASTATIN CALCIUM 40 MG/1
40 TABLET, FILM COATED ORAL NIGHTLY
Status: DISCONTINUED | OUTPATIENT
Start: 2024-05-01 | End: 2024-05-02 | Stop reason: HOSPADM

## 2024-05-01 RX ORDER — ROCURONIUM BROMIDE 10 MG/ML
INJECTION, SOLUTION INTRAVENOUS PRN
Status: DISCONTINUED | OUTPATIENT
Start: 2024-05-01 | End: 2024-05-01 | Stop reason: SDUPTHER

## 2024-05-01 RX ORDER — ARFORMOTEROL TARTRATE 15 UG/2ML
15 SOLUTION RESPIRATORY (INHALATION)
Status: DISCONTINUED | OUTPATIENT
Start: 2024-05-01 | End: 2024-05-02 | Stop reason: HOSPADM

## 2024-05-01 RX ORDER — ALBUTEROL SULFATE 90 UG/1
2 AEROSOL, METERED RESPIRATORY (INHALATION) EVERY 6 HOURS PRN
Status: DISCONTINUED | OUTPATIENT
Start: 2024-05-01 | End: 2024-05-01 | Stop reason: ALTCHOICE

## 2024-05-01 RX ORDER — ONDANSETRON 2 MG/ML
INJECTION INTRAMUSCULAR; INTRAVENOUS PRN
Status: DISCONTINUED | OUTPATIENT
Start: 2024-05-01 | End: 2024-05-01 | Stop reason: SDUPTHER

## 2024-05-01 RX ORDER — ALBUTEROL SULFATE 2.5 MG/3ML
2.5 SOLUTION RESPIRATORY (INHALATION) EVERY 6 HOURS PRN
Status: DISCONTINUED | OUTPATIENT
Start: 2024-05-01 | End: 2024-05-01 | Stop reason: SDUPTHER

## 2024-05-01 RX ORDER — BUDESONIDE AND FORMOTEROL FUMARATE DIHYDRATE 80; 4.5 UG/1; UG/1
2 AEROSOL RESPIRATORY (INHALATION) 2 TIMES DAILY
Status: DISCONTINUED | OUTPATIENT
Start: 2024-05-01 | End: 2024-05-01 | Stop reason: CLARIF

## 2024-05-01 RX ORDER — PROPOFOL 10 MG/ML
INJECTION, EMULSION INTRAVENOUS PRN
Status: DISCONTINUED | OUTPATIENT
Start: 2024-05-01 | End: 2024-05-01 | Stop reason: SDUPTHER

## 2024-05-01 RX ORDER — CHLORTHALIDONE 25 MG/1
25 TABLET ORAL DAILY
Status: DISCONTINUED | OUTPATIENT
Start: 2024-05-01 | End: 2024-05-02 | Stop reason: HOSPADM

## 2024-05-01 RX ADMIN — FENTANYL CITRATE 50 MCG: 50 INJECTION, SOLUTION INTRAMUSCULAR; INTRAVENOUS at 07:12

## 2024-05-01 RX ADMIN — SODIUM CHLORIDE, PRESERVATIVE FREE 10 ML: 5 INJECTION INTRAVENOUS at 18:40

## 2024-05-01 RX ADMIN — HYDROMORPHONE HYDROCHLORIDE 0.5 MG: 1 INJECTION, SOLUTION INTRAMUSCULAR; INTRAVENOUS; SUBCUTANEOUS at 18:38

## 2024-05-01 RX ADMIN — CIPROFLOXACIN 200 MG: 2 INJECTION, SOLUTION INTRAVENOUS at 22:46

## 2024-05-01 RX ADMIN — ONDANSETRON 4 MG: 2 INJECTION INTRAMUSCULAR; INTRAVENOUS at 07:23

## 2024-05-01 RX ADMIN — Medication 1 TABLET: at 11:48

## 2024-05-01 RX ADMIN — FENTANYL CITRATE 50 MCG: 50 INJECTION, SOLUTION INTRAMUSCULAR; INTRAVENOUS at 07:23

## 2024-05-01 RX ADMIN — ARFORMOTEROL TARTRATE 15 MCG: 15 SOLUTION RESPIRATORY (INHALATION) at 20:13

## 2024-05-01 RX ADMIN — POTASSIUM CHLORIDE, DEXTROSE MONOHYDRATE AND SODIUM CHLORIDE: 150; 5; 450 INJECTION, SOLUTION INTRAVENOUS at 22:10

## 2024-05-01 RX ADMIN — MIDAZOLAM 2 MG: 1 INJECTION INTRAMUSCULAR; INTRAVENOUS at 07:07

## 2024-05-01 RX ADMIN — LIDOCAINE HYDROCHLORIDE 500 MG: 20 INJECTION, SOLUTION EPIDURAL; INFILTRATION; INTRACAUDAL; PERINEURAL at 07:12

## 2024-05-01 RX ADMIN — GABAPENTIN 300 MG: 300 CAPSULE ORAL at 20:39

## 2024-05-01 RX ADMIN — OXYCODONE HYDROCHLORIDE AND ACETAMINOPHEN 1 TABLET: 5; 325 TABLET ORAL at 11:53

## 2024-05-01 RX ADMIN — DEXAMETHASONE SODIUM PHOSPHATE 8 MG: 4 INJECTION, SOLUTION INTRAMUSCULAR; INTRAVENOUS at 07:23

## 2024-05-01 RX ADMIN — ROCURONIUM BROMIDE 40 MG: 10 INJECTION, SOLUTION INTRAVENOUS at 07:12

## 2024-05-01 RX ADMIN — SODIUM CHLORIDE, POTASSIUM CHLORIDE, SODIUM LACTATE AND CALCIUM CHLORIDE: 600; 310; 30; 20 INJECTION, SOLUTION INTRAVENOUS at 08:08

## 2024-05-01 RX ADMIN — PROPOFOL 150 MG: 10 INJECTION, EMULSION INTRAVENOUS at 07:12

## 2024-05-01 RX ADMIN — ARFORMOTEROL TARTRATE 15 MCG: 15 SOLUTION RESPIRATORY (INHALATION) at 12:07

## 2024-05-01 RX ADMIN — LEVOFLOXACIN 500 MG: 5 INJECTION, SOLUTION INTRAVENOUS at 06:11

## 2024-05-01 RX ADMIN — SODIUM CHLORIDE, PRESERVATIVE FREE 10 ML: 5 INJECTION INTRAVENOUS at 20:39

## 2024-05-01 RX ADMIN — SODIUM CHLORIDE: 9 INJECTION, SOLUTION INTRAVENOUS at 06:00

## 2024-05-01 RX ADMIN — POTASSIUM CHLORIDE, DEXTROSE MONOHYDRATE AND SODIUM CHLORIDE: 150; 5; 450 INJECTION, SOLUTION INTRAVENOUS at 13:19

## 2024-05-01 RX ADMIN — GABAPENTIN 300 MG: 300 CAPSULE ORAL at 15:35

## 2024-05-01 RX ADMIN — CIPROFLOXACIN 200 MG: 2 INJECTION, SOLUTION INTRAVENOUS at 11:46

## 2024-05-01 RX ADMIN — POLYETHYLENE GLYCOL 3350 17 G: 17 POWDER, FOR SOLUTION ORAL at 11:48

## 2024-05-01 RX ADMIN — DIPHENHYDRAMINE HYDROCHLORIDE 12.5 MG: 50 INJECTION, SOLUTION INTRAMUSCULAR; INTRAVENOUS at 07:23

## 2024-05-01 RX ADMIN — ASPIRIN 81 MG: 81 TABLET, COATED ORAL at 11:48

## 2024-05-01 RX ADMIN — BUDESONIDE 250 MCG: 0.25 SUSPENSION RESPIRATORY (INHALATION) at 12:07

## 2024-05-01 RX ADMIN — OXYCODONE HYDROCHLORIDE AND ACETAMINOPHEN 1 TABLET: 5; 325 TABLET ORAL at 21:10

## 2024-05-01 RX ADMIN — SUGAMMADEX 200 MG: 100 INJECTION, SOLUTION INTRAVENOUS at 08:00

## 2024-05-01 RX ADMIN — BUDESONIDE 250 MCG: 0.25 SUSPENSION RESPIRATORY (INHALATION) at 20:13

## 2024-05-01 RX ADMIN — HYDROMORPHONE HYDROCHLORIDE 0.5 MG: 1 INJECTION, SOLUTION INTRAMUSCULAR; INTRAVENOUS; SUBCUTANEOUS at 08:46

## 2024-05-01 RX ADMIN — ATORVASTATIN CALCIUM 40 MG: 40 TABLET, FILM COATED ORAL at 20:38

## 2024-05-01 ASSESSMENT — PAIN DESCRIPTION - DESCRIPTORS
DESCRIPTORS: ACHING;DISCOMFORT;PRESSURE
DESCRIPTORS: DISCOMFORT;PRESSURE
DESCRIPTORS: PRESSURE
DESCRIPTORS: DISCOMFORT;SORE
DESCRIPTORS: DISCOMFORT;SORE
DESCRIPTORS: PRESSURE

## 2024-05-01 ASSESSMENT — PAIN DESCRIPTION - ORIENTATION
ORIENTATION: INNER
ORIENTATION: LOWER
ORIENTATION: INNER

## 2024-05-01 ASSESSMENT — PAIN DESCRIPTION - PAIN TYPE
TYPE: SURGICAL PAIN

## 2024-05-01 ASSESSMENT — PAIN DESCRIPTION - LOCATION
LOCATION: PELVIS;BACK;ABDOMEN
LOCATION: BUTTOCKS;PELVIS
LOCATION: PELVIS
LOCATION: PELVIS;BUTTOCKS

## 2024-05-01 ASSESSMENT — PAIN DESCRIPTION - FREQUENCY: FREQUENCY: CONTINUOUS

## 2024-05-01 ASSESSMENT — PAIN - FUNCTIONAL ASSESSMENT: PAIN_FUNCTIONAL_ASSESSMENT: 0-10

## 2024-05-01 ASSESSMENT — PAIN SCALES - GENERAL
PAINLEVEL_OUTOF10: 7
PAINLEVEL_OUTOF10: 5
PAINLEVEL_OUTOF10: 7
PAINLEVEL_OUTOF10: 7
PAINLEVEL_OUTOF10: 8
PAINLEVEL_OUTOF10: 7

## 2024-05-01 NOTE — ANESTHESIA PRE PROCEDURE
Department of Anesthesiology  Preprocedure Note       Name:  Ruth Anguiano   Age:  64 y.o.  :  1960                                          MRN:  [id number]         Date:  2024      Surgeon: Surgeon(s):  Freddie Wright DO    Procedure: Procedure(s):  POSTERIOR REPAIR         ++LATEX ALLERGY++   +++IODINE ALLERGY+++    Medications prior to admission:   Prior to Admission medications    Medication Sig Start Date End Date Taking? Authorizing Provider   gabapentin (NEURONTIN) 300 MG capsule Take 1 capsule by mouth in the morning, at noon, and at bedtime. 24  Hien Stokes MD   ipratropium 0.5 mg-albuterol 2.5 mg (DUONEB) 0.5-2.5 (3) MG/3ML SOLN nebulizer solution Inhale 3 mLs into the lungs every 6 hours as needed for Shortness of Breath 24   Jatinder Srinivasan MD   budesonide-formoterol (SYMBICORT) 80-4.5 MCG/ACT AERO Inhale 2 puffs into the lungs 2 times daily 24   Jatinder Srinivasan MD   aspirin 81 MG EC tablet Take 1 tablet by mouth daily 24  Judd Olivier MD   chlorthalidone (HYGROTON) 25 MG tablet TAKE ONE TABLET BY MOUTH DAILY 3/22/24   Hien Stokes MD   potassium chloride (KLOR-CON M) 10 MEQ extended release tablet TAKE ONE TABLET BY MOUTH DAILY WITH BREAKFAST 3/22/24   Hien Stokes MD   pantoprazole (PROTONIX) 20 MG tablet TAKE ONE TABLET BY MOUTH EVERY DAY 3/22/24   Hien Stokes MD   metoprolol succinate (TOPROL XL) 25 MG extended release tablet TAKE ONE TABLET BY MOUTH EVERY DAY 3/22/24   Hien Stokes MD   losartan (COZAAR) 100 MG tablet Take 1 tablet by mouth daily 3/21/24   Hien Stokes MD   psyllium (METAMUCIL SMOOTH TEXTURE) 28.3 % POWD powder Take 3.4 g by mouth 2 times daily 3/21/24   Hien Stokes MD   acetaminophen-codeine (TYLENOL #4) 300-60 MG per tablet Take 1 tablet by mouth daily as needed for Pain for up to 60 days. Max Daily Amount: 1 tablet 3/18/24 5/17/24  Massiel Kebede PA   atorvastatin

## 2024-05-01 NOTE — PROGRESS NOTES
4 Eyes Skin Assessment     NAME:  Ruth Anguiano  YOB: 1960  MEDICAL RECORD NUMBER:  [id number]    The patient is being assessed for  Admission    I agree that at least one RN has performed a thorough Head to Toe Skin Assessment on the patient. ALL assessment sites listed below have been assessed.      Areas assessed by both nurses:    Head, Face, Ears, Shoulders, Back, Chest, Arms, Elbows, Hands, Sacrum. Buttock, Coccyx, Ischium, Legs. Feet and Heels, and Under Medical Devices         Does the Patient have a Wound? No noted wound(s)       Doni Prevention initiated by RN: Yes  Wound Care Orders initiated by RN: No    Pressure Injury (Stage 3,4, Unstageable, DTI, NWPT, and Complex wounds) if present, place Wound referral order by RN under : No    New Ostomies, if present place, Ostomy referral order under : No     Nurse 1 eSignature: Electronically signed by Alta Leavitt RN on 5/1/24 at 4:54 PM EDT    **SHARE this note so that the co-signing nurse can place an eSignature**    Nurse 2 eSignature: Electronically signed by Taryn North RN on 5/1/24 at 5:20 PM EDT

## 2024-05-01 NOTE — OP NOTE
epinephrine.  I then made a triangular perineal incision.   I was able to do a systematic dissection of the vaginal mucosa off the large defect.  There was no entrance into the intraperitoneal cavity.  I was able to get down to the lateral levator muscle.  After this occurred with right angle retractors, I was able to deploy a three 2-0 Vicryl on UR-6 in a plication method in reduction of the underlying rectocele.  These were tied down and there was good reconstruction of the pelvic floor.  After this occurred, copious irrigation occurred with antibiotic solution.  Rectal examination demonstrated no injury.  I did turn the vaginal mucosa slightly and then closed the vaginal mucosa with a running 2-0 Vicryl on UR-6.  At the end, the patient had good reconstruction, good cosmetic and functional.  Rectal examination was performed one last time.  The patient at this time awoke from anesthesia without complication, brought to PACU in a stable condition.    PLAN:    1. She will be admitted for a 24-hour observation.  2. Pain medications and antibiotics will be given.  3. Findings conveyed to her family.  4. Lap count, instrument count, and needle counts were correct.          GARRETT LADI SOUSAO, DO      D:  05/01/2024 08:02:44     T:  05/01/2024 13:02:35     RADHA/JAGJIT  Job #:  [id number]     Doc#:  [id number]    CC:   Primary Care Physician

## 2024-05-01 NOTE — ANESTHESIA POSTPROCEDURE EVALUATION
Department of Anesthesiology  Postprocedure Note    Patient: Ruth Anguiano  MRN: [id number]  YOB: 1960  Date of evaluation: 5/1/2024    Procedure Summary       Date: 05/01/24 Room / Location: [address]    Anesthesia Start: 0659 Anesthesia Stop:     Procedure: POSTERIOR REPAIR         ++LATEX ALLERGY++   +++IODINE ALLERGY+++ Diagnosis:       Rectocele      (Rectocele [N81.6])    Surgeons: Freddie Wright DO Responsible Provider: Christine Ward MD    Anesthesia Type: general ASA Status: 4            Anesthesia Type: No value filed.    Monica Phase I: Monica Score: 10    Monica Phase II:      Anesthesia Post Evaluation    Patient location during evaluation: PACU  Patient participation: complete - patient participated  Level of consciousness: awake  Pain score: 3  Airway patency: patent  Nausea & Vomiting: no nausea and no vomiting  Cardiovascular status: hemodynamically stable  Respiratory status: acceptable, nonlabored ventilation and spontaneous ventilation  Hydration status: euvolemic  Pain management: adequate and satisfactory to patient        No notable events documented.

## 2024-05-01 NOTE — DISCHARGE INSTRUCTIONS
Follow up Dr. Freddie Wright in 1-4 week, [phone]    When you are discharged, it is okay to climb stairs, take a shower, and no heavy lifting greater than 5-10 pounds.  You will need to call the office to make an appointment to see Dr. Wright/Malcolm/Michael in 10-14 days.  Pain medications will be written as well as antibiotics.  Hold on Driving until cleared by the physician.  Any questions or concerns arise call the office, [phone].

## 2024-05-01 NOTE — BRIEF OP NOTE
Brief Postoperative Note      Patient: Ruth Anguiano  YOB: 1960  MRN: [id number]    Date of Procedure: 5/1/2024    Pre-Op Diagnosis Codes:     * Rectocele [N81.6]    Post-Op Diagnosis: Same       Procedure(s):  POSTERIOR REPAIR         ++LATEX ALLERGY++   +++IODINE ALLERGY+++    Surgeon(s):  Freddie Wright DO    Assistant:  * No surgical staff found *    Anesthesia: General    Estimated Blood Loss (mL): Minimal    Complications: None    Specimens:   * No specimens in log *    Implants:  * No implants in log *      Drains: * No LDAs found *    Findings:  Infection Present At Time Of Surgery (PATOS) (choose all levels that have infection present):  No infection present  Other Findings: see operative report     Electronically signed by Freddie Wright DO on 5/1/2024 at 6:53 AM

## 2024-05-01 NOTE — H&P
(HYGROTON) 25 MG tablet, TAKE ONE TABLET BY MOUTH DAILY  potassium chloride (KLOR-CON M) 10 MEQ extended release tablet, TAKE ONE TABLET BY MOUTH DAILY WITH BREAKFAST  pantoprazole (PROTONIX) 20 MG tablet, TAKE ONE TABLET BY MOUTH EVERY DAY  metoprolol succinate (TOPROL XL) 25 MG extended release tablet, TAKE ONE TABLET BY MOUTH EVERY DAY  losartan (COZAAR) 100 MG tablet, Take 1 tablet by mouth daily  psyllium (METAMUCIL SMOOTH TEXTURE) 28.3 % POWD powder, Take 3.4 g by mouth 2 times daily  acetaminophen-codeine (TYLENOL #4) 300-60 MG per tablet, Take 1 tablet by mouth daily as needed for Pain for up to 60 days. Max Daily Amount: 1 tablet  atorvastatin (LIPITOR) 40 MG tablet, Take 1 tablet by mouth nightly  albuterol sulfate HFA (PROVENTIL;VENTOLIN;PROAIR) 108 (90 Base) MCG/ACT inhaler, Inhale 2 puffs into the lungs every 6 hours as needed for Wheezing  amLODIPine (NORVASC) 5 MG tablet, TAKE ONE TABLET BY MOUTH EVERY DAY  Elastic Bandages & Supports (JOBST KNEE HIGH COMPRESSION SM) MISC, Knee high with 20- 30 mmhg of compression  polyethylene glycol (GLYCOLAX) 17 g packet, DISSOLVE 1 PACKET (17 GRAMS) IN LIQUID AND TAKE BY MOUTH DAILY  Multiple Vitamins-Minerals (THERAPEUTIC MULTIVITAMIN-MINERALS) tablet, Take 1 tablet by mouth daily  Misc. Devices (ROLLATOR) MISC, 1 each by Does not apply route daily as needed (use as needed for stability)    Allergies:    Latex, Iodine, Shellfish-derived products, Spiriva respimat [tiotropium bromide monohydrate], Aloe, Cefdinir, Celebrex [celecoxib], and Fish-derived products    Social History:    reports that she quit smoking about 5 years ago. Her smoking use included cigarettes. She started smoking about 48 years ago. She has a 64.5 pack-year smoking history. She has never used smokeless tobacco. She reports that she does not drink alcohol and does not use drugs.    Family History:   Non-contributory to this urological problem  family history includes Arthritis in her father

## 2024-05-02 VITALS
DIASTOLIC BLOOD PRESSURE: 47 MMHG | TEMPERATURE: 98.4 F | RESPIRATION RATE: 16 BRPM | HEIGHT: 65 IN | HEART RATE: 62 BPM | SYSTOLIC BLOOD PRESSURE: 117 MMHG | OXYGEN SATURATION: 100 % | BODY MASS INDEX: 37.49 KG/M2 | WEIGHT: 225 LBS

## 2024-05-02 LAB
ERYTHROCYTE [DISTWIDTH] IN BLOOD BY AUTOMATED COUNT: 13.1 % (ref 11.5–15)
HCT VFR BLD AUTO: 35.3 % (ref 34–48)
HGB BLD-MCNC: 11.4 G/DL (ref 11.5–15.5)
MCH RBC QN AUTO: 29.4 PG (ref 26–35)
MCHC RBC AUTO-ENTMCNC: 32.3 G/DL (ref 32–34.5)
MCV RBC AUTO: 91 FL (ref 80–99.9)
PLATELET # BLD AUTO: 194 K/UL (ref 130–450)
PMV BLD AUTO: 9.4 FL (ref 7–12)
RBC # BLD AUTO: 3.88 M/UL (ref 3.5–5.5)
WBC OTHER # BLD: 9.9 K/UL (ref 4.5–11.5)

## 2024-05-02 PROCEDURE — 85027 COMPLETE CBC AUTOMATED: CPT

## 2024-05-02 PROCEDURE — 6370000000 HC RX 637 (ALT 250 FOR IP): Performed by: UROLOGY

## 2024-05-02 PROCEDURE — 6370000000 HC RX 637 (ALT 250 FOR IP): Performed by: NURSE PRACTITIONER

## 2024-05-02 PROCEDURE — G0378 HOSPITAL OBSERVATION PER HR: HCPCS

## 2024-05-02 PROCEDURE — 94640 AIRWAY INHALATION TREATMENT: CPT

## 2024-05-02 PROCEDURE — 2500000003 HC RX 250 WO HCPCS: Performed by: UROLOGY

## 2024-05-02 PROCEDURE — 6360000002 HC RX W HCPCS: Performed by: UROLOGY

## 2024-05-02 PROCEDURE — 36415 COLL VENOUS BLD VENIPUNCTURE: CPT

## 2024-05-02 PROCEDURE — 51798 US URINE CAPACITY MEASURE: CPT

## 2024-05-02 RX ORDER — CIPROFLOXACIN 500 MG/1
500 TABLET, FILM COATED ORAL 2 TIMES DAILY
Qty: 6 TABLET | Refills: 0 | Status: SHIPPED | OUTPATIENT
Start: 2024-05-02 | End: 2024-05-05

## 2024-05-02 RX ADMIN — METOPROLOL SUCCINATE 25 MG: 25 TABLET, EXTENDED RELEASE ORAL at 08:29

## 2024-05-02 RX ADMIN — CHLORTHALIDONE 25 MG: 25 TABLET ORAL at 08:29

## 2024-05-02 RX ADMIN — GABAPENTIN 300 MG: 300 CAPSULE ORAL at 08:28

## 2024-05-02 RX ADMIN — ARFORMOTEROL TARTRATE 15 MCG: 15 SOLUTION RESPIRATORY (INHALATION) at 09:01

## 2024-05-02 RX ADMIN — PANTOPRAZOLE SODIUM 20 MG: 20 TABLET, DELAYED RELEASE ORAL at 08:29

## 2024-05-02 RX ADMIN — ASPIRIN 81 MG: 81 TABLET, COATED ORAL at 08:29

## 2024-05-02 RX ADMIN — AMLODIPINE BESYLATE 5 MG: 5 TABLET ORAL at 08:29

## 2024-05-02 RX ADMIN — LOSARTAN POTASSIUM 100 MG: 50 TABLET, FILM COATED ORAL at 08:28

## 2024-05-02 RX ADMIN — Medication 1 TABLET: at 08:28

## 2024-05-02 RX ADMIN — POTASSIUM CHLORIDE 10 MEQ: 1500 TABLET, EXTENDED RELEASE ORAL at 08:27

## 2024-05-02 RX ADMIN — CIPROFLOXACIN 200 MG: 2 INJECTION, SOLUTION INTRAVENOUS at 10:48

## 2024-05-02 RX ADMIN — POTASSIUM CHLORIDE, DEXTROSE MONOHYDRATE AND SODIUM CHLORIDE: 150; 5; 450 INJECTION, SOLUTION INTRAVENOUS at 12:12

## 2024-05-02 RX ADMIN — BUDESONIDE 250 MCG: 0.25 SUSPENSION RESPIRATORY (INHALATION) at 09:01

## 2024-05-02 RX ADMIN — OXYCODONE HYDROCHLORIDE AND ACETAMINOPHEN 1 TABLET: 5; 325 TABLET ORAL at 08:20

## 2024-05-02 RX ADMIN — GABAPENTIN 300 MG: 300 CAPSULE ORAL at 15:14

## 2024-05-02 RX ADMIN — PSYLLIUM HUSK 1 PACKET: 3.4 POWDER ORAL at 08:27

## 2024-05-02 ASSESSMENT — PAIN DESCRIPTION - DESCRIPTORS
DESCRIPTORS: ACHING;DISCOMFORT;TENDER
DESCRIPTORS: ACHING;PRESSURE

## 2024-05-02 ASSESSMENT — PAIN SCALES - GENERAL
PAINLEVEL_OUTOF10: 8
PAINLEVEL_OUTOF10: 6

## 2024-05-02 ASSESSMENT — PAIN DESCRIPTION - ORIENTATION
ORIENTATION: LOWER
ORIENTATION: LOWER

## 2024-05-02 ASSESSMENT — PAIN DESCRIPTION - LOCATION
LOCATION: PELVIS;BACK
LOCATION: PELVIS;VAGINA

## 2024-05-02 ASSESSMENT — PAIN - FUNCTIONAL ASSESSMENT
PAIN_FUNCTIONAL_ASSESSMENT: ACTIVITIES ARE NOT PREVENTED
PAIN_FUNCTIONAL_ASSESSMENT: PREVENTS OR INTERFERES WITH MANY ACTIVE NOT PASSIVE ACTIVITIES

## 2024-05-02 ASSESSMENT — PAIN DESCRIPTION - ONSET
ONSET: ON-GOING
ONSET: ON-GOING

## 2024-05-02 ASSESSMENT — PAIN DESCRIPTION - PAIN TYPE
TYPE: SURGICAL PAIN
TYPE: SURGICAL PAIN

## 2024-05-02 ASSESSMENT — PAIN DESCRIPTION - FREQUENCY
FREQUENCY: CONTINUOUS
FREQUENCY: CONTINUOUS

## 2024-05-02 NOTE — PLAN OF CARE
Problem: Chronic Conditions and Co-morbidities  Goal: Patient's chronic conditions and co-morbidity symptoms are monitored and maintained or improved  Outcome: Progressing     Problem: Discharge Planning  Goal: Discharge to home or other facility with appropriate resources  Outcome: Progressing  Flowsheets (Taken 5/1/2024 1025 by Violeta Merchant, RN)  Discharge to home or other facility with appropriate resources: Refer to discharge planning if patient needs post-hospital services based on physician order or complex needs related to functional status, cognitive ability or social support system     Problem: Pain  Goal: Verbalizes/displays adequate comfort level or baseline comfort level  Outcome: Progressing     Problem: Safety - Adult  Goal: Free from fall injury  Outcome: Progressing     Problem: ABCDS Injury Assessment  Goal: Absence of physical injury  Outcome: Progressing

## 2024-05-02 NOTE — PROGRESS NOTES
5/2/2024 1:21 PM  Ruth Anguiano  [id number]    Subjective:    Her Sams was removed this morning around 8 AM  She has voided small amounts and is comfortable  PVRs are pending  Her son is present  Her pain is controlled  She is tolerating diet  Packing was removed this morning  Review of Systems  Constitutional: No fever or chills   Respiratory: negative for cough and hemoptysis  Cardiovascular: negative for chest pain and dyspnea  Gastrointestinal: negative for abdominal pain, diarrhea, nausea and vomiting   : See above  Derm: negative for rash and skin lesion(s)  Neurological: negative for seizures and tremors  Musculoskeletal: Negative    Psychiatric: Negative   All other reviews are negative      Scheduled Meds:   therapeutic multivitamin-minerals  1 tablet Oral Daily    amLODIPine  5 mg Oral Daily    atorvastatin  40 mg Oral Nightly    losartan  100 mg Oral Daily    chlorthalidone  25 mg Oral Daily    potassium chloride  10 mEq Oral Daily with breakfast    pantoprazole  20 mg Oral Daily    metoprolol succinate  25 mg Oral Daily    aspirin  81 mg Oral Daily    gabapentin  300 mg Oral TID    sodium chloride flush  5-40 mL IntraVENous 2 times per day    ciprofloxacin  200 mg IntraVENous Q12H    psyllium  1 packet Oral BID    budesonide  0.25 mg Nebulization BID RT    And    arformoterol tartrate  15 mcg Nebulization BID RT       Objective:  Vitals:    05/02/24 1142   BP: (!) 117/47   Pulse: 62   Resp: 16   Temp: 98.4 °F (36.9 °C)   SpO2: 100%         Allergies: Latex, Iodine, Shellfish-derived products, Spiriva respimat [tiotropium bromide monohydrate], Aloe, Cefdinir, Celebrex [celecoxib], and Fish-derived products    General Appearance: alert and oriented to person, place and time and in no acute distress  Skin: no rash or erythema  Head: normocephalic and atraumatic  Pulmonary/Chest: normal air movement, no respiratory distress  Abdomen: soft, non-tender, non-distended  Genitourinary: No

## 2024-05-02 NOTE — PROGRESS NOTES
CLINICAL PHARMACY NOTE: MEDS TO BEDS    Total # of Prescriptions Filled: 1   The following medications were delivered to the patient:  Cipro 500    Additional Documentation:

## 2024-05-15 ENCOUNTER — OFFICE VISIT (OUTPATIENT)
Dept: PAIN MANAGEMENT | Age: 64
End: 2024-05-15
Payer: MEDICARE

## 2024-05-15 VITALS
SYSTOLIC BLOOD PRESSURE: 138 MMHG | TEMPERATURE: 96.9 F | RESPIRATION RATE: 18 BRPM | BODY MASS INDEX: 37.49 KG/M2 | WEIGHT: 225 LBS | OXYGEN SATURATION: 96 % | HEART RATE: 80 BPM | DIASTOLIC BLOOD PRESSURE: 80 MMHG | HEIGHT: 65 IN

## 2024-05-15 DIAGNOSIS — M50.30 DEGENERATION OF CERVICAL DISC WITHOUT MYELOPATHY: ICD-10-CM

## 2024-05-15 DIAGNOSIS — M16.12 PRIMARY OSTEOARTHRITIS OF LEFT HIP: ICD-10-CM

## 2024-05-15 DIAGNOSIS — M47.816 LUMBAR FACET ARTHROPATHY: ICD-10-CM

## 2024-05-15 DIAGNOSIS — M47.816 LUMBAR SPONDYLOSIS: ICD-10-CM

## 2024-05-15 DIAGNOSIS — G89.4 CHRONIC PAIN SYNDROME: Primary | ICD-10-CM

## 2024-05-15 DIAGNOSIS — Z96.642 S/P TOTAL LEFT HIP ARTHROPLASTY: ICD-10-CM

## 2024-05-15 DIAGNOSIS — M54.16 LUMBAR RADICULITIS: ICD-10-CM

## 2024-05-15 DIAGNOSIS — M16.11 ARTHRITIS OF RIGHT HIP: ICD-10-CM

## 2024-05-15 DIAGNOSIS — M47.812 FACET ARTHROPATHY, CERVICAL: ICD-10-CM

## 2024-05-15 DIAGNOSIS — M51.9 LUMBAR DISC DISORDER: ICD-10-CM

## 2024-05-15 PROCEDURE — 99213 OFFICE O/P EST LOW 20 MIN: CPT | Performed by: PHYSICIAN ASSISTANT

## 2024-05-15 PROCEDURE — G8417 CALC BMI ABV UP PARAM F/U: HCPCS | Performed by: PHYSICIAN ASSISTANT

## 2024-05-15 PROCEDURE — 3075F SYST BP GE 130 - 139MM HG: CPT | Performed by: PHYSICIAN ASSISTANT

## 2024-05-15 PROCEDURE — 1123F ACP DISCUSS/DSCN MKR DOCD: CPT | Performed by: PHYSICIAN ASSISTANT

## 2024-05-15 PROCEDURE — 1090F PRES/ABSN URINE INCON ASSESS: CPT | Performed by: PHYSICIAN ASSISTANT

## 2024-05-15 PROCEDURE — 3017F COLORECTAL CA SCREEN DOC REV: CPT | Performed by: PHYSICIAN ASSISTANT

## 2024-05-15 PROCEDURE — G8400 PT W/DXA NO RESULTS DOC: HCPCS | Performed by: PHYSICIAN ASSISTANT

## 2024-05-15 PROCEDURE — 3079F DIAST BP 80-89 MM HG: CPT | Performed by: PHYSICIAN ASSISTANT

## 2024-05-15 PROCEDURE — G8427 DOCREV CUR MEDS BY ELIG CLIN: HCPCS | Performed by: PHYSICIAN ASSISTANT

## 2024-05-15 PROCEDURE — 1036F TOBACCO NON-USER: CPT | Performed by: PHYSICIAN ASSISTANT

## 2024-05-15 RX ORDER — HYDROCODONE BITARTRATE AND ACETAMINOPHEN 5; 325 MG/1; MG/1
1 TABLET ORAL DAILY PRN
Qty: 7 TABLET | Refills: 0 | Status: SHIPPED | OUTPATIENT
Start: 2024-05-15 | End: 2024-05-22

## 2024-05-15 NOTE — PROGRESS NOTES
Ruth Anguiano presents to the [facility] on 5/15/2024. Ruth is complaining of pain in her back. The pain is constant. The pain is described as aching, throbbing, stabbing, and sharp. Pain is rated on her best day at a 4, on her worst day at a 10, and on average at a 9 on the VAS scale. She took her last dose of Neurontin and Tylenol and tylenol #4 2 days ago.      Any procedures since your last visit: No    She is not on NSAIDS and  is  on anticoagulation medications to include ASA and is managed by Hien Stokes MD       Pacemaker or defibrillator: No    Medication Contract and Consent for Opioid Use Documents Filed       Patient Documents       Type of Document Status Date Received Received By Description    Medication Contract Received 3/1/2019  1:43 PM FRANCIS MCINTOSH PAIN MANAGEMENT PT AGREEMENT 3/1/2019    Medication Contract Received 10/8/2020  1:21 PM FRANCIS MCINTOSH pain pt agreement    Medication Contract Received 10/7/2021 10:36 AM BOLIVAR TRUJILLO Pain Mgmt Patient Agreement 10.7.2021    Medication Contract Received 9/26/2022 10:40 AM ENMA WAGNER MEDICATION CONTRACT    Consent Opioid Use Received 8/28/2023 10:09 AM OLU VALENZUELA pt agreement 8/28/23                       Resp 18   Ht 1.651 m (5' 5\")   Wt 102.1 kg (225 lb)   BMI 37.44 kg/m²      No LMP recorded. Patient is postmenopausal.    
  Social History Narrative    Not on file     Social Determinants of Health     Financial Resource Strain: Low Risk  (3/21/2024)    Overall Financial Resource Strain (CARDIA)     Difficulty of Paying Living Expenses: Not hard at all   Food Insecurity: No Food Insecurity (5/1/2024)    Hunger Vital Sign     Worried About Running Out of Food in the Last Year: Never true     Ran Out of Food in the Last Year: Never true   Transportation Needs: No Transportation Needs (5/1/2024)    PRAPARE - Transportation     Lack of Transportation (Medical): No     Lack of Transportation (Non-Medical): No   Physical Activity: Sufficiently Active (8/18/2023)    Exercise Vital Sign     Days of Exercise per Week: 5 days     Minutes of Exercise per Session: 30 min   Stress: No Stress Concern Present (3/21/2023)    Citizen of [place] Reading of Occupational Health - Occupational Stress Questionnaire     Feeling of Stress : Only a little   Social Connections: Socially Isolated (3/21/2023)    Social Connection and Isolation Panel [NHANES]     Frequency of Communication with Friends and Family: More than three times a week     Frequency of Social Gatherings with Friends and Family: More than three times a week     Attends Caodaism Services: Never     Active Member of Clubs or Organizations: No     Attends Club or Organization Meetings: Never     Marital Status:    Intimate Partner Violence: Not on file   Housing Stability: Low Risk  (5/1/2024)    Housing Stability Vital Sign     Unable to Pay for Housing in the Last Year: No     Number of Places Lived in the Last Year: 1     Unstable Housing in the Last Year: No       Family History   Problem Relation Age of Onset    Diabetes Mother     Arthritis Mother     Atrial Fibrillation Mother     Diabetes Father     Atrial Fibrillation Father     Arthritis Father     Emphysema Father     Cancer Sister         \"female\"       REVIEW OF SYSTEMS:     Ruth denies fever/chills, chest pain, shortness of

## 2024-05-16 DIAGNOSIS — Z76.0 MEDICATION REFILL: ICD-10-CM

## 2024-05-16 DIAGNOSIS — I10 HYPERTENSION, UNSPECIFIED TYPE: ICD-10-CM

## 2024-05-16 DIAGNOSIS — G89.4 CHRONIC PAIN SYNDROME: ICD-10-CM

## 2024-05-16 RX ORDER — ALBUTEROL SULFATE 90 UG/1
2 AEROSOL, METERED RESPIRATORY (INHALATION) EVERY 6 HOURS PRN
Qty: 18 G | Refills: 1 | Status: SHIPPED | OUTPATIENT
Start: 2024-05-16

## 2024-05-16 RX ORDER — METOPROLOL SUCCINATE 25 MG/1
25 TABLET, EXTENDED RELEASE ORAL DAILY
Qty: 90 TABLET | Refills: 1 | Status: SHIPPED | OUTPATIENT
Start: 2024-05-16

## 2024-05-16 RX ORDER — ATORVASTATIN CALCIUM 40 MG/1
40 TABLET, FILM COATED ORAL NIGHTLY
Qty: 90 TABLET | Refills: 1 | Status: SHIPPED | OUTPATIENT
Start: 2024-05-16 | End: 2025-02-10

## 2024-05-16 RX ORDER — PANTOPRAZOLE SODIUM 20 MG/1
20 TABLET, DELAYED RELEASE ORAL DAILY
Qty: 90 TABLET | Refills: 0 | Status: SHIPPED | OUTPATIENT
Start: 2024-05-16

## 2024-05-16 RX ORDER — POTASSIUM CHLORIDE 750 MG/1
10 TABLET, EXTENDED RELEASE ORAL
Qty: 90 TABLET | Refills: 0 | Status: SHIPPED | OUTPATIENT
Start: 2024-05-16

## 2024-05-16 RX ORDER — GABAPENTIN 300 MG/1
300 CAPSULE ORAL 3 TIMES DAILY
Qty: 90 CAPSULE | Refills: 2 | Status: SHIPPED | OUTPATIENT
Start: 2024-05-16 | End: 2025-05-16

## 2024-05-16 RX ORDER — AMLODIPINE BESYLATE 5 MG/1
TABLET ORAL
Qty: 90 TABLET | Refills: 1 | Status: SHIPPED | OUTPATIENT
Start: 2024-05-16

## 2024-05-21 ENCOUNTER — TELEPHONE (OUTPATIENT)
Dept: PAIN MANAGEMENT | Age: 64
End: 2024-05-21

## 2024-05-21 DIAGNOSIS — M47.816 LUMBAR SPONDYLOSIS: ICD-10-CM

## 2024-05-21 DIAGNOSIS — M51.9 LUMBAR DISC DISORDER: ICD-10-CM

## 2024-05-21 DIAGNOSIS — M16.11 ARTHRITIS OF RIGHT HIP: ICD-10-CM

## 2024-05-21 DIAGNOSIS — M47.812 FACET ARTHROPATHY, CERVICAL: ICD-10-CM

## 2024-05-21 DIAGNOSIS — M47.816 LUMBAR FACET ARTHROPATHY: ICD-10-CM

## 2024-05-21 DIAGNOSIS — G89.4 CHRONIC PAIN SYNDROME: ICD-10-CM

## 2024-05-21 DIAGNOSIS — M54.16 LUMBAR RADICULITIS: ICD-10-CM

## 2024-05-21 DIAGNOSIS — Z96.642 S/P TOTAL LEFT HIP ARTHROPLASTY: ICD-10-CM

## 2024-05-21 DIAGNOSIS — M50.30 DEGENERATION OF CERVICAL DISC WITHOUT MYELOPATHY: ICD-10-CM

## 2024-05-21 DIAGNOSIS — M16.12 PRIMARY OSTEOARTHRITIS OF LEFT HIP: ICD-10-CM

## 2024-05-21 RX ORDER — HYDROCODONE BITARTRATE AND ACETAMINOPHEN 5; 325 MG/1; MG/1
1 TABLET ORAL DAILY PRN
Qty: 7 TABLET | Refills: 0 | Status: SHIPPED
Start: 2024-05-22 | End: 2024-05-21 | Stop reason: SDUPTHER

## 2024-05-21 RX ORDER — HYDROCODONE BITARTRATE AND ACETAMINOPHEN 5; 325 MG/1; MG/1
1 TABLET ORAL DAILY PRN
Qty: 30 TABLET | Refills: 0 | Status: SHIPPED | OUTPATIENT
Start: 2024-05-22 | End: 2024-06-21

## 2024-06-07 LAB
MICROORGANISM SPEC CULT: NO GROWTH
SPECIMEN DESCRIPTION: NORMAL

## 2024-06-17 ENCOUNTER — OFFICE VISIT (OUTPATIENT)
Dept: PAIN MANAGEMENT | Age: 64
End: 2024-06-17
Payer: MEDICARE

## 2024-06-17 VITALS
BODY MASS INDEX: 37.49 KG/M2 | SYSTOLIC BLOOD PRESSURE: 132 MMHG | WEIGHT: 225 LBS | HEIGHT: 65 IN | OXYGEN SATURATION: 97 % | DIASTOLIC BLOOD PRESSURE: 70 MMHG | RESPIRATION RATE: 18 BRPM | HEART RATE: 71 BPM

## 2024-06-17 DIAGNOSIS — Z96.642 S/P TOTAL LEFT HIP ARTHROPLASTY: ICD-10-CM

## 2024-06-17 DIAGNOSIS — G89.4 CHRONIC PAIN SYNDROME: ICD-10-CM

## 2024-06-17 DIAGNOSIS — M51.9 LUMBAR DISC DISORDER: ICD-10-CM

## 2024-06-17 DIAGNOSIS — M47.816 LUMBAR SPONDYLOSIS: ICD-10-CM

## 2024-06-17 DIAGNOSIS — M47.812 FACET ARTHROPATHY, CERVICAL: ICD-10-CM

## 2024-06-17 DIAGNOSIS — M54.16 LUMBAR RADICULITIS: ICD-10-CM

## 2024-06-17 DIAGNOSIS — M16.11 ARTHRITIS OF RIGHT HIP: ICD-10-CM

## 2024-06-17 DIAGNOSIS — Z79.891 ENCOUNTER FOR LONG-TERM OPIATE ANALGESIC USE: Primary | ICD-10-CM

## 2024-06-17 DIAGNOSIS — M50.30 DEGENERATION OF CERVICAL DISC WITHOUT MYELOPATHY: ICD-10-CM

## 2024-06-17 DIAGNOSIS — M16.12 PRIMARY OSTEOARTHRITIS OF LEFT HIP: ICD-10-CM

## 2024-06-17 DIAGNOSIS — M47.816 LUMBAR FACET ARTHROPATHY: ICD-10-CM

## 2024-06-17 LAB
SEND OUT REPORT: NORMAL
TEST NAME: NORMAL

## 2024-06-17 PROCEDURE — G8417 CALC BMI ABV UP PARAM F/U: HCPCS | Performed by: PHYSICIAN ASSISTANT

## 2024-06-17 PROCEDURE — G8427 DOCREV CUR MEDS BY ELIG CLIN: HCPCS | Performed by: PHYSICIAN ASSISTANT

## 2024-06-17 PROCEDURE — G8400 PT W/DXA NO RESULTS DOC: HCPCS | Performed by: PHYSICIAN ASSISTANT

## 2024-06-17 PROCEDURE — 1123F ACP DISCUSS/DSCN MKR DOCD: CPT | Performed by: PHYSICIAN ASSISTANT

## 2024-06-17 PROCEDURE — 1036F TOBACCO NON-USER: CPT | Performed by: PHYSICIAN ASSISTANT

## 2024-06-17 PROCEDURE — 1090F PRES/ABSN URINE INCON ASSESS: CPT | Performed by: PHYSICIAN ASSISTANT

## 2024-06-17 PROCEDURE — 99213 OFFICE O/P EST LOW 20 MIN: CPT | Performed by: PHYSICIAN ASSISTANT

## 2024-06-17 PROCEDURE — 3078F DIAST BP <80 MM HG: CPT | Performed by: PHYSICIAN ASSISTANT

## 2024-06-17 PROCEDURE — 3017F COLORECTAL CA SCREEN DOC REV: CPT | Performed by: PHYSICIAN ASSISTANT

## 2024-06-17 PROCEDURE — 3075F SYST BP GE 130 - 139MM HG: CPT | Performed by: PHYSICIAN ASSISTANT

## 2024-06-17 RX ORDER — HYDROCODONE BITARTRATE AND ACETAMINOPHEN 5; 325 MG/1; MG/1
1 TABLET ORAL DAILY PRN
Qty: 30 TABLET | Refills: 0 | Status: SHIPPED | OUTPATIENT
Start: 2024-06-21 | End: 2024-07-21

## 2024-06-17 NOTE — PROGRESS NOTES
Ruth Anguiano presents to the [facility] on 6/17/2024. Ruth is complaining of pain in her back. The pain is constant. The pain is described as aching, throbbing, stabbing, and sharp. Pain is rated on her best day at a 4, on her worst day at a 10, and on average at a 9 on the VAS scale. She took her last dose of Norco and Gabapentin yesterday.      Any procedures since your last visit: No      She is not on NSAIDS and  is  on anticoagulation medications to include ASA and is managed by Hien Stokes MD       Pacemaker or defibrillator: No     Medication Contract and Consent for Opioid Use Documents Filed       Patient Documents       Type of Document Status Date Received Received By Description    Medication Contract Received 3/1/2019  1:43 PM FRANCIS MCINTOSH PAIN MANAGEMENT PT AGREEMENT 3/1/2019    Medication Contract Received 10/8/2020  1:21 PM FRANCIS MCINTOSH pain pt agreement    Medication Contract Received 10/7/2021 10:36 AM BOLIVAR TRUJILLO Pain Mgmt Patient Agreement 10.7.2021    Medication Contract Received 9/26/2022 10:40 AM ENMA WAGNER MEDICATION CONTRACT    Consent Opioid Use Received 8/28/2023 10:09 AM OLU VALENZUELA pt agreement 8/28/23                       Resp 18   Ht 1.651 m (5' 5\")   Wt 102.1 kg (225 lb)   BMI 37.44 kg/m²      No LMP recorded. Patient is postmenopausal.  
to engage in work or other purposeful activities, the pain intensity and its interference with activities of daily living, quality of family life and social activities, and the physical activity);Obtaining appropriate analgesic effect of treatment.;Random urine drug screen sent today.                 We discussed with the patient that combining opioids, benzodiazepines, alcohol, illicit drugs or sleep aids increases the risk of respiratory depression including death. We discussed that these medications may cause drowsiness, sedation or dizziness and have counseled the patient not to drive or operate machinery. We have discussed that these medications will be prescribed only by one provider. We have discussed with the patient about age related risk factors and have thoroughly discussed the importance of taking these medications as prescribed. The patient verbalizes understanding.    ccreferring physic

## 2024-06-24 DIAGNOSIS — Z76.0 MEDICATION REFILL: ICD-10-CM

## 2024-06-24 DIAGNOSIS — I10 HYPERTENSION, UNSPECIFIED TYPE: ICD-10-CM

## 2024-06-25 LAB
SEND OUT REPORT: NORMAL
TEST NAME: NORMAL

## 2024-06-26 RX ORDER — CHLORTHALIDONE 25 MG/1
25 TABLET ORAL DAILY
Qty: 90 TABLET | Refills: 0 | Status: SHIPPED | OUTPATIENT
Start: 2024-06-26

## 2024-07-16 ENCOUNTER — OFFICE VISIT (OUTPATIENT)
Dept: PAIN MANAGEMENT | Age: 64
End: 2024-07-16
Payer: MEDICARE

## 2024-07-16 VITALS
SYSTOLIC BLOOD PRESSURE: 114 MMHG | HEIGHT: 65 IN | BODY MASS INDEX: 37.49 KG/M2 | WEIGHT: 225 LBS | TEMPERATURE: 96.8 F | DIASTOLIC BLOOD PRESSURE: 60 MMHG | OXYGEN SATURATION: 97 % | RESPIRATION RATE: 18 BRPM | HEART RATE: 84 BPM

## 2024-07-16 DIAGNOSIS — M47.812 FACET ARTHROPATHY, CERVICAL: ICD-10-CM

## 2024-07-16 DIAGNOSIS — F32.A DEPRESSION, UNSPECIFIED DEPRESSION TYPE: ICD-10-CM

## 2024-07-16 DIAGNOSIS — E66.01 SEVERE OBESITY (BMI 35.0-39.9) WITH COMORBIDITY (HCC): ICD-10-CM

## 2024-07-16 DIAGNOSIS — M16.12 PRIMARY OSTEOARTHRITIS OF LEFT HIP: ICD-10-CM

## 2024-07-16 DIAGNOSIS — M50.30 DEGENERATION OF CERVICAL DISC WITHOUT MYELOPATHY: ICD-10-CM

## 2024-07-16 DIAGNOSIS — M54.16 LUMBAR RADICULITIS: ICD-10-CM

## 2024-07-16 DIAGNOSIS — Z96.642 S/P TOTAL LEFT HIP ARTHROPLASTY: ICD-10-CM

## 2024-07-16 DIAGNOSIS — G89.4 CHRONIC PAIN SYNDROME: Primary | ICD-10-CM

## 2024-07-16 DIAGNOSIS — Z79.891 ENCOUNTER FOR LONG-TERM OPIATE ANALGESIC USE: ICD-10-CM

## 2024-07-16 DIAGNOSIS — M47.816 LUMBAR SPONDYLOSIS: ICD-10-CM

## 2024-07-16 DIAGNOSIS — M47.816 LUMBAR FACET ARTHROPATHY: ICD-10-CM

## 2024-07-16 DIAGNOSIS — M16.11 ARTHRITIS OF RIGHT HIP: ICD-10-CM

## 2024-07-16 DIAGNOSIS — M51.9 LUMBAR DISC DISORDER: ICD-10-CM

## 2024-07-16 PROCEDURE — G8400 PT W/DXA NO RESULTS DOC: HCPCS | Performed by: PHYSICIAN ASSISTANT

## 2024-07-16 PROCEDURE — G8427 DOCREV CUR MEDS BY ELIG CLIN: HCPCS | Performed by: PHYSICIAN ASSISTANT

## 2024-07-16 PROCEDURE — 3078F DIAST BP <80 MM HG: CPT | Performed by: PHYSICIAN ASSISTANT

## 2024-07-16 PROCEDURE — 3017F COLORECTAL CA SCREEN DOC REV: CPT | Performed by: PHYSICIAN ASSISTANT

## 2024-07-16 PROCEDURE — 99213 OFFICE O/P EST LOW 20 MIN: CPT | Performed by: PHYSICIAN ASSISTANT

## 2024-07-16 PROCEDURE — 1123F ACP DISCUSS/DSCN MKR DOCD: CPT | Performed by: PHYSICIAN ASSISTANT

## 2024-07-16 PROCEDURE — 3074F SYST BP LT 130 MM HG: CPT | Performed by: PHYSICIAN ASSISTANT

## 2024-07-16 PROCEDURE — 1036F TOBACCO NON-USER: CPT | Performed by: PHYSICIAN ASSISTANT

## 2024-07-16 PROCEDURE — 1090F PRES/ABSN URINE INCON ASSESS: CPT | Performed by: PHYSICIAN ASSISTANT

## 2024-07-16 PROCEDURE — G8417 CALC BMI ABV UP PARAM F/U: HCPCS | Performed by: PHYSICIAN ASSISTANT

## 2024-07-16 RX ORDER — HYDROCODONE BITARTRATE AND ACETAMINOPHEN 5; 325 MG/1; MG/1
1 TABLET ORAL DAILY PRN
Qty: 30 TABLET | Refills: 0 | Status: SHIPPED | OUTPATIENT
Start: 2024-07-21 | End: 2024-08-20

## 2024-07-16 NOTE — PROGRESS NOTES
Ruth Anguiano presents to the [facility] on 7/16/2024. Ruth is complaining of pain in her back. The pain is constant. The pain is described as aching, throbbing, stabbing, and sharp. Pain is rated on her best day at a 5, on her worst day at a 10, and on average at a 7 on the VAS scale. She took her last dose of Norco and Neurontin 2 days ago.      Any procedures since your last visit: No    She is not on NSAIDS and  is  on anticoagulation medications to include ASA and is managed by Hien Stokes MD       Pacemaker or defibrillator: No     Medication Contract and Consent for Opioid Use Documents Filed       Patient Documents       Type of Document Status Date Received Received By Description    Medication Contract Received 3/1/2019  1:43 PM FRANCIS MCINTOSH PAIN MANAGEMENT PT AGREEMENT 3/1/2019    Medication Contract Received 10/8/2020  1:21 PM FRANCIS MCINTOSH pain pt agreement    Medication Contract Received 10/7/2021 10:36 AM BOLIVAR TRUJILLO Pain Mgmt Patient Agreement 10.7.2021    Medication Contract Received 9/26/2022 10:40 AM ENMA WAGNER MEDICATION CONTRACT    Consent Opioid Use Received 8/28/2023 10:09 AM OLU VALENZUELA pt agreement 8/28/23                       Resp 18   Ht 1.651 m (5' 5\")   Wt 102.1 kg (225 lb)   BMI 37.44 kg/m²      No LMP recorded. Patient is postmenopausal.    
SYSTEMS:     Ruth denies fever/chills, chest pain, shortness of breath, new bowel or bladder complaints. All other review of systems was negative.    PHYSICAL EXAMINATION:      /60   Pulse 84   Temp 96.8 °F (36 °C) (Infrared)   Resp 18   Ht 1.651 m (5' 5\")   Wt 102.1 kg (225 lb)   SpO2 97%   BMI 37.44 kg/m²     General:      General appearance:   pleasant and well-hydrated.   , in no discomfort and A & O x3  Build:Overweight  Function:Rises from a seated position with difficulty    HEENT:    Head:normocephalic and atraumatic  Pupils:regular, round and equal.  Sclera: icterus absent,    Lungs:    Breathing:Normal expansion.   No wheezing.    Abdomen:    Shape:non-distended and normal      Extremities:    Tremors:None bilaterally upper and lower  Range of motion:Generally normal shoulders  Intact:Yes  Varicose veins:not assessed   Cyanosis:none  Edema:Normal    Neurological:    Gait: antalgic with cane.    Dermatology:    Skin:no unusual rashes, no skin lesions, no palpable subcutaneous nodules and good skin turgor    Impression:    Patient seen for follow up for her chronic bilateral hip pain and low back pain due to severe degenerative changes and axial c/o neck pain   Would benefit from Cervical MBB, patient uninterested   Patient is s/p:  Left MERVAT on 2/22/2021.  Patient is s/p right MERVAT on 01/10/2022.   Continue Gabapentin 300 mg TID per PCP  Continue Norco 5/325 QD prn  Compounding cream. Helping a lot.  OARRS report reviewed   UDS reviewed and is consistent.  Patient encouraged to remain active as tolerated   Treatment plan discussed with the patient including medications and side effects        Controlled Substance Monitoring:    Acute and Chronic Pain Monitoring:   RX Monitoring Periodic Controlled Substance Monitoring   7/16/2024  10:54 AM Possible medication side effects, risk of tolerance/dependence & alternative treatments discussed.;No signs of potential drug abuse or diversion

## 2024-07-24 ENCOUNTER — OFFICE VISIT (OUTPATIENT)
Age: 64
End: 2024-07-24
Payer: MEDICARE

## 2024-07-24 DIAGNOSIS — F41.9 ANXIETY: Primary | ICD-10-CM

## 2024-07-24 PROCEDURE — 90791 PSYCH DIAGNOSTIC EVALUATION: CPT | Performed by: SOCIAL WORKER

## 2024-07-24 NOTE — PROGRESS NOTES
MISC, Knee high with 20- 30 mmhg of compression, Disp: 1 each, Rfl: 10    polyethylene glycol (GLYCOLAX) 17 g packet, DISSOLVE 1 PACKET (17 GRAMS) IN LIQUID AND TAKE BY MOUTH DAILY, Disp: , Rfl:     Multiple Vitamins-Minerals (THERAPEUTIC MULTIVITAMIN-MINERALS) tablet, Take 1 tablet by mouth daily, Disp: , Rfl:      FAMILY MEDICAL/MH HISTORY   Her family history includes Arthritis in her father and mother; Atrial Fibrillation in her father and mother; Cancer in her sister; Diabetes in her father and mother; Emphysema in her father.    PATIENT MENTAL HEALTH HISTORY  Pt reports counseling in her teenage years but nothing consistent.    PSYCHOSOCIAL HISTORY  Current living situation: Pt lives in her own and her son lives with her who is diagnosed with Schizophrenia (Demarcus). Pt also has older Ryder as well.    Work/Education: Pt receives SSI    Support system: Pt reports positive support system. Pt has daughter Kelvin who is supportive.    Buddhism/Spirituality: Pt is a spiritual person and believes in God.      DRUG AND ALCOHOL CURRENT USE/HISTORY  TOBACCO:  She reports that she quit smoking about 5 years ago. Her smoking use included cigarettes. She started smoking about 48 years ago. She has a 64.5 pack-year smoking history. She has never used smokeless tobacco.  ALCOHOL:  She reports no history of alcohol use.  OTHER SUBSTANCES: She reports no history of drug use.       ASSESSMENT  Ruth Anguiano presented to the appointment today for evaluation and treatment of symptoms of anxiety.  She is currently deemed no risk to herself or others and meets criteria for anxiety.  She will benefit from a medication evaluation to assess if anxiety medications could be helpful in treating anxiety symptoms. Ruth's anxiety symptoms are well controlled at this time.  She will also benefit from brief and solution-focused consultation to address cognitive and behavioral interventions for anxiety symptoms. Pt reports she would like

## 2024-07-25 ENCOUNTER — OFFICE VISIT (OUTPATIENT)
Dept: PULMONOLOGY | Age: 64
End: 2024-07-25
Payer: MEDICARE

## 2024-07-25 ENCOUNTER — TELEPHONE (OUTPATIENT)
Dept: PULMONOLOGY | Age: 64
End: 2024-07-25

## 2024-07-25 VITALS
TEMPERATURE: 97 F | RESPIRATION RATE: 16 BRPM | DIASTOLIC BLOOD PRESSURE: 56 MMHG | OXYGEN SATURATION: 96 % | HEIGHT: 64 IN | SYSTOLIC BLOOD PRESSURE: 117 MMHG | WEIGHT: 227 LBS | BODY MASS INDEX: 38.76 KG/M2 | HEART RATE: 65 BPM

## 2024-07-25 DIAGNOSIS — J43.9 PULMONARY EMPHYSEMA, UNSPECIFIED EMPHYSEMA TYPE (HCC): ICD-10-CM

## 2024-07-25 DIAGNOSIS — J44.9 CHRONIC OBSTRUCTIVE PULMONARY DISEASE, UNSPECIFIED COPD TYPE (HCC): Primary | ICD-10-CM

## 2024-07-25 DIAGNOSIS — G47.33 OSA (OBSTRUCTIVE SLEEP APNEA): ICD-10-CM

## 2024-07-25 DIAGNOSIS — Z87.891 PERSONAL HISTORY OF TOBACCO USE: ICD-10-CM

## 2024-07-25 DIAGNOSIS — E66.01 CLASS 2 SEVERE OBESITY WITH SERIOUS COMORBIDITY AND BODY MASS INDEX (BMI) OF 39.0 TO 39.9 IN ADULT, UNSPECIFIED OBESITY TYPE (HCC): ICD-10-CM

## 2024-07-25 PROCEDURE — G0296 VISIT TO DETERM LDCT ELIG: HCPCS | Performed by: NURSE PRACTITIONER

## 2024-07-25 NOTE — PROGRESS NOTES
Ppt here for office follow up today. Pt doing well. Discussed Home Sleep Study and pt states she has not been able tot get study done due to surgery and never got back to sleep lab. Will reorder testing and plan for home sleep study to r/o MACARIO. Pt to continue meds per orders and follow up in 6 mos.

## 2024-07-25 NOTE — TELEPHONE ENCOUNTER
Mailed letter to patient to inform her of the Ct  Lung Screen that is scheduled for her  at [place], [place] . This test is scheduled on  Thursday, August8, 2024 at  2:00 pm. Please arrive 30 minutes prior to appointment time.    No Test Prep is needed

## 2024-07-25 NOTE — PROGRESS NOTES
[facility]  Department of Pulmonary, Critical Care and Sleep Medicine  Dr. Hatfield, Dr. Hanley, Dr. Phillips, Dr. Joya, LYLY Hernández    Pulmonary & Critical Care Office Note - Follow up      Assessment/Plan     Problem List Items Addressed This Visit          Respiratory    Pulmonary emphysema (HCC) (Chronic)     Other Visit Diagnoses       Chronic obstructive pulmonary disease, unspecified COPD type (HCC)    -  Primary    Personal history of tobacco use        Class 2 severe obesity with serious comorbidity and body mass index (BMI) of 39.0 to 39.9 in adult, unspecified obesity type (HCC)                Patient ID: Ruth Anguiano is a 64 y.o. female    Chief Complaint: Shortness of breath    HPI: Ruth Anguiano is a 64 y.o. female with a PMH of Pulmonary emphysema, former smoker, chronic DVT, spontaneous subarachnoid hemorrhage s/p coiling (past)/embolization (3/1/23), CVA with right hemiparesis, Left ICA stent (3/1/23), atrial fibrillation/flutter, bilateral hip replacement.     Regarding her chronic right peroneal DVT she is following up with Dr. Davis of oncology as well as vascular surgery     History of smoking 1.5 to 2 packs daily for 40 years before quitting in 2018.     Her mobility is limited by her back pain and hip pain.     She has environmental allergies    July 25, 2025: Ruth is seen and examined today for pulmonary emphysema and possible sleep apnea. Ruth states her breathing is at baseline since her last office visit in April with . She does not use her Symbicort daily, she states she uses it when she feels she needs it. We had an extensive discussion about inhaler use as prescribed and she was educated that Symbicort is not to be used as a rescue inhaler it is a maintenance inhaler prescribed to be used every day. She states she does not want to use it everyday and she doesn't think she needs it. She states she is short of breath

## 2024-07-30 ENCOUNTER — TRANSCRIBE ORDERS (OUTPATIENT)
Dept: SLEEP CENTER | Age: 64
End: 2024-07-30

## 2024-07-30 DIAGNOSIS — G47.33 OBSTRUCTIVE SLEEP APNEA (ADULT) (PEDIATRIC): Primary | ICD-10-CM

## 2024-08-12 ENCOUNTER — HOSPITAL ENCOUNTER (OUTPATIENT)
Dept: CT IMAGING | Age: 64
Discharge: HOME | End: 2024-08-14
Payer: MEDICARE

## 2024-08-12 DIAGNOSIS — Z87.891 PERSONAL HISTORY OF TOBACCO USE: ICD-10-CM

## 2024-08-12 PROCEDURE — 71271 CT THORAX LUNG CANCER SCR C-: CPT

## 2024-08-12 NOTE — TELEPHONE ENCOUNTER
OK to wait until PCP returns. Allergic reaction Products Recommended: Eucerin SPF with Zinc Detail Level: Detailed General Sunscreen Counseling: I recommended a broad spectrum sunscreen with a SPF of 30 or higher.  I explained that SPF 30 sunscreens block approximately 97 percent of the sun's harmful rays.  Sunscreens should be applied at least 15 minutes prior to expected sun exposure and then every 2 hours after that as long as sun exposure continues. If swimming or exercising sunscreen should be reapplied every 45 minutes to an hour after getting wet or sweating.  One ounce, or the equivalent of a shot glass full of sunscreen, is adequate to protect the skin not covered by a bathing suit. I also recommended a lip balm with a sunscreen as well. Sun protective clothing can be used in lieu of sunscreen but must be worn the entire time you are exposed to the sun's rays.

## 2024-08-15 ENCOUNTER — OFFICE VISIT (OUTPATIENT)
Age: 64
End: 2024-08-15
Payer: MEDICARE

## 2024-08-15 DIAGNOSIS — F41.9 ANXIETY: Primary | ICD-10-CM

## 2024-08-15 PROCEDURE — 1123F ACP DISCUSS/DSCN MKR DOCD: CPT | Performed by: SOCIAL WORKER

## 2024-08-15 PROCEDURE — 90832 PSYTX W PT 30 MINUTES: CPT | Performed by: SOCIAL WORKER

## 2024-08-15 PROCEDURE — 1036F TOBACCO NON-USER: CPT | Performed by: SOCIAL WORKER

## 2024-08-15 NOTE — PROGRESS NOTES
ADULT BEHAVIORAL HEALTH FOLLOW UP  JEUSS Friedman,LISW      Visit Date: 8/15/2024   Time of appointment:  11:00   Time spent with Patient: 30 minutes.   This is patient's second appointment.    Reason for Consult:  Anxiety     Referring Provider/PCP:    No ref. provider found  Hien Stokes MD      Pt provided informed consent for the behavioral health program. Discussed with patient model of service to include the limits of confidentiality (i.e. abuse reporting, suicide intervention, etc.) and short-term intervention focused approach.  Pt indicated understanding.    SUBJECTIVE  Ruth is a 64 y.o. female who presents for follow up of anxiety. Pt recently returned from [place] after her nephew passed away.        MENTAL STATUS EXAM  Mood was euthymic with congruent affect.   Suicidal ideation was denied.   Homicidal ideation was denied.   Hygiene was good .  Dress was neat.   Behavior was Within Normal Limits with No self-report of difficulties ambulating.   Attitude was Cooperative, Engageable, and Friendly.  Eye-contact was good.  Speech: rate - WNL, rhythm - WNL, volume - WNL.  Verbalizations were goal directed.  Thought processes were intact and goal-oriented without evidence of delusions, hallucinations, obsessions, or nicole; with little cognitive distortions.   Associations were characterized by intact cognitive processes.  Pt was oriented to person, place, time, and general circumstances;  recent:  good.  Insight and judgment were estimated to be excellent, AEB, a good  understanding of cyclical maladaptive patterns, and the ability to use insight to inform behavior change.       ASSESSMENT  Ruth Anguiano presented to the appointment today for evaluation and treatment of symptoms of anxiety.  She is currently deemed no risk to herself or others and meets criteria for anxiety. Discussed and processed recent challenges and grief. Explored with pt anxiety and recent stressors. Pt was open and engaged

## 2024-08-19 ENCOUNTER — OFFICE VISIT (OUTPATIENT)
Dept: PAIN MANAGEMENT | Age: 64
End: 2024-08-19
Payer: MEDICARE

## 2024-08-19 VITALS
WEIGHT: 227 LBS | RESPIRATION RATE: 18 BRPM | TEMPERATURE: 96.1 F | HEART RATE: 78 BPM | DIASTOLIC BLOOD PRESSURE: 68 MMHG | BODY MASS INDEX: 38.76 KG/M2 | OXYGEN SATURATION: 98 % | SYSTOLIC BLOOD PRESSURE: 122 MMHG | HEIGHT: 64 IN

## 2024-08-19 DIAGNOSIS — M50.30 DEGENERATION OF CERVICAL DISC WITHOUT MYELOPATHY: ICD-10-CM

## 2024-08-19 DIAGNOSIS — Z96.642 S/P TOTAL LEFT HIP ARTHROPLASTY: ICD-10-CM

## 2024-08-19 DIAGNOSIS — M54.16 LUMBAR RADICULITIS: ICD-10-CM

## 2024-08-19 DIAGNOSIS — Z79.891 ENCOUNTER FOR LONG-TERM OPIATE ANALGESIC USE: ICD-10-CM

## 2024-08-19 DIAGNOSIS — M47.816 LUMBAR FACET ARTHROPATHY: ICD-10-CM

## 2024-08-19 DIAGNOSIS — G89.4 CHRONIC PAIN SYNDROME: Primary | ICD-10-CM

## 2024-08-19 DIAGNOSIS — M51.9 LUMBAR DISC DISORDER: ICD-10-CM

## 2024-08-19 DIAGNOSIS — M47.816 LUMBAR SPONDYLOSIS: ICD-10-CM

## 2024-08-19 DIAGNOSIS — M16.12 PRIMARY OSTEOARTHRITIS OF LEFT HIP: ICD-10-CM

## 2024-08-19 DIAGNOSIS — M47.812 FACET ARTHROPATHY, CERVICAL: ICD-10-CM

## 2024-08-19 DIAGNOSIS — M16.11 ARTHRITIS OF RIGHT HIP: ICD-10-CM

## 2024-08-19 DIAGNOSIS — E66.01 SEVERE OBESITY (BMI 35.0-39.9) WITH COMORBIDITY (HCC): ICD-10-CM

## 2024-08-19 PROCEDURE — G8427 DOCREV CUR MEDS BY ELIG CLIN: HCPCS | Performed by: PHYSICIAN ASSISTANT

## 2024-08-19 PROCEDURE — 3078F DIAST BP <80 MM HG: CPT | Performed by: PHYSICIAN ASSISTANT

## 2024-08-19 PROCEDURE — 99213 OFFICE O/P EST LOW 20 MIN: CPT | Performed by: PHYSICIAN ASSISTANT

## 2024-08-19 PROCEDURE — 1090F PRES/ABSN URINE INCON ASSESS: CPT | Performed by: PHYSICIAN ASSISTANT

## 2024-08-19 PROCEDURE — 3074F SYST BP LT 130 MM HG: CPT | Performed by: PHYSICIAN ASSISTANT

## 2024-08-19 PROCEDURE — 1036F TOBACCO NON-USER: CPT | Performed by: PHYSICIAN ASSISTANT

## 2024-08-19 PROCEDURE — 3017F COLORECTAL CA SCREEN DOC REV: CPT | Performed by: PHYSICIAN ASSISTANT

## 2024-08-19 PROCEDURE — G8417 CALC BMI ABV UP PARAM F/U: HCPCS | Performed by: PHYSICIAN ASSISTANT

## 2024-08-19 PROCEDURE — G8400 PT W/DXA NO RESULTS DOC: HCPCS | Performed by: PHYSICIAN ASSISTANT

## 2024-08-19 PROCEDURE — 1123F ACP DISCUSS/DSCN MKR DOCD: CPT | Performed by: PHYSICIAN ASSISTANT

## 2024-08-19 RX ORDER — HYDROCODONE BITARTRATE AND ACETAMINOPHEN 5; 325 MG/1; MG/1
1 TABLET ORAL DAILY PRN
Qty: 30 TABLET | Refills: 0 | Status: SHIPPED | OUTPATIENT
Start: 2024-08-20 | End: 2024-09-19

## 2024-08-19 NOTE — PROGRESS NOTES
Ruth Anguiano presents to the [facility] on 8/19/2024. Ruth is complaining of pain in her back. The pain is constant. The pain is described as aching, throbbing, stabbing, and sharp. Pain is rated on her best day at a 5, on her worst day at a 10, and on average at a 7 on the VAS scale. She took her last dose of Norco and Neurontin 2 days ago.      Any procedures since your last visit: No    She is not on NSAIDS and  is  on anticoagulation medications to include ASA and is managed by Hien Stokes MD       Pacemaker or defibrillator: No     Medication Contract and Consent for Opioid Use Documents Filed       Patient Documents       Type of Document Status Date Received Received By Description    Medication Contract Received 3/1/2019  1:43 PM FRANCIS MCINTOSH PAIN MANAGEMENT PT AGREEMENT 3/1/2019    Medication Contract Received 10/8/2020  1:21 PM FRANCIS MCINTOSH pain pt agreement    Medication Contract Received 10/7/2021 10:36 AM BOLIVAR TRUJILLO Pain Mgmt Patient Agreement 10.7.2021    Medication Contract Received 9/26/2022 10:40 AM ENMA WAGNER MEDICATION CONTRACT    Consent Opioid Use Received 8/28/2023 10:09 AM OLU VALENZUELA pt agreement 8/28/23                       Resp 18   Ht 1.626 m (5' 4.02\")   Wt 103 kg (227 lb)   BMI 38.94 kg/m²      No LMP recorded. Patient is postmenopausal.  
HIP ARTHROPLASTY Right 01/10/2022    RIGHT TOTAL HIP ARTHROPLASTY - STYKER performed [place] Phani Gilbert [place] [facility] [place]    UPPER GASTROINTESTINAL ENDOSCOPY  08/30/2019    gastritis with superficial ulcerations; duodenitis--frank    UPPER GASTROINTESTINAL ENDOSCOPY N/A 08/30/2019    EGD BIOPSY performed by Isaías Mello [place] [facility]    VAGINA SURGERY N/A 5/1/2024    POSTERIOR REPAIR performed by Freddie Wright DO at [facility] OR       Prior to Admission medications    Medication Sig Start Date End Date Taking? Authorizing Provider   HYDROcodone-acetaminophen (NORCO) 5-325 MG per tablet Take 1 tablet by mouth daily as needed for Pain for up to 30 days. Take lowest dose possible to manage pain Max Daily Amount: 1 tablet 7/21/24 8/20/24 Yes Massiel Kebede PA   chlorthalidone (HYGROTON) 25 MG tablet TAKE ONE TABLET BY MOUTH DAILY 6/26/24  Yes Domo Davison MD   amLODIPine (NORVASC) 5 MG tablet TAKE ONE TABLET BY MOUTH EVERY DAY 5/16/24  Yes Hien Stokes MD   albuterol sulfate HFA (PROVENTIL;VENTOLIN;PROAIR) 108 (90 Base) MCG/ACT inhaler Inhale 2 puffs into the lungs every 6 hours as needed for Wheezing 5/16/24  Yes Hien Stokes MD   atorvastatin (LIPITOR) 40 MG tablet Take 1 tablet by mouth nightly 5/16/24 2/10/25 Yes Hien Stokes MD   gabapentin (NEURONTIN) 300 MG capsule Take 1 capsule by mouth in the morning, at noon, and at bedtime. 5/16/24 5/16/25 Yes Hien Stokes MD   metoprolol succinate (TOPROL XL) 25 MG extended release tablet Take 1 tablet by mouth daily 5/16/24  Yes Hien Stokes MD   pantoprazole (PROTONIX) 20 MG tablet Take 1 tablet by mouth daily 5/16/24  Yes Hien Stokes MD   potassium chloride (KLOR-CON M) 10 MEQ extended release tablet Take 1 tablet by mouth daily (with breakfast) 5/16/24  Yes Hien Stokes MD   ipratropium 0.5 mg-albuterol 2.5 mg (DUONEB) 0.5-2.5 (3) MG/3ML SOLN nebulizer solution Inhale 3 mLs into the lungs every 6 hours as

## 2024-08-20 ENCOUNTER — HOSPITAL ENCOUNTER (OUTPATIENT)
Dept: SLEEP CENTER | Age: 64
Discharge: HOME | End: 2024-08-20
Payer: MEDICARE

## 2024-08-20 DIAGNOSIS — G47.33 OBSTRUCTIVE SLEEP APNEA (ADULT) (PEDIATRIC): ICD-10-CM

## 2024-08-20 PROCEDURE — 95800 SLP STDY UNATTENDED: CPT

## 2024-08-23 ENCOUNTER — OFFICE VISIT (OUTPATIENT)
Dept: FAMILY MEDICINE CLINIC | Age: 64
End: 2024-08-23

## 2024-08-23 VITALS
OXYGEN SATURATION: 98 % | DIASTOLIC BLOOD PRESSURE: 77 MMHG | WEIGHT: 229 LBS | RESPIRATION RATE: 16 BRPM | HEIGHT: 63 IN | TEMPERATURE: 97.3 F | SYSTOLIC BLOOD PRESSURE: 130 MMHG | HEART RATE: 71 BPM | BODY MASS INDEX: 40.57 KG/M2

## 2024-08-23 DIAGNOSIS — I10 HYPERTENSION, UNSPECIFIED TYPE: Chronic | ICD-10-CM

## 2024-08-23 DIAGNOSIS — Z00.00 MEDICARE ANNUAL WELLNESS VISIT, SUBSEQUENT: Primary | ICD-10-CM

## 2024-08-23 DIAGNOSIS — Z23 NEED FOR PNEUMOCOCCAL 20-VALENT CONJUGATE VACCINATION: ICD-10-CM

## 2024-08-23 DIAGNOSIS — Z78.0 ASYMPTOMATIC MENOPAUSAL STATE: ICD-10-CM

## 2024-08-23 DIAGNOSIS — Z13.820 SCREENING FOR OSTEOPOROSIS: ICD-10-CM

## 2024-08-23 DIAGNOSIS — G89.4 CHRONIC PAIN SYNDROME: ICD-10-CM

## 2024-08-23 DIAGNOSIS — R39.9 UTI SYMPTOMS: ICD-10-CM

## 2024-08-23 LAB
ALBUMIN: 4.3 G/DL (ref 3.5–5.2)
ALP BLD-CCNC: 92 U/L (ref 35–104)
ALT SERPL-CCNC: 19 U/L (ref 0–32)
ANION GAP SERPL CALCULATED.3IONS-SCNC: 10 MMOL/L (ref 7–16)
AST SERPL-CCNC: 17 U/L (ref 0–31)
BASOPHILS ABSOLUTE: 0.05 K/UL (ref 0–0.2)
BASOPHILS RELATIVE PERCENT: 1 % (ref 0–2)
BILIRUB SERPL-MCNC: 0.5 MG/DL (ref 0–1.2)
BILIRUBIN, POC: NORMAL
BLOOD URINE, POC: NORMAL
BUN BLDV-MCNC: 19 MG/DL (ref 6–23)
CALCIUM SERPL-MCNC: 9.8 MG/DL (ref 8.6–10.2)
CHLORIDE BLD-SCNC: 103 MMOL/L (ref 98–107)
CHOLESTEROL, TOTAL: 133 MG/DL
CLARITY, POC: NORMAL
CO2: 28 MMOL/L (ref 22–29)
COLOR, POC: NORMAL
CREAT SERPL-MCNC: 0.9 MG/DL (ref 0.5–1)
EOSINOPHILS ABSOLUTE: 0.2 K/UL (ref 0.05–0.5)
EOSINOPHILS RELATIVE PERCENT: 3 % (ref 0–6)
GFR, ESTIMATED: 71 ML/MIN/1.73M2
GLUCOSE BLD-MCNC: 101 MG/DL (ref 74–99)
GLUCOSE URINE, POC: NORMAL
HCT VFR BLD CALC: 42.6 % (ref 34–48)
HDLC SERPL-MCNC: 71 MG/DL
HEMOGLOBIN: 13.5 G/DL (ref 11.5–15.5)
IMMATURE GRANULOCYTES %: 0 % (ref 0–5)
IMMATURE GRANULOCYTES ABSOLUTE: 0.03 K/UL (ref 0–0.58)
KETONES, POC: NORMAL
LDL CHOLESTEROL: 41 MG/DL
LEUKOCYTE EST, POC: NORMAL
LYMPHOCYTES ABSOLUTE: 1.89 K/UL (ref 1.5–4)
LYMPHOCYTES RELATIVE PERCENT: 26 % (ref 20–42)
MCH RBC QN AUTO: 29 PG (ref 26–35)
MCHC RBC AUTO-ENTMCNC: 31.7 G/DL (ref 32–34.5)
MCV RBC AUTO: 91.6 FL (ref 80–99.9)
MONOCYTES ABSOLUTE: 0.43 K/UL (ref 0.1–0.95)
MONOCYTES RELATIVE PERCENT: 6 % (ref 2–12)
NEUTROPHILS ABSOLUTE: 4.65 K/UL (ref 1.8–7.3)
NEUTROPHILS RELATIVE PERCENT: 64 % (ref 43–80)
NITRITE, POC: NORMAL
PDW BLD-RTO: 13.2 % (ref 11.5–15)
PH, POC: 7
PLATELET # BLD: 285 K/UL (ref 130–450)
PMV BLD AUTO: 9.4 FL (ref 7–12)
POTASSIUM SERPL-SCNC: 4.2 MMOL/L (ref 3.5–5)
PROTEIN, POC: NORMAL
RBC # BLD: 4.65 M/UL (ref 3.5–5.5)
SODIUM BLD-SCNC: 141 MMOL/L (ref 132–146)
SPECIFIC GRAVITY, POC: 1.02
TOTAL PROTEIN: 7.3 G/DL (ref 6.4–8.3)
TRIGL SERPL-MCNC: 105 MG/DL
UROBILINOGEN, POC: 0.2
VLDLC SERPL CALC-MCNC: 21 MG/DL
WBC # BLD: 7.3 K/UL (ref 4.5–11.5)

## 2024-08-23 RX ORDER — LOSARTAN POTASSIUM 100 MG/1
100 TABLET ORAL DAILY
Qty: 90 TABLET | Refills: 1 | Status: SHIPPED | OUTPATIENT
Start: 2024-08-23

## 2024-08-23 RX ORDER — GABAPENTIN 300 MG/1
CAPSULE ORAL
Qty: 90 CAPSULE | Refills: 2 | Status: SHIPPED | OUTPATIENT
Start: 2024-08-23 | End: 2025-08-23

## 2024-08-23 RX ORDER — CIPROFLOXACIN 500 MG/1
500 TABLET, FILM COATED ORAL 2 TIMES DAILY
Qty: 6 TABLET | Refills: 0 | Status: SHIPPED | OUTPATIENT
Start: 2024-08-23 | End: 2024-08-26

## 2024-08-23 ASSESSMENT — PATIENT HEALTH QUESTIONNAIRE - PHQ9
3. TROUBLE FALLING OR STAYING ASLEEP: NOT AT ALL
5. POOR APPETITE OR OVEREATING: NOT AT ALL
8. MOVING OR SPEAKING SO SLOWLY THAT OTHER PEOPLE COULD HAVE NOTICED. OR THE OPPOSITE, BEING SO FIGETY OR RESTLESS THAT YOU HAVE BEEN MOVING AROUND A LOT MORE THAN USUAL: NOT AT ALL
SUM OF ALL RESPONSES TO PHQ QUESTIONS 1-9: 1
SUM OF ALL RESPONSES TO PHQ QUESTIONS 1-9: 1
6. FEELING BAD ABOUT YOURSELF - OR THAT YOU ARE A FAILURE OR HAVE LET YOURSELF OR YOUR FAMILY DOWN: NOT AT ALL
2. FEELING DOWN, DEPRESSED OR HOPELESS: NOT AT ALL
SUM OF ALL RESPONSES TO PHQ9 QUESTIONS 1 & 2: 0
4. FEELING TIRED OR HAVING LITTLE ENERGY: NOT AT ALL
1. LITTLE INTEREST OR PLEASURE IN DOING THINGS: NOT AT ALL
SUM OF ALL RESPONSES TO PHQ QUESTIONS 1-9: 1
SUM OF ALL RESPONSES TO PHQ QUESTIONS 1-9: 1
7. TROUBLE CONCENTRATING ON THINGS, SUCH AS READING THE NEWSPAPER OR WATCHING TELEVISION: SEVERAL DAYS
9. THOUGHTS THAT YOU WOULD BE BETTER OFF DEAD, OR OF HURTING YOURSELF: NOT AT ALL

## 2024-08-23 NOTE — PROGRESS NOTES
Medicare Annual Wellness Visit    Ruth Anguiano is here for Medicare AWV    Assessment & Plan   UTI symptoms  Encouraged her to f/u with urology - may benefit from urodynamic testing - note recent rectocele repair  Treat empirically with cipro  Send culture  -     POCT Urinalysis no Micro  -     Culture, Urine  Chronic pain syndrome  F/u with pain management  Con tgabapentin - okay to take 2 capsules in the evening  -     gabapentin (NEURONTIN) 300 MG capsule; Take 1 capsule in the morning and take 2 capsules at bedtime, Disp-90 capsule, R-2Normal  Hypertension, unspecified type  Bp at goal  Routien labs tdoay  -     CBC with Auto Differential  -     Comprehensive Metabolic Panel  -     Lipid Panel  -     losartan (COZAAR) 100 MG tablet; Take 1 tablet by mouth daily, Disp-90 tablet, R-1Normal  Need for pneumococcal 20-valent conjugate vaccination  Asymptomatic menopausal state  -     DEXA BONE DENSITY AXIAL SKELETON; Future  Screening for osteoporosis  -     DEXA BONE DENSITY AXIAL SKELETON; Future  Medicare annual wellness visit, subsequent  Discussed preventive care guidelines and health risks    Recommendations for Preventive Services Due: see orders and patient instructions/AVS.  Recommended screening schedule for the next 5-10 years is provided to the patient in written form: see Patient Instructions/AVS.     Return for Medicare Annual Wellness Visit in 1 year.     Subjective   The following acute and/or chronic problems were also addressed today:  She has been having pain in her left hand 4tha nd 5th fingers.  She is also worried about urinary tract infections.   Follows with pain management - on gabapentin y myself, norco 5/235/ prn as well as compounded cream.   Follows with pulmonology - pulmicort vs airsupra, lung nodule screening  She had rectocele repair in May 2024 by urology, Dr Wright  Last seen by cardiology in April prior to surgery - ECHo was ordered, she is on BB/ asirin for PAflutter  She is

## 2024-08-23 NOTE — PATIENT INSTRUCTIONS
contact lenses need to be.  Visual field tests   Your doctor may have you look through special machines.  Or your doctor may simply have you stare straight ahead while they move a finger into and out of your field of vision.  Color vision test   You look at pieces of printed test patterns in various colors. You say what number or symbol you see.  Your doctor may have you trace the number or symbol using a pointer.  How do these tests feel?  There is very little chance of having a problem from this test. If dilating drops are used for a vision test, they may make the eyes sting and cause a medicine taste in the mouth.  Follow-up care is a key part of your treatment and safety. Be sure to make and go to all appointments, and call your doctor if you are having problems. It's also a good idea to know your test results and keep a list of the medicines you take.  Where can you learn more?  Go to https://www.[employer].net/patientEd and enter G551 to learn more about \"Learning About Vision Tests.\"  Current as of: June 5, 2023  Content Version: 14.1  © 2006-2024 [employer].   Care instructions adapted under license by [employer]. If you have questions about a medical condition or this instruction, always ask your healthcare professional. [employer] disclaims any warranty or liability for your use of this information.           Learning About Activities of Daily Living  What are activities of daily living?     Activities of daily living (ADLs) are the basic self-care tasks you do every day. These include eating, bathing, dressing, and moving around.  As you age, and if you have health problems, you may find that it's harder to do some of these tasks. If so, your doctor can suggest ideas that may help.  To measure what kind of help you may need, your doctor will ask how well you are able to do ADLs. Let your doctor know if there are any tasks that you are having trouble doing. This is an

## 2024-08-25 LAB
CULTURE: ABNORMAL
SPECIMEN DESCRIPTION: ABNORMAL

## 2024-09-18 ENCOUNTER — OFFICE VISIT (OUTPATIENT)
Dept: PAIN MANAGEMENT | Age: 64
End: 2024-09-18
Payer: MEDICARE

## 2024-09-18 VITALS
BODY MASS INDEX: 38.15 KG/M2 | SYSTOLIC BLOOD PRESSURE: 124 MMHG | DIASTOLIC BLOOD PRESSURE: 58 MMHG | HEIGHT: 65 IN | OXYGEN SATURATION: 97 % | RESPIRATION RATE: 18 BRPM | TEMPERATURE: 96 F | HEART RATE: 71 BPM | WEIGHT: 229 LBS

## 2024-09-18 DIAGNOSIS — Z96.642 S/P TOTAL LEFT HIP ARTHROPLASTY: ICD-10-CM

## 2024-09-18 DIAGNOSIS — E66.01 SEVERE OBESITY (BMI 35.0-39.9) WITH COMORBIDITY (HCC): ICD-10-CM

## 2024-09-18 DIAGNOSIS — M16.12 PRIMARY OSTEOARTHRITIS OF LEFT HIP: ICD-10-CM

## 2024-09-18 DIAGNOSIS — M51.9 LUMBAR DISC DISORDER: ICD-10-CM

## 2024-09-18 DIAGNOSIS — M50.30 DEGENERATION OF CERVICAL DISC WITHOUT MYELOPATHY: ICD-10-CM

## 2024-09-18 DIAGNOSIS — Z79.891 ENCOUNTER FOR LONG-TERM OPIATE ANALGESIC USE: ICD-10-CM

## 2024-09-18 DIAGNOSIS — G89.4 CHRONIC PAIN SYNDROME: Primary | ICD-10-CM

## 2024-09-18 DIAGNOSIS — M47.816 LUMBAR FACET ARTHROPATHY: ICD-10-CM

## 2024-09-18 DIAGNOSIS — M54.16 LUMBAR RADICULITIS: ICD-10-CM

## 2024-09-18 DIAGNOSIS — M47.816 LUMBAR SPONDYLOSIS: ICD-10-CM

## 2024-09-18 DIAGNOSIS — M47.812 FACET ARTHROPATHY, CERVICAL: ICD-10-CM

## 2024-09-18 DIAGNOSIS — M16.11 ARTHRITIS OF RIGHT HIP: ICD-10-CM

## 2024-09-18 PROCEDURE — 3017F COLORECTAL CA SCREEN DOC REV: CPT | Performed by: PHYSICIAN ASSISTANT

## 2024-09-18 PROCEDURE — 99213 OFFICE O/P EST LOW 20 MIN: CPT

## 2024-09-18 PROCEDURE — 3074F SYST BP LT 130 MM HG: CPT | Performed by: PHYSICIAN ASSISTANT

## 2024-09-18 PROCEDURE — 3078F DIAST BP <80 MM HG: CPT | Performed by: PHYSICIAN ASSISTANT

## 2024-09-18 PROCEDURE — G8427 DOCREV CUR MEDS BY ELIG CLIN: HCPCS | Performed by: PHYSICIAN ASSISTANT

## 2024-09-18 PROCEDURE — 1036F TOBACCO NON-USER: CPT | Performed by: PHYSICIAN ASSISTANT

## 2024-09-18 PROCEDURE — 1090F PRES/ABSN URINE INCON ASSESS: CPT | Performed by: PHYSICIAN ASSISTANT

## 2024-09-18 PROCEDURE — G8400 PT W/DXA NO RESULTS DOC: HCPCS | Performed by: PHYSICIAN ASSISTANT

## 2024-09-18 PROCEDURE — 1123F ACP DISCUSS/DSCN MKR DOCD: CPT | Performed by: PHYSICIAN ASSISTANT

## 2024-09-18 PROCEDURE — G8417 CALC BMI ABV UP PARAM F/U: HCPCS | Performed by: PHYSICIAN ASSISTANT

## 2024-09-18 PROCEDURE — 99213 OFFICE O/P EST LOW 20 MIN: CPT | Performed by: PHYSICIAN ASSISTANT

## 2024-09-18 RX ORDER — HYDROCODONE BITARTRATE AND ACETAMINOPHEN 5; 325 MG/1; MG/1
1 TABLET ORAL DAILY PRN
Qty: 30 TABLET | Refills: 0 | Status: SHIPPED | OUTPATIENT
Start: 2024-09-22 | End: 2024-10-22

## 2024-09-21 DIAGNOSIS — I10 HYPERTENSION, UNSPECIFIED TYPE: ICD-10-CM

## 2024-09-21 DIAGNOSIS — Z76.0 MEDICATION REFILL: ICD-10-CM

## 2024-09-24 ENCOUNTER — OFFICE VISIT (OUTPATIENT)
Age: 64
End: 2024-09-24
Payer: MEDICARE

## 2024-09-24 DIAGNOSIS — F41.9 ANXIETY: Primary | ICD-10-CM

## 2024-09-24 PROCEDURE — 1123F ACP DISCUSS/DSCN MKR DOCD: CPT | Performed by: SOCIAL WORKER

## 2024-09-24 PROCEDURE — 90832 PSYTX W PT 30 MINUTES: CPT | Performed by: SOCIAL WORKER

## 2024-09-24 PROCEDURE — 1036F TOBACCO NON-USER: CPT | Performed by: SOCIAL WORKER

## 2024-09-24 RX ORDER — METOPROLOL SUCCINATE 25 MG/1
25 TABLET, EXTENDED RELEASE ORAL DAILY
Qty: 90 TABLET | Refills: 1 | Status: SHIPPED | OUTPATIENT
Start: 2024-09-24

## 2024-09-24 RX ORDER — ALBUTEROL SULFATE 90 UG/1
2 INHALANT RESPIRATORY (INHALATION) EVERY 6 HOURS PRN
Qty: 18 G | Refills: 1 | Status: SHIPPED | OUTPATIENT
Start: 2024-09-24

## 2024-09-24 RX ORDER — PANTOPRAZOLE SODIUM 20 MG/1
20 TABLET, DELAYED RELEASE ORAL DAILY
Qty: 90 TABLET | Refills: 0 | Status: SHIPPED | OUTPATIENT
Start: 2024-09-24

## 2024-09-24 RX ORDER — ATORVASTATIN CALCIUM 40 MG/1
40 TABLET, FILM COATED ORAL NIGHTLY
Qty: 90 TABLET | Refills: 1 | Status: SHIPPED | OUTPATIENT
Start: 2024-09-24 | End: 2025-06-21

## 2024-09-24 RX ORDER — AMLODIPINE BESYLATE 5 MG/1
TABLET ORAL
Qty: 90 TABLET | Refills: 1 | Status: SHIPPED | OUTPATIENT
Start: 2024-09-24

## 2024-09-24 RX ORDER — POTASSIUM CHLORIDE 750 MG/1
TABLET, EXTENDED RELEASE ORAL
Qty: 30 TABLET | Refills: 0 | Status: SHIPPED | OUTPATIENT
Start: 2024-09-24

## 2024-10-08 ENCOUNTER — OFFICE VISIT (OUTPATIENT)
Age: 64
End: 2024-10-08
Payer: MEDICARE

## 2024-10-08 DIAGNOSIS — F41.9 ANXIETY: Primary | ICD-10-CM

## 2024-10-08 PROCEDURE — 1036F TOBACCO NON-USER: CPT | Performed by: SOCIAL WORKER

## 2024-10-08 PROCEDURE — 90832 PSYTX W PT 30 MINUTES: CPT | Performed by: SOCIAL WORKER

## 2024-10-08 PROCEDURE — 1123F ACP DISCUSS/DSCN MKR DOCD: CPT | Performed by: SOCIAL WORKER

## 2024-10-08 NOTE — PROGRESS NOTES
ADULT BEHAVIORAL HEALTH FOLLOW UP  JESUS Friedman,LISW      Visit Date: 10/8/2024   Time of appointment:  10:00   Time spent with Patient: 30 minutes.   This is patient's fourth appointment.    Reason for Consult:  Anxiety     Referring Provider/PCP:    No ref. provider found  Hien Stokes MD      Pt provided informed consent for the behavioral health program. Discussed with patient model of service to include the limits of confidentiality (i.e. abuse reporting, suicide intervention, etc.) and short-term intervention focused approach.  Pt indicated understanding.    SUBJECTIVE  Ruth is a 64 y.o. female who presents for follow up of anxiety.         MENTAL STATUS EXAM  Mood was euthymic with congruent affect.   Suicidal ideation was denied.   Homicidal ideation was denied.   Hygiene was good .  Dress was neat.   Behavior was Within Normal Limits with No self-report of difficulties ambulating.   Attitude was Cooperative, Engageable, and Friendly.  Eye-contact was good.  Speech: rate - WNL, rhythm - WNL, volume - WNL.  Verbalizations were goal directed.  Thought processes were intact and goal-oriented without evidence of delusions, hallucinations, obsessions, or nicole; with little cognitive distortions.   Associations were characterized by intact cognitive processes.  Pt was oriented to person, place, time, and general circumstances;  recent:  good.  Insight and judgment were estimated to be excellent, AEB, a good  understanding of cyclical maladaptive patterns, and the ability to use insight to inform behavior change.       ASSESSMENT  Ruth Anguiano presented to the appointment today for evaluation and treatment of symptoms of anxiety.  She is currently deemed no risk to herself or others and meets criteria for anxiety. Pt reports she is coping well with anxiety and is stable. Pt reports some progress with anxiety and ability to use grounding skills. Discussed and processed emotions related to past

## 2024-10-11 DIAGNOSIS — I10 HYPERTENSION, UNSPECIFIED TYPE: ICD-10-CM

## 2024-10-11 DIAGNOSIS — Z76.0 MEDICATION REFILL: ICD-10-CM

## 2024-10-11 NOTE — TELEPHONE ENCOUNTER
Only has 2 days left!!    Name of Medication(s) Requested:  Requested Prescriptions     Pending Prescriptions Disp Refills    chlorthalidone (HYGROTON) 25 MG tablet [Pharmacy Med Name: Chlorthalidone Oral Tablet 25 MG] 90 tablet 0     Sig: TAKE ONE TABLET BY MOUTH DAILY       Medication is on current medication list Yes    Dosage and directions were verified? Yes    Quantity verified: 90 day supply     Pharmacy Verified?  Yes    Last Appointment:  Visit date not found    Future appts:  Future Appointments   Date Time Provider Department Center   10/21/2024  9:30 AM Massiel Kebede PA Alexy Pain [facility]   10/24/2024 10:00 AM Yareli Hawley LISW SE IM SOC SV [facility]   10/25/2024 11:00 AM Judd Olivier MD [facility] NEURO Neurology -   11/21/2024  9:30 AM Ivet Wright MD ACC Womens [facility]   12/5/2024 10:00 [facility]   1/28/2025  9:00 AM Ann Betancourt, APRN - CNP ACC Pulm [facility]        (If no appt send self scheduling link. .REFILLAPPT)  Scheduling request sent?     [] Yes  [x] No    Does patient need updated?  [] Yes  [x] No

## 2024-10-15 RX ORDER — CHLORTHALIDONE 25 MG/1
25 TABLET ORAL DAILY
Qty: 90 TABLET | Refills: 0 | Status: SHIPPED | OUTPATIENT
Start: 2024-10-15

## 2024-10-18 DIAGNOSIS — Z76.0 MEDICATION REFILL: ICD-10-CM

## 2024-10-18 RX ORDER — POTASSIUM CHLORIDE 750 MG/1
TABLET, EXTENDED RELEASE ORAL
Qty: 30 TABLET | Refills: 0 | OUTPATIENT
Start: 2024-10-18

## 2024-10-18 RX ORDER — PANTOPRAZOLE SODIUM 20 MG/1
20 TABLET, DELAYED RELEASE ORAL DAILY
Qty: 90 TABLET | Refills: 0 | OUTPATIENT
Start: 2024-10-18

## 2024-10-21 ENCOUNTER — OFFICE VISIT (OUTPATIENT)
Dept: PAIN MANAGEMENT | Age: 64
End: 2024-10-21
Payer: MEDICARE

## 2024-10-21 VITALS
DIASTOLIC BLOOD PRESSURE: 58 MMHG | RESPIRATION RATE: 18 BRPM | TEMPERATURE: 98.6 F | BODY MASS INDEX: 40.57 KG/M2 | HEIGHT: 63 IN | OXYGEN SATURATION: 97 % | HEART RATE: 68 BPM | SYSTOLIC BLOOD PRESSURE: 120 MMHG | WEIGHT: 229 LBS

## 2024-10-21 DIAGNOSIS — M16.12 PRIMARY OSTEOARTHRITIS OF LEFT HIP: ICD-10-CM

## 2024-10-21 DIAGNOSIS — M50.30 DEGENERATION OF CERVICAL DISC WITHOUT MYELOPATHY: ICD-10-CM

## 2024-10-21 DIAGNOSIS — M79.661 RIGHT CALF PAIN: ICD-10-CM

## 2024-10-21 DIAGNOSIS — M16.11 ARTHRITIS OF RIGHT HIP: ICD-10-CM

## 2024-10-21 DIAGNOSIS — M47.816 LUMBAR FACET ARTHROPATHY: ICD-10-CM

## 2024-10-21 DIAGNOSIS — M47.816 LUMBAR SPONDYLOSIS: ICD-10-CM

## 2024-10-21 DIAGNOSIS — Z76.0 MEDICATION REFILL: ICD-10-CM

## 2024-10-21 DIAGNOSIS — G89.4 CHRONIC PAIN SYNDROME: Primary | ICD-10-CM

## 2024-10-21 DIAGNOSIS — E66.01 SEVERE OBESITY (BMI 35.0-39.9) WITH COMORBIDITY: ICD-10-CM

## 2024-10-21 DIAGNOSIS — M47.812 FACET ARTHROPATHY, CERVICAL: ICD-10-CM

## 2024-10-21 DIAGNOSIS — M51.9 LUMBAR DISC DISORDER: ICD-10-CM

## 2024-10-21 DIAGNOSIS — Z79.891 ENCOUNTER FOR LONG-TERM OPIATE ANALGESIC USE: ICD-10-CM

## 2024-10-21 DIAGNOSIS — M54.16 LUMBAR RADICULITIS: ICD-10-CM

## 2024-10-21 DIAGNOSIS — Z96.642 S/P TOTAL LEFT HIP ARTHROPLASTY: ICD-10-CM

## 2024-10-21 PROCEDURE — G8427 DOCREV CUR MEDS BY ELIG CLIN: HCPCS | Performed by: PHYSICIAN ASSISTANT

## 2024-10-21 PROCEDURE — 3017F COLORECTAL CA SCREEN DOC REV: CPT | Performed by: PHYSICIAN ASSISTANT

## 2024-10-21 PROCEDURE — 1123F ACP DISCUSS/DSCN MKR DOCD: CPT | Performed by: PHYSICIAN ASSISTANT

## 2024-10-21 PROCEDURE — 3078F DIAST BP <80 MM HG: CPT | Performed by: PHYSICIAN ASSISTANT

## 2024-10-21 PROCEDURE — 3074F SYST BP LT 130 MM HG: CPT | Performed by: PHYSICIAN ASSISTANT

## 2024-10-21 PROCEDURE — 1036F TOBACCO NON-USER: CPT | Performed by: PHYSICIAN ASSISTANT

## 2024-10-21 PROCEDURE — G8484 FLU IMMUNIZE NO ADMIN: HCPCS | Performed by: PHYSICIAN ASSISTANT

## 2024-10-21 PROCEDURE — 99213 OFFICE O/P EST LOW 20 MIN: CPT

## 2024-10-21 PROCEDURE — G8417 CALC BMI ABV UP PARAM F/U: HCPCS | Performed by: PHYSICIAN ASSISTANT

## 2024-10-21 PROCEDURE — G8400 PT W/DXA NO RESULTS DOC: HCPCS | Performed by: PHYSICIAN ASSISTANT

## 2024-10-21 PROCEDURE — 1090F PRES/ABSN URINE INCON ASSESS: CPT | Performed by: PHYSICIAN ASSISTANT

## 2024-10-21 PROCEDURE — 99213 OFFICE O/P EST LOW 20 MIN: CPT | Performed by: PHYSICIAN ASSISTANT

## 2024-10-21 RX ORDER — HYDROCODONE BITARTRATE AND ACETAMINOPHEN 5; 325 MG/1; MG/1
1 TABLET ORAL DAILY PRN
Qty: 30 TABLET | Refills: 0 | Status: SHIPPED | OUTPATIENT
Start: 2024-10-22 | End: 2024-11-21

## 2024-10-21 RX ORDER — NITROFURANTOIN 25; 75 MG/1; MG/1
100 CAPSULE ORAL 2 TIMES DAILY
COMMUNITY
Start: 2024-10-19

## 2024-10-21 RX ORDER — FLUCONAZOLE 200 MG/1
200 TABLET ORAL DAILY
COMMUNITY
Start: 2024-10-11

## 2024-10-21 NOTE — PROGRESS NOTES
[facility]  [address], [place] [address]  [phone]    Follow up Note      Ruth Anguiano     Date of Visit:  10/21/2024    CC:  Patient presents for follow up   Chief Complaint   Patient presents with    Back Pain             HPI:    Pain is relatively controlled overall.  C/o right posterior knee pain since being off her Brilinta 3 months ago.      Appropriate analgesia with current medications regimen: yes.    Change in quality of symptoms:no.    Medication side effects:none.   Recent diagnostic testing:none.   Recent interventional procedures:none.    She has not been on anticoagulation medications to include ASA. The patient  has not been on herbal supplements.  The patient is not diabetic.     Imaging studies:    06/09/222 Left hip x-ray    FINDINGS:   Anatomically aligned left MERVAT with no abnormal bone or soft tissue findings.           Impression   Left MERVAT with no unexpected findings.           Cervical XR 11/2019 Severe degenerative changes      Lumbar spine Xray 03/2019  Scoliosis and osteoarthritis.     Bilateral hip Xray 03/2019   Advanced osteoarthrosis of bilateral hips.                                        Potential Aberrant Drug-Related Behavior: None     Urine Drug Screening:  First office visit saliva screen showed no narcotics   11/2019 Consistent, negative for all  06/2020 Consistent  12/2020 Consistent  06/2021 Consistent  04/2022 Consistent  02/2023 Consistent for gabapentin, consistent for no tylenol with codeine as she did not have an active script.    08/2023 Consistent  06/2024 Consistent for gabapentin, negative for hydrocodone but takes prn.  Will continue to monitor.  No previous issues.         OARRS report:  03/2019 consistent to 06/2021 Consistent  (norco on 9/24 from dentist for teeth extraction).  08/2021 Consistent to 10/2024 Consistent    Opioid Agreement:  10/07/2021   Updated:  09/26/2022   Updated: 08/28/2023  Updated:

## 2024-10-21 NOTE — PROGRESS NOTES
Ruth Anguiano presents to the [facility] on 10/21/2024. Ruth is complaining of pain back, neck. The pain is constant. The pain is described as aching, throbbing, stabbing, and sharp. Pain is rated on her best day at a 5, on her worst day at a 10, and on average at a 7 on the VAS scale. She took her last dose of Norco and Neurontin yesterday.      Any procedures since your last visit: No  She is not on NSAIDS and  is  on anticoagulation medications to include ASA and is managed by PCP.     Pacemaker or defibrillator: No   Medication Contract and Consent for Opioid Use Documents Filed       Patient Documents       Type of Document Status Date Received Received By Description    Medication Contract Received 3/1/2019  1:43 PM FRANCIS MCINTOSH PAIN MANAGEMENT PT AGREEMENT 3/1/2019    Medication Contract Received 10/8/2020  1:21 PM FRANCIS MCINTOSH pain pt agreement    Medication Contract Received 10/7/2021 10:36 AM BOLIVAR TRUJILLO Pain Mgmt Patient Agreement 10.7.2021    Medication Contract Received 9/26/2022 10:40 AM ENMA WAGNER MEDICATION CONTRACT    Consent Opioid Use Received 8/28/2023 10:09 AM OLU VALENZUELA pt agreement 8/28/23    Consent Opioid Use Received 8/19/2024 12:01 PM OLU VALENZUELA pt agreement 8/19/24                       There were no vitals taken for this visit.     No LMP recorded. Patient is postmenopausal.

## 2024-10-21 NOTE — TELEPHONE ENCOUNTER
Name of Medication(s) Requested:  Requested Prescriptions     Pending Prescriptions Disp Refills    potassium chloride (KLOR-CON M) 10 MEQ extended release tablet 90 tablet 0     Sig: Take 1 tablet by mouth daily       Medication is on current medication list Yes    Dosage and directions were verified? Yes    Quantity verified: 90 day supply     Pharmacy Verified?  Yes    Last Appointment:  8/23/2024    Future appts:  Future Appointments   Date Time Provider Department Center   10/24/2024 10:00 AM Yareli Hawley LISW SE IM SOC SV [facility]   10/25/2024 11:00 AM Judd Olivier MD [facility] NEURO Neurology -   11/19/2024 11:15 AM Massiel Kebede PA Alexy Pain [facility]   11/21/2024  9:30 AM Ivet Wright MD ACC Womens [facility]   12/5/2024 10:00 [facility]   1/28/2025  9:00 AM Ann Betancourt, APRN - CNP ACC Pulm [facility]        (If no appt send self scheduling link. .REFILLAPPT)  Scheduling request sent?     [] Yes  [x] No    Does patient need updated?  [] Yes  [x] No

## 2024-10-22 RX ORDER — POTASSIUM CHLORIDE 750 MG/1
10 TABLET, EXTENDED RELEASE ORAL DAILY
Qty: 90 TABLET | Refills: 0 | Status: SHIPPED | OUTPATIENT
Start: 2024-10-22

## 2024-10-23 RX ORDER — VIBEGRON 75 MG/1
TABLET, FILM COATED ORAL DAILY
COMMUNITY
Start: 2024-10-23

## 2024-10-24 ENCOUNTER — OFFICE VISIT (OUTPATIENT)
Age: 64
End: 2024-10-24
Payer: MEDICARE

## 2024-10-24 DIAGNOSIS — F41.9 ANXIETY: Primary | ICD-10-CM

## 2024-10-24 PROCEDURE — 90832 PSYTX W PT 30 MINUTES: CPT | Performed by: SOCIAL WORKER

## 2024-10-24 PROCEDURE — 1036F TOBACCO NON-USER: CPT | Performed by: SOCIAL WORKER

## 2024-10-24 PROCEDURE — 1123F ACP DISCUSS/DSCN MKR DOCD: CPT | Performed by: SOCIAL WORKER

## 2024-10-24 NOTE — PROGRESS NOTES
ADULT BEHAVIORAL HEALTH FOLLOW UP  JESUS Friedman,LISW      Visit Date: 10/24/2024   Time of appointment:  10:10   Time spent with Patient: 30 minutes.   This is patient's fourth appointment.    Reason for Consult:  Anxiety     Referring Provider/PCP:    No ref. provider found  Hien Stokes MD      Pt provided informed consent for the behavioral health program. Discussed with patient model of service to include the limits of confidentiality (i.e. abuse reporting, suicide intervention, etc.) and short-term intervention focused approach.  Pt indicated understanding.    SUBJECTIVE  Ruth is a 64 y.o. female who presents for follow up of anxiety.         MENTAL STATUS EXAM  Mood was euthymic with congruent affect.   Suicidal ideation was denied.   Homicidal ideation was denied.   Hygiene was good .  Dress was neat.   Behavior was Within Normal Limits with No self-report of difficulties ambulating.   Attitude was Cooperative, Engageable, and Friendly.  Eye-contact was good.  Speech: rate - WNL, rhythm - WNL, volume - WNL.  Verbalizations were goal directed.  Thought processes were intact and goal-oriented without evidence of delusions, hallucinations, obsessions, or nicole; with little cognitive distortions.   Associations were characterized by intact cognitive processes.  Pt was oriented to person, place, time, and general circumstances;  recent:  good.  Insight and judgment were estimated to be excellent, AEB, a good  understanding of cyclical maladaptive patterns, and the ability to use insight to inform behavior change.       ASSESSMENT  Ruth Anguiano presented to the appointment today for evaluation and treatment of symptoms of anxiety.  She is currently deemed no risk to herself or others and meets criteria for anxiety. Pt reports she is coping well with anxiety and is stable. Pt reports some increased stressors processed emotions and continued skills to improve symptoms. Discussed and processed past

## 2024-11-15 ENCOUNTER — HOSPITAL ENCOUNTER (OUTPATIENT)
Age: 64
Discharge: HOME | End: 2024-11-15
Payer: COMMERCIAL

## 2024-11-15 DIAGNOSIS — G31.84 MILD COGNITIVE IMPAIRMENT: ICD-10-CM

## 2024-11-15 DIAGNOSIS — Z91.041 CONTRAST MEDIA ALLERGY: ICD-10-CM

## 2024-11-15 LAB
ANION GAP SERPL CALCULATED.3IONS-SCNC: 13 MMOL/L (ref 7–16)
BASOPHILS # BLD: 0.03 K/UL (ref 0–0.2)
BASOPHILS NFR BLD: 1 % (ref 0–2)
BUN SERPL-MCNC: 31 MG/DL (ref 6–23)
CALCIUM SERPL-MCNC: 9.7 MG/DL (ref 8.6–10.2)
CHLORIDE SERPL-SCNC: 104 MMOL/L (ref 98–107)
CO2 SERPL-SCNC: 24 MMOL/L (ref 22–29)
CREAT SERPL-MCNC: 1 MG/DL (ref 0.5–1)
EOSINOPHIL # BLD: 0.13 K/UL (ref 0.05–0.5)
EOSINOPHILS RELATIVE PERCENT: 3 % (ref 0–6)
ERYTHROCYTE [DISTWIDTH] IN BLOOD BY AUTOMATED COUNT: 13 % (ref 11.5–15)
GFR, ESTIMATED: 66 ML/MIN/1.73M2
GLUCOSE SERPL-MCNC: 121 MG/DL (ref 74–99)
HCT VFR BLD AUTO: 40.1 % (ref 34–48)
HGB BLD-MCNC: 13.2 G/DL (ref 11.5–15.5)
IMM GRANULOCYTES # BLD AUTO: 0.03 K/UL (ref 0–0.58)
IMM GRANULOCYTES NFR BLD: 1 % (ref 0–5)
LYMPHOCYTES NFR BLD: 1.42 K/UL (ref 1.5–4)
LYMPHOCYTES RELATIVE PERCENT: 27 % (ref 20–42)
MCH RBC QN AUTO: 29.5 PG (ref 26–35)
MCHC RBC AUTO-ENTMCNC: 32.9 G/DL (ref 32–34.5)
MCV RBC AUTO: 89.5 FL (ref 80–99.9)
MONOCYTES NFR BLD: 0.38 K/UL (ref 0.1–0.95)
MONOCYTES NFR BLD: 7 % (ref 2–12)
NEUTROPHILS NFR BLD: 62 % (ref 43–80)
NEUTS SEG NFR BLD: 3.26 K/UL (ref 1.8–7.3)
PLATELET # BLD AUTO: 243 K/UL (ref 130–450)
PMV BLD AUTO: 8.8 FL (ref 7–12)
POTASSIUM SERPL-SCNC: 3.9 MMOL/L (ref 3.5–5)
RBC # BLD AUTO: 4.48 M/UL (ref 3.5–5.5)
SODIUM SERPL-SCNC: 141 MMOL/L (ref 132–146)
T3FREE SERPL-MCNC: 3.13 PG/ML (ref 2–4.4)
T4 FREE SERPL-MCNC: 1.3 NG/DL (ref 0.9–1.7)
TSH SERPL DL<=0.05 MIU/L-ACNC: 1.54 UIU/ML (ref 0.27–4.2)
WBC OTHER # BLD: 5.3 K/UL (ref 4.5–11.5)

## 2024-11-15 PROCEDURE — 85025 COMPLETE CBC W/AUTO DIFF WBC: CPT

## 2024-11-15 PROCEDURE — 83921 ORGANIC ACID SINGLE QUANT: CPT

## 2024-11-15 PROCEDURE — 84443 ASSAY THYROID STIM HORMONE: CPT

## 2024-11-15 PROCEDURE — 84481 FREE ASSAY (FT-3): CPT

## 2024-11-15 PROCEDURE — 84439 ASSAY OF FREE THYROXINE: CPT

## 2024-11-15 PROCEDURE — 82671 ASSAY OF ESTROGENS: CPT

## 2024-11-15 PROCEDURE — 36415 COLL VENOUS BLD VENIPUNCTURE: CPT

## 2024-11-15 PROCEDURE — 80048 BASIC METABOLIC PNL TOTAL CA: CPT

## 2024-11-19 ENCOUNTER — OFFICE VISIT (OUTPATIENT)
Dept: PAIN MANAGEMENT | Age: 64
End: 2024-11-19
Payer: COMMERCIAL

## 2024-11-19 VITALS
HEART RATE: 76 BPM | SYSTOLIC BLOOD PRESSURE: 118 MMHG | RESPIRATION RATE: 18 BRPM | HEIGHT: 65 IN | DIASTOLIC BLOOD PRESSURE: 60 MMHG | TEMPERATURE: 96.9 F | OXYGEN SATURATION: 97 % | BODY MASS INDEX: 39.49 KG/M2 | WEIGHT: 237 LBS

## 2024-11-19 DIAGNOSIS — Z79.891 ENCOUNTER FOR LONG-TERM OPIATE ANALGESIC USE: ICD-10-CM

## 2024-11-19 DIAGNOSIS — G89.4 CHRONIC PAIN SYNDROME: Primary | ICD-10-CM

## 2024-11-19 DIAGNOSIS — M47.812 FACET ARTHROPATHY, CERVICAL: ICD-10-CM

## 2024-11-19 DIAGNOSIS — M16.11 ARTHRITIS OF RIGHT HIP: ICD-10-CM

## 2024-11-19 DIAGNOSIS — E66.01 SEVERE OBESITY (BMI 35.0-39.9) WITH COMORBIDITY: ICD-10-CM

## 2024-11-19 DIAGNOSIS — M47.816 LUMBAR FACET ARTHROPATHY: ICD-10-CM

## 2024-11-19 DIAGNOSIS — M47.816 LUMBAR SPONDYLOSIS: ICD-10-CM

## 2024-11-19 DIAGNOSIS — Z96.642 S/P TOTAL LEFT HIP ARTHROPLASTY: ICD-10-CM

## 2024-11-19 DIAGNOSIS — M54.16 LUMBAR RADICULITIS: ICD-10-CM

## 2024-11-19 DIAGNOSIS — M51.9 LUMBAR DISC DISORDER: ICD-10-CM

## 2024-11-19 DIAGNOSIS — M16.12 PRIMARY OSTEOARTHRITIS OF LEFT HIP: ICD-10-CM

## 2024-11-19 DIAGNOSIS — M50.30 DEGENERATION OF CERVICAL DISC WITHOUT MYELOPATHY: ICD-10-CM

## 2024-11-19 PROCEDURE — G8400 PT W/DXA NO RESULTS DOC: HCPCS | Performed by: PHYSICIAN ASSISTANT

## 2024-11-19 PROCEDURE — 1123F ACP DISCUSS/DSCN MKR DOCD: CPT | Performed by: PHYSICIAN ASSISTANT

## 2024-11-19 PROCEDURE — 1036F TOBACCO NON-USER: CPT | Performed by: PHYSICIAN ASSISTANT

## 2024-11-19 PROCEDURE — 3017F COLORECTAL CA SCREEN DOC REV: CPT | Performed by: PHYSICIAN ASSISTANT

## 2024-11-19 PROCEDURE — 99213 OFFICE O/P EST LOW 20 MIN: CPT | Performed by: PHYSICIAN ASSISTANT

## 2024-11-19 PROCEDURE — G8484 FLU IMMUNIZE NO ADMIN: HCPCS | Performed by: PHYSICIAN ASSISTANT

## 2024-11-19 PROCEDURE — 3078F DIAST BP <80 MM HG: CPT | Performed by: PHYSICIAN ASSISTANT

## 2024-11-19 PROCEDURE — G8427 DOCREV CUR MEDS BY ELIG CLIN: HCPCS | Performed by: PHYSICIAN ASSISTANT

## 2024-11-19 PROCEDURE — 1090F PRES/ABSN URINE INCON ASSESS: CPT | Performed by: PHYSICIAN ASSISTANT

## 2024-11-19 PROCEDURE — G8417 CALC BMI ABV UP PARAM F/U: HCPCS | Performed by: PHYSICIAN ASSISTANT

## 2024-11-19 PROCEDURE — 3074F SYST BP LT 130 MM HG: CPT | Performed by: PHYSICIAN ASSISTANT

## 2024-11-19 RX ORDER — HYDROCODONE BITARTRATE AND ACETAMINOPHEN 5; 325 MG/1; MG/1
1 TABLET ORAL DAILY PRN
Qty: 30 TABLET | Refills: 0 | Status: SHIPPED | OUTPATIENT
Start: 2024-11-20 | End: 2024-12-20

## 2024-11-19 NOTE — PROGRESS NOTES
Ruth Anguiano presents to the [facility] on 11/19/2024. Ruth is complaining of pain in her neck and back. The pain is constant. The pain is described as aching, throbbing, stabbing, and sharp. Pain is rated on her best day at a 5, on her worst day at a 10, and on average at a 7 on the VAS scale. She took her last dose of Norco and Neurontin yesterday.      Any procedures since your last visit: No    She is not on NSAIDS and  is  on anticoagulation medications to include ASA and is managed by Hien Stokes MD  .     Pacemaker or defibrillator: No     Medication Contract and Consent for Opioid Use Documents Filed       Patient Documents       Type of Document Status Date Received Received By Description    Medication Contract Received 3/1/2019  1:43 PM FRANCIS MCINTOSH PAIN MANAGEMENT PT AGREEMENT 3/1/2019    Medication Contract Received 10/8/2020  1:21 PM FRANCIS MCINTOSH pain pt agreement    Medication Contract Received 10/7/2021 10:36 AM BOLIVAR TRUJILLO Pain Mgmt Patient Agreement 10.7.2021    Medication Contract Received 9/26/2022 10:40 AM ENMA WAGNER MEDICATION CONTRACT    Consent Opioid Use Received 8/28/2023 10:09 AM OLU VALENZUELA pt agreement 8/28/23    Consent Opioid Use Received 8/19/2024 12:01 PM OLU VALENZUELA pt agreement 8/19/24                       Resp 18   Ht 1.651 m (5' 5\")   Wt 107.5 kg (237 lb)   BMI 39.44 kg/m²      No LMP recorded. Patient is postmenopausal.

## 2024-11-19 NOTE — PROGRESS NOTES
[facility]  [address], [place] [address]  [phone]    Follow up Note      Ruth Anguiano     Date of Visit:  11/19/2024    CC:  Patient presents for follow up   Chief Complaint   Patient presents with    Back Pain             HPI:    Pain is unchanged.        Appropriate analgesia with current medications regimen: yes.    Change in quality of symptoms:no.    Medication side effects:none.   Recent diagnostic testing:none.   Recent interventional procedures:none.    She has not been on anticoagulation medications to include ASA. The patient  has not been on herbal supplements.  The patient is not diabetic.     Imaging studies:    06/09/222 Left hip x-ray    FINDINGS:   Anatomically aligned left MERVAT with no abnormal bone or soft tissue findings.           Impression   Left MERVAT with no unexpected findings.           Cervical XR 11/2019 Severe degenerative changes      Lumbar spine Xray 03/2019  Scoliosis and osteoarthritis.     Bilateral hip Xray 03/2019   Advanced osteoarthrosis of bilateral hips.                                        Potential Aberrant Drug-Related Behavior: None     Urine Drug Screening:  First office visit saliva screen showed no narcotics   11/2019 Consistent, negative for all  06/2020 Consistent  12/2020 Consistent  06/2021 Consistent  04/2022 Consistent  02/2023 Consistent for gabapentin, consistent for no tylenol with codeine as she did not have an active script.    08/2023 Consistent  06/2024 Consistent for gabapentin, negative for hydrocodone but takes prn.  Will continue to monitor.  No previous issues.         OARRS report:  03/2019 consistent to 06/2021 Consistent  (norco on 9/24 from dentist for teeth extraction).  08/2021 Consistent to 11/2024 Consistent    Opioid Agreement:  10/07/2021   Updated:  09/26/2022   Updated: 08/28/2023  Updated:  08/19/2024    Past Medical History:   Diagnosis Date    A-fib (HCC)     Acute deep vein

## 2024-11-20 LAB
ESTRADIOL LEVEL: 6.8 PG/ML
ESTROGEN TOTAL: 27.4 PG/ML
ESTRONE SERPL-MCNC: 20.6 PG/ML
METHYLMALONATE SERPL-SCNC: 0.22 UMOL/L (ref 0–0.4)

## 2024-11-21 ENCOUNTER — OFFICE VISIT (OUTPATIENT)
Dept: OBGYN | Age: 64
End: 2024-11-21
Payer: COMMERCIAL

## 2024-11-21 VITALS
RESPIRATION RATE: 16 BRPM | WEIGHT: 237 LBS | DIASTOLIC BLOOD PRESSURE: 64 MMHG | TEMPERATURE: 97.4 F | OXYGEN SATURATION: 95 % | SYSTOLIC BLOOD PRESSURE: 137 MMHG | BODY MASS INDEX: 39.44 KG/M2 | HEART RATE: 74 BPM

## 2024-11-21 DIAGNOSIS — Z12.31 ENCOUNTER FOR SCREENING MAMMOGRAM FOR BREAST CANCER: Primary | ICD-10-CM

## 2024-11-21 DIAGNOSIS — Z01.419 ENCOUNTER FOR WELL WOMAN EXAM: ICD-10-CM

## 2024-11-21 DIAGNOSIS — N81.4 CYSTOCELE WITH PROLAPSE: ICD-10-CM

## 2024-11-21 PROCEDURE — G8484 FLU IMMUNIZE NO ADMIN: HCPCS | Performed by: OBSTETRICS & GYNECOLOGY

## 2024-11-21 PROCEDURE — 99397 PER PM REEVAL EST PAT 65+ YR: CPT | Performed by: OBSTETRICS & GYNECOLOGY

## 2024-11-21 PROCEDURE — 3078F DIAST BP <80 MM HG: CPT | Performed by: OBSTETRICS & GYNECOLOGY

## 2024-11-21 PROCEDURE — 3075F SYST BP GE 130 - 139MM HG: CPT | Performed by: OBSTETRICS & GYNECOLOGY

## 2024-11-21 SDOH — ECONOMIC STABILITY: FOOD INSECURITY: WITHIN THE PAST 12 MONTHS, YOU WORRIED THAT YOUR FOOD WOULD RUN OUT BEFORE YOU GOT MONEY TO BUY MORE.: NEVER TRUE

## 2024-11-21 SDOH — ECONOMIC STABILITY: INCOME INSECURITY: HOW HARD IS IT FOR YOU TO PAY FOR THE VERY BASICS LIKE FOOD, HOUSING, MEDICAL CARE, AND HEATING?: SOMEWHAT HARD

## 2024-11-21 SDOH — ECONOMIC STABILITY: FOOD INSECURITY: WITHIN THE PAST 12 MONTHS, THE FOOD YOU BOUGHT JUST DIDN'T LAST AND YOU DIDN'T HAVE MONEY TO GET MORE.: NEVER TRUE

## 2024-11-21 ASSESSMENT — PATIENT HEALTH QUESTIONNAIRE - PHQ9
SUM OF ALL RESPONSES TO PHQ QUESTIONS 1-9: 0
2. FEELING DOWN, DEPRESSED OR HOPELESS: NOT AT ALL
1. LITTLE INTEREST OR PLEASURE IN DOING THINGS: NOT AT ALL
SUM OF ALL RESPONSES TO PHQ QUESTIONS 1-9: 0
SUM OF ALL RESPONSES TO PHQ9 QUESTIONS 1 & 2: 0

## 2024-11-21 NOTE — PROGRESS NOTES
Addended by: AIXA DAHL on: 11/21/2024 10:22 AM     Modules accepted: Level of Service     Cough and congestion for 3 weeks  Treated with doxycycline to no avail  Mild yellow sputum  Past smoker  No fever  Examination  Blood pressure 109/62, pulse 73, temperature 97.8 °F (36.6 °C), temperature source Oral, resp. rate 18, height 5' 5\" (1.651 m), weight 239 lb (108.4 kg), SpO2 95 %, not currently breastfeeding. Sinus tenderness  Lungs mild wheezing  A/P  Sinusitis  Possible mild COPD exacerbation  augmentin  flonase  Albuterol prednisone for a week  Attending Physician Statement  I have discussed the case, including pertinent history and exam findings with the resident. I agree with the documented assessment and plan.

## 2024-11-21 NOTE — PROGRESS NOTES
Patient here today for her annual well woman exam.  Last pap was in 2023.  Mammogram done in 2022.  Breast and pelvic exam done by Dr. Wright.  No specimens obtained.  Discharge instructions have been discussed with the patient by Dr. Wright and patient voiced understanding of plan of care.  Patient advised to call our office with any questions or concerns.

## 2024-11-21 NOTE — PROGRESS NOTES
Ruth Anguiano     Patient presents for annual exam.     No complaints.     Patient was noted to have prolapse at her annual last year. She saw Dr. Wright and had a posterior repair. She is still having urge incontinence symptoms and well as difficulty voiding during the day. Patient states she will sometimes feel a bulge coming out of her vagina when she wipes or stands. She was offered a medication by [facility] but her insurance does not cover it. Discussed returning to [facility] to discuss other options. Patient would like a referral to other doctor. Will placed one to urogyn in [place].     Patient had a normal pap smear with absent endocervical transformation zone 2023. Will do cotesting today.     Past Medical History:   Diagnosis Date    A-fib (HCC)     Acute deep vein thrombosis (DVT) of right peroneal vein ([facility]) 10/14/2022    Brain aneurysm 09/2018    H/O TIMES 2 THAT BURST PER PATIENT    Cerebral artery occlusion with cerebral infarction ([facility])     right sided weakness    Chronic deep vein thrombosis (DVT) of right peroneal vein ([facility]) 01/12/2023    Degenerative arthritis of hand     Edentulous     Emphysema of lung (HCC)     Headache     Hiatal hernia     Hypertension     Stroke (cerebrum) ([facility])     Subarachnoid bleed (HCC) 09/10/2018    Varicose veins of both lower extremities with pain 07/20/2023        Past Surgical History:   Procedure Laterality Date    BRAIN ANEURYSM SURGERY      coiling     COLONOSCOPY  08/30/2019    polyps--frank    COLONOSCOPY N/A 08/30/2019    COLONOSCOPY POLYPECTOMY SNARE/COLD BIOPSY performed by Isaías Mello MD at [facility]    COLONOSCOPY N/A 12/09/2022    COLONOSCOPY POLYPECTOMY SNARE/COLD BIOPSY performed by Isaías Mello MD at [facility]    COLONOSCOPY W/ BIOPSIES  12/09/2022    polyp/cecal mass; diverticula--frank    DILATION AND CURETTAGE OF UTERUS      IR CAROTID STENT W PROTECTION  03/01/2023    IR CAROTID STENT UNI W PROTECTION 3/1/2023 Jdud Olivier MD

## 2024-11-22 ENCOUNTER — OFFICE VISIT (OUTPATIENT)
Age: 64
End: 2024-11-22
Payer: COMMERCIAL

## 2024-11-22 DIAGNOSIS — F41.9 ANXIETY: Primary | ICD-10-CM

## 2024-11-22 PROCEDURE — 90832 PSYTX W PT 30 MINUTES: CPT | Performed by: SOCIAL WORKER

## 2024-11-22 PROCEDURE — 1036F TOBACCO NON-USER: CPT | Performed by: SOCIAL WORKER

## 2024-11-22 PROCEDURE — 1123F ACP DISCUSS/DSCN MKR DOCD: CPT | Performed by: SOCIAL WORKER

## 2024-11-22 NOTE — PROGRESS NOTES
expressing anxiety. Pt was open and engaged during session.          11/21/2024     9:50 AM 8/23/2024    10:21 AM 7/24/2024     9:32 AM 3/21/2024    10:53 AM 8/18/2023     9:34 AM 1/20/2023    10:47 AM 8/5/2022    10:02 AM   PHQ Scores   PHQ2 Score 0 0 1 0 0 0 2   PHQ9 Score 0 1 4 0 0 0 2     Interpretation of Total Score Depression Severity: 1-4 = Minimal depression, 5-9 = Mild depression, 10-14 = Moderate depression, 15-19 = Moderately severe depression, 20-27 = Severe depression    How often pt has had thoughts of death or hurting self (if PHQ positive for depression):           7/24/2024     9:00 AM   RAMILA 7 SCORE   RAMILA-7 Total Score 8     Interpretation of RAMILA-7 score: 5-9 = mild anxiety, 10-14 = moderate anxiety, 15+ = severe anxiety. Recommend referral to behavioral health for scores 10 or greater.      DIAGNOSIS  Ruth was seen today for anxiety.    Diagnoses and all orders for this visit:    Anxiety        INTERVENTION  Provided education, Established rapport, Supportive techniques, Provided Psychoeducation re: anxiety, and CBT to target anxiety      PLAN  Pt to continue to use grounding skills as discussed in session  Pt to engage socially in an activity she finds steven in  Pt to be seen in one month for follow up      INTERACTIVE COMPLEXITY  Is interactive complexity present?  No  Reason:  N/A  Additional Supporting Information:  N/A       Electronically signed by FAIZA Rosales on 8/15/24 at 11:06 AM EDT

## 2024-12-02 ENCOUNTER — CLINICAL DOCUMENTATION (OUTPATIENT)
Dept: OBGYN | Age: 64
End: 2024-12-02

## 2024-12-02 NOTE — PROGRESS NOTES
Referral sent via Right Fax    EPL ID  [id number]    Job ID  [id number]

## 2024-12-06 ENCOUNTER — HOSPITAL ENCOUNTER (OUTPATIENT)
Dept: CT IMAGING | Age: 64
Discharge: HOME | End: 2024-12-08
Attending: PSYCHIATRY & NEUROLOGY
Payer: COMMERCIAL

## 2024-12-06 DIAGNOSIS — I67.1 CEREBRAL ANEURYSM: ICD-10-CM

## 2024-12-06 PROCEDURE — 70496 CT ANGIOGRAPHY HEAD: CPT

## 2024-12-06 PROCEDURE — 2580000003 HC RX 258: Performed by: RADIOLOGY

## 2024-12-06 PROCEDURE — 6360000004 HC RX CONTRAST MEDICATION: Performed by: RADIOLOGY

## 2024-12-06 RX ORDER — IOPAMIDOL 755 MG/ML
60 INJECTION, SOLUTION INTRAVASCULAR
Status: COMPLETED | OUTPATIENT
Start: 2024-12-06 | End: 2024-12-06

## 2024-12-06 RX ORDER — SODIUM CHLORIDE 0.9 % (FLUSH) 0.9 %
10 SYRINGE (ML) INJECTION
Status: COMPLETED | OUTPATIENT
Start: 2024-12-06 | End: 2024-12-06

## 2024-12-06 RX ADMIN — IOPAMIDOL 60 ML: 755 INJECTION, SOLUTION INTRAVENOUS at 07:55

## 2024-12-06 RX ADMIN — Medication 10 ML: at 07:55

## 2024-12-17 ENCOUNTER — OFFICE VISIT (OUTPATIENT)
Dept: PAIN MANAGEMENT | Age: 64
End: 2024-12-17
Payer: COMMERCIAL

## 2024-12-17 VITALS
TEMPERATURE: 95.9 F | DIASTOLIC BLOOD PRESSURE: 60 MMHG | HEART RATE: 67 BPM | BODY MASS INDEX: 39.49 KG/M2 | HEIGHT: 65 IN | SYSTOLIC BLOOD PRESSURE: 114 MMHG | RESPIRATION RATE: 18 BRPM | OXYGEN SATURATION: 97 % | WEIGHT: 237 LBS

## 2024-12-17 DIAGNOSIS — M50.30 DEGENERATION OF CERVICAL DISC WITHOUT MYELOPATHY: ICD-10-CM

## 2024-12-17 DIAGNOSIS — E66.01 SEVERE OBESITY (BMI 35.0-39.9) WITH COMORBIDITY: ICD-10-CM

## 2024-12-17 DIAGNOSIS — M16.12 PRIMARY OSTEOARTHRITIS OF LEFT HIP: ICD-10-CM

## 2024-12-17 DIAGNOSIS — M47.812 FACET ARTHROPATHY, CERVICAL: ICD-10-CM

## 2024-12-17 DIAGNOSIS — G89.4 CHRONIC PAIN SYNDROME: Primary | ICD-10-CM

## 2024-12-17 DIAGNOSIS — Z96.642 S/P TOTAL LEFT HIP ARTHROPLASTY: ICD-10-CM

## 2024-12-17 DIAGNOSIS — Z79.891 ENCOUNTER FOR LONG-TERM OPIATE ANALGESIC USE: ICD-10-CM

## 2024-12-17 DIAGNOSIS — M54.16 LUMBAR RADICULITIS: ICD-10-CM

## 2024-12-17 DIAGNOSIS — M47.816 LUMBAR SPONDYLOSIS: ICD-10-CM

## 2024-12-17 DIAGNOSIS — M16.11 ARTHRITIS OF RIGHT HIP: ICD-10-CM

## 2024-12-17 DIAGNOSIS — M47.816 LUMBAR FACET ARTHROPATHY: ICD-10-CM

## 2024-12-17 DIAGNOSIS — M51.9 LUMBAR DISC DISORDER: ICD-10-CM

## 2024-12-17 PROCEDURE — 1090F PRES/ABSN URINE INCON ASSESS: CPT | Performed by: PHYSICIAN ASSISTANT

## 2024-12-17 PROCEDURE — 1123F ACP DISCUSS/DSCN MKR DOCD: CPT | Performed by: PHYSICIAN ASSISTANT

## 2024-12-17 PROCEDURE — 3078F DIAST BP <80 MM HG: CPT | Performed by: PHYSICIAN ASSISTANT

## 2024-12-17 PROCEDURE — G8484 FLU IMMUNIZE NO ADMIN: HCPCS | Performed by: PHYSICIAN ASSISTANT

## 2024-12-17 PROCEDURE — 3074F SYST BP LT 130 MM HG: CPT | Performed by: PHYSICIAN ASSISTANT

## 2024-12-17 PROCEDURE — G8400 PT W/DXA NO RESULTS DOC: HCPCS | Performed by: PHYSICIAN ASSISTANT

## 2024-12-17 PROCEDURE — 3017F COLORECTAL CA SCREEN DOC REV: CPT | Performed by: PHYSICIAN ASSISTANT

## 2024-12-17 PROCEDURE — 99213 OFFICE O/P EST LOW 20 MIN: CPT | Performed by: PHYSICIAN ASSISTANT

## 2024-12-17 PROCEDURE — G8417 CALC BMI ABV UP PARAM F/U: HCPCS | Performed by: PHYSICIAN ASSISTANT

## 2024-12-17 PROCEDURE — G8427 DOCREV CUR MEDS BY ELIG CLIN: HCPCS | Performed by: PHYSICIAN ASSISTANT

## 2024-12-17 PROCEDURE — 1036F TOBACCO NON-USER: CPT | Performed by: PHYSICIAN ASSISTANT

## 2024-12-17 RX ORDER — FLUCONAZOLE 200 MG/1
200 TABLET ORAL DAILY
COMMUNITY
Start: 2024-12-09

## 2024-12-17 RX ORDER — HYDROCODONE BITARTRATE AND ACETAMINOPHEN 5; 325 MG/1; MG/1
1 TABLET ORAL DAILY PRN
Qty: 30 TABLET | Refills: 0 | Status: SHIPPED | OUTPATIENT
Start: 2024-12-20 | End: 2025-01-19

## 2024-12-17 NOTE — PROGRESS NOTES
Ruth Anguiano presents to the [facility] on 12/17/2024. Ruth is complaining of pain neck and back. The pain is constant. The pain is described as aching, throbbing, stabbing, and sharp. Pain is rated on her best day at a 5, on her worst day at a 10, and on average at a 7 on the VAS scale. She took her last dose of Norco and Neurontin today.      Any procedures since your last visit: No  She is not on NSAIDS and  is  on anticoagulation medications to include ASA and is managed by Dr. Stokes, PCP.     Pacemaker or defibrillator: No  Do you want someone present when the provider examines you? No    Medication Contract and Consent for Opioid Use Documents Filed       Patient Documents       Type of Document Status Date Received Received By Description    Medication Contract Received 3/1/2019  1:43 PM FRANCIS MCINTOSH PAIN MANAGEMENT PT AGREEMENT 3/1/2019    Medication Contract Received 10/8/2020  1:21 PM FRANCIS MCINTOSH pain pt agreement    Medication Contract Received 10/7/2021 10:36 AM BOLIVAR TRUJILLO Pain Mgmt Patient Agreement 10.7.2021    Medication Contract Received 9/26/2022 10:40 AM ENMA WAGNER MEDICATION CONTRACT    Consent Opioid Use Received 8/28/2023 10:09 AM OLU VALENZUELA pt agreement 8/28/23    Consent Opioid Use Received 8/19/2024 12:01 PM OLU VALENZUELA pt agreement 8/19/24                       Resp 18   Ht 1.651 m (5' 5\")   Wt 107.5 kg (237 lb)   BMI 39.44 kg/m²      No LMP recorded. Patient is postmenopausal.    
medications and side effects    .  Controlled Substance Monitoring:    Acute and Chronic Pain Monitoring:   RX Monitoring Periodic Controlled Substance Monitoring   12/17/2024   2:01 PM Possible medication side effects, risk of tolerance/dependence & alternative treatments discussed.;No signs of potential drug abuse or diversion identified.;Obtaining appropriate analgesic effect of treatment.;Assessed functional status (ability to engage in work or other purposeful activities, the pain intensity and its interference with activities of daily living, quality of family life and social activities, and the physical activity)                       We discussed with the patient that combining opioids, benzodiazepines, alcohol, illicit drugs or sleep aids increases the risk of respiratory depression including death. We discussed that these medications may cause drowsiness, sedation or dizziness and have counseled the patient not to drive or operate machinery. We have discussed that these medications will be prescribed only by one provider. We have discussed with the patient about age related risk factors and have thoroughly discussed the importance of taking these medications as prescribed. The patient verbalizes understanding.    ccreferring physic

## 2024-12-18 DIAGNOSIS — G89.4 CHRONIC PAIN SYNDROME: ICD-10-CM

## 2024-12-18 DIAGNOSIS — Z76.0 MEDICATION REFILL: ICD-10-CM

## 2024-12-18 RX ORDER — PANTOPRAZOLE SODIUM 20 MG/1
20 TABLET, DELAYED RELEASE ORAL DAILY
Qty: 90 TABLET | Refills: 0 | Status: SHIPPED | OUTPATIENT
Start: 2024-12-18

## 2024-12-18 RX ORDER — GABAPENTIN 300 MG/1
CAPSULE ORAL
Qty: 90 CAPSULE | Refills: 2 | Status: SHIPPED | OUTPATIENT
Start: 2024-12-18 | End: 2025-12-18

## 2024-12-18 RX ORDER — METOPROLOL SUCCINATE 25 MG/1
25 TABLET, EXTENDED RELEASE ORAL DAILY
Qty: 90 TABLET | Refills: 1 | Status: SHIPPED | OUTPATIENT
Start: 2024-12-18

## 2024-12-18 NOTE — TELEPHONE ENCOUNTER
Name of Medication(s) Requested:  Requested Prescriptions     Pending Prescriptions Disp Refills    pantoprazole (PROTONIX) 20 MG tablet 90 tablet 0     Sig: Take 1 tablet by mouth daily    metoprolol succinate (TOPROL XL) 25 MG extended release tablet 90 tablet 1     Sig: Take 1 tablet by mouth daily    gabapentin (NEURONTIN) 300 MG capsule 90 capsule 2     Sig: Take 1 capsule in the morning and take 2 capsules at bedtime       Medication is on current medication list Yes    Dosage and directions were verified? Yes    Quantity verified: 90 day supply     Pharmacy Verified?  Yes    Last Appointment:  8/23/2024    Future appts:  Future Appointments   Date Time Provider Department Center   1/2/2025  9:45 AM SEYZ ABDU DEXA SEYZ ABDU [facility] Rad/Car   1/2/2025 10:15 AM SEYZ ABDU RADHA RM 1 SEYZ ABDU [facility] Rad/Car   1/3/2025 10:15 AM [facility]   1/8/2025 11:00 AM Yareli Hawley LISW SE IM SOC [facility]   1/27/2025 10:15 AM Massiel Kebede PA [facility]   1/28/2025  9:00 AM Ann Betancourt, APRN - CNP ACC Pulm [facility]   11/21/2025 11:00 AM Judd Olivier MD [facility] NEURO Neurology -   11/27/2025  9:30 AM Ivet Wright MD [facility] Womens [facility]        (If no appt send self scheduling link. .REFILLAPPT)  Scheduling request sent?     [] Yes  [x] No    Does patient need updated?  [x] Yes  [] No

## 2024-12-23 ENCOUNTER — TELEPHONE (OUTPATIENT)
Dept: FAMILY MEDICINE CLINIC | Age: 64
End: 2024-12-23

## 2024-12-23 DIAGNOSIS — Z76.0 MEDICATION REFILL: ICD-10-CM

## 2024-12-23 RX ORDER — ALBUTEROL SULFATE 90 UG/1
2 INHALANT RESPIRATORY (INHALATION) EVERY 6 HOURS PRN
Qty: 18 G | Refills: 1 | Status: SHIPPED | OUTPATIENT
Start: 2024-12-23

## 2024-12-23 NOTE — TELEPHONE ENCOUNTER
Last Appointment   8/23/2024  Next Appointment  1/3/2025    Patient requesting refill of Albuterol inhaler. She would like this sent to [facility] on [address].

## 2025-01-09 DIAGNOSIS — Z76.0 MEDICATION REFILL: ICD-10-CM

## 2025-01-09 DIAGNOSIS — I10 HYPERTENSION, UNSPECIFIED TYPE: ICD-10-CM

## 2025-01-09 RX ORDER — CHLORTHALIDONE 25 MG/1
25 TABLET ORAL DAILY
Qty: 90 TABLET | Refills: 0 | Status: SHIPPED | OUTPATIENT
Start: 2025-01-09

## 2025-01-09 NOTE — TELEPHONE ENCOUNTER
Name of Medication(s) Requested:  Requested Prescriptions     Pending Prescriptions Disp Refills    chlorthalidone (HYGROTON) 25 MG tablet [Pharmacy Med Name: Chlorthalidone Oral Tablet 25 MG] 90 tablet 0     Sig: TAKE ONE TABLET BY MOUTH DAILY       Medication is on current medication list Yes    Dosage and directions were verified? Yes    Quantity verified: 90 day supply     Pharmacy Verified?  Yes    Last Appointment:  8/23/2024    Future appts:  Future Appointments   Date Time Provider Department Center   1/27/2025 10:15 AM Massiel Kebede PA [facility]   1/27/2025 10:30 AM SEYZ ABDU RADHA RM 2 SEYZ ABDU [facility] Rad/Car   1/27/2025 10:45 AM SEYZ ABDU DEXA SEYZ ABDU [facility] Rad/Car   1/28/2025  9:00 AM Ann Betancourt, APRN - CNP [facility]   3/6/2025 10:30 AM [facility]   11/21/2025 11:00 AM Judd Olivier MD [facility] NEURO Neurology -   11/27/2025  9:30 AM Ivet Wright MD [facility] Womens [facility]        (If no appt send self scheduling link. .REFILLAPPT)  Scheduling request sent?     [] Yes  [x] No    Does patient need updated?  [] Yes  [x] No

## 2025-01-16 DIAGNOSIS — I10 HYPERTENSION, UNSPECIFIED TYPE: ICD-10-CM

## 2025-01-16 DIAGNOSIS — Z76.0 MEDICATION REFILL: ICD-10-CM

## 2025-01-16 RX ORDER — AMLODIPINE BESYLATE 5 MG/1
TABLET ORAL
Qty: 90 TABLET | Refills: 0 | Status: SHIPPED | OUTPATIENT
Start: 2025-01-16

## 2025-01-16 RX ORDER — LOSARTAN POTASSIUM 100 MG/1
100 TABLET ORAL DAILY
Qty: 90 TABLET | Refills: 0 | Status: SHIPPED | OUTPATIENT
Start: 2025-01-16

## 2025-01-16 RX ORDER — ATORVASTATIN CALCIUM 40 MG/1
40 TABLET, FILM COATED ORAL NIGHTLY
Qty: 90 TABLET | Refills: 1 | Status: SHIPPED | OUTPATIENT
Start: 2025-01-16 | End: 2025-10-13

## 2025-01-16 RX ORDER — POTASSIUM CHLORIDE 750 MG/1
10 TABLET, EXTENDED RELEASE ORAL DAILY
Qty: 90 TABLET | Refills: 0 | Status: SHIPPED | OUTPATIENT
Start: 2025-01-16

## 2025-01-16 NOTE — TELEPHONE ENCOUNTER
Name of Medication(s) Requested:  Requested Prescriptions     Pending Prescriptions Disp Refills    potassium chloride (KLOR-CON M) 10 MEQ extended release tablet [Pharmacy Med Name: Potassium Chloride Fabi ER Oral Tablet Extended Release 10 MEQ] 90 tablet 0     Sig: TAKE ONE TABLET BY MOUTH DAILY    amLODIPine (NORVASC) 5 MG tablet 90 tablet 1     Sig: TAKE ONE TABLET BY MOUTH EVERY DAY    atorvastatin (LIPITOR) 40 MG tablet 90 tablet 1     Sig: Take 1 tablet by mouth nightly    losartan (COZAAR) 100 MG tablet 90 tablet 1     Sig: Take 1 tablet by mouth daily       Medication is on current medication list Yes    Dosage and directions were verified? Yes    Quantity verified: 90 day supply     Pharmacy Verified?  Yes    Last Appointment:  8/23/2024    Future appts:  Future Appointments   Date Time Provider Department Center   1/27/2025 10:15 AM Massiel Kebede PA [place] [facility]   1/27/2025 10:30 AM SEYZ ABDU RADHA RM 2 SE[facility] ABDU [facility] Rad/Car   1/27/2025 10:45 AM SEYZ ABDU DEXA SEYZ [facility] Rad/Car   1/28/2025  9:00 AM Ann Betancourt, APRN - CNP [facility] Pulm [facility]   3/6/2025 10:30 AM Hien Stokes MD [facility]   11/21/2025 11:00 AM Judd Olivier MD [facility] NEURO Neurology -   11/27/2025  9:30 AM Ivet Wright MD [facility]ens [facility]        (If no appt send self scheduling link. .REFILLAPPT)  Scheduling request sent?     [] Yes  [] No    Does patient need updated?  [] Yes  [] No

## 2025-01-16 NOTE — TELEPHONE ENCOUNTER
Name of Medication(s) Requested:  Requested Prescriptions     Pending Prescriptions Disp Refills    potassium chloride (KLOR-CON M) 10 MEQ extended release tablet [Pharmacy Med Name: Potassium Chloride Fabi ER Oral Tablet Extended Release 10 MEQ] 90 tablet 0     Sig: TAKE ONE TABLET BY MOUTH DAILY    amLODIPine (NORVASC) 5 MG tablet 90 tablet 1     Sig: TAKE ONE TABLET BY MOUTH EVERY DAY    atorvastatin (LIPITOR) 40 MG tablet 90 tablet 1     Sig: Take 1 tablet by mouth nightly    losartan (COZAAR) 100 MG tablet 90 tablet 1     Sig: Take 1 tablet by mouth daily       Medication is on current medication list Yes    Dosage and directions were verified? Yes    Quantity verified: 90 day supply     Pharmacy Verified?  Yes    Last Appointment:  8/23/2024    Future appts:  Future Appointments   Date Time Provider Department Center   1/27/2025 10:15 AM Massiel Kebede PA [place] [facility]   1/27/2025 10:30 AM SEYZ ABDU RADHA RM 2 SEYZ ABDU [facility] Rad/Car   1/27/2025 10:45 AM SEYZ ABDU DEXA SEYZ [facility] Rad/Car   1/28/2025  9:00 AM Ann Betancourt, APRN - CNP [facility] Pulm [facility]   3/6/2025 10:30 AM Hien Stokes MD [facility]   11/21/2025 11:00 AM Judd Olivier MD [facility] NEURO Neurology -   11/27/2025  9:30 AM Ivet Wright MD [facility] Womens [facility]        (If no appt send self scheduling link. .REFILLAPPT)  Scheduling request sent?     [] Yes  [x] No    Does patient need updated?  [x] Yes  [] No

## 2025-01-21 ENCOUNTER — TELEPHONE (OUTPATIENT)
Dept: INTERNAL MEDICINE | Age: 65
End: 2025-01-21

## 2025-01-27 ENCOUNTER — HOSPITAL ENCOUNTER (OUTPATIENT)
Dept: GENERAL RADIOLOGY | Age: 65
Discharge: HOME | End: 2025-01-29
Attending: OBSTETRICS & GYNECOLOGY
Payer: MEDICARE

## 2025-01-27 VITALS — BODY MASS INDEX: 38.32 KG/M2 | HEIGHT: 65 IN | WEIGHT: 230 LBS

## 2025-01-27 DIAGNOSIS — Z78.0 ASYMPTOMATIC MENOPAUSAL STATE: ICD-10-CM

## 2025-01-27 DIAGNOSIS — Z12.31 ENCOUNTER FOR SCREENING MAMMOGRAM FOR BREAST CANCER: ICD-10-CM

## 2025-01-27 DIAGNOSIS — Z13.820 SCREENING FOR OSTEOPOROSIS: ICD-10-CM

## 2025-01-27 PROCEDURE — 77080 DXA BONE DENSITY AXIAL: CPT

## 2025-01-27 PROCEDURE — 77063 BREAST TOMOSYNTHESIS BI: CPT

## 2025-01-28 ENCOUNTER — OFFICE VISIT (OUTPATIENT)
Dept: PAIN MANAGEMENT | Age: 65
End: 2025-01-28
Payer: MEDICARE

## 2025-01-28 VITALS
SYSTOLIC BLOOD PRESSURE: 128 MMHG | TEMPERATURE: 96.9 F | WEIGHT: 230 LBS | DIASTOLIC BLOOD PRESSURE: 80 MMHG | HEIGHT: 65 IN | RESPIRATION RATE: 18 BRPM | HEART RATE: 67 BPM | OXYGEN SATURATION: 100 % | BODY MASS INDEX: 38.32 KG/M2

## 2025-01-28 DIAGNOSIS — M51.9 LUMBAR DISC DISORDER: ICD-10-CM

## 2025-01-28 DIAGNOSIS — M47.812 FACET ARTHROPATHY, CERVICAL: ICD-10-CM

## 2025-01-28 DIAGNOSIS — M47.816 LUMBAR FACET ARTHROPATHY: ICD-10-CM

## 2025-01-28 DIAGNOSIS — M50.30 DEGENERATION OF CERVICAL DISC WITHOUT MYELOPATHY: ICD-10-CM

## 2025-01-28 DIAGNOSIS — Z79.891 ENCOUNTER FOR LONG-TERM OPIATE ANALGESIC USE: ICD-10-CM

## 2025-01-28 DIAGNOSIS — M54.16 LUMBAR RADICULITIS: ICD-10-CM

## 2025-01-28 DIAGNOSIS — M16.12 PRIMARY OSTEOARTHRITIS OF LEFT HIP: ICD-10-CM

## 2025-01-28 DIAGNOSIS — M16.11 ARTHRITIS OF RIGHT HIP: ICD-10-CM

## 2025-01-28 DIAGNOSIS — E66.01 SEVERE OBESITY (BMI 35.0-39.9) WITH COMORBIDITY: ICD-10-CM

## 2025-01-28 DIAGNOSIS — G89.4 CHRONIC PAIN SYNDROME: Primary | ICD-10-CM

## 2025-01-28 DIAGNOSIS — M47.816 LUMBAR SPONDYLOSIS: ICD-10-CM

## 2025-01-28 DIAGNOSIS — Z96.642 S/P TOTAL LEFT HIP ARTHROPLASTY: ICD-10-CM

## 2025-01-28 PROCEDURE — G8399 PT W/DXA RESULTS DOCUMENT: HCPCS | Performed by: PHYSICIAN ASSISTANT

## 2025-01-28 PROCEDURE — 3074F SYST BP LT 130 MM HG: CPT | Performed by: PHYSICIAN ASSISTANT

## 2025-01-28 PROCEDURE — 1036F TOBACCO NON-USER: CPT | Performed by: PHYSICIAN ASSISTANT

## 2025-01-28 PROCEDURE — 3017F COLORECTAL CA SCREEN DOC REV: CPT | Performed by: PHYSICIAN ASSISTANT

## 2025-01-28 PROCEDURE — G8427 DOCREV CUR MEDS BY ELIG CLIN: HCPCS | Performed by: PHYSICIAN ASSISTANT

## 2025-01-28 PROCEDURE — 1090F PRES/ABSN URINE INCON ASSESS: CPT | Performed by: PHYSICIAN ASSISTANT

## 2025-01-28 PROCEDURE — G8417 CALC BMI ABV UP PARAM F/U: HCPCS | Performed by: PHYSICIAN ASSISTANT

## 2025-01-28 PROCEDURE — 99213 OFFICE O/P EST LOW 20 MIN: CPT | Performed by: PHYSICIAN ASSISTANT

## 2025-01-28 PROCEDURE — 1123F ACP DISCUSS/DSCN MKR DOCD: CPT | Performed by: PHYSICIAN ASSISTANT

## 2025-01-28 PROCEDURE — 3079F DIAST BP 80-89 MM HG: CPT | Performed by: PHYSICIAN ASSISTANT

## 2025-01-28 RX ORDER — HYDROCODONE BITARTRATE AND ACETAMINOPHEN 5; 325 MG/1; MG/1
1 TABLET ORAL DAILY PRN
Qty: 30 TABLET | Refills: 0 | Status: SHIPPED | OUTPATIENT
Start: 2025-01-28 | End: 2025-02-27

## 2025-01-28 NOTE — PROGRESS NOTES
Ruth Anguiano presents to the [facility] on 1/28/2025. Ruth is complaining of pain in her neck and back. The pain is constant. The pain is described as aching, throbbing, stabbing, and sharp. Pain is rated on her best day at a 5, on her worst day at a 10, and on average at a 7 on the VAS scale. She took her last dose of Norco and Neurontin 2 days ago.      Any procedures since your last visit: No    She is not on NSAIDS and  is  on anticoagulation medications to include ASA and is managed by Hien Stokes MD       Pacemaker or defibrillator: No     Medication Contract and Consent for Opioid Use Documents Filed       Patient Documents       Type of Document Status Date Received Received By Description    Medication Contract Received 3/1/2019  1:43 PM FRANCIS MCINTOSH PAIN MANAGEMENT PT AGREEMENT 3/1/2019    Medication Contract Received 10/8/2020  1:21 PM FRANCIS MCINTOSH pain pt agreement    Medication Contract Received 10/7/2021 10:36 AM BOLIVAR TRUJILLO Pain Mgmt Patient Agreement 10.7.2021    Medication Contract Received 9/26/2022 10:40 AM ENMA WAGNER MEDICATION CONTRACT    Consent Opioid Use Received 8/28/2023 10:09 AM OLU VALENZUELA pt agreement 8/28/23    Consent Opioid Use Received 8/19/2024 12:01 PM OLU VALENZUELA pt agreement 8/19/24                       Resp 18   Ht 1.651 m (5' 5\")   Wt 104.3 kg (230 lb)   BMI 38.27 kg/m²      No LMP recorded. Patient is postmenopausal.     "          [place] [address] MEDICAL SURGICAL: [phone]                                              INTERAGENCY TRANSFER FORM - LAB / IMAGING / EKG / EMG RESULTS   2017                    Hospital Admission Date: 2017  CALEB ARELLANO   : 1934  Sex: Male        Attending Provider: Rudy Grey MD     Allergies:  No Known Allergies    Infection:  None   Service:  GENERAL MEDI    Ht:  1.727 m (5' 8\")   Wt:  85.957 kg (189 lb 8 oz)   Admission Wt:  90.719 kg (200 lb)    BMI:  28.82 kg/m 2   BSA:  2.03 m 2            Patient PCP Information     Provider PCP Type    [facility]         Lab Results - 3 Days (17 - 17)      Creatinine [[id number]]  Resulted: 17 0706, Result status: Final result    Ordering provider: Rudy Grey MD  17 0000 Resulting lab: [facility]    Specimen Information    Type Source Collected On   Blood  17 0545          Components       Value Reference Range Flag Lab   Creatinine 1.06 0.66 - 1.25 mg/dL  [facility]   GFR Estimate 67 >60 mL/min/1.7m2  [facility]   Comment:  Non  GFR Calc   GFR Estimate If Black 81 >60 mL/min/1.7m2  [facility]   Comment:   GFR Calc            Blood culture [[id number]]  Resulted: 17, Result status: Preliminary result    Ordering provider: Any Dobbs MD  17 Resulting lab: [facility]    Specimen Information    Type Source Collected On   Blood Arm, Right 17 2158          Components       Value Reference Range Flag Lab   Specimen Description Blood Right Hand   [facility]   Special Requests Aerobic and anaerobic bottles received   75   Culture Micro No growth after 4 days   225   Micro Report Status Pending   225            Blood culture [[id number]]  Resulted: 17 040, Result status: Preliminary result    Ordering provider: Any Dobbs MD  17 9155 Resulting lab: [facility]    Specimen Information    Type Source Collected On   Blood  01/16/17 2325          Components       Value Reference Range Flag Lab   Specimen Description Blood Left Hand   [facility]   Special Requests Aerobic and anaerobic bottles received   FrRdHs   Culture Micro No growth after 4 days   225   Micro Report Status Pending   225            Potassium [[id number]]  Resulted: 01/20/17 0806, Result status: Final result    Ordering provider: Rudy Grey MD  01/20/17 0655 Resulting lab: [facility]    Specimen Information    Type Source Collected On     01/20/17 0655          Components       Value Reference Range Flag Lab   Potassium 3.9 3.4 - 5.3 mmol/L  Rd            Creatinine [[id number]]  Resulted: 01/20/17 0746, Result status: Final result    Ordering provider: Rudy Grey MD  01/20/17 0000 Resulting lab: [facility]    Specimen Information    Type Source Collected On   Blood  01/20/17 0655          Components       Value Reference Range Flag Lab   Creatinine 1.00 0.66 - 1.25 mg/dL  Rd   GFR Estimate 72 >60 mL/min/1.7m2  [facility]   Comment:  Non  GFR Calc   GFR Estimate If Black 87 >60 mL/min/1.7m2  [facility]   Comment:   GFR Calc            CBC with platelets [[id number]] (Abnormal)  Resulted: 01/20/17 0730, Result status: Final result    Ordering provider: Nahid Arreola MD  01/20/17 0000 Resulting lab: [facility]    Specimen Information    Type Source Collected On   Blood  01/20/17 0655          Components       Value Reference Range Flag Lab   WBC 6.0 4.0 - 11.0 10e9/L  FrRdHs   RBC Count 3.09 4.4 - 5.9 10e12/L L FrRdHs   Hemoglobin 9.7 13.3 - 17.7 g/dL L FrRdHs   Hematocrit 30.8 40.0 - 53.0 % L FrRdHs    78 - 100 fl  FrRdHs   MCH 31.4 26.5 - 33.0 pg  FrRdHs   MCHC 31.5 31.5 - 36.5 g/dL  FrRdHs   RDW 17.6 10.0 - 15.0 % H FrRdHs   Platelet Count 268 150 - 450 10e9/L  FrRdHs             Basic metabolic panel [[id number]] (Abnormal)  Resulted: 01/19/17 0650, Result status: Final result    Ordering provider: Nahid Arreola MD  01/19/17 0000 Resulting lab: [facility]    Specimen Information    Type Source Collected On   Blood  01/19/17 0534          Components       Value Reference Range Flag Lab   Sodium 144 133 - 144 mmol/L  FrRdHs   Potassium 3.5 3.4 - 5.3 mmol/L  FrRdHs   Chloride 110 94 - 109 mmol/L H FrRdHs   Carbon Dioxide 27 20 - 32 mmol/L  FrRdHs   Anion Gap 7 3 - 14 mmol/L  FrRdHs   Glucose 91 70 - 99 mg/dL  FrRdHs   Urea Nitrogen 16 7 - 30 mg/dL  FrRdHs   Creatinine 1.15 0.66 - 1.25 mg/dL  Fr[facility]dHs   GFR Estimate 61 >60 mL/min/1.7m2  [facility]   Comment:  Non  GFR Calc   GFR Estimate If Black 73 >60 mL/min/1.7m2  [facility]   Comment:  African American GFR Calc   Calcium 8.8 8.5 - 10.1 mg/dL  FrRdHs            CBC with platelets [[id number]] (Abnormal)  Resulted: 01/19/17 0632, Result status: Final result    Ordering provider: Nahid Arreola MD  01/19/17 0000 Resulting lab: [facility]    Specimen Information    Type Source Collected On   Blood  01/19/17 0534          Components       Value Reference Range Flag Lab   WBC 5.4 4.0 - 11.0 10e9/L  FrRdHs   RBC Count 2.73 4.4 - 5.9 10e12/L L FrRdHs   Hemoglobin 8.5 13.3 - 17.7 g/dL L FrRdHs   Hematocrit 27.1 40.0 - 53.0 % L FrRdHs   MCV 99 78 - 100 fl  FrRdHs   MCH 31.1 26.5 - 33.0 pg  FrRdHs   MCHC 31.4 31.5 - 36.5 g/dL L FrRdHs   RDW 17.7 10.0 - 15.0 % H FrRdHs   Platelet Count 227 150 - 450 10e9/L  FrRdHs            Urine Culture Aerobic Bacterial [[id number]] (Abnormal)  Resulted: 01/18/17 2124, Result status: Final result    Ordering provider: Any Dobbs MD  01/16/17 2236 Resulting lab: [facility]    Specimen Information    Type Source Collected On   Urine  01/16/17 2566          Components       Value Reference Range Flag Lab   Specimen Description  Catheterized Urine   FrRdHs   Special Requests Specimen received in preservative   75   Culture Micro >100,000 colonies/mL Proteus mirabilis  A 225   Micro Report Status FINAL 01/18/2017   225   Organism: >100,000 colonies/mL Proteus mirabilis   225            Basic metabolic panel [[id number]] (Abnormal)  Resulted: 01/18/17 0850, Result status: Final result    Ordering provider: Nahid Arreola MD  01/18/17 0000 Resulting lab: [facility]    Specimen Information    Type Source Collected On   Blood  01/18/17 0809          Components       Value Reference Range Flag Lab   Sodium 143 133 - 144 mmol/L  FrRdHs   Potassium 4.1 3.4 - 5.3 mmol/L  FrRdHs   Chloride 109 94 - 109 mmol/L  FrRdHs   Carbon Dioxide 26 20 - 32 mmol/L  FrRdHs   Anion Gap 8 3 - 14 mmol/L  FrRdHs   Glucose 91 70 - 99 mg/dL  FrRdHs   Urea Nitrogen 17 7 - 30 mg/dL  FrRdHs   Creatinine 1.20 0.66 - 1.25 mg/dL  FrRdHs   GFR Estimate 58 >60 mL/min/1.7m2 L FrRdHs   Comment:  Non  GFR Calc   GFR Estimate If Black 70 >60 mL/min/1.7m2  FrRd   Comment:  African American GFR Calc   Calcium 8.6 8.5 - 10.1 mg/dL  FrRdHs            CBC with platelets [[id number]] (Abnormal)  Resulted: 01/18/17 0820, Result status: Final result    Ordering provider: Nahid Arreola MD  01/18/17 0000 Resulting lab: [facility]    Specimen Information    Type Source Collected On   Blood  01/18/17 0809          Components       Value Reference Range Flag Lab   WBC 6.4 4.0 - 11.0 10e9/L  FrRdHs   RBC Count 2.72 4.4 - 5.9 10e12/L L FrRdHs   Hemoglobin 8.6 13.3 - 17.7 g/dL L FrRdHs   Hematocrit 27.3 40.0 - 53.0 % L FrRdHs    78 - 100 fl  FrRdHs   MCH 31.6 26.5 - 33.0 pg  FrRdHs   MCHC 31.5 31.5 - 36.5 g/dL  FrRdHs   RDW 18.0 10.0 - 15.0 % H FrRdHs   Platelet Count 176 150 - 450 10e9/L  [facility]            Testing Performed By     Lab - Abbreviation Name Director Address Valid Date Range    12 - [facility]  "Unknown [address] [place]  [facility] [address] 05/08/15 1057 - Present    75 - Unknown [facility] Unknown [address] [facility] [address] 01/15/15 1019 - Present    225 - Unknown [facility] Unknown [address] [facility] [facility] [address] 12/19/14 0954 - Present            Unresulted Labs (24h ago through future)    Start       Ordered    01/19/17 0600  Platelet count -  (Pharmacological Prophylaxis - enoxaparin (LOVENOX) *Use only if creatinine clearance is greater than 30 mL/min)   EVERY THREE DAYS,   Routine     Comments:  Repeat every 3 days while on VTE prophylaxis.  Notify provider and hold enoxaparin if platelet count falls by 50% of baseline. If no result is listed, this lab has not been done the past 365 days. LATEST LAB RESULT: PLATELET COUNT (10e9/L)       Date                     Value                 01/16/2017               226              ----------    01/17/17 0205    01/19/17 0000  Creatinine -  (Pharmacological Prophylaxis - enoxaparin (LOVENOX) *Use only if creatinine clearance is greater than 30 mL/min)   EVERY THREE DAYS,   Routine     Comments:  Repeat every 3 days while on VTE prophylaxis.    01/17/17 0205    01/17/17 0242  Creatinine   AM DRAW,   Routine     Comments:  Last Lab Result: CREATININE (mg/dL)       Date                     Value                 01/16/2017               1.28*            ----------    01/17/17 0243    Unscheduled  Potassium -  (Potassium Replacement - \"Standard\" - For K levels less than 3.4 mmol/L - UU,UR,UA,RH,SH,PH,WY )   CONDITIONAL (SPECIFY),   Routine     Comments:  Obtain Potassium Level for these conditions:  *IF no potassium result within 24 hours before initiation of order set, draw potassium level with next lab collect.    *2 HOURS AFTER last IV potassium replacement dose and 4 hours after an oral replacement dose.  *Next morning after potassium dose.     Repeat Potassium Replacement if " necessary.    01/17/17 0205      Encounter-Level Documents:     There are no encounter-level documents.      Order-Level Documents:     There are no order-level documents.

## 2025-01-28 NOTE — PROGRESS NOTES
[facility]  [address], [place] [address]  [phone]    Follow up Note      Ruth Anguiano     Date of Visit:  1/28/2025    CC:  Patient presents for follow up   Chief Complaint   Patient presents with    Back Pain             HPI:    Pain is unchanged.        Appropriate analgesia with current medications regimen: yes.    Change in quality of symptoms:no.    Medication side effects:none.   Recent diagnostic testing:none.   Recent interventional procedures:none.    She has not been on anticoagulation medications to include ASA. The patient  has not been on herbal supplements.  The patient is not diabetic.     Imaging studies:    06/09/222 Left hip x-ray    FINDINGS:   Anatomically aligned left MERVAT with no abnormal bone or soft tissue findings.           Impression   Left MERVAT with no unexpected findings.           Cervical XR 11/2019 Severe degenerative changes      Lumbar spine Xray 03/2019  Scoliosis and osteoarthritis.     Bilateral hip Xray 03/2019   Advanced osteoarthrosis of bilateral hips.                                        Potential Aberrant Drug-Related Behavior: None     Urine Drug Screening:  First office visit saliva screen showed no narcotics   11/2019 Consistent, negative for all  06/2020 Consistent  12/2020 Consistent  06/2021 Consistent  04/2022 Consistent  02/2023 Consistent for gabapentin, consistent for no tylenol with codeine as she did not have an active script.    08/2023 Consistent  06/2024 Consistent for gabapentin, negative for hydrocodone but takes prn.  Will continue to monitor.  No previous issues.         OARRS report:  03/2019 consistent to 06/2021 Consistent  (norco on 9/24 from dentist for teeth extraction).  08/2021 Consistent to 01/2025 Consistent    Opioid Agreement:  10/07/2021   Updated:  09/26/2022   Updated: 08/28/2023  Updated:  08/19/2024    Past Medical History:   Diagnosis Date    A-fib (HCC)     Acute deep vein

## 2025-01-31 ENCOUNTER — OFFICE VISIT (OUTPATIENT)
Age: 65
End: 2025-01-31
Payer: MEDICARE

## 2025-01-31 DIAGNOSIS — F41.9 ANXIETY: Primary | ICD-10-CM

## 2025-01-31 PROCEDURE — 1123F ACP DISCUSS/DSCN MKR DOCD: CPT | Performed by: SOCIAL WORKER

## 2025-01-31 PROCEDURE — 90834 PSYTX W PT 45 MINUTES: CPT | Performed by: SOCIAL WORKER

## 2025-01-31 PROCEDURE — 1036F TOBACCO NON-USER: CPT | Performed by: SOCIAL WORKER

## 2025-01-31 NOTE — PROGRESS NOTES
Discussed and processed additional family stressors and continued skill building surrounding anxiety. Pt was open and engaged during session.          11/21/2024     9:50 AM 8/23/2024    10:21 AM 7/24/2024     9:32 AM 3/21/2024    10:53 AM 8/18/2023     9:34 AM 1/20/2023    10:47 AM 8/5/2022    10:02 AM   PHQ Scores   PHQ2 Score 0 0 1 0 0 0 2   PHQ9 Score 0 1 4 0 0 0 2     Interpretation of Total Score Depression Severity: 1-4 = Minimal depression, 5-9 = Mild depression, 10-14 = Moderate depression, 15-19 = Moderately severe depression, 20-27 = Severe depression    How often pt has had thoughts of death or hurting self (if PHQ positive for depression):           7/24/2024     9:00 AM   RAMILA 7 SCORE   RAMILA-7 Total Score 8     Interpretation of RAMILA-7 score: 5-9 = mild anxiety, 10-14 = moderate anxiety, 15+ = severe anxiety. Recommend referral to behavioral health for scores 10 or greater.      DIAGNOSIS  Ruth was seen today for anxiety.    Diagnoses and all orders for this visit:    Anxiety          INTERVENTION  Provided education, Established rapport, Supportive techniques, Provided Psychoeducation re: anxiety, and CBT to target anxiety      PLAN  Pt to continue to use grounding skills as discussed in session  Pt to engage socially in an activity she finds steven in  Pt to be seen in one month for follow up      INTERACTIVE COMPLEXITY  Is interactive complexity present?  No  Reason:  N/A  Additional Supporting Information:  N/A       Electronically signed by FAIZA Rosales on 8/15/24 at 11:06 AM EDT

## 2025-02-05 ENCOUNTER — OFFICE VISIT (OUTPATIENT)
Dept: FAMILY MEDICINE CLINIC | Age: 65
End: 2025-02-05

## 2025-02-05 VITALS
SYSTOLIC BLOOD PRESSURE: 128 MMHG | RESPIRATION RATE: 20 BRPM | DIASTOLIC BLOOD PRESSURE: 81 MMHG | WEIGHT: 238 LBS | HEIGHT: 65 IN | BODY MASS INDEX: 39.65 KG/M2 | OXYGEN SATURATION: 98 % | HEART RATE: 60 BPM | TEMPERATURE: 97.6 F

## 2025-02-05 DIAGNOSIS — J44.1 COPD WITH ACUTE EXACERBATION (HCC): Primary | ICD-10-CM

## 2025-02-05 DIAGNOSIS — J43.9 PULMONARY EMPHYSEMA, UNSPECIFIED EMPHYSEMA TYPE (HCC): ICD-10-CM

## 2025-02-05 DIAGNOSIS — R39.9 UTI SYMPTOMS: ICD-10-CM

## 2025-02-05 LAB
BILIRUBIN, POC: NEGATIVE
BLOOD URINE, POC: NEGATIVE
CLARITY, POC: CLEAR
COLOR, POC: YELLOW
GLUCOSE URINE, POC: NEGATIVE MG/DL
INFLUENZA A ANTIGEN, POC: NORMAL
INFLUENZA B ANTIGEN, POC: NORMAL
KETONES, POC: NEGATIVE MG/DL
LEUKOCYTE EST, POC: NORMAL
Lab: NORMAL
NITRITE, POC: NEGATIVE
PERFORMING INSTRUMENT: NORMAL
PH, POC: 7
PROTEIN, POC: NEGATIVE MG/DL
QC PASS/FAIL: NORMAL
RSV RNA: NORMAL
SARS-COV-2, POC: NORMAL
SPECIFIC GRAVITY, POC: 1.02
UROBILINOGEN, POC: 0.2 MG/DL

## 2025-02-05 RX ORDER — PREDNISONE 20 MG/1
40 TABLET ORAL DAILY
Qty: 10 TABLET | Refills: 0 | Status: SHIPPED | OUTPATIENT
Start: 2025-02-05 | End: 2025-02-10

## 2025-02-05 ASSESSMENT — ENCOUNTER SYMPTOMS
SHORTNESS OF BREATH: 1
COUGH: 1
WHEEZING: 0

## 2025-02-05 NOTE — PROGRESS NOTES
Chronic cough is worsening for one week  COPD   Increased sputum, brownish  Rhinorrhea, worsening dyspnea  Examination  Blood pressure 128/81, pulse 60, temperature 97.6 °F (36.4 °C), resp. rate 20, height 1.651 m (5' 5\"), weight 108 kg (238 lb), SpO2 98%, not currently breastfeeding.   Lungs clear  Heart regular  No distress  A/P  COPD exacerbation  URI  Unremarkable U/A  Albuterol  prednisone  Attending Physician Statement  I have discussed the case, including pertinent history and exam findings with the resident. I agree with the documented assessment and plan.

## 2025-02-05 NOTE — PROGRESS NOTES
[facility]  Department of Family Medicine  Family Medicine Residency Program      Patient: Ruth Anguiano 64 y.o. female     Date of Service: 2/5/25      Chief complaint:   Chief Complaint   Patient presents with    Cough     Persistent cough, dark mucus, hx emphysema. No fever, or other flu/uri sx.    Urinary Frequency     C/o frequency, dysuria       HISTORY OF PRESENTING ILLNESS     64 y.o. female PMH COPD presented to the clinic for acute visit related to exacerbation of chronic cough    Chronic cough:  - has been coughing for the last week, states history of COPD and chronic cough  - states intermittent chest congestion over the last week  - with COPD will have intermittent sputum production, now it is constant production, sputum thicker than normal now, described and brownish white  - states runny nose started a few days ago (watery runny), denies post nasal drip  - states has baseline SOB is able to walk to mail box without symptoms, now is SOB with walk to mail box   -  only using one albuterol inhaler, using as needed about 2-3 times each day, has tried other inhalers but couldn't tolerate for various reasons  - denies nvd , fevers chills night sweats.  - states daughter had sinus infection recently no other sick contacts    Urinary symptoms:  - states history of kidney infections wanted checked on UA today  - had odd feeling, states reduced day time urination and increased night time urination   - denies itching burning, blood, fevers, chills, nightsweats, cloudiness, odor changes    Health Maintenance:  Health Maintenance Due   Topic Date Due    Shingles vaccine (1 of 2) Never done    Respiratory Syncytial Virus (RSV) Pregnant or age 60 yrs+ (1 - Risk 60-74 years 1-dose series) Never done    A1C test (Diabetic or Prediabetic)  05/27/2023    Flu vaccine (1) 08/01/2024    COVID-19 Vaccine (1 - 2024-25 season) Never done    Annual Wellness Visit (Medicare Advantage)  01/01/2025

## 2025-02-13 ENCOUNTER — OFFICE VISIT (OUTPATIENT)
Dept: PULMONOLOGY | Age: 65
End: 2025-02-13
Payer: MEDICARE

## 2025-02-13 VITALS
SYSTOLIC BLOOD PRESSURE: 121 MMHG | TEMPERATURE: 96.6 F | HEIGHT: 65 IN | DIASTOLIC BLOOD PRESSURE: 56 MMHG | BODY MASS INDEX: 39.15 KG/M2 | OXYGEN SATURATION: 97 % | WEIGHT: 235 LBS | RESPIRATION RATE: 12 BRPM | HEART RATE: 68 BPM

## 2025-02-13 DIAGNOSIS — R05.2 SUBACUTE COUGH: Primary | ICD-10-CM

## 2025-02-13 DIAGNOSIS — J43.9 PULMONARY EMPHYSEMA, UNSPECIFIED EMPHYSEMA TYPE (HCC): ICD-10-CM

## 2025-02-13 DIAGNOSIS — Z87.891 HISTORY OF TOBACCO USE: ICD-10-CM

## 2025-02-13 DIAGNOSIS — E66.9 OBESITY (BMI 30-39.9): ICD-10-CM

## 2025-02-13 PROCEDURE — 1123F ACP DISCUSS/DSCN MKR DOCD: CPT | Performed by: NURSE PRACTITIONER

## 2025-02-13 PROCEDURE — 3074F SYST BP LT 130 MM HG: CPT | Performed by: NURSE PRACTITIONER

## 2025-02-13 PROCEDURE — G8427 DOCREV CUR MEDS BY ELIG CLIN: HCPCS | Performed by: NURSE PRACTITIONER

## 2025-02-13 PROCEDURE — 3078F DIAST BP <80 MM HG: CPT | Performed by: NURSE PRACTITIONER

## 2025-02-13 PROCEDURE — 3023F SPIROM DOC REV: CPT | Performed by: NURSE PRACTITIONER

## 2025-02-13 PROCEDURE — 99214 OFFICE O/P EST MOD 30 MIN: CPT | Performed by: NURSE PRACTITIONER

## 2025-02-13 PROCEDURE — 1036F TOBACCO NON-USER: CPT | Performed by: NURSE PRACTITIONER

## 2025-02-13 PROCEDURE — G8417 CALC BMI ABV UP PARAM F/U: HCPCS | Performed by: NURSE PRACTITIONER

## 2025-02-13 PROCEDURE — 1090F PRES/ABSN URINE INCON ASSESS: CPT | Performed by: NURSE PRACTITIONER

## 2025-02-13 PROCEDURE — G8399 PT W/DXA RESULTS DOCUMENT: HCPCS | Performed by: NURSE PRACTITIONER

## 2025-02-13 PROCEDURE — 3017F COLORECTAL CA SCREEN DOC REV: CPT | Performed by: NURSE PRACTITIONER

## 2025-02-13 RX ORDER — ALBUTEROL SULFATE AND BUDESONIDE 90; 80 UG/1; UG/1
2 AEROSOL, METERED RESPIRATORY (INHALATION) EVERY 6 HOURS PRN
Qty: 10.7 G | Refills: 3 | Status: SHIPPED | OUTPATIENT
Start: 2025-02-13

## 2025-02-13 NOTE — PROGRESS NOTES
Bandages & Supports (JOBST KNEE HIGH COMPRESSION SM) MISC Knee high with 20- 30 mmhg of compression 1 each 10     No current facility-administered medications for this visit.       Review of Systems: Negative other than specified above.      Objective       Vitals:  BP: (!) 121/56, Pulse: 68, Respirations: 12, Temp: (!) 96.6 °F (35.9 °C),  , SpO2: 97 %, Height: 165.1 cm (5' 5\"), Weight - Scale: 106.6 kg (235 lb)    BMI body mass index is 39.11 kg/m².  Ideal Body Weight: Ideal body weight: 57 kg (125 lb 10.6 oz)  Adjusted ideal body weight: 76.8 kg (169 lb 6.4 oz)  ---------------  Physical Exam:    General: Alert and oriented x 3. No acute distress. Obese  Eyes:  Vision - grossly normal, PERRLA  HEENT:  Head is atraumatic and normocephalic.  Neck is supple, no jugular venous distention.  Respiratory:  Lungs with faint expiratory wheeze to auscultation, respirations are nonlabored, breath sounds are equal.  Cardiovascular:  S1, S2 normal, regular rate and rhythm, no murmur, no pedal edema  Gastrointestinal:  Soft, nontender, nondistended.  Normal bowel sounds.  No organomegaly  Neurologic:  Awake and alert, cranial nerves 2-12 grossly intact, no focal motor or sensory deficits.  Musculoskeletal: Walks with cane    Labs     CBC with Differential:    Lab Results   Component Value Date/Time    WBC 5.3 11/15/2024 09:01 AM    RBC 4.48 11/15/2024 09:01 AM    HGB 13.2 11/15/2024 09:01 AM    HGB 14.2 12/13/2022 08:33 AM    HCT 40.1 11/15/2024 09:01 AM    HCT 42.0 12/13/2022 08:33 AM     11/15/2024 09:01 AM    MCV 89.5 11/15/2024 09:01 AM    MCH 29.5 11/15/2024 09:01 AM    MCHC 32.9 11/15/2024 09:01 AM    RDW 13.0 11/15/2024 09:01 AM    LYMPHOPCT 27 11/15/2024 09:01 AM    MONOPCT 7 11/15/2024 09:01 AM    EOSPCT 3 11/15/2024 09:01 AM    BASOPCT 1 11/15/2024 09:01 AM    MONOSABS 0.38 11/15/2024 09:01 AM    LYMPHSABS 1.42 11/15/2024 09:01 AM    EOSABS 0.13 11/15/2024 09:01 AM    BASOSABS 0.03 11/15/2024 09:01 AM     CMP:

## 2025-02-21 ENCOUNTER — TELEPHONE (OUTPATIENT)
Dept: PULMONOLOGY | Age: 65
End: 2025-02-21

## 2025-02-21 DIAGNOSIS — Z76.0 MEDICATION REFILL: ICD-10-CM

## 2025-02-21 RX ORDER — ALBUTEROL SULFATE 90 UG/1
2 INHALANT RESPIRATORY (INHALATION) EVERY 6 HOURS PRN
Qty: 18 G | Refills: 6 | Status: SHIPPED | OUTPATIENT
Start: 2025-02-21

## 2025-02-21 NOTE — TELEPHONE ENCOUNTER
Pt called into office to report feeling of increased cough and dizziness since starting to use new inhaler AirSupra. Instructed to stop using new inhaler and resume use of Albuterol inhaler prn. Refill sent to pharmacy. RN to discuss with provider and will call her back with recommendations for another med if appropriate.

## 2025-02-24 ENCOUNTER — OFFICE VISIT (OUTPATIENT)
Dept: PAIN MANAGEMENT | Age: 65
End: 2025-02-24
Payer: MEDICARE

## 2025-02-24 VITALS
TEMPERATURE: 96.8 F | HEIGHT: 65 IN | SYSTOLIC BLOOD PRESSURE: 100 MMHG | OXYGEN SATURATION: 94 % | HEART RATE: 63 BPM | DIASTOLIC BLOOD PRESSURE: 60 MMHG | WEIGHT: 235 LBS | BODY MASS INDEX: 39.15 KG/M2 | RESPIRATION RATE: 18 BRPM

## 2025-02-24 DIAGNOSIS — M50.30 DEGENERATION OF CERVICAL DISC WITHOUT MYELOPATHY: ICD-10-CM

## 2025-02-24 DIAGNOSIS — Z96.642 S/P TOTAL LEFT HIP ARTHROPLASTY: ICD-10-CM

## 2025-02-24 DIAGNOSIS — M47.816 LUMBAR SPONDYLOSIS: ICD-10-CM

## 2025-02-24 DIAGNOSIS — M47.812 FACET ARTHROPATHY, CERVICAL: ICD-10-CM

## 2025-02-24 DIAGNOSIS — M51.9 LUMBAR DISC DISORDER: ICD-10-CM

## 2025-02-24 DIAGNOSIS — Z79.891 ENCOUNTER FOR LONG-TERM OPIATE ANALGESIC USE: ICD-10-CM

## 2025-02-24 DIAGNOSIS — M16.12 PRIMARY OSTEOARTHRITIS OF LEFT HIP: ICD-10-CM

## 2025-02-24 DIAGNOSIS — G89.4 CHRONIC PAIN SYNDROME: Primary | ICD-10-CM

## 2025-02-24 DIAGNOSIS — M16.11 ARTHRITIS OF RIGHT HIP: ICD-10-CM

## 2025-02-24 DIAGNOSIS — M54.16 LUMBAR RADICULITIS: ICD-10-CM

## 2025-02-24 DIAGNOSIS — M47.816 LUMBAR FACET ARTHROPATHY: ICD-10-CM

## 2025-02-24 DIAGNOSIS — E66.01 SEVERE OBESITY (BMI 35.0-39.9) WITH COMORBIDITY: ICD-10-CM

## 2025-02-24 PROCEDURE — 3078F DIAST BP <80 MM HG: CPT | Performed by: PHYSICIAN ASSISTANT

## 2025-02-24 PROCEDURE — G8417 CALC BMI ABV UP PARAM F/U: HCPCS | Performed by: PHYSICIAN ASSISTANT

## 2025-02-24 PROCEDURE — 99213 OFFICE O/P EST LOW 20 MIN: CPT | Performed by: PHYSICIAN ASSISTANT

## 2025-02-24 PROCEDURE — 1123F ACP DISCUSS/DSCN MKR DOCD: CPT | Performed by: PHYSICIAN ASSISTANT

## 2025-02-24 PROCEDURE — 1036F TOBACCO NON-USER: CPT | Performed by: PHYSICIAN ASSISTANT

## 2025-02-24 PROCEDURE — 1090F PRES/ABSN URINE INCON ASSESS: CPT | Performed by: PHYSICIAN ASSISTANT

## 2025-02-24 PROCEDURE — G8399 PT W/DXA RESULTS DOCUMENT: HCPCS | Performed by: PHYSICIAN ASSISTANT

## 2025-02-24 PROCEDURE — G8427 DOCREV CUR MEDS BY ELIG CLIN: HCPCS | Performed by: PHYSICIAN ASSISTANT

## 2025-02-24 PROCEDURE — 3017F COLORECTAL CA SCREEN DOC REV: CPT | Performed by: PHYSICIAN ASSISTANT

## 2025-02-24 PROCEDURE — 3074F SYST BP LT 130 MM HG: CPT | Performed by: PHYSICIAN ASSISTANT

## 2025-02-24 RX ORDER — GABAPENTIN 300 MG/1
CAPSULE ORAL
Qty: 90 CAPSULE | Refills: 2 | Status: SHIPPED | OUTPATIENT
Start: 2025-02-24 | End: 2026-02-24

## 2025-02-24 RX ORDER — HYDROCODONE BITARTRATE AND ACETAMINOPHEN 5; 325 MG/1; MG/1
1 TABLET ORAL DAILY PRN
Qty: 30 TABLET | Refills: 0 | Status: SHIPPED | OUTPATIENT
Start: 2025-02-27 | End: 2025-03-29

## 2025-02-24 NOTE — PROGRESS NOTES
[facility]  [address], [place] [address]  [phone]    Follow up Note      Ruth Anguiano     Date of Visit:  2/24/2025    CC:  Patient presents for follow up   Chief Complaint   Patient presents with    Back Pain    Neck Pain             HPI:    Pain is unchanged.        Appropriate analgesia with current medications regimen: yes.    Change in quality of symptoms:no.    Medication side effects:none.   Recent diagnostic testing:none.   Recent interventional procedures:none.    She has not been on anticoagulation medications to include ASA. The patient  has not been on herbal supplements.  The patient is not diabetic.     Imaging studies:    06/09/222 Left hip x-ray    FINDINGS:   Anatomically aligned left MERVAT with no abnormal bone or soft tissue findings.           Impression   Left MERVAT with no unexpected findings.           Cervical XR 11/2019 Severe degenerative changes      Lumbar spine Xray 03/2019  Scoliosis and osteoarthritis.     Bilateral hip Xray 03/2019   Advanced osteoarthrosis of bilateral hips.                                        Potential Aberrant Drug-Related Behavior: None     Urine Drug Screening:  First office visit saliva screen showed no narcotics   11/2019 Consistent, negative for all  06/2020 Consistent  12/2020 Consistent  06/2021 Consistent  04/2022 Consistent  02/2023 Consistent for gabapentin, consistent for no tylenol with codeine as she did not have an active script.    08/2023 Consistent  06/2024 Consistent for gabapentin, negative for hydrocodone but takes prn.  Will continue to monitor.  No previous issues.         OARRS report:  03/2019 consistent to 06/2021 Consistent  (norco on 9/24 from dentist for teeth extraction).  08/2021 Consistent to 02/2025 Consistent    Opioid Agreement:  10/07/2021   Updated:  09/26/2022   Updated: 08/28/2023  Updated:  08/19/2024    Past Medical History:   Diagnosis Date    A-fib (HCC)     Acute deep

## 2025-02-24 NOTE — PROGRESS NOTES
Ruth Anguiano presents to the [facility] on 2/24/2025. Ruth is complaining of pain neck and back. The pain is constant. The pain is described as aching, throbbing, and sharp. Pain is rated on her best day at a 5, on her worst day at a 10, and on average at a 7 on the VAS scale. She took her last dose of Norco and Neurontin Norco was 4 days ago.      Any procedures since your last visit: No  She is not on NSAIDS and  is  on anticoagulation medications to include ASA and is managed by Dr. Stokes.     Pacemaker or defibrillator: No   Medication Contract and Consent for Opioid Use Documents Filed       Patient Documents       Type of Document Status Date Received Received By Description    Medication Contract Received 3/1/2019  1:43 PM FRANCIS MCINTOSH PAIN MANAGEMENT PT AGREEMENT 3/1/2019    Medication Contract Received 10/8/2020  1:21 PM FRANCIS MCINTOSH pain pt agreement    Medication Contract Received 10/7/2021 10:36 AM BOLIVAR TRUJILLO Pain Mgmt Patient Agreement 10.7.2021    Medication Contract Received 9/26/2022 10:40 AM ENMA WAGNER MEDICATION CONTRACT    Consent Opioid Use Received 8/28/2023 10:09 AM OLU VALENZUELA pt agreement 8/28/23    Consent Opioid Use Received 8/19/2024 12:01 PM OLU VALENZUELA pt agreement 8/19/24                       There were no vitals taken for this visit.     No LMP recorded. Patient is postmenopausal.

## 2025-02-28 ENCOUNTER — TELEPHONE (OUTPATIENT)
Dept: PAIN MANAGEMENT | Age: 65
End: 2025-02-28

## 2025-02-28 NOTE — TELEPHONE ENCOUNTER
[facility] lab called stating that they received a saliva specimen for Ruth but it was not labeled so they cannot run it. We will need to re-administer the test.

## 2025-03-06 ENCOUNTER — APPOINTMENT (OUTPATIENT)
Dept: NUCLEAR MEDICINE | Age: 65
DRG: 871 | End: 2025-03-06
Payer: MEDICARE

## 2025-03-06 ENCOUNTER — APPOINTMENT (OUTPATIENT)
Dept: GENERAL RADIOLOGY | Age: 65
DRG: 871 | End: 2025-03-06
Payer: MEDICARE

## 2025-03-06 ENCOUNTER — APPOINTMENT (OUTPATIENT)
Dept: ULTRASOUND IMAGING | Age: 65
DRG: 871 | End: 2025-03-06
Payer: MEDICARE

## 2025-03-06 ENCOUNTER — HOSPITAL ENCOUNTER (INPATIENT)
Age: 65
LOS: 5 days | Discharge: HOME HEALTH | DRG: 871 | End: 2025-03-11
Attending: EMERGENCY MEDICINE | Admitting: FAMILY MEDICINE
Payer: MEDICARE

## 2025-03-06 ENCOUNTER — OFFICE VISIT (OUTPATIENT)
Dept: FAMILY MEDICINE CLINIC | Age: 65
End: 2025-03-06
Payer: MEDICARE

## 2025-03-06 ENCOUNTER — APPOINTMENT (OUTPATIENT)
Dept: CT IMAGING | Age: 65
DRG: 871 | End: 2025-03-06
Payer: MEDICARE

## 2025-03-06 VITALS
TEMPERATURE: 97 F | SYSTOLIC BLOOD PRESSURE: 63 MMHG | WEIGHT: 227 LBS | DIASTOLIC BLOOD PRESSURE: 48 MMHG | HEIGHT: 65 IN | OXYGEN SATURATION: 89 % | BODY MASS INDEX: 37.82 KG/M2 | HEART RATE: 88 BPM | RESPIRATION RATE: 20 BRPM

## 2025-03-06 DIAGNOSIS — I82.551 CHRONIC DEEP VEIN THROMBOSIS (DVT) OF RIGHT PERONEAL VEIN (HCC): ICD-10-CM

## 2025-03-06 DIAGNOSIS — R06.09 DOE (DYSPNEA ON EXERTION): Chronic | ICD-10-CM

## 2025-03-06 DIAGNOSIS — I95.9 HYPOTENSION, UNSPECIFIED HYPOTENSION TYPE: Primary | ICD-10-CM

## 2025-03-06 DIAGNOSIS — N17.9 AKI (ACUTE KIDNEY INJURY): Primary | ICD-10-CM

## 2025-03-06 DIAGNOSIS — M16.0 ARTHRITIS OF BOTH HIPS: ICD-10-CM

## 2025-03-06 DIAGNOSIS — J18.9 PNEUMONIA OF BOTH LUNGS DUE TO INFECTIOUS ORGANISM, UNSPECIFIED PART OF LUNG: ICD-10-CM

## 2025-03-06 DIAGNOSIS — R53.81 PHYSICAL DECONDITIONING: ICD-10-CM

## 2025-03-06 PROBLEM — N81.4 CYSTOCELE WITH PROLAPSE: Status: ACTIVE | Noted: 2025-03-06

## 2025-03-06 PROBLEM — R79.89 ELEVATED D-DIMER: Status: ACTIVE | Noted: 2025-03-06

## 2025-03-06 PROBLEM — J43.8 OTHER EMPHYSEMA (HCC): Status: ACTIVE | Noted: 2021-04-27

## 2025-03-06 PROBLEM — A41.9 SEPSIS (HCC): Status: ACTIVE | Noted: 2025-03-06

## 2025-03-06 LAB
ALBUMIN SERPL-MCNC: 3.2 G/DL (ref 3.5–5.2)
ALP SERPL-CCNC: 185 U/L (ref 35–104)
ALT SERPL-CCNC: 85 U/L (ref 0–32)
ANION GAP SERPL CALCULATED.3IONS-SCNC: 19 MMOL/L (ref 7–16)
AST SERPL-CCNC: 64 U/L (ref 0–31)
B PARAP IS1001 DNA NPH QL NAA+NON-PROBE: NOT DETECTED
B PERT DNA SPEC QL NAA+PROBE: NOT DETECTED
BASOPHILS # BLD: 0.02 K/UL (ref 0–0.2)
BASOPHILS NFR BLD: 0 % (ref 0–2)
BILIRUB SERPL-MCNC: 0.7 MG/DL (ref 0–1.2)
BILIRUB UR QL STRIP: NEGATIVE
BUN SERPL-MCNC: 71 MG/DL (ref 6–23)
C PNEUM DNA NPH QL NAA+NON-PROBE: NOT DETECTED
CALCIUM SERPL-MCNC: 9.1 MG/DL (ref 8.6–10.2)
CHLORIDE SERPL-SCNC: 97 MMOL/L (ref 98–107)
CLARITY UR: ABNORMAL
CO2 SERPL-SCNC: 19 MMOL/L (ref 22–29)
COLOR UR: YELLOW
CREAT SERPL-MCNC: 3.2 MG/DL (ref 0.5–1)
CREAT UR-MCNC: 122.7 MG/DL (ref 29–226)
D-DIMER QUANTITATIVE: 1092 NG/ML DDU (ref 0–230)
EKG ATRIAL RATE: 80 BPM
EKG P AXIS: 67 DEGREES
EKG P-R INTERVAL: 164 MS
EKG Q-T INTERVAL: 390 MS
EKG QRS DURATION: 102 MS
EKG QTC CALCULATION (BAZETT): 449 MS
EKG R AXIS: 68 DEGREES
EKG T AXIS: 38 DEGREES
EKG VENTRICULAR RATE: 80 BPM
EOSINOPHIL # BLD: 0.02 K/UL (ref 0.05–0.5)
EOSINOPHILS RELATIVE PERCENT: 0 % (ref 0–6)
EPI CELLS #/AREA URNS HPF: ABNORMAL /HPF
ERYTHROCYTE [DISTWIDTH] IN BLOOD BY AUTOMATED COUNT: 13.5 % (ref 11.5–15)
ERYTHROCYTE [SEDIMENTATION RATE] IN BLOOD BY WESTERGREN METHOD: 127 MM/HR (ref 0–20)
FLUAV RNA NPH QL NAA+NON-PROBE: NOT DETECTED
FLUBV RNA NPH QL NAA+NON-PROBE: NOT DETECTED
GFR, ESTIMATED: 16 ML/MIN/1.73M2
GLUCOSE SERPL-MCNC: 188 MG/DL (ref 74–99)
GLUCOSE UR STRIP-MCNC: NEGATIVE MG/DL
HADV DNA NPH QL NAA+NON-PROBE: NOT DETECTED
HCOV 229E RNA NPH QL NAA+NON-PROBE: NOT DETECTED
HCOV HKU1 RNA NPH QL NAA+NON-PROBE: NOT DETECTED
HCOV NL63 RNA NPH QL NAA+NON-PROBE: NOT DETECTED
HCOV OC43 RNA NPH QL NAA+NON-PROBE: NOT DETECTED
HCT VFR BLD AUTO: 36.3 % (ref 34–48)
HGB BLD-MCNC: 11.8 G/DL (ref 11.5–15.5)
HGB UR QL STRIP.AUTO: ABNORMAL
HMPV RNA NPH QL NAA+NON-PROBE: NOT DETECTED
HPIV1 RNA NPH QL NAA+NON-PROBE: NOT DETECTED
HPIV2 RNA NPH QL NAA+NON-PROBE: NOT DETECTED
HPIV3 RNA NPH QL NAA+NON-PROBE: NOT DETECTED
HPIV4 RNA NPH QL NAA+NON-PROBE: NOT DETECTED
IMM GRANULOCYTES # BLD AUTO: 0.14 K/UL (ref 0–0.58)
IMM GRANULOCYTES NFR BLD: 1 % (ref 0–5)
INFLUENZA A BY PCR: NOT DETECTED
INFLUENZA B BY PCR: NOT DETECTED
KETONES UR STRIP-MCNC: NEGATIVE MG/DL
LACTATE BLDV-SCNC: 1.6 MMOL/L (ref 0.5–2.2)
LACTATE BLDV-SCNC: 2.4 MMOL/L (ref 0.5–2.2)
LEUKOCYTE ESTERASE UR QL STRIP: NEGATIVE
LYMPHOCYTES NFR BLD: 0.76 K/UL (ref 1.5–4)
LYMPHOCYTES RELATIVE PERCENT: 6 % (ref 20–42)
M PNEUMO DNA NPH QL NAA+NON-PROBE: NOT DETECTED
MAGNESIUM SERPL-MCNC: 2.4 MG/DL (ref 1.6–2.6)
MCH RBC QN AUTO: 28.6 PG (ref 26–35)
MCHC RBC AUTO-ENTMCNC: 32.5 G/DL (ref 32–34.5)
MCV RBC AUTO: 88.1 FL (ref 80–99.9)
MONOCYTES NFR BLD: 0.6 K/UL (ref 0.1–0.95)
MONOCYTES NFR BLD: 5 % (ref 2–12)
NEUTROPHILS NFR BLD: 87 % (ref 43–80)
NEUTS SEG NFR BLD: 10.46 K/UL (ref 1.8–7.3)
NITRITE UR QL STRIP: NEGATIVE
PH UR STRIP: 5.5 [PH] (ref 5–8)
PLATELET # BLD AUTO: 434 K/UL (ref 130–450)
PMV BLD AUTO: 9.4 FL (ref 7–12)
POTASSIUM SERPL-SCNC: 3.5 MMOL/L (ref 3.5–5)
PROT SERPL-MCNC: 7.9 G/DL (ref 6.4–8.3)
PROT UR STRIP-MCNC: ABNORMAL MG/DL
RBC # BLD AUTO: 4.12 M/UL (ref 3.5–5.5)
RBC #/AREA URNS HPF: ABNORMAL /HPF
RSV RNA NPH QL NAA+NON-PROBE: NOT DETECTED
RV+EV RNA NPH QL NAA+NON-PROBE: NOT DETECTED
SARS-COV-2 RDRP RESP QL NAA+PROBE: NOT DETECTED
SARS-COV-2 RNA NPH QL NAA+NON-PROBE: NOT DETECTED
SODIUM SERPL-SCNC: 135 MMOL/L (ref 132–146)
SODIUM UR-SCNC: 33 MMOL/L
SP GR UR STRIP: 1.02 (ref 1–1.03)
SPECIMEN DESCRIPTION: NORMAL
SPECIMEN DESCRIPTION: NORMAL
TROPONIN I SERPL HS-MCNC: 12 NG/L (ref 0–9)
TROPONIN I SERPL HS-MCNC: 16 NG/L (ref 0–9)
UROBILINOGEN UR STRIP-ACNC: 1 EU/DL (ref 0–1)
UUN UR-MCNC: 451 MG/DL (ref 800–1666)
WBC #/AREA URNS HPF: ABNORMAL /HPF
WBC OTHER # BLD: 12 K/UL (ref 4.5–11.5)

## 2025-03-06 PROCEDURE — 6370000000 HC RX 637 (ALT 250 FOR IP)

## 2025-03-06 PROCEDURE — 78580 LUNG PERFUSION IMAGING: CPT

## 2025-03-06 PROCEDURE — 2500000003 HC RX 250 WO HCPCS

## 2025-03-06 PROCEDURE — 1090F PRES/ABSN URINE INCON ASSESS: CPT | Performed by: FAMILY MEDICINE

## 2025-03-06 PROCEDURE — 3074F SYST BP LT 130 MM HG: CPT | Performed by: FAMILY MEDICINE

## 2025-03-06 PROCEDURE — 2580000003 HC RX 258

## 2025-03-06 PROCEDURE — 99285 EMERGENCY DEPT VISIT HI MDM: CPT

## 2025-03-06 PROCEDURE — 74176 CT ABD & PELVIS W/O CONTRAST: CPT

## 2025-03-06 PROCEDURE — G8427 DOCREV CUR MEDS BY ELIG CLIN: HCPCS | Performed by: FAMILY MEDICINE

## 2025-03-06 PROCEDURE — G8399 PT W/DXA RESULTS DOCUMENT: HCPCS | Performed by: FAMILY MEDICINE

## 2025-03-06 PROCEDURE — 2060000000 HC ICU INTERMEDIATE R&B

## 2025-03-06 PROCEDURE — 85379 FIBRIN DEGRADATION QUANT: CPT

## 2025-03-06 PROCEDURE — 87086 URINE CULTURE/COLONY COUNT: CPT

## 2025-03-06 PROCEDURE — 83735 ASSAY OF MAGNESIUM: CPT

## 2025-03-06 PROCEDURE — G8417 CALC BMI ABV UP PARAM F/U: HCPCS | Performed by: FAMILY MEDICINE

## 2025-03-06 PROCEDURE — 86063 ANTISTREPTOLYSIN O SCREEN: CPT

## 2025-03-06 PROCEDURE — 87150 DNA/RNA AMPLIFIED PROBE: CPT

## 2025-03-06 PROCEDURE — 99213 OFFICE O/P EST LOW 20 MIN: CPT | Performed by: FAMILY MEDICINE

## 2025-03-06 PROCEDURE — 1123F ACP DISCUSS/DSCN MKR DOCD: CPT | Performed by: FAMILY MEDICINE

## 2025-03-06 PROCEDURE — 87040 BLOOD CULTURE FOR BACTERIA: CPT

## 2025-03-06 PROCEDURE — 6360000002 HC RX W HCPCS

## 2025-03-06 PROCEDURE — 93005 ELECTROCARDIOGRAM TRACING: CPT

## 2025-03-06 PROCEDURE — A9540 TC99M MAA: HCPCS | Performed by: RADIOLOGY

## 2025-03-06 PROCEDURE — 51798 US URINE CAPACITY MEASURE: CPT

## 2025-03-06 PROCEDURE — 96365 THER/PROPH/DIAG IV INF INIT: CPT

## 2025-03-06 PROCEDURE — 87635 SARS-COV-2 COVID-19 AMP PRB: CPT

## 2025-03-06 PROCEDURE — 84300 ASSAY OF URINE SODIUM: CPT

## 2025-03-06 PROCEDURE — 96366 THER/PROPH/DIAG IV INF ADDON: CPT

## 2025-03-06 PROCEDURE — 84540 ASSAY OF URINE/UREA-N: CPT

## 2025-03-06 PROCEDURE — 83605 ASSAY OF LACTIC ACID: CPT

## 2025-03-06 PROCEDURE — 3430000000 HC RX DIAGNOSTIC RADIOPHARMACEUTICAL: Performed by: RADIOLOGY

## 2025-03-06 PROCEDURE — 80053 COMPREHEN METABOLIC PANEL: CPT

## 2025-03-06 PROCEDURE — 93010 ELECTROCARDIOGRAM REPORT: CPT | Performed by: INTERNAL MEDICINE

## 2025-03-06 PROCEDURE — 71250 CT THORAX DX C-: CPT

## 2025-03-06 PROCEDURE — 85025 COMPLETE CBC W/AUTO DIFF WBC: CPT

## 2025-03-06 PROCEDURE — 96361 HYDRATE IV INFUSION ADD-ON: CPT

## 2025-03-06 PROCEDURE — 82570 ASSAY OF URINE CREATININE: CPT

## 2025-03-06 PROCEDURE — 71046 X-RAY EXAM CHEST 2 VIEWS: CPT

## 2025-03-06 PROCEDURE — 84145 PROCALCITONIN (PCT): CPT

## 2025-03-06 PROCEDURE — 84484 ASSAY OF TROPONIN QUANT: CPT

## 2025-03-06 PROCEDURE — 93970 EXTREMITY STUDY: CPT

## 2025-03-06 PROCEDURE — 86140 C-REACTIVE PROTEIN: CPT

## 2025-03-06 PROCEDURE — 87502 INFLUENZA DNA AMP PROBE: CPT

## 2025-03-06 PROCEDURE — 0202U NFCT DS 22 TRGT SARS-COV-2: CPT

## 2025-03-06 PROCEDURE — 1036F TOBACCO NON-USER: CPT | Performed by: FAMILY MEDICINE

## 2025-03-06 PROCEDURE — 3078F DIAST BP <80 MM HG: CPT | Performed by: FAMILY MEDICINE

## 2025-03-06 PROCEDURE — 3017F COLORECTAL CA SCREEN DOC REV: CPT | Performed by: FAMILY MEDICINE

## 2025-03-06 PROCEDURE — 96375 TX/PRO/DX INJ NEW DRUG ADDON: CPT

## 2025-03-06 PROCEDURE — 94640 AIRWAY INHALATION TREATMENT: CPT

## 2025-03-06 PROCEDURE — 85652 RBC SED RATE AUTOMATED: CPT

## 2025-03-06 PROCEDURE — 81001 URINALYSIS AUTO W/SCOPE: CPT

## 2025-03-06 RX ORDER — POTASSIUM CHLORIDE 750 MG/1
10 TABLET, EXTENDED RELEASE ORAL DAILY
Status: DISCONTINUED | OUTPATIENT
Start: 2025-03-06 | End: 2025-03-11 | Stop reason: HOSPADM

## 2025-03-06 RX ORDER — METOPROLOL SUCCINATE 25 MG/1
25 TABLET, EXTENDED RELEASE ORAL DAILY
Status: DISCONTINUED | OUTPATIENT
Start: 2025-03-06 | End: 2025-03-11 | Stop reason: HOSPADM

## 2025-03-06 RX ORDER — NYSTATIN 100000 [USP'U]/ML
5 SUSPENSION ORAL 4 TIMES DAILY
Status: DISCONTINUED | OUTPATIENT
Start: 2025-03-06 | End: 2025-03-11 | Stop reason: HOSPADM

## 2025-03-06 RX ORDER — GABAPENTIN 300 MG/1
300 CAPSULE ORAL 2 TIMES DAILY
Status: DISCONTINUED | OUTPATIENT
Start: 2025-03-06 | End: 2025-03-11 | Stop reason: HOSPADM

## 2025-03-06 RX ORDER — IPRATROPIUM BROMIDE AND ALBUTEROL SULFATE 2.5; .5 MG/3ML; MG/3ML
1 SOLUTION RESPIRATORY (INHALATION)
Status: DISCONTINUED | OUTPATIENT
Start: 2025-03-06 | End: 2025-03-11 | Stop reason: HOSPADM

## 2025-03-06 RX ORDER — HYDROCODONE BITARTRATE AND ACETAMINOPHEN 5; 325 MG/1; MG/1
1 TABLET ORAL DAILY PRN
Status: DISCONTINUED | OUTPATIENT
Start: 2025-03-06 | End: 2025-03-11 | Stop reason: HOSPADM

## 2025-03-06 RX ORDER — PANTOPRAZOLE SODIUM 20 MG/1
20 TABLET, DELAYED RELEASE ORAL DAILY
Status: DISCONTINUED | OUTPATIENT
Start: 2025-03-06 | End: 2025-03-11 | Stop reason: HOSPADM

## 2025-03-06 RX ORDER — METHYLPREDNISOLONE SODIUM SUCCINATE 125 MG/2ML
125 INJECTION INTRAMUSCULAR; INTRAVENOUS ONCE
Status: COMPLETED | OUTPATIENT
Start: 2025-03-06 | End: 2025-03-06

## 2025-03-06 RX ORDER — AMLODIPINE BESYLATE 5 MG/1
5 TABLET ORAL DAILY
Status: CANCELLED | OUTPATIENT
Start: 2025-03-06

## 2025-03-06 RX ORDER — ACETAMINOPHEN 650 MG/1
650 SUPPOSITORY RECTAL EVERY 6 HOURS PRN
Status: DISCONTINUED | OUTPATIENT
Start: 2025-03-06 | End: 2025-03-11 | Stop reason: HOSPADM

## 2025-03-06 RX ORDER — CHLORTHALIDONE 25 MG/1
25 TABLET ORAL DAILY
Status: CANCELLED | OUTPATIENT
Start: 2025-03-06

## 2025-03-06 RX ORDER — LOSARTAN POTASSIUM 50 MG/1
100 TABLET ORAL DAILY
Status: CANCELLED | OUTPATIENT
Start: 2025-03-06

## 2025-03-06 RX ORDER — POLYETHYLENE GLYCOL 3350 17 G/17G
17 POWDER, FOR SOLUTION ORAL DAILY PRN
Status: DISCONTINUED | OUTPATIENT
Start: 2025-03-06 | End: 2025-03-11 | Stop reason: HOSPADM

## 2025-03-06 RX ORDER — ATORVASTATIN CALCIUM 40 MG/1
40 TABLET, FILM COATED ORAL NIGHTLY
Status: DISCONTINUED | OUTPATIENT
Start: 2025-03-06 | End: 2025-03-07

## 2025-03-06 RX ORDER — SODIUM CHLORIDE 0.9 % (FLUSH) 0.9 %
5-40 SYRINGE (ML) INJECTION PRN
Status: DISCONTINUED | OUTPATIENT
Start: 2025-03-06 | End: 2025-03-11 | Stop reason: HOSPADM

## 2025-03-06 RX ORDER — ARFORMOTEROL TARTRATE 15 UG/2ML
15 SOLUTION RESPIRATORY (INHALATION)
Status: DISCONTINUED | OUTPATIENT
Start: 2025-03-06 | End: 2025-03-11 | Stop reason: HOSPADM

## 2025-03-06 RX ORDER — ONDANSETRON 2 MG/ML
4 INJECTION INTRAMUSCULAR; INTRAVENOUS EVERY 6 HOURS PRN
Status: DISCONTINUED | OUTPATIENT
Start: 2025-03-06 | End: 2025-03-11 | Stop reason: HOSPADM

## 2025-03-06 RX ORDER — ASPIRIN 81 MG/1
81 TABLET ORAL DAILY
Status: DISCONTINUED | OUTPATIENT
Start: 2025-03-06 | End: 2025-03-11 | Stop reason: HOSPADM

## 2025-03-06 RX ORDER — SODIUM CHLORIDE 9 MG/ML
INJECTION, SOLUTION INTRAVENOUS PRN
Status: DISCONTINUED | OUTPATIENT
Start: 2025-03-06 | End: 2025-03-11 | Stop reason: HOSPADM

## 2025-03-06 RX ORDER — SODIUM CHLORIDE 0.9 % (FLUSH) 0.9 %
5-40 SYRINGE (ML) INJECTION EVERY 12 HOURS SCHEDULED
Status: DISCONTINUED | OUTPATIENT
Start: 2025-03-06 | End: 2025-03-11 | Stop reason: HOSPADM

## 2025-03-06 RX ORDER — ACETAMINOPHEN 325 MG/1
650 TABLET ORAL EVERY 6 HOURS PRN
Status: DISCONTINUED | OUTPATIENT
Start: 2025-03-06 | End: 2025-03-11 | Stop reason: HOSPADM

## 2025-03-06 RX ORDER — ONDANSETRON 4 MG/1
4 TABLET, ORALLY DISINTEGRATING ORAL EVERY 8 HOURS PRN
Status: DISCONTINUED | OUTPATIENT
Start: 2025-03-06 | End: 2025-03-11 | Stop reason: HOSPADM

## 2025-03-06 RX ORDER — 0.9 % SODIUM CHLORIDE 0.9 %
1000 INTRAVENOUS SOLUTION INTRAVENOUS ONCE
Status: COMPLETED | OUTPATIENT
Start: 2025-03-06 | End: 2025-03-06

## 2025-03-06 RX ORDER — IPRATROPIUM BROMIDE AND ALBUTEROL SULFATE 2.5; .5 MG/3ML; MG/3ML
3 SOLUTION RESPIRATORY (INHALATION) ONCE
Status: COMPLETED | OUTPATIENT
Start: 2025-03-06 | End: 2025-03-06

## 2025-03-06 RX ORDER — SODIUM CHLORIDE 9 MG/ML
INJECTION, SOLUTION INTRAVENOUS CONTINUOUS
Status: DISCONTINUED | OUTPATIENT
Start: 2025-03-06 | End: 2025-03-08

## 2025-03-06 RX ORDER — DOXYCYCLINE 100 MG/1
100 CAPSULE ORAL EVERY 12 HOURS SCHEDULED
Status: DISCONTINUED | OUTPATIENT
Start: 2025-03-06 | End: 2025-03-11

## 2025-03-06 RX ORDER — DIPHENHYDRAMINE HYDROCHLORIDE 50 MG/ML
25 INJECTION INTRAMUSCULAR; INTRAVENOUS ONCE
Status: DISCONTINUED | OUTPATIENT
Start: 2025-03-06 | End: 2025-03-06

## 2025-03-06 RX ADMIN — SODIUM CHLORIDE 1000 ML: 0.9 INJECTION, SOLUTION INTRAVENOUS at 12:42

## 2025-03-06 RX ADMIN — ARFORMOTEROL TARTRATE 15 MCG: 15 SOLUTION RESPIRATORY (INHALATION) at 20:13

## 2025-03-06 RX ADMIN — SODIUM CHLORIDE: 9 INJECTION, SOLUTION INTRAVENOUS at 22:10

## 2025-03-06 RX ADMIN — SODIUM CHLORIDE, PRESERVATIVE FREE 10 ML: 5 INJECTION INTRAVENOUS at 22:11

## 2025-03-06 RX ADMIN — IPRATROPIUM BROMIDE AND ALBUTEROL SULFATE 1 DOSE: .5; 2.5 SOLUTION RESPIRATORY (INHALATION) at 20:13

## 2025-03-06 RX ADMIN — GABAPENTIN 300 MG: 300 CAPSULE ORAL at 22:10

## 2025-03-06 RX ADMIN — ATORVASTATIN CALCIUM 40 MG: 40 TABLET, FILM COATED ORAL at 22:11

## 2025-03-06 RX ADMIN — NYSTATIN 500000 UNITS: 100000 SUSPENSION ORAL at 22:12

## 2025-03-06 RX ADMIN — WATER 1000 MG: 1 INJECTION INTRAMUSCULAR; INTRAVENOUS; SUBCUTANEOUS at 15:15

## 2025-03-06 RX ADMIN — DOXYCYCLINE HYCLATE 100 MG: 100 CAPSULE ORAL at 22:10

## 2025-03-06 RX ADMIN — Medication 6 MILLICURIE: at 13:55

## 2025-03-06 RX ADMIN — SODIUM CHLORIDE 1000 ML: 0.9 INJECTION, SOLUTION INTRAVENOUS at 15:15

## 2025-03-06 RX ADMIN — METHYLPREDNISOLONE SODIUM SUCCINATE 125 MG: 125 INJECTION INTRAMUSCULAR; INTRAVENOUS at 12:46

## 2025-03-06 RX ADMIN — DOXYCYCLINE 100 MG: 100 INJECTION, POWDER, LYOPHILIZED, FOR SOLUTION INTRAVENOUS at 15:16

## 2025-03-06 RX ADMIN — IPRATROPIUM BROMIDE AND ALBUTEROL SULFATE 3 DOSE: .5; 2.5 SOLUTION RESPIRATORY (INHALATION) at 11:32

## 2025-03-06 SDOH — ECONOMIC STABILITY: FOOD INSECURITY: WITHIN THE PAST 12 MONTHS, THE FOOD YOU BOUGHT JUST DIDN'T LAST AND YOU DIDN'T HAVE MONEY TO GET MORE.: NEVER TRUE

## 2025-03-06 SDOH — ECONOMIC STABILITY: FOOD INSECURITY: WITHIN THE PAST 12 MONTHS, YOU WORRIED THAT YOUR FOOD WOULD RUN OUT BEFORE YOU GOT MONEY TO BUY MORE.: NEVER TRUE

## 2025-03-06 ASSESSMENT — PATIENT HEALTH QUESTIONNAIRE - PHQ9
4. FEELING TIRED OR HAVING LITTLE ENERGY: NOT AT ALL
SUM OF ALL RESPONSES TO PHQ QUESTIONS 1-9: 0
1. LITTLE INTEREST OR PLEASURE IN DOING THINGS: NOT AT ALL
SUM OF ALL RESPONSES TO PHQ QUESTIONS 1-9: 0
SUM OF ALL RESPONSES TO PHQ QUESTIONS 1-9: 0
8. MOVING OR SPEAKING SO SLOWLY THAT OTHER PEOPLE COULD HAVE NOTICED. OR THE OPPOSITE, BEING SO FIGETY OR RESTLESS THAT YOU HAVE BEEN MOVING AROUND A LOT MORE THAN USUAL: NOT AT ALL
6. FEELING BAD ABOUT YOURSELF - OR THAT YOU ARE A FAILURE OR HAVE LET YOURSELF OR YOUR FAMILY DOWN: NOT AT ALL
7. TROUBLE CONCENTRATING ON THINGS, SUCH AS READING THE NEWSPAPER OR WATCHING TELEVISION: NOT AT ALL
5. POOR APPETITE OR OVEREATING: NOT AT ALL
9. THOUGHTS THAT YOU WOULD BE BETTER OFF DEAD, OR OF HURTING YOURSELF: NOT AT ALL
3. TROUBLE FALLING OR STAYING ASLEEP: NOT AT ALL
2. FEELING DOWN, DEPRESSED OR HOPELESS: NOT AT ALL
SUM OF ALL RESPONSES TO PHQ QUESTIONS 1-9: 0
10. IF YOU CHECKED OFF ANY PROBLEMS, HOW DIFFICULT HAVE THESE PROBLEMS MADE IT FOR YOU TO DO YOUR WORK, TAKE CARE OF THINGS AT HOME, OR GET ALONG WITH OTHER PEOPLE: NOT DIFFICULT AT ALL

## 2025-03-06 NOTE — PROGRESS NOTES
3/6/2025    Ruth Anguiano is a 64 y.o. female here for   Chief Complaint   Patient presents with    Cough    Loss of Balance     Patient reports not feelign well for about week   Event longer since time of recent surgery  Decreased urine output - only a trickle  She was hoping to avoid toing to the hospital but has not been feeling well  Also with cough/ congestion  Note weight loss    On check in found to have low blood pressures 63/48   Repeat by me 63/40 left arm    Wt Readings from Last 3 Encounters:   03/06/25 103 kg (227 lb)   02/24/25 106.6 kg (235 lb)   02/13/25 106.6 kg (235 lb)       Allergies   Allergen Reactions    Latex Hives     Hives and rash    Iodine Rash     Hives .pt. States anaphalyxis    Shellfish-Derived Products Anaphylaxis and Hives    Spiriva Respimat [Tiotropium Bromide Monohydrate] Dizziness or Vertigo    Aloe Hives     Hives     Cefdinir Rash    Celebrex [Celecoxib] Itching    Fish-Derived Products Other (See Comments)       Medications  Current Outpatient Medications   Medication Sig Dispense Refill    HYDROcodone-acetaminophen (NORCO) 5-325 MG per tablet Take 1 tablet by mouth daily as needed for Pain for up to 30 days. Take lowest dose possible to manage pain Max Daily Amount: 1 tablet 30 tablet 0    gabapentin (NEURONTIN) 300 MG capsule Take 1 capsule in the morning and take 2 capsules at bedtime 90 capsule 2    albuterol sulfate HFA (PROVENTIL;VENTOLIN;PROAIR) 108 (90 Base) MCG/ACT inhaler Inhale 2 puffs into the lungs every 6 hours as needed for Wheezing 18 g 6    potassium chloride (KLOR-CON M) 10 MEQ extended release tablet TAKE ONE TABLET BY MOUTH DAILY 90 tablet 0    amLODIPine (NORVASC) 5 MG tablet TAKE ONE TABLET BY MOUTH EVERY DAY 90 tablet 0    atorvastatin (LIPITOR) 40 MG tablet Take 1 tablet by mouth nightly 90 tablet 1    losartan (COZAAR) 100 MG tablet Take 1 tablet by mouth daily 90 tablet 0    chlorthalidone (HYGROTON) 25 MG tablet TAKE ONE TABLET BY MOUTH DAILY 90

## 2025-03-06 NOTE — H&P
plaque in the upper abdominal aorta.   5. Multilevel degenerative changes in the thoracic spine with some   rotoscoliosis.         XR CHEST (2 VW)   Final Result   Mild bibasilar opacities worse on the right, likely atelectasis or developing   pneumonia.  Follow-up study recommended.             ASSESSMENT/PLAN:      Active Hospital Problems    Diagnosis Date Noted    Cerebral aneurysm [I67.1] 03/01/2023     Priority: Medium    (HFpEF) heart failure with preserved ejection fraction (HCC) [I50.30] 01/04/2023     Priority: Medium    LFT elevation [R79.89] 12/16/2022     Priority: Medium    BELCHER (dyspnea on exertion) [R06.09] 10/13/2022     Priority: Medium    Cystocele with prolapse [N81.4] 03/06/2025    Sepsis (HCC) [A41.9] 03/06/2025    ORLIN (acute kidney injury) [N17.9] 03/06/2025    Elevated d-dimer [R79.89] 03/06/2025    Rectocele [N81.6] 01/23/2024    Other emphysema (HCC) [J43.8] 04/27/2021    Heartburn [R12]     Chronic pain syndrome [G89.4] 03/01/2019    Hypertension [I10] 09/21/2018    Obesity (BMI 30-39.9) [E66.9] 09/10/2018     Sepsis secondary to suspected pneumonia versus other source  lactic acid 2.4, bicarb 19, anion gap 19, white count 12,000, vitally on initial presentation 96.5 °F, bp 78/50, HR 85, status post 2 L fluid bolus in the ED with improved hemodynamic stability blood pressure 121/67 CT imaging showing bilateral pneumonia   Follow urinalysis/urine culture  Influenza A/B COVID-19 negative follow-up full RFA  Obtain ESR, CRP, Pro-Nic, ASO titer  Get repeat lactic acid  Started on Rocephin and Doxy in the ED, plan is to continue    Acute kidney injury stage III baseline creatinine 0.9-1.0 likely hemodynamically mediated in the setting of prolonged hypotension versus obstruction in the setting of hydrocele/rectal prolapse  Creatinine in the ED 3.2, blood pressure at that time 78/50  Patient complained of urinary retention status post straight cath x 1  Obtain urine electrolytes  Avoid

## 2025-03-06 NOTE — ED PROVIDER NOTES
[facility] EMERGENCY DEPARTMENT  EMERGENCY DEPARTMENT ENCOUNTER        Pt Name: Ruth Anguiano  MRN: [id number]  Birthdate 1960  Date of evaluation: 3/6/2025  Provider: Nancy Murphy DO  PCP: Hien Stokes MD  Note Started: 11:08 AM EST 3/6/25    CHIEF COMPLAINT       Chief Complaint   Patient presents with    Hypotension     Was at doctor for annual check up, sent in for BP 60's/40's    Cough    Fatigue     X 1 week        HISTORY OF PRESENT ILLNESS: 1 or more Elements   Ruth Anguiano is a 64 y.o. female with a past medical history of HFpEF and atrial fibrillation who presents to the emergency department with chief complaint of hypotension.  Has felt fatigued and short of breath with a productive cough over the past week.  Was seen at her primary care physician this morning and had three blood pressures that were 60s/40s.  She states she has felt somewhat lightheaded but denies syncope, nausea, vomiting, or chest pain.  Cough is not made better by anything.  She has been using her home inhalers without long-term relief of her symptoms.  Patient does not have a nebulizer breathing treatment at home.  She is otherwise denying any dysuria, abdominal pain, headache, fever, or chills.    Nursing Notes were all reviewed and agreed with or any disagreements were addressed in the HPI.    REVIEW OF SYSTEMS :    Positives and Pertinent negatives as per HPI.    PAST MEDICAL HISTORY/Chronic Conditions Affecting Care    has a past medical history of A-fib ([facility]), Acute deep vein thrombosis (DVT) of right peroneal vein ([facility]) (10/14/2022), Brain aneurysm (09/2018), Cerebral artery occlusion with cerebral infarction ([facility]), Chronic deep vein thrombosis (DVT) of right peroneal vein ([facility]) (01/12/2023), Degenerative arthritis of hand, Edentulous, Emphysema of lung ([facility]), Headache, Hiatal hernia, Hypertension, Stroke (cerebrum) ([facility]), Subarachnoid bleed ([facility]) (09/10/2018), and Varicose veins of

## 2025-03-07 PROBLEM — J18.9 PNEUMONIA OF BOTH LUNGS DUE TO INFECTIOUS ORGANISM: Status: ACTIVE | Noted: 2025-03-07

## 2025-03-07 LAB
ALBUMIN SERPL-MCNC: 2.9 G/DL (ref 3.5–5.2)
ALP SERPL-CCNC: 141 U/L (ref 35–104)
ALT SERPL-CCNC: 60 U/L (ref 0–32)
ANION GAP SERPL CALCULATED.3IONS-SCNC: 15 MMOL/L (ref 7–16)
ASO AB SERPL-ACNC: 25 IU/ML (ref 0–200)
AST SERPL-CCNC: 33 U/L (ref 0–31)
BILIRUB SERPL-MCNC: 0.4 MG/DL (ref 0–1.2)
BUN SERPL-MCNC: 54 MG/DL (ref 6–23)
CALCIUM SERPL-MCNC: 8.6 MG/DL (ref 8.6–10.2)
CHLORIDE SERPL-SCNC: 106 MMOL/L (ref 98–107)
CO2 SERPL-SCNC: 19 MMOL/L (ref 22–29)
CORTIS SERPL-MCNC: 8.4 UG/DL (ref 2.7–18.4)
CREAT SERPL-MCNC: 1.7 MG/DL (ref 0.5–1)
CRP SERPL HS-MCNC: 173 MG/L (ref 0–5)
ERYTHROCYTE [DISTWIDTH] IN BLOOD BY AUTOMATED COUNT: 13.1 % (ref 11.5–15)
GFR, ESTIMATED: 33 ML/MIN/1.73M2
GLUCOSE SERPL-MCNC: 139 MG/DL (ref 74–99)
HCT VFR BLD AUTO: 32 % (ref 34–48)
HGB BLD-MCNC: 10.6 G/DL (ref 11.5–15.5)
LACTATE BLDV-SCNC: 0.5 MMOL/L (ref 0.5–2.2)
MAGNESIUM SERPL-MCNC: 2.2 MG/DL (ref 1.6–2.6)
MCH RBC QN AUTO: 29.2 PG (ref 26–35)
MCHC RBC AUTO-ENTMCNC: 33.1 G/DL (ref 32–34.5)
MCV RBC AUTO: 88.2 FL (ref 80–99.9)
MICROORGANISM SPEC CULT: NO GROWTH
PHOSPHATE SERPL-MCNC: 4.5 MG/DL (ref 2.5–4.5)
PLATELET # BLD AUTO: 376 K/UL (ref 130–450)
PMV BLD AUTO: 9.4 FL (ref 7–12)
POTASSIUM SERPL-SCNC: 3.6 MMOL/L (ref 3.5–5)
PROCALCITONIN SERPL-MCNC: 0.21 NG/ML (ref 0–0.08)
PROT SERPL-MCNC: 6.7 G/DL (ref 6.4–8.3)
RBC # BLD AUTO: 3.63 M/UL (ref 3.5–5.5)
SERVICE CMNT-IMP: NORMAL
SODIUM SERPL-SCNC: 140 MMOL/L (ref 132–146)
SPECIMEN DESCRIPTION: NORMAL
WBC OTHER # BLD: 6.6 K/UL (ref 4.5–11.5)

## 2025-03-07 PROCEDURE — 83605 ASSAY OF LACTIC ACID: CPT

## 2025-03-07 PROCEDURE — 2580000003 HC RX 258

## 2025-03-07 PROCEDURE — 85027 COMPLETE CBC AUTOMATED: CPT

## 2025-03-07 PROCEDURE — 83735 ASSAY OF MAGNESIUM: CPT

## 2025-03-07 PROCEDURE — 84100 ASSAY OF PHOSPHORUS: CPT

## 2025-03-07 PROCEDURE — 6370000000 HC RX 637 (ALT 250 FOR IP)

## 2025-03-07 PROCEDURE — 6360000002 HC RX W HCPCS

## 2025-03-07 PROCEDURE — 82533 TOTAL CORTISOL: CPT

## 2025-03-07 PROCEDURE — 94640 AIRWAY INHALATION TREATMENT: CPT

## 2025-03-07 PROCEDURE — 2700000000 HC OXYGEN THERAPY PER DAY

## 2025-03-07 PROCEDURE — 2060000000 HC ICU INTERMEDIATE R&B

## 2025-03-07 PROCEDURE — 2500000003 HC RX 250 WO HCPCS

## 2025-03-07 PROCEDURE — 94664 DEMO&/EVAL PT USE INHALER: CPT

## 2025-03-07 PROCEDURE — 80053 COMPREHEN METABOLIC PANEL: CPT

## 2025-03-07 RX ORDER — ATORVASTATIN CALCIUM 20 MG/1
20 TABLET, FILM COATED ORAL 2 TIMES DAILY
Status: DISCONTINUED | OUTPATIENT
Start: 2025-03-07 | End: 2025-03-10

## 2025-03-07 RX ORDER — 0.9 % SODIUM CHLORIDE 0.9 %
1000 INTRAVENOUS SOLUTION INTRAVENOUS ONCE
Status: COMPLETED | OUTPATIENT
Start: 2025-03-07 | End: 2025-03-07

## 2025-03-07 RX ADMIN — IPRATROPIUM BROMIDE AND ALBUTEROL SULFATE 1 DOSE: .5; 2.5 SOLUTION RESPIRATORY (INHALATION) at 19:50

## 2025-03-07 RX ADMIN — ARFORMOTEROL TARTRATE 15 MCG: 15 SOLUTION RESPIRATORY (INHALATION) at 09:07

## 2025-03-07 RX ADMIN — PANTOPRAZOLE SODIUM 20 MG: 20 TABLET, DELAYED RELEASE ORAL at 09:45

## 2025-03-07 RX ADMIN — SODIUM CHLORIDE: 9 INJECTION, SOLUTION INTRAVENOUS at 17:15

## 2025-03-07 RX ADMIN — POTASSIUM CHLORIDE 10 MEQ: 750 TABLET, EXTENDED RELEASE ORAL at 09:45

## 2025-03-07 RX ADMIN — HYDROCODONE BITARTRATE AND ACETAMINOPHEN 1 TABLET: 5; 325 TABLET ORAL at 09:45

## 2025-03-07 RX ADMIN — NYSTATIN 500000 UNITS: 100000 SUSPENSION ORAL at 21:07

## 2025-03-07 RX ADMIN — ARFORMOTEROL TARTRATE 15 MCG: 15 SOLUTION RESPIRATORY (INHALATION) at 19:50

## 2025-03-07 RX ADMIN — IPRATROPIUM BROMIDE AND ALBUTEROL SULFATE 1 DOSE: .5; 2.5 SOLUTION RESPIRATORY (INHALATION) at 11:29

## 2025-03-07 RX ADMIN — SODIUM CHLORIDE, PRESERVATIVE FREE 10 ML: 5 INJECTION INTRAVENOUS at 09:46

## 2025-03-07 RX ADMIN — IPRATROPIUM BROMIDE AND ALBUTEROL SULFATE 1 DOSE: .5; 2.5 SOLUTION RESPIRATORY (INHALATION) at 15:51

## 2025-03-07 RX ADMIN — NYSTATIN 500000 UNITS: 100000 SUSPENSION ORAL at 09:44

## 2025-03-07 RX ADMIN — SODIUM CHLORIDE 1000 ML: 0.9 INJECTION, SOLUTION INTRAVENOUS at 12:28

## 2025-03-07 RX ADMIN — GABAPENTIN 300 MG: 300 CAPSULE ORAL at 09:45

## 2025-03-07 RX ADMIN — ASPIRIN 81 MG: 81 TABLET, COATED ORAL at 09:45

## 2025-03-07 RX ADMIN — DOXYCYCLINE HYCLATE 100 MG: 100 CAPSULE ORAL at 21:07

## 2025-03-07 RX ADMIN — ATORVASTATIN CALCIUM 20 MG: 20 TABLET, FILM COATED ORAL at 21:07

## 2025-03-07 RX ADMIN — WATER 1000 MG: 1 INJECTION INTRAMUSCULAR; INTRAVENOUS; SUBCUTANEOUS at 17:16

## 2025-03-07 RX ADMIN — METOPROLOL SUCCINATE 25 MG: 25 TABLET, EXTENDED RELEASE ORAL at 09:46

## 2025-03-07 RX ADMIN — IPRATROPIUM BROMIDE AND ALBUTEROL SULFATE 1 DOSE: .5; 2.5 SOLUTION RESPIRATORY (INHALATION) at 09:07

## 2025-03-07 RX ADMIN — NYSTATIN 500000 UNITS: 100000 SUSPENSION ORAL at 17:52

## 2025-03-07 RX ADMIN — SODIUM CHLORIDE, PRESERVATIVE FREE 10 ML: 5 INJECTION INTRAVENOUS at 21:08

## 2025-03-07 RX ADMIN — DOXYCYCLINE HYCLATE 100 MG: 100 CAPSULE ORAL at 09:45

## 2025-03-07 RX ADMIN — GABAPENTIN 300 MG: 300 CAPSULE ORAL at 21:07

## 2025-03-07 ASSESSMENT — PAIN SCALES - GENERAL: PAINLEVEL_OUTOF10: 5

## 2025-03-07 ASSESSMENT — PAIN DESCRIPTION - DESCRIPTORS: DESCRIPTORS: ACHING

## 2025-03-07 ASSESSMENT — PAIN DESCRIPTION - LOCATION: LOCATION: BACK

## 2025-03-07 ASSESSMENT — PAIN - FUNCTIONAL ASSESSMENT: PAIN_FUNCTIONAL_ASSESSMENT: ACTIVITIES ARE NOT PREVENTED

## 2025-03-07 NOTE — PLAN OF CARE
Problem: Discharge Planning  Goal: Discharge to home or other facility with appropriate resources  Outcome: Progressing  Flowsheets (Taken 3/7/2025 0920 by Violeta Merchant, RN)  Discharge to home or other facility with appropriate resources: Refer to discharge planning if patient needs post-hospital services based on physician order or complex needs related to functional status, cognitive ability or social support system     Problem: ABCDS Injury Assessment  Goal: Absence of physical injury  Outcome: Progressing     Problem: Safety - Adult  Goal: Free from fall injury  Outcome: Progressing     Problem: Chronic Conditions and Co-morbidities  Goal: Patient's chronic conditions and co-morbidity symptoms are monitored and maintained or improved  Outcome: Progressing

## 2025-03-08 ENCOUNTER — APPOINTMENT (OUTPATIENT)
Dept: GENERAL RADIOLOGY | Age: 65
DRG: 871 | End: 2025-03-08
Payer: MEDICARE

## 2025-03-08 LAB
ALBUMIN SERPL-MCNC: 2.9 G/DL (ref 3.5–5.2)
ALP SERPL-CCNC: 117 U/L (ref 35–104)
ALT SERPL-CCNC: 84 U/L (ref 0–32)
ANION GAP SERPL CALCULATED.3IONS-SCNC: 16 MMOL/L (ref 7–16)
AST SERPL-CCNC: 55 U/L (ref 0–31)
BILIRUB SERPL-MCNC: 0.5 MG/DL (ref 0–1.2)
BUN SERPL-MCNC: 37 MG/DL (ref 6–23)
CALCIUM SERPL-MCNC: 9.1 MG/DL (ref 8.6–10.2)
CHLORIDE SERPL-SCNC: 106 MMOL/L (ref 98–107)
CO2 SERPL-SCNC: 19 MMOL/L (ref 22–29)
CREAT SERPL-MCNC: 1.2 MG/DL (ref 0.5–1)
ERYTHROCYTE [DISTWIDTH] IN BLOOD BY AUTOMATED COUNT: 13.4 % (ref 11.5–15)
GFR, ESTIMATED: 51 ML/MIN/1.73M2
GLUCOSE SERPL-MCNC: 129 MG/DL (ref 74–99)
HCT VFR BLD AUTO: 32.8 % (ref 34–48)
HGB BLD-MCNC: 10.4 G/DL (ref 11.5–15.5)
MAGNESIUM SERPL-MCNC: 1.7 MG/DL (ref 1.6–2.6)
MCH RBC QN AUTO: 28.2 PG (ref 26–35)
MCHC RBC AUTO-ENTMCNC: 31.7 G/DL (ref 32–34.5)
MCV RBC AUTO: 88.9 FL (ref 80–99.9)
PHOSPHATE SERPL-MCNC: 2.6 MG/DL (ref 2.5–4.5)
PLATELET # BLD AUTO: 400 K/UL (ref 130–450)
PMV BLD AUTO: 9.1 FL (ref 7–12)
POTASSIUM SERPL-SCNC: 3.4 MMOL/L (ref 3.5–5)
PROT SERPL-MCNC: 6.6 G/DL (ref 6.4–8.3)
RBC # BLD AUTO: 3.69 M/UL (ref 3.5–5.5)
SODIUM SERPL-SCNC: 141 MMOL/L (ref 132–146)
WBC OTHER # BLD: 7.4 K/UL (ref 4.5–11.5)

## 2025-03-08 PROCEDURE — 2500000003 HC RX 250 WO HCPCS

## 2025-03-08 PROCEDURE — 71045 X-RAY EXAM CHEST 1 VIEW: CPT

## 2025-03-08 PROCEDURE — 84100 ASSAY OF PHOSPHORUS: CPT

## 2025-03-08 PROCEDURE — 36415 COLL VENOUS BLD VENIPUNCTURE: CPT

## 2025-03-08 PROCEDURE — 6370000000 HC RX 637 (ALT 250 FOR IP): Performed by: STUDENT IN AN ORGANIZED HEALTH CARE EDUCATION/TRAINING PROGRAM

## 2025-03-08 PROCEDURE — 83735 ASSAY OF MAGNESIUM: CPT

## 2025-03-08 PROCEDURE — 6360000002 HC RX W HCPCS

## 2025-03-08 PROCEDURE — 6360000002 HC RX W HCPCS: Performed by: STUDENT IN AN ORGANIZED HEALTH CARE EDUCATION/TRAINING PROGRAM

## 2025-03-08 PROCEDURE — 94640 AIRWAY INHALATION TREATMENT: CPT

## 2025-03-08 PROCEDURE — 6370000000 HC RX 637 (ALT 250 FOR IP)

## 2025-03-08 PROCEDURE — 2060000000 HC ICU INTERMEDIATE R&B

## 2025-03-08 PROCEDURE — 80053 COMPREHEN METABOLIC PANEL: CPT

## 2025-03-08 PROCEDURE — 85027 COMPLETE CBC AUTOMATED: CPT

## 2025-03-08 PROCEDURE — 2700000000 HC OXYGEN THERAPY PER DAY

## 2025-03-08 RX ORDER — MAGNESIUM SULFATE IN WATER 40 MG/ML
2000 INJECTION, SOLUTION INTRAVENOUS ONCE
Status: COMPLETED | OUTPATIENT
Start: 2025-03-08 | End: 2025-03-08

## 2025-03-08 RX ADMIN — DOXYCYCLINE HYCLATE 100 MG: 100 CAPSULE ORAL at 08:51

## 2025-03-08 RX ADMIN — NYSTATIN 500000 UNITS: 100000 SUSPENSION ORAL at 08:51

## 2025-03-08 RX ADMIN — NYSTATIN 500000 UNITS: 100000 SUSPENSION ORAL at 16:23

## 2025-03-08 RX ADMIN — DOXYCYCLINE HYCLATE 100 MG: 100 CAPSULE ORAL at 21:36

## 2025-03-08 RX ADMIN — ASPIRIN 81 MG: 81 TABLET, COATED ORAL at 08:51

## 2025-03-08 RX ADMIN — ATORVASTATIN CALCIUM 20 MG: 20 TABLET, FILM COATED ORAL at 21:36

## 2025-03-08 RX ADMIN — ATORVASTATIN CALCIUM 20 MG: 20 TABLET, FILM COATED ORAL at 08:51

## 2025-03-08 RX ADMIN — NYSTATIN 500000 UNITS: 100000 SUSPENSION ORAL at 12:46

## 2025-03-08 RX ADMIN — POTASSIUM BICARBONATE 40 MEQ: 782 TABLET, EFFERVESCENT ORAL at 11:11

## 2025-03-08 RX ADMIN — IPRATROPIUM BROMIDE AND ALBUTEROL SULFATE 1 DOSE: .5; 2.5 SOLUTION RESPIRATORY (INHALATION) at 15:59

## 2025-03-08 RX ADMIN — IPRATROPIUM BROMIDE AND ALBUTEROL SULFATE 1 DOSE: .5; 2.5 SOLUTION RESPIRATORY (INHALATION) at 18:22

## 2025-03-08 RX ADMIN — MAGNESIUM SULFATE HEPTAHYDRATE 2000 MG: 40 INJECTION, SOLUTION INTRAVENOUS at 11:10

## 2025-03-08 RX ADMIN — GABAPENTIN 300 MG: 300 CAPSULE ORAL at 21:36

## 2025-03-08 RX ADMIN — WATER 1000 MG: 1 INJECTION INTRAMUSCULAR; INTRAVENOUS; SUBCUTANEOUS at 14:46

## 2025-03-08 RX ADMIN — ARFORMOTEROL TARTRATE 15 MCG: 15 SOLUTION RESPIRATORY (INHALATION) at 18:22

## 2025-03-08 RX ADMIN — SODIUM CHLORIDE, PRESERVATIVE FREE 10 ML: 5 INJECTION INTRAVENOUS at 21:36

## 2025-03-08 RX ADMIN — ARFORMOTEROL TARTRATE 15 MCG: 15 SOLUTION RESPIRATORY (INHALATION) at 09:07

## 2025-03-08 RX ADMIN — GABAPENTIN 300 MG: 300 CAPSULE ORAL at 08:51

## 2025-03-08 RX ADMIN — POTASSIUM CHLORIDE 10 MEQ: 750 TABLET, EXTENDED RELEASE ORAL at 08:51

## 2025-03-08 RX ADMIN — METOPROLOL SUCCINATE 25 MG: 25 TABLET, EXTENDED RELEASE ORAL at 08:51

## 2025-03-08 RX ADMIN — IPRATROPIUM BROMIDE AND ALBUTEROL SULFATE 1 DOSE: .5; 2.5 SOLUTION RESPIRATORY (INHALATION) at 12:15

## 2025-03-08 RX ADMIN — NYSTATIN 500000 UNITS: 100000 SUSPENSION ORAL at 21:47

## 2025-03-08 RX ADMIN — IPRATROPIUM BROMIDE AND ALBUTEROL SULFATE 1 DOSE: .5; 2.5 SOLUTION RESPIRATORY (INHALATION) at 09:07

## 2025-03-08 RX ADMIN — PANTOPRAZOLE SODIUM 20 MG: 20 TABLET, DELAYED RELEASE ORAL at 08:51

## 2025-03-08 NOTE — PLAN OF CARE
Problem: Discharge Planning  Goal: Discharge to home or other facility with appropriate resources  3/8/2025 0253 by Viry Plasencia RN  Outcome: Progressing     Problem: ABCDS Injury Assessment  Goal: Absence of physical injury  3/8/2025 0253 by Viry Plasencia RN  Outcome: Progressing     Problem: Safety - Adult  Goal: Free from fall injury  3/8/2025 0253 by Viry Plasencia RN  Outcome: Progressing     Problem: Chronic Conditions and Co-morbidities  Goal: Patient's chronic conditions and co-morbidity symptoms are monitored and maintained or improved  3/8/2025 0253 by Viry Plasencia RN  Outcome: Progressing     Problem: Skin/Tissue Integrity  Goal: Skin integrity remains intact  Description: 1.  Monitor for areas of redness and/or skin breakdown  2.  Assess vascular access sites hourly  3.  Every 4-6 hours minimum:  Change oxygen saturation probe site  4.  Every 4-6 hours:  If on nasal continuous positive airway pressure, respiratory therapy assess nares and determine need for appliance change or resting period  Outcome: Progressing

## 2025-03-09 PROBLEM — R53.81 PHYSICAL DECONDITIONING: Status: ACTIVE | Noted: 2025-03-09

## 2025-03-09 LAB
ALBUMIN SERPL-MCNC: 2.9 G/DL (ref 3.5–5.2)
ALP SERPL-CCNC: 108 U/L (ref 35–104)
ALT SERPL-CCNC: 65 U/L (ref 0–32)
ANION GAP SERPL CALCULATED.3IONS-SCNC: 11 MMOL/L (ref 7–16)
AST SERPL-CCNC: 36 U/L (ref 0–31)
BILIRUB SERPL-MCNC: 0.5 MG/DL (ref 0–1.2)
BUN SERPL-MCNC: 26 MG/DL (ref 6–23)
CALCIUM SERPL-MCNC: 8.9 MG/DL (ref 8.6–10.2)
CHLORIDE SERPL-SCNC: 106 MMOL/L (ref 98–107)
CO2 SERPL-SCNC: 23 MMOL/L (ref 22–29)
CREAT SERPL-MCNC: 0.9 MG/DL (ref 0.5–1)
ERYTHROCYTE [DISTWIDTH] IN BLOOD BY AUTOMATED COUNT: 13.4 % (ref 11.5–15)
GFR, ESTIMATED: 70 ML/MIN/1.73M2
GLUCOSE SERPL-MCNC: 100 MG/DL (ref 74–99)
HCT VFR BLD AUTO: 32.5 % (ref 34–48)
HGB BLD-MCNC: 10.3 G/DL (ref 11.5–15.5)
MAGNESIUM SERPL-MCNC: 1.7 MG/DL (ref 1.6–2.6)
MCH RBC QN AUTO: 28.5 PG (ref 26–35)
MCHC RBC AUTO-ENTMCNC: 31.7 G/DL (ref 32–34.5)
MCV RBC AUTO: 90 FL (ref 80–99.9)
PHOSPHATE SERPL-MCNC: 3.4 MG/DL (ref 2.5–4.5)
PLATELET # BLD AUTO: 395 K/UL (ref 130–450)
PMV BLD AUTO: 9 FL (ref 7–12)
POTASSIUM SERPL-SCNC: 3.9 MMOL/L (ref 3.5–5)
PROCALCITONIN SERPL-MCNC: 0.07 NG/ML (ref 0–0.08)
PROT SERPL-MCNC: 6.5 G/DL (ref 6.4–8.3)
RBC # BLD AUTO: 3.61 M/UL (ref 3.5–5.5)
SODIUM SERPL-SCNC: 140 MMOL/L (ref 132–146)
WBC OTHER # BLD: 6 K/UL (ref 4.5–11.5)

## 2025-03-09 PROCEDURE — 84145 PROCALCITONIN (PCT): CPT

## 2025-03-09 PROCEDURE — 2700000000 HC OXYGEN THERAPY PER DAY

## 2025-03-09 PROCEDURE — 36415 COLL VENOUS BLD VENIPUNCTURE: CPT

## 2025-03-09 PROCEDURE — 6360000002 HC RX W HCPCS

## 2025-03-09 PROCEDURE — 80053 COMPREHEN METABOLIC PANEL: CPT

## 2025-03-09 PROCEDURE — 2060000000 HC ICU INTERMEDIATE R&B

## 2025-03-09 PROCEDURE — 87040 BLOOD CULTURE FOR BACTERIA: CPT

## 2025-03-09 PROCEDURE — 84100 ASSAY OF PHOSPHORUS: CPT

## 2025-03-09 PROCEDURE — 6370000000 HC RX 637 (ALT 250 FOR IP)

## 2025-03-09 PROCEDURE — 85027 COMPLETE CBC AUTOMATED: CPT

## 2025-03-09 PROCEDURE — 2500000003 HC RX 250 WO HCPCS

## 2025-03-09 PROCEDURE — 83735 ASSAY OF MAGNESIUM: CPT

## 2025-03-09 PROCEDURE — 94640 AIRWAY INHALATION TREATMENT: CPT

## 2025-03-09 RX ORDER — GUAIFENESIN 400 MG/1
400 TABLET ORAL 2 TIMES DAILY
Status: DISCONTINUED | OUTPATIENT
Start: 2025-03-09 | End: 2025-03-11 | Stop reason: HOSPADM

## 2025-03-09 RX ORDER — GUAIFENESIN 400 MG/1
400 TABLET ORAL 2 TIMES DAILY
Status: DISCONTINUED | OUTPATIENT
Start: 2025-03-09 | End: 2025-03-09

## 2025-03-09 RX ADMIN — SODIUM CHLORIDE, PRESERVATIVE FREE 10 ML: 5 INJECTION INTRAVENOUS at 20:47

## 2025-03-09 RX ADMIN — ATORVASTATIN CALCIUM 20 MG: 20 TABLET, FILM COATED ORAL at 09:28

## 2025-03-09 RX ADMIN — DOXYCYCLINE HYCLATE 100 MG: 100 CAPSULE ORAL at 20:46

## 2025-03-09 RX ADMIN — NYSTATIN 500000 UNITS: 100000 SUSPENSION ORAL at 20:46

## 2025-03-09 RX ADMIN — POTASSIUM CHLORIDE 10 MEQ: 750 TABLET, EXTENDED RELEASE ORAL at 09:28

## 2025-03-09 RX ADMIN — NYSTATIN 500000 UNITS: 100000 SUSPENSION ORAL at 12:50

## 2025-03-09 RX ADMIN — GUAIFENESIN 400 MG: 400 TABLET ORAL at 20:46

## 2025-03-09 RX ADMIN — GABAPENTIN 300 MG: 300 CAPSULE ORAL at 09:28

## 2025-03-09 RX ADMIN — METOPROLOL SUCCINATE 25 MG: 25 TABLET, EXTENDED RELEASE ORAL at 09:28

## 2025-03-09 RX ADMIN — NYSTATIN 500000 UNITS: 100000 SUSPENSION ORAL at 09:28

## 2025-03-09 RX ADMIN — ARFORMOTEROL TARTRATE 15 MCG: 15 SOLUTION RESPIRATORY (INHALATION) at 08:54

## 2025-03-09 RX ADMIN — IPRATROPIUM BROMIDE AND ALBUTEROL SULFATE 1 DOSE: .5; 2.5 SOLUTION RESPIRATORY (INHALATION) at 08:54

## 2025-03-09 RX ADMIN — ASPIRIN 81 MG: 81 TABLET, COATED ORAL at 09:28

## 2025-03-09 RX ADMIN — GABAPENTIN 300 MG: 300 CAPSULE ORAL at 20:46

## 2025-03-09 RX ADMIN — GUAIFENESIN 400 MG: 400 TABLET ORAL at 12:50

## 2025-03-09 RX ADMIN — DOXYCYCLINE HYCLATE 100 MG: 100 CAPSULE ORAL at 09:28

## 2025-03-09 RX ADMIN — ARFORMOTEROL TARTRATE 15 MCG: 15 SOLUTION RESPIRATORY (INHALATION) at 19:42

## 2025-03-09 RX ADMIN — SODIUM CHLORIDE, PRESERVATIVE FREE 10 ML: 5 INJECTION INTRAVENOUS at 09:29

## 2025-03-09 RX ADMIN — IPRATROPIUM BROMIDE AND ALBUTEROL SULFATE 1 DOSE: .5; 2.5 SOLUTION RESPIRATORY (INHALATION) at 19:42

## 2025-03-09 RX ADMIN — WATER 1000 MG: 1 INJECTION INTRAMUSCULAR; INTRAVENOUS; SUBCUTANEOUS at 16:22

## 2025-03-09 RX ADMIN — IPRATROPIUM BROMIDE AND ALBUTEROL SULFATE 1 DOSE: .5; 2.5 SOLUTION RESPIRATORY (INHALATION) at 15:57

## 2025-03-09 RX ADMIN — IPRATROPIUM BROMIDE AND ALBUTEROL SULFATE 1 DOSE: .5; 2.5 SOLUTION RESPIRATORY (INHALATION) at 12:57

## 2025-03-09 RX ADMIN — PANTOPRAZOLE SODIUM 20 MG: 20 TABLET, DELAYED RELEASE ORAL at 09:28

## 2025-03-09 RX ADMIN — ATORVASTATIN CALCIUM 20 MG: 20 TABLET, FILM COATED ORAL at 20:47

## 2025-03-09 NOTE — PLAN OF CARE
Problem: Discharge Planning  Goal: Discharge to home or other facility with appropriate resources  3/9/2025 1017 by Viry Plasencia RN  Outcome: Progressing     Problem: ABCDS Injury Assessment  Goal: Absence of physical injury  3/9/2025 1017 by Viry Plasencia RN  Outcome: Progressing     Problem: Safety - Adult  Goal: Free from fall injury  3/9/2025 1017 by Viry Plasencia RN  Outcome: Progressing     Problem: Chronic Conditions and Co-morbidities  Goal: Patient's chronic conditions and co-morbidity symptoms are monitored and maintained or improved  3/9/2025 1017 by Viry Plasencia RN  Outcome: Progressing     Problem: Skin/Tissue Integrity  Goal: Skin integrity remains intact  Description: 1.  Monitor for areas of redness and/or skin breakdown  2.  Assess vascular access sites hourly  3.  Every 4-6 hours minimum:  Change oxygen saturation probe site  4.  Every 4-6 hours:  If on nasal continuous positive airway pressure, respiratory therapy assess nares and determine need for appliance change or resting period  3/9/2025 1017 by Viry Plasencia RN  Outcome: Progressing

## 2025-03-09 NOTE — PLAN OF CARE
Problem: Discharge Planning  Goal: Discharge to home or other facility with appropriate resources  Outcome: Progressing     Problem: ABCDS Injury Assessment  Goal: Absence of physical injury  Outcome: Progressing     Problem: Safety - Adult  Goal: Free from fall injury  Outcome: Progressing     Problem: Chronic Conditions and Co-morbidities  Goal: Patient's chronic conditions and co-morbidity symptoms are monitored and maintained or improved  Outcome: Progressing     Problem: Skin/Tissue Integrity  Goal: Skin integrity remains intact  Description: 1.  Monitor for areas of redness and/or skin breakdown  2.  Assess vascular access sites hourly  3.  Every 4-6 hours minimum:  Change oxygen saturation probe site  4.  Every 4-6 hours:  If on nasal continuous positive airway pressure, respiratory therapy assess nares and determine need for appliance change or resting period  Outcome: Progressing

## 2025-03-10 LAB
ACB COMPLEX DNA BLD POS QL NAA+NON-PROBE: NOT DETECTED
ALBUMIN SERPL-MCNC: 3 G/DL (ref 3.5–5.2)
ALP SERPL-CCNC: 110 U/L (ref 35–104)
ALT SERPL-CCNC: 59 U/L (ref 0–32)
ANION GAP SERPL CALCULATED.3IONS-SCNC: 12 MMOL/L (ref 7–16)
AST SERPL-CCNC: 27 U/L (ref 0–31)
B FRAGILIS DNA BLD POS QL NAA+NON-PROBE: NOT DETECTED
BILIRUB SERPL-MCNC: 0.5 MG/DL (ref 0–1.2)
BIOFIRE TEST COMMENT: ABNORMAL
BUN SERPL-MCNC: 18 MG/DL (ref 6–23)
C ALBICANS DNA BLD POS QL NAA+NON-PROBE: NOT DETECTED
C AURIS DNA BLD POS QL NAA+NON-PROBE: NOT DETECTED
C GATTII+NEOFOR DNA BLD POS QL NAA+N-PRB: NOT DETECTED
C GLABRATA DNA BLD POS QL NAA+NON-PROBE: NOT DETECTED
C KRUSEI DNA BLD POS QL NAA+NON-PROBE: NOT DETECTED
C PARAP DNA BLD POS QL NAA+NON-PROBE: NOT DETECTED
C TROPICLS DNA BLD POS QL NAA+NON-PROBE: NOT DETECTED
CALCIUM SERPL-MCNC: 9.2 MG/DL (ref 8.6–10.2)
CHLORIDE SERPL-SCNC: 104 MMOL/L (ref 98–107)
CO2 SERPL-SCNC: 25 MMOL/L (ref 22–29)
CORTIS SERPL-MCNC: 12.8 UG/DL (ref 2.7–18.4)
CREAT SERPL-MCNC: 0.8 MG/DL (ref 0.5–1)
E CLOAC COMP DNA BLD POS NAA+NON-PROBE: NOT DETECTED
E COLI DNA BLD POS QL NAA+NON-PROBE: NOT DETECTED
E FAECALIS DNA BLD POS QL NAA+NON-PROBE: NOT DETECTED
E FAECIUM DNA BLD POS QL NAA+NON-PROBE: NOT DETECTED
ENTEROBACTERALES DNA BLD POS NAA+N-PRB: NOT DETECTED
ERYTHROCYTE [DISTWIDTH] IN BLOOD BY AUTOMATED COUNT: 13.4 % (ref 11.5–15)
GFR, ESTIMATED: 77 ML/MIN/1.73M2
GLUCOSE SERPL-MCNC: 99 MG/DL (ref 74–99)
GP B STREP DNA BLD POS QL NAA+NON-PROBE: NOT DETECTED
HAEM INFLU DNA BLD POS QL NAA+NON-PROBE: NOT DETECTED
HCT VFR BLD AUTO: 33.8 % (ref 34–48)
HGB BLD-MCNC: 10.8 G/DL (ref 11.5–15.5)
K OXYTOCA DNA BLD POS QL NAA+NON-PROBE: NOT DETECTED
KLEBSIELLA SP DNA BLD POS QL NAA+NON-PRB: NOT DETECTED
KLEBSIELLA SP DNA BLD POS QL NAA+NON-PRB: NOT DETECTED
L MONOCYTOG DNA BLD POS QL NAA+NON-PROBE: NOT DETECTED
MAGNESIUM SERPL-MCNC: 1.3 MG/DL (ref 1.6–2.6)
MCH RBC QN AUTO: 28.6 PG (ref 26–35)
MCHC RBC AUTO-ENTMCNC: 32 G/DL (ref 32–34.5)
MCV RBC AUTO: 89.4 FL (ref 80–99.9)
MECA+MECC ISLT/SPM QL: NOT DETECTED
MICROORGANISM SPEC CULT: ABNORMAL
MICROORGANISM SPEC CULT: ABNORMAL
MICROORGANISM/AGENT SPEC: ABNORMAL
MICROORGANISM/AGENT SPEC: ABNORMAL
N MEN DNA BLD POS QL NAA+NON-PROBE: NOT DETECTED
P AERUGINOSA DNA BLD POS NAA+NON-PROBE: NOT DETECTED
PHOSPHATE SERPL-MCNC: 3.7 MG/DL (ref 2.5–4.5)
PLATELET # BLD AUTO: 418 K/UL (ref 130–450)
PMV BLD AUTO: 9 FL (ref 7–12)
POTASSIUM SERPL-SCNC: 4 MMOL/L (ref 3.5–5)
PROT SERPL-MCNC: 6.6 G/DL (ref 6.4–8.3)
PROTEUS SP DNA BLD POS QL NAA+NON-PROBE: NOT DETECTED
RBC # BLD AUTO: 3.78 M/UL (ref 3.5–5.5)
S AUREUS DNA BLD POS QL NAA+NON-PROBE: NOT DETECTED
S AUREUS+CONS DNA BLD POS NAA+NON-PROBE: DETECTED
S EPIDERMIDIS DNA BLD POS QL NAA+NON-PRB: DETECTED
S LUGDUNENSIS DNA BLD POS QL NAA+NON-PRB: NOT DETECTED
S MALTOPHILIA DNA BLD POS QL NAA+NON-PRB: NOT DETECTED
S MARCESCENS DNA BLD POS NAA+NON-PROBE: NOT DETECTED
S PNEUM DNA BLD POS QL NAA+NON-PROBE: NOT DETECTED
S PYO DNA BLD POS QL NAA+NON-PROBE: NOT DETECTED
SALMONELLA DNA BLD POS QL NAA+NON-PROBE: NOT DETECTED
SERVICE CMNT-IMP: ABNORMAL
SERVICE CMNT-IMP: ABNORMAL
SODIUM SERPL-SCNC: 141 MMOL/L (ref 132–146)
SPECIMEN DESCRIPTION: ABNORMAL
SPECIMEN DESCRIPTION: ABNORMAL
STREPTOCOCCUS DNA BLD POS NAA+NON-PROBE: NOT DETECTED
WBC OTHER # BLD: 6 K/UL (ref 4.5–11.5)

## 2025-03-10 PROCEDURE — 6370000000 HC RX 637 (ALT 250 FOR IP)

## 2025-03-10 PROCEDURE — 2700000000 HC OXYGEN THERAPY PER DAY

## 2025-03-10 PROCEDURE — 84100 ASSAY OF PHOSPHORUS: CPT

## 2025-03-10 PROCEDURE — 6360000002 HC RX W HCPCS

## 2025-03-10 PROCEDURE — 94640 AIRWAY INHALATION TREATMENT: CPT

## 2025-03-10 PROCEDURE — 99231 SBSQ HOSP IP/OBS SF/LOW 25: CPT | Performed by: FAMILY MEDICINE

## 2025-03-10 PROCEDURE — 83735 ASSAY OF MAGNESIUM: CPT

## 2025-03-10 PROCEDURE — 97161 PT EVAL LOW COMPLEX 20 MIN: CPT

## 2025-03-10 PROCEDURE — 97165 OT EVAL LOW COMPLEX 30 MIN: CPT

## 2025-03-10 PROCEDURE — 82533 TOTAL CORTISOL: CPT

## 2025-03-10 PROCEDURE — 80053 COMPREHEN METABOLIC PANEL: CPT

## 2025-03-10 PROCEDURE — 36415 COLL VENOUS BLD VENIPUNCTURE: CPT

## 2025-03-10 PROCEDURE — 2500000003 HC RX 250 WO HCPCS

## 2025-03-10 PROCEDURE — 85027 COMPLETE CBC AUTOMATED: CPT

## 2025-03-10 PROCEDURE — 2060000000 HC ICU INTERMEDIATE R&B

## 2025-03-10 RX ORDER — ATORVASTATIN CALCIUM 20 MG/1
20 TABLET, FILM COATED ORAL 2 TIMES DAILY
Status: DISCONTINUED | OUTPATIENT
Start: 2025-03-10 | End: 2025-03-11 | Stop reason: HOSPADM

## 2025-03-10 RX ORDER — MAGNESIUM SULFATE IN WATER 40 MG/ML
2000 INJECTION, SOLUTION INTRAVENOUS ONCE
Status: COMPLETED | OUTPATIENT
Start: 2025-03-10 | End: 2025-03-10

## 2025-03-10 RX ORDER — ATORVASTATIN CALCIUM 40 MG/1
40 TABLET, FILM COATED ORAL DAILY
Status: DISCONTINUED | OUTPATIENT
Start: 2025-03-11 | End: 2025-03-10

## 2025-03-10 RX ADMIN — NYSTATIN 500000 UNITS: 100000 SUSPENSION ORAL at 12:12

## 2025-03-10 RX ADMIN — NYSTATIN 500000 UNITS: 100000 SUSPENSION ORAL at 08:59

## 2025-03-10 RX ADMIN — POTASSIUM CHLORIDE 10 MEQ: 750 TABLET, EXTENDED RELEASE ORAL at 08:58

## 2025-03-10 RX ADMIN — DOXYCYCLINE HYCLATE 100 MG: 100 CAPSULE ORAL at 08:58

## 2025-03-10 RX ADMIN — ARFORMOTEROL TARTRATE 15 MCG: 15 SOLUTION RESPIRATORY (INHALATION) at 21:28

## 2025-03-10 RX ADMIN — GABAPENTIN 300 MG: 300 CAPSULE ORAL at 20:20

## 2025-03-10 RX ADMIN — PANTOPRAZOLE SODIUM 20 MG: 20 TABLET, DELAYED RELEASE ORAL at 08:57

## 2025-03-10 RX ADMIN — ASPIRIN 81 MG: 81 TABLET, COATED ORAL at 08:57

## 2025-03-10 RX ADMIN — GUAIFENESIN 400 MG: 400 TABLET ORAL at 20:20

## 2025-03-10 RX ADMIN — ATORVASTATIN CALCIUM 20 MG: 20 TABLET, FILM COATED ORAL at 20:20

## 2025-03-10 RX ADMIN — IPRATROPIUM BROMIDE AND ALBUTEROL SULFATE 1 DOSE: .5; 2.5 SOLUTION RESPIRATORY (INHALATION) at 21:28

## 2025-03-10 RX ADMIN — NYSTATIN 500000 UNITS: 100000 SUSPENSION ORAL at 20:20

## 2025-03-10 RX ADMIN — SODIUM CHLORIDE, PRESERVATIVE FREE 10 ML: 5 INJECTION INTRAVENOUS at 20:21

## 2025-03-10 RX ADMIN — SODIUM CHLORIDE, PRESERVATIVE FREE 10 ML: 5 INJECTION INTRAVENOUS at 08:59

## 2025-03-10 RX ADMIN — MAGNESIUM SULFATE HEPTAHYDRATE 2000 MG: 40 INJECTION, SOLUTION INTRAVENOUS at 09:02

## 2025-03-10 RX ADMIN — WATER 1000 MG: 1 INJECTION INTRAMUSCULAR; INTRAVENOUS; SUBCUTANEOUS at 15:31

## 2025-03-10 RX ADMIN — DOXYCYCLINE HYCLATE 100 MG: 100 CAPSULE ORAL at 20:20

## 2025-03-10 RX ADMIN — GABAPENTIN 300 MG: 300 CAPSULE ORAL at 08:58

## 2025-03-10 RX ADMIN — ARFORMOTEROL TARTRATE 15 MCG: 15 SOLUTION RESPIRATORY (INHALATION) at 08:27

## 2025-03-10 RX ADMIN — METOPROLOL SUCCINATE 25 MG: 25 TABLET, EXTENDED RELEASE ORAL at 08:58

## 2025-03-10 RX ADMIN — IPRATROPIUM BROMIDE AND ALBUTEROL SULFATE 1 DOSE: .5; 2.5 SOLUTION RESPIRATORY (INHALATION) at 11:52

## 2025-03-10 RX ADMIN — ATORVASTATIN CALCIUM 20 MG: 20 TABLET, FILM COATED ORAL at 08:58

## 2025-03-10 RX ADMIN — IPRATROPIUM BROMIDE AND ALBUTEROL SULFATE 1 DOSE: .5; 2.5 SOLUTION RESPIRATORY (INHALATION) at 08:26

## 2025-03-10 RX ADMIN — NYSTATIN 500000 UNITS: 100000 SUSPENSION ORAL at 17:37

## 2025-03-10 RX ADMIN — IPRATROPIUM BROMIDE AND ALBUTEROL SULFATE 1 DOSE: .5; 2.5 SOLUTION RESPIRATORY (INHALATION) at 17:00

## 2025-03-10 RX ADMIN — GUAIFENESIN 400 MG: 400 TABLET ORAL at 08:57

## 2025-03-10 ASSESSMENT — PAIN SCALES - GENERAL: PAINLEVEL_OUTOF10: 0

## 2025-03-10 NOTE — CONSULTS
Nutrition Assessment    Type and Reason for Visit:  Initial, Consult    Nutrition Recommendations/Plan:   Continue current diet & inpatient monitoring     Nutrition Assessment:    Pt admits w/ sepsis 2/2 CAP, acute COPD exacerbation & ORLIN- resolved. Hx CVA. Consult for ONS, meal intakes appear good at this time, will continue current diet & inpatient monitoring.    Nutrition Related Findings:    A&O X4, -2.2L, BLE +2 edema, abd rounded, +BS, constipation (prn med), oral thrush, Mg 1.3   Wound Type: None       Current Nutrition Intake & Therapies:    Average Meal Intake: 51-75%, %  Average Supplements Intake: None Ordered  ADULT DIET; Regular    Anthropometric Measures:  Height: 165.1 cm (5' 5\")  Ideal Body Weight (IBW): 125 lbs (57 kg)    Admission Body Weight: 107.2 kg (236 lb 6.4 oz) (3/10 bed)  Current Body Weight: 107.2 kg (236 lb 6.4 oz) (3/10), 189.1 % IBW. Weight Source: Bed scale  Current BMI (kg/m2): 39.3  Usual Body Weight: 101 kg (222 lb 11 oz) (3/2024 per EMR, 11/2024 237#)     % Weight Change (Calculated): 6.2                    BMI Categories: Obese Class 2 (BMI 35.0 -39.9)    Nutrition Diagnosis:   No nutrition diagnosis at this time     Nutrition Interventions:   Food and/or Nutrient Delivery: Continue Current Diet  Nutrition Education/Counseling: No recommendation at this time  Coordination of Nutrition Care: Continue to monitor while inpatient       Goals:  Goals: PO intake 75% or greater, by next RD assessment          Nutrition Monitoring and Evaluation:      Food/Nutrient Intake Outcomes: Food and Nutrient Intake  Physical Signs/Symptoms Outcomes: Biochemical Data, Constipation, GI Status, Fluid Status or Edema, Nutrition Focused Physical Findings, Skin, Weight    Discharge Planning:    Continue current diet     Miranda D'Amico, RD, LD  Contact: [phone]    
12/09/2022    COLONOSCOPY POLYPECTOMY SNARE/COLD BIOPSY performed by Isaías Mello MD at [facility] ENDOSCOPY    COLONOSCOPY W/ BIOPSIES  12/09/2022    polyp/cecal mass; diverticula--frank    DILATION AND CURETTAGE OF UTERUS      IR CAROTID STENT W PROTECTION  03/01/2023    IR CAROTID STENT UNI W PROTECTION 3/1/2023 MD MARÍA Murrell [facility]    IR OCCLUSION/EMBOLIZATION PERC CNS  03/01/2023    IR OCCLUSIVE OR EMBOL PERC CENTL NERV SYS 3/1/2023 MD MARÍA Murrell [facility]    IR VASC EMBOLIZE OCCLUDE ARTERY  03/01/2023    IR VASC EMBOLIZE OCCLUDE ARTERY 3/1/2023 MD MARÍA Murrell [facility]    JOINT REPLACEMENT      total hip replacement left and right    LAPAROTOMY N/A 12/09/2022    LAPAROTOMY EXPLORATORY, ILIOCECECTOMY performed by Isaías Mello MD at [facility] OR    OTHER SURGICAL HISTORY      FOR CANCER CELLS- DONE AT [facility]    THORACENTESIS Right 12/13/2022    400 kathy colored    TONSILLECTOMY      TOTAL HIP ARTHROPLASTY Left 02/22/2021    LEFT HIP TOTAL ARTHROPLASTY performed by Phani Gilbert MD at [facility] OR    TOTAL HIP ARTHROPLASTY Right 01/10/2022    RIGHT TOTAL HIP ARTHROPLASTY - STYKER performed by Phani Gilbert MD at [facility] OR    UPPER GASTROINTESTINAL ENDOSCOPY  08/30/2019    gastritis with superficial ulcerations; duodenitis--frank    UPPER GASTROINTESTINAL ENDOSCOPY N/A 08/30/2019    EGD BIOPSY performed by Isaías Mello MD at [facility] ENDOSCOPY    VAGINA SURGERY N/A 5/1/2024    POSTERIOR REPAIR performed by Freddie Wright DO at [facility] OR       Family History   Problem Relation Age of Onset    Diabetes Mother     Arthritis Mother     Atrial Fibrillation Mother     Diabetes Father     Atrial Fibrillation Father     Arthritis Father     Emphysema Father     Cancer Sister         \"female\"        reports that she quit smoking about 6 years ago. Her smoking use included cigarettes. She started smoking about 49 years ago. She has a

## 2025-03-10 NOTE — PLAN OF CARE
Problem: Discharge Planning  Goal: Discharge to home or other facility with appropriate resources  3/9/2025 2214 by Yareli Laws RN  Outcome: Progressing  3/9/2025 1017 by Viry Plasencia RN  Outcome: Progressing     Problem: ABCDS Injury Assessment  Goal: Absence of physical injury  3/9/2025 2214 by Yareli Laws RN  Outcome: Progressing  3/9/2025 1017 by Viry Plasencia RN  Outcome: Progressing     Problem: Safety - Adult  Goal: Free from fall injury  3/9/2025 2214 by Yareli Laws RN  Outcome: Progressing  3/9/2025 1017 by Viry Plasencia RN  Outcome: Progressing     Problem: Chronic Conditions and Co-morbidities  Goal: Patient's chronic conditions and co-morbidity symptoms are monitored and maintained or improved  3/9/2025 2214 by Yareli Laws RN  Outcome: Progressing  3/9/2025 1017 by Viry Plasencia RN  Outcome: Progressing     Problem: Skin/Tissue Integrity  Goal: Skin integrity remains intact  Description: 1.  Monitor for areas of redness and/or skin breakdown  2.  Assess vascular access sites hourly  3.  Every 4-6 hours minimum:  Change oxygen saturation probe site  4.  Every 4-6 hours:  If on nasal continuous positive airway pressure, respiratory therapy assess nares and determine need for appliance change or resting period  3/9/2025 2214 by Yareli Laws RN  Outcome: Progressing  3/9/2025 1017 by Viry Plasencia RN  Outcome: Progressing

## 2025-03-11 VITALS
HEART RATE: 88 BPM | BODY MASS INDEX: 39.39 KG/M2 | DIASTOLIC BLOOD PRESSURE: 62 MMHG | WEIGHT: 236.4 LBS | HEIGHT: 65 IN | TEMPERATURE: 98.1 F | SYSTOLIC BLOOD PRESSURE: 110 MMHG | RESPIRATION RATE: 18 BRPM | OXYGEN SATURATION: 91 %

## 2025-03-11 PROBLEM — N17.9 AKI (ACUTE KIDNEY INJURY): Status: RESOLVED | Noted: 2025-03-06 | Resolved: 2025-03-11

## 2025-03-11 PROBLEM — R06.09 DOE (DYSPNEA ON EXERTION): Chronic | Status: RESOLVED | Noted: 2022-10-13 | Resolved: 2025-03-11

## 2025-03-11 PROBLEM — R79.89 ELEVATED D-DIMER: Status: RESOLVED | Noted: 2025-03-06 | Resolved: 2025-03-11

## 2025-03-11 PROBLEM — R79.89 LFT ELEVATION: Status: RESOLVED | Noted: 2022-12-16 | Resolved: 2025-03-11

## 2025-03-11 PROBLEM — J18.9 PNEUMONIA OF BOTH LUNGS DUE TO INFECTIOUS ORGANISM: Status: RESOLVED | Noted: 2025-03-07 | Resolved: 2025-03-11

## 2025-03-11 PROBLEM — A41.9 SEPSIS (HCC): Status: RESOLVED | Noted: 2025-03-06 | Resolved: 2025-03-11

## 2025-03-11 LAB
ALBUMIN SERPL-MCNC: 3 G/DL (ref 3.5–5.2)
ALP SERPL-CCNC: 105 U/L (ref 35–104)
ALT SERPL-CCNC: 48 U/L (ref 0–32)
ANION GAP SERPL CALCULATED.3IONS-SCNC: 11 MMOL/L (ref 7–16)
AST SERPL-CCNC: 20 U/L (ref 0–31)
BILIRUB SERPL-MCNC: 0.5 MG/DL (ref 0–1.2)
BUN SERPL-MCNC: 18 MG/DL (ref 6–23)
CALCIUM SERPL-MCNC: 9.2 MG/DL (ref 8.6–10.2)
CHLORIDE SERPL-SCNC: 102 MMOL/L (ref 98–107)
CO2 SERPL-SCNC: 25 MMOL/L (ref 22–29)
CORTIS SERPL-MCNC: 19.3 UG/DL (ref 2.7–18.4)
CREAT SERPL-MCNC: 0.9 MG/DL (ref 0.5–1)
ERYTHROCYTE [DISTWIDTH] IN BLOOD BY AUTOMATED COUNT: 13.6 % (ref 11.5–15)
GFR, ESTIMATED: 74 ML/MIN/1.73M2
GLUCOSE SERPL-MCNC: 91 MG/DL (ref 74–99)
HCT VFR BLD AUTO: 34.2 % (ref 34–48)
HGB BLD-MCNC: 10.9 G/DL (ref 11.5–15.5)
MAGNESIUM SERPL-MCNC: 1.6 MG/DL (ref 1.6–2.6)
MCH RBC QN AUTO: 28.8 PG (ref 26–35)
MCHC RBC AUTO-ENTMCNC: 31.9 G/DL (ref 32–34.5)
MCV RBC AUTO: 90.5 FL (ref 80–99.9)
PHOSPHATE SERPL-MCNC: 3.9 MG/DL (ref 2.5–4.5)
PLATELET # BLD AUTO: 442 K/UL (ref 130–450)
PMV BLD AUTO: 9.1 FL (ref 7–12)
POTASSIUM SERPL-SCNC: 4.1 MMOL/L (ref 3.5–5)
PROT SERPL-MCNC: 6.5 G/DL (ref 6.4–8.3)
RBC # BLD AUTO: 3.78 M/UL (ref 3.5–5.5)
SODIUM SERPL-SCNC: 138 MMOL/L (ref 132–146)
WBC OTHER # BLD: 6.4 K/UL (ref 4.5–11.5)

## 2025-03-11 PROCEDURE — 6370000000 HC RX 637 (ALT 250 FOR IP)

## 2025-03-11 PROCEDURE — 84100 ASSAY OF PHOSPHORUS: CPT

## 2025-03-11 PROCEDURE — 82533 TOTAL CORTISOL: CPT

## 2025-03-11 PROCEDURE — 6360000002 HC RX W HCPCS

## 2025-03-11 PROCEDURE — 99238 HOSP IP/OBS DSCHRG MGMT 30/<: CPT | Performed by: FAMILY MEDICINE

## 2025-03-11 PROCEDURE — 94640 AIRWAY INHALATION TREATMENT: CPT

## 2025-03-11 PROCEDURE — 80053 COMPREHEN METABOLIC PANEL: CPT

## 2025-03-11 PROCEDURE — 2700000000 HC OXYGEN THERAPY PER DAY

## 2025-03-11 PROCEDURE — 2500000003 HC RX 250 WO HCPCS

## 2025-03-11 PROCEDURE — 83735 ASSAY OF MAGNESIUM: CPT

## 2025-03-11 PROCEDURE — 85027 COMPLETE CBC AUTOMATED: CPT

## 2025-03-11 RX ORDER — GUAIFENESIN 400 MG/1
400 TABLET ORAL 2 TIMES DAILY PRN
Qty: 30 TABLET | Refills: 0 | Status: SHIPPED | OUTPATIENT
Start: 2025-03-11 | End: 2025-03-26

## 2025-03-11 RX ORDER — ATORVASTATIN CALCIUM 20 MG/1
20 TABLET, FILM COATED ORAL 2 TIMES DAILY
Qty: 60 TABLET | Refills: 0 | Status: SHIPPED | OUTPATIENT
Start: 2025-03-11 | End: 2025-04-10

## 2025-03-11 RX ORDER — MAGNESIUM SULFATE IN WATER 40 MG/ML
2000 INJECTION, SOLUTION INTRAVENOUS ONCE
Status: COMPLETED | OUTPATIENT
Start: 2025-03-11 | End: 2025-03-11

## 2025-03-11 RX ORDER — MAGNESIUM OXIDE 400 MG/1
400 TABLET ORAL DAILY
Qty: 30 TABLET | Refills: 0 | Status: SHIPPED | OUTPATIENT
Start: 2025-03-11

## 2025-03-11 RX ADMIN — MAGNESIUM SULFATE HEPTAHYDRATE 2000 MG: 40 INJECTION, SOLUTION INTRAVENOUS at 12:02

## 2025-03-11 RX ADMIN — SODIUM CHLORIDE, PRESERVATIVE FREE 10 ML: 5 INJECTION INTRAVENOUS at 10:14

## 2025-03-11 RX ADMIN — IPRATROPIUM BROMIDE AND ALBUTEROL SULFATE 1 DOSE: .5; 2.5 SOLUTION RESPIRATORY (INHALATION) at 12:12

## 2025-03-11 RX ADMIN — ARFORMOTEROL TARTRATE 15 MCG: 15 SOLUTION RESPIRATORY (INHALATION) at 07:54

## 2025-03-11 RX ADMIN — POTASSIUM CHLORIDE 10 MEQ: 750 TABLET, EXTENDED RELEASE ORAL at 10:14

## 2025-03-11 RX ADMIN — GUAIFENESIN 400 MG: 400 TABLET ORAL at 10:14

## 2025-03-11 RX ADMIN — METOPROLOL SUCCINATE 25 MG: 25 TABLET, EXTENDED RELEASE ORAL at 10:14

## 2025-03-11 RX ADMIN — GABAPENTIN 300 MG: 300 CAPSULE ORAL at 10:14

## 2025-03-11 RX ADMIN — PANTOPRAZOLE SODIUM 20 MG: 20 TABLET, DELAYED RELEASE ORAL at 10:14

## 2025-03-11 RX ADMIN — ATORVASTATIN CALCIUM 20 MG: 20 TABLET, FILM COATED ORAL at 10:14

## 2025-03-11 RX ADMIN — IPRATROPIUM BROMIDE AND ALBUTEROL SULFATE 1 DOSE: .5; 2.5 SOLUTION RESPIRATORY (INHALATION) at 07:54

## 2025-03-11 RX ADMIN — NYSTATIN 500000 UNITS: 100000 SUSPENSION ORAL at 12:05

## 2025-03-11 RX ADMIN — NYSTATIN 500000 UNITS: 100000 SUSPENSION ORAL at 10:14

## 2025-03-11 RX ADMIN — ASPIRIN 81 MG: 81 TABLET, COATED ORAL at 10:14

## 2025-03-11 ASSESSMENT — PAIN SCALES - GENERAL
PAINLEVEL_OUTOF10: 0
PAINLEVEL_OUTOF10: 0

## 2025-03-11 NOTE — DISCHARGE SUMMARY
[facility]  Discharge Summary      Patient ID:  Ruth Anguiano  [id number]  64 y.o.  1960    Admit date: 3/6/2025    Discharge date and time: 3/11/25    Location of discharge: [facility]     Admitting Physician: Yuniel Hernandez MD     Discharge Physician: Natalia Ko MD    Consults: nephrology    Admission Diagnoses: ORLIN (acute kidney injury) [N17.9]  Sepsis (HCC) [A41.9]  Pneumonia of both lungs due to infectious organism, unspecified part of lung [J18.9]    Discharge Diagnoses: Principal Problem (Resolved):    Sepsis (HCC)  Active Problems:    (HFpEF) heart failure with preserved ejection fraction (HCC)    Cerebral aneurysm    Obesity (BMI 30-39.9)    Hypertension    Chronic pain syndrome    Heartburn    Other emphysema (HCC)    Rectocele    Cystocele with prolapse    Physical deconditioning  Resolved Problems:    BELCHER (dyspnea on exertion)    LFT elevation    ORLIN (acute kidney injury)    Elevated d-dimer    Pneumonia of both lungs due to infectious organism      Hospital Course:   Ruth Anguiano  is a 64 y.o. female patient of Hien Stokes MD  with a pertinent PMHx of A-fib, hypertension, brain aneurysm, subarachnoid bleed, CVA, DVT, varicose veins, COPD who presented to the ER from home/PCP office  with chief complaint of symptomatic hypotension.   Imaging done in the ER was suspicious for pneumonia, hence patient was admitted to the floors and was started on Rocephin and doxycycline.  Her home blood pressure medication were held in view of hypotension.  Workup was also significant for ORLIN stage III with a creatinine of 3.2. Nephrology was consulted and their recommendations were followed.  Patient was requiring 2 L of oxygen through nasal cannula, multiple attempts were made to wean it down however she could not maintain her oxygen saturation above 88% on room air.  Her creatinine improved during the course of her stay.  She completed 5 days

## 2025-03-11 NOTE — CARE COORDINATION
Social Work/Discharge Planning:  Met with patient and completed assessment.  Explained Social Work role and discussed transition of care/discharge planning.  Patient lives with her son Ryder in a one story house.  PTA she is independent with no adaptive device.  She has a cane, wheeled walker, nebulizer and a shower chair.  She has a history at [facility].  She has a home health care history with [facility] and [facility].  Reviewed therapy notes reflecting need for rehab and she prefers to return home with [facility] home health care.  Referral made to Donya with [facility] and she will review patient information.  Will continue to follow.  Electronically signed by SUZANNE Echavarria on 3/11/2025 at 3:09 PM    Addendum:  [facility] accepted.  Electronically signed by SUZANNE Echavarria on 3/11/2025 at 3:27 PM

## 2025-03-11 NOTE — PROGRESS NOTES
[facility]    Chief complaint :  Chief Complaint   Patient presents with    Hypotension     Was at doctor for annual check up, sent in for BP 60's/40's    Cough    Fatigue     X 1 week        Subjective:    Patient seen and examined at bedside this morning.  States feeling fine at this time, denies having any chest pain, abdominal pain, nausea, vomiting, fever or chills.  She has not been out of bed and has not walked around much yesterday.  She still feels tired and weak however. States that her breathing is getting better.    Per nursing patient is still requiring 2 L of oxygen since she cannot maintain her oxygen saturation above 94% on her own.    ROS: As stated above    Past medical, surgical, family and social history were reviewed, non-contributory, and unchanged unless otherwise stated.    Objective:  /66   Pulse 86   Temp 97.2 °F (36.2 °C) (Temporal)   Resp 20   Ht 1.651 m (5' 5\")   Wt 103 kg (227 lb)   SpO2 94%   BMI 37.77 kg/m²     Physical Exam  Constitutional:       General: She is not in acute distress.     Appearance: She is obese. She is not ill-appearing.      Comments: On 2 L of oxygen through nasal cannula   HENT:      Head: Normocephalic and atraumatic.   Eyes:      Extraocular Movements: Extraocular movements intact.   Cardiovascular:      Rate and Rhythm: Normal rate and regular rhythm.      Heart sounds: No murmur heard.  Pulmonary:      Effort: Pulmonary effort is normal. No respiratory distress.      Breath sounds: No wheezing, rhonchi or rales.   Neurological:      Mental Status: She is alert.            Labs:    Complete Blood Count:   Recent Labs     03/07/25  0559 03/08/25  0950 03/09/25  0546   WBC 6.6 7.4 6.0   HGB 10.6* 10.4* 10.3*   HCT 32.0* 32.8* 32.5*    400 395        Last 3 Blood Glucose:   Recent Labs     03/07/25  0559 03/08/25  0950 03/09/25  0546   GLUCOSE 139* 129* 100*        PT/INR:    Lab 
    [facility]  Resident [facility] Note    Chief complaint :  Chief Complaint   Patient presents with    Hypotension     Was at doctor for annual check up, sent in for BP 60's/40's    Cough    Fatigue     X 1 week        O/N events: SpO2 desaturated overnight requiring supplemental oxygen on 2.5 liters this morning    Subjective:    Patient seen and evaluated at bedside and appears in no acute distress.  Patient states mild improvement to her shortness of breath.  Still complaining of a cough having a difficult time coughing up phlegm.  Also stated 2 episodes of dizziness described as the room spinning, states this is new for her.  No chest pain abdominal pain nausea vomiting diarrhea urinating into external catheter    ROS: As stated above    Past medical, surgical, family and social history were reviewed, non-contributory, and unchanged unless otherwise stated.    Objective:  /61   Pulse 79   Temp 97.7 °F (36.5 °C) (Oral)   Resp 21   Ht 1.651 m (5' 5\")   Wt 103 kg (227 lb)   SpO2 99%   BMI 37.77 kg/m²     Physical Exam  Constitutional:       General: She is not in acute distress.     Appearance: She is obese. She is not ill-appearing.   HENT:      Head: Normocephalic and atraumatic.   Eyes:      Extraocular Movements: Extraocular movements intact.   Cardiovascular:      Rate and Rhythm: Normal rate and regular rhythm.      Heart sounds: No murmur heard.  Pulmonary:      Effort: Pulmonary effort is normal. No respiratory distress.      Breath sounds: Wheezing and rales (at bases) present. No rhonchi.   Neurological:      Mental Status: She is alert.            Labs:    Complete Blood Count:   Recent Labs     03/06/25  1231 03/07/25  0559   WBC 12.0* 6.6   HGB 11.8 10.6*   HCT 36.3 32.0*    376        Last 3 Blood Glucose:   Recent Labs     03/06/25  1231   GLUCOSE 188*        PT/INR:    Lab Results   Component Value Date/Time    PROTIME 10.2 01/31/2024 08:30 AM    
    [facility]  Progress Note    Chief complaint :  Chief Complaint   Patient presents with    Hypotension     Was at doctor for annual check up, sent in for BP 60's/40's    Cough    Fatigue     X 1 week        Subjective:  Patient is being followed for ORLIN (acute kidney injury) [N17.9]  Sepsis (HCC) [A41.9]  Pneumonia of both lungs due to infectious organism, unspecified part of lung [J18.9]    No overnight problems. Patient describes feeling better.  she still has a cough however it is getting a lot better than yesterday and is saturating on 2 L of oxygen nasal cannula.    Patient denies chest pain, SOB,Bloody stools, fever, chills, changes in urination, changes in BM. Patient is tolerating diet.      Past medical, surgical, family and social history were reviewed, non-contributory, and unchanged unless otherwise stated.      Nursing Notes were all reviewed and agreed with or any disagreements were addressed in the HPI.      Objective:  /84   Pulse 94   Temp 97.7 °F (36.5 °C) (Oral)   Resp 18   Ht 1.651 m (5' 5\")   Wt 107.2 kg (236 lb 6.4 oz)   SpO2 94%   BMI 39.34 kg/m²     Physical Exam  Vitals reviewed.   Constitutional:       Appearance: Normal appearance. She is obese.      Comments: Crackles at lower base more prominent on the right side today.  Air movement improved   Cardiovascular:      Rate and Rhythm: Normal rate and regular rhythm.      Pulses: Normal pulses.      Heart sounds: Normal heart sounds. No murmur heard.     No gallop.   Pulmonary:      Effort: Pulmonary effort is normal. No respiratory distress.      Breath sounds: Normal breath sounds.   Abdominal:      General: Abdomen is flat. Bowel sounds are normal.   Skin:     General: Skin is warm.      Capillary Refill: Capillary refill takes less than 2 seconds.      Coloration: Skin is not jaundiced.   Neurological:      General: No focal deficit present.      Mental Status: She is alert. 
4 Eyes Skin Assessment     NAME:  Ruth Anguiano  YOB: 1960  MEDICAL RECORD NUMBER:  [id number]    The patient is being assessed for  Admission    I agree that at least one RN has performed a thorough Head to Toe Skin Assessment on the patient. ALL assessment sites listed below have been assessed.      Areas assessed by both nurses:    Head, Face, Ears, Shoulders, Back, Chest, Arms, Elbows, Hands, Sacrum. Buttock, Coccyx, Ischium, Legs. Feet and Heels, and Under Medical Devices         Does the Patient have a Wound? No noted wound(s)       Doni Prevention initiated by RN: Yes  Wound Care Orders initiated by RN: No    Pressure Injury (Stage 3,4, Unstageable, DTI, NWPT, and Complex wounds) if present, place Wound referral order by RN under : No    New Ostomies, if present place, Ostomy referral order under : No     Nurse 1 eSignature: Electronically signed by Christa Ramirez RN on 3/11/25 at 2:15 PM EDT    **SHARE this note so that the co-signing nurse can place an eSignature**    Nurse 2 eSignature: {Esignature:[id number]}  
4S phoned and notified pt to be transferred to room 448.  Pt story updated.  
CLINICAL PHARMACY NOTE: MEDS TO BEDS    Total # of Prescriptions Filled: 3   The following medications were delivered to the patient:  Guaifenesin 400  Magnesium 400  Atorvastatin 20    Additional Documentation:   
Current IV site leaking and unable to run IV fluids. Several attempts at a new site including clinical manager. Put in order for IV team to insert when they arive in the morning. Dr. Hernandez notified.  
Database initiated. Patient is A&O comes in from home with son. She uses a cane and a walker and is RA at baseline. She has fallen recently and is a NO RIGHT ARM d/t an old injury.   
Discharge instructions given. All question answered.   
Messaged Dr Hassan regarding blood cultures 1/4+ for gram waiting on ID   
Occupational Therapy  OCCUPATIONAL THERAPY INITIAL EVALUATION  [facility]  [address], [place]    Date: 3/10/2025     Patient Name: Ruth Anguiano  MRN: [id number]  : 1960  Room: [address] [place] [address]    Evaluating OT: Rhina Barfield, OTR/L - [id number]    Referring Provider: John Esquivel MD  Specific Provider Orders/Date: \"OT eval and treat\" - 3/9/2025    Diagnosis: ORLIN (acute kidney injury) [N17.9]  Sepsis (HCC) [A41.9]  Pneumonia of both lungs due to infectious organism, unspecified part of lung [J18.9]     Pertinent Medical History: a-fib, h/o brain aneurysm (x2), HTN, stroke, emphysema     Precautions: fall risk, chair alarm, O2 via nasal cannula    Assessment of Current Deficits:   [x] Functional mobility   [x] ADLs  [x] Strength               [x] Cognition   [x] Functional transfers   [x] IADLs         [x] Safety Awareness   [x] Endurance   [] Fine Motor Coordination  [x] Balance      [] Vision/Perception   [x] Sensation    [] Gross Motor Coordination [] ROM          [] Delirium                  [] Motor Control     OT PLAN OF CARE   OT POC is based on physician orders, patient diagnosis, and results of clinical assessment.  Frequency/Duration 2-5 days/week for 2-4 weeks PRN   Specific OT Treatment Interventions to Include:   * Instruction/training on adapted ADL techniques and AE recommendations to increase functional independence within precautions       * Training on energy conservation strategies, correct breathing pattern and techniques to improve independence/tolerance for self-care routine  * Functional transfer/mobility training/DME recommendations for increased independence, safety, and fall prevention  * Patient/Family education to increase follow through with safety techniques and functional independence  * Recommendation of environmental modifications for increased safety with functional transfers/mobility and ADLs  * Cognitive 
Physical Therapy  Facility/Department: [address] INTERMEDIATE 1  Physical Therapy Initial Assessment    Name: Ruth Anguiano  : 1960  MRN: [id number]  Date of Service: 3/10/2025        Patient Diagnosis(es): The primary encounter diagnosis was ORLIN (acute kidney injury). A diagnosis of Pneumonia of both lungs due to infectious organism, unspecified part of lung was also pertinent to this visit.  Past Medical History:  has a past medical history of A-fib (HCC), Acute deep vein thrombosis (DVT) of right peroneal vein (HCC), Brain aneurysm, Cerebral artery occlusion with cerebral infarction (HCC), Chronic deep vein thrombosis (DVT) of right peroneal vein (HCC), Degenerative arthritis of hand, Edentulous, Emphysema of lung (HCC), Headache, Hiatal hernia, Hypertension, Stroke (cerebrum) (HCC), Subarachnoid bleed (HCC), and Varicose veins of both lower extremities with pain.  Past Surgical History:  has a past surgical history that includes Tonsillectomy; Brain aneurysm surgery; other surgical history; Upper gastrointestinal endoscopy (2019); Colonoscopy (2019); Upper gastrointestinal endoscopy (N/A, 2019); Colonoscopy (N/A, 2019); Total hip arthroplasty (Left, 2021); Total hip arthroplasty (Right, 01/10/2022); joint replacement; Colonoscopy w/ biopsies (2022); laparotomy (N/A, 2022); thoracentesis (Right, 2022); Colonoscopy (N/A, 2022); IR CAROTID STENT W PROTECTION (2023); IR OCCLUSION/EMBOLIZATION PERC CNS (2023); IR VASC EMBOLIZE OCCLUDE ARTERY (2023); Dilation and curettage of uterus; and Vagina surgery (N/A, 2024).      Evaluating Therapist: Cristy Castellon PT    Room #:  0448/0448-A  Diagnosis:  ORLIN (acute kidney injury) [N17.9]  Sepsis (HCC) [A41.9]  Pneumonia of both lungs due to infectious organism, unspecified part of lung [J18.9]  PMHx/PSHx:  CVA, HTN, Subarachnoid bleed  Precautions:  falls, alarm, O2      Social:  Pt lives with son  
Pt's son provided update at this time. All questions answered.   
Pulse ox was 90% on room air at rest.   Ambulated patient on room air.   Oxygen saturation was 88% on room air while ambulating.   Oxygen applied.   Recovery pulse ox was 93% on 1 liter of oxygen while ambulating   
Pulse ox was 93% on room air at rest.   Ambulated patient on room air.   Oxygen saturation was 91% on room air while ambulating.   
[facility]  Nephrology Progress Note  Patient's Name: Ruth Anguiano  1:33 PM  3/9/2025    Nephrologist: JERAMIE Ramirez MD    Reason for Consult:  ORLIN  Requesting Physician:  Dr. SHERLY Hernandez    Chief Complaint:  Hypotension    History Obtained From:  patient and past medical records    HPI from 3/7 consult:    Ruth Anguiano is a 64 y.o. female with prior history of CKD G3B with a baseline serum cr 1.3mg/dl with an assoc e-GFR=41ml/min in 2022. She presented to [facility] ED after being seen in her PCP office for a Wellness vist and she was found to have a BP 60/40. Initial BP in the ED 78/50 with a T 96.5. She was treated with 2L 0.9NS and BP up to 157/86.  Her outpt meds included: amlodipine, chlorthalidone, losartan and metoprolol.  Pt states she had a productive cough and weakness recently. Her appetite has been poor. She states over the last few days when she stood up she felt off balance. She notes she would only urinate in the AM and then very little throughout the rest of the day. She notes she chronically has watery BM 1-2 per day.    Interval History:    3/8/25: Patient seen and examined, receiving breathing treatment.  Overall, she feels much better today.  Blood pressures are improving, no acute events noted overnight.    3/9/thousand 25: Patient seen and examined.  Still weak and fatigued likely need of physical therapy.  However labs and blood pressure reviewed with patient, vitals are stable and renal function has returned to her baseline.  Excellent urine output noted.  No additional complaints.    Past Medical History:   Diagnosis Date    A-fib ([facility])     Acute deep vein thrombosis (DVT) of right peroneal vein ([facility]) 10/14/2022    Brain aneurysm 09/2018    H/O TIMES 2 THAT BURST PER PATIENT    Cerebral artery occlusion with cerebral infarction ([facility])     right sided weakness    Chronic deep vein thrombosis (DVT) of right peroneal vein ([facility]) 01/12/2023    Degenerative arthritis of hand     Edentulous     
[facility]  Nephrology Progress Note  Patient's Name: Ruth Anguiano  1:44 PM  3/8/2025    Nephrologist: JERAMIE Ramirez MD    Reason for Consult:  ORLIN  Requesting Physician:  Dr. SHERLY Hernandez    Chief Complaint:  Hypotension    History Obtained From:  patient and past medical records    HPI from 3/7 consult:    Ruth Anguiano is a 64 y.o. female with prior history of CKD G3B with a baseline serum cr 1.3mg/dl with an assoc e-GFR=41ml/min in 2022. She presented to [facility] ED after being seen in her PCP office for a Wellness vist and she was found to have a BP 60/40. Initial BP in the ED 78/50 with a T 96.5. She was treated with 2L 0.9NS and BP up to 157/86.  Her outpt meds included: amlodipine, chlorthalidone, losartan and metoprolol.  Pt states she had a productive cough and weakness recently. Her appetite has been poor. She states over the last few days when she stood up she felt off balance. She notes she would only urinate in the AM and then very little throughout the rest of the day. She notes she chronically has watery BM 1-2 per day.    Interval History:    3/8/25: Patient seen and examined, receiving breathing treatment.  Overall, she feels much better today.  Blood pressures are improving, no acute events noted overnight.    Past Medical History:   Diagnosis Date    A-fib ([facility])     Acute deep vein thrombosis (DVT) of right peroneal vein ([facility]) 10/14/2022    Brain aneurysm 09/2018    H/O TIMES 2 THAT BURST PER PATIENT    Cerebral artery occlusion with cerebral infarction ([facility])     right sided weakness    Chronic deep vein thrombosis (DVT) of right peroneal vein ([facility]) 01/12/2023    Degenerative arthritis of hand     Edentulous     Emphysema of lung ([facility])     Headache     Hiatal hernia     Hypertension     Stroke (cerebrum) ([facility])     Subarachnoid bleed ([facility]) 09/10/2018    Varicose veins of both lower extremities with pain 07/20/2023       Past Surgical History:   Procedure Laterality Date    BRAIN ANEURYSM SURGERY 
polyps--frank    COLONOSCOPY N/A 08/30/2019    COLONOSCOPY POLYPECTOMY SNARE/COLD BIOPSY performed by Isaías [facility]ello [place] [facility]    COLONOSCOPY N/A 12/09/2022    COLONOSCOPY POLYPECTOMY SNARE/COLD BIOPSY performed by Isaías Mello MD at [facility]    COLONOSCOPY W/ BIOPSIES  12/09/2022    polyp/cecal mass; diverticula--frank    DILATION AND CURETTAGE OF UTERUS      IR CAROTID STENT W PROTECTION  03/01/2023    IR CAROTID STENT UNI W PROTECTION 3/1/2023 Judd Olivier [place] [facility]    IR OCCLUSION/EMBOLIZATION PERC CNS  03/01/2023    IR OCCLUSIVE OR EMBOL PERC CENTL NERV SYS 3/1/2023 Judd Olivier MD [facility]    IR VASC EMBOLIZE OCCLUDE ARTERY  03/01/2023    IR VASC EMBOLIZE OCCLUDE ARTERY 3/1/2023 Judd Olivier MD [facility]    JOINT REPLACEMENT      total hip replacement left and right    LAPAROTOMY N/A 12/09/2022    LAPAROTOMY EXPLORATORY, ILIOCECECTOMY performed by Isaías Mello MD at [facility]    OTHER SURGICAL HISTORY      FOR CANCER CELLS- DONE AT [facility]    THORACENTESIS Right 12/13/2022    400 kathy colored    TONSILLECTOMY      TOTAL HIP ARTHROPLASTY Left 02/22/2021    LEFT HIP TOTAL ARTHROPLASTY performed by Phani Gilbert MD at [facility]    TOTAL HIP ARTHROPLASTY Right 01/10/2022    RIGHT TOTAL HIP ARTHROPLASTY - STYKER performed by Phani Gilbert MD at [facility]    UPPER GASTROINTESTINAL ENDOSCOPY  08/30/2019    gastritis with superficial ulcerations; duodenitis--frank    UPPER GASTROINTESTINAL ENDOSCOPY N/A 08/30/2019    EGD BIOPSY performed by Isaías Mello MD at [facility]    VAGINA SURGERY N/A 5/1/2024    POSTERIOR REPAIR performed by Freddie Wright DO at [facility] OR       Family History   Problem Relation Age of Onset    Diabetes Mother     Arthritis Mother     Atrial Fibrillation Mother     Diabetes Father     Atrial Fibrillation Father     Arthritis Father     Emphysema Father     Cancer Sister  
Medium    Pneumonia of both lungs due to infectious organism [J18.9] 03/07/2025    Cystocele with prolapse [N81.4] 03/06/2025    Sepsis (HCC) [A41.9] 03/06/2025    ORLIN (acute kidney injury) [N17.9] 03/06/2025    Elevated d-dimer [R79.89] 03/06/2025    Rectocele [N81.6] 01/23/2024    Other emphysema (HCC) [J43.8] 04/27/2021    Heartburn [R12]     Chronic pain syndrome [G89.4] 03/01/2019    Hypertension [I10] 09/21/2018    Obesity (BMI 30-39.9) [E66.9] 09/10/2018       Plan:  Sepsis secondary to suspected pneumonia versus other source  lactic acid 2.4, bicarb 19, anion gap 19, white count 12,000, vitally on initial presentation 96.5 °F, bp 78/50, HR 85, status post 2 L fluid bolus in the ED with improved hemodynamic stability blood pressure 121/67 CT imaging showing bilateral pneumonia   Follow urinalysis/urine culture  Influenza A/B COVID-19 negative follow-up full RFA negative  , CRP, Pro-Nic, ASO titer  Follow blood cultures  -1 out of 4 blood cultures growing staph auris and epidermidis  Get repeat lactic acid: 2.4> 1.6  Continue ceftriaxone and doxycycline, day 3     Acute kidney injury stage III baseline creatinine 0.9-1.0 likely hemodynamically mediated in the setting of prolonged hypotension versus obstruction in the setting of cystocele/rectal prolapse  Creatinine in the ED 3.2, blood pressure at that time 78/50  Patient complained of urinary retention status post straight cath x 1  Obtain urine electrolytes, FeNa 0.6% indicating pre-renal, FeUrea 16.7% indicating pre-renal  Avoid hypotension  Avoid nephrotoxic agents  Start normal saline 125 cc an hour  Consult to nephrology appreciate recommendation  -Renal hypoperfusion  -Continue to hold ARB and hydrochlorothiazide  -Continue NaCl 125 cc/h     Elevated D-dimer history of DVT  In the ED D-dimer 1092  V/Q scan low probability of pulmonary embolus  Bilateral lower extremity Doppler ultrasound no evidence of DVT  Monitor for respiratory status  Initial 
include amlodipine 5 mg daily, losartan 100 mg daily, chlorthalidone 25 mg daily, metoprolol succinate 25 mg daily,  Hold amlodipine, losartan, chlorthalidone in the setting of hypotension     History of CVA  ASA 81 mg daily     Overactive bladder  Home medications include vibegron 75mg daily  Hold home medication in the setting of urinary retention     Chronic hip pain pain secondary to degenerative changes  Home medications include gabapentin 300 mg 3 times daily, Norco 5/325 daily as needed  Resume home gabapentin, [place] 5/325 daily as needed     Pain control:norco prn daily  DVT prophylaxis: PCD's  GI prophylaxis:proton pump inhibitor per orders  CODE STATUS: Full Code  Diet: ADULT DIET; Regular    Consults:nephrology      Disposition: Discharge to Home pending clinical course    Brody Cummings MD   Family Medicine Resident PGY-1  03/09/25   10:14 PM   
throughout the lungs compatible with interstitial  lung disease.    Impression:      Low Probability for Pulmonary Embolus.    CT CHEST WO CONTRAST [[id number]] Collected: 03/06/25 1405    Order Status: Completed Updated: 03/06/25 1411    Narrative:      EXAMINATION:  CT OF THE CHEST WITHOUT CONTRAST 3/6/2025 1:38 pm    TECHNIQUE:  CT of the chest was performed without the administration of intravenous  contrast. Multiplanar reformatted images are provided for review. Automated  exposure control, iterative reconstruction, and/or weight based adjustment of  the mA/kV was utilized to reduce the radiation dose to as low as reasonably  achievable.    COMPARISON:  None.    HISTORY:  ORDERING SYSTEM PROVIDED HISTORY: pneumonia?  TECHNOLOGIST PROVIDED HISTORY:  Reason for exam:->pneumonia?  Decision Support Exception - unselect if not a suspected or confirmed  emergency medical condition->Emergency Medical Condition (MA)    FINDINGS:  Mediastinum: There is a small amount of plaque in the arch and great vessels.  There are small AP window and pretracheal lymph nodes.  There is some plaque  in the thoracic aorta without aneurysm.  There is aortic tortuosity.  Heart  size is normal.    Lungs/pleura: There is reticulonodular changes throughout the lungs  compatible with interstitial disease.  There is significant bilateral basilar  infiltrates.  There are small bilateral pleural effusions.    Upper Abdomen: There is moderate amount of plaque in the upper abdominal  aorta.    Soft Tissues/Bones: There are multilevel areas disc space narrowing and  degenerative change in the thoracic spine.  There is some rotoscoliosis.    Impression:      1. Significant bilateral basilar infiltrates with small bilateral pleural  effusions.  2. Reticulonodular changes throughout the lungs compatible with interstitial  disease.  3. Small mediastinal lymph nodes are likely reactive.  4. Moderate amount of plaque in the upper abdominal aorta.  5.

## 2025-03-11 NOTE — DISCHARGE INSTR - COC
NO:}  Bladder: {YES / NO:}  Urinary Catheter: {Urinary Catheter:976425183}   Colostomy/Ileostomy/Ileal Conduit: {YES / NO:}       Date of Last BM: ***    Intake/Output Summary (Last 24 hours) at 3/11/2025 1540  Last data filed at 3/11/2025 0800  Gross per 24 hour   Intake 473.85 ml   Output 800 ml   Net -326.15 ml     I/O last 3 completed shifts:  In: 713.9 [P.O.:660; I.V.:10; IV Piggyback:43.9]  Out: 2500 [Urine:2500]    Safety Concerns:     { HOSSEIN Safety Concerns:761122896}    Impairments/Disabilities:      { HOSSEIN Impairments/Disabilities:642997388}    Nutrition Therapy:  Current Nutrition Therapy:   { HOSSEIN Diet List:386582520}    Routes of Feeding: {[facility] DME Other Feedings:059447181}  Liquids: {Slp liquid thickness:90151}  Daily Fluid Restriction: {[facility] DME Yes amt example:791736306}  Last Modified Barium Swallow with Video (Video Swallowing Test): {Done Not Done Date:}    Treatments at the Time of Hospital Discharge:   Respiratory Treatments: ***  Oxygen Therapy:  {Therapy; copd oxygen:04843}  Ventilator:    { CC Vent List:455316804}    Rehab Therapies: {THERAPEUTIC INTERVENTION:0160870013}  Weight Bearing Status/Restrictions: {[facility] Weight Bearin}  Other Medical Equipment (for information only, NOT a DME order):  {EQUIPMENT:103259540}  Other Treatments: ***    Patient's personal belongings (please select all that are sent with patient):  {[facility] DME Belongings:603373711}    RN SIGNATURE:  {Esignature:542307631}    CASE MANAGEMENT/SOCIAL WORK SECTION    Inpatient Status Date: ***    Readmission Risk Assessment Score:  [facility] RISK OF UNPLANNED READMISSION 2.0             12.6 Total Score        Discharging to Facility/ Agency   Name:   Address:  Phone:  Fax:    Dialysis Facility (if applicable)   Name:  Address:  Dialysis Schedule:  Phone:  Fax:    / signature: {Esignature:778302506}    PHYSICIAN SECTION    Prognosis: {Prognosis:4949949426}    Condition at

## 2025-03-11 NOTE — ACP (ADVANCE CARE PLANNING)
Advance Care Planning   Healthcare Decision Maker:    Primary Decision Maker: Ryder Anguiano - Child - [phone]    Secondary Decision Maker: Kelvin Anguiano - Sudheer - [phone]    Click here to complete Healthcare Decision Makers including selection of the Healthcare Decision Maker Relationship (ie \"Primary\").

## 2025-03-11 NOTE — PLAN OF CARE
Problem: Discharge Planning  Goal: Discharge to home or other facility with appropriate resources  Outcome: Adequate for Discharge     Problem: ABCDS Injury Assessment  Goal: Absence of physical injury  Outcome: Adequate for Discharge     Problem: Safety - Adult  Goal: Free from fall injury  Outcome: Adequate for Discharge     Problem: Chronic Conditions and Co-morbidities  Goal: Patient's chronic conditions and co-morbidity symptoms are monitored and maintained or improved  Outcome: Adequate for Discharge     Problem: Skin/Tissue Integrity  Goal: Skin integrity remains intact  Description: 1.  Monitor for areas of redness and/or skin breakdown  2.  Assess vascular access sites hourly  3.  Every 4-6 hours minimum:  Change oxygen saturation probe site  4.  Every 4-6 hours:  If on nasal continuous positive airway pressure, respiratory therapy assess nares and determine need for appliance change or resting period  Outcome: Adequate for Discharge     Problem: Neurosensory - Adult  Goal: Achieves stable or improved neurological status  Outcome: Adequate for Discharge  Goal: Achieves maximal functionality and self care  Outcome: Adequate for Discharge     Problem: Respiratory - Adult  Goal: Achieves optimal ventilation and oxygenation  Outcome: Adequate for Discharge     Problem: Cardiovascular - Adult  Goal: Maintains optimal cardiac output and hemodynamic stability  Outcome: Adequate for Discharge     Problem: Skin/Tissue Integrity - Adult  Goal: Skin integrity remains intact  Description: 1.  Monitor for areas of redness and/or skin breakdown  2.  Assess vascular access sites hourly  3.  Every 4-6 hours minimum:  Change oxygen saturation probe site  4.  Every 4-6 hours:  If on nasal continuous positive airway pressure, respiratory therapy assess nares and determine need for appliance change or resting period  Outcome: Adequate for Discharge     Problem: Musculoskeletal - Adult  Goal: Return mobility to safest level

## 2025-03-12 ENCOUNTER — CARE COORDINATION (OUTPATIENT)
Dept: CARE COORDINATION | Age: 65
End: 2025-03-12

## 2025-03-12 DIAGNOSIS — A41.9 SEPSIS, DUE TO UNSPECIFIED ORGANISM, UNSPECIFIED WHETHER ACUTE ORGAN DYSFUNCTION PRESENT (HCC): Primary | ICD-10-CM

## 2025-03-12 DIAGNOSIS — A41.9 SEPSIS, DUE TO UNSPECIFIED ORGANISM, UNSPECIFIED WHETHER ACUTE ORGAN DYSFUNCTION PRESENT (HCC): ICD-10-CM

## 2025-03-12 DIAGNOSIS — I50.32 CHRONIC HEART FAILURE WITH PRESERVED EJECTION FRACTION (HCC): Primary | Chronic | ICD-10-CM

## 2025-03-12 NOTE — PROGRESS NOTES
Remote Patient Monitoring Treatment Plan    Received request from ACM/CTN Carin Cosme APRN   to order remote patient monitoring for in home monitoring of Sepsis; Condition managed by PCP.  CHF; Condition managed by PCP.  and order completed.     Patient will be monitoring activity  blood pressure   pulse ox   temperature  weight  survey questions.      Patient will engage in Remote Patient Monitoring each day to develop the skills necessary for self management.       RPM Care Team Responsibilities:   Alerts will be reviewed daily and addressed within 2-4 hours during operational hours (Monday -Friday 9 am-4 pm)  Alert response and intervention documented in patient medical record  Alert response escalated to PCP per protocol and documented in patient medical record  Patient monitored over approximately  days  Discharge from program based on self-management readiness    See care coordination encounters for additional details.

## 2025-03-12 NOTE — CARE COORDINATION
Care Transitions Note    Initial Call - Call within 2 business days of discharge: Yes    Patient Current Location:  Home: [address] [place] [id number]    Care Transition Nurse contacted the patient by telephone to perform post hospital discharge assessment, verified name and  as identifiers. Provided introduction to self, and explanation of the Care Transition Nurse role.     Patient: Ruth Anguiano    Patient : 1960   MRN: [id number]    Reason for Admission: Sepsis  Discharge Date: 3/11/25  RURS: Readmission Risk Score: 12.4      Last Discharge Facility       Date Complaint Diagnosis Description Type Department Provider    3/6/25 Hypotension; Cough; Fatigue ORLIN (acute kidney injury) ... ED to Hosp-Admission (Discharged) (ADMITTED) Natalia Booker MD; Aristides Josue...            Was this an external facility discharge? No    Additional needs identified to be addressed with provider   No needs identified             Method of communication with provider: none.    Patients top risk factors for readmission: functional physical ability, lack of knowledge about disease, medical condition-CHF, polypharmacy, and utilization of services    Interventions to address risk factors:   Education: Discussed CHF zone tool and knowing when to call doctor or seek medical attention.    Care Summary Note: Spoke with patient today 3/12/25 for initial MEL/hospital discharge (low risk) follow up. Patient states she is feeling better since discharge and offers no complaints. Denies any shortness of breath but has ongoing productive cough and producing \"yellow\" colored phlegm. CT of chest obtained on admission revealed the following below:     IMPRESSION:  1. Significant bilateral basilar infiltrates with small bilateral pleural  effusions.  2. Reticulonodular changes throughout the lungs compatible with interstitial  disease.  3. Small mediastinal lymph nodes are likely reactive.  4. Moderate amount of plaque in the

## 2025-03-13 ENCOUNTER — TELEPHONE (OUTPATIENT)
Dept: INTERNAL MEDICINE | Age: 65
End: 2025-03-13

## 2025-03-13 ENCOUNTER — CARE COORDINATION (OUTPATIENT)
Dept: PRIMARY CARE CLINIC | Age: 65
End: 2025-03-13

## 2025-03-13 NOTE — TELEPHONE ENCOUNTER
LISW made contact to pt, pt was discharged from the hospital. Pt reports overall stable symptoms and will contact LISW to schedule appointment.

## 2025-03-13 NOTE — CARE COORDINATION
Remote Patient Kit Ordering Note      Date/Time:  3/13/2025 1:08 PM      CCSS placed phone call to patient/family today to notify of RPM kit order; patient/family was unavailable; Left HIPAA compliant voicemail regarding RPM kit.    [x] [facility] confirmed patient shipping address  [x] Patient will receive package over the next 1-3 business days. Someone 21 years or older must be present to sign for UPS delivery.  [x] HRS will contact patient within 24 hours, an HRS  will call the patient directly: If the patient does not answer, HRS will follow up with the clinical team notifying them about the unsuccessful attempt to contact the patient. HRS will make three call attempts to the patient.Provide patient with [facility] Virtual install number is: [phone].  [x] The RPM Nurse will contact patient once equipment is active to welcome them to the program.                                                         [x] Hours of RPM monitoring - Monday-Friday [phone]; encourage patient to get vitals entered by Noon each day to have the alert addressed same day.  [x][facility] mailed RPM Patient flyer to patient.                      ACM made aware the RPM kit has been ordered.

## 2025-03-14 LAB
MICROORGANISM SPEC CULT: NORMAL
MICROORGANISM SPEC CULT: NORMAL
SERVICE CMNT-IMP: NORMAL
SERVICE CMNT-IMP: NORMAL
SPECIMEN DESCRIPTION: NORMAL
SPECIMEN DESCRIPTION: NORMAL

## 2025-03-17 ENCOUNTER — OFFICE VISIT (OUTPATIENT)
Dept: FAMILY MEDICINE CLINIC | Age: 65
End: 2025-03-17

## 2025-03-17 VITALS
TEMPERATURE: 97.4 F | BODY MASS INDEX: 38.65 KG/M2 | HEIGHT: 65 IN | WEIGHT: 232 LBS | SYSTOLIC BLOOD PRESSURE: 136 MMHG | DIASTOLIC BLOOD PRESSURE: 70 MMHG | OXYGEN SATURATION: 98 % | RESPIRATION RATE: 18 BRPM | HEART RATE: 95 BPM

## 2025-03-17 DIAGNOSIS — J44.9 CHRONIC OBSTRUCTIVE PULMONARY DISEASE, UNSPECIFIED COPD TYPE (HCC): ICD-10-CM

## 2025-03-17 DIAGNOSIS — I95.9 HYPOTENSION, UNSPECIFIED HYPOTENSION TYPE: Primary | ICD-10-CM

## 2025-03-17 DIAGNOSIS — Z09 HOSPITAL DISCHARGE FOLLOW-UP: ICD-10-CM

## 2025-03-17 NOTE — PROGRESS NOTES
Post-Discharge Transitional Care  Follow Up      Ruth Anguiano   YOB: 1960    Date of Office Visit:  3/17/2025  Date of Hospital Admission: 3/6/25  Date of Hospital Discharge: 3/11/25  Risk of hospital readmission (high >=14%. Medium >=10%) :Readmission Risk Score: 12.4      Care management risk score Rising risk (score 2-5) and Complex Care (Scores >=6): No Risk Score On File     Non face to face  following discharge, date last encounter closed (first attempt may have been earlier): 03/12/2025    Call initiated 2 business days of discharge: Yes    ASSESSMENT/PLAN:   Hypotension, unspecified hypotension type  Improved than before.  Patient is checking blood pressure at home readings are around 130s.  Advised patient to keep checking it if the readings got higher, talked with PCP to add some other blood pressure medication.  Currently patient is on metoprolol.  Chronic obstructive pulmonary disease, unspecified COPD type (HCC)  Patient is stable on room air, continue taking nebulizer.  Hospital discharge follow-up      Medical Decision Making: moderate complexity  Return in about 1 month (around 4/17/2025) for Blood pressure follow up with PCP .           Subjective:   HPI:  Follow up of Hospital problems/diagnosis(es):     Patient was admitted on 03/06 and was discharged on 03/11 secondary to ORLIN and symptomatic hypotension.  Patient was also started on Rocephin and doxycycline secondary to pneumonia.  On blood pressure pills were held secondary to hypotension.  Patient completed 5 days course of pneumonia.  As per PT OT scores subacute rehab was considered but patient refused that.  Patient went home with home health care.  Home medications were amlodipine 5 mg, losartan 100 mg, chlorthalidone 25 mg, metoprolol 25 mg which were all held, .  Patient was discharged with 1 L of oxygen at home although before coming to the hospital she was on room air.    Checking Bp at home, around 120-140 s.   - and

## 2025-03-17 NOTE — PROGRESS NOTES
[facility]  [facility]    Subjective:  Hospital follow up  Was admitted for pneumonia  Treated with Ceftriaxone and Doxy      ROS otherwise negative     Past medical, surgical, family and social history were reviewed, non-contributory, and unchanged unless otherwise stated.    Objective:    /70   Pulse 95   Temp 97.4 °F (36.3 °C) (Temporal)   Resp 18   Ht 1.651 m (5' 5\")   Wt 105.2 kg (232 lb)   SpO2 98%   BMI 38.61 kg/m²     Exam is as noted by resident with the following changes, additions or corrections:    General:  NAD; alert & oriented x 3   Heart:  RRR, no murmurs, gallops, or rubs.  Lungs:  CTA bilaterally, no wheeze, rales or rhonchi  Abd: bowel sounds present, nontender, nondistended, no masses  Extrem:  No clubbing, cyanosis, or edema    Assessment/Plan:  Hospital follow up  Pneumonia: completed the course  Hypotension: improved. Meds were adjusted in the hospital     Attending Physician Statement  I have reviewed the chart, including any radiology or labs. I have discussed the case, including pertinent history and exam findings with the resident.  I agree with the assessment, plan and orders as documented by the resident.  Please refer to the resident note for additional information.      Electronically signed by FIDELINA BARBER MD on 3/17/2025 at 1:59 PM

## 2025-03-18 ENCOUNTER — CARE COORDINATION (OUTPATIENT)
Dept: CASE MANAGEMENT | Age: 65
End: 2025-03-18

## 2025-03-18 NOTE — CARE COORDINATION
Remote Patient Monitoring Welcome Note  Date/Time:  3/18/2025 1:08 PM  Patient Current Location: Home: [place] [address]  Verified patients name and  as identifiers.       Completed and confirmed the following:    [x] Patient received all RPM equipment (tablet, scale, blood pressure device and cuff, and pulse oximeter)  Cuff Size: regular (9.05\"-15.74\")    Weight Scale:  regular (<330lbs)                    [x] Instructed patient keep box for use when returning equipment                                                          [x] Reviewed Patient Welcome Letter with patient    [x]  Reviewed Consent Form  Copy of consent form in chart.                 [x] Reviewed expectations for patient and care team  Monitoring hours M-F 9-4pm  It is important to take your vitals every day, even on the weekends,to keep your care team aware of how you are doing every day of the week.  Completing monitoring by 12pm on  so that alerts can be responded to in the same day  Patient weighs self at same time every day (or after urinating and waking up)  Take blood pressure 1-2 hrs after medications   RPM team may have different phone area code (including [place], [place], [place] or [place])                              [x] Instructed patient to keep scale on flat surface                                                         [x] Instructed patient to keep tablet plugged in at all times                         [x] Instructed how to contact IT support  ([phone])  [x] Provided Remote Patient Monitoring care  information     Emergency Contact Verified: Emergency Contact: Ryder Anguiano(son) [phone]               All questions answered at this time.

## 2025-03-18 NOTE — CARE COORDINATION
Date/Time:  3/18/2025 10:06 AM  LPN attempted to reach patient by telephone regarding  Welcome call  in remote patient monitoring program. Left HIPAA compliant message requesting a return call. Will attempt to reach patient again.    Tablet message sent  Message sent to patient via Patient Connect Portal for Remote Patient Monitoring     RPM Nurse message Return call requested Hello, Please see an important message from your RPM Team.  We have attempted to reach you to discuss your readings please reach out to us to discuss with the provided number left on your voicemail.     Please do not respond to this message; If you have a question or concern please call your Ambulatory Care Manager or your Primary Care Physician.

## 2025-03-19 ENCOUNTER — CARE COORDINATION (OUTPATIENT)
Dept: CARE COORDINATION | Age: 65
End: 2025-03-19

## 2025-03-19 NOTE — H&P
Hepatology Consult Note    Referring provider: Dr. Maya Larsen *  PCP: Hina Temple MD    Chief complaint: alcohol related cirrhosis     HPI:  Dexter Robertson is a 68 y.o. male who was referred to Hepatology Clinic for alcohol-related cirrhosis. Accompanied by girlfriend, Cherelle. Previously followed in Hepatology Clinic by Dr. Hyatt. Last seen in 2019.    Regarding risk factors for liver disease, the patient reports half a pint of whiskey a day and mixes with Red Bull. Has been drinking this same amount of alcohol since  after wife . States he has been a heavy drinker throughout his life. Used to attend AA. Currently not participating in structured sobriety. Denies illicit drug use, blood transfusions, prior tattoos. MASLD risk factors: obesity with heaviest weight ~220lb, HTN, HLD, JOYCE. Denies history of DM. Denies family history of liver disease or liver cancer.     Reports history of ascites, and lower extremity edema. Per chart review, required paracentesis in the past. Last . Notes he remains off diuretics. The patient denies signs/symptoms of encephalopathy, jaundice, gross GI bleeding.     Denies prior upper abdominal surgeries.    Current cigarette smoking.    Past Medical History:   Diagnosis Date    Arthritis     hand    Asthma     Bleeding ulcer     Cirrhosis     Depression     Diverticulitis     Fistula     GERD without esophagitis     H/O seasonal allergies     High cholesterol     Hyperlipidemia     Irregular heart beat     JOYCE (obstructive sleep apnea)     PVC (premature ventricular contraction)     Smokes cigarettes        Past Surgical History:   Procedure Laterality Date    COLONOSCOPY N/A 2020    Procedure: COLONOSCOPY;  Surgeon: Zack West MD;  Location: [facility] ENDO;  Service: General;  Laterality: N/A;    HERNIA REPAIR      LYSIS OF ADHESIONS N/A 2020    Procedure: LYSIS, ADHESIONS;  Surgeon: Zack West MD;  Location: [facility] OR;  Service: General;   Patient ID: Otoniel Pena is a 60 y.o. female. HPI  60 yr old female with hx of tubular adenoma on prior colonoscopy (8/2019). Pt reports occasional lower abd pain. States this occurs intermittently. Does not radiate anywhere. Denies change in bowel habits, blood in stool, unintentional weight loss, or family hx of colon cancer.      Past Medical History        Past Medical History:   Diagnosis Date    Brain aneurysm 09/2018     H/O TIMES 2 THAT BURST PER PATIENT    Cerebral artery occlusion with cerebral infarction ([address]       RT SIDE AFFECTED-LEARNED TO WALK AND WRITE AGAIN    Degenerative arthritis of hand      Edentulous      Emphysema lung (HCC)       lung dr    Headache      Hiatal hernia      Hip problem       NEEDS HIP REPLACEMENT PER PATIENT    Hx of abnormal cervical Pap smear      Hypertension      Stroke (cerebrum) ([address]      Subarachnoid bleed ([address] 9/10/2018            Past Surgical History         Past Surgical History:   Procedure Laterality Date    BRAIN ANEURYSM SURGERY         coiling     COLONOSCOPY   08/30/2019     polyps--frank    COLONOSCOPY N/A 8/30/2019     COLONOSCOPY POLYPECTOMY SNARE/COLD BIOPSY performed by Jesse Yepez MD at [facility].         total hip replacement left and right    OTHER SURGICAL HISTORY         [facility] 2/22/2021     LEFT HIP TOTAL ARTHROPLASTY performed by Rafael Gamez MD at [facility] Right 1/10/2022     RIGHT TOTAL HIP ARTHROPLASTY - STYKER performed by Rafael Gamez MD at [address] [facility] ENDOSCOPY   08/30/2019     gastritis with superficial ulcerations; duodenitis--frank    UPPER GASTROINTESTINAL ENDOSCOPY N/A 8/30/2019     EGD BIOPSY performed by Jesse Yepez MD at [facility]            Current Facility-Administered Medications          Current Outpatient Medications "Laterality: N/A;    SHOULDER SURGERY Right     SUBTOTAL COLECTOMY N/A 8/19/2020    Procedure: COLECTOMY, PARTIAL,  for colovesical fistula;  Surgeon: Zack West MD;  Location: [facility] OR;  Service: General;  Laterality: N/A;  covid negative 8-11      TONSILLECTOMY         Family History   Problem Relation Name Age of Onset    Diabetes Mother      Heart disease Mother      Heart disease Father         Social History[1]    Current Medications[2]    Review of patient's allergies indicates:   Allergen Reactions    Bee sting [allergen ext-venom-honey bee] Anaphylaxis    Ativan [lorazepam] Other (See Comments)     confusion    Iodine and iodide containing products Edema    Pcn [penicillins] Edema    Iodine Other (See Comments)    Shellfish containing products             Vitals:    03/19/25 1446   BP: 136/70   Pulse: 72   Resp: 18   SpO2: 98%   Weight: 79.4 kg (175 lb)   Height: 5' 10" (1.778 m)   Body mass index is 25.11 kg/m².    Physical Exam  Constitutional:       General: He is not in acute distress.  Eyes:      General: No scleral icterus.  Cardiovascular:      Rate and Rhythm: Normal rate.   Pulmonary:      Effort: Pulmonary effort is normal.   Abdominal:      General: Abdomen is flat. There is no distension.      Palpations: Abdomen is soft. There is no fluid wave, hepatomegaly or splenomegaly.      Tenderness: There is no abdominal tenderness.   Musculoskeletal:      Right lower leg: No edema.      Left lower leg: No edema.   Skin:     General: Skin is warm.      Coloration: Skin is not jaundiced.      Comments: Spider angiomas on chest. No palmar erythema. No leukonychia.    Neurological:      General: No focal deficit present.      Mental Status: He is alert and oriented to person, place, and time.      Comments: No asterixis.   Psychiatric:         Behavior: Behavior is cooperative.         Thought Content: Thought content normal.         LABS: I personally reviewed pertinent laboratory findings.    Lab " Medication Sig Dispense Refill    [START ON 10/8/2022] acetaminophen-codeine (TYLENOL #4) 300-60 MG per tablet Take 1 tablet by mouth daily as needed for Pain for up to 30 days. 30 tablet 1    amLODIPine (NORVASC) 5 MG tablet TAKE ONE TABLET BY MOUTH EVERY DAY 30 tablet 3    chlorthalidone (HYGROTON) 25 MG tablet Take 1 tablet by mouth in the morning. 30 tablet 3    fluticasone (FLONASE) 50 MCG/ACT nasal spray 2 sprays by Each Nostril route in the morning. 1 each 3    gabapentin (NEURONTIN) 300 MG capsule TAKE ONE CAPSULE BY MOUTH THREE TIMES A DAY 90 capsule 3    losartan (COZAAR) 100 MG tablet Take 1 tablet by mouth in the morning. 30 tablet 3    pantoprazole (PROTONIX) 40 MG tablet TAKE ONE TABLET BY MOUTH EVERY DAY 30 tablet 3    potassium chloride (KLOR-CON M) 10 MEQ extended release tablet TAKE ONE TABLET BY MOUTH DAILY WITH BREAKFAST 30 tablet 3    albuterol sulfate HFA (PROVENTIL;VENTOLIN;PROAIR) 108 (90 Base) MCG/ACT inhaler INHALE TWO PUFFS BY MOUTH EVERY 6 HOURS AS NEEDED FOR WHEEZING. 1 each 3    polyethylene glycol (GLYCOLAX) 17 g packet DISSOLVE 1 PACKET (17 GRAMS) IN LIQUID AND TAKE BY MOUTH DAILY        valACYclovir (VALTREX) 1 g tablet Take 1 tablet by mouth daily For 5 days as needed for Herpes outbreak 5 tablet 1    Multiple Vitamins-Minerals (THERAPEUTIC MULTIVITAMIN-MINERALS) tablet Take 1 tablet by mouth daily        diclofenac sodium (VOLTAREN) 1 % GEL APPLY ONE GRAM EXTERNALLY TWICE A DAY TO NECK AND ONE GRAM EXTERNALLY TWICE A DAY TO BACK  100 g 2    Misc. Devices (ROLLATOR) MISC 1 each by Does not apply route daily as needed (use as needed for stability) 1 each 0    metoprolol tartrate (LOPRESSOR) 25 MG tablet Take 1.5 tablets by mouth in the morning and 1.5 tablets before bedtime.  90 tablet 3    carBAMazepine (TEGRETOL-XR) 100 MG extended release tablet Take 1 tablet by mouth in the morning, at noon, and at bedtime (Patient not taking: No sig reported) 270 tablet 0    fluconazole (DIFLUCAN) 150 MG tablet TAKE ONE TABLET BY MOUTH ONCE FOR 1 DOSE FOR YEAST INFECTION AS NEEDED (Patient not taking: No sig reported) 1 tablet 1    aspirin 325 MG EC tablet Take 1 tablet by mouth 2 times daily (Patient not taking: Reported on 10/4/2022) 56 tablet 1      No current facility-administered medications for this visit. Allergies   Allergen Reactions    Latex Hives       Hives and rash    Iodine Rash       Hives . pt.  States anaphalyxis    Aloe Hives       Hives     Celebrex [Celecoxib] Itching    Fish-Derived Products Other (See Comments)         Family History         Family History   Problem Relation Age of Onset    Diabetes Mother      Arthritis Mother      Atrial Fibrillation Mother      Diabetes Father      Atrial Fibrillation Father      Arthritis Father      Emphysema Father      Cancer Sister           \"female\"            Social History               Socioeconomic History    Marital status:        Spouse name: Not on file    Number of children: Not on file    Years of education: Not on file    Highest education level: Not on file   Occupational History    Not on file   Tobacco Use    Smoking status: Former       Packs/day: 1.50       Years: 43.00       Pack years: 64.50       Types: Cigarettes       Quit date: 2018       Years since quittin.0    Smokeless tobacco: Never   Vaping Use    Vaping Use: Never used   Substance and Sexual Activity    Alcohol use: No    Drug use: No    Sexual activity: Not Currently   Other Topics Concern    Not on file   Social History Narrative    Not on file      Social Determinants of Health          Financial Resource Strain: Not on file   Food Insecurity: Not on file   Transportation Needs: Not on file   Physical Activity: Sufficiently Active    Days of Exercise per Week: 6 days    Minutes of Exercise per Session: 60 min   Stress: Not on file   Social Connections: Not on file   Intimate Partner Violence: Not on file   Housing Results   Component Value Date    WBC 7.60 09/27/2024    HGB 17.7 09/27/2024    HCT 50.7 09/27/2024     (H) 09/27/2024     09/27/2024       Lab Results   Component Value Date     (L) 09/27/2024    K 3.5 09/27/2024    CL 97 09/27/2024    CO2 27 09/27/2024    BUN 7 (L) 09/27/2024    CREATININE 0.93 09/27/2024    CALCIUM 9.4 09/27/2024    ANIONGAP 11 09/27/2024    ESTGFRAFRICA >60 08/22/2020    EGFRNONAA >60 08/22/2020       Lab Results   Component Value Date    ALT 46 (H) 09/27/2024     (H) 09/27/2024     (H) 06/10/2016    ALKPHOS 240 (H) 09/27/2024    BILITOT 1.2 09/27/2024       Lab Results   Component Value Date    HAV Non-reactive 08/15/2024    HEPAIGM Negative 06/09/2016    HEPBIGM Negative 06/09/2016    HEPBCAB Non-reactive 08/15/2024    HEPCAB Non-reactive 08/15/2024       Lab Results   Component Value Date    SMOOTHMUSCAB Positive 1:40 (A) 06/09/2016    CERULOPLSM 31.0 06/09/2016        Computed MELD 3.0 unavailable. One or more values for this score either were not found within the given timeframe or did not fit some other criterion.  Computed MELD-Na unavailable. One or more values for this score either were not found within the given timeframe or did not fit some other criterion.       IMAGING: I personally reviewed imaging studies.      Assessment:  Dexter Robertson is a 68 y.o. male who was referred to Hepatology Clinic for alcohol-related cirrhosis.     1. Decompensated cirrhosis    2. Alcoholic cirrhosis of liver with ascites    3. History of abdominal paracentesis    4. Alcohol use disorder, severe, dependence    5. Anorexia    6. Unintentional weight loss      #Decompensated EtOH Cirrhosis: prior ascites requiring paracentesis back in 2016, but recompensated likely due to period of abstinence around that time.     Volume: Prior ascites and paracentesis. Last paracentesis 2016. No ascites, no LE edema on my exam. Remains off diuretics.  Infection: No concern at  Stability: Not on file         Review of Systems   Constitutional:  Negative for activity change, appetite change, chills, fever and unexpected weight change. HENT: Negative. Eyes: Negative. Respiratory:  Positive for cough, choking, shortness of breath and wheezing. Negative for chest tightness. Cardiovascular:  Negative for chest pain, palpitations and leg swelling. Gastrointestinal:  Positive for abdominal pain, constipation and diarrhea. Negative for abdominal distention, anal bleeding, blood in stool, nausea and vomiting. Endocrine: Negative for cold intolerance, heat intolerance, polydipsia and polyuria. Genitourinary:  Negative for dysuria, frequency, hematuria, urgency, vaginal bleeding, vaginal discharge and vaginal pain. Musculoskeletal:  Positive for arthralgias, back pain, gait problem, myalgias, neck pain and neck stiffness. Negative for joint swelling. Skin:  Negative for color change, pallor, rash and wound. Allergic/Immunologic: Positive for food allergies. Negative for environmental allergies. Neurological:  Positive for dizziness. Negative for seizures, syncope, weakness, light-headedness, numbness and headaches. Hematological:  Negative for adenopathy. Bruises/bleeds easily. Psychiatric/Behavioral:  Negative for agitation, confusion, decreased concentration, hallucinations, self-injury and suicidal ideas. The patient is not nervous/anxious and is not hyperactive. Objective:   Physical Exam  Constitutional:       General: She is not in acute distress. Appearance: Normal appearance. She is well-developed. She is obese. She is not ill-appearing, toxic-appearing or diaphoretic. HENT:      Head: Normocephalic and atraumatic. Nose: Nose normal.      Mouth/Throat:      Mouth: Mucous membranes are moist.      Pharynx: Oropharynx is clear. Eyes:      General: No scleral icterus. Right eye: No discharge. Left eye: No discharge.       Extraocular this time.   Bleeding: No prior EGD. Referral placed for EGD for EV screening.   Encephalopathy: No prior history.  Screening: No recent AFP. Last liver US w/o HCC 3/18/25. Will obtain AFP and MRI abd w/wo con given pt reports anorexia, unintentional weight loss.   Immunization: Recommend HAV, HBV vaccination.  Transplant: Recompensated disease. Has continued to drink despite knowledge of liver disease.     #Alcohol Use Disorder: I have counseled him on the importance of complete abstinence to prevent progression of liver disease. States he would like to quit alcohol, and interested in medical therapy to help curve cravings. Referred to Addiction Psych.     Return to clinic in 3 months.    I have sent communication to the referring physician and/or primary care provider.    Kristy Sewell MD  Staff Physician  Hepatology and Liver Transplant  [facility]  [facility]           [1]   Social History  Tobacco Use    Smoking status: Former     Current packs/day: 0.50     Average packs/day: 0.5 packs/day for 44.2 years (22.1 ttl pk-yrs)     Types: Cigarettes     Start date: 4/11/1977     Quit date: 4/11/2020    Smokeless tobacco: Never   Substance Use Topics    Alcohol use: Yes     Comment: social    Drug use: Yes     Types: Marijuana   [2]   Current Outpatient Medications   Medication Sig Dispense Refill    acetaminophen (TYLENOL) 325 MG tablet Take 325 mg by mouth every 6 (six) hours as needed for Pain.      aspirin 81 MG Chew Take 81 mg by mouth once daily.      citalopram (CELEXA) 20 MG tablet Take 1 tablet (20 mg total) by mouth once daily. 90 tablet 3    docusate sodium (COLACE) 100 MG capsule Take 100 mg by mouth once daily.      folic acid (FOLVITE) 400 MCG tablet Take 400 mcg by mouth once daily.      loratadine (CLARITIN) 10 mg tablet Take 1 tablet (10 mg total) by mouth once daily. 30 tablet 11    mirtazapine (REMERON SOL-TAB) 15 MG disintegrating tablet Take  15 mg by mouth every evening.      pantoprazole (PROTONIX) 40 MG tablet TAKE 1 TABLET BY MOUTH EVERY DAY 30 tablet 0    potassium gluconate 595 mg (99 mg) TbSR Take 1 tablet by mouth once daily.      vitamin E 400 UNIT capsule Take 400 Units by mouth once daily.      buPROPion (WELLBUTRIN SR) 150 MG TBSR 12 hr tablet Take 1 tablet (150 mg total) by mouth 2 (two) times daily. 180 tablet 3    co-enzyme Q-10 50 mg capsule Take 50 mg by mouth once daily. (Patient not taking: Reported on 3/19/2025)      fluticasone propionate (FLONASE) 50 mcg/actuation nasal spray 1 spray by Each Nostril route once daily. (Patient not taking: Reported on 3/19/2025)      furosemide (LASIX) 40 MG tablet TAKE 1 & 1/2 TABLETS BY MOUTH ONCE DAILY (Patient not taking: Reported on 3/19/2025) 45 tablet 2    gabapentin (NEURONTIN) 300 MG capsule Take 1 capsule (300 mg total) by mouth 3 (three) times daily. for 10 days 30 capsule 0    lisinopriL (PRINIVIL,ZESTRIL) 5 MG Tab Take 1 tablet (5 mg total) by mouth once daily. (Patient not taking: Reported on 3/19/2025) 30 tablet 1    lovastatin (MEVACOR) 40 MG tablet Take 40 mg by mouth every evening. (Patient not taking: Reported on 3/19/2025)      magnesium oxide (MAG-OX) 400 mg (241.3 mg magnesium) tablet Take 400 mg by mouth every other day. (Patient not taking: Reported on 3/19/2025)      meloxicam (MOBIC) 7.5 MG tablet Take 7.5 mg by mouth once daily. (Patient not taking: Reported on 3/19/2025)      omega-3 fatty acids-vitamin E (FISH OIL) 1,000 mg Cap Take 1 capsule by mouth 2 (two) times daily.  0    spironolactone (ALDACTONE) 100 MG tablet TAKE 1 & 1/2 TABLETS BY MOUTH ONCE DAILY (Patient not taking: Reported on 3/19/2025) 135 tablet 3     No current facility-administered medications for this visit.      Movements: Extraocular movements intact. Pupils: Pupils are equal, round, and reactive to light. Cardiovascular:      Rate and Rhythm: Normal rate and regular rhythm. Heart sounds: Normal heart sounds. No murmur heard. Pulmonary:      Effort: Pulmonary effort is normal. No respiratory distress. Breath sounds: Normal breath sounds. No stridor. No wheezing, rhonchi or rales. Abdominal:      General: Bowel sounds are normal. There is no distension. Palpations: Abdomen is soft. There is no mass. Tenderness: There is no abdominal tenderness. There is no guarding or rebound. Hernia: No hernia is present. Musculoskeletal:         General: Normal range of motion. Cervical back: Normal range of motion and neck supple. Comments: Walks with walker   Skin:     General: Skin is warm and dry. Neurological:      General: No focal deficit present. Mental Status: She is alert and oriented to person, place, and time. Psychiatric:         Mood and Affect: Mood normal.         Behavior: Behavior normal.         Thought Content: Thought content normal.         Judgment: Judgment normal.   PCP notes personally reviewed     Assessment:   Hx of tubular adenoma  Lower abd pain                Plan:   Recommend colonoscopy. The patient was explained the risks/benefits/alternatives/expected outcomes of the procedure. The patient was explained the risks of the procedure, including, but not limited to, the risk of reaction to the anesthesia medicine and the risk of perforation requiring further surgery. The patient was informed that they may require biopsy or polypectomy. These procedures may increase the risk of complication. All questions were answered. The patient verbalized understanding and agreed to proceed. Pt is higher risk due to BMI >36.                    Gracy Hart MD      UPDATED 12/9/22  History and physical unchanged  For colonoscopy    Gracy Hart MD, FACS  12/9/2022  10:57 AM

## 2025-03-19 NOTE — CARE COORDINATION
Care Transitions Note    Follow Up Call     Attempted to reach patient for transitions of care follow up.  Unable to reach patient.      Outreach Attempts:   HIPAA compliant voicemail left for patient.     Care Summary Note: Attempted to contact patient today 3/19/25 for MEL/hospital discharge (low risk) follow up sub call. Left message on home/mobile number requesting a return call back to CTN and provided contact information. Noted patient completed HFU appt with PCP on 3/17/25 and is activated with RPM. CTN will continue to follow for Care Transition.    Follow Up Appointment:   Future Appointments         Provider Specialty Dept Phone    3/24/2025 10:15 AM Massiel Kebede PA Pain Management [phone]    4/21/2025 11:15 AM Amy Pappas MD Family Medicine [phone]    5/14/2025 10:30 AM Ann Betancourt APRN - [facility] Pulmonology [phone]    11/21/2025 11:00 AM Judd Olivier MD Neurology [phone]    11/27/2025 9:30 AM Ivet Wright MD Obstetrics and Gynecology [phone]            Plan for follow-up call in 6-10 days based on severity of symptoms and risk factors.     Carin Cosme, APRN

## 2025-03-24 ENCOUNTER — OFFICE VISIT (OUTPATIENT)
Dept: PAIN MANAGEMENT | Age: 65
End: 2025-03-24
Payer: MEDICARE

## 2025-03-24 VITALS
DIASTOLIC BLOOD PRESSURE: 83 MMHG | TEMPERATURE: 96.4 F | HEIGHT: 65 IN | RESPIRATION RATE: 18 BRPM | HEART RATE: 76 BPM | SYSTOLIC BLOOD PRESSURE: 131 MMHG | WEIGHT: 228 LBS | BODY MASS INDEX: 37.99 KG/M2 | OXYGEN SATURATION: 95 %

## 2025-03-24 DIAGNOSIS — Z96.642 S/P TOTAL LEFT HIP ARTHROPLASTY: ICD-10-CM

## 2025-03-24 DIAGNOSIS — M54.16 LUMBAR RADICULITIS: ICD-10-CM

## 2025-03-24 DIAGNOSIS — M16.12 PRIMARY OSTEOARTHRITIS OF LEFT HIP: ICD-10-CM

## 2025-03-24 DIAGNOSIS — M16.11 ARTHRITIS OF RIGHT HIP: ICD-10-CM

## 2025-03-24 DIAGNOSIS — Z79.891 ENCOUNTER FOR LONG-TERM OPIATE ANALGESIC USE: ICD-10-CM

## 2025-03-24 DIAGNOSIS — M50.30 DEGENERATION OF CERVICAL DISC WITHOUT MYELOPATHY: ICD-10-CM

## 2025-03-24 DIAGNOSIS — G89.4 CHRONIC PAIN SYNDROME: Primary | ICD-10-CM

## 2025-03-24 DIAGNOSIS — M47.812 FACET ARTHROPATHY, CERVICAL: ICD-10-CM

## 2025-03-24 DIAGNOSIS — M51.9 LUMBAR DISC DISORDER: ICD-10-CM

## 2025-03-24 DIAGNOSIS — E66.01 SEVERE OBESITY (BMI 35.0-39.9) WITH COMORBIDITY: ICD-10-CM

## 2025-03-24 DIAGNOSIS — M47.816 LUMBAR FACET ARTHROPATHY: ICD-10-CM

## 2025-03-24 DIAGNOSIS — M47.816 LUMBAR SPONDYLOSIS: ICD-10-CM

## 2025-03-24 PROCEDURE — 1090F PRES/ABSN URINE INCON ASSESS: CPT | Performed by: PHYSICIAN ASSISTANT

## 2025-03-24 PROCEDURE — G8399 PT W/DXA RESULTS DOCUMENT: HCPCS | Performed by: PHYSICIAN ASSISTANT

## 2025-03-24 PROCEDURE — G8427 DOCREV CUR MEDS BY ELIG CLIN: HCPCS | Performed by: PHYSICIAN ASSISTANT

## 2025-03-24 PROCEDURE — 99213 OFFICE O/P EST LOW 20 MIN: CPT | Performed by: PHYSICIAN ASSISTANT

## 2025-03-24 PROCEDURE — 1036F TOBACCO NON-USER: CPT | Performed by: PHYSICIAN ASSISTANT

## 2025-03-24 PROCEDURE — 1111F DSCHRG MED/CURRENT MED MERGE: CPT | Performed by: PHYSICIAN ASSISTANT

## 2025-03-24 PROCEDURE — 3075F SYST BP GE 130 - 139MM HG: CPT | Performed by: PHYSICIAN ASSISTANT

## 2025-03-24 PROCEDURE — G8417 CALC BMI ABV UP PARAM F/U: HCPCS | Performed by: PHYSICIAN ASSISTANT

## 2025-03-24 PROCEDURE — 1123F ACP DISCUSS/DSCN MKR DOCD: CPT | Performed by: PHYSICIAN ASSISTANT

## 2025-03-24 PROCEDURE — 3079F DIAST BP 80-89 MM HG: CPT | Performed by: PHYSICIAN ASSISTANT

## 2025-03-24 PROCEDURE — 3017F COLORECTAL CA SCREEN DOC REV: CPT | Performed by: PHYSICIAN ASSISTANT

## 2025-03-24 RX ORDER — HYDROCODONE BITARTRATE AND ACETAMINOPHEN 5; 325 MG/1; MG/1
1 TABLET ORAL DAILY PRN
Qty: 30 TABLET | Refills: 0 | Status: SHIPPED | OUTPATIENT
Start: 2025-03-29 | End: 2025-04-28

## 2025-03-24 NOTE — PROGRESS NOTES
[facility]  [address], [place] [address]  [phone]    Follow up Note      Ruth Anguiano     Date of Visit:  3/24/2025    CC:  Patient presents for follow up   Chief Complaint   Patient presents with    Back Pain    Neck Pain             HPI:    Pain is unchanged.        Appropriate analgesia with current medications regimen: yes.    Change in quality of symptoms:no.    Medication side effects:none.   Recent diagnostic testing:none.   Recent interventional procedures:none.    She has not been on anticoagulation medications to include ASA. The patient  has not been on herbal supplements.  The patient is not diabetic.     Imaging studies:    06/09/222 Left hip x-ray    FINDINGS:   Anatomically aligned left MERVAT with no abnormal bone or soft tissue findings.           Impression   Left MERVAT with no unexpected findings.           Cervical XR 11/2019 Severe degenerative changes      Lumbar spine Xray 03/2019  Scoliosis and osteoarthritis.     Bilateral hip Xray 03/2019   Advanced osteoarthrosis of bilateral hips.                                        Potential Aberrant Drug-Related Behavior: None     Urine Drug Screening:  First office visit saliva screen showed no narcotics   11/2019 Consistent, negative for all  06/2020 Consistent  12/2020 Consistent  06/2021 Consistent  04/2022 Consistent  02/2023 Consistent for gabapentin, consistent for no tylenol with codeine as she did not have an active script.    08/2023 Consistent  06/2024 Consistent for gabapentin, negative for hydrocodone but takes prn.  Will continue to monitor.  No previous issues.         OARRS report:  03/2019 consistent to 06/2021 Consistent  (norco on 9/24 from dentist for teeth extraction).  08/2021 Consistent to 03/2025 Consistent    Opioid Agreement:  10/07/2021   Updated:  09/26/2022   Updated: 08/28/2023  Updated:  08/19/2024    Past Medical History:   Diagnosis Date    A-fib (HCC)     Acute deep

## 2025-03-24 NOTE — PROGRESS NOTES
Ruth Anguiano presents to the [facility] on 3/24/2025. Ruth is complaining of pain in her neck and back. The pain is constant. The pain is described as aching, throbbing, stabbing, and sharp. Pain is rated on her best day at a 5, on her worst day at a 10, and on average at a 7 on the VAS scale. She took her last dose of Norco and Neurontin 2 days ago.      Any procedures since your last visit: No    She is not on NSAIDS and  is  on anticoagulation medications to include ASA and is managed by Hien Stokes MD       Pacemaker or defibrillator: No     Medication Contract and Consent for Opioid Use Documents Filed       Patient Documents       Type of Document Status Date Received Received By Description    Medication Contract Received 3/1/2019  1:43 PM FRANCIS MCINTOSH PAIN MANAGEMENT PT AGREEMENT 3/1/2019    Medication Contract Received 10/8/2020  1:21 PM FRANCIS MCINTOSH pain pt agreement    Medication Contract Received 10/7/2021 10:36 AM BOLIVAR TRUJILLO Pain Mgmt Patient Agreement 10.7.2021    Medication Contract Received 9/26/2022 10:40 AM ENMA WAGNER MEDICATION CONTRACT    Consent Opioid Use Received 8/28/2023 10:09 AM OLU VALENZUELA pt agreement 8/28/23    Consent Opioid Use Received 8/19/2024 12:01 PM OLU VALENZUELA pt agreement 8/19/24                       Resp 18   Ht 1.651 m (5' 5\")   Wt 103.4 kg (228 lb)   BMI 37.94 kg/m²      No LMP recorded. Patient is postmenopausal.

## 2025-03-25 DIAGNOSIS — Z76.0 MEDICATION REFILL: ICD-10-CM

## 2025-03-25 DIAGNOSIS — Z95.828 PRESENCE OF ARTERIAL STENT: ICD-10-CM

## 2025-03-25 DIAGNOSIS — I67.1 CEREBRAL ANEURYSM: ICD-10-CM

## 2025-03-25 RX ORDER — ATORVASTATIN CALCIUM 20 MG/1
20 TABLET, FILM COATED ORAL 2 TIMES DAILY
Qty: 60 TABLET | Refills: 2 | Status: SHIPPED | OUTPATIENT
Start: 2025-03-25 | End: 2025-06-23

## 2025-03-25 RX ORDER — PANTOPRAZOLE SODIUM 20 MG/1
20 TABLET, DELAYED RELEASE ORAL DAILY
Qty: 90 TABLET | Refills: 0 | Status: SHIPPED | OUTPATIENT
Start: 2025-03-25

## 2025-03-25 RX ORDER — MAGNESIUM OXIDE 400 MG/1
400 TABLET ORAL DAILY
Qty: 30 TABLET | Refills: 2 | Status: SHIPPED | OUTPATIENT
Start: 2025-03-25

## 2025-03-25 RX ORDER — ASPIRIN 81 MG/1
81 TABLET ORAL DAILY
Qty: 90 TABLET | Refills: 3 | Status: SHIPPED | OUTPATIENT
Start: 2025-03-25 | End: 2026-03-25

## 2025-03-25 NOTE — TELEPHONE ENCOUNTER
Name of Medication(s) Requested:  Requested Prescriptions     Pending Prescriptions Disp Refills    pantoprazole (PROTONIX) 20 MG tablet [Pharmacy Med Name: Pantoprazole Sodium Oral Tablet Delayed Release 20 MG] 90 tablet 0     Sig: TAKE ONE TABLET BY MOUTH DAILY    atorvastatin (LIPITOR) 20 MG tablet 60 tablet 2     Sig: Take 1 tablet by mouth in the morning and at bedtime    magnesium oxide (MAG-OX) 400 MG tablet 30 tablet 2     Sig: Take 1 tablet by mouth daily    aspirin 81 MG EC tablet 90 tablet 3     Sig: Take 1 tablet by mouth daily       Medication is on current medication list Yes    Dosage and directions were verified? Yes    Quantity verified: 90 day supply     Pharmacy Verified?  Yes    Last Appointment:  3/6/2025    Future appts:  Future Appointments   Date Time Provider Department Center   4/21/2025 11:15 AM [facility]   4/28/2025  9:45 AM Massiel Kebede PA [facility] Pain [facility]   5/14/2025 10:30 AM Ann Betancourt, APRN - CNP ACC Pulm [facility]   11/21/2025 11:00 AM Judd Olivier MD [facility] NEURO Neurology -   11/27/2025  9:30 AM Ivet Wright MD [facility] Womens [facility]        (If no appt send self scheduling link. .REFILLAPPT)  Scheduling request sent?     [] Yes  [x] No    Does patient need updated?  [x] Yes  [] No

## 2025-03-26 ENCOUNTER — CARE COORDINATION (OUTPATIENT)
Dept: CARE COORDINATION | Age: 65
End: 2025-03-26

## 2025-03-26 NOTE — CARE COORDINATION
Care Transitions Note    Follow Up Call     3/6/2025 - 3/11/2025 [facility] Ivet Jayne. Sepsis, Pneumonia.          Activity   20 20 20 20 20 20 20   Blood  Pressure   157/92/70 149/96/81 143/87/71 139/86/73 153/86/77  155/91/77 134/86/85  136/98/92 140/84/80   Survey   0/3 0/0 Complete 0/0 0/0 Complete 0/0   PulseOx   95/65 93/71 92/84 94/73 94/86 94/85  93/90 93/65   Temperature   97.6F 97.3F 97.8F 97.6F 97.6F 97.7F 97.6F   Weight   229.5 229 228.5 227 226.5 228 228       Patient Current Location:  Home: [address] [place] [id number]    Care Transition Nurse contacted the patient by telephone. Verified name and  as identifiers.    Additional needs identified to be addressed with provider   No needs identified                 Method of communication with provider: none.    Care Summary Note: CTN spoke w/ Ruth.    Current RPM Program pt. HRS 7 day history as listed above. Sats 95% and has continued cough but feels slowly improving. Reports occasional clear to yellow phlegm production. Has not needed Mucinex in the past 3 days. No wheezes or acute exertional SOB. No chills or flu-like symptoms and has been afebrile. She reports she had an episode of night sweating last night. It did wake her. No other associated symptoms. Was able to fall back asleep last night. Has not noted this on other nights and reports she has been sleeping well nightly. She did take a Norco yesterday morning for chronic pain and is not a new medication to her. States no edema concerns or wt gains. Pt feels well today. No appetite or elimination concerns. Pt is alert and answers questions appropriately. Denies any mental fogginess. No home or med refill needs today.    Plan of care updates since last contact:  Education: Return to ED for s/s Sepsis: chills, freq sweats, fever, flu-like symptoms, dizziness, low BP, Rapid HR, rash, warm dry skin.  Reviewed to report to her

## 2025-03-27 ENCOUNTER — CARE COORDINATION (OUTPATIENT)
Dept: CASE MANAGEMENT | Age: 65
End: 2025-03-27

## 2025-03-27 NOTE — CARE COORDINATION
-Remote Alert Monitoring Note  Date/Time:  3/27/2025 11:52 AM  Patient Current Location: [place]  Verified patients name and  as identifiers.    Rpm alert to be reviewed by the provider   red alert  pulse ox reading (91%)  Vitals Recheck pulse ox reading (95%)  Additional needs to be addressed by provider: No         LPN contacted patient by telephone regarding red alert received   Background: Sepsis, CHF  Refer to 911 immediately if:  Patient unresponsive or unable to provide history  Change in cognition or sudden confusion  Patient unable to respond in complete sentences  Intense chest pain/tightness  Any concern for any clinical emergency  Red Alert: Provider response time of 1 hr required for any red alert requiring intervention  Yellow Alert: Provider response time of 3hr required for any escalated yellow alert  Patient Chief Complaint:  Oxygen O2 Triage  Are you having any Chest Pain? no   Are you having any Shortness of Breath? no   Swelling in your hands or feet? no   Are you having any other health concerns or issues? no  .............................................................................................................................................................................................  Do you use oxygen? No   Patient educated on oxygen preparedness in case of an emergency?  No     Patient/Caregiver educated on how to how to properly place pulse oximeter. Patient/Caregiver verbalizes understanding.     Clinical Interventions: Reviewed and followed up on alerts and treatments-Patient has low SpO2 level of 91%. The patient is in no apparent distress at this time.  Denies SOB, CP, Swelling and Dizziness. Patient is feeling fine she says. Therapist rechecked Oximeter reading at 95%. All reported metrics are WNL of RPM Alert Parameters.     For any new or worsening symptoms or you are concerned in anyway, please contact your Provider, Call 911 or report to the nearest Emergency Room.

## 2025-03-27 NOTE — CARE COORDINATION
-Remote Alert Monitoring Note  Date/Time:  3/27/2025 9:02 AM  Patient Current Location: [place]  Verified patients name and  as identifiers.    Rpm alert to be reviewed by the provider   red alert  pulse ox reading (91%)  Vitals Recheck pulse ox reading ( )  Additional needs to be addressed by provider: No         LPN attempted to contacted patient by telephone regarding red alert received   Background: Sepsis, CHF  Refer to 911 immediately if:  Patient unresponsive or unable to provide history  Change in cognition or sudden confusion  Patient unable to respond in complete sentences  Intense chest pain/tightness  Any concern for any clinical emergency  Red Alert: Provider response time of 1 hr required for any red alert requiring intervention  Yellow Alert: Provider response time of 3hr required for any escalated yellow alert    Clinical Interventions: Reviewed and followed up on alerts and treatments-Patient has low SpO2 level of 91%.      Attempted to reach patient & ER Contact DTR Kelvin for RPM Red Alert Call. Unable to reach patient.  Left HIPAA Compliant message on Voice Mail to Call Back. Number left on Voice mail.   Will continue to follow.     Becki Guallpa LPN    [phone]  [facility] Coordinator    Plan/Follow Up: Will continue to review, monitor and address alerts with follow up based on severity of symptoms and risk factors.  **For any new or worsening symptoms or you are concerned in anyway, please contact your Provider or report to the nearest Emergency Room.**

## 2025-03-28 VITALS
SYSTOLIC BLOOD PRESSURE: 144 MMHG | DIASTOLIC BLOOD PRESSURE: 83 MMHG | BODY MASS INDEX: 38.27 KG/M2 | HEART RATE: 69 BPM | OXYGEN SATURATION: 92 % | WEIGHT: 230 LBS | TEMPERATURE: 97.6 F

## 2025-04-02 ENCOUNTER — CARE COORDINATION (OUTPATIENT)
Dept: CASE MANAGEMENT | Age: 65
End: 2025-04-02

## 2025-04-02 ENCOUNTER — CARE COORDINATION (OUTPATIENT)
Dept: CARE COORDINATION | Age: 65
End: 2025-04-02

## 2025-04-02 NOTE — CARE COORDINATION
-Remote Alert Monitoring Note  Date/Time:  2025 11:02 AM  Patient Current Location: [place]  Verified patients name and  as identifiers.    Rpm alert to be reviewed by the provider   red alert  temperature reading (96.8)  Vitals Recheck temperature reading ( )  Additional needs to be addressed by provider: No         LPN attempted to contacted patient by telephone regarding red alert received   Background: CHF, Sepsis  Refer to 911 immediately if:  Patient unresponsive or unable to provide history  Change in cognition or sudden confusion  Patient unable to respond in complete sentences  Intense chest pain/tightness  Any concern for any clinical emergency  Red Alert: Provider response time of 1 hr required for any red alert requiring intervention  Yellow Alert: Provider response time of 3hr required for any escalated yellow alert  Patient Chief Complaint:Patient/Caregiver educated on how to properly use thermometer.     Clinical Interventions: Reviewed and followed up on alerts and treatments-Patient has low Temperature of 96.8.      Attempted to reach patient & ER Contact DTR Emalie for RPM Red Alert Call. Unable to reach patient.  Left HIPAA Compliant message on Voice Mail to Call Back. Number left on Voice mail.   Will continue to follow.     Becki Guallpa LPN    [phone]  [facility] Coordinator    Plan/Follow Up: Will continue to review, monitor and address alerts with follow up based on severity of symptoms and risk factors.  **For any new or worsening symptoms or you are concerned in anyway, please contact your Provider or report to the nearest Emergency Room.**

## 2025-04-02 NOTE — CARE COORDINATION
Care Transitions Note    Follow Up Call     Patient Current Location:  [place] [address]    Care Transition Nurse contacted the patient by telephone. Verified name and  as identifiers.    Additional needs identified to be addressed with provider   No needs identified                 Method of communication with provider: none.    Care Summary Note: Spoke with patient today 25 for MEL/hospital discharge follow up sub call for Sepsis/PNA/ORLIN. Confirmed patient was a RPM red alert for low temp reading of 96.8. Temp reading on recheck while on phone with this CTN is 97.9. Patient states her thermometer at home is plastic she places sublingual. Noted other RPM metrics reported include BP: 157/96, HR: 65, O2 Sat 96% and weight: 230.5 lbs.     Patient states she is doing well but has ongoing cough which is productive. States producing clear/white phlegm. States having shortness of breath with exertion but is at baseline (Emphysema/CHF). Denies any chest pain, chest discomfort, LE swelling, nausea, vomiting, chills or fever.     Noted patient completed f/u yesterday with Dr. CHARO Griffin at [facility] for asymptomatic microscopic hematuria/pelvic organ prolapse/pessary and was prescribed Estradial vaginal cream.     Patient states she continues with visits from [facility] and admits therapy is cutting visits down. Patient states she only uses DME (cane/walker) when going outside of home and does not need it inside. Denies any recent falls.     Patient denies any other complaints at this time. CTN will continue to follow for Care Transition.     Plan of care updates since last contact:  Education: Reiterated CHF/PNA zone tools and knowing when to call doctor or seek medical attention.       Advance Care Planning:   Does patient have an Advance Directive: reviewed and current.    Medication Review:  Medications changed since last call, reviewed today.     Remote Patient Monitoring:  Offered patient

## 2025-04-02 NOTE — CARE COORDINATION
-Remote Alert Monitoring Note  Date/Time:  2025   2:29 PM  Patient Current Location: [place]  Verified patients name and  as identifiers.    Rpm alert to be reviewed by the provider   red alert  temperature reading (96.8)  Vitals Recheck temperature reading (97.9)  Additional needs to be addressed by provider: No         Patient rechecked Temperature at 97.9.  All reported metrics are WNL of RPM Alert Parameters.    Becki Guallpa LPN    [phone]  [facility] Coordinator / RPM

## 2025-04-04 ENCOUNTER — CARE COORDINATION (OUTPATIENT)
Dept: CASE MANAGEMENT | Age: 65
End: 2025-04-04

## 2025-04-04 NOTE — CARE COORDINATION
-Remote Alert Monitoring Note  Date/Time:  2025 10:24 AM  Patient Current Location: [place]  Verified patients name and  as identifiers.    Rpm alert to be reviewed by the provider   red alert  blood pressure reading (186/104)  Vitals Recheck blood pressure reading (144/80)  Additional needs to be addressed by provider: No         Patient rechecked BP at 144/80.  All reported metrics are WNL of RPM Alert Parameters.    Becki Guallpa LPN    [phone]  [facility] Coordinator / RPM

## 2025-04-04 NOTE — CARE COORDINATION
-Remote Alert Monitoring Note  Date/Time:  2025 9:38 AM  Patient Current Location: [place]  Verified patients name and  as identifiers.    Rpm alert to be reviewed by the provider   red alert  blood pressure reading (186/104)  Vitals Recheck blood pressure reading ( )  Additional needs to be addressed by provider: No         LPN contacted patient by telephone regarding red alert received   Background: Sepsis, CHF  Refer to 911 immediately if:  Patient unresponsive or unable to provide history  Change in cognition or sudden confusion  Patient unable to respond in complete sentences  Intense chest pain/tightness  Any concern for any clinical emergency  Red Alert: Provider response time of 1 hr required for any red alert requiring intervention  Yellow Alert: Provider response time of 3hr required for any escalated yellow alert  Patient Chief Complaint:  Blood Pressure BP Triage  Are you having any Chest Pain? no   Are you having any Shortness of Breath? no   Do you have a headache or have any vision changes? no   Are you having any numbness or tingling? no   Are you having any other health concerns or issues? no  Patient/Caregiver educated on how to properly take a blood pressure. Patient/Caregiver verbalizes understanding.     Clinical Interventions: Reviewed and followed up on alerts and treatments-Patient has elevated BP of 186/104.  The patient is in no apparent distress at this time.  Denies CP, SOB, Swelling and Stroke like Symptoms.  Pat states, I was having BP Cuff problems and my nurse put the cuff higher on my arm\". Patient took BP after using the bathroom, without relaxing first and shortly after taking medications. Instructed patient after taking BP medications to check BP about 1.5 to 2 hours after to let medications work. Expresses Understanding. OT is coming out later this morning and will recheck the BP then.     BP Cuff Triage  Where was the blood pressure cuff placed during today's reading; upper

## 2025-04-09 ENCOUNTER — CARE COORDINATION (OUTPATIENT)
Dept: CARE COORDINATION | Age: 65
End: 2025-04-09

## 2025-04-09 NOTE — CARE COORDINATION
Ambulatory Care Coordination Note     4/9/2025 4:30 PM     ACM received RPM patient hand off from CASSY Markham .  ACM will outreach to patient in 1-2 business days to enroll in a High Risk Care Management Program.

## 2025-04-09 NOTE — CARE COORDINATION
Care Transitions Note    Final Call     Attempted to reach patient for transitions of care follow up.  Unable to reach patient.      Outreach Attempts:   HIPAA compliant voicemail left for patient.     Patient graduated from the Care Transitions program on 4/9/25.  Patient/family  Pt enrolled in RPM .      Handoff:   Patient enrolled in RPM for Sepsis  CHF and will be monitoring blood pressure , pulse ox , temperature, and weight daily.   Patient has graduated from Transitions of Care program and will be transitioned to [facility], Cynthia Houston - [phone]  .   RPM team has been notified of transition in patients care.   Patient provided ACM name and contact information for future needs.       Care Summary Note:     Unable to completed Final outreach. Left HIPAA compliant message for Pt requesting return call.  Handoff information provided and ACM name given and to expect outreach.    Pt enrolled in Remote Patient Monitoring for Sepsis, CHF.   Vitals today 4/9/25:   /93   HR 70-73 bpm   P/Ox 92% RA  T 97.3 F  .5 lbs    Assessments:  No changes since last call    Upcoming Appointments:    Future Appointments         Provider Specialty Dept Phone    4/21/2025 11:15 AM Amy Pappas MD Family Medicine [phone]    4/28/2025 9:45 AM Massiel Kebede PA Pain Management [phone]    5/14/2025 10:30 AM Ann Betancourt, APRN - [facility] Pulmonology [phone]    11/21/2025 11:00 AM Judd Olivier MD Neurology [phone]    11/27/2025 9:30 AM Ivet Wright MD Obstetrics and Gynecology [phone]            Elizabeth Markham LPN

## 2025-04-10 ENCOUNTER — CARE COORDINATION (OUTPATIENT)
Dept: CARE COORDINATION | Age: 65
End: 2025-04-10

## 2025-04-10 NOTE — CARE COORDINATION
Ambulatory Care Coordination Note     4/10/2025 12:52 PM     Patient outreach attempt by this ACM today to offer care management services. ACM was unable to reach the patient by telephone today;   left voice message requesting a return phone call to this ACM.     ACM: Cynthia Houston RN     Care Summary Note: ACM attempted to reach patient to follow up for Care Coordination with no answer. HIPAA compliant VM left introducing self and reason for call.  ACM information left on voicemail, requesting a call back.    Plan:   If no return call, ACM will attempt outreach again at a later time.                  PCP/Specialist follow up:   Future Appointments         Provider Specialty Dept Phone    4/11/2025 10:15 AM Hien Stokes MD Family Medicine [phone]    4/21/2025 11:15 AM Amy Pappas MD Family Medicine [phone]    4/28/2025 9:45 AM Massiel Kebede PA Pain Management [phone]    5/14/2025 10:30 AM Ann Betancourt, APRN - CNP Pulmonology [phone]    11/21/2025 11:00 AM Judd Olivier MD Neurology [phone]    11/27/2025 9:30 AM Ivet Wright MD Obstetrics and Gynecology [phone]            Follow Up:   Plan for next ACM outreach in approximately 1-2 days  and 1 week to complete:  Enroll into Care Management and follow previous POC .

## 2025-04-11 ENCOUNTER — OFFICE VISIT (OUTPATIENT)
Dept: FAMILY MEDICINE CLINIC | Age: 65
End: 2025-04-11
Payer: MEDICARE

## 2025-04-11 ENCOUNTER — CARE COORDINATION (OUTPATIENT)
Dept: CASE MANAGEMENT | Age: 65
End: 2025-04-11

## 2025-04-11 VITALS
HEART RATE: 71 BPM | TEMPERATURE: 97 F | WEIGHT: 232 LBS | DIASTOLIC BLOOD PRESSURE: 72 MMHG | HEIGHT: 65 IN | BODY MASS INDEX: 38.65 KG/M2 | RESPIRATION RATE: 15 BRPM | SYSTOLIC BLOOD PRESSURE: 143 MMHG

## 2025-04-11 DIAGNOSIS — N81.4 CYSTOCELE WITH PROLAPSE: ICD-10-CM

## 2025-04-11 DIAGNOSIS — Z87.828 HISTORY OF KIDNEY INJURY: ICD-10-CM

## 2025-04-11 DIAGNOSIS — E66.9 OBESITY (BMI 30-39.9): Chronic | ICD-10-CM

## 2025-04-11 DIAGNOSIS — I50.32 CHRONIC HEART FAILURE WITH PRESERVED EJECTION FRACTION (HCC): Chronic | ICD-10-CM

## 2025-04-11 DIAGNOSIS — M16.0 PRIMARY OSTEOARTHRITIS OF BOTH HIPS: Chronic | ICD-10-CM

## 2025-04-11 DIAGNOSIS — Z96.641 H/O TOTAL HIP ARTHROPLASTY, RIGHT: ICD-10-CM

## 2025-04-11 DIAGNOSIS — R05.3 CHRONIC COUGH: ICD-10-CM

## 2025-04-11 DIAGNOSIS — Z86.79: ICD-10-CM

## 2025-04-11 DIAGNOSIS — I10 PRIMARY HYPERTENSION: Primary | ICD-10-CM

## 2025-04-11 LAB
ANION GAP SERPL CALCULATED.3IONS-SCNC: 15 MMOL/L (ref 7–16)
BUN BLDV-MCNC: 14 MG/DL (ref 6–23)
CALCIUM SERPL-MCNC: 9.6 MG/DL (ref 8.6–10.2)
CHLORIDE BLD-SCNC: 106 MMOL/L (ref 98–107)
CO2: 21 MMOL/L (ref 22–29)
CREAT SERPL-MCNC: 0.8 MG/DL (ref 0.5–1)
GFR, ESTIMATED: 82 ML/MIN/1.73M2
GLUCOSE BLD-MCNC: 140 MG/DL (ref 74–99)
POTASSIUM SERPL-SCNC: 4 MMOL/L (ref 3.5–5)
SODIUM BLD-SCNC: 142 MMOL/L (ref 132–146)

## 2025-04-11 PROCEDURE — 1160F RVW MEDS BY RX/DR IN RCRD: CPT | Performed by: FAMILY MEDICINE

## 2025-04-11 PROCEDURE — 99214 OFFICE O/P EST MOD 30 MIN: CPT | Performed by: FAMILY MEDICINE

## 2025-04-11 PROCEDURE — G8417 CALC BMI ABV UP PARAM F/U: HCPCS | Performed by: FAMILY MEDICINE

## 2025-04-11 PROCEDURE — G8399 PT W/DXA RESULTS DOCUMENT: HCPCS | Performed by: FAMILY MEDICINE

## 2025-04-11 PROCEDURE — G8427 DOCREV CUR MEDS BY ELIG CLIN: HCPCS | Performed by: FAMILY MEDICINE

## 2025-04-11 PROCEDURE — 36415 COLL VENOUS BLD VENIPUNCTURE: CPT | Performed by: FAMILY MEDICINE

## 2025-04-11 PROCEDURE — 3017F COLORECTAL CA SCREEN DOC REV: CPT | Performed by: FAMILY MEDICINE

## 2025-04-11 PROCEDURE — 3077F SYST BP >= 140 MM HG: CPT | Performed by: FAMILY MEDICINE

## 2025-04-11 PROCEDURE — 1123F ACP DISCUSS/DSCN MKR DOCD: CPT | Performed by: FAMILY MEDICINE

## 2025-04-11 PROCEDURE — 1090F PRES/ABSN URINE INCON ASSESS: CPT | Performed by: FAMILY MEDICINE

## 2025-04-11 PROCEDURE — 3078F DIAST BP <80 MM HG: CPT | Performed by: FAMILY MEDICINE

## 2025-04-11 PROCEDURE — 1036F TOBACCO NON-USER: CPT | Performed by: FAMILY MEDICINE

## 2025-04-11 PROCEDURE — 1159F MED LIST DOCD IN RCRD: CPT | Performed by: FAMILY MEDICINE

## 2025-04-11 RX ORDER — LORATADINE 10 MG/1
10 TABLET ORAL DAILY
Qty: 30 TABLET | Refills: 0 | Status: SHIPPED | OUTPATIENT
Start: 2025-04-11 | End: 2025-05-11

## 2025-04-11 RX ORDER — BENZONATATE 100 MG/1
100 CAPSULE ORAL 3 TIMES DAILY PRN
Qty: 30 CAPSULE | Refills: 0 | Status: SHIPPED | OUTPATIENT
Start: 2025-04-11 | End: 2025-04-21

## 2025-04-11 RX ORDER — LOSARTAN POTASSIUM 25 MG/1
25 TABLET ORAL DAILY
Qty: 30 TABLET | Refills: 0 | Status: SHIPPED | OUTPATIENT
Start: 2025-04-11

## 2025-04-11 RX ORDER — BUDESONIDE AND FORMOTEROL FUMARATE DIHYDRATE 80; 4.5 UG/1; UG/1
2 AEROSOL RESPIRATORY (INHALATION) 2 TIMES DAILY
Qty: 10.2 G | Refills: 1 | Status: SHIPPED
Start: 2025-04-11 | End: 2025-04-11 | Stop reason: SINTOL

## 2025-04-11 RX ORDER — ESTRADIOL 0.1 MG/G
2 CREAM VAGINAL EVERY OTHER DAY
COMMUNITY
Start: 2025-04-01

## 2025-04-11 ASSESSMENT — ENCOUNTER SYMPTOMS
WHEEZING: 0
COUGH: 0

## 2025-04-11 NOTE — CARE COORDINATION
-Remote Alert Monitoring Note  Date/Time:  2025 9:27 AM  Patient Current Location: [place]  Verified patients name and  as identifiers.    Rpm alert to be reviewed by the provider   red alert and yellow alert  blood pressure reading (172/97) and temperature reading (96.8)  Vitals Recheck blood pressure reading ( ) and temperature reading ( )  Additional needs to be addressed by provider: Has PCP appointment this morning.         LPN contacted patient by telephone regarding red alert received   Background: CHF, Sepsis  Refer to 911 immediately if:  Patient unresponsive or unable to provide history  Change in cognition or sudden confusion  Patient unable to respond in complete sentences  Intense chest pain/tightness  Any concern for any clinical emergency  Red Alert: Provider response time of 1 hr required for any red alert requiring intervention  Yellow Alert: Provider response time of 3hr required for any escalated yellow alert  Patient Chief Complaint:  Blood Pressure BP Triage  Are you having any Chest Pain? no   Are you having any Shortness of Breath? no   Do you have a headache or have any vision changes? no   Are you having any numbness or tingling? no   Are you having any other health concerns or issues? no  Patient/Caregiver educated on how to properly take a blood pressure. Patient/Caregiver verbalizes understanding.       Temperature Do you feel your heart racing/beating faster than usual? No  Do you feel more short of breath?No   Are you feeling more confused or your has your caregiver noticed that you seem more confused? No   Do you have a wound anywhere? No    Do you have a bad cough or congestion? No   Have you been running at temperature above 101.5 or below 96.8? No   Have you noticed a change in your urine?No  Is your wound showing signs of dehiscence? No   Are you experiencing urinary frequency? No   Are you experiencing any super pubic pain? No   Patient/Caregiver educated on how to properly

## 2025-04-11 NOTE — PROGRESS NOTES
4/11/2025    Ruth Anguiano is a 65 y.o. female here for   Chief Complaint   Patient presents with    Cough    Other     paperwork       Several issues today  She is also requesting hospital bed mattress.    Needs apperwork/ face to face assessment  She has had a hospital bed since time of her stroke 6 years ago.   She has had the same mattress for 6 years   Able to change her position - raising feet  Easier to get in and out of than her other bed  Wt Readings from Last 3 Encounters:   04/11/25 105.2 kg (232 lb)   03/24/25 103.4 kg (228 lb)   04/10/25 105.2 kg (232 lb)     Seemed to be better for a few days  She has a cough  Thinks it could be from the emphysema  Cough is described as \"rougher\", sproductive of sputum, it take s aminute ot bring up sputum  Sometimes takes a few minutes to resolve; hoarse both dry and wet   \"Like something is stuck\"  It is now occurring daily for 3-4 days , worse over this time period  Occurs at night  She has had some coughign pills  She does use inahler - 2-3 times   Sometimes will occur more often    She has a nebulizer which she uses most mornings since hospital discharge.     She  reports that she quit smoking about 6 years ago. Her smoking use included cigarettes. She started smoking about 49 years ago. She has a 64.5 pack-year smoking history. She has never used smokeless tobacco.    Medications and allergies reviewed and updated in chart.    Current Outpatient Medications   Medication Sig Dispense Refill    estradiol (ESTRACE) 0.1 MG/GM vaginal cream Place 2 g vaginally every other day      pantoprazole (PROTONIX) 20 MG tablet TAKE ONE TABLET BY MOUTH DAILY 90 tablet 0    atorvastatin (LIPITOR) 20 MG tablet Take 1 tablet by mouth in the morning and at bedtime 60 tablet 2    magnesium oxide (MAG-OX) 400 MG tablet Take 1 tablet by mouth daily 30 tablet 2    aspirin 81 MG EC tablet Take 1 tablet by mouth daily 90 tablet 3    HYDROcodone-acetaminophen (NORCO) 5-325 MG per tablet

## 2025-04-11 NOTE — CARE COORDINATION
-Remote Alert Monitoring Note  Date/Time:  2025 10:48 AM  Patient Current Location: [place]  Verified patients name and  as identifiers.    Rpm alert to be reviewed by the provider   red alert and yellow alert  blood pressure reading (172/97) and temperature reading (96.8)  Vitals Recheck blood pressure reading (143/72) and temperature reading (97)  Additional needs to be addressed by provider: Has PCP appointment this morning.         BP & Temp taken at PCP Office appointment 143/72 & 97.  All reported metrics are WNL of RPM Alert Parameters.    Becki Guallpa LPN    [phone]  [facility] Coordinator / RPM

## 2025-04-14 ENCOUNTER — CARE COORDINATION (OUTPATIENT)
Dept: CARE COORDINATION | Age: 65
End: 2025-04-14

## 2025-04-14 NOTE — CARE COORDINATION
Ambulatory Care Coordination Note     2025 5:37 PM     Patient Current Location:  Home: [facility] [place] [address]     This patient was received as a referral from Care Transition Nurse.    ACM contacted the patient by telephone. Verified name and  with patient as identifiers. Provided introduction to self, and explanation of the ACM role.   Patient accepted care management services at this time.          ACM: Cynthia Houston RN     Challenges to be reviewed by the provider   Additional needs identified to be addressed with provider Yes  Patient enrolled into care management with RPM.               Method of communication with provider: chart routing.    Utilization: Initial Call - N/A    Care Summary Note:     Patient A/Ox4.  Patient continued enrollment into care management with RPM.  Patient has hospital bed and reports having a mattress being delivered d/t her old mattress is breaking down.   Patient denies any s/s of sepsis such as high or low body temp, fast HR, difficulty breathing, change in mental status or clammy/sweaty skin.  Patient was inpatient 3/6-3/11 at [facility] for sepsis/pneumonia.  Patient has [facility] PT weekly at this point.  She reports feeling well but doesn't feel like she should be driving.  Otherwise patient drives.    Patient is independent with ADL's, using shower bench inside/over tub for bath care.  Patient is ambulatory.  Patient's 2 sons live with her at her home.  Patient has no difficulty with IADLs at this time.    See assessment for CHF, HTN, COPD    All appointments reviewed    Patient is known to an [facility] ANDRIA Irene and is aware of her appt 25        Offered patient enrollment in the Remote Patient Monitoring (RPM) program for in-home monitoring: Yes, patient enrolled; current status is activated and monitoring.                 Assessments Completed:       2025     5:33 PM   Amb Fall Risk Assessment and TUG Test   Do you feel unsteady or are you

## 2025-04-15 ENCOUNTER — CARE COORDINATION (OUTPATIENT)
Dept: CASE MANAGEMENT | Age: 65
End: 2025-04-15

## 2025-04-15 NOTE — CARE COORDINATION
-Remote Alert Monitoring Note  Date/Time:  4/15/2025 2:48 PM  Patient Current Location: [place]  Verified patients name and  as identifiers.    Rpm alert to be reviewed by the provider   red alert  temperature reading (96.8)  Vitals Recheck temperature reading (97.4)  Additional needs to be addressed by provider: No         LPN contacted patient by telephone regarding red alert received   Background: Sepsis, CHF  Refer to 911 immediately if:  Patient unresponsive or unable to provide history  Change in cognition or sudden confusion  Patient unable to respond in complete sentences  Intense chest pain/tightness  Any concern for any clinical emergency  Red Alert: Provider response time of 1 hr required for any red alert requiring intervention  Yellow Alert: Provider response time of 3hr required for any escalated yellow alert  Patient Chief Complaint:  Temperature Do you feel your heart racing/beating faster than usual? No  Do you feel more short of breath?No   Are you feeling more confused or your has your caregiver noticed that you seem more confused? No   Do you have a wound anywhere? No    Do you have a bad cough or congestion? No   Have you been running at temperature above 101.5 or below 96.8? No   Have you noticed a change in your urine?No  Is your wound showing signs of dehiscence? No   Are you experiencing urinary frequency? No   Are you experiencing any super pubic pain? No   Patient/Caregiver educated on how to properly use thermometer.     Clinical Interventions: Reviewed and followed up on alerts and treatments-Patient has low Temperature of 96.8. Called and spoke with patient. The patient is in no apparent distress at this time.  Denies CP, SOB, Rapid Heart Rate, Dizziness, Mottling, Chills, Confusion, Low urine out put or Frequency in urination, Sweaty, NVD. Patient rechecked Temperature at 97.4. All reported metrics are WNL of RPM Alert Parameters.     Plan/Follow Up: Will continue to review, monitor

## 2025-04-15 NOTE — CARE COORDINATION
-Remote Alert Monitoring Note  Date/Time:  4/15/2025 10:50 AM  Patient Current Location: [place]  Verified patients name and  as identifiers.    Rpm alert to be reviewed by the provider   red alert  temperature reading (96.8)  Vitals Recheck temperature reading ( )  Additional needs to be addressed by provider: No         LPN attempted to contacted patient by telephone regarding red alert received   Background: Sepsis, CHF  Refer to 911 immediately if:  Patient unresponsive or unable to provide history  Change in cognition or sudden confusion  Patient unable to respond in complete sentences  Intense chest pain/tightness  Any concern for any clinical emergency  Red Alert: Provider response time of 1 hr required for any red alert requiring intervention  Yellow Alert: Provider response time of 3hr required for any escalated yellow alert    Clinical Interventions: Reviewed and followed up on alerts and treatments-Patient has low Temperature of 96.8.      Attempted to reach patient & ER Contact for RPM Red Alert Call. Unable to reach patient.  Left HIPAA Compliant message on Voice Mail to Call Back. Number left on Voice mail.   Will continue to follow.     Becki Guallpa LPN    [phone]  [facility] Coordinator   Plan/Follow Up: Will continue to review, monitor and address alerts with follow up based on severity of symptoms and risk factors.  **For any new or worsening symptoms or you are concerned in anyway, please contact your Provider or report to the nearest Emergency Room.**

## 2025-04-16 ENCOUNTER — CARE COORDINATION (OUTPATIENT)
Dept: OTHER | Facility: CLINIC | Age: 65
End: 2025-04-16

## 2025-04-16 ENCOUNTER — CARE COORDINATION (OUTPATIENT)
Dept: CARE COORDINATION | Age: 65
End: 2025-04-16

## 2025-04-16 NOTE — CARE COORDINATION
4/16/2025 10:18 AM  *  Unable to Reach Date/Time:  4/16/2025 10:18 AM  ACM attempted to reach patient by telephone regarding red alert r/t BP in remote patient monitoring program. Left HIPAA compliant message requesting a return call. ACM notified.       SANCHEZ Hogan, RN  Associate Care Manager   Cell: [phone]  [email or web address]

## 2025-04-16 NOTE — CARE COORDINATION
4/16/2025 9:26 AM  *  Unable to Reach Date/Time:  4/16/2025 9:26 AM  ACM attempted to reach patient by telephone regarding red alert r/t BP in remote patient monitoring program. Unable to leave a VM, the call fails after several rings. Will attempt to reach patient again.       SANCHEZ Hogan, RN  Associate Care Manager   Cell: [phone]  [email or web address]

## 2025-04-17 ENCOUNTER — CARE COORDINATION (OUTPATIENT)
Dept: CASE MANAGEMENT | Age: 65
End: 2025-04-17

## 2025-04-17 NOTE — CARE COORDINATION
-Remote Alert Monitoring Note  Date/Time:  2025 9:18 AM  Patient Current Location: [place]  Verified patients name and  as identifiers.    Rpm alert to be reviewed by the provider   red alert  blood pressure reading (180/107) and temperature reading (96.8)  Vitals Recheck blood pressure reading ( ) and temperature reading ( )  Additional needs to be addressed by provider: No         LPN attempted to contacted patient by telephone regarding red alert received   Background: Sepsis, CHF  Refer to 911 immediately if:  Patient unresponsive or unable to provide history  Change in cognition or sudden confusion  Patient unable to respond in complete sentences  Intense chest pain/tightness  Any concern for any clinical emergency  Red Alert: Provider response time of 1 hr required for any red alert requiring intervention  Yellow Alert: Provider response time of 3hr required for any escalated yellow alert    Clinical Interventions: Reviewed and followed up on alerts and treatments-Patient has elevated BP of 180/107 and low Temperature of 96.8.      Attempted to reach patient & ER Contact, DTR Emalie for RPM Red Alert Call. Unable to reach patient.  Left HIPAA Compliant message on Voice Mail to Call Back. Number left on Voice mail.   Will continue to follow.     Becki Guallpa LPN    [phone]  [facility] / [facility] Coordinator    Plan/Follow Up: Will continue to review, monitor and address alerts with follow up based on severity of symptoms and risk factors.  **For any new or worsening symptoms or you are concerned in anyway, please contact your Provider or report to the nearest Emergency Room.**

## 2025-04-17 NOTE — CARE COORDINATION
-Remote Alert Monitoring Note  Date/Time:  2025 10:44 AM  Patient Current Location: [place]  Verified patients name and  as identifiers.    Rpm alert to be reviewed by the provider   red alert  blood pressure reading (180/107) and temperature reading (96.8)  Vitals Recheck blood pressure reading (152/88) and temperature reading (97.1)  Additional needs to be addressed by provider: No         Patient rechecked BP at 152/88 and Temperature at 97.1.  All reported metrics are WNL of RPM Alert Parameters.  Attempted to reach patient for RPM Red Alert Call. Unable to reach patient.  Left HIPAA Compliant message on Voice Mail to Call Back. Number left on Voice mail.     Becki Guallpa LPN    [phone]  [facility] Coordinator / RPM

## 2025-04-18 ENCOUNTER — CARE COORDINATION (OUTPATIENT)
Dept: CASE MANAGEMENT | Age: 65
End: 2025-04-18

## 2025-04-18 NOTE — CARE COORDINATION
-Remote Alert Monitoring Note  Date/Time:  2025 9:44 AM  Patient Current Location: [place]  Verified patients name and  as identifiers.    Rpm alert to be reviewed by the provider   yellow alert  blood pressure reading (172/89)  Vitals Recheck blood pressure reading ( )  Additional needs to be addressed by provider: No         LPN contacted patient by telephone regarding red alert received   Background: Sepsis CHF  Refer to 911 immediately if:  Patient unresponsive or unable to provide history  Change in cognition or sudden confusion  Patient unable to respond in complete sentences  Intense chest pain/tightness  Any concern for any clinical emergency  Red Alert: Provider response time of 1 hr required for any red alert requiring intervention  Yellow Alert: Provider response time of 3hr required for any escalated yellow alert  Patient Chief Complaint:  Blood Pressure BP Triage  Are you having any Chest Pain? no   Are you having any Shortness of Breath? no   Do you have a headache or have any vision changes? no   Are you having any numbness or tingling? no   Are you having any other health concerns or issues? no  Patient/Caregiver educated on how to properly take a blood pressure. Patient/Caregiver verbalizes understanding.     Clinical Interventions: Reviewed and followed up on alerts and treatments-Patient has elevated BP of 172/89.  The patient is in no apparent distress at this time.   Denies CP, SOB, Swelling, Stroke Like Symptoms. Patient has taken morning medications about 7:30 AM. Patient will recheck BP about 11 AM.    For any new or worsening symptoms or you are concerned in anyway, please contact your Provider, Call 911 or report to the nearest Emergency Room. Expresses Understanding    Plan/Follow Up: Will continue to review, monitor and address alerts with follow up based on severity of symptoms and risk factors.  **For any new or worsening symptoms or you are concerned in anyway, please contact your

## 2025-04-18 NOTE — CARE COORDINATION
-Remote Alert Monitoring Note  Date/Time:  2025   4:04 PM  Patient Current Location: [place]  Verified patients name and  as identifiers.    Rpm alert to be reviewed by the provider   yellow alert  blood pressure reading (172/89)  Vitals Recheck blood pressure reading   Additional needs to be addressed by provider: No         Attempted to reach patient to recheck BP.   Left HIPAA Compliant message on Voice Mail to Call Back. Number left on Voice mail.   ACM notified  Will continue to follow.     Becki Guallpa LPN    [phone]  [facility] / [facility] Coordinator

## 2025-04-18 NOTE — CARE COORDINATION
-Remote Alert Monitoring Note  Date/Time:  2025   12:44 PM  Patient Current Location: [place]  Verified patients name and  as identifiers.    Rpm alert to be reviewed by the provider   yellow alert  blood pressure reading (172/89)  Vitals Recheck blood pressure reading ()  Additional needs to be addressed by provider: No         Called and spoke with patient. She just got home and will recheck BP shortly.    Becki Guallpa LPN    [phone]  [facility] Coordinator / RPM

## 2025-04-21 ENCOUNTER — CARE COORDINATION (OUTPATIENT)
Dept: CARE COORDINATION | Age: 65
End: 2025-04-21

## 2025-04-21 ENCOUNTER — CARE COORDINATION (OUTPATIENT)
Dept: CASE MANAGEMENT | Age: 65
End: 2025-04-21

## 2025-04-21 ENCOUNTER — OFFICE VISIT (OUTPATIENT)
Dept: FAMILY MEDICINE CLINIC | Age: 65
End: 2025-04-21
Payer: MEDICARE

## 2025-04-21 VITALS
RESPIRATION RATE: 18 BRPM | HEIGHT: 65 IN | SYSTOLIC BLOOD PRESSURE: 164 MMHG | BODY MASS INDEX: 39.15 KG/M2 | HEART RATE: 77 BPM | DIASTOLIC BLOOD PRESSURE: 84 MMHG | WEIGHT: 235 LBS | OXYGEN SATURATION: 97 % | TEMPERATURE: 98.7 F

## 2025-04-21 DIAGNOSIS — Z76.0 MEDICATION REFILL: ICD-10-CM

## 2025-04-21 DIAGNOSIS — K59.04 CHRONIC IDIOPATHIC CONSTIPATION: Primary | ICD-10-CM

## 2025-04-21 DIAGNOSIS — I10 HYPERTENSION, UNSPECIFIED TYPE: ICD-10-CM

## 2025-04-21 PROCEDURE — 1036F TOBACCO NON-USER: CPT

## 2025-04-21 PROCEDURE — 1090F PRES/ABSN URINE INCON ASSESS: CPT

## 2025-04-21 PROCEDURE — 99213 OFFICE O/P EST LOW 20 MIN: CPT

## 2025-04-21 PROCEDURE — 3077F SYST BP >= 140 MM HG: CPT

## 2025-04-21 PROCEDURE — 3078F DIAST BP <80 MM HG: CPT

## 2025-04-21 PROCEDURE — G8399 PT W/DXA RESULTS DOCUMENT: HCPCS

## 2025-04-21 PROCEDURE — G8417 CALC BMI ABV UP PARAM F/U: HCPCS

## 2025-04-21 PROCEDURE — G8427 DOCREV CUR MEDS BY ELIG CLIN: HCPCS

## 2025-04-21 PROCEDURE — 1123F ACP DISCUSS/DSCN MKR DOCD: CPT

## 2025-04-21 PROCEDURE — 3017F COLORECTAL CA SCREEN DOC REV: CPT

## 2025-04-21 PROCEDURE — 1159F MED LIST DOCD IN RCRD: CPT

## 2025-04-21 RX ORDER — CHLORTHALIDONE 25 MG/1
25 TABLET ORAL DAILY
Qty: 90 TABLET | Refills: 0 | OUTPATIENT
Start: 2025-04-21

## 2025-04-21 RX ORDER — AMLODIPINE BESYLATE 5 MG/1
5 TABLET ORAL DAILY
Qty: 30 TABLET | Refills: 0 | Status: SHIPPED | OUTPATIENT
Start: 2025-04-21

## 2025-04-21 RX ORDER — WHEAT DEXTRIN 3 G/3.8 G
4 POWDER (GRAM) ORAL
Qty: 250 G | Refills: 0 | Status: SHIPPED | OUTPATIENT
Start: 2025-04-21

## 2025-04-21 RX ORDER — LOSARTAN POTASSIUM 25 MG/1
25 TABLET ORAL DAILY
Qty: 30 TABLET | Refills: 0 | Status: SHIPPED | OUTPATIENT
Start: 2025-04-21

## 2025-04-21 RX ORDER — DOCUSATE SODIUM 100 MG/1
100 CAPSULE, LIQUID FILLED ORAL 2 TIMES DAILY
Qty: 60 CAPSULE | Refills: 0 | Status: SHIPPED | OUTPATIENT
Start: 2025-04-21 | End: 2025-05-21

## 2025-04-21 NOTE — CARE COORDINATION
-Remote Alert Monitoring Note  Date/Time:  2025 11:45 AM  Patient Current Location: [place]  Verified patients name and  as identifiers.    Rpm alert to be reviewed by the provider   red alert  blood pressure reading (180/108) and temperature reading (96.8)  Vitals Recheck blood pressure reading (164/84) and temperature reading (98.7)  Additional needs to be addressed by provider: Amy Pappas MD                Patient had VS checked at PCP appointment: /84 and Temperature 98.7.  All reported metrics are WNL of RPM Alert Parameters.    Becki Guallpa LPN    [phone]  [facility] Coordinator / RPM

## 2025-04-21 NOTE — CARE COORDINATION
ANTHONY received a red alert for BP of 180/108/67 . MARIAJOSEM contacted Ruth.  She denies any headaches, dizziness, or vision changes. She denies any issues or concerns at this time and reports to feeling good. She reported she was on her way to her PCP appointment and was going to have them recheck her VS.      ANTHONY noted patients BP at the PCP appointment today was 164/84 and pulse 77.

## 2025-04-21 NOTE — CARE COORDINATION
AC outreached patient in response to RPM CC assessment and patient answers to survery questions r/t her RPM    In response to patient survey answers,  Patient has no changes in her symptoms since returning from hospital.  She reports this is her baseline.    She reports to [facility] her hot flashes, sweating are from new Magnesium supplement and her new mattress being too soft/hot and PCP is aware.    She does not have confusion, just poor sleep last night d/t the new soft mattress.    Patient has emphysema and this cough is her \"usual cough\", her baseline.    [facility] forwarded message to [facility] CC.  No acute distress or s/s sepsis noted/reported

## 2025-04-21 NOTE — PROGRESS NOTES
[facility]  Precepting Note    Subjective:  HTN:  Red alert from social work d/t high bp   No red flag sx   BP Readings from Last 3 Encounters:   04/21/25 (!) 164/84   04/11/25 (!) 143/72   03/24/25 131/83     Large change in march   D/t ORLIN & hypotension     1 month ago  Losartan 25 restarted   At home 150s/100s     Constipation:   Small this am   Using miralax PRN     ROS otherwise negative    Past medical, surgical, family and social history were reviewed, non-contributory, and unchanged unless otherwise stated.    Objective:    BP (!) 164/84   Pulse 77   Temp 98.7 °F (37.1 °C) (Temporal)   Resp 18   Ht 1.651 m (5' 5\")   Wt 106.6 kg (235 lb)   SpO2 97%   BMI 39.11 kg/m²     Exam is as noted by resident.    Assessment/Plan:    HTN:   Losartan continue   Metoprolol continue   Restart amlodipine 5    Constipation:   Consistency is educated   Fibre and colace     Follow up in 1 month      Attending Physician Statement  I have reviewed the chart, including any radiology or labs, with the resident(s). I agree with the assessment, plan and orders as documented by the resident.  Please refer to the resident note for additional information.      Electronically signed by Ana Link MD on 4/21/2025 at 11:47 AM

## 2025-04-21 NOTE — CARE COORDINATION
-Remote Alert Monitoring Note  Date/Time:  2025 10:20 AM  Patient Current Location: [place]  Verified patients name and  as identifiers.    Rpm alert to be reviewed by the provider   red alert  blood pressure reading (180/108) and temperature reading (96.8)  Vitals Recheck blood pressure reading ( ) and temperature reading ( )  Additional needs to be addressed by provider: Amy Pappas MD LPN contacted patient by telephone regarding red alert received   Background: Sepsis, CHF  Refer to 911 immediately if:  Patient unresponsive or unable to provide history  Change in cognition or sudden confusion  Patient unable to respond in complete sentences  Intense chest pain/tightness  Any concern for any clinical emergency  Red Alert: Provider response time of 1 hr required for any red alert requiring intervention  Yellow Alert: Provider response time of 3hr required for any escalated yellow alert  Patient Chief Complaint:  Blood Pressure BP Triage  Are you having any Chest Pain? no   Are you having any Shortness of Breath? no   Do you have a headache or have any vision changes? yes   Are you having any numbness or tingling? no   Are you having any other health concerns or issues? no  Patient/Caregiver educated on how to properly take a blood pressure. Patient/Caregiver verbalizes understanding.       Temperature Do you feel your heart racing/beating faster than usual? No  Do you feel more short of breath?No   Are you feeling more confused or your has your caregiver noticed that you seem more confused? No   Do you have a wound anywhere? No    Do you have a bad cough or congestion? No   Have you been running at temperature above 101.5 or below 96.8? No   Have you noticed a change in your urine?No  Is your wound showing signs of dehiscence? No   Are you experiencing urinary frequency? No   Are you experiencing any super pubic pain? No   Patient/Caregiver educated on how to properly use thermometer.

## 2025-04-21 NOTE — TELEPHONE ENCOUNTER
Name of Medication(s) Requested:  Requested Prescriptions     Pending Prescriptions Disp Refills    potassium chloride (KLOR-CON M) 10 MEQ extended release tablet [Pharmacy Med Name: Potassium Chloride Fabi ER Oral Tablet Extended Release 10 MEQ] 90 tablet 0     Sig: TAKE ONE TABLET BY MOUTH DAILY     Refused Prescriptions Disp Refills    chlorthalidone (HYGROTON) 25 MG tablet [Pharmacy Med Name: Chlorthalidone Oral Tablet 25 MG] 90 tablet 0     Sig: TAKE ONE TABLET BY MOUTH DAILY       Medication is on current medication list Yes    Dosage and directions were verified? Yes    Quantity verified: 90 day supply     Pharmacy Verified?  Yes    Last Appointment:  4/11/2025    Future appts:  Future Appointments   Date Time Provider Department Center   4/28/2025  9:45 AM Massiel Kebede PA [facility]   5/14/2025 10:30 AM Ann Betancourt, APRN - CNP [facility]   5/30/2025  9:00 AM [facility]   11/21/2025 11:00 AM Judd Olivier MD [facility] NEURO Neurology -   11/27/2025  9:30 AM Ivet Wright MD [facility]        (If no appt send self scheduling link. .REFILLAPPT)  Scheduling request sent?     [] Yes  [x] No    Does patient need updated?  [x] Yes  [] No

## 2025-04-21 NOTE — PROGRESS NOTES
[facility]  Department of Family Medicine  Family Medicine Residency Program      Patient: Ruth Anguiano 65 y.o. female     Date of Service: 4/21/25      Chief complaint:   Chief Complaint   Patient presents with    Hypertension    Constipation       HISTORY OF PRESENTING ILLNESS     65 y.o. female presented to the clinic     HTN:  - ACM received a red alert for BP of 180/108/67 . ACM contacted Ruth and She denies any headaches, dizziness, or vision changes. She denies any issues or concerns at this time and reports to feeling good.   - Off amlodipine, and chlortahdlieon since time of hospital admission for petar / hypotension  Losartan was resumed with caution at low dose, is on metoprolol 25 daily.   BMP did not show any concerns.   - she stated that in the morning she did not have headaches bt was having lil pain in head and weird vision.   - checking BP at home, 150s/100s.       Constipation:  - hx of hiatal hernia.    -last BM was his morning, small amount.   - denies abdominal pain.   - ususlaly use miralax, is not helping mucjh.         Health Maintenance:  Health Maintenance Due   Topic Date Due    Shingles vaccine (1 of 2) Never done    Respiratory Syncytial Virus (RSV) Pregnant or age 60 yrs+ (1 - Risk 60-74 years 1-dose series) Never done    A1C test (Diabetic or Prediabetic)  05/27/2023    COVID-19 Vaccine (1 - 2024-25 season) Never done    Annual Wellness Visit (Medicare Advantage)  01/01/2025     Past Medical History:      Diagnosis Date    A-fib (HCC)     Acute deep vein thrombosis (DVT) of right peroneal vein ([facility]) 10/14/2022    Brain aneurysm 09/2018    H/O TIMES 2 THAT BURST PER PATIENT    Cerebral artery occlusion with cerebral infarction (HCC)     right sided weakness    Chronic deep vein thrombosis (DVT) of right peroneal vein ([facility]) 01/12/2023    Degenerative arthritis of hand     Edentulous     Emphysema of lung (HCC)     Headache     Hiatal hernia     Hypertension

## 2025-04-22 ENCOUNTER — CARE COORDINATION (OUTPATIENT)
Dept: CARE COORDINATION | Age: 65
End: 2025-04-22

## 2025-04-22 ENCOUNTER — CARE COORDINATION (OUTPATIENT)
Dept: CASE MANAGEMENT | Age: 65
End: 2025-04-22

## 2025-04-22 DIAGNOSIS — Z76.0 MEDICATION REFILL: ICD-10-CM

## 2025-04-22 RX ORDER — POTASSIUM CHLORIDE 750 MG/1
10 TABLET, EXTENDED RELEASE ORAL DAILY
Qty: 90 TABLET | Refills: 0 | Status: SHIPPED
Start: 2025-04-22 | End: 2025-04-22

## 2025-04-22 RX ORDER — POTASSIUM CHLORIDE 750 MG/1
10 TABLET, EXTENDED RELEASE ORAL DAILY
Qty: 90 TABLET | Refills: 0 | Status: SHIPPED | OUTPATIENT
Start: 2025-04-22

## 2025-04-22 ASSESSMENT — ENCOUNTER SYMPTOMS
NAUSEA: 0
CHEST TIGHTNESS: 0
SHORTNESS OF BREATH: 0
VOMITING: 0
CONSTIPATION: 0
DIARRHEA: 0
COUGH: 0
ABDOMINAL PAIN: 0

## 2025-04-22 NOTE — CARE COORDINATION
-Remote Alert Monitoring Note  Date/Time:  2025   3:39 PM  Patient Current Location: [place]  Verified patients name and  as identifiers.    Rpm alert to be reviewed by the provider   red alert  blood pressure reading (176/103) and temperature reading (96.3)  Vitals Recheck blood pressure reading (152/88) and temperature reading (96.8)  Additional needs to be addressed by provider: No         Attempted to reach patient to recheck Temperature.   Left HIPAA Compliant message on Voice Mail to Call Back. Number left on Voice mail.   ACM notified.   Will continue to follow.     Becki Guallpa LPN    [phone]  [facility] / [facility] Coordinator

## 2025-04-22 NOTE — CARE COORDINATION
-Remote Alert Monitoring Note  Date/Time:  2025   12:14 PM  Patient Current Location: [place]  Verified patients name and  as identifiers.    Rpm alert to be reviewed by the provider   red alert  blood pressure reading (176/103) and temperature reading (96.3)  Vitals Recheck blood pressure reading (152/88) and temperature reading (98.8)  Additional needs to be addressed by provider: No         LPN contacted patient by telephone regarding red alert received   Background: Sepsis, CHF  Refer to 911 immediately if:  Patient unresponsive or unable to provide history  Change in cognition or sudden confusion  Patient unable to respond in complete sentences  Intense chest pain/tightness  Any concern for any clinical emergency  Red Alert: Provider response time of 1 hr required for any red alert requiring intervention  Yellow Alert: Provider response time of 3hr required for any escalated yellow alert  Patient Chief Complaint:  Blood Pressure BP Triage  Are you having any Chest Pain? no   Are you having any Shortness of Breath? no   Do you have a headache or have any vision changes? no   Are you having any numbness or tingling? no   Are you having any other health concerns or issues? no  Patient/Caregiver educated on how to properly take a blood pressure. Patient/Caregiver verbalizes understanding.       Temperature Do you feel your heart racing/beating faster than usual? No  Do you feel more short of breath?No   Are you feeling more confused or your has your caregiver noticed that you seem more confused? No   Do you have a wound anywhere? No    Do you have a bad cough or congestion? No   Have you been running at temperature above 101.5 or below 96.8? No   Have you noticed a change in your urine?No  Is your wound showing signs of dehiscence? No   Are you experiencing urinary frequency? No   Are you experiencing any super pubic pain? No   Patient/Caregiver educated on how to properly use thermometer.     Clinical

## 2025-04-22 NOTE — TELEPHONE ENCOUNTER
Name of Medication(s) Requested:  Requested Prescriptions     Pending Prescriptions Disp Refills    potassium chloride (KLOR-CON M) 10 MEQ extended release tablet 90 tablet 0     Sig: Take 1 tablet by mouth daily       Medication is on current medication list Yes    Dosage and directions were verified? Yes    Quantity verified: 90 day supply     Pharmacy Verified?  Yes    Last Appointment:  4/11/2025    Future appts:  Future Appointments   Date Time Provider Department Center   4/28/2025  9:45 AM Massiel Kebede PA [facility]   5/14/2025 10:30 AM Ann Betancourt, APRN - CNP [facility]   5/30/2025  9:00 AM Hien Stokes MD [facility]   6/25/2025  1:45 PM Hien Stokes MD [facility]   7/30/2025  1:15 PM Hien Stokes MD [facility]   11/21/2025 11:00 AM Judd Olivier MD [facility] NEURO Neurology -   11/27/2025  9:30 AM Ivet Wright MD [facility]        (If no appt send self scheduling link. .REFILLAPPT)  Scheduling request sent?     [] Yes  [x] No    Does patient need updated?  [x] Yes  [] No

## 2025-04-22 NOTE — CARE COORDINATION
-Remote Alert Monitoring Note  Date/Time:  2025 9:27 AM  Patient Current Location: [place]  Verified patients name and  as identifiers.    Rpm alert to be reviewed by the provider   red alert  blood pressure reading (176/103) and temperature reading (96.3)  Vitals Recheck blood pressure reading ( ) and temperature reading ( )  Additional needs to be addressed by provider: No         LPN attempted to contacted patient by telephone regarding red alert received   Background: Sepsis, CHF  Refer to 911 immediately if:  Patient unresponsive or unable to provide history  Change in cognition or sudden confusion  Patient unable to respond in complete sentences  Intense chest pain/tightness  Any concern for any clinical emergency  Red Alert: Provider response time of 1 hr required for any red alert requiring intervention  Yellow Alert: Provider response time of 3hr required for any escalated yellow alert    Temperature   Patient/Caregiver educated on how to properly use thermometer.     Clinical Interventions: Reviewed and followed up on alerts and treatments-Patient has elevated BP of 176/103 and low Temperature of 96.3.      Attempted to reach patient & ER Contact, DTR Emalie for RPM Red Alert Call. Unable to reach patient.  Left HIPAA Compliant message on Voice Mail to Call Back. Number left on Voice mail.   Will continue to follow.     Becki Guallpa LPN    [phone]  [facility] Coordinator    Plan/Follow Up: Will continue to review, monitor and address alerts with follow up based on severity of symptoms and risk factors.  **For any new or worsening symptoms or you are concerned in anyway, please contact your Provider or report to the nearest Emergency Room.**

## 2025-04-22 NOTE — TELEPHONE ENCOUNTER
If chlorthalidone has been discontinued the patient may no longer need the potassium supplementation.  I recommend to hold this message for PCP to return to address this.

## 2025-04-22 NOTE — TELEPHONE ENCOUNTER
Refill request    Pt called in [facility] because she was just here yesterday and she went to pharmacy and they didn't have a refill for Potassium Chloride 10 MEQ ER Tablets, and she only has 2 pills left.    Please send asap per pt request- did advise can take 48hr for refills requests to process.     RICHIE @ [facility]    Last Appointment   4/21/2025  Next Appointment  5/30/2025

## 2025-04-23 ENCOUNTER — CARE COORDINATION (OUTPATIENT)
Dept: CARE COORDINATION | Age: 65
End: 2025-04-23

## 2025-04-23 NOTE — CARE COORDINATION
Ambulatory Care Coordination Note     4/23/2025 2:55 PM     Patient outreach attempt by this ACM today to perform care management follow up . ACM was unable to reach the patient by telephone today;   left voice message requesting a return phone call to this ACM.     ACM: Cynthia Houston RN     Care Summary Note:                 PCP/Specialist follow up:   Future Appointments         Provider Specialty Dept Phone    4/28/2025 9:45 AM Massiel Kebede PA Pain Management [phone]    5/14/2025 10:30 AM Ann Betancourt, APRN - CNP Pulmonology [phone]    5/30/2025 9:00 AM Hien Stokes MD Family Medicine [phone]    6/25/2025 1:45 PM Hien Stokes MD Family Medicine [phone]    7/30/2025 1:15 PM Hien Stokes MD Family Medicine [phone]    11/21/2025 11:00 AM Judd Olivier MD Neurology [phone]    11/27/2025 9:30 AM Ivet Wright MD Obstetrics and Gynecology [phone]            Follow Up:   Plan for next ACM outreach in approximately 1 week and 2 weeks to complete:  Continue last POC .

## 2025-04-24 ENCOUNTER — CARE COORDINATION (OUTPATIENT)
Dept: CASE MANAGEMENT | Age: 65
End: 2025-04-24

## 2025-04-24 ENCOUNTER — TELEPHONE (OUTPATIENT)
Dept: FAMILY MEDICINE CLINIC | Age: 65
End: 2025-04-24

## 2025-04-24 NOTE — CARE COORDINATION
-Remote Alert Monitoring Note  Date/Time:  2025 9:36 AM  Patient Current Location: [place]  Verified patients name and  as identifiers.    Rpm alert to be reviewed by the provider   yellow alert  blood pressure reading (176/98)  Vitals Recheck blood pressure reading ( )  Additional needs to be addressed by provider: No         LPN attempted to contacted patient by telephone regarding red alert received   Background: Sepsis, CHF  Refer to 911 immediately if:  Patient unresponsive or unable to provide history  Change in cognition or sudden confusion  Patient unable to respond in complete sentences  Intense chest pain/tightness  Any concern for any clinical emergency  Red Alert: Provider response time of 1 hr required for any red alert requiring intervention  Yellow Alert: Provider response time of 3hr required for any escalated yellow alert    Clinical Interventions: Reviewed and followed up on alerts and treatments-Patient has elevated BP of 176/98.      Attempted to reach patient & ER Contact DTR Kelvin for RPM Red Alert Call. Unable to reach patient.  Left HIPAA Compliant message on Voice Mail to Call Back. Number left on Voice mail.   Will continue to follow.     Becki Guallpa LPN    [phone]  [facility] Coordinator    Plan/Follow Up: Will continue to review, monitor and address alerts with follow up based on severity of symptoms and risk factors.  **For any new or worsening symptoms or you are concerned in anyway, please contact your Provider or report to the nearest Emergency Room.**

## 2025-04-24 NOTE — TELEPHONE ENCOUNTER
Home health called in, stating that pt's BP today was 162/98 and pulse was 92.     Pt has been taking amlodipine for two days now.

## 2025-04-24 NOTE — CARE COORDINATION
-Remote Alert Monitoring Note  Date/Time:  2025   4? PM  Patient Current Location: [place]  Verified patients name and  as identifiers.    Rpm alert to be reviewed by the provider   yellow alert  blood pressure reading (176/98)  Vitals Recheck blood pressure reading   Additional needs to be addressed by provider: No       Patient is still not home to recheck BP. Will F/U tomorrow. ACM notified     Becki Guallpa, JTN    [phone]  [facility] Coordinator / RPM

## 2025-04-24 NOTE — CARE COORDINATION
-Remote Alert Monitoring Note  Date/Time:  2025   12:32 PM  Patient Current Location: [place]  Verified patients name and  as identifiers.    Rpm alert to be reviewed by the provider   yellow alert  blood pressure reading (176/98)  Vitals Recheck blood pressure reading ()  Additional needs to be addressed by provider: No         LPN contacted patient by telephone regarding red alert received   Background: Sepsis, CHF  Refer to 911 immediately if:  Patient unresponsive or unable to provide history  Change in cognition or sudden confusion  Patient unable to respond in complete sentences  Intense chest pain/tightness  Any concern for any clinical emergency  Red Alert: Provider response time of 1 hr required for any red alert requiring intervention  Yellow Alert: Provider response time of 3hr required for any escalated yellow alert  Patient Chief Complaint:  Blood Pressure BP Triage  Are you having any Chest Pain? no   Are you having any Shortness of Breath? no   Do you have a headache or have any vision changes? no   Are you having any numbness or tingling? no   Are you having any other health concerns or issues? no  Patient/Caregiver educated on how to properly take a blood pressure. Patient/Caregiver verbalizes understanding.     Clinical Interventions: Reviewed and followed up on alerts and treatments-Patient has elevated BP of 176/98. Called and spoke with patient. She is at [facility]. She is not having any hypertensive S/S at this time. Patient did take medications earlier today. Patient states, \"My nurse took my BP today and then called the PCP office because it was high. I don't remember what it was. I have it written down at home\". Patient instructed to recheck BP after she gets home and relaxes a while before taking it. Patient expresses understanding.    Instructed to go to the ER for worsening or developing CP, Heaviness, tightness, SOB, Severe Headaches, Facial droop, Blurred or double vision, weakness

## 2025-04-25 NOTE — TELEPHONE ENCOUNTER
This blood pressure is decreased from her office visit on 4/23.    Is the patient having any new symptoms?  If so she needs to be seen at this time that would be the emergency room or an urgent care.    The patient is not having any symptoms and she is decreasing from her prior office visit I recommend a recheck Monday or Tuesday with a call to the PCP.

## 2025-04-28 ENCOUNTER — CARE COORDINATION (OUTPATIENT)
Dept: CASE MANAGEMENT | Age: 65
End: 2025-04-28

## 2025-04-28 ENCOUNTER — OFFICE VISIT (OUTPATIENT)
Dept: PAIN MANAGEMENT | Age: 65
End: 2025-04-28
Payer: MEDICARE

## 2025-04-28 VITALS
HEIGHT: 65 IN | HEART RATE: 77 BPM | BODY MASS INDEX: 38.32 KG/M2 | RESPIRATION RATE: 16 BRPM | SYSTOLIC BLOOD PRESSURE: 144 MMHG | DIASTOLIC BLOOD PRESSURE: 62 MMHG | WEIGHT: 230 LBS | TEMPERATURE: 97.1 F | OXYGEN SATURATION: 98 %

## 2025-04-28 DIAGNOSIS — M16.12 PRIMARY OSTEOARTHRITIS OF LEFT HIP: ICD-10-CM

## 2025-04-28 DIAGNOSIS — M47.816 LUMBAR FACET ARTHROPATHY: ICD-10-CM

## 2025-04-28 DIAGNOSIS — M47.812 FACET ARTHROPATHY, CERVICAL: ICD-10-CM

## 2025-04-28 DIAGNOSIS — M51.9 LUMBAR DISC DISORDER: ICD-10-CM

## 2025-04-28 DIAGNOSIS — Z96.642 S/P TOTAL LEFT HIP ARTHROPLASTY: ICD-10-CM

## 2025-04-28 DIAGNOSIS — M16.11 ARTHRITIS OF RIGHT HIP: ICD-10-CM

## 2025-04-28 DIAGNOSIS — G89.4 CHRONIC PAIN SYNDROME: Primary | ICD-10-CM

## 2025-04-28 DIAGNOSIS — M54.16 LUMBAR RADICULITIS: ICD-10-CM

## 2025-04-28 DIAGNOSIS — E66.01 SEVERE OBESITY (BMI 35.0-39.9) WITH COMORBIDITY (HCC): ICD-10-CM

## 2025-04-28 DIAGNOSIS — M50.30 DEGENERATION OF CERVICAL DISC WITHOUT MYELOPATHY: ICD-10-CM

## 2025-04-28 DIAGNOSIS — Z79.891 ENCOUNTER FOR LONG-TERM OPIATE ANALGESIC USE: ICD-10-CM

## 2025-04-28 DIAGNOSIS — M47.816 LUMBAR SPONDYLOSIS: ICD-10-CM

## 2025-04-28 PROCEDURE — 1123F ACP DISCUSS/DSCN MKR DOCD: CPT | Performed by: PHYSICIAN ASSISTANT

## 2025-04-28 PROCEDURE — 99213 OFFICE O/P EST LOW 20 MIN: CPT | Performed by: PHYSICIAN ASSISTANT

## 2025-04-28 PROCEDURE — 3078F DIAST BP <80 MM HG: CPT | Performed by: PHYSICIAN ASSISTANT

## 2025-04-28 PROCEDURE — 3017F COLORECTAL CA SCREEN DOC REV: CPT | Performed by: PHYSICIAN ASSISTANT

## 2025-04-28 PROCEDURE — 3077F SYST BP >= 140 MM HG: CPT | Performed by: PHYSICIAN ASSISTANT

## 2025-04-28 PROCEDURE — 1159F MED LIST DOCD IN RCRD: CPT | Performed by: PHYSICIAN ASSISTANT

## 2025-04-28 PROCEDURE — 1036F TOBACCO NON-USER: CPT | Performed by: PHYSICIAN ASSISTANT

## 2025-04-28 PROCEDURE — 1160F RVW MEDS BY RX/DR IN RCRD: CPT | Performed by: PHYSICIAN ASSISTANT

## 2025-04-28 PROCEDURE — G8399 PT W/DXA RESULTS DOCUMENT: HCPCS | Performed by: PHYSICIAN ASSISTANT

## 2025-04-28 PROCEDURE — G8417 CALC BMI ABV UP PARAM F/U: HCPCS | Performed by: PHYSICIAN ASSISTANT

## 2025-04-28 PROCEDURE — 1090F PRES/ABSN URINE INCON ASSESS: CPT | Performed by: PHYSICIAN ASSISTANT

## 2025-04-28 PROCEDURE — G8427 DOCREV CUR MEDS BY ELIG CLIN: HCPCS | Performed by: PHYSICIAN ASSISTANT

## 2025-04-28 RX ORDER — GABAPENTIN 300 MG/1
CAPSULE ORAL
Qty: 90 CAPSULE | Refills: 2 | Status: SHIPPED | OUTPATIENT
Start: 2025-04-28 | End: 2026-04-28

## 2025-04-28 RX ORDER — DILTIAZEM HYDROCHLORIDE 60 MG/1
TABLET, FILM COATED ORAL
COMMUNITY
Start: 2025-04-11

## 2025-04-28 RX ORDER — HYDROCODONE BITARTRATE AND ACETAMINOPHEN 5; 325 MG/1; MG/1
1 TABLET ORAL DAILY PRN
Qty: 30 TABLET | Refills: 0 | Status: SHIPPED | OUTPATIENT
Start: 2025-04-28 | End: 2025-05-28

## 2025-04-28 NOTE — PROGRESS NOTES
[facility]  [address], [place] [address]  [phone]    Follow up Note      Ruth Anguiano     Date of Visit:  4/28/2025    CC:  Patient presents for follow up   Chief Complaint   Patient presents with    Back Pain    Neck Pain             HPI:    Pain is unchanged.        Appropriate analgesia with current medications regimen: yes.    Change in quality of symptoms:no.    Medication side effects:none.   Recent diagnostic testing:none.   Recent interventional procedures:none.    She has not been on anticoagulation medications to include ASA. The patient  has not been on herbal supplements.  The patient is not diabetic.     Imaging studies:    06/09/222 Left hip x-ray    FINDINGS:   Anatomically aligned left MERVAT with no abnormal bone or soft tissue findings.           Impression   Left MERVAT with no unexpected findings.           Cervical XR 11/2019 Severe degenerative changes      Lumbar spine Xray 03/2019  Scoliosis and osteoarthritis.     Bilateral hip Xray 03/2019   Advanced osteoarthrosis of bilateral hips.                                        Potential Aberrant Drug-Related Behavior: None     Urine Drug Screening:  First office visit saliva screen showed no narcotics   11/2019 Consistent, negative for all  06/2020 Consistent  12/2020 Consistent  06/2021 Consistent  04/2022 Consistent  02/2023 Consistent for gabapentin, consistent for no tylenol with codeine as she did not have an active script.    08/2023 Consistent  06/2024 Consistent for gabapentin, negative for hydrocodone but takes prn.  Will continue to monitor.  No previous issues.         OARRS report:  03/2019 consistent to 06/2021 Consistent  (norco on 9/24 from dentist for teeth extraction).  08/2021 Consistent to 04/2025 Consistent    Opioid Agreement:  10/07/2021   Updated:  09/26/2022   Updated: 08/28/2023  Updated:  08/19/2024    Past Medical History:   Diagnosis Date    A-fib (HCC)     Acute deep

## 2025-04-28 NOTE — CARE COORDINATION
-Remote Alert Monitoring Note  Date/Time:  2025 9:23 AM  Patient Current Location: [place]  Verified patients name and  as identifiers.    Rpm alert to be reviewed by the provider   yellow alert  blood pressure reading (171/94)  Vitals Recheck blood pressure reading ( )  Additional needs to be addressed by provider: No         LPN contacted patient by telephone regarding red alert received   Background: Sepsis, CHF  Refer to 911 immediately if:  Patient unresponsive or unable to provide history  Change in cognition or sudden confusion  Patient unable to respond in complete sentences  Intense chest pain/tightness  Any concern for any clinical emergency  Red Alert: Provider response time of 1 hr required for any red alert requiring intervention  Yellow Alert: Provider response time of 3hr required for any escalated yellow alert  Patient Chief Complaint:  Blood Pressure BP Triage  Are you having any Chest Pain? no   Are you having any Shortness of Breath? no   Do you have a headache or have any vision changes? no   Are you having any numbness or tingling? no   Are you having any other health concerns or issues? no  Patient/Caregiver educated on how to properly take a blood pressure. Patient/Caregiver verbalizes understanding.     Clinical Interventions: Reviewed and followed up on alerts and treatments-Patient has elevated BP of 171/94. Patient has appointment this morning. She will recheck BP when she gets home.      Instructed to go to the ER for worsening or developing CP, Heaviness, tightness, SOB, Severe Headaches, Facial droop, Blurred or double vision, weakness in any extremities, unable to speak, Unable to hold arms up. Expresses understanding.      Plan/Follow Up: Will continue to review, monitor and address alerts with follow up based on severity of symptoms and risk factors.  **For any new or worsening symptoms or you are concerned in anyway, please contact your Provider or report to the nearest

## 2025-04-28 NOTE — PROGRESS NOTES
Ruth Anguiano presents to the [facility] on 4/28/2025. Ruth is complaining of pain in her neck and back. The pain is constant. The pain is described as aching, throbbing, stabbing, and sharp. Pain is rated on her best day at a 5, on her worst day at a 10, and on average at a 7 on the VAS scale. She took her last dose of Norco and Neurontin      Any procedures since your last visit: No    She is not on NSAIDS and  is  on anticoagulation medications to include ASA and is managed by Hien Stokes MD       Pacemaker or defibrillator: No     Medication Contract and Consent for Opioid Use Documents Filed       Patient Documents       Type of Document Status Date Received Received By Description    Medication Contract Received 3/1/2019  1:43 PM FRANCIS MCINTOSH PAIN MANAGEMENT PT AGREEMENT 3/1/2019    Medication Contract Received 10/8/2020  1:21 PM FRANCIS MCINTOSH pain pt agreement    Medication Contract Received 10/7/2021 10:36 AM BOLIVAR TRUJILLO Pain Mgmt Patient Agreement 10.7.2021    Medication Contract Received 9/26/2022 10:40 AM ENMA WAGNER MEDICATION CONTRACT    Consent Opioid Use Received 8/28/2023 10:09 AM OLU VALENZUELA pt agreement 8/28/23    Consent Opioid Use Received 8/19/2024 12:01 PM OLU VALENZUELA pt agreement 8/19/24                       Resp 16   Ht 1.651 m (5' 5\")   Wt 104.3 kg (230 lb)   BMI 38.27 kg/m²      No LMP recorded. Patient is postmenopausal.

## 2025-04-29 ENCOUNTER — CARE COORDINATION (OUTPATIENT)
Dept: CASE MANAGEMENT | Age: 65
End: 2025-04-29

## 2025-04-29 ENCOUNTER — TELEPHONE (OUTPATIENT)
Dept: FAMILY MEDICINE CLINIC | Age: 65
End: 2025-04-29

## 2025-04-29 DIAGNOSIS — I10 HYPERTENSION, UNSPECIFIED TYPE: Primary | ICD-10-CM

## 2025-04-29 LAB
SEND OUT REPORT: NORMAL
TEST NAME: NORMAL

## 2025-04-29 RX ORDER — POTASSIUM CHLORIDE 750 MG/1
10 TABLET, EXTENDED RELEASE ORAL DAILY
Qty: 90 TABLET | Refills: 0 | OUTPATIENT
Start: 2025-04-29

## 2025-04-29 RX ORDER — CHLORTHALIDONE 25 MG/1
25 TABLET ORAL DAILY
Qty: 30 TABLET | Refills: 3 | Status: SHIPPED | OUTPATIENT
Start: 2025-04-29

## 2025-04-29 ASSESSMENT — ENCOUNTER SYMPTOMS: DYSPNEA ASSOCIATED WITH: EXERTION

## 2025-04-29 NOTE — PROGRESS NOTES
Remote Patient Monitoring Change to Monitoring Parameters    Patient currently being managed with remote patient monitoring for Sepsis and CHF.    Request received to delete temperature monitoring in order to accommodate patient's baseline measurements, or associated changes in patient's status.    See care coordination encounters for additional details.

## 2025-04-29 NOTE — TELEPHONE ENCOUNTER
Okay to change temperature parameters (or remove all together - I am mostly interested in her BP, HR readings)

## 2025-04-29 NOTE — TELEPHONE ENCOUNTER
Please call patient  I would like for her to continue amlodipine   But also restart chlorthalidone  Okay to take 1/2 tablet for 1 week, then take 1 tablet  Recheck labs in 1-2 weeks.

## 2025-04-29 NOTE — CARE COORDINATION
-Remote Alert Monitoring Note  Date/Time:  2025 11:29 AM  Patient Current Location: [place]  Verified patients name and  as identifiers.    Rpm alert to be reviewed by the provider   yellow alert  blood pressure reading (172/93)   TEMP (96.8)  Vitals Recheck blood pressure reading (158/92)   Temp. ((97.9)  Additional needs to be addressed by provider: No         Patient rechecked BP at 158/92 and Temperature at 97.9.  All reported metrics are WNL of RPM Alert Parameters.    Becki Guallpa LPN    [phone]  [facility] Coordinator / RPM    
treatments-Patient has elevated /93 and Low Temp. 96.8  The patient is in no apparent distress at this time.  Denies CP, SOB, Stroke like symptoms. Patient has Slight Ankle swelling. Patient has taken medications this morning. HH Nurse coming later and will recheck VS.     Instructed to go to the ER for worsening or developing CP, Heaviness, tightness, SOB, Severe Headaches, Facial droop, Blurred or double vision, weakness in any extremities, unable to speak, Unable to hold arms up. Expresses understanding.      Plan/Follow Up: Will continue to review, monitor and address alerts with follow up based on severity of symptoms and risk factors.  **For any new or worsening symptoms or you are concerned in anyway, please contact your Provider or report to the nearest Emergency Room.**

## 2025-04-29 NOTE — TELEPHONE ENCOUNTER
[facility] calling in on recent high blood pressures about 160s/90s. Normal HR, no chest pain, dizzinies or headaches. She has been taking new med amlodipine 5mg for the last week. Thank you

## 2025-04-30 NOTE — TELEPHONE ENCOUNTER
Results given, verbal understanding demonstrated. Patient will get blood work on 5/14 when she has her other doctors appointment. Her blood pressure today was 160/90. I told her to call with any questions. Thank you

## 2025-05-02 ENCOUNTER — CARE COORDINATION (OUTPATIENT)
Dept: OTHER | Facility: CLINIC | Age: 65
End: 2025-05-02

## 2025-05-02 NOTE — CARE COORDINATION
5/2/2025 12:02 PM  *  Unable to Reach Date/Time:  5/2/2025 12:02 PM  ACM attempted to reach patient by telephone regarding yellow alert r/t no weight x2 days in remote patient monitoring program. Left HIPAA compliant message requesting a return call. Will attempt to reach patient again.         SANCHEZ Hogan, RN  Associate Care Manager   Cell: [phone]  [email or web address]

## 2025-05-06 LAB
SEND OUT REPORT: NORMAL
TEST NAME: NORMAL

## 2025-05-07 DIAGNOSIS — R05.3 CHRONIC COUGH: ICD-10-CM

## 2025-05-07 RX ORDER — LORATADINE 10 MG/1
10 TABLET ORAL DAILY
Qty: 30 TABLET | Refills: 5 | Status: SHIPPED | OUTPATIENT
Start: 2025-05-07

## 2025-05-07 NOTE — TELEPHONE ENCOUNTER
Name of Medication(s) Requested:  Requested Prescriptions     Pending Prescriptions Disp Refills    loratadine (CLARITIN) 10 MG tablet 30 tablet 0     Sig: Take 1 tablet by mouth daily       Medication is on current medication list Yes    Dosage and directions were verified? Yes    Quantity verified: 30 day supply     Pharmacy Verified?  Yes    Last Appointment:  4/11/2025    Future appts:  Future Appointments   Date Time Provider Department Center   5/14/2025 10:30 AM Ann Betancourt, APRN - CNP [facility]   5/30/2025  9:00 AM [facility]   6/25/2025  1:45 PM Hien Stokes MD [facility]   7/30/2025  1:15 PM [facility]   11/21/2025 11:00 AM Judd Olivier MD [facility] NEURO Neurology -   11/27/2025  9:30 AM Ivet Wright MD [facility]        (If no appt send self scheduling link. .REFILLAPPT)  Scheduling request sent?     [] Yes  [x] No    Does patient need updated?  [x] Yes  [] No

## 2025-05-07 NOTE — TELEPHONE ENCOUNTER
Last Appointment   4/21/2025  Next Appointment  5/30/2025    Patient requesting refill of Loratidine. She would like this sent to [facility] on [address].

## 2025-05-08 DIAGNOSIS — K59.04 CHRONIC IDIOPATHIC CONSTIPATION: ICD-10-CM

## 2025-05-08 NOTE — TELEPHONE ENCOUNTER
Last Appointment   4/21/2025  Next Appointment  5/30/2025  [facility] called in:      Patient took a fall, abrasion on left knee, bumped her shoulder and head, no issues seen on those areas, tripped on level of the doorway.   No loc or change in behavior  .   Also wanted dr to be aware since starting the Claritin and colace she has started having some cramping in her legs bilateral.       Also needs a refill of Colace sent to [facility].

## 2025-05-08 NOTE — TELEPHONE ENCOUNTER
Name of Medication(s) Requested:  Requested Prescriptions     Pending Prescriptions Disp Refills    docusate sodium (COLACE) 100 MG capsule 60 capsule 0     Sig: Take 1 capsule by mouth 2 times daily       Medication is on current medication list Yes    Dosage and directions were verified? Yes    Quantity verified: 90 day supply     Pharmacy Verified?  Yes    Last Appointment:  4/11/2025    Future appts:  Future Appointments   Date Time Provider Department Center   5/14/2025 10:30 AM Ann Betancourt, APRN - CNP [facility]   5/30/2025  9:00 AM [facility]   6/25/2025  1:45 PM Hien Stokes MD [facility]   7/30/2025  1:15 PM [facility]   11/21/2025 11:00 AM Judd Olivier MD [facility] NEURO Neurology -   11/27/2025  9:30 AM Ivet Wright MD [facility]        (If no appt send self scheduling link. .REFILLAPPT)  Scheduling request sent?     [] Yes  [x] No    Does patient need updated?  [x] Yes  [] No

## 2025-05-09 RX ORDER — DOCUSATE SODIUM 100 MG/1
100 CAPSULE, LIQUID FILLED ORAL 2 TIMES DAILY
Qty: 180 CAPSULE | Refills: 1 | Status: SHIPPED | OUTPATIENT
Start: 2025-05-09

## 2025-05-12 ENCOUNTER — CARE COORDINATION (OUTPATIENT)
Dept: CARE COORDINATION | Age: 65
End: 2025-05-12

## 2025-05-13 DIAGNOSIS — R05.3 CHRONIC COUGH: ICD-10-CM

## 2025-05-13 RX ORDER — LORATADINE 10 MG/1
10 TABLET ORAL DAILY
Qty: 30 TABLET | Refills: 5 | Status: SHIPPED | OUTPATIENT
Start: 2025-05-13

## 2025-05-13 NOTE — CARE COORDINATION
Ambulatory Care Coordination Note     2025 9:03 AM     Patient Current Location:  [place]: [place] [id number]     ACM contacted the patient by telephone. Verified name and  with patient as identifiers.         ACM: Cynthia Houston RN     Challenges to be reviewed by the provider   Additional needs identified to be addressed with provider Yes  Pt has pain BLE from toes to knees (feels not associated/hurting prior to her fall last week).  Worse over last 2 weeks.  Denies s/s DVT.  Reports mild BLE edema, no discoloration changes,  no wounds, bruising or drg. Has OV .                 Method of communication with provider: chart routing.    Utilization: Patient has not had any utilization since our last call.     Care Summary Note:         Offered patient enrollment in the Remote Patient Monitoring (RPM) program for in-home monitoring: Yes, patient enrolled; current status is activated and monitoring.             Assessments Completed:   Congestive Heart Failure Assessment    Are you currently restricting fluids?: No Restriction  Do you understand a low sodium diet?: Yes  Do you understand how to read food labels?: Yes  How many restaurant meals do you eat per week?: 0  Do you salt your food before tasting it?: No         Symptoms:  None: Yes      Symptom course: stable  Patient-reported weight (lb): 228 (Comment: RPM)  Weight trend: stable  Salt intake watch compared to last visit: stable      ,   Hypertension - Encounter Level    Symptom course: stable (Comment: RPM)      ,   General Assessment    Do you have any symptoms that are causing concern?: Yes  Reported Symptoms: Other (Comment: L posterior knee edema, and bruising from fall..  BLE pain from knees down (not associated with her fall/bruising).  Pt c/o pessary not fitting correctly (#4).  Urogynecology aware. Attempting to continue to fit patien with new)          Medications Reviewed:   Current Outpatient Medications   Medication

## 2025-05-14 ENCOUNTER — HOSPITAL ENCOUNTER (OUTPATIENT)
Dept: GENERAL RADIOLOGY | Age: 65
Discharge: HOME | End: 2025-05-16
Payer: MEDICARE

## 2025-05-14 ENCOUNTER — HOSPITAL ENCOUNTER (OUTPATIENT)
Age: 65
Discharge: HOME | End: 2025-05-14
Payer: MEDICARE

## 2025-05-14 ENCOUNTER — OFFICE VISIT (OUTPATIENT)
Dept: FAMILY MEDICINE CLINIC | Age: 65
End: 2025-05-14
Payer: MEDICARE

## 2025-05-14 ENCOUNTER — OFFICE VISIT (OUTPATIENT)
Age: 65
End: 2025-05-14
Payer: MEDICARE

## 2025-05-14 VITALS
SYSTOLIC BLOOD PRESSURE: 130 MMHG | HEART RATE: 69 BPM | RESPIRATION RATE: 18 BRPM | TEMPERATURE: 97.4 F | DIASTOLIC BLOOD PRESSURE: 67 MMHG | OXYGEN SATURATION: 97 %

## 2025-05-14 VITALS
TEMPERATURE: 97.4 F | HEIGHT: 65 IN | OXYGEN SATURATION: 96 % | BODY MASS INDEX: 38.49 KG/M2 | DIASTOLIC BLOOD PRESSURE: 69 MMHG | HEART RATE: 83 BPM | RESPIRATION RATE: 16 BRPM | WEIGHT: 231 LBS | SYSTOLIC BLOOD PRESSURE: 134 MMHG
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
## 2025-05-14 DIAGNOSIS — S89.92XD INJURY OF LEFT KNEE, SUBSEQUENT ENCOUNTER: ICD-10-CM

## 2025-05-14 DIAGNOSIS — M79.605 BILATERAL LEG PAIN: ICD-10-CM

## 2025-05-14 DIAGNOSIS — I10 HYPERTENSION, UNSPECIFIED TYPE: ICD-10-CM

## 2025-05-14 DIAGNOSIS — M79.604 BILATERAL LEG PAIN: ICD-10-CM

## 2025-05-14 DIAGNOSIS — M79.604 BILATERAL LEG PAIN: Primary | ICD-10-CM

## 2025-05-14 DIAGNOSIS — R09.89 OTHER SPECIFIED SYMPTOMS AND SIGNS INVOLVING THE CIRCULATORY AND RESPIRATORY SYSTEMS: ICD-10-CM

## 2025-05-14 DIAGNOSIS — Z87.891 PERSONAL HISTORY OF TOBACCO USE: Primary | ICD-10-CM

## 2025-05-14 DIAGNOSIS — R06.09 DYSPNEA ON EXERTION: ICD-10-CM

## 2025-05-14 DIAGNOSIS — K21.9 GASTROESOPHAGEAL REFLUX DISEASE WITHOUT ESOPHAGITIS: ICD-10-CM

## 2025-05-14 DIAGNOSIS — M79.605 BILATERAL LEG PAIN: Primary | ICD-10-CM

## 2025-05-14 LAB
ANION GAP SERPL CALCULATED.3IONS-SCNC: 13 MMOL/L (ref 7–16)
BUN SERPL-MCNC: 21 MG/DL (ref 8–23)
CALCIUM SERPL-MCNC: 9.8 MG/DL (ref 8.8–10.2)
CHLORIDE SERPL-SCNC: 100 MMOL/L (ref 98–107)
CO2 SERPL-SCNC: 25 MMOL/L (ref 22–29)
CREAT SERPL-MCNC: 0.9 MG/DL (ref 0.5–1)
GFR, ESTIMATED: 72 ML/MIN/1.73M2
GLUCOSE SERPL-MCNC: 127 MG/DL (ref 74–99)
POTASSIUM SERPL-SCNC: 3.8 MMOL/L (ref 3.5–5.1)
SODIUM SERPL-SCNC: 138 MMOL/L (ref 136–145)

## 2025-05-14 PROCEDURE — G8427 DOCREV CUR MEDS BY ELIG CLIN: HCPCS | Performed by: FAMILY MEDICINE

## 2025-05-14 PROCEDURE — G8399 PT W/DXA RESULTS DOCUMENT: HCPCS | Performed by: NURSE PRACTITIONER

## 2025-05-14 PROCEDURE — 3078F DIAST BP <80 MM HG: CPT | Performed by: FAMILY MEDICINE

## 2025-05-14 PROCEDURE — 1123F ACP DISCUSS/DSCN MKR DOCD: CPT | Performed by: FAMILY MEDICINE

## 2025-05-14 PROCEDURE — 3017F COLORECTAL CA SCREEN DOC REV: CPT | Performed by: NURSE PRACTITIONER

## 2025-05-14 PROCEDURE — 1159F MED LIST DOCD IN RCRD: CPT | Performed by: NURSE PRACTITIONER

## 2025-05-14 PROCEDURE — G8427 DOCREV CUR MEDS BY ELIG CLIN: HCPCS | Performed by: NURSE PRACTITIONER

## 2025-05-14 PROCEDURE — 80048 BASIC METABOLIC PNL TOTAL CA: CPT

## 2025-05-14 PROCEDURE — 1036F TOBACCO NON-USER: CPT | Performed by: NURSE PRACTITIONER

## 2025-05-14 PROCEDURE — 99214 OFFICE O/P EST MOD 30 MIN: CPT | Performed by: NURSE PRACTITIONER

## 2025-05-14 PROCEDURE — G8417 CALC BMI ABV UP PARAM F/U: HCPCS | Performed by: NURSE PRACTITIONER

## 2025-05-14 PROCEDURE — 99213 OFFICE O/P EST LOW 20 MIN: CPT | Performed by: FAMILY MEDICINE

## 2025-05-14 PROCEDURE — 1090F PRES/ABSN URINE INCON ASSESS: CPT | Performed by: FAMILY MEDICINE

## 2025-05-14 PROCEDURE — G0296 VISIT TO DETERM LDCT ELIG: HCPCS | Performed by: NURSE PRACTITIONER

## 2025-05-14 PROCEDURE — 3017F COLORECTAL CA SCREEN DOC REV: CPT | Performed by: FAMILY MEDICINE

## 2025-05-14 PROCEDURE — 72100 X-RAY EXAM L-S SPINE 2/3 VWS: CPT

## 2025-05-14 PROCEDURE — 1123F ACP DISCUSS/DSCN MKR DOCD: CPT | Performed by: NURSE PRACTITIONER

## 2025-05-14 PROCEDURE — 1090F PRES/ABSN URINE INCON ASSESS: CPT | Performed by: NURSE PRACTITIONER

## 2025-05-14 PROCEDURE — 73562 X-RAY EXAM OF KNEE 3: CPT

## 2025-05-14 PROCEDURE — G8399 PT W/DXA RESULTS DOCUMENT: HCPCS | Performed by: FAMILY MEDICINE

## 2025-05-14 PROCEDURE — 3075F SYST BP GE 130 - 139MM HG: CPT | Performed by: FAMILY MEDICINE

## 2025-05-14 PROCEDURE — 36415 COLL VENOUS BLD VENIPUNCTURE: CPT

## 2025-05-14 PROCEDURE — 1159F MED LIST DOCD IN RCRD: CPT | Performed by: FAMILY MEDICINE

## 2025-05-14 PROCEDURE — 1036F TOBACCO NON-USER: CPT | Performed by: FAMILY MEDICINE

## 2025-05-14 PROCEDURE — 3078F DIAST BP <80 MM HG: CPT | Performed by: NURSE PRACTITIONER

## 2025-05-14 PROCEDURE — 3075F SYST BP GE 130 - 139MM HG: CPT | Performed by: NURSE PRACTITIONER

## 2025-05-14 PROCEDURE — G8417 CALC BMI ABV UP PARAM F/U: HCPCS | Performed by: FAMILY MEDICINE

## 2025-05-14 RX ORDER — ATORVASTATIN CALCIUM 20 MG/1
20 TABLET, FILM COATED ORAL 2 TIMES DAILY
Qty: 180 TABLET | Refills: 0 | Status: SHIPPED | OUTPATIENT
Start: 2025-05-14

## 2025-05-14 RX ORDER — AMLODIPINE BESYLATE 5 MG/1
5 TABLET ORAL DAILY
Qty: 90 TABLET | Refills: 0 | Status: SHIPPED | OUTPATIENT
Start: 2025-05-14

## 2025-05-14 RX ORDER — METOPROLOL SUCCINATE 25 MG/1
25 TABLET, EXTENDED RELEASE ORAL DAILY
Qty: 90 TABLET | Refills: 1 | Status: SHIPPED | OUTPATIENT
Start: 2025-05-14

## 2025-05-14 RX ORDER — PANTOPRAZOLE SODIUM 40 MG/1
40 TABLET, DELAYED RELEASE ORAL
Qty: 90 TABLET | Refills: 1 | Status: SHIPPED | OUTPATIENT
Start: 2025-05-14

## 2025-05-14 RX ORDER — LOSARTAN POTASSIUM 25 MG/1
25 TABLET ORAL DAILY
Qty: 90 TABLET | Refills: 0 | Status: SHIPPED | OUTPATIENT
Start: 2025-05-14

## 2025-05-14 NOTE — PROGRESS NOTES
Pt came in for a 12 week follow up, with only concerns of shortness of breath when doing activities. She talked about using her rescue inhaler 1-5 times a week as she really only uses her nebulizer. Per Ann, she will be sent for a PFT and a chest XR, and will also follow up in 6 months.  
needs it. She states she is short of breath at times but it is what she is used to. Ruth was to have a sleep study but recently underwent a posterior repair in May and she states she had too much going on, however, she is agreeable to doing raj now.    February 13, 2025:  Ruth is seen and examined today for pulmonary emphysema. On today's visit she reports ongoing cough, sometime productive. She recently saw her family doctor and tested negative for covid, influenza and RSV. She was prescribed prednisone which she did not start because she started feeling better. She has wheezing on auscultation. I recommend AirSupra rescue inhaler to help with the wheezing and flutter valve to assist with airway clearance.     May 14, 2025: Ruth is seen and examined today for follow up of emphysema. Since her last visit she was hospitalized in March for ORLIN and pneumonia. Since her discharge she reports doing well. Breathing is at baseline, no current issues or complaints. Only using albuterol as needed, has been intolerant of most maintenance inhalers. Continues to report intermittent cough, also endorses acid reflux.  No new concerns or complaints.     Assessment:    Pulmonary emphysema   History of tobacco use  Intermittent cough  Chronic DVT  HFpEF   Ruptured subarachnoid hemorrhage in 2018 2/2 2 left ENRRIQUE aneurysms and multiple other aneurysms. Follows with Neurosurgery  GERD    Plan:     Increase pantoprazole to 40 mg po daily  Repeat CT chest August as planned  Full PFT    Continue P.r.n. albuterol via inhaler and nebulizer    Sleep study (August 20, 2024) - did not reveal sleep apnea    Nodule OR Screen - 50-80, smoked >= 20 pack years, smoke or quit within 15 yrs. Due August 2025    Aspiration / GERD precautions  Head end of bed elevation.    Maintain active and healthy lifestyle with weight reduction.  COVID-19 precautions  Recommend yearly Influenza and appropriate pneumonia vaccinations.        Family History

## 2025-05-14 NOTE — PROGRESS NOTES
5/14/2025    Ruth Anguiano is a 65 y.o. female here for   Chief Complaint   Patient presents with    Leg Pain     Both. Some swelling. tripped up a small step     Started spontaenously   No specific triggers  Ongoing for 2 1/2 to 3 weeks ago.   Was trying to figure out if there was a trigger    Went to her sisters  She tripped on a strip going over step and she fell onto his left kneeshe was in so much pain from the fall that she couldn't deal with  Her left leg pain seems to improving  She has some bruising in bck of her left and right leg  She has not had swelling    Three new medications - colcae, magnesium/ claritin   She has had swelling    Will get a tinglign sensation in her lgs  Thinks her muscles.    She has chronic back pain which she relates to dgenerative disc disease  She seens pain clinic    Worse with feet still  Legs/ feet seem shiny      Wt Readings from Last 3 Encounters:   05/14/25 104.8 kg (231 lb)   04/28/25 104.3 kg (230 lb)   04/21/25 106.6 kg (235 lb)       She  reports that she quit smoking about 6 years ago. Her smoking use included cigarettes. She started smoking about 49 years ago. She has a 64.5 pack-year smoking history. She has never used smokeless tobacco.    Medications and allergies reviewed and updated in chart.    Current Outpatient Medications   Medication Sig Dispense Refill    pantoprazole (PROTONIX) 40 MG tablet Take 1 tablet by mouth every morning (before breakfast) 90 tablet 1    loratadine (CLARITIN) 10 MG tablet Take 1 tablet by mouth daily 30 tablet 5    docusate sodium (COLACE) 100 MG capsule Take 1 capsule by mouth 2 times daily 180 capsule 1    chlorthalidone (HYGROTON) 25 MG tablet Take 1 tablet by mouth daily 30 tablet 3    gabapentin (NEURONTIN) 300 MG capsule Take 1 capsule in the morning and take 2 capsules at bedtime 90 capsule 2    HYDROcodone-acetaminophen (NORCO) 5-325 MG per tablet Take 1 tablet by mouth daily as needed for Pain for up to 30 days. Take

## 2025-05-15 ENCOUNTER — CARE COORDINATION (OUTPATIENT)
Dept: CASE MANAGEMENT | Age: 65
End: 2025-05-15

## 2025-05-15 NOTE — CARE COORDINATION
-Remote Alert Monitoring Note  Date/Time:  5/15/2025 9:08 AM  Patient Current Location: [place]  Verified patients name and  as identifiers.    Rpm alert to be reviewed by the provider   red alert  pulse ox reading (90%)  Vitals Recheck pulse ox reading (94%)  Additional needs to be addressed by provider: No         LPN contacted patient by telephone regarding red alert received   Background: Sepsis, CHF  Refer to 911 immediately if:  Patient unresponsive or unable to provide history  Change in cognition or sudden confusion  Patient unable to respond in complete sentences  Intense chest pain/tightness  Any concern for any clinical emergency  Red Alert: Provider response time of 1 hr required for any red alert requiring intervention  Yellow Alert: Provider response time of 3hr required for any escalated yellow alert  Patient Chief Complaint:  Oxygen O2 Triage  Are you having any Chest Pain? no   Are you having any Shortness of Breath? no   Swelling in your hands or feet? no   Are you having any other health concerns or issues? no  .............................................................................................................................................................................................  Do you use oxygen? No   Patient educated on oxygen preparedness in case of an emergency?  No     Patient/Caregiver educated on how to how to properly place pulse oximeter. Patient/Caregiver verbalizes understanding.     Clinical Interventions: Reviewed and followed up on alerts and treatments-Patient has low SpO2 level of 90%.  The patient is in no apparent distress at this time.  Denies CP, SOB, Swelling and dizziness. Patient rechecked SpO2 level at 94%.     Instructed to go to ER for worsening or developing SOB, wheezing, cough, chest pain & chest tightness with inspiration or expiration, Swelling in extremities fatigue, fever or chills. Expresses Understanding.      Plan/Follow Up: Will continue to

## 2025-05-20 ENCOUNTER — TELEPHONE (OUTPATIENT)
Dept: FAMILY MEDICINE CLINIC | Age: 65
End: 2025-05-20

## 2025-05-20 NOTE — TELEPHONE ENCOUNTER
Hien yu MD Assion, Amy; P yx [facility]   yes okay to take higher dose of PPI as recommended by specialist          Previous Messages       ----- Message -----  From: Donya Knox  Sent: 5/19/2025   1:53 PM EDT  To: MD Ruth Norman called in to see if you thought it was ok for her to go back up to the 40 mg Protonix? Her Lung doctor changed the dosage.  .

## 2025-05-22 ENCOUNTER — CARE COORDINATION (OUTPATIENT)
Dept: CARE COORDINATION | Age: 65
End: 2025-05-22

## 2025-05-22 NOTE — CARE COORDINATION
1:15 PM Hien Stokes MD Family Medicine [phone]    8/14/2025 1:30 PM (Arrive by 1:15 PM) [facility] CT SCAN 3 Radiology [phone]    8/14/2025 2:00 PM [facility] Pulmonary Function Testing [phone]    11/17/2025 9:30 AM Ann Betancuort, APRN - CNP Pulmonology [phone]    11/21/2025 11:00 AM Judd lOivier MD Neurology [phone]    11/27/2025 9:30 AM Ivet Wright MD Obstetrics and Gynecology [phone]            Follow Up:   Plan for next AC outreach in approximately 1 week and 2 weeks to complete:  - disease specific assessments  - medication review   - advance care planning   - education   - RPM.   -assess ongoing needs  -review f/u appts  -discuss BLE pain increasing/discussion  -discuss if any other falls since last one first week of May  -discuss progress with PT  -continue educating regarding sepsis (risk factors, s/s), encourage pt to cont to take her own temp daily  -continue educating wt gain r/t CHF  -discuss pessary/Urogyn POC  Patient  is agreeable to this plan.

## 2025-05-23 ENCOUNTER — RESULTS FOLLOW-UP (OUTPATIENT)
Dept: FAMILY MEDICINE CLINIC | Age: 65
End: 2025-05-23

## 2025-05-28 ENCOUNTER — OFFICE VISIT (OUTPATIENT)
Age: 65
End: 2025-05-28
Payer: MEDICARE

## 2025-05-28 VITALS
DIASTOLIC BLOOD PRESSURE: 68 MMHG | WEIGHT: 228 LBS | OXYGEN SATURATION: 97 % | SYSTOLIC BLOOD PRESSURE: 120 MMHG | BODY MASS INDEX: 37.99 KG/M2 | HEART RATE: 74 BPM | TEMPERATURE: 96.9 F | HEIGHT: 65 IN | RESPIRATION RATE: 16 BRPM

## 2025-05-28 DIAGNOSIS — Z96.642 S/P TOTAL LEFT HIP ARTHROPLASTY: ICD-10-CM

## 2025-05-28 DIAGNOSIS — Z79.891 ENCOUNTER FOR LONG-TERM OPIATE ANALGESIC USE: ICD-10-CM

## 2025-05-28 DIAGNOSIS — M47.812 FACET ARTHROPATHY, CERVICAL: ICD-10-CM

## 2025-05-28 DIAGNOSIS — E66.01 SEVERE OBESITY (BMI 35.0-39.9) WITH COMORBIDITY (HCC): ICD-10-CM

## 2025-05-28 DIAGNOSIS — M16.12 PRIMARY OSTEOARTHRITIS OF LEFT HIP: ICD-10-CM

## 2025-05-28 DIAGNOSIS — M47.816 LUMBAR SPONDYLOSIS: ICD-10-CM

## 2025-05-28 DIAGNOSIS — M51.9 LUMBAR DISC DISORDER: ICD-10-CM

## 2025-05-28 DIAGNOSIS — M54.16 LUMBAR RADICULITIS: ICD-10-CM

## 2025-05-28 DIAGNOSIS — M16.11 ARTHRITIS OF RIGHT HIP: ICD-10-CM

## 2025-05-28 DIAGNOSIS — M50.30 DEGENERATION OF CERVICAL DISC WITHOUT MYELOPATHY: ICD-10-CM

## 2025-05-28 DIAGNOSIS — G89.4 CHRONIC PAIN SYNDROME: Primary | ICD-10-CM

## 2025-05-28 DIAGNOSIS — M47.816 LUMBAR FACET ARTHROPATHY: ICD-10-CM

## 2025-05-28 PROCEDURE — 1090F PRES/ABSN URINE INCON ASSESS: CPT | Performed by: PHYSICIAN ASSISTANT

## 2025-05-28 PROCEDURE — 1036F TOBACCO NON-USER: CPT | Performed by: PHYSICIAN ASSISTANT

## 2025-05-28 PROCEDURE — 3078F DIAST BP <80 MM HG: CPT | Performed by: PHYSICIAN ASSISTANT

## 2025-05-28 PROCEDURE — 3074F SYST BP LT 130 MM HG: CPT | Performed by: PHYSICIAN ASSISTANT

## 2025-05-28 PROCEDURE — 1160F RVW MEDS BY RX/DR IN RCRD: CPT | Performed by: PHYSICIAN ASSISTANT

## 2025-05-28 PROCEDURE — G8399 PT W/DXA RESULTS DOCUMENT: HCPCS | Performed by: PHYSICIAN ASSISTANT

## 2025-05-28 PROCEDURE — 1159F MED LIST DOCD IN RCRD: CPT | Performed by: PHYSICIAN ASSISTANT

## 2025-05-28 PROCEDURE — 1123F ACP DISCUSS/DSCN MKR DOCD: CPT | Performed by: PHYSICIAN ASSISTANT

## 2025-05-28 PROCEDURE — G8427 DOCREV CUR MEDS BY ELIG CLIN: HCPCS | Performed by: PHYSICIAN ASSISTANT

## 2025-05-28 PROCEDURE — 99213 OFFICE O/P EST LOW 20 MIN: CPT | Performed by: PHYSICIAN ASSISTANT

## 2025-05-28 PROCEDURE — G8417 CALC BMI ABV UP PARAM F/U: HCPCS | Performed by: PHYSICIAN ASSISTANT

## 2025-05-28 PROCEDURE — 3017F COLORECTAL CA SCREEN DOC REV: CPT | Performed by: PHYSICIAN ASSISTANT

## 2025-05-28 RX ORDER — HYDROCODONE BITARTRATE AND ACETAMINOPHEN 5; 325 MG/1; MG/1
1 TABLET ORAL DAILY PRN
Qty: 30 TABLET | Refills: 0 | Status: SHIPPED | OUTPATIENT
Start: 2025-05-28 | End: 2025-06-27

## 2025-05-28 NOTE — PROGRESS NOTES
[facility]  [address], [place] [address]  [phone]    Follow up Note      Ruth Anguiano     Date of Visit:  5/28/2025    CC:  Patient presents for follow up   Chief Complaint   Patient presents with    Back Pain    Neck Pain             HPI:    Pain is unchanged.        Appropriate analgesia with current medications regimen: yes.    Change in quality of symptoms:no.    Medication side effects:none.   Recent diagnostic testing:none.   Recent interventional procedures:none.    She has not been on anticoagulation medications to include ASA. The patient  has not been on herbal supplements.  The patient is not diabetic.     Imaging studies:    06/09/222 Left hip x-ray    FINDINGS:   Anatomically aligned left MERVAT with no abnormal bone or soft tissue findings.           Impression   Left MERVAT with no unexpected findings.           Cervical XR 11/2019 Severe degenerative changes      Lumbar spine Xray 03/2019  Scoliosis and osteoarthritis.     Bilateral hip Xray 03/2019   Advanced osteoarthrosis of bilateral hips.                                        Potential Aberrant Drug-Related Behavior: None     Urine Drug Screening:  First office visit saliva screen showed no narcotics   11/2019 Consistent, negative for all  06/2020 Consistent  12/2020 Consistent  06/2021 Consistent  04/2022 Consistent  02/2023 Consistent for gabapentin, consistent for no tylenol with codeine as she did not have an active script.    08/2023 Consistent  06/2024 Consistent for gabapentin, negative for hydrocodone but takes prn.  Will continue to monitor.  No previous issues.    04/2025 negative, consistent.  Last pill 2 days prior.         OARRS report:  03/2019 consistent to 06/2021 Consistent  (norco on 9/24 from dentist for teeth extraction).  08/2021 Consistent to 05/2025 Consistent    Opioid Agreement:  10/07/2021   Updated:  09/26/2022   Updated: 08/28/2023  Updated:  08/19/2024    Past

## 2025-05-28 NOTE — PROGRESS NOTES
Ruth Anguiano presents to the [facility] on 5/28/2025. Ruth is complaining of pain in her neck and back. The pain is constant. The pain is described as aching, throbbing, stabbing, and sharp. Pain is rated on her best day at a 5, on her worst day at a 10, and on average at a 7 on the VAS scale. She took her last dose of Norco and Neurontin yesterday.      Any procedures since your last visit: No    She is not on NSAIDS and  is  on anticoagulation medications to include ASA and is managed by Hien Stokes MD       Pacemaker or defibrillator: No   Medication Contract and Consent for Opioid Use Documents Filed       Patient Documents       Type of Document Status Date Received Received By Description    Medication Contract Received 3/1/2019  1:43 PM FRANCIS MCINTOSH PAIN MANAGEMENT PT AGREEMENT 3/1/2019    Medication Contract Received 10/8/2020  1:21 PM FRANCIS MCINTOSH pain pt agreement    Medication Contract Received 10/7/2021 10:36 AM BOLIVAR TRUJILLO Pain Mgmt Patient Agreement 10.7.2021    Medication Contract Received 9/26/2022 10:40 AM ENMA WAGNER MEDICATION CONTRACT    Consent Opioid Use Received 8/28/2023 10:09 AM OLU VALENZUELA pt agreement 8/28/23    Consent Opioid Use Received 8/19/2024 12:01 PM OLU VALENZUELA pt agreement 8/19/24                       Resp 16   Ht 1.651 m (5' 5\")   Wt 103.4 kg (228 lb)   BMI 37.94 kg/m²      No LMP recorded. Patient is postmenopausal.

## 2025-05-30 ENCOUNTER — OFFICE VISIT (OUTPATIENT)
Dept: FAMILY MEDICINE CLINIC | Age: 65
End: 2025-05-30
Payer: MEDICARE

## 2025-05-30 VITALS
TEMPERATURE: 97.2 F | OXYGEN SATURATION: 94 % | SYSTOLIC BLOOD PRESSURE: 124 MMHG | HEIGHT: 65 IN | BODY MASS INDEX: 37.32 KG/M2 | WEIGHT: 224 LBS | DIASTOLIC BLOOD PRESSURE: 63 MMHG | RESPIRATION RATE: 16 BRPM | HEART RATE: 64 BPM

## 2025-05-30 DIAGNOSIS — M47.816 OSTEOARTHRITIS OF LUMBAR SPINE, UNSPECIFIED SPINAL OSTEOARTHRITIS COMPLICATION STATUS: ICD-10-CM

## 2025-05-30 DIAGNOSIS — N81.4 CYSTOCELE WITH PROLAPSE: ICD-10-CM

## 2025-05-30 DIAGNOSIS — M41.9 SCOLIOSIS OF LUMBAR SPINE, UNSPECIFIED SCOLIOSIS TYPE: Primary | ICD-10-CM

## 2025-05-30 DIAGNOSIS — N39.46 URINARY INCONTINENCE, MIXED: ICD-10-CM

## 2025-05-30 PROCEDURE — 1123F ACP DISCUSS/DSCN MKR DOCD: CPT | Performed by: FAMILY MEDICINE

## 2025-05-30 PROCEDURE — 1160F RVW MEDS BY RX/DR IN RCRD: CPT | Performed by: FAMILY MEDICINE

## 2025-05-30 PROCEDURE — 3074F SYST BP LT 130 MM HG: CPT | Performed by: FAMILY MEDICINE

## 2025-05-30 PROCEDURE — G8399 PT W/DXA RESULTS DOCUMENT: HCPCS | Performed by: FAMILY MEDICINE

## 2025-05-30 PROCEDURE — G8427 DOCREV CUR MEDS BY ELIG CLIN: HCPCS | Performed by: FAMILY MEDICINE

## 2025-05-30 PROCEDURE — G8417 CALC BMI ABV UP PARAM F/U: HCPCS | Performed by: FAMILY MEDICINE

## 2025-05-30 PROCEDURE — 1090F PRES/ABSN URINE INCON ASSESS: CPT | Performed by: FAMILY MEDICINE

## 2025-05-30 PROCEDURE — 99213 OFFICE O/P EST LOW 20 MIN: CPT | Performed by: FAMILY MEDICINE

## 2025-05-30 PROCEDURE — 1036F TOBACCO NON-USER: CPT | Performed by: FAMILY MEDICINE

## 2025-05-30 PROCEDURE — 1159F MED LIST DOCD IN RCRD: CPT | Performed by: FAMILY MEDICINE

## 2025-05-30 PROCEDURE — 3078F DIAST BP <80 MM HG: CPT | Performed by: FAMILY MEDICINE

## 2025-05-30 PROCEDURE — 3017F COLORECTAL CA SCREEN DOC REV: CPT | Performed by: FAMILY MEDICINE

## 2025-05-30 PROCEDURE — 0509F URINE INCON PLAN DOCD: CPT | Performed by: FAMILY MEDICINE

## 2025-05-30 NOTE — PROGRESS NOTES
[facility]    Ruth Anguiano presents to the office today for   Chief Complaint   Patient presents with    Hypertension    Other     Xray results  Order for vinyl gloves L or XL, depends pullups XL, Forest hose stockings       History of Present Illness  The patient presents for evaluation of leg swelling and pain.    She reports persistent leg swelling, although there has been some improvement since her last visit. The right leg exhibits less swelling than the left, and she experiences unusual pain in both legs, which is not as severe as before. She has been managing the condition with ice and elevation. She recalls a recent fall at her sister's house where she landed on her left knee, resulting in soreness and bruising. However, the bruising has mostly resolved. The pain was present prior to the fall.    She was previously unaware of her scoliosis diagnosis until it was brought to her attention by her hip doctor around the time of her hip replacement. This was also around the time she experienced comlications from colonoscopy.  She declined the proposed treatment of spinal fluid injection due to fear. She expresses a reluctance towards surgical interventions or spinal injections.    She acknowledges having degenerative disc disease and questions if it could be related to her scoliosis.    She has discontinued Symbicort due to adverse effects on her mouth. She is seeking a prescription for rubber gloves and Depend for management of urinary incontinence. She is following with urogyne specialist for this and may have upcoming surgery.     She follows with pain management.      PAST SURGICAL HISTORY:  - Hip replacements on both sides  - Colonoscopy    FAMILY HISTORY  Her 2 nieces have scoliosis.  C/o sweats  Leg pains  BP Readings from Last 3 Encounters:   05/30/25 124/63   05/28/25 120/68   05/14/25 134/69     On losartan 25 mg , chlortahdlieon 25 mg, amlodipine 5 mg daily  XR's done - lumbar spine/ knee

## 2025-06-04 ENCOUNTER — CARE COORDINATION (OUTPATIENT)
Dept: CARE COORDINATION | Age: 65
End: 2025-06-04

## 2025-06-04 NOTE — CARE COORDINATION
30 tablet 2    aspirin 81 MG EC tablet Take 1 tablet by mouth daily 90 tablet 3    albuterol sulfate HFA (PROVENTIL;VENTOLIN;PROAIR) 108 (90 Base) MCG/ACT inhaler Inhale 2 puffs into the lungs every 6 hours as needed for Wheezing 18 g 6    psyllium (METAMUCIL SMOOTH TEXTURE) 28.3 % POWD powder Take 3.4 g by mouth 2 times daily 538 g 5    Elastic Bandages & Supports (JOBST KNEE HIGH COMPRESSION SM) MISC Knee high with 20- 30 mmhg of compression 1 each 10    polyethylene glycol (GLYCOLAX) 17 g packet DISSOLVE 1 PACKET (17 GRAMS) IN LIQUID AND TAKE BY MOUTH DAILY      Multiple Vitamins-Minerals (THERAPEUTIC MULTIVITAMIN-MINERALS) tablet Take 1 tablet by mouth daily       No current facility-administered medications for this visit.       Advance Care Planning:   Not reviewed during this call     Care Planning:   Education Documentation  No documentation found.  Education Comments  No comments found.     ,    Goals Addressed                   This Visit's Progress     Self Monitoring   On track     Daily Weights - I will weight myself as directed - Daily and write down weights  I will notify my provider of any increase in weight by 3 or more pounds in 2 days OR 5 or more pounds in a week.  Blood Pressure - I will take my blood pressure as directed - Daily  I will notify my provider of any trends of increasing or decreasing blood pressures over a month period of time.  I will notify my provider of any changes in blood pressure associated with symptoms of dizziness, falls, passing out, headache, confusion/change in mental status.  Other Self-Monitoring - I will monitor my pulse oximeter and temperature - Daily    Patient Reported Blood Pressure        No data to display              Patient Reported Weight        No data to display                Barriers: lack of support, overwhelmed by complexity of regimen, stress, medication side effects, and lack of education  Plan for overcoming my barriers:   Confidence:

## 2025-06-10 ENCOUNTER — TELEPHONE (OUTPATIENT)
Dept: FAMILY MEDICINE CLINIC | Age: 65
End: 2025-06-10

## 2025-06-10 NOTE — TELEPHONE ENCOUNTER
Spoke with patient about her scoliosis appointment. She said that their doctor has to look over a few things and then would get back to her. She just wanted to let you know.

## 2025-06-13 ENCOUNTER — CARE COORDINATION (OUTPATIENT)
Dept: CASE MANAGEMENT | Age: 65
End: 2025-06-13

## 2025-06-13 NOTE — CARE COORDINATION
-Remote Alert Monitoring Note  Date/Time:  2025 11:02 AM  Patient Current Location: [place]  Verified patients name and  as identifiers.    Rpm alert to be reviewed by the provider      Called patient due to Survey Questions answered.   Vitals Recheck   Additional needs to be addressed by provider: No         LPN contacted patient by telephone regarding red alert received   Background: Sepsis and CHF  Refer to 911 immediately if:  Patient unresponsive or unable to provide history  Change in cognition or sudden confusion  Patient unable to respond in complete sentences  Intense chest pain/tightness  Any concern for any clinical emergency  Red Alert: Provider response time of 1 hr required for any red alert requiring intervention  Yellow Alert: Provider response time of 3hr required for any escalated yellow alert  Patient Chief Complaint: Excessive Sweating and Productive cough mentioned on Survey.     Clinical Interventions: Reviewed and followed up on alerts and treatments-Patient filled survey out and it said she has excessive sweating day and night and Productive cough.  The patient is in no apparent distress at this time.  Patient denies CP, SOB, Fever, Fatigue, Major unwell feeling. Patient is C/O excessive sweating both day and especially at night since release from the hospital after Sepsis. Patient's VS are all WNL at this time. Instructed patient to monitor signs & symptoms of sepsis. Patient expresses understanding. Will F/U Monday.     For any new or worsening symptoms or you are concerned in anyway, please contact your Provider, Call 911 or report to the nearest Emergency Room. Expresses Understanding    Plan/Follow Up: Will continue to review, monitor and address alerts with follow up based on severity of symptoms and risk factors.  **For any new or worsening symptoms or you are concerned in anyway, please contact your Provider or report to the nearest Emergency Room.**

## 2025-06-16 ENCOUNTER — CARE COORDINATION (OUTPATIENT)
Dept: CASE MANAGEMENT | Age: 65
End: 2025-06-16

## 2025-06-16 ENCOUNTER — CARE COORDINATION (OUTPATIENT)
Dept: CARE COORDINATION | Age: 65
End: 2025-06-16

## 2025-06-16 NOTE — CARE COORDINATION
Ambulatory Care Coordination Note     6/16/2025 10:31 AM     Patient outreach attempt by this AC today to perform care management follow up . AC was unable to reach the patient by telephone today;   left voice message requesting a return phone call to this ACM.     ACM: Cynthia Houston RN     Care Summary Note:       Patient red alert for survey questions r/t sepsis.  Patient notified to return ACM call and/or RPM CC calls      PCP/Specialist follow up:   Future Appointments         Provider Specialty Dept Phone    6/25/2025 1:45 PM Hien Stokes MD Family Medicine [phone]    6/30/2025 10:15 AM Massiel Kebede PA Pain Management [phone]    7/30/2025 1:15 PM Hien Stokes MD Family Medicine [phone]    8/14/2025 1:30 PM (Arrive by 1:15 PM) [facility] CT SCAN 3 Radiology [phone]    8/14/2025 2:00 PM [facility] RO[facility] Pulmonary Function Testing [phone]    8/20/2025 11:15 AM Anne Polk DO Cardiology [phone]    9/12/2025 9:45 AM Hien Stokes MD Family Medicine [phone]    11/17/2025 9:30 AM Ann Betancourt, APRN - CNP Pulmonology [phone]    11/21/2025 11:00 AM Judd Olivier MD Neurology [phone]    11/27/2025 9:30 AM Ivet Wright MD Obstetrics and Gynecology [phone]            Follow Up:   Plan for next ACM outreach in approximately 1-2 days  and 1 week to complete:  Prev POC and f/u surveyRPM.

## 2025-06-16 NOTE — CARE COORDINATION
6/16/2025 9:16 AM  *  Unable to Reach Date/Time:  6/16/2025 3:18 PM  LPN attempted to reach patient by telephone regarding red alert in remote patient monitoring program. Left HIPAA compliant message requesting a return call. Will attempt to reach patient again.      Survey Response Above Risk Threshold     Called [phone], left vm at 09:16    Called [phone], left vm at 09:48     called Emergency Contact: DaughterKelvin  [phone], left vm at 10:04          Notify ACM at 10:10                      (Sepsis) Have you experienced any chills, fever or sweats in the past 24 hours?  A)  Yes06/16/2025 9:11 AM  Q)  (Sepsis) Have you experienced any confusion or increased difficulty sleeping in the past 24 hours?  A)  Yes06/16/2025 9:11 AM  Q)  (Sepsis) Have you had an increase in coughing or wheezing in the past 24 hours?  A)

## 2025-06-30 ENCOUNTER — OFFICE VISIT (OUTPATIENT)
Age: 65
End: 2025-06-30
Payer: MEDICARE

## 2025-06-30 VITALS
SYSTOLIC BLOOD PRESSURE: 108 MMHG | WEIGHT: 229 LBS | TEMPERATURE: 97.2 F | BODY MASS INDEX: 38.15 KG/M2 | OXYGEN SATURATION: 95 % | HEART RATE: 79 BPM | DIASTOLIC BLOOD PRESSURE: 54 MMHG | RESPIRATION RATE: 16 BRPM | HEIGHT: 65 IN

## 2025-06-30 DIAGNOSIS — M16.11 ARTHRITIS OF RIGHT HIP: ICD-10-CM

## 2025-06-30 DIAGNOSIS — M50.30 DEGENERATION OF CERVICAL DISC WITHOUT MYELOPATHY: ICD-10-CM

## 2025-06-30 DIAGNOSIS — E66.01 SEVERE OBESITY (BMI 35.0-39.9) WITH COMORBIDITY (HCC): ICD-10-CM

## 2025-06-30 DIAGNOSIS — M47.816 LUMBAR SPONDYLOSIS: ICD-10-CM

## 2025-06-30 DIAGNOSIS — M47.812 FACET ARTHROPATHY, CERVICAL: ICD-10-CM

## 2025-06-30 DIAGNOSIS — M47.816 LUMBAR FACET ARTHROPATHY: ICD-10-CM

## 2025-06-30 DIAGNOSIS — G89.4 CHRONIC PAIN SYNDROME: Primary | ICD-10-CM

## 2025-06-30 DIAGNOSIS — M16.12 PRIMARY OSTEOARTHRITIS OF LEFT HIP: ICD-10-CM

## 2025-06-30 DIAGNOSIS — Z79.891 ENCOUNTER FOR LONG-TERM OPIATE ANALGESIC USE: ICD-10-CM

## 2025-06-30 DIAGNOSIS — M54.16 LUMBAR RADICULITIS: ICD-10-CM

## 2025-06-30 DIAGNOSIS — M51.9 LUMBAR DISC DISORDER: ICD-10-CM

## 2025-06-30 DIAGNOSIS — Z96.642 S/P TOTAL LEFT HIP ARTHROPLASTY: ICD-10-CM

## 2025-06-30 PROCEDURE — G8427 DOCREV CUR MEDS BY ELIG CLIN: HCPCS | Performed by: PHYSICIAN ASSISTANT

## 2025-06-30 PROCEDURE — 3017F COLORECTAL CA SCREEN DOC REV: CPT | Performed by: PHYSICIAN ASSISTANT

## 2025-06-30 PROCEDURE — 1159F MED LIST DOCD IN RCRD: CPT | Performed by: PHYSICIAN ASSISTANT

## 2025-06-30 PROCEDURE — 3078F DIAST BP <80 MM HG: CPT | Performed by: PHYSICIAN ASSISTANT

## 2025-06-30 PROCEDURE — 1123F ACP DISCUSS/DSCN MKR DOCD: CPT | Performed by: PHYSICIAN ASSISTANT

## 2025-06-30 PROCEDURE — 99213 OFFICE O/P EST LOW 20 MIN: CPT | Performed by: PHYSICIAN ASSISTANT

## 2025-06-30 PROCEDURE — G8399 PT W/DXA RESULTS DOCUMENT: HCPCS | Performed by: PHYSICIAN ASSISTANT

## 2025-06-30 PROCEDURE — 1090F PRES/ABSN URINE INCON ASSESS: CPT | Performed by: PHYSICIAN ASSISTANT

## 2025-06-30 PROCEDURE — 1036F TOBACCO NON-USER: CPT | Performed by: PHYSICIAN ASSISTANT

## 2025-06-30 PROCEDURE — 3074F SYST BP LT 130 MM HG: CPT | Performed by: PHYSICIAN ASSISTANT

## 2025-06-30 PROCEDURE — G8417 CALC BMI ABV UP PARAM F/U: HCPCS | Performed by: PHYSICIAN ASSISTANT

## 2025-06-30 PROCEDURE — 1160F RVW MEDS BY RX/DR IN RCRD: CPT | Performed by: PHYSICIAN ASSISTANT

## 2025-06-30 RX ORDER — HYDROCODONE BITARTRATE AND ACETAMINOPHEN 5; 325 MG/1; MG/1
1 TABLET ORAL DAILY PRN
Qty: 30 TABLET | Refills: 0 | Status: SHIPPED | OUTPATIENT
Start: 2025-06-30 | End: 2025-07-30

## 2025-06-30 RX ORDER — GABAPENTIN 300 MG/1
CAPSULE ORAL
Qty: 90 CAPSULE | Refills: 2 | Status: SHIPPED | OUTPATIENT
Start: 2025-06-30 | End: 2026-06-30

## 2025-06-30 NOTE — PROGRESS NOTES
[facility]  [address], [place] [address]  [phone]    Follow up Note      Ruth Anguiano     Date of Visit:  6/30/2025    CC:  Patient presents for follow up   Chief Complaint   Patient presents with    Back Pain    Neck Pain             HPI:    Pain is unchanged.        Appropriate analgesia with current medications regimen: yes.    Change in quality of symptoms:no.    Medication side effects:none.   Recent diagnostic testing:none.   Recent interventional procedures:none.    She has not been on anticoagulation medications to include ASA. The patient  has not been on herbal supplements.  The patient is not diabetic.     Imaging studies:    06/09/222 Left hip x-ray    FINDINGS:   Anatomically aligned left MERVAT with no abnormal bone or soft tissue findings.           Impression   Left MERVAT with no unexpected findings.           Cervical XR 11/2019 Severe degenerative changes      Lumbar spine Xray 03/2019  Scoliosis and osteoarthritis.     Bilateral hip Xray 03/2019   Advanced osteoarthrosis of bilateral hips.                                        Potential Aberrant Drug-Related Behavior: None     Urine Drug Screening:  First office visit saliva screen showed no narcotics   11/2019 Consistent, negative for all  06/2020 Consistent  12/2020 Consistent  06/2021 Consistent  04/2022 Consistent  02/2023 Consistent for gabapentin, consistent for no tylenol with codeine as she did not have an active script.    08/2023 Consistent  06/2024 Consistent for gabapentin, negative for hydrocodone but takes prn.  Will continue to monitor.  No previous issues.    04/2025 negative, consistent.  Last pill 2 days prior.         OARRS report:  03/2019 consistent to 06/2021 Consistent  (norco on 9/24 from dentist for teeth extraction).  08/2021 Consistent to 06/2025 Consistent    Opioid Agreement:  10/07/2021   Updated:  09/26/2022   Updated: 08/28/2023  Updated:  08/19/2024 - update

## 2025-06-30 NOTE — PROGRESS NOTES
Ruth Anguiano presents to the [facility] on 6/30/2025. Ruth is complaining of pain in her neck and back. The pain is constant. The pain is described as aching, throbbing, stabbing, and sharp. Pain is rated on her best day at a 5, on her worst day at a 10, and on average at a 7 on the VAS scale. She took her last dose of Norco and Neurontin 2 days ago.      Any procedures since your last visit: No    She is not on NSAIDS and  is  on anticoagulation medications to include ASA and is managed by NA.     Pacemaker or defibrillator: No       Medication Contract and Consent for Opioid Use Documents Filed       Patient Documents       Type of Document Status Date Received Received By Description    Medication Contract Received 3/1/2019  1:43 PM FRANCIS MCINTOSH PAIN MANAGEMENT PT AGREEMENT 3/1/2019    Medication Contract Received 10/8/2020  1:21 PM FRANCIS MCINTOSH pain pt agreement    Medication Contract Received 10/7/2021 10:36 AM BOLIVAR TRUJILLO Pain Mgmt Patient Agreement 10.7.2021    Medication Contract Received 9/26/2022 10:40 AM ENMA WAGNER MEDICATION CONTRACT    Consent Opioid Use Received 8/28/2023 10:09 AM OLU VALENZUELA pt agreement 8/28/23    Consent Opioid Use Received 8/19/2024 12:01 PM OLU VALENZUELA pt agreement 8/19/24                       Resp 16   Ht 1.651 m (5' 5\")   Wt 103.9 kg (229 lb)   BMI 38.11 kg/m²      No LMP recorded. Patient is postmenopausal.

## 2025-07-01 ENCOUNTER — CARE COORDINATION (OUTPATIENT)
Dept: CARE COORDINATION | Age: 65
End: 2025-07-01

## 2025-07-01 NOTE — CARE COORDINATION
Ambulatory Care Coordination Note     7/1/2025 3:10 PM     Patient outreach attempt by this ACM today to perform care management follow up . ACM was unable to reach the patient by telephone today;   left voice message requesting a return phone call to this ACM.     ACM: Cynthia Houston RN     Care Summary Note:                 PCP/Specialist follow up:   Future Appointments         Provider Specialty Dept Phone    7/30/2025 1:15 PM Hien Stokes MD Family Medicine [phone]    8/11/2025 3:15 PM Massiel Kebede PA Pain Management [phone]    8/14/2025 1:30 PM (Arrive by 1:15 PM) [facility] CT SCAN 3 Radiology [phone]    8/14/2025 2:00 PM [facility] RO[facility] Pulmonary Function Testing [phone]    8/20/2025 11:15 AM Anne Polk,  Cardiology [phone]    9/5/2025 1:00 PM Diana Acevedo MD Physical Medicine and Rehab [phone]    9/12/2025 9:45 AM Hien Stokes MD Family Medicine [phone]    11/17/2025 9:30 AM Ann Betancourt, APRN - [facility] Pulmonology [phone]    11/21/2025 11:00 AM Judd Olivier MD Neurology [phone]    11/27/2025 9:30 AM Ivet Wright MD Obstetrics and Gynecology [phone]            Follow Up:   Plan for next ACM outreach in approximately 1 week to complete:  Follow previous POC.

## 2025-07-11 ENCOUNTER — CARE COORDINATION (OUTPATIENT)
Dept: CASE MANAGEMENT | Age: 65
End: 2025-07-11

## 2025-07-11 NOTE — CARE COORDINATION
-Remote Alert Monitoring Note  Date/Time:  2025 10:00 AM  Patient Current Location: [place]  Verified patients name and  as identifiers.    Rpm alert to be reviewed by the provider   red alert  weight (Gained 3# over night Has CHF  225.5 to 228.5)  Vitals Recheck F/U tomorrow  Additional needs to be addressed by provider: No         LPN contacted patient by telephone regarding red alert received   Background: Sepsis, CHF  Refer to 911 immediately if:  Patient unresponsive or unable to provide history  Change in cognition or sudden confusion  Patient unable to respond in complete sentences  Intense chest pain/tightness  Any concern for any clinical emergency  Red Alert: Provider response time of 1 hr required for any red alert requiring intervention  Yellow Alert: Provider response time of 3hr required for any escalated yellow alert  Patient Chief Complaint:  Weight Weight Scale Triage  Was your weight obtained upon rising/waking today? Yes   Was your weight obtained after voiding and/or use of the bathroom today? yes   Did you weigh yourself in the same amount of clothing today, compared to how you typically do? yes   Was the scale bumped or moved prior to today's weight? no   Is your scale on a flat/hard surface? yes   Did you obtain your weight with shoes on? no   If yes, is this something you normally do during your daily weights? no   Were you standing up straight on the scale today? yes   Were you leaning on anything while obtaining your weight today? no   Weight Education Provided to Patient or Caregiver:   Patient to weigh on the same scale at the same time each day  Patient to weigh first thing in the morning, after going to the bathroom; before eating breakfast, and before having anything to drink.  Instructed on importance of  not wearing shoes on the scale, and wearing the same type of clothing each day.    Clinical Interventions: Reviewed and followed up on alerts and treatments-Patient has 3#

## 2025-07-12 DIAGNOSIS — I10 HYPERTENSION, UNSPECIFIED TYPE: ICD-10-CM

## 2025-07-14 NOTE — TELEPHONE ENCOUNTER
Name of Medication(s) Requested:  Requested Prescriptions     Pending Prescriptions Disp Refills    atorvastatin (LIPITOR) 20 MG tablet [Pharmacy Med Name: Atorvastatin Calcium Oral Tablet 20 MG] 180 tablet 0     Sig: TAKE ONE TABLET BY MOUTH IN THE MORNING AND ONE TABLET AT BEDTIME    losartan (COZAAR) 25 MG tablet [Pharmacy Med Name: Losartan Potassium Oral Tablet 25 MG] 90 tablet 0     Sig: TAKE ONE TABLET BY MOUTH EVERY DAY       Medication is on current medication list Yes    Dosage and directions were verified? Yes    Quantity verified: 90 day supply     Pharmacy Verified?  Yes    Last Appointment:  5/30/2025    Future appts:  Future Appointments   Date Time Provider Department Center   7/30/2025  1:15 PM Hien Stokes MD [facility]   8/11/2025  3:15 PM Massiel Kebede PA [facility]   8/14/2025  1:30 PM [facility] CT SCAN 3 [facility] CT [facility] Rad/Car   8/14/2025  2:00 PM [facility] PFT STRESS LAB ROOM [facility] PFT [facility]   8/20/2025 11:15 AM Anne Polk, DO [place] [facility]   9/5/2025  1:00 PM Diana Acevedo MD BDM PMR [facility]   9/12/2025  9:45 AM Hien Stokes MD [facility]   11/17/2025  9:30 AM Ann Betancourt, APRN - CNP ACC Pulm [facility]   11/21/2025 11:00 AM Judd Olivier MD [facility] NEURO Neurology -   11/25/2025  1:45 PM Ivet Wright MD ACC Womens [facility]        (If no appt send self scheduling link. .REFILLAPPT)  Scheduling request sent?     [] Yes  [x] No    Does patient need updated?  [x] Yes  [] No

## 2025-07-15 RX ORDER — LOSARTAN POTASSIUM 25 MG/1
25 TABLET ORAL DAILY
Qty: 90 TABLET | Refills: 0 | Status: SHIPPED | OUTPATIENT
Start: 2025-07-15

## 2025-07-15 RX ORDER — MAGNESIUM OXIDE 400 MG/1
400 TABLET ORAL DAILY
Qty: 30 TABLET | Refills: 2 | Status: SHIPPED | OUTPATIENT
Start: 2025-07-15

## 2025-07-15 RX ORDER — ATORVASTATIN CALCIUM 20 MG/1
TABLET, FILM COATED ORAL
Qty: 180 TABLET | Refills: 0 | Status: SHIPPED | OUTPATIENT
Start: 2025-07-15

## 2025-07-15 NOTE — TELEPHONE ENCOUNTER
Name of Medication(s) Requested:  Requested Prescriptions     Pending Prescriptions Disp Refills    magnesium oxide (MAG-OX) 400 MG tablet 30 tablet 2     Sig: Take 1 tablet by mouth daily       Medication is on current medication list Yes    Dosage and directions were verified? Yes    Quantity verified: 90 day supply     Pharmacy Verified?  Yes    Last Appointment:  5/30/2025    Future appts:  Future Appointments   Date Time Provider Department Center   7/30/2025  1:15 PM Hien Stokes MD [facility]   8/11/2025  3:15 PM Massiel Kebede PA [facility]   8/14/2025  1:30 PM SE CT SCAN 3 SEYZ CT [facility] Rad/Car   8/14/2025  2:00 PM SE[facility] PFT STRESS LAB ROOM [facility] PFT [facility]   8/20/2025 11:15 AM Anne Polk,  [place] [facility]   9/5/2025  1:00 PM Diana Acevedo MD BDM PMR [facility]   9/12/2025  9:45 AM Hien Stokes MD [facility]   11/17/2025  9:30 AM Ann Betancourt, APRN - CNP ACC Pulm [facility]   11/21/2025 11:00 AM [facility]   11/25/2025  1:45 PM Ivet Wright MD [facility] Womens [facility]        (If no appt send self scheduling link. .REFILLAPPT)  Scheduling request sent?     [] Yes  [x] No    Does patient need updated?  [x] Yes  [] No

## 2025-07-15 NOTE — TELEPHONE ENCOUNTER
Last Appointment   5/30/2025  Next Appointment  7/30/2025    Patient requesting refill of Mag-Ox. She would like this sent to [facility] on [address].

## 2025-07-18 ENCOUNTER — CARE COORDINATION (OUTPATIENT)
Dept: CARE COORDINATION | Age: 65
End: 2025-07-18

## 2025-07-18 NOTE — CARE COORDINATION
2025 1:54 PM  *  Alert and Triage   -Remote Alert Monitoring Note      Date/Time:  2025 2:16 PM  Patient Current Location: Home: [address] [place], [place] [address] [place] [address]  Verified patients name and  as identifiers.    Rpm alert to be reviewed by the provider   red alert  survey response (listed below in red)  Q)  (Sepsis) Have you experienced any chills, fever or sweats in the past 24 hours?  A)  Yes  2025 12:53 PM  Q)  (Sepsis) Have you experienced any confusion or increased difficulty sleeping in the past 24 hours?  A)  No  2025 12:53 PM  Q)  (Sepsis) Have you had an increase in coughing or wheezing in the past 24 hours?  A)  Yes  2025 12:53 PM    Vitals Recheck survey response (tomorrow)  Additional needs to be addressed by provider: RUFINA      PCP,    Called [phone], Ruth Anguiano has been speaking with ACM RN, Cynthia.     - Ruth Anguiano denies experiencing chills or fever.   - Ruth Anguiano states she still sweats since recovering from Sepsis, and it remains severe; she seems to sweat constantly. She was told she might have it forever, or it could be short-term.   - She reports shortness of breath, coughing, and wheezing due to Emphysema/COPD. She takes medication at 7 am, which seems to help, but she also has an emergency inhaler and nebulizer if needed.    - Ruth Anguiano also complains of ankle, foot, and leg swelling since her hospital stay.  - She denies chest pain.      Will see PCP on 2025.     Please advise,   Thanks.  Rhina UP contacted patient by telephone regarding red alert received   Background: enrolled in RPM for RPM Care Plans: Sepsis and CHF    Refer to 911 immediately if:  Patient unresponsive or unable to provide history  Change in cognition or sudden confusion  Patient unable to respond in complete sentences  Intense chest pain/tightness  Any concern for any clinical emergency  Red Alert: Provider response time of 1 hr

## 2025-07-22 ENCOUNTER — CARE COORDINATION (OUTPATIENT)
Dept: CASE MANAGEMENT | Age: 65
End: 2025-07-22

## 2025-07-22 RX ORDER — POLYETHYLENE GLYCOL 3350 17 G/17G
17 POWDER, FOR SOLUTION ORAL DAILY
Qty: 527 G | Refills: 2 | Status: SHIPPED | OUTPATIENT
Start: 2025-07-22

## 2025-07-22 NOTE — CARE COORDINATION
-Remote Alert Monitoring Note  Date/Time:  2025   2:24 PM  Patient Current Location: [place]  Verified patients name and  as identifiers.    Rpm alert to be reviewed by the provider   red alert  weight (Patient gained 5# in 7 days  228.5 to 233.5   Has CHF )  Vitals Recheck   Additional needs to be addressed by provider:     Patient has 5# weight gain in 7 days.  Called and spoke with patient. The patient is in no apparent distress at this time.  Patient denies CP, SOB, Scale issues. Patient states, \"I have swelling on the tops of my feet and my ankles. I have not had a good BM in awhile. I had a little bit yesterday and the day before. I'm taking Colace and Benefiber\".  Patient denies abdominal pain. Patient has bloating of the abdomen. Patient states, \"I'm not really passing gas\". Patient has taken Chlorthalidone 25 mg daily this morning. Has PCP appointment 25. Will escalate to PCP for weight gain, swelling, abdominal bloating, No good BM in a few days, and no passing gas.            Hien Stokes MD at 2025  8:51 AM    Status: Signed   Notified. Will discuss with patient at stated appointment.        Called patient with above information.  Patient expresses understanding. Will F/U tomorrow.     Becki Guallpa LPN    [phone]  [facility] Coordinator / RPM

## 2025-07-22 NOTE — TELEPHONE ENCOUNTER
Please call patient  Recommend she start taking miralax    (See note from remote monitoring re; weight gain/ constipation)

## 2025-07-22 NOTE — CARE COORDINATION
-Remote Alert Monitoring Note  Date/Time:  2025 11:26 AM  Patient Current Location: [place]  Verified patients name and  as identifiers.    Rpm alert to be reviewed by the provider   red alert  weight (Patient gained 5# in 7 days  228.5 to 233.5   Has CHF )  Vitals Recheck   Additional needs to be addressed by provider: No         LPN attempted to contacted patient by telephone regarding red alert received   Background: Sepsis, CHF  Refer to 911 immediately if:  Patient unresponsive or unable to provide history  Change in cognition or sudden confusion  Patient unable to respond in complete sentences  Intense chest pain/tightness  Any concern for any clinical emergency  Red Alert: Provider response time of 1 hr required for any red alert requiring intervention  Yellow Alert: Provider response time of 3hr required for any escalated yellow alert    Clinical Interventions: Reviewed and followed up on alerts and treatments-Patient has 5# weight gain in 7 days.      Attempted to reach patient for RPM Red Alert Call. Unable to reach patient.  Left HIPAA Compliant message on Voice Mail to Call Back. Number left on Voice mail.   Attempted to reach ER Contact, CONSTANTINO Warren. Not accepting calls at this time.  Will continue to follow.     Becki Guallpa LPN    [phone]  [facility] Coordinator     Plan/Follow Up: Will continue to review, monitor and address alerts with follow up based on severity of symptoms and risk factors.  **For any new or worsening symptoms or you are concerned in anyway, please contact your Provider or report to the nearest Emergency Room.**

## 2025-07-22 NOTE — CARE COORDINATION
-Remote Alert Monitoring Note  Date/Time:  2025   12:50 PM  Patient Current Location: [place]  Verified patients name and  as identifiers.    Rpm alert to be reviewed by the provider   red alert  weight (Patient gained 5# in 7 days  228.5 to 233.5   Has CHF )  Vitals Recheck   Additional needs to be addressed by provider:     Patient has 5# weight gain in 7 days.  Called and spoke with patient. The patient is in no apparent distress at this time.  Patient denies CP, SOB, Scale issues. Patient states, \"I have swelling on the tops of my feet and my ankles. I have not had a good BM in awhile. I had a little bit yesterday and the day before. I'm taking Colace and Benefiber\".  Patient denies abdominal pain. Patient has bloating of the abdomen. Patient states, \"I'm not really passing gas\". Patient has taken Chlorthalidone 25 mg daily this morning. Has PCP appointment 25. Will escalate to PCP for weight gain, swelling, abdominal bloating, No good BM in a few days, and no passing gas.              LPN contacted patient by telephone regarding red alert received   Background: Sepsis, CHF  Refer to 911 immediately if:  Patient unresponsive or unable to provide history  Change in cognition or sudden confusion  Patient unable to respond in complete sentences  Intense chest pain/tightness  Any concern for any clinical emergency  Red Alert: Provider response time of 1 hr required for any red alert requiring intervention  Yellow Alert: Provider response time of 3hr required for any escalated yellow alert  Patient Chief Complaint:  Weight Weight Scale Triage  Was your weight obtained upon rising/waking today? Yes   Was your weight obtained after voiding and/or use of the bathroom today? yes   Did you weigh yourself in the same amount of clothing today, compared to how you typically do? yes   Was the scale bumped or moved prior to today's weight? no   Is your scale on a flat/hard surface? yes   Did you obtain your weight

## 2025-07-23 ENCOUNTER — CARE COORDINATION (OUTPATIENT)
Dept: CARE COORDINATION | Age: 65
End: 2025-07-23

## 2025-07-23 ASSESSMENT — ENCOUNTER SYMPTOMS: DYSPNEA ASSOCIATED WITH: MINIMAL EXERTION

## 2025-07-23 NOTE — CARE COORDINATION
Ambulatory Care Coordination Note     2025 6:34 PM     Patient Current Location:  [place]: [place] [address]     ACM contacted the patient by telephone. Verified name and  with patient as identifiers.         ACM: Cynthia Houston RN     Challenges to be reviewed by the provider   Additional needs identified to be addressed with provider No  NA               Method of communication with provider: none.    Utilization: Patient has not had any utilization since our last call.     Care Summary Note:   Appointments reviewed.  No questions and aware of all  See assessments for CHF, HTN, COPD  See RPM  Pain is a 4-5 on scale 0-10.  BLE pain. Lumbar pain.  PM appt Dr. Kebede - patient scheduled for 25 to discuss BLE pain/constipation  B foot edema continues unchanged as per baseline.  Denies any s/s CHF or Afib. No soon appt with cardiology needed/noted. NOV in 2025  No discoloration changes, no wounds, bruising or drg.  ACM continues to encourage patient to take temp daily to help with sepsis prevention.  Agreed and verbalized understanding.      Patient concerned having BM habit changes.  Patient is experiencing constipation that is severe at times.  She is not drinking less or eating too much of a certain food.  Patient states, \"This all started happening after I had my colonoscopy and all the doctors are aware.\"  Patient also admits to taking a different opioid prior to her colonoscopy.  She admits to only taking Norco PRN. ACM encouraged patient to speak with her PM provider Yandy at NOV.  Patient verbalized understanding and she will speak to PCP, as well.  Patient is having Bms but loose/liquid, after passing small amounts of hard pebble stool.  She is continuing to use the Benefiber as per dr recommendations.   Patient is also concerned about her BLE foot edema.  Not worsening but not her usual, prior to the episode with sepsis  Patient continues to have large bouts of

## 2025-07-28 ENCOUNTER — TELEPHONE (OUTPATIENT)
Age: 65
End: 2025-07-28

## 2025-07-28 DIAGNOSIS — M47.816 LUMBAR FACET ARTHROPATHY: ICD-10-CM

## 2025-07-28 DIAGNOSIS — M54.16 LUMBAR RADICULITIS: ICD-10-CM

## 2025-07-28 DIAGNOSIS — Z96.642 S/P TOTAL LEFT HIP ARTHROPLASTY: ICD-10-CM

## 2025-07-28 DIAGNOSIS — M51.9 LUMBAR DISC DISORDER: ICD-10-CM

## 2025-07-28 DIAGNOSIS — G89.4 CHRONIC PAIN SYNDROME: ICD-10-CM

## 2025-07-28 DIAGNOSIS — M50.30 DEGENERATION OF CERVICAL DISC WITHOUT MYELOPATHY: ICD-10-CM

## 2025-07-28 DIAGNOSIS — M16.11 ARTHRITIS OF RIGHT HIP: ICD-10-CM

## 2025-07-28 DIAGNOSIS — M47.812 FACET ARTHROPATHY, CERVICAL: ICD-10-CM

## 2025-07-28 DIAGNOSIS — M16.12 PRIMARY OSTEOARTHRITIS OF LEFT HIP: ICD-10-CM

## 2025-07-28 DIAGNOSIS — M47.816 LUMBAR SPONDYLOSIS: ICD-10-CM

## 2025-07-28 RX ORDER — HYDROCODONE BITARTRATE AND ACETAMINOPHEN 5; 325 MG/1; MG/1
1 TABLET ORAL DAILY PRN
Qty: 12 TABLET | Refills: 0 | Status: SHIPPED | OUTPATIENT
Start: 2025-07-30 | End: 2025-08-11

## 2025-07-28 NOTE — TELEPHONE ENCOUNTER
Ruth is requesting a refill of Norco. Last filled 6/30. OARRS is consistent. Last appt 6/30. Next appt 8/11. Thanks.

## 2025-07-30 ENCOUNTER — OFFICE VISIT (OUTPATIENT)
Dept: FAMILY MEDICINE CLINIC | Age: 65
End: 2025-07-30

## 2025-07-30 ENCOUNTER — CARE COORDINATION (OUTPATIENT)
Dept: CARE COORDINATION | Age: 65
End: 2025-07-30

## 2025-07-30 VITALS
DIASTOLIC BLOOD PRESSURE: 72 MMHG | HEART RATE: 85 BPM | TEMPERATURE: 97.2 F | BODY MASS INDEX: 38.82 KG/M2 | HEIGHT: 65 IN | RESPIRATION RATE: 15 BRPM | OXYGEN SATURATION: 95 % | SYSTOLIC BLOOD PRESSURE: 125 MMHG | WEIGHT: 233 LBS

## 2025-07-30 DIAGNOSIS — K59.09 CHRONIC CONSTIPATION: ICD-10-CM

## 2025-07-30 DIAGNOSIS — I50.32 CHRONIC HEART FAILURE WITH PRESERVED EJECTION FRACTION (HCC): Chronic | ICD-10-CM

## 2025-07-30 DIAGNOSIS — M72.2 PLANTAR FASCIITIS: ICD-10-CM

## 2025-07-30 DIAGNOSIS — I10 HYPERTENSION, UNSPECIFIED TYPE: Chronic | ICD-10-CM

## 2025-07-30 DIAGNOSIS — R30.0 DYSURIA: Primary | ICD-10-CM

## 2025-07-30 PROBLEM — I48.91 ATRIAL FIBRILLATION WITH RVR (HCC): Status: RESOLVED | Noted: 2022-12-16 | Resolved: 2025-07-30

## 2025-07-30 LAB
BILIRUBIN, POC: NEGATIVE
BLOOD URINE, POC: NEGATIVE
CLARITY, POC: NORMAL
COLOR, POC: NORMAL
GLUCOSE URINE, POC: NEGATIVE MG/DL
KETONES, POC: NORMAL MG/DL
LEUKOCYTE EST, POC: NEGATIVE
NITRITE, POC: NEGATIVE
PH, POC: 7
PROTEIN, POC: NEGATIVE MG/DL
SPECIFIC GRAVITY, POC: 1.01
UROBILINOGEN, POC: 0.2 MG/DL

## 2025-07-30 RX ORDER — PLECANATIDE 3 MG/1
3 TABLET ORAL DAILY PRN
Qty: 30 TABLET | Refills: 1 | Status: SHIPPED | OUTPATIENT
Start: 2025-07-30

## 2025-07-30 RX ORDER — SENNOSIDES 8.6 MG
2 TABLET ORAL EVERY EVENING
Qty: 60 TABLET | Refills: 2 | Status: SHIPPED | OUTPATIENT
Start: 2025-07-30

## 2025-07-30 NOTE — CARE COORDINATION
Ambulatory Care Coordination Note     2025 11:43 AM     Patient Current Location:  Home: [address] [place] [address]  [place] [address]     ACM contacted the patient by telephone. Verified name and  with patient as identifiers.         ACM: Cynthia Houston RN     Challenges to be reviewed by the provider   Additional needs identified to be addressed with provider No  NA               Method of communication with provider: none.    Utilization: Patient has not had any utilization since our last call.     Care Summary Note:     ACM gave scale to patient from Care Management shelley program.  Patient is aware.  Will  today at office, at her appt.  Patient has been educated multiple times on wt gain greater than 3# day , 5# a week and to report to cardiology or her PCP.  Patient is continuing to prepare for RPM graduation, then CM graduation.        Offered patient enrollment in the Remote Patient Monitoring (RPM) program for in-home monitoring: Yes, patient enrolled; current status is activated and monitoring.     Assessments Completed:   No changes since last call    Medications Reviewed:   Current Outpatient Medications   Medication Sig Dispense Refill    HYDROcodone-acetaminophen (NORCO) 5-325 MG per tablet Take 1 tablet by mouth daily as needed for Pain for up to 12 days. Take lowest dose possible to manage pain Max Daily Amount: 1 tablet 12 tablet 0    polyethylene glycol (GLYCOLAX) 17 g packet Take 1 packet by mouth daily Mix with 8 ounces of fluid 527 g 2    atorvastatin (LIPITOR) 20 MG tablet TAKE ONE TABLET BY MOUTH IN THE MORNING AND ONE TABLET AT BEDTIME 180 tablet 0    losartan (COZAAR) 25 MG tablet TAKE ONE TABLET BY MOUTH EVERY DAY 90 tablet 0    magnesium oxide (MAG-OX) 400 MG tablet Take 1 tablet by mouth daily 30 tablet 2    gabapentin (NEURONTIN) 300 MG capsule Take 1 capsule in the morning and take 2 capsules at bedtime 90 capsule 2    pantoprazole (PROTONIX) 40 MG tablet Take 1 tablet by mouth

## 2025-08-11 ENCOUNTER — OFFICE VISIT (OUTPATIENT)
Age: 65
End: 2025-08-11
Payer: MEDICARE

## 2025-08-11 VITALS
SYSTOLIC BLOOD PRESSURE: 141 MMHG | DIASTOLIC BLOOD PRESSURE: 81 MMHG | BODY MASS INDEX: 37.99 KG/M2 | TEMPERATURE: 96.6 F | HEIGHT: 65 IN | HEART RATE: 79 BPM | OXYGEN SATURATION: 98 % | WEIGHT: 228 LBS | RESPIRATION RATE: 16 BRPM

## 2025-08-11 DIAGNOSIS — M47.816 LUMBAR SPONDYLOSIS: ICD-10-CM

## 2025-08-11 DIAGNOSIS — E66.01 SEVERE OBESITY (BMI 35.0-39.9) WITH COMORBIDITY (HCC): ICD-10-CM

## 2025-08-11 DIAGNOSIS — M47.816 LUMBAR FACET ARTHROPATHY: ICD-10-CM

## 2025-08-11 DIAGNOSIS — Z79.891 ENCOUNTER FOR LONG-TERM OPIATE ANALGESIC USE: ICD-10-CM

## 2025-08-11 DIAGNOSIS — M50.30 DEGENERATION OF CERVICAL DISC WITHOUT MYELOPATHY: ICD-10-CM

## 2025-08-11 DIAGNOSIS — M54.16 LUMBAR RADICULITIS: ICD-10-CM

## 2025-08-11 DIAGNOSIS — G89.4 CHRONIC PAIN SYNDROME: Primary | ICD-10-CM

## 2025-08-11 DIAGNOSIS — M51.9 LUMBAR DISC DISORDER: ICD-10-CM

## 2025-08-11 DIAGNOSIS — Z96.642 S/P TOTAL LEFT HIP ARTHROPLASTY: ICD-10-CM

## 2025-08-11 DIAGNOSIS — M16.11 ARTHRITIS OF RIGHT HIP: ICD-10-CM

## 2025-08-11 DIAGNOSIS — M16.12 PRIMARY OSTEOARTHRITIS OF LEFT HIP: ICD-10-CM

## 2025-08-11 DIAGNOSIS — M47.812 FACET ARTHROPATHY, CERVICAL: ICD-10-CM

## 2025-08-11 PROCEDURE — 3017F COLORECTAL CA SCREEN DOC REV: CPT | Performed by: PHYSICIAN ASSISTANT

## 2025-08-11 PROCEDURE — 1090F PRES/ABSN URINE INCON ASSESS: CPT | Performed by: PHYSICIAN ASSISTANT

## 2025-08-11 PROCEDURE — G8399 PT W/DXA RESULTS DOCUMENT: HCPCS | Performed by: PHYSICIAN ASSISTANT

## 2025-08-11 PROCEDURE — 1160F RVW MEDS BY RX/DR IN RCRD: CPT | Performed by: PHYSICIAN ASSISTANT

## 2025-08-11 PROCEDURE — G8417 CALC BMI ABV UP PARAM F/U: HCPCS | Performed by: PHYSICIAN ASSISTANT

## 2025-08-11 PROCEDURE — 3077F SYST BP >= 140 MM HG: CPT | Performed by: PHYSICIAN ASSISTANT

## 2025-08-11 PROCEDURE — 1123F ACP DISCUSS/DSCN MKR DOCD: CPT | Performed by: PHYSICIAN ASSISTANT

## 2025-08-11 PROCEDURE — G8427 DOCREV CUR MEDS BY ELIG CLIN: HCPCS | Performed by: PHYSICIAN ASSISTANT

## 2025-08-11 PROCEDURE — 99213 OFFICE O/P EST LOW 20 MIN: CPT | Performed by: PHYSICIAN ASSISTANT

## 2025-08-11 PROCEDURE — 3079F DIAST BP 80-89 MM HG: CPT | Performed by: PHYSICIAN ASSISTANT

## 2025-08-11 PROCEDURE — 1036F TOBACCO NON-USER: CPT | Performed by: PHYSICIAN ASSISTANT

## 2025-08-11 PROCEDURE — 1159F MED LIST DOCD IN RCRD: CPT | Performed by: PHYSICIAN ASSISTANT

## 2025-08-11 RX ORDER — HYDROCODONE BITARTRATE AND ACETAMINOPHEN 5; 325 MG/1; MG/1
1 TABLET ORAL DAILY PRN
Qty: 30 TABLET | Refills: 0 | Status: SHIPPED | OUTPATIENT
Start: 2025-08-11 | End: 2025-09-10

## 2025-08-12 ENCOUNTER — CARE COORDINATION (OUTPATIENT)
Dept: CARE COORDINATION | Age: 65
End: 2025-08-12

## 2025-08-12 ENCOUNTER — CARE COORDINATION (OUTPATIENT)
Dept: CASE MANAGEMENT | Age: 65
End: 2025-08-12

## 2025-08-13 ENCOUNTER — CARE COORDINATION (OUTPATIENT)
Dept: PRIMARY CARE CLINIC | Age: 65
End: 2025-08-13

## 2025-08-13 DIAGNOSIS — I50.32 CHRONIC HEART FAILURE WITH PRESERVED EJECTION FRACTION (HCC): Primary | Chronic | ICD-10-CM

## 2025-08-13 DIAGNOSIS — A41.9 SEPSIS, DUE TO UNSPECIFIED ORGANISM, UNSPECIFIED WHETHER ACUTE ORGAN DYSFUNCTION PRESENT (HCC): ICD-10-CM

## 2025-08-14 ENCOUNTER — HOSPITAL ENCOUNTER (OUTPATIENT)
Dept: PULMONOLOGY | Age: 65
Discharge: HOME | End: 2025-08-14
Payer: MEDICARE

## 2025-08-14 ENCOUNTER — CARE COORDINATION (OUTPATIENT)
Dept: CARE COORDINATION | Age: 65
End: 2025-08-14

## 2025-08-14 ENCOUNTER — HOSPITAL ENCOUNTER (OUTPATIENT)
Dept: CT IMAGING | Age: 65
Discharge: HOME | End: 2025-08-16
Payer: MEDICARE

## 2025-08-14 DIAGNOSIS — Z87.891 PERSONAL HISTORY OF TOBACCO USE: ICD-10-CM

## 2025-08-14 PROCEDURE — 94729 DIFFUSING CAPACITY: CPT

## 2025-08-14 PROCEDURE — 94060 EVALUATION OF WHEEZING: CPT

## 2025-08-14 PROCEDURE — 94726 PLETHYSMOGRAPHY LUNG VOLUMES: CPT

## 2025-08-14 PROCEDURE — 71271 CT THORAX LUNG CANCER SCR C-: CPT

## 2025-08-20 ENCOUNTER — OFFICE VISIT (OUTPATIENT)
Dept: CARDIOLOGY CLINIC | Age: 65
End: 2025-08-20
Payer: MEDICARE

## 2025-08-20 VITALS
HEART RATE: 98 BPM | BODY MASS INDEX: 38.55 KG/M2 | WEIGHT: 231.4 LBS | TEMPERATURE: 97.6 F | OXYGEN SATURATION: 95 % | SYSTOLIC BLOOD PRESSURE: 122 MMHG | HEIGHT: 65 IN | RESPIRATION RATE: 14 BRPM | DIASTOLIC BLOOD PRESSURE: 78 MMHG

## 2025-08-20 DIAGNOSIS — I48.91 ATRIAL FIBRILLATION, UNSPECIFIED TYPE (HCC): Primary | ICD-10-CM

## 2025-08-20 PROCEDURE — 93000 ELECTROCARDIOGRAM COMPLETE: CPT | Performed by: INTERNAL MEDICINE

## 2025-08-20 PROCEDURE — G2211 COMPLEX E/M VISIT ADD ON: HCPCS | Performed by: INTERNAL MEDICINE

## 2025-08-20 PROCEDURE — G8427 DOCREV CUR MEDS BY ELIG CLIN: HCPCS | Performed by: INTERNAL MEDICINE

## 2025-08-20 PROCEDURE — G8417 CALC BMI ABV UP PARAM F/U: HCPCS | Performed by: INTERNAL MEDICINE

## 2025-08-20 PROCEDURE — 1036F TOBACCO NON-USER: CPT | Performed by: INTERNAL MEDICINE

## 2025-08-20 PROCEDURE — 1159F MED LIST DOCD IN RCRD: CPT | Performed by: INTERNAL MEDICINE

## 2025-08-20 PROCEDURE — 99214 OFFICE O/P EST MOD 30 MIN: CPT | Performed by: INTERNAL MEDICINE

## 2025-08-20 PROCEDURE — 1123F ACP DISCUSS/DSCN MKR DOCD: CPT | Performed by: INTERNAL MEDICINE

## 2025-08-20 PROCEDURE — 3074F SYST BP LT 130 MM HG: CPT | Performed by: INTERNAL MEDICINE

## 2025-08-20 PROCEDURE — G8399 PT W/DXA RESULTS DOCUMENT: HCPCS | Performed by: INTERNAL MEDICINE

## 2025-08-20 PROCEDURE — 1090F PRES/ABSN URINE INCON ASSESS: CPT | Performed by: INTERNAL MEDICINE

## 2025-08-20 PROCEDURE — 3017F COLORECTAL CA SCREEN DOC REV: CPT | Performed by: INTERNAL MEDICINE

## 2025-08-20 PROCEDURE — 3078F DIAST BP <80 MM HG: CPT | Performed by: INTERNAL MEDICINE

## 2025-08-25 ENCOUNTER — TELEPHONE (OUTPATIENT)
Age: 65
End: 2025-08-25

## 2025-08-28 ENCOUNTER — TELEPHONE (OUTPATIENT)
Dept: FAMILY MEDICINE CLINIC | Age: 65
End: 2025-08-28

## 2025-08-28 DIAGNOSIS — I10 HYPERTENSION, UNSPECIFIED TYPE: ICD-10-CM

## 2025-08-28 RX ORDER — CHLORTHALIDONE 25 MG/1
25 TABLET ORAL DAILY
Qty: 30 TABLET | Refills: 2 | Status: SHIPPED | OUTPATIENT
Start: 2025-08-28

## (undated) DEVICE — GLOVE SURG SZ 8 L12IN FNGR THK79MIL GRN LTX FREE

## (undated) DEVICE — SEALER ENDOSCP NANO COAT OPN DIV CRV L JAW LIGASURE IMPACT

## (undated) DEVICE — CONNECTOR TBNG AUX H2O JET DISP FOR OLY 160/180 SER

## (undated) DEVICE — GOWN,AURORA,BRTHSLV,2XL,18/CS: Brand: MEDLINE

## (undated) DEVICE — Z DISCONTINUED NO SUB IDED TUBING ETCO2 AD L6.5FT NSL ORAL CVD PRNG NONFLARED TIP OVR

## (undated) DEVICE — DRILL SYSTEM 7

## (undated) DEVICE — FORCEPS BX L160CM JAW DIA2.4MM YEL L CAP W/ NDL DISP RAD

## (undated) DEVICE — 4-PORT MANIFOLD: Brand: NEPTUNE 2

## (undated) DEVICE — GOWN,SIRUS,FABRNF,XL,20/CS: Brand: MEDLINE

## (undated) DEVICE — BLADE,STAINLESS-STEEL,15,STRL,DISPOSABLE: Brand: MEDLINE

## (undated) DEVICE — DOUBLE BASIN SET: Brand: MEDLINE INDUSTRIES, INC.

## (undated) DEVICE — DRIP REDUCTION MANIFOLD

## (undated) DEVICE — APPLICATOR MEDICATED 26 CC SOLUTION HI LT ORNG CHLORAPREP

## (undated) DEVICE — SOLUTION IRRIG 1000ML 0.9% SOD CHL USP POUR PLAS BTL

## (undated) DEVICE — HANDPIECE SET WITH BONE CLEANING TIP AND SUCTION TUBE: Brand: INTERPULSE

## (undated) DEVICE — SET ORTHO ACCOLADE TOTAL HIP BROACH

## (undated) DEVICE — INTENDED FOR TISSUE SEPARATION, AND OTHER PROCEDURES THAT REQUIRE A SHARP SURGICAL BLADE TO PUNCTURE OR CUT.: Brand: BARD-PARKER ® STAINLESS STEEL BLADES

## (undated) DEVICE — TRAP POLYP ETRAP

## (undated) DEVICE — Z DISCONTINUED USE 2272117 DRAPE SURG 3 QTR N INVASIVE 2 LAYR DISP

## (undated) DEVICE — PAD,SANITARY,11 IN,MAXI,N-STRL,IND WRAP: Brand: MEDLINE

## (undated) DEVICE — BANDAGE,GAUZE,BULKEE II,4.5"X4.1YD,STRL: Brand: MEDLINE

## (undated) DEVICE — GLOVE ORANGE PI 7 1/2   MSG9075

## (undated) DEVICE — SET ORTHO STD STORTSTD2

## (undated) DEVICE — PATIENT RETURN ELECTRODE, SINGLE-USE, CONTACT QUALITY MONITORING, ADULT, WITH 9FT CORD, FOR PATIENTS WEIGING OVER 33LBS. (15KG): Brand: MEGADYNE

## (undated) DEVICE — PACK PROCEDURE SURG GEN CUST

## (undated) DEVICE — SYRINGE MED 50ML LUERLOCK TIP

## (undated) DEVICE — STAPLER INT L75MM CUT LN L73MM STPL LN L77MM BLU B FRM 8

## (undated) DEVICE — ELECTRODE PT RET AD L9FT HI MOIST COND ADH HYDRGEL CORDED

## (undated) DEVICE — SOLUTION IRRIG 3000ML 0.9% SOD CHL USP UROMATIC PLAS CONT

## (undated) DEVICE — RETRACTOR KNE PCA

## (undated) DEVICE — SET ORTHO ACCOLADE TOTAL HIP INSRTION

## (undated) DEVICE — GOWN,BREATHABLE,IMP SLV 3XL AURORA: Brand: MEDLINE

## (undated) DEVICE — CONTAINER SPEC 60ML PH 7NEUTRAL BUFF FRMLN RDY TO USE

## (undated) DEVICE — SYRINGE MED 10ML POLYPR LUERSLIP TIP FLAT TOP W/O SFTY DISP

## (undated) DEVICE — DRAINBAG,ANTI-REFLUX TOWER,L/F,2000ML,LL: Brand: MEDLINE

## (undated) DEVICE — SET ORTHO TRI CERAMIC ACET INST

## (undated) DEVICE — CLOTH SURG PREP PREOPERATIVE CHLORHEXIDINE GLUC 2% READYPREP

## (undated) DEVICE — VAGINAL PREP TRAY: Brand: MEDLINE INDUSTRIES, INC.

## (undated) DEVICE — GAMMEX® NON-LATEX PI ORTHO SIZE 8, STERILE POLYISOPRENE POWDER-FREE SURGICAL GLOVE: Brand: GAMMEX

## (undated) DEVICE — PILLOW POS W15XH6XL22IN RASPBERRY FOAM ABD W/ STRP DISP FOR

## (undated) DEVICE — GOWN,BREATHABLE SLV,AURORA,XLG,STRL: Brand: MEDLINE

## (undated) DEVICE — CATHETER URETH 16FR BLLN 5CC SIL HYDRGEL 2 W F LUBRICIOUS

## (undated) DEVICE — SYRINGE,CONTROL,LL,FINGER,GRIP: Brand: MEDLINE INDUSTRIES, INC.

## (undated) DEVICE — CANNULA NSL ORAL AD FOR CAPNOFLEX CO2 O2 AIRLFE

## (undated) DEVICE — SURGICAL PROCEDURE PACK BASIC

## (undated) DEVICE — 1000 S-DRAPE TOWEL DRAPE 10/BX: Brand: STERI-DRAPE™

## (undated) DEVICE — KIT PLT RATIO DISPNS KT 2IN CANN TIP SPRY TIP DISP MAGELLAN

## (undated) DEVICE — Z INACTIVE USE 2660664 SOLUTION IRRIG 3000ML 0.9% SOD CHL USP UROMATIC PLAS CONT

## (undated) DEVICE — E-Z CLEAN, NON-STICK, PTFE COATED, ELECTROSURGICAL BLADE ELECTRODE, 6.5 INCH (16.5 CM): Brand: MEGADYNE

## (undated) DEVICE — SET TRIDENT SCREW INSTR

## (undated) DEVICE — TUBING SUCT 12FR MAL ALUM SHFT FN CAP VENT UNIV CONN W/ OBT

## (undated) DEVICE — NEEDLE HYPO 18GA L1.5IN PNK POLYPR HUB S STL REG BVL STR

## (undated) DEVICE — 3M™ IOBAN™ 2 ANTIMICROBIAL INCISE DRAPE 6651EZ: Brand: IOBAN™ 2

## (undated) DEVICE — BLOCK BITE 60FR RUBBER ADLT DENTAL

## (undated) DEVICE — STRYKER PERFORMANCE SERIES SAGITTAL BLADE: Brand: STRYKER PERFORMANCE SERIES

## (undated) DEVICE — APPLICATOR PREP 26ML 0.7% IOD POVACRYLEX 74% ISO ALC ST

## (undated) DEVICE — SPONGE GZ W4XL4IN RAYON POLY FILL CVR W/ NONWOVEN FAB

## (undated) DEVICE — SHEET,DRAPE,53X77,STERILE: Brand: MEDLINE

## (undated) DEVICE — RELOAD STPL L75MM OPN H3.8MM CLS 1.5MM WIRE DIA0.2MM REG

## (undated) DEVICE — FORCEPS BX L240CM JAW DIA2.4MM WRK CHN 2.8MM ORNG L CAP W/

## (undated) DEVICE — NBF PREFILLED CONTAINER 60ML

## (undated) DEVICE — SOLUTION IRRIG 1000ML STRL H2O USP PLAS POUR BTL

## (undated) DEVICE — TOTAL HIP PK

## (undated) DEVICE — SET ORTHO HIP EXTRAS

## (undated) DEVICE — TOWEL,OR,DSP,ST,BLUE,STD,6/PK,12PK/CS: Brand: MEDLINE

## (undated) DEVICE — 450 ML BOTTLE OF 0.05% CHLORHEXIDINE GLUCONATE IN 99.95% STERILE WATER FOR IRRIGATION, USP AND APPLICATOR.: Brand: IRRISEPT ANTIMICROBIAL WOUND LAVAGE

## (undated) DEVICE — GLOVE ORTHO 8   MSG9480

## (undated) DEVICE — REAMER ACET

## (undated) DEVICE — DEFENDO AIR WATER SUCTION AND BIOPSY VALVE KIT FOR  OLYMPUS: Brand: DEFENDO AIR/WATER/SUCTION AND BIOPSY VALVE

## (undated) DEVICE — 3M™ STERI-DRAPE™ U-DRAPE 1015: Brand: STERI-DRAPE™

## (undated) DEVICE — PACK,AURORA,LAVH: Brand: MEDLINE

## (undated) DEVICE — PEEL-AWAY HOOD: Brand: FLYTE, SURGICOOL

## (undated) DEVICE — PICO 7 10CM X 30CM: Brand: PICO™ 7

## (undated) DEVICE — SNARE ENDOSCP POLYP SM 2.4 MM 195 CM 13 MM 2.8 MM CAPTIVATOR

## (undated) DEVICE — SET ORTHO STD STORTSTD1

## (undated) DEVICE — GAUZE,SPONGE,POST-OP,4X3,STRL,LF: Brand: MEDLINE

## (undated) DEVICE — Z DISCONTINUED PER MEDLINE USE 2741944 DRESSING AQUACEL 12 IN SURG W9XL30CM SIL CVR WTRPRF VIR BACT BARR ANTIMIC

## (undated) DEVICE — TOWEL,OR,DSP,ST,BLUE,DLX,10/PK,8PK/CS: Brand: MEDLINE

## (undated) DEVICE — Z DISCONTINUED USE 2275686 GLOVE SURG SZ 8 L12IN FNGR THK13MIL WHT ISOLEX POLYISOPRENE

## (undated) DEVICE — Y-TYPE TUR/BLADDER IRRIGATION SET, REGULATING CLAMP

## (undated) DEVICE — GLOVE SURG SZ 75 CRM LTX FREE POLYISOPRENE POLYMER BEAD ANTI

## (undated) DEVICE — EXTRAS HIP

## (undated) DEVICE — Device

## (undated) DEVICE — SPONGE,DISSECTOR,ROUND CHERRY,XR,ST,5/PK: Brand: MEDLINE

## (undated) DEVICE — CONNECTOR IRRIGATION AUXILIARY H2O JET W/ PRT MTL THRD HYDR